# Patient Record
Sex: FEMALE | Race: WHITE | NOT HISPANIC OR LATINO | Employment: OTHER | URBAN - METROPOLITAN AREA
[De-identification: names, ages, dates, MRNs, and addresses within clinical notes are randomized per-mention and may not be internally consistent; named-entity substitution may affect disease eponyms.]

---

## 2017-01-16 ENCOUNTER — APPOINTMENT (OUTPATIENT)
Dept: WOUND CARE | Facility: HOSPITAL | Age: 57
End: 2017-01-16
Attending: SURGERY

## 2017-03-11 ENCOUNTER — HOSPITAL ENCOUNTER (EMERGENCY)
Facility: HOSPITAL | Age: 57
Discharge: HOME | End: 2017-03-11
Attending: EMERGENCY MEDICINE | Admitting: EMERGENCY MEDICINE

## 2017-03-11 ENCOUNTER — APPOINTMENT (OUTPATIENT)
Dept: GENERAL RADIOLOGY | Facility: HOSPITAL | Age: 57
End: 2017-03-11

## 2017-03-11 VITALS
TEMPERATURE: 98.1 F | HEIGHT: 65 IN | SYSTOLIC BLOOD PRESSURE: 126 MMHG | WEIGHT: 165 LBS | OXYGEN SATURATION: 99 % | BODY MASS INDEX: 27.49 KG/M2 | RESPIRATION RATE: 16 BRPM | HEART RATE: 86 BPM | DIASTOLIC BLOOD PRESSURE: 77 MMHG

## 2017-03-11 DIAGNOSIS — S40.011A CONTUSION OF RIGHT SHOULDER, INITIAL ENCOUNTER: Primary | ICD-10-CM

## 2017-03-11 PROCEDURE — 73030 X-RAY EXAM OF SHOULDER: CPT

## 2017-03-11 PROCEDURE — 99283 EMERGENCY DEPT VISIT LOW MDM: CPT

## 2017-03-11 RX ORDER — HYDROCODONE BITARTRATE AND ACETAMINOPHEN 7.5; 325 MG/1; MG/1
1 TABLET ORAL ONCE
Status: COMPLETED | OUTPATIENT
Start: 2017-03-11 | End: 2017-03-11

## 2017-03-11 RX ORDER — CIPROFLOXACIN 500 MG/1
500 TABLET, FILM COATED ORAL 2 TIMES DAILY
COMMUNITY
Start: 2017-03-04 | End: 2017-03-18

## 2017-03-11 RX ADMIN — HYDROCODONE BITARTRATE AND ACETAMINOPHEN 1 TABLET: 7.5; 325 TABLET ORAL at 10:42
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
## 2017-03-13 ENCOUNTER — TRANSCRIBE ORDERS (OUTPATIENT)
Dept: ADMINISTRATIVE | Facility: HOSPITAL | Age: 57
End: 2017-03-13

## 2017-03-13 ENCOUNTER — HOSPITAL ENCOUNTER (OUTPATIENT)
Dept: MRI IMAGING | Facility: HOSPITAL | Age: 57
Discharge: HOME | End: 2017-03-13
Attending: INTERNAL MEDICINE | Admitting: INTERNAL MEDICINE

## 2017-03-13 DIAGNOSIS — M75.101 UNSPECIFIED ROTATOR CUFF TEAR OR RUPTURE OF RIGHT SHOULDER, NOT SPECIFIED AS TRAUMATIC: ICD-10-CM

## 2017-03-13 DIAGNOSIS — M75.101 UNSPECIFIED ROTATOR CUFF TEAR OR RUPTURE OF RIGHT SHOULDER, NOT SPECIFIED AS TRAUMATIC: Primary | ICD-10-CM

## 2017-03-13 PROCEDURE — 73221 MRI JOINT UPR EXTREM W/O DYE: CPT

## 2017-07-05 ENCOUNTER — HOSPITAL ENCOUNTER (OUTPATIENT)
Facility: HOSPITAL | Age: 57
Setting detail: SURGERY ADMIT
End: 2017-07-05
Attending: ORTHOPAEDIC SURGERY | Admitting: ORTHOPAEDIC SURGERY

## 2017-07-18 ENCOUNTER — APPOINTMENT (OUTPATIENT)
Dept: PREADMISSION TESTING | Facility: HOSPITAL | Age: 57
End: 2017-07-18

## 2017-08-17 ENCOUNTER — TRANSCRIBE ORDERS (OUTPATIENT)
Dept: ADMINISTRATIVE | Facility: HOSPITAL | Age: 57
End: 2017-08-17

## 2017-08-17 DIAGNOSIS — I87.2 VENOUS INSUFFICIENCY: ICD-10-CM

## 2017-08-17 DIAGNOSIS — I82.90 THROMBOSIS: Primary | ICD-10-CM

## 2017-08-29 ENCOUNTER — HOSPITAL ENCOUNTER (OUTPATIENT)
Dept: CARDIOLOGY | Facility: HOSPITAL | Age: 57
Discharge: HOME | End: 2017-08-29
Attending: INTERNAL MEDICINE | Admitting: INTERNAL MEDICINE

## 2017-08-29 DIAGNOSIS — I87.2 VENOUS INSUFFICIENCY: ICD-10-CM

## 2017-08-29 DIAGNOSIS — I82.90 THROMBOSIS: ICD-10-CM

## 2017-08-29 LAB
BH CV LOWER VASCULAR LEFT COMMON FEMORAL AUGMENT: NORMAL
BH CV LOWER VASCULAR LEFT COMMON FEMORAL COMPETENT: NORMAL
BH CV LOWER VASCULAR LEFT COMMON FEMORAL COMPRESS: NORMAL
BH CV LOWER VASCULAR LEFT COMMON FEMORAL PHASIC: NORMAL
BH CV LOWER VASCULAR LEFT COMMON FEMORAL SPONT: NORMAL
BH CV LOWER VASCULAR LEFT DISTAL FEMORAL COMPRESS: NORMAL
BH CV LOWER VASCULAR LEFT GASTRONEMIUS COMPRESS: NORMAL
BH CV LOWER VASCULAR LEFT GREATER SAPH AK COMPRESS: NORMAL
BH CV LOWER VASCULAR LEFT GREATER SAPH BK COMPRESS: NORMAL
BH CV LOWER VASCULAR LEFT MID FEMORAL AUGMENT: NORMAL
BH CV LOWER VASCULAR LEFT MID FEMORAL COMPETENT: NORMAL
BH CV LOWER VASCULAR LEFT MID FEMORAL COMPRESS: NORMAL
BH CV LOWER VASCULAR LEFT MID FEMORAL PHASIC: NORMAL
BH CV LOWER VASCULAR LEFT MID FEMORAL SPONT: NORMAL
BH CV LOWER VASCULAR LEFT PERONEAL COMPRESS: NORMAL
BH CV LOWER VASCULAR LEFT POPLITEAL AUGMENT: NORMAL
BH CV LOWER VASCULAR LEFT POPLITEAL COMPETENT: NORMAL
BH CV LOWER VASCULAR LEFT POPLITEAL COMPRESS: NORMAL
BH CV LOWER VASCULAR LEFT POPLITEAL PHASIC: NORMAL
BH CV LOWER VASCULAR LEFT POPLITEAL SPONT: NORMAL
BH CV LOWER VASCULAR LEFT POSTERIOR TIBIAL COMPRESS: NORMAL
BH CV LOWER VASCULAR LEFT PROXIMAL FEMORAL COMPRESS: NORMAL
BH CV LOWER VASCULAR LEFT SAPHENOFEMORAL JUNCTION AUGMENT: NORMAL
BH CV LOWER VASCULAR LEFT SAPHENOFEMORAL JUNCTION COMPETENT: NORMAL
BH CV LOWER VASCULAR LEFT SAPHENOFEMORAL JUNCTION COMPRESS: NORMAL
BH CV LOWER VASCULAR LEFT SAPHENOFEMORAL JUNCTION PHASIC: NORMAL
BH CV LOWER VASCULAR LEFT SAPHENOFEMORAL JUNCTION SPONT: NORMAL
BH CV LOWER VASCULAR RIGHT COMMON FEMORAL AUGMENT: NORMAL
BH CV LOWER VASCULAR RIGHT COMMON FEMORAL COMPETENT: NORMAL
BH CV LOWER VASCULAR RIGHT COMMON FEMORAL COMPRESS: NORMAL
BH CV LOWER VASCULAR RIGHT COMMON FEMORAL PHASIC: NORMAL
BH CV LOWER VASCULAR RIGHT COMMON FEMORAL SPONT: NORMAL
BH CV LOWER VASCULAR RIGHT DISTAL FEMORAL COMPRESS: NORMAL
BH CV LOWER VASCULAR RIGHT GASTRONEMIUS COMPRESS: NORMAL
BH CV LOWER VASCULAR RIGHT GREATER SAPH AK COMPRESS: NORMAL
BH CV LOWER VASCULAR RIGHT GREATER SAPH BK COMPRESS: NORMAL
BH CV LOWER VASCULAR RIGHT MID FEMORAL AUGMENT: NORMAL
BH CV LOWER VASCULAR RIGHT MID FEMORAL COMPETENT: NORMAL
BH CV LOWER VASCULAR RIGHT MID FEMORAL COMPRESS: NORMAL
BH CV LOWER VASCULAR RIGHT MID FEMORAL PHASIC: NORMAL
BH CV LOWER VASCULAR RIGHT MID FEMORAL SPONT: NORMAL
BH CV LOWER VASCULAR RIGHT PERONEAL COMPRESS: NORMAL
BH CV LOWER VASCULAR RIGHT POPLITEAL AUGMENT: NORMAL
BH CV LOWER VASCULAR RIGHT POPLITEAL COMPETENT: NORMAL
BH CV LOWER VASCULAR RIGHT POPLITEAL COMPRESS: NORMAL
BH CV LOWER VASCULAR RIGHT POPLITEAL PHASIC: NORMAL
BH CV LOWER VASCULAR RIGHT POPLITEAL SPONT: NORMAL
BH CV LOWER VASCULAR RIGHT POSTERIOR TIBIAL COMPRESS: NORMAL
BH CV LOWER VASCULAR RIGHT PROXIMAL FEMORAL COMPRESS: NORMAL
BH CV LOWER VASCULAR RIGHT SAPHENOFEMORAL JUNCTION AUGMENT: NORMAL
BH CV LOWER VASCULAR RIGHT SAPHENOFEMORAL JUNCTION COMPETENT: NORMAL
BH CV LOWER VASCULAR RIGHT SAPHENOFEMORAL JUNCTION COMPRESS: NORMAL
BH CV LOWER VASCULAR RIGHT SAPHENOFEMORAL JUNCTION PHASIC: NORMAL
BH CV LOWER VASCULAR RIGHT SAPHENOFEMORAL JUNCTION SPONT: NORMAL

## 2017-08-29 PROCEDURE — 93970 EXTREMITY STUDY: CPT

## 2018-01-23 ENCOUNTER — APPOINTMENT (OUTPATIENT)
Dept: WOMENS IMAGING | Facility: HOSPITAL | Age: 58
End: 2018-01-23

## 2018-01-23 PROCEDURE — 77067 SCR MAMMO BI INCL CAD: CPT | Performed by: RADIOLOGY

## 2018-01-23 PROCEDURE — 77063 BREAST TOMOSYNTHESIS BI: CPT | Performed by: RADIOLOGY

## 2018-03-27 ENCOUNTER — TRANSCRIBE ORDERS (OUTPATIENT)
Dept: ADMINISTRATIVE | Facility: HOSPITAL | Age: 58
End: 2018-03-27

## 2018-03-27 DIAGNOSIS — M54.12 CERVICAL RADICULOPATHY: Primary | ICD-10-CM

## 2018-03-30 ENCOUNTER — HOSPITAL ENCOUNTER (OUTPATIENT)
Dept: MRI IMAGING | Facility: HOSPITAL | Age: 58
Discharge: HOME | End: 2018-03-30
Attending: SURGERY | Admitting: SURGERY

## 2018-03-30 ENCOUNTER — TRANSCRIBE ORDERS (OUTPATIENT)
Dept: ADMINISTRATIVE | Facility: HOSPITAL | Age: 58
End: 2018-03-30

## 2018-03-30 ENCOUNTER — HOSPITAL ENCOUNTER (OUTPATIENT)
Dept: GENERAL RADIOLOGY | Facility: HOSPITAL | Age: 58
Discharge: HOME | End: 2018-03-30
Attending: INTERNAL MEDICINE

## 2018-03-30 DIAGNOSIS — M19.112 POST-TRAUMATIC OSTEOARTHRITIS OF LEFT SHOULDER: ICD-10-CM

## 2018-03-30 DIAGNOSIS — M19.112 POST-TRAUMATIC OSTEOARTHRITIS OF LEFT SHOULDER: Primary | ICD-10-CM

## 2018-03-30 DIAGNOSIS — M54.12 CERVICAL RADICULOPATHY: ICD-10-CM

## 2018-03-30 PROCEDURE — 72141 MRI NECK SPINE W/O DYE: CPT

## 2018-03-30 PROCEDURE — 73030 X-RAY EXAM OF SHOULDER: CPT

## 2018-04-03 ENCOUNTER — LAB REQUISITION (OUTPATIENT)
Dept: LAB | Facility: HOSPITAL | Age: 58
End: 2018-04-03

## 2018-04-03 DIAGNOSIS — L08.9 LOCAL INFECTION OF SKIN AND SUBCUTANEOUS TISSUE: ICD-10-CM

## 2018-04-03 PROCEDURE — 87205 SMEAR GRAM STAIN: CPT | Performed by: SURGERY

## 2018-04-03 PROCEDURE — 87070 CULTURE OTHR SPECIMN AEROBIC: CPT | Performed by: SURGERY

## 2018-04-05 LAB
BACTERIA SPEC AEROBE CULT: NORMAL
BACTERIA SPEC AEROBE CULT: NORMAL
GRAM STN SPEC: NORMAL

## 2018-05-10 ENCOUNTER — APPOINTMENT (OUTPATIENT)
Dept: OTHER | Facility: HOSPITAL | Age: 58
End: 2018-05-10

## 2018-06-21 ENCOUNTER — APPOINTMENT (OUTPATIENT)
Dept: GENERAL RADIOLOGY | Facility: HOSPITAL | Age: 58
End: 2018-06-21

## 2018-06-21 ENCOUNTER — HOSPITAL ENCOUNTER (EMERGENCY)
Facility: HOSPITAL | Age: 58
Discharge: HOME | End: 2018-06-21
Attending: EMERGENCY MEDICINE | Admitting: EMERGENCY MEDICINE

## 2018-06-21 VITALS
SYSTOLIC BLOOD PRESSURE: 148 MMHG | WEIGHT: 160 LBS | HEIGHT: 65 IN | HEART RATE: 80 BPM | OXYGEN SATURATION: 98 % | DIASTOLIC BLOOD PRESSURE: 100 MMHG | BODY MASS INDEX: 26.66 KG/M2 | RESPIRATION RATE: 18 BRPM | TEMPERATURE: 97.6 F

## 2018-06-21 DIAGNOSIS — S43.401A SPRAIN OF RIGHT SHOULDER, UNSPECIFIED SHOULDER SPRAIN TYPE, INITIAL ENCOUNTER: ICD-10-CM

## 2018-06-21 DIAGNOSIS — N30.00 ACUTE CYSTITIS WITHOUT HEMATURIA: ICD-10-CM

## 2018-06-21 DIAGNOSIS — S63.502A SPRAIN OF LEFT WRIST, INITIAL ENCOUNTER: Primary | ICD-10-CM

## 2018-06-21 LAB
BACTERIA UR QL AUTO: ABNORMAL /HPF
BILIRUB UR QL STRIP: NEGATIVE
CLARITY UR: ABNORMAL
COLOR UR: YELLOW
GLUCOSE UR STRIP-MCNC: NEGATIVE MG/DL
HGB UR QL STRIP.AUTO: ABNORMAL
HYALINE CASTS UR QL AUTO: ABNORMAL /LPF
KETONES UR QL STRIP: NEGATIVE
LEUKOCYTE ESTERASE UR QL STRIP.AUTO: ABNORMAL
NITRITE UR QL STRIP: POSITIVE
PH UR STRIP.AUTO: 7.5 [PH] (ref 5–8)
PROT UR QL STRIP: ABNORMAL
RBC # UR: ABNORMAL /HPF
REF LAB TEST METHOD: ABNORMAL
SP GR UR STRIP: 1.03 (ref 1–1.03)
SQUAMOUS #/AREA URNS HPF: ABNORMAL /HPF
UROBILINOGEN UR QL STRIP: ABNORMAL
WBC UR QL AUTO: ABNORMAL /HPF

## 2018-06-21 PROCEDURE — 99283 EMERGENCY DEPT VISIT LOW MDM: CPT

## 2018-06-21 PROCEDURE — 73030 X-RAY EXAM OF SHOULDER: CPT

## 2018-06-21 PROCEDURE — 73110 X-RAY EXAM OF WRIST: CPT

## 2018-06-21 PROCEDURE — 81001 URINALYSIS AUTO W/SCOPE: CPT | Performed by: EMERGENCY MEDICINE

## 2018-06-21 PROCEDURE — P9612 CATHETERIZE FOR URINE SPEC: HCPCS

## 2018-06-21 RX ORDER — PHENAZOPYRIDINE HYDROCHLORIDE 200 MG/1
200 TABLET, FILM COATED ORAL 3 TIMES DAILY PRN
Qty: 15 TABLET | Refills: 0 | Status: SHIPPED | OUTPATIENT
Start: 2018-06-21 | End: 2022-03-25

## 2018-06-21 RX ORDER — OXYCODONE HCL 10 MG/1
20 TABLET, FILM COATED, EXTENDED RELEASE ORAL EVERY 12 HOURS PRN
COMMUNITY
End: 2022-03-03 | Stop reason: HOSPADM

## 2018-06-21 RX ORDER — ATORVASTATIN CALCIUM 10 MG/1
10 TABLET, FILM COATED ORAL DAILY
COMMUNITY

## 2018-06-21 RX ORDER — CEPHALEXIN 500 MG/1
500 CAPSULE ORAL 3 TIMES DAILY
Qty: 21 CAPSULE | Refills: 0 | Status: SHIPPED | OUTPATIENT
Start: 2018-06-21 | End: 2018-11-14

## 2018-10-22 ENCOUNTER — ANESTHESIA EVENT (OUTPATIENT)
Dept: PERIOP | Facility: HOSPITAL | Age: 58
End: 2018-10-22

## 2018-10-22 ENCOUNTER — ANESTHESIA (OUTPATIENT)
Dept: PERIOP | Facility: HOSPITAL | Age: 58
End: 2018-10-22

## 2018-10-22 ENCOUNTER — HOSPITAL ENCOUNTER (INPATIENT)
Facility: HOSPITAL | Age: 58
LOS: 3 days | Discharge: NURSING, REHAB OR LONG-TERM CARE | End: 2018-10-25
Attending: ORTHOPAEDIC SURGERY | Admitting: ORTHOPAEDIC SURGERY

## 2018-10-22 ENCOUNTER — APPOINTMENT (OUTPATIENT)
Dept: GENERAL RADIOLOGY | Facility: HOSPITAL | Age: 58
End: 2018-10-22

## 2018-10-22 DIAGNOSIS — M19.011 PRIMARY OSTEOARTHRITIS OF RIGHT SHOULDER: Primary | ICD-10-CM

## 2018-10-22 LAB
ANION GAP SERPL CALCULATED.3IONS-SCNC: 13.2 MMOL/L
BASOPHILS # BLD AUTO: 0.01 10*3/MM3 (ref 0–0.2)
BASOPHILS NFR BLD AUTO: 0.1 % (ref 0–1.5)
BUN BLD-MCNC: 20 MG/DL (ref 6–20)
BUN/CREAT SERPL: 33.3 (ref 7–25)
CALCIUM SPEC-SCNC: 9.8 MG/DL (ref 8.6–10.5)
CHLORIDE SERPL-SCNC: 101 MMOL/L (ref 98–107)
CO2 SERPL-SCNC: 25.8 MMOL/L (ref 22–29)
CREAT BLD-MCNC: 0.6 MG/DL (ref 0.57–1)
DEPRECATED RDW RBC AUTO: 46.3 FL (ref 37–54)
EOSINOPHIL # BLD AUTO: 0.08 10*3/MM3 (ref 0–0.7)
EOSINOPHIL NFR BLD AUTO: 0.8 % (ref 0.3–6.2)
ERYTHROCYTE [DISTWIDTH] IN BLOOD BY AUTOMATED COUNT: 13.6 % (ref 11.7–13)
GFR SERPL CREATININE-BSD FRML MDRD: 104 ML/MIN/1.73
GLUCOSE BLD-MCNC: 119 MG/DL (ref 65–99)
HCT VFR BLD AUTO: 40.3 % (ref 35.6–45.5)
HGB BLD-MCNC: 13 G/DL (ref 11.9–15.5)
IMM GRANULOCYTES # BLD: 0.01 10*3/MM3 (ref 0–0.03)
IMM GRANULOCYTES NFR BLD: 0.1 % (ref 0–0.5)
LYMPHOCYTES # BLD AUTO: 1.12 10*3/MM3 (ref 0.9–4.8)
LYMPHOCYTES NFR BLD AUTO: 11.2 % (ref 19.6–45.3)
MCH RBC QN AUTO: 29.7 PG (ref 26.9–32)
MCHC RBC AUTO-ENTMCNC: 32.3 G/DL (ref 32.4–36.3)
MCV RBC AUTO: 92 FL (ref 80.5–98.2)
MONOCYTES # BLD AUTO: 0.51 10*3/MM3 (ref 0.2–1.2)
MONOCYTES NFR BLD AUTO: 5.1 % (ref 5–12)
NEUTROPHILS # BLD AUTO: 8.28 10*3/MM3 (ref 1.9–8.1)
NEUTROPHILS NFR BLD AUTO: 82.8 % (ref 42.7–76)
PLATELET # BLD AUTO: 347 10*3/MM3 (ref 140–500)
PMV BLD AUTO: 9.7 FL (ref 6–12)
POTASSIUM BLD-SCNC: 3.4 MMOL/L (ref 3.5–5.2)
RBC # BLD AUTO: 4.38 10*6/MM3 (ref 3.9–5.2)
SODIUM BLD-SCNC: 140 MMOL/L (ref 136–145)
WBC NRBC COR # BLD: 10 10*3/MM3 (ref 4.5–10.7)

## 2018-10-22 PROCEDURE — 25010000002 DEXAMETHASONE PER 1 MG: Performed by: NURSE ANESTHETIST, CERTIFIED REGISTERED

## 2018-10-22 PROCEDURE — C1776 JOINT DEVICE (IMPLANTABLE): HCPCS | Performed by: ORTHOPAEDIC SURGERY

## 2018-10-22 PROCEDURE — 25010000002 FENTANYL CITRATE (PF) 100 MCG/2ML SOLUTION: Performed by: NURSE ANESTHETIST, CERTIFIED REGISTERED

## 2018-10-22 PROCEDURE — 25010000003 CEFAZOLIN IN DEXTROSE 2-4 GM/100ML-% SOLUTION: Performed by: NURSE ANESTHETIST, CERTIFIED REGISTERED

## 2018-10-22 PROCEDURE — 85025 COMPLETE CBC W/AUTO DIFF WBC: CPT | Performed by: ORTHOPAEDIC SURGERY

## 2018-10-22 PROCEDURE — L3670 SO ACRO/CLAV CAN WEB PRE OTS: HCPCS | Performed by: ORTHOPAEDIC SURGERY

## 2018-10-22 PROCEDURE — 25010000002 MIDAZOLAM PER 1 MG: Performed by: ANESTHESIOLOGY

## 2018-10-22 PROCEDURE — 25010000003 CEFAZOLIN IN DEXTROSE 2-4 GM/100ML-% SOLUTION: Performed by: ORTHOPAEDIC SURGERY

## 2018-10-22 PROCEDURE — 25010000003 MEPIVACAINE PER 10 ML: Performed by: ANESTHESIOLOGY

## 2018-10-22 PROCEDURE — 25010000002 FENTANYL CITRATE (PF) 100 MCG/2ML SOLUTION: Performed by: ANESTHESIOLOGY

## 2018-10-22 PROCEDURE — 25010000002 PROPOFOL 10 MG/ML EMULSION: Performed by: NURSE ANESTHETIST, CERTIFIED REGISTERED

## 2018-10-22 PROCEDURE — 80048 BASIC METABOLIC PNL TOTAL CA: CPT | Performed by: ORTHOPAEDIC SURGERY

## 2018-10-22 PROCEDURE — 25010000002 ONDANSETRON PER 1 MG: Performed by: NURSE ANESTHETIST, CERTIFIED REGISTERED

## 2018-10-22 PROCEDURE — 73020 X-RAY EXAM OF SHOULDER: CPT

## 2018-10-22 PROCEDURE — 0RRJ00Z REPLACEMENT OF RIGHT SHOULDER JOINT WITH REVERSE BALL AND SOCKET SYNTHETIC SUBSTITUTE, OPEN APPROACH: ICD-10-PCS | Performed by: ORTHOPAEDIC SURGERY

## 2018-10-22 PROCEDURE — 25010000002 ROPIVACAINE PER 1 MG: Performed by: ANESTHESIOLOGY

## 2018-10-22 DEVICE — IMPLANTABLE DEVICE
Type: IMPLANTABLE DEVICE | Site: SHOULDER | Status: FUNCTIONAL
Brand: COMPREHENSIVE® REVERSE SHOULDER

## 2018-10-22 DEVICE — IMPLANTABLE DEVICE: Type: IMPLANTABLE DEVICE | Site: SHOULDER | Status: FUNCTIONAL

## 2018-10-22 DEVICE — BASEPLT GLEN COMPR MINI W TPR ADAPTR 25: Type: IMPLANTABLE DEVICE | Site: SHOULDER | Status: FUNCTIONAL

## 2018-10-22 DEVICE — SCRW FIX LK HEX 4.75X20MM: Type: IMPLANTABLE DEVICE | Site: SHOULDER | Status: FUNCTIONAL

## 2018-10-22 DEVICE — IMPLANTABLE DEVICE
Type: IMPLANTABLE DEVICE | Site: SHOULDER | Status: FUNCTIONAL
Brand: TRABECULAR METAL®

## 2018-10-22 DEVICE — IMPLANTABLE DEVICE
Type: IMPLANTABLE DEVICE | Site: SHOULDER | Status: FUNCTIONAL
Brand: COMPREHENSIVE REVERSE SHOULDER

## 2018-10-22 RX ORDER — FLUOXETINE HYDROCHLORIDE 20 MG/1
60 CAPSULE ORAL DAILY
Status: DISCONTINUED | OUTPATIENT
Start: 2018-10-22 | End: 2018-10-25 | Stop reason: HOSPADM

## 2018-10-22 RX ORDER — EPHEDRINE SULFATE 50 MG/ML
INJECTION, SOLUTION INTRAVENOUS AS NEEDED
Status: DISCONTINUED | OUTPATIENT
Start: 2018-10-22 | End: 2018-10-22 | Stop reason: SURG

## 2018-10-22 RX ORDER — PROMETHAZINE HYDROCHLORIDE 25 MG/ML
12.5 INJECTION, SOLUTION INTRAMUSCULAR; INTRAVENOUS ONCE AS NEEDED
Status: DISCONTINUED | OUTPATIENT
Start: 2018-10-22 | End: 2018-10-22 | Stop reason: HOSPADM

## 2018-10-22 RX ORDER — ATORVASTATIN CALCIUM 10 MG/1
10 TABLET, FILM COATED ORAL DAILY
Status: DISCONTINUED | OUTPATIENT
Start: 2018-10-22 | End: 2018-10-25 | Stop reason: HOSPADM

## 2018-10-22 RX ORDER — PHENAZOPYRIDINE HYDROCHLORIDE 200 MG/1
200 TABLET, FILM COATED ORAL 3 TIMES DAILY PRN
Status: DISCONTINUED | OUTPATIENT
Start: 2018-10-22 | End: 2018-10-25 | Stop reason: HOSPADM

## 2018-10-22 RX ORDER — OXYCODONE HCL 10 MG/1
10 TABLET, FILM COATED, EXTENDED RELEASE ORAL EVERY 12 HOURS PRN
Status: DISCONTINUED | OUTPATIENT
Start: 2018-10-22 | End: 2018-10-23

## 2018-10-22 RX ORDER — HYDROCODONE BITARTRATE AND ACETAMINOPHEN 7.5; 325 MG/1; MG/1
1 TABLET ORAL ONCE AS NEEDED
Status: DISCONTINUED | OUTPATIENT
Start: 2018-10-22 | End: 2018-10-22 | Stop reason: HOSPADM

## 2018-10-22 RX ORDER — LABETALOL HYDROCHLORIDE 5 MG/ML
5 INJECTION, SOLUTION INTRAVENOUS
Status: DISCONTINUED | OUTPATIENT
Start: 2018-10-22 | End: 2018-10-22 | Stop reason: HOSPADM

## 2018-10-22 RX ORDER — DIPHENHYDRAMINE HCL 25 MG
25 CAPSULE ORAL EVERY 6 HOURS PRN
Status: DISCONTINUED | OUTPATIENT
Start: 2018-10-22 | End: 2018-10-22 | Stop reason: HOSPADM

## 2018-10-22 RX ORDER — PANTOPRAZOLE SODIUM 40 MG/1
40 TABLET, DELAYED RELEASE ORAL
Status: DISCONTINUED | OUTPATIENT
Start: 2018-10-22 | End: 2018-10-25 | Stop reason: HOSPADM

## 2018-10-22 RX ORDER — SODIUM CHLORIDE 0.9 % (FLUSH) 0.9 %
1-10 SYRINGE (ML) INJECTION AS NEEDED
Status: DISCONTINUED | OUTPATIENT
Start: 2018-10-22 | End: 2018-10-25 | Stop reason: HOSPADM

## 2018-10-22 RX ORDER — UREA 10 %
1 LOTION (ML) TOPICAL NIGHTLY PRN
Status: DISCONTINUED | OUTPATIENT
Start: 2018-10-22 | End: 2018-10-25 | Stop reason: HOSPADM

## 2018-10-22 RX ORDER — HYDROCODONE BITARTRATE AND ACETAMINOPHEN 10; 325 MG/1; MG/1
1 TABLET ORAL EVERY 4 HOURS PRN
Status: DISCONTINUED | OUTPATIENT
Start: 2018-10-22 | End: 2018-10-25 | Stop reason: HOSPADM

## 2018-10-22 RX ORDER — OXYCODONE AND ACETAMINOPHEN 10; 325 MG/1; MG/1
2 TABLET ORAL EVERY 12 HOURS
COMMUNITY
End: 2022-03-03 | Stop reason: HOSPADM

## 2018-10-22 RX ORDER — MAGNESIUM HYDROXIDE 1200 MG/15ML
LIQUID ORAL AS NEEDED
Status: DISCONTINUED | OUTPATIENT
Start: 2018-10-22 | End: 2018-10-22 | Stop reason: HOSPADM

## 2018-10-22 RX ORDER — SODIUM CHLORIDE, SODIUM LACTATE, POTASSIUM CHLORIDE, CALCIUM CHLORIDE 600; 310; 30; 20 MG/100ML; MG/100ML; MG/100ML; MG/100ML
9 INJECTION, SOLUTION INTRAVENOUS CONTINUOUS
Status: DISCONTINUED | OUTPATIENT
Start: 2018-10-22 | End: 2018-10-25 | Stop reason: HOSPADM

## 2018-10-22 RX ORDER — MIDAZOLAM HYDROCHLORIDE 1 MG/ML
1 INJECTION INTRAMUSCULAR; INTRAVENOUS
Status: DISCONTINUED | OUTPATIENT
Start: 2018-10-22 | End: 2018-10-22 | Stop reason: HOSPADM

## 2018-10-22 RX ORDER — SODIUM CHLORIDE 0.9 % (FLUSH) 0.9 %
3 SYRINGE (ML) INJECTION EVERY 12 HOURS SCHEDULED
Status: DISCONTINUED | OUTPATIENT
Start: 2018-10-22 | End: 2018-10-25 | Stop reason: HOSPADM

## 2018-10-22 RX ORDER — FENTANYL CITRATE 50 UG/ML
INJECTION, SOLUTION INTRAMUSCULAR; INTRAVENOUS AS NEEDED
Status: DISCONTINUED | OUTPATIENT
Start: 2018-10-22 | End: 2018-10-22 | Stop reason: SURG

## 2018-10-22 RX ORDER — MIDAZOLAM HYDROCHLORIDE 1 MG/ML
2 INJECTION INTRAMUSCULAR; INTRAVENOUS
Status: DISCONTINUED | OUTPATIENT
Start: 2018-10-22 | End: 2018-10-22 | Stop reason: HOSPADM

## 2018-10-22 RX ORDER — SENNA AND DOCUSATE SODIUM 50; 8.6 MG/1; MG/1
2 TABLET, FILM COATED ORAL 2 TIMES DAILY
Status: DISCONTINUED | OUTPATIENT
Start: 2018-10-22 | End: 2018-10-25 | Stop reason: HOSPADM

## 2018-10-22 RX ORDER — SODIUM CHLORIDE 0.9 % (FLUSH) 0.9 %
1-10 SYRINGE (ML) INJECTION AS NEEDED
Status: DISCONTINUED | OUTPATIENT
Start: 2018-10-22 | End: 2018-10-22 | Stop reason: HOSPADM

## 2018-10-22 RX ORDER — LIDOCAINE HYDROCHLORIDE 20 MG/ML
INJECTION, SOLUTION INFILTRATION; PERINEURAL AS NEEDED
Status: DISCONTINUED | OUTPATIENT
Start: 2018-10-22 | End: 2018-10-22 | Stop reason: SURG

## 2018-10-22 RX ORDER — TRANEXAMIC ACID 100 MG/ML
INJECTION, SOLUTION INTRAVENOUS AS NEEDED
Status: DISCONTINUED | OUTPATIENT
Start: 2018-10-22 | End: 2018-10-22 | Stop reason: SURG

## 2018-10-22 RX ORDER — FENTANYL CITRATE 50 UG/ML
50 INJECTION, SOLUTION INTRAMUSCULAR; INTRAVENOUS
Status: DISCONTINUED | OUTPATIENT
Start: 2018-10-22 | End: 2018-10-22 | Stop reason: HOSPADM

## 2018-10-22 RX ORDER — MIDAZOLAM HYDROCHLORIDE 5 MG/ML
INJECTION INTRAMUSCULAR; INTRAVENOUS
Status: COMPLETED | OUTPATIENT
Start: 2018-10-22 | End: 2018-10-22

## 2018-10-22 RX ORDER — ACETAMINOPHEN 325 MG/1
650 TABLET ORAL EVERY 4 HOURS PRN
Status: DISCONTINUED | OUTPATIENT
Start: 2018-10-22 | End: 2018-10-25 | Stop reason: HOSPADM

## 2018-10-22 RX ORDER — FAMOTIDINE 10 MG/ML
20 INJECTION, SOLUTION INTRAVENOUS ONCE
Status: COMPLETED | OUTPATIENT
Start: 2018-10-22 | End: 2018-10-22

## 2018-10-22 RX ORDER — EPHEDRINE SULFATE 50 MG/ML
5 INJECTION, SOLUTION INTRAVENOUS ONCE AS NEEDED
Status: DISCONTINUED | OUTPATIENT
Start: 2018-10-22 | End: 2018-10-22 | Stop reason: HOSPADM

## 2018-10-22 RX ORDER — KETOROLAC TROMETHAMINE 30 MG/ML
15 INJECTION, SOLUTION INTRAMUSCULAR; INTRAVENOUS EVERY 6 HOURS PRN
Status: DISPENSED | OUTPATIENT
Start: 2018-10-22 | End: 2018-10-24

## 2018-10-22 RX ORDER — SODIUM CHLORIDE, SODIUM LACTATE, POTASSIUM CHLORIDE, CALCIUM CHLORIDE 600; 310; 30; 20 MG/100ML; MG/100ML; MG/100ML; MG/100ML
100 INJECTION, SOLUTION INTRAVENOUS CONTINUOUS
Status: DISCONTINUED | OUTPATIENT
Start: 2018-10-22 | End: 2018-10-25 | Stop reason: HOSPADM

## 2018-10-22 RX ORDER — BACLOFEN 10 MG/1
10 TABLET ORAL 3 TIMES DAILY
Status: DISCONTINUED | OUTPATIENT
Start: 2018-10-22 | End: 2018-10-24

## 2018-10-22 RX ORDER — CLONAZEPAM 0.5 MG/1
0.25 TABLET ORAL 2 TIMES DAILY PRN
Status: DISCONTINUED | OUTPATIENT
Start: 2018-10-22 | End: 2018-10-25 | Stop reason: HOSPADM

## 2018-10-22 RX ORDER — PROMETHAZINE HYDROCHLORIDE 25 MG/1
25 TABLET ORAL EVERY 6 HOURS PRN
Status: DISCONTINUED | OUTPATIENT
Start: 2018-10-22 | End: 2018-10-25 | Stop reason: HOSPADM

## 2018-10-22 RX ORDER — CEFAZOLIN SODIUM 2 G/100ML
2 INJECTION, SOLUTION INTRAVENOUS EVERY 8 HOURS
Status: COMPLETED | OUTPATIENT
Start: 2018-10-22 | End: 2018-10-23

## 2018-10-22 RX ORDER — ROCURONIUM BROMIDE 10 MG/ML
INJECTION, SOLUTION INTRAVENOUS AS NEEDED
Status: DISCONTINUED | OUTPATIENT
Start: 2018-10-22 | End: 2018-10-22 | Stop reason: SURG

## 2018-10-22 RX ORDER — PROMETHAZINE HYDROCHLORIDE 12.5 MG/1
12.5 TABLET ORAL EVERY 6 HOURS PRN
Status: DISCONTINUED | OUTPATIENT
Start: 2018-10-22 | End: 2018-10-25 | Stop reason: HOSPADM

## 2018-10-22 RX ORDER — ACETAMINOPHEN 650 MG/1
650 SUPPOSITORY RECTAL EVERY 4 HOURS PRN
Status: DISCONTINUED | OUTPATIENT
Start: 2018-10-22 | End: 2018-10-25 | Stop reason: HOSPADM

## 2018-10-22 RX ORDER — ACETAMINOPHEN 160 MG/5ML
650 SOLUTION ORAL EVERY 4 HOURS PRN
Status: DISCONTINUED | OUTPATIENT
Start: 2018-10-22 | End: 2018-10-25 | Stop reason: HOSPADM

## 2018-10-22 RX ORDER — ROPIVACAINE HYDROCHLORIDE 5 MG/ML
INJECTION, SOLUTION EPIDURAL; INFILTRATION; PERINEURAL AS NEEDED
Status: DISCONTINUED | OUTPATIENT
Start: 2018-10-22 | End: 2018-10-22 | Stop reason: SURG

## 2018-10-22 RX ORDER — NALOXONE HCL 0.4 MG/ML
0.2 VIAL (ML) INJECTION AS NEEDED
Status: DISCONTINUED | OUTPATIENT
Start: 2018-10-22 | End: 2018-10-22 | Stop reason: HOSPADM

## 2018-10-22 RX ORDER — FAMOTIDINE 40 MG/1
40 TABLET, FILM COATED ORAL DAILY
Status: DISCONTINUED | OUTPATIENT
Start: 2018-10-22 | End: 2018-10-25 | Stop reason: HOSPADM

## 2018-10-22 RX ORDER — HYDROMORPHONE HYDROCHLORIDE 1 MG/ML
0.5 INJECTION, SOLUTION INTRAMUSCULAR; INTRAVENOUS; SUBCUTANEOUS
Status: DISCONTINUED | OUTPATIENT
Start: 2018-10-22 | End: 2018-10-22 | Stop reason: HOSPADM

## 2018-10-22 RX ORDER — ONDANSETRON 2 MG/ML
4 INJECTION INTRAMUSCULAR; INTRAVENOUS ONCE AS NEEDED
Status: DISCONTINUED | OUTPATIENT
Start: 2018-10-22 | End: 2018-10-22 | Stop reason: HOSPADM

## 2018-10-22 RX ORDER — PRAMIPEXOLE DIHYDROCHLORIDE 1.5 MG/1
1.5 TABLET ORAL 3 TIMES DAILY
Status: DISCONTINUED | OUTPATIENT
Start: 2018-10-22 | End: 2018-10-25

## 2018-10-22 RX ORDER — ONDANSETRON 2 MG/ML
INJECTION INTRAMUSCULAR; INTRAVENOUS AS NEEDED
Status: DISCONTINUED | OUTPATIENT
Start: 2018-10-22 | End: 2018-10-22 | Stop reason: SURG

## 2018-10-22 RX ORDER — PROPOFOL 10 MG/ML
VIAL (ML) INTRAVENOUS AS NEEDED
Status: DISCONTINUED | OUTPATIENT
Start: 2018-10-22 | End: 2018-10-22 | Stop reason: SURG

## 2018-10-22 RX ORDER — PROMETHAZINE HYDROCHLORIDE 25 MG/1
12.5 TABLET ORAL ONCE AS NEEDED
Status: DISCONTINUED | OUTPATIENT
Start: 2018-10-22 | End: 2018-10-22 | Stop reason: HOSPADM

## 2018-10-22 RX ORDER — LIDOCAINE HYDROCHLORIDE 10 MG/ML
0.5 INJECTION, SOLUTION EPIDURAL; INFILTRATION; INTRACAUDAL; PERINEURAL ONCE AS NEEDED
Status: DISCONTINUED | OUTPATIENT
Start: 2018-10-22 | End: 2018-10-22 | Stop reason: HOSPADM

## 2018-10-22 RX ORDER — FLUMAZENIL 0.1 MG/ML
0.2 INJECTION INTRAVENOUS AS NEEDED
Status: DISCONTINUED | OUTPATIENT
Start: 2018-10-22 | End: 2018-10-22 | Stop reason: HOSPADM

## 2018-10-22 RX ORDER — OXYCODONE AND ACETAMINOPHEN 10; 325 MG/1; MG/1
2 TABLET ORAL EVERY 4 HOURS PRN
Status: DISCONTINUED | OUTPATIENT
Start: 2018-10-22 | End: 2018-10-23

## 2018-10-22 RX ORDER — OXYCODONE AND ACETAMINOPHEN 7.5; 325 MG/1; MG/1
1 TABLET ORAL ONCE AS NEEDED
Status: DISCONTINUED | OUTPATIENT
Start: 2018-10-22 | End: 2018-10-22 | Stop reason: HOSPADM

## 2018-10-22 RX ORDER — CEFAZOLIN SODIUM 2 G/100ML
INJECTION, SOLUTION INTRAVENOUS AS NEEDED
Status: DISCONTINUED | OUTPATIENT
Start: 2018-10-22 | End: 2018-10-22 | Stop reason: SURG

## 2018-10-22 RX ORDER — PROMETHAZINE HYDROCHLORIDE 25 MG/1
25 SUPPOSITORY RECTAL ONCE AS NEEDED
Status: DISCONTINUED | OUTPATIENT
Start: 2018-10-22 | End: 2018-10-22 | Stop reason: HOSPADM

## 2018-10-22 RX ORDER — PROMETHAZINE HYDROCHLORIDE 25 MG/1
25 TABLET ORAL ONCE AS NEEDED
Status: DISCONTINUED | OUTPATIENT
Start: 2018-10-22 | End: 2018-10-22 | Stop reason: HOSPADM

## 2018-10-22 RX ORDER — DEXAMETHASONE SODIUM PHOSPHATE 10 MG/ML
INJECTION INTRAMUSCULAR; INTRAVENOUS AS NEEDED
Status: DISCONTINUED | OUTPATIENT
Start: 2018-10-22 | End: 2018-10-22 | Stop reason: SURG

## 2018-10-22 RX ADMIN — SUGAMMADEX 400 MG: 100 INJECTION, SOLUTION INTRAVENOUS at 14:25

## 2018-10-22 RX ADMIN — PROPOFOL 150 MG: 10 INJECTION, EMULSION INTRAVENOUS at 12:50

## 2018-10-22 RX ADMIN — Medication 3 ML: at 21:48

## 2018-10-22 RX ADMIN — SODIUM CHLORIDE, POTASSIUM CHLORIDE, SODIUM LACTATE AND CALCIUM CHLORIDE 100 ML/HR: 600; 310; 30; 20 INJECTION, SOLUTION INTRAVENOUS at 20:59

## 2018-10-22 RX ADMIN — ROCURONIUM BROMIDE 35 MG: 10 INJECTION INTRAVENOUS at 12:50

## 2018-10-22 RX ADMIN — PRAMIPEXOLE DIHYDROCHLORIDE 1.5 MG: 1.5 TABLET ORAL at 18:13

## 2018-10-22 RX ADMIN — DEXAMETHASONE SODIUM PHOSPHATE 8 MG: 10 INJECTION INTRAMUSCULAR; INTRAVENOUS at 13:21

## 2018-10-22 RX ADMIN — EPHEDRINE SULFATE 5 MG: 50 INJECTION INTRAMUSCULAR; INTRAVENOUS; SUBCUTANEOUS at 14:05

## 2018-10-22 RX ADMIN — SENNOSIDES AND DOCUSATE SODIUM 2 TABLET: 8.6; 5 TABLET ORAL at 20:59

## 2018-10-22 RX ADMIN — MIDAZOLAM HYDROCHLORIDE 1 MG: 2 INJECTION, SOLUTION INTRAMUSCULAR; INTRAVENOUS at 12:25

## 2018-10-22 RX ADMIN — FENTANYL CITRATE 50 MCG: 50 INJECTION, SOLUTION INTRAMUSCULAR; INTRAVENOUS at 15:00

## 2018-10-22 RX ADMIN — SODIUM CHLORIDE, POTASSIUM CHLORIDE, SODIUM LACTATE AND CALCIUM CHLORIDE 9 ML/HR: 600; 310; 30; 20 INJECTION, SOLUTION INTRAVENOUS at 12:24

## 2018-10-22 RX ADMIN — CEFAZOLIN SODIUM 2 G: 2 INJECTION, SOLUTION INTRAVENOUS at 20:59

## 2018-10-22 RX ADMIN — TRANEXAMIC ACID 1000 MG: 100 INJECTION, SOLUTION INTRAVENOUS at 13:12

## 2018-10-22 RX ADMIN — MEPIVACAINE HYDROCHLORIDE 10 ML: 15 INJECTION, SOLUTION EPIDURAL; INFILTRATION at 12:25

## 2018-10-22 RX ADMIN — CEFAZOLIN SODIUM 2 G: 2 INJECTION, SOLUTION INTRAVENOUS at 12:47

## 2018-10-22 RX ADMIN — PANTOPRAZOLE SODIUM 40 MG: 40 TABLET, DELAYED RELEASE ORAL at 17:13

## 2018-10-22 RX ADMIN — BACLOFEN 10 MG: 10 TABLET ORAL at 22:19

## 2018-10-22 RX ADMIN — BACLOFEN 10 MG: 10 TABLET ORAL at 18:13

## 2018-10-22 RX ADMIN — ATORVASTATIN CALCIUM 10 MG: 10 TABLET, FILM COATED ORAL at 18:13

## 2018-10-22 RX ADMIN — ROPIVACAINE HYDROCHLORIDE 25 ML: 5 INJECTION, SOLUTION EPIDURAL; INFILTRATION; PERINEURAL at 12:25

## 2018-10-22 RX ADMIN — MIDAZOLAM HYDROCHLORIDE 1 MG: 2 INJECTION, SOLUTION INTRAMUSCULAR; INTRAVENOUS at 12:32

## 2018-10-22 RX ADMIN — ROCURONIUM BROMIDE 15 MG: 10 INJECTION INTRAVENOUS at 13:53

## 2018-10-22 RX ADMIN — FENTANYL CITRATE 50 MCG: 50 INJECTION, SOLUTION INTRAMUSCULAR; INTRAVENOUS at 16:00

## 2018-10-22 RX ADMIN — LIDOCAINE HYDROCHLORIDE 100 MG: 20 INJECTION, SOLUTION INFILTRATION; PERINEURAL at 12:50

## 2018-10-22 RX ADMIN — EPHEDRINE SULFATE 10 MG: 50 INJECTION INTRAMUSCULAR; INTRAVENOUS; SUBCUTANEOUS at 12:51

## 2018-10-22 RX ADMIN — FENTANYL CITRATE 50 MCG: 50 INJECTION, SOLUTION INTRAMUSCULAR; INTRAVENOUS at 12:26

## 2018-10-22 RX ADMIN — PRAMIPEXOLE DIHYDROCHLORIDE 1.5 MG: 1.5 TABLET ORAL at 22:47

## 2018-10-22 RX ADMIN — OXYCODONE HYDROCHLORIDE AND ACETAMINOPHEN 2 TABLET: 10; 325 TABLET ORAL at 22:19

## 2018-10-22 RX ADMIN — LOSARTAN POTASSIUM: 50 TABLET, FILM COATED ORAL at 18:13

## 2018-10-22 RX ADMIN — OXYCODONE HYDROCHLORIDE AND ACETAMINOPHEN 2 TABLET: 10; 325 TABLET ORAL at 17:13

## 2018-10-22 RX ADMIN — FAMOTIDINE 20 MG: 10 INJECTION, SOLUTION INTRAVENOUS at 12:26

## 2018-10-22 RX ADMIN — EPHEDRINE SULFATE 10 MG: 50 INJECTION INTRAMUSCULAR; INTRAVENOUS; SUBCUTANEOUS at 13:12

## 2018-10-22 RX ADMIN — SODIUM CHLORIDE, POTASSIUM CHLORIDE, SODIUM LACTATE AND CALCIUM CHLORIDE: 600; 310; 30; 20 INJECTION, SOLUTION INTRAVENOUS at 12:45

## 2018-10-22 RX ADMIN — FENTANYL CITRATE 100 MCG: 50 INJECTION INTRAMUSCULAR; INTRAVENOUS at 12:46

## 2018-10-22 RX ADMIN — ONDANSETRON 4 MG: 2 INJECTION INTRAMUSCULAR; INTRAVENOUS at 14:22

## 2018-10-22 RX ADMIN — MIDAZOLAM HYDROCHLORIDE 1 MG: 5 INJECTION, SOLUTION INTRAMUSCULAR; INTRAVENOUS at 12:20

## 2018-10-22 RX ADMIN — FLUOXETINE HYDROCHLORIDE 60 MG: 20 CAPSULE ORAL at 18:13

## 2018-10-22 NOTE — PLAN OF CARE
Problem: Patient Care Overview  Goal: Plan of Care Review  Outcome: Ongoing (interventions implemented as appropriate)   10/22/18 1948   Coping/Psychosocial   Plan of Care Reviewed With patient   Plan of Care Review   Progress improving   OTHER   Outcome Summary S/P RRTS. Dressing c/d/i. Pain controlled with PO pain med. Sling to RUE, NWB. F/C to BSD. Accumax pump to bed. Skin intact. Discussed BP med and monitoring r./t HTN. Rehab vs HH.        Problem: Fall Risk (Adult)  Goal: Identify Related Risk Factors and Signs and Symptoms  Outcome: Ongoing (interventions implemented as appropriate)    Goal: Absence of Fall  Outcome: Ongoing (interventions implemented as appropriate)      Problem: Shoulder Arthroplasty (Adult)  Goal: Signs and Symptoms of Listed Potential Problems Will be Absent, Minimized or Managed (Shoulder Arthroplasty)  Outcome: Ongoing (interventions implemented as appropriate)    Goal: Anesthesia/Sedation Recovery  Outcome: Ongoing (interventions implemented as appropriate)      Problem: Skin Injury Risk (Adult)  Goal: Identify Related Risk Factors and Signs and Symptoms  Outcome: Outcome(s) achieved Date Met: 10/22/18    Goal: Skin Health and Integrity  Outcome: Ongoing (interventions implemented as appropriate)

## 2018-10-22 NOTE — OP NOTE
TOTAL SHOULDER REVERSE ARTHROPLASTY  Procedure Note    Maritza LECHUGA Darci  10/22/2018    Pre-op Diagnosis: Osteoarthritis Right  glenohumeral joint secondary to chronic full thickness rotator cuff tear.  Post-op Diagnosis: Same  Procedure: Right  Reverse Total Shoulder, glenoid augmentation with autograft  Surgeon:  Bipin Dangelo MD  Anesthesia: General, Anesthesiologist: Juma Graham MD  CRNA: Digna Jones CRNA  Staff: Circulator: Gurpreet Woody RN; Laverne Mancuso RN  Scrub Person: Maral King; Darlene Hughes; Axel Rodríguez  Vendor Representative: Ulisses Shelton  Assistant: Laverne Del Toro CFA  Estimated Blood Loss: 100ml  Specimens:   Order Name Source Comment Collection Info Order Time   BASIC METABOLIC PANEL Arm, Left  Collected By: Digna Chirinos RN 10/22/2018 11:24 AM     Drains: none  Complications: None    Components Utilized:      Implant Name Type Inv. Item Serial No.  Lot No. LRB No. Used   STEM HUM TRABECULAR REV [id number] - [id number] Implant STEM HUM TRABECULAR REV [id number]  [employer] [id number] Right 1   BASEPLT SONIDO COMPR MINI W TPR ADAPTR 25 - [id number] Implant BASEPLT SONIDO COMPR MINI W TPR ADAPTR 25  [employer] [id number] Right 1   SCRW COMPRNSV CNTRL 6.5X45MM REUS - [id number] Implant SCRW COMPRNSV CNTRL 6.5X45MM REUS  [employer] [id number] Right 1   SCRW FIX LK HEX 4.83K78AT - [id number] Implant SCRW FIX LK HEX 4.63P34PP  [employer] [id number] Right 1   GLENOSPHERE VERSA DIAL FXD OFFST36 PLS3 - [id number] Implant GLENOSPHERE VERSA DIAL FXD OFFST36 PLS3  [employer] [id number] Right 1   SCRW FIX LK HEX 4.97J38BV - [id number] Implant SCRW FIX LK HEX 4.49D33AQ  [employer] [id number] Right 1   LINER HUM REV TRAB REV PA 7D 36 PLS0 - [id number] Implant LINER HUM REV TRAB REV PA 7D 36 PLS0   [employer] [id number] Right 1       Indication for Procedure:   The patient is a 57 y.o. female presents today for a reverse total shoulder arthroplasty procedure  because of failure to conservatively manage the patients pain secondary to arthritis.  The patient was educated in risks of surgery that could include possible risk of infection, deep venous thrombosis, pulmonary embolism, fracture, neurovascular injury, leg length discrepancy, dislocation, possible persistent pain, need for additional surgeries, anesthetic risks, medical risks including heart attack and stroke, and death.  The discussion occurred in the office pre-operatively, and patient had the opportunity to ask questions, and concerns about the proposed surgery.  The patient also understood that medicine is not an exact science, and that outcomes of the surgical procedure may be less than desired. The patient wished to proceed.      Protocols for intravenous antibiotics and venous thrombosis were followed for this patient.  IV antibiotics were infused prior to surgery and will be discontinued within 24 hours of completion of the surgical procedure.  Thrombosis prophylaxis will be initiated within 24 hours of the completion of the surgical procedure.      DESCRIPTION OF PROCEDURE: After the patient was identified in the preoperative holding area, the operative site was marked and confirmed. The patient was brought to the operating room on a stretcher and placed supine on the operating table. General endotracheal anesthesia was placed uneventfully. The patient was placed in beach chair position. The right upper extremity was then prepped and draped in the usual sterile fashion. Timeout procedure was performed. IV antibiotics were infused. Then using a deltopectoral approach I utilized a #10 blade scalpel; deltoid was mobilized laterally, pectoralis major muscle was mobilized medially. The conjoined tendon was mobilized medially. The subscapularis was released 1 cm off the lessor tuberosity and retracted medially.  The shoulder was dislocated anteriorly. A starter awl was then placed in the superior aspect of the  humerus. This was reamed into the medullary canal, then was reamed up to the final size of the above humeral stem diameter. The intramedullary cutting block was attached and the oscillating saw was used to complete the ostomy cut. Then the proximal cone and conical reamers were then used to finish repairing the proximal humerus. The trial humerus stem was placed. The glenoid retractors were placed. The labrum was excised.  Osteophytes were removed around the glenoid face.  The starter drill was then placed in the central aspect of the glenoid, aimed 10° cephalad. It was then drilled full-thickness, then the glenoid reamer was used to flatten out the glenoid face. She had significant bone loss superiorly.  The patient's native bone from the humeral cut was then fashioned as an augment superiorly and placed on the backside of the glenoid component.  The baseplate was then impacted over the drill until it was fully seated.  The drill was then removed, and the hole depth was measured and the above size screw was placed.  Then screws were placed in the periphery using the locking drill guide and drill.  Depth measured and the appropriate length locking screws were placed.  Then the glenosphere component was impacted in place.  It was checked to be fully seated.   Then the trial humeral stem was removed and the final humeral stem was impacted into the humeral canal pressfit.  The trial polyethylenes were used. The above size polyethylene component polyethylene gave good balance and stability.  The final polyethylene was impacted on the humeral stem then shoulder was reduced. The wound was copiously irrigated with 3L of Betadine laced normal saline using pulsatile lavage. The subscapularis muscle was repaired with 0 Vicryl suture in an interrupted suture fashion.  The deltopectoral interval was closed with 0 Vicryl in a running fashion, 2-0 Vicryl to close the dermis, and 3-0 Monocryl followed by Dermabond skin glue to  close the skin. Sterile dressing applied. The shoulder immobilizer was then applied and the patient was wakened from anesthesia and returned to recovery room in stable condition. No intraoperative complications.  Sponge and needle counts were correct at the conclusion of the case.      Bipin Dangelo MD     Date: 10/22/2018  Time: 2:18 PM

## 2018-10-22 NOTE — ANESTHESIA PROCEDURE NOTES
Peripheral Block    Pre-sedation assessment completed: 10/22/2018 12:25 PM    Patient reassessed immediately prior to procedure    Patient location during procedure: holding area  Start time: 10/22/2018 12:25 PM  Stop time: 10/22/2018 12:35 PM  Reason for block: at surgeon's request and post-op pain management  Performed by  Anesthesiologist: CHU PATTERSON  Preanesthetic Checklist  Completed: patient identified, site marked, surgical consent, pre-op evaluation, timeout performed, IV checked, risks and benefits discussed and monitors and equipment checked  Prep:  Sterile barriers:cap, gloves, gown, mask and sterile barriers  Prep: ChloraPrep  Patient monitoring: blood pressure monitoring, continuous pulse oximetry and EKG  Procedure  Sedation:yes    Guidance:ultrasound guided  ULTRASOUND INTERPRETATION.  Using ultrasound guidance a 21 G gauge needle was placed in close proximity to the brachial plexus nerve, at which point, under ultrasound guidance anesthetic was injected in the area of the nerve and spread of the anesthesia was seen on ultrasound in close proximity thereto.  There were no abnormalities seen on ultrasound; a digital image was taken; and the patient tolerated the procedure with no complications. Images:still images obtained    Laterality:right  Block Type:interscalene  Injection Technique:single-shot  Needle Type:echogenic  Needle Gauge:21 G    Medications  Local Injected:ropivacaine 0.5% and 1.5%Mepicacaine  Post Assessment  Injection Assessment: negative aspiration for heme, no paresthesia on injection and incremental injection  Patient Tolerance:comfortable throughout block  Complications:no

## 2018-10-22 NOTE — ANESTHESIA PREPROCEDURE EVALUATION
Anesthesia Evaluation     Patient summary reviewed and Nursing notes reviewed   history of anesthetic complications: PONV               Airway   Mallampati: II  Dental      Pulmonary    (+) a smoker,   Cardiovascular     Rhythm: regular  Rate: normal    (+) hypertension, hyperlipidemia,       Neuro/Psych- negative ROS  GI/Hepatic/Renal/Endo - negative ROS     Musculoskeletal     Abdominal    Substance History - negative use     OB/GYN negative ob/gyn ROS         Other   (+) arthritis                     Anesthesia Plan    ASA 2     general   (MS  Right ISB)  intravenous induction   Anesthetic plan, all risks, benefits, and alternatives have been provided, discussed and informed consent has been obtained with: patient.

## 2018-10-22 NOTE — ANESTHESIA PROCEDURE NOTES
Airway  Urgency: elective    Date/Time: 10/22/2018 12:51 PM  Airway not difficult    General Information and Staff    Patient location during procedure: OR  Anesthesiologist: SHANICE BONNER  CRNA: GEO CABAN    Indications and Patient Condition  Indications for airway management: airway protection    Preoxygenated: yes  MILS not maintained throughout  Mask difficulty assessment: 1 - vent by mask    Final Airway Details  Final airway type: endotracheal airway      Successful airway: ETT  Cuffed: yes   Successful intubation technique: direct laryngoscopy  Endotracheal tube insertion site: oral  Blade: Sheila  Blade size: 4  ETT size: 7.0 mm  Cormack-Lehane Classification: grade I - full view of glottis  Placement verified by: chest auscultation and capnometry   Cuff volume (mL): 7  Measured from: lips  ETT to lips (cm): 22  Number of attempts at approach: 1    Additional Comments  Pt preoxygenated prior to induction, easy mask airway, atraumatic intubation,+ ETCO2, + bs bilat,  ETT secured and connected to ventilator.

## 2018-10-22 NOTE — H&P
ORTHOPEDIC SURGERY PRE-OP HISTORY AND PHYSICAL      Patient: Maritza Bejarano  Date of Admission: 10/22/2018 10:25 AM  YOB: 1960  Medical Record Number: [id number]  Attending Physician: Bipin Dangelo MD  Consulting Physician: Bipin Dangelo MD    CHIEF COMPLIANT: Right shoulder pain    HISTORY OF PRESENT ILLINESS: Patient is a 57 y.o. year old female presents to [facility] with above complaints.  The patient failed conservative treatment and patient requested surgical intervention.  Presents for a right reverse total shoulder arthroplasty.        ALLERGIES:   Allergies   Allergen Reactions   • Sulfa Antibiotics        HOME MEDICATIONS:  Prescriptions Prior to Admission   Medication Sig Dispense Refill Last Dose   • atorvastatin (LIPITOR) 10 MG tablet Take 10 mg by mouth Daily.      • baclofen (LIORESAL) 10 MG tablet Take 10 mg by mouth 3 (three) times a day.      • Calcium-Phosphorus-Vitamin D (CITRACAL +D3 PO) Take  by mouth daily.      • cephalexin (KEFLEX) 500 MG capsule Take 1 capsule by mouth 3 (Three) Times a Day. 21 capsule 0    • clonazePAM (KlonoPIN) 2 MG tablet Take  by mouth.      • FLUoxetine (PROzac) 20 MG capsule Take 60 mg by mouth Daily.      • HYDROcodone-acetaminophen (NORCO) 7.5-325 MG per tablet Take  by mouth.      • Hyoscyamine Sulfate (HYOSCYAMINE PO) Take 0.125 mg by mouth Daily.      • losartan-hydrochlorothiazide (HYZAAR) 50-12.5 MG per tablet Take 1 tablet by mouth daily.      • NON FORMULARY Take 675 mg by mouth daily. Protandim - OTC      • oxyCODONE (OXYCONTIN) 10 MG 12 hr tablet Take 10 mg by mouth Every 12 (Twelve) Hours As Needed.      • pantoprazole (PROTONIX) 40 MG EC tablet Take  by mouth 2 (two) times a day.      • phenazopyridine (PYRIDIUM) 200 MG tablet Take 1 tablet by mouth 3 (Three) Times a Day As Needed for bladder spasms. 15 tablet 0    • pramipexole (MIRAPEX) 0.5 MG tablet Take 1.5 mg by mouth 3 (three) times a day.      • promethazine  (PHENERGAN) 25 MG tablet Take  by mouth.          Past Medical History:   Diagnosis Date   • Hyperlipidemia    • Hypertension    • Multiple sclerosis (CMS/HCC)    • PONV (postoperative nausea and vomiting)      Past Surgical History:   Procedure Laterality Date   • ADENOIDECTOMY     • PARATHYROIDECTOMY       Social History     Occupational History   • Not on file.     Social History Main Topics   • Smoking status: Former Smoker     Quit date: 10/22/2003   • Smokeless tobacco: Never Used   • Alcohol use No   • Drug use: No   • Sexual activity: Defer      Social History     Social History Narrative   • No narrative on file     History reviewed. No pertinent family history.    REVIEW OF SYSTEMS:    HEENT: Patient denies any headaches, vision changes, change in hearing, or tinnitus, Patient denies epistaxis, sinus pain, hoarseness, or dysphagia   Pulmonary: Patient denies any cough, congestion, acute change in SOA or wheezing.   Cardiovascular: Patient denies any change in chest pain, dyspnea, palpitations, weakness, intolerance of exercise, varicosities, change in murmur   Gastrointestinal:  Patient denies change in appetite, melena, change in bowel habits.   Genital/Urinary: Patient denies dysuria, change in color of urine, change in frequency of urination, pain with urgency, change in incontinence, retention.   Musculoskeletal: Patient denies complaints of acute changes in symptoms of other joints not mentioned above.   Neurological: Patient denies changes in dizziness, tremor, ataxia, or difficulty in speaking or changes in memory.   Endocrine system: Patient denies acute changes in tremors, palpitations, polyuria, polydipsia, polyphagia, diaphoresis, exophthalmos, or goiter.   Psychological: Patient denies thoughts/plans or harming self or other; denies acute changes in depression,  insomnia, night terrors, piotr, disorientation.   Skin: Patient denies any bruising, rashes, discoloration, pruritus,or wounds not  mentioned in history of present illness or chief complaint above.   Hematopoietic: Patient denies current bleeding, epistaxis, hematuria, or melena.    PHYSICAL EXAM:   Vitals:  There were no vitals filed for this visit.    General:  57 y.o. female who appears about stated age.    Alert, cooperative, in no acute distress                       Head:    Normocephalic, without obvious abnormality, atraumatic   Eyes:            Lids and lashes normal, conjunctivae and sclerae normal, no         icterus, no pallor, corneas clear, PERRLA   Ears:    Ears appear intact with no abnormalities noted   Throat:   No oral lesions, no thrush, oral mucosa moist   Neck:   No adenopathy, supple, trachea midline, no JVD   Back:     Limited exam shows no severe kyphosis present,no visible           erythema, no excessive  tenderness to palpation.    Lungs:     Respirations regular, even and unlabored.     Heart:    Normal rate, Pulses palpable   Chest Wall:    No abnormalities observed.   Abdomen:     Normal bowel sounds, no masses, no organomegaly, soft              non-tender, non-distended, no guarding, no rebound                      tenderness   Rectal:     Deferred   Pulses:   Pulses palpable and equal bilaterally   Skin:   No bleeding, bruising or rash   Lymph nodes:   No palpable adenopathy   Extremities:     Right shoulder skin intact.  Painful ROM.  NVI distally.      DIAGNOSTIC TEST:  No visits with results within 2 Day(s) from this visit.   Latest known visit with results is:   Admission on 06/21/2018, Discharged on 06/21/2018   Component Date Value Ref Range Status   • Color,  06/21/2018 Yellow  Yellow, Straw Final   • Appearance,  06/21/2018 Cloudy* Clear Final   • pH, UA 06/21/2018 7.5  5.0 - 8.0 Final   • Specific Gravity, UA 06/21/2018 1.028  1.005 - 1.030 Final   • Glucose, UA 06/21/2018 Negative  Negative Final   • Ketones, UA 06/21/2018 Negative  Negative Final   • Bilirubin, UA 06/21/2018 Negative  Negative  Final   • Blood, UA 06/21/2018 Trace* Negative Final   • Protein, UA 06/21/2018 Trace* Negative Final   • Leuk Esterase, UA 06/21/2018 Small (1+)* Negative Final   • Nitrite, UA 06/21/2018 Positive* Negative Final   • Urobilinogen, UA 06/21/2018 1.0 E.U./dL  0.2 - 1.0 E.U./dL Final   • RBC, UA 06/21/2018 0-2  None Seen, 0-2 /HPF Final   • WBC, UA 06/21/2018 6-12* None Seen, 0-2 /HPF Final   • Bacteria, UA 06/21/2018 4+* None Seen /HPF Final   • Squamous Epithelial Cells, UA 06/21/2018 3-6* None Seen, 0-2 /HPF Final   • Hyaline Casts, UA 06/21/2018 None Seen  None Seen /LPF Final   • Methodology 06/21/2018 Manual Light Microscopy   Final       No results found.      ASSESSMENT:  Right shoulder OA, rotator cuff tear.    Patient Active Problem List   Diagnosis   • Osteoarthritis of right shoulder       PLAN:    Right reverse TSA.    Risks and benefits of surgical intervention were discussed in detail with the patient.  Risks of infection, fracture, dislocation, extremity length discrepancy, neurovascular injury, persistent pain, medical risks, anesthetic risk, need for additional surgery, deep venous thrombosis, pulmonary embolism and death.      The above diagnosis and treatment plan was discussed with the patient and/or family.  They were educated in both non-surgical and surgical treatment options for their condition.   They were given the opportunity to ask questions and were answered to their satisfaction.  They agreed to proceed with the above treatment plan.        Bipin Dangelo MD  Date: 10/22/2018

## 2018-10-22 NOTE — ANESTHESIA POSTPROCEDURE EVALUATION
Patient: Maritza Bejarano    Procedure Summary     Date:  10/22/18 Room / Location:  [facility] OR  / [facility] MAIN OR    Anesthesia Start:  1245 Anesthesia Stop:  1448    Procedure:  RT TOTAL SHOULDER REVERSE ARTHROPLASTY (Right Shoulder) Diagnosis:      Surgeon:  Bipin Dangelo MD Provider:  Juma Graham MD    Anesthesia Type:  general ASA Status:  2          Anesthesia Type: general  Last vitals  BP   124/75 (10/22/18 1234)   Temp   36.9 °C (98.4 °F) (10/22/18 1050)   Pulse   70 (10/22/18 1234)   Resp   13 (10/22/18 1234)     SpO2   97 % (10/22/18 1234)     Post Anesthesia Care and Evaluation    Patient location during evaluation: PACU  Anesthetic complications: No anesthetic complications

## 2018-10-23 LAB
ANION GAP SERPL CALCULATED.3IONS-SCNC: 13 MMOL/L
BUN BLD-MCNC: 10 MG/DL (ref 6–20)
BUN/CREAT SERPL: 13.7 (ref 7–25)
CALCIUM SPEC-SCNC: 9.3 MG/DL (ref 8.6–10.5)
CHLORIDE SERPL-SCNC: 100 MMOL/L (ref 98–107)
CO2 SERPL-SCNC: 26 MMOL/L (ref 22–29)
CREAT BLD-MCNC: 0.73 MG/DL (ref 0.57–1)
GFR SERPL CREATININE-BSD FRML MDRD: 82 ML/MIN/1.73
GLUCOSE BLD-MCNC: 134 MG/DL (ref 65–99)
HCT VFR BLD AUTO: 36.4 % (ref 35.6–45.5)
HCT VFR BLD AUTO: 37.5 % (ref 35.6–45.5)
HGB BLD-MCNC: 11.2 G/DL (ref 11.9–15.5)
HGB BLD-MCNC: 11.6 G/DL (ref 11.9–15.5)
POTASSIUM BLD-SCNC: 3.5 MMOL/L (ref 3.5–5.2)
SODIUM BLD-SCNC: 139 MMOL/L (ref 136–145)

## 2018-10-23 PROCEDURE — 85018 HEMOGLOBIN: CPT | Performed by: ORTHOPAEDIC SURGERY

## 2018-10-23 PROCEDURE — 25010000002 ENOXAPARIN PER 10 MG: Performed by: ORTHOPAEDIC SURGERY

## 2018-10-23 PROCEDURE — 25010000002 KETOROLAC TROMETHAMINE PER 15 MG: Performed by: ORTHOPAEDIC SURGERY

## 2018-10-23 PROCEDURE — 97162 PT EVAL MOD COMPLEX 30 MIN: CPT | Performed by: PHYSICAL THERAPIST

## 2018-10-23 PROCEDURE — 97110 THERAPEUTIC EXERCISES: CPT | Performed by: PHYSICAL THERAPIST

## 2018-10-23 PROCEDURE — 80048 BASIC METABOLIC PNL TOTAL CA: CPT | Performed by: ORTHOPAEDIC SURGERY

## 2018-10-23 PROCEDURE — 85014 HEMATOCRIT: CPT | Performed by: ORTHOPAEDIC SURGERY

## 2018-10-23 PROCEDURE — 25010000003 CEFAZOLIN IN DEXTROSE 2-4 GM/100ML-% SOLUTION: Performed by: ORTHOPAEDIC SURGERY

## 2018-10-23 RX ORDER — OXYCODONE AND ACETAMINOPHEN 10; 325 MG/1; MG/1
1-2 TABLET ORAL EVERY 4 HOURS PRN
Qty: 56 TABLET | Refills: 0 | Status: SHIPPED | OUTPATIENT
Start: 2018-10-23 | End: 2018-11-01

## 2018-10-23 RX ORDER — OXYCODONE AND ACETAMINOPHEN 10; 325 MG/1; MG/1
1 TABLET ORAL EVERY 4 HOURS PRN
Status: DISCONTINUED | OUTPATIENT
Start: 2018-10-23 | End: 2018-10-25 | Stop reason: HOSPADM

## 2018-10-23 RX ORDER — OXYCODONE AND ACETAMINOPHEN 10; 325 MG/1; MG/1
2 TABLET ORAL EVERY 4 HOURS PRN
Status: DISCONTINUED | OUTPATIENT
Start: 2018-10-23 | End: 2018-10-25 | Stop reason: HOSPADM

## 2018-10-23 RX ORDER — OXYCODONE HCL 10 MG/1
20 TABLET, FILM COATED, EXTENDED RELEASE ORAL EVERY 12 HOURS
Status: DISCONTINUED | OUTPATIENT
Start: 2018-10-23 | End: 2018-10-25 | Stop reason: HOSPADM

## 2018-10-23 RX ORDER — OXYCODONE HCL 20 MG/1
20 TABLET, FILM COATED, EXTENDED RELEASE ORAL EVERY 12 HOURS
Qty: 28 TABLET | Refills: 0 | Status: SHIPPED | OUTPATIENT
Start: 2018-10-23 | End: 2022-03-03 | Stop reason: HOSPADM

## 2018-10-23 RX ADMIN — PRAMIPEXOLE DIHYDROCHLORIDE 1.5 MG: 1.5 TABLET ORAL at 20:50

## 2018-10-23 RX ADMIN — PANTOPRAZOLE SODIUM 40 MG: 40 TABLET, DELAYED RELEASE ORAL at 17:08

## 2018-10-23 RX ADMIN — LOSARTAN POTASSIUM: 50 TABLET, FILM COATED ORAL at 08:16

## 2018-10-23 RX ADMIN — SODIUM CHLORIDE, POTASSIUM CHLORIDE, SODIUM LACTATE AND CALCIUM CHLORIDE 500 ML: 600; 310; 30; 20 INJECTION, SOLUTION INTRAVENOUS at 17:00

## 2018-10-23 RX ADMIN — Medication 3 ML: at 08:18

## 2018-10-23 RX ADMIN — OXYCODONE HYDROCHLORIDE AND ACETAMINOPHEN 2 TABLET: 10; 325 TABLET ORAL at 08:16

## 2018-10-23 RX ADMIN — PRAMIPEXOLE DIHYDROCHLORIDE 1.5 MG: 1.5 TABLET ORAL at 08:17

## 2018-10-23 RX ADMIN — OXYCODONE HYDROCHLORIDE 10 MG: 10 TABLET, FILM COATED, EXTENDED RELEASE ORAL at 00:50

## 2018-10-23 RX ADMIN — OXYCODONE HYDROCHLORIDE AND ACETAMINOPHEN 1 TABLET: 10; 325 TABLET ORAL at 21:54

## 2018-10-23 RX ADMIN — SENNOSIDES AND DOCUSATE SODIUM 2 TABLET: 8.6; 5 TABLET ORAL at 08:16

## 2018-10-23 RX ADMIN — OXYCODONE HYDROCHLORIDE AND ACETAMINOPHEN 2 TABLET: 10; 325 TABLET ORAL at 12:25

## 2018-10-23 RX ADMIN — FAMOTIDINE 40 MG: 40 TABLET, FILM COATED ORAL at 08:17

## 2018-10-23 RX ADMIN — OXYCODONE HYDROCHLORIDE 20 MG: 20 TABLET, FILM COATED, EXTENDED RELEASE ORAL at 13:37

## 2018-10-23 RX ADMIN — BACLOFEN 10 MG: 10 TABLET ORAL at 08:17

## 2018-10-23 RX ADMIN — ENOXAPARIN SODIUM 40 MG: 40 INJECTION SUBCUTANEOUS at 08:16

## 2018-10-23 RX ADMIN — FLUOXETINE HYDROCHLORIDE 60 MG: 20 CAPSULE ORAL at 08:17

## 2018-10-23 RX ADMIN — ATORVASTATIN CALCIUM 10 MG: 10 TABLET, FILM COATED ORAL at 08:18

## 2018-10-23 RX ADMIN — PANTOPRAZOLE SODIUM 40 MG: 40 TABLET, DELAYED RELEASE ORAL at 08:17

## 2018-10-23 RX ADMIN — OXYCODONE HYDROCHLORIDE AND ACETAMINOPHEN 2 TABLET: 10; 325 TABLET ORAL at 03:48

## 2018-10-23 RX ADMIN — CEFAZOLIN SODIUM 2 G: 2 INJECTION, SOLUTION INTRAVENOUS at 03:48

## 2018-10-23 RX ADMIN — Medication 3 ML: at 20:53

## 2018-10-23 RX ADMIN — KETOROLAC TROMETHAMINE 15 MG: 30 INJECTION, SOLUTION INTRAMUSCULAR at 20:50

## 2018-10-23 RX ADMIN — KETOROLAC TROMETHAMINE 15 MG: 30 INJECTION, SOLUTION INTRAMUSCULAR at 10:46

## 2018-10-23 RX ADMIN — BACLOFEN 10 MG: 10 TABLET ORAL at 20:50

## 2018-10-23 NOTE — PROGRESS NOTES
Procedure(s):  RT TOTAL SHOULDER REVERSE ARTHROPLASTY     LOS: 1 day     Subjective :   Complains of pain, controlled with meds.      Objective :    Vital signs in last 24 hours:  Vitals:    10/22/18 1855 10/22/18 1959 10/23/18 0039 10/23/18 0336   BP: 130/76 119/58 102/58 111/60   BP Location: Left arm Left arm Left arm Left arm   Patient Position: Lying Lying Lying Lying   Pulse: 102 99 72 78   Resp: 16 16 16 16   Temp: 100.2 °F (37.9 °C) 97.3 °F (36.3 °C) 98.1 °F (36.7 °C) 98.5 °F (36.9 °C)   TempSrc: Oral Oral Oral Oral   SpO2: 94% 94% 96% 94%   Weight:       Height:           PHYSICAL EXAM:  Patient is calm, in no acute distress, awake and oriented x 3.  Dressing is clean, dry and intact.  No signs of infection.  Swelling is appropriate in amount.  Ecchymosis is appropriate in amount.  Homans test is negative.  Patient is neurovascularly intact distally.    LABS:    Results from last 7 days  Lab Units 10/23/18  0423 10/22/18  1124   WBC 10*3/mm3  --  10.00   HEMOGLOBIN g/dL 11.6* 13.0   HEMATOCRIT % 37.5 40.3   PLATELETS 10*3/mm3  --  347       Results from last 7 days  Lab Units 10/23/18  0423   SODIUM mmol/L 139   POTASSIUM mmol/L 3.5   CHLORIDE mmol/L 100   CO2 mmol/L 26.0   BUN mg/dL 10   CREATININE mg/dL 0.73   GLUCOSE mg/dL 134*   CALCIUM mg/dL 9.3           ASSESSMENT:  Status post Procedure(s):  RT TOTAL SHOULDER REVERSE ARTHROPLASTY      Plan:  Continue Physical Therapy, increase mobility and range of motion as tolerated.  Continue SCDs, Continue Lovenox for DVT prophylaxis while inpatient.  Dispo planning for rehab facility.  Will try to d/c catheter, and resume self catheterization.  If not will resume catheter.      Bipin Dangelo MD    Date: 10/23/2018  Time: 7:03 AM

## 2018-10-23 NOTE — PLAN OF CARE
Problem: Patient Care Overview  Goal: Plan of Care Review  Outcome: Ongoing (interventions implemented as appropriate)   10/23/18 0432   Coping/Psychosocial   Plan of Care Reviewed With patient   Plan of Care Review   Progress improving   OTHER   Outcome Summary S/P RRTS. some c/o pain, dressing clean, dry, and intact. Sling on RUE. Hemovac drain in place with minimal discharge. Reviewed pt's MS and pain management routine at home. Will continue to monitor patient closely       Problem: Fall Risk (Adult)  Goal: Identify Related Risk Factors and Signs and Symptoms  Outcome: Ongoing (interventions implemented as appropriate)    Goal: Absence of Fall  Outcome: Ongoing (interventions implemented as appropriate)      Problem: Shoulder Arthroplasty (Adult)  Goal: Signs and Symptoms of Listed Potential Problems Will be Absent, Minimized or Managed (Shoulder Arthroplasty)  Outcome: Ongoing (interventions implemented as appropriate)    Goal: Anesthesia/Sedation Recovery  Outcome: Ongoing (interventions implemented as appropriate)      Problem: Skin Injury Risk (Adult)  Goal: Skin Health and Integrity  Outcome: Ongoing (interventions implemented as appropriate)

## 2018-10-23 NOTE — DISCHARGE PLACEMENT REQUEST
"Maritza Bejarano (58 y.o. Female)     Date of Birth Social Security Number Address Home Phone MRN    1960  [id number] [facility] [address] [phone] [id number]    [employer] Marital Status          Jain        Admission Date Admission Type Admitting Provider Attending Provider Department, Room/Bed    10/22/18 Elective Bipin Dangelo MD Rhoads, David, MD [address], [address]    Discharge Date Discharge Disposition Discharge Destination         Rehab Facility or Unit (DC - External)              Attending Provider:  Bipin Dangelo MD    Allergies:  Sulfa Antibiotics    Isolation:  None   Infection:  None   Code Status:  CPR    Ht:  165.1 cm (65\")   Wt:  74.8 kg (165 lb)    Admission Cmt:  None   Principal Problem:  Osteoarthritis of right shoulder [M19.011]                 Active Insurance as of 10/22/2018     Primary Coverage     Payor Plan Insurance Group Employer/Plan Group    MEDICARE MEDICARE A & B      Payor Plan Address Payor Plan Phone Number Effective From Effective To    [address] [facility] [id number] [phone] 6/1/1999     [facility] [id number]       Subscriber Name Subscriber Birth Date Member ID       GURWINDERMARITZA ANKUSH 1960 [id number]           Secondary Coverage     Payor Plan Insurance Group Employer/Plan Group    [place] [employer] [place]  [facility]      Payor Plan Address Payor Plan Phone Number Effective From Effective To    1996 [employer] [facility] [phone] 3/5/2016     [facility] [address]       Subscriber Name Subscriber Birth Date Member ID       GURWINDERNARINDERANKUSH LECHUGA 1960 [id number]                 Emergency Contacts      (Rel.) Home Phone Work Phone Mobile Phone    Bigg Bejarano (Spouse) [phone] -- [phone]              "

## 2018-10-23 NOTE — THERAPY EVALUATION
Acute Care - Physical Therapy Initial Evaluation   [place]     Patient Name: Maritza LECHUGA Darci  : 1960  MRN: [id number]  Today's Date: 10/23/2018                Admit Date: 10/22/2018    Visit Dx:     ICD-10-CM ICD-9-CM   1. Primary osteoarthritis of right shoulder M19.011 715.11     Patient Active Problem List   Diagnosis   • Osteoarthritis of right shoulder     Past Medical History:   Diagnosis Date   • Hyperlipidemia    • Hypertension    • Multiple sclerosis (CMS/HCC)    • PONV (postoperative nausea and vomiting)      Past Surgical History:   Procedure Laterality Date   • ADENOIDECTOMY     • PARATHYROIDECTOMY     • TOTAL SHOULDER ARTHROPLASTY W/ DISTAL CLAVICLE EXCISION Right 10/22/2018    Procedure: RT TOTAL SHOULDER REVERSE ARTHROPLASTY;  Surgeon: Bipin Dangelo MD;  [facility]: [facility] MAIN OR;  Service: Orthopedics        PT ASSESSMENT (last 12 hours)      Physical Therapy Evaluation     Row Name 10/23/18 1338          PT Evaluation Time/Intention    Subjective Information complains of;pain  -     Document Type evaluation  -     Mode of Treatment physical therapy  -     Patient Effort good  -     Symptoms Noted During/After Treatment increased pain  -     Row Name 10/23/18 1338          General Information    Patient Observations alert;cooperative;agree to therapy  -     General Observations of Patient in bed, RUE in sling  -     Prior Level of Function mod assist:;transfer  -     Equipment Currently Used at Home commode, 3-in-1;walker, rolling;wheelchair, motorized  -     Row Name 10/23/18 1332          Relationship/Environment    Lives With spouse  -     Row Name 10/23/18 1338          Resource/Environmental Concerns    Current Living Arrangements home/apartment/condo  -     Row Name 10/23/18 1335          Cognitive Assessment/Interventions    Additional Documentation Cognitive Assessment/Intervention (Group)  -     Row Name 10/23/18 1335          Cognitive  Assessment/Intervention- PT/OT    Orientation Status (Cognition) oriented x 4  -     Follows Commands (Cognition) WNL  -     Personal Safety Interventions fall prevention program maintained;gait belt;nonskid shoes/slippers when out of bed  -[facility] Name 10/23/18 1338          Mobility Assessment/Treatment    Extremity Weight-bearing Status right upper extremity  -     Right Upper Extremity (Weight-bearing Status) non weight-bearing (NWB)  -[facility] Name 10/23/18 1338          Bed Mobility Assessment/Treatment    Bed Mobility Assessment/Treatment rolling left;rolling right;supine-sit;sit-supine  -     Rolling Left [place] (Bed Mobility) moderate assist (50% patient effort)  -     Rolling Right [place] (Bed Mobility) not tested  -     Supine-Sit [place] (Bed Mobility) moderate assist (50% patient effort)  -     Sit-Supine [place] (Bed Mobility) maximum assist (25% patient effort)  -[facility] Name 10/23/18 1337          Transfer Assessment/Treatment    Transfer Assessment/Treatment sit-stand transfer;stand-sit transfer  -     Comment (Transfers) stood twice, pt did not wisht o transfers to chair secondary to pain and feeling light headed  -     Sit-Stand [place] (Transfers) maximum assist (25% patient effort)  -     Stand-Sit [place] (Transfers) moderate assist (50% patient effort)  -     Row Name 10/23/18 1338          Gait/Stairs Assessment/Training    [place] Level (Gait) not tested   pt is not ambulatory  -[facility] Name 10/23/18 1338          General ROM    GENERAL ROM COMMENTS AAROM WFL  -[facility] Name 10/23/18 1338          MMT (Manual Muscle Testing)    General MMT Comments RUE not tested, LUE WFL, LLE WFL, 3/5 distal, 2/5 proximal  -[facility] Name 10/23/18 1338          Motor Assessment/Intervention    Additional Documentation Therapeutic Exercise Interventions (Group)  -[facility] Name 10/23/18 1338          Therapeutic Exercise    Comment  (Therapeutic Exercise) R RTSA protocol x 10 reps with assist, BLE AP, LAQ x 5 reps  -     Row Name 10/23/18 6238          Pain Assessment    Additional Documentation Pain Scale: Numbers Pre/Post-Treatment (Group)  -     Row Name 10/23/18 4328          Pain Scale: Numbers Pre/Post-Treatment    Pain Scale: Numbers, Pretreatment 3/10  -     Pain Scale: Numbers, Post-Treatment 5/10  -     Pain Location - Side Right  -     Pain Location shoulder  -     Row Name             Wound 10/22/18 1324 Right shoulder incision    Wound - Properties Group Date first assessed: 10/22/18  -EG Time first assessed: 1324  -EG Side: Right  -EG Location: shoulder  -EG Type: incision  -EG    Row Name 10/23/18 9998          Plan of Care Review    Plan of Care Reviewed With patient  -[facility] Name 10/23/18 0292          Physical Therapy Clinical Impression    Patient/Family Goals Statement (PT Clinical Impression) return to [facility]  -     Criteria for Skilled Interventions Met (PT Clinical Impression) treatment indicated  -     Impairments Found (describe specific impairments) gait, locomotion, and balance;ROM  -     Rehab Potential (PT Clinical Summary) good, to achieve stated therapy goals  -     Row Name 10/23/18 4470          Physical Therapy Goals    Bed Mobility Goal Selection (PT) bed mobility, PT goal 1  -     Transfer Goal Selection (PT) transfer, PT goal 1  -[facility] Name 10/23/18 1427          Bed Mobility Goal 1 (PT)    Activity/Assistive Device (Bed Mobility Goal 1, PT) sit to supine;supine to sit  -     [place] Level/Cues Needed (Bed Mobility Goal 1, PT) moderate assist (50-74% patient effort)  -     Time Frame (Bed Mobility Goal 1, PT) 3 days  -     Row Name 10/23/18 6730          Transfer Goal 1 (PT)    Activity/Assistive Device (Transfer Goal 1, PT) sit-to-stand/stand-to-sit;bed-to-chair/chair-to-bed  -     [place] Level/Cues Needed (Transfer Goal 1, PT) moderate assist (50-74% patient  effort)  -     Time Frame (Transfer Goal 1, PT) 3 days  -     Row Name 10/23/18 1338          Patient Education Goal (PT)    Activity (Patient Education Goal, PT) HEP, post-op RTSA protocol, use of sling  -     [place]/Cues/Accuracy (Memory Goal 2, PT) demonstrates adequately;verbalizes understanding  -     Time Frame (Patient Education Goal, PT) 3 days  -     Row Name 10/23/18 1333          Positioning and Restraints    Pre-Treatment Position in bed  -     Post Treatment Position bed  -     In Bed fowlers;call light within reach;encouraged to call for assist;notified ns  -       User Key  (r) = Recorded By, (t) = Taken By, (c) = Cosigned By    Initials Name Provider Type    Hailey Kapoor, RENZO Physical Therapist    Laverne Ocampo RN Registered Nurse          Physical Therapy Education     Title: PT OT SLP Therapies (Done)     Topic: Physical Therapy (Done)     Point: Mobility training (Done)    Learning Progress Summary     Learner Status Readiness Method Response Comment Documented by    Patient Done Acceptance JANET BUTT NR   10/23/18 1349          Point: Home exercise program (Done)    Learning Progress Summary     Learner Status Readiness Method Response Comment Documented by    Patient Done Acceptance JANET BUTT NR   10/23/18 1349          Point: Body mechanics (Done)    Learning Progress Summary     Learner Status Readiness Method Response Comment Documented by    Patient Done Acceptance JANET BUTT NR   10/23/18 1349          Point: Precautions (Done)    Learning Progress Summary     Learner Status Readiness Method Response Comment Documented by    Patient Done Acceptance JANET BUTT NR   10/23/18 1349                      User Key     Initials Effective Dates Name Provider Type Critical access hospital 06/08/18 -  Hailey Workman, PT Physical Therapist PT                PT Recommendation and Plan  Anticipated Discharge Disposition (PT): skilled nursing facility  Planned Therapy  Interventions (PT Eval): bed mobility training, home exercise program, strengthening, transfer training, ROM (range of motion), patient/family education  Therapy Frequency (PT Clinical Impression): daily  Outcome Summary/Treatment Plan (PT)  Anticipated Discharge Disposition (PT): skilled nursing facility  Plan of Care Reviewed With: patient  Outcome Summary: Pt is s/p R RTSA. She has a h/o MS and is nonambulatory. Pt reports her  helps her transfer at home, but she usually able to hold on around his shoulders and does WB through BLE. Strength is 3/5 in LLE and 3-/5 distally in RLE and 2/5 proximally in RLE. Pt stood twice with assist but was uncomfortable transferring to a chair today. Pt would benefit from PT to address these impairments. Pt will need SNF placement at d/c          Outcome Measures     Row Name 10/23/18 1300             How much help from another person do you currently need...    Turning from your back to your side while in flat bed without using bedrails? 2  -KH      Moving from lying on back to sitting on the side of a flat bed without bedrails? 2  -KH      Moving to and from a bed to a chair (including a wheelchair)? 1  -KH      Standing up from a chair using your arms (e.g., wheelchair, bedside chair)? 2  -KH      Climbing 3-5 steps with a railing? 1  -KH      To walk in hospital room? 1  -KH      AM-PAC 6 Clicks Score 9  -KH         Functional Assessment    Outcome Measure Options AM-PAC 6 Clicks Basic Mobility (PT)  -        User Key  (r) = Recorded By, (t) = Taken By, (c) = Cosigned By    Initials Name Provider Type    Hailey Kapoor, PT Physical Therapist           Time Calculation:         PT Charges     Row Name 10/23/18 1697             Time Calculation    Start Time 1305  -      Stop Time 1337  -      Time Calculation (min) 32 min  -MARLEN      PT Received On 10/23/18  -MARLEN      PT - Next Appointment 10/24/18  -MARLEN         Time Calculation- PT    Total Timed Code  Minutes- PT 10 minute(s)  -MARLEN        User Key  (r) = Recorded By, (t) = Taken By, (c) = Cosigned By    Initials Name Provider Type    Hailey Kapoor, PT Physical Therapist        Therapy Suggested Charges     Code   Minutes Charges    None           Therapy Charges for Today     Code Description Service Date Service Provider Modifiers Qty    14035206285 HC PT EVAL MOD COMPLEXITY 2 10/23/2018 Hailey Workman, PT GP 1    50146382400 HC PT THER PROC EA 15 MIN 10/23/2018 Hailey Workman, PT GP 1          PT G-Codes  Outcome Measure Options: AM-PAC 6 Clicks Basic Mobility (PT)  AM-PAC 6 Clicks Score: 9      Hailey Workman PT  10/23/2018

## 2018-10-23 NOTE — PROGRESS NOTES
Discharge Planning Assessment  [facility]     Patient Name: Maritza Bejarano  MRN: [id number]  Today's Date: 10/23/2018    Admit Date: 10/22/2018          Discharge Needs Assessment     Row Name 10/23/18 1641       Living Environment    Lives With spouse    Current Living Arrangements home/apartment/condo    Quality of Family Relationships helpful;involved;supportive    Able to Return to Prior Arrangements yes       Transition Planning    Patient/Family Anticipates Transition to inpatient rehabilitation facility    Transportation Anticipated family or friend will provide       Discharge Needs Assessment    Readmission Within the Last 30 Days no previous admission in last 30 days    Concerns to be Addressed no discharge needs identified    Equipment Currently Used at Home wheelchair, motorized    Discharge Facility/Level of Care Needs nursing facility, skilled    Offered/Gave Vendor List yes            Discharge Plan     Row Name 10/23/18 1642       Plan    Plan referral pending w/ [facility]     Patient/Family in Agreement with Plan yes    Plan Comments Met w/ pt and family at the bedside, verified facesheet and d/c plan. Pt would like a referral to [facility] before she returns home. Her first choice is [facility] followed by  Wilfredo Scott. [place]/Nunu notified and will eval. CCP f/u 10/24.         Destination     Service Request Status Selected Specialties Address Phone Number Fax Number    [facility] Pending - Request Sent N/A [id number] [facility] [id number] [phone]      Durable Medical Equipment     No service coordination in this encounter.      Dialysis/Infusion     No service coordination in this encounter.      Home Medical Care     Service Request Status Selected Specialties Address Phone Number Fax Number    [facility] Pending - Request Sent N/A [address] [place] [address] [phone]      Social Care     No  service coordination in this encounter.        Expected Discharge Date and Time     Expected Discharge Date Expected Discharge Time    Oct 25, 2018               Demographic Summary    No documentation.           Functional Status     Row Name 10/23/18 1642       Functional Status    Usual Activity Tolerance fair    Current Activity Tolerance fair       Functional Status, IADL    Medications independent    Meal Preparation independent    Housekeeping assistive equipment and person    Laundry independent    Shopping assistive equipment and person       Mental Status    General Appearance WDL WDL       Mental Status Summary    Recent Changes in Mental Status/Cognitive Functioning no changes            Psychosocial    No documentation.           Abuse/Neglect    No documentation.           Legal    No documentation.           Substance Abuse    No documentation.           Patient Forms     Row Name 10/23/18 1641       Patient Forms    Provider Choice List Delivered    Delivered to Patient    Method of delivery In person          Shwetha Maravilla RN

## 2018-10-23 NOTE — PLAN OF CARE
Problem: Patient Care Overview  Goal: Plan of Care Review  Outcome: Ongoing (interventions implemented as appropriate)   10/23/18 8045   Coping/Psychosocial   Plan of Care Reviewed With patient   Plan of Care Review   Progress improving   OTHER   Outcome Summary VSS, BP low, pt denies symptoms. Pain controlled with PO pain med. Dressing c/d/i. F/c d/c'd. Pt straight caths self. Worked with therapy and sat on side of bed today. Discussed hx MS and pain management. Planning to d/c to rehab.        Problem: Fall Risk (Adult)  Goal: Identify Related Risk Factors and Signs and Symptoms  Outcome: Ongoing (interventions implemented as appropriate)    Goal: Absence of Fall  Outcome: Ongoing (interventions implemented as appropriate)      Problem: Shoulder Arthroplasty (Adult)  Goal: Signs and Symptoms of Listed Potential Problems Will be Absent, Minimized or Managed (Shoulder Arthroplasty)  Outcome: Ongoing (interventions implemented as appropriate)    Goal: Anesthesia/Sedation Recovery  Outcome: Ongoing (interventions implemented as appropriate)      Problem: Skin Injury Risk (Adult)  Goal: Skin Health and Integrity  Outcome: Ongoing (interventions implemented as appropriate)

## 2018-10-23 NOTE — PLAN OF CARE
Problem: Patient Care Overview  Goal: Plan of Care Review   10/23/18 6849   OTHER   Outcome Summary Pt is s/p R RTSA. She has a h/o MS and is nonambulatory. Pt reports her  helps her transfer at home, but she usually able to hold on around his shoulders and does WB through BLE. Strength is 3/5 in LLE and 3-/5 distally in RLE and 2/5 proximally in RLE. Pt stood twice with assist but was uncomfortable transferring to a chair today. Pt would benefit from PT to address these impairments. Pt will need SNF placement at d/c

## 2018-10-23 NOTE — PLAN OF CARE
Problem: Patient Care Overview  Goal: Plan of Care Review   10/23/18 8589   OTHER   Outcome Summary Pt is s/p R RTSA. She has a h/o MS and is nonambulatory. Pt reports her  helps her transfer at home, but she usually able to hold on around his shoulders and does WB through BLE. Strength is 3/5 in LLE and 3-/5 distally in RLE and 2/5 proximally in RLE. Pt stood twice with assist but was uncomfortable transferring to a chair today. Pt would benefit from PT to address these impairments.

## 2018-10-23 NOTE — DISCHARGE PLACEMENT REQUEST
"Maritza Bejarano (58 y.o. Female)     Date of Birth Social Security Number Address Home Phone MRN    1960  [id number] [facility] [address] [phone] [id number]    Church Marital Status          Congregational        Admission Date Admission Type Admitting Provider Attending Provider Department, Room/Bed    10/22/18 Elective Bipin Dangelo MD Rhoads, David, MD [address], [address]    Discharge Date Discharge Disposition Discharge Destination         Rehab Facility or Unit (DC - External)              Attending Provider:  Bipin Dangelo MD    Allergies:  Sulfa Antibiotics    Isolation:  None   Infection:  None   Code Status:  CPR    Ht:  165.1 cm (65\")   Wt:  74.8 kg (165 lb)    Admission Cmt:  None   Principal Problem:  Osteoarthritis of right shoulder [M19.011]                 Active Insurance as of 10/22/2018     Primary Coverage     Payor Plan Insurance Group Employer/Plan Group    MEDICARE MEDICARE A & B      Payor Plan Address Payor Plan Phone Number Effective From Effective To    [address] [facility] [id number] [phone] 6/1/1999     [facility] [id number]       Subscriber Name Subscriber Birth Date Member ID       GURWINDERMARITZA ANKUSH 1960 [id number]           Secondary Coverage     Payor Plan Insurance Group Employer/Plan Group    [place] [employer] [place]  [employer] HE[employer] [facility]      Payor Plan Address Payor Plan Phone Number Effective From Effective To    1996 BYPASS [facility] [phone] 3/5/2016     [facility] [address]       Subscriber Name Subscriber Birth Date Member ID       GURWINDERNARINDERANKUSH LECHUGA 1960 [id number]                 Emergency Contacts      (Rel.) Home Phone Work Phone Mobile Phone    Bigg Bejarano (Spouse) [phone] -- [phone]              "

## 2018-10-24 PROCEDURE — 97110 THERAPEUTIC EXERCISES: CPT

## 2018-10-24 PROCEDURE — 25010000002 ENOXAPARIN PER 10 MG: Performed by: ORTHOPAEDIC SURGERY

## 2018-10-24 RX ORDER — BACLOFEN 10 MG/1
10 TABLET ORAL EVERY 6 HOURS PRN
Status: DISCONTINUED | OUTPATIENT
Start: 2018-10-24 | End: 2018-10-25 | Stop reason: HOSPADM

## 2018-10-24 RX ADMIN — CLONAZEPAM 0.25 MG: 0.5 TABLET ORAL at 04:34

## 2018-10-24 RX ADMIN — PANTOPRAZOLE SODIUM 40 MG: 40 TABLET, DELAYED RELEASE ORAL at 08:47

## 2018-10-24 RX ADMIN — OXYCODONE HYDROCHLORIDE AND ACETAMINOPHEN 2 TABLET: 10; 325 TABLET ORAL at 21:38

## 2018-10-24 RX ADMIN — FLUOXETINE HYDROCHLORIDE 60 MG: 20 CAPSULE ORAL at 08:48

## 2018-10-24 RX ADMIN — ENOXAPARIN SODIUM 40 MG: 40 INJECTION SUBCUTANEOUS at 08:48

## 2018-10-24 RX ADMIN — PRAMIPEXOLE DIHYDROCHLORIDE 1.5 MG: 1.5 TABLET ORAL at 08:48

## 2018-10-24 RX ADMIN — SENNOSIDES AND DOCUSATE SODIUM 2 TABLET: 8.6; 5 TABLET ORAL at 08:48

## 2018-10-24 RX ADMIN — PRAMIPEXOLE DIHYDROCHLORIDE 1.5 MG: 1.5 TABLET ORAL at 21:38

## 2018-10-24 RX ADMIN — HYDROCODONE BITARTRATE AND ACETAMINOPHEN 1 TABLET: 10; 325 TABLET ORAL at 08:50

## 2018-10-24 RX ADMIN — LOSARTAN POTASSIUM: 50 TABLET, FILM COATED ORAL at 09:00

## 2018-10-24 RX ADMIN — BACLOFEN 10 MG: 10 TABLET ORAL at 17:05

## 2018-10-24 RX ADMIN — OXYCODONE HYDROCHLORIDE 20 MG: 20 TABLET, FILM COATED, EXTENDED RELEASE ORAL at 01:06

## 2018-10-24 RX ADMIN — PRAMIPEXOLE DIHYDROCHLORIDE 1.5 MG: 1.5 TABLET ORAL at 17:06

## 2018-10-24 RX ADMIN — BACLOFEN 10 MG: 10 TABLET ORAL at 21:38

## 2018-10-24 RX ADMIN — PANTOPRAZOLE SODIUM 40 MG: 40 TABLET, DELAYED RELEASE ORAL at 21:38

## 2018-10-24 RX ADMIN — OXYCODONE HYDROCHLORIDE 20 MG: 20 TABLET, FILM COATED, EXTENDED RELEASE ORAL at 14:37

## 2018-10-24 RX ADMIN — OXYCODONE HYDROCHLORIDE AND ACETAMINOPHEN 2 TABLET: 10; 325 TABLET ORAL at 14:38

## 2018-10-24 RX ADMIN — OXYCODONE HYDROCHLORIDE AND ACETAMINOPHEN 1 TABLET: 10; 325 TABLET ORAL at 04:34

## 2018-10-24 RX ADMIN — ATORVASTATIN CALCIUM 10 MG: 10 TABLET, FILM COATED ORAL at 08:48

## 2018-10-24 RX ADMIN — BACLOFEN 10 MG: 10 TABLET ORAL at 08:48

## 2018-10-24 RX ADMIN — Medication 3 ML: at 21:42

## 2018-10-24 NOTE — THERAPY TREATMENT NOTE
Acute Care - Physical Therapy Treatment Note  [facility]     Patient Name: Maritza LECHUGA Darci  : 1960  MRN: [id number]  Today's Date: 10/24/2018             Admit Date: 10/22/2018    Visit Dx:    ICD-10-CM ICD-9-CM   1. Primary osteoarthritis of right shoulder M19.011 715.11     Patient Active Problem List   Diagnosis   • Osteoarthritis of right shoulder       Therapy Treatment          Rehabilitation Treatment Summary     Row Name 10/24/18 1647             Treatment Time/Intention    Discipline physical therapist  -PC      Document Type therapy note (daily note)  -PC      Subjective Information no complaints  -PC      Mode of Treatment physical therapy  -PC      Patient/Family Observations pt sitting in chair, no acute distress, RUE in sling  -PC      Therapy Frequency (PT Clinical Impression) daily  -PC      Patient Effort good  -PC      Treatment Considerations/Comments pt with MS, nonambulatory at baseline  -PC      Recorded by [PC] Adeline Damon, PT 10/24/18 1650      Row Name 10/24/18 164             Cognitive Assessment/Intervention- PT/OT    Orientation Status (Cognition) oriented x 4  -PC      Follows Commands (Cognition) WNL  -PC      Recorded by [PC] Adeline Damon, PT 10/24/18 1650      Row Name 10/24/18 164             Mobility Assessment/Intervention    Extremity Weight-bearing Status right upper extremity  -PC      Right Upper Extremity (Weight-bearing Status) non weight-bearing (NWB)  -PC      Recorded by [PC] Adeline Damon, PT 10/24/18 1650      Row Name 10/24/18 1647             Bed Mobility Assessment/Treatment    Bed Mobility Assessment/Treatment --  -PC      Rolling Left [place] (Bed Mobility) --  -PC      Rolling Right [place] (Bed Mobility) --  -PC      Supine-Sit [place] (Bed Mobility) --  -PC      Sit-Supine [place] (Bed Mobility) --  -PC      Comment (Bed Mobility) pt up in chair  -PC      Recorded by [PC] Adeline Damon, PT 10/24/18 1650      Row Name  10/24/18 1647             Transfer Assessment/Treatment    Transfer Assessment/Treatment sit-stand transfer;stand-sit transfer  -PC      Comment (Transfers) worked on sit to stand, weight shifting in standing  -PC      Recorded by [PC] Adeline Damon, PT 10/24/18 1650      Row Name 10/24/18 1647             Sit-Stand Transfer    Sit-Stand [place] (Transfers) moderate assist (50% patient effort)  -PC      Recorded by [PC] Adeline Damon, PT 10/24/18 1650      Row Name 10/24/18 1647             Stand-Sit Transfer    Stand-Sit [place] (Transfers) moderate assist (50% patient effort)  -PC      Recorded by [PC] Adeline Damon, PT 10/24/18 1650      Row Name 10/24/18 1647             Gait/Stairs Assessment/Training    [place] Level (Gait) not tested   pt is not ambulatory  -PC      Recorded by [PC] Adeline Damon, PT 10/24/18 1650      Row Name 10/24/18 1647             Therapeutic Exercise    Comment (Therapeutic Exercise) B AP, LAQ, QS, R TSR protocol, pt perf pendulums by leaning forward in sitting  -PC      Recorded by [PC] Adeline Damon, PT 10/24/18 1650      Row Name 10/24/18 1647             Positioning and Restraints    Pre-Treatment Position sitting in chair/recliner  -PC      Post Treatment Position chair  -PC      In Chair reclined;call light within reach;encouraged to call for assist  -PC      Recorded by [PC] Adeline Damon, PT 10/24/18 1650      Row Name 10/24/18 1647             Pain Scale: Numbers Pre/Post-Treatment    Pain Scale: Numbers, Pretreatment 3/10  -PC      Pain Scale: Numbers, Post-Treatment 5/10  -PC      Pain Location - Side Right  -PC      Pain Location shoulder  -PC      Recorded by [PC] Adeline Damon, PT 10/24/18 1650      Row Name                Wound 10/22/18 1324 Right shoulder incision    Wound - Properties Group Date first assessed: 10/22/18 [EG] Time first assessed: 1324 [EG] Side: Right [EG] Location: shoulder [EG] Type: incision [EG] Recorded by:  [EG]  Laverne Mancuso, RN 10/22/18 1324    Row Name 10/24/18 1647             Plan of Care Review    Plan of Care Reviewed With patient  -PC      Recorded by [PC] Adeline Damon, PT 10/24/18 1650      Row Name 10/24/18 1647             Outcome Summary/Treatment Plan (PT)    Anticipated Discharge Disposition (PT) skilled nursing facility  -PC      Recorded by [PC] Adeline Damon, PT 10/24/18 1650        User Key  (r) = Recorded By, (t) = Taken By, (c) = Cosigned By    Initials Name Effective Dates Discipline    PC Adeline Damon, PT 04/03/18 -  PT    EG Laverne Mancuso RN 07/10/17 -  Nurse          Wound 10/22/18 1324 Right shoulder incision (Active)   Dressing Appearance dry;intact;no drainage 10/24/2018  4:04 PM   Closure INDER 10/24/2018  4:04 PM             Physical Therapy Education     Title: PT OT SLP Therapies (Done)     Topic: Physical Therapy (Done)     Point: Mobility training (Done)    Learning Progress Summary     Learner Status Readiness Method Response Comment Documented by    Patient Done Acceptance E,D DU  PC 10/24/18 1650     Done Acceptance JANET BUTT,NR   10/23/18 1349          Point: Home exercise program (Done)    Learning Progress Summary     Learner Status Readiness Method Response Comment Documented by    Patient Done Acceptance E,D DU  PC 10/24/18 1650     Done Acceptance ED ARTURO,NR   10/23/18 1349          Point: Body mechanics (Done)    Learning Progress Summary     Learner Status Readiness Method Response Comment Documented by    Patient Done Acceptance E,D DU  PC 10/24/18 1650     Done Acceptance ED VU,NR   10/23/18 1349          Point: Precautions (Done)    Learning Progress Summary     Learner Status Readiness Method Response Comment Documented by    Patient Done Acceptance E,D DU  PC 10/24/18 1650     Done Acceptance ED VU,NR   10/23/18 1349                      User Key     Initials Effective Dates Name Provider Type Discipline     06/08/18 -  Hailey Workman, PT  Physical Therapist PT    PC 04/03/18 -  Adeline Damon, PT Physical Therapist PT                    PT Recommendation and Plan  Anticipated Discharge Disposition (PT): skilled nursing facility  Therapy Frequency (PT Clinical Impression): daily  Outcome Summary/Treatment Plan (PT)  Anticipated Discharge Disposition (PT): skilled nursing facility  Plan of Care Reviewed With: patient  Progress: improving  Outcome Summary: pt with improved tolerance to  sit to stand today, perf B LE strengthening ex, and RUE total shoulder replacement ex, good progress today          Outcome Measures     Row Name 10/24/18 1600 10/23/18 1300          How much help from another person do you currently need...    Turning from your back to your side while in flat bed without using bedrails? 2  -PC 2  -KH     Moving from lying on back to sitting on the side of a flat bed without bedrails? 2  -PC 2  -KH     Moving to and from a bed to a chair (including a wheelchair)? 2  -PC 1  -KH     Standing up from a chair using your arms (e.g., wheelchair, bedside chair)? 2  -PC 2  -KH     Climbing 3-5 steps with a railing? 1  -PC 1  -KH     To walk in hospital room? 1  -PC 1  -KH     AM-PAC 6 Clicks Score 10  -PC 9  -KH        Functional Assessment    Outcome Measure Options  -- AM-PAC 6 Clicks Basic Mobility (PT)  -KH       User Key  (r) = Recorded By, (t) = Taken By, (c) = Cosigned By    Initials Name Provider Type     Hailey Workman, PT Physical Therapist    PC Adeline Damon, PT Physical Therapist           Time Calculation:         PT Charges     Row Name 10/24/18 1652             Time Calculation    Start Time 1505  -PC      Stop Time 1530  -PC      Time Calculation (min) 25 min  -PC      PT Received On 10/24/18  -PC      PT - Next Appointment 10/25/18  -PC        User Key  (r) = Recorded By, (t) = Taken By, (c) = Cosigned By    Initials Name Provider Type    PC Adeline Damon, PT Physical Therapist        Therapy Suggested Charges      Code   Minutes Charges    None           Therapy Charges for Today     Code Description Service Date Service Provider Modifiers Qty    54034529037 HC PT THER PROC EA 15 MIN 10/24/2018 Adeline Damon, PT GP 2          PT G-Codes  Outcome Measure Options: AM-PAC 6 Clicks Basic Mobility (PT)  AM-PAC 6 Clicks Score: 10    Adeline Damon, PT  10/24/2018

## 2018-10-24 NOTE — PLAN OF CARE
Problem: Patient Care Overview  Goal: Plan of Care Review  Outcome: Ongoing (interventions implemented as appropriate)   10/24/18 4292   Coping/Psychosocial   Plan of Care Reviewed With patient   Plan of Care Review   Progress improving   OTHER   Outcome Summary pt with improved tolerance to sit to stand today, perf B LE strengthening ex, and RUE total shoulder replacement ex, good progress today

## 2018-10-24 NOTE — PLAN OF CARE
Problem: Patient Care Overview  Goal: Plan of Care Review   10/24/18 6742   Coping/Psychosocial   Plan of Care Reviewed With patient   Plan of Care Review   Progress improving   OTHER   Outcome Summary HV drain remains in place to Right shoulder sling maintained. Instructed on need for freq BPchecks and antihypertensive meds. Self cath self this shift on BSC without difficulty. Pain managed with po meds. Expecting transfer to rehab tomorrow       Problem: Fall Risk (Adult)  Goal: Absence of Fall  Outcome: Ongoing (interventions implemented as appropriate)      Problem: Shoulder Arthroplasty (Adult)  Goal: Signs and Symptoms of Listed Potential Problems Will be Absent, Minimized or Managed (Shoulder Arthroplasty)  Outcome: Ongoing (interventions implemented as appropriate)      Problem: Skin Injury Risk (Adult)  Goal: Skin Health and Integrity  Outcome: Ongoing (interventions implemented as appropriate)

## 2018-10-24 NOTE — PROGRESS NOTES
Procedure(s):  RT TOTAL SHOULDER REVERSE ARTHROPLASTY     LOS: 2 days     Subjective :   Complains of muscle spasms. Pain controlled with meds.    Objective :    Vital signs in last 24 hours:  Vitals:    10/23/18 1900 10/23/18 2313 10/24/18 0223 10/24/18 0700   BP: 98/56 111/62 108/62 118/65   BP Location: Left arm Left arm Left arm Left arm   Patient Position: Lying Lying Lying Lying   Pulse: 76 73 71 80   Resp: 16 16 16 14   Temp: 99 °F (37.2 °C) 99 °F (37.2 °C) 99.4 °F (37.4 °C) 97.7 °F (36.5 °C)   TempSrc: Oral Oral Oral Oral   SpO2: 98% 93% 95% 94%   Weight:       Height:           PHYSICAL EXAM:  Patient is calm, in no acute distress, awake and oriented x 3.  Dressing is clean, dry and intact.  No signs of infection.  Swelling is appropriate in amount.  Ecchymosis is appropriate in amount.  Homans test is negative.  Patient is neurovascularly intact distally.  Drain intact.    LABS:    Results from last 7 days  Lab Units 10/23/18  1820  10/22/18  1124   WBC 10*3/mm3  --   --  10.00   HEMOGLOBIN g/dL 11.2*  < > 13.0   HEMATOCRIT % 36.4  < > 40.3   PLATELETS 10*3/mm3  --   --  347   < > = values in this interval not displayed.    Results from last 7 days  Lab Units 10/23/18  0423   SODIUM mmol/L 139   POTASSIUM mmol/L 3.5   CHLORIDE mmol/L 100   CO2 mmol/L 26.0   BUN mg/dL 10   CREATININE mg/dL 0.73   GLUCOSE mg/dL 134*   CALCIUM mg/dL 9.3           ASSESSMENT:  Status post Procedure(s):  RT TOTAL SHOULDER REVERSE ARTHROPLASTY      Plan:  Continue Physical Therapy, increase mobility and range of motion as tolerated.  Continue SCDs, Continue Lovenox for DVT prophylaxis while inpatient.  Dispo planning for rehab. DC drain when output less than 25cc per shift.    Autumn Raines PA-C    Date: 10/24/2018  Time: 7:57 AM

## 2018-10-24 NOTE — DISCHARGE PLACEMENT REQUEST
"Maritza Bejarano (58 y.o. Female)     Date of Birth Social Security Number Address Home Phone MRN    1960  [address] [facility] [address] [phone] [id number]    Rastafari Marital Status          [employer]        Admission Date Admission Type Admitting Provider Attending Provider Department, Room/Bed    10/22/18 Elective Bipin Dangelo MD Rhoads, David, MD [address], [address]    Discharge Date Discharge Disposition Discharge Destination         Rehab Facility or Unit (DC - External)              Attending Provider:  Bipin Dangelo MD    Allergies:  Sulfa Antibiotics    Isolation:  None   Infection:  None   Code Status:  CPR    Ht:  165.1 cm (65\")   Wt:  74.8 kg (165 lb)    Admission Cmt:  None   Principal Problem:  Osteoarthritis of right shoulder [M19.011]                 Active Insurance as of 10/22/2018     Primary Coverage     Payor Plan Insurance Group Employer/Plan Group    MEDICARE MEDICARE A & B      Payor Plan Address Payor Plan Phone Number Effective From Effective To    [address] [facility] [id number] [phone] 6/1/1999     [facility] [id number]       Subscriber Name Subscriber Birth Date Member ID       GURWINDERMARITZA ANKUSH 1960 [id number]           Secondary Coverage     Payor Plan Insurance Group Employer/Plan Group    [place] [employer] HEALTH AND [place]ARE [place]  [facility]      Payor Plan Address Payor Plan Phone Number Effective From Effective To    1996 BYPASS [facility] [phone] 3/5/2016     [facility] [address]       Subscriber Name Subscriber Birth Date Member ID       GURWINDERNARINDERANKUSH LECHUGA 1960 [id number]                 Emergency Contacts      (Rel.) Home Phone Work Phone Mobile Phone    Bigg Bejarano (Spouse) [phone] -- [phone]            "

## 2018-10-24 NOTE — PLAN OF CARE
Problem: Patient Care Overview  Goal: Plan of Care Review  Outcome: Ongoing (interventions implemented as appropriate)   10/24/18 0312   Coping/Psychosocial   Plan of Care Reviewed With patient   Plan of Care Review   Progress improving   OTHER   Outcome Summary client POD 1 Rt reverse total shoulder. VSS - BP stable, discussed BP monitoring and parameters and how pain medication can effect readings r/t hx HTN. pain well controlled with scheduled oxycontin and prn percocet - denies any GI upset. client was dtv by 2030, at 2045 was aided to C with assist x2 and gait belt in order to perform self catheterization per client request; client states thst she catheterizes herself 5 times per day.      Goal: Individualization and Mutuality  Outcome: Ongoing (interventions implemented as appropriate)    Goal: Discharge Needs Assessment  Outcome: Ongoing (interventions implemented as appropriate)      Problem: Fall Risk (Adult)  Goal: Identify Related Risk Factors and Signs and Symptoms  Outcome: Outcome(s) achieved Date Met: 10/24/18    Goal: Absence of Fall  Outcome: Ongoing (interventions implemented as appropriate)      Problem: Shoulder Arthroplasty (Adult)  Goal: Signs and Symptoms of Listed Potential Problems Will be Absent, Minimized or Managed (Shoulder Arthroplasty)  Outcome: Ongoing (interventions implemented as appropriate)    Goal: Anesthesia/Sedation Recovery  Outcome: Ongoing (interventions implemented as appropriate)      Problem: Skin Injury Risk (Adult)  Goal: Skin Health and Integrity  Outcome: Ongoing (interventions implemented as appropriate)

## 2018-10-25 VITALS
HEART RATE: 70 BPM | RESPIRATION RATE: 16 BRPM | SYSTOLIC BLOOD PRESSURE: 98 MMHG | HEIGHT: 65 IN | BODY MASS INDEX: 27.49 KG/M2 | TEMPERATURE: 97.8 F | OXYGEN SATURATION: 96 % | DIASTOLIC BLOOD PRESSURE: 59 MMHG | WEIGHT: 165 LBS

## 2018-10-25 PROBLEM — Z96.611 H/O TOTAL SHOULDER REPLACEMENT, RIGHT: Status: ACTIVE | Noted: 2018-10-22

## 2018-10-25 PROCEDURE — 25010000002 ENOXAPARIN PER 10 MG: Performed by: ORTHOPAEDIC SURGERY

## 2018-10-25 RX ORDER — PRAMIPEXOLE DIHYDROCHLORIDE 1.5 MG/1
1.5 TABLET ORAL 3 TIMES DAILY PRN
Status: DISCONTINUED | OUTPATIENT
Start: 2018-10-25 | End: 2018-10-25 | Stop reason: HOSPADM

## 2018-10-25 RX ADMIN — ATORVASTATIN CALCIUM 10 MG: 10 TABLET, FILM COATED ORAL at 08:38

## 2018-10-25 RX ADMIN — PANTOPRAZOLE SODIUM 40 MG: 40 TABLET, DELAYED RELEASE ORAL at 08:37

## 2018-10-25 RX ADMIN — ENOXAPARIN SODIUM 40 MG: 40 INJECTION SUBCUTANEOUS at 08:40

## 2018-10-25 RX ADMIN — OXYCODONE HYDROCHLORIDE AND ACETAMINOPHEN 1 TABLET: 10; 325 TABLET ORAL at 04:41

## 2018-10-25 RX ADMIN — OXYCODONE HYDROCHLORIDE AND ACETAMINOPHEN 1 TABLET: 10; 325 TABLET ORAL at 08:47

## 2018-10-25 RX ADMIN — FLUOXETINE HYDROCHLORIDE 60 MG: 20 CAPSULE ORAL at 08:38

## 2018-10-25 RX ADMIN — OXYCODONE HYDROCHLORIDE 20 MG: 20 TABLET, FILM COATED, EXTENDED RELEASE ORAL at 01:58

## 2018-10-25 RX ADMIN — OXYCODONE HYDROCHLORIDE AND ACETAMINOPHEN 1 TABLET: 10; 325 TABLET ORAL at 09:42

## 2018-10-25 RX ADMIN — BACLOFEN 10 MG: 10 TABLET ORAL at 04:41

## 2018-10-25 RX ADMIN — Medication 3 ML: at 08:38

## 2018-10-25 RX ADMIN — OXYCODONE HYDROCHLORIDE 20 MG: 20 TABLET, FILM COATED, EXTENDED RELEASE ORAL at 12:06

## 2018-10-25 RX ADMIN — PRAMIPEXOLE DIHYDROCHLORIDE 1.5 MG: 1.5 TABLET ORAL at 08:38

## 2018-10-25 RX ADMIN — SENNOSIDES AND DOCUSATE SODIUM 2 TABLET: 8.6; 5 TABLET ORAL at 08:37

## 2018-10-25 RX ADMIN — FAMOTIDINE 40 MG: 40 TABLET, FILM COATED ORAL at 08:37

## 2018-10-25 NOTE — DISCHARGE SUMMARY
Discharge Summary    Date of Admission: 10/22/2018 10:25 AM  Date of Discharge:  10/25/2018    Discharge Diagnosis:   Osteoarthritis of right shoulder, unspecified osteoarthritis type [M19.011]  S/P Right Reverse total shoulder arthroplasty     PMHX:   Past Medical History:   Diagnosis Date   • Hyperlipidemia    • Hypertension    • Multiple sclerosis (CMS/HCC)    • PONV (postoperative nausea and vomiting)        Discharge Disposition  Rehab Facility or Unit (DC - External)    Procedures Performed  Procedure(s):  RT TOTAL SHOULDER REVERSE ARTHROPLASTY       Indication for Admission  Patient is a 58 y.o. female admitted after undergoing the above surgical procedure. They were admitted for post-operative pain control, medical management and physical therapy.  They progressed with physical therapy.  They were deemed stable for discharge.      Consults:   Consults     No orders found from 9/23/2018 to 10/23/2018.          Discharge Instructions:  Patient is non-weight bearing as tolerated on the operative arm.  Patient is to progress range of motion with flexion as tolerated at least twice daily.  No external rotation past neutral for 6 weeks.  No resisted internal rotation for 6 weeks.  No Driving for 6 weeks.  The dressing is waterproof, and the patient may shower.  Keep dressing in place until post op day 7. Keep shoulder immobilizer on except for bathing and range of motion exercises. Dressing may be changed if saturated or starts to fall off.  Patient will follow-up in the office in 10-14 days.  Call the office at [phone] for any questions or concerns.      Discharge Medications     Discharge Medications      Changes to Medications      Instructions Start Date   oxyCODONE-acetaminophen  MG per tablet  Commonly known as:  PERCOCET  What changed:  Another medication with the same name was added. Make sure you understand how and when to take each.   2 tablets, Oral, Every 12 Hours       oxyCODONE-acetaminophen  MG per tablet  Commonly known as:  PERCOCET  What changed:  You were already taking a medication with the same name, and this prescription was added. Make sure you understand how and when to take each.   1-2 tablets, Oral, Every 4 Hours PRN      OXYCONTIN 10 MG 12 hr tablet  Generic drug:  oxyCODONE  What changed:  Another medication with the same name was added. Make sure you understand how and when to take each.   20 mg, Oral, Every 12 Hours PRN      oxyCODONE ER 20 MG 12 hr tablet  Commonly known as:  OXYCONTIN  What changed:  You were already taking a medication with the same name, and this prescription was added. Make sure you understand how and when to take each.   20 mg, Oral, Every 12 Hours         Continue These Medications      Instructions Start Date   atorvastatin 10 MG tablet  Commonly known as:  LIPITOR   10 mg, Oral, Daily      baclofen 10 MG tablet  Commonly known as:  LIORESAL   20 mg, Oral, 3 Times Daily      cephalexin 500 MG capsule  Commonly known as:  KEFLEX   500 mg, Oral, 3 Times Daily      CITRACAL +D3 PO   1 tablet, Oral, Daily      clonazePAM 2 MG tablet  Commonly known as:  KlonoPIN   Oral      FLUoxetine 20 MG capsule  Commonly known as:  PROzac   60 mg, Oral, Daily      HYOSCYAMINE PO   0.125 mg, Oral, Daily      losartan-hydrochlorothiazide 50-12.5 MG per tablet  Commonly known as:  HYZAAR   1 tablet, Oral, Daily      NON FORMULARY   675 mg, Oral, Daily, Protandim - OTC       pantoprazole 40 MG EC tablet  Commonly known as:  PROTONIX   Oral, 2 Times Daily      phenazopyridine 200 MG tablet  Commonly known as:  PYRIDIUM   200 mg, Oral, 3 Times Daily PRN      pramipexole 0.5 MG tablet  Commonly known as:  MIRAPEX   1.5 mg, Oral, 3 Times Daily      promethazine 25 MG tablet  Commonly known as:  PHENERGAN   Oral         Stop These Medications    HYDROcodone-acetaminophen 7.5-325 MG per tablet  Commonly known as:  NORCO            Discharge Diet:   Diet  Instructions     Advance Diet As Tolerated             Activity at Discharge:   Activity Instructions     Discharge Activity Restrictions       No driving for 6 weeks.  No external rotation past neutral for 6 weeks.  No resisted internal rotation for 6 weeks.    Discharge Activity Restrictions       No driving for 6 weeks.  No external rotation past neutral for 6 weeks.  No resisted internal rotation for 6 weeks.          Follow-up Appointments  No future appointments.  Additional Instructions for the Follow-ups that You Need to Schedule     Discharge Follow-up with Specified Provider: Autumn Maciel PA-C (Dr. Carey office); 2 Weeks    As directed      To:  Autumn Maciel PA-C (Dr. Carey office)    Follow Up:  2 Weeks         Referral to Home Health    As directed      No external rotation past neutral, no resisted internal rotation for 6 weeks.  Flexion as tolerated.    Order Comments:  No external rotation past neutral, no resisted internal rotation for 6 weeks.  Flexion as tolerated.     Face to Face Visit Date:  10/23/2018    Follow-up Provider for Plan of Care?:  I will be treating the patient on an ongoing basis.  Please send me the Plan of Care for signature.    Follow-up Provider:  MALA CAREY [[id number]]    Reason/Clinical Findings:  s/p TSA    Describe mobility limitations that make leaving home difficult:  taxing effort    Nursing/Therapeutic Services Requested:  Physical Therapy    PT orders:  Total joint pathway    Frequency:  1 Week 1               Test Results Pending at Discharge       Autumn Raines PA-C  10/25/18,  12:32 PM

## 2018-10-25 NOTE — PLAN OF CARE
Problem: Patient Care Overview  Goal: Plan of Care Review   10/25/18 5640   Coping/Psychosocial   Plan of Care Reviewed With patient   Plan of Care Review   Progress improving   OTHER   Outcome Summary Pt POD 3 R shoulder reverse. VSS, NVI, dressing CDI. Stand and pivot. Voiding adequately per BSC, self straight cath. Pain controlled with PO meds. D/C to [facility] today

## 2018-10-25 NOTE — PROGRESS NOTES
Continued Stay Note  [facility]     Patient Name: Maritza Bejarano  MRN: [id number]  Today's Date: 10/25/2018    Admit Date: 10/22/2018          Discharge Plan     Row Name 10/25/18 1109       Plan    Plan [facility]     Patient/Family in Agreement with Plan yes    Plan Comments [facility] will accept, bed is ready per [facility]. S/w [facility] (per pt request) per [facility] there are no beds available at [place].     Final Discharge Disposition Code 03 - skilled nursing facility (SNF)    [facility]               Discharge Codes    No documentation.       Expected Discharge Date and Time     Expected Discharge Date Expected Discharge Time    Oct 25, 2018             Shwetha Maravilla RN

## 2018-10-25 NOTE — PROGRESS NOTES
Procedure(s):  RT TOTAL SHOULDER REVERSE ARTHROPLASTY     LOS: 3 days     Subjective :   Complains of muscle spasm    Objective :    Vital signs in last 24 hours:  Vitals:    10/24/18 1900 10/24/18 2300 10/25/18 0300 10/25/18 0631   BP: 98/62 95/58 120/67 113/67   BP Location: Left arm Left arm Left arm Left arm   Patient Position: Lying Lying Lying Lying   Pulse: 75 68 62 80   Resp: 16 16 16 16   Temp: 98 °F (36.7 °C) 98 °F (36.7 °C) 98.4 °F (36.9 °C) 97.1 °F (36.2 °C)   TempSrc:       SpO2: 94% 94% 96% 96%   Weight:       Height:           PHYSICAL EXAM:  Patient is calm, in no acute distress, awake and oriented x 3.  Dressing is clean, dry and intact.  No signs of infection.  Swelling is appropriate in amount.  Ecchymosis is appropriate in amount.  Homans test is negative.  Patient is neurovascularly intact distally.    LABS:    Results from last 7 days  Lab Units 10/23/18  1820  10/22/18  1124   WBC 10*3/mm3  --   --  10.00   HEMOGLOBIN g/dL 11.2*  < > 13.0   HEMATOCRIT % 36.4  < > 40.3   PLATELETS 10*3/mm3  --   --  347   < > = values in this interval not displayed.    Results from last 7 days  Lab Units 10/23/18  0423   SODIUM mmol/L 139   POTASSIUM mmol/L 3.5   CHLORIDE mmol/L 100   CO2 mmol/L 26.0   BUN mg/dL 10   CREATININE mg/dL 0.73   GLUCOSE mg/dL 134*   CALCIUM mg/dL 9.3           ASSESSMENT:  Status post Procedure(s):  RT TOTAL SHOULDER REVERSE ARTHROPLASTY      Plan:  DC [facility]  Continue Physical Therapy, increase mobility and range of motion as tolerated.  Continue SCDs, Continue Lovenox for DVT prophylaxis while inpatient.  Dispo planning for rehab when bed is ready. Please notify the office when available.     Autumn Raines PA-C    Date: 10/25/2018  Time: 8:30 AM

## 2018-11-14 ENCOUNTER — HOSPITAL ENCOUNTER (EMERGENCY)
Facility: HOSPITAL | Age: 58
Discharge: HOME | End: 2018-11-15
Attending: EMERGENCY MEDICINE | Admitting: EMERGENCY MEDICINE

## 2018-11-14 ENCOUNTER — APPOINTMENT (OUTPATIENT)
Dept: GENERAL RADIOLOGY | Facility: HOSPITAL | Age: 58
End: 2018-11-14

## 2018-11-14 DIAGNOSIS — N39.0 ACUTE UTI: Primary | ICD-10-CM

## 2018-11-14 DIAGNOSIS — K59.00 CONSTIPATION, UNSPECIFIED CONSTIPATION TYPE: ICD-10-CM

## 2018-11-14 LAB
ALBUMIN SERPL-MCNC: 4.3 G/DL (ref 3.5–5.2)
ALBUMIN/GLOB SERPL: 1.2 G/DL
ALP SERPL-CCNC: 141 U/L (ref 39–117)
ALT SERPL W P-5'-P-CCNC: 15 U/L (ref 1–33)
ANION GAP SERPL CALCULATED.3IONS-SCNC: 11.9 MMOL/L
AST SERPL-CCNC: 24 U/L (ref 1–32)
BACTERIA UR QL AUTO: ABNORMAL /HPF
BASOPHILS # BLD AUTO: 0.01 10*3/MM3 (ref 0–0.2)
BASOPHILS NFR BLD AUTO: 0.2 % (ref 0–1.5)
BILIRUB SERPL-MCNC: 0.4 MG/DL (ref 0.1–1.2)
BILIRUB UR QL STRIP: NEGATIVE
BUN BLD-MCNC: 17 MG/DL (ref 6–20)
BUN/CREAT SERPL: 21 (ref 7–25)
CALCIUM SPEC-SCNC: 9.9 MG/DL (ref 8.6–10.5)
CHLORIDE SERPL-SCNC: 97 MMOL/L (ref 98–107)
CLARITY UR: ABNORMAL
CO2 SERPL-SCNC: 28.1 MMOL/L (ref 22–29)
COLOR UR: YELLOW
CREAT BLD-MCNC: 0.81 MG/DL (ref 0.57–1)
DEPRECATED RDW RBC AUTO: 46.8 FL (ref 37–54)
EOSINOPHIL # BLD AUTO: 0.08 10*3/MM3 (ref 0–0.7)
EOSINOPHIL NFR BLD AUTO: 1.4 % (ref 0.3–6.2)
ERYTHROCYTE [DISTWIDTH] IN BLOOD BY AUTOMATED COUNT: 13.7 % (ref 11.7–13)
GFR SERPL CREATININE-BSD FRML MDRD: 73 ML/MIN/1.73
GLOBULIN UR ELPH-MCNC: 3.5 GM/DL
GLUCOSE BLD-MCNC: 91 MG/DL (ref 65–99)
GLUCOSE UR STRIP-MCNC: NEGATIVE MG/DL
HCT VFR BLD AUTO: 39.2 % (ref 35.6–45.5)
HGB BLD-MCNC: 12.5 G/DL (ref 11.9–15.5)
HGB UR QL STRIP.AUTO: ABNORMAL
HOLD SPECIMEN: NORMAL
HOLD SPECIMEN: NORMAL
HYALINE CASTS UR QL AUTO: ABNORMAL /LPF
IMM GRANULOCYTES # BLD: 0 10*3/MM3 (ref 0–0.03)
IMM GRANULOCYTES NFR BLD: 0 % (ref 0–0.5)
KETONES UR QL STRIP: ABNORMAL
LEUKOCYTE ESTERASE UR QL STRIP.AUTO: NEGATIVE
LYMPHOCYTES # BLD AUTO: 1.4 10*3/MM3 (ref 0.9–4.8)
LYMPHOCYTES NFR BLD AUTO: 24.4 % (ref 19.6–45.3)
MCH RBC QN AUTO: 29.7 PG (ref 26.9–32)
MCHC RBC AUTO-ENTMCNC: 31.9 G/DL (ref 32.4–36.3)
MCV RBC AUTO: 93.1 FL (ref 80.5–98.2)
MONOCYTES # BLD AUTO: 0.4 10*3/MM3 (ref 0.2–1.2)
MONOCYTES NFR BLD AUTO: 7 % (ref 5–12)
NEUTROPHILS # BLD AUTO: 3.84 10*3/MM3 (ref 1.9–8.1)
NEUTROPHILS NFR BLD AUTO: 67 % (ref 42.7–76)
NITRITE UR QL STRIP: NEGATIVE
PH UR STRIP.AUTO: 5.5 [PH] (ref 5–8)
PLATELET # BLD AUTO: 421 10*3/MM3 (ref 140–500)
PMV BLD AUTO: 9.6 FL (ref 6–12)
POTASSIUM BLD-SCNC: 4 MMOL/L (ref 3.5–5.2)
PROT SERPL-MCNC: 7.8 G/DL (ref 6–8.5)
PROT UR QL STRIP: NEGATIVE
RBC # BLD AUTO: 4.21 10*6/MM3 (ref 3.9–5.2)
RBC # UR: ABNORMAL /HPF
REF LAB TEST METHOD: ABNORMAL
SODIUM BLD-SCNC: 137 MMOL/L (ref 136–145)
SP GR UR STRIP: 1.03 (ref 1–1.03)
SQUAMOUS #/AREA URNS HPF: ABNORMAL /HPF
UROBILINOGEN UR QL STRIP: ABNORMAL
WBC NRBC COR # BLD: 5.73 10*3/MM3 (ref 4.5–10.7)
WBC UR QL AUTO: ABNORMAL /HPF
WHOLE BLOOD HOLD SPECIMEN: NORMAL
WHOLE BLOOD HOLD SPECIMEN: NORMAL

## 2018-11-14 PROCEDURE — 36415 COLL VENOUS BLD VENIPUNCTURE: CPT

## 2018-11-14 PROCEDURE — 81001 URINALYSIS AUTO W/SCOPE: CPT | Performed by: NURSE PRACTITIONER

## 2018-11-14 PROCEDURE — 87077 CULTURE AEROBIC IDENTIFY: CPT | Performed by: EMERGENCY MEDICINE

## 2018-11-14 PROCEDURE — 87086 URINE CULTURE/COLONY COUNT: CPT | Performed by: EMERGENCY MEDICINE

## 2018-11-14 PROCEDURE — 99284 EMERGENCY DEPT VISIT MOD MDM: CPT

## 2018-11-14 PROCEDURE — 25010000002 CEFTRIAXONE PER 250 MG: Performed by: EMERGENCY MEDICINE

## 2018-11-14 PROCEDURE — 85025 COMPLETE CBC W/AUTO DIFF WBC: CPT | Performed by: NURSE PRACTITIONER

## 2018-11-14 PROCEDURE — 96365 THER/PROPH/DIAG IV INF INIT: CPT

## 2018-11-14 PROCEDURE — 74022 RADEX COMPL AQT ABD SERIES: CPT

## 2018-11-14 PROCEDURE — 51798 US URINE CAPACITY MEASURE: CPT

## 2018-11-14 PROCEDURE — 80053 COMPREHEN METABOLIC PANEL: CPT | Performed by: NURSE PRACTITIONER

## 2018-11-14 PROCEDURE — 87186 SC STD MICRODIL/AGAR DIL: CPT | Performed by: EMERGENCY MEDICINE

## 2018-11-14 PROCEDURE — 96361 HYDRATE IV INFUSION ADD-ON: CPT

## 2018-11-14 PROCEDURE — P9612 CATHETERIZE FOR URINE SPEC: HCPCS

## 2018-11-14 RX ORDER — CEFTRIAXONE SODIUM 1 G/50ML
1 INJECTION, SOLUTION INTRAVENOUS ONCE
Status: COMPLETED | OUTPATIENT
Start: 2018-11-14 | End: 2018-11-15

## 2018-11-14 RX ORDER — CEPHALEXIN 500 MG/1
500 CAPSULE ORAL 2 TIMES DAILY
Qty: 14 CAPSULE | Refills: 0 | Status: SHIPPED | OUTPATIENT
Start: 2018-11-14 | End: 2019-06-14

## 2018-11-14 RX ADMIN — CEFTRIAXONE SODIUM 1 G: 1 INJECTION, SOLUTION INTRAVENOUS at 23:44

## 2018-11-14 RX ADMIN — SODIUM CHLORIDE 500 ML: 9 INJECTION, SOLUTION INTRAVENOUS at 20:29

## 2018-11-14 NOTE — ED NOTES
"Patient states \"I haven't had a bowel movement in several days. I've been taking miralax and it hasn't been helping. I self-cath and I haven't been getting a large amount of urine the last few days, my legs have both been swelling too.\"     Tonia Lorenzo, RN  11/14/18 8004    "

## 2018-11-14 NOTE — ED TRIAGE NOTES
Pt to ED with c/o constipation x 5 days, has only had 2 BM's x 10 days, pt concerned about low urine output, states she self-caths, has only had 600ml output since yesterday. 2+ BLE edema present. + nausea, pt reports does not have much feeling in abd area d/t MS. Pt has been narcotics for fractured humerus and torn rotator cuff.

## 2018-11-15 VITALS
HEIGHT: 65 IN | SYSTOLIC BLOOD PRESSURE: 133 MMHG | RESPIRATION RATE: 18 BRPM | HEART RATE: 72 BPM | DIASTOLIC BLOOD PRESSURE: 63 MMHG | BODY MASS INDEX: 26.37 KG/M2 | WEIGHT: 158.3 LBS | OXYGEN SATURATION: 92 % | TEMPERATURE: 98.2 F

## 2018-11-15 NOTE — ED NOTES
Patient provided discharge instructions at this time.  Respirations are even and unlabored, chest rise and fall is equal in expansion.  All patients questions answered prior to departure from the ED.  Pt wheeled from ED in personal wheelchair by , capillary refill within normal limit, speech normal.       Leslee Kay, RN  11/15/18 0126

## 2018-11-15 NOTE — ED PROVIDER NOTES
EMERGENCY DEPARTMENT ENCOUNTER    CHIEF COMPLAINT  Chief Complaint: constipation  History given by: pt  History limited by: none  Room Number: 15/15  PMD: Sohan Jacobsen MD      HPI:  Pt is a 58 y.o. female who presents complaining of constipation x5 days. Pt admits to decreased urine output. Pt reports she has tried miralax with no relief.     Duration:  Five days  Onset: gradual  Timing: timing  Quality: constipation  Intensity/Severity: moderate  Progression: unchanged  Associated Symptoms: decreased urine output  Aggravating Factors: none  Alleviating Factors: none  Previous Episodes: none  Treatment before arrival: miralax with no relief.    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     Resolved Ambulatory Problems     Diagnosis Date Noted   • No Resolved Ambulatory Problems     Past Medical History:   Diagnosis Date   • Hyperlipidemia    • Hypertension    • Multiple sclerosis (CMS/HCC)    • PONV (postoperative nausea and vomiting)        PAST SURGICAL HISTORY  Past Surgical History:   Procedure Laterality Date   • ADENOIDECTOMY     • PARATHYROIDECTOMY         FAMILY HISTORY  History reviewed. No pertinent family history.    SOCIAL HISTORY  Social History     Socioeconomic History   • Marital status:      Spouse name: Not on file   • Number of children: Not on file   • Years of education: Not on file   • Highest education level: Not on file   Social Needs   • Financial resource strain: Not on file   • Food insecurity - worry: Not on file   • Food insecurity - inability: Not on file   • Transportation needs - medical: Not on file   • Transportation needs - non-medical: Not on file   Occupational History   • Not on file   Tobacco Use   • Smoking status: Former Smoker     Last attempt to quit: 10/22/2003     Years since quitting: 15.0   • Smokeless tobacco: Never Used   Substance and Sexual Activity   • Alcohol use: No   •  Drug use: No   • Sexual activity: Defer   Other Topics Concern   • Not on file   Social History Narrative   • Not on file       ALLERGIES  Sulfa antibiotics    REVIEW OF SYSTEMS  Review of Systems   Constitutional: Negative for fever.   HENT: Negative for sore throat.    Eyes: Negative.    Respiratory: Negative for cough and shortness of breath.    Cardiovascular: Negative for chest pain.   Gastrointestinal: Positive for constipation. Negative for abdominal pain, diarrhea and vomiting.   Genitourinary: Positive for decreased urine volume. Negative for dysuria.   Musculoskeletal: Negative for neck pain.   Skin: Negative for rash.   Allergic/Immunologic: Negative.    Neurological: Negative for weakness, numbness and headaches.   Hematological: Negative.    Psychiatric/Behavioral: Negative.    All other systems reviewed and are negative.      PHYSICAL EXAM  ED Triage Vitals [11/14/18 1739]   Temp Heart Rate Resp BP SpO2   99.2 °F (37.3 °C) 82 18 -- 99 %      Temp src Heart Rate Source Patient Position BP Location FiO2 (%)   Tympanic -- -- -- --       Physical Exam   Constitutional: She is oriented to person, place, and time. No distress.   HENT:   Head: Normocephalic and atraumatic.   Mouth/Throat: Mucous membranes are normal.   Eyes: EOM are normal. Pupils are equal, round, and reactive to light.   Neck: Normal range of motion. Neck supple.   Cardiovascular: Normal rate, regular rhythm and normal heart sounds.   Pulmonary/Chest: Effort normal and breath sounds normal. No respiratory distress.   Abdominal: Soft. Bowel sounds are hypoactive. There is no tenderness. There is no rebound and no guarding.   Musculoskeletal: Normal range of motion. She exhibits edema (2+ pedal).   Neurological: She is alert and oriented to person, place, and time. She has normal sensation and normal strength.   Pt is at baseline neuro per herself deficit due to MS   Skin: Skin is warm and dry. No rash noted.   Psychiatric: Mood and affect  normal.   Nursing note and vitals reviewed.      LAB RESULTS  Lab Results (last 24 hours)     ** No results found for the last 24 hours. **          I ordered the above labs and reviewed the results    RADIOLOGY  XR Abdomen 2 View With Chest 1 View   Final Result   Moderate fecal burden identified within the colon. This would be keeping   with history of constipation. No evidence of mechanical small bowel   obstruction.       This report was finalized on 11/14/2018 9:18 PM by Dr. Yisel Shafer M.D.               I ordered the above noted radiological studies. Interpreted by radiologist. Discussed with radiologist  Reviewed by me in PACS.       PROCEDURES  Procedures      PROGRESS AND CONSULTS       2024  Ordered abdomen XR for further viewing. Ordered IV fluids.    2134  Rechecked patient who is resting comfortably. Discussed all lab and test results. Discussed plan to give antibiotic for UTI. Discussed option to do an enema. Pt decided to do the enema. Pt understands and agrees with the plan. All questions have been answered.    2210  Ordered rocephin for UTI.    2300  Pt stable for discharge and agrees with plan.    MEDICAL DECISION MAKING  Results were reviewed/discussed with the patient and they were also made aware of online access. Pt also made aware that some labs, such as cultures, will not be resulted during ER visit and follow up with PMD is necessary.     MDM  Number of Diagnoses or Management Options     Amount and/or Complexity of Data Reviewed  Clinical lab tests: ordered and reviewed (UA: Bacteria 4+)  Tests in the radiology section of CPT®: ordered and reviewed (Abdomen XR - moderate fecal burden identified within the colon.)  Decide to obtain previous medical records or to obtain history from someone other than the patient: yes (EPIC)  Review and summarize past medical records: yes (Pt is on pain medication due to recent surgery to right arm. Pt has a history of MS.)           DIAGNOSIS  Final  diagnoses:   Acute UTI   Constipation, unspecified constipation type       DISPOSITION  Discharged    Latest Documented Vital Signs:  As of 7:52 AM  BP- 133/63 HR- 72 Temp- 98.2 °F (36.8 °C) (Oral) O2 sat- 92%    --  Documentation assistance provided by camryn Soto for Dr Gallegos.  Information recorded by the scribe was done at my direction and has been verified and validated by me.                Nicole Soto  11/14/18 5731       Suresh Gallegos MD  11/17/18 5257

## 2018-11-15 NOTE — ED NOTES
Per ED tech states pt had small BM after enema administration. Pt cleaned and dressed, sling reapplied. Pt waiting for  to arrive to transport home.     Leslee Kay, RN  11/15/18 0111

## 2018-11-15 NOTE — ED NOTES
Pt straight cathed for urine 150 ml cloudy urine out with sediment, provider notified.     Leslee Kay, RN  11/14/18 2055

## 2018-11-17 LAB
BACTERIA SPEC AEROBE CULT: ABNORMAL
BACTERIA SPEC AEROBE CULT: ABNORMAL

## 2019-06-14 ENCOUNTER — HOSPITAL ENCOUNTER (EMERGENCY)
Facility: HOSPITAL | Age: 59
Discharge: HOME | End: 2019-06-14
Attending: EMERGENCY MEDICINE | Admitting: EMERGENCY MEDICINE

## 2019-06-14 ENCOUNTER — APPOINTMENT (OUTPATIENT)
Dept: GENERAL RADIOLOGY | Facility: HOSPITAL | Age: 59
End: 2019-06-14

## 2019-06-14 VITALS
OXYGEN SATURATION: 94 % | BODY MASS INDEX: 30.82 KG/M2 | HEART RATE: 80 BPM | TEMPERATURE: 97.8 F | HEIGHT: 65 IN | SYSTOLIC BLOOD PRESSURE: 131 MMHG | WEIGHT: 185 LBS | DIASTOLIC BLOOD PRESSURE: 77 MMHG | RESPIRATION RATE: 18 BRPM

## 2019-06-14 DIAGNOSIS — S80.11XA CONTUSION OF RIGHT LOWER EXTREMITY, INITIAL ENCOUNTER: ICD-10-CM

## 2019-06-14 DIAGNOSIS — S80.11XA LEG HEMATOMA, RIGHT, INITIAL ENCOUNTER: Primary | ICD-10-CM

## 2019-06-14 DIAGNOSIS — L03.115 CELLULITIS OF RIGHT LEG: ICD-10-CM

## 2019-06-14 LAB
ALBUMIN SERPL-MCNC: 4 G/DL (ref 3.5–5.2)
ALBUMIN/GLOB SERPL: 1.3 G/DL
ALP SERPL-CCNC: 118 U/L (ref 39–117)
ALT SERPL W P-5'-P-CCNC: 13 U/L (ref 1–33)
ANION GAP SERPL CALCULATED.3IONS-SCNC: 11.5 MMOL/L
AST SERPL-CCNC: 21 U/L (ref 1–32)
BASOPHILS # BLD AUTO: 0.03 10*3/MM3 (ref 0–0.2)
BASOPHILS NFR BLD AUTO: 0.4 % (ref 0–1.5)
BILIRUB SERPL-MCNC: 0.5 MG/DL (ref 0.2–1.2)
BUN BLD-MCNC: 20 MG/DL (ref 6–20)
BUN/CREAT SERPL: 27 (ref 7–25)
CALCIUM SPEC-SCNC: 9.2 MG/DL (ref 8.6–10.5)
CHLORIDE SERPL-SCNC: 102 MMOL/L (ref 98–107)
CO2 SERPL-SCNC: 26.5 MMOL/L (ref 22–29)
CREAT BLD-MCNC: 0.74 MG/DL (ref 0.57–1)
DEPRECATED RDW RBC AUTO: 46 FL (ref 37–54)
EOSINOPHIL # BLD AUTO: 0.08 10*3/MM3 (ref 0–0.4)
EOSINOPHIL NFR BLD AUTO: 1.1 % (ref 0.3–6.2)
ERYTHROCYTE [DISTWIDTH] IN BLOOD BY AUTOMATED COUNT: 13.9 % (ref 12.3–15.4)
GFR SERPL CREATININE-BSD FRML MDRD: 81 ML/MIN/1.73
GLOBULIN UR ELPH-MCNC: 3 GM/DL
GLUCOSE BLD-MCNC: 107 MG/DL (ref 65–99)
HCT VFR BLD AUTO: 36.1 % (ref 34–46.6)
HGB BLD-MCNC: 11.4 G/DL (ref 12–15.9)
IMM GRANULOCYTES # BLD AUTO: 0.04 10*3/MM3 (ref 0–0.05)
IMM GRANULOCYTES NFR BLD AUTO: 0.6 % (ref 0–0.5)
LYMPHOCYTES # BLD AUTO: 0.93 10*3/MM3 (ref 0.7–3.1)
LYMPHOCYTES NFR BLD AUTO: 13.3 % (ref 19.6–45.3)
MCH RBC QN AUTO: 28.6 PG (ref 26.6–33)
MCHC RBC AUTO-ENTMCNC: 31.6 G/DL (ref 31.5–35.7)
MCV RBC AUTO: 90.5 FL (ref 79–97)
MONOCYTES # BLD AUTO: 0.62 10*3/MM3 (ref 0.1–0.9)
MONOCYTES NFR BLD AUTO: 8.9 % (ref 5–12)
NEUTROPHILS # BLD AUTO: 5.3 10*3/MM3 (ref 1.7–7)
NEUTROPHILS NFR BLD AUTO: 75.7 % (ref 42.7–76)
NRBC BLD AUTO-RTO: 0 /100 WBC (ref 0–0.2)
PLATELET # BLD AUTO: 349 10*3/MM3 (ref 140–450)
PMV BLD AUTO: 9.7 FL (ref 6–12)
POTASSIUM BLD-SCNC: 3.6 MMOL/L (ref 3.5–5.2)
PROCALCITONIN SERPL-MCNC: 0.06 NG/ML (ref 0.1–0.25)
PROT SERPL-MCNC: 7 G/DL (ref 6–8.5)
RBC # BLD AUTO: 3.99 10*6/MM3 (ref 3.77–5.28)
SODIUM BLD-SCNC: 140 MMOL/L (ref 136–145)
WBC NRBC COR # BLD: 7 10*3/MM3 (ref 3.4–10.8)

## 2019-06-14 PROCEDURE — 90471 IMMUNIZATION ADMIN: CPT | Performed by: EMERGENCY MEDICINE

## 2019-06-14 PROCEDURE — 85025 COMPLETE CBC W/AUTO DIFF WBC: CPT | Performed by: EMERGENCY MEDICINE

## 2019-06-14 PROCEDURE — 90715 TDAP VACCINE 7 YRS/> IM: CPT | Performed by: EMERGENCY MEDICINE

## 2019-06-14 PROCEDURE — 84145 PROCALCITONIN (PCT): CPT | Performed by: EMERGENCY MEDICINE

## 2019-06-14 PROCEDURE — 73590 X-RAY EXAM OF LOWER LEG: CPT

## 2019-06-14 PROCEDURE — 99283 EMERGENCY DEPT VISIT LOW MDM: CPT

## 2019-06-14 PROCEDURE — 80053 COMPREHEN METABOLIC PANEL: CPT | Performed by: EMERGENCY MEDICINE

## 2019-06-14 PROCEDURE — 25010000002 TDAP 5-2.5-18.5 LF-MCG/0.5 SUSPENSION: Performed by: EMERGENCY MEDICINE

## 2019-06-14 PROCEDURE — 36415 COLL VENOUS BLD VENIPUNCTURE: CPT

## 2019-06-14 RX ORDER — CEPHALEXIN 500 MG/1
500 CAPSULE ORAL ONCE
Status: COMPLETED | OUTPATIENT
Start: 2019-06-14 | End: 2019-06-14

## 2019-06-14 RX ORDER — CEPHALEXIN 500 MG/1
500 CAPSULE ORAL 2 TIMES DAILY
Qty: 14 CAPSULE | Refills: 0 | Status: SHIPPED | OUTPATIENT
Start: 2019-06-14 | End: 2021-02-04 | Stop reason: HOSPADM

## 2019-06-14 RX ADMIN — CEPHALEXIN 500 MG: 500 CAPSULE ORAL at 11:11

## 2019-06-14 RX ADMIN — TETANUS TOXOID, REDUCED DIPHTHERIA TOXOID AND ACELLULAR PERTUSSIS VACCINE, ADSORBED 0.5 ML: 5; 2.5; 8; 8; 2.5 SUSPENSION INTRAMUSCULAR at 11:10

## 2019-12-06 ENCOUNTER — HOSPITAL ENCOUNTER (EMERGENCY)
Facility: HOSPITAL | Age: 59
Discharge: HOME | End: 2019-12-06
Attending: EMERGENCY MEDICINE | Admitting: EMERGENCY MEDICINE

## 2019-12-06 ENCOUNTER — APPOINTMENT (OUTPATIENT)
Dept: CT IMAGING | Facility: HOSPITAL | Age: 59
End: 2019-12-06

## 2019-12-06 VITALS
TEMPERATURE: 97.9 F | HEART RATE: 70 BPM | WEIGHT: 185 LBS | RESPIRATION RATE: 16 BRPM | BODY MASS INDEX: 29.73 KG/M2 | DIASTOLIC BLOOD PRESSURE: 79 MMHG | SYSTOLIC BLOOD PRESSURE: 149 MMHG | HEIGHT: 66 IN | OXYGEN SATURATION: 98 %

## 2019-12-06 DIAGNOSIS — N39.0 URINARY TRACT INFECTION ASSOCIATED WITH CATHETERIZATION OF URINARY TRACT, UNSPECIFIED INDWELLING URINARY CATHETER TYPE, INITIAL ENCOUNTER (HCC): ICD-10-CM

## 2019-12-06 DIAGNOSIS — G35 MULTIPLE SCLEROSIS (HCC): ICD-10-CM

## 2019-12-06 DIAGNOSIS — R33.9 URINARY RETENTION: Primary | ICD-10-CM

## 2019-12-06 DIAGNOSIS — T83.511A URINARY TRACT INFECTION ASSOCIATED WITH CATHETERIZATION OF URINARY TRACT, UNSPECIFIED INDWELLING URINARY CATHETER TYPE, INITIAL ENCOUNTER (HCC): ICD-10-CM

## 2019-12-06 LAB
ALBUMIN SERPL-MCNC: 4.2 G/DL (ref 3.5–5.2)
ALBUMIN/GLOB SERPL: 1.3 G/DL
ALP SERPL-CCNC: 147 U/L (ref 39–117)
ALT SERPL W P-5'-P-CCNC: 19 U/L (ref 1–33)
ANION GAP SERPL CALCULATED.3IONS-SCNC: 14.4 MMOL/L (ref 5–15)
APTT PPP: 33.3 SECONDS (ref 22.7–35.4)
AST SERPL-CCNC: 36 U/L (ref 1–32)
BACTERIA UR QL AUTO: ABNORMAL /HPF
BASOPHILS # BLD AUTO: 0.04 10*3/MM3 (ref 0–0.2)
BASOPHILS NFR BLD AUTO: 0.6 % (ref 0–1.5)
BILIRUB SERPL-MCNC: 0.3 MG/DL (ref 0.2–1.2)
BILIRUB UR QL STRIP: NEGATIVE
BUN BLD-MCNC: 19 MG/DL (ref 6–20)
BUN/CREAT SERPL: 26.8 (ref 7–25)
CALCIUM SPEC-SCNC: 9.4 MG/DL (ref 8.6–10.5)
CHLORIDE SERPL-SCNC: 102 MMOL/L (ref 98–107)
CLARITY UR: ABNORMAL
CO2 SERPL-SCNC: 23.6 MMOL/L (ref 22–29)
COLOR UR: YELLOW
CREAT BLD-MCNC: 0.71 MG/DL (ref 0.57–1)
DEPRECATED RDW RBC AUTO: 42.2 FL (ref 37–54)
EOSINOPHIL # BLD AUTO: 0.11 10*3/MM3 (ref 0–0.4)
EOSINOPHIL NFR BLD AUTO: 1.6 % (ref 0.3–6.2)
ERYTHROCYTE [DISTWIDTH] IN BLOOD BY AUTOMATED COUNT: 13.6 % (ref 12.3–15.4)
GFR SERPL CREATININE-BSD FRML MDRD: 85 ML/MIN/1.73
GLOBULIN UR ELPH-MCNC: 3.3 GM/DL
GLUCOSE BLD-MCNC: 142 MG/DL (ref 65–99)
GLUCOSE UR STRIP-MCNC: NEGATIVE MG/DL
HCT VFR BLD AUTO: 36.4 % (ref 34–46.6)
HGB BLD-MCNC: 12 G/DL (ref 12–15.9)
HGB UR QL STRIP.AUTO: ABNORMAL
HYALINE CASTS UR QL AUTO: ABNORMAL /LPF
IMM GRANULOCYTES # BLD AUTO: 0.02 10*3/MM3 (ref 0–0.05)
IMM GRANULOCYTES NFR BLD AUTO: 0.3 % (ref 0–0.5)
INR PPP: 0.89 (ref 0.9–1.1)
KETONES UR QL STRIP: ABNORMAL
LEUKOCYTE ESTERASE UR QL STRIP.AUTO: ABNORMAL
LYMPHOCYTES # BLD AUTO: 0.98 10*3/MM3 (ref 0.7–3.1)
LYMPHOCYTES NFR BLD AUTO: 14.2 % (ref 19.6–45.3)
MCH RBC QN AUTO: 28.4 PG (ref 26.6–33)
MCHC RBC AUTO-ENTMCNC: 33 G/DL (ref 31.5–35.7)
MCV RBC AUTO: 86.1 FL (ref 79–97)
MONOCYTES # BLD AUTO: 0.43 10*3/MM3 (ref 0.1–0.9)
MONOCYTES NFR BLD AUTO: 6.2 % (ref 5–12)
NEUTROPHILS # BLD AUTO: 5.32 10*3/MM3 (ref 1.7–7)
NEUTROPHILS NFR BLD AUTO: 77.1 % (ref 42.7–76)
NITRITE UR QL STRIP: POSITIVE
NRBC BLD AUTO-RTO: 0 /100 WBC (ref 0–0.2)
NT-PROBNP SERPL-MCNC: 41 PG/ML (ref 5–900)
PH UR STRIP.AUTO: 5.5 [PH] (ref 5–8)
PLATELET # BLD AUTO: 372 10*3/MM3 (ref 140–450)
PMV BLD AUTO: 9.6 FL (ref 6–12)
POTASSIUM BLD-SCNC: 3.7 MMOL/L (ref 3.5–5.2)
PROT SERPL-MCNC: 7.5 G/DL (ref 6–8.5)
PROT UR QL STRIP: NEGATIVE
PROTHROMBIN TIME: 11.8 SECONDS (ref 11.7–14.2)
RBC # BLD AUTO: 4.23 10*6/MM3 (ref 3.77–5.28)
RBC # UR: ABNORMAL /HPF
REF LAB TEST METHOD: ABNORMAL
SODIUM BLD-SCNC: 140 MMOL/L (ref 136–145)
SP GR UR STRIP: >=1.03 (ref 1–1.03)
SQUAMOUS #/AREA URNS HPF: ABNORMAL /HPF
TROPONIN T SERPL-MCNC: <0.01 NG/ML (ref 0–0.03)
UROBILINOGEN UR QL STRIP: ABNORMAL
WBC NRBC COR # BLD: 6.9 10*3/MM3 (ref 3.4–10.8)
WBC UR QL AUTO: ABNORMAL /HPF

## 2019-12-06 PROCEDURE — 83880 ASSAY OF NATRIURETIC PEPTIDE: CPT | Performed by: EMERGENCY MEDICINE

## 2019-12-06 PROCEDURE — 51702 INSERT TEMP BLADDER CATH: CPT

## 2019-12-06 PROCEDURE — 81001 URINALYSIS AUTO W/SCOPE: CPT | Performed by: EMERGENCY MEDICINE

## 2019-12-06 PROCEDURE — 80053 COMPREHEN METABOLIC PANEL: CPT | Performed by: EMERGENCY MEDICINE

## 2019-12-06 PROCEDURE — 99284 EMERGENCY DEPT VISIT MOD MDM: CPT

## 2019-12-06 PROCEDURE — 93005 ELECTROCARDIOGRAM TRACING: CPT | Performed by: EMERGENCY MEDICINE

## 2019-12-06 PROCEDURE — 85025 COMPLETE CBC W/AUTO DIFF WBC: CPT | Performed by: EMERGENCY MEDICINE

## 2019-12-06 PROCEDURE — 84484 ASSAY OF TROPONIN QUANT: CPT | Performed by: EMERGENCY MEDICINE

## 2019-12-06 PROCEDURE — 85610 PROTHROMBIN TIME: CPT | Performed by: EMERGENCY MEDICINE

## 2019-12-06 PROCEDURE — 85730 THROMBOPLASTIN TIME PARTIAL: CPT | Performed by: EMERGENCY MEDICINE

## 2019-12-06 PROCEDURE — 70450 CT HEAD/BRAIN W/O DYE: CPT

## 2019-12-06 PROCEDURE — 93010 ELECTROCARDIOGRAM REPORT: CPT | Performed by: INTERNAL MEDICINE

## 2019-12-06 RX ORDER — NITROFURANTOIN 25; 75 MG/1; MG/1
100 CAPSULE ORAL 2 TIMES DAILY
Qty: 14 CAPSULE | Refills: 0 | Status: SHIPPED | OUTPATIENT
Start: 2019-12-06 | End: 2022-03-03 | Stop reason: HOSPADM

## 2019-12-06 RX ORDER — SODIUM CHLORIDE 0.9 % (FLUSH) 0.9 %
10 SYRINGE (ML) INJECTION AS NEEDED
Status: DISCONTINUED | OUTPATIENT
Start: 2019-12-06 | End: 2019-12-06 | Stop reason: HOSPADM

## 2019-12-06 NOTE — ED NOTES
"Pt to ED via EMS from home c/o bladder fullness d/t inability to perform her straight cath. Pt has hx of MS and usually straight caths herself w/out difficulty but couldn't do it tonight. Pt also reports hx of recent falls, states \"I fell 5 times in the last 48 hours.\"      Azalia Temple RN  12/06/19 3972    "

## 2019-12-06 NOTE — ED PROVIDER NOTES
EMERGENCY DEPARTMENT ENCOUNTER    CHIEF COMPLAINT  Chief Complaint: Urinary Retention  History given by: Patient  History limited by:   Room Number: 10/10  PMD: Provider, No Known      HPI:  Pt is a 59 y.o. female who presents complaining of urinary retention r/t her inability to perform self cath today. She states that she has been having an MS exacerbation and does not have the dexterity to perform cath. Pt states that she was last able to cath at 1500 yesterday. Pt also reports multiple falls over the last 2 days with head injury. No LOC. Pt does not take anticoagulants.      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     Resolved Ambulatory Problems     Diagnosis Date Noted   • No Resolved Ambulatory Problems     Past Medical History:   Diagnosis Date   • Hyperlipidemia    • Hypertension    • Multiple sclerosis (CMS/HCC)    • PONV (postoperative nausea and vomiting)        PAST SURGICAL HISTORY  Past Surgical History:   Procedure Laterality Date   • ADENOIDECTOMY     • PARATHYROIDECTOMY     • TOTAL SHOULDER ARTHROPLASTY W/ DISTAL CLAVICLE EXCISION Right 10/22/2018    Procedure: RT TOTAL SHOULDER REVERSE ARTHROPLASTY;  Surgeon: Bipin Dangelo MD;  Location: [facility];  Service: Orthopedics       FAMILY HISTORY  No family history on file.    SOCIAL HISTORY  Social History     Socioeconomic History   • Marital status:      Spouse name: Not on file   • Number of children: Not on file   • Years of education: Not on file   • Highest education level: Not on file   Tobacco Use   • Smoking status: Former Smoker     Last attempt to quit: 10/22/2003     Years since quittin.1   • Smokeless tobacco: Never Used   Substance and Sexual Activity   • Alcohol use: No   • Drug use: No   • Sexual activity: Defer       ALLERGIES  Contrast dye and Sulfa antibiotics    REVIEW OF SYSTEMS  Review of Systems   Constitutional: Negative for  fever.   HENT: Negative for sore throat.    Eyes: Negative.    Respiratory: Negative for cough and shortness of breath.    Cardiovascular: Negative for chest pain.   Gastrointestinal: Negative for abdominal pain, diarrhea and vomiting.   Genitourinary: Positive for decreased urine volume and difficulty urinating. Negative for dysuria.   Musculoskeletal: Negative for neck pain.   Skin: Negative for rash.   Allergic/Immunologic: Negative.    Neurological: Negative for weakness, numbness and headaches.   Hematological: Negative.    Psychiatric/Behavioral: Negative.    All other systems reviewed and are negative.      PHYSICAL EXAM  ED Triage Vitals [12/06/19 0239]   Temp Heart Rate Resp BP SpO2   97.9 °F (36.6 °C) 70 -- 130/70 98 %      Temp src Heart Rate Source Patient Position BP Location FiO2 (%)   Tympanic -- -- -- --       Physical Exam   Constitutional: She is oriented to person, place, and time. No distress.   HENT:   Head: Normocephalic and atraumatic.   Eyes: EOM are normal. Pupils are equal, round, and reactive to light.   Neck: Normal range of motion. Neck supple.   Cardiovascular: Normal rate, regular rhythm and normal heart sounds.   Pulmonary/Chest: Effort normal and breath sounds normal. No respiratory distress.   Abdominal: Soft. There is no tenderness. There is no rebound and no guarding.   Musculoskeletal: Normal range of motion. She exhibits no edema.   Neurological: She is alert and oriented to person, place, and time. She has normal sensation and normal strength.   Skin: Skin is warm and dry. No rash noted.   Psychiatric: Mood and affect normal.   Nursing note and vitals reviewed.      LAB RESULTS  Lab Results (last 24 hours)     Procedure Component Value Units Date/Time    CBC & Differential [[id number]] Collected:  12/06/19 0307    Specimen:  Blood Updated:  12/06/19 0350    Narrative:       The following orders were created for panel order CBC & Differential.  Procedure                                Abnormality         Status                     ---------                               -----------         ------                     CBC Auto Differential[[id number]]        Abnormal            Final result                 Please view results for these tests on the individual orders.    Comprehensive Metabolic Panel [[id number]]  (Abnormal) Collected:  12/06/19 0307    Specimen:  Blood Updated:  12/06/19 0342     Glucose 142 mg/dL      BUN 19 mg/dL      Creatinine 0.71 mg/dL      Sodium 140 mmol/L      Potassium 3.7 mmol/L      Chloride 102 mmol/L      CO2 23.6 mmol/L      Calcium 9.4 mg/dL      Total Protein 7.5 g/dL      Albumin 4.20 g/dL      ALT (SGPT) 19 U/L      AST (SGOT) 36 U/L      Alkaline Phosphatase 147 U/L      Total Bilirubin 0.3 mg/dL      eGFR Non African Amer 85 mL/min/1.73      Globulin 3.3 gm/dL      A/G Ratio 1.3 g/dL      BUN/Creatinine Ratio 26.8     Anion Gap 14.4 mmol/L     Narrative:       GFR Normal >60  Chronic Kidney Disease <60  Kidney Failure <15    Protime-INR [[id number]]  (Abnormal) Collected:  12/06/19 0307    Specimen:  Blood Updated:  12/06/19 0339     Protime 11.8 Seconds      INR 0.89    aPTT [[id number]]  (Normal) Collected:  12/06/19 0307    Specimen:  Blood Updated:  12/06/19 0339     PTT 33.3 seconds     Troponin [[id number]]  (Normal) Collected:  12/06/19 0307    Specimen:  Blood Updated:  12/06/19 0342     Troponin T <0.010 ng/mL     Narrative:       Troponin T Reference Range:  <= 0.03 ng/mL-   Negative for AMI  >0.03 ng/mL-     Abnormal for myocardial necrosis.  Clinicians would have to utilize clinical acumen, EKG, Troponin and serial changes to determine if it is an Acute Myocardial Infarction or myocardial injury due to an underlying chronic condition.     BNP [[id number]]  (Normal) Collected:  12/06/19 0307    Specimen:  Blood Updated:  12/06/19 0341     proBNP 41.0 pg/mL     Narrative:       Among patients with dyspnea, NT-proBNP is highly sensitive for the detection  of acute congestive heart failure. In addition NT-proBNP of <300 pg/ml effectively rules out acute congestive heart failure with 99% negative predictive value.    CBC Auto Differential [[id number]]  (Abnormal) Collected:  12/06/19 0307    Specimen:  Blood Updated:  12/06/19 0350     WBC 6.90 10*3/mm3      RBC 4.23 10*6/mm3      Hemoglobin 12.0 g/dL      Hematocrit 36.4 %      MCV 86.1 fL      MCH 28.4 pg      MCHC 33.0 g/dL      RDW 13.6 %      RDW-SD 42.2 fl      MPV 9.6 fL      Platelets 372 10*3/mm3      Neutrophil % 77.1 %      Lymphocyte % 14.2 %      Monocyte % 6.2 %      Eosinophil % 1.6 %      Basophil % 0.6 %      Immature Grans % 0.3 %      Neutrophils, Absolute 5.32 10*3/mm3      Lymphocytes, Absolute 0.98 10*3/mm3      Monocytes, Absolute 0.43 10*3/mm3      Eosinophils, Absolute 0.11 10*3/mm3      Basophils, Absolute 0.04 10*3/mm3      Immature Grans, Absolute 0.02 10*3/mm3      nRBC 0.0 /100 WBC     Urinalysis With Microscopic If Indicated (No Culture) - Urine, Catheter [[id number]]  (Abnormal) Collected:  12/06/19 0348    Specimen:  Urine, Catheter Updated:  12/06/19 0359     Color, UA Yellow     Appearance, UA Cloudy     pH, UA 5.5     Specific Gravity, UA >=1.030     Glucose, UA Negative     Ketones, UA Trace     Bilirubin, UA Negative     Blood, UA Trace     Protein, UA Negative     Leuk Esterase, UA Moderate (2+)     Nitrite, UA Positive     Urobilinogen, UA 1.0 E.U./dL    Urinalysis, Microscopic Only - Urine, Catheter [[id number]]  (Abnormal) Collected:  12/06/19 0348    Specimen:  Urine, Catheter Updated:  12/06/19 0359     RBC, UA 0-2 /HPF      WBC, UA Too Numerous to Count /HPF      Bacteria, UA 4+ /HPF      Squamous Epithelial Cells, UA 0-2 /HPF      Hyaline Casts, UA 21-30 /LPF      Methodology Automated Microscopy          I ordered the above labs and reviewed the results    RADIOLOGY  CT Head Without Contrast   Preliminary Result   1. No acute intracranial abnormality.                                        I ordered the above noted radiological studies. Interpreted by radiologist. Reviewed by me in PACS.       PROCEDURES  Procedures    EKG          EKG time: 0334  Rhythm/Rate: NSR, 75BPM  P waves and WA: nml  QRS, axis: nml   ST and T waves: nml     Interpreted Contemporaneously by me, independently viewed  unchanged compared to prior 7/13/14      PROGRESS AND CONSULTS     2:50 PM  Blood, UA, EKG, and CT head.    4:34 AM  Rechecked with pt. Discussed with pt about her unremarkable labs and imaging. Discussed with pt about option for admission vs discharge with catheter. She would like to be discharged.       MEDICAL DECISION MAKING  Results were reviewed/discussed with the patient and they were also made aware of online access. Pt also made aware that some labs, such as cultures, will not be resulted during ER visit and follow up with PMD is necessary.     MDM  Number of Diagnoses or Management Options     Amount and/or Complexity of Data Reviewed  Review and summarize past medical records: yes (Pt last seen in ED 6/2019 for cellulitis R leg)           DIAGNOSIS  Final diagnoses:   Urinary retention   Multiple sclerosis (CMS/[facility])   Urinary tract infection associated with catheterization of urinary tract, unspecified indwelling urinary catheter type, initial encounter (CMS/[facility])       DISPOSITION  DISCHARGE    Patient discharged in stable condition.    Reviewed implications of results, diagnosis, meds, responsibility to follow up, warning signs and symptoms of possible worsening, potential complications and reasons to return to ER.    Patient/Family voiced understanding of above instructions.    Discussed plan for discharge, as there is no emergent indication for admission. Patient referred to primary care provider for BP management due to today's BP. Pt/family is agreeable and understands need for follow up and repeat testing.  Pt is aware that discharge does not mean that nothing is wrong but it  indicates no emergency is present that requires admission and they must continue care with follow-up as given below or physician of their choice.     FOLLOW-UP  [facility] PHYSICAN REFERRAL SERVICE  [facility] [address] [phone]  Schedule an appointment as soon as possible for a visit            Medication List      New Prescriptions    nitrofurantoin (macrocrystal-monohydrate) 100 MG capsule  Commonly known as:  MACROBID  Take 1 capsule by mouth 2 (Two) Times a Day.              Latest Documented Vital Signs:  As of 6:52 AM  BP- 149/79 HR- 70 Temp- 97.9 °F (36.6 °C) (Tympanic) O2 sat- 98%    --  Documentation assistance provided by camryn Montenegro for Dr Tena.  Information recorded by the scribe was done at my direction and has been verified and validated by me.     James Montenegro  12/06/19 9318       James Montenegro  12/06/19 0602       Aurelio Tena MD  12/06/19 1852

## 2020-06-30 ENCOUNTER — APPOINTMENT (OUTPATIENT)
Dept: WOMENS IMAGING | Facility: HOSPITAL | Age: 60
End: 2020-06-30

## 2020-06-30 PROCEDURE — 77063 BREAST TOMOSYNTHESIS BI: CPT | Performed by: RADIOLOGY

## 2020-06-30 PROCEDURE — 77067 SCR MAMMO BI INCL CAD: CPT | Performed by: RADIOLOGY

## 2021-01-28 ENCOUNTER — APPOINTMENT (OUTPATIENT)
Dept: GENERAL RADIOLOGY | Facility: HOSPITAL | Age: 61
End: 2021-01-28

## 2021-01-28 ENCOUNTER — HOSPITAL ENCOUNTER (INPATIENT)
Facility: HOSPITAL | Age: 61
LOS: 7 days | Discharge: HOME | End: 2021-02-04
Attending: EMERGENCY MEDICINE | Admitting: HOSPITALIST

## 2021-01-28 ENCOUNTER — APPOINTMENT (OUTPATIENT)
Dept: CT IMAGING | Facility: HOSPITAL | Age: 61
End: 2021-01-28

## 2021-01-28 DIAGNOSIS — R05.9 COUGH: Primary | ICD-10-CM

## 2021-01-28 DIAGNOSIS — Z20.822 SUSPECTED COVID-19 VIRUS INFECTION: ICD-10-CM

## 2021-01-28 DIAGNOSIS — G47.33 OSA (OBSTRUCTIVE SLEEP APNEA): ICD-10-CM

## 2021-01-28 DIAGNOSIS — G35 MS (MULTIPLE SCLEROSIS) (HCC): ICD-10-CM

## 2021-01-28 DIAGNOSIS — R06.02 SHORTNESS OF BREATH: ICD-10-CM

## 2021-01-28 DIAGNOSIS — N30.00 ACUTE CYSTITIS WITHOUT HEMATURIA: ICD-10-CM

## 2021-01-28 PROBLEM — E78.5 HYPERLIPIDEMIA: Status: ACTIVE | Noted: 2021-01-28

## 2021-01-28 PROBLEM — I10 ESSENTIAL HYPERTENSION: Status: ACTIVE | Noted: 2021-01-28

## 2021-01-28 PROBLEM — N39.0 ACUTE UTI (URINARY TRACT INFECTION): Status: ACTIVE | Noted: 2021-01-28

## 2021-01-28 LAB
ALBUMIN SERPL-MCNC: 4.4 G/DL (ref 3.5–5.2)
ALBUMIN/GLOB SERPL: 1.5 G/DL
ALP SERPL-CCNC: 182 U/L (ref 39–117)
ALT SERPL W P-5'-P-CCNC: 17 U/L (ref 1–33)
ANION GAP SERPL CALCULATED.3IONS-SCNC: 9.6 MMOL/L (ref 5–15)
AST SERPL-CCNC: 28 U/L (ref 1–32)
B PARAPERT DNA SPEC QL NAA+PROBE: NOT DETECTED
B PERT DNA SPEC QL NAA+PROBE: NOT DETECTED
BACTERIA UR QL AUTO: ABNORMAL /HPF
BASOPHILS # BLD AUTO: 0.06 10*3/MM3 (ref 0–0.2)
BASOPHILS NFR BLD AUTO: 0.6 % (ref 0–1.5)
BILIRUB SERPL-MCNC: 0.2 MG/DL (ref 0–1.2)
BILIRUB UR QL STRIP: NEGATIVE
BUN SERPL-MCNC: 19 MG/DL (ref 6–20)
BUN/CREAT SERPL: 21.6 (ref 7–25)
C PNEUM DNA NPH QL NAA+NON-PROBE: NOT DETECTED
CALCIUM SPEC-SCNC: 10.1 MG/DL (ref 8.6–10.5)
CHLORIDE SERPL-SCNC: 105 MMOL/L (ref 98–107)
CLARITY UR: ABNORMAL
CO2 SERPL-SCNC: 26.4 MMOL/L (ref 22–29)
COLOR UR: YELLOW
CREAT SERPL-MCNC: 0.88 MG/DL (ref 0.57–1)
D DIMER PPP FEU-MCNC: 1.18 MCGFEU/ML (ref 0–0.49)
D-LACTATE SERPL-SCNC: 1.8 MMOL/L (ref 0.5–2)
DEPRECATED RDW RBC AUTO: 42.9 FL (ref 37–54)
EOSINOPHIL # BLD AUTO: 0.06 10*3/MM3 (ref 0–0.4)
EOSINOPHIL NFR BLD AUTO: 0.6 % (ref 0.3–6.2)
ERYTHROCYTE [DISTWIDTH] IN BLOOD BY AUTOMATED COUNT: 14.4 % (ref 12.3–15.4)
FERRITIN SERPL-MCNC: 17.4 NG/ML (ref 13–150)
FLUAV SUBTYP SPEC NAA+PROBE: NOT DETECTED
FLUBV RNA ISLT QL NAA+PROBE: NOT DETECTED
GFR SERPL CREATININE-BSD FRML MDRD: 66 ML/MIN/1.73
GLOBULIN UR ELPH-MCNC: 3 GM/DL
GLUCOSE SERPL-MCNC: 122 MG/DL (ref 65–99)
GLUCOSE UR STRIP-MCNC: NEGATIVE MG/DL
HADV DNA SPEC NAA+PROBE: NOT DETECTED
HCOV 229E RNA SPEC QL NAA+PROBE: NOT DETECTED
HCOV HKU1 RNA SPEC QL NAA+PROBE: NOT DETECTED
HCOV NL63 RNA SPEC QL NAA+PROBE: NOT DETECTED
HCOV OC43 RNA SPEC QL NAA+PROBE: NOT DETECTED
HCT VFR BLD AUTO: 40 % (ref 34–46.6)
HGB BLD-MCNC: 12.3 G/DL (ref 12–15.9)
HGB UR QL STRIP.AUTO: ABNORMAL
HMPV RNA NPH QL NAA+NON-PROBE: NOT DETECTED
HOLD SPECIMEN: NORMAL
HOLD SPECIMEN: NORMAL
HPIV1 RNA SPEC QL NAA+PROBE: NOT DETECTED
HPIV2 RNA SPEC QL NAA+PROBE: NOT DETECTED
HPIV3 RNA NPH QL NAA+PROBE: NOT DETECTED
HPIV4 P GENE NPH QL NAA+PROBE: NOT DETECTED
HYALINE CASTS UR QL AUTO: ABNORMAL /LPF
IMM GRANULOCYTES # BLD AUTO: 0.04 10*3/MM3 (ref 0–0.05)
IMM GRANULOCYTES NFR BLD AUTO: 0.4 % (ref 0–0.5)
KETONES UR QL STRIP: NEGATIVE
LDH SERPL-CCNC: 222 U/L (ref 135–214)
LEUKOCYTE ESTERASE UR QL STRIP.AUTO: ABNORMAL
LYMPHOCYTES # BLD AUTO: 0.81 10*3/MM3 (ref 0.7–3.1)
LYMPHOCYTES NFR BLD AUTO: 7.5 % (ref 19.6–45.3)
M PNEUMO IGG SER IA-ACNC: NOT DETECTED
MCH RBC QN AUTO: 25.5 PG (ref 26.6–33)
MCHC RBC AUTO-ENTMCNC: 30.8 G/DL (ref 31.5–35.7)
MCV RBC AUTO: 82.8 FL (ref 79–97)
MONOCYTES # BLD AUTO: 0.46 10*3/MM3 (ref 0.1–0.9)
MONOCYTES NFR BLD AUTO: 4.3 % (ref 5–12)
NEUTROPHILS NFR BLD AUTO: 86.6 % (ref 42.7–76)
NEUTROPHILS NFR BLD AUTO: 9.33 10*3/MM3 (ref 1.7–7)
NITRITE UR QL STRIP: POSITIVE
NRBC BLD AUTO-RTO: 0 /100 WBC (ref 0–0.2)
NT-PROBNP SERPL-MCNC: 22.4 PG/ML (ref 0–900)
PH UR STRIP.AUTO: <=5 [PH] (ref 5–8)
PLATELET # BLD AUTO: 405 10*3/MM3 (ref 140–450)
PMV BLD AUTO: 9.3 FL (ref 6–12)
POTASSIUM SERPL-SCNC: 4.7 MMOL/L (ref 3.5–5.2)
PROCALCITONIN SERPL-MCNC: 0.05 NG/ML (ref 0–0.25)
PROCALCITONIN SERPL-MCNC: 0.06 NG/ML (ref 0–0.25)
PROT SERPL-MCNC: 7.4 G/DL (ref 6–8.5)
PROT UR QL STRIP: NEGATIVE
QT INTERVAL: 383 MS
RBC # BLD AUTO: 4.83 10*6/MM3 (ref 3.77–5.28)
RBC # UR: ABNORMAL /HPF
REF LAB TEST METHOD: ABNORMAL
RHINOVIRUS RNA SPEC NAA+PROBE: NOT DETECTED
RSV RNA NPH QL NAA+NON-PROBE: NOT DETECTED
SARS-COV-2 RNA NPH QL NAA+NON-PROBE: NOT DETECTED
SODIUM SERPL-SCNC: 141 MMOL/L (ref 136–145)
SP GR UR STRIP: 1.03 (ref 1–1.03)
SQUAMOUS #/AREA URNS HPF: ABNORMAL /HPF
TROPONIN T SERPL-MCNC: <0.01 NG/ML (ref 0–0.03)
UROBILINOGEN UR QL STRIP: ABNORMAL
WBC # BLD AUTO: 10.76 10*3/MM3 (ref 3.4–10.8)
WBC UR QL AUTO: ABNORMAL /HPF
WHOLE BLOOD HOLD SPECIMEN: NORMAL
WHOLE BLOOD HOLD SPECIMEN: NORMAL

## 2021-01-28 PROCEDURE — 87077 CULTURE AEROBIC IDENTIFY: CPT | Performed by: PHYSICIAN ASSISTANT

## 2021-01-28 PROCEDURE — 85025 COMPLETE CBC W/AUTO DIFF WBC: CPT | Performed by: EMERGENCY MEDICINE

## 2021-01-28 PROCEDURE — 87040 BLOOD CULTURE FOR BACTERIA: CPT | Performed by: PHYSICIAN ASSISTANT

## 2021-01-28 PROCEDURE — 25010000002 ENOXAPARIN PER 10 MG: Performed by: NURSE PRACTITIONER

## 2021-01-28 PROCEDURE — 84484 ASSAY OF TROPONIN QUANT: CPT | Performed by: EMERGENCY MEDICINE

## 2021-01-28 PROCEDURE — 84145 PROCALCITONIN (PCT): CPT | Performed by: PHYSICIAN ASSISTANT

## 2021-01-28 PROCEDURE — 82728 ASSAY OF FERRITIN: CPT | Performed by: NURSE PRACTITIONER

## 2021-01-28 PROCEDURE — 83615 LACTATE (LD) (LDH) ENZYME: CPT | Performed by: NURSE PRACTITIONER

## 2021-01-28 PROCEDURE — 87186 SC STD MICRODIL/AGAR DIL: CPT | Performed by: PHYSICIAN ASSISTANT

## 2021-01-28 PROCEDURE — 84145 PROCALCITONIN (PCT): CPT | Performed by: NURSE PRACTITIONER

## 2021-01-28 PROCEDURE — 25010000002 DIPHENHYDRAMINE PER 50 MG: Performed by: PHYSICIAN ASSISTANT

## 2021-01-28 PROCEDURE — 81001 URINALYSIS AUTO W/SCOPE: CPT | Performed by: PHYSICIAN ASSISTANT

## 2021-01-28 PROCEDURE — 36415 COLL VENOUS BLD VENIPUNCTURE: CPT | Performed by: EMERGENCY MEDICINE

## 2021-01-28 PROCEDURE — 99285 EMERGENCY DEPT VISIT HI MDM: CPT

## 2021-01-28 PROCEDURE — 93010 ELECTROCARDIOGRAM REPORT: CPT | Performed by: INTERNAL MEDICINE

## 2021-01-28 PROCEDURE — 36415 COLL VENOUS BLD VENIPUNCTURE: CPT

## 2021-01-28 PROCEDURE — 0202U NFCT DS 22 TRGT SARS-COV-2: CPT | Performed by: PHYSICIAN ASSISTANT

## 2021-01-28 PROCEDURE — 71275 CT ANGIOGRAPHY CHEST: CPT

## 2021-01-28 PROCEDURE — 93005 ELECTROCARDIOGRAM TRACING: CPT | Performed by: EMERGENCY MEDICINE

## 2021-01-28 PROCEDURE — 25010000002 CEFTRIAXONE PER 250 MG: Performed by: PHYSICIAN ASSISTANT

## 2021-01-28 PROCEDURE — 85379 FIBRIN DEGRADATION QUANT: CPT | Performed by: NURSE PRACTITIONER

## 2021-01-28 PROCEDURE — 83605 ASSAY OF LACTIC ACID: CPT | Performed by: PHYSICIAN ASSISTANT

## 2021-01-28 PROCEDURE — 25010000002 METHYLPREDNISOLONE PER 40 MG: Performed by: PHYSICIAN ASSISTANT

## 2021-01-28 PROCEDURE — 80053 COMPREHEN METABOLIC PANEL: CPT | Performed by: EMERGENCY MEDICINE

## 2021-01-28 PROCEDURE — 83880 ASSAY OF NATRIURETIC PEPTIDE: CPT | Performed by: EMERGENCY MEDICINE

## 2021-01-28 PROCEDURE — 87205 SMEAR GRAM STAIN: CPT | Performed by: NURSE PRACTITIONER

## 2021-01-28 PROCEDURE — 87070 CULTURE OTHR SPECIMN AEROBIC: CPT | Performed by: NURSE PRACTITIONER

## 2021-01-28 PROCEDURE — 71045 X-RAY EXAM CHEST 1 VIEW: CPT

## 2021-01-28 PROCEDURE — 87086 URINE CULTURE/COLONY COUNT: CPT | Performed by: PHYSICIAN ASSISTANT

## 2021-01-28 PROCEDURE — P9612 CATHETERIZE FOR URINE SPEC: HCPCS

## 2021-01-28 PROCEDURE — 94799 UNLISTED PULMONARY SVC/PX: CPT

## 2021-01-28 PROCEDURE — 0 IOPAMIDOL PER 1 ML: Performed by: EMERGENCY MEDICINE

## 2021-01-28 RX ORDER — ACETAMINOPHEN 160 MG/5ML
650 SOLUTION ORAL EVERY 4 HOURS PRN
Status: DISCONTINUED | OUTPATIENT
Start: 2021-01-28 | End: 2021-02-04 | Stop reason: HOSPADM

## 2021-01-28 RX ORDER — PRAMIPEXOLE DIHYDROCHLORIDE 1.5 MG/1
1.5 TABLET ORAL 3 TIMES DAILY
Status: DISCONTINUED | OUTPATIENT
Start: 2021-01-28 | End: 2021-02-04 | Stop reason: HOSPADM

## 2021-01-28 RX ORDER — PRAMIPEXOLE DIHYDROCHLORIDE 1.5 MG/1
1.5 TABLET ORAL ONCE
Status: COMPLETED | OUTPATIENT
Start: 2021-01-28 | End: 2021-01-28

## 2021-01-28 RX ORDER — ACETAMINOPHEN 500 MG
1000 TABLET ORAL ONCE
Status: COMPLETED | OUTPATIENT
Start: 2021-01-28 | End: 2021-01-28

## 2021-01-28 RX ORDER — PROMETHAZINE HYDROCHLORIDE 25 MG/1
25 TABLET ORAL EVERY 6 HOURS PRN
Status: DISCONTINUED | OUTPATIENT
Start: 2021-01-28 | End: 2021-02-04 | Stop reason: HOSPADM

## 2021-01-28 RX ORDER — LOSARTAN POTASSIUM 50 MG/1
50 TABLET ORAL DAILY
Status: DISCONTINUED | OUTPATIENT
Start: 2021-01-29 | End: 2021-02-04 | Stop reason: HOSPADM

## 2021-01-28 RX ORDER — METHYLPREDNISOLONE SODIUM SUCCINATE 40 MG/ML
40 INJECTION, POWDER, LYOPHILIZED, FOR SOLUTION INTRAMUSCULAR; INTRAVENOUS EVERY 4 HOURS
Status: COMPLETED | OUTPATIENT
Start: 2021-01-28 | End: 2021-01-28

## 2021-01-28 RX ORDER — CLONAZEPAM 1 MG/1
2 TABLET ORAL NIGHTLY
Status: DISCONTINUED | OUTPATIENT
Start: 2021-01-28 | End: 2021-01-31

## 2021-01-28 RX ORDER — ACETAMINOPHEN 325 MG/1
650 TABLET ORAL EVERY 4 HOURS PRN
Status: DISCONTINUED | OUTPATIENT
Start: 2021-01-28 | End: 2021-02-04 | Stop reason: HOSPADM

## 2021-01-28 RX ORDER — CEFTRIAXONE SODIUM 1 G/50ML
1 INJECTION, SOLUTION INTRAVENOUS EVERY 24 HOURS
Status: DISCONTINUED | OUTPATIENT
Start: 2021-01-29 | End: 2021-01-30

## 2021-01-28 RX ORDER — FLUOXETINE HYDROCHLORIDE 20 MG/1
60 CAPSULE ORAL DAILY
Status: DISCONTINUED | OUTPATIENT
Start: 2021-01-29 | End: 2021-02-04 | Stop reason: HOSPADM

## 2021-01-28 RX ORDER — DIPHENHYDRAMINE HYDROCHLORIDE 50 MG/ML
50 INJECTION INTRAMUSCULAR; INTRAVENOUS ONCE
Status: COMPLETED | OUTPATIENT
Start: 2021-01-28 | End: 2021-01-28

## 2021-01-28 RX ORDER — LOSARTAN POTASSIUM 50 MG/1
50 TABLET ORAL DAILY
COMMUNITY
End: 2021-02-04 | Stop reason: HOSPADM

## 2021-01-28 RX ORDER — ONDANSETRON 2 MG/ML
4 INJECTION INTRAMUSCULAR; INTRAVENOUS EVERY 6 HOURS PRN
Status: DISCONTINUED | OUTPATIENT
Start: 2021-01-28 | End: 2021-02-04 | Stop reason: HOSPADM

## 2021-01-28 RX ORDER — ACETAMINOPHEN 650 MG/1
650 SUPPOSITORY RECTAL EVERY 4 HOURS PRN
Status: DISCONTINUED | OUTPATIENT
Start: 2021-01-28 | End: 2021-02-04 | Stop reason: HOSPADM

## 2021-01-28 RX ORDER — CEFTRIAXONE SODIUM 1 G/50ML
1 INJECTION, SOLUTION INTRAVENOUS ONCE
Status: COMPLETED | OUTPATIENT
Start: 2021-01-28 | End: 2021-01-28

## 2021-01-28 RX ORDER — NITROGLYCERIN 0.4 MG/1
0.4 TABLET SUBLINGUAL
Status: DISCONTINUED | OUTPATIENT
Start: 2021-01-28 | End: 2021-02-04 | Stop reason: HOSPADM

## 2021-01-28 RX ORDER — PANTOPRAZOLE SODIUM 40 MG/1
40 TABLET, DELAYED RELEASE ORAL
Status: DISCONTINUED | OUTPATIENT
Start: 2021-01-29 | End: 2021-02-04 | Stop reason: HOSPADM

## 2021-01-28 RX ORDER — SODIUM CHLORIDE 0.9 % (FLUSH) 0.9 %
10 SYRINGE (ML) INJECTION EVERY 12 HOURS SCHEDULED
Status: DISCONTINUED | OUTPATIENT
Start: 2021-01-28 | End: 2021-02-04 | Stop reason: HOSPADM

## 2021-01-28 RX ORDER — SODIUM CHLORIDE 0.9 % (FLUSH) 0.9 %
10 SYRINGE (ML) INJECTION AS NEEDED
Status: DISCONTINUED | OUTPATIENT
Start: 2021-01-28 | End: 2021-02-04 | Stop reason: HOSPADM

## 2021-01-28 RX ADMIN — CLONAZEPAM 2 MG: 1 TABLET ORAL at 22:39

## 2021-01-28 RX ADMIN — METHYLPREDNISOLONE SODIUM SUCCINATE 40 MG: 40 INJECTION, POWDER, LYOPHILIZED, FOR SOLUTION INTRAMUSCULAR; INTRAVENOUS at 15:55

## 2021-01-28 RX ADMIN — ENOXAPARIN SODIUM 40 MG: 40 INJECTION SUBCUTANEOUS at 22:39

## 2021-01-28 RX ADMIN — PRAMIPEXOLE DIHYDROCHLORIDE 1.5 MG: 1.5 TABLET ORAL at 16:39

## 2021-01-28 RX ADMIN — METHYLPREDNISOLONE SODIUM SUCCINATE 40 MG: 40 INJECTION, POWDER, LYOPHILIZED, FOR SOLUTION INTRAMUSCULAR; INTRAVENOUS at 22:45

## 2021-01-28 RX ADMIN — DIPHENHYDRAMINE HYDROCHLORIDE 50 MG: 50 INJECTION, SOLUTION INTRAMUSCULAR; INTRAVENOUS at 15:55

## 2021-01-28 RX ADMIN — CEFTRIAXONE SODIUM 1 G: 1 INJECTION, SOLUTION INTRAVENOUS at 15:57

## 2021-01-28 RX ADMIN — ACETAMINOPHEN 1000 MG: 500 TABLET, FILM COATED ORAL at 18:14

## 2021-01-28 RX ADMIN — SODIUM CHLORIDE, PRESERVATIVE FREE 10 ML: 5 INJECTION INTRAVENOUS at 20:02

## 2021-01-28 RX ADMIN — PRAMIPEXOLE DIHYDROCHLORIDE 1.5 MG: 1.5 TABLET ORAL at 22:39

## 2021-01-28 RX ADMIN — IOPAMIDOL 95 ML: 755 INJECTION, SOLUTION INTRAVENOUS at 17:55

## 2021-01-28 RX ADMIN — METHYLPREDNISOLONE SODIUM SUCCINATE 40 MG: 40 INJECTION, POWDER, LYOPHILIZED, FOR SOLUTION INTRAMUSCULAR; INTRAVENOUS at 20:00

## 2021-01-29 ENCOUNTER — APPOINTMENT (OUTPATIENT)
Dept: CARDIOLOGY | Facility: HOSPITAL | Age: 61
End: 2021-01-29

## 2021-01-29 LAB
ALBUMIN SERPL-MCNC: 3.9 G/DL (ref 3.5–5.2)
ALBUMIN/GLOB SERPL: 1.4 G/DL
ALP SERPL-CCNC: 159 U/L (ref 39–117)
ALT SERPL W P-5'-P-CCNC: 12 U/L (ref 1–33)
ANION GAP SERPL CALCULATED.3IONS-SCNC: 9.5 MMOL/L (ref 5–15)
AST SERPL-CCNC: 25 U/L (ref 1–32)
BASOPHILS # BLD AUTO: 0.02 10*3/MM3 (ref 0–0.2)
BASOPHILS NFR BLD AUTO: 0.3 % (ref 0–1.5)
BH CV LOWER VASCULAR LEFT COMMON FEMORAL AUGMENT: NORMAL
BH CV LOWER VASCULAR LEFT COMMON FEMORAL COMPETENT: NORMAL
BH CV LOWER VASCULAR LEFT COMMON FEMORAL COMPRESS: NORMAL
BH CV LOWER VASCULAR LEFT COMMON FEMORAL PHASIC: NORMAL
BH CV LOWER VASCULAR LEFT COMMON FEMORAL SPONT: NORMAL
BH CV LOWER VASCULAR LEFT DISTAL FEMORAL COMPRESS: NORMAL
BH CV LOWER VASCULAR LEFT GASTRONEMIUS COMPRESS: NORMAL
BH CV LOWER VASCULAR LEFT GREATER SAPH AK COMPRESS: NORMAL
BH CV LOWER VASCULAR LEFT GREATER SAPH BK COMPRESS: NORMAL
BH CV LOWER VASCULAR LEFT LESSER SAPH COMPRESS: NORMAL
BH CV LOWER VASCULAR LEFT MID FEMORAL AUGMENT: NORMAL
BH CV LOWER VASCULAR LEFT MID FEMORAL COMPETENT: NORMAL
BH CV LOWER VASCULAR LEFT MID FEMORAL COMPRESS: NORMAL
BH CV LOWER VASCULAR LEFT MID FEMORAL PHASIC: NORMAL
BH CV LOWER VASCULAR LEFT MID FEMORAL SPONT: NORMAL
BH CV LOWER VASCULAR LEFT PERONEAL COMPRESS: NORMAL
BH CV LOWER VASCULAR LEFT POPLITEAL AUGMENT: NORMAL
BH CV LOWER VASCULAR LEFT POPLITEAL COMPETENT: NORMAL
BH CV LOWER VASCULAR LEFT POPLITEAL COMPRESS: NORMAL
BH CV LOWER VASCULAR LEFT POPLITEAL PHASIC: NORMAL
BH CV LOWER VASCULAR LEFT POPLITEAL SPONT: NORMAL
BH CV LOWER VASCULAR LEFT POSTERIOR TIBIAL COMPRESS: NORMAL
BH CV LOWER VASCULAR LEFT PROFUNDA FEMORAL COMPRESS: NORMAL
BH CV LOWER VASCULAR LEFT PROXIMAL FEMORAL COMPRESS: NORMAL
BH CV LOWER VASCULAR LEFT SAPHENOFEMORAL JUNCTION COMPRESS: NORMAL
BH CV LOWER VASCULAR RIGHT COMMON FEMORAL AUGMENT: NORMAL
BH CV LOWER VASCULAR RIGHT COMMON FEMORAL COMPETENT: NORMAL
BH CV LOWER VASCULAR RIGHT COMMON FEMORAL COMPRESS: NORMAL
BH CV LOWER VASCULAR RIGHT COMMON FEMORAL PHASIC: NORMAL
BH CV LOWER VASCULAR RIGHT COMMON FEMORAL SPONT: NORMAL
BH CV LOWER VASCULAR RIGHT DISTAL FEMORAL COMPRESS: NORMAL
BH CV LOWER VASCULAR RIGHT GASTRONEMIUS COMPRESS: NORMAL
BH CV LOWER VASCULAR RIGHT GREATER SAPH AK COMPRESS: NORMAL
BH CV LOWER VASCULAR RIGHT GREATER SAPH BK COMPRESS: NORMAL
BH CV LOWER VASCULAR RIGHT LESSER SAPH COMPRESS: NORMAL
BH CV LOWER VASCULAR RIGHT MID FEMORAL AUGMENT: NORMAL
BH CV LOWER VASCULAR RIGHT MID FEMORAL COMPETENT: NORMAL
BH CV LOWER VASCULAR RIGHT MID FEMORAL COMPRESS: NORMAL
BH CV LOWER VASCULAR RIGHT MID FEMORAL PHASIC: NORMAL
BH CV LOWER VASCULAR RIGHT MID FEMORAL SPONT: NORMAL
BH CV LOWER VASCULAR RIGHT PERONEAL COMPRESS: NORMAL
BH CV LOWER VASCULAR RIGHT POPLITEAL AUGMENT: NORMAL
BH CV LOWER VASCULAR RIGHT POPLITEAL COMPETENT: NORMAL
BH CV LOWER VASCULAR RIGHT POPLITEAL COMPRESS: NORMAL
BH CV LOWER VASCULAR RIGHT POPLITEAL PHASIC: NORMAL
BH CV LOWER VASCULAR RIGHT POPLITEAL SPONT: NORMAL
BH CV LOWER VASCULAR RIGHT POSTERIOR TIBIAL COMPRESS: NORMAL
BH CV LOWER VASCULAR RIGHT PROFUNDA FEMORAL COMPRESS: NORMAL
BH CV LOWER VASCULAR RIGHT PROXIMAL FEMORAL COMPRESS: NORMAL
BH CV LOWER VASCULAR RIGHT SAPHENOFEMORAL JUNCTION COMPRESS: NORMAL
BILIRUB SERPL-MCNC: 0.3 MG/DL (ref 0–1.2)
BUN SERPL-MCNC: 12 MG/DL (ref 6–20)
BUN/CREAT SERPL: 19.7 (ref 7–25)
CALCIUM SPEC-SCNC: 9.2 MG/DL (ref 8.6–10.5)
CHLORIDE SERPL-SCNC: 104 MMOL/L (ref 98–107)
CK SERPL-CCNC: 64 U/L (ref 20–180)
CO2 SERPL-SCNC: 23.5 MMOL/L (ref 22–29)
CREAT SERPL-MCNC: 0.61 MG/DL (ref 0.57–1)
CRP SERPL-MCNC: 2.13 MG/DL (ref 0–0.5)
D DIMER PPP FEU-MCNC: 0.96 MCGFEU/ML (ref 0–0.49)
DEPRECATED RDW RBC AUTO: 40.5 FL (ref 37–54)
EOSINOPHIL # BLD AUTO: 0 10*3/MM3 (ref 0–0.4)
EOSINOPHIL NFR BLD AUTO: 0 % (ref 0.3–6.2)
ERYTHROCYTE [DISTWIDTH] IN BLOOD BY AUTOMATED COUNT: 13.9 % (ref 12.3–15.4)
FERRITIN SERPL-MCNC: 19.9 NG/ML (ref 13–150)
FIBRINOGEN PPP-MCNC: 393 MG/DL (ref 219–464)
GFR SERPL CREATININE-BSD FRML MDRD: 101 ML/MIN/1.73
GLOBULIN UR ELPH-MCNC: 2.8 GM/DL
GLUCOSE BLDC GLUCOMTR-MCNC: 166 MG/DL (ref 70–130)
GLUCOSE SERPL-MCNC: 148 MG/DL (ref 65–99)
HCT VFR BLD AUTO: 36.9 % (ref 34–46.6)
HGB BLD-MCNC: 12 G/DL (ref 12–15.9)
IMM GRANULOCYTES # BLD AUTO: 0.04 10*3/MM3 (ref 0–0.05)
IMM GRANULOCYTES NFR BLD AUTO: 0.6 % (ref 0–0.5)
LDH SERPL-CCNC: 211 U/L (ref 135–214)
LYMPHOCYTES # BLD AUTO: 0.59 10*3/MM3 (ref 0.7–3.1)
LYMPHOCYTES NFR BLD AUTO: 8.2 % (ref 19.6–45.3)
MCH RBC QN AUTO: 26.3 PG (ref 26.6–33)
MCHC RBC AUTO-ENTMCNC: 32.5 G/DL (ref 31.5–35.7)
MCV RBC AUTO: 80.9 FL (ref 79–97)
MONOCYTES # BLD AUTO: 0.05 10*3/MM3 (ref 0.1–0.9)
MONOCYTES NFR BLD AUTO: 0.7 % (ref 5–12)
NEUTROPHILS NFR BLD AUTO: 6.52 10*3/MM3 (ref 1.7–7)
NEUTROPHILS NFR BLD AUTO: 90.2 % (ref 42.7–76)
NRBC BLD AUTO-RTO: 0 /100 WBC (ref 0–0.2)
PLATELET # BLD AUTO: 391 10*3/MM3 (ref 140–450)
PMV BLD AUTO: 9.6 FL (ref 6–12)
POTASSIUM SERPL-SCNC: 4.1 MMOL/L (ref 3.5–5.2)
PROT SERPL-MCNC: 6.7 G/DL (ref 6–8.5)
RBC # BLD AUTO: 4.56 10*6/MM3 (ref 3.77–5.28)
SARS-COV-2 ORF1AB RESP QL NAA+PROBE: NOT DETECTED
SODIUM SERPL-SCNC: 137 MMOL/L (ref 136–145)
WBC # BLD AUTO: 7.22 10*3/MM3 (ref 3.4–10.8)

## 2021-01-29 PROCEDURE — 82962 GLUCOSE BLOOD TEST: CPT

## 2021-01-29 PROCEDURE — 85379 FIBRIN DEGRADATION QUANT: CPT | Performed by: NURSE PRACTITIONER

## 2021-01-29 PROCEDURE — 85384 FIBRINOGEN ACTIVITY: CPT | Performed by: NURSE PRACTITIONER

## 2021-01-29 PROCEDURE — 97162 PT EVAL MOD COMPLEX 30 MIN: CPT

## 2021-01-29 PROCEDURE — 82728 ASSAY OF FERRITIN: CPT | Performed by: NURSE PRACTITIONER

## 2021-01-29 PROCEDURE — 25010000002 ENOXAPARIN PER 10 MG: Performed by: NURSE PRACTITIONER

## 2021-01-29 PROCEDURE — 94799 UNLISTED PULMONARY SVC/PX: CPT

## 2021-01-29 PROCEDURE — 83615 LACTATE (LD) (LDH) ENZYME: CPT | Performed by: NURSE PRACTITIONER

## 2021-01-29 PROCEDURE — 93970 EXTREMITY STUDY: CPT

## 2021-01-29 PROCEDURE — U0004 COV-19 TEST NON-CDC HGH THRU: HCPCS | Performed by: HOSPITALIST

## 2021-01-29 PROCEDURE — 86140 C-REACTIVE PROTEIN: CPT | Performed by: NURSE PRACTITIONER

## 2021-01-29 PROCEDURE — 80053 COMPREHEN METABOLIC PANEL: CPT | Performed by: NURSE PRACTITIONER

## 2021-01-29 PROCEDURE — 97110 THERAPEUTIC EXERCISES: CPT

## 2021-01-29 PROCEDURE — 82550 ASSAY OF CK (CPK): CPT | Performed by: NURSE PRACTITIONER

## 2021-01-29 PROCEDURE — 25010000002 FUROSEMIDE PER 20 MG: Performed by: HOSPITALIST

## 2021-01-29 PROCEDURE — 85025 COMPLETE CBC W/AUTO DIFF WBC: CPT | Performed by: NURSE PRACTITIONER

## 2021-01-29 PROCEDURE — 25010000002 FUROSEMIDE PER 20 MG: Performed by: INTERNAL MEDICINE

## 2021-01-29 PROCEDURE — 25010000002 CEFTRIAXONE PER 250 MG: Performed by: NURSE PRACTITIONER

## 2021-01-29 RX ORDER — HYOSCYAMINE SULFATE EXTENDED-RELEASE 0.38 MG/1
375 TABLET ORAL EVERY 12 HOURS PRN
Status: DISCONTINUED | OUTPATIENT
Start: 2021-01-29 | End: 2021-02-04 | Stop reason: HOSPADM

## 2021-01-29 RX ORDER — FUROSEMIDE 10 MG/ML
40 INJECTION INTRAMUSCULAR; INTRAVENOUS ONCE
Status: COMPLETED | OUTPATIENT
Start: 2021-01-29 | End: 2021-01-29

## 2021-01-29 RX ORDER — FUROSEMIDE 10 MG/ML
40 INJECTION INTRAMUSCULAR; INTRAVENOUS DAILY
Status: DISCONTINUED | OUTPATIENT
Start: 2021-01-29 | End: 2021-02-02

## 2021-01-29 RX ORDER — BUTALBITAL, ACETAMINOPHEN AND CAFFEINE 50; 325; 40 MG/1; MG/1; MG/1
2 TABLET ORAL EVERY 6 HOURS PRN
Status: DISCONTINUED | OUTPATIENT
Start: 2021-01-29 | End: 2021-02-04 | Stop reason: HOSPADM

## 2021-01-29 RX ORDER — HYDROCHLOROTHIAZIDE 12.5 MG/1
12.5 CAPSULE, GELATIN COATED ORAL DAILY
Status: DISCONTINUED | OUTPATIENT
Start: 2021-01-29 | End: 2021-02-04 | Stop reason: HOSPADM

## 2021-01-29 RX ADMIN — FLUOXETINE HYDROCHLORIDE 60 MG: 20 CAPSULE ORAL at 08:02

## 2021-01-29 RX ADMIN — HYDROCHLOROTHIAZIDE 12.5 MG: 12.5 CAPSULE ORAL at 15:02

## 2021-01-29 RX ADMIN — FUROSEMIDE 40 MG: 10 INJECTION, SOLUTION INTRAMUSCULAR; INTRAVENOUS at 05:40

## 2021-01-29 RX ADMIN — SODIUM CHLORIDE, PRESERVATIVE FREE 10 ML: 5 INJECTION INTRAVENOUS at 09:55

## 2021-01-29 RX ADMIN — PRAMIPEXOLE DIHYDROCHLORIDE 1.5 MG: 1.5 TABLET ORAL at 16:27

## 2021-01-29 RX ADMIN — ENOXAPARIN SODIUM 40 MG: 40 INJECTION SUBCUTANEOUS at 20:31

## 2021-01-29 RX ADMIN — PRAMIPEXOLE DIHYDROCHLORIDE 1.5 MG: 1.5 TABLET ORAL at 08:02

## 2021-01-29 RX ADMIN — CEFTRIAXONE SODIUM 1 G: 1 INJECTION, SOLUTION INTRAVENOUS at 16:27

## 2021-01-29 RX ADMIN — FUROSEMIDE 40 MG: 10 INJECTION, SOLUTION INTRAMUSCULAR; INTRAVENOUS at 15:02

## 2021-01-29 RX ADMIN — PRAMIPEXOLE DIHYDROCHLORIDE 1.5 MG: 1.5 TABLET ORAL at 20:31

## 2021-01-29 RX ADMIN — CLONAZEPAM 2 MG: 1 TABLET ORAL at 20:31

## 2021-01-29 RX ADMIN — BUTALBITAL, ACETAMINOPHEN, AND CAFFEINE 2 TABLET: 50; 325; 40 TABLET ORAL at 10:21

## 2021-01-29 RX ADMIN — SODIUM CHLORIDE, PRESERVATIVE FREE 10 ML: 5 INJECTION INTRAVENOUS at 20:31

## 2021-01-29 RX ADMIN — LOSARTAN POTASSIUM 50 MG: 50 TABLET, FILM COATED ORAL at 08:02

## 2021-01-29 RX ADMIN — HYOSCYAMINE SULFATE 375 MCG: 0.38 TABLET, EXTENDED RELEASE ORAL at 20:31

## 2021-01-29 RX ADMIN — PANTOPRAZOLE SODIUM 40 MG: 40 TABLET, DELAYED RELEASE ORAL at 16:42

## 2021-01-30 LAB
ALBUMIN SERPL-MCNC: 3.6 G/DL (ref 3.5–5.2)
ALBUMIN/GLOB SERPL: 1.1 G/DL
ALP SERPL-CCNC: 145 U/L (ref 39–117)
ALT SERPL W P-5'-P-CCNC: 13 U/L (ref 1–33)
ANION GAP SERPL CALCULATED.3IONS-SCNC: 8.3 MMOL/L (ref 5–15)
AST SERPL-CCNC: 20 U/L (ref 1–32)
BACTERIA SPEC AEROBE CULT: ABNORMAL
BACTERIA SPEC RESP CULT: NORMAL
BASOPHILS # BLD AUTO: 0.05 10*3/MM3 (ref 0–0.2)
BASOPHILS NFR BLD AUTO: 0.4 % (ref 0–1.5)
BILIRUB SERPL-MCNC: 0.2 MG/DL (ref 0–1.2)
BUN SERPL-MCNC: 21 MG/DL (ref 6–20)
BUN/CREAT SERPL: 24.7 (ref 7–25)
CALCIUM SPEC-SCNC: 8.9 MG/DL (ref 8.6–10.5)
CHLORIDE SERPL-SCNC: 99 MMOL/L (ref 98–107)
CK SERPL-CCNC: 32 U/L (ref 20–180)
CO2 SERPL-SCNC: 32.7 MMOL/L (ref 22–29)
CREAT SERPL-MCNC: 0.85 MG/DL (ref 0.57–1)
CRP SERPL-MCNC: 1.3 MG/DL (ref 0–0.5)
DEPRECATED RDW RBC AUTO: 42.5 FL (ref 37–54)
EOSINOPHIL # BLD AUTO: 0.29 10*3/MM3 (ref 0–0.4)
EOSINOPHIL NFR BLD AUTO: 2.3 % (ref 0.3–6.2)
ERYTHROCYTE [DISTWIDTH] IN BLOOD BY AUTOMATED COUNT: 14.3 % (ref 12.3–15.4)
FERRITIN SERPL-MCNC: 24.6 NG/ML (ref 13–150)
GFR SERPL CREATININE-BSD FRML MDRD: 69 ML/MIN/1.73
GLOBULIN UR ELPH-MCNC: 3.2 GM/DL
GLUCOSE SERPL-MCNC: 95 MG/DL (ref 65–99)
GRAM STN SPEC: NORMAL
HCT VFR BLD AUTO: 37.1 % (ref 34–46.6)
HGB BLD-MCNC: 12 G/DL (ref 12–15.9)
IMM GRANULOCYTES # BLD AUTO: 0.08 10*3/MM3 (ref 0–0.05)
IMM GRANULOCYTES NFR BLD AUTO: 0.6 % (ref 0–0.5)
LYMPHOCYTES # BLD AUTO: 1.71 10*3/MM3 (ref 0.7–3.1)
LYMPHOCYTES NFR BLD AUTO: 13.8 % (ref 19.6–45.3)
MCH RBC QN AUTO: 26.7 PG (ref 26.6–33)
MCHC RBC AUTO-ENTMCNC: 32.3 G/DL (ref 31.5–35.7)
MCV RBC AUTO: 82.4 FL (ref 79–97)
MONOCYTES # BLD AUTO: 0.82 10*3/MM3 (ref 0.1–0.9)
MONOCYTES NFR BLD AUTO: 6.6 % (ref 5–12)
NEUTROPHILS NFR BLD AUTO: 76.3 % (ref 42.7–76)
NEUTROPHILS NFR BLD AUTO: 9.43 10*3/MM3 (ref 1.7–7)
NRBC BLD AUTO-RTO: 0 /100 WBC (ref 0–0.2)
PLATELET # BLD AUTO: 382 10*3/MM3 (ref 140–450)
PMV BLD AUTO: 9.3 FL (ref 6–12)
POTASSIUM SERPL-SCNC: 3.5 MMOL/L (ref 3.5–5.2)
PROT SERPL-MCNC: 6.8 G/DL (ref 6–8.5)
RBC # BLD AUTO: 4.5 10*6/MM3 (ref 3.77–5.28)
SODIUM SERPL-SCNC: 140 MMOL/L (ref 136–145)
WBC # BLD AUTO: 12.38 10*3/MM3 (ref 3.4–10.8)

## 2021-01-30 PROCEDURE — 36415 COLL VENOUS BLD VENIPUNCTURE: CPT | Performed by: NURSE PRACTITIONER

## 2021-01-30 PROCEDURE — 80053 COMPREHEN METABOLIC PANEL: CPT | Performed by: NURSE PRACTITIONER

## 2021-01-30 PROCEDURE — 87449 NOS EACH ORGANISM AG IA: CPT | Performed by: INTERNAL MEDICINE

## 2021-01-30 PROCEDURE — 86140 C-REACTIVE PROTEIN: CPT | Performed by: NURSE PRACTITIONER

## 2021-01-30 PROCEDURE — 25010000002 ENOXAPARIN PER 10 MG: Performed by: NURSE PRACTITIONER

## 2021-01-30 PROCEDURE — 85025 COMPLETE CBC W/AUTO DIFF WBC: CPT | Performed by: NURSE PRACTITIONER

## 2021-01-30 PROCEDURE — 25010000002 CEFTRIAXONE PER 250 MG: Performed by: NURSE PRACTITIONER

## 2021-01-30 PROCEDURE — 94799 UNLISTED PULMONARY SVC/PX: CPT

## 2021-01-30 PROCEDURE — 25010000002 FUROSEMIDE PER 20 MG: Performed by: INTERNAL MEDICINE

## 2021-01-30 PROCEDURE — 87385 HISTOPLASMA CAPSUL AG IA: CPT | Performed by: INTERNAL MEDICINE

## 2021-01-30 PROCEDURE — 82728 ASSAY OF FERRITIN: CPT | Performed by: NURSE PRACTITIONER

## 2021-01-30 PROCEDURE — 99223 1ST HOSP IP/OBS HIGH 75: CPT | Performed by: INTERNAL MEDICINE

## 2021-01-30 PROCEDURE — 82550 ASSAY OF CK (CPK): CPT | Performed by: NURSE PRACTITIONER

## 2021-01-30 PROCEDURE — 94640 AIRWAY INHALATION TREATMENT: CPT

## 2021-01-30 RX ORDER — GUAIFENESIN 600 MG/1
1200 TABLET, EXTENDED RELEASE ORAL EVERY 12 HOURS SCHEDULED
Status: DISCONTINUED | OUTPATIENT
Start: 2021-01-30 | End: 2021-02-04 | Stop reason: HOSPADM

## 2021-01-30 RX ADMIN — ALBUTEROL SULFATE 2.5 MG: 2.5 SOLUTION RESPIRATORY (INHALATION) at 21:59

## 2021-01-30 RX ADMIN — ENOXAPARIN SODIUM 40 MG: 40 INJECTION SUBCUTANEOUS at 20:41

## 2021-01-30 RX ADMIN — PANTOPRAZOLE SODIUM 40 MG: 40 TABLET, DELAYED RELEASE ORAL at 16:57

## 2021-01-30 RX ADMIN — PANTOPRAZOLE SODIUM 40 MG: 40 TABLET, DELAYED RELEASE ORAL at 09:07

## 2021-01-30 RX ADMIN — FUROSEMIDE 40 MG: 10 INJECTION, SOLUTION INTRAMUSCULAR; INTRAVENOUS at 09:06

## 2021-01-30 RX ADMIN — CEFTRIAXONE SODIUM 1 G: 1 INJECTION, SOLUTION INTRAVENOUS at 16:57

## 2021-01-30 RX ADMIN — PRAMIPEXOLE DIHYDROCHLORIDE 1.5 MG: 1.5 TABLET ORAL at 20:41

## 2021-01-30 RX ADMIN — HYOSCYAMINE SULFATE 375 MCG: 0.38 TABLET, EXTENDED RELEASE ORAL at 20:41

## 2021-01-30 RX ADMIN — SODIUM CHLORIDE, PRESERVATIVE FREE 10 ML: 5 INJECTION INTRAVENOUS at 20:42

## 2021-01-30 RX ADMIN — HYDROCHLOROTHIAZIDE 12.5 MG: 12.5 CAPSULE ORAL at 09:06

## 2021-01-30 RX ADMIN — GUAIFENESIN 1200 MG: 600 TABLET, EXTENDED RELEASE ORAL at 20:41

## 2021-01-30 RX ADMIN — SODIUM CHLORIDE, PRESERVATIVE FREE 10 ML: 5 INJECTION INTRAVENOUS at 09:06

## 2021-01-30 RX ADMIN — BUTALBITAL, ACETAMINOPHEN, AND CAFFEINE 2 TABLET: 50; 325; 40 TABLET ORAL at 05:20

## 2021-01-30 RX ADMIN — GUAIFENESIN 1200 MG: 600 TABLET, EXTENDED RELEASE ORAL at 13:25

## 2021-01-30 RX ADMIN — PRAMIPEXOLE DIHYDROCHLORIDE 1.5 MG: 1.5 TABLET ORAL at 09:06

## 2021-01-30 RX ADMIN — LOSARTAN POTASSIUM 50 MG: 50 TABLET, FILM COATED ORAL at 09:06

## 2021-01-30 RX ADMIN — FLUOXETINE HYDROCHLORIDE 60 MG: 20 CAPSULE ORAL at 09:06

## 2021-01-30 RX ADMIN — CLONAZEPAM 2 MG: 1 TABLET ORAL at 20:41

## 2021-01-30 RX ADMIN — PRAMIPEXOLE DIHYDROCHLORIDE 1.5 MG: 1.5 TABLET ORAL at 16:57

## 2021-01-31 ENCOUNTER — APPOINTMENT (OUTPATIENT)
Dept: CARDIOLOGY | Facility: HOSPITAL | Age: 61
End: 2021-01-31

## 2021-01-31 LAB
ALBUMIN SERPL-MCNC: 3.9 G/DL (ref 3.5–5.2)
ALBUMIN/GLOB SERPL: 1.4 G/DL
ALP SERPL-CCNC: 148 U/L (ref 39–117)
ALT SERPL W P-5'-P-CCNC: 11 U/L (ref 1–33)
ANION GAP SERPL CALCULATED.3IONS-SCNC: 11 MMOL/L (ref 5–15)
ARTERIAL PATENCY WRIST A: POSITIVE
AST SERPL-CCNC: 21 U/L (ref 1–32)
ATMOSPHERIC PRESS: 747.3 MMHG
BASE EXCESS BLDA CALC-SCNC: 4.3 MMOL/L (ref 0–2)
BASOPHILS # BLD AUTO: 0.07 10*3/MM3 (ref 0–0.2)
BASOPHILS NFR BLD AUTO: 0.6 % (ref 0–1.5)
BDY SITE: ABNORMAL
BILIRUB SERPL-MCNC: 0.3 MG/DL (ref 0–1.2)
BUN SERPL-MCNC: 23 MG/DL (ref 6–20)
BUN/CREAT SERPL: 33.3 (ref 7–25)
CALCIUM SPEC-SCNC: 8.9 MG/DL (ref 8.6–10.5)
CHLORIDE SERPL-SCNC: 95 MMOL/L (ref 98–107)
CK SERPL-CCNC: 42 U/L (ref 20–180)
CO2 SERPL-SCNC: 28 MMOL/L (ref 22–29)
CREAT SERPL-MCNC: 0.69 MG/DL (ref 0.57–1)
CRP SERPL-MCNC: 1.96 MG/DL (ref 0–0.5)
D DIMER PPP FEU-MCNC: 0.79 MCGFEU/ML (ref 0–0.49)
DEPRECATED RDW RBC AUTO: 41.1 FL (ref 37–54)
EOSINOPHIL # BLD AUTO: 0.33 10*3/MM3 (ref 0–0.4)
EOSINOPHIL NFR BLD AUTO: 3 % (ref 0.3–6.2)
ERYTHROCYTE [DISTWIDTH] IN BLOOD BY AUTOMATED COUNT: 14 % (ref 12.3–15.4)
FERRITIN SERPL-MCNC: 26.8 NG/ML (ref 13–150)
FIBRINOGEN PPP-MCNC: 457 MG/DL (ref 219–464)
GAS FLOW AIRWAY: 2 LPM
GFR SERPL CREATININE-BSD FRML MDRD: 87 ML/MIN/1.73
GLOBULIN UR ELPH-MCNC: 2.8 GM/DL
GLUCOSE SERPL-MCNC: 110 MG/DL (ref 65–99)
HCO3 BLDA-SCNC: 29 MMOL/L (ref 22–28)
HCT VFR BLD AUTO: 38.2 % (ref 34–46.6)
HGB BLD-MCNC: 12.6 G/DL (ref 12–15.9)
IMM GRANULOCYTES # BLD AUTO: 0.06 10*3/MM3 (ref 0–0.05)
IMM GRANULOCYTES NFR BLD AUTO: 0.5 % (ref 0–0.5)
LDH SERPL-CCNC: 193 U/L (ref 135–214)
LYMPHOCYTES # BLD AUTO: 1.88 10*3/MM3 (ref 0.7–3.1)
LYMPHOCYTES NFR BLD AUTO: 17 % (ref 19.6–45.3)
MCH RBC QN AUTO: 26.8 PG (ref 26.6–33)
MCHC RBC AUTO-ENTMCNC: 33 G/DL (ref 31.5–35.7)
MCV RBC AUTO: 81.1 FL (ref 79–97)
MODALITY: ABNORMAL
MONOCYTES # BLD AUTO: 0.77 10*3/MM3 (ref 0.1–0.9)
MONOCYTES NFR BLD AUTO: 7 % (ref 5–12)
NEUTROPHILS NFR BLD AUTO: 7.95 10*3/MM3 (ref 1.7–7)
NEUTROPHILS NFR BLD AUTO: 71.9 % (ref 42.7–76)
NRBC BLD AUTO-RTO: 0 /100 WBC (ref 0–0.2)
PCO2 BLDA: 42.8 MM HG (ref 35–45)
PH BLDA: 7.44 PH UNITS (ref 7.35–7.45)
PLATELET # BLD AUTO: 395 10*3/MM3 (ref 140–450)
PMV BLD AUTO: 9.4 FL (ref 6–12)
PO2 BLDA: 60.5 MM HG (ref 80–100)
POTASSIUM SERPL-SCNC: 3.5 MMOL/L (ref 3.5–5.2)
PROCALCITONIN SERPL-MCNC: 0.05 NG/ML (ref 0–0.25)
PROT SERPL-MCNC: 6.7 G/DL (ref 6–8.5)
RBC # BLD AUTO: 4.71 10*6/MM3 (ref 3.77–5.28)
SAO2 % BLDCOA: 91.5 % (ref 92–99)
SODIUM SERPL-SCNC: 134 MMOL/L (ref 136–145)
TOTAL RATE: 20 BREATHS/MINUTE
WBC # BLD AUTO: 11.06 10*3/MM3 (ref 3.4–10.8)

## 2021-01-31 PROCEDURE — 94799 UNLISTED PULMONARY SVC/PX: CPT

## 2021-01-31 PROCEDURE — 84145 PROCALCITONIN (PCT): CPT | Performed by: INTERNAL MEDICINE

## 2021-01-31 PROCEDURE — 82803 BLOOD GASES ANY COMBINATION: CPT

## 2021-01-31 PROCEDURE — 83615 LACTATE (LD) (LDH) ENZYME: CPT | Performed by: NURSE PRACTITIONER

## 2021-01-31 PROCEDURE — 85025 COMPLETE CBC W/AUTO DIFF WBC: CPT | Performed by: NURSE PRACTITIONER

## 2021-01-31 PROCEDURE — 25010000002 PERFLUTREN (DEFINITY) 8.476 MG IN SODIUM CHLORIDE (PF) 0.9 % 10 ML INJECTION: Performed by: INTERNAL MEDICINE

## 2021-01-31 PROCEDURE — 82550 ASSAY OF CK (CPK): CPT | Performed by: NURSE PRACTITIONER

## 2021-01-31 PROCEDURE — 85379 FIBRIN DEGRADATION QUANT: CPT | Performed by: NURSE PRACTITIONER

## 2021-01-31 PROCEDURE — 85384 FIBRINOGEN ACTIVITY: CPT | Performed by: NURSE PRACTITIONER

## 2021-01-31 PROCEDURE — 87449 NOS EACH ORGANISM AG IA: CPT | Performed by: INTERNAL MEDICINE

## 2021-01-31 PROCEDURE — 86140 C-REACTIVE PROTEIN: CPT | Performed by: NURSE PRACTITIONER

## 2021-01-31 PROCEDURE — 80053 COMPREHEN METABOLIC PANEL: CPT | Performed by: NURSE PRACTITIONER

## 2021-01-31 PROCEDURE — 93306 TTE W/DOPPLER COMPLETE: CPT

## 2021-01-31 PROCEDURE — 87385 HISTOPLASMA CAPSUL AG IA: CPT | Performed by: INTERNAL MEDICINE

## 2021-01-31 PROCEDURE — 25010000002 ENOXAPARIN PER 10 MG: Performed by: NURSE PRACTITIONER

## 2021-01-31 PROCEDURE — 82728 ASSAY OF FERRITIN: CPT | Performed by: NURSE PRACTITIONER

## 2021-01-31 PROCEDURE — 36600 WITHDRAWAL OF ARTERIAL BLOOD: CPT

## 2021-01-31 PROCEDURE — 99232 SBSQ HOSP IP/OBS MODERATE 35: CPT | Performed by: INTERNAL MEDICINE

## 2021-01-31 PROCEDURE — 93306 TTE W/DOPPLER COMPLETE: CPT | Performed by: INTERNAL MEDICINE

## 2021-01-31 PROCEDURE — 25010000002 FUROSEMIDE PER 20 MG: Performed by: INTERNAL MEDICINE

## 2021-01-31 RX ORDER — ALBUTEROL SULFATE 2.5 MG/3ML
2.5 SOLUTION RESPIRATORY (INHALATION)
Status: DISCONTINUED | OUTPATIENT
Start: 2021-01-31 | End: 2021-02-04 | Stop reason: HOSPADM

## 2021-01-31 RX ORDER — CLONAZEPAM 1 MG/1
2 TABLET ORAL NIGHTLY PRN
Status: DISCONTINUED | OUTPATIENT
Start: 2021-01-31 | End: 2021-02-04 | Stop reason: HOSPADM

## 2021-01-31 RX ORDER — SODIUM CHLORIDE FOR INHALATION 7 %
4 VIAL, NEBULIZER (ML) INHALATION
Status: DISCONTINUED | OUTPATIENT
Start: 2021-01-31 | End: 2021-02-04 | Stop reason: HOSPADM

## 2021-01-31 RX ADMIN — SODIUM CHLORIDE, PRESERVATIVE FREE 10 ML: 5 INJECTION INTRAVENOUS at 20:12

## 2021-01-31 RX ADMIN — GUAIFENESIN 1200 MG: 600 TABLET, EXTENDED RELEASE ORAL at 08:48

## 2021-01-31 RX ADMIN — PERFLUTREN 2 ML: 6.52 INJECTION, SUSPENSION INTRAVENOUS at 12:00

## 2021-01-31 RX ADMIN — BUTALBITAL, ACETAMINOPHEN, AND CAFFEINE 2 TABLET: 50; 325; 40 TABLET ORAL at 10:19

## 2021-01-31 RX ADMIN — PANTOPRAZOLE SODIUM 40 MG: 40 TABLET, DELAYED RELEASE ORAL at 08:49

## 2021-01-31 RX ADMIN — LOSARTAN POTASSIUM 50 MG: 50 TABLET, FILM COATED ORAL at 08:47

## 2021-01-31 RX ADMIN — PRAMIPEXOLE DIHYDROCHLORIDE 1.5 MG: 1.5 TABLET ORAL at 23:20

## 2021-01-31 RX ADMIN — PANTOPRAZOLE SODIUM 40 MG: 40 TABLET, DELAYED RELEASE ORAL at 19:01

## 2021-01-31 RX ADMIN — PRAMIPEXOLE DIHYDROCHLORIDE 1.5 MG: 1.5 TABLET ORAL at 19:01

## 2021-01-31 RX ADMIN — PRAMIPEXOLE DIHYDROCHLORIDE 1.5 MG: 1.5 TABLET ORAL at 08:47

## 2021-01-31 RX ADMIN — ALBUTEROL SULFATE 2.5 MG: 2.5 SOLUTION RESPIRATORY (INHALATION) at 20:00

## 2021-01-31 RX ADMIN — ENOXAPARIN SODIUM 40 MG: 40 INJECTION SUBCUTANEOUS at 20:17

## 2021-01-31 RX ADMIN — BUTALBITAL, ACETAMINOPHEN, AND CAFFEINE 2 TABLET: 50; 325; 40 TABLET ORAL at 22:24

## 2021-01-31 RX ADMIN — CLONAZEPAM 2 MG: 1 TABLET ORAL at 23:26

## 2021-01-31 RX ADMIN — FLUOXETINE HYDROCHLORIDE 60 MG: 20 CAPSULE ORAL at 08:47

## 2021-01-31 RX ADMIN — FUROSEMIDE 40 MG: 10 INJECTION, SOLUTION INTRAMUSCULAR; INTRAVENOUS at 08:48

## 2021-01-31 RX ADMIN — ALBUTEROL SULFATE 2.5 MG: 2.5 SOLUTION RESPIRATORY (INHALATION) at 14:58

## 2021-01-31 RX ADMIN — SODIUM CHLORIDE, PRESERVATIVE FREE 10 ML: 5 INJECTION INTRAVENOUS at 08:48

## 2021-01-31 RX ADMIN — ALBUTEROL SULFATE 2.5 MG: 2.5 SOLUTION RESPIRATORY (INHALATION) at 08:20

## 2021-01-31 RX ADMIN — GUAIFENESIN 1200 MG: 600 TABLET, EXTENDED RELEASE ORAL at 20:17

## 2021-01-31 RX ADMIN — HYDROCHLOROTHIAZIDE 12.5 MG: 12.5 CAPSULE ORAL at 08:47

## 2021-02-01 ENCOUNTER — APPOINTMENT (OUTPATIENT)
Dept: GENERAL RADIOLOGY | Facility: HOSPITAL | Age: 61
End: 2021-02-01

## 2021-02-01 LAB
ALBUMIN SERPL-MCNC: 3.8 G/DL (ref 3.5–5.2)
ALBUMIN/GLOB SERPL: 1.1 G/DL
ALP SERPL-CCNC: 166 U/L (ref 39–117)
ALT SERPL W P-5'-P-CCNC: 15 U/L (ref 1–33)
ANION GAP SERPL CALCULATED.3IONS-SCNC: 12.8 MMOL/L (ref 5–15)
AORTIC DIMENSIONLESS INDEX: 0.8 (DI)
ARTERIAL PATENCY WRIST A: POSITIVE
AST SERPL-CCNC: 23 U/L (ref 1–32)
ATMOSPHERIC PRESS: 750.5 MMHG
BASE EXCESS BLDA CALC-SCNC: 4.4 MMOL/L (ref 0–2)
BASOPHILS # BLD AUTO: 0.06 10*3/MM3 (ref 0–0.2)
BASOPHILS NFR BLD AUTO: 0.5 % (ref 0–1.5)
BDY SITE: ABNORMAL
BH CV ECHO MEAS - ACS: 2.1 CM
BH CV ECHO MEAS - AO MAX PG (FULL): 3.8 MMHG
BH CV ECHO MEAS - AO MAX PG: 9 MMHG
BH CV ECHO MEAS - AO MEAN PG (FULL): 2 MMHG
BH CV ECHO MEAS - AO MEAN PG: 5 MMHG
BH CV ECHO MEAS - AO ROOT AREA (BSA CORRECTED): 1.2
BH CV ECHO MEAS - AO ROOT AREA: 3.8 CM^2
BH CV ECHO MEAS - AO ROOT DIAM: 2.2 CM
BH CV ECHO MEAS - AO V2 MAX: 150 CM/SEC
BH CV ECHO MEAS - AO V2 MEAN: 109 CM/SEC
BH CV ECHO MEAS - AO V2 VTI: 25 CM
BH CV ECHO MEAS - ASC AORTA: 2.7 CM
BH CV ECHO MEAS - AVA(I,A): 1.9 CM^2
BH CV ECHO MEAS - AVA(I,D): 1.9 CM^2
BH CV ECHO MEAS - AVA(V,A): 1.7 CM^2
BH CV ECHO MEAS - AVA(V,D): 1.7 CM^2
BH CV ECHO MEAS - BSA(HAYCOCK): 2 M^2
BH CV ECHO MEAS - BSA: 1.9 M^2
BH CV ECHO MEAS - BZI_BMI: 30.5 KILOGRAMS/M^2
BH CV ECHO MEAS - BZI_METRIC_HEIGHT: 165.1 CM
BH CV ECHO MEAS - BZI_METRIC_WEIGHT: 83 KG
BH CV ECHO MEAS - EDV(CUBED): 46.7 ML
BH CV ECHO MEAS - EDV(MOD-SP2): 70 ML
BH CV ECHO MEAS - EDV(MOD-SP4): 65 ML
BH CV ECHO MEAS - EDV(TEICH): 54.4 ML
BH CV ECHO MEAS - EF(CUBED): 77.2 %
BH CV ECHO MEAS - EF(MOD-BP): 69.8 %
BH CV ECHO MEAS - EF(MOD-SP2): 74.3 %
BH CV ECHO MEAS - EF(MOD-SP4): 67.7 %
BH CV ECHO MEAS - EF(TEICH): 70.2 %
BH CV ECHO MEAS - ESV(CUBED): 10.6 ML
BH CV ECHO MEAS - ESV(MOD-SP2): 18 ML
BH CV ECHO MEAS - ESV(MOD-SP4): 21 ML
BH CV ECHO MEAS - ESV(TEICH): 16.2 ML
BH CV ECHO MEAS - FS: 38.9 %
BH CV ECHO MEAS - IVS/LVPW: 1
BH CV ECHO MEAS - IVSD: 1.1 CM
BH CV ECHO MEAS - LAT PEAK E' VEL: 9.7 CM/SEC
BH CV ECHO MEAS - LV DIASTOLIC VOL/BSA (35-75): 34.1 ML/M^2
BH CV ECHO MEAS - LV MASS(C)D: 124.1 GRAMS
BH CV ECHO MEAS - LV MASS(C)DI: 65.2 GRAMS/M^2
BH CV ECHO MEAS - LV MAX PG: 5.2 MMHG
BH CV ECHO MEAS - LV MEAN PG: 3 MMHG
BH CV ECHO MEAS - LV SYSTOLIC VOL/BSA (12-30): 11 ML/M^2
BH CV ECHO MEAS - LV V1 MAX: 114 CM/SEC
BH CV ECHO MEAS - LV V1 MEAN: 83.2 CM/SEC
BH CV ECHO MEAS - LV V1 VTI: 20.4 CM
BH CV ECHO MEAS - LVIDD: 3.6 CM
BH CV ECHO MEAS - LVIDS: 2.2 CM
BH CV ECHO MEAS - LVLD AP2: 7.6 CM
BH CV ECHO MEAS - LVLD AP4: 7.4 CM
BH CV ECHO MEAS - LVLS AP2: 6.5 CM
BH CV ECHO MEAS - LVLS AP4: 7.1 CM
BH CV ECHO MEAS - LVOT AREA (M): 2.3 CM^2
BH CV ECHO MEAS - LVOT AREA: 2.3 CM^2
BH CV ECHO MEAS - LVOT DIAM: 1.7 CM
BH CV ECHO MEAS - LVPWD: 1.1 CM
BH CV ECHO MEAS - MED PEAK E' VEL: 6.4 CM/SEC
BH CV ECHO MEAS - MV A DUR: 0.13 SEC
BH CV ECHO MEAS - MV A MAX VEL: 66 CM/SEC
BH CV ECHO MEAS - MV DEC SLOPE: 268 CM/SEC^2
BH CV ECHO MEAS - MV DEC TIME: 309 SEC
BH CV ECHO MEAS - MV E MAX VEL: 52.3 CM/SEC
BH CV ECHO MEAS - MV E/A: 0.79
BH CV ECHO MEAS - MV MAX PG: 3.4 MMHG
BH CV ECHO MEAS - MV MEAN PG: 2 MMHG
BH CV ECHO MEAS - MV P1/2T MAX VEL: 99.9 CM/SEC
BH CV ECHO MEAS - MV P1/2T: 109.2 MSEC
BH CV ECHO MEAS - MV V2 MAX: 91.6 CM/SEC
BH CV ECHO MEAS - MV V2 MEAN: 59.9 CM/SEC
BH CV ECHO MEAS - MV V2 VTI: 26.4 CM
BH CV ECHO MEAS - MVA P1/2T LCG: 2.2 CM^2
BH CV ECHO MEAS - MVA(P1/2T): 2 CM^2
BH CV ECHO MEAS - MVA(VTI): 1.8 CM^2
BH CV ECHO MEAS - PA ACC TIME: 0.05 SEC
BH CV ECHO MEAS - PA MAX PG (FULL): -1.1 MMHG
BH CV ECHO MEAS - PA MAX PG: 2.8 MMHG
BH CV ECHO MEAS - PA PR(ACCEL): 56.5 MMHG
BH CV ECHO MEAS - PA V2 MAX: 84.2 CM/SEC
BH CV ECHO MEAS - PULM A REVS DUR: 0.14 SEC
BH CV ECHO MEAS - PULM A REVS VEL: 27 CM/SEC
BH CV ECHO MEAS - PULM DIAS VEL: 34.3 CM/SEC
BH CV ECHO MEAS - PULM S/D: 1.4
BH CV ECHO MEAS - PULM SYS VEL: 47.1 CM/SEC
BH CV ECHO MEAS - RV MAX PG: 3.9 MMHG
BH CV ECHO MEAS - RV MEAN PG: 2 MMHG
BH CV ECHO MEAS - RV V1 MAX: 98.6 CM/SEC
BH CV ECHO MEAS - RV V1 MEAN: 57.3 CM/SEC
BH CV ECHO MEAS - RV V1 VTI: 14.5 CM
BH CV ECHO MEAS - SI(AO): 49.9 ML/M^2
BH CV ECHO MEAS - SI(CUBED): 18.9 ML/M^2
BH CV ECHO MEAS - SI(LVOT): 24.3 ML/M^2
BH CV ECHO MEAS - SI(MOD-SP2): 27.3 ML/M^2
BH CV ECHO MEAS - SI(MOD-SP4): 23.1 ML/M^2
BH CV ECHO MEAS - SI(TEICH): 20.1 ML/M^2
BH CV ECHO MEAS - SV(AO): 95 ML
BH CV ECHO MEAS - SV(CUBED): 36 ML
BH CV ECHO MEAS - SV(LVOT): 46.3 ML
BH CV ECHO MEAS - SV(MOD-SP2): 52 ML
BH CV ECHO MEAS - SV(MOD-SP4): 44 ML
BH CV ECHO MEAS - SV(TEICH): 38.2 ML
BH CV ECHO MEASUREMENTS AVERAGE E/E' RATIO: 6.5
BH CV XLRA - TDI S': 16 CM/SEC
BILIRUB SERPL-MCNC: 0.3 MG/DL (ref 0–1.2)
BUN SERPL-MCNC: 24 MG/DL (ref 6–20)
BUN/CREAT SERPL: 27.9 (ref 7–25)
CALCIUM SPEC-SCNC: 9.4 MG/DL (ref 8.6–10.5)
CHLORIDE SERPL-SCNC: 95 MMOL/L (ref 98–107)
CK SERPL-CCNC: 42 U/L (ref 20–180)
CO2 SERPL-SCNC: 25.2 MMOL/L (ref 22–29)
CREAT SERPL-MCNC: 0.86 MG/DL (ref 0.57–1)
CRP SERPL-MCNC: 2.57 MG/DL (ref 0–0.5)
DEPRECATED RDW RBC AUTO: 42.4 FL (ref 37–54)
EOSINOPHIL # BLD AUTO: 0.27 10*3/MM3 (ref 0–0.4)
EOSINOPHIL NFR BLD AUTO: 2.4 % (ref 0.3–6.2)
ERYTHROCYTE [DISTWIDTH] IN BLOOD BY AUTOMATED COUNT: 14.5 % (ref 12.3–15.4)
FERRITIN SERPL-MCNC: 28.8 NG/ML (ref 13–150)
GAS FLOW AIRWAY: 7 LPM
GFR SERPL CREATININE-BSD FRML MDRD: 68 ML/MIN/1.73
GLOBULIN UR ELPH-MCNC: 3.6 GM/DL
GLUCOSE SERPL-MCNC: 125 MG/DL (ref 65–99)
HCO3 BLDA-SCNC: 30.5 MMOL/L (ref 22–28)
HCT VFR BLD AUTO: 42 % (ref 34–46.6)
HGB BLD-MCNC: 13.1 G/DL (ref 12–15.9)
IMM GRANULOCYTES # BLD AUTO: 0.06 10*3/MM3 (ref 0–0.05)
IMM GRANULOCYTES NFR BLD AUTO: 0.5 % (ref 0–0.5)
LEFT ATRIUM VOLUME INDEX: 15.3 ML/M2
LV EF 2D ECHO EST: 70 %
LYMPHOCYTES # BLD AUTO: 2.06 10*3/MM3 (ref 0.7–3.1)
LYMPHOCYTES NFR BLD AUTO: 17.9 % (ref 19.6–45.3)
MAXIMAL PREDICTED HEART RATE: 162 BPM
MCH RBC QN AUTO: 25.5 PG (ref 26.6–33)
MCHC RBC AUTO-ENTMCNC: 31.2 G/DL (ref 31.5–35.7)
MCV RBC AUTO: 81.7 FL (ref 79–97)
MODALITY: ABNORMAL
MONOCYTES # BLD AUTO: 0.77 10*3/MM3 (ref 0.1–0.9)
MONOCYTES NFR BLD AUTO: 6.7 % (ref 5–12)
NEUTROPHILS NFR BLD AUTO: 72 % (ref 42.7–76)
NEUTROPHILS NFR BLD AUTO: 8.26 10*3/MM3 (ref 1.7–7)
NRBC BLD AUTO-RTO: 0 /100 WBC (ref 0–0.2)
PCO2 BLDA: 49.8 MM HG (ref 35–45)
PH BLDA: 7.39 PH UNITS (ref 7.35–7.45)
PLATELET # BLD AUTO: 464 10*3/MM3 (ref 140–450)
PMV BLD AUTO: 9.4 FL (ref 6–12)
PO2 BLDA: 76.9 MM HG (ref 80–100)
POTASSIUM SERPL-SCNC: 3.5 MMOL/L (ref 3.5–5.2)
PROT SERPL-MCNC: 7.4 G/DL (ref 6–8.5)
RBC # BLD AUTO: 5.14 10*6/MM3 (ref 3.77–5.28)
SAO2 % BLDCOA: 94.9 % (ref 92–99)
SODIUM SERPL-SCNC: 133 MMOL/L (ref 136–145)
STRESS TARGET HR: 138 BPM
TOTAL RATE: 16 BREATHS/MINUTE
WBC # BLD AUTO: 11.48 10*3/MM3 (ref 3.4–10.8)

## 2021-02-01 PROCEDURE — 25010000002 ENOXAPARIN PER 10 MG: Performed by: NURSE PRACTITIONER

## 2021-02-01 PROCEDURE — 86140 C-REACTIVE PROTEIN: CPT | Performed by: NURSE PRACTITIONER

## 2021-02-01 PROCEDURE — 92610 EVALUATE SWALLOWING FUNCTION: CPT

## 2021-02-01 PROCEDURE — 94799 UNLISTED PULMONARY SVC/PX: CPT

## 2021-02-01 PROCEDURE — 80053 COMPREHEN METABOLIC PANEL: CPT | Performed by: NURSE PRACTITIONER

## 2021-02-01 PROCEDURE — 82803 BLOOD GASES ANY COMBINATION: CPT

## 2021-02-01 PROCEDURE — 85025 COMPLETE CBC W/AUTO DIFF WBC: CPT | Performed by: NURSE PRACTITIONER

## 2021-02-01 PROCEDURE — 36600 WITHDRAWAL OF ARTERIAL BLOOD: CPT

## 2021-02-01 PROCEDURE — 82728 ASSAY OF FERRITIN: CPT | Performed by: NURSE PRACTITIONER

## 2021-02-01 PROCEDURE — 71046 X-RAY EXAM CHEST 2 VIEWS: CPT

## 2021-02-01 PROCEDURE — 82550 ASSAY OF CK (CPK): CPT | Performed by: NURSE PRACTITIONER

## 2021-02-01 PROCEDURE — 99223 1ST HOSP IP/OBS HIGH 75: CPT | Performed by: NURSE PRACTITIONER

## 2021-02-01 PROCEDURE — 25010000002 FUROSEMIDE PER 20 MG: Performed by: INTERNAL MEDICINE

## 2021-02-01 PROCEDURE — 36415 COLL VENOUS BLD VENIPUNCTURE: CPT | Performed by: NURSE PRACTITIONER

## 2021-02-01 RX ADMIN — PANTOPRAZOLE SODIUM 40 MG: 40 TABLET, DELAYED RELEASE ORAL at 17:29

## 2021-02-01 RX ADMIN — PRAMIPEXOLE DIHYDROCHLORIDE 1.5 MG: 1.5 TABLET ORAL at 21:08

## 2021-02-01 RX ADMIN — SODIUM CHLORIDE 4 ML: 7 NEBU SOLN,3 % NEBU at 08:41

## 2021-02-01 RX ADMIN — ALBUTEROL SULFATE 2.5 MG: 2.5 SOLUTION RESPIRATORY (INHALATION) at 11:48

## 2021-02-01 RX ADMIN — PRAMIPEXOLE DIHYDROCHLORIDE 1.5 MG: 1.5 TABLET ORAL at 15:19

## 2021-02-01 RX ADMIN — ALBUTEROL SULFATE 2.5 MG: 2.5 SOLUTION RESPIRATORY (INHALATION) at 08:40

## 2021-02-01 RX ADMIN — BUTALBITAL, ACETAMINOPHEN, AND CAFFEINE 2 TABLET: 50; 325; 40 TABLET ORAL at 09:26

## 2021-02-01 RX ADMIN — SODIUM CHLORIDE, PRESERVATIVE FREE 10 ML: 5 INJECTION INTRAVENOUS at 21:20

## 2021-02-01 RX ADMIN — SODIUM CHLORIDE 4 ML: 7 NEBU SOLN,3 % NEBU at 19:36

## 2021-02-01 RX ADMIN — BUTALBITAL, ACETAMINOPHEN, AND CAFFEINE 2 TABLET: 50; 325; 40 TABLET ORAL at 15:19

## 2021-02-01 RX ADMIN — LOSARTAN POTASSIUM 50 MG: 50 TABLET, FILM COATED ORAL at 09:27

## 2021-02-01 RX ADMIN — GUAIFENESIN 1200 MG: 600 TABLET, EXTENDED RELEASE ORAL at 21:08

## 2021-02-01 RX ADMIN — FLUOXETINE HYDROCHLORIDE 60 MG: 20 CAPSULE ORAL at 09:27

## 2021-02-01 RX ADMIN — PANTOPRAZOLE SODIUM 40 MG: 40 TABLET, DELAYED RELEASE ORAL at 06:32

## 2021-02-01 RX ADMIN — ALBUTEROL SULFATE 2.5 MG: 2.5 SOLUTION RESPIRATORY (INHALATION) at 19:31

## 2021-02-01 RX ADMIN — ALBUTEROL SULFATE 2.5 MG: 2.5 SOLUTION RESPIRATORY (INHALATION) at 16:10

## 2021-02-01 RX ADMIN — ENOXAPARIN SODIUM 40 MG: 40 INJECTION SUBCUTANEOUS at 21:08

## 2021-02-01 RX ADMIN — CLONAZEPAM 1 MG: 1 TABLET ORAL at 21:11

## 2021-02-01 RX ADMIN — HYDROCHLOROTHIAZIDE 12.5 MG: 12.5 CAPSULE ORAL at 09:27

## 2021-02-01 RX ADMIN — PRAMIPEXOLE DIHYDROCHLORIDE 1.5 MG: 1.5 TABLET ORAL at 09:27

## 2021-02-01 RX ADMIN — SODIUM CHLORIDE, PRESERVATIVE FREE 10 ML: 5 INJECTION INTRAVENOUS at 09:27

## 2021-02-01 RX ADMIN — GUAIFENESIN 1200 MG: 600 TABLET, EXTENDED RELEASE ORAL at 09:27

## 2021-02-01 RX ADMIN — FUROSEMIDE 40 MG: 10 INJECTION, SOLUTION INTRAMUSCULAR; INTRAVENOUS at 09:27

## 2021-02-01 NOTE — PLAN OF CARE
Goal Outcome Evaluation:  Plan of Care Reviewed With: patient  Progress: improving   Vss on 2 L nasal cannula. During 0200 assessment patients o2 sats were declining. Ended up needing to bump patient to 7 L high flow nasal cannula. ABGs were drawn. Repositioned patient, encouraged deep breathing and coughing, eventually managed to get patient back down to 2 L nasal cannula. Q2 turns. IS. A/O x 4. NSR. Patient currently resting comfortably. Will continue to monitor.

## 2021-02-01 NOTE — THERAPY EVALUATION
Acute Care - Speech Language Pathology   Swallow Initial Evaluation [facility]     Patient Name: Maritza Nance Darci  : 1960  MRN: [id number]  Today's Date: 2021               Admit Date: 2021    Visit Dx:     ICD-10-CM ICD-9-CM   1. Cough  R05 786.2   2. Shortness of breath  R06.02 786.05   3. Acute cystitis without hematuria  N30.00 595.0   4. Suspected COVID-19 virus infection  Z20.822 V01.79   5. MS (multiple sclerosis) (CMS/HCC)  G35 340   6. GERARDO (obstructive sleep apnea)  G47.33 327.23     Patient Active Problem List   Diagnosis   • H/O total shoulder replacement, right   • Cough   • Acute UTI (urinary tract infection)   • Multiple sclerosis (CMS/HCC)   • Essential hypertension   • Hyperlipidemia   • Shortness of breath     Past Medical History:   Diagnosis Date   • Hyperlipidemia    • Hypertension    • Multiple sclerosis (CMS/HCC)    • PONV (postoperative nausea and vomiting)      Past Surgical History:   Procedure Laterality Date   • ADENOIDECTOMY     • PARATHYROIDECTOMY     • TOTAL SHOULDER ARTHROPLASTY W/ DISTAL CLAVICLE EXCISION Right 10/22/2018    Procedure: RT TOTAL SHOULDER REVERSE ARTHROPLASTY;  Surgeon: Bipin Dangelo MD;  Location: [facility] MAIN OR;  Service: Orthopedics     Patient was not wearing a face mask during this therapy encounter. Therapist used appropriate personal protective equipment including mask, eye protection and gloves.  Mask used was standard procedure mask. Appropriate PPE was worn during the entire therapy session. Hand hygiene was completed before and after therapy session. Patient is not in enhanced droplet precautions.        SWALLOW EVALUATION (last 72 hours)      SLP Adult Swallow Evaluation     Row Name 21 1500                   Rehab Evaluation    Document Type  evaluation  -HS        Subjective Information  complains of;weakness;fatigue;dyspnea  -HS        Patient Observations  alert;cooperative;agree to therapy  -HS        Care Plan Review   evaluation/treatment results reviewed;care plan/treatment goals reviewed;risks/benefits reviewed;current/potential barriers reviewed;patient/other agree to care plan  -HS        Patient Effort  good  -HS        Symptoms Noted During/After Treatment  none  -HS           General Information    Patient Profile Reviewed  yes  -HS        Pertinent History Of Current Problem  Admitted with dyspnea, AHRF. Hx of MS, hiatal hernia. Pulmonology in room during portion of eval. Suspects aspiration. Patient denies dysphagia.   -HS        Current Method of Nutrition  regular textures;thin liquids  -HS        Precautions/Limitations, Vision  WFL;for purposes of eval  -HS        Precautions/Limitations, Hearing  WFL;for purposes of eval  -HS        Prior Level of Function-Communication  WFL  -HS        Prior Level of Function-Swallowing  no diet consistency restrictions;safe, efficient swallowing in all situations  -HS        Plans/Goals Discussed with  patient  -HS        Barriers to Rehab  none identified  -HS        Patient's Goals for Discharge  patient did not state  -HS           Oral Motor Structure and Function    Dentition Assessment  natural, present and adequate  -HS        Secretion Management  WNL/WFL  -HS        Mucosal Quality  moist, healthy  -HS        Volitional Swallow  weak  -HS        Volitional Cough  weak  -HS           Oral Musculature and Cranial Nerve Assessment    Oral Motor General Assessment  generalized oral motor weakness  -HS           General Eating/Swallowing Observations    Respiratory Support Currently in Use  nasal cannula;other (see comments) below chin upon SLP entering room, 96% O2 on room air  -HS        Eating/Swallowing Skills  self-fed;appropriate self-feeding skills observed  -HS        Positioning During Eating  needs frequent re-positioning  -HS        Utensils Used  spoon;cup;straw  -HS        Consistencies Trialed  regular textures;mechanical soft, no mixed consistencies;pureed;ice  chips;thin liquids  -HS           Respiratory    Respiratory Status  other (see comments) difficulty coordinating breathing/speech  -HS           Clinical Swallow Eval    Oral Prep Phase  WFL  -HS        Oral Transit  WFL  -HS        Oral Residue  WFL  -HS        Pharyngeal Phase  suspected pharyngeal impairment  -HS        Esophageal Phase  suspected esophageal impairment  -HS        Clinical Swallow Evaluation Summary  Patient demonstrates weak coughing at baseline. She has difficulty coordinating breathing with talking (and likely swallowing at times). Pulmonologist states congestion is in her throat versus her lungs during his assessment (SLP present). Decreased laryngeal elevation upon palpation with ice chip trials. Audible swallow with straw sips of thin liquids. No overt s/s of aspiration with cup sips of thin liquids, puree, mechanical soft, or regular. Concerned for swallow fatigue.   -HS           Clinical Impression    SLP Swallowing Diagnosis  R/O pharyngeal dysphagia;esophageal dysphagia  -HS        Functional Impact  risk of aspiration/pneumonia;risk of malnutrition;risk of dehydration  -HS        Rehab Potential/Prognosis, Swallowing  good, to achieve stated therapy goals  -HS        Swallow Criteria for Skilled Therapeutic Interventions Met  demonstrates skilled criteria  -HS           Recommendations    Therapy Frequency (Swallow)  PRN  -HS        Predicted Duration Therapy Intervention (Days)  until discharge  -HS        SLP Diet Recommendation  regular textures;thin liquids  -HS        Recommended Diagnostics  VFSS (MBS)  -HS        Recommended Precautions and Strategies  upright posture during/after eating;small bites of food and sips of liquid;no straw;fatigue precautions;reflux precautions  -HS        Oral Care Recommendations  Oral Care before breakfast, after meals and PRN  -HS        SLP Rec. for Method of Medication Administration  as tolerated  -HS        Monitor for Signs of Aspiration   yes;notify SLP if any concerns  -        Anticipated Discharge Disposition (SLP)  anticipate therapy at next level of care  -HS          User Key  (r) = Recorded By, (t) = Taken By, (c) = Cosigned By    Initials Name Effective Dates     Judith Gonzalez, MS CCC-SLP 06/08/18 -           EDUCATION  The patient has been educated in the following areas:   Dysphagia (Swallowing Impairment) Oral Care/Hydration Modified Diet Instruction.    SLP Recommendation and Plan  SLP Swallowing Diagnosis: R/O pharyngeal dysphagia, esophageal dysphagia  SLP Diet Recommendation: regular textures, thin liquids  Recommended Precautions and Strategies: upright posture during/after eating, small bites of food and sips of liquid, no straw, fatigue precautions, reflux precautions  SLP Rec. for Method of Medication Administration: as tolerated     Monitor for Signs of Aspiration: yes, notify SLP if any concerns  Recommended Diagnostics: VFSS ([facility])  Swallow Criteria for Skilled Therapeutic Interventions Met: demonstrates skilled criteria  Anticipated Discharge Disposition (SLP): anticipate therapy at next level of care  Rehab Potential/Prognosis, Swallowing: good, to achieve stated therapy goals  Therapy Frequency (Swallow): PRN  Predicted Duration Therapy Intervention (Days): until discharge     Plan of Care Reviewed With: patient  Outcome Summary: Clinical swallow evaluation completed. Suspected pharyngeal dysphagia. Rec: regular diet with thin liquids (no straws). Meds as tolerated. Upright for PO intake, small bites/sips, no straws, fatigue precautions, reflux precautions. VFSS to further assess pharyngeal swallow.         SLP Outcome Measures (last 72 hours)      SLP Outcome Measures     Row Name 02/01/21 1500             SLP Outcome Measures    Outcome Measure Used?  Adult NOMS  -HS         Adult FCM Scores    FCM Chosen  Swallowing  -      Swallowing FCM Score  5  -HS        User Key  (r) = Recorded By, (t) = Taken By, (c) =  Cosigned By    Initials Name Effective Dates    HS Judith Gonzalez MS CCC-SLP 06/08/18 -            Time Calculation:   Time Calculation- SLP     Row Name 02/01/21 1535             Time Calculation- SLP    SLP Start Time  1330  -HS      SLP Stop Time  1445  -HS      SLP Time Calculation (min)  75 min  -HS      SLP Received On  02/01/21  -        User Key  (r) = Recorded By, (t) = Taken By, (c) = Cosigned By    Initials Name Provider Type     Judith Gonzalez MS CCC-SLP Speech and Language Pathologist          Therapy Charges for Today     Code Description Service Date Service Provider Modifiers Qty    [id number] HC ST EVAL ORAL PHARYNG SWALLOW 5 2/1/2021 Judith Gonzalez MS CCC-SLP GN 1               Judith Gonzalez MS CCC-ZARA  2/1/2021

## 2021-02-01 NOTE — PROGRESS NOTES
LOS: 4 days   Patient Care Team:  Provider, No Known as PCP - General  Sohan Jacobsen MD as PCP - Family Medicine    Subjective     Patient says she just feels sort of punk no energy nothing specific.  Not really short of breath she has her oxygen off currently    Review of Systems:          Objective     Vital Signs  Vital Sign Min/Max for last 24 hours  Temp  Min: 97.5 °F (36.4 °C)  Max: 99.8 °F (37.7 °C)   BP  Min: 113/82  Max: 136/95   Pulse  Min: 78  Max: 98   Resp  Min: 16  Max: 18   SpO2  Min: 86 %  Max: 96 %   Flow (L/min)  Min: 2  Max: 7   No data recorded        Ventilator/Non-Invasive Ventilation Settings (From admission, onward)    None                       Body mass index is 30.45 kg/m².  I/O last 3 completed shifts:  In: 118 [P.O.:118]  Out: 2500 [Urine:2500]  No intake/output data recorded.        Physical Exam:  General Appearance: Well-developed obese white female looks a decade or 2 older than her stated age resting in bed she has a wet rhonchorous cough that clearly starts in her laryngeal area  Eyes: Conjunctiva are clear and anicteric  ENT: Mucous membranes are moist no erythema or exudates Mallampati type II airway nasal septum midline  Neck: No adenopathy no jugular venous tension trachea midline  Lungs: Pretty clear but she does have this very loose wet sounding cough that is loudest in the laryngeal area  Cardiac: Regular rate rhythm  Abdomen: Obese soft nontender no palpable hepatosplenomegaly  : Not examined  Musculoskeletal: Does not have a whole lot of muscle mass in her lower extremities  Skin: No jaundice no petechiae skin is warm and dry  Neuro: She is alert and oriented she has lower extremity weakness  Extremities/P Vascular: No clubbing no cyanosis no edema palpable radial dorsalis pedis pulses  MSE: Little anxious       Labs:  Results from last 7 days   Lab Units 02/01/21  0345 01/31/21  0536 01/30/21  0533 01/29/21  0602 01/28/21  1257   GLUCOSE mg/dL 125* 110* 95  148* 122*   SODIUM mmol/L 133* 134* 140 137 141   POTASSIUM mmol/L 3.5 3.5 3.5 4.1 4.7   CO2 mmol/L 25.2 28.0 32.7* 23.5 26.4   CHLORIDE mmol/L 95* 95* 99 104 105   ANION GAP mmol/L 12.8 11.0 8.3 9.5 9.6   CREATININE mg/dL 0.86 0.69 0.85 0.61 0.88   BUN mg/dL 24* 23* 21* 12 19   BUN / CREAT RATIO  27.9* 33.3* 24.7 19.7 21.6   CALCIUM mg/dL 9.4 8.9 8.9 9.2 10.1   EGFR IF NONAFRICN AM mL/min/1.73 68 87 69 101 66   ALK PHOS U/L 166* 148* 145* 159* 182*   TOTAL PROTEIN g/dL 7.4 6.7 6.8 6.7 7.4   ALT (SGPT) U/L 15 11 13 12 17   AST (SGOT) U/L 23 21 20 25 28   BILIRUBIN mg/dL 0.3 0.3 0.2 0.3 0.2   ALBUMIN g/dL 3.80 3.90 3.60 3.90 4.40   GLOBULIN gm/dL 3.6 2.8 3.2 2.8 3.0     Estimated Creatinine Clearance: 75.9 mL/min (by C-G formula based on SCr of 0.86 mg/dL).      Results from last 7 days   Lab Units 02/01/21  0345 01/31/21  0536 01/30/21  0533 01/29/21  0602 01/28/21  1257   WBC 10*3/mm3 11.48* 11.06* 12.38* 7.22 10.76   RBC 10*6/mm3 5.14 4.71 4.50 4.56 4.83   HEMOGLOBIN g/dL 13.1 12.6 12.0 12.0 12.3   HEMATOCRIT % 42.0 38.2 37.1 36.9 40.0   MCV fL 81.7 81.1 82.4 80.9 82.8   MCH pg 25.5* 26.8 26.7 26.3* 25.5*   MCHC g/dL 31.2* 33.0 32.3 32.5 30.8*   RDW % 14.5 14.0 14.3 13.9 14.4   RDW-SD fl 42.4 41.1 42.5 40.5 42.9   MPV fL 9.4 9.4 9.3 9.6 9.3   PLATELETS 10*3/mm3 464* 395 382 391 405   NEUTROPHIL % % 72.0 71.9 76.3* 90.2* 86.6*   LYMPHOCYTE % % 17.9* 17.0* 13.8* 8.2* 7.5*   MONOCYTES % % 6.7 7.0 6.6 0.7* 4.3*   EOSINOPHIL % % 2.4 3.0 2.3 0.0* 0.6   BASOPHIL % % 0.5 0.6 0.4 0.3 0.6   IMM GRAN % % 0.5 0.5 0.6* 0.6* 0.4   NEUTROS ABS 10*3/mm3 8.26* 7.95* 9.43* 6.52 9.33*   LYMPHS ABS 10*3/mm3 2.06 1.88 1.71 0.59* 0.81   MONOS ABS 10*3/mm3 0.77 0.77 0.82 0.05* 0.46   EOS ABS 10*3/mm3 0.27 0.33 0.29 0.00 0.06   BASOS ABS 10*3/mm3 0.06 0.07 0.05 0.02 0.06   IMMATURE GRANS (ABS) 10*3/mm3 0.06* 0.06* 0.08* 0.04 0.04   NRBC /100 WBC 0.0 0.0 0.0 0.0 0.0     Results from last 7 days   Lab Units 02/01/21  0248   PH, ARTERIAL pH  units 7.394   PO2 ART mm Hg 76.9*   PCO2, ARTERIAL mm Hg 49.8*   HCO3 ART mmol/L 30.5*     Results from last 7 days   Lab Units 02/01/21  0345 01/31/21  0536 01/30/21  0533  01/28/21  1257   CK TOTAL U/L 42 42 32   < >  --    TROPONIN T ng/mL  --   --   --   --  <0.010    < > = values in this interval not displayed.     Results from last 7 days   Lab Units 01/28/21  1257   PROBNP pg/mL 22.4         Results from last 7 days   Lab Units 01/31/21  0536 01/28/21  2013 01/28/21  1358 01/28/21  1257   LACTATE mmol/L  --   --  1.8  --    PROCALCITONIN ng/mL 0.05 0.06  --  0.05         Microbiology Results (last 10 days)     Procedure Component Value - Date/Time    COVID-19,[facility]TIMA PANTHER,[facility] IN-HOUSE, NP/OP SWAB IN UTM/VTM/[facility] TRANSPORT MEDIA,24 HR TAT - Swab, Nasopharynx [[id number]]  (Normal) Collected: 01/29/21 0547    Lab Status: Final result Specimen: Swab from Nasopharynx Updated: 01/29/21 1011     COVID19 Not Detected    Narrative:      Fact sheet for providers: https://www.fda.gov/media/295716/download     Fact sheet for patients: https://www.fda.gov/media/294448/download    Test performed by PCR.    Respiratory Culture - Sputum, Cough [[id number]] Collected: 01/28/21 2028    Lab Status: Final result Specimen: Sputum from Cough Updated: 01/30/21 0856     Respiratory Culture Light growth (2+) Normal Respiratory Kathia: NO S.aureus/MRSA or Pseudomonas aeruginosa     Gram Stain Occasional Epithelial cells per low power field      Rare (1+) WBCs seen      Mixed bacterial morphotypes seen on Gram Stain    Respiratory Panel PCR w/COVID-19(SARS-CoV-2) [facility] In-House, NP Swab in UTM/VTM, 3-4 HR TAT - Swab, Nasopharynx [[id number]]  (Normal) Collected: 01/28/21 1359    Lab Status: Final result Specimen: Swab from Nasopharynx Updated: 01/28/21 1529     ADENOVIRUS, PCR Not Detected     Coronavirus 229E Not Detected     Coronavirus HKU1 Not Detected     Coronavirus NL63 Not Detected     Coronavirus OC43  Not Detected     COVID19 Not Detected     Human Metapneumovirus Not Detected     Human Rhinovirus/Enterovirus Not Detected     Influenza A PCR Not Detected     Influenza B PCR Not Detected     Parainfluenza Virus 1 Not Detected     Parainfluenza Virus 2 Not Detected     Parainfluenza Virus 3 Not Detected     Parainfluenza Virus 4 Not Detected     RSV, PCR Not Detected     Bordetella pertussis pcr Not Detected     Bordetella parapertussis PCR Not Detected     Chlamydophila pneumoniae PCR Not Detected     Mycoplasma pneumo by PCR Not Detected    Narrative:      Fact sheet for providers: https://docs.[employer]/wp-content/uploads/VXZ3479-4221-YP9.1-EUA-Provider-Fact-Sheet-3.pdf    Fact sheet for patients: https://[employer]/wp-content/uploads/YSK5096-0907-VV3.1-EUA-Patient-Fact-Sheet-1.pdf    Test performed by PCR.    Urine Culture - Urine, Urine, Catheter [[id number]]  (Abnormal)  (Susceptibility) Collected: 01/28/21 1359    Lab Status: Final result Specimen: Urine, Catheter Updated: 01/30/21 0054     Urine Culture >100,000 CFU/mL Escherichia coli    Susceptibility      Escherichia coli     JOSE     Ampicillin Resistant     Ampicillin + Sulbactam Intermediate     Cefazolin Susceptible     Cefepime Susceptible     Ceftazidime Susceptible     Ceftriaxone Susceptible     Gentamicin Susceptible     Levofloxacin Susceptible     Nitrofurantoin Susceptible     Piperacillin + Tazobactam Susceptible     Tetracycline Susceptible     Trimethoprim + Sulfamethoxazole Susceptible                    Blood Culture - Blood, Arm, Left [[id number]] Collected: 01/28/21 1358    Lab Status: Preliminary result Specimen: Blood from Arm, Left Updated: 01/31/21 1430     Blood Culture No growth at 3 days    Blood Culture - Blood, Arm, Right [[id number]] Collected: 01/28/21 1358    Lab Status: Preliminary result Specimen: Blood from Arm, Right Updated: 01/31/21 1430     Blood Culture No growth at 3 days              albuterol, 2.5 mg,  Nebulization, 4x Daily - RT  enoxaparin, 40 mg, Subcutaneous, Q24H  FLUoxetine, 60 mg, Oral, Daily  furosemide, 40 mg, Intravenous, Daily  guaiFENesin, 1,200 mg, Oral, Q12H  hydroCHLOROthiazide Oral, 12.5 mg, Oral, Daily  losartan, 50 mg, Oral, Daily  pantoprazole, 40 mg, Oral, BID AC  pramipexole, 1.5 mg, Oral, TID  sodium chloride, 10 mL, Intravenous, Q12H  sodium chloride, 4 mL, Nebulization, BID - RT           Diagnostics:  Xr Chest 1 View    Result Date: 1/28/2021  XR CHEST 1 VW-  HISTORY: Female who is 60 years-old,  short of breath  TECHNIQUE: Frontal view of the chest  COMPARISON: 11/14/2018  FINDINGS: There is mildly enlarged. Pulmonary vasculature is unremarkable. Mildly tortuous appearing aorta. Minimal likely scarring or atelectasis medially at the left base. No pleural effusion, or pneumothorax. No acute osseous process.      Minimal likely scarring or atelectasis at the left base. Mild cardiomegaly. The aorta appears mildly tortuous. Follow-up as clinically indicated.  This report was finalized on 1/28/2021 2:35 PM by Dr. Luan Estrada M.D.      Ct Angiogram Chest    Result Date: 1/28/2021  CT ANGIOGRAM CHEST-  INDICATIONS: Short of breath, cough  Radiation dose reduction techniques were utilized, including automated exposure control and exposure modulation based on body size.  TECHNIQUE: CT angiography of the chest with three-dimensional reconstructions  COMPARISON: None available  FINDINGS:  No pulmonary embolism. No aortic dissection.  The heart size is normal without pericardial effusion. A few small subcentimeter short axis mediastinal lymph nodes are seen that are not significant by size criteria.  The airways appear clear.  No pleural effusion or pneumothorax.  The lungs show likely scarring and atelectasis in the lower lungs. A 9 mm nodular density right apical region is seen on image 92, recommend 3 month follow-up chest CT characterize change; further evaluation with positron emission  tomography could be considered provided, but may be limited by small size of the lesion. A predominantly groundglass density measuring 2.9 cm is seen right apical region on axial image 18, infectious or neoplastic etiologies could be considered, follow-up of this lesion is also recommended. Scattered faint groundglass densities are seen in the posterior right middle lobe right lower lobe could be areas of developing pneumonia, including possibility of viral pneumonia.  Upper abdominal structures show nonspecific nodular thickening of the bilateral adrenal glands appear stable from 04/05/2016. Moderate to large hiatal hernia.  Degenerative changes are seen in the spine. No acute fracture is identified. Sternal foramen is seen.       Nodular and groundglass densities in the right lung, further evaluation/follow-up recommended as indicated.  No pulmonary embolism.  This report was finalized on 1/28/2021 6:45 PM by Dr. Luan Estrada M.D.      Results for orders placed during the hospital encounter of 01/28/21   Adult Transthoracic Echo Complete W/ Cont if Necessary Per Protocol    Narrative · Calculated left ventricular EF = 69.8% Estimated left ventricular EF =   70% Estimated left ventricular EF was in agreement with the calculated   left ventricular EF. Left ventricular systolic function is normal. The   left ventricular cavity is small in size. Left ventricular wall thickness   is consistent with moderate concentric hypertrophy. All left ventricular   wall segments contract normally. Left ventricular diastolic function is   consistent with (grade I) impaired relaxation.  · Limited 2D imaging of cardiac valves that appear to be functioning   normally by doppler imaging          I have reviewed the CT scan of the chest and no PE is seen there is a little bit of groundglass density in the right there is also some in the left base and there is linear atelectasis in both bases    Active Hospital Problems     Diagnosis  POA   • **Shortness of breath [R06.02]  Unknown   • Cough [R05]  Yes   • Acute UTI (urinary tract infection) [N39.0]  Unknown   • Multiple sclerosis (CMS/HCC) [G35]  Unknown   • Essential hypertension [I10]  Unknown   • Hyperlipidemia [E78.5]  Unknown      Resolved Hospital Problems   No resolved problems to display.         Assessment/Plan     1. Hypoxic mild hypercapnic respiratory failure probably related to some of the the mild infiltrates and atelectasis.  She is improving rapidly wean O2 as tolerated in fact her saturations were 96% on room air when I was examining her.  2. Right-sided pulmonary infiltrates groundglass nodular, cultures been negative procalcitonin was negative she completed 3 days of antibiotics for her cystitis infectious disease does not feel further antibiotics are indicated.  I wonder if she had a little aspiration pneumonitis.  I would recommend a follow-up CT scan in 6 to 12 weeks.  3. Atelectasis bibasilar she is getting OPEP, hypertonic saline and bronchodilator therapy.  Continue.  4. Hiatal hernia part  5. Multiple sclerosis  6. Hypertension  7. Hyperlipidemia  8. Cystitis E. coli patient with chronic intermittent catheterization completed antibiotics  9. Possible sleep apnea she needs polysomnogram after discharge  10. Hyponatremia mild  11. Suspected oropharyngeal dysphagia she certainly sounds like it speech therapy was there while I was there and her getting ready to evaluate I will be interested in there findings    Plan for disposition:    Ash Wilcox MD  02/01/21  13:12 EST    Time:

## 2021-02-01 NOTE — CONSULTS
Neurology Consult Note  Consult Date: 2/1/2021  Referring MD: Jose Valente*  Reason for Consult I have been asked to see the patient in neurological consultation to render advice and opinion regarding excessive secretions.    Maritza Bejarano is a 60 y.o. female with a PMH of multiple sclerosis primary or secondary progressive followed by Dr. France with [facility], HTN, RLS, marijuana use who presented to the hospital on 1/28/2021 with c/o SOA, productive cough, and malaise. Reportedly has not been on disease modifying therapy since Jan. 5. 2010. Last treatment was Tysabri after she failed both Copaxone and Rebif. She was supposed to be initiated on Ocrevus trial however she never underwent the recommended GERARDO study or shingles vaccination. At baseline she is nonambulatory and uses a motorized scooter for transportation and requires full transfer help by her .  She has not walked in 3 years.  She also self caths. Respiratory panel was negative including negative COVID-19 testing with an additional negative COVID-19 testing. CTA chest was concerning for PNA. She received 3 doses of Rocephin. Fungal serologies and Fungitell have been sent for concerns for histo/blasto. We were consulted due to concerns of MS contributing to the patient's increased O2 requirements and excessive secretions.  Today she feels her breathing is better and less shallow.  She is still coughing up white sputum but her cough is strong.  She denies any troubles with swallowing.  She reports her first sign of MS was optic neuritis which occurred back in 1999.  She reports she still has intermittent daily blurred vision.  She has not been seen by her ophthalmologist in 5 years.    Past Medical/Surgical Hx:  Past Medical History:   Diagnosis Date   • Hyperlipidemia    • Hypertension    • Multiple sclerosis (CMS/HCC)    • PONV (postoperative nausea and vomiting)      Past Surgical History:   Procedure Laterality Date   •  ADENOIDECTOMY     • PARATHYROIDECTOMY     • TOTAL SHOULDER ARTHROPLASTY W/ DISTAL CLAVICLE EXCISION Right 10/22/2018    Procedure: RT TOTAL SHOULDER REVERSE ARTHROPLASTY;  Surgeon: Bipin Dangelo MD;  Location: [facility];  Service: Orthopedics     Medications On Admission  Medications Prior to Admission   Medication Sig Dispense Refill Last Dose   • atorvastatin (LIPITOR) 10 MG tablet Take 10 mg by mouth Daily.      • baclofen (LIORESAL) 10 MG tablet Take 20 mg by mouth 3 (Three) Times a Day.      • Calcium-Phosphorus-Vitamin D (CITRACAL +D3 PO) Take 1 tablet by mouth Daily.      • clonazePAM (KlonoPIN) 2 MG tablet Take  by mouth.      • FLUoxetine (PROzac) 20 MG capsule Take 60 mg by mouth Daily.      • Hyoscyamine Sulfate (HYOSCYAMINE PO) Take 0.125 mg by mouth Daily.      • losartan (COZAAR) 50 MG tablet Take 50 mg by mouth Daily.      • pantoprazole (PROTONIX) 40 MG EC tablet Take  by mouth 2 (two) times a day.      • pramipexole (MIRAPEX) 0.5 MG tablet Take 1.5 mg by mouth 3 (three) times a day.      • promethazine (PHENERGAN) 25 MG tablet Take  by mouth.      • cephalexin (KEFLEX) 500 MG capsule Take 1 capsule by mouth 2 (Two) Times a Day. 14 capsule 0    • losartan-hydrochlorothiazide (HYZAAR) 50-12.5 MG per tablet Take 1 tablet by mouth daily.      • nitrofurantoin, macrocrystal-monohydrate, (MACROBID) 100 MG capsule Take 1 capsule by mouth 2 (Two) Times a Day. 14 capsule 0    • NON FORMULARY Take 675 mg by mouth daily. Protandim - OTC      • oxyCODONE (OXYCONTIN) 10 MG 12 hr tablet Take 20 mg by mouth Every 12 (Twelve) Hours As Needed.      • oxyCODONE ER (oxyCONTIN) 20 MG 12 hr tablet Take 1 tablet by mouth Every 12 (Twelve) Hours. 28 tablet 0    • oxyCODONE-acetaminophen (PERCOCET)  MG per tablet Take 2 tablets by mouth Every 12 (Twelve) Hours.      • phenazopyridine (PYRIDIUM) 200 MG tablet Take 1 tablet by mouth 3 (Three) Times a Day As Needed for bladder spasms. 15 tablet 0   "    Allergies:  Allergies   Allergen Reactions   • Contrast Dye Shortness Of Breath   • Sulfa Antibiotics Other (See Comments)     Bradycardia after injection      Social Hx:  Social History     Socioeconomic History   • Marital status:      Spouse name: Not on file   • Number of children: Not on file   • Years of education: Not on file   • Highest education level: Not on file   Tobacco Use   • Smoking status: Former Smoker     Quit date: 10/22/2003     Years since quittin.2   • Smokeless tobacco: Never Used   Substance and Sexual Activity   • Alcohol use: No   • Drug use: Yes     Frequency: 2.0 times per week     Types: Marijuana   • Sexual activity: Defer     Family Hx:  Family History   Problem Relation Age of Onset   • Hypertension Mother    • Hyperlipidemia Mother    • Heart disease Mother    • Heart disease Father    • Miscarriages / Stillbirths Sister      Review of Systems   Constitutional: Positive for fatigue.   HENT: Negative.    Eyes: Positive for visual disturbance.   Respiratory: Positive for cough.    Cardiovascular: Positive for leg swelling.   Gastrointestinal: Negative.    Endocrine: Negative.    Genitourinary: Positive for difficulty urinating.   Musculoskeletal: Positive for gait problem.   Skin: Negative.    Allergic/Immunologic: Negative.    Neurological: Positive for weakness and numbness.   Hematological: Negative.    Psychiatric/Behavioral: Negative.      Exam  /82 (BP Location: Left arm, Patient Position: Lying)   Pulse 86   Temp 98.3 °F (36.8 °C) (Oral)   Resp 18   Ht 165.1 cm (65\")   Wt 83 kg (183 lb)   LMP  (LMP Unknown) Comment: PT. STATES HER LAST PERIOD WAS GREATER THAN FIVE YEARS AGO  SpO2 95%   Breastfeeding No   BMI 30.45 kg/m²     Current Facility-Administered Medications:   •  acetaminophen (TYLENOL) tablet 650 mg, 650 mg, Oral, Q4H PRN **OR** acetaminophen (TYLENOL) 160 MG/5ML solution 650 mg, 650 mg, Oral, Q4H PRN **OR** acetaminophen (TYLENOL) " suppository 650 mg, 650 mg, Rectal, Q4H PRN, Rosina Atkinson APRN  •  albuterol (PROVENTIL) nebulizer solution 0.083% 2.5 mg/3mL, 2.5 mg, Nebulization, 4x Daily - RT, Christopher Caicedo MD, 2.5 mg at 02/01/21 1148  •  butalbital-acetaminophen-caffeine (FIORICET, ESGIC) -40 MG per tablet 2 tablet, 2 tablet, Oral, Q6H PRN, Christopher Caicedo MD, 2 tablet at 02/01/21 1519  •  clonazePAM (KlonoPIN) tablet 2 mg, 2 mg, Oral, Nightly PRN, Christopher Caicedo MD, 2 mg at 01/31/21 2326  •  enoxaparin (LOVENOX) syringe 40 mg, 40 mg, Subcutaneous, Q24H, Rosina Atkinson APRN, 40 mg at 01/31/21 2017  •  FLUoxetine (PROzac) capsule 60 mg, 60 mg, Oral, Daily, Rosina Atkinson APRN, 60 mg at 02/01/21 0927  •  furosemide (LASIX) injection 40 mg, 40 mg, Intravenous, Daily, Christopher Caicedo MD, 40 mg at 02/01/21 0927  •  guaiFENesin (MUCINEX) 12 hr tablet 1,200 mg, 1,200 mg, Oral, Q12H, Christopher Caicedo MD, 1,200 mg at 02/01/21 0927  •  hydroCHLOROthiazide (MICROZIDE) capsule 12.5 mg, 12.5 mg, Oral, Daily, Christopher Caicedo MD, 12.5 mg at 02/01/21 0927  •  hyoscyamine (LEVBID) 12 hr tablet 375 mcg, 375 mcg, Oral, Q12H PRN, Christopher Caicedo MD, 375 mcg at 01/30/21 2041  •  losartan (COZAAR) tablet 50 mg, 50 mg, Oral, Daily, Rosina Atkinson APRN, 50 mg at 02/01/21 0927  •  nitroglycerin (NITROSTAT) SL tablet 0.4 mg, 0.4 mg, Sublingual, Q5 Min PRN, Rosina Atkinson APRN  •  ondansetron (ZOFRAN) injection 4 mg, 4 mg, Intravenous, Q6H PRN, Rosina Atkinson APRN  •  pantoprazole (PROTONIX) EC tablet 40 mg, 40 mg, Oral, BID AC, Rosina Atkinson APRN, 40 mg at 02/01/21 0632  •  pramipexole (MIRAPEX) tablet 1.5 mg, 1.5 mg, Oral, TID, Rosina Atkinson APRN, 1.5 mg at 02/01/21 1519  •  promethazine (PHENERGAN) tablet 25 mg, 25 mg, Oral, Q6H PRN, Rosina Atkinson APRN  •  sodium chloride 0.9 % flush 10 mL, 10 mL, Intravenous, PRN, Suresh Gallegos MD  •  sodium chloride 0.9 % flush 10 mL,  10 mL, Intravenous, Q12H, Rosina Atkinson APRN, 10 mL at 02/01/21 0927  •  sodium chloride 0.9 % flush 10 mL, 10 mL, Intravenous, PRN, Rosina Atkinson APRN  •  sodium chloride 7 % nebulizer solution nebulizer solution 4 mL, 4 mL, Nebulization, BID - RT, Rom Mcmillan MD, 4 mL at 02/01/21 0841    PRN meds  •  acetaminophen **OR** acetaminophen **OR** acetaminophen  •  butalbital-acetaminophen-caffeine  •  clonazePAM  •  hyoscyamine  •  nitroglycerin  •  ondansetron  •  promethazine  •  sodium chloride  •  sodium chloride    No current facility-administered medications on file prior to encounter.      Current Outpatient Medications on File Prior to Encounter   Medication Sig   • atorvastatin (LIPITOR) 10 MG tablet Take 10 mg by mouth Daily.   • baclofen (LIORESAL) 10 MG tablet Take 20 mg by mouth 3 (Three) Times a Day.   • Calcium-Phosphorus-Vitamin D (CITRACAL +D3 PO) Take 1 tablet by mouth Daily.   • clonazePAM (KlonoPIN) 2 MG tablet Take  by mouth.   • FLUoxetine (PROzac) 20 MG capsule Take 60 mg by mouth Daily.   • Hyoscyamine Sulfate (HYOSCYAMINE PO) Take 0.125 mg by mouth Daily.   • losartan (COZAAR) 50 MG tablet Take 50 mg by mouth Daily.   • pantoprazole (PROTONIX) 40 MG EC tablet Take  by mouth 2 (two) times a day.   • pramipexole (MIRAPEX) 0.5 MG tablet Take 1.5 mg by mouth 3 (three) times a day.   • promethazine (PHENERGAN) 25 MG tablet Take  by mouth.   • cephalexin (KEFLEX) 500 MG capsule Take 1 capsule by mouth 2 (Two) Times a Day.   • losartan-hydrochlorothiazide (HYZAAR) 50-12.5 MG per tablet Take 1 tablet by mouth daily.   • nitrofurantoin, macrocrystal-monohydrate, (MACROBID) 100 MG capsule Take 1 capsule by mouth 2 (Two) Times a Day.   • NON FORMULARY Take 675 mg by mouth daily. Protandim - OTC   • oxyCODONE (OXYCONTIN) 10 MG 12 hr tablet Take 20 mg by mouth Every 12 (Twelve) Hours As Needed.   • oxyCODONE ER (oxyCONTIN) 20 MG 12 hr tablet Take 1 tablet by mouth Every 12 (Twelve) Hours.    • oxyCODONE-acetaminophen (PERCOCET)  MG per tablet Take 2 tablets by mouth Every 12 (Twelve) Hours.   • phenazopyridine (PYRIDIUM) 200 MG tablet Take 1 tablet by mouth 3 (Three) Times a Day As Needed for bladder spasms.       General appearance: NAD, alert and cooperative, well groomed  HEENT: Normocephalic, atraumatic, PERRL 3mm bilaterally and sluggish, no masses or tenderness  Eyes: fundus sharp with no papilledema or retinal hemorrhages.  COR: RRR  Resp: Even and unlabored  Extremities: Mild BLE edema  Skin: warm, dry    Neurological:   MS: oriented x3, recent/remote memory intact, normal attention/concentration, language intact, no neglect, normal fund of knowledge  CN: visual fields full, PERRL, EOMI, facial sensation equal, no facial droop, hearing symmetric, palate elevates symmetrically, shoulder shrug equal, tongue midline  Motor: 5/5 in upper extremities, 0/5 in lower extremities against gravity and distance, very weak plantar flexion  Reflexes: Upgoing toes bilaterally, trace LLE/RLE reflex  Sensory: light touch sensation intact in all 4 ext.  Coordination: Normal finger to nose test  Gait and station: Immobile  Rapid alternating movements: normal finger to thumb tap bilaterally    Laboratory results:  Lab Results   Component Value Date    GLUCOSE 125 (H) 02/01/2021    CALCIUM 9.4 02/01/2021     (L) 02/01/2021    K 3.5 02/01/2021    CO2 25.2 02/01/2021    CL 95 (L) 02/01/2021    BUN 24 (H) 02/01/2021    CREATININE 0.86 02/01/2021    EGFRIFNONA 68 02/01/2021    BCR 27.9 (H) 02/01/2021    ANIONGAP 12.8 02/01/2021     Lab Results   Component Value Date    WBC 11.48 (H) 02/01/2021    HGB 13.1 02/01/2021    HCT 42.0 02/01/2021    MCV 81.7 02/01/2021     (H) 02/01/2021     No results found for: CHOL  No results found for: HDL  No results found for: LDL  No results found for: TRIG  No results found for: HGBA1C  Lab Results   Component Value Date    INR 0.89 (L) 12/06/2019    INR 0.9  07/29/2014    PROTIME 11.8 12/06/2019    PROTIME 9.9 (L) 07/29/2014     Lab Results   Component Value Date    OEVHJLWM34 231 07/29/2014     Lab Results   Component Value Date    TSH 1.060 01/04/2019     Pain Management Panel     There is no flowsheet data to display.         Brief Urine Lab Results  (Last result in the past 365 days)      Color   Clarity   Blood   Leuk Est   Nitrite   Protein   CREAT   Urine HCG        01/28/21 1359 Yellow Cloudy Trace Trace Positive Negative               Lab review: I personally reviewed all labs as documented above.    Imaging review:   Xr Chest 1 View    Result Date: 1/28/2021  Minimal likely scarring or atelectasis at the left base. Mild cardiomegaly. The aorta appears mildly tortuous. Follow-up as clinically indicated.  This report was finalized on 1/28/2021 2:35 PM by Dr. Luan Estrada M.D.      Ct Angiogram Chest    Result Date: 1/28/2021   Nodular and groundglass densities in the right lung, further evaluation/follow-up recommended as indicated.  No pulmonary embolism.  This report was finalized on 1/28/2021 6:45 PM by Dr. Luan Estrada M.D.      Results for orders placed during the hospital encounter of 01/28/21   Adult Transthoracic Echo Complete W/ Cont if Necessary Per Protocol    Narrative · Calculated left ventricular EF = 69.8% Estimated left ventricular EF =   70% Estimated left ventricular EF was in agreement with the calculated   left ventricular EF. Left ventricular systolic function is normal. The   left ventricular cavity is small in size. Left ventricular wall thickness   is consistent with moderate concentric hypertrophy. All left ventricular   wall segments contract normally. Left ventricular diastolic function is   consistent with (grade I) impaired relaxation.  · Limited 2D imaging of cardiac valves that appear to be functioning   normally by doppler imaging          I personally reviewed images with Dr. Byrnes, and he agrees with radiology  report.    Diagnosis: Shortness of air with abnormal CT chest, multiple sclerosis, UTI    Comment: 60-year-old female presented with SOA and a productive cough x several days with history of multiple sclerosis not on drug therapy at this time and has not been for at least 10 years.  Subjectively she is reporting her breathing feels much better today.  I had her perform her IS exercise while I was in the room and she was able to reach 1250 x3 attempts.  She had a strong cough and was able to clear and cough up her secretions.  No troubles with swallowing.  She did have evidence of paraparesis on exam which is her baseline. Given her exam, we do not feel that her MS is contributing to her excessive secretions or SOA. Her CTH from 2019 actually did not show any significant white matter lesions. Unfortunately we are unable to review the images and report of her MRI brain and MRI C-spine performed at [facility] however no need to repeat imaging here. She needs f/u outpatient. Provided her with several different Neurologist including Luan Cueto in our office who treat MS. She is also requesting referral for a new PCP, this can be arranged by the .     PLAN:   No need for additional brain or spine imaging.  Patient subjectively denies any difficulty swallowing.  ST notes reviewed, they recommend regular diet with thin liquids and a VFSS.  F/U with outpatient Neurology as scheduled.   to refer to new PCP.  PT/OT/ST  We will sign off, will see again per request.    Case discussed with patient and Dr. Byrnes and he agrees with plan above.  DANA Muller

## 2021-02-01 NOTE — PROGRESS NOTES
Name: Maritza Nance Darci ADMIT: 2021   : 1960  PCP: Provider, No Known    MRN: [id number] LOS: 4 days   AGE/SEX: 60 y.o. female  ROOM: S408/   Subjective   Chief Complaint   Patient presents with   • Shortness of Breath      She had worse hypoxia overnight requiring up to 7 L.  Patient reports she felt like she was unable to cough secretions up having choking sensation during that time.  She does feel improved now and has been weaned back down to 3 L.  No chest pain or palpitations.  No nausea vomiting or diarrhea.    Objective   Vital Signs  Temp:  [97.5 °F (36.4 °C)-99.8 °F (37.7 °C)] 98 °F (36.7 °C)  Heart Rate:  [70-98] 78  Resp:  [16-18] 16  BP: (113-136)/(73-95) 136/95  SpO2:  [86 %-95 %] 91 %  on  Flow (L/min):  [2-7] 3;   Device (Oxygen Therapy): nasal cannula  Body mass index is 30.45 kg/m².    Physical Exam  Vitals signs and nursing note reviewed.   Constitutional:       General: She is not in acute distress.  HENT:      Head: Normocephalic and atraumatic.   Eyes:      Extraocular Movements: Extraocular movements intact.      Conjunctiva/sclera: Conjunctivae normal.   Neck:      Musculoskeletal: Neck supple. No muscular tenderness.   Cardiovascular:      Rate and Rhythm: Normal rate and regular rhythm.      Pulses: Normal pulses.   Pulmonary:      Effort: Pulmonary effort is normal.      Breath sounds: Decreased breath sounds and rhonchi present. No wheezing.   Abdominal:      General: There is no distension.      Palpations: Abdomen is soft.      Tenderness: There is no abdominal tenderness. There is no guarding or rebound.   Musculoskeletal:      Right lower leg: Edema present.      Left lower leg: Edema present.      Comments: 1+   Skin:     General: Skin is warm and dry.   Neurological:      Mental Status: She is alert. Mental status is at baseline.   Psychiatric:         Mood and Affect: Mood normal.         Behavior: Behavior normal.         Results Review:       I reviewed the  patient's new clinical results.      Results from last 7 days   Lab Units 02/01/21 0345 01/31/21 0536 01/30/21  0533 01/29/21  0602   WBC 10*3/mm3 11.48* 11.06* 12.38* 7.22   HEMOGLOBIN g/dL 13.1 12.6 12.0 12.0   PLATELETS 10*3/mm3 464* 395 382 391     Results from last 7 days   Lab Units 02/01/21 0345 01/31/21 0536 01/30/21  0533 01/29/21  0602   SODIUM mmol/L 133* 134* 140 137   POTASSIUM mmol/L 3.5 3.5 3.5 4.1   CHLORIDE mmol/L 95* 95* 99 104   CO2 mmol/L 25.2 28.0 32.7* 23.5   BUN mg/dL 24* 23* 21* 12   CREATININE mg/dL 0.86 0.69 0.85 0.61   GLUCOSE mg/dL 125* 110* 95 148*   Estimated Creatinine Clearance: 75.9 mL/min (by C-G formula based on SCr of 0.86 mg/dL).  Results from last 7 days   Lab Units 02/01/21 0345 01/31/21 0536 01/30/21  0533 01/29/21  0602   CALCIUM mg/dL 9.4 8.9 8.9 9.2   ALBUMIN g/dL 3.80 3.90 3.60 3.90          albuterol, 2.5 mg, Nebulization, 4x Daily - RT  enoxaparin, 40 mg, Subcutaneous, Q24H  FLUoxetine, 60 mg, Oral, Daily  furosemide, 40 mg, Intravenous, Daily  guaiFENesin, 1,200 mg, Oral, Q12H  hydroCHLOROthiazide Oral, 12.5 mg, Oral, Daily  losartan, 50 mg, Oral, Daily  pantoprazole, 40 mg, Oral, BID AC  pramipexole, 1.5 mg, Oral, TID  sodium chloride, 10 mL, Intravenous, Q12H  sodium chloride, 4 mL, Nebulization, BID - RT       Diet Regular; Cardiac    Assessment/Plan      Active Hospital Problems    Diagnosis  POA   • **Shortness of breath [R06.02]  Unknown   • Cough [R05]  Yes   • Acute UTI (urinary tract infection) [N39.0]  Unknown   • Multiple sclerosis (CMS/HCC) [G35]  Unknown   • Essential hypertension [I10]  Unknown   • Hyperlipidemia [E78.5]  Unknown      Resolved Hospital Problems   No resolved problems to display.       · UTI, chronic intermittent cath: Ecoli sensitive to cephalosporin. Completed rocephin course for acute cystitis.  · Shortness of breath: Ground glass on CT possibly inhalant ALI, inflammation, or peripheral airway disease.  Much more rhonchorous today  and increased O2 requirements.  Repeat chest x-ray.  She is receiving guaifenesin and OPEP to aid with secretions.  Pulmonology and ID following.  · Multiple sclerosis: Will ask neurology to review.  · Hypertension: Stable  · Elevated D-dimer: No PE on CTA, Covid negative x2, no dvt on duplex.  · Snoring: Outpatient PSG  · Prophylaxis: Lovenox  · Disposition: Home/TBD    Christopher Caicedo MD  [facility]  02/01/21  10:10 EST    Dictated portions using Dragon dictation software.    During the entire encounter, I was wearing recommended PPE including face mask and eye protection. Hand sanitization was performed prior to entering room and upon exit.

## 2021-02-01 NOTE — PLAN OF CARE
Problem: Adult Inpatient Plan of Care  Goal: Plan of Care Review  Outcome: Ongoing, Progressing  Flowsheets (Taken 2/1/2021 1454)  Progress: no change  Plan of Care Reviewed With:   patient   sibling  Goal: Patient-Specific Goal (Individualized)  Outcome: Ongoing, Progressing  Goal: Absence of Hospital-Acquired Illness or Injury  Outcome: Ongoing, Progressing  Intervention: Identify and Manage Fall Risk  Recent Flowsheet Documentation  Taken 2/1/2021 1453 by Kelsie Farmer RN  Safety Promotion/Fall Prevention: safety round/check completed  Taken 2/1/2021 1200 by Kelsie Farmer RN  Safety Promotion/Fall Prevention:   safety round/check completed   room organization consistent   assistive device/personal items within reach  Taken 2/1/2021 0900 by Kelsie Farmer RN  Safety Promotion/Fall Prevention:   safety round/check completed   fall prevention program maintained   nonskid shoes/slippers when out of bed   room organization consistent  Intervention: Prevent Skin Injury  Recent Flowsheet Documentation  Taken 2/1/2021 1200 by Kelsie Farmer RN  Body Position: tilted, left  Taken 2/1/2021 0900 by Kelsie Farmer RN  Body Position:   turned   tilted, left  Intervention: Prevent and Manage VTE (venous thromboembolism) Risk  Recent Flowsheet Documentation  Taken 2/1/2021 0900 by Kelsie Farmer RN  VTE Prevention/Management: (LOVENOX) other (see comments)  Intervention: Prevent Infection  Recent Flowsheet Documentation  Taken 2/1/2021 1200 by Kelsie Farmer RN  Infection Prevention:   hand hygiene promoted   rest/sleep promoted  Taken 2/1/2021 0900 by Kelsie Farmer RN  Infection Prevention:   hand hygiene promoted   visitors restricted/screened   rest/sleep promoted  Goal: Optimal Comfort and Wellbeing  Outcome: Ongoing, Progressing  Goal: Readiness for Transition of Care  Outcome: Ongoing, Progressing   Goal Outcome Evaluation:  Plan of Care Reviewed With: patient, sibling  Progress: no change      Message left for a return call.

## 2021-02-01 NOTE — PLAN OF CARE
Goal Outcome Evaluation:  Plan of Care Reviewed With: patient     Outcome Summary: Clinical swallow evaluation completed. Suspected pharyngeal dysphagia. Rec: regular diet with thin liquids (no straws). Meds as tolerated. Upright for PO intake, small bites/sips, no straws, fatigue precautions, reflux precautions. VFSS to further assess pharyngeal swallow.

## 2021-02-02 ENCOUNTER — APPOINTMENT (OUTPATIENT)
Dept: GENERAL RADIOLOGY | Facility: HOSPITAL | Age: 61
End: 2021-02-02

## 2021-02-02 LAB
ALBUMIN SERPL-MCNC: 4 G/DL (ref 3.5–5.2)
ALBUMIN/GLOB SERPL: 1.3 G/DL
ALP SERPL-CCNC: 163 U/L (ref 39–117)
ALT SERPL W P-5'-P-CCNC: 22 U/L (ref 1–33)
ANION GAP SERPL CALCULATED.3IONS-SCNC: 13.7 MMOL/L (ref 5–15)
AST SERPL-CCNC: 33 U/L (ref 1–32)
BACTERIA SPEC AEROBE CULT: NORMAL
BACTERIA SPEC AEROBE CULT: NORMAL
BASOPHILS # BLD AUTO: 0.08 10*3/MM3 (ref 0–0.2)
BASOPHILS NFR BLD AUTO: 0.7 % (ref 0–1.5)
BILIRUB SERPL-MCNC: 0.3 MG/DL (ref 0–1.2)
BUN SERPL-MCNC: 22 MG/DL (ref 6–20)
BUN/CREAT SERPL: 26.2 (ref 7–25)
CALCIUM SPEC-SCNC: 9.2 MG/DL (ref 8.6–10.5)
CHLORIDE SERPL-SCNC: 92 MMOL/L (ref 98–107)
CK SERPL-CCNC: 85 U/L (ref 20–180)
CO2 SERPL-SCNC: 26.3 MMOL/L (ref 22–29)
CREAT SERPL-MCNC: 0.84 MG/DL (ref 0.57–1)
CRP SERPL-MCNC: 2.54 MG/DL (ref 0–0.5)
D DIMER PPP FEU-MCNC: 0.56 MCGFEU/ML (ref 0–0.49)
DEPRECATED RDW RBC AUTO: 41.1 FL (ref 37–54)
EOSINOPHIL # BLD AUTO: 0.32 10*3/MM3 (ref 0–0.4)
EOSINOPHIL NFR BLD AUTO: 2.9 % (ref 0.3–6.2)
ERYTHROCYTE [DISTWIDTH] IN BLOOD BY AUTOMATED COUNT: 14.2 % (ref 12.3–15.4)
FERRITIN SERPL-MCNC: 30.8 NG/ML (ref 13–150)
FIBRINOGEN PPP-MCNC: 560 MG/DL (ref 219–464)
GFR SERPL CREATININE-BSD FRML MDRD: 70 ML/MIN/1.73
GLOBULIN UR ELPH-MCNC: 3 GM/DL
GLUCOSE SERPL-MCNC: 136 MG/DL (ref 65–99)
HCT VFR BLD AUTO: 41.2 % (ref 34–46.6)
HGB BLD-MCNC: 13.2 G/DL (ref 12–15.9)
IMM GRANULOCYTES # BLD AUTO: 0.08 10*3/MM3 (ref 0–0.05)
IMM GRANULOCYTES NFR BLD AUTO: 0.7 % (ref 0–0.5)
LDH SERPL-CCNC: 202 U/L (ref 135–214)
LYMPHOCYTES # BLD AUTO: 1.96 10*3/MM3 (ref 0.7–3.1)
LYMPHOCYTES NFR BLD AUTO: 18 % (ref 19.6–45.3)
MAGNESIUM SERPL-MCNC: 2.6 MG/DL (ref 1.6–2.6)
MCH RBC QN AUTO: 26 PG (ref 26.6–33)
MCHC RBC AUTO-ENTMCNC: 32 G/DL (ref 31.5–35.7)
MCV RBC AUTO: 81.3 FL (ref 79–97)
MONOCYTES # BLD AUTO: 0.74 10*3/MM3 (ref 0.1–0.9)
MONOCYTES NFR BLD AUTO: 6.8 % (ref 5–12)
NEUTROPHILS NFR BLD AUTO: 7.71 10*3/MM3 (ref 1.7–7)
NEUTROPHILS NFR BLD AUTO: 70.9 % (ref 42.7–76)
NRBC BLD AUTO-RTO: 0 /100 WBC (ref 0–0.2)
PLATELET # BLD AUTO: 456 10*3/MM3 (ref 140–450)
PMV BLD AUTO: 9.5 FL (ref 6–12)
POTASSIUM SERPL-SCNC: 3.4 MMOL/L (ref 3.5–5.2)
PROT SERPL-MCNC: 7 G/DL (ref 6–8.5)
RBC # BLD AUTO: 5.07 10*6/MM3 (ref 3.77–5.28)
SODIUM SERPL-SCNC: 132 MMOL/L (ref 136–145)
WBC # BLD AUTO: 10.89 10*3/MM3 (ref 3.4–10.8)

## 2021-02-02 PROCEDURE — 80053 COMPREHEN METABOLIC PANEL: CPT | Performed by: NURSE PRACTITIONER

## 2021-02-02 PROCEDURE — 85379 FIBRIN DEGRADATION QUANT: CPT | Performed by: NURSE PRACTITIONER

## 2021-02-02 PROCEDURE — 86140 C-REACTIVE PROTEIN: CPT | Performed by: NURSE PRACTITIONER

## 2021-02-02 PROCEDURE — 94799 UNLISTED PULMONARY SVC/PX: CPT

## 2021-02-02 PROCEDURE — 25010000002 FUROSEMIDE PER 20 MG: Performed by: INTERNAL MEDICINE

## 2021-02-02 PROCEDURE — 85384 FIBRINOGEN ACTIVITY: CPT | Performed by: NURSE PRACTITIONER

## 2021-02-02 PROCEDURE — 85025 COMPLETE CBC W/AUTO DIFF WBC: CPT | Performed by: NURSE PRACTITIONER

## 2021-02-02 PROCEDURE — 83735 ASSAY OF MAGNESIUM: CPT | Performed by: INTERNAL MEDICINE

## 2021-02-02 PROCEDURE — 83615 LACTATE (LD) (LDH) ENZYME: CPT | Performed by: NURSE PRACTITIONER

## 2021-02-02 PROCEDURE — 82728 ASSAY OF FERRITIN: CPT | Performed by: NURSE PRACTITIONER

## 2021-02-02 PROCEDURE — 74230 X-RAY XM SWLNG FUNCJ C+: CPT

## 2021-02-02 PROCEDURE — 63710000001 PROMETHAZINE PER 25 MG: Performed by: NURSE PRACTITIONER

## 2021-02-02 PROCEDURE — 82550 ASSAY OF CK (CPK): CPT | Performed by: NURSE PRACTITIONER

## 2021-02-02 PROCEDURE — 92611 MOTION FLUOROSCOPY/SWALLOW: CPT

## 2021-02-02 PROCEDURE — 25010000002 ENOXAPARIN PER 10 MG: Performed by: NURSE PRACTITIONER

## 2021-02-02 RX ORDER — MAGNESIUM SULFATE HEPTAHYDRATE 40 MG/ML
4 INJECTION, SOLUTION INTRAVENOUS AS NEEDED
Status: DISCONTINUED | OUTPATIENT
Start: 2021-02-02 | End: 2021-02-04 | Stop reason: HOSPADM

## 2021-02-02 RX ORDER — POTASSIUM CHLORIDE 7.45 MG/ML
10 INJECTION INTRAVENOUS
Status: DISCONTINUED | OUTPATIENT
Start: 2021-02-02 | End: 2021-02-04 | Stop reason: HOSPADM

## 2021-02-02 RX ORDER — MAGNESIUM SULFATE HEPTAHYDRATE 40 MG/ML
2 INJECTION, SOLUTION INTRAVENOUS AS NEEDED
Status: DISCONTINUED | OUTPATIENT
Start: 2021-02-02 | End: 2021-02-04 | Stop reason: HOSPADM

## 2021-02-02 RX ORDER — POTASSIUM CHLORIDE 750 MG/1
40 TABLET, FILM COATED, EXTENDED RELEASE ORAL AS NEEDED
Status: DISCONTINUED | OUTPATIENT
Start: 2021-02-02 | End: 2021-02-04 | Stop reason: HOSPADM

## 2021-02-02 RX ORDER — POTASSIUM CHLORIDE 1.5 G/1.77G
40 POWDER, FOR SOLUTION ORAL AS NEEDED
Status: DISCONTINUED | OUTPATIENT
Start: 2021-02-02 | End: 2021-02-04 | Stop reason: HOSPADM

## 2021-02-02 RX ADMIN — ENOXAPARIN SODIUM 40 MG: 40 INJECTION SUBCUTANEOUS at 20:53

## 2021-02-02 RX ADMIN — ALBUTEROL SULFATE 2.5 MG: 2.5 SOLUTION RESPIRATORY (INHALATION) at 15:10

## 2021-02-02 RX ADMIN — ALBUTEROL SULFATE 2.5 MG: 2.5 SOLUTION RESPIRATORY (INHALATION) at 11:04

## 2021-02-02 RX ADMIN — PROMETHAZINE HYDROCHLORIDE 25 MG: 25 TABLET ORAL at 01:34

## 2021-02-02 RX ADMIN — ALBUTEROL SULFATE 2.5 MG: 2.5 SOLUTION RESPIRATORY (INHALATION) at 07:23

## 2021-02-02 RX ADMIN — PANTOPRAZOLE SODIUM 40 MG: 40 TABLET, DELAYED RELEASE ORAL at 06:42

## 2021-02-02 RX ADMIN — PRAMIPEXOLE DIHYDROCHLORIDE 1.5 MG: 1.5 TABLET ORAL at 20:53

## 2021-02-02 RX ADMIN — BARIUM SULFATE 50 ML: 400 SUSPENSION ORAL at 14:48

## 2021-02-02 RX ADMIN — PRAMIPEXOLE DIHYDROCHLORIDE 1.5 MG: 1.5 TABLET ORAL at 17:46

## 2021-02-02 RX ADMIN — BARIUM SULFATE 4 ML: 980 POWDER, FOR SUSPENSION ORAL at 14:48

## 2021-02-02 RX ADMIN — BUTALBITAL, ACETAMINOPHEN, AND CAFFEINE 2 TABLET: 50; 325; 40 TABLET ORAL at 17:49

## 2021-02-02 RX ADMIN — GUAIFENESIN 1200 MG: 600 TABLET, EXTENDED RELEASE ORAL at 20:53

## 2021-02-02 RX ADMIN — LOSARTAN POTASSIUM 50 MG: 50 TABLET, FILM COATED ORAL at 08:19

## 2021-02-02 RX ADMIN — POTASSIUM CHLORIDE 40 MEQ: 1.5 POWDER, FOR SOLUTION ORAL at 10:09

## 2021-02-02 RX ADMIN — PRAMIPEXOLE DIHYDROCHLORIDE 1.5 MG: 1.5 TABLET ORAL at 08:19

## 2021-02-02 RX ADMIN — GUAIFENESIN 1200 MG: 600 TABLET, EXTENDED RELEASE ORAL at 08:19

## 2021-02-02 RX ADMIN — ALBUTEROL SULFATE 2.5 MG: 2.5 SOLUTION RESPIRATORY (INHALATION) at 19:27

## 2021-02-02 RX ADMIN — PANTOPRAZOLE SODIUM 40 MG: 40 TABLET, DELAYED RELEASE ORAL at 17:47

## 2021-02-02 RX ADMIN — CLONAZEPAM 1 MG: 1 TABLET ORAL at 20:53

## 2021-02-02 RX ADMIN — SODIUM CHLORIDE, PRESERVATIVE FREE 10 ML: 5 INJECTION INTRAVENOUS at 10:12

## 2021-02-02 RX ADMIN — FLUOXETINE HYDROCHLORIDE 60 MG: 20 CAPSULE ORAL at 08:18

## 2021-02-02 RX ADMIN — SODIUM CHLORIDE, PRESERVATIVE FREE 10 ML: 5 INJECTION INTRAVENOUS at 20:54

## 2021-02-02 RX ADMIN — SODIUM CHLORIDE 4 ML: 7 NEBU SOLN,3 % NEBU at 19:28

## 2021-02-02 RX ADMIN — BARIUM SULFATE 55 ML: 0.81 POWDER, FOR SUSPENSION ORAL at 14:47

## 2021-02-02 RX ADMIN — HYDROCHLOROTHIAZIDE 12.5 MG: 12.5 CAPSULE ORAL at 08:18

## 2021-02-02 RX ADMIN — SODIUM CHLORIDE 4 ML: 7 NEBU SOLN,3 % NEBU at 07:23

## 2021-02-02 NOTE — MBS/VFSS/FEES
Acute Care - Speech Language Pathology   Swallow Initial Evaluation [facility]     Patient Name: Maritza Nance Darci  : 1960  MRN: [id number]  Today's Date: 2021               Admit Date: 2021    Visit Dx:     ICD-10-CM ICD-9-CM   1. Cough  R05 786.2   2. Shortness of breath  R06.02 786.05   3. Acute cystitis without hematuria  N30.00 595.0   4. Suspected COVID-19 virus infection  Z20.822 V01.79   5. MS (multiple sclerosis) (CMS/HCC)  G35 340   6. GERARDO (obstructive sleep apnea)  G47.33 327.23     Patient Active Problem List   Diagnosis   • H/O total shoulder replacement, right   • Cough   • Acute UTI (urinary tract infection)   • Multiple sclerosis (CMS/HCC)   • Essential hypertension   • Hyperlipidemia   • Shortness of breath     Past Medical History:   Diagnosis Date   • Dawn esophagus     per patient   • Hyperlipidemia    • Hypertension    • Multiple sclerosis (CMS/HCC)    • PONV (postoperative nausea and vomiting)      Past Surgical History:   Procedure Laterality Date   • ADENOIDECTOMY     • PARATHYROIDECTOMY     • TOTAL SHOULDER ARTHROPLASTY W/ DISTAL CLAVICLE EXCISION Right 10/22/2018    Procedure: RT TOTAL SHOULDER REVERSE ARTHROPLASTY;  Surgeon: Bipin Dangelo MD;  Location: [facility] MAIN OR;  Service: Orthopedics     Patient was not wearing a face mask during this therapy encounter. Therapist used appropriate personal protective equipment including mask, eye protection and gloves.  Mask used was standard procedure mask. Appropriate PPE was worn during the entire therapy session. Hand hygiene was completed before and after therapy session. Patient is not in enhanced droplet precautions.        SWALLOW EVALUATION (last 72 hours)      SLP Adult Swallow Evaluation     Row Name 21 1600 21 1500                Rehab Evaluation    Document Type  evaluation  -AW  evaluation  -HS       Subjective Information  no complaints  -AW  complains of;weakness;fatigue;dyspnea  -HS       Patient  Observations  alert;cooperative;agree to therapy  -AW  alert;cooperative;agree to therapy  -HS       Care Plan Review  evaluation/treatment results reviewed;patient/other agree to care plan  -AW  evaluation/treatment results reviewed;care plan/treatment goals reviewed;risks/benefits reviewed;current/potential barriers reviewed;patient/other agree to care plan  -HS       Patient Effort  good  -AW  good  -HS       Symptoms Noted During/After Treatment  none  -AW  none  -HS          General Information    Patient Profile Reviewed  yes  -AW  yes  -HS       Pertinent History Of Current Problem  Admitted with dyspnea, AHRF. Hx of MS, hiatal hernia.   -AW  Admitted with dyspnea, AHRF. Hx of MS, hiatal hernia. Pulmonology in room during portion of eval. Suspects aspiration. Patient denies dysphagia.   -HS       Current Method of Nutrition  regular textures;thin liquids  -AW  regular textures;thin liquids  -HS       Precautions/Limitations, Vision  WFL;for purposes of eval  -AW  WFL;for purposes of eval  -HS       Precautions/Limitations, Hearing  WFL;for purposes of eval  -AW  WFL;for purposes of eval  -HS       Prior Level of Function-Communication  WFL  -AW  WFL  -HS       Prior Level of Function-Swallowing  no diet consistency restrictions  -AW  no diet consistency restrictions;safe, efficient swallowing in all situations  -HS       Plans/Goals Discussed with  patient;agreed upon  -AW  patient  -HS       Barriers to Rehab  none identified  -AW  none identified  -HS       Patient's Goals for Discharge  return home  -AW  patient did not state  -HS          Pain    Additional Documentation  Pain Scale: Numbers Pre/Post-Treatment (Group)  -AW  --          Pain Scale: Numbers Pre/Post-Treatment    Pretreatment Pain Rating  0/10 - no pain  -AW  --       Posttreatment Pain Rating  0/10 - no pain  -AW  --          Oral Motor Structure and Function    Dentition Assessment  natural, present and adequate  -AW  natural, present and  adequate  -HS       Secretion Management  WNL/WFL  -AW  WNL/WFL  -HS       Mucosal Quality  moist, healthy  -AW  moist, healthy  -HS       Volitional Swallow  --  weak  -HS       Volitional Cough  --  weak  -HS          Oral Musculature and Cranial Nerve Assessment    Oral Motor General Assessment  generalized oral motor weakness  -AW  generalized oral motor weakness  -HS          General Eating/Swallowing Observations    Respiratory Support Currently in Use  nasal cannula  -AW  nasal cannula;other (see comments) below chin upon SLP entering room, 96% O2 on room air  -HS       Eating/Swallowing Skills  fed by SLP;self-fed  -AW  self-fed;appropriate self-feeding skills observed  -HS       Positioning During Eating  upright in bed  -AW  needs frequent re-positioning  -HS       Utensils Used  --  spoon;cup;straw  -HS       Consistencies Trialed  --  regular textures;mechanical soft, no mixed consistencies;pureed;ice chips;thin liquids  -HS          Respiratory    Respiratory Status  --  other (see comments) difficulty coordinating breathing/speech  -HS          Clinical Swallow Eval    Oral Prep Phase  --  WFL  -HS       Oral Transit  --  WFL  -HS       Oral Residue  --  WFL  -HS       Pharyngeal Phase  --  suspected pharyngeal impairment  -HS       Esophageal Phase  --  suspected esophageal impairment  -HS       Clinical Swallow Evaluation Summary  --  Patient demonstrates weak coughing at baseline. She has difficulty coordinating breathing with talking (and likely swallowing at times). Pulmonologist states congestion is in her throat versus her lungs during his assessment (SLP present). Decreased laryngeal elevation upon palpation with ice chip trials. Audible swallow with straw sips of thin liquids. No overt s/s of aspiration with cup sips of thin liquids, puree, mechanical soft, or regular. Concerned for swallow fatigue.   -HS          MBS/VFSS    Utensils Used  spoon;cup;straw  -AW  --       Consistencies Trialed   regular textures;soft textures;mixed consistency;pureed;thin liquids;nectar/syrup-thick liquids  -AW  --          MBS/VFSS Interpretation    VFSS Summary  Pt exhibited mild oropharyngeal dysphagia characterized by mistiming, tongue base weakness, and decreased hyolaryngeal excursion. Premature spillage to the valleculae was noted with thin, pureed, regular, and to the pyriforms with soft and mixed consistencies. No penetration or aspiration was noted with thin (cup/straw), pureed, soft solids, mixed consistency, or regular solids. Pt had mild pharyngeal residuals (tongue base, valleculae, aryepiglottic folds, and pyriforms) which she cleared with a spontaneous swallow. These residuals increase risk of aspiration making use of swallow strategies important.   -AW  --          SLP Communication to Radiology    Summary Statement  Pt exhibited mild oropharyngeal dysphagia characterized by mistiming, tongue base weakness, and decreased hyolaryngeal excursion. Premature spillage to the valleculae was noted with thin, pureed, regular, and to the pyriforms with soft and mixed consistencies. No penetration or aspiration was noted with thin (cup/straw), pureed, soft solids, mixed consistency, or regular solids. Pt had mild pharyngeal residuals (tongue base, valleculae, aryepiglottic folds, and pyriforms) which she cleared with a spontaneous swallow. These residuals increase risk of aspiration making use of swallow strategies important.   -AW  --          Clinical Impression    SLP Swallowing Diagnosis  R/O pharyngeal dysphagia  -AW  R/O pharyngeal dysphagia;esophageal dysphagia  -HS       Functional Impact  risk of aspiration/pneumonia  -AW  risk of aspiration/pneumonia;risk of malnutrition;risk of dehydration  -HS       Rehab Potential/Prognosis, Swallowing  good, to achieve stated therapy goals  -AW  good, to achieve stated therapy goals  -HS       Swallow Criteria for Skilled Therapeutic Interventions Met  demonstrates  skilled criteria  -AW  demonstrates skilled criteria  -HS          Recommendations    Therapy Frequency (Swallow)  PRN  -AW  PRN  -HS       Predicted Duration Therapy Intervention (Days)  until discharge  -AW  until discharge  -HS       SLP Diet Recommendation  regular textures;thin liquids  -AW  regular textures;thin liquids  -HS       Recommended Diagnostics  --  VFSS (MBS)  -HS       Recommended Precautions and Strategies  upright posture during/after eating;small bites of food and sips of liquid;multiple swallows per bite of food;multiple swallows per sip of liquid  -AW  upright posture during/after eating;small bites of food and sips of liquid;no straw;fatigue precautions;reflux precautions  -HS       Oral Care Recommendations  Oral Care BID/PRN  -AW  Oral Care before breakfast, after meals and PRN  -HS       SLP Rec. for Method of Medication Administration  meds whole;with pudding or applesauce  -AW  as tolerated  -HS       Monitor for Signs of Aspiration  yes;notify SLP if any concerns  -AW  yes;notify SLP if any concerns  -HS       Anticipated Discharge Disposition (SLP)  unknown  -AW  anticipate therapy at next level of care  -HS          Swallow Goals (SLP)    Oral Nutrition/Hydration Goal Selection (SLP)  oral nutrition/hydration, SLP goal 1  -AW  --          Oral Nutrition/Hydration Goal 1 (SLP)    Oral Nutrition/Hydration Goal 1, SLP  Pt will safely tolerate the least restrictive diet with no s/s of aspiration.  -AW  --       Time Frame (Oral Nutrition/Hydration Goal 1, SLP)  by discharge  -AW  --         User Key  (r) = Recorded By, (t) = Taken By, (c) = Cosigned By    Initials Name Effective Dates    HS Judith Gonzalez, MS CCC-SLP 06/08/18 -     Sahra Curran MS CCC-SLP 06/08/18 -           EDUCATION  The patient has been educated in the following areas:   Dysphagia (Swallowing Impairment) Oral Care/Hydration.    SLP Recommendation and Plan  SLP Swallowing Diagnosis: R/O pharyngeal dysphagia  SLP  Diet Recommendation: regular textures, thin liquids  Recommended Precautions and Strategies: upright posture during/after eating, small bites of food and sips of liquid, multiple swallows per bite of food, multiple swallows per sip of liquid  SLP Rec. for Method of Medication Administration: meds whole, with pudding or applesauce     Monitor for Signs of Aspiration: yes, notify SLP if any concerns     Swallow Criteria for Skilled Therapeutic Interventions Met: demonstrates skilled criteria  Anticipated Discharge Disposition (SLP): unknown  Rehab Potential/Prognosis, Swallowing: good, to achieve stated therapy goals  Therapy Frequency (Swallow): PRN  Predicted Duration Therapy Intervention (Days): until discharge                         Outcome Summary: VFSS completed. Pt had no penetration or aspiration during the study. Mild pharyngeal residuals were noted, cleared with a spontaneous swallow, which places pt at increased risk of penetration/aspiraiton. Recommend continue on regular diet w/ thins; meds as tolerated; upright for meals and 30 min after; slow rate; small bites; single sips; double swallow. Of note, pt had a lot of coughing prior to the study and 1 time during the study. Frequent repositioning was needed due to pt leaning. ST to follow.    SLP GOALS     Row Name 02/02/21 1600             Oral Nutrition/Hydration Goal 1 (SLP)    Oral Nutrition/Hydration Goal 1, SLP  Pt will safely tolerate the least restrictive diet with no s/s of aspiration.  -AW      Time Frame (Oral Nutrition/Hydration Goal 1, SLP)  by discharge  -AW        User Key  (r) = Recorded By, (t) = Taken By, (c) = Cosigned By    Initials Name Provider Type    AW Sahra Thomas MS CCC-SLP Speech and Language Pathologist           SLP Outcome Measures (last 72 hours)      SLP Outcome Measures     Row Name 02/01/21 1500             SLP Outcome Measures    Outcome Measure Used?  Adult NOMS  -HS         Adult FCM Scores    FCM Chosen  Swallowing   -HS      Swallowing FCM Score  5  -HS        User Key  (r) = Recorded By, (t) = Taken By, (c) = Cosigned By    Initials Name Effective Dates    HS Judith Gonzalez, MS CCC-SLP 06/08/18 -            Time Calculation:   Time Calculation- SLP     Row Name 02/02/21 1736             Time Calculation- SLP    SLP Start Time  1400  -AW      SLP Received On  02/02/21  -AW        User Key  (r) = Recorded By, (t) = Taken By, (c) = Cosigned By    Initials Name Provider Type    Sahra Curran, MS CCC-SLP Speech and Language Pathologist          Therapy Charges for Today     Code Description Service Date Service Provider Modifiers Qty    80914086688 HC ST MOTION FLUORO EVAL SWALLOW 5 2/2/2021 Sahra Thomas, MS CCC-SLP GN 1               Sahra Thomas MS CCC-SLP  2/2/2021

## 2021-02-02 NOTE — PROGRESS NOTES
Name: Maritza Nance Darci ADMIT: 2021   : 1960  PCP: Provider, No Known    MRN: [id number] LOS: 5 days   AGE/SEX: 60 y.o. female  ROOM: S4/   Subjective   Chief Complaint   Patient presents with   • Shortness of Breath      Resting comfortably. Snoring. No CP NVD. Dyspnea improved compared to yesterday.    Objective   Vital Signs  Temp:  [97.8 °F (36.6 °C)-98.7 °F (37.1 °C)] 98.4 °F (36.9 °C)  Heart Rate:  [74-94] 83  Resp:  [16-20] 16  BP: (118-140)/(59-90) 129/77  SpO2:  [90 %-99 %] 93 %  on  Flow (L/min):  [3-4] 4;   Device (Oxygen Therapy): nasal cannula  Body mass index is 30.45 kg/m².    Physical Exam  Vitals signs and nursing note reviewed.   Constitutional:       General: She is not in acute distress.  HENT:      Head: Normocephalic and atraumatic.   Eyes:      Extraocular Movements: Extraocular movements intact.      Conjunctiva/sclera: Conjunctivae normal.   Neck:      Musculoskeletal: Neck supple. No muscular tenderness.   Cardiovascular:      Rate and Rhythm: Normal rate and regular rhythm.      Pulses: Normal pulses.   Pulmonary:      Effort: Pulmonary effort is normal.      Breath sounds: Decreased breath sounds and rhonchi present. No wheezing.   Abdominal:      General: There is no distension.      Palpations: Abdomen is soft.      Tenderness: There is no abdominal tenderness. There is no guarding or rebound.   Musculoskeletal:      Right lower leg: Edema present.      Left lower leg: Edema present.      Comments: trace   Skin:     General: Skin is warm and dry.   Neurological:      Mental Status: She is alert. Mental status is at baseline.   Psychiatric:         Mood and Affect: Mood normal.         Behavior: Behavior normal.         Results Review:       I reviewed the patient's new clinical results.     I reviewed imaging, agree with interpretation     I reviewed telemetry, sinus     I reviewed other test results, agree with interpretation.      Results from last 7 days   Lab Units  02/02/21 0351 02/01/21 0345 01/31/21  0536 01/30/21  0533   WBC 10*3/mm3 10.89* 11.48* 11.06* 12.38*   HEMOGLOBIN g/dL 13.2 13.1 12.6 12.0   PLATELETS 10*3/mm3 456* 464* 395 382     Results from last 7 days   Lab Units 02/02/21 0351 02/01/21 0345 01/31/21  0536 01/30/21  0533   SODIUM mmol/L 132* 133* 134* 140   POTASSIUM mmol/L 3.4* 3.5 3.5 3.5   CHLORIDE mmol/L 92* 95* 95* 99   CO2 mmol/L 26.3 25.2 28.0 32.7*   BUN mg/dL 22* 24* 23* 21*   CREATININE mg/dL 0.84 0.86 0.69 0.85   GLUCOSE mg/dL 136* 125* 110* 95   Estimated Creatinine Clearance: 77.7 mL/min (by C-G formula based on SCr of 0.84 mg/dL).  Results from last 7 days   Lab Units 02/02/21 0351 02/01/21 0345 01/31/21 0536 01/30/21  0533   CALCIUM mg/dL 9.2 9.4 8.9 8.9   ALBUMIN g/dL 4.00 3.80 3.90 3.60   MAGNESIUM mg/dL 2.6  --   --   --           albuterol, 2.5 mg, Nebulization, 4x Daily - RT  enoxaparin, 40 mg, Subcutaneous, Q24H  FLUoxetine, 60 mg, Oral, Daily  furosemide, 40 mg, Intravenous, Daily  guaiFENesin, 1,200 mg, Oral, Q12H  hydroCHLOROthiazide Oral, 12.5 mg, Oral, Daily  losartan, 50 mg, Oral, Daily  pantoprazole, 40 mg, Oral, BID AC  pramipexole, 1.5 mg, Oral, TID  sodium chloride, 10 mL, Intravenous, Q12H  sodium chloride, 4 mL, Nebulization, BID - RT       Diet Regular; Cardiac    Assessment/Plan      Active Hospital Problems    Diagnosis  POA   • **Shortness of breath [R06.02]  Unknown   • Cough [R05]  Yes   • Acute UTI (urinary tract infection) [N39.0]  Unknown   • Multiple sclerosis (CMS/HCC) [G35]  Unknown   • Essential hypertension [I10]  Unknown   • Hyperlipidemia [E78.5]  Unknown      Resolved Hospital Problems   No resolved problems to display.       · UTI, chronic intermittent cath: Ecoli sensitive to cephalosporin. Completed rocephin course for acute cystitis.  · Shortness of breath: Ground glass on CT possibly inhalant ALI, inflammation, or peripheral airway disease.  Undergoing SLP evaluation today. CXR mostly unchanged.  Preserved EF and grade 1 diastolic dysfunction on echo. Peripheral edema improved with IV lasix but respiratory status unchanged and no pulmonary edema. Will stop lasix. Wean O2 as able. Pulmonology following.  · Multiple sclerosis: Stable. Discussed with Dr Byrnes. Neurology evaluated.  · Hypertension: Stable  · Elevated D-dimer: No PE on CTA, Covid negative x2, no dvt on duplex.  · Snoring: Outpatient PSG  · Prophylaxis: Lovenox  · Disposition: Home/TBD    Christopher Caicedo MD  [facility]  02/02/21  09:59 EST    Dictated portions using Dragon dictation software.    During the entire encounter, I was wearing recommended PPE including face mask and eye protection. Hand sanitization was performed prior to entering room and upon exit.

## 2021-02-02 NOTE — PLAN OF CARE
Goal Outcome Evaluation:  Plan of Care Reviewed With: patient  Progress: no change  Outcome Summary: Pt O2 increased to 4L NC related to desats. Pt still with loose cough, not productive overnight. Using IS and flutter valve. Q2 turns, F/C in place. VVS, awaiting am labs, filemon continue to monitor.

## 2021-02-02 NOTE — PROGRESS NOTES
LOS: 5 days   Patient Care Team:  Provider, No Known as PCP - Sohan Michele MD as PCP - Family Medicine    Subjective     Patient reports she has even less energy today than she did yesterday she thinks she slept well she did desaturate again when she slept last night.  She has been suspected of having sleep apnea in the past she had a home study apparently that sensor fell out and she did not get to complete.    Review of Systems:          Objective     Vital Signs  Vital Sign Min/Max for last 24 hours  Temp  Min: 97.8 °F (36.6 °C)  Max: 98.7 °F (37.1 °C)   BP  Min: 118/59  Max: 140/90   Pulse  Min: 71  Max: 94   Resp  Min: 16  Max: 20   SpO2  Min: 90 %  Max: 99 %   Flow (L/min)  Min: 3  Max: 4   No data recorded        Ventilator/Non-Invasive Ventilation Settings (From admission, onward)    None                       Body mass index is 30.45 kg/m².  I/O last 3 completed shifts:  In: -   Out: 1800 [Urine:1800]  No intake/output data recorded.        Physical Exam:  General Appearance: Well-developed obese white female looks a decade or 2 older than her stated age resting in bed   Eyes: Conjunctiva are clear and anicteric  ENT: Mucous membranes are moist no erythema or exudates Mallampati type 3 airway nasal septum midline  Neck: No adenopathy no jugular venous tension trachea midline  Lungs: Are pretty clear today she does not have as much of that loose cough that she had yesterday.  Cardiac: Regular rate rhythm  Abdomen: Obese soft nontender no palpable hepatosplenomegaly  : Not examined  Musculoskeletal: Does not have a whole lot of muscle mass in her lower extremities  Skin: No jaundice no petechiae skin is warm and dry  Neuro: She is alert and oriented she has lower extremity weakness  Extremities/P Vascular: No clubbing no cyanosis no edema palpable radial dorsalis pedis pulses  MSE: Pretty flat affect today       Labs:  Results from last 7 days   Lab Units 02/02/21  0351 02/01/21  1630  01/31/21  0536 01/30/21  0533 01/29/21  0602 01/28/21  1257   GLUCOSE mg/dL 136* 125* 110* 95 148* 122*   SODIUM mmol/L 132* 133* 134* 140 137 141   POTASSIUM mmol/L 3.4* 3.5 3.5 3.5 4.1 4.7   MAGNESIUM mg/dL 2.6  --   --   --   --   --    CO2 mmol/L 26.3 25.2 28.0 32.7* 23.5 26.4   CHLORIDE mmol/L 92* 95* 95* 99 104 105   ANION GAP mmol/L 13.7 12.8 11.0 8.3 9.5 9.6   CREATININE mg/dL 0.84 0.86 0.69 0.85 0.61 0.88   BUN mg/dL 22* 24* 23* 21* 12 19   BUN / CREAT RATIO  26.2* 27.9* 33.3* 24.7 19.7 21.6   CALCIUM mg/dL 9.2 9.4 8.9 8.9 9.2 10.1   EGFR IF NONAFRICN AM mL/min/1.73 70 68 87 69 101 66   ALK PHOS U/L 163* 166* 148* 145* 159* 182*   TOTAL PROTEIN g/dL 7.0 7.4 6.7 6.8 6.7 7.4   ALT (SGPT) U/L 22 15 11 13 12 17   AST (SGOT) U/L 33* 23 21 20 25 28   BILIRUBIN mg/dL 0.3 0.3 0.3 0.2 0.3 0.2   ALBUMIN g/dL 4.00 3.80 3.90 3.60 3.90 4.40   GLOBULIN gm/dL 3.0 3.6 2.8 3.2 2.8 3.0     Estimated Creatinine Clearance: 77.7 mL/min (by C-G formula based on SCr of 0.84 mg/dL).      Results from last 7 days   Lab Units 02/02/21  0351 02/01/21  0345 01/31/21 0536 01/30/21  0533 01/29/21  0602 01/28/21  1257   WBC 10*3/mm3 10.89* 11.48* 11.06* 12.38* 7.22 10.76   RBC 10*6/mm3 5.07 5.14 4.71 4.50 4.56 4.83   HEMOGLOBIN g/dL 13.2 13.1 12.6 12.0 12.0 12.3   HEMATOCRIT % 41.2 42.0 38.2 37.1 36.9 40.0   MCV fL 81.3 81.7 81.1 82.4 80.9 82.8   MCH pg 26.0* 25.5* 26.8 26.7 26.3* 25.5*   MCHC g/dL 32.0 31.2* 33.0 32.3 32.5 30.8*   RDW % 14.2 14.5 14.0 14.3 13.9 14.4   RDW-SD fl 41.1 42.4 41.1 42.5 40.5 42.9   MPV fL 9.5 9.4 9.4 9.3 9.6 9.3   PLATELETS 10*3/mm3 456* 464* 395 382 391 405   NEUTROPHIL % % 70.9 72.0 71.9 76.3* 90.2* 86.6*   LYMPHOCYTE % % 18.0* 17.9* 17.0* 13.8* 8.2* 7.5*   MONOCYTES % % 6.8 6.7 7.0 6.6 0.7* 4.3*   EOSINOPHIL % % 2.9 2.4 3.0 2.3 0.0* 0.6   BASOPHIL % % 0.7 0.5 0.6 0.4 0.3 0.6   IMM GRAN % % 0.7* 0.5 0.5 0.6* 0.6* 0.4   NEUTROS ABS 10*3/mm3 7.71* 8.26* 7.95* 9.43* 6.52 9.33*   LYMPHS ABS 10*3/mm3 1.96 2.06  1.88 1.71 0.59* 0.81   MONOS ABS 10*3/mm3 0.74 0.77 0.77 0.82 0.05* 0.46   EOS ABS 10*3/mm3 0.32 0.27 0.33 0.29 0.00 0.06   BASOS ABS 10*3/mm3 0.08 0.06 0.07 0.05 0.02 0.06   IMMATURE GRANS (ABS) 10*3/mm3 0.08* 0.06* 0.06* 0.08* 0.04 0.04   NRBC /100 WBC 0.0 0.0 0.0 0.0 0.0 0.0     Results from last 7 days   Lab Units 02/01/21  0248   PH, ARTERIAL pH units 7.394   PO2 ART mm Hg 76.9*   PCO2, ARTERIAL mm Hg 49.8*   HCO3 ART mmol/L 30.5*     Results from last 7 days   Lab Units 02/02/21  0351 02/01/21  0345 01/31/21  0536  01/28/21  1257   CK TOTAL U/L 85 42 42   < >  --    TROPONIN T ng/mL  --   --   --   --  <0.010    < > = values in this interval not displayed.     Results from last 7 days   Lab Units 01/28/21  1257   PROBNP pg/mL 22.4         Results from last 7 days   Lab Units 01/31/21  0536 01/28/21  2013 01/28/21  1358 01/28/21  1257   LACTATE mmol/L  --   --  1.8  --    PROCALCITONIN ng/mL 0.05 0.06  --  0.05         Microbiology Results (last 10 days)     Procedure Component Value - Date/Time    COVID-19,[facility] IN-HOUSE, NP/OP SWAB IN UTM/VTM/[facility] TRANSPORT MEDIA,24 HR TAT - Swab, Nasopharynx [[id number]]  (Normal) Collected: 01/29/21 0547    Lab Status: Final result Specimen: Swab from Nasopharynx Updated: 01/29/21 1011     COVID19 Not Detected    Narrative:      Fact sheet for providers: https://www.fda.gov/media/135598/download     Fact sheet for patients: https://www.fda.gov/media/197018/download    Test performed by PCR.    Respiratory Culture - Sputum, Cough [[id number]] Collected: 01/28/21 2028    Lab Status: Final result Specimen: Sputum from Cough Updated: 01/30/21 0856     Respiratory Culture Light growth (2+) Normal Respiratory Kathia: NO S.aureus/MRSA or Pseudomonas aeruginosa     Gram Stain Occasional Epithelial cells per low power field      Rare (1+) WBCs seen      Mixed bacterial morphotypes seen on Gram Stain    Respiratory Panel PCR w/COVID-19(SARS-CoV-2)  YAMILE/FRANK/ROSE/PAD/[facility]I In-House, NP Swab in UTM/VTM, 3-4 HR TAT - Swab, Nasopharynx [[id number]]  (Normal) Collected: 01/28/21 1359    Lab Status: Final result Specimen: Swab from Nasopharynx Updated: 01/28/21 1529     ADENOVIRUS, PCR Not Detected     Coronavirus 229E Not Detected     Coronavirus HKU1 Not Detected     Coronavirus NL63 Not Detected     Coronavirus OC43 Not Detected     COVID19 Not Detected     Human Metapneumovirus Not Detected     Human Rhinovirus/Enterovirus Not Detected     Influenza A PCR Not Detected     Influenza B PCR Not Detected     Parainfluenza Virus 1 Not Detected     Parainfluenza Virus 2 Not Detected     Parainfluenza Virus 3 Not Detected     Parainfluenza Virus 4 Not Detected     RSV, PCR Not Detected     Bordetella pertussis pcr Not Detected     Bordetella parapertussis PCR Not Detected     Chlamydophila pneumoniae PCR Not Detected     Mycoplasma pneumo by PCR Not Detected    Narrative:      Fact sheet for providers: https://docs.[employer]/wp-content/uploads/VMV5744-5571-OE0.1-EUA-Provider-Fact-Sheet-3.pdf    Fact sheet for patients: https://docs.[employer]/wp-content/uploads/AYD8060-8144-AI3.1-EUA-Patient-Fact-Sheet-1.pdf    Test performed by PCR.    Urine Culture - Urine, Urine, Catheter [[id number]]  (Abnormal)  (Susceptibility) Collected: 01/28/21 1359    Lab Status: Final result Specimen: Urine, Catheter Updated: 01/30/21 0054     Urine Culture >100,000 CFU/mL Escherichia coli    Susceptibility      Escherichia coli     JOSE     Ampicillin Resistant     Ampicillin + Sulbactam Intermediate     Cefazolin Susceptible     Cefepime Susceptible     Ceftazidime Susceptible     Ceftriaxone Susceptible     Gentamicin Susceptible     Levofloxacin Susceptible     Nitrofurantoin Susceptible     Piperacillin + Tazobactam Susceptible     Tetracycline Susceptible     Trimethoprim + Sulfamethoxazole Susceptible                    Blood Culture - Blood, Arm, Left [[id number]] Collected:  01/28/21 1358    Lab Status: Preliminary result Specimen: Blood from Arm, Left Updated: 02/01/21 1430     Blood Culture No growth at 4 days    Blood Culture - Blood, Arm, Right [[id number]] Collected: 01/28/21 1358    Lab Status: Preliminary result Specimen: Blood from Arm, Right Updated: 02/01/21 1430     Blood Culture No growth at 4 days              albuterol, 2.5 mg, Nebulization, 4x Daily - RT  enoxaparin, 40 mg, Subcutaneous, Q24H  FLUoxetine, 60 mg, Oral, Daily  guaiFENesin, 1,200 mg, Oral, Q12H  hydroCHLOROthiazide Oral, 12.5 mg, Oral, Daily  losartan, 50 mg, Oral, Daily  pantoprazole, 40 mg, Oral, BID AC  pramipexole, 1.5 mg, Oral, TID  sodium chloride, 10 mL, Intravenous, Q12H  sodium chloride, 4 mL, Nebulization, BID - RT           Diagnostics:  Xr Chest 1 View    Result Date: 1/28/2021  XR CHEST 1 VW-  HISTORY: Female who is 60 years-old,  short of breath  TECHNIQUE: Frontal view of the chest  COMPARISON: 11/14/2018  FINDINGS: There is mildly enlarged. Pulmonary vasculature is unremarkable. Mildly tortuous appearing aorta. Minimal likely scarring or atelectasis medially at the left base. No pleural effusion, or pneumothorax. No acute osseous process.      Minimal likely scarring or atelectasis at the left base. Mild cardiomegaly. The aorta appears mildly tortuous. Follow-up as clinically indicated.  This report was finalized on 1/28/2021 2:35 PM by Dr. Luan Estrada M.D.      Ct Angiogram Chest    Result Date: 1/28/2021  CT ANGIOGRAM CHEST-  INDICATIONS: Short of breath, cough  Radiation dose reduction techniques were utilized, including automated exposure control and exposure modulation based on body size.  TECHNIQUE: CT angiography of the chest with three-dimensional reconstructions  COMPARISON: None available  FINDINGS:  No pulmonary embolism. No aortic dissection.  The heart size is normal without pericardial effusion. A few small subcentimeter short axis mediastinal lymph nodes are seen that are  not significant by size criteria.  The airways appear clear.  No pleural effusion or pneumothorax.  The lungs show likely scarring and atelectasis in the lower lungs. A 9 mm nodular density right apical region is seen on image 92, recommend 3 month follow-up chest CT characterize change; further evaluation with positron emission tomography could be considered provided, but may be limited by small size of the lesion. A predominantly groundglass density measuring 2.9 cm is seen right apical region on axial image 18, infectious or neoplastic etiologies could be considered, follow-up of this lesion is also recommended. Scattered faint groundglass densities are seen in the posterior right middle lobe right lower lobe could be areas of developing pneumonia, including possibility of viral pneumonia.  Upper abdominal structures show nonspecific nodular thickening of the bilateral adrenal glands appear stable from 04/05/2016. Moderate to large hiatal hernia.  Degenerative changes are seen in the spine. No acute fracture is identified. Sternal foramen is seen.       Nodular and groundglass densities in the right lung, further evaluation/follow-up recommended as indicated.  No pulmonary embolism.  This report was finalized on 1/28/2021 6:45 PM by Dr. Luan Estrada M.D.      Results for orders placed during the hospital encounter of 01/28/21   Adult Transthoracic Echo Complete W/ Cont if Necessary Per Protocol    Narrative · Calculated left ventricular EF = 69.8% Estimated left ventricular EF =   70% Estimated left ventricular EF was in agreement with the calculated   left ventricular EF. Left ventricular systolic function is normal. The   left ventricular cavity is small in size. Left ventricular wall thickness   is consistent with moderate concentric hypertrophy. All left ventricular   wall segments contract normally. Left ventricular diastolic function is   consistent with (grade I) impaired relaxation.  · Limited 2D  imaging of cardiac valves that appear to be functioning   normally by doppler imaging          I have reviewed the CT scan of the chest and no PE is seen there is a little bit of groundglass density in the right there is also some in the left base and there is linear atelectasis in both bases    Active Hospital Problems    Diagnosis  POA   • **Shortness of breath [R06.02]  Unknown   • Cough [R05]  Yes   • Acute UTI (urinary tract infection) [N39.0]  Unknown   • Multiple sclerosis (CMS/HCC) [G35]  Unknown   • Essential hypertension [I10]  Unknown   • Hyperlipidemia [E78.5]  Unknown      Resolved Hospital Problems   No resolved problems to display.         Assessment/Plan     1. Hypoxic mild hypercapnic respiratory failure probably related to some of the the mild infiltrates and atelectasis.  She had some desaturation again last evening they are weaning her back down now.  I do not know whether she has some sleep apnea central or obstructive that may be contributing to her nighttime desaturation.  I have placed an order for a polysomnogram at discharge.  2. Right-sided pulmonary infiltrates groundglass nodular, cultures been negative procalcitonin was negative she completed 3 days of antibiotics for her cystitis infectious disease does not feel further antibiotics are indicated.  I wonder if she had a little aspiration pneumonitis.  I would recommend a follow-up CT scan in 6 to 12 weeks.  3. Atelectasis bibasilar she is getting OPEP, hypertonic saline and bronchodilator therapy.  Continue.  4. Hiatal hernia part  5. Multiple sclerosis  6. Hypertension  7. Hyperlipidemia  8. Cystitis, E. coli ;patient with chronic intermittent catheterization completed antibiotics  9. Possible sleep apnea she needs polysomnogram after discharge  10. Hyponatremia mild  11. Suspected oropharyngeal dysphagia she certainly sounds like it speech therapy is doing a video swallow shortly to evaluate.    Plan for disposition:    Ash Argueta  MD [place]  02/02/21  13:39 EST    Time:

## 2021-02-02 NOTE — PLAN OF CARE
Problem: Adult Inpatient Plan of Care  Goal: Plan of Care Review  Outcome: Ongoing, Progressing  Flowsheets (Taken 2/2/2021 1831)  Progress: improving  Plan of Care Reviewed With: patient  Goal: Patient-Specific Goal (Individualized)  Outcome: Ongoing, Progressing  Goal: Absence of Hospital-Acquired Illness or Injury  Outcome: Ongoing, Progressing  Intervention: Identify and Manage Fall Risk  Recent Flowsheet Documentation  Taken 2/2/2021 1802 by Kelsie Farmer RN  Safety Promotion/Fall Prevention: safety round/check completed  Taken 2/2/2021 1432 by Kelsie Farmer RN  Safety Promotion/Fall Prevention: safety round/check completed  Taken 2/2/2021 0805 by Kelsie Farmer RN  Safety Promotion/Fall Prevention: safety round/check completed  Intervention: Prevent Skin Injury  Recent Flowsheet Documentation  Taken 2/2/2021 1432 by Kelsie Farmer RN  Body Position: weight shift assistance provided  Taken 2/2/2021 0805 by Kelsie Farmer RN  Body Position:   turned   tilted, right  Intervention: Prevent and Manage VTE (venous thromboembolism) Risk  Recent Flowsheet Documentation  Taken 2/2/2021 1432 by Kelsie Farmer RN  VTE Prevention/Management:   bilateral   sequential compression devices on  Intervention: Prevent Infection  Recent Flowsheet Documentation  Taken 2/2/2021 1802 by Kelsie Farmer RN  Infection Prevention: hand hygiene promoted  Taken 2/2/2021 1432 by Kelsie Faremr RN  Infection Prevention: hand hygiene promoted  Taken 2/2/2021 0805 by Kelsie Farmer RN  Infection Prevention:   hand hygiene promoted   visitors restricted/screened  Goal: Optimal Comfort and Wellbeing  Outcome: Ongoing, Progressing  Goal: Readiness for Transition of Care  Outcome: Ongoing, Progressing   Goal Outcome Evaluation:  Plan of Care Reviewed With: patient  Progress: improving

## 2021-02-02 NOTE — PLAN OF CARE
Goal Outcome Evaluation:        Outcome Summary: VFSS completed. Pt had no penetration or aspiration during the study. Mild pharyngeal residuals were noted, cleared with a spontaneous swallow, which places pt at increased risk of penetration/aspiraiton. Recommend continue on regular diet w/ thins; meds as tolerated; upright for meals and 30 min after; slow rate; small bites; single sips; double swallow. Of note, pt had a lot of coughing prior to the study and 1 time during the study. Frequent repositioning was needed due to pt leaning. ST to follow.

## 2021-02-03 LAB
1,3 BETA GLUCAN SER-MCNC: <31 PG/ML
ALBUMIN SERPL-MCNC: 3.8 G/DL (ref 3.5–5.2)
ALBUMIN/GLOB SERPL: 1.1 G/DL
ALP SERPL-CCNC: 171 U/L (ref 39–117)
ALT SERPL W P-5'-P-CCNC: 24 U/L (ref 1–33)
ANION GAP SERPL CALCULATED.3IONS-SCNC: 13 MMOL/L (ref 5–15)
AST SERPL-CCNC: 32 U/L (ref 1–32)
BASOPHILS # BLD AUTO: 0.08 10*3/MM3 (ref 0–0.2)
BASOPHILS NFR BLD AUTO: 0.8 % (ref 0–1.5)
BILIRUB SERPL-MCNC: 0.2 MG/DL (ref 0–1.2)
BUN SERPL-MCNC: 23 MG/DL (ref 6–20)
BUN/CREAT SERPL: 32.9 (ref 7–25)
CALCIUM SPEC-SCNC: 9.3 MG/DL (ref 8.6–10.5)
CHLORIDE SERPL-SCNC: 95 MMOL/L (ref 98–107)
CK SERPL-CCNC: 84 U/L (ref 20–180)
CO2 SERPL-SCNC: 26 MMOL/L (ref 22–29)
CREAT SERPL-MCNC: 0.7 MG/DL (ref 0.57–1)
CRP SERPL-MCNC: 1.95 MG/DL (ref 0–0.5)
DEPRECATED RDW RBC AUTO: 40.8 FL (ref 37–54)
EOSINOPHIL # BLD AUTO: 0.37 10*3/MM3 (ref 0–0.4)
EOSINOPHIL NFR BLD AUTO: 3.9 % (ref 0.3–6.2)
ERYTHROCYTE [DISTWIDTH] IN BLOOD BY AUTOMATED COUNT: 14.2 % (ref 12.3–15.4)
FERRITIN SERPL-MCNC: 32.4 NG/ML (ref 13–150)
GFR SERPL CREATININE-BSD FRML MDRD: 86 ML/MIN/1.73
GLOBULIN UR ELPH-MCNC: 3.6 GM/DL
GLUCOSE SERPL-MCNC: 129 MG/DL (ref 65–99)
HCT VFR BLD AUTO: 39.9 % (ref 34–46.6)
HGB BLD-MCNC: 12.7 G/DL (ref 12–15.9)
IMM GRANULOCYTES # BLD AUTO: 0.08 10*3/MM3 (ref 0–0.05)
IMM GRANULOCYTES NFR BLD AUTO: 0.8 % (ref 0–0.5)
LYMPHOCYTES # BLD AUTO: 1.55 10*3/MM3 (ref 0.7–3.1)
LYMPHOCYTES NFR BLD AUTO: 16.2 % (ref 19.6–45.3)
MCH RBC QN AUTO: 25.8 PG (ref 26.6–33)
MCHC RBC AUTO-ENTMCNC: 31.8 G/DL (ref 31.5–35.7)
MCV RBC AUTO: 80.9 FL (ref 79–97)
MONOCYTES # BLD AUTO: 0.64 10*3/MM3 (ref 0.1–0.9)
MONOCYTES NFR BLD AUTO: 6.7 % (ref 5–12)
NEUTROPHILS NFR BLD AUTO: 6.85 10*3/MM3 (ref 1.7–7)
NEUTROPHILS NFR BLD AUTO: 71.6 % (ref 42.7–76)
NRBC BLD AUTO-RTO: 0 /100 WBC (ref 0–0.2)
PLATELET # BLD AUTO: 454 10*3/MM3 (ref 140–450)
PMV BLD AUTO: 9.7 FL (ref 6–12)
POTASSIUM SERPL-SCNC: 3.9 MMOL/L (ref 3.5–5.2)
PROT SERPL-MCNC: 7.4 G/DL (ref 6–8.5)
RBC # BLD AUTO: 4.93 10*6/MM3 (ref 3.77–5.28)
REF LAB TEST METHOD: NORMAL
REF LAB TEST METHOD: NORMAL
SODIUM SERPL-SCNC: 134 MMOL/L (ref 136–145)
WBC # BLD AUTO: 9.57 10*3/MM3 (ref 3.4–10.8)

## 2021-02-03 PROCEDURE — 25010000002 ENOXAPARIN PER 10 MG: Performed by: NURSE PRACTITIONER

## 2021-02-03 PROCEDURE — 80053 COMPREHEN METABOLIC PANEL: CPT | Performed by: NURSE PRACTITIONER

## 2021-02-03 PROCEDURE — 94799 UNLISTED PULMONARY SVC/PX: CPT

## 2021-02-03 PROCEDURE — 82550 ASSAY OF CK (CPK): CPT | Performed by: NURSE PRACTITIONER

## 2021-02-03 PROCEDURE — 85025 COMPLETE CBC W/AUTO DIFF WBC: CPT | Performed by: NURSE PRACTITIONER

## 2021-02-03 PROCEDURE — 92526 ORAL FUNCTION THERAPY: CPT

## 2021-02-03 PROCEDURE — 94762 N-INVAS EAR/PLS OXIMTRY CONT: CPT

## 2021-02-03 PROCEDURE — 63710000001 PROMETHAZINE PER 25 MG: Performed by: NURSE PRACTITIONER

## 2021-02-03 PROCEDURE — 86140 C-REACTIVE PROTEIN: CPT | Performed by: NURSE PRACTITIONER

## 2021-02-03 PROCEDURE — 82728 ASSAY OF FERRITIN: CPT | Performed by: NURSE PRACTITIONER

## 2021-02-03 RX ADMIN — PANTOPRAZOLE SODIUM 40 MG: 40 TABLET, DELAYED RELEASE ORAL at 08:57

## 2021-02-03 RX ADMIN — PRAMIPEXOLE DIHYDROCHLORIDE 1.5 MG: 1.5 TABLET ORAL at 21:24

## 2021-02-03 RX ADMIN — PANTOPRAZOLE SODIUM 40 MG: 40 TABLET, DELAYED RELEASE ORAL at 17:55

## 2021-02-03 RX ADMIN — PRAMIPEXOLE DIHYDROCHLORIDE 1.5 MG: 1.5 TABLET ORAL at 08:56

## 2021-02-03 RX ADMIN — SODIUM CHLORIDE 4 ML: 7 NEBU SOLN,3 % NEBU at 23:39

## 2021-02-03 RX ADMIN — ALBUTEROL SULFATE 2.5 MG: 2.5 SOLUTION RESPIRATORY (INHALATION) at 23:39

## 2021-02-03 RX ADMIN — FLUOXETINE HYDROCHLORIDE 60 MG: 20 CAPSULE ORAL at 08:56

## 2021-02-03 RX ADMIN — LOSARTAN POTASSIUM 50 MG: 50 TABLET, FILM COATED ORAL at 08:56

## 2021-02-03 RX ADMIN — ALBUTEROL SULFATE 2.5 MG: 2.5 SOLUTION RESPIRATORY (INHALATION) at 06:38

## 2021-02-03 RX ADMIN — GUAIFENESIN 1200 MG: 600 TABLET, EXTENDED RELEASE ORAL at 08:56

## 2021-02-03 RX ADMIN — GUAIFENESIN 1200 MG: 600 TABLET, EXTENDED RELEASE ORAL at 21:24

## 2021-02-03 RX ADMIN — PRAMIPEXOLE DIHYDROCHLORIDE 1.5 MG: 1.5 TABLET ORAL at 15:16

## 2021-02-03 RX ADMIN — SODIUM CHLORIDE 4 ML: 7 NEBU SOLN,3 % NEBU at 06:38

## 2021-02-03 RX ADMIN — HYDROCHLOROTHIAZIDE 12.5 MG: 12.5 CAPSULE ORAL at 08:56

## 2021-02-03 RX ADMIN — SODIUM CHLORIDE, PRESERVATIVE FREE 10 ML: 5 INJECTION INTRAVENOUS at 08:57

## 2021-02-03 RX ADMIN — ALBUTEROL SULFATE 2.5 MG: 2.5 SOLUTION RESPIRATORY (INHALATION) at 15:47

## 2021-02-03 RX ADMIN — PROMETHAZINE HYDROCHLORIDE 25 MG: 25 TABLET ORAL at 17:55

## 2021-02-03 RX ADMIN — SODIUM CHLORIDE, PRESERVATIVE FREE 10 ML: 5 INJECTION INTRAVENOUS at 21:24

## 2021-02-03 RX ADMIN — ENOXAPARIN SODIUM 40 MG: 40 INJECTION SUBCUTANEOUS at 21:24

## 2021-02-03 NOTE — PROGRESS NOTES
LOS: 6 days   Patient Care Team:  Provider, No Known as PCP - General  Sohan Jacobsen MD as PCP - Family Medicine    Subjective     Patient reports still has a lack of energy.  Not coughing much.    Review of Systems:          Objective     Vital Signs  Vital Sign Min/Max for last 24 hours  Temp  Min: 97.6 °F (36.4 °C)  Max: 98.2 °F (36.8 °C)   BP  Min: 131/64  Max: 158/79   Pulse  Min: 81  Max: 94   Resp  Min: 18  Max: 20   SpO2  Min: 91 %  Max: 99 %   Flow (L/min)  Min: 2  Max: 2   No data recorded        Ventilator/Non-Invasive Ventilation Settings (From admission, onward)    None                       Body mass index is 30.45 kg/m².  I/O last 3 completed shifts:  In: -   Out: 900 [Urine:900]  I/O this shift:  In: 1040 [P.O.:1040]  Out: 120 [Urine:120]        Physical Exam:  General Appearance: Well-developed obese white female looks a decade or 2 older than her stated age resting in bed   Eyes: Conjunctiva are clear and anicteric  ENT: Mucous membranes are moist no erythema or exudates Mallampati type 3 airway nasal septum midline  Neck: No adenopathy no jugular venous tension trachea midline  Lungs: CLear bilaterally.  Cardiac: Regular rate rhythm  Abdomen: Obese soft nontender no palpable hepatosplenomegaly  : Not examined  Musculoskeletal: Does not have a whole lot of muscle mass in her lower extremities  Skin: No jaundice no petechiae skin is warm and dry  Neuro: She is alert and oriented she has lower extremity weakness and her upper extremities are not real strong either.  Extremities/P Vascular: No clubbing no cyanosis no edema palpable radial dorsalis pedis pulses  MSE: Much more normal affect today       Labs:  Results from last 7 days   Lab Units 02/03/21  0454 02/02/21  0351 02/01/21  0345 01/31/21  0536 01/30/21  0533 01/29/21  0602 01/28/21  1257   GLUCOSE mg/dL 129* 136* 125* 110* 95 148* 122*   SODIUM mmol/L 134* 132* 133* 134* 140 137 141   POTASSIUM mmol/L 3.9 3.4* 3.5 3.5 3.5 4.1 4.7    MAGNESIUM mg/dL  --  2.6  --   --   --   --   --    CO2 mmol/L 26.0 26.3 25.2 28.0 32.7* 23.5 26.4   CHLORIDE mmol/L 95* 92* 95* 95* 99 104 105   ANION GAP mmol/L 13.0 13.7 12.8 11.0 8.3 9.5 9.6   CREATININE mg/dL 0.70 0.84 0.86 0.69 0.85 0.61 0.88   BUN mg/dL 23* 22* 24* 23* 21* 12 19   BUN / CREAT RATIO  32.9* 26.2* 27.9* 33.3* 24.7 19.7 21.6   CALCIUM mg/dL 9.3 9.2 9.4 8.9 8.9 9.2 10.1   EGFR IF NONAFRICN AM mL/min/1.73 86 70 68 87 69 101 66   ALK PHOS U/L 171* 163* 166* 148* 145* 159* 182*   TOTAL PROTEIN g/dL 7.4 7.0 7.4 6.7 6.8 6.7 7.4   ALT (SGPT) U/L 24 22 15 11 13 12 17   AST (SGOT) U/L 32 33* 23 21 20 25 28   BILIRUBIN mg/dL 0.2 0.3 0.3 0.3 0.2 0.3 0.2   ALBUMIN g/dL 3.80 4.00 3.80 3.90 3.60 3.90 4.40   GLOBULIN gm/dL 3.6 3.0 3.6 2.8 3.2 2.8 3.0     Estimated Creatinine Clearance: 93.2 mL/min (by C-G formula based on SCr of 0.7 mg/dL).      Results from last 7 days   Lab Units 02/03/21  0454 02/02/21  0351 02/01/21  0345 01/31/21  0536 01/30/21  0533 01/29/21  0602 01/28/21  1257   WBC 10*3/mm3 9.57 10.89* 11.48* 11.06* 12.38* 7.22 10.76   RBC 10*6/mm3 4.93 5.07 5.14 4.71 4.50 4.56 4.83   HEMOGLOBIN g/dL 12.7 13.2 13.1 12.6 12.0 12.0 12.3   HEMATOCRIT % 39.9 41.2 42.0 38.2 37.1 36.9 40.0   MCV fL 80.9 81.3 81.7 81.1 82.4 80.9 82.8   MCH pg 25.8* 26.0* 25.5* 26.8 26.7 26.3* 25.5*   MCHC g/dL 31.8 32.0 31.2* 33.0 32.3 32.5 30.8*   RDW % 14.2 14.2 14.5 14.0 14.3 13.9 14.4   RDW-SD fl 40.8 41.1 42.4 41.1 42.5 40.5 42.9   MPV fL 9.7 9.5 9.4 9.4 9.3 9.6 9.3   PLATELETS 10*3/mm3 454* 456* 464* 395 382 391 405   NEUTROPHIL % % 71.6 70.9 72.0 71.9 76.3* 90.2* 86.6*   LYMPHOCYTE % % 16.2* 18.0* 17.9* 17.0* 13.8* 8.2* 7.5*   MONOCYTES % % 6.7 6.8 6.7 7.0 6.6 0.7* 4.3*   EOSINOPHIL % % 3.9 2.9 2.4 3.0 2.3 0.0* 0.6   BASOPHIL % % 0.8 0.7 0.5 0.6 0.4 0.3 0.6   IMM GRAN % % 0.8* 0.7* 0.5 0.5 0.6* 0.6* 0.4   NEUTROS ABS 10*3/mm3 6.85 7.71* 8.26* 7.95* 9.43* 6.52 9.33*   LYMPHS ABS 10*3/mm3 1.55 1.96 2.06 1.88 1.71 0.59*  0.81   MONOS ABS 10*3/mm3 0.64 0.74 0.77 0.77 0.82 0.05* 0.46   EOS ABS 10*3/mm3 0.37 0.32 0.27 0.33 0.29 0.00 0.06   BASOS ABS 10*3/mm3 0.08 0.08 0.06 0.07 0.05 0.02 0.06   IMMATURE GRANS (ABS) 10*3/mm3 0.08* 0.08* 0.06* 0.06* 0.08* 0.04 0.04   NRBC /100 WBC 0.0 0.0 0.0 0.0 0.0 0.0 0.0     Results from last 7 days   Lab Units 02/01/21  0248   PH, ARTERIAL pH units 7.394   PO2 ART mm Hg 76.9*   PCO2, ARTERIAL mm Hg 49.8*   HCO3 ART mmol/L 30.5*     Results from last 7 days   Lab Units 02/03/21  0454 02/02/21  0351 02/01/21  0345  01/28/21  1257   CK TOTAL U/L 84 85 42   < >  --    TROPONIN T ng/mL  --   --   --   --  <0.010    < > = values in this interval not displayed.     Results from last 7 days   Lab Units 01/28/21  1257   PROBNP pg/mL 22.4         Results from last 7 days   Lab Units 01/31/21  0536 01/28/21  2013 01/28/21  1358 01/28/21  1257   LACTATE mmol/L  --   --  1.8  --    PROCALCITONIN ng/mL 0.05 0.06  --  0.05         Microbiology Results (last 10 days)     Procedure Component Value - Date/Time    Blastomyces Antigen - Urine, Urine, Clean Catch [[id number]] Collected: 01/30/21 1948    Lab Status: Final result Specimen: Urine, Clean Catch Updated: 02/03/21 1004     Reference Lab Report see attached report    Narrative:      See original report from [facility].    COVID-19,[facility] IN-HOUSE, NP/OP SWAB IN UTM/VTM/SALINE TRANSPORT MEDIA,24 HR TAT - Swab, Nasopharynx [[id number]]  (Normal) Collected: 01/29/21 0547    Lab Status: Final result Specimen: Swab from Nasopharynx Updated: 01/29/21 1011     COVID19 Not Detected    Narrative:      Fact sheet for providers: https://www.fda.gov/media/748189/download     Fact sheet for patients: https://www.fda.gov/media/680928/download    Test performed by PCR.    Respiratory Culture - Sputum, Cough [[id number]] Collected: 01/28/21 2028    Lab Status: Final result Specimen: Sputum from Cough Updated: 01/30/21 0856     Respiratory Culture Light  growth (2+) Normal Respiratory Kathia: NO S.aureus/MRSA or Pseudomonas aeruginosa     Gram Stain Occasional Epithelial cells per low power field      Rare (1+) WBCs seen      Mixed bacterial morphotypes seen on Gram Stain    Respiratory Panel PCR w/COVID-19(SARS-CoV-2) YAMILE/FRANK/ROSE/[facility]I In-House, NP Swab in UTM/VTM, 3-4 HR TAT - Swab, Nasopharynx [[id number]]  (Normal) Collected: 01/28/21 135    Lab Status: Final result Specimen: Swab from Nasopharynx Updated: 01/28/21 1529     ADENOVIRUS, PCR Not Detected     Coronavirus 229E Not Detected     Coronavirus HKU1 Not Detected     Coronavirus NL63 Not Detected     Coronavirus OC43 Not Detected     COVID19 Not Detected     Human Metapneumovirus Not Detected     Human Rhinovirus/Enterovirus Not Detected     Influenza A PCR Not Detected     Influenza B PCR Not Detected     Parainfluenza Virus 1 Not Detected     Parainfluenza Virus 2 Not Detected     Parainfluenza Virus 3 Not Detected     Parainfluenza Virus 4 Not Detected     RSV, PCR Not Detected     Bordetella pertussis pcr Not Detected     Bordetella parapertussis PCR Not Detected     Chlamydophila pneumoniae PCR Not Detected     Mycoplasma pneumo by PCR Not Detected    Narrative:      Fact sheet for providers: https://docs.[employer]/wp-content/uploads/JTK1684-2483-YN5.1-EUA-Provider-Fact-Sheet-3.pdf    Fact sheet for patients: https://docs.[employer]/wp-content/uploads/KCA7170-7527-MV1.1-EUA-Patient-Fact-Sheet-1.pdf    Test performed by PCR.    Urine Culture - Urine, Urine, Catheter [[id number]]  (Abnormal)  (Susceptibility) Collected: 01/28/21 1359    Lab Status: Final result Specimen: Urine, Catheter Updated: 01/30/21 0054     Urine Culture >100,000 CFU/mL Escherichia coli    Susceptibility      Escherichia coli     JOSE     Ampicillin Resistant     Ampicillin + Sulbactam Intermediate     Cefazolin Susceptible     Cefepime Susceptible     Ceftazidime Susceptible     Ceftriaxone Susceptible      Gentamicin Susceptible     Levofloxacin Susceptible     Nitrofurantoin Susceptible     Piperacillin + Tazobactam Susceptible     Tetracycline Susceptible     Trimethoprim + Sulfamethoxazole Susceptible                    Blood Culture - Blood, Arm, Left [[id number]] Collected: 01/28/21 1358    Lab Status: Final result Specimen: Blood from Arm, Left Updated: 02/02/21 1430     Blood Culture No growth at 5 days    Blood Culture - Blood, Arm, Right [[id number]] Collected: 01/28/21 1358    Lab Status: Final result Specimen: Blood from Arm, Right Updated: 02/02/21 1430     Blood Culture No growth at 5 days              albuterol, 2.5 mg, Nebulization, 4x Daily - RT  enoxaparin, 40 mg, Subcutaneous, Q24H  FLUoxetine, 60 mg, Oral, Daily  guaiFENesin, 1,200 mg, Oral, Q12H  hydroCHLOROthiazide Oral, 12.5 mg, Oral, Daily  losartan, 50 mg, Oral, Daily  pantoprazole, 40 mg, Oral, BID AC  pramipexole, 1.5 mg, Oral, TID  sodium chloride, 10 mL, Intravenous, Q12H  sodium chloride, 4 mL, Nebulization, BID - RT           Diagnostics:  Xr Chest 1 View    Result Date: 1/28/2021  XR CHEST 1 VW-  HISTORY: Female who is 60 years-old,  short of breath  TECHNIQUE: Frontal view of the chest  COMPARISON: 11/14/2018  FINDINGS: There is mildly enlarged. Pulmonary vasculature is unremarkable. Mildly tortuous appearing aorta. Minimal likely scarring or atelectasis medially at the left base. No pleural effusion, or pneumothorax. No acute osseous process.      Minimal likely scarring or atelectasis at the left base. Mild cardiomegaly. The aorta appears mildly tortuous. Follow-up as clinically indicated.  This report was finalized on 1/28/2021 2:35 PM by Dr. Luan Estrada M.D.      Ct Angiogram Chest    Result Date: 1/28/2021  CT ANGIOGRAM CHEST-  INDICATIONS: Short of breath, cough  Radiation dose reduction techniques were utilized, including automated exposure control and exposure modulation based on body size.  TECHNIQUE: CT angiography of the  chest with three-dimensional reconstructions  COMPARISON: None available  FINDINGS:  No pulmonary embolism. No aortic dissection.  The heart size is normal without pericardial effusion. A few small subcentimeter short axis mediastinal lymph nodes are seen that are not significant by size criteria.  The airways appear clear.  No pleural effusion or pneumothorax.  The lungs show likely scarring and atelectasis in the lower lungs. A 9 mm nodular density right apical region is seen on image 92, recommend 3 month follow-up chest CT characterize change; further evaluation with positron emission tomography could be considered provided, but may be limited by small size of the lesion. A predominantly groundglass density measuring 2.9 cm is seen right apical region on axial image 18, infectious or neoplastic etiologies could be considered, follow-up of this lesion is also recommended. Scattered faint groundglass densities are seen in the posterior right middle lobe right lower lobe could be areas of developing pneumonia, including possibility of viral pneumonia.  Upper abdominal structures show nonspecific nodular thickening of the bilateral adrenal glands appear stable from 04/05/2016. Moderate to large hiatal hernia.  Degenerative changes are seen in the spine. No acute fracture is identified. Sternal foramen is seen.       Nodular and groundglass densities in the right lung, further evaluation/follow-up recommended as indicated.  No pulmonary embolism.  This report was finalized on 1/28/2021 6:45 PM by Dr. Luan Estrada M.D.      Results for orders placed during the hospital encounter of 01/28/21   Adult Transthoracic Echo Complete W/ Cont if Necessary Per Protocol    Narrative · Calculated left ventricular EF = 69.8% Estimated left ventricular EF =   70% Estimated left ventricular EF was in agreement with the calculated   left ventricular EF. Left ventricular systolic function is normal. The   left ventricular cavity  is small in size. Left ventricular wall thickness   is consistent with moderate concentric hypertrophy. All left ventricular   wall segments contract normally. Left ventricular diastolic function is   consistent with (grade I) impaired relaxation.  · Limited 2D imaging of cardiac valves that appear to be functioning   normally by doppler imaging              Active Hospital Problems    Diagnosis  POA   • **Shortness of breath [R06.02]  Unknown   • Cough [R05]  Yes   • Acute UTI (urinary tract infection) [N39.0]  Unknown   • Multiple sclerosis (CMS/HCC) [G35]  Unknown   • Essential hypertension [I10]  Unknown   • Hyperlipidemia [E78.5]  Unknown      Resolved Hospital Problems   No resolved problems to display.         Assessment/Plan     1. Hypoxic mild hypercapnic respiratory failure probably related to some of the the mild infiltrates and atelectasis.  She had some desaturation again last evening they are weaning her back down now.  I do not know whether she has some sleep apnea central or obstructive that may be contributing to her nighttime desaturation.  I have placed an order for a polysomnogram at discharge.  Check a blood gas tomorrow and nocturnal oximetry tonight  2. Right-sided pulmonary infiltrates groundglass nodular, cultures been negative procalcitonin was negative she completed 3 days of antibiotics for her cystitis infectious disease does not feel further antibiotics are indicated.  I wonder if she had a little aspiration pneumonitis.  I would recommend a follow-up CT scan in 6 to 12 weeks.  3. Atelectasis bibasilar she is getting OPEP, hypertonic saline and bronchodilator therapy.  Continue.  4. Hiatal hernia part  5. Multiple sclerosis  6. Hypertension  7. Hyperlipidemia  8. Cystitis, E. coli ;patient with chronic intermittent catheterization completed antibiotics  9. Possible sleep apnea she needs polysomnogram after discharge  10. Hyponatremia mild  11. Oropharyngeal dysphagia mild to moderate on  video swallow.    Plan for disposition:    Ash Wilcox MD  02/03/21  16:20 EST    Time:

## 2021-02-03 NOTE — PLAN OF CARE
Goal Outcome Evaluation:  Plan of Care Reviewed With: patient  Progress: improving  Outcome Summary: Pt demonstrated use of IS for RN while awake. Refused turning during night for RN. Pt cough is croupy and weak but with coaching will cough harder; not productive. No signs of aspiration when taking meds or drinking water. Pt tried to have BM on bedpanm, but would like to try to be assisted to BSC with use of gait belt. Outpatient sleep study per Dr. Wilcox. Pt on O2 @ 2L NC. Pt also declined assistance with brushing her teeth before bed. RN set all her supplies up for her on her table and reminded her how to use the controls on the bed to raise and lower her HOB. Continue to wean down O2 as able.

## 2021-02-03 NOTE — PLAN OF CARE
Goal Outcome Evaluation:        Outcome Summary: Pt seen for diet tolerance and VFSS f/u. Reviewed video swallow results from 2/2/21. Discussed recommended strategies of slow rate, positioning, double swallow, and use of single sip. Emphasized the importance of upright position and use of re-positioning during meals as pt needed to be re-positioned several times during the study due to leaning. Pt had spirometer at bedside. Encouraged her to keep using this and continue use at home. Pt verbalized understanding of all recommendations. Pt is tolerating reg/thin with no overt s/s.

## 2021-02-03 NOTE — PROGRESS NOTES
Name: Maritza Nance Darci ADMIT: 2021   : 1960  PCP: Provider, No Known    MRN: [id number] LOS: 6 days   AGE/SEX: 60 y.o. female  ROOM: [facility]   Subjective   Chief Complaint   Patient presents with   • Shortness of Breath      No CP NVD. Dyspnea is improved compared to yesterday but she feels fatigued.    Objective   Vital Signs  Temp:  [97.6 °F (36.4 °C)-98.2 °F (36.8 °C)] 97.6 °F (36.4 °C)  Heart Rate:  [71-94] 94  Resp:  [16-20] 19  BP: (132-158)/(74-79) 133/74  SpO2:  [91 %-99 %] 91 %  on  Flow (L/min):  [2-3.5] 2;   Device (Oxygen Therapy): room air  Body mass index is 30.45 kg/m².    Physical Exam  Vitals signs and nursing note reviewed.   Constitutional:       General: She is not in acute distress.  HENT:      Head: Normocephalic and atraumatic.   Eyes:      Extraocular Movements: Extraocular movements intact.      Conjunctiva/sclera: Conjunctivae normal.   Neck:      Musculoskeletal: Neck supple. No muscular tenderness.   Cardiovascular:      Rate and Rhythm: Normal rate and regular rhythm.      Pulses: Normal pulses.   Pulmonary:      Effort: Pulmonary effort is normal.      Breath sounds: Decreased breath sounds present. No wheezing.   Abdominal:      General: There is no distension.      Palpations: Abdomen is soft.      Tenderness: There is no abdominal tenderness. There is no guarding or rebound.   Musculoskeletal:      Right lower leg: Edema present.      Left lower leg: Edema present.      Comments: trace   Skin:     General: Skin is warm and dry.   Neurological:      Mental Status: She is alert. Mental status is at baseline.   Psychiatric:         Mood and Affect: Mood normal.         Behavior: Behavior normal.         Results Review:       I reviewed the patient's new clinical results.     I reviewed imaging, agree with interpretation     I reviewed telemetry, sinus      Results from last 7 days   Lab Units 21  0454 21  0351 21  0345 21  0536   WBC 10*3/mm3 9.57  10.89* 11.48* 11.06*   HEMOGLOBIN g/dL 12.7 13.2 13.1 12.6   PLATELETS 10*3/mm3 454* 456* 464* 395     Results from last 7 days   Lab Units 02/03/21 0454 02/02/21 0351 02/01/21 0345 01/31/21  0536   SODIUM mmol/L 134* 132* 133* 134*   POTASSIUM mmol/L 3.9 3.4* 3.5 3.5   CHLORIDE mmol/L 95* 92* 95* 95*   CO2 mmol/L 26.0 26.3 25.2 28.0   BUN mg/dL 23* 22* 24* 23*   CREATININE mg/dL 0.70 0.84 0.86 0.69   GLUCOSE mg/dL 129* 136* 125* 110*   Estimated Creatinine Clearance: 93.2 mL/min (by C-G formula based on SCr of 0.7 mg/dL).  Results from last 7 days   Lab Units 02/03/21 0454 02/02/21 0351 02/01/21 0345 01/31/21  0536   CALCIUM mg/dL 9.3 9.2 9.4 8.9   ALBUMIN g/dL 3.80 4.00 3.80 3.90   MAGNESIUM mg/dL  --  2.6  --   --           albuterol, 2.5 mg, Nebulization, 4x Daily - RT  enoxaparin, 40 mg, Subcutaneous, Q24H  FLUoxetine, 60 mg, Oral, Daily  guaiFENesin, 1,200 mg, Oral, Q12H  hydroCHLOROthiazide Oral, 12.5 mg, Oral, Daily  losartan, 50 mg, Oral, Daily  pantoprazole, 40 mg, Oral, BID AC  pramipexole, 1.5 mg, Oral, TID  sodium chloride, 10 mL, Intravenous, Q12H  sodium chloride, 4 mL, Nebulization, BID - RT       Diet Regular; Cardiac    Assessment/Plan      Active Hospital Problems    Diagnosis  POA   • **Shortness of breath [R06.02]  Unknown   • Cough [R05]  Yes   • Acute UTI (urinary tract infection) [N39.0]  Unknown   • Multiple sclerosis (CMS/HCC) [G35]  Unknown   • Essential hypertension [I10]  Unknown   • Hyperlipidemia [E78.5]  Unknown      Resolved Hospital Problems   No resolved problems to display.       · UTI, chronic intermittent cath: Ecoli sensitive to cephalosporin. Completed rocephin course for acute cystitis.  · Shortness of breath: Ground glass on CT possibly inhalant ALI, inflammation, or peripheral airway disease.  Preserved EF and grade 1 diastolic dysfunction on echo. Peripheral edema improved with IV lasix but respiratory status unchanged and no pulmonary edema so now off lasix. FVSS  showed some oropharyngeal dysphagai but no aspiration. Wean O2 as able (improved to 2L this morning). Pulmonology following.  · Multiple sclerosis: Neurology evaluated.  · Hypertension: Acceptable  · Elevated D-dimer: No PE on CTA, Covid negative x2, no dvt on duplex.  · Snoring: Outpatient PSG  · Prophylaxis: Lovenox  · Disposition: Home/possibly tomorrow    Christopher Caicedo MD  [facility]  02/03/21  10:41 EST    Dictated portions using Dragon dictation software.    During the entire encounter, I was wearing recommended PPE including face mask and eye protection. Hand sanitization was performed prior to entering room and upon exit.

## 2021-02-03 NOTE — THERAPY TREATMENT NOTE
Acute Care - Speech Language Pathology   Swallow Treatment Note [facility]     Patient Name: Maritza Nance Darci  : 1960  MRN: [id number]  Today's Date: 2/3/2021               Admit Date: 2021    Visit Dx:     ICD-10-CM ICD-9-CM   1. Cough  R05 786.2   2. Shortness of breath  R06.02 786.05   3. Acute cystitis without hematuria  N30.00 595.0   4. Suspected COVID-19 virus infection  Z20.822 V01.79   5. MS (multiple sclerosis) (CMS/HCC)  G35 340   6. GERARDO (obstructive sleep apnea)  G47.33 327.23     Patient Active Problem List   Diagnosis   • H/O total shoulder replacement, right   • Cough   • Acute UTI (urinary tract infection)   • Multiple sclerosis (CMS/HCC)   • Essential hypertension   • Hyperlipidemia   • Shortness of breath     Past Medical History:   Diagnosis Date   • Dawn esophagus     per patient   • Hyperlipidemia    • Hypertension    • Multiple sclerosis (CMS/HCC)    • PONV (postoperative nausea and vomiting)      Past Surgical History:   Procedure Laterality Date   • ADENOIDECTOMY     • PARATHYROIDECTOMY     • TOTAL SHOULDER ARTHROPLASTY W/ DISTAL CLAVICLE EXCISION Right 10/22/2018    Procedure: RT TOTAL SHOULDER REVERSE ARTHROPLASTY;  Surgeon: Bipin Dangelo MD;  Location: [facility] MAIN OR;  Service: Orthopedics        SWALLOW EVALUATION (last 72 hours)      SLP Adult Swallow Evaluation     Row Name 21 1300 21 1600 21 1500             Rehab Evaluation    Document Type  therapy note (daily note)  -AW  evaluation  -AW  evaluation  -HS      Subjective Information  no complaints  -AW  no complaints  -AW  complains of;weakness;fatigue;dyspnea  -HS      Patient Observations  alert;cooperative;agree to therapy  -AW  alert;cooperative;agree to therapy  -AW  alert;cooperative;agree to therapy  -HS      Care Plan Review  evaluation/treatment results reviewed;care plan/treatment goals reviewed;risks/benefits reviewed;current/potential barriers reviewed;patient/other agree to care plan   -AW  evaluation/treatment results reviewed;patient/other agree to care plan  -AW  evaluation/treatment results reviewed;care plan/treatment goals reviewed;risks/benefits reviewed;current/potential barriers reviewed;patient/other agree to care plan  -HS      Patient Effort  good  -AW  good  -AW  good  -HS      Symptoms Noted During/After Treatment  none  -AW  none  -AW  none  -HS         General Information    Patient Profile Reviewed  --  yes  -AW  yes  -HS      Pertinent History Of Current Problem  --  Admitted with dyspnea, AHRF. Hx of MS, hiatal hernia.   -AW  Admitted with dyspnea, AHRF. Hx of MS, hiatal hernia. Pulmonology in room during portion of eval. Suspects aspiration. Patient denies dysphagia.   -HS      Current Method of Nutrition  --  regular textures;thin liquids  -AW  regular textures;thin liquids  -HS      Precautions/Limitations, Vision  --  WFL;for purposes of eval  -AW  WFL;for purposes of eval  -HS      Precautions/Limitations, Hearing  --  WFL;for purposes of eval  -AW  WFL;for purposes of eval  -HS      Prior Level of Function-Communication  --  WFL  -AW  WFL  -HS      Prior Level of Function-Swallowing  --  no diet consistency restrictions  -AW  no diet consistency restrictions;safe, efficient swallowing in all situations  -HS      Plans/Goals Discussed with  --  patient;agreed upon  -AW  patient  -HS      Barriers to Rehab  --  none identified  -AW  none identified  -HS      Patient's Goals for Discharge  --  return home  -AW  patient did not state  -HS         Pain    Additional Documentation  --  Pain Scale: Numbers Pre/Post-Treatment (Group)  -AW  --         Pain Scale: Numbers Pre/Post-Treatment    Pretreatment Pain Rating  --  0/10 - no pain  -AW  --      Posttreatment Pain Rating  --  0/10 - no pain  -AW  --         Oral Motor Structure and Function    Dentition Assessment  --  natural, present and adequate  -AW  natural, present and adequate  -HS      Secretion Management  --  WNL/WFL   -AW  WNL/WFL  -HS      Mucosal Quality  --  moist, healthy  -AW  moist, healthy  -HS      Volitional Swallow  --  --  weak  -HS      Volitional Cough  --  --  weak  -HS         Oral Musculature and Cranial Nerve Assessment    Oral Motor General Assessment  --  generalized oral motor weakness  -AW  generalized oral motor weakness  -HS         General Eating/Swallowing Observations    Respiratory Support Currently in Use  --  nasal cannula  -AW  nasal cannula;other (see comments) below chin upon SLP entering room, 96% O2 on room air  -HS      Eating/Swallowing Skills  --  fed by SLP;self-fed  -AW  self-fed;appropriate self-feeding skills observed  -HS      Positioning During Eating  --  upright in bed  -AW  needs frequent re-positioning  -HS      Utensils Used  --  --  spoon;cup;straw  -HS      Consistencies Trialed  --  --  regular textures;mechanical soft, no mixed consistencies;pureed;ice chips;thin liquids  -HS         Respiratory    Respiratory Status  --  --  other (see comments) difficulty coordinating breathing/speech  -HS         Clinical Swallow Eval    Oral Prep Phase  --  --  WFL  -HS      Oral Transit  --  --  WFL  -HS      Oral Residue  --  --  WFL  -HS      Pharyngeal Phase  --  --  suspected pharyngeal impairment  -HS      Esophageal Phase  --  --  suspected esophageal impairment  -HS      Clinical Swallow Evaluation Summary  --  --  Patient demonstrates weak coughing at baseline. She has difficulty coordinating breathing with talking (and likely swallowing at times). Pulmonologist states congestion is in her throat versus her lungs during his assessment (SLP present). Decreased laryngeal elevation upon palpation with ice chip trials. Audible swallow with straw sips of thin liquids. No overt s/s of aspiration with cup sips of thin liquids, puree, mechanical soft, or regular. Concerned for swallow fatigue.   -HS         MBS/VFSS    Utensils Used  --  spoon;cup;straw  -AW  --      Consistencies Trialed   --  regular textures;soft textures;mixed consistency;pureed;thin liquids;nectar/syrup-thick liquids  -AW  --         MBS/VFSS Interpretation    VFSS Summary  --  Pt exhibited mild oropharyngeal dysphagia characterized by mistiming, tongue base weakness, and decreased hyolaryngeal excursion. Premature spillage to the valleculae was noted with thin, pureed, regular, and to the pyriforms with soft and mixed consistencies. No penetration or aspiration was noted with thin (cup/straw), pureed, soft solids, mixed consistency, or regular solids. Pt had mild pharyngeal residuals (tongue base, valleculae, aryepiglottic folds, and pyriforms) which she cleared with a spontaneous swallow. These residuals increase risk of aspiration making use of swallow strategies important.   -AW  --         SLP Communication to Radiology    Summary Statement  --  Pt exhibited mild oropharyngeal dysphagia characterized by mistiming, tongue base weakness, and decreased hyolaryngeal excursion. Premature spillage to the valleculae was noted with thin, pureed, regular, and to the pyriforms with soft and mixed consistencies. No penetration or aspiration was noted with thin (cup/straw), pureed, soft solids, mixed consistency, or regular solids. Pt had mild pharyngeal residuals (tongue base, valleculae, aryepiglottic folds, and pyriforms) which she cleared with a spontaneous swallow. These residuals increase risk of aspiration making use of swallow strategies important.   -AW  --         Clinical Impression    SLP Swallowing Diagnosis  --  R/O pharyngeal dysphagia  -AW  R/O pharyngeal dysphagia;esophageal dysphagia  -HS      Functional Impact  --  risk of aspiration/pneumonia  -AW  risk of aspiration/pneumonia;risk of malnutrition;risk of dehydration  -HS      Rehab Potential/Prognosis, Swallowing  --  good, to achieve stated therapy goals  -AW  good, to achieve stated therapy goals  -HS      Swallow Criteria for Skilled Therapeutic Interventions Met   --  demonstrates skilled criteria  -AW  demonstrates skilled criteria  -HS         Recommendations    Therapy Frequency (Swallow)  --  PRN  -AW  PRN  -HS      Predicted Duration Therapy Intervention (Days)  --  until discharge  -AW  until discharge  -HS      SLP Diet Recommendation  --  regular textures;thin liquids  -AW  regular textures;thin liquids  -HS      Recommended Diagnostics  --  --  VFSS ([facility])  -HS      Recommended Precautions and Strategies  --  upright posture during/after eating;small bites of food and sips of liquid;multiple swallows per bite of food;multiple swallows per sip of liquid  -AW  upright posture during/after eating;small bites of food and sips of liquid;no straw;fatigue precautions;reflux precautions  -HS      Oral Care Recommendations  --  Oral Care BID/PRN  -AW  Oral Care before breakfast, after meals and PRN  -HS      SLP Rec. for Method of Medication Administration  --  meds whole;with pudding or applesauce  -AW  as tolerated  -HS      Monitor for Signs of Aspiration  --  yes;notify SLP if any concerns  -AW  yes;notify SLP if any concerns  -HS      Anticipated Discharge Disposition (SLP)  --  unknown  -AW  anticipate therapy at next level of care  -HS         Swallow Goals (SLP)    Oral Nutrition/Hydration Goal Selection (SLP)  --  oral nutrition/hydration, SLP goal 1  -AW  --         Oral Nutrition/Hydration Goal 1 (SLP)    Oral Nutrition/Hydration Goal 1, SLP  --  Pt will safely tolerate the least restrictive diet with no s/s of aspiration.  -AW  --      Time Frame (Oral Nutrition/Hydration Goal 1, SLP)  --  by discharge  -AW  --        User Key  (r) = Recorded By, (t) = Taken By, (c) = Cosigned By    Initials Name Effective Dates     Judith Gonzalez, MS CCC-SLP 06/08/18 -     Sahra Curran, MS CCC-SLP 06/08/18 -           EDUCATION  The patient has been educated in the following areas:   Dysphagia (Swallowing Impairment) Oral Care/Hydration.    SLP Recommendation and Plan                                                              Outcome Summary: Pt seen for diet tolerance and VFSS f/u. Reviewed video swallow results from 2/2/21. Discussed recommended strategies of slow rate, positioning, double swallow, and use of single sip. Emphasized the importance of upright position and use of re-positioning during meals as pt needed to be re-positioned several times during the study due to leaning. Pt had spirometer at bedside. Encouraged her to keep using this and continue use at home. Pt verbalized understanding of all recommendations. Pt is tolerating reg/thin with no overt s/s.     Patient was not wearing a face mask during this therapy encounter. Therapist used appropriate personal protective equipment including mask, eye protection and gloves.  Mask used was standard procedure mask. Appropriate PPE was worn during the entire therapy session. Hand hygiene was completed before and after therapy session. Patient is not in enhanced droplet precautions.     SLP GOALS     Row Name 02/03/21 1300 02/02/21 1600          Oral Nutrition/Hydration Goal 1 (SLP)    Oral Nutrition/Hydration Goal 1, SLP  Pt seen for diet tolerance and VFSS f/u. Reviewed video swallow results from 2/2/21. Discussed recommended strategies of slow rate, positioning, double swallow, and use of single sip. Emphasized the importance of upright position and use of re-positioning during meals as pt needed to be re-positioned several times during the study due to leaning. Pt had spirometer at bedside. Encouraged her to keep using this and continue use at home. Pt verbalized understanding of all recommendations. Pt is tolerating reg/thin with no overt s/s.    -AW  Pt will safely tolerate the least restrictive diet with no s/s of aspiration.  -AW     Time Frame (Oral Nutrition/Hydration Goal 1, SLP)  --  by discharge  -AW       User Key  (r) = Recorded By, (t) = Taken By, (c) = Cosigned By    Initials Name Provider Type    ARMANDO Thomas  MS Sahra CCC-SLP Speech and Language Pathologist           SLP Outcome Measures (last 72 hours)      SLP Outcome Measures     Row Name 02/01/21 1500             SLP Outcome Measures    Outcome Measure Used?  Adult NOMS  -HS         Adult FCM Scores    FCM Chosen  Swallowing  -HS      Swallowing FCM Score  5  -HS        User Key  (r) = Recorded By, (t) = Taken By, (c) = Cosigned By    Initials Name Effective Dates    HS Judith Gonzalez, MS CCC-SLP 06/08/18 -            Time Calculation:   Time Calculation- SLP     Row Name 02/03/21 1336             Time Calculation- SLP    SLP Start Time  1000  -AW      SLP Received On  02/03/21  -AW        User Key  (r) = Recorded By, (t) = Taken By, (c) = Cosigned By    Initials Name Provider Type    AW Sahra Thomas, MS CCC-SLP Speech and Language Pathologist          Therapy Charges for Today     Code Description Service Date Service Provider Modifiers Qty    05893253647 HC ST MOTION FLUORO EVAL SWALLOW 5 2/2/2021 Sahra Thomas MS CCC-SLP GN 1    17686188410 HC ST TREATMENT SWALLOW 3 2/3/2021 Sahra Thomas MS CCC-SLP GN 1               Sahra Thomas MS CCC-SLP  2/3/2021

## 2021-02-04 VITALS
TEMPERATURE: 98.2 F | HEIGHT: 65 IN | DIASTOLIC BLOOD PRESSURE: 80 MMHG | BODY MASS INDEX: 30.49 KG/M2 | RESPIRATION RATE: 18 BRPM | SYSTOLIC BLOOD PRESSURE: 150 MMHG | OXYGEN SATURATION: 93 % | HEART RATE: 78 BPM | WEIGHT: 183 LBS

## 2021-02-04 PROBLEM — R13.12 OROPHARYNGEAL DYSPHAGIA: Status: ACTIVE | Noted: 2021-02-04

## 2021-02-04 LAB
ALBUMIN SERPL-MCNC: 3.9 G/DL (ref 3.5–5.2)
ALBUMIN/GLOB SERPL: 1.1 G/DL
ALP SERPL-CCNC: 179 U/L (ref 39–117)
ALT SERPL W P-5'-P-CCNC: 28 U/L (ref 1–33)
ANION GAP SERPL CALCULATED.3IONS-SCNC: 12.5 MMOL/L (ref 5–15)
ARTERIAL PATENCY WRIST A: POSITIVE
AST SERPL-CCNC: 37 U/L (ref 1–32)
ATMOSPHERIC PRESS: 746.6 MMHG
BASE EXCESS BLDA CALC-SCNC: 2.2 MMOL/L (ref 0–2)
BASOPHILS # BLD AUTO: 0.07 10*3/MM3 (ref 0–0.2)
BASOPHILS NFR BLD AUTO: 0.6 % (ref 0–1.5)
BDY SITE: ABNORMAL
BILIRUB SERPL-MCNC: 0.2 MG/DL (ref 0–1.2)
BUN SERPL-MCNC: 22 MG/DL (ref 6–20)
BUN/CREAT SERPL: 27.8 (ref 7–25)
CALCIUM SPEC-SCNC: 9.5 MG/DL (ref 8.6–10.5)
CHLORIDE SERPL-SCNC: 95 MMOL/L (ref 98–107)
CK SERPL-CCNC: 85 U/L (ref 20–180)
CO2 SERPL-SCNC: 24.5 MMOL/L (ref 22–29)
CREAT SERPL-MCNC: 0.79 MG/DL (ref 0.57–1)
CRP SERPL-MCNC: 1.73 MG/DL (ref 0–0.5)
D DIMER PPP FEU-MCNC: 0.74 MCGFEU/ML (ref 0–0.49)
DEPRECATED RDW RBC AUTO: 42.1 FL (ref 37–54)
EOSINOPHIL # BLD AUTO: 0.25 10*3/MM3 (ref 0–0.4)
EOSINOPHIL NFR BLD AUTO: 2.2 % (ref 0.3–6.2)
ERYTHROCYTE [DISTWIDTH] IN BLOOD BY AUTOMATED COUNT: 14.5 % (ref 12.3–15.4)
FERRITIN SERPL-MCNC: 29.3 NG/ML (ref 13–150)
FIBRINOGEN PPP-MCNC: 525 MG/DL (ref 219–464)
GFR SERPL CREATININE-BSD FRML MDRD: 75 ML/MIN/1.73
GLOBULIN UR ELPH-MCNC: 3.4 GM/DL
GLUCOSE SERPL-MCNC: 150 MG/DL (ref 65–99)
H CAPSUL AG SPEC QL: NORMAL
HCO3 BLDA-SCNC: 27.6 MMOL/L (ref 22–28)
HCT VFR BLD AUTO: 38.9 % (ref 34–46.6)
HGB BLD-MCNC: 12.9 G/DL (ref 12–15.9)
IMM GRANULOCYTES # BLD AUTO: 0.09 10*3/MM3 (ref 0–0.05)
IMM GRANULOCYTES NFR BLD AUTO: 0.8 % (ref 0–0.5)
LDH SERPL-CCNC: 195 U/L (ref 135–214)
LYMPHOCYTES # BLD AUTO: 1.92 10*3/MM3 (ref 0.7–3.1)
LYMPHOCYTES NFR BLD AUTO: 17.1 % (ref 19.6–45.3)
MCH RBC QN AUTO: 26.6 PG (ref 26.6–33)
MCHC RBC AUTO-ENTMCNC: 33.2 G/DL (ref 31.5–35.7)
MCV RBC AUTO: 80.2 FL (ref 79–97)
MODALITY: ABNORMAL
MONOCYTES # BLD AUTO: 0.69 10*3/MM3 (ref 0.1–0.9)
MONOCYTES NFR BLD AUTO: 6.1 % (ref 5–12)
NEUTROPHILS NFR BLD AUTO: 73.2 % (ref 42.7–76)
NEUTROPHILS NFR BLD AUTO: 8.24 10*3/MM3 (ref 1.7–7)
NRBC BLD AUTO-RTO: 0 /100 WBC (ref 0–0.2)
PCO2 BLDA: 44.4 MM HG (ref 35–45)
PH BLDA: 7.4 PH UNITS (ref 7.35–7.45)
PLATELET # BLD AUTO: 471 10*3/MM3 (ref 140–450)
PMV BLD AUTO: 9.2 FL (ref 6–12)
PO2 BLDA: 63.1 MM HG (ref 80–100)
POTASSIUM SERPL-SCNC: 3.8 MMOL/L (ref 3.5–5.2)
PROT SERPL-MCNC: 7.3 G/DL (ref 6–8.5)
RBC # BLD AUTO: 4.85 10*6/MM3 (ref 3.77–5.28)
SAO2 % BLDCOA: 91.6 % (ref 92–99)
SET MECH RESP RATE: 18
SODIUM SERPL-SCNC: 132 MMOL/L (ref 136–145)
WBC # BLD AUTO: 11.26 10*3/MM3 (ref 3.4–10.8)

## 2021-02-04 PROCEDURE — 85379 FIBRIN DEGRADATION QUANT: CPT | Performed by: NURSE PRACTITIONER

## 2021-02-04 PROCEDURE — 85025 COMPLETE CBC W/AUTO DIFF WBC: CPT | Performed by: NURSE PRACTITIONER

## 2021-02-04 PROCEDURE — 82803 BLOOD GASES ANY COMBINATION: CPT

## 2021-02-04 PROCEDURE — 94799 UNLISTED PULMONARY SVC/PX: CPT

## 2021-02-04 PROCEDURE — 83615 LACTATE (LD) (LDH) ENZYME: CPT | Performed by: NURSE PRACTITIONER

## 2021-02-04 PROCEDURE — 36415 COLL VENOUS BLD VENIPUNCTURE: CPT | Performed by: NURSE PRACTITIONER

## 2021-02-04 PROCEDURE — 82550 ASSAY OF CK (CPK): CPT | Performed by: NURSE PRACTITIONER

## 2021-02-04 PROCEDURE — 86140 C-REACTIVE PROTEIN: CPT | Performed by: NURSE PRACTITIONER

## 2021-02-04 PROCEDURE — 82728 ASSAY OF FERRITIN: CPT | Performed by: NURSE PRACTITIONER

## 2021-02-04 PROCEDURE — 85384 FIBRINOGEN ACTIVITY: CPT | Performed by: NURSE PRACTITIONER

## 2021-02-04 PROCEDURE — 80053 COMPREHEN METABOLIC PANEL: CPT | Performed by: NURSE PRACTITIONER

## 2021-02-04 PROCEDURE — 36600 WITHDRAWAL OF ARTERIAL BLOOD: CPT

## 2021-02-04 RX ORDER — ALBUTEROL SULFATE 90 UG/1
2 AEROSOL, METERED RESPIRATORY (INHALATION) EVERY 4 HOURS PRN
Qty: 8 G | Refills: 0 | Status: SHIPPED | OUTPATIENT
Start: 2021-02-04

## 2021-02-04 RX ADMIN — ALBUTEROL SULFATE 2.5 MG: 2.5 SOLUTION RESPIRATORY (INHALATION) at 07:37

## 2021-02-04 RX ADMIN — PANTOPRAZOLE SODIUM 40 MG: 40 TABLET, DELAYED RELEASE ORAL at 06:43

## 2021-02-04 RX ADMIN — HYDROCHLOROTHIAZIDE 12.5 MG: 12.5 CAPSULE ORAL at 09:38

## 2021-02-04 RX ADMIN — LOSARTAN POTASSIUM 50 MG: 50 TABLET, FILM COATED ORAL at 09:38

## 2021-02-04 RX ADMIN — SODIUM CHLORIDE 4 ML: 7 NEBU SOLN,3 % NEBU at 07:41

## 2021-02-04 RX ADMIN — SODIUM CHLORIDE, PRESERVATIVE FREE 10 ML: 5 INJECTION INTRAVENOUS at 09:39

## 2021-02-04 RX ADMIN — PRAMIPEXOLE DIHYDROCHLORIDE 1.5 MG: 1.5 TABLET ORAL at 09:38

## 2021-02-04 RX ADMIN — GUAIFENESIN 1200 MG: 600 TABLET, EXTENDED RELEASE ORAL at 09:37

## 2021-02-04 RX ADMIN — FLUOXETINE HYDROCHLORIDE 60 MG: 20 CAPSULE ORAL at 09:37

## 2021-02-04 RX ADMIN — ALBUTEROL SULFATE 2.5 MG: 2.5 SOLUTION RESPIRATORY (INHALATION) at 11:15

## 2021-02-04 NOTE — DISCHARGE SUMMARY
"    Date of Admission: 1/28/2021  Date of Discharge:  2/4/2021  Primary Care Physician: Provider, No Known     Discharge Diagnosis:  Active Hospital Problems    Diagnosis  POA   • **Shortness of breath [R06.02]  Unknown   • Oropharyngeal dysphagia [R13.12]  Yes   • Cough [R05]  Yes   • Acute UTI (urinary tract infection) [N39.0]  Unknown   • Multiple sclerosis (CMS/HCC) [G35]  Unknown   • Essential hypertension [I10]  Unknown   • Hyperlipidemia [E78.5]  Unknown      Resolved Hospital Problems   No resolved problems to display.       Presenting Problem/History of Present Illness:  Shortness of breath [R06.02]  Cough [R05]  MS (multiple sclerosis) (CMS/HCC) [G35]  Acute cystitis without hematuria [N30.00]  Suspected COVID-19 virus infection [Z20.822]     Mrs. Bejarano is a 60-year-old female with history of multiple sclerosis, hypertension, hyperlipidemia who presents to the emergency room with shortness of breath. Patient states she started having shortness of breath this a.m. that has gradually worsened. She initially had a dry cough that has become productive this afternoon. She denies having any fever at home that she is aware of, but she did have some chills and noticed that she had some increased urine output, which she states normally indicates she has a fever some infection. With her history of multiple sclerosis she has been written with bilateral lower extremity weakness and uses a motorized scooter, her  lifts her from the bed to the scooter. She does self catheter self 3-4 times a day she states. She is not noticed her urine being at any change in color, no blood in her urine. She has been having normal bowel movements, however this morning her bowel movement was \"softer\" than normal. She denies diarrhea or blood in her stool. She has not started on any new medication that she is aware of. She denies any exposure to known COVID-19.  In the emergency room patient respiratory viral panel was negative " including COVID-19, CTA done does shows nodular groundglass densities in the right lung, no pulmonary embolism. Urinalysis is positive for nitrites. Patient was started on Rocephin in the emergency room. Patient did have allergy to contrast dye, but was premedicated per protocol and states that she has done well with contrast in the past using that protocol. BNP was 22.4, glucose 122, sodium 141, potassium 4.7, creatinine 0.88, BUN 19, white blood cell count 10.76, hemoglobin 12.3, hematocrit 40.0, platelets 405. Lactate 1.8, pro calcitonin 0.05. Patient will be kept in Covid isolation for now and likely retest in a.m.    Hospital Course:  The patient is a 60 y.o. female who presented with shortness of breath.  She was found to have acute cystitis secondary to chronic intermittent catheterization which is from multiple sclerosis.  Urine culture grew E. coli sensitive to cephalosporin and she has completed a Rocephin course here.  She is on prophylactic antibiotic for recurrent urinary tract infections as an outpatient and can resume that as previously instructed.    CT did have groundglass changes concerning for atypical infection or inhalant acute lung injury possible peripheral airway disease.  Pulmonology was consulted.  She had peripheral edema on admission so even though BNP was not elevated we did give diuresis.  This improved her edema but did not really improve her respiratory status.  Echo was obtained and this showed preserved ejection fraction and only grade 1 diastolic dysfunction.  Diuretics were stopped.  Covid was negative x2.  She had some issues with secretions so neurology and speech therapy evaluated.  She did not have any evidence of MS exacerbation.  She was found to have oropharyngeal dysphagia but did not have any aspiration on speech evaluation.  She also likely has sleep apnea since she was snoring and having intermittent daytime somnolence.  Plan is for outpatient polysomnography here to  determine if it is obstructive or possibly having central component.  She did have an elevated D-dimer on admission and did not have any clot on CTA or duplex.  She is currently been weaned back to room air and is feeling improved.  She can be discharged home and will need to follow-up with primary care and pulmonology.  She will need a repeat CT scan in about 6 to 12 weeks to monitor.    Exam Today:  Constitutional:       General: She is not in acute distress.  HENT:      Head: Normocephalic and atraumatic.   Eyes:      Extraocular Movements: Extraocular movements intact.      Conjunctiva/sclera: Conjunctivae normal.   Neck:      Musculoskeletal: Neck supple. No muscular tenderness.   Cardiovascular:      Rate and Rhythm: Normal rate and regular rhythm.      Pulses: Normal pulses.   Pulmonary:      Effort: Pulmonary effort is normal.      Breath sounds: Decreased breath sounds present. No wheezing.   Abdominal:      General: There is no distension.      Palpations: Abdomen is soft.      Tenderness: There is no abdominal tenderness. There is no guarding or rebound.   Musculoskeletal:      Right lower leg: Edema present.      Left lower leg: Edema present.      Comments: trace   Skin:     General: Skin is warm and dry.   Neurological:      Mental Status: She is alert. Mental status is at baseline.   Psychiatric:         Mood and Affect: Mood normal.         Behavior: Behavior normal.     Results:  CXR  Minimal likely scarring or atelectasis at the left base.  Mild cardiomegaly. The aorta appears mildly tortuous. Follow-up as  clinically indicated.    CTA Chest  Nodular and groundglass densities in the right lung, further  evaluation/follow-up recommended as indicated.    No pulmonary embolism.    CXR  The lungs are well-expanded with some predominantly linear  density at the left base likely representing atelectasis as also noted  on the CT scan performed 4 days ago. There is a small hiatus hernia best  seen on the  previous CT scan. I cannot completely exclude some minimal  changes of acute bronchitis with peribronchial thickening better  visualized on the CT scan. The heart remains top normal in size.    FVSS  The patient exhibits mild-to-moderate oropharyngeal dysphagia  with mistiming of swallowing. Some residuals were present which cleared  with spontaneous swallow. There is no penetration or aspiration during  the examination. Please also see the speech therapist's report.    TTE  · Calculated left ventricular EF = 69.8% Estimated left ventricular EF = 70% Estimated left ventricular EF was in agreement with the calculated left ventricular EF. Left ventricular systolic function is normal. The left ventricular cavity is small in size. Left ventricular wall thickness is consistent with moderate concentric hypertrophy. All left ventricular wall segments contract normally. Left ventricular diastolic function is consistent with (grade I) impaired relaxation.  · Limited 2D imaging of cardiac valves that appear to be functioning normally by doppler imaging    BLE Duplex  · Normal bilateral lower extremity venous duplex scan.    Procedures Performed:         Consults:   Consults     Date and Time Order Name Status Description    2/1/2021 1020 Inpatient Neurology Consult General Completed     1/30/2021 1043 Inpatient Infectious Diseases Consult Completed     1/29/2021 1304 Inpatient Pulmonology Consult Completed     1/28/2021 1904 [facility] (on-call MD unless specified) Details Completed            Discharge Disposition:  Home or Self Care    Discharge Medications:     Discharge Medications      New Medications      Instructions Start Date   albuterol sulfate  (90 Base) MCG/ACT inhaler  Commonly known as: PROVENTIL HFA;VENTOLIN HFA;PROAIR HFA   2 puffs, Inhalation, Every 4 Hours PRN         Continue These Medications      Instructions Start Date   atorvastatin 10 MG tablet  Commonly known as: LIPITOR   10 mg, Oral, Daily       baclofen 10 MG tablet  Commonly known as: LIORESAL   20 mg, Oral, 3 Times Daily      CITRACAL +D3 PO   1 tablet, Oral, Daily      clonazePAM 2 MG tablet  Commonly known as: KlonoPIN   Oral      FLUoxetine 20 MG capsule  Commonly known as: PROzac   60 mg, Oral, Daily      HYOSCYAMINE PO   0.125 mg, Oral, Daily      losartan-hydrochlorothiazide 50-12.5 MG per tablet  Commonly known as: HYZAAR   1 tablet, Oral, Daily      nitrofurantoin (macrocrystal-monohydrate) 100 MG capsule  Commonly known as: MACROBID   100 mg, Oral, 2 Times Daily      NON FORMULARY   675 mg, Oral, Daily, Protandim - OTC       oxyCODONE-acetaminophen  MG per tablet  Commonly known as: PERCOCET   2 tablets, Oral, Every 12 Hours      OxyCONTIN 10 MG 12 hr tablet  Generic drug: oxyCODONE   20 mg, Oral, Every 12 Hours PRN      oxyCODONE ER 20 MG 12 hr tablet  Commonly known as: OxyCONTIN   20 mg, Oral, Every 12 Hours      pantoprazole 40 MG EC tablet  Commonly known as: PROTONIX   Oral, 2 Times Daily      phenazopyridine 200 MG tablet  Commonly known as: PYRIDIUM   200 mg, Oral, 3 Times Daily PRN      pramipexole 0.5 MG tablet  Commonly known as: MIRAPEX   1.5 mg, Oral, 3 Times Daily      promethazine 25 MG tablet  Commonly known as: PHENERGAN   Oral         Stop These Medications    cephalexin 500 MG capsule  Commonly known as: KEFLEX     losartan 50 MG tablet  Commonly known as: COZAAR            Discharge Diet:   Diet Instructions     Diet: Cardiac      Discharge Diet: Cardiac          Activity at Discharge:   Activity Instructions     Activity as Tolerated            Follow-up Appointments:  Follow-up Information     Provider, No Known Follow up.    Contact information:  [facility] [address] [phone]             Sohan Jacobsen MD .    Specialties: Internal Medicine, Hospitalist  Contact information:  [address] [facility] [address] [address] [phone]             Ash Wilcox MD Follow up.     Specialty: Pulmonary Disease  Contact information:  [facility] SABA  [address] [id number] [phone]                   Test Results Pending at Discharge:       Christopher Caicedo MD  02/04/21  09:41 EST    Time Spent on Discharge Activities: >30 minutes    Dictated portions using Dragon dictation software.  During the entire encounter, I was wearing recommended PPE including face mask and eye protection. Hand sanitization was performed prior to entering room and upon exit.

## 2021-02-04 NOTE — PROGRESS NOTES
Continued Stay Note  [facility]     Patient Name: Maritza Bejarano  MRN: [id number]  Today's Date: 2/4/2021    Admit Date: 1/28/2021    Discharge Plan     Row Name 02/04/21 1132       Plan    Plan  Home with spouse    Plan Comments  Patient plasn home with her spouse.  Updated her Facesheet information and clarified with [facility] that the patient is seen by DANA Stafford.  They were notified of DC and call patient and schedule a hospital follow up appointment within 7-10 days.  Patient has been weaned from oxygen and denies DME needs.  Spouse will transport.....................Trena Spears RN        Discharge Codes    No documentation.       Expected Discharge Date and Time     Expected Discharge Date Expected Discharge Time    Feb 4, 2021             Trena Spears RN

## 2021-02-05 ENCOUNTER — READMISSION MANAGEMENT (OUTPATIENT)
Dept: CALL CENTER | Facility: HOSPITAL | Age: 61
End: 2021-02-05

## 2021-02-05 ENCOUNTER — TRANSCRIBE ORDERS (OUTPATIENT)
Dept: SLEEP MEDICINE | Facility: HOSPITAL | Age: 61
End: 2021-02-05

## 2021-02-05 DIAGNOSIS — Z01.818 OTHER SPECIFIED PRE-OPERATIVE EXAMINATION: Primary | ICD-10-CM

## 2021-02-05 NOTE — OUTREACH NOTE
Prep Survey      Responses   [facility] patient discharged from?  [place]   Is LACE score < 7 ?  No   Emergency Room discharge w/ pulse ox?  No   Eligibility  Readm Mgmt   Discharge diagnosis  Shortness of breath, cough, UTI   Does the patient have one of the following disease processes/diagnoses(primary or secondary)?  Other   Does the patient have Home health ordered?  No   Is there a DME ordered?  No   Comments regarding appointments  Needs f/u scheduled   Prep survey completed?  Yes          Geri Boogie RN

## 2021-02-08 ENCOUNTER — READMISSION MANAGEMENT (OUTPATIENT)
Dept: CALL CENTER | Facility: HOSPITAL | Age: 61
End: 2021-02-08

## 2021-02-08 NOTE — OUTREACH NOTE
Medical Week 1 Survey      Responses   [facility] patient discharged from?  [place]   Does the patient have one of the following disease processes/diagnoses(primary or secondary)?  Other   Week 1 attempt successful?  Yes   Call start time  1520   Call end time  1524   Discharge diagnosis  Shortness of breath, cough, UTI   Person spoke with today (if not patient) and relationship  Donald-spouse    Meds reviewed with patient/caregiver?  Yes   Is the patient having any side effects they believe may be caused by any medication additions or changes?  No   Does the patient have all medications ordered at discharge?  Yes   Is the patient taking all medications as directed (includes completed medication regime)?  Yes   Does the patient have a primary care provider?   Yes   Does the patient have an appointment with their PCP within 7 days of discharge?  No   What is preventing the patient from scheduling follow up appointments within 7 days of discharge?  Haven't had time   Nursing Interventions  Advised patient to make appointment   Has the patient kept scheduled appointments due by today?  N/A   Psychosocial issues?  No   Did the patient receive a copy of their discharge instructions?  Yes   Nursing interventions  Reviewed instructions with patient   What is the patient's perception of their health status since discharge?  Improving   Is the patient/caregiver able to teach back signs and symptoms related to disease process for when to call PCP?  Yes   Is the patient/caregiver able to teach back signs and symptoms related to disease process for when to call 911?  Yes   Is the patient/caregiver able to teach back the hierarchy of who to call/visit for symptoms/problems? PCP, Specialist, Home health nurse, Urgent Care, ED, 911  Yes   Week 1 call completed?  Yes          Rosanne Shell RN

## 2021-02-08 NOTE — PROGRESS NOTES
Case Management Discharge Note      Final Note: Discharge home.         Selected Continued Care - Discharged on 2/4/2021 Admission date: 1/28/2021 - Discharge disposition: Home or Self Care    Destination    No services have been selected for the patient.              Durable Medical Equipment    No services have been selected for the patient.              Dialysis/Infusion    No services have been selected for the patient.              Home Medical Care    No services have been selected for the patient.              Therapy    No services have been selected for the patient.              Community Resources    No services have been selected for the patient.                  Transportation Services  Private: Car    Final Discharge Disposition Code: 01 - home or self-care

## 2021-02-17 ENCOUNTER — READMISSION MANAGEMENT (OUTPATIENT)
Dept: CALL CENTER | Facility: HOSPITAL | Age: 61
End: 2021-02-17

## 2021-02-17 NOTE — OUTREACH NOTE
Medical Week 2 Survey      Responses   [facility] patient discharged from?  [place]   Does the patient have one of the following disease processes/diagnoses(primary or secondary)?  Other   Week 2 attempt successful?  No   Unsuccessful attempts  Attempt 1          Wai Degroot RN

## 2021-02-18 ENCOUNTER — READMISSION MANAGEMENT (OUTPATIENT)
Dept: CALL CENTER | Facility: HOSPITAL | Age: 61
End: 2021-02-18

## 2021-02-18 NOTE — OUTREACH NOTE
Medical Week 2 Survey      Responses   [facility] patient discharged from?  [place]   Does the patient have one of the following disease processes/diagnoses(primary or secondary)?  Other   Week 2 attempt successful?  No   Unsuccessful attempts  Attempt 2          Katrin Alvarez RN

## 2021-02-24 ENCOUNTER — READMISSION MANAGEMENT (OUTPATIENT)
Dept: CALL CENTER | Facility: HOSPITAL | Age: 61
End: 2021-02-24

## 2021-02-24 NOTE — OUTREACH NOTE
Medical Week 3 Survey      Responses   [facility] patient discharged from?  [place]   Does the patient have one of the following disease processes/diagnoses(primary or secondary)?  Other   Week 3 attempt successful?  No   Unsuccessful attempts  Attempt 1          Minal Lomas RN

## 2021-02-26 ENCOUNTER — LAB (OUTPATIENT)
Dept: LAB | Facility: HOSPITAL | Age: 61
End: 2021-02-26

## 2021-02-26 DIAGNOSIS — Z01.818 OTHER SPECIFIED PRE-OPERATIVE EXAMINATION: ICD-10-CM

## 2021-02-26 PROCEDURE — C9803 HOPD COVID-19 SPEC COLLECT: HCPCS

## 2021-02-26 PROCEDURE — U0004 COV-19 TEST NON-CDC HGH THRU: HCPCS

## 2021-02-27 LAB — SARS-COV-2 ORF1AB RESP QL NAA+PROBE: NOT DETECTED

## 2021-03-01 ENCOUNTER — APPOINTMENT (OUTPATIENT)
Dept: SLEEP MEDICINE | Facility: HOSPITAL | Age: 61
End: 2021-03-01

## 2021-03-01 ENCOUNTER — READMISSION MANAGEMENT (OUTPATIENT)
Dept: CALL CENTER | Facility: HOSPITAL | Age: 61
End: 2021-03-01

## 2021-03-01 NOTE — OUTREACH NOTE
Medical Week 3 Survey      Responses   [facility] patient discharged from?  [place]   Does the patient have one of the following disease processes/diagnoses(primary or secondary)?  Other   Week 3 attempt successful?  Yes   Call start time  1744   Call end time  1746   Meds reviewed with patient/caregiver?  Yes   Is the patient taking all medications as directed (includes completed medication regime)?  Yes   Has the patient kept scheduled appointments due by today?  Yes   What is the patient's perception of their health status since discharge?  Improving   Week 3 Call Completed?  Yes   Graduated  Yes   Did the patient feel the follow up calls were helpful during their recovery period?  Yes   Was the number of calls appropriate?  Yes   Graduated/Revoked comments  She was so pleased with her care, no new issues, doing well          Reyna Madden, RN

## 2021-03-11 ENCOUNTER — TRANSCRIBE ORDERS (OUTPATIENT)
Dept: LAB | Facility: HOSPITAL | Age: 61
End: 2021-03-11

## 2021-03-11 DIAGNOSIS — Z01.818 OTHER SPECIFIED PRE-OPERATIVE EXAMINATION: Primary | ICD-10-CM

## 2021-03-15 ENCOUNTER — TRANSCRIBE ORDERS (OUTPATIENT)
Dept: LAB | Facility: HOSPITAL | Age: 61
End: 2021-03-15

## 2021-03-15 DIAGNOSIS — Z01.818 OTHER SPECIFIED PRE-OPERATIVE EXAMINATION: Primary | ICD-10-CM

## 2021-03-16 ENCOUNTER — TRANSCRIBE ORDERS (OUTPATIENT)
Dept: SLEEP MEDICINE | Facility: HOSPITAL | Age: 61
End: 2021-03-16

## 2021-03-16 ENCOUNTER — LAB (OUTPATIENT)
Dept: LAB | Facility: HOSPITAL | Age: 61
End: 2021-03-16

## 2021-03-16 DIAGNOSIS — Z01.818 OTHER SPECIFIED PRE-OPERATIVE EXAMINATION: Primary | ICD-10-CM

## 2021-03-16 DIAGNOSIS — Z01.818 OTHER SPECIFIED PRE-OPERATIVE EXAMINATION: ICD-10-CM

## 2021-03-18 ENCOUNTER — HOSPITAL ENCOUNTER (OUTPATIENT)
Dept: SLEEP MEDICINE | Facility: HOSPITAL | Age: 61
Discharge: HOME | End: 2021-03-18

## 2021-03-24 ENCOUNTER — LAB (OUTPATIENT)
Dept: LAB | Facility: HOSPITAL | Age: 61
End: 2021-03-24

## 2021-03-24 DIAGNOSIS — Z01.818 OTHER SPECIFIED PRE-OPERATIVE EXAMINATION: ICD-10-CM

## 2021-03-24 PROCEDURE — U0004 COV-19 TEST NON-CDC HGH THRU: HCPCS

## 2021-03-24 PROCEDURE — C9803 HOPD COVID-19 SPEC COLLECT: HCPCS

## 2021-03-25 LAB — SARS-COV-2 RNA RESP QL NAA+PROBE: NOT DETECTED

## 2021-03-26 ENCOUNTER — HOSPITAL ENCOUNTER (OUTPATIENT)
Dept: SLEEP MEDICINE | Facility: HOSPITAL | Age: 61
Discharge: HOME | End: 2021-03-26
Admitting: INTERNAL MEDICINE

## 2021-03-26 ENCOUNTER — BULK ORDERING (OUTPATIENT)
Dept: CASE MANAGEMENT | Facility: OTHER | Age: 61
End: 2021-03-26

## 2021-03-26 DIAGNOSIS — G47.33 OSA (OBSTRUCTIVE SLEEP APNEA): ICD-10-CM

## 2021-03-26 DIAGNOSIS — Z23 IMMUNIZATION DUE: ICD-10-CM

## 2021-03-26 PROCEDURE — 95811 POLYSOM 6/>YRS CPAP 4/> PARM: CPT

## 2021-04-13 ENCOUNTER — TELEPHONE (OUTPATIENT)
Dept: SLEEP MEDICINE | Facility: HOSPITAL | Age: 61
End: 2021-04-13

## 2021-08-09 PROBLEM — Z86.39 HISTORY OF VITAMIN D DEFICIENCY: Status: ACTIVE | Noted: 2017-10-31

## 2021-08-09 PROBLEM — F41.9 ANXIETY AND DEPRESSION: Status: ACTIVE | Noted: 2018-05-01

## 2021-08-09 PROBLEM — K21.9 ACID REFLUX: Status: ACTIVE | Noted: 2021-08-09

## 2021-08-09 PROBLEM — N31.9 NEUROGENIC BLADDER: Status: ACTIVE | Noted: 2017-02-23

## 2021-08-09 PROBLEM — Z86.69 HISTORY OF OPTIC NEURITIS: Status: ACTIVE | Noted: 2020-01-07

## 2021-08-09 PROBLEM — F32.A ANXIETY AND DEPRESSION: Status: ACTIVE | Noted: 2018-05-01

## 2021-08-09 PROBLEM — R92.8 ABNORMAL FINDING ON MAMMOGRAPHY: Status: ACTIVE | Noted: 2021-08-09

## 2021-08-09 PROBLEM — N39.46 MIXED INCONTINENCE: Status: ACTIVE | Noted: 2017-07-31

## 2021-08-09 PROBLEM — A64 DISEASE WITH A PREDOMINANTLY SEXUAL MODE OF TRANSMISSION: Status: ACTIVE | Noted: 2021-08-09

## 2021-08-09 PROBLEM — R35.0 FOM (FREQUENCY OF MICTURITION): Status: ACTIVE | Noted: 2021-08-09

## 2021-08-09 PROBLEM — Z98.890 S/P CUBITAL TUNNEL RELEASE: Status: ACTIVE | Noted: 2019-01-04

## 2021-08-09 PROBLEM — M25.511 PAIN IN JOINT OF RIGHT SHOULDER: Status: ACTIVE | Noted: 2017-07-31

## 2021-08-09 PROBLEM — IMO0001 BRASH: Status: ACTIVE | Noted: 2021-08-09

## 2021-08-09 PROBLEM — M75.100 RUPTURE OF ROTATOR CUFF OF SHOULDER: Status: ACTIVE | Noted: 2017-08-31

## 2021-08-09 PROBLEM — Z86.69 HISTORY OF DIPLOPIA: Status: ACTIVE | Noted: 2020-01-07

## 2021-08-09 RX ORDER — MELATONIN
5000 DAILY
COMMUNITY

## 2021-08-09 RX ORDER — POLYETHYLENE GLYCOL 3350 17 G/17G
17 POWDER, FOR SOLUTION ORAL
COMMUNITY
End: 2022-03-25

## 2021-08-09 RX ORDER — LOSARTAN POTASSIUM 50 MG/1
50 TABLET ORAL DAILY
COMMUNITY
Start: 2021-06-01 | End: 2022-03-03 | Stop reason: HOSPADM

## 2022-02-26 ENCOUNTER — APPOINTMENT (OUTPATIENT)
Dept: GENERAL RADIOLOGY | Facility: HOSPITAL | Age: 62
End: 2022-02-26

## 2022-02-26 ENCOUNTER — HOSPITAL ENCOUNTER (INPATIENT)
Facility: HOSPITAL | Age: 62
LOS: 5 days | Discharge: NURSING, REHAB OR LONG-TERM CARE | End: 2022-03-03
Attending: EMERGENCY MEDICINE | Admitting: HOSPITALIST

## 2022-02-26 DIAGNOSIS — N30.90 CYSTITIS: ICD-10-CM

## 2022-02-26 DIAGNOSIS — F41.9 ANXIETY AND DEPRESSION: ICD-10-CM

## 2022-02-26 DIAGNOSIS — G35 MULTIPLE SCLEROSIS EXACERBATION: Primary | ICD-10-CM

## 2022-02-26 DIAGNOSIS — F32.A ANXIETY AND DEPRESSION: ICD-10-CM

## 2022-02-26 LAB
ALBUMIN SERPL-MCNC: 3.9 G/DL (ref 3.5–5.2)
ALBUMIN/GLOB SERPL: 1.2 G/DL
ALP SERPL-CCNC: 174 U/L (ref 39–117)
ALT SERPL W P-5'-P-CCNC: 16 U/L (ref 1–33)
ANION GAP SERPL CALCULATED.3IONS-SCNC: 13 MMOL/L (ref 5–15)
AST SERPL-CCNC: 32 U/L (ref 1–32)
BACTERIA UR QL AUTO: ABNORMAL /HPF
BASOPHILS # BLD AUTO: 0.02 10*3/MM3 (ref 0–0.2)
BASOPHILS NFR BLD AUTO: 0.2 % (ref 0–1.5)
BILIRUB SERPL-MCNC: 0.6 MG/DL (ref 0–1.2)
BILIRUB UR QL STRIP: NEGATIVE
BUN SERPL-MCNC: 12 MG/DL (ref 6–20)
BUN/CREAT SERPL: 14.1 (ref 7–25)
CALCIUM SPEC-SCNC: 8.7 MG/DL (ref 8.6–10.5)
CHLORIDE SERPL-SCNC: 104 MMOL/L (ref 98–107)
CLARITY UR: ABNORMAL
CO2 SERPL-SCNC: 21 MMOL/L (ref 22–29)
COLOR UR: YELLOW
CREAT SERPL-MCNC: 0.85 MG/DL (ref 0.57–1)
D-LACTATE SERPL-SCNC: 1.4 MMOL/L (ref 0.5–2)
DEPRECATED RDW RBC AUTO: 41 FL (ref 37–54)
EOSINOPHIL # BLD AUTO: 0.02 10*3/MM3 (ref 0–0.4)
EOSINOPHIL NFR BLD AUTO: 0.2 % (ref 0.3–6.2)
ERYTHROCYTE [DISTWIDTH] IN BLOOD BY AUTOMATED COUNT: 13.9 % (ref 12.3–15.4)
GFR SERPL CREATININE-BSD FRML MDRD: 68 ML/MIN/1.73
GLOBULIN UR ELPH-MCNC: 3.3 GM/DL
GLUCOSE SERPL-MCNC: 118 MG/DL (ref 65–99)
GLUCOSE UR STRIP-MCNC: NEGATIVE MG/DL
HCT VFR BLD AUTO: 37.7 % (ref 34–46.6)
HGB BLD-MCNC: 12.7 G/DL (ref 12–15.9)
HGB UR QL STRIP.AUTO: ABNORMAL
HYALINE CASTS UR QL AUTO: ABNORMAL /LPF
IMM GRANULOCYTES # BLD AUTO: 0.05 10*3/MM3 (ref 0–0.05)
IMM GRANULOCYTES NFR BLD AUTO: 0.5 % (ref 0–0.5)
KETONES UR QL STRIP: ABNORMAL
LEUKOCYTE ESTERASE UR QL STRIP.AUTO: ABNORMAL
LIPASE SERPL-CCNC: 279 U/L (ref 13–60)
LYMPHOCYTES # BLD AUTO: 0.26 10*3/MM3 (ref 0.7–3.1)
LYMPHOCYTES NFR BLD AUTO: 2.8 % (ref 19.6–45.3)
MCH RBC QN AUTO: 27.9 PG (ref 26.6–33)
MCHC RBC AUTO-ENTMCNC: 33.7 G/DL (ref 31.5–35.7)
MCV RBC AUTO: 82.9 FL (ref 79–97)
MONOCYTES # BLD AUTO: 0.61 10*3/MM3 (ref 0.1–0.9)
MONOCYTES NFR BLD AUTO: 6.6 % (ref 5–12)
NEUTROPHILS NFR BLD AUTO: 8.23 10*3/MM3 (ref 1.7–7)
NEUTROPHILS NFR BLD AUTO: 89.7 % (ref 42.7–76)
NITRITE UR QL STRIP: POSITIVE
NRBC BLD AUTO-RTO: 0 /100 WBC (ref 0–0.2)
PH UR STRIP.AUTO: 6 [PH] (ref 5–8)
PLATELET # BLD AUTO: 303 10*3/MM3 (ref 140–450)
PMV BLD AUTO: 9 FL (ref 6–12)
POTASSIUM SERPL-SCNC: 3.8 MMOL/L (ref 3.5–5.2)
PROCALCITONIN SERPL-MCNC: 1.02 NG/ML (ref 0–0.25)
PROT SERPL-MCNC: 7.2 G/DL (ref 6–8.5)
PROT UR QL STRIP: ABNORMAL
RBC # BLD AUTO: 4.55 10*6/MM3 (ref 3.77–5.28)
RBC # UR STRIP: ABNORMAL /HPF
REF LAB TEST METHOD: ABNORMAL
SARS-COV-2 RNA PNL SPEC NAA+PROBE: NOT DETECTED
SODIUM SERPL-SCNC: 138 MMOL/L (ref 136–145)
SP GR UR STRIP: 1.01 (ref 1–1.03)
SQUAMOUS #/AREA URNS HPF: ABNORMAL /HPF
UROBILINOGEN UR QL STRIP: ABNORMAL
WBC # UR STRIP: ABNORMAL /HPF
WBC NRBC COR # BLD: 9.19 10*3/MM3 (ref 3.4–10.8)

## 2022-02-26 PROCEDURE — 80053 COMPREHEN METABOLIC PANEL: CPT | Performed by: EMERGENCY MEDICINE

## 2022-02-26 PROCEDURE — 25010000002 DIAZEPAM PER 5 MG: Performed by: PSYCHIATRY & NEUROLOGY

## 2022-02-26 PROCEDURE — 25010000002 METHYLPREDNISOLONE PER 125 MG: Performed by: EMERGENCY MEDICINE

## 2022-02-26 PROCEDURE — 87635 SARS-COV-2 COVID-19 AMP PRB: CPT | Performed by: EMERGENCY MEDICINE

## 2022-02-26 PROCEDURE — 83605 ASSAY OF LACTIC ACID: CPT | Performed by: EMERGENCY MEDICINE

## 2022-02-26 PROCEDURE — 71045 X-RAY EXAM CHEST 1 VIEW: CPT

## 2022-02-26 PROCEDURE — 99285 EMERGENCY DEPT VISIT HI MDM: CPT

## 2022-02-26 PROCEDURE — 25010000002 ONDANSETRON PER 1 MG: Performed by: EMERGENCY MEDICINE

## 2022-02-26 PROCEDURE — 25010000002 METHYLPREDNISOLONE PER 125 MG: Performed by: PSYCHIATRY & NEUROLOGY

## 2022-02-26 PROCEDURE — 99223 1ST HOSP IP/OBS HIGH 75: CPT | Performed by: PSYCHIATRY & NEUROLOGY

## 2022-02-26 PROCEDURE — 87186 SC STD MICRODIL/AGAR DIL: CPT | Performed by: EMERGENCY MEDICINE

## 2022-02-26 PROCEDURE — 25010000002 METOCLOPRAMIDE PER 10 MG: Performed by: EMERGENCY MEDICINE

## 2022-02-26 PROCEDURE — 85025 COMPLETE CBC W/AUTO DIFF WBC: CPT | Performed by: EMERGENCY MEDICINE

## 2022-02-26 PROCEDURE — 87150 DNA/RNA AMPLIFIED PROBE: CPT | Performed by: EMERGENCY MEDICINE

## 2022-02-26 PROCEDURE — 87077 CULTURE AEROBIC IDENTIFY: CPT | Performed by: EMERGENCY MEDICINE

## 2022-02-26 PROCEDURE — 25010000002 CEFTRIAXONE PER 250 MG: Performed by: EMERGENCY MEDICINE

## 2022-02-26 PROCEDURE — 81001 URINALYSIS AUTO W/SCOPE: CPT | Performed by: EMERGENCY MEDICINE

## 2022-02-26 PROCEDURE — 84145 PROCALCITONIN (PCT): CPT | Performed by: NURSE PRACTITIONER

## 2022-02-26 PROCEDURE — 87040 BLOOD CULTURE FOR BACTERIA: CPT | Performed by: EMERGENCY MEDICINE

## 2022-02-26 PROCEDURE — 83690 ASSAY OF LIPASE: CPT | Performed by: EMERGENCY MEDICINE

## 2022-02-26 RX ORDER — PRAMIPEXOLE DIHYDROCHLORIDE 1.5 MG/1
1.5 TABLET ORAL 3 TIMES DAILY
Status: DISCONTINUED | OUTPATIENT
Start: 2022-02-26 | End: 2022-02-27

## 2022-02-26 RX ORDER — PANTOPRAZOLE SODIUM 40 MG/1
40 TABLET, DELAYED RELEASE ORAL
Status: DISCONTINUED | OUTPATIENT
Start: 2022-02-27 | End: 2022-03-03 | Stop reason: HOSPADM

## 2022-02-26 RX ORDER — ONDANSETRON 2 MG/ML
4 INJECTION INTRAMUSCULAR; INTRAVENOUS EVERY 6 HOURS PRN
Status: DISCONTINUED | OUTPATIENT
Start: 2022-02-26 | End: 2022-03-03

## 2022-02-26 RX ORDER — SODIUM CHLORIDE 0.9 % (FLUSH) 0.9 %
10 SYRINGE (ML) INJECTION AS NEEDED
Status: DISCONTINUED | OUTPATIENT
Start: 2022-02-26 | End: 2022-03-03 | Stop reason: HOSPADM

## 2022-02-26 RX ORDER — ATORVASTATIN CALCIUM 20 MG/1
10 TABLET, FILM COATED ORAL DAILY
Status: DISCONTINUED | OUTPATIENT
Start: 2022-02-27 | End: 2022-03-03 | Stop reason: HOSPADM

## 2022-02-26 RX ORDER — DIAZEPAM 5 MG/ML
5 INJECTION, SOLUTION INTRAMUSCULAR; INTRAVENOUS ONCE
Status: COMPLETED | OUTPATIENT
Start: 2022-02-26 | End: 2022-02-26

## 2022-02-26 RX ORDER — SODIUM CHLORIDE 0.9 % (FLUSH) 0.9 %
10 SYRINGE (ML) INJECTION EVERY 12 HOURS SCHEDULED
Status: DISCONTINUED | OUTPATIENT
Start: 2022-02-26 | End: 2022-03-03 | Stop reason: HOSPADM

## 2022-02-26 RX ORDER — ACETAMINOPHEN 325 MG/1
650 TABLET ORAL EVERY 4 HOURS PRN
Status: DISCONTINUED | OUTPATIENT
Start: 2022-02-26 | End: 2022-03-03 | Stop reason: HOSPADM

## 2022-02-26 RX ORDER — ACETAMINOPHEN 650 MG/1
650 SUPPOSITORY RECTAL EVERY 4 HOURS PRN
Status: DISCONTINUED | OUTPATIENT
Start: 2022-02-26 | End: 2022-03-03 | Stop reason: HOSPADM

## 2022-02-26 RX ORDER — FLUOXETINE HYDROCHLORIDE 20 MG/1
60 CAPSULE ORAL DAILY
Status: DISCONTINUED | OUTPATIENT
Start: 2022-02-27 | End: 2022-03-03 | Stop reason: HOSPADM

## 2022-02-26 RX ORDER — ACETAMINOPHEN 500 MG
1000 TABLET ORAL ONCE
Status: DISCONTINUED | OUTPATIENT
Start: 2022-02-26 | End: 2022-02-26

## 2022-02-26 RX ORDER — LOSARTAN POTASSIUM 50 MG/1
50 TABLET ORAL DAILY
Status: DISCONTINUED | OUTPATIENT
Start: 2022-02-27 | End: 2022-03-01

## 2022-02-26 RX ORDER — ONDANSETRON 2 MG/ML
4 INJECTION INTRAMUSCULAR; INTRAVENOUS ONCE
Status: COMPLETED | OUTPATIENT
Start: 2022-02-26 | End: 2022-02-26

## 2022-02-26 RX ORDER — CALCIUM CARBONATE 200(500)MG
2 TABLET,CHEWABLE ORAL 2 TIMES DAILY PRN
Status: DISCONTINUED | OUTPATIENT
Start: 2022-02-26 | End: 2022-03-03 | Stop reason: HOSPADM

## 2022-02-26 RX ORDER — BACLOFEN 10 MG/1
20 TABLET ORAL 3 TIMES DAILY
Status: DISCONTINUED | OUTPATIENT
Start: 2022-02-26 | End: 2022-03-03 | Stop reason: HOSPADM

## 2022-02-26 RX ORDER — PHENAZOPYRIDINE HYDROCHLORIDE 200 MG/1
200 TABLET, FILM COATED ORAL 3 TIMES DAILY PRN
Status: DISCONTINUED | OUTPATIENT
Start: 2022-02-26 | End: 2022-03-03 | Stop reason: HOSPADM

## 2022-02-26 RX ORDER — ACETAMINOPHEN 160 MG/5ML
650 SOLUTION ORAL EVERY 4 HOURS PRN
Status: DISCONTINUED | OUTPATIENT
Start: 2022-02-26 | End: 2022-03-03 | Stop reason: HOSPADM

## 2022-02-26 RX ORDER — METOCLOPRAMIDE HYDROCHLORIDE 5 MG/ML
10 INJECTION INTRAMUSCULAR; INTRAVENOUS ONCE
Status: COMPLETED | OUTPATIENT
Start: 2022-02-26 | End: 2022-02-26

## 2022-02-26 RX ORDER — MELATONIN
5000 DAILY
Status: DISCONTINUED | OUTPATIENT
Start: 2022-02-27 | End: 2022-03-03 | Stop reason: HOSPADM

## 2022-02-26 RX ADMIN — Medication 10 ML: at 22:38

## 2022-02-26 RX ADMIN — CEFTRIAXONE 1 G: 1 INJECTION, POWDER, FOR SOLUTION INTRAMUSCULAR; INTRAVENOUS at 11:01

## 2022-02-26 RX ADMIN — DIAZEPAM 5 MG: 5 INJECTION INTRAMUSCULAR; INTRAVENOUS at 12:06

## 2022-02-26 RX ADMIN — ACETAMINOPHEN 650 MG: 325 TABLET ORAL at 23:55

## 2022-02-26 RX ADMIN — SODIUM CHLORIDE 500 MG: 900 INJECTION INTRAVENOUS at 10:24

## 2022-02-26 RX ADMIN — BACLOFEN 20 MG: 10 TABLET ORAL at 22:36

## 2022-02-26 RX ADMIN — METOCLOPRAMIDE HYDROCHLORIDE 10 MG: 5 INJECTION INTRAMUSCULAR; INTRAVENOUS at 10:46

## 2022-02-26 RX ADMIN — SODIUM CHLORIDE 500 MG: 900 INJECTION INTRAVENOUS at 18:49

## 2022-02-26 RX ADMIN — SODIUM CHLORIDE 500 MG: 900 INJECTION INTRAVENOUS at 13:03

## 2022-02-26 RX ADMIN — ACETAMINOPHEN 650 MG: 325 TABLET ORAL at 14:19

## 2022-02-26 RX ADMIN — SODIUM CHLORIDE, POTASSIUM CHLORIDE, SODIUM LACTATE AND CALCIUM CHLORIDE 1000 ML: 600; 310; 30; 20 INJECTION, SOLUTION INTRAVENOUS at 10:25

## 2022-02-26 RX ADMIN — ONDANSETRON 4 MG: 2 INJECTION INTRAMUSCULAR; INTRAVENOUS at 09:37

## 2022-02-26 RX ADMIN — PRAMIPEXOLE DIHYDROCHLORIDE 1.5 MG: 1.5 TABLET ORAL at 22:36

## 2022-02-27 ENCOUNTER — APPOINTMENT (OUTPATIENT)
Dept: MRI IMAGING | Facility: HOSPITAL | Age: 62
End: 2022-02-27

## 2022-02-27 PROBLEM — A41.50 SEPSIS DUE TO GRAM NEGATIVE BACTERIA (HCC): Status: ACTIVE | Noted: 2022-02-27

## 2022-02-27 LAB
ANION GAP SERPL CALCULATED.3IONS-SCNC: 10 MMOL/L (ref 5–15)
BACTERIA BLD CULT: ABNORMAL
BUN SERPL-MCNC: 15 MG/DL (ref 6–20)
BUN/CREAT SERPL: 20.3 (ref 7–25)
CALCIUM SPEC-SCNC: 9 MG/DL (ref 8.6–10.5)
CHLORIDE SERPL-SCNC: 107 MMOL/L (ref 98–107)
CO2 SERPL-SCNC: 22 MMOL/L (ref 22–29)
CREAT SERPL-MCNC: 0.74 MG/DL (ref 0.57–1)
DEPRECATED RDW RBC AUTO: 42.5 FL (ref 37–54)
ERYTHROCYTE [DISTWIDTH] IN BLOOD BY AUTOMATED COUNT: 13.9 % (ref 12.3–15.4)
GFR SERPL CREATININE-BSD FRML MDRD: 80 ML/MIN/1.73
GLUCOSE SERPL-MCNC: 180 MG/DL (ref 65–99)
HCT VFR BLD AUTO: 33.4 % (ref 34–46.6)
HGB BLD-MCNC: 11 G/DL (ref 12–15.9)
MCH RBC QN AUTO: 27.5 PG (ref 26.6–33)
MCHC RBC AUTO-ENTMCNC: 32.9 G/DL (ref 31.5–35.7)
MCV RBC AUTO: 83.5 FL (ref 79–97)
PLATELET # BLD AUTO: 310 10*3/MM3 (ref 140–450)
PMV BLD AUTO: 9.5 FL (ref 6–12)
POTASSIUM SERPL-SCNC: 3.9 MMOL/L (ref 3.5–5.2)
RBC # BLD AUTO: 4 10*6/MM3 (ref 3.77–5.28)
SODIUM SERPL-SCNC: 139 MMOL/L (ref 136–145)
WBC NRBC COR # BLD: 8.12 10*3/MM3 (ref 3.4–10.8)

## 2022-02-27 PROCEDURE — 99223 1ST HOSP IP/OBS HIGH 75: CPT | Performed by: INTERNAL MEDICINE

## 2022-02-27 PROCEDURE — 97110 THERAPEUTIC EXERCISES: CPT

## 2022-02-27 PROCEDURE — 85027 COMPLETE CBC AUTOMATED: CPT | Performed by: NURSE PRACTITIONER

## 2022-02-27 PROCEDURE — 80048 BASIC METABOLIC PNL TOTAL CA: CPT | Performed by: NURSE PRACTITIONER

## 2022-02-27 PROCEDURE — 25010000002 CEFTRIAXONE PER 250 MG: Performed by: HOSPITALIST

## 2022-02-27 PROCEDURE — 25010000002 METHYLPREDNISOLONE PER 125 MG: Performed by: PSYCHIATRY & NEUROLOGY

## 2022-02-27 PROCEDURE — 99231 SBSQ HOSP IP/OBS SF/LOW 25: CPT | Performed by: PSYCHIATRY & NEUROLOGY

## 2022-02-27 PROCEDURE — 97162 PT EVAL MOD COMPLEX 30 MIN: CPT

## 2022-02-27 RX ORDER — FUROSEMIDE 20 MG/1
20 TABLET ORAL ONCE
Status: COMPLETED | OUTPATIENT
Start: 2022-02-27 | End: 2022-02-27

## 2022-02-27 RX ORDER — CLONAZEPAM 1 MG/1
2 TABLET ORAL ONCE
Status: COMPLETED | OUTPATIENT
Start: 2022-02-28 | End: 2022-02-28

## 2022-02-27 RX ORDER — PRAMIPEXOLE DIHYDROCHLORIDE 1.5 MG/1
1.5 TABLET ORAL EVERY 8 HOURS SCHEDULED
Status: DISCONTINUED | OUTPATIENT
Start: 2022-02-27 | End: 2022-03-03 | Stop reason: HOSPADM

## 2022-02-27 RX ADMIN — Medication 10 ML: at 08:10

## 2022-02-27 RX ADMIN — PRAMIPEXOLE DIHYDROCHLORIDE 1.5 MG: 1.5 TABLET ORAL at 05:07

## 2022-02-27 RX ADMIN — BACLOFEN 20 MG: 10 TABLET ORAL at 20:39

## 2022-02-27 RX ADMIN — PRAMIPEXOLE DIHYDROCHLORIDE 1.5 MG: 1.5 TABLET ORAL at 13:15

## 2022-02-27 RX ADMIN — SODIUM CHLORIDE 500 MG: 900 INJECTION INTRAVENOUS at 11:42

## 2022-02-27 RX ADMIN — CEFTRIAXONE 2 G: 2 INJECTION, POWDER, FOR SOLUTION INTRAMUSCULAR; INTRAVENOUS at 11:41

## 2022-02-27 RX ADMIN — SODIUM CHLORIDE 500 MG: 900 INJECTION INTRAVENOUS at 03:52

## 2022-02-27 RX ADMIN — FLUOXETINE HYDROCHLORIDE 60 MG: 20 CAPSULE ORAL at 08:06

## 2022-02-27 RX ADMIN — BACLOFEN 20 MG: 10 TABLET ORAL at 16:06

## 2022-02-27 RX ADMIN — FUROSEMIDE 20 MG: 20 TABLET ORAL at 11:53

## 2022-02-27 RX ADMIN — Medication 10 ML: at 20:40

## 2022-02-27 RX ADMIN — SODIUM CHLORIDE 500 MG: 900 INJECTION INTRAVENOUS at 17:49

## 2022-02-27 RX ADMIN — Medication 10 ML: at 08:09

## 2022-02-27 RX ADMIN — PANTOPRAZOLE SODIUM 40 MG: 40 TABLET, DELAYED RELEASE ORAL at 05:07

## 2022-02-27 RX ADMIN — ATORVASTATIN CALCIUM 10 MG: 20 TABLET, FILM COATED ORAL at 08:06

## 2022-02-27 RX ADMIN — Medication 10 ML: at 20:39

## 2022-02-27 RX ADMIN — BACLOFEN 20 MG: 10 TABLET ORAL at 08:06

## 2022-02-27 RX ADMIN — LOSARTAN POTASSIUM: 50 TABLET, FILM COATED ORAL at 08:06

## 2022-02-27 RX ADMIN — Medication 5000 UNITS: at 08:06

## 2022-02-27 RX ADMIN — PRAMIPEXOLE DIHYDROCHLORIDE 1.5 MG: 1.5 TABLET ORAL at 20:39

## 2022-02-28 ENCOUNTER — APPOINTMENT (OUTPATIENT)
Dept: CT IMAGING | Facility: HOSPITAL | Age: 62
End: 2022-02-28

## 2022-02-28 ENCOUNTER — APPOINTMENT (OUTPATIENT)
Dept: SLEEP MEDICINE | Facility: HOSPITAL | Age: 62
End: 2022-02-28

## 2022-02-28 PROCEDURE — 99232 SBSQ HOSP IP/OBS MODERATE 35: CPT | Performed by: INTERNAL MEDICINE

## 2022-02-28 PROCEDURE — 74176 CT ABD & PELVIS W/O CONTRAST: CPT

## 2022-02-28 PROCEDURE — 25010000002 CEFTRIAXONE PER 250 MG: Performed by: HOSPITALIST

## 2022-02-28 PROCEDURE — 97530 THERAPEUTIC ACTIVITIES: CPT

## 2022-02-28 PROCEDURE — 87040 BLOOD CULTURE FOR BACTERIA: CPT | Performed by: INTERNAL MEDICINE

## 2022-02-28 PROCEDURE — 97166 OT EVAL MOD COMPLEX 45 MIN: CPT

## 2022-02-28 PROCEDURE — 99233 SBSQ HOSP IP/OBS HIGH 50: CPT | Performed by: NURSE PRACTITIONER

## 2022-02-28 PROCEDURE — 25010000002 METHYLPREDNISOLONE PER 125 MG: Performed by: PSYCHIATRY & NEUROLOGY

## 2022-02-28 RX ORDER — FUROSEMIDE 20 MG/1
20 TABLET ORAL DAILY
Status: DISCONTINUED | OUTPATIENT
Start: 2022-03-01 | End: 2022-03-03 | Stop reason: HOSPADM

## 2022-02-28 RX ADMIN — Medication 10 ML: at 21:05

## 2022-02-28 RX ADMIN — CLONAZEPAM 2 MG: 1 TABLET ORAL at 00:32

## 2022-02-28 RX ADMIN — SODIUM CHLORIDE 500 MG: 900 INJECTION INTRAVENOUS at 06:00

## 2022-02-28 RX ADMIN — BACLOFEN 20 MG: 10 TABLET ORAL at 09:46

## 2022-02-28 RX ADMIN — BACLOFEN 20 MG: 10 TABLET ORAL at 17:02

## 2022-02-28 RX ADMIN — PANTOPRAZOLE SODIUM 40 MG: 40 TABLET, DELAYED RELEASE ORAL at 06:00

## 2022-02-28 RX ADMIN — ATORVASTATIN CALCIUM 10 MG: 20 TABLET, FILM COATED ORAL at 09:46

## 2022-02-28 RX ADMIN — Medication 10 ML: at 21:06

## 2022-02-28 RX ADMIN — LOSARTAN POTASSIUM 50 MG: 50 TABLET, FILM COATED ORAL at 21:05

## 2022-02-28 RX ADMIN — CEFTRIAXONE 2 G: 2 INJECTION, POWDER, FOR SOLUTION INTRAMUSCULAR; INTRAVENOUS at 12:28

## 2022-02-28 RX ADMIN — FLUOXETINE HYDROCHLORIDE 60 MG: 20 CAPSULE ORAL at 09:45

## 2022-02-28 RX ADMIN — Medication 10 ML: at 09:46

## 2022-02-28 RX ADMIN — BACLOFEN 20 MG: 10 TABLET ORAL at 23:18

## 2022-02-28 RX ADMIN — PRAMIPEXOLE DIHYDROCHLORIDE 1.5 MG: 1.5 TABLET ORAL at 23:18

## 2022-02-28 RX ADMIN — Medication 5000 UNITS: at 09:45

## 2022-02-28 RX ADMIN — PRAMIPEXOLE DIHYDROCHLORIDE 1.5 MG: 1.5 TABLET ORAL at 06:00

## 2022-02-28 RX ADMIN — PRAMIPEXOLE DIHYDROCHLORIDE 1.5 MG: 1.5 TABLET ORAL at 17:02

## 2022-03-01 LAB
BACTERIA SPEC AEROBE CULT: ABNORMAL
BACTERIA SPEC AEROBE CULT: ABNORMAL
GRAM STN SPEC: ABNORMAL
ISOLATED FROM: ABNORMAL
ISOLATED FROM: ABNORMAL

## 2022-03-01 PROCEDURE — 99232 SBSQ HOSP IP/OBS MODERATE 35: CPT | Performed by: NURSE PRACTITIONER

## 2022-03-01 PROCEDURE — 99232 SBSQ HOSP IP/OBS MODERATE 35: CPT | Performed by: INTERNAL MEDICINE

## 2022-03-01 PROCEDURE — 97110 THERAPEUTIC EXERCISES: CPT

## 2022-03-01 PROCEDURE — 25010000002 CEFTRIAXONE PER 250 MG: Performed by: INTERNAL MEDICINE

## 2022-03-01 PROCEDURE — 97535 SELF CARE MNGMENT TRAINING: CPT

## 2022-03-01 RX ORDER — CLONAZEPAM 1 MG/1
2 TABLET ORAL ONCE
Status: COMPLETED | OUTPATIENT
Start: 2022-03-01 | End: 2022-03-01

## 2022-03-01 RX ORDER — LEVOFLOXACIN 750 MG/1
750 TABLET ORAL EVERY 24 HOURS
Status: DISCONTINUED | OUTPATIENT
Start: 2022-03-02 | End: 2022-03-03 | Stop reason: HOSPADM

## 2022-03-01 RX ADMIN — Medication 10 ML: at 21:33

## 2022-03-01 RX ADMIN — PRAMIPEXOLE DIHYDROCHLORIDE 1.5 MG: 1.5 TABLET ORAL at 06:15

## 2022-03-01 RX ADMIN — PRAMIPEXOLE DIHYDROCHLORIDE 1.5 MG: 1.5 TABLET ORAL at 21:32

## 2022-03-01 RX ADMIN — ATORVASTATIN CALCIUM 10 MG: 20 TABLET, FILM COATED ORAL at 09:50

## 2022-03-01 RX ADMIN — PANTOPRAZOLE SODIUM 40 MG: 40 TABLET, DELAYED RELEASE ORAL at 06:15

## 2022-03-01 RX ADMIN — BACLOFEN 20 MG: 10 TABLET ORAL at 16:32

## 2022-03-01 RX ADMIN — FUROSEMIDE 20 MG: 20 TABLET ORAL at 06:15

## 2022-03-01 RX ADMIN — Medication 10 ML: at 09:51

## 2022-03-01 RX ADMIN — CEFTRIAXONE 2 G: 2 INJECTION, POWDER, FOR SOLUTION INTRAMUSCULAR; INTRAVENOUS at 12:42

## 2022-03-01 RX ADMIN — Medication 5000 UNITS: at 09:50

## 2022-03-01 RX ADMIN — LOSARTAN POTASSIUM: 50 TABLET, FILM COATED ORAL at 16:32

## 2022-03-01 RX ADMIN — BACLOFEN 20 MG: 10 TABLET ORAL at 21:32

## 2022-03-01 RX ADMIN — FLUOXETINE HYDROCHLORIDE 60 MG: 20 CAPSULE ORAL at 09:56

## 2022-03-01 RX ADMIN — LOSARTAN POTASSIUM 50 MG: 50 TABLET, FILM COATED ORAL at 09:50

## 2022-03-01 RX ADMIN — CLONAZEPAM 2 MG: 1 TABLET ORAL at 00:25

## 2022-03-01 RX ADMIN — Medication 10 ML: at 21:32

## 2022-03-01 RX ADMIN — PRAMIPEXOLE DIHYDROCHLORIDE 1.5 MG: 1.5 TABLET ORAL at 16:32

## 2022-03-01 RX ADMIN — BACLOFEN 20 MG: 10 TABLET ORAL at 09:51

## 2022-03-01 NOTE — THERAPY TREATMENT NOTE
Patient Name: Maritza Nance Darci  : 1960    MRN: [id number]                              Today's Date: 3/1/2022       Admit Date: 2022    Visit Dx:     ICD-10-CM ICD-9-CM   1. Multiple sclerosis exacerbation (HCC)  G35 340   2. 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   • FOM (frequency of micturition)   • Headache   • History of diplopia   • History of optic neuritis   • History of vitamin D deficiency   • Migraine syndrome   • Mixed incontinence   • Nausea   • Neurogenic bladder   • Pain in joint of right shoulder   • Restless legs syndrome   • Rupture of rotator cuff of shoulder   • Secondary progressive multiple sclerosis (HCC)   • S/P cubital tunnel release   • Vitamin D deficiency   • Multiple sclerosis exacerbation (HCC)   • Sepsis due to Gram negative bacteria (HCC)     Past Medical History:   Diagnosis Date   • Dawn esophagus     per patient   • Hyperlipidemia    • Hypertension    • Multiple sclerosis (HCC)    • PONV (postoperative nausea and vomiting)      Past Surgical History:   Procedure Laterality Date   • ADENOIDECTOMY     • PARATHYROIDECTOMY     • TOTAL SHOULDER ARTHROPLASTY W/ DISTAL CLAVICLE EXCISION Right 10/22/2018    Procedure: RT TOTAL SHOULDER REVERSE ARTHROPLASTY;  Surgeon: Bipin Dangelo MD;  Location: [facility];  Service: Orthopedics      General Information     Row Name 22 1213          Physical Therapy Time and Intention    Document Type therapy note (daily note)  -AUSTIN     Mode of Treatment co-treatment;  occupational therapy; physical therapy  -     Row Name 03/01/22 1213          General Information    Patient Profile Reviewed yes  -     Existing Precautions/Restrictions fall  -     Row Name 03/01/22 1213          Cognition    Orientation Status (Cognition) oriented x 3  -     Row Name 03/01/22 1213          Safety Issues, Functional Mobility    Impairments Affecting Function (Mobility) balance; coordination; endurance/activity tolerance; range of motion (ROM); strength; postural/trunk control; sensation/sensory awareness  -           User Key  (r) = Recorded By, (t) = Taken By, (c) = Cosigned By    Initials Name Provider Type    Azalia Anderson PTA Physical Therapy Assistant               Mobility     Row Name 03/01/22 1213          Bed Mobility    Supine-Sit [place] (Bed Mobility) moderate assist (50% patient effort); verbal cues  -     Comment (Bed Mobility) trunk control very weak once EOB  -     Row Name 03/01/22 1213          Transfers    Comment (Transfers) STS x1, then perf pivot to BSC, then pt wanted chair so had to pull bsc out and replace w/recliner while OT staff was in front blocking knees and heavy use of gt belt  -[facility] Name 03/01/22 1213          Bed-Chair Transfer    Bed-Chair [place] (Transfers) maximum assist (25% patient effort); verbal cues; nonverbal cues (demo/gesture); 2 person assist  -[facility] Name 03/01/22 1213          Sit-Stand Transfer    Sit-Stand [place] (Transfers) maximum assist (25% patient effort); verbal cues; nonverbal cues (demo/gesture); 2 person assist; moderate assist (50% patient effort)  -     Assistive Device (Sit-Stand Transfers) walker, front-wheeled  -     Row Name 03/01/22 1213          Gait/Stairs (Locomotion)    [place] Level (Gait) unable to assess  -           User Key  (r) = Recorded By, (t) = Taken By, (c) = Cosigned By    Initials Name Provider Type    Azalia Anderson PTA Physical Therapy Assistant                Obj/Interventions    No documentation.                Goals/Plan    No documentation.                Clinical Impression     Row Name 03/01/22 1215          Pain Scale: Numbers Pre/Post-Treatment    Pretreatment Pain Rating 0/10 - no pain  -     Posttreatment Pain Rating 0/10 - no pain  -     Row Name 03/01/22 1215          Plan of Care Review    Plan of Care Reviewed With patient  -     Outcome Summary Pt xochitl PT/OT session w/MAX 2 assist STS and tfer from bed>bsc>recliner; later returned to assist and educ nsg on possible ways to safely get pt back onto bsc from recliner and then to bed for full clean-up by nsg  -AUSTIN     Row Name 03/01/22 1215          Therapy Assessment/Plan (PT)    Rehab Potential (PT) fair, will monitor progress closely  -     Criteria for Skilled Interventions Met (PT) yes  -AUSTIN     Row Name 03/01/22 1215          Positioning and Restraints    Pre-Treatment Position in bed  -JM     Post Treatment Position chair  -JM     In Chair reclined; call light within reach; encouraged to call for assist; exit alarm on; notified nsg; waffle cushion  -           User Key  (r) = Recorded By, (t) = Taken By, (c) = Cosigned By    Initials Name Provider Type    Azalia Anderson PTA Physical Therapy Assistant               Outcome Measures     Row Name 03/01/22 6109          How much help from another person do you currently need...    Turning from your back to your side while in flat bed without using bedrails? 2  -JM     Moving from lying on back to sitting on the side of a flat bed without bedrails? 2  -JM     Moving to and from a bed to a chair (including a wheelchair)? 2  -JM     Standing up from a chair using your arms (e.g., wheelchair, bedside chair)? 2  -JM     Climbing 3-5 steps with a railing? 1  -JM     To walk in hospital room? 1  -JM     AM-PAC 6 Clicks Score (PT) 10  -           User Key  (r) = Recorded By, (t) = Taken By, (c) = Cosigned By    Initials Name Provider Type     Azalia Anderson PTA Physical Therapy Assistant                             Physical Therapy Education                 Title: PT OT SLP Therapies (Done)     Topic: Physical Therapy (Done)     Point: Mobility training (Done)     Learning Progress Summary           Patient Eager, E,TB,D, VU,NR by AUSTIN at 3/1/2022 1851    Acceptance, E,TB, VU by JARED at 3/1/2022 1636    Acceptance, E,TB, VU by  at 3/1/2022 0404    Acceptance, E, VU by AA at 2/28/2022 1653    Acceptance, E, VU,NR by DJ at 2/28/2022 1446    Acceptance, E, VU by DB at 2/27/2022 1135                   Point: Home exercise program (Done)     Learning Progress Summary           Patient Eager, E,TB,D, VU,NR by AUSTIN at 3/1/2022 1851    Acceptance, E,TB, VU by JARED at 3/1/2022 1636    Acceptance, E,TB, VU by  at 3/1/2022 0404    Acceptance, E, VU by AA at 2/28/2022 1653    Acceptance, E, VU,NR by DJ at 2/28/2022 1446    Acceptance, E, VU by DB at 2/27/2022 1135                               User Key     Initials Effective Dates Name Provider Type Discipline    AUSTIN 03/07/18 -  Azalia Tena PTA Physical Therapy Assistant PT    JARED 06/16/21 -  Alpesh Higgins, RN Registered Nurse Nurse    MARIBELL 06/16/21 -  Kelly Cerda, PT Physical Therapist PT    DJ 10/25/19 -  Felipa Camara, PT Physical Therapist PT    AA 06/16/21 -  Jessi Toussaint, RN Registered Nurse Nurse     03/10/21 -  Lottie Castorena, RN Registered Nurse Nurse              PT Recommendation and Plan     Plan of Care Reviewed With: patient  Outcome Summary: Pt xochitl PT/OT session w/MAX 2 assist STS and tfer from bed>bsc>recliner; later returned to assist and educ nsg on possible ways to safely get pt back onto bsc from recliner and then to bed for full clean-up by nsg     Time Calculation:    PT Charges     Row Name 03/01/22 1841 03/01/22 1213          Time Calculation    Start Time 1430  - 1115  -JM     Stop Time 1454  - 1204  -JM     Time Calculation (min) 24 min  -AUSTIN 49 min  -AUSTIN     PT Received  On 03/01/22  -AUSTIN 03/01/22  -AUSTIN     PT - Next Appointment 03/02/22  -AUSTIN 03/02/22  -AUSTIN           User Key  (r) = Recorded By, (t) = Taken By, (c) = Cosigned By    Initials Name Provider Type    Azalia Anderson PTA Physical Therapy Assistant              Therapy Charges for Today     Code Description Service Date Service Provider Modifiers Qty    05150102057 HC PT THER PROC EA 15 MIN 3/1/2022 Azalia Tena PTA GP 3    12938516217 HC PT THER SUPP EA 15 MIN 3/1/2022 Azalia Tena PTA GP 3    54483661929 HC PT THER PROC EA 15 MIN 3/1/2022 Azalia Tena PTA GP 2    58350536891 HC PT THER SUPP EA 15 MIN 3/1/2022 Azalia Tena PTA GP 1          PT G-Codes  Outcome Measure Options: AM-PAC 6 Clicks Basic Mobility (PT)  AM-PAC 6 Clicks Score (PT): 10  AM-PAC 6 Clicks Score (OT): 13    Azalia Tena PTA  3/1/2022

## 2022-03-01 NOTE — PLAN OF CARE
Goal Outcome Evaluation:      Pt alert and Oriented. Pt medicated per orders. Pt stated she is able to do more with her hands today than previous. Pt bladder scan obtained >300. RN performed straight cath. Pt tolerated well. Pt placed on 2L nasal cannula for oxygen saturation dropping while asleep, had to be titrated up to 3L. Pt takes klonopin at home, requested this medication. RN placed a call out, one time dose obtained. Pt rested well. Pt vital signs within normal limits at this time.

## 2022-03-01 NOTE — PLAN OF CARE
Goal Outcome Evaluation:  Plan of Care Reviewed With: patient           Outcome Summary: Pt xochitl PT/OT session w/MAX 2 assist STS and tfer from bed>bsc>recliner; later returned to assist and educ nsg on possible ways to safely get pt back onto bsc from recliner and then to bed for full clean-up by nsg    Patient was wearing a face mask during this therapy encounter. Therapist used appropriate personal protective equipment including eye protection, mask, and gloves.  Mask used was standard procedure mask. Appropriate PPE was worn during the entire therapy session. Hand hygiene was completed before and after therapy session. Patient is not in enhanced droplet precautions.

## 2022-03-01 NOTE — NURSING NOTE
"Pt asked if she was receiving lasix tonight. Upon looking at the MAR this RN notified the pt she was not prescribed lasix. Pt stated \"she took it the other day\". Upon reviewing previous orders pt did receive one dose of oral Lasix 20mg. Pt stated that she is suppose to take lasix every day and she has a prescription for lasix. RN questioned pt about how long she has been prescribed this medication. Pt stated \"over a year\". Pt stated that she did not tell anyone in the ER that she had a prescription for lasix because \"she was embarrassed that she has not been taking it recently\". RN placed a call out spoke with Ji Dennison, new orders obtained.   "

## 2022-03-01 NOTE — PROGRESS NOTES
"DOS: 3/1/2022  NAME: Maritza Nance Darci   : 1960  PCP: Nilda Junior APRN  Chief Complaint   Patient presents with   • decreased mobility     Stroke    Subjective: MRIs were attempted last night but per MRI staff the patient did not tolerated due to pain while laying flat. The patient states she was able to stand for 15 seconds the PT and she has not been able to  1 year so she is very encouraged about going to rehab. She continues to feel like she is currently functioning better than her recent baseline.     Objective:  Vital signs: /75 (BP Location: Right arm, Patient Position: Sitting)   Pulse 73   Temp 98.2 °F (36.8 °C) (Oral)   Resp 18   Ht 165.1 cm (65\")   Wt 80.3 kg (177 lb)   LMP  (LMP Unknown) Comment: PT. STATES HER LAST PERIOD WAS GREATER THAN FIVE YEARS AGO  SpO2 95%   BMI 29.45 kg/m²       General appearance: Well developed, well nourished, alert and cooperative.   HEENT: Normocephalic.   Cardiac: Regular rate and rhythm.  Peripheral Vasculature: Radial pulses are equal and symmetric.  Chest Exam: Clear to auscultation bilaterally, no wheezes, no rhonchi.  Extremities: Normal, no edema.   Skin: No rashes or birthmarks.      Higher integrative function: Awake/alert, oriented to self, location, and date.  Recent memory intact.  Naming intact, speech fluent.  No neglect.  CN II: Visual fields intact.  CN III IV VI: Extraocular movements are full without nystagmus. Pupils are equal, round, and reactive to light.  CN V: Normal facial sensation.  CN VII: Facial movements are symmetric, no weakness.   CN VIII: Auditory acuity is normal.   CN IX & X: Symmetric palatal movement.   CN XI: Sternocleidomastoid and trapezius are normal. No weakness.   CN XII: The tongue is midline.  Motor: BUE 5/5, BLE 3/5 with spasticity in legs>arms.  Sensation: Decreased to light touch in legs bilaterally.  Station and gait: Deferred, nonambulatory at baseline.  Muscle stretch reflexes: " Hyperreflexive throughout.  The patient was reexamined, changes noted.     Scheduled Meds:atorvastatin, 10 mg, Oral, Daily  baclofen, 20 mg, Oral, TID  cholecalciferol, 5,000 Units, Oral, Daily  FLUoxetine, 60 mg, Oral, Daily  furosemide, 20 mg, Oral, Daily  [START ON 3/2/2022] levoFLOXacin, 750 mg, Oral, Q24H  losartan-HCTZ (HYZAAR) 50-12.5 combo dose, , Oral, Daily  pantoprazole, 40 mg, Oral, Q AM  pramipexole, 1.5 mg, Oral, Q8H  sodium chloride, 10 mL, Intravenous, Q12H  sodium chloride, 10 mL, Intravenous, Q12H      Continuous Infusions:   PRN Meds:.•  acetaminophen **OR** acetaminophen **OR** acetaminophen  •  calcium carbonate  •  ondansetron  •  phenazopyridine  •  [COMPLETED] Insert peripheral IV **AND** sodium chloride  •  sodium chloride  •  sodium chloride    Laboratory results:  Lab Results   Component Value Date    GLUCOSE 180 (H) 02/27/2022    CALCIUM 9.0 02/27/2022     02/27/2022    K 3.9 02/27/2022    CO2 22.0 02/27/2022     02/27/2022    BUN 15 02/27/2022    CREATININE 0.74 02/27/2022    EGFRIFNONA 80 02/27/2022    BCR 20.3 02/27/2022    ANIONGAP 10.0 02/27/2022     Lab Results   Component Value Date    WBC 8.12 02/27/2022    HGB 11.0 (L) 02/27/2022    HCT 33.4 (L) 02/27/2022    MCV 83.5 02/27/2022     02/27/2022     No results found for: CHOL  No results found for: HDL  No results found for: LDL  No results found for: TRIG       Review and interpretation of imaging:  CT Abdomen Pelvis Stone Protocol    Result Date: 2/28/2022  1. Hiatal hernia. 2. No acute findings are seen to explain patient's reported bilateral flank pain. There is no evidence of urolithiasis.  Radiation dose reduction techniques were utilized, including automated exposure control and exposure modulation based on body size.  This report was finalized on 2/28/2022 4:40 PM by Dr. Curt Byers M.D.        Impression:  61-year-old female with past medical history of HTN, HLD, and MS with associated neurogenic  bladder status post bladder stimulator, followed by Dr. France, who was admitted 2/26 with complaints of progressive weakening of her arms and a burning sensation over her body.  At baseline she is bound to her scooter and requires self cathing.  She has previously been on Copaxone, Rebif, and Tysabri however more recently she has not been on any disease modifying therapies.  MRI brain and cervical spine with and without contrast were ordered but were not able to be completed as patient did not tolerate laying flat.     Dr. Rodriguez started her on Solu-Medrol 500 mg IV every 8 hours on 2/26 and due to improvement she was decreased to twice daily yesterday.  She has been found to have UTI with associated E. coli bacteremia/sepsis.  She has improved since she was started on antibiotics, ID following.     Diagnoses:  Multiple sclerosis pseudoexacerbation  E. coli bacteremia  UTI  Neurogenic bladder s/[ bladder stimulator  The above impression statement reviewed and changes noted.    Plan:  Given patient's improvement no need to pursue MRI brain/c spine.   D/W Dr Kerr. Neurology will sign off, but please call with further questions/concerns.  F/U with her neurology at [facility] as outpatient.

## 2022-03-01 NOTE — PROGRESS NOTES
ID note foe e coli  Subjective: She is comfortable today. MS doing very well, better than baseline. Lab was unable to collect enough blood for repeat cx and given improvement I think chances of persistent bacteremia are very unlikely. No fevers or chills or night sweats.  Tolerating Rocephin.     O: AF, nad chronically ill appearing,  Abdomen soft nontender, florence mood and affect    2/26 BCx 2/2 E coli    A/p  1.  E. coli bacteremia  2.  Bacteremic UTI with neurogenic bladder  3.  Multiple sclerosis     CT a/p negative  Stop rocephin after today's dose then start Levofloxacin 750mg po q24 4d  Home when okay with others  We will see prn

## 2022-03-01 NOTE — PLAN OF CARE
Goal Outcome Evaluation:  Plan of Care Reviewed With: patient        Progress: no change  Outcome Summary: Pt transferrd byt PT today to bsc then to r/c, barely bearing weight to le, requires 2-3 staff to transfer, had lg hard formed stool this shift, intermittent cath performed r/t bladder scan of 769 at 1000, 700cc urine returned , pt xochitl well, pressure to lower abd resolved per pt, abd now soft, nontender, pt sleeping on and off, easy to arouse. nad, nsr.

## 2022-03-01 NOTE — CASE MANAGEMENT/SOCIAL WORK
Continued Stay Note  [facility]     Patient Name: Maritza Nance Darci  MRN: [id number]  Today's Date: 3/1/2022    Admit Date: 2/26/2022     Discharge Plan     Row Name 03/01/22 1455       Plan    Plan Plan skilled care at accepting facility- pre cert needed.   ELAYNE Armando RN    Patient/Family in Agreement with Plan yes    Plan Comments Called and spoke with Savannah  ( [phone]) regarding bed availability at [facility] and [facility].  Per Savannah [facility] is not in network with pt's insurance.  Savannah to check bed availability at [facility]- pre cert needed. Plan skilled care at accepting facility- pre cert needed   ELAYNE Armando RN               Discharge Codes    No documentation.               Expected Discharge Date and Time     Expected Discharge Date Expected Discharge Time    Mar 4, 2022             Chelly Armando RN

## 2022-03-01 NOTE — PLAN OF CARE
Goal Outcome Evaluation:  Plan of Care Reviewed With: patient     Outcome Summary: Pt was found supine in bed, pleasant and agreeable to OT/PT cotreatment, reporting urge to have BM. ModA to sit EOB with dec core strength for sitting balance noted initially. STS with ModAx2, stand pivot TF to BSC with MaxAx2 with VCs for technique and safety. Poor LB strength and coordination during TF with concepcion knees flexed, assist to block. Pt req's total Ax2 for toileting task, clothing mgt and brief change. TF to bedside recliner with MaxAx2. Pt is left C with all needs in reach.   Therapist used appropriate personal protective equipment including mask, gloves, and eye protection. Hand hygiene was completed before and after therapy session.

## 2022-03-01 NOTE — PROGRESS NOTES
Name: Maritza Bejarano ADMIT: 2022   : 1960  PCP: Nilda Junior APRN    MRN: [id number] LOS: 3 days   AGE/SEX: 61 y.o. female  ROOM: [facility]/     Subjective   Subjective    She states she was able to stand with PT today. No new complaints.     Review of Systems   Constitutional: Negative for fever.   Respiratory: Negative for cough and shortness of breath.    Cardiovascular: Negative for chest pain.   Gastrointestinal: Negative for abdominal pain, diarrhea, nausea and vomiting.   Neurological: Negative for headaches.      Objective   Objective   Vital Signs  Temp:  [97.7 °F (36.5 °C)-98.2 °F (36.8 °C)] 98.2 °F (36.8 °C)  Heart Rate:  [65-81] 80  Resp:  [18] 18  BP: (149-165)/(80-94) 150/87  SpO2:  [93 %-96 %] 93 %  on  Flow (L/min):  [2-3] 3;   Device (Oxygen Therapy): room air  Body mass index is 29.45 kg/m².    Physical Exam  Constitutional:       General: She is not in acute distress.     Appearance: Normal appearance. She is not toxic-appearing.   HENT:      Head: Normocephalic and atraumatic.      Right Ear: External ear normal.      Left Ear: External ear normal.      Nose: Nose normal.   Eyes:      Conjunctiva/sclera: Conjunctivae normal.   Cardiovascular:      Rate and Rhythm: Normal rate and regular rhythm.   Pulmonary:      Effort: Pulmonary effort is normal. No respiratory distress.      Breath sounds: Normal breath sounds. No wheezing or rhonchi.   Abdominal:      General: Bowel sounds are normal. There is no distension.      Palpations: Abdomen is soft.      Tenderness: There is no abdominal tenderness. There is no guarding or rebound.   Musculoskeletal:         General: No swelling.      Right lower leg: No edema.      Left lower leg: No edema.   Skin:     General: Skin is warm and dry.   Neurological:      Mental Status: She is alert and oriented to person, place, and time.   Psychiatric:         Mood and Affect: Mood normal.         Behavior: Behavior normal.         Thought  Content: Thought content normal.     Results Review  I reviewed the patient's new clinical results.  Results from last 7 days   Lab Units 02/27/22  0435 02/26/22  0919   WBC 10*3/mm3 8.12 9.19   HEMOGLOBIN g/dL 11.0* 12.7   PLATELETS 10*3/mm3 310 303     Results from last 7 days   Lab Units 02/27/22  0435 02/26/22  0919   SODIUM mmol/L 139 138   POTASSIUM mmol/L 3.9 3.8   CHLORIDE mmol/L 107 104   CO2 mmol/L 22.0 21.0*   BUN mg/dL 15 12   CREATININE mg/dL 0.74 0.85   GLUCOSE mg/dL 180* 118*     Lab Results   Component Value Date    ANIONGAP 10.0 02/27/2022     Estimated Creatinine Clearance: 85.7 mL/min (by C-G formula based on SCr of 0.74 mg/dL).    Results from last 7 days   Lab Units 02/26/22  0919   ALBUMIN g/dL 3.90   BILIRUBIN mg/dL 0.6   ALK PHOS U/L 174*   AST (SGOT) U/L 32   ALT (SGPT) U/L 16     Results from last 7 days   Lab Units 02/27/22  0435 02/26/22  0919   CALCIUM mg/dL 9.0 8.7   ALBUMIN g/dL  --  3.90     Results from last 7 days   Lab Units 02/26/22  1057 02/26/22  0919   PROCALCITONIN ng/mL  --  1.02*   LACTATE mmol/L 1.4  --      Scheduled Meds  atorvastatin, 10 mg, Oral, Daily  baclofen, 20 mg, Oral, TID  cefTRIAXone, 2 g, Intravenous, Q24H  cholecalciferol, 5,000 Units, Oral, Daily  FLUoxetine, 60 mg, Oral, Daily  furosemide, 20 mg, Oral, Daily  [START ON 3/2/2022] levoFLOXacin, 750 mg, Oral, Q24H  losartan, 50 mg, Oral, Daily  pantoprazole, 40 mg, Oral, Q AM  pramipexole, 1.5 mg, Oral, Q8H  sodium chloride, 10 mL, Intravenous, Q12H  sodium chloride, 10 mL, Intravenous, Q12H    Continuous Infusions   PRN Meds  •  acetaminophen **OR** acetaminophen **OR** acetaminophen  •  calcium carbonate  •  ondansetron  •  phenazopyridine  •  [COMPLETED] Insert peripheral IV **AND** sodium chloride  •  sodium chloride  •  sodium chloride     Diet  Diet Regular    I have personally reviewed:  [x]  Microbiology BCx E. coli susceptible to all  [x]  EKG/Telemetry   [x]  Radiology CT noted. No urolithiasis.  [x]   EKG/Telemetry        Assessment/Plan     Active Hospital Problems    Diagnosis  POA   • Sepsis due to Gram negative bacteria (HCC) [A41.50]  Unknown   • Multiple sclerosis exacerbation (HCC) [G35]  Yes   • Acute UTI (urinary tract infection) [N39.0]  Yes   • Essential hypertension [I10]  Yes   • Hyperlipidemia [E78.5]  Yes   • Anxiety and depression [F41.9, F32.A]  Yes      Resolved Hospital Problems   No resolved problems to display.     61 y.o. female admitted with weakness, MS exacerbation. History of MS since 1993, hypertension, hyperlipidemia. History of retention due to MS has bladder stimulator    · MS exacerbation: Symptoms improved. Status post steroids per neurology. MRI brain and C-spine pending  · Gram-negative sepsis from UTI. BC ID E. coli. Antibiotics to p.o.  · HTN: Resume HCTZ  · SCDs for DVT prophylaxis  · Discussed with patient, nursing staff, CCP and care team on multidisciplinary rounds  · Discharge: She plans on going to SNF at [facility] A[facility]ldo Grant MD  [facility]  03/01/22  12:36 EST    I wore protective equipment throughout this patient encounter including a face mask, gloves, and protective eyewear.  Hand hygiene was performed before donning protective equipment and after removal when leaving the room.

## 2022-03-01 NOTE — CONSULTS
I was requested to see patient to provide emotional and spiritual support.  Patient expressed a strong spiritual belief and she is seeking a closer relationship with God.  She was raised [employer], but has now become active at [facility].  It sounds like she has an excellent support system.  She has a lot of hope for her future.  We concluded with prayer.

## 2022-03-01 NOTE — THERAPY TREATMENT NOTE
Patient Name: Maritza Nance Darci  : 1960    MRN: [id number]                              Today's Date: 3/1/2022       Admit Date: 2022    Visit Dx:     ICD-10-CM ICD-9-CM   1. Multiple sclerosis exacerbation (HCC)  G35 340   2. 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   • FOM (frequency of micturition)   • Headache   • History of diplopia   • History of optic neuritis   • History of vitamin D deficiency   • Migraine syndrome   • Mixed incontinence   • Nausea   • Neurogenic bladder   • Pain in joint of right shoulder   • Restless legs syndrome   • Rupture of rotator cuff of shoulder   • Secondary progressive multiple sclerosis (HCC)   • S/P cubital tunnel release   • Vitamin D deficiency   • Multiple sclerosis exacerbation (HCC)   • Sepsis due to Gram negative bacteria (HCC)     Past Medical History:   Diagnosis Date   • Dawn esophagus     per patient   • Hyperlipidemia    • Hypertension    • Multiple sclerosis (HCC)    • PONV (postoperative nausea and vomiting)      Past Surgical History:   Procedure Laterality Date   • ADENOIDECTOMY     • PARATHYROIDECTOMY     • TOTAL SHOULDER ARTHROPLASTY W/ DISTAL CLAVICLE EXCISION Right 10/22/2018    Procedure: RT TOTAL SHOULDER REVERSE ARTHROPLASTY;  Surgeon: Bipin Dangelo MD;  Location: [facility];  Service: Orthopedics      General Information     Row Name 22 1249          OT Time and Intention    Document Type therapy note (daily note)  -JW     Mode of Treatment co-treatment; occupational therapy;  physical therapy  -     Row Name 03/01/22 1249          General Information    Patient Profile Reviewed yes  -     Existing Precautions/Restrictions fall  -     Row Name 03/01/22 1249          Cognition    Orientation Status (Cognition) oriented x 3  -     Row Name 03/01/22 1249          Safety Issues, Functional Mobility    Impairments Affecting Function (Mobility) balance; coordination; endurance/activity tolerance; range of motion (ROM); strength; postural/trunk control; sensation/sensory awareness  -           User Key  (r) = Recorded By, (t) = Taken By, (c) = Cosigned By    Initials Name Provider Type     Hermelinda Cronin OT Occupational Therapist                 Mobility/ADL's     Row Name 03/01/22 1250          Bed Mobility    Supine-Sit [place] (Bed Mobility) moderate assist (50% patient effort); verbal cues  -     Row Name 03/01/22 1250          Transfers    Transfers toilet transfer; bed-chair transfer; sit-stand transfer  -     Bed-Chair [place] (Transfers) maximum assist (25% patient effort); verbal cues; nonverbal cues (demo/gesture); 2 person assist  -     Sit-Stand [place] (Transfers) maximum assist (25% patient effort); verbal cues; nonverbal cues (demo/gesture); 2 person assist; moderate assist (50% patient effort)  -     [place] Level (Toilet Transfer) verbal cues; nonverbal cues (demo/gesture); maximum assist (25% patient effort); 2 person assist  -     Assistive Device (Toilet Transfer) commode, bedside without drop arms  -     Row Name 03/01/22 1250          Sit-Stand Transfer    Assistive Device (Sit-Stand Transfers) walker, front-wheeled  -     Row Name 03/01/22 1250          Toilet Transfer    Type (Toilet Transfer) stand pivot/stand step  -     Row Name 03/01/22 1250          Activities of Daily Living    BADL Assessment/Intervention toileting; grooming  -     Row Name 03/01/22 1250          Toileting Assessment/Training    [place] Level  (Toileting) toileting skills; adjust/manage clothing; change pad/brief; perform perineal hygiene; dependent (less than 25% patient effort); verbal cues  x2 assist  -     Position (Toileting) supported sitting; supported standing  -JW     Row Name 03/01/22 1250          Grooming Assessment/Training    [place] Level (Grooming) grooming skills; hair care, combing/brushing; set up; standby assist; verbal cues  -     Position (Grooming) supported sitting  -           User Key  (r) = Recorded By, (t) = Taken By, (c) = Cosigned By    Initials Name Provider Type    Hermelinda Vera OT Occupational Therapist               Obj/Interventions     Row Name 03/01/22 1253          Balance    Static Sitting Balance mild impairment; supported; sitting, edge of bed  -     Dynamic Sitting Balance mild impairment; moderate impairment; supported; sitting, edge of bed  -JW     Static Standing Balance severe impairment; supported; standing  -     Balance Interventions sitting; standing; occupation based/functional task; dynamic reaching  -     Comment, Balance dec core strength and trunk control for sitting balance at EOB. Knees buckling once standing  -           User Key  (r) = Recorded By, (t) = Taken By, (c) = Cosigned By    Initials Name Provider Type    Hermelinda Vera OT Occupational Therapist               Goals/Plan    No documentation.                Clinical Impression     Row Name 03/01/22 1254          Plan of Care Review    Plan of Care Reviewed With patient  -JW     Outcome Summary Pt was found supine in bed, pleasant and agreeable to OT/PT cotreatment, reporting urge to have BM. ModA to sit EOB with dec core strength for sitting balance noted initially. STS with ModAx2, stand pivot TF to BSC with MaxAx2 with VCs for technique and safety. Poor LB strength and coordination during TF with concepcion knees flexed, assist to block. Pt req's total Ax2 for toileting task, clothing mgt and brief change. TF to bedside  recliner with MaxAx2. Pt is left [facility] with all needs in reach.  -     Row Name 03/01/22 1254          Positioning and Restraints    Pre-Treatment Position in bed  -     Post Treatment Position chair  -JW     In Chair notified nsg; sitting; call light within reach; encouraged to call for assist; exit alarm on; legs elevated; waffle cushion  -           User Key  (r) = Recorded By, (t) = Taken By, (c) = Cosigned By    Initials Name Provider Type    Hermelinda Vera OT Occupational Therapist               Outcome Measures    No documentation.                 Occupational Therapy Education                 Title: PT OT SLP Therapies (Done)     Topic: Occupational Therapy (Done)     Point: ADL training (Done)     Description:   Instruct learner(s) on proper safety adaptation and remediation techniques during self care or transfers.   Instruct in proper use of assistive devices.              Learning Progress Summary           Patient Acceptance, E,TB, VU by  at 3/1/2022 0404    Acceptance, E, VU by YOGI at 2/28/2022 1653    Acceptance, E, VU by ALFREDO at 2/28/2022 1051    Comment: role of OT, plan of care, safety                   Point: Home exercise program (Done)     Description:   Instruct learner(s) on appropriate technique for monitoring, assisting and/or progressing therapeutic exercises/activities.              Learning Progress Summary           Patient Acceptance, E,TB, VU by  at 3/1/2022 0404    Acceptance, E, VU by YOGI at 2/28/2022 1653    Acceptance, E, VU by ALFREDO at 2/28/2022 1051    Comment: role of OT, plan of care, safety                   Point: Precautions (Done)     Description:   Instruct learner(s) on prescribed precautions during self-care and functional transfers.              Learning Progress Summary           Patient Acceptance, E,TB, VU by  at 3/1/2022 0404    Acceptance, E, VU by YOGI at 2/28/2022 1653    Acceptance, E, VU by ALFREDO at 2/28/2022 1051    Comment: role of OT, plan of care, safety                    Point: Body mechanics (Done)     Description:   Instruct learner(s) on proper positioning and spine alignment during self-care, functional mobility activities and/or exercises.              Learning Progress Summary           Patient Acceptance, E,TB, VU by  at 3/1/2022 0404    Acceptance, E, VU by  at 2/28/2022 1653    Acceptance, E, VU by ALFREOD at 2/28/2022 1051    Comment: role of OT, plan of care, safety                               User Key     Initials Effective Dates Name Provider Type Discipline     06/16/21 -  Jessi Toussaint, RN Registered Nurse Nurse     03/10/21 -  Lottie Castorena, RN Registered Nurse Nurse    ALFREDO 06/10/21 -  Hermelinda Cronin OT Occupational Therapist OT              OT Recommendation and Plan  Planned Therapy Interventions (OT): activity tolerance training, BADL retraining, functional balance retraining, neuromuscular control/coordination retraining, occupation/activity based interventions, patient/caregiver education/training, strengthening exercise, transfer/mobility retraining  Therapy Frequency (OT): 5 times/wk  Plan of Care Review  Plan of Care Reviewed With: patient  Outcome Summary: Pt was found supine in bed, pleasant and agreeable to OT/PT cotreatment, reporting urge to have BM. ModA to sit EOB with dec core strength for sitting balance noted initially. STS with ModAx2, stand pivot TF to BSC with MaxAx2 with VCs for technique and safety. Poor LB strength and coordination during TF with concepcion knees flexed, assist to block. Pt req's total Ax2 for toileting task, clothing mgt and brief change. TF to bedside recliner with MaxAx2. Pt is left [facility] with all needs in reach.     Time Calculation:    Time Calculation- OT     Row Name 03/01/22 1259             Time Calculation-     OT Start Time 1117  -      OT Stop Time 1158  -      OT Time Calculation (min) 41 min  -      Total Timed Code Minutes- OT 41 minute(s)  -      OT Received On 03/01/22  -      OT - Next  Appointment 03/02/22  -              Timed Charges    37842 - OT Self Care/Mgmt Minutes 41  -JW              Total Minutes    Timed Charges Total Minutes 41  -JW       Total Minutes 41  -JW            User Key  (r) = Recorded By, (t) = Taken By, (c) = Cosigned By    Initials Name Provider Type    Hermelinda Vera OT Occupational Therapist              Therapy Charges for Today     Code Description Service Date Service Provider Modifiers Qty    41075478150  OT THERAPEUTIC ACT EA 15 MIN 2/28/2022 Hermelinda Cronin OT GO 1    [id number]  OT EVAL MOD COMPLEXITY 3 2/28/2022 Hermelinda Cronin OT GO 1    [id number]  OT SELF CARE/MGMT/TRAIN EA 15 MIN 3/1/2022 Hermelinda Cronin OT GO 3               Hermelinda Cronin OT  3/1/2022

## 2022-03-02 LAB
HCT VFR BLD AUTO: 38.6 % (ref 34–46.6)
HGB BLD-MCNC: 12.4 G/DL (ref 12–15.9)
QT INTERVAL: 427 MS

## 2022-03-02 PROCEDURE — 93005 ELECTROCARDIOGRAM TRACING: CPT | Performed by: HOSPITALIST

## 2022-03-02 PROCEDURE — 25010000002 ONDANSETRON PER 1 MG: Performed by: NURSE PRACTITIONER

## 2022-03-02 PROCEDURE — 99233 SBSQ HOSP IP/OBS HIGH 50: CPT | Performed by: NURSE PRACTITIONER

## 2022-03-02 PROCEDURE — 97110 THERAPEUTIC EXERCISES: CPT

## 2022-03-02 PROCEDURE — 25010000002 METHYLPREDNISOLONE PER 125 MG: Performed by: NURSE PRACTITIONER

## 2022-03-02 PROCEDURE — 93010 ELECTROCARDIOGRAM REPORT: CPT | Performed by: INTERNAL MEDICINE

## 2022-03-02 PROCEDURE — 85014 HEMATOCRIT: CPT | Performed by: HOSPITALIST

## 2022-03-02 PROCEDURE — 85018 HEMOGLOBIN: CPT | Performed by: HOSPITALIST

## 2022-03-02 RX ADMIN — Medication 10 ML: at 21:08

## 2022-03-02 RX ADMIN — PRAMIPEXOLE DIHYDROCHLORIDE 1.5 MG: 1.5 TABLET ORAL at 21:07

## 2022-03-02 RX ADMIN — ONDANSETRON 4 MG: 2 INJECTION INTRAMUSCULAR; INTRAVENOUS at 21:07

## 2022-03-02 RX ADMIN — FLUOXETINE HYDROCHLORIDE 60 MG: 20 CAPSULE ORAL at 09:01

## 2022-03-02 RX ADMIN — Medication 10 ML: at 09:01

## 2022-03-02 RX ADMIN — PANTOPRAZOLE SODIUM 40 MG: 40 TABLET, DELAYED RELEASE ORAL at 09:03

## 2022-03-02 RX ADMIN — PRAMIPEXOLE DIHYDROCHLORIDE 1.5 MG: 1.5 TABLET ORAL at 06:26

## 2022-03-02 RX ADMIN — PRAMIPEXOLE DIHYDROCHLORIDE 1.5 MG: 1.5 TABLET ORAL at 14:33

## 2022-03-02 RX ADMIN — FUROSEMIDE 20 MG: 20 TABLET ORAL at 06:26

## 2022-03-02 RX ADMIN — LEVOFLOXACIN 750 MG: 750 TABLET, FILM COATED ORAL at 09:01

## 2022-03-02 RX ADMIN — BACLOFEN 20 MG: 10 TABLET ORAL at 09:01

## 2022-03-02 RX ADMIN — LOSARTAN POTASSIUM: 50 TABLET, FILM COATED ORAL at 09:01

## 2022-03-02 RX ADMIN — ONDANSETRON 4 MG: 2 INJECTION INTRAMUSCULAR; INTRAVENOUS at 08:55

## 2022-03-02 RX ADMIN — SODIUM CHLORIDE 500 MG: 900 INJECTION INTRAVENOUS at 14:34

## 2022-03-02 RX ADMIN — ATORVASTATIN CALCIUM 10 MG: 20 TABLET, FILM COATED ORAL at 09:01

## 2022-03-02 RX ADMIN — BACLOFEN 20 MG: 10 TABLET ORAL at 17:10

## 2022-03-02 RX ADMIN — BACLOFEN 20 MG: 10 TABLET ORAL at 21:07

## 2022-03-02 RX ADMIN — Medication 5000 UNITS: at 14:33

## 2022-03-02 RX ADMIN — ONDANSETRON 4 MG: 2 INJECTION INTRAMUSCULAR; INTRAVENOUS at 14:34

## 2022-03-02 NOTE — PROGRESS NOTES
Name: Maritza Bejarano ADMIT: 2022   : 1960  PCP: Nilda Junior APRN    MRN: [id number] LOS: 4 days   AGE/SEX: 61 y.o. female  ROOM: [facility]/     Subjective   Subjective    She states arm weakness is worse today. She states she is not able to lift her legs.    Review of Systems   Constitutional: Negative for fever.   Respiratory: Negative for cough and shortness of breath.    Cardiovascular: Negative for chest pain.   Gastrointestinal: Negative for abdominal pain, diarrhea, nausea and vomiting.   Neurological: Positive for weakness. Negative for headaches.      Objective   Objective   Vital Signs  Temp:  [97.4 °F (36.3 °C)-98 °F (36.7 °C)] 97.6 °F (36.4 °C)  Heart Rate:  [62-73] 63  Resp:  [18] 18  BP: (104-153)/(69-86) 104/69  SpO2:  [95 %-97 %] 95 %  on   ;   Device (Oxygen Therapy): room air  Body mass index is 29.45 kg/m².    Physical Exam  Constitutional:       General: She is not in acute distress.     Appearance: Normal appearance. She is not toxic-appearing.   HENT:      Head: Normocephalic and atraumatic.      Right Ear: External ear normal.      Left Ear: External ear normal.      Nose: Nose normal.   Eyes:      Conjunctiva/sclera: Conjunctivae normal.   Cardiovascular:      Rate and Rhythm: Normal rate and regular rhythm.   Pulmonary:      Effort: Pulmonary effort is normal. No respiratory distress.      Breath sounds: Normal breath sounds. No wheezing or rhonchi.   Abdominal:      General: Bowel sounds are normal. There is no distension.      Palpations: Abdomen is soft.      Tenderness: There is no abdominal tenderness. There is no guarding or rebound.   Musculoskeletal:         General: No swelling.      Right lower leg: No edema.      Left lower leg: No edema.   Skin:     General: Skin is warm and dry.   Neurological:      Mental Status: She is alert and oriented to person, place, and time.      Motor: Weakness present.   Psychiatric:         Mood and Affect: Mood normal.          Behavior: Behavior normal.         Thought Content: Thought content normal.     Results Review  I reviewed the patient's new clinical results.  Results from last 7 days   Lab Units 02/27/22  0435 02/26/22 0919   WBC 10*3/mm3 8.12 9.19   HEMOGLOBIN g/dL 11.0* 12.7   PLATELETS 10*3/mm3 310 303     Results from last 7 days   Lab Units 02/27/22  0435 02/26/22 0919   SODIUM mmol/L 139 138   POTASSIUM mmol/L 3.9 3.8   CHLORIDE mmol/L 107 104   CO2 mmol/L 22.0 21.0*   BUN mg/dL 15 12   CREATININE mg/dL 0.74 0.85   GLUCOSE mg/dL 180* 118*     Lab Results   Component Value Date    ANIONGAP 10.0 02/27/2022     Estimated Creatinine Clearance: 85.7 mL/min (by C-G formula based on SCr of 0.74 mg/dL).    Results from last 7 days   Lab Units 02/26/22  0919   ALBUMIN g/dL 3.90   BILIRUBIN mg/dL 0.6   ALK PHOS U/L 174*   AST (SGOT) U/L 32   ALT (SGPT) U/L 16     Results from last 7 days   Lab Units 02/27/22  0435 02/26/22 0919   CALCIUM mg/dL 9.0 8.7   ALBUMIN g/dL  --  3.90     Results from last 7 days   Lab Units 02/26/22  1057 02/26/22 0919   PROCALCITONIN ng/mL  --  1.02*   LACTATE mmol/L 1.4  --      Scheduled Meds  atorvastatin, 10 mg, Oral, Daily  baclofen, 20 mg, Oral, TID  cholecalciferol, 5,000 Units, Oral, Daily  FLUoxetine, 60 mg, Oral, Daily  furosemide, 20 mg, Oral, Daily  levoFLOXacin, 750 mg, Oral, Q24H  losartan-HCTZ (HYZAAR) 50-12.5 combo dose, , Oral, Daily  pantoprazole, 40 mg, Oral, Q AM  pramipexole, 1.5 mg, Oral, Q8H  sodium chloride, 10 mL, Intravenous, Q12H  sodium chloride, 10 mL, Intravenous, Q12H    Continuous Infusions   PRN Meds  •  acetaminophen **OR** acetaminophen **OR** acetaminophen  •  calcium carbonate  •  ondansetron  •  phenazopyridine  •  [COMPLETED] Insert peripheral IV **AND** sodium chloride  •  sodium chloride  •  sodium chloride     Diet  Diet Regular    I have personally reviewed:  [x]  Microbiology follow-up cultures in process  [x]  EKG/Telemetry        Assessment/Plan     Active  Hospital Problems    Diagnosis  POA   • Sepsis due to Gram negative bacteria (HCC) [A41.50]  Unknown   • Multiple sclerosis exacerbation (HCC) [G35]  Yes   • Acute UTI (urinary tract infection) [N39.0]  Yes   • Essential hypertension [I10]  Yes   • Hyperlipidemia [E78.5]  Yes   • Anxiety and depression [F41.9, F32.A]  Yes      Resolved Hospital Problems   No resolved problems to display.     61 y.o. female admitted with weakness, MS exacerbation. History of MS since 1993, hypertension, hyperlipidemia. History of retention due to MS has bladder stimulator    · MS exacerbation: Symptoms improved but worsened again today steroids were stopped yesterday.   · E. coli sepsis from UTI. BC ID E. coli. Antibiotics to p.o. (levofloxacin- check EKG). Follow-up urology 4 weeks.  · HTN: Resumed HCTZ and BP improved today continue to monitor  · SCDs for DVT prophylaxis  · Discussed with patient, nursing staff, CCP and care team on multidisciplinary rounds and Dr. Kerr neurology will see today  · Discharge: She plans on going to SN[facility] Aroldo Grant MD  [facility]  03/02/22  10:32 EST    I wore protective equipment throughout this patient encounter including a face mask, gloves, and protective eyewear.  Hand hygiene was performed before donning protective equipment and after removal when leaving the room.

## 2022-03-02 NOTE — THERAPY TREATMENT NOTE
Acute Care - Physical Therapy Treatment Note  [facility]     Patient Name: Maritza Nance Darci  : 1960  MRN: [id number]  Today's Date: 3/2/2022      Visit Dx:     ICD-10-CM ICD-9-CM   1. Multiple sclerosis exacerbation (HCC)  G35 340   2. 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   • FOM (frequency of micturition)   • Headache   • History of diplopia   • History of optic neuritis   • History of vitamin D deficiency   • Migraine syndrome   • Mixed incontinence   • Nausea   • Neurogenic bladder   • Pain in joint of right shoulder   • Restless legs syndrome   • Rupture of rotator cuff of shoulder   • Secondary progressive multiple sclerosis (HCC)   • S/P cubital tunnel release   • Vitamin D deficiency   • Multiple sclerosis exacerbation (HCC)   • Sepsis due to Gram negative bacteria (HCC)     Past Medical History:   Diagnosis Date   • Dawn esophagus     per patient   • Hyperlipidemia    • Hypertension    • Multiple sclerosis (HCC)    • PONV (postoperative nausea and vomiting)      Past Surgical History:   Procedure Laterality Date   • ADENOIDECTOMY     • PARATHYROIDECTOMY     • TOTAL SHOULDER ARTHROPLASTY W/ DISTAL CLAVICLE EXCISION Right 10/22/2018    Procedure: RT TOTAL SHOULDER REVERSE ARTHROPLASTY;  Surgeon: Bipin Dangelo MD;  Location: [facility] OR;  Service: Orthopedics     PT Assessment (last 12 hours)     PT Evaluation and Treatment     Row Name 22 1100          Physical Therapy Time and Intention    Subjective Information complains of;  fatigue; weakness  -     Document Type therapy note (daily note)  -     Mode of Treatment individual therapy; physical therapy  -     Patient Effort good  -     Row Name 03/02/22 1100          General Information    Patient Profile Reviewed yes  -     Existing Precautions/Restrictions fall  -     Barriers to Rehab medically complex; physical barrier  -     Row Name 03/02/22 1100          Cognition    Orientation Status (Cognition) oriented x 3  -     Row Name 03/02/22 1100          Pain Scale: Numbers Pre/Post-Treatment    Pretreatment Pain Rating 0/10 - no pain  -     Posttreatment Pain Rating 0/10 - no pain  -     Row Name 03/02/22 1100          Bed Mobility    Supine-Sit Peach (Bed Mobility) moderate assist (50% patient effort); nonverbal cues (demo/gesture); verbal cues; 2 person assist  -     Sit-Supine Peach (Bed Mobility) moderate assist (50% patient effort); 2 person assist; verbal cues; nonverbal cues (demo/gesture)  -     Assistive Device (Bed Mobility) bed rails; draw sheet; head of bed elevated  -     Row Name 03/02/22 1100          Balance    Static Sitting Balance moderate impairment; supported; sitting, edge of bed  -     Dynamic Sitting Balance moderate impairment; sitting, edge of bed; supported  -     Row Name 03/02/22 1100          Motor Skills    Therapeutic Exercise --  PROM LAQ x 5, AROM APs x 5  -     Row Name             Wound 02/26/22 1607 Left upper greater trochanter    Wound - Properties Group Placement Date: 02/26/22  -LN Placement Time: 1607  -LN Present on Hospital Admission: Y  -LN Side: Left  -LN Orientation: upper  -LN Location: greater trochanter  -LN     Retired Wound - Properties Group Date first assessed: 02/26/22  -LN Time first assessed: 1607  -LN Present on Hospital Admission: Y  -LN Side: Left  -LN Location: greater trochanter  -LN     Row Name 03/02/22 1100          Plan of Care Review    Plan of Care Reviewed With patient  -      Progress declining  -     Outcome Summary Pt w increased weakness this date, requiring Mod A x 2 for bed mobility, limited movement LEs w apparent increased tone. Working on static sitting balance Mod A. will cont to progress as appropriate.  -     Row Name 03/02/22 1100          Positioning and Restraints    Pre-Treatment Position in bed  -     Post Treatment Position bed  -     In Bed supine; call light within reach; encouraged to call for assist; exit alarm on; LUE elevated; RUE elevated; legs elevated  -           User Key  (r) = Recorded By, (t) = Taken By, (c) = Cosigned By    Initials Name Provider Type     Anegli Farmer, PT Physical Therapist    Loni Taylor, RN Registered Nurse                Physical Therapy Education                 Title: PT OT SLP Therapies (In Progress)     Topic: Physical Therapy (In Progress)     Point: Mobility training (In Progress)     Learning Progress Summary           Patient Acceptance, E, NR by  at 3/2/2022 1200    Acceptance, E,TB, VU,NR by  at 3/2/2022 0400    Eager, E,TB,D, VU,NR by AUSTIN at 3/1/2022 1851    Acceptance, E,TB, VU by JARED at 3/1/2022 1636    Acceptance, E,TB, VU by  at 3/1/2022 0404    Acceptance, E, VU by AA at 2/28/2022 1653    Acceptance, E, VU,NR by MELISSA at 2/28/2022 1446    Acceptance, E, VU by DB at 2/27/2022 1135                   Point: Home exercise program (In Progress)     Learning Progress Summary           Patient Acceptance, E, NR by  at 3/2/2022 1200    Acceptance, E,TB, VU,NR by  at 3/2/2022 0400    Eager, E,TB,D, VU,NR by AUSTIN at 3/1/2022 1851    Acceptance, E,TB, VU by JARED at 3/1/2022 1636    Acceptance, E,TB, VU by  at 3/1/2022 0404    Acceptance, E, VU by AA at 2/28/2022 1653    Acceptance, E, VU,NR by MELISSA at 2/28/2022 1446    Acceptance, E, VU by DB at 2/27/2022 1135                               User Key     Initials Effective Dates Name Provider Type Discipline     06/16/21 -  Angeli Farmer, PT Physical  Therapist PT    JM 03/07/18 -  Azalia Tena PTA Physical Therapy Assistant PT    JS 06/16/21 -  Alpesh Higgins, RN Registered Nurse Nurse    DB 06/16/21 -  Kelly Cerda, PT Physical Therapist PT    DJ 10/25/19 -  Felipa Camara, PT Physical Therapist PT    AA 06/16/21 -  Jessi Toussaint, RN Registered Nurse Nurse    AH 03/10/21 -  Lottie Castorena, RN Registered Nurse Nurse              PT Recommendation and Plan     Plan of Care Reviewed With: patient  Progress: declining  Outcome Summary: Pt w increased weakness this date, requiring Mod A x 2 for bed mobility, limited movement LEs w apparent increased tone. Working on static sitting balance Mod A. will cont to progress as appropriate.   Outcome Measures     Row Name 03/02/22 1200             How much help from another person do you currently need...    Turning from your back to your side while in flat bed without using bedrails? 2  -LH      Moving from lying on back to sitting on the side of a flat bed without bedrails? 2  -LH      Moving to and from a bed to a chair (including a wheelchair)? 1  -LH      Standing up from a chair using your arms (e.g., wheelchair, bedside chair)? 2  -LH      Climbing 3-5 steps with a railing? 1  -LH      To walk in hospital room? 1  -      AM-PAC 6 Clicks Score (PT) 9  -              Functional Assessment    Outcome Measure Options AM-PAC 6 Clicks Basic Mobility (PT)  -            User Key  (r) = Recorded By, (t) = Taken By, (c) = Cosigned By    Initials Name Provider Type     Angeli Farmer, PT Physical Therapist                 Time Calculation:    PT Charges     Row Name 03/02/22 1201             Time Calculation    Start Time 1007  -      Stop Time 1017  -      Time Calculation (min) 10 min  -      PT Received On 03/02/22  -      PT - Next Appointment 03/03/22  -            User Key  (r) = Recorded By, (t) = Taken By, (c) = Cosigned By    Initials Name Provider Type     Angeli Farmer, PT Physical  Therapist              Therapy Charges for Today     Code Description Service Date Service Provider Modifiers Qty    [id number]  PT THER SUPP EA 15 MIN 3/2/2022 Angeli Farmer, PT GP 1    27971819178 HC PT THER PROC EA 15 MIN 3/2/2022 Angeli Farmer, PT GP 1          PT G-Codes  Outcome Measure Options: AM-PAC 6 Clicks Basic Mobility (PT)  AM-PAC 6 Clicks Score (PT): 9  AM-PAC 6 Clicks Score (OT): 13    Angeli Farmer, PT  3/2/2022

## 2022-03-02 NOTE — PROGRESS NOTES
"DOS: 3/2/2022  NAME: Maritza Nance Darci   : 1960  PCP: Nilda Junior APRN  Chief Complaint   Patient presents with   • decreased mobility   Patient seen in follow-up today; new to me        Subjective: Reports she had a \"terrible night\" she reports significant nausea not improved by antiemetics causing her inability to eat.  Our service signed off yesterday; reconsulted today due to worsening weakness in torso and lower extremities.  Patient on high-dose IV steroid until ; noted decline since that time.    No family at bedside.     Objective:  Vital signs: /69 (BP Location: Right arm, Patient Position: Lying)   Pulse 63   Temp 97.6 °F (36.4 °C) (Oral)   Resp 18   Ht 165.1 cm (65\")   Wt 80.3 kg (177 lb)   LMP  (LMP Unknown) Comment: PT. STATES HER LAST PERIOD WAS GREATER THAN FIVE YEARS AGO  SpO2 95%   BMI 29.45 kg/m²      General appearance: Well developed, well nourished, alert and cooperative.  BMI 29.45  HEENT: Normocephalic.   Cardiac: Regular rate and rhythm.  Peripheral Vasculature: Radial pulses are equal and symmetric.  Chest Exam: Clear to auscultation bilaterally, no wheezes, no rhonchi.  Extremities: Normal, no edema.   Skin: No rashes or birthmarks.      Higher integrative function: Awake/alert, oriented to self, location, and date.  Recent memory intact.  Naming intact, speech fluent.  No neglect.  CN II: Visual fields intact.  CN III IV VI: Extraocular movements are full without nystagmus. Pupils are equal, round, and reactive to light.  CN V: Normal facial sensation.  CN VII: Facial movements are symmetric, no weakness.   CN VIII: Auditory acuity is normal.   CN IX & X: Symmetric palatal movement.   CN XI: Sternocleidomastoid and trapezius are normal. No weakness.   CN XII: The tongue is midline.  Motor: BUE 4+/5, BLE 3-/5 with spasticity in legs>arms.  Sensation: Decreased to light touch in legs bilaterally.  Station and gait: Deferred, nonambulatory at baseline.  Muscle " stretch reflexes: Hyperreflexive throughout.    Laboratory results:  Lab Results   Component Value Date    GLUCOSE 180 (H) 02/27/2022    CALCIUM 9.0 02/27/2022     02/27/2022    K 3.9 02/27/2022    CO2 22.0 02/27/2022     02/27/2022    BUN 15 02/27/2022    CREATININE 0.74 02/27/2022    EGFRIFNONA 80 02/27/2022    BCR 20.3 02/27/2022    ANIONGAP 10.0 02/27/2022     Lab Results   Component Value Date    WBC 8.12 02/27/2022    HGB 11.0 (L) 02/27/2022    HCT 33.4 (L) 02/27/2022    MCV 83.5 02/27/2022     02/27/2022          Review and interpretation of imaging:  XR CHEST 1 VW-     HISTORY: Female who is 61 years-old, low-grade fever     TECHNIQUE: Frontal view of the chest     COMPARISON: 02/01/2021     FINDINGS: The heart size is borderline. Pulmonary vasculature is  unremarkable. Minimal likely atelectasis or infiltrate medially in the  left base. No pleural effusion, or pneumothorax. No acute osseous  process.     IMPRESSION:  Minimal likely atelectasis or infiltrate medially in the  left base. Borderline heart size.     This report was finalized on 2/26/2022 9:49 AM by Dr. Luan Estrada M.D.     CT OF THE ABDOMEN AND PELVIS WITHOUT CONTRAST 02/28/2022     HISTORY: Bilateral flank pain.     TECHNIQUE: Spiral images were obtained from the lung bases to the  symphysis pubis. No intravenous or oral contrast was given.     FINDINGS: There is minimal atelectasis or dependent edema in the  posterior aspects of the lung bases with some additional linear scar or  atelectasis in the left lung base.     There is a moderate size hiatal hernia. Gallbladder appears contracted.  No gallstones are seen. There is food material in the stomach.     The liver, spleen and pancreas appear unremarkable. There is mild  fullness of both adrenal glands which appears similar to the 04/05/2016  study and may represent some mild adrenal hyperplasia.     No intrarenal stones are seen bilaterally. No hydronephrosis  or  significant perinephric stranding is seen. No ureteral stones are seen.  Urinary bladder is unremarkable.     Uterus and adnexa appear normal.     No bowel wall thickening or bowel dilatation is seen.     IMPRESSION:  1. Hiatal hernia.  2. No acute findings are seen to explain patient's reported bilateral  flank pain. There is no evidence of urolithiasis.     Radiation dose reduction techniques were utilized, including automated  exposure control and exposure modulation based on body size.     This report was finalized on 2/28/2022 4:40 PM by Dr. Curt Byers M.D.    Impression:  1.  Multiple sclerosis; pseudoexacerbation-worsening symptom while off steroids  2.  E. coli bacteremia  3.  UTI; on IV antibiotics  4.  Neurogenic bladder secondary to #1; bladder stimulator in place  5.  Immobility due to #1      Note: Neurologically, stable with worsening weakness lower extremities greater than upper extremities since steroids discontinued therefore will recommend resuming steroid until patient seen by outpatient neurologist; recommend consideration of disease modifying therapy in the future.PT/OT/ST. CCP to assist with discharge planning. Call RRT for any acute neurological changes and/or concerns. We will continue to follow and advise.       Plan:  · Resume steroids will recommend methylprednisolone 500 mg IV x1 dose then prednisone 60 mg daily thereafter until seen by outpatient neurologist Dr. France   · Continue UTI treatment urology-urology following  · Previously recommended consideration of MRI of the brain and cervical spine to further evaluate progression of MS not completed due to patient level bladder stimulator displaced therefore urology consulted.  Would not recommend pursuing at this time as it will likely not  will recommend patient follow-up with established neurologist to further evaluate adding disease modifying therapy; previously on Copaxone, Rebif, and Tysabri.     Case  reviewed with attending neurologist Dr. Kerr she agrees with treatment recommendations above.     DANA Vasquez

## 2022-03-02 NOTE — PLAN OF CARE
Goal Outcome Evaluation:              Outcome Summary: C/O weakness in extremities. States can only move toe but does move feet side to side. States can only move hands but raised arms in the air for Dr. Grant. Medicated for nausea x 2 which got better after 2nd dose. Pt feared of having a GIB but H/H better today at 12.4/38.6 than on 02/27. No visual signs of bleeding. Straight cath x 1 for bladder scan of 377 and voiding per yeny.Telemetry SB/SR.

## 2022-03-02 NOTE — PLAN OF CARE
Goal Outcome Evaluation:  Plan of Care Reviewed With: patient        Progress: declining  Outcome Summary: Pt w increased weakness this date, requiring Mod A x 2 for bed mobility, limited movement LEs w apparent increased tone. Working on static sitting balance Mod A. will cont to progress as appropriate.      .Patient was wearing a face mask during this therapy encounter. Therapist used appropriate personal protective equipment including eye protection, mask, and gloves.  Mask used was standard procedure mask. Appropriate PPE was worn during the entire therapy session. Hand hygiene was completed before and after therapy session. Patient is not in enhanced droplet precautions.

## 2022-03-02 NOTE — PLAN OF CARE
Goal Outcome Evaluation:            Pt is Alert and Oriented. Pt medicated per orders. Pt had no complaints of pain throughout shift. Pt bladder scanned q6. Straight cath x1. 300ml out. Pt medicated per orders. Pt on room air throughout shift. Vital signs within normal limits at this time.

## 2022-03-03 VITALS
RESPIRATION RATE: 18 BRPM | WEIGHT: 177 LBS | HEART RATE: 82 BPM | SYSTOLIC BLOOD PRESSURE: 120 MMHG | TEMPERATURE: 98.2 F | OXYGEN SATURATION: 94 % | HEIGHT: 65 IN | DIASTOLIC BLOOD PRESSURE: 59 MMHG | BODY MASS INDEX: 29.49 KG/M2

## 2022-03-03 LAB
ANION GAP SERPL CALCULATED.3IONS-SCNC: 13.3 MMOL/L (ref 5–15)
BUN SERPL-MCNC: 24 MG/DL (ref 6–20)
BUN/CREAT SERPL: 30.4 (ref 7–25)
CALCIUM SPEC-SCNC: 9.4 MG/DL (ref 8.6–10.5)
CHLORIDE SERPL-SCNC: 100 MMOL/L (ref 98–107)
CO2 SERPL-SCNC: 25.7 MMOL/L (ref 22–29)
CREAT SERPL-MCNC: 0.79 MG/DL (ref 0.57–1)
DEPRECATED RDW RBC AUTO: 43.5 FL (ref 37–54)
EGFRCR SERPLBLD CKD-EPI 2021: 86.3 ML/MIN/1.73
ERYTHROCYTE [DISTWIDTH] IN BLOOD BY AUTOMATED COUNT: 14.1 % (ref 12.3–15.4)
GLUCOSE SERPL-MCNC: 210 MG/DL (ref 65–99)
HCT VFR BLD AUTO: 41.1 % (ref 34–46.6)
HGB BLD-MCNC: 13.2 G/DL (ref 12–15.9)
MCH RBC QN AUTO: 27.1 PG (ref 26.6–33)
MCHC RBC AUTO-ENTMCNC: 32.1 G/DL (ref 31.5–35.7)
MCV RBC AUTO: 84.4 FL (ref 79–97)
PLATELET # BLD AUTO: 456 10*3/MM3 (ref 140–450)
PMV BLD AUTO: 9.4 FL (ref 6–12)
POTASSIUM SERPL-SCNC: 3.6 MMOL/L (ref 3.5–5.2)
RBC # BLD AUTO: 4.87 10*6/MM3 (ref 3.77–5.28)
SODIUM SERPL-SCNC: 139 MMOL/L (ref 136–145)
WBC NRBC COR # BLD: 9.66 10*3/MM3 (ref 3.4–10.8)

## 2022-03-03 PROCEDURE — 97530 THERAPEUTIC ACTIVITIES: CPT

## 2022-03-03 PROCEDURE — 80048 BASIC METABOLIC PNL TOTAL CA: CPT | Performed by: HOSPITALIST

## 2022-03-03 PROCEDURE — 85027 COMPLETE CBC AUTOMATED: CPT | Performed by: HOSPITALIST

## 2022-03-03 PROCEDURE — 99233 SBSQ HOSP IP/OBS HIGH 50: CPT | Performed by: NURSE PRACTITIONER

## 2022-03-03 PROCEDURE — 63710000001 PREDNISONE PER 5 MG: Performed by: NURSE PRACTITIONER

## 2022-03-03 PROCEDURE — 25010000002 ONDANSETRON PER 1 MG: Performed by: NURSE PRACTITIONER

## 2022-03-03 PROCEDURE — 97110 THERAPEUTIC EXERCISES: CPT

## 2022-03-03 PROCEDURE — 97535 SELF CARE MNGMENT TRAINING: CPT

## 2022-03-03 RX ORDER — PRENATAL 168/IRON/FOLIC/OMEGA3 27-800-235
1 CAPSULE ORAL DAILY
Start: 2022-03-10 | End: 2022-03-25

## 2022-03-03 RX ORDER — CLONAZEPAM 1 MG/1
2 TABLET ORAL 2 TIMES DAILY PRN
Qty: 4 TABLET | Refills: 0 | Status: SHIPPED | OUTPATIENT
Start: 2022-03-03 | End: 2022-03-25

## 2022-03-03 RX ORDER — LEVOFLOXACIN 750 MG/1
750 TABLET ORAL EVERY 24 HOURS
Qty: 2 TABLET | Refills: 0
Start: 2022-03-04 | End: 2022-03-06

## 2022-03-03 RX ORDER — PREDNISONE 20 MG/1
60 TABLET ORAL
Start: 2022-03-04 | End: 2022-03-25

## 2022-03-03 RX ORDER — ONDANSETRON 2 MG/ML
4 INJECTION INTRAMUSCULAR; INTRAVENOUS EVERY 4 HOURS PRN
Status: DISCONTINUED | OUTPATIENT
Start: 2022-03-03 | End: 2022-03-03 | Stop reason: HOSPADM

## 2022-03-03 RX ORDER — CLONAZEPAM 1 MG/1
2 TABLET ORAL 2 TIMES DAILY PRN
Qty: 4 TABLET | Refills: 0
Start: 2022-03-03 | End: 2022-03-03 | Stop reason: SDUPTHER

## 2022-03-03 RX ADMIN — ONDANSETRON 4 MG: 2 INJECTION INTRAMUSCULAR; INTRAVENOUS at 01:36

## 2022-03-03 RX ADMIN — FLUOXETINE HYDROCHLORIDE 60 MG: 20 CAPSULE ORAL at 09:11

## 2022-03-03 RX ADMIN — Medication 5000 UNITS: at 09:12

## 2022-03-03 RX ADMIN — PANTOPRAZOLE SODIUM 40 MG: 40 TABLET, DELAYED RELEASE ORAL at 06:23

## 2022-03-03 RX ADMIN — Medication 10 ML: at 09:14

## 2022-03-03 RX ADMIN — PREDNISONE 60 MG: 50 TABLET ORAL at 09:11

## 2022-03-03 RX ADMIN — PRAMIPEXOLE DIHYDROCHLORIDE 1.5 MG: 1.5 TABLET ORAL at 06:23

## 2022-03-03 RX ADMIN — PRAMIPEXOLE DIHYDROCHLORIDE 1.5 MG: 1.5 TABLET ORAL at 14:55

## 2022-03-03 RX ADMIN — BACLOFEN 20 MG: 10 TABLET ORAL at 09:11

## 2022-03-03 RX ADMIN — FUROSEMIDE 20 MG: 20 TABLET ORAL at 06:23

## 2022-03-03 RX ADMIN — LEVOFLOXACIN 750 MG: 750 TABLET, FILM COATED ORAL at 09:12

## 2022-03-03 RX ADMIN — LOSARTAN POTASSIUM: 50 TABLET, FILM COATED ORAL at 09:13

## 2022-03-03 RX ADMIN — ATORVASTATIN CALCIUM 10 MG: 20 TABLET, FILM COATED ORAL at 09:11

## 2022-03-03 NOTE — DISCHARGE SUMMARY
[facility]    Date of Discharge:  3/3/2022    PCP: Nilda Junior APRN    Discharge Diagnosis:   Active Hospital Problems    Diagnosis  POA   • Sepsis due to Gram negative bacteria (HCC) [A41.50]  Unknown   • Multiple sclerosis exacerbation (HCC) [G35]  Yes   • Acute UTI (urinary tract infection) [N39.0]  Yes   • Essential hypertension [I10]  Yes   • Hyperlipidemia [E78.5]  Yes   • Anxiety and depression [F41.9, F32.A]  Yes      Resolved Hospital Problems   No resolved problems to display.      Consults     Date and Time Order Name Status Description    2/27/2022 10:29 AM Inpatient Urology Consult Completed     2/27/2022 10:29 AM Inpatient Infectious Diseases Consult Completed     2/27/2022 10:28 AM Inpatient Infectious Diseases Consult Completed     2/26/2022 10:22 AM [facility] (on-call MD unless specified) Details      2/26/2022  9:15 AM Inpatient Neurology Consult General Completed         Hospital Course  61 y.o. female admitted with multiple sclerosis exacerbation and E. coli sepsis/bacteremia from a urinary tract infection (neurogenic bladder, bladder stimulator). She was started on IV steroids with improvement in her weakness (she states even better than baseline). ID recommends finishing out antibiotics with levofloxacin. She was taken off steroids and had worsening of symptoms and restarted with improvement again. Neurology recommends discharge on steroids 60 mg a day until she follows up with her regular neurologist (Dr. Ozzy France). She states she does not have an appointment until August but will try to get one as soon as possible. She knows it is not ideal to be on high-dose steroids chronically and will try to get an appointment as soon as possible. She is also aware of risk of tendinopathy with levofloxacin especially with steroids but she is on a relatively short course of levofloxacin.     She has an elevated BUN likely from steroids.    I  discussed the patient's findings and my recommendations with patient and nursing staff.    Condition on Discharge: Improved strength today.     Temp:  [97.8 °F (36.6 °C)-98.3 °F (36.8 °C)] 98.2 °F (36.8 °C)  Heart Rate:  [64-84] 82  Resp:  [18] 18  BP: (113-122)/(59-73) 120/59  Body mass index is 29.45 kg/m².    Physical Exam  Constitutional:       General: She is not in acute distress.     Appearance: Normal appearance. She is not toxic-appearing.   HENT:      Head: Normocephalic and atraumatic.      Right Ear: External ear normal.      Left Ear: External ear normal.      Nose: Nose normal.   Eyes:      Conjunctiva/sclera: Conjunctivae normal.   Cardiovascular:      Rate and Rhythm: Normal rate and regular rhythm.   Pulmonary:      Effort: Pulmonary effort is normal. No respiratory distress.      Breath sounds: Normal breath sounds. No wheezing or rhonchi.   Abdominal:      General: Bowel sounds are normal. There is no distension.      Palpations: Abdomen is soft.      Tenderness: There is no abdominal tenderness. There is no guarding or rebound.   Musculoskeletal:         General: No swelling.      Right lower leg: No edema.      Left lower leg: No edema.   Skin:     General: Skin is warm and dry.   Neurological:      Mental Status: She is alert and oriented to person, place, and time.   Psychiatric:         Mood and Affect: Mood normal.         Behavior: Behavior normal.         Thought Content: Thought content normal.     While in the room and during my examination of the patient I wore gloves, mask, eye protection.  I washed my hands before and after this patient encounter.     Disposition: Skilled Nursing Facility (DC - External)       Discharge Medications      New Medications      Instructions Start Date   levoFLOXacin 750 MG tablet  Commonly known as: LEVAQUIN   750 mg, Oral, Every 24 Hours   Start Date: March 4, 2022     predniSONE 20 MG tablet  Commonly known as: DELTASONE   60 mg, Oral, Daily With  Breakfast   Start Date: March 4, 2022        Changes to Medications      Instructions Start Date   calcium citrate-vitamin d 315-250 MG-UNIT tablet tablet  Commonly known as: CALCITRATE  What changed: These instructions start on March 10, 2022. If you are unsure what to do until then, ask your doctor or other care provider.   1 tablet, Oral, Daily   Start Date: March 10, 2022     Citracal +D3 250-107-500 MG-MG-UNIT chewable tablet  Generic drug: Calcium-Phosphorus-Vitamin D  What changed:   · medication strength  · These instructions start on March 10, 2022. If you are unsure what to do until then, ask your doctor or other care provider.   1 tablet, Oral, Daily   Start Date: March 10, 2022     clonazePAM 1 MG tablet  Commonly known as: KlonoPIN  What changed:   · medication strength  · how much to take  · when to take this  · reasons to take this   2 mg, Oral, 2 Times Daily PRN         Continue These Medications      Instructions Start Date   albuterol sulfate  (90 Base) MCG/ACT inhaler  Commonly known as: PROVENTIL HFA;VENTOLIN HFA;PROAIR HFA   2 puffs, Inhalation, Every 4 Hours PRN      atorvastatin 10 MG tablet  Commonly known as: LIPITOR   10 mg, Oral, Daily      baclofen 10 MG tablet  Commonly known as: LIORESAL   20 mg, Oral, 3 Times Daily      cholecalciferol 25 MCG (1000 UT) tablet  Commonly known as: VITAMIN D3   5,000 Units, Oral, Daily      conjugated estrogens 0.625 MG/GM vaginal cream  Commonly known as: PREMARIN   USE A PEA-SIZED AMOUNT ON FINGERTIP AND PLACE IN THE VAGINAL OPENING 3 X PER WEEK.      FLUoxetine 20 MG capsule  Commonly known as: PROzac   60 mg, Oral, Daily      HYOSCYAMINE PO   0.125 mg, Oral, Daily      losartan-hydrochlorothiazide 50-12.5 MG per tablet  Commonly known as: HYZAAR   1 tablet, Oral, Daily      pantoprazole 40 MG EC tablet  Commonly known as: PROTONIX   Oral, 2 Times Daily      phenazopyridine 200 MG tablet  Commonly known as: PYRIDIUM   200 mg, Oral, 3 Times Daily  PRN      polyethylene glycol 17 GM/SCOOP powder  Commonly known as: MIRALAX   17 g, Oral      pramipexole 0.5 MG tablet  Commonly known as: MIRAPEX   1.5 mg, Oral, 3 Times Daily      promethazine 25 MG tablet  Commonly known as: PHENERGAN   Oral      triamcinolone 0.1 % ointment  Commonly known as: KENALOG   APPLY TO AFFECTED AREA TWICE A DAY         Stop These Medications    losartan 50 MG tablet  Commonly known as: COZAAR     nitrofurantoin (macrocrystal-monohydrate) 100 MG capsule  Commonly known as: MACROBID     NON FORMULARY     oxyCODONE ER 20 MG 12 hr tablet  Commonly known as: OxyCONTIN     oxyCODONE-acetaminophen  MG per tablet  Commonly known as: PERCOCET     OxyCONTIN 10 MG 12 hr tablet  Generic drug: oxyCODONE           Diet Instructions     Diet: Regular      Discharge Diet: Regular         Activity Instructions     Activity as Tolerated           Additional Instructions for the Follow-ups that You Need to Schedule     Call MD for problems / concerns.   As directed         Contact information for follow-up providers     Nilda Junior APRN .    Specialty: Family Medicine  Contact information:  [address]  [address]  [place] [id number] [phone]             Sohan Jacobsen MD .    Specialties: Internal Medicine, Hospitalist  Contact information:  [address] [place] [id number]  [phone]             Ozzy France MD. Call.    Specialty: Neurology  Why: make appointment ASAP  Contact information:  [address]  [address]  [place] [address]  [phone]                   Contact information for after-discharge care     Destination     [facility] .    Service: Skilled Nursing  Contact information:  [address] [facility] [address] [phone]                            Pending Labs     Order Current Status    Blood Culture - Blood, Arm, Left Preliminary result    Blood Culture - Blood, Arm, Right  Preliminary result         Ravinder Grant MD  03/03/22  13:28 EST    Discharge time spent greater than 30 minutes.

## 2022-03-03 NOTE — CASE MANAGEMENT/SOCIAL WORK
Continued Stay Note  [facility]     Patient Name: Maritza Nance Darci  MRN: [id number]  Today's Date: 3/3/2022    Admit Date: 2/26/2022     Discharge Plan     Row Name 03/03/22 1428       Plan    Plan Plan [facility] for skilled care - pre cert obtained.  ELAYNE Armando RN    Patient/Family in Agreement with Plan yes    Plan Comments Per [place]  ( [phone]) [facility] has a bed and can accept pt today.  Discharge Summary faxed to [facility].  Discharge Summary in packet.  Packet to RN.  Called and spoke with pt's spouse ( Bigg Darci  [phone]) and he states he will transport pt to [facility].  Plan [facility] for skilled care- pre cert obtained.  ELAYNE Armando RN               Discharge Codes    No documentation.               Expected Discharge Date and Time     Expected Discharge Date Expected Discharge Time    Mar 3, 2022             Chelly Armando RN

## 2022-03-03 NOTE — THERAPY TREATMENT NOTE
Acute Care - Occupational Therapy Treatment Note  [facility]     Patient Name: Maritza Nance Darci  : 1960  MRN: [id number]  Today's Date: 3/3/2022             Admit Date: 2022       ICD-10-CM ICD-9-CM   1. Multiple sclerosis exacerbation (HCC)  G35 340   2. Cystitis  N30.90 595.9   3. 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   • FOM (frequency of micturition)   • Headache   • History of diplopia   • History of optic neuritis   • History of vitamin D deficiency   • Migraine syndrome   • Mixed incontinence   • Nausea   • Neurogenic bladder   • Pain in joint of right shoulder   • Restless legs syndrome   • Rupture of rotator cuff of shoulder   • Secondary progressive multiple sclerosis (HCC)   • S/P cubital tunnel release   • Vitamin D deficiency   • Multiple sclerosis exacerbation (HCC)   • Sepsis due to Gram negative bacteria (HCC)     Past Medical History:   Diagnosis Date   • Dawn esophagus     per patient   • Hyperlipidemia    • Hypertension    • Multiple sclerosis (HCC)    • PONV (postoperative nausea and vomiting)      Past Surgical History:   Procedure Laterality Date   • ADENOIDECTOMY     • PARATHYROIDECTOMY     • TOTAL SHOULDER ARTHROPLASTY W/ DISTAL CLAVICLE EXCISION Right 10/22/2018    Procedure: RT TOTAL SHOULDER REVERSE ARTHROPLASTY;  Surgeon: Bipin Dangelo MD;  Location: [facility] OR;  Service: Orthopedics         OT ASSESSMENT FLOWSHEET (last 12 hours)     OT Evaluation and Treatment     Row Name 22  7209                   OT Time and Intention    Document Type therapy note (daily note)  -MR        Mode of Treatment co-treatment; occupational therapy; physical therapy  -MR        Patient Effort good  -MR                  General Information    Patient Profile Reviewed yes  -MR        Patient/Family/Caregiver Comments/Observations pt supine in bed, NAD  -MR        Existing Precautions/Restrictions fall  -MR        Barriers to Rehab medically complex; previous functional deficit  -MR                  Cognition    Orientation Status (Cognition) oriented x 3  -MR        Follows Commands (Cognition) repetition of directions required; verbal cues/prompting required  -MR                  Pain Scale: Numbers Pre/Post-Treatment    Pretreatment Pain Rating 0/10 - no pain  -MR        Posttreatment Pain Rating 0/10 - no pain  -MR                  Bed Mobility    Bed Mobility supine-sit; sit-supine; scooting/bridging  -MR        Scooting/Bridging [place] (Bed Mobility) moderate assist (50% patient effort)  -MR        Supine-Sit [place] (Bed Mobility) 2 person assist; moderate assist (50% patient effort); maximum assist (25% patient effort)  -MR        Sit-Supine [place] (Bed Mobility) 2 person assist; maximum assist (25% patient effort)  -MR        Assistive Device (Bed Mobility) bed rails; draw sheet; head of bed elevated  -MR                  Safety Issues, Functional Mobility    Safety Issues Affecting Function (Mobility) insight into deficits/self-awareness; judgment; safety precaution awareness; awareness of need for assistance  -MR        Impairments Affecting Function (Mobility) balance; coordination; endurance/activity tolerance; grasp; motor control; muscle tone abnormal; postural/trunk control; range of motion (ROM); strength  -MR                  Motor Skills    Therapeutic Exercise shoulder  -MR                  Shoulder (Therapeutic Exercise)    Shoulder (Therapeutic Exercise) AROM (active range  of motion)  -MR        Shoulder AROM (Therapeutic Exercise) --  forward reaching while seated EOB  -MR                  Balance    Balance Assessment sitting static balance; sitting dynamic balance  -MR        Static Sitting Balance mild impairment; supported; sitting, edge of bed  -MR        Dynamic Sitting Balance moderate impairment; supported; sitting, edge of bed  -MR        Comment, Balance pt able to tolerate sitting EOB for various activities > 20 minutes this date. Level of assist needed for sitting balance varies from CGA to min/mod assist. Pt able to participate in washing hair using shampoo cap with mod assist from OT while PT assists to maintain sitting balance. Pt also participates in weight bearing through UEs, and forward functional reaching.  -MR                  Activities of Daily Living    BADL Assessment/Intervention grooming; lower body dressing  -MR                  Lower Body Dressing Assessment/Training    [place] Level (Lower Body Dressing) doff; don; socks; dependent (less than 25% patient effort)  -MR        Position (Lower Body Dressing) supine  -MR                  Grooming Assessment/Training    [place] Level (Grooming) grooming skills; moderate assist (50% patient effort)  washing hair using shampoo cap  -MR        Position (Grooming) edge of bed sitting; supported sitting  -MR        Comment (Grooming) pt uses LUE to participate in washing hair while seated EOB  -MR                  Wound 02/26/22 1607 Left upper greater trochanter    Wound - Properties Group Placement Date: 02/26/22  -LN Placement Time: 1607  -LN Present on Hospital Admission: Y  -LN Side: Left  -LN Orientation: upper  -LN Location: greater trochanter  -LN        Retired Wound - Properties Group Date first assessed: 02/26/22  -LN Time first assessed: 1607  -LN Present on Hospital Admission: Y  -LN Side: Left  -LN Location: greater trochanter  -LN                  Plan of Care Review    Plan of Care  Reviewed With patient  -MR        Outcome Summary Pt agreeable to OT this am, seen for co-tx with PT. Performs supine to sit with mod to max assist of 2. Pt is able to tolerate sitting EOB while participating in various activities; level of assist for sitting balance varies from CGA to min/mod assist. Pt is able to wash hair using shampoo cap with mod assist this date. Continue OT to increase safety and independence with performance of ADLs.  -MR                  Positioning and Restraints    Pre-Treatment Position in bed  -MR        Post Treatment Position bed  -MR        In Bed supine; notified nsg; call light within reach; encouraged to call for assist; exit alarm on  -MR              User Key  (r) = Recorded By, (t) = Taken By, (c) = Cosigned By    Initials Name Effective Dates     Katrin RamosBuffy, OT 06/16/21 -     Loni Taylor RN 06/01/21 -                  Occupational Therapy Education                 Title: PT OT SLP Therapies (Done)     Topic: Occupational Therapy (Done)     Point: ADL training (Done)     Description:   Instruct learner(s) on proper safety adaptation and remediation techniques during self care or transfers.   Instruct in proper use of assistive devices.              Learning Progress Summary           Patient Acceptance, E, VU,NR by  at 3/3/2022 0424      Show all documentation for this point (5)                 Point: Home exercise program (Done)     Description:   Instruct learner(s) on appropriate technique for monitoring, assisting and/or progressing therapeutic exercises/activities.              Learning Progress Summary           Patient Acceptance, E, VU,NR by  at 3/3/2022 0424      Show all documentation for this point (5)                 Point: Precautions (Done)     Description:   Instruct learner(s) on prescribed precautions during self-care and functional transfers.              Learning Progress Summary           Patient Acceptance, E, VU,NR by  at 3/3/2022  0424      Show all documentation for this point (5)                 Point: Body mechanics (Done)     Description:   Instruct learner(s) on proper positioning and spine alignment during self-care, functional mobility activities and/or exercises.              Learning Progress Summary           Patient Acceptance, E, VU,NR by  at 3/3/2022 0424      Show all documentation for this point (5)                             User Key     Initials Effective Dates Name Provider Type Discipline     03/10/21 -  Lottie Castorena, RN Registered Nurse Nurse                  OT Recommendation and Plan     Plan of Care Review  Plan of Care Reviewed With: patient  Outcome Summary: Pt agreeable to OT this am, seen for co-tx with PT. Performs supine to sit with mod to max assist of 2. Pt is able to tolerate sitting EOB while participating in various activities; level of assist for sitting balance varies from CGA to min/mod assist. Pt is able to wash hair using shampoo cap with mod assist this date. Continue OT to increase safety and independence with performance of ADLs.  Plan of Care Reviewed With: patient  Outcome Summary: Pt agreeable to OT this am, seen for co-tx with PT. Performs supine to sit with mod to max assist of 2. Pt is able to tolerate sitting EOB while participating in various activities; level of assist for sitting balance varies from CGA to min/mod assist. Pt is able to wash hair using shampoo cap with mod assist this date. Continue OT to increase safety and independence with performance of ADLs.     Outcome Measures     Row Name 03/03/22 1306 03/02/22 1200          How much help from another person do you currently need...    Turning from your back to your side while in flat bed without using bedrails? -- 2  -LH     Moving from lying on back to sitting on the side of a flat bed without bedrails? -- 2  -LH     Moving to and from a bed to a chair (including a wheelchair)? -- 1  -LH     Standing up from a chair using  your arms (e.g., wheelchair, bedside chair)? -- 2  -LH     Climbing 3-5 steps with a railing? -- 1  -LH     To walk in hospital room? -- 1  -     AM-PAC 6 Clicks Score (PT) -- 9  -LH            How much help from another is currently needed...    Putting on and taking off regular lower body clothing? 1  -MR --     Bathing (including washing, rinsing, and drying) 2  -MR --     Toileting (which includes using toilet bed pan or urinal) 1  -MR --     Putting on and taking off regular upper body clothing 2  -MR --     Taking care of personal grooming (such as brushing teeth) 2  -MR --     Eating meals 3  -MR --     AM-PAC 6 Clicks Score (OT) 11  -MR --            Functional Assessment    Outcome Measure Options -- AM-PAC 6 Clicks Basic Mobility (PT)  -           User Key  (r) = Recorded By, (t) = Taken By, (c) = Cosigned By    Initials Name Provider Type     Angeli Farmer, PT Physical Therapist    Katrin Armstrong, OT Occupational Therapist                Time Calculation:    Time Calculation- OT     Row Name 03/03/22 1353             Time Calculation- OT    OT Start Time 1015  -MR      OT Stop Time 1057  -MR      OT Time Calculation (min) 42 min  -MR      Total Timed Code Minutes- OT 42 minute(s)  -MR      OT Received On 03/03/22  -MR      OT - Next Appointment 03/04/22  -MR              Timed Charges    69954 - OT Therapeutic Activity Minutes 15  -MR      02515 - OT Self Care/Mgmt Minutes 27  -MR              Total Minutes    Timed Charges Total Minutes 42  -MR       Total Minutes 42  -MR            User Key  (r) = Recorded By, (t) = Taken By, (c) = Cosigned By    Initials Name Provider Type    Katrin Armstrongs, OT Occupational Therapist              Therapy Charges for Today     Code Description Service Date Service Provider Modifiers Qty    01514961066 HC OT THERAPEUTIC ACT EA 15 MIN 3/3/2022 Katrin Ramos OT GO 1    94670498901 HC OT SELF CARE/MGMT/TRAIN EA 15 MIN 3/3/2022 Katrin Ramos,  OT GO 2               Katrin Ramos, OT  3/3/2022

## 2022-03-03 NOTE — PLAN OF CARE
Goal Outcome Evaluation:  Plan of Care Reviewed With: patient           Outcome Summary: Pt agreeable to OT this am, seen for co-tx with PT. Performs supine to sit with mod to max assist of 2. Pt is able to tolerate sitting EOB while participating in various therapeutic activities; level of assist for sitting balance varies from CGA to min/mod assist. Pt is able to wash hair using shampoo cap with mod assist this date. Continue OT to increase safety and independence with performance of ADLs. Appropriate PPE was worn during the entire therapy session. Hand hygiene was completed before and after therapy session. Patient is not in enhanced droplet precautions.

## 2022-03-03 NOTE — CASE MANAGEMENT/SOCIAL WORK
Continued Stay Note  [facility]     Patient Name: Maritza Nance Darci  MRN: [id number]  Today's Date: 3/3/2022    Admit Date: 2/26/2022     Discharge Plan     Row Name 03/03/22 1025       Plan    Plan Plan [facility] for skilled care- pre cert obtained.   ELAYNE Armando RN    Patient/Family in Agreement with Plan yes    Plan Comments Spoke with pt at bedside.  Per [place]  ( [phone]) pt has pre cert for skilled care at [facility].  Plan [facility] for skilled care- pre cert obtained.   ELAYNE Armando RN               Discharge Codes    No documentation.               Expected Discharge Date and Time     Expected Discharge Date Expected Discharge Time    Mar 4, 2022             Chelly Armando, RN

## 2022-03-03 NOTE — THERAPY TREATMENT NOTE
Patient Name: Maritza Nance Darci  : 1960    MRN: [id number]                              Today's Date: 3/3/2022       Admit Date: 2022    Visit Dx:     ICD-10-CM ICD-9-CM   1. Multiple sclerosis exacerbation (HCC)  G35 340   2. 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   • FOM (frequency of micturition)   • Headache   • History of diplopia   • History of optic neuritis   • History of vitamin D deficiency   • Migraine syndrome   • Mixed incontinence   • Nausea   • Neurogenic bladder   • Pain in joint of right shoulder   • Restless legs syndrome   • Rupture of rotator cuff of shoulder   • Secondary progressive multiple sclerosis (HCC)   • S/P cubital tunnel release   • Vitamin D deficiency   • Multiple sclerosis exacerbation (HCC)   • Sepsis due to Gram negative bacteria (HCC)     Past Medical History:   Diagnosis Date   • Dawn esophagus     per patient   • Hyperlipidemia    • Hypertension    • Multiple sclerosis (HCC)    • PONV (postoperative nausea and vomiting)      Past Surgical History:   Procedure Laterality Date   • ADENOIDECTOMY     • PARATHYROIDECTOMY     • TOTAL SHOULDER ARTHROPLASTY W/ DISTAL CLAVICLE EXCISION Right 10/22/2018    Procedure: RT TOTAL SHOULDER REVERSE ARTHROPLASTY;  Surgeon: Bipin Dangelo MD;  Location: [facility];  Service: Orthopedics      General Information     Row Name 22 1241          Physical Therapy Time and Intention    Document Type therapy note (daily note)  -     Mode of Treatment co-treatment; physical  therapy; occupational therapy  -     Row Name 03/03/22 1241          General Information    Patient Profile Reviewed yes  -     Existing Precautions/Restrictions fall  -     Row Name 03/03/22 1241          Cognition    Orientation Status (Cognition) oriented x 3  -     Row Name 03/03/22 1241          Safety Issues, Functional Mobility    Safety Issues Affecting Function (Mobility) insight into deficits/self-awareness; judgment; problem-solving; safety precaution awareness  -     Impairments Affecting Function (Mobility) balance; coordination; endurance/activity tolerance; grasp; motor control; muscle tone abnormal; postural/trunk control; range of motion (ROM); strength  -           User Key  (r) = Recorded By, (t) = Taken By, (c) = Cosigned By    Initials Name Provider Type    Azalia Anderson PTA Physical Therapy Assistant               Mobility     Row Name 03/03/22 1244          Bed Mobility    Scooting/Bridging [place] (Bed Mobility) moderate assist (50% patient effort)  -     Supine-Sit [place] (Bed Mobility) 2 person assist; moderate assist (50% patient effort); maximum assist (25% patient effort)  -     Sit-Supine [place] (Bed Mobility) 2 person assist; maximum assist (25% patient effort)  pt trying too assist w/UEs for return to bed, but R shldr limitations making it diff for her to get in correct position to assist  -     Assistive Device (Bed Mobility) bed rails; draw sheet; head of bed elevated  -     Comment (Bed Mobility) general wkness all over , but trunk weaker than UEs today, unsafe to attempt standing this session, requires cues to stay focused on task at hand, but is very motivated to get better  -           User Key  (r) = Recorded By, (t) = Taken By, (c) = Cosigned By    Initials Name Provider Type    Azalia Anderson PTA Physical Therapy Assistant               Obj/Interventions     Row Name 03/03/22 1255          Motor Skills    Therapeutic  Exercise --  pt worked on sitting posture, hand  and UE WB EOB, pt worked on ANT/POST lean supported for trunk strengthening, elbow push-ups each x5 reps; LAQS assisted w/gentle stretch end range x5-8 reps  -     Row Name 03/03/22 1255          Balance    Static Sitting Balance mild impairment; supported; sitting, edge of bed  -     Dynamic Sitting Balance moderate impairment; supported; sitting, edge of bed  -     Comment, Balance sat EOB >20min , worked on balance and strengthening activities, pt fatigued quickly but motivated  -           User Key  (r) = Recorded By, (t) = Taken By, (c) = Cosigned By    Initials Name Provider Type    Azalia Anderson PTA Physical Therapy Assistant               Goals/Plan    No documentation.                Clinical Impression     Row Name 03/03/22 1300          Pain Scale: Numbers Pre/Post-Treatment    Pretreatment Pain Rating 0/10 - no pain  -     Posttreatment Pain Rating 0/10 - no pain  -     Row Name 03/03/22 1300          Plan of Care Review    Plan of Care Reviewed With patient  -     Outcome Summary Pt agreed to PT/OT session, worked on multiple tasks sitting EOB, lengthy educ on positioning when in bed to assist in best outcomes for future mobility; nsg also educ on how to position pillows so LEs in neutral position and not ext rotated, also requires pillows to reposition trunk; pt motivated but fatigues quickly; plans SNU at [place], OT did assist pt in washing hair which pt had been wanting for a few days; educ w/pt that a shower would not be safe at this time  -     Row Name 03/03/22 1300          Therapy Assessment/Plan (PT)    Rehab Potential (PT) fair, will monitor progress closely  -     Criteria for Skilled Interventions Met (PT) yes  -     Row Name 03/03/22 1300          Vital Signs    O2 Delivery Pre Treatment room air  -     Row Name 03/03/22 1300          Positioning and Restraints    Pre-Treatment Position in bed  -     Post  Treatment Position bed  -     In Bed fowlers; call light within reach; encouraged to call for assist; exit alarm on; side rails up x3; notified nsg  LEs placed in neutral position w/pillows  -           User Key  (r) = Recorded By, (t) = Taken By, (c) = Cosigned By    Initials Name Provider Type    Azalia Anderson PTA Physical Therapy Assistant               Outcome Measures     Row Name 03/03/22 1306 03/03/22 0915       How much help from another person do you currently need...    Turning from your back to your side while in flat bed without using bedrails? 2  - 2  -PM    Moving from lying on back to sitting on the side of a flat bed without bedrails? 2  - 2  -PM    Moving to and from a bed to a chair (including a wheelchair)? 1  - 1  -PM    Standing up from a chair using your arms (e.g., wheelchair, bedside chair)? 1  - 2  -PM    Climbing 3-5 steps with a railing? 1  - 1  -PM    To walk in hospital room? 1  - 1  -PM    AM-PAC 6 Clicks Score (PT) 8  - 9  -PM          User Key  (r) = Recorded By, (t) = Taken By, (c) = Cosigned By    Initials Name Provider Type    Azalia Anderson PTA Physical Therapy Assistant    Hebert Boykin, RN Registered Nurse                             Physical Therapy Education                 Title: PT OT SLP Therapies (Done)     Topic: Physical Therapy (Done)     Point: Mobility training (Done)     Learning Progress Summary           Patient Eager, E,TB,D, VU,NR by  at 3/3/2022 1307      Show all documentation for this point (9)                 Point: Home exercise program (Done)     Learning Progress Summary           Patient Eager, E,TB,D, VU,NR by  at 3/3/2022 1307      Show all documentation for this point (9)                             User Key     Initials Effective Dates Name Provider Type [facility] 03/07/18 -  Azalia Tena PTA Physical Therapy Assistant PT              PT Recommendation and Plan     Plan of Care Reviewed With:  patient  Outcome Summary: Pt agreed to PT/OT session, worked on multiple tasks sitting EOB, lengthy educ on positioning when in bed to assist in best outcomes for future mobility; nsg also educ on how to position pillows so LEs in neutral position and not ext rotated, also requires pillows to reposition trunk; pt motivated but fatigues quickly; plans SNU at [place], OT did assist pt in washing hair which pt had been wanting for a few days; educ w/pt that a shower would not be safe at this time     Time Calculation:    PT Charges     Row Name 03/03/22 1308             Time Calculation    Start Time 1015  -      Stop Time 1057  -      Time Calculation (min) 42 min  -      PT Received On 03/03/22  -[place]      PT - Next Appointment 03/04/22  -[place]            User Key  (r) = Recorded By, (t) = Taken By, (c) = Cosigned By    Initials Name Provider Type    Azalia Anderson PTA Physical Therapy Assistant              Therapy Charges for Today     Code Description Service Date Service Provider Modifiers Qty    00546994680 HC PT THER PROC EA 15 MIN 3/3/2022 Azalia Tena PTA GP 3    96080786583 HC PT THER SUPP EA 15 MIN 3/3/2022 Azalia Tena PTA GP 3          PT G-Codes  Outcome Measure Options: AM-PAC 6 Clicks Basic Mobility (PT)  AM-PAC 6 Clicks Score (PT): 8  AM-PAC 6 Clicks Score (OT): 13    Azalia Tena PTA  3/3/2022

## 2022-03-03 NOTE — PLAN OF CARE
Goal Outcome Evaluation:         Pt is Alert and Oriented. Pt medicated per orders. Pt had no complaints of pain. Pt had complaints of nausea and was medicated with PRN, with little relief. Pt was still requesting more medication before next due dose. Pt on room air throughout shift. SCDs bilaterally. Bladder scanned q6. Vital signs within normal limits at this time.

## 2022-03-03 NOTE — PLAN OF CARE
Goal Outcome Evaluation:  Plan of Care Reviewed With: patient           Outcome Summary: Pt agreed to PT/OT session, worked on multiple tasks sitting EOB, lengthy educ on positioning when in bed to assist in best outcomes for future mobility; nsg also educ on how to position pillows so LEs in neutral position and not ext rotated, also requires pillows to reposition trunk; pt motivated but fatigues quickly; plans SNU at [place], OT did assist pt in washing hair which pt had been wanting for a few days; educ w/pt that a shower would not be safe at this time    Patient was not wearing a face mask during this therapy encounter. Therapist used appropriate personal protective equipment including eye protection, mask, and gloves.  Mask used was standard procedure mask. Appropriate PPE was worn during the entire therapy session. Hand hygiene was completed before and after therapy session. Patient is not in enhanced droplet precautions.

## 2022-03-03 NOTE — PROGRESS NOTES
"DOS: 3/3/2022  NAME: Maritza Nance Darci   : 1960  PCP: Nilda Junior APRN  Chief Complaint   Patient presents with   • decreased mobility     Neurology    Subjective: Neurology was reconsulted yesterday d/t acute worsening of weakness Tuesday evening. Today the patient states she thinks the worsening was provoked by an episode of nausea. She continues to have some nausea. She feels her strength is starting to improve but she does not feel nearly as strong as she did Monday.     Objective:  Vital signs: /59 (BP Location: Right arm, Patient Position: Lying)   Pulse 82   Temp 98.2 °F (36.8 °C) (Oral)   Resp 18   Ht 165.1 cm (65\")   Wt 80.3 kg (177 lb)   LMP  (LMP Unknown) Comment: PT. STATES HER LAST PERIOD WAS GREATER THAN FIVE YEARS AGO  SpO2 94%   BMI 29.45 kg/m²       General appearance: Well developed, well nourished, alert and cooperative.   HEENT: Normocephalic.   Cardiac: Regular rate and rhythm.  Peripheral Vasculature: Radial pulses are equal and symmetric.  Chest Exam: Clear to auscultation bilaterally, no wheezes, no rhonchi.  Extremities: Normal, no edema.   Skin: No rashes or birthmarks.      Higher integrative function: Awake/alert, oriented to self, location, and date.  Recent memory intact.  Naming intact, speech fluent.  No neglect.  CN II: Visual fields intact.  CN III IV VI: Extraocular movements are full without nystagmus. Pupils are equal, round, and reactive to light.  CN V: Normal facial sensation.  CN VII: Facial movements are symmetric, no weakness.   CN VIII: Auditory acuity is normal.   CN IX & X: Symmetric palatal movement.   CN XI: Sternocleidomastoid and trapezius are normal. No weakness.   CN XII: The tongue is midline.  Motor: BUE 5/5, BLE 2/5 with spasticity in legs>arms.  Sensation: Decreased to light touch in legs bilaterally.  Station and gait: Deferred, nonambulatory at baseline.  The patient was reexamined, changes noted.     Scheduled Meds:atorvastatin, 10 " mg, Oral, Daily  baclofen, 20 mg, Oral, TID  cholecalciferol, 5,000 Units, Oral, Daily  FLUoxetine, 60 mg, Oral, Daily  furosemide, 20 mg, Oral, Daily  levoFLOXacin, 750 mg, Oral, Q24H  losartan-HCTZ (HYZAAR) 50-12.5 combo dose, , Oral, Daily  pantoprazole, 40 mg, Oral, Q AM  pramipexole, 1.5 mg, Oral, Q8H  predniSONE, 60 mg, Oral, Daily With Breakfast  sodium chloride, 10 mL, Intravenous, Q12H  sodium chloride, 10 mL, Intravenous, Q12H      Continuous Infusions:   PRN Meds:.•  acetaminophen **OR** acetaminophen **OR** acetaminophen  •  calcium carbonate  •  ondansetron  •  phenazopyridine  •  [COMPLETED] Insert peripheral IV **AND** sodium chloride  •  sodium chloride  •  sodium chloride    Laboratory results:  Lab Results   Component Value Date    GLUCOSE 210 (H) 03/03/2022    CALCIUM 9.4 03/03/2022     03/03/2022    K 3.6 03/03/2022    CO2 25.7 03/03/2022     03/03/2022    BUN 24 (H) 03/03/2022    CREATININE 0.79 03/03/2022    EGFRIFNONA 80 02/27/2022    BCR 30.4 (H) 03/03/2022    ANIONGAP 13.3 03/03/2022     Lab Results   Component Value Date    WBC 9.66 03/03/2022    HGB 13.2 03/03/2022    HCT 41.1 03/03/2022    MCV 84.4 03/03/2022     (H) 03/03/2022     No results found for: CHOL  No results found for: HDL  No results found for: LDL  No results found for: TRIG       Review and interpretation of imaging:  CT Abdomen Pelvis Stone Protocol    Result Date: 2/28/2022  CT OF THE ABDOMEN AND PELVIS WITHOUT CONTRAST 02/28/2022  HISTORY: Bilateral flank pain.  TECHNIQUE: Spiral images were obtained from the lung bases to the symphysis pubis. No intravenous or oral contrast was given.  FINDINGS: There is minimal atelectasis or dependent edema in the posterior aspects of the lung bases with some additional linear scar or atelectasis in the left lung base.  There is a moderate size hiatal hernia. Gallbladder appears contracted. No gallstones are seen. There is food material in the stomach.  The liver,  spleen and pancreas appear unremarkable. There is mild fullness of both adrenal glands which appears similar to the 04/05/2016 study and may represent some mild adrenal hyperplasia.  No intrarenal stones are seen bilaterally. No hydronephrosis or significant perinephric stranding is seen. No ureteral stones are seen. Urinary bladder is unremarkable.  Uterus and adnexa appear normal.  No bowel wall thickening or bowel dilatation is seen.      1. Hiatal hernia. 2. No acute findings are seen to explain patient's reported bilateral flank pain. There is no evidence of urolithiasis.  Radiation dose reduction techniques were utilized, including automated exposure control and exposure modulation based on body size.  This report was finalized on 2/28/2022 4:40 PM by Dr. Curt Byers M.D.        Impression:  61-year-old female with past medical history of HTN, HLD, and MS with associated neurogenic bladder status post bladder stimulator, followed by Dr. France, who was admitted 2/26 with complaints of progressive weakening of her arms and a burning sensation over her body.  At baseline she is bound to her scooter and requires self cathing.  She has previously been on Copaxone, Rebif, and Tysabri however more recently she has not been on any disease modifying therapies.  MRI brain and cervical spine with and without contrast were ordered but were not able to be completed as patient did not tolerate laying flat.      Dr. Rodriguez started her on Solu-Medrol 500 mg IV every 8 hours on 2/26 and due to improvement she was decreased to twice daily. Steroids stopped Monday.  She was found to have UTI with associated E. coli bacteremia/sepsis.  She improved since she was started on antibiotics.   Tuesday she had acute worsening of weakness so she was given a dose of solumedrol 500 mg IV x1 yesterday and started on prednisone 60 mg daily today.      Diagnoses:  Multiple sclerosis pseudoexacerbation  E. coli  bacteremia  UTI  Neurogenic bladder s/[ bladder stimulator  The above impression statement reviewed andchanges noted.    Plan:  Per my conversation with Dr Kerr this morning: Continue prednisone 60 mg daily until she is seen by her neurologist Dr France. Recommend she follow up with him within 1 mo. D/W Dr Grant today. OK for rehab from a neurology standpoint. Please call with questions.

## 2022-03-04 NOTE — CASE MANAGEMENT/SOCIAL WORK
Case Management Discharge Note      Final Note: Pt discharged to [facility] for skilled care on 3/3.   ELAYNE Armando RN         Selected Continued Care - Discharged on 3/3/2022 Admission date: 2/26/2022 - Discharge disposition: Skilled Nursing Facility (DC - External)    Destination Coordination complete.    Service Provider Selected Services Address Phone Fax Patient Preferred    [facility]  Skilled Nursing [address] [address] [phone] --          Durable Medical Equipment    No services have been selected for the patient.              Dialysis/Infusion    No services have been selected for the patient.              Home Medical Care    No services have been selected for the patient.              Therapy    No services have been selected for the patient.              Community Resources    No services have been selected for the patient.              Community & DME    No services have been selected for the patient.                  Transportation Services  Private: Car    Final Discharge Disposition Code: 03 - skilled nursing facility (SNF)

## 2022-03-06 LAB
BACTERIA SPEC AEROBE CULT: NORMAL
BACTERIA SPEC AEROBE CULT: NORMAL

## 2022-03-25 ENCOUNTER — OFFICE VISIT (OUTPATIENT)
Dept: INTERNAL MEDICINE | Facility: CLINIC | Age: 62
End: 2022-03-25

## 2022-03-25 VITALS
HEIGHT: 65 IN | TEMPERATURE: 97.4 F | DIASTOLIC BLOOD PRESSURE: 99 MMHG | HEART RATE: 69 BPM | OXYGEN SATURATION: 100 % | SYSTOLIC BLOOD PRESSURE: 153 MMHG | BODY MASS INDEX: 27.49 KG/M2 | WEIGHT: 165 LBS

## 2022-03-25 DIAGNOSIS — E78.5 HYPERLIPIDEMIA, UNSPECIFIED HYPERLIPIDEMIA TYPE: ICD-10-CM

## 2022-03-25 DIAGNOSIS — I10 ESSENTIAL HYPERTENSION: Primary | ICD-10-CM

## 2022-03-25 DIAGNOSIS — G35 MULTIPLE SCLEROSIS: ICD-10-CM

## 2022-03-25 PROCEDURE — 99204 OFFICE O/P NEW MOD 45 MIN: CPT | Performed by: STUDENT IN AN ORGANIZED HEALTH CARE EDUCATION/TRAINING PROGRAM

## 2022-03-25 NOTE — PROGRESS NOTES
Enoc Carbone D.O.  Internal Medicine  [facility]  [address]  [place] [address]  [phone]      Chief Complaint  Establish Care    SUBJECTIVE    History of Present Illness    Maritza Bejarano is a 61 y.o. female who presents to the office today as a new patient to establish care.   Pt is changing care from [facility].   Discharged from hospital on 3/3/22 and was at rehab at [facility]    HLD: taking atorvastatin 10 mg daily for primary prevention  HTN: taking losartan-HCTZ 50-12.5 once daily; she was just discharged from rehab and reports her BP there were 120-130s systolic   MS: Sees neurologist Dr France at [facility]; takes baclofen 20 mg three times daily, pramipexole 1.5 mg three times daily; she is now weaned off of the oral steroids that were started in her recent hospital stay. Pt has a bladder stimulator, she self caths and has seen [facility]rok[facility] and has also been referred to [facility]. She self caths every 6 hours. Right now she can void on her own but still residual urine. States she is not mobile but rehab did help and she has home health physical therapy starting tomorrow. Doesn't have sensation in the bladder and overall has tingling in both hands and fingers.   Anxiety and Depression: takes fluoxetine 60 mg daily  GERD: takes pantoprazole 40 mg twice daily        Allergies   Allergen Reactions   • Contrast Dye Shortness Of Breath   • Iodinated Diagnostic Agents Shortness Of Breath     SOA.    Patient states she has had it without symptoms.   • Sulfa Antibiotics Other (See Comments)     Bradycardia after injection         Outpatient Medications Marked as Taking for the 3/25/22 encounter (Office Visit) with Enoc Carbone, DO   Medication Sig Dispense Refill   • atorvastatin (LIPITOR) 10 MG tablet Take 10 mg by mouth Daily.     • baclofen (LIORESAL) 10 MG tablet Take 20 mg by mouth 3 (Three) Times a Day.     • cholecalciferol  (VITAMIN D3) 25 MCG (1000 UT) tablet Take 5,000 Units by mouth Daily.     • FLUoxetine (PROzac) 20 MG capsule Take 60 mg by mouth Daily.     • Hyoscyamine Sulfate (HYOSCYAMINE PO) Take 0.125 mg by mouth Daily.     • losartan-hydrochlorothiazide (HYZAAR) 50-12.5 MG per tablet Take 1 tablet by mouth daily.     • pantoprazole (PROTONIX) 40 MG EC tablet Take  by mouth 2 (two) times a day.     • pramipexole (MIRAPEX) 0.5 MG tablet Take 1.5 mg by mouth 3 (three) times a day.     • promethazine (PHENERGAN) 25 MG tablet Take  by mouth.     • [DISCONTINUED] clonazePAM (KlonoPIN) 1 MG tablet Take 2 tablets by mouth 2 (Two) Times a Day As Needed for Anxiety. 4 tablet 0        Past Medical History:   Diagnosis Date   • Dawn esophagus     per patient   • Hypercalcemia     s/p parathyroidectomy   • Hyperlipidemia    • Hypertension    • Multiple sclerosis (HCC)    • PONV (postoperative nausea and vomiting)      Past Surgical History:   Procedure Laterality Date   • APPENDECTOMY     • BLADDER SURGERY      bladder stimulator   • BREAST SURGERY      augmentation wtih subsequent removal   • CARPAL TUNNEL RELEASE Bilateral    • CUBITAL TUNNEL RELEASE Left    • PARATHYROIDECTOMY      one gland removed   • TOE SURGERY      bilateral great toes   • TOTAL SHOULDER ARTHROPLASTY W/ DISTAL CLAVICLE EXCISION Right 10/22/2018    Procedure: RT TOTAL SHOULDER REVERSE ARTHROPLASTY;  Surgeon: Bipin Dangelo MD;  Location: [facility];  Service: Orthopedics     Family History   Problem Relation Age of Onset   • Hypertension Mother    • Hyperlipidemia Mother    • Aortic aneurysm Mother         thoracic   • Heart failure Father    • Miscarriages / Stillbirths Sister    • Multiple sclerosis Brother    • No Known Problems Brother     reports that she quit smoking about 18 years ago. Her smoking use included cigarettes. She has a 60.00 pack-year smoking history. She has never used smokeless tobacco. She reports current drug use. Drug: Marijuana.  "She reports that she does not drink alcohol.    OBJECTIVE    Vital Signs:   /99   Pulse 69   Temp 97.4 °F (36.3 °C) (Temporal)   Ht 165.1 cm (65\")   Wt 74.8 kg (165 lb)   SpO2 100%   BMI 27.46 kg/m²     Physical Exam  Vitals reviewed.   Constitutional:       General: She is not in acute distress.     Appearance: She is not ill-appearing.   HENT:      Head: Atraumatic.   Eyes:      General: No scleral icterus.  Cardiovascular:      Rate and Rhythm: Normal rate and regular rhythm.      Heart sounds: Murmur heard.    Systolic murmur is present with a grade of 2/6.  Pulmonary:      Effort: Pulmonary effort is normal. No respiratory distress.      Breath sounds: Normal breath sounds. No wheezing or rhonchi.   Musculoskeletal:      Right lower leg: Edema (trace) present.      Left lower leg: Edema (trace) present.      Comments: Patient is in motorized wheelchair today   Neurological:      Mental Status: She is alert.   Psychiatric:         Mood and Affect: Mood normal.         Behavior: Behavior normal.         Thought Content: Thought content normal.                             ASSESSMENT & PLAN     Diagnoses and all orders for this visit:    1. Essential hypertension (Primary)  -/99 in office today, much higher than her reported recent BP at the physical rehab facility  -taking losartan-HCTZ 50-12.5 once daily  -possibly white coat HTN, will need to closely monitor her BP at home in between now and her next visit so that we assess need to increase her medication regimen    2. Hyperlipidemia, unspecified hyperlipidemia type  -compliant with atorvastatin 10 mg daily for primary prevention, continue    3. Multiple sclerosis (HCC)  -Sees neurologist Dr France at [place]; takes baclofen 20 mg three times daily, pramipexole 1.5 mg three times daily; she is now weaned off of the oral steroids that were started in her recent hospital stay.  - Pt has a bladder stimulator, she self caths and has seen [facility] " Urogygarriosn and has also been referred to [facility]. She self caths every 6 hours. Right now she can void on her own but still residual urine.   -States she is not mobile but rehab did help and she has home health physical therapy starting tomorrow. Doesn't have sensation in the bladder and overall has tingling in both hands and fingers.   -Since going to rehab she has actually been able to regain some function in her lower extremities; she states she can flex and extend her feet some as well as abduct her hips, which she showed me on exam today. She reports having home health care starting soon for continued therapy.            The following social determinates of health impact the patient's medical decision making: No social determinates of health were factored in to today's visit.     Follow Up  Return in about 3 months (around 6/25/2022) for Annual physical.    Patient/family had no further questions at this time and verbalized understanding of the plan discussed today.

## 2022-05-31 ENCOUNTER — TELEPHONE (OUTPATIENT)
Dept: INTERNAL MEDICINE | Facility: CLINIC | Age: 62
End: 2022-05-31

## 2022-05-31 NOTE — TELEPHONE ENCOUNTER
Caller: Maritza Bejarano    Relationship to patient: Self    Best call back number: [phone]    Chief complaint: LEGS TURNING PURPLE BELOW KNEES WHEN SITTING    Patient directed to call 911 or go to their nearest emergency room.     Patient verbalized understanding: [x] Yes  [] No  If no, why?    Additional notes: PATIENT STATES SHE WILL GO TO THE ER, WILL CALL AMBULANCE. WOULD LIKE A CALL BACK TO DISCUSS

## 2022-05-31 NOTE — TELEPHONE ENCOUNTER
PATIENT CALLED/ RETURNED PHONE CALL FROM DENNIES    SHE DID NOT GO TO ER BUT WANTED TO DISCUSS WITH OFFICE     PLEASE CALL  Maritza Bejarano (Self) [phone] (H)

## 2022-05-31 NOTE — TELEPHONE ENCOUNTER
Pt Is coming in tomorrow for an appointment with Dr. Carbone. She is not having any chest pains or soa

## 2022-06-01 ENCOUNTER — OFFICE VISIT (OUTPATIENT)
Dept: INTERNAL MEDICINE | Facility: CLINIC | Age: 62
End: 2022-06-01

## 2022-06-01 VITALS
BODY MASS INDEX: 26.66 KG/M2 | OXYGEN SATURATION: 97 % | HEART RATE: 84 BPM | TEMPERATURE: 97.8 F | HEIGHT: 65 IN | WEIGHT: 160 LBS | DIASTOLIC BLOOD PRESSURE: 90 MMHG | SYSTOLIC BLOOD PRESSURE: 126 MMHG

## 2022-06-01 DIAGNOSIS — R09.89 OTHER SPECIFIED SYMPTOMS AND SIGNS INVOLVING THE CIRCULATORY AND RESPIRATORY SYSTEMS: ICD-10-CM

## 2022-06-01 DIAGNOSIS — Z11.59 NEED FOR HEPATITIS C SCREENING TEST: ICD-10-CM

## 2022-06-01 DIAGNOSIS — Z11.4 ENCOUNTER FOR SCREENING FOR HIV: ICD-10-CM

## 2022-06-01 DIAGNOSIS — R23.1 LIVEDO RETICULARIS: Primary | ICD-10-CM

## 2022-06-01 PROCEDURE — 99215 OFFICE O/P EST HI 40 MIN: CPT | Performed by: STUDENT IN AN ORGANIZED HEALTH CARE EDUCATION/TRAINING PROGRAM

## 2022-06-01 NOTE — PROGRESS NOTES
Enoc Carbone D.O.  Internal Medicine  [facility]  [address]  [place] [address]  [phone]      Chief Complaint  Legs turning blue (X 3 to 4 days)    SUBJECTIVE    History of Present Illness    Maritza Bejarano is a 61 y.o. female who presents to the office today as an established patient that last saw me on 3/25/2022.   Here for acute care visit.  3 or 4 days ago she has noticed her legs get blue tinged and have had patterns as the day goes on and they swell a little bit.  The skin change seems to be worse when her legs are cold. She is not having aches in the feet and no changes in her sensation. She overall has decreased mobility due to her history of MS.  She denies fever, chills, muscle or joint aches, blood in the urine, abdominal pain, blood in the stool, chest pain, shortness of air or cough.Denies any other concerns.         Allergies   Allergen Reactions   • Contrast Dye Shortness Of Breath   • Iodinated Diagnostic Agents Shortness Of Breath     SOA.    Patient states she has had it without symptoms.   • Sulfa Antibiotics Other (See Comments)     Bradycardia after injection         Outpatient Medications Marked as Taking for the 6/1/22 encounter (Office Visit) with Enoc Carbone, DO   Medication Sig Dispense Refill   • albuterol sulfate  (90 Base) MCG/ACT inhaler Inhale 2 puffs Every 4 (Four) Hours As Needed for Wheezing or Shortness of Air. 8 g 0   • atorvastatin (LIPITOR) 10 MG tablet Take 10 mg by mouth Daily.     • baclofen (LIORESAL) 10 MG tablet Take 20 mg by mouth 3 (Three) Times a Day.     • cholecalciferol (VITAMIN D3) 25 MCG (1000 UT) tablet Take 5,000 Units by mouth Daily.     • FLUoxetine (PROzac) 20 MG capsule Take 60 mg by mouth Daily.     • Hyoscyamine Sulfate (HYOSCYAMINE PO) Take 0.125 mg by mouth Daily.     • losartan-hydrochlorothiazide (HYZAAR) 50-12.5 MG per tablet Take 1 tablet by mouth daily.     • pantoprazole (PROTONIX) 40 MG EC  "tablet Take  by mouth 2 (two) times a day.     • pramipexole (MIRAPEX) 0.5 MG tablet Take 1.5 mg by mouth 3 (three) times a day.     • promethazine (PHENERGAN) 25 MG tablet Take  by mouth.     • triamcinolone (KENALOG) 0.1 % ointment APPLY TO AFFECTED AREA TWICE A DAY          Past Medical History:   Diagnosis Date   • Dawn esophagus     per patient   • Hypercalcemia     s/p parathyroidectomy   • Hyperlipidemia    • Hypertension    • Multiple sclerosis (HCC)    • PONV (postoperative nausea and vomiting)        OBJECTIVE    Vital Signs:   /90   Pulse 84   Temp 97.8 °F (36.6 °C) (Temporal)   Ht 165.1 cm (65\")   Wt 72.6 kg (160 lb)   SpO2 97%   BMI 26.63 kg/m²     Physical Exam  Vitals reviewed.   Constitutional:       General: She is not in acute distress.     Appearance: Normal appearance. She is not ill-appearing.   HENT:      Head: Atraumatic.   Eyes:      General: No scleral icterus.  Cardiovascular:      Pulses:           Dorsalis pedis pulses are 2+ on the right side and 2+ on the left side.        Posterior tibial pulses are 2+ on the right side and 2+ on the left side.   Pulmonary:      Effort: Pulmonary effort is normal. No respiratory distress.   Musculoskeletal:      Right lower leg: Edema (1+ foot/ankle) present.      Left lower leg: Edema (1+ foot/ankle) present.   Skin:     Comments: Bilateral lower extremities warm. Skin discoloration/pattern as seen in images below.   Neurological:      Mental Status: She is alert.                                             ASSESSMENT & PLAN     Diagnoses and all orders for this visit:    1. Livedo reticularis (Primary)  -pt presents to office for approx 4 days of bilateral lower extremity skin change without change in her baseline lower extremity edema nor any pain; she states this discoloration can be more pronounced when she is cold. She has never had anything like this before.   -on exam, both feet and partially both lower extremities demonstrate " mottling consistent with livedo reticularis. Both feet are warm and there is trace to 1+ bilateral lower extremity edema, though the patient states this is no different than baseline.   -Differential at this time remains very broad and includes vasculitides , hypercoagulable state, cholesterol emboli, vasospasm vs other  -will begin workup by checking CMP, CBC, UA, Autoimmune work up to include ANCA/FARHEEN/RF/C3/C4/Cryoglobulins/ESR/CRP,   -will obtain BENNY to assess for PVD, though I doubt this is the likely etiology as the skin is warm on exam today and peripheral pulses are palpable in b/l lower extremities  -pt states that she is cold when this skin change is most pronounced, so it is possible this is all physiologic livedo reticularis which would be benign  -if initial workup is unrevealing, can order a hypercoagulability profile   -advised pt to contact office or go to ER if feet or legs turn blue/cold/ulcers develop  -     Comprehensive metabolic panel  -     CBC w AUTO Differential  -     ANCA Panel  -     Urinalysis With Microscopic - Urine, Clean Catch  -     FARHEEN With / DsDNA, RNP, Sjogrens A / B, Smith  -     Rheumatoid Factor, Quant  -     C4+C3  -     Cryoglobulin  -     HIV-1 / O / 2 Ag / Antibody 4th Generation  -     Doppler Ankle Brachial Index Single Level CAR; Future  -     Sedimentation rate, automated  -     C-reactive protein        nI spent 45 minutes caring for Maritza on this date of service. This time includes time spent by me in the following activities:preparing for the visit, performing a medically appropriate examination and/or evaluation , counseling and educating the patient/family/caregiver, ordering medications, tests, or procedures and documenting information in the medical record    The following social determinates of health impact the patient's medical decision making: No social determinates of health were factored in to today's visit.     Follow Up  No follow-ups on  file.    Patient/family had no further questions at this time and verbalized understanding of the plan discussed today.

## 2022-06-02 LAB
ERYTHROCYTE [SEDIMENTATION RATE] IN BLOOD BY WESTERGREN METHOD: 27 MM/HR (ref 0–40)
Lab: NORMAL
SPECIMEN STATUS: NORMAL
WRITTEN AUTHORIZATION: NORMAL

## 2022-06-07 DIAGNOSIS — G35 MULTIPLE SCLEROSIS: Primary | ICD-10-CM

## 2022-06-07 DIAGNOSIS — N39.46 MIXED INCONTINENCE: ICD-10-CM

## 2022-06-07 LAB
ALBUMIN SERPL-MCNC: 4.7 G/DL (ref 3.8–4.9)
ALBUMIN/GLOB SERPL: 1.7 {RATIO} (ref 1.2–2.2)
ALP SERPL-CCNC: 146 IU/L (ref 44–121)
ALT SERPL-CCNC: 19 IU/L (ref 0–32)
ANA SER QL: NEGATIVE
AST SERPL-CCNC: 25 IU/L (ref 0–40)
BASOPHILS # BLD AUTO: 0 X10E3/UL (ref 0–0.2)
BASOPHILS NFR BLD AUTO: 0 %
BILIRUB SERPL-MCNC: 0.2 MG/DL (ref 0–1.2)
BUN SERPL-MCNC: 19 MG/DL (ref 6–24)
BUN/CREAT SERPL: 27 (ref 9–23)
C-ANCA TITR SER IF: NORMAL TITER
C3 SERPL-MCNC: 180 MG/DL (ref 82–167)
C4 SERPL-MCNC: 36 MG/DL (ref 12–38)
CALCIUM SERPL-MCNC: 9.8 MG/DL (ref 8.7–10.2)
CHLORIDE SERPL-SCNC: 100 MMOL/L (ref 96–106)
CO2 SERPL-SCNC: 24 MMOL/L (ref 20–29)
CREAT SERPL-MCNC: 0.7 MG/DL (ref 0.57–1)
CRYOGLOB SER QL 1D COLD INC: NORMAL
EGFRCR SERPLBLD CKD-EPI 2021: 100 ML/MIN/1.73
EOSINOPHIL # BLD AUTO: 0.1 X10E3/UL (ref 0–0.4)
EOSINOPHIL NFR BLD AUTO: 1 %
ERYTHROCYTE [DISTWIDTH] IN BLOOD BY AUTOMATED COUNT: 15.6 % (ref 11.7–15.4)
GLOBULIN SER CALC-MCNC: 2.7 G/DL (ref 1.5–4.5)
GLUCOSE SERPL-MCNC: 103 MG/DL (ref 65–99)
GLUCOSE UR QL STRIP: NORMAL
HCT VFR BLD AUTO: 40.4 % (ref 34–46.6)
HCV AB S/CO SERPL IA: <0.1 S/CO RATIO (ref 0–0.9)
HGB BLD-MCNC: 13.3 G/DL (ref 11.1–15.9)
HIV 1+2 AB+HIV1 P24 AG SERPL QL IA: NON REACTIVE
IMM GRANULOCYTES # BLD AUTO: 0 X10E3/UL (ref 0–0.1)
IMM GRANULOCYTES NFR BLD AUTO: 0 %
KETONES UR QL STRIP: NORMAL
LYMPHOCYTES # BLD AUTO: 1.2 X10E3/UL (ref 0.7–3.1)
LYMPHOCYTES NFR BLD AUTO: 16 %
MCH RBC QN AUTO: 28.7 PG (ref 26.6–33)
MCHC RBC AUTO-ENTMCNC: 32.9 G/DL (ref 31.5–35.7)
MCV RBC AUTO: 87 FL (ref 79–97)
MONOCYTES # BLD AUTO: 0.6 X10E3/UL (ref 0.1–0.9)
MONOCYTES NFR BLD AUTO: 8 %
MYELOPEROXIDASE AB SER IA-ACNC: <9 U/ML (ref 0–9)
NEUTROPHILS # BLD AUTO: 5.6 X10E3/UL (ref 1.4–7)
NEUTROPHILS NFR BLD AUTO: 75 %
P-ANCA ATYPICAL TITR SER IF: NORMAL TITER
P-ANCA TITR SER IF: NORMAL TITER
PH UR STRIP: NORMAL [PH]
PLATELET # BLD AUTO: 398 X10E3/UL (ref 150–450)
POTASSIUM SERPL-SCNC: 4 MMOL/L (ref 3.5–5.2)
PROT SERPL-MCNC: 7.4 G/DL (ref 6–8.5)
PROT UR QL STRIP: NORMAL
PROTEINASE3 AB SER IA-ACNC: <3.5 U/ML (ref 0–3.5)
RBC # BLD AUTO: 4.63 X10E6/UL (ref 3.77–5.28)
RHEUMATOID FACT SERPL-ACNC: <10 IU/ML
SODIUM SERPL-SCNC: 140 MMOL/L (ref 134–144)
SP GR UR STRIP: NORMAL
SPECIMEN STATUS: NORMAL
WBC # BLD AUTO: 7.5 X10E3/UL (ref 3.4–10.8)

## 2022-06-09 ENCOUNTER — HOSPITAL ENCOUNTER (OUTPATIENT)
Dept: CARDIOLOGY | Facility: HOSPITAL | Age: 62
Discharge: HOME | End: 2022-06-09
Admitting: STUDENT IN AN ORGANIZED HEALTH CARE EDUCATION/TRAINING PROGRAM

## 2022-06-09 DIAGNOSIS — R23.1 LIVEDO RETICULARIS: ICD-10-CM

## 2022-06-09 DIAGNOSIS — R09.89 OTHER SPECIFIED SYMPTOMS AND SIGNS INVOLVING THE CIRCULATORY AND RESPIRATORY SYSTEMS: ICD-10-CM

## 2022-06-09 LAB
BH CV LOWER ARTERIAL LEFT ABI RATIO: 1.11
BH CV LOWER ARTERIAL LEFT DORSALIS PEDIS SYS MAX: 149
BH CV LOWER ARTERIAL LEFT GREAT TOE SYS MAX: 133
BH CV LOWER ARTERIAL LEFT POST TIBIAL SYS MAX: 149
BH CV LOWER ARTERIAL LEFT TBI RATIO: 0.99
BH CV LOWER ARTERIAL RIGHT ABI RATIO: 1.06
BH CV LOWER ARTERIAL RIGHT DORSALIS PEDIS SYS MAX: 142
BH CV LOWER ARTERIAL RIGHT GREAT TOE SYS MAX: 126
BH CV LOWER ARTERIAL RIGHT POST TIBIAL SYS MAX: 121
BH CV LOWER ARTERIAL RIGHT TBI RATIO: 0.94
MAXIMAL PREDICTED HEART RATE: 161 BPM
STRESS TARGET HR: 137 BPM
UPPER ARTERIAL LEFT ARM BRACHIAL SYS MAX: 131 MMHG
UPPER ARTERIAL RIGHT ARM BRACHIAL SYS MAX: 134 MMHG

## 2022-06-09 PROCEDURE — 93922 UPR/L XTREMITY ART 2 LEVELS: CPT

## 2022-06-12 LAB
APPEARANCE UR: CLEAR
BACTERIA #/AREA URNS HPF: ABNORMAL /[HPF]
BACTERIA UR CULT: ABNORMAL
BACTERIA UR CULT: ABNORMAL
BILIRUB UR QL STRIP: NEGATIVE
CASTS URNS QL MICRO: ABNORMAL /LPF
COLOR UR: YELLOW
EPI CELLS #/AREA URNS HPF: ABNORMAL /HPF (ref 0–10)
GLUCOSE UR QL STRIP: NEGATIVE
HGB UR QL STRIP: NEGATIVE
KETONES UR QL STRIP: NEGATIVE
LEUKOCYTE ESTERASE UR QL STRIP: ABNORMAL
MICRO URNS: ABNORMAL
NITRITE UR QL STRIP: POSITIVE
OTHER ANTIBIOTIC SUSC ISLT: ABNORMAL
PH UR STRIP: 5.5 [PH] (ref 5–7.5)
PROT UR QL STRIP: ABNORMAL
RBC #/AREA URNS HPF: ABNORMAL /HPF (ref 0–2)
SP GR UR STRIP: 1.03 (ref 1–1.03)
URINALYSIS REFLEX: ABNORMAL
UROBILINOGEN UR STRIP-MCNC: 0.2 MG/DL (ref 0.2–1)
WBC #/AREA URNS HPF: ABNORMAL /HPF (ref 0–5)

## 2022-06-13 ENCOUNTER — OFFICE VISIT (OUTPATIENT)
Dept: INTERNAL MEDICINE | Facility: CLINIC | Age: 62
End: 2022-06-13

## 2022-06-13 VITALS
WEIGHT: 160 LBS | BODY MASS INDEX: 26.66 KG/M2 | HEIGHT: 65 IN | DIASTOLIC BLOOD PRESSURE: 78 MMHG | HEART RATE: 76 BPM | OXYGEN SATURATION: 96 % | SYSTOLIC BLOOD PRESSURE: 120 MMHG

## 2022-06-13 DIAGNOSIS — R76.0 ANTICARDIOLIPIN ANTIBODY POSITIVE: ICD-10-CM

## 2022-06-13 DIAGNOSIS — I99.8 OTHER DISORDER OF CIRCULATORY SYSTEM: ICD-10-CM

## 2022-06-13 DIAGNOSIS — R23.1 LIVEDO RETICULARIS: Primary | ICD-10-CM

## 2022-06-13 PROCEDURE — 99213 OFFICE O/P EST LOW 20 MIN: CPT | Performed by: STUDENT IN AN ORGANIZED HEALTH CARE EDUCATION/TRAINING PROGRAM

## 2022-06-13 NOTE — PROGRESS NOTES
Enoc Carbone D.O.  Internal Medicine  [facility]  [address], [address]  [place] [address]  [phone]      Chief Complaint  Follow-up on legs    SUBJECTIVE    History of Present Illness    Maritza Bejarano is a 61 y.o. female who presents to the office today as an established patient that last saw me on 6/1/2022.     Here for short term 1 week follow up of discoloration of the legs consistent with livedo reticularis. States the leg blue pattern seems to be worse as the day goes on and her feet hang. Tends to be better when they are elevated.   No swelling of either leg or pain.     States she was also just placed on an antibiotic for UTI by her urologist.     Allergies   Allergen Reactions   • Contrast Dye Shortness Of Breath   • Iodinated Diagnostic Agents Shortness Of Breath     SOA.    Patient states she has had it without symptoms.   • Sulfa Antibiotics Other (See Comments)     Bradycardia after injection         Outpatient Medications Marked as Taking for the 6/13/22 encounter (Office Visit) with Enoc Carbone, DO   Medication Sig Dispense Refill   • albuterol sulfate  (90 Base) MCG/ACT inhaler Inhale 2 puffs Every 4 (Four) Hours As Needed for Wheezing or Shortness of Air. 8 g 0   • atorvastatin (LIPITOR) 10 MG tablet Take 10 mg by mouth Daily.     • baclofen (LIORESAL) 10 MG tablet Take 20 mg by mouth 3 (Three) Times a Day.     • cholecalciferol (VITAMIN D3) 25 MCG (1000 UT) tablet Take 5,000 Units by mouth Daily.     • FLUoxetine (PROzac) 20 MG capsule Take 60 mg by mouth Daily.     • Hyoscyamine Sulfate (HYOSCYAMINE PO) Take 0.125 mg by mouth Daily.     • losartan-hydrochlorothiazide (HYZAAR) 50-12.5 MG per tablet Take 1 tablet by mouth daily.     • pantoprazole (PROTONIX) 40 MG EC tablet Take  by mouth 2 (two) times a day.     • pramipexole (MIRAPEX) 0.5 MG tablet Take 1.5 mg by mouth 3 (three) times a day.     • promethazine (PHENERGAN) 25 MG tablet Take  by mouth.    "  • triamcinolone (KENALOG) 0.1 % ointment APPLY TO AFFECTED AREA TWICE A DAY          Past Medical History:   Diagnosis Date   • Dawn esophagus     per patient   • Hypercalcemia     s/p parathyroidectomy   • Hyperlipidemia    • Hypertension    • Multiple sclerosis (HCC)    • PONV (postoperative nausea and vomiting)        OBJECTIVE    Vital Signs:   /78   Pulse 76   Ht 165.1 cm (65\")   Wt 72.6 kg (160 lb)   SpO2 96%   BMI 26.63 kg/m²     Physical Exam  Vitals reviewed.   Constitutional:       General: She is not in acute distress.     Appearance: Normal appearance. She is not ill-appearing.   Eyes:      General: No scleral icterus.  Musculoskeletal:      Right lower leg: Edema (trace ankle and foot) present.      Left lower leg: Edema (trace ankle and foot) present.      Comments: Ambulates with motorized wheelchair     Skin:     General: Skin is warm.      Coloration: Skin is not jaundiced.      Comments: See images below for foot exam   Neurological:      Mental Status: She is alert.   Psychiatric:         Mood and Affect: Mood normal.         Behavior: Behavior normal.         Thought Content: Thought content normal.                                                     ASSESSMENT & PLAN     Diagnoses and all orders for this visit:    1. Livedo reticularis (Primary)  -pt here for short term 1 week follow up of bilateral skin changes consistent with livedo reticularis  -workup thus far has included Normal CBC, negative ANCA panel, negative FARHEEN, negative RF, negative cryoglobulin, negative HIV, negative hepatitis C, normal ESR, normal kidney function electrolytes and liver enzymes. Urinary tract infection was positive for bacteria and she is currently being treated for asymptomatic UTI by urology.  -I sent her for BENNY testing and that was normal as well   -pt reports the legs still look worse in the evening as opposed to the morning  -today I will obtain antiphospholipid syndrome to complete a " generalized workup of livedo but it is possible that this is physiologic livedo vs stasis dermatologic change  -     Antiphospholipid Syndrome            The following social determinates of health impact the patient's medical decision making: No social determinates of health were factored in to today's visit.     Follow Up  No follow-ups on file.    Patient/family had no further questions at this time and verbalized understanding of the plan discussed today.

## 2022-06-15 LAB
APTT HEX PL PPP: 3 SEC (ref 0–11)
APTT PPP: 26.2 SEC (ref 22.9–30.2)
B2 GLYCOPROT1 IGG SER-ACNC: <9 GPI IGG UNITS (ref 0–20)
B2 GLYCOPROT1 IGM SER-ACNC: <9 GPI IGM UNITS (ref 0–32)
CARDIOLIPIN IGG SER IA-ACNC: <9 GPL U/ML (ref 0–14)
CARDIOLIPIN IGM SER IA-ACNC: 13 MPL U/ML (ref 0–12)
INR PPP: 1 (ref 0.9–1.2)
PATH INTERP BLD-IMP: NORMAL
PATH INTERP SPEC-IMP: ABNORMAL
PROTHROMBIN TIME: 10.1 SEC (ref 9.1–12)
SCREEN DRVVT: 27.4 SEC (ref 0–47)
THROMBIN TIME: 17.1 SEC (ref 0–23)

## 2022-06-17 ENCOUNTER — TELEPHONE (OUTPATIENT)
Dept: INTERNAL MEDICINE | Facility: CLINIC | Age: 62
End: 2022-06-17

## 2022-06-17 NOTE — TELEPHONE ENCOUNTER
Patient request call back from [facility] about her pramipexole. She states she has been working with [facility] about the dosage. Please call patient back at [phone].

## 2022-06-24 ENCOUNTER — APPOINTMENT (OUTPATIENT)
Dept: CARDIOLOGY | Facility: HOSPITAL | Age: 62
End: 2022-06-24

## 2022-08-15 ENCOUNTER — TELEPHONE (OUTPATIENT)
Dept: ONCOLOGY | Facility: CLINIC | Age: 62
End: 2022-08-15

## 2022-08-15 NOTE — TELEPHONE ENCOUNTER
Caller: RAMILA    Reason for Call: RAMILA IS CALLING TO R/S HER NEW LAB AND APPT WITH DR COELHO, SHE WOULD LIKE AN AFTERNOON APPT    SHE IS IN A WHEELCHAIR, SHE WANTED THE STAFF TO KNOW    PLEASE ADVISE

## 2022-09-20 ENCOUNTER — APPOINTMENT (OUTPATIENT)
Dept: OTHER | Facility: HOSPITAL | Age: 62
End: 2022-09-20

## 2023-05-15 RX ORDER — ATORVASTATIN CALCIUM 10 MG/1
10 TABLET, FILM COATED ORAL DAILY
Qty: 90 TABLET | Refills: 1 | Status: SHIPPED | OUTPATIENT
Start: 2023-05-15

## 2023-05-15 RX ORDER — PANTOPRAZOLE SODIUM 40 MG/1
40 TABLET, DELAYED RELEASE ORAL 2 TIMES DAILY
Qty: 180 TABLET | Refills: 1 | Status: SHIPPED | OUTPATIENT
Start: 2023-05-15

## 2023-05-15 RX ORDER — LOSARTAN POTASSIUM AND HYDROCHLOROTHIAZIDE 12.5; 5 MG/1; MG/1
1 TABLET ORAL DAILY
Qty: 90 TABLET | Refills: 1 | Status: SHIPPED | OUTPATIENT
Start: 2023-05-15

## 2023-05-15 RX ORDER — FLUOXETINE HYDROCHLORIDE 20 MG/1
60 CAPSULE ORAL DAILY
Qty: 270 CAPSULE | Refills: 1 | Status: SHIPPED | OUTPATIENT
Start: 2023-05-15

## 2023-09-15 ENCOUNTER — OFFICE VISIT (OUTPATIENT)
Dept: INTERNAL MEDICINE | Facility: CLINIC | Age: 63
End: 2023-09-15
Payer: MEDICARE

## 2023-09-15 VITALS
OXYGEN SATURATION: 95 % | WEIGHT: 175 LBS | BODY MASS INDEX: 29.16 KG/M2 | HEART RATE: 76 BPM | SYSTOLIC BLOOD PRESSURE: 128 MMHG | HEIGHT: 65 IN | DIASTOLIC BLOOD PRESSURE: 80 MMHG

## 2023-09-15 DIAGNOSIS — D22.9 ATYPICAL MOLE: ICD-10-CM

## 2023-09-15 DIAGNOSIS — Z12.31 ENCOUNTER FOR SCREENING MAMMOGRAM FOR MALIGNANT NEOPLASM OF BREAST: ICD-10-CM

## 2023-09-15 DIAGNOSIS — M25.532 PAIN IN BOTH WRISTS: Primary | ICD-10-CM

## 2023-09-15 DIAGNOSIS — E78.5 HYPERLIPIDEMIA, UNSPECIFIED HYPERLIPIDEMIA TYPE: ICD-10-CM

## 2023-09-15 DIAGNOSIS — I10 ESSENTIAL HYPERTENSION: ICD-10-CM

## 2023-09-15 DIAGNOSIS — R74.8 ELEVATED ALKALINE PHOSPHATASE LEVEL: ICD-10-CM

## 2023-09-15 DIAGNOSIS — M25.531 PAIN IN BOTH WRISTS: Primary | ICD-10-CM

## 2023-09-15 DIAGNOSIS — D64.9 NORMOCYTIC ANEMIA: ICD-10-CM

## 2023-09-15 PROCEDURE — 3079F DIAST BP 80-89 MM HG: CPT | Performed by: STUDENT IN AN ORGANIZED HEALTH CARE EDUCATION/TRAINING PROGRAM

## 2023-09-15 PROCEDURE — 99214 OFFICE O/P EST MOD 30 MIN: CPT | Performed by: STUDENT IN AN ORGANIZED HEALTH CARE EDUCATION/TRAINING PROGRAM

## 2023-09-15 PROCEDURE — 3074F SYST BP LT 130 MM HG: CPT | Performed by: STUDENT IN AN ORGANIZED HEALTH CARE EDUCATION/TRAINING PROGRAM

## 2023-09-15 RX ORDER — CYANOCOBALAMIN 1000 UG/ML
1000 INJECTION, SOLUTION INTRAMUSCULAR; SUBCUTANEOUS
COMMUNITY
Start: 2023-02-14 | End: 2024-02-14

## 2023-09-15 RX ORDER — CLONAZEPAM 2 MG/1
2 TABLET ORAL
COMMUNITY
Start: 2023-08-24

## 2023-09-15 NOTE — PROGRESS NOTES
Enoc Carbone D.O.  Internal Medicine  [facility]  [address], [address]  [place] [address]  [phone]      Chief Complaint  wrist pain    SUBJECTIVE    History of Present Illness    Maritza Bejarano is a 62 y.o. female who presents to the office today as an established patient that last saw me on 6/13/2022.     Pt states she has had swelling on her wrist that  is getting worse over the last 5 years. States she previously went to a hand specialist and she has made another appointment with them. States she has worsened pain at the base of the thumb and it is starting to radiate up her forearm. States her average pain in her wrists is 6/10 , present most of the time, worse with playing games on her cell phone. She doesn't take any pain medication over the counter for it.     HTN: taking losartan-HCTZ 50-12.5 once daily; doesn't check her blood pressure at home frequently.     HLD: taking atorvastatin 10 mg daily for primary prevention     Allergies   Allergen Reactions    Contrast Dye (Echo Or Unknown Ct/Mr) Shortness Of Breath    Iodinated Contrast Media Shortness Of Breath     SOA.    Patient states she has had it without symptoms.    Sulfa Antibiotics Other (See Comments)     Bradycardia after injection         Outpatient Medications Marked as Taking for the 9/15/23 encounter (Office Visit) with Enoc Carbone, DO   Medication Sig Dispense Refill    atorvastatin (LIPITOR) 10 MG tablet Take 1 tablet by mouth Daily. 90 tablet 1    baclofen (LIORESAL) 10 MG tablet Take 2 tablets by mouth 3 (Three) Times a Day.      cholecalciferol (VITAMIN D3) 25 MCG (1000 UT) tablet Take 5 tablets by mouth Daily.      clonazePAM (KlonoPIN) 2 MG tablet Take 1 tablet by mouth.      cyanocobalamin 1000 MCG/ML injection Inject 1 mL under the skin into the appropriate area as directed.      FLUoxetine (PROzac) 20 MG capsule Take 3 capsules by mouth Daily. 270 capsule 1    losartan-hydrochlorothiazide (HYZAAR)  "50-12.5 MG per tablet Take 1 tablet by mouth Daily. 90 tablet 1    pantoprazole (PROTONIX) 40 MG EC tablet Take 1 tablet by mouth 2 (Two) Times a Day. 180 tablet 1    pramipexole (MIRAPEX) 0.5 MG tablet Take 3 tablets by mouth 3 (Three) Times a Day.      promethazine (PHENERGAN) 25 MG tablet Take  by mouth.          Past Medical History:   Diagnosis Date    Dawn esophagus     per patient    Blurred vision     R/T MS    Carpal tunnel syndrome     Depression     Diplopia 2013    GERD (gastroesophageal reflux disease)     H/O Skin cancer, basal cell     Headache     History of blood transfusion     History of GI bleed     R/T NSAIDS AND STEROIDS, multiple times    History of urinary tract infection     Hypercalcemia     s/p parathyroidectomy    Hyperlipidemia     Hypertension     Movement disorder     Multiple sclerosis     Optic neuritis     PONV (postoperative nausea and vomiting)        OBJECTIVE    Vital Signs:   /80   Pulse 76   Ht 165.1 cm (65\")   Wt 79.4 kg (175 lb)   SpO2 95%   BMI 29.12 kg/m²     Physical Exam  Vitals reviewed.   Constitutional:       General: She is not in acute distress.     Appearance: Normal appearance. She is not ill-appearing.   HENT:      Head: Atraumatic.     Eyes:      General: No scleral icterus.  Cardiovascular:      Rate and Rhythm: Normal rate and regular rhythm.      Heart sounds: Murmur heard.   Systolic murmur is present with a grade of 2/6.   Pulmonary:      Effort: Pulmonary effort is normal. No respiratory distress.      Breath sounds: Normal breath sounds. No stridor. No wheezing or rhonchi.   Musculoskeletal:      Comments: Ambulating via motorized wheelchair.  Bilateral hands with bony hypertrophy at the CMC joints with a small amount of effusion. No overlying skin erythema. No swelling of the MCP joints. Scattered deviation of distal phalynx of other fingers.    Skin:     Coloration: Skin is not jaundiced.   Neurological:      Mental Status: She is alert. "   Psychiatric:         Mood and Affect: Mood normal.         Behavior: Behavior normal.         Thought Content: Thought content normal.                         Narrative & Impression   XR WRIST 3+ VW LEFT-     INDICATIONS:    Trauma     TECHNIQUE: 4 VIEWS OF THE LEFT WRIST     COMPARISON: 1/9/2013     FINDINGS:      Osseous fragments at the dorsal aspect of the wrist, one of which  appears to be new but at least partly corticated, may reflect age  indeterminate triquetral fracture; soft tissue swelling is seen in this  region. Degenerative changes are seen at the lateral aspect of the  wrist, with subcortical cystic change apparent in the scaphoid.     IMPRESSION:     Soft tissue swelling of the wrist. Evidence of age indeterminate  triquetral fracture, correlate with location of pain. If there is  further clinical concern, MRI could be considered for further  evaluation.     This report was finalized on 6/21/2018 3:32 PM by Dr. Luan Estrada M.D.     ASSESSMENT & PLAN     Diagnoses and all orders for this visit:    1. Pain in both wrists (Primary)  -reports following previously with hand surgeon and getting some type of injection; I do not have access to those records  -I do have access to a left wrist xray from 2018 which showed soft tissues swelling of the wrist and age indeterminate triquetral fracture   -overall I will allow hand surgery to continue to manage but I did recommend she start Tylenol arthritis 2 tabs up to 3 times daily as needed and will Rx voltaren gel for symptomatic relief   -symptoms are overall consistent with OA   -of note patient had normal RF in 2022  -     Diclofenac Sodium (VOLTAREN) 1 % gel gel; Apply 4 g topically to the appropriate area as directed 4 (Four) Times a Day As Needed (pain).  Dispense: 350 g; Refill: 0    2. Essential hypertension  -acceptable control on current regimen at 128/80 today  -recheck CMP today    3. Hyperlipidemia, unspecified hyperlipidemia  type  -taking atorvastatin 10 mg daily for primary  prevention  -will check fasting lipid today    4. Atypical mole       -recommended dermatology consult; provided contact information to pt in office today and she can self refer    5. Encounter for screening mammogram for malignant neoplasm of breast  -     Mammo Screening Digital Tomosynthesis Bilateral With CAD; Future            The following social determinates of health impact the patient's medical decision making: No social determinates of health were factored in to today's visit.     Follow Up  Return in about 2 months (around 11/15/2023) for Annual physical.    Patient/family had no further questions at this time and verbalized understanding of the plan discussed today.

## 2023-09-16 LAB
ALBUMIN SERPL-MCNC: 4.5 G/DL (ref 3.5–5.2)
ALBUMIN/GLOB SERPL: 1.8 G/DL
ALP SERPL-CCNC: 174 U/L (ref 39–117)
ALT SERPL-CCNC: 20 U/L (ref 1–33)
AST SERPL-CCNC: 25 U/L (ref 1–32)
BASOPHILS # BLD AUTO: 0.05 10*3/MM3 (ref 0–0.2)
BASOPHILS NFR BLD AUTO: 0.6 % (ref 0–1.5)
BILIRUB SERPL-MCNC: 0.4 MG/DL (ref 0–1.2)
BUN SERPL-MCNC: 20 MG/DL (ref 8–23)
BUN/CREAT SERPL: 27 (ref 7–25)
CALCIUM SERPL-MCNC: 9.6 MG/DL (ref 8.6–10.5)
CHLORIDE SERPL-SCNC: 104 MMOL/L (ref 98–107)
CHOLEST SERPL-MCNC: 167 MG/DL (ref 0–200)
CO2 SERPL-SCNC: 26.4 MMOL/L (ref 22–29)
CREAT SERPL-MCNC: 0.74 MG/DL (ref 0.57–1)
EGFRCR SERPLBLD CKD-EPI 2021: 92.8 ML/MIN/1.73
EOSINOPHIL # BLD AUTO: 0.12 10*3/MM3 (ref 0–0.4)
EOSINOPHIL NFR BLD AUTO: 1.5 % (ref 0.3–6.2)
ERYTHROCYTE [DISTWIDTH] IN BLOOD BY AUTOMATED COUNT: 13.7 % (ref 12.3–15.4)
GLOBULIN SER CALC-MCNC: 2.5 GM/DL
GLUCOSE SERPL-MCNC: 89 MG/DL (ref 65–99)
HCT VFR BLD AUTO: 36.9 % (ref 34–46.6)
HDLC SERPL-MCNC: 46 MG/DL (ref 40–60)
HGB BLD-MCNC: 11.8 G/DL (ref 12–15.9)
IMM GRANULOCYTES # BLD AUTO: 0.03 10*3/MM3 (ref 0–0.05)
IMM GRANULOCYTES NFR BLD AUTO: 0.4 % (ref 0–0.5)
LDLC SERPL CALC-MCNC: 94 MG/DL (ref 0–100)
LDLC/HDLC SERPL: 1.96 {RATIO}
LYMPHOCYTES # BLD AUTO: 0.93 10*3/MM3 (ref 0.7–3.1)
LYMPHOCYTES NFR BLD AUTO: 11.7 % (ref 19.6–45.3)
MCH RBC QN AUTO: 26 PG (ref 26.6–33)
MCHC RBC AUTO-ENTMCNC: 32 G/DL (ref 31.5–35.7)
MCV RBC AUTO: 81.5 FL (ref 79–97)
MONOCYTES # BLD AUTO: 0.44 10*3/MM3 (ref 0.1–0.9)
MONOCYTES NFR BLD AUTO: 5.6 % (ref 5–12)
NEUTROPHILS # BLD AUTO: 6.35 10*3/MM3 (ref 1.7–7)
NEUTROPHILS NFR BLD AUTO: 80.2 % (ref 42.7–76)
NRBC BLD AUTO-RTO: 0 /100 WBC (ref 0–0.2)
PLATELET # BLD AUTO: 364 10*3/MM3 (ref 140–450)
POTASSIUM SERPL-SCNC: 3.8 MMOL/L (ref 3.5–5.2)
PROT SERPL-MCNC: 7 G/DL (ref 6–8.5)
RBC # BLD AUTO: 4.53 10*6/MM3 (ref 3.77–5.28)
SODIUM SERPL-SCNC: 142 MMOL/L (ref 136–145)
TRIGL SERPL-MCNC: 155 MG/DL (ref 0–150)
VLDLC SERPL CALC-MCNC: 27 MG/DL (ref 5–40)
WBC # BLD AUTO: 7.92 10*3/MM3 (ref 3.4–10.8)

## 2023-09-18 ENCOUNTER — TELEPHONE (OUTPATIENT)
Dept: INTERNAL MEDICINE | Facility: CLINIC | Age: 63
End: 2023-09-18

## 2023-09-18 NOTE — TELEPHONE ENCOUNTER
Caller: Maritza Bejarano    Relationship: Self    Best call back number: [phone]     What test was performed:  BLOOD TEST     When was the test performed: 9.15.23       Additional notes: PATIENT IS REQUESTING TO DISCUSS HER TEST RESULTS WITH CLINICAL    PATIENT IS CONCERNED THAT SHE MAY HAVE A UTI AND IS WONDERING IF THAT WILL BE SENT FOR CULTURE     PLEASE CALL PATIENT TO ADVISE AND LEAVE VOICEMAIL IF UNABLE TO REACH PATIENT

## 2023-09-28 LAB
ALP BONE CFR SERPL: 39 % (ref 14–68)
ALP INTEST CFR SERPL: 1 % (ref 0–18)
ALP LIVER CFR SERPL: 60 % (ref 18–85)
ALP SERPL-CCNC: 163 IU/L (ref 44–121)
FERRITIN SERPL-MCNC: 30 NG/ML (ref 15–150)
FOLATE SERPL-MCNC: 13.3 NG/ML
GGT SERPL-CCNC: 15 IU/L (ref 0–60)
IRON SATN MFR SERPL: 8 % (ref 15–55)
IRON SERPL-MCNC: 32 UG/DL (ref 27–159)
RETICS/RBC NFR AUTO: 1.9 % (ref 0.6–2.6)
TIBC SERPL-MCNC: 407 UG/DL (ref 250–450)
TSH SERPL DL<=0.005 MIU/L-ACNC: 1.47 UIU/ML (ref 0.45–4.5)
UIBC SERPL-MCNC: 375 UG/DL (ref 131–425)
VIT B12 SERPL-MCNC: 519 PG/ML (ref 232–1245)

## 2023-10-17 ENCOUNTER — TELEPHONE (OUTPATIENT)
Dept: INTERNAL MEDICINE | Facility: CLINIC | Age: 63
End: 2023-10-17

## 2023-10-17 NOTE — TELEPHONE ENCOUNTER
Caller: Maritza Bejarano    Relationship: Self    Best call back number: [phone]     What orders are you requesting (i.e. lab or imaging): REPAIR ORDER FOR ELECTRIC HOSPITAL BED    THIS WOULD NEED TO BE FAXED TO [facility]. FAX NUMBER [phone].

## 2023-11-09 ENCOUNTER — TELEPHONE (OUTPATIENT)
Dept: INTERNAL MEDICINE | Facility: CLINIC | Age: 63
End: 2023-11-09

## 2023-11-09 NOTE — TELEPHONE ENCOUNTER
Caller: Maritza Bejarano    Relationship: Self    Best call back number:     [phone]       Equipment requested:     PATIENT IS NEED A CONNECTOR HOSE FROM THE TUBE TO THE MASK FOR PATIENT'S CPAP DREAM STATION MACHINE      Additional information or concerns:       PATIENT IS WANTING THE SCRIPT/ORDDER SENT TO [facility].    [phone]     PHONE: [phone]      [address] [place] [address]       PLEASE LET PATIENT KNOW WHEN THIS HAS BEEN SENT.

## 2023-11-14 ENCOUNTER — TELEPHONE (OUTPATIENT)
Dept: INTERNAL MEDICINE | Facility: CLINIC | Age: 63
End: 2023-11-14
Payer: MEDICARE

## 2023-11-14 NOTE — TELEPHONE ENCOUNTER
Caller: Maritza Bejarano    Relationship: Self    Best call back number: [phone]    What was the call regarding: PATIENT STATED THE FORM THAT WAS SENT TO [facility] WAS INCORRECT, SO THEY HAVE FAXED OVER ANOTHER FORM TO BE FILLED OUT.    SHE WOULD LIKE TO CHECK THE STATUS OF THIS.    PLEASE CALL.

## 2023-11-16 ENCOUNTER — OFFICE VISIT (OUTPATIENT)
Dept: INTERNAL MEDICINE | Facility: CLINIC | Age: 63
End: 2023-11-16
Payer: MEDICARE

## 2023-11-16 VITALS
TEMPERATURE: 97.7 F | WEIGHT: 175 LBS | OXYGEN SATURATION: 97 % | HEART RATE: 81 BPM | HEIGHT: 65 IN | DIASTOLIC BLOOD PRESSURE: 84 MMHG | SYSTOLIC BLOOD PRESSURE: 130 MMHG | BODY MASS INDEX: 29.16 KG/M2

## 2023-11-16 DIAGNOSIS — Z78.0 POST-MENOPAUSAL: ICD-10-CM

## 2023-11-16 DIAGNOSIS — F41.9 ANXIETY AND DEPRESSION: ICD-10-CM

## 2023-11-16 DIAGNOSIS — Z91.81 AT HIGH RISK FOR FALLS: ICD-10-CM

## 2023-11-16 DIAGNOSIS — Z00.00 MEDICARE ANNUAL WELLNESS VISIT, SUBSEQUENT: Primary | ICD-10-CM

## 2023-11-16 DIAGNOSIS — G35 MULTIPLE SCLEROSIS: ICD-10-CM

## 2023-11-16 DIAGNOSIS — F32.A ANXIETY AND DEPRESSION: ICD-10-CM

## 2023-11-16 DIAGNOSIS — D50.9 IRON DEFICIENCY ANEMIA, UNSPECIFIED IRON DEFICIENCY ANEMIA TYPE: ICD-10-CM

## 2023-11-16 LAB
EXPIRATION DATE: ABNORMAL
HGB BLDA-MCNC: 10 G/DL (ref 12–17)
Lab: ABNORMAL

## 2023-11-16 RX ORDER — PRAMIPEXOLE DIHYDROCHLORIDE 1.5 MG/1
1.5 TABLET ORAL 3 TIMES DAILY
COMMUNITY
Start: 2023-09-17

## 2023-11-16 NOTE — PROGRESS NOTES
The ABCs of the Annual Wellness Visit  Subsequent Medicare Wellness Visit    Subjective      Maritza Bejarano is a 63 y.o. female who presents for a Subsequent Medicare Wellness Visit.    The following portions of the patient's history were reviewed and   updated as appropriate: allergies, current medications, past family history, past medical history, past social history, past surgical history, and problem list.    HLD: taking atorvastatin 10 mg daily for primary prevention    HTN: taking losartan-HCTZ 50-12.5 once daily. Doesn't routinely check BP at home.     MS: Sees neurologist Dr Jacobson at [place]; takes baclofen 20 mg three times daily, pramipexole 1.5 mg three times daily. Pt has a bladder stimulator. She no longer self caths and is incontinent to urine. She follows with [facility]. [facility] is adjusting the stimulator. Doesn't have sensation in the bladder and overall has tingling in both hands and fingers, feet and legs.She previously had physical therapy. Her neurologist prescribes PRN clonazepam for insomnia- 30 tablets lasts up to 6 months. She receives B12 injections from neurology. She takes a vitamin D supplement.     Anxiety and Depression: takes fluoxetine 60 mg daily. States she has been on this forever and considering weaning off it.     GERD: takes pantoprazole 40 mg twice daily    Iron deficiency anemia: at last visit recommended iron tablet. She has not picked that up yet. States she did not realize a GI appointment was made and thus did not go.     Compared to one year ago, the patient feels her physical   health is the same.    Compared to one year ago, the patient feels her mental   health is the same.    Recent Hospitalizations:  She was not admitted to the hospital during the last year.       Current Medical Providers:  Patient Care Team:  Enoc Carbone DO as PCP - General (Internal Medicine)  Sohan Jacobsen MD as PCP - Family Medicine  Rohith Reyes MD as Consulting Physician  (Hematology and Oncology)  Enoc Carbone DO as Referring Physician (Internal Medicine)    Outpatient Medications Prior to Visit   Medication Sig Dispense Refill    atorvastatin (LIPITOR) 10 MG tablet Take 1 tablet by mouth Daily. 90 tablet 1    baclofen (LIORESAL) 10 MG tablet Take 2 tablets by mouth 3 (Three) Times a Day.      cholecalciferol (VITAMIN D3) 25 MCG (1000 UT) tablet Take 5 tablets by mouth Daily.      clonazePAM (KlonoPIN) 2 MG tablet Take 1 tablet by mouth.      cyanocobalamin 1000 MCG/ML injection Inject 1 mL under the skin into the appropriate area as directed.      Diclofenac Sodium (VOLTAREN) 1 % gel gel Apply 4 g topically to the appropriate area as directed 4 (Four) Times a Day As Needed (pain). 350 g 0    FLUoxetine (PROzac) 20 MG capsule Take 3 capsules by mouth Daily. 270 capsule 1    losartan-hydrochlorothiazide (HYZAAR) 50-12.5 MG per tablet Take 1 tablet by mouth Daily. 90 tablet 1    pantoprazole (PROTONIX) 40 MG EC tablet Take 1 tablet by mouth 2 (Two) Times a Day. 180 tablet 1    pramipexole (MIRAPEX) 1.5 MG tablet Take 1 tablet by mouth 3 (Three) Times a Day.      ferrous sulfate 325 (65 FE) MG tablet Take 1 tablet by mouth Every Other Day. (Patient not taking: Reported on 11/16/2023) 45 tablet 1    triamcinolone (KENALOG) 0.1 % ointment APPLY TO AFFECTED AREA TWICE A DAY (Patient not taking: Reported on 9/15/2023)      albuterol sulfate  (90 Base) MCG/ACT inhaler Inhale 2 puffs Every 4 (Four) Hours As Needed for Wheezing or Shortness of Air. (Patient not taking: Reported on 9/15/2023) 8 g 0    Hyoscyamine Sulfate (HYOSCYAMINE PO) Take 0.125 mg by mouth Daily. (Patient not taking: Reported on 9/15/2023)      pramipexole (MIRAPEX) 0.5 MG tablet Take 3 tablets by mouth 3 (Three) Times a Day.      promethazine (PHENERGAN) 25 MG tablet Take  by mouth.       No facility-administered medications prior to visit.       No opioid medication identified on active medication list. I have  "reviewed chart for other potential  high risk medication/s and harmful drug interactions in the elderly.        Aspirin is not on active medication list.  Aspirin use is not indicated based on review of current medical condition/s. Risk of harm outweighs potential benefits.  .    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  ACP discussion was held with the patient during this visit. Patient does not have an advance directive, information provided.     Objective    Vitals:    11/16/23 1558   BP: 130/84   Pulse: 81   Temp: 97.7 °F (36.5 °C)   TempSrc: Infrared   SpO2: 97%   Weight: 79.4 kg (175 lb)   Height: 165.1 cm (65\")     Physical Exam  Vitals reviewed.   Constitutional:       General: She is not in acute distress.     Appearance: Normal appearance. She is not ill-appearing.   HENT:      Head: Atraumatic.   Eyes:      General: No scleral icterus.  Pulmonary:      Effort: Pulmonary effort is normal. No respiratory distress.   Musculoskeletal:      " "Comments: Ambulating via motorized wheelchair   Skin:     Coloration: Skin is not jaundiced.   Neurological:      Mental Status: She is alert.   Psychiatric:         Mood and Affect: Mood normal.         Behavior: Behavior normal.         Thought Content: Thought content normal.           Estimated body mass index is 29.12 kg/m² as calculated from the following:    Height as of this encounter: 165.1 cm (65\").    Weight as of this encounter: 79.4 kg (175 lb).    BMI is >= 25 and <30. (Overweight) The following options were offered after discussion;: nutrition counseling/recommendations      Does the patient have evidence of cognitive impairment?   No    Lab Results   Component Value Date    CHLPL 167 09/15/2023    TRIG 155 (H) 09/15/2023    HDL 46 09/15/2023    LDL 94 09/15/2023    VLDL 27 09/15/2023          HEALTH RISK ASSESSMENT    Smoking Status:  Social History     Tobacco Use   Smoking Status Former    Packs/day: 2.00    Years: 30.00    Additional pack years: 0.00    Total pack years: 60.00    Types: Cigarettes    Quit date: 10/22/2003    Years since quittin.0   Smokeless Tobacco Never     Alcohol Consumption:  Social History     Substance and Sexual Activity   Alcohol Use Not Currently     Fall Risk Screen:    STEADI Fall Risk Assessment was completed, and patient is at HIGH risk for falls. Assessment completed on:2023    Depression Screenin/16/2023     4:00 PM   PHQ-2/PHQ-9 Depression Screening   Little Interest or Pleasure in Doing Things 0-->not at all   Feeling Down, Depressed or Hopeless 0-->not at all   PHQ-9: Brief Depression Severity Measure Score 0       Health Habits and Functional and Cognitive Screenin/16/2023     4:00 PM   Functional & Cognitive Status   Do you have difficulty preparing food and eating? No   Do you have difficulty bathing yourself, getting dressed or grooming yourself? Yes   Do you have difficulty using the toilet? Yes   Do you have difficulty moving " around from place to place? Yes   Do you have trouble with steps or getting out of a bed or a chair? Yes   Current Diet Low Carb Diet   Dental Exam Not up to date   Eye Exam Not up to date   Exercise (times per week) 0 times per week   Current Exercises Include No Regular Exercise   Do you need help using the phone?  No   Are you deaf or do you have serious difficulty hearing?  No   Do you need help to go to places out of walking distance? Yes   Do you need help shopping? Yes   Do you need help preparing meals?  Yes   Do you need help with housework?  Yes   Do you need help with laundry? Yes   Do you need help taking your medications? No   Do you need help managing money? No   Do you ever drive or ride in a car without wearing a seat belt? No   Have you felt unusual stress, anger or loneliness in the last month? No   Who do you live with? Spouse   If you need help, do you have trouble finding someone available to you? No   Do you have difficulty concentrating, remembering or making decisions? No       Age-appropriate Screening Schedule:  Refer to the list below for future screening recommendations based on patient's age, sex and/or medical conditions. Orders for these recommended tests are listed in the plan section. The patient has been provided with a written plan.    Health Maintenance   Topic Date Due    MAMMOGRAM  Never done    COVID-19 Vaccine (1) Never done    ZOSTER VACCINE (1 of 2) Never done    PAP SMEAR  Never done    INFLUENZA VACCINE  Never done    LIPID PANEL  09/15/2024    ANNUAL WELLNESS VISIT  11/16/2024    BMI FOLLOWUP  11/16/2024    COLORECTAL CANCER SCREENING  01/30/2025    TDAP/TD VACCINES (2 - Td or Tdap) 06/14/2029    HEPATITIS C SCREENING  Completed    Pneumococcal Vaccine 0-64  Aged Out                  CMS Preventative Services Quick Reference  Risk Factors Identified During Encounter:    *mammogram scheduled for  January  *DEXA ordered today  *she is referred to GI for  "colonoscopy/EGD    Fall Risk-High or Moderate: Referral Placed for Physical Therapy  Immunizations Discussed/Encouraged: Influenza, Shingrix, COVID19, and RSV (Respiratory Syncytial Virus)  Polypharmacy: Medication List reviewed  Dental Screening Recommended  Vision Screening Recommended    The above risks/problems have been discussed with the patient.  Pertinent information has been shared with the patient in the After Visit Summary.      Follow Up:   Next Medicare Wellness visit to be scheduled in 1 year.      An After Visit Summary and PPPS were made available to the patient.    A problem-based visit was also conducted on the same day, see below for assessment and plan    Diagnoses and all orders for this visit:    1. Anxiety and depression (Primary)  -takes fluoxetine 60 mg daily. States she has been on this forever \"decades\"  and considering weaning off it.   -at this point I advised pt she could consider a titration off the medication over several months and we discussed how that would be performed. In general if she is tolerating it well she doesn't have to titrate off the medication though. If anxiety and depression symptoms recurred I would recommend restarting asap.     2. Iron deficiency anemia, unspecified iron deficiency anemia type  -at last visit recommended iron tablet. She has not picked that up yet. States she did not realize a GI appointment was made and thus did not go.     Lab Results   Component Value Date    WBC 7.92 09/15/2023    HGB 11.8 (L) 09/15/2023    HCT 36.9 09/15/2023    MCV 81.5 09/15/2023     09/15/2023     -repeat Hgb POC today is 10.0  -pt reports history of GI bleed in the remote past after steroid administration as well as a questionable history of Dawn's esophagus. Advised her of the precancerous nature of Dawn's and that I strongly encourage her to start iron supplement as prescribed and call GI to get back on the schedule. She seems agreeable.   -I will have her " follow up in 1-2 months for close monitoring.   -     POCT hemoglobin            The following social determinates of health impact the patient's medical decision making: No social determinates of health were factored in to today's visit.     Follow Up  Return in about 7 weeks (around 1/4/2024) for Recheck.

## 2023-11-17 ENCOUNTER — HOME HEALTH ADMISSION (OUTPATIENT)
Dept: HOME HEALTH SERVICES | Facility: HOME HEALTHCARE | Age: 63
End: 2023-11-17
Payer: MEDICARE

## 2023-11-20 ENCOUNTER — TELEPHONE (OUTPATIENT)
Dept: INTERNAL MEDICINE | Facility: CLINIC | Age: 63
End: 2023-11-20

## 2023-11-20 NOTE — TELEPHONE ENCOUNTER
Faxed referral and clinical information to [facility] and they will call the patient to schedule an appointment.

## 2023-11-20 NOTE — TELEPHONE ENCOUNTER
Caller: VIBHA CUELLAR      Best call back number: [phone]    Who are you requesting to speak with (clinical staff, provider,  specific staff member): DR. SALMERON    What was the call regarding: VIBHA WITH [facility] STATES SHE WANTED TO LET DR. SALMERON KNOW THAT THEY RECEIVED A REFERRAL FOR THE PATIENT BUT THEY ARE UNABLE TO TAKE ON THE PATIENT DUE TO CAPACITY REASONS.

## 2023-12-29 ENCOUNTER — TELEPHONE (OUTPATIENT)
Dept: INTERNAL MEDICINE | Facility: CLINIC | Age: 63
End: 2023-12-29
Payer: MEDICARE

## 2023-12-29 NOTE — TELEPHONE ENCOUNTER
MARIA DE JESUS Downing from [facility] called and stated that patient is avoiding scheduling Home Health visits. Salina stated that patient was going to let her come today for a visit but now patient is not answering her phone and returning voice mails to her.     Salina call back number [phone] if you have qauestions.

## 2024-01-05 ENCOUNTER — TELEPHONE (OUTPATIENT)
Dept: INTERNAL MEDICINE | Facility: CLINIC | Age: 64
End: 2024-01-05
Payer: MEDICARE

## 2024-01-05 NOTE — TELEPHONE ENCOUNTER
Caller: EULOGIO DOE    Relationship to patient: Home Health    Best call back number: [phone]     [facility] IS CALLING TO INFORM DR SALMERON PATIENT IS NOT ANSWERING THE PHONE     EULOGIO STATES SHE HAS TRIED MULTIPLE DAYS AND NOT ABLE TO GET IN TOUCH WITH PATIENT     EULOGIO STATES SHE HAS SPOKEN WITH THE PATIENTS  AND HE INFORMED HER TO CONTACT THE PATIENT

## 2024-03-04 ENCOUNTER — TELEPHONE (OUTPATIENT)
Dept: INTERNAL MEDICINE | Facility: CLINIC | Age: 64
End: 2024-03-04
Payer: MEDICARE

## 2024-03-04 NOTE — TELEPHONE ENCOUNTER
Caller: Maritza Bejarano    Relationship to patient: Self    Best call back number: [phone]     Chief complaint: LOW HEMOGLOBIN    Type of visit: LAB    Requested date: WEEK OF 03/04/24    If rescheduling, when is the original appointment: N/A     Additional notes: PATIENT IS REQUESTING TO COME IN AND HAVE A HEMOGLOBIN TEST DONE TO CHECK HER LEVELS. HER LAST LAB SHOWED HER LEVELS WERE LOW.     PLEASE CALL TO DISCUSS AND ADVISE

## 2024-03-06 DIAGNOSIS — D50.9 IRON DEFICIENCY ANEMIA, UNSPECIFIED IRON DEFICIENCY ANEMIA TYPE: Primary | ICD-10-CM

## 2024-03-07 ENCOUNTER — HOSPITAL ENCOUNTER (OUTPATIENT)
Facility: HOSPITAL | Age: 64
Setting detail: OBSERVATION
Discharge: HOME | End: 2024-03-09
Attending: EMERGENCY MEDICINE | Admitting: INTERNAL MEDICINE
Payer: MEDICARE

## 2024-03-07 DIAGNOSIS — R60.0 LOWER EXTREMITY EDEMA: ICD-10-CM

## 2024-03-07 DIAGNOSIS — L03.119 CELLULITIS OF LOWER EXTREMITY, UNSPECIFIED LATERALITY: Primary | ICD-10-CM

## 2024-03-07 LAB
ALBUMIN SERPL-MCNC: 4 G/DL (ref 3.5–5.2)
ALBUMIN/GLOB SERPL: 1.3 G/DL
ALP SERPL-CCNC: 136 U/L (ref 39–117)
ALT SERPL W P-5'-P-CCNC: 13 U/L (ref 1–33)
ANION GAP SERPL CALCULATED.3IONS-SCNC: 11.7 MMOL/L (ref 5–15)
AST SERPL-CCNC: 23 U/L (ref 1–32)
BASOPHILS # BLD AUTO: 0.03 10*3/MM3 (ref 0–0.2)
BASOPHILS # BLD AUTO: 0.04 10*3/MM3 (ref 0–0.2)
BASOPHILS NFR BLD AUTO: 0.5 % (ref 0–1.5)
BASOPHILS NFR BLD AUTO: 0.6 % (ref 0–1.5)
BILIRUB SERPL-MCNC: 0.3 MG/DL (ref 0–1.2)
BUN SERPL-MCNC: 15 MG/DL (ref 8–23)
BUN/CREAT SERPL: 22.4 (ref 7–25)
CALCIUM SPEC-SCNC: 9.2 MG/DL (ref 8.6–10.5)
CHLORIDE SERPL-SCNC: 107 MMOL/L (ref 98–107)
CO2 SERPL-SCNC: 25.3 MMOL/L (ref 22–29)
CREAT SERPL-MCNC: 0.67 MG/DL (ref 0.57–1)
D-LACTATE SERPL-SCNC: 1.7 MMOL/L (ref 0.5–2)
DEPRECATED RDW RBC AUTO: 53 FL (ref 37–54)
EGFRCR SERPLBLD CKD-EPI 2021: 99.6 ML/MIN/1.73
EOSINOPHIL # BLD AUTO: 0.11 10*3/MM3 (ref 0–0.4)
EOSINOPHIL # BLD AUTO: 0.15 10*3/MM3 (ref 0–0.4)
EOSINOPHIL NFR BLD AUTO: 1.8 % (ref 0.3–6.2)
EOSINOPHIL NFR BLD AUTO: 2.3 % (ref 0.3–6.2)
ERYTHROCYTE [DISTWIDTH] IN BLOOD BY AUTOMATED COUNT: 17 % (ref 12.3–15.4)
ERYTHROCYTE [DISTWIDTH] IN BLOOD BY AUTOMATED COUNT: 17.4 % (ref 12.3–15.4)
FERRITIN SERPL-MCNC: 47.1 NG/ML (ref 13–150)
GLOBULIN UR ELPH-MCNC: 3 GM/DL
GLUCOSE SERPL-MCNC: 96 MG/DL (ref 65–99)
HCT VFR BLD AUTO: 38.4 % (ref 34–46.6)
HCT VFR BLD AUTO: 40.2 % (ref 34–46.6)
HGB BLD-MCNC: 12.4 G/DL (ref 12–15.9)
HGB BLD-MCNC: 12.5 G/DL (ref 12–15.9)
IMM GRANULOCYTES # BLD AUTO: 0.02 10*3/MM3 (ref 0–0.05)
IMM GRANULOCYTES # BLD AUTO: 0.06 10*3/MM3 (ref 0–0.05)
IMM GRANULOCYTES NFR BLD AUTO: 0.3 % (ref 0–0.5)
IMM GRANULOCYTES NFR BLD AUTO: 0.9 % (ref 0–0.5)
IRON SATN MFR SERPL: 15 % (ref 20–50)
IRON SERPL-MCNC: 68 MCG/DL (ref 37–145)
LYMPHOCYTES # BLD AUTO: 1 10*3/MM3 (ref 0.7–3.1)
LYMPHOCYTES # BLD AUTO: 1.35 10*3/MM3 (ref 0.7–3.1)
LYMPHOCYTES NFR BLD AUTO: 15.5 % (ref 19.6–45.3)
LYMPHOCYTES NFR BLD AUTO: 22 % (ref 19.6–45.3)
MCH RBC QN AUTO: 26.1 PG (ref 26.6–33)
MCH RBC QN AUTO: 27.2 PG (ref 26.6–33)
MCHC RBC AUTO-ENTMCNC: 30.8 G/DL (ref 31.5–35.7)
MCHC RBC AUTO-ENTMCNC: 32.6 G/DL (ref 31.5–35.7)
MCV RBC AUTO: 83.7 FL (ref 79–97)
MCV RBC AUTO: 84.5 FL (ref 79–97)
MONOCYTES # BLD AUTO: 0.42 10*3/MM3 (ref 0.1–0.9)
MONOCYTES # BLD AUTO: 0.43 10*3/MM3 (ref 0.1–0.9)
MONOCYTES NFR BLD AUTO: 6.7 % (ref 5–12)
MONOCYTES NFR BLD AUTO: 6.8 % (ref 5–12)
NEUTROPHILS # BLD AUTO: 4.78 10*3/MM3 (ref 1.7–7)
NEUTROPHILS NFR BLD AUTO: 4.22 10*3/MM3 (ref 1.7–7)
NEUTROPHILS NFR BLD AUTO: 68.6 % (ref 42.7–76)
NEUTROPHILS NFR BLD AUTO: 74 % (ref 42.7–76)
NRBC BLD AUTO-RTO: 0 /100 WBC (ref 0–0.2)
NRBC BLD AUTO-RTO: 0 /100 WBC (ref 0–0.2)
PLATELET # BLD AUTO: 383 10*3/MM3 (ref 140–450)
PLATELET # BLD AUTO: 389 10*3/MM3 (ref 140–450)
PMV BLD AUTO: 9.2 FL (ref 6–12)
POTASSIUM SERPL-SCNC: 4.1 MMOL/L (ref 3.5–5.2)
PROT SERPL-MCNC: 7 G/DL (ref 6–8.5)
RBC # BLD AUTO: 4.59 10*6/MM3 (ref 3.77–5.28)
RBC # BLD AUTO: 4.76 10*6/MM3 (ref 3.77–5.28)
SODIUM SERPL-SCNC: 144 MMOL/L (ref 136–145)
TIBC SERPL-MCNC: 448 MCG/DL
UIBC SERPL-MCNC: 380 MCG/DL (ref 112–346)
WBC # BLD AUTO: 6.46 10*3/MM3 (ref 3.4–10.8)
WBC NRBC COR # BLD AUTO: 6.15 10*3/MM3 (ref 3.4–10.8)
WHOLE BLOOD HOLD COAG: NORMAL

## 2024-03-07 PROCEDURE — 96365 THER/PROPH/DIAG IV INF INIT: CPT

## 2024-03-07 PROCEDURE — 36415 COLL VENOUS BLD VENIPUNCTURE: CPT

## 2024-03-07 PROCEDURE — 25010000002 CEFAZOLIN IN DEXTROSE 2-4 GM/100ML-% SOLUTION: Performed by: PHYSICIAN ASSISTANT

## 2024-03-07 PROCEDURE — 99285 EMERGENCY DEPT VISIT HI MDM: CPT

## 2024-03-07 PROCEDURE — 87040 BLOOD CULTURE FOR BACTERIA: CPT | Performed by: PHYSICIAN ASSISTANT

## 2024-03-07 PROCEDURE — G0378 HOSPITAL OBSERVATION PER HR: HCPCS

## 2024-03-07 PROCEDURE — 85025 COMPLETE CBC W/AUTO DIFF WBC: CPT | Performed by: PHYSICIAN ASSISTANT

## 2024-03-07 PROCEDURE — 80053 COMPREHEN METABOLIC PANEL: CPT | Performed by: PHYSICIAN ASSISTANT

## 2024-03-07 PROCEDURE — 83605 ASSAY OF LACTIC ACID: CPT | Performed by: PHYSICIAN ASSISTANT

## 2024-03-07 RX ORDER — AMOXICILLIN 250 MG
2 CAPSULE ORAL 2 TIMES DAILY PRN
Status: DISCONTINUED | OUTPATIENT
Start: 2024-03-07 | End: 2024-03-09 | Stop reason: HOSPADM

## 2024-03-07 RX ORDER — HYDROCHLOROTHIAZIDE 12.5 MG/1
12.5 TABLET ORAL
Status: DISCONTINUED | OUTPATIENT
Start: 2024-03-08 | End: 2024-03-09 | Stop reason: HOSPADM

## 2024-03-07 RX ORDER — BACLOFEN 10 MG/1
20 TABLET ORAL 3 TIMES DAILY
Status: DISCONTINUED | OUTPATIENT
Start: 2024-03-07 | End: 2024-03-09 | Stop reason: HOSPADM

## 2024-03-07 RX ORDER — HYDROCODONE BITARTRATE AND ACETAMINOPHEN 7.5; 325 MG/1; MG/1
1 TABLET ORAL EVERY 6 HOURS PRN
Status: DISCONTINUED | OUTPATIENT
Start: 2024-03-07 | End: 2024-03-09 | Stop reason: HOSPADM

## 2024-03-07 RX ORDER — CEFAZOLIN SODIUM 2 G/100ML
2000 INJECTION, SOLUTION INTRAVENOUS ONCE
Status: COMPLETED | OUTPATIENT
Start: 2024-03-07 | End: 2024-03-07

## 2024-03-07 RX ORDER — FERROUS SULFATE 325(65) MG
325 TABLET ORAL EVERY OTHER DAY
Status: DISCONTINUED | OUTPATIENT
Start: 2024-03-07 | End: 2024-03-09 | Stop reason: HOSPADM

## 2024-03-07 RX ORDER — ATORVASTATIN CALCIUM 20 MG/1
10 TABLET, FILM COATED ORAL DAILY
Status: DISCONTINUED | OUTPATIENT
Start: 2024-03-08 | End: 2024-03-09 | Stop reason: HOSPADM

## 2024-03-07 RX ORDER — MELATONIN
5000 DAILY
Status: DISCONTINUED | OUTPATIENT
Start: 2024-03-08 | End: 2024-03-09 | Stop reason: HOSPADM

## 2024-03-07 RX ORDER — BISACODYL 10 MG
10 SUPPOSITORY, RECTAL RECTAL DAILY PRN
Status: DISCONTINUED | OUTPATIENT
Start: 2024-03-07 | End: 2024-03-09 | Stop reason: HOSPADM

## 2024-03-07 RX ORDER — CLONAZEPAM 1 MG/1
2 TABLET ORAL AS NEEDED
Status: DISCONTINUED | OUTPATIENT
Start: 2024-03-07 | End: 2024-03-09 | Stop reason: HOSPADM

## 2024-03-07 RX ORDER — PRAMIPEXOLE DIHYDROCHLORIDE 1.5 MG/1
1.5 TABLET ORAL 3 TIMES DAILY
Status: DISCONTINUED | OUTPATIENT
Start: 2024-03-07 | End: 2024-03-09 | Stop reason: HOSPADM

## 2024-03-07 RX ORDER — ONDANSETRON 2 MG/ML
4 INJECTION INTRAMUSCULAR; INTRAVENOUS EVERY 6 HOURS PRN
Status: DISCONTINUED | OUTPATIENT
Start: 2024-03-07 | End: 2024-03-09 | Stop reason: HOSPADM

## 2024-03-07 RX ORDER — PANTOPRAZOLE SODIUM 40 MG/1
40 TABLET, DELAYED RELEASE ORAL 2 TIMES DAILY
Status: DISCONTINUED | OUTPATIENT
Start: 2024-03-07 | End: 2024-03-09 | Stop reason: HOSPADM

## 2024-03-07 RX ORDER — LOSARTAN POTASSIUM 50 MG/1
50 TABLET ORAL
Status: DISCONTINUED | OUTPATIENT
Start: 2024-03-08 | End: 2024-03-09 | Stop reason: HOSPADM

## 2024-03-07 RX ORDER — BISACODYL 5 MG/1
5 TABLET, DELAYED RELEASE ORAL DAILY PRN
Status: DISCONTINUED | OUTPATIENT
Start: 2024-03-07 | End: 2024-03-09 | Stop reason: HOSPADM

## 2024-03-07 RX ORDER — FLUOXETINE HYDROCHLORIDE 20 MG/1
60 CAPSULE ORAL DAILY
Status: DISCONTINUED | OUTPATIENT
Start: 2024-03-08 | End: 2024-03-09 | Stop reason: HOSPADM

## 2024-03-07 RX ORDER — ACETAMINOPHEN 650 MG/1
650 SUPPOSITORY RECTAL EVERY 4 HOURS PRN
Status: DISCONTINUED | OUTPATIENT
Start: 2024-03-07 | End: 2024-03-09 | Stop reason: HOSPADM

## 2024-03-07 RX ORDER — HYDROCODONE BITARTRATE AND ACETAMINOPHEN 5; 325 MG/1; MG/1
1 TABLET ORAL ONCE
Status: COMPLETED | OUTPATIENT
Start: 2024-03-07 | End: 2024-03-07

## 2024-03-07 RX ORDER — ACETAMINOPHEN 160 MG/5ML
650 SOLUTION ORAL EVERY 4 HOURS PRN
Status: DISCONTINUED | OUTPATIENT
Start: 2024-03-07 | End: 2024-03-09 | Stop reason: HOSPADM

## 2024-03-07 RX ORDER — ACETAMINOPHEN 325 MG/1
650 TABLET ORAL EVERY 4 HOURS PRN
Status: DISCONTINUED | OUTPATIENT
Start: 2024-03-07 | End: 2024-03-09 | Stop reason: HOSPADM

## 2024-03-07 RX ORDER — POLYETHYLENE GLYCOL 3350 17 G/17G
17 POWDER, FOR SOLUTION ORAL DAILY PRN
Status: DISCONTINUED | OUTPATIENT
Start: 2024-03-07 | End: 2024-03-09 | Stop reason: HOSPADM

## 2024-03-07 RX ADMIN — BACLOFEN 20 MG: 10 TABLET ORAL at 22:29

## 2024-03-07 RX ADMIN — PANTOPRAZOLE SODIUM 40 MG: 40 TABLET, DELAYED RELEASE ORAL at 22:29

## 2024-03-07 RX ADMIN — CLONAZEPAM 2 MG: 1 TABLET ORAL at 22:29

## 2024-03-07 RX ADMIN — PRAMIPEXOLE DIHYDROCHLORIDE 1.5 MG: 1.5 TABLET ORAL at 22:29

## 2024-03-07 RX ADMIN — HYDROCODONE BITARTRATE AND ACETAMINOPHEN 1 TABLET: 5; 325 TABLET ORAL at 13:16

## 2024-03-07 RX ADMIN — CEFAZOLIN SODIUM 2000 MG: 2 INJECTION, SOLUTION INTRAVENOUS at 13:52

## 2024-03-07 RX ADMIN — HYDROCODONE BITARTRATE AND ACETAMINOPHEN 1 TABLET: 7.5; 325 TABLET ORAL at 18:53

## 2024-03-07 RX ADMIN — FERROUS SULFATE TAB 325 MG (65 MG ELEMENTAL FE) 325 MG: 325 (65 FE) TAB at 22:29

## 2024-03-07 NOTE — ED NOTES
Nursing report ED to floor  Maritza Bergn  63 y.o.  female    HPI :  HPI (Adult)  Stated Reason for Visit: left leg rash/pain    Chief Complaint  Chief Complaint   Patient presents with    Leg Pain       Admitting doctor:   Gisella Britt MD    Admitting diagnosis:   The primary encounter diagnosis was Cellulitis of lower extremity, unspecified laterality. A diagnosis of Lower extremity edema was also pertinent to this visit.    Code status:   Current Code Status       Date Active Code Status Order ID Comments User Context       Prior            Allergies:   Contrast dye (echo or unknown ct/mr), Iodinated contrast media, and Sulfa antibiotics    Isolation:   No active isolations    Intake and Output  No intake or output data in the 24 hours ending 03/07/24 1435    Weight:       03/07/24  1156   Weight: 77.1 kg (170 lb)       Most recent vitals:   Vitals:    03/07/24 1228 03/07/24 1231 03/07/24 1331 03/07/24 1401   BP:  159/88 169/95 167/83   Pulse: 70 73 70 73   Resp:       Temp:       TempSrc:       SpO2: 97% 100% 98% 99%   Weight:       Height:           Active LDAs/IV Access:   Lines, Drains & Airways       Active LDAs       Name Placement date Placement time Site Days    Peripheral IV 03/07/24 1318 Right Antecubital 03/07/24  1318  Antecubital  less than 1    External Urinary Catheter 03/02/22  1924  --  735                    Labs (abnormal labs have a star):   Labs Reviewed   COMPREHENSIVE METABOLIC PANEL - Abnormal; Notable for the following components:       Result Value    Alkaline Phosphatase 136 (*)     All other components within normal limits    Narrative:     GFR Normal >60  Chronic Kidney Disease <60  Kidney Failure <15     CBC WITH AUTO DIFFERENTIAL - Abnormal; Notable for the following components:    MCH 26.1 (*)     MCHC 30.8 (*)     RDW 17.0 (*)     All other components within normal limits   LACTIC ACID, PLASMA - Normal   BLOOD CULTURE   BLOOD CULTURE   RAINBOW DRAW    Narrative:     The  following orders were created for panel order [place] Draw.  Procedure                               Abnormality         Status                     ---------                               -----------         ------                     Light Blue Top[[id number]]                                   Final result                 Please view results for these tests on the individual orders.   CBC AND DIFFERENTIAL    Narrative:     The following orders were created for panel order CBC & Differential.  Procedure                               Abnormality         Status                     ---------                               -----------         ------                     CBC Auto Differential[[id number]]        Abnormal            Final result                 Please view results for these tests on the individual orders.   LIGHT BLUE TOP       EKG:   No orders to display       Meds given in ED:   Medications   ceFAZolin in dextrose (ANCEF) IVPB solution 2,000 mg (2,000 mg Intravenous New Bag 3/7/24 1352)   HYDROcodone-acetaminophen (NORCO) 5-325 MG per tablet 1 tablet (1 tablet Oral Given 3/7/24 1316)       Imaging results:  No radiology results for the last day    Ambulatory status:   - Ax2    Social issues:   Social History     Socioeconomic History    Marital status:      Spouse name: Donald    Number of children: 0   Tobacco Use    Smoking status: Former     Current packs/day: 0.00     Average packs/day: 2.0 packs/day for 30.0 years (60.0 ttl pk-yrs)     Types: Cigarettes     Start date: 10/22/1973     Quit date: 10/22/2003     Years since quittin.3    Smokeless tobacco: Never   Vaping Use    Vaping status: Never Used   Substance and Sexual Activity    Alcohol use: Not Currently    Drug use: Yes     Types: Marijuana       Peripheral Neurovascular  Peripheral Neurovascular (Adult)  Peripheral Neurovascular WDL: .WDL except, neurovascular assessment lower, pulse assessment  Pulse Assessment: dorsalis pedis  LLE  Neurovascular Assessment  Temperature LLE: warm  Color LLE: blotchy  RLE Neurovascular Assessment  Temperature RLE: warm  Color RLE: blotchy    Neuro Cognitive  Neuro Cognitive (Adult)  Cognitive/Neuro/Behavioral WDL: WDL, orientation  Orientation: oriented x 4    Learning  Learning Assessment (Adult)  Learning Readiness and Ability: no barriers identified    Respiratory  Respiratory WDL  Respiratory WDL: WDL    Abdominal Pain       Pain Assessments  Pain (Adult)  (0-10) Pain Rating: Rest: 2  (0-10) Pain Rating: Activity: 3  Response to Pain Interventions: interventions effective per patient    NIH Stroke Scale       Luz Rodriguez, RN  03/07/24 14:35 EST

## 2024-03-07 NOTE — PLAN OF CARE
Goal Outcome Evaluation:              Outcome Evaluation: Admission from ER with Cellulitis. Bed alarm on. SL. Purewick in place. Norco ordered for pain.

## 2024-03-07 NOTE — ED PROVIDER NOTES
MD ATTESTATION NOTE    The MEGHA and I have discussed this patient's history, physical exam, and treatment plan.  I have reviewed the documentation and personally had a face to face interaction with the patient. I affirm the documentation and agree with the treatment and plan.  The attached note describes my personal findings.      I provided a substantive portion of the care of the patient.  I personally performed the physical exam in its entirety, and below are my findings.        Brief HPI: Patient presents for evaluation of leg pain.  Patient has paraplegia from MS.  Patient has had increasing pain to both legs.  Increasing redness left greater than right.    PHYSICAL EXAM  ED Triage Vitals   Temp Heart Rate Resp BP SpO2   03/07/24 1153 03/07/24 1153 03/07/24 1153 03/07/24 1156 03/07/24 1153   97.8 °F (36.6 °C) 76 16 (!) 176/102 100 %      Temp src Heart Rate Source Patient Position BP Location FiO2 (%)   03/07/24 1153 -- -- -- --   Oral             GENERAL: no acute distress  HENT: nares patent  EYES: no scleral icterus  CV: regular rhythm, normal rate  RESPIRATORY: normal effort  ABDOMEN: soft  MUSCULOSKELETAL: no deformity  NEURO: alert, moves all extremities, follows commands  PSYCH:  calm, cooperative  SKIN: warm, dry.  Redness bilateral lower extremities    Vital signs and nursing notes reviewed.    ED Course as of 03/07/24 1829   Thu Mar 07, 2024   1355 WBC: 6.15 [KA]   1355 Hemoglobin: 12.4 [KA]   1355 Glucose: 96 [KA]   1355 Creatinine: 0.67 [KA]   1355 Lactate: 1.7 [KA]   1420 Patient presents with bilateral lower extremity cellulitis, worse on the left.  Chronic lower extremity edema.  Labs look okay, no sepsis.  Based on the bilateral nature and significant progression over the last couple days offered admission for further evaluation and treatment and she is agreeable [KA]   1427 I discussed patient with Dr. Britt, hospitalist including history presentation workup and she agrees to admit for further  evaluation and treatment. [KA]      ED Course User Index  [KA] Natalia Aburto PA-C        Plan: admit    SHARED VISIT: This visit was performed by BOTH a physician and an APC. The substantive portion of the medical decision making was performed by this attesting physician who made or approved the management plan and takes responsibility for patient management. All studies in the APC note (if performed) were independently interpreted by me.         Kimo Costello MD  03/07/24 4573

## 2024-03-08 ENCOUNTER — APPOINTMENT (OUTPATIENT)
Dept: CARDIOLOGY | Facility: HOSPITAL | Age: 64
End: 2024-03-08
Payer: MEDICARE

## 2024-03-08 LAB
ANION GAP SERPL CALCULATED.3IONS-SCNC: 9.3 MMOL/L (ref 5–15)
BASOPHILS # BLD AUTO: 0.03 10*3/MM3 (ref 0–0.2)
BASOPHILS NFR BLD AUTO: 0.6 % (ref 0–1.5)
BH CV LOWER VASCULAR LEFT COMMON FEMORAL AUGMENT: NORMAL
BH CV LOWER VASCULAR LEFT COMMON FEMORAL COMPETENT: NORMAL
BH CV LOWER VASCULAR LEFT COMMON FEMORAL COMPRESS: NORMAL
BH CV LOWER VASCULAR LEFT COMMON FEMORAL PHASIC: NORMAL
BH CV LOWER VASCULAR LEFT COMMON FEMORAL SPONT: NORMAL
BH CV LOWER VASCULAR LEFT DISTAL FEMORAL COMPRESS: NORMAL
BH CV LOWER VASCULAR LEFT GASTRONEMIUS COMPRESS: NORMAL
BH CV LOWER VASCULAR LEFT GREATER SAPH AK COMPRESS: NORMAL
BH CV LOWER VASCULAR LEFT GREATER SAPH BK COMPRESS: NORMAL
BH CV LOWER VASCULAR LEFT LESSER SAPH COMPRESS: NORMAL
BH CV LOWER VASCULAR LEFT MID FEMORAL AUGMENT: NORMAL
BH CV LOWER VASCULAR LEFT MID FEMORAL COMPETENT: NORMAL
BH CV LOWER VASCULAR LEFT MID FEMORAL COMPRESS: NORMAL
BH CV LOWER VASCULAR LEFT MID FEMORAL PHASIC: NORMAL
BH CV LOWER VASCULAR LEFT MID FEMORAL SPONT: NORMAL
BH CV LOWER VASCULAR LEFT PERONEAL COMPRESS: NORMAL
BH CV LOWER VASCULAR LEFT POPLITEAL AUGMENT: NORMAL
BH CV LOWER VASCULAR LEFT POPLITEAL COMPETENT: NORMAL
BH CV LOWER VASCULAR LEFT POPLITEAL COMPRESS: NORMAL
BH CV LOWER VASCULAR LEFT POPLITEAL PHASIC: NORMAL
BH CV LOWER VASCULAR LEFT POPLITEAL SPONT: NORMAL
BH CV LOWER VASCULAR LEFT POSTERIOR TIBIAL COMPRESS: NORMAL
BH CV LOWER VASCULAR LEFT PROFUNDA FEMORAL COMPRESS: NORMAL
BH CV LOWER VASCULAR LEFT PROXIMAL FEMORAL COMPRESS: NORMAL
BH CV LOWER VASCULAR LEFT SAPHENOFEMORAL JUNCTION COMPRESS: NORMAL
BH CV LOWER VASCULAR RIGHT COMMON FEMORAL AUGMENT: NORMAL
BH CV LOWER VASCULAR RIGHT COMMON FEMORAL COMPETENT: NORMAL
BH CV LOWER VASCULAR RIGHT COMMON FEMORAL COMPRESS: NORMAL
BH CV LOWER VASCULAR RIGHT COMMON FEMORAL PHASIC: NORMAL
BH CV LOWER VASCULAR RIGHT COMMON FEMORAL SPONT: NORMAL
BH CV LOWER VASCULAR RIGHT DISTAL FEMORAL COMPRESS: NORMAL
BH CV LOWER VASCULAR RIGHT GASTRONEMIUS COMPRESS: NORMAL
BH CV LOWER VASCULAR RIGHT GREATER SAPH AK COMPRESS: NORMAL
BH CV LOWER VASCULAR RIGHT GREATER SAPH BK COMPRESS: NORMAL
BH CV LOWER VASCULAR RIGHT LESSER SAPH COMPRESS: NORMAL
BH CV LOWER VASCULAR RIGHT MID FEMORAL AUGMENT: NORMAL
BH CV LOWER VASCULAR RIGHT MID FEMORAL COMPETENT: NORMAL
BH CV LOWER VASCULAR RIGHT MID FEMORAL COMPRESS: NORMAL
BH CV LOWER VASCULAR RIGHT MID FEMORAL PHASIC: NORMAL
BH CV LOWER VASCULAR RIGHT MID FEMORAL SPONT: NORMAL
BH CV LOWER VASCULAR RIGHT PERONEAL COMPRESS: NORMAL
BH CV LOWER VASCULAR RIGHT POPLITEAL AUGMENT: NORMAL
BH CV LOWER VASCULAR RIGHT POPLITEAL COMPETENT: NORMAL
BH CV LOWER VASCULAR RIGHT POPLITEAL COMPRESS: NORMAL
BH CV LOWER VASCULAR RIGHT POPLITEAL PHASIC: NORMAL
BH CV LOWER VASCULAR RIGHT POPLITEAL SPONT: NORMAL
BH CV LOWER VASCULAR RIGHT POSTERIOR TIBIAL COMPRESS: NORMAL
BH CV LOWER VASCULAR RIGHT PROFUNDA FEMORAL COMPRESS: NORMAL
BH CV LOWER VASCULAR RIGHT PROXIMAL FEMORAL COMPRESS: NORMAL
BH CV LOWER VASCULAR RIGHT SAPHENOFEMORAL JUNCTION COMPRESS: NORMAL
BUN SERPL-MCNC: 15 MG/DL (ref 8–23)
BUN/CREAT SERPL: 18.5 (ref 7–25)
CALCIUM SPEC-SCNC: 8.3 MG/DL (ref 8.6–10.5)
CHLORIDE SERPL-SCNC: 109 MMOL/L (ref 98–107)
CO2 SERPL-SCNC: 23.7 MMOL/L (ref 22–29)
CREAT SERPL-MCNC: 0.81 MG/DL (ref 0.57–1)
DEPRECATED RDW RBC AUTO: 51.6 FL (ref 37–54)
EGFRCR SERPLBLD CKD-EPI 2021: 82.7 ML/MIN/1.73
EOSINOPHIL # BLD AUTO: 0.13 10*3/MM3 (ref 0–0.4)
EOSINOPHIL NFR BLD AUTO: 2.6 % (ref 0.3–6.2)
ERYTHROCYTE [DISTWIDTH] IN BLOOD BY AUTOMATED COUNT: 16.8 % (ref 12.3–15.4)
GLUCOSE SERPL-MCNC: 117 MG/DL (ref 65–99)
HCT VFR BLD AUTO: 34.8 % (ref 34–46.6)
HGB BLD-MCNC: 10.9 G/DL (ref 12–15.9)
IMM GRANULOCYTES # BLD AUTO: 0.02 10*3/MM3 (ref 0–0.05)
IMM GRANULOCYTES NFR BLD AUTO: 0.4 % (ref 0–0.5)
LYMPHOCYTES # BLD AUTO: 1.34 10*3/MM3 (ref 0.7–3.1)
LYMPHOCYTES NFR BLD AUTO: 26.6 % (ref 19.6–45.3)
MCH RBC QN AUTO: 26.3 PG (ref 26.6–33)
MCHC RBC AUTO-ENTMCNC: 31.3 G/DL (ref 31.5–35.7)
MCV RBC AUTO: 83.9 FL (ref 79–97)
MONOCYTES # BLD AUTO: 0.46 10*3/MM3 (ref 0.1–0.9)
MONOCYTES NFR BLD AUTO: 9.1 % (ref 5–12)
NEUTROPHILS NFR BLD AUTO: 3.06 10*3/MM3 (ref 1.7–7)
NEUTROPHILS NFR BLD AUTO: 60.7 % (ref 42.7–76)
NRBC BLD AUTO-RTO: 0 /100 WBC (ref 0–0.2)
PLATELET # BLD AUTO: 326 10*3/MM3 (ref 140–450)
PMV BLD AUTO: 9.3 FL (ref 6–12)
POTASSIUM SERPL-SCNC: 4 MMOL/L (ref 3.5–5.2)
RBC # BLD AUTO: 4.15 10*6/MM3 (ref 3.77–5.28)
SODIUM SERPL-SCNC: 142 MMOL/L (ref 136–145)
WBC NRBC COR # BLD AUTO: 5.04 10*3/MM3 (ref 3.4–10.8)

## 2024-03-08 PROCEDURE — 97530 THERAPEUTIC ACTIVITIES: CPT

## 2024-03-08 PROCEDURE — 85025 COMPLETE CBC W/AUTO DIFF WBC: CPT | Performed by: INTERNAL MEDICINE

## 2024-03-08 PROCEDURE — 80048 BASIC METABOLIC PNL TOTAL CA: CPT | Performed by: INTERNAL MEDICINE

## 2024-03-08 PROCEDURE — 25010000002 CEFAZOLIN IN DEXTROSE 2-4 GM/100ML-% SOLUTION: Performed by: HOSPITALIST

## 2024-03-08 PROCEDURE — 97110 THERAPEUTIC EXERCISES: CPT

## 2024-03-08 PROCEDURE — 97162 PT EVAL MOD COMPLEX 30 MIN: CPT

## 2024-03-08 PROCEDURE — G0378 HOSPITAL OBSERVATION PER HR: HCPCS

## 2024-03-08 PROCEDURE — 93970 EXTREMITY STUDY: CPT

## 2024-03-08 PROCEDURE — 36415 COLL VENOUS BLD VENIPUNCTURE: CPT | Performed by: INTERNAL MEDICINE

## 2024-03-08 RX ORDER — CEFAZOLIN SODIUM 2 G/100ML
2000 INJECTION, SOLUTION INTRAVENOUS EVERY 8 HOURS
Status: DISCONTINUED | OUTPATIENT
Start: 2024-03-08 | End: 2024-03-09 | Stop reason: HOSPADM

## 2024-03-08 RX ORDER — GINSENG 100 MG
1 CAPSULE ORAL EVERY 12 HOURS SCHEDULED
Status: DISCONTINUED | OUTPATIENT
Start: 2024-03-08 | End: 2024-03-09 | Stop reason: HOSPADM

## 2024-03-08 RX ADMIN — Medication 5000 UNITS: at 08:49

## 2024-03-08 RX ADMIN — ATORVASTATIN CALCIUM 10 MG: 20 TABLET, FILM COATED ORAL at 08:49

## 2024-03-08 RX ADMIN — CEFAZOLIN SODIUM 2000 MG: 2 INJECTION, SOLUTION INTRAVENOUS at 18:23

## 2024-03-08 RX ADMIN — PRAMIPEXOLE DIHYDROCHLORIDE 1.5 MG: 1.5 TABLET ORAL at 18:23

## 2024-03-08 RX ADMIN — HYDROCODONE BITARTRATE AND ACETAMINOPHEN 1 TABLET: 7.5; 325 TABLET ORAL at 20:56

## 2024-03-08 RX ADMIN — PANTOPRAZOLE SODIUM 40 MG: 40 TABLET, DELAYED RELEASE ORAL at 08:49

## 2024-03-08 RX ADMIN — HYDROCHLOROTHIAZIDE 12.5 MG: 12.5 TABLET ORAL at 08:49

## 2024-03-08 RX ADMIN — FLUOXETINE HYDROCHLORIDE 60 MG: 20 CAPSULE ORAL at 08:49

## 2024-03-08 RX ADMIN — LOSARTAN POTASSIUM 50 MG: 50 TABLET, FILM COATED ORAL at 08:50

## 2024-03-08 RX ADMIN — PRAMIPEXOLE DIHYDROCHLORIDE 1.5 MG: 1.5 TABLET ORAL at 08:50

## 2024-03-08 RX ADMIN — BACLOFEN 20 MG: 10 TABLET ORAL at 18:23

## 2024-03-08 RX ADMIN — BACITRACIN 0.9 G: 500 OINTMENT TOPICAL at 20:52

## 2024-03-08 RX ADMIN — CEFAZOLIN SODIUM 2000 MG: 2 INJECTION, SOLUTION INTRAVENOUS at 10:55

## 2024-03-08 RX ADMIN — BACLOFEN 20 MG: 10 TABLET ORAL at 20:50

## 2024-03-08 RX ADMIN — BACLOFEN 20 MG: 10 TABLET ORAL at 08:50

## 2024-03-08 RX ADMIN — PANTOPRAZOLE SODIUM 40 MG: 40 TABLET, DELAYED RELEASE ORAL at 20:50

## 2024-03-08 RX ADMIN — BACITRACIN 0.9 G: 500 OINTMENT TOPICAL at 10:55

## 2024-03-08 RX ADMIN — PRAMIPEXOLE DIHYDROCHLORIDE 1.5 MG: 1.5 TABLET ORAL at 20:50

## 2024-03-08 NOTE — THERAPY EVALUATION
Patient Name: Maritza Nance Darci  : 1960    MRN: [id number]                              Today's Date: 3/8/2024       Admit Date: 3/7/2024    Visit Dx:     ICD-10-CM ICD-9-CM   1. Cellulitis of lower extremity, unspecified laterality  L03.119 682.6   2. Lower extremity edema  R60.0 782.3     Patient Active Problem List   Diagnosis    H/O total shoulder replacement, right    Cough    Acute UTI (urinary tract infection)    Multiple sclerosis    Essential hypertension    Hyperlipidemia    Shortness of breath    Oropharyngeal dysphagia    Abnormal finding on mammography    Acid reflux    Anxiety and depression    Brash    Carpal tunnel syndrome    Chronic low back pain    Diplopia    Disease with a predominantly sexual mode of transmission    FOM (frequency of micturition)    Headache    History of diplopia    History of optic neuritis    History of vitamin D deficiency    Migraine syndrome    Mixed incontinence    Nausea    Neurogenic bladder    Pain in joint of right shoulder    Restless legs syndrome    Rupture of rotator cuff of shoulder    Secondary progressive multiple sclerosis    S/P cubital tunnel release    Vitamin D deficiency    Multiple sclerosis exacerbation    Sepsis due to Gram negative bacteria    Lower extremity cellulitis     Past Medical History:   Diagnosis Date    Dawn esophagus     per patient    Blurred vision     R/T MS    Carpal tunnel syndrome     Depression     Diplopia     GERD (gastroesophageal reflux disease)     H/O Skin cancer, basal cell     Headache     History of blood transfusion     History of GI bleed     R/T NSAIDS AND STEROIDS, multiple times    History of urinary tract infection     Hypercalcemia     s/p parathyroidectomy    Hyperlipidemia     Hypertension     Movement disorder     Multiple sclerosis     Optic neuritis     PONV (postoperative nausea and vomiting)      Past Surgical History:   Procedure Laterality Date    APPENDECTOMY      BLADDER SURGERY      bladder  stimulator    BREAST SURGERY      augmentation wtih subsequent removal    CARPAL TUNNEL RELEASE Bilateral     Left 2018, right 2020    CUBITAL TUNNEL RELEASE Left     CYSTOSCOPY BOTOX INJECTION OF BLADDER  2018    Cystoscopy with Botox    PARATHYROIDECTOMY      one gland removed    ROTATOR CUFF REPAIR Right 2017    TOE SURGERY      bilateral great toes    TOTAL SHOULDER ARTHROPLASTY W/ DISTAL CLAVICLE EXCISION Right 10/22/2018    Procedure: RT TOTAL SHOULDER REVERSE ARTHROPLASTY;  Surgeon: Bipin Dangelo MD;  Location: [facility] OR;  Service: Orthopedics      General Information       Row Name 03/08/24 1325          Physical Therapy Time and Intention    Document Type evaluation  -MG     Mode of Treatment individual therapy;physical therapy  -MG       Row Name 03/08/24 1325          General Information    Patient Profile Reviewed yes  -MG     Prior Level of Function max assist:;dependent:  Pt uses power WC or motorized scooter as each one only fits in certain parts of the house. States spouse lifts her into the WC/scooter and onto the commode.  -MG     Existing Precautions/Restrictions fall  -MG     Barriers to Rehab previous functional deficit;impaired sensation;contractures  -MG       Row Name 03/08/24 1325          Living Environment    People in Home spouse  -MG       Row Name 03/08/24 1325          Home Main Entrance    Number of Stairs, Main Entrance none   bring truck flush w/ porch and pt is able to use scooter or power WC straight into the house.  -MG       Row Name 03/08/24 1325          Stairs Within Home, Primary    Number of Stairs, Within Home, Primary none  -MG       Row Name 03/08/24 1325          Cognition    Orientation Status (Cognition) oriented x 4  -MG       Row Name 03/08/24 1325          Safety Issues, Functional Mobility    Impairments Affecting Function (Mobility) balance;postural/trunk control;range of motion (ROM);endurance/activity tolerance;sensation/sensory  awareness;grasp;motor control;strength;muscle tone abnormal  -MG               User Key  (r) = Recorded By, (t) = Taken By, (c) = Cosigned By      Initials Name Provider Type    MG Salina Victor PT Physical Therapist                   Mobility       Row Name 03/08/24 1328          Bed Mobility    Bed Mobility other (see comments)  -MG     Assistive Device (Bed Mobility) bed rails  -MG     Comment, (Bed Mobility) Tsf'd into long sitting with MaxA and pt pulling up with both UEs on bedrail. Increased effort required. Pt unable to maintain.  -MG               User Key  (r) = Recorded By, (t) = Taken By, (c) = Cosigned By      Initials Name Provider Type    MG Salina Victor PT Physical Therapist                   Obj/Interventions       Row Name 03/08/24 1328          Range of Motion Comprehensive    General Range of Motion lower extremity range of motion deficits identified  -MG     Comment, General Range of Motion Bilateral ankle PF and hip ER WFL. Limited AROM DF. Bilateral hamstring contractures w/ pt knees flexed around 10deg. Limited bilateral hip internal rotation d/t tightness, but not to contracture extent. Pt able to achieve active bilateral knee flexion total of 10deg (gets to 20deg of flexion).  -MG       Row Name 03/08/24 1328          Strength Comprehensive (MMT)    General Manual Muscle Testing (MMT) Assessment lower extremity strength deficits identified;neck/trunk strength deficits identified  -MG     Comment, General Manual Muscle Testing (MMT) Assessment Bilateral ankle PF 3+/5. Bilateral ankle DF, knee flexion/ext and hip flexion 2+/5.  -MG       Row Name 03/08/24 1328          Motor Skills    Therapeutic Exercise other (see comments)  Bilateral AP, HS x5  -MG       Row Name 03/08/24 1328          Sensory Assessment (Somatosensory)    Sensory Assessment (Somatosensory) other (see comments)  Decreased sensation to LEs. Able to feel pressure.  -MG               User Key  (r) = Recorded By, (t) =  Taken By, (c) = Cosigned By      Initials Name Provider Type    MG Salina Victor, PT Physical Therapist                   Goals/Plan       Row Name 03/08/24 1410          ROM Goal 1 (PT)    ROM Goal 1 (PT) B knee ROM to 30deg of flexion.  -MG     Time Frame (ROM Goal 1, PT) 1 week  -MG       Row Name 03/08/24 1410          Problem Specific Goal 1 (PT)    Problem Specific Goal 1 (PT) Improve supported via bed rail long sitting tolerance to 1 min.  -MG     Time Frame (Problem Specific Goal 1, PT) 1 week  -MG       Row Name 03/08/24 1410          Therapy Assessment/Plan (PT)    Planned Therapy Interventions (PT) balance training;bed mobility training;home exercise program;motor coordination training;neuromuscular re-education;ROM (range of motion);strengthening;stretching;patient/family education;postural re-education;transfer training  -MG               User Key  (r) = Recorded By, (t) = Taken By, (c) = Cosigned By      Initials Name Provider Type    MG Salina Victor PT Physical Therapist                   Clinical Impression       Row Name 03/08/24 1341          Pain    Pretreatment Pain Rating 0/10 - no pain  -MG     Posttreatment Pain Rating 0/10 - no pain  -MG     Pre/Posttreatment Pain Comment States cellulitis spots on LEs are painful to touch, otherwise in no pain.  -MG     Pain Intervention(s) Medication (See MAR);Ambulation/increased activity;Repositioned;Rest  -MG       Row Name 03/08/24 1341          Plan of Care Review    Plan of Care Reviewed With patient  -MG     Outcome Evaluation Pt is a 64 y/o F with h/o Primary progressive MS leading to paraplegia that presented to ED with c/o redness and pain to BLEs; admitted with cellulitis. Pt reports at baseline her  lifts her into her power WC, motorized scooter or onto the commode, and he assists with all ADLs. Pt sleeps in a hospital bed with a trapezee and with increased time/effort she is able to reposition herself. Pt states spouse brings truck  straight up to their porch and it is flush to where she can drive her WC or scooter straight into the house. Pt also states they typically leave a gait belt strapped around her thighs as her LEs tend to flex and ER without it. Pt presents with decreased strength, decreased sensation, bilateral hamstring contractures with pt at 10deg of knee flexion, impaired trunk strength/control and decreased endurance. Pt performed LE ther-ex (ankle pumps, heel slides AROM x5 reps) and one modified sit-up with HOB elevated and pulling up with bedrails and MaxA from this PT, but was unable to maintain. Educated and discussed with pt on alternative transfers to  via Rachell lift or sliding board to protect spouse as she states it is beginning to become taxing on his back. Pt stating spouse is too impatient for these transfers and is unsure if a Rachell would fit in her house. Per pt and chart pt gets multiple abrasions to her knees from positioning in her WC, discussed need to call [employer] for potential accessories of foot straps and lateral side padding to keep her LEs from externally rotating. Discussed positioning of her LEs in the bed to assist w/ long duration stretching and further prevent contractures and hip ER with pillow placement and/or splinting (RN made aware of needing Bassem splint/Multi-podus boots). Pt questioning need for steroids during this admission, as she has improved LE movement w/ steroids during last admission, encouraged pt to discuss with hospitalist or her neurologist. DANYELL w/ RN following session on need for splints and pt wanting to tsf to recliner or power WC w/ use of Rachell later this date. PT will follow pt during this admission to address ROM, strength and trunk deficits. Rec home w/ assist and HH PT to further assist w/ tsf training with spouse. Pt interested in new hospital bed and possibly Rachell lift if able to fit in her house.  -MG       Row Name 03/08/24 8587          Therapy Assessment/Plan  (PT)    Criteria for Skilled Interventions Met (PT) yes;meets criteria  -MG     Therapy Frequency (PT) 3 times/wk  -MG       Row Name 03/08/24 1341          Vital Signs    O2 Delivery Pre Treatment room air  -MG     O2 Delivery Intra Treatment room air  -MG     O2 Delivery Post Treatment room air  -MG     Pre Patient Position Supine  -MG     Intra Patient Position Sitting  Long sitting  -MG     Post Patient Position Supine  -MG       Row Name 03/08/24 1341          Positioning and Restraints    Pre-Treatment Position in bed  -MG     Post Treatment Position bed  -MG     In Bed notified nsg;supine;fowlers;call light within reach;encouraged to call for assist;exit alarm on  Side rails up x4 to assist w/ pillow placement for long duration stretching of her hamstrings and prevent further ER.  -MG               User Key  (r) = Recorded By, (t) = Taken By, (c) = Cosigned By      Initials Name Provider Type    Salina Bocanegra, PT Physical Therapist                   Outcome Measures       Row Name 03/08/24 1411 03/08/24 1045       How much help from another person do you currently need...    Turning from your back to your side while in flat bed without using bedrails? 2  -MG 3  -CD    Moving from lying on back to sitting on the side of a flat bed without bedrails? 2  -MG 3  -CD    Moving to and from a bed to a chair (including a wheelchair)? 1  -MG 2  -CD    Standing up from a chair using your arms (e.g., wheelchair, bedside chair)? 1  -MG 1  -CD    Climbing 3-5 steps with a railing? 1  -MG 1  -CD    To walk in hospital room? 1  -MG 1  -CD    AM-PAC 6 Clicks Score (PT) 8  -MG 11  -CD    Highest Level of Mobility Goal 3 --> Sit at edge of bed  -MG 4 --> Transfer to chair/commode  -CD              User Key  (r) = Recorded By, (t) = Taken By, (c) = Cosigned By      Initials Name Provider Type    Karlie Guzman, RN Registered Nurse    Salina Bocanegra, PT Physical Therapist                                 Physical  Therapy Education       Title: PT OT SLP Therapies (In Progress)       Topic: Physical Therapy (In Progress)       Point: Mobility training (Not Started)       Learner Progress:  Not documented in this visit.              Point: Home exercise program (Done)       Learning Progress Summary             Patient Acceptance, E,D, VU by  at 3/8/2024 1412    Comment: Ed to continue performing ankle pumps and heel slides for 5 reps throughout the day.                         Point: Body mechanics (Done)       Learning Progress Summary             Patient Acceptance, E,D, VU by  at 3/8/2024 1412    Comment: Ed to continue performing ankle pumps and heel slides for 5 reps throughout the day.                         Point: Precautions (Done)       Learning Progress Summary             Patient Acceptance, E,D, VU by  at 3/8/2024 1412    Comment: Ed to continue performing ankle pumps and heel slides for 5 reps throughout the day.                                         User Key       Initials Effective Dates Name Provider Type Discipline     05/24/22 -  Salina Victor PT Physical Therapist PT                  PT Recommendation and Plan  Planned Therapy Interventions (PT): balance training, bed mobility training, home exercise program, motor coordination training, neuromuscular re-education, ROM (range of motion), strengthening, stretching, patient/family education, postural re-education, transfer training  Plan of Care Reviewed With: patient  Outcome Evaluation: Pt is a 64 y/o F with h/o Primary progressive MS leading to paraplegia that presented to ED with c/o redness and pain to BLEs; admitted with cellulitis. Pt reports at baseline her  lifts her into her power WC, motorized scooter or onto the commode, and he assists with all ADLs. Pt sleeps in a hospital bed with a trapezee and with increased time/effort she is able to reposition herself. Pt states spouse brings truck straight up to their porch and it is  flush to where she can drive her WC or scooter straight into the house. Pt also states they typically leave a gait belt strapped around her thighs as her LEs tend to flex and ER without it. Pt presents with decreased strength, decreased sensation, bilateral hamstring contractures with pt at 10deg of knee flexion, impaired trunk strength/control and decreased endurance. Pt performed LE ther-ex (ankle pumps, heel slides AROM x5 reps) and one modified sit-up with HOB elevated and pulling up with bedrails and MaxA from this PT, but was unable to maintain. Educated and discussed with pt on alternative transfers to  via Rachell lift or sliding board to protect spouse as she states it is beginning to become taxing on his back. Pt stating spouse is too impatient for these transfers and is unsure if a Rachell would fit in her house. Per pt and chart pt gets multiple abrasions to her knees from positioning in her WC, discussed need to call [employer] for potential accessories of foot straps and lateral side padding to keep her LEs from externally rotating. Discussed positioning of her LEs in the bed to assist w/ long duration stretching and further prevent contractures and hip ER with pillow placement and/or splinting (RN made aware of needing Bassem splint/Multi-podus boots). Pt questioning need for steroids during this admission, as she has improved LE movement w/ steroids during last admission, encouraged pt to discuss with hospitalist or her neurologist. DANYELL w/ RN following session on need for splints and pt wanting to tsf to recliner or power WC w/ use of Rachell later this date. PT will follow pt during this admission to address ROM, strength and trunk deficits. Rec home w/ assist and HH PT to further assist w/ tsf training with spouse. Pt interested in new hospital bed and possibly Racehll lift if able to fit in her house.     Time Calculation:         PT Charges       Row Name 03/08/24 6472             Time Calculation     Start Time 0852  -MG      Stop Time 0946  -MG      Time Calculation (min) 54 min  -MG      PT Received On 03/08/24  -MG      PT - Next Appointment 03/11/24  -MG      PT Goal Re-Cert Due Date 03/22/24  -MG         Time Calculation- PT    Total Timed Code Minutes- PT 42 minute(s)  -MG                User Key  (r) = Recorded By, (t) = Taken By, (c) = Cosigned By      Initials Name Provider Type    MG Salina Victor, PT Physical Therapist                  Therapy Charges for Today       Code Description Service Date Service Provider Modifiers Qty    35573182797 HC PT EVAL MOD COMPLEXITY 2 3/8/2024 Salina Victor, PT GP 1    89724761238 HC PT THERAPEUTIC ACT EA 15 MIN 3/8/2024 Salina Victor, PT GP 2    63162822555 HC PT THER PROC EA 15 MIN 3/8/2024 Salina Victor, PT GP 1            PT G-Codes  AM-PAC 6 Clicks Score (PT): 8  PT Discharge Summary  Anticipated Discharge Disposition (PT): home with home health (For transfer training with spouse. Pt interested in new hospital bed as it has been 5+ years since getting the current bed and Rachell if able to fit inside her home.)    Salina Victor, PT  3/8/2024

## 2024-03-08 NOTE — H&P
HISTORY AND PHYSICAL   [facility]        Date of Admission: 3/7/2024  Patient Identification:  Name: Maritza Bejarano  Age: 63 y.o.  Sex: female  :  1960  MRN: [id number]                     Primary Care Physician: Enoc Carbone DO    Chief Complaint:  63 year old female presented to the emergency with redness and pain of  both  legs which started a few days ago; left is worse than the right; it started a few days ago; she is not aware of any injury but says she runs into things while on her electric scooter; she has a history of paraplegia and multiple sclerosis    History of Present Illness:   As above    Past Medical History:  Past Medical History:   Diagnosis Date    Dawn esophagus     per patient    Blurred vision     R/T MS    Carpal tunnel syndrome     Depression     Diplopia     GERD (gastroesophageal reflux disease)     H/O Skin cancer, basal cell     Headache     History of blood transfusion     History of GI bleed     R/T NSAIDS AND STEROIDS, multiple times    History of urinary tract infection     Hypercalcemia     s/p parathyroidectomy    Hyperlipidemia     Hypertension     Movement disorder     Multiple sclerosis     Optic neuritis     PONV (postoperative nausea and vomiting)      Past Surgical History:  Past Surgical History:   Procedure Laterality Date    APPENDECTOMY      BLADDER SURGERY      bladder stimulator    BREAST SURGERY      augmentation wtih subsequent removal    CARPAL TUNNEL RELEASE Bilateral     Left , right     CUBITAL TUNNEL RELEASE Left     CYSTOSCOPY BOTOX INJECTION OF BLADDER  2018    Cystoscopy with Botox    PARATHYROIDECTOMY      one gland removed    ROTATOR CUFF REPAIR Right 2017    TOE SURGERY      bilateral great toes    TOTAL SHOULDER ARTHROPLASTY W/ DISTAL CLAVICLE EXCISION Right 10/22/2018    Procedure: RT TOTAL SHOULDER REVERSE ARTHROPLASTY;  Surgeon: Bipin Dangelo MD;  Location: [facility];  Service: Orthopedics      Home  Meds:  Medications Prior to Admission   Medication Sig Dispense Refill Last Dose    atorvastatin (LIPITOR) 10 MG tablet Take 1 tablet by mouth Daily. 90 tablet 1 3/6/2024    baclofen (LIORESAL) 10 MG tablet Take 2 tablets by mouth 3 (Three) Times a Day.   Past Week    cholecalciferol (VITAMIN D3) 25 MCG (1000 UT) tablet Take 5 tablets by mouth Daily.   3/6/2024    ferrous sulfate 325 (65 FE) MG tablet Take 1 tablet by mouth Every Other Day. 45 tablet 1 3/6/2024    FLUoxetine (PROzac) 20 MG capsule Take 3 capsules by mouth Daily. 270 capsule 1 3/6/2024    losartan-hydrochlorothiazide (HYZAAR) 50-12.5 MG per tablet Take 1 tablet by mouth Daily. 90 tablet 1 3/6/2024    pantoprazole (PROTONIX) 40 MG EC tablet Take 1 tablet by mouth 2 (Two) Times a Day. 180 tablet 1 3/6/2024    pramipexole (MIRAPEX) 1.5 MG tablet Take 1 tablet by mouth 3 (Three) Times a Day.   3/6/2024    clonazePAM (KlonoPIN) 2 MG tablet Take 1 tablet by mouth As Needed (Sleep).       Diclofenac Sodium (VOLTAREN) 1 % gel gel Apply 4 g topically to the appropriate area as directed 4 (Four) Times a Day As Needed (pain). 350 g 0     triamcinolone (KENALOG) 0.1 % ointment APPLY TO AFFECTED AREA TWICE A DAY (Patient not taking: Reported on 9/15/2023)          Allergies:  Allergies   Allergen Reactions    Contrast Dye (Echo Or Unknown Ct/Mr) Shortness Of Breath    Iodinated Contrast Media Shortness Of Breath     SOA.    Patient states she has had it without symptoms.    Sulfa Antibiotics Other (See Comments)     Bradycardia after injection      Immunizations:  Immunization History   Administered Date(s) Administered    Tdap 06/14/2019     Social History:   Social History     Social History Narrative    Not on file     Social History     Socioeconomic History    Marital status:      Spouse name: Donald    Number of children: 0   Tobacco Use    Smoking status: Former     Current packs/day: 0.00     Average packs/day: 2.0 packs/day for 30.0 years (60.0 ttl  "pk-yrs)     Types: Cigarettes     Start date: 10/22/1973     Quit date: 10/22/2003     Years since quittin.3    Smokeless tobacco: Never   Vaping Use    Vaping status: Never Used   Substance and Sexual Activity    Alcohol use: Not Currently    Drug use: Yes     Types: Marijuana       Family History:  Family History   Problem Relation Age of Onset    Hypertension Mother     Hyperlipidemia Mother     Aortic aneurysm Mother         thoracic    Diabetes Mother     Hypertension Father     Heart failure Father     Miscarriages / Stillbirths Sister     Multiple sclerosis Brother     Atrial fibrillation Brother     Diabetes Maternal Grandfather     Stroke Maternal Grandfather     Parkinsonism Paternal Grandmother     Tremor Paternal Grandmother     Stomach cancer Paternal Grandfather         Review of Systems  See history of present illness and past medical history.  Patient denies headache, dizziness, syncope, falls, trauma, change in vision, change in hearing, change in taste, changes in weight, changes in appetite, focal weakness, numbness, or paresthesia.  Patient denies chest pain, palpitations, dyspnea, orthopnea, PND, cough, sinus pressure, rhinorrhea, epistaxis, hemoptysis, nausea, vomiting,hematemesis, diarrhea, constipation or hematochezia.  Denies cold or heat intolerance, polydipsia, polyuria, polyphagia. Denies hematuria, pyuria, dysuria, hesitancy, frequency or urgency. Denies consumption of raw and under cooked meats foods or change in water source.       Objective:  T Max 24 hrs: Temp (24hrs), Av.6 °F (36.4 °C), Min:97.1 °F (36.2 °C), Max:97.8 °F (36.6 °C)    Vitals Ranges:   Temp:  [97.1 °F (36.2 °C)-97.8 °F (36.6 °C)] 97.5 °F (36.4 °C)  Heart Rate:  [70-77] 77  Resp:  [16-18] 18  BP: (126-176)/() 126/66      Exam:  /66 (BP Location: Left arm, Patient Position: Lying)   Pulse 77   Temp 97.5 °F (36.4 °C) (Oral)   Resp 18   Ht 166.4 cm (65.5\")   Wt 77.1 kg (170 lb)   LMP  (LMP " Unknown) Comment: PT. STATES HER LAST PERIOD WAS GREATER THAN FIVE YEARS AGO  SpO2 94%   BMI 27.86 kg/m²     General Appearance:    Alert, cooperative, no distress, appears stated age   Head:    Normocephalic, without obvious abnormality, atraumatic   Eyes:    PERRL, conjunctivae/corneas clear, EOM's intact, both eyes   Ears:    Normal external ear canals, both ears   Nose:   Nares normal, septum midline, mucosa normal, no drainage    or sinus tenderness   Throat:   Lips, mucosa, and tongue normal   Neck:   Supple, symmetrical, trachea midline, no adenopathy;     thyroid:  no enlargement/tenderness/nodules; no carotid    bruit or JVD   Back:     Symmetric, no curvature, ROM normal, no CVA tenderness   Lungs:     Clear to auscultation bilaterally, respirations unlabored   Chest Wall:    No tenderness or deformity    Heart:    Regular rate and rhythm, S1 and S2 normal, no murmur, rub   or gallop   Abdomen:     Soft, nontender, bowel sounds active all four quadrants,     no masses, no hepatomegaly, no splenomegaly   Extremities:   Extremities normal, atraumatic, erythema both lower extremities; warm and tender to touch                       .    Data Review:  Labs in chart were reviewed.  WBC   Date Value Ref Range Status   03/07/2024 6.15 3.40 - 10.80 10*3/mm3 Final   03/06/2024 6.46 3.40 - 10.80 10*3/mm3 Final     Hemoglobin   Date Value Ref Range Status   03/07/2024 12.4 12.0 - 15.9 g/dL Final     Hematocrit   Date Value Ref Range Status   03/07/2024 40.2 34.0 - 46.6 % Final     Platelets   Date Value Ref Range Status   03/07/2024 389 140 - 450 10*3/mm3 Final     Sodium   Date Value Ref Range Status   03/07/2024 144 136 - 145 mmol/L Final     Potassium   Date Value Ref Range Status   03/07/2024 4.1 3.5 - 5.2 mmol/L Final     Chloride   Date Value Ref Range Status   03/07/2024 107 98 - 107 mmol/L Final     CO2   Date Value Ref Range Status   03/07/2024 25.3 22.0 - 29.0 mmol/L Final     BUN   Date Value Ref Range  Status   03/07/2024 15 8 - 23 mg/dL Final     Creatinine   Date Value Ref Range Status   03/07/2024 0.67 0.57 - 1.00 mg/dL Final     Glucose   Date Value Ref Range Status   03/07/2024 96 65 - 99 mg/dL Final     Calcium   Date Value Ref Range Status   03/07/2024 9.2 8.6 - 10.5 mg/dL Final                Imaging Results (All)       None              Assessment:  Active Hospital Problems    Diagnosis  POA    **Lower extremity cellulitis [L03.119]  Yes      Resolved Hospital Problems   No resolved problems to display.   Multiple sclerosis  Paraplegia  Hypertension  Hyperlipidemia      Plan:  Will continue antibiotics  Follow closely for clinical improvement  Monitor bp on her home medications  Dw patient and ed provider    Gisella Britt MD  3/7/2024  21:07 EST

## 2024-03-08 NOTE — PLAN OF CARE
Goal Outcome Evaluation:  Plan of Care Reviewed With: patient           Outcome Evaluation: Pt is a 64 y/o F with h/o Primary progressive MS leading to paraplegia that presented to ED with c/o redness and pain to BLEs; admitted with cellulitis. Pt reports at baseline her  lifts her into her power WC, motorized scooter or onto the commode, and he assists with all ADLs. Pt sleeps in a hospital bed with a trapezee and with increased time/effort she is able to reposition herself. Pt states spouse brings truck straight up to their porch and it is flush to where she can drive her WC or scooter straight into the house. Pt also states they typically leave a gait belt strapped around her thighs as her LEs tend to flex and ER without it. Pt presents with decreased strength, decreased sensation, bilateral hamstring contractures with pt at 10deg of knee flexion, impaired trunk strength/control and decreased endurance. Pt performed LE ther-ex (ankle pumps, heel slides AROM x5 reps) and one modified sit-up with HOB elevated and pulling up with bedrails and MaxA from this PT, but was unable to maintain. Educated and discussed with pt on alternative transfers to  via Rachell lift or sliding board to protect spouse as she states it is beginning to become taxing on his back. Pt stating spouse is too impatient for these transfers and is unsure if a Rachell would fit in her house. Per pt and chart pt gets multiple abrasions to her knees from positioning in her WC, discussed need to call [employer] for potential accessories of foot straps and lateral side padding to keep her LEs from externally rotating. Discussed positioning of her LEs in the bed to assist w/ long duration stretching and further prevent contractures and hip ER with pillow placement and/or splinting (RN made aware of needing Bassem splint/Multi-podus boots). Pt questioning need for steroids during this admission, as she has improved LE movement w/ steroids during  last admission, encouraged pt to discuss with hospitalist or her neurologist. SBAR w/ RN following session on need for splints and pt wanting to tsf to recliner or power WC w/ use of Rachell later this date. PT will follow pt during this admission to address ROM, strength and trunk deficits. Rec home w/ assist and  PT to further assist w/ tsf training with spouse. Pt interested in new hospital bed and possibly Rachell lift if able to fit in her house.      Anticipated Discharge Disposition (PT): home with home health (For transfer training with spouse. Pt interested in new hospital bed as it has been 5+ years since getting the current bed and Rachell if able to fit inside her home.)

## 2024-03-08 NOTE — PLAN OF CARE
Goal Outcome Evaluation:  Plan of Care Reviewed With: patient           Outcome Evaluation: VSS, left lower leg pink, warm to touch and painful, right has area above ankle that pt states is starting to feel th same. the right leg does have area that is pink, and warm, blood culture obtained in ER, first dose of abx given, on reg diet, room air, purewick in place, pt wheel chair bound, refused SCDs. plan of care continuing.

## 2024-03-08 NOTE — PROGRESS NOTES
Continued Stay Note  [facility]     Patient Name: Maritza Nance Darci  MRN: [id number]  Today's Date: 3/8/2024    Admit Date: 3/7/2024    Plan: Home with spouse assist   Discharge Plan       Row Name 03/08/24 1220       Plan    Plan Home with spouse assist    Plan Comments Introduced self and role of CCP. Patient confirmed DC plan is to return to home. Stated her spouse assists with all her needs. Patient uses an electric wheelchair and/or scooter.  Stated her spouse will provide transportation at DC. Denies any needs/equipment.                   Discharge Codes    No documentation.                 Expected Discharge Date and Time       Expected Discharge Date Expected Discharge Time    Mar 9, 2024               Jess Mathis RN

## 2024-03-08 NOTE — PROGRESS NOTES
"    DAILY PROGRESS NOTE  [facility]    Patient Identification:  Name: Maritza Bejarano  Age: 63 y.o.  Sex: female  :  1960  MRN: [id number]         Primary Care Physician: Enoc Carbone DO    Subjective:  Interval History: Admits to limited use of lower extremity secondary to MS.  Admits to also bumping the legs on things and not being aware.  Denies any nausea vomiting diarrhea.  Denies any altered mentation or fever.  No chest pain or shortness of breath.    Objective: Sleeping very soundly upon entering room.  Once awake and conversational and pleasant.  Good historian.  No family present at bedside the electric scooter was noted in room    Scheduled Meds:atorvastatin, 10 mg, Oral, Daily  baclofen, 20 mg, Oral, TID  cholecalciferol, 5,000 Units, Oral, Daily  ferrous sulfate, 325 mg, Oral, Every Other Day  FLUoxetine, 60 mg, Oral, Daily  losartan, 50 mg, Oral, Q24H   And  hydroCHLOROthiazide, 12.5 mg, Oral, Q24H  pantoprazole, 40 mg, Oral, BID  pramipexole, 1.5 mg, Oral, TID      Continuous Infusions:     Vital signs in last 24 hours:  Temp:  [97 °F (36.1 °C)-97.8 °F (36.6 °C)] 97.6 °F (36.4 °C)  Heart Rate:  [70-83] 79  Resp:  [16-18] 18  BP: (114-176)/() 136/85    Intake/Output:    Intake/Output Summary (Last 24 hours) at 3/8/2024 0647  Last data filed at 3/8/2024 0524  Gross per 24 hour   Intake 100 ml   Output 650 ml   Net -550 ml       Exam:  /85 (BP Location: Right arm, Patient Position: Lying)   Pulse 79   Temp 97.6 °F (36.4 °C) (Oral)   Resp 18   Ht 166.4 cm (65.5\")   Wt 77.1 kg (170 lb)   LMP  (LMP Unknown) Comment: PT. STATES HER LAST PERIOD WAS GREATER THAN FIVE YEARS AGO  SpO2 94%   BMI 27.86 kg/m²     General Appearance:    Alert, cooperative, nontoxic, AAOx3                          Head:    Normocephalic, without obvious abnormality, atraumatic                         Throat:   Oral mucosa pink and moist                           Neck:   No JVD                 "         Lungs:    Clear to auscultation bilaterally, respirations unlabored                 Chest Wall:    No tenderness or deformity                          Heart:    Regular rate and rhythm, S1 and S2 normal                  Abdomen:     Soft, nontender, bowel sounds active                  Extremities: No pitting edema to lower extremities, limited use given MS but partial movement noted, small hematoma to left anterior shin about quarter size with some increased warmth and erythema surrounding and distal to this lesion consistent with some mild cellulitis.  Very mild stasis like changes noted to right lower extremity with very mild baseline pedal edema                         pulses:   Pulses palpable in lower extremities                  Neurologic:   CNII-XII intact     Data Review:  Labs in chart were reviewed.    Assessment:  Active Hospital Problems    Diagnosis  POA    **Lower extremity cellulitis [L03.119]  Yes      Resolved Hospital Problems   No resolved problems to display.       Plan:    Left lower extremity cellulitis secondary to contusion to anterior shin with small hematoma made worse by immobility/scooter dependent given MS with limited use of legs   -Additional abrasions noted to right knee but right lower extremity not as cellulitic in appearance -add topical bacitracin   -Cefazolin for now we will switch to p.o. antibiotic at discharge   -No sepsis present admission -BC pending x 2      HTN stable on losartan/HCTZ    YAZ on p.o. iron    HLD on statin    GERD on PPI so no additional prophylaxis needed    Past history of MS followed by  with appointment pending next week and currently not on any immunosuppressive meds        Disposition -PT to evaluate today    -Check Doppler lower extremities given him immobility to ensure no complicating features such as DVT   -CCP to coordinate discharge needs but anticipate home tomorrow    Jose Rasmussen MD  3/8/2024  06:47 EST

## 2024-03-09 ENCOUNTER — READMISSION MANAGEMENT (OUTPATIENT)
Dept: CALL CENTER | Facility: HOSPITAL | Age: 64
End: 2024-03-09
Payer: MEDICARE

## 2024-03-09 VITALS
BODY MASS INDEX: 27.6 KG/M2 | RESPIRATION RATE: 18 BRPM | HEIGHT: 66 IN | OXYGEN SATURATION: 95 % | WEIGHT: 171.74 LBS | HEART RATE: 72 BPM | TEMPERATURE: 98.6 F | DIASTOLIC BLOOD PRESSURE: 74 MMHG | SYSTOLIC BLOOD PRESSURE: 130 MMHG

## 2024-03-09 PROCEDURE — 25010000002 CEFAZOLIN IN DEXTROSE 2-4 GM/100ML-% SOLUTION: Performed by: HOSPITALIST

## 2024-03-09 PROCEDURE — 96375 TX/PRO/DX INJ NEW DRUG ADDON: CPT

## 2024-03-09 PROCEDURE — 25010000002 ONDANSETRON PER 1 MG: Performed by: INTERNAL MEDICINE

## 2024-03-09 PROCEDURE — G0378 HOSPITAL OBSERVATION PER HR: HCPCS

## 2024-03-09 RX ORDER — CEPHALEXIN 500 MG/1
500 CAPSULE ORAL 3 TIMES DAILY
Qty: 18 CAPSULE | Refills: 0 | Status: SHIPPED | OUTPATIENT
Start: 2024-03-09

## 2024-03-09 RX ORDER — GINSENG 100 MG
1 CAPSULE ORAL EVERY 12 HOURS SCHEDULED
Qty: 28 G | Refills: 0 | Status: SHIPPED | OUTPATIENT
Start: 2024-03-09

## 2024-03-09 RX ADMIN — BACLOFEN 20 MG: 10 TABLET ORAL at 09:00

## 2024-03-09 RX ADMIN — Medication 5000 UNITS: at 09:00

## 2024-03-09 RX ADMIN — FERROUS SULFATE TAB 325 MG (65 MG ELEMENTAL FE) 325 MG: 325 (65 FE) TAB at 09:00

## 2024-03-09 RX ADMIN — CEFAZOLIN SODIUM 2000 MG: 2 INJECTION, SOLUTION INTRAVENOUS at 01:20

## 2024-03-09 RX ADMIN — FLUOXETINE HYDROCHLORIDE 60 MG: 20 CAPSULE ORAL at 09:01

## 2024-03-09 RX ADMIN — HYDROCHLOROTHIAZIDE 12.5 MG: 12.5 TABLET ORAL at 09:01

## 2024-03-09 RX ADMIN — PANTOPRAZOLE SODIUM 40 MG: 40 TABLET, DELAYED RELEASE ORAL at 09:00

## 2024-03-09 RX ADMIN — ONDANSETRON 4 MG: 2 INJECTION INTRAMUSCULAR; INTRAVENOUS at 03:29

## 2024-03-09 RX ADMIN — PRAMIPEXOLE DIHYDROCHLORIDE 1.5 MG: 1.5 TABLET ORAL at 09:01

## 2024-03-09 RX ADMIN — LOSARTAN POTASSIUM 50 MG: 50 TABLET, FILM COATED ORAL at 09:01

## 2024-03-09 RX ADMIN — ATORVASTATIN CALCIUM 10 MG: 20 TABLET, FILM COATED ORAL at 09:00

## 2024-03-09 RX ADMIN — BACITRACIN 0.9 G: 500 OINTMENT TOPICAL at 09:00

## 2024-03-09 RX ADMIN — HYDROCODONE BITARTRATE AND ACETAMINOPHEN 1 TABLET: 7.5; 325 TABLET ORAL at 09:18

## 2024-03-09 NOTE — NURSING NOTE
Pt present during discharge teaching. Pt instructed to call and make an appointment with PCP. Pt instructed to  medications from her preferred pharmacy. All belongings with pt. All questions answered. Pt ready to go home.

## 2024-03-09 NOTE — OUTREACH NOTE
Prep Survey      Flowsheet Row Responses   [facility] patient discharged from? [place]   Is LACE score < 7 ? Yes   Eligibility [facility]   Date of Admission 03/07/24   Date of Discharge 03/09/24   Discharge Disposition Home or Self Care   Discharge diagnosis Lower extremity cellulitis   Does the patient have one of the following disease processes/diagnoses(primary or secondary)? Other   Prep survey completed? Yes            Eugenia GONZALES - Registered Nurse

## 2024-03-09 NOTE — PLAN OF CARE
Goal Outcome Evaluation:              Outcome Evaluation: VSS. Turn 2Q. Heels elevated. Needs food cut for meals. PO meds for pain. Sleepy most of the day. States she only wants 1/2 tablet of klonopin

## 2024-03-09 NOTE — PLAN OF CARE
Goal Outcome Evaluation:  Plan of Care Reviewed With: patient        Progress: no change  Outcome Evaluation: vss, afebrile, Q2 turns, new iv placed, kvo for iv abx, medicated ointment applied, Norco given for pain control, zofran given for nausea, purewick in place, room air, resting between care, bed alarm and fall precautions maintained

## 2024-03-09 NOTE — DISCHARGE SUMMARY
[facility]    Date of Discharge:  3/9/2024    PCP: Enoc Carbone DO    Discharge Diagnosis:   Active Hospital Problems    Diagnosis  POA    **Lower extremity cellulitis [L03.119]  Yes      Resolved Hospital Problems   No resolved problems to display.          Consults       Date and Time Order Name Status Description    3/7/2024  2:20 PM [facility] (on-call MD unless specified) Details            Hospital Course  63 y.o. female who was initially going to due to concerns for cellulitis in lower extremities.  Her right lower extremity does not demonstrate any aspects of cellulitis when I evaluated her but her left lower extremity clearly has a quarter sized hematoma with some possible aspects of secondary cellulitis.  She was placed on cefazolin and is clinically improved to this point.  I will transition her over to p.o. Keflex for her to complete a total 7-day course of antibiotics.  Her history is complicated by MS and she is Scooter dependent as she does not have much use of her lower extremities anymore.  CCP coordinated additional discharge needs with spouse and patient and apparently they are doing just fine and do not need any additional assistance or home health.  Other comorbidities are stable such as hypertension YAZ HLD as well as GERD on associated medications.  Further evaluation of her lower extremities with Doppler given her immobility did not reveal any aspects of DVT.  Vital signs are stable as well as afebrile and I feel this patient is more than medically stable to transition back to an outpatient management care plan and she is agreeable to that as well as thankful for the care she received while here.  No family present at bedside when I saw the patient and she has not asked me to reach out discussed the case with anyone additionally.      Temp:  [97 °F (36.1 °C)-97.8 °F (36.6 °C)] 97 °F (36.1 °C)  Heart Rate:  [66-84] 72  Resp:  [18] 18  BP:  ()/(57-81) 155/61  Body mass index is 28.14 kg/m².    Physical Exam  HENT:      Head: Normocephalic.      Nose: Nose normal.      Mouth/Throat:      Mouth: Mucous membranes are moist.      Pharynx: Oropharynx is clear.   Cardiovascular:      Rate and Rhythm: Normal rate and regular rhythm.   Pulmonary:      Effort: Pulmonary effort is normal. No respiratory distress.      Breath sounds: Normal breath sounds.   Abdominal:      General: Bowel sounds are normal. There is no distension.      Palpations: Abdomen is soft.      Tenderness: There is no abdominal tenderness.   Musculoskeletal:      Comments: Quarter size small hematoma to the left anterior shin with mild subsequent cellulitis   Neurological:      Mental Status: She is alert and oriented to person, place, and time.      Comments: Chronic decreased use of lower extremities with chronic nonpitting mild edema   Psychiatric:         Mood and Affect: Mood normal.         Behavior: Behavior normal.       Disposition: Home or Self Care       Discharge Medications        New Medications        Instructions Start Date   bacitracin 500 UNIT/GM ointment   0.9 g, Topical, Every 12 Hours Scheduled      cephalexin 500 MG capsule  Commonly known as: Keflex   500 mg, Oral, 3 Times Daily             Continue These Medications        Instructions Start Date   atorvastatin 10 MG tablet  Commonly known as: LIPITOR   10 mg, Oral, Daily      baclofen 10 MG tablet  Commonly known as: LIORESAL   20 mg, Oral, 3 Times Daily      cholecalciferol 25 MCG (1000 UT) tablet   5,000 Units, Oral, Daily      clonazePAM 2 MG tablet  Commonly known as: KlonoPIN   2 mg, Oral, As Needed      Diclofenac Sodium 1 % gel gel  Commonly known as: VOLTAREN   4 g, Topical, 4 Times Daily PRN      ferrous sulfate 325 (65 FE) MG tablet   325 mg, Oral, Every Other Day      FLUoxetine 20 MG capsule  Commonly known as: PROzac   60 mg, Oral, Daily      losartan-hydrochlorothiazide 50-12.5 MG per  tablet  Commonly known as: HYZAAR   1 tablet, Oral, Daily      pantoprazole 40 MG EC tablet  Commonly known as: PROTONIX   40 mg, Oral, 2 Times Daily      pramipexole 1.5 MG tablet  Commonly known as: MIRAPEX   1.5 mg, Oral, 3 Times Daily             Stop These Medications      triamcinolone 0.1 % ointment  Commonly known as: KENALOG               Additional Instructions for the Follow-ups that You Need to Schedule       Discharge Follow-up with PCP   As directed       Currently Documented PCP:    Enoc Carbone DO    PCP Phone Number:    [phone]     Follow Up Details: PCP 2 to 3 weeks.  Keep scheduled follow-up with pending neurologist as noted prior to this admission               Follow-up Information       Enoc Carbone DO .    Specialty: Internal Medicine  Why: PCP 2 to 3 weeks.  Keep scheduled follow-up with pending neurologist as noted prior to this admission  Contact information:  [address]  [place] [facility] [id number] [phone]               Sohan Jacobsen MD .    Specialties: Internal Medicine, Hospitalist  Why: PCP 2 to 3 weeks.  Keep scheduled follow-up with pending neurologist as noted prior to this admission  Contact information:  [address]  [place] [id number] [phone]                            Future Appointments   Date Time Provider Department Center   3/13/2024  3:45 PM Enoc Carbone DO [facility]     Pending Labs       Order Current Status    Blood Culture - Blood, Arm, Left Preliminary result    Blood Culture - Blood, Arm, Right Preliminary result           Jose Rasmussen MD  [facility]  03/09/24    Discharge time spent greater than 30 minutes.

## 2024-03-10 NOTE — CASE MANAGEMENT/SOCIAL WORK
Case Management Discharge Note      Final Note: Home w/ Spouse         Selected Continued Care - Discharged on 3/9/2024 Admission date: 3/7/2024 - Discharge disposition: Home or Self Care      Destination    No services have been selected for the patient.                Durable Medical Equipment    No services have been selected for the patient.                Dialysis/Infusion    No services have been selected for the patient.                Home Medical Care    No services have been selected for the patient.                Therapy    No services have been selected for the patient.                Community Resources    No services have been selected for the patient.                Community & DME    No services have been selected for the patient.                    Transportation Services  Private: Car (spouse)    Final Discharge Disposition Code: 01 - home or self-care

## 2024-03-11 ENCOUNTER — TRANSITIONAL CARE MANAGEMENT TELEPHONE ENCOUNTER (OUTPATIENT)
Dept: CALL CENTER | Facility: HOSPITAL | Age: 64
End: 2024-03-11
Payer: MEDICARE

## 2024-03-11 NOTE — OUTREACH NOTE
Call Center TCM Note      Flowsheet Row Responses   [facility] patient discharged from? [facility]   Does the patient have one of the following disease processes/diagnoses(primary or secondary)? Other   TCM attempt successful? Yes   Call start time 1619   Call end time 1622   Discharge diagnosis Lower extremity cellulitis   Person spoke with today (if not patient) and relationship Patient   Meds reviewed with patient/caregiver? Yes   Is the patient having any side effects they believe may be caused by any medication additions or changes? No   Does the patient have all medications ordered at discharge? Yes   Is the patient taking all medications as directed (includes completed medication regime)? Yes   Comments Patient has a video visit with PCP for a hospital followup on 3/13/24   Does the patient have an appointment with their PCP within 7-14 days of discharge? Yes   Psychosocial issues? No   Did the patient receive a copy of their discharge instructions? Yes   Nursing interventions Reviewed instructions with patient   What is the patient's perception of their health status since discharge? Improving   Is the patient/caregiver able to teach back signs and symptoms related to disease process for when to call PCP? Yes   Is the patient/caregiver able to teach back signs and symptoms related to disease process for when to call 911? Yes   Is the patient/caregiver able to teach back the hierarchy of who to call/visit for symptoms/problems? PCP, Specialist, Home health nurse, Urgent Care, ED, 911 Yes   If the patient is a current smoker, are they able to teach back resources for cessation? Not a smoker   TCM call completed? Yes   Wrap up additional comments Doing well. No needs at this time.   Call end time 1622   Would this patient benefit from a Referral to Amb Social Work? No   Is the patient interested in additional calls from an ambulatory ? No            Wai Degroot RN    3/11/2024, 16:22  EDT

## 2024-03-12 LAB
BACTERIA SPEC AEROBE CULT: NORMAL
BACTERIA SPEC AEROBE CULT: NORMAL

## 2024-03-13 ENCOUNTER — TELEMEDICINE (OUTPATIENT)
Dept: INTERNAL MEDICINE | Facility: CLINIC | Age: 64
End: 2024-03-13
Payer: MEDICARE

## 2024-03-13 DIAGNOSIS — Z09 HOSPITAL DISCHARGE FOLLOW-UP: Primary | ICD-10-CM

## 2024-03-13 DIAGNOSIS — D50.9 IRON DEFICIENCY ANEMIA, UNSPECIFIED IRON DEFICIENCY ANEMIA TYPE: ICD-10-CM

## 2024-03-13 DIAGNOSIS — L03.116 CELLULITIS OF LEFT LOWER EXTREMITY: ICD-10-CM

## 2024-03-13 NOTE — PROGRESS NOTES
"Transitional Care Follow Up Visit  Subjective     Maritza Bejarano is a 63 y.o. female who presents for a transitional care management visit.    Within 48 business hours after discharge our office contacted her via telephone to coordinate her care and needs.      I reviewed and discussed the details of that call along with the discharge summary, hospital problems, inpatient lab results, inpatient diagnostic studies, and consultation reports with Maritza.     Current outpatient and discharge medications have been reconciled for the patient.  Reviewed by: Enoc Carbone DO          3/9/2024    12:36 PM   Date of TCM Phone Call   [facility]   Date of Admission 3/7/2024   Date of Discharge 3/9/2024   Discharge Disposition Home or Self Care     Risk for Readmission (LACE) Score: 2 (3/9/2024  6:00 AM)      History of Present Illness   Course During Hospital Stay:      \"Hospital Course  63 y.o. female who was initially going to due to concerns for cellulitis in lower extremities.  Her right lower extremity does not demonstrate any aspects of cellulitis when I evaluated her but her left lower extremity clearly has a quarter sized hematoma with some possible aspects of secondary cellulitis.  She was placed on cefazolin and is clinically improved to this point.  I will transition her over to p.o. Keflex for her to complete a total 7-day course of antibiotics.  Her history is complicated by MS and she is Scooter dependent as she does not have much use of her lower extremities anymore.  [facility] coordinated additional discharge needs with spouse and patient and apparently they are doing just fine and do not need any additional assistance or home health.  Other comorbidities are stable such as hypertension YAZ HLD as well as GERD on associated medications.  Further evaluation of her lower extremities with Doppler given her immobility did not reveal any aspects of DVT.  Vital signs are stable as well as afebrile and I " "feel this patient is more than medically stable to transition back to an outpatient management care plan and she is agreeable to that as well as thankful for the care she received while here.  No family present at bedside when I saw the patient and she has not asked me to reach out discussed the case with anyone additionally.\"    Mode of Visit: Video  Location of patient: home in [place]   Location of provider: [facility]  You have chosen to receive care through a telehealth visit.  Does the patient consent to use a video/audio connection their medical care today? yes  The visit included audio and video interaction. No technical issues occurred during this visit.     She states she has 3 more days of antibiotic pills left.    States redness in her legs is \"much better, was my whole leg\". States the redness that was surrounding the hematoma on the left lower shin is now gone. She states she is not able to move her camera to her leg today to look but she can show me pictures on her phone. States no fever or chills and the pain is a lot better.     The following portions of the patient's history were reviewed and updated as appropriate: allergies, current medications, past family history, past medical history, past social history, past surgical history, and problem list.      Objective   Physical Exam  Vitals reviewed: PHYSICAL EXAM LIMITED 2/2 TELEHEALTH.   Constitutional:       General: She is not in acute distress.     Appearance: She is not ill-appearing.   Eyes:      General: No scleral icterus.  Pulmonary:      Effort: Pulmonary effort is normal. No respiratory distress.   Skin:     Coloration: Skin is not jaundiced.   Neurological:      Mental Status: She is alert.   Psychiatric:         Mood and Affect: Mood normal.         Behavior: Behavior normal.         Thought Content: Thought content normal.         Assessment & Plan   Diagnoses and all orders for this visit:    1. Hospital discharge follow-up " (Primary)  2. Iron deficiency anemia, unspecified iron deficiency anemia type  3. Cellulitis of left lower extremity  -pt seen via telehealth today for hospital follow up LLE cellulitis and hematoma. Overall she cannot move her camera for me to see her leg but she shows me a picture of before treatment which shows a hematoma with surrounding erythema and then shows me a picture of today which shows no erythema around the hematoma. Advised that the hematoma itself could take several weeks to resolve.   -at this point I recommend she continue antibiotic therapy as prescribed at discharge and return to office asap if any redness begins to develop again and spread.   Lab Results   Component Value Date    WBC 5.04 03/08/2024    HGB 10.9 (L) 03/08/2024    HCT 34.8 03/08/2024    MCV 83.9 03/08/2024     03/08/2024     -she did develop a mild anemia while inpatient  which I suspect is dilutional and iatrogenic.. She has history of YAZ and had improvement up until she was in the hospital after starting iron therapy. . She is scheduled next month with GI for consideration of further eval. I recommend she make an appointment in office for labs in 1 month to follow up.

## 2024-03-15 NOTE — ED PROVIDER NOTES
EMERGENCY DEPARTMENT ENCOUNTER  Room Number:  P688/1  PCP: Enoc Carbone DO  Independent Historians: Patient      HPI:  Chief Complaint: had concerns including Leg Pain.       A complete HPI/ROS/PMH/PSH/SH/FH are unobtainable due to: None    Chronic or social conditions impacting patient care (Social Determinants of Health): None      Context: Maritza Bejarano is a 63 y.o. female with a medical history of Paraplegia from MS who presents to the ED c/o acute bilateral leg pain, gradually worsening, left greater than right.  She uses a motorized wheelchair.  She has noticed increasing redness and they have been painful to the touch for a few days.  She denies any fever.  She is not diabetic.      Review of prior external notes (non-ED) -and- Review of prior external test results outside of this encounter:  Office visit with PCP on 11/16/2023 for Medicare wellness visit.  Point-of-care hemoglobin was 10, encouraged to start iron supplement as previously prescribed, no new medications        PAST MEDICAL HISTORY  Active Ambulatory Problems     Diagnosis Date Noted    H/O total shoulder replacement, right 10/22/2018    Cough 01/28/2021    Acute UTI (urinary tract infection) 01/28/2021    Multiple sclerosis 01/28/2021    Essential hypertension 01/28/2021    Hyperlipidemia 01/28/2021    Shortness of breath 01/28/2021    Oropharyngeal dysphagia 02/04/2021    Abnormal finding on mammography 08/09/2021    Acid reflux 08/09/2021    Anxiety and depression 05/01/2018    Brash 08/09/2021    Carpal tunnel syndrome 01/10/2013    Chronic low back pain 01/22/2014    Diplopia 01/10/2013    Disease with a predominantly sexual mode of transmission 08/09/2021    FOM (frequency of micturition) 08/09/2021    Headache 01/10/2013    History of diplopia 01/07/2020    History of optic neuritis 01/07/2020    History of vitamin D deficiency 10/31/2017    Migraine syndrome 01/22/2014    Mixed incontinence 07/31/2017    Nausea 08/10/2015    Neurogenic  bladder 02/23/2017    Pain in joint of right shoulder 07/31/2017    Restless legs syndrome 07/23/2014    Rupture of rotator cuff of shoulder 08/31/2017    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    S/P cubital tunnel release 01/04/2019    Vitamin D deficiency 01/22/2014    Multiple sclerosis exacerbation 02/26/2022    Sepsis due to Gram negative bacteria 02/27/2022     Resolved Ambulatory Problems     Diagnosis Date Noted    No Resolved Ambulatory Problems     Past Medical History:   Diagnosis Date    Dawn esophagus     Blurred vision     Depression     GERD (gastroesophageal reflux disease)     H/O Skin cancer, basal cell     History of blood transfusion     History of GI bleed     History of urinary tract infection     Hypercalcemia     Hypertension     Movement disorder     Optic neuritis     PONV (postoperative nausea and vomiting)          PAST SURGICAL HISTORY  Past Surgical History:   Procedure Laterality Date    APPENDECTOMY      BLADDER SURGERY      bladder stimulator    BREAST SURGERY      augmentation wtih subsequent removal    CARPAL TUNNEL RELEASE Bilateral     Left 2018, right 2020    CUBITAL TUNNEL RELEASE Left     CYSTOSCOPY BOTOX INJECTION OF BLADDER  2018    Cystoscopy with Botox    PARATHYROIDECTOMY      one gland removed    ROTATOR CUFF REPAIR Right 2017    TOE SURGERY      bilateral great toes    TOTAL SHOULDER ARTHROPLASTY W/ DISTAL CLAVICLE EXCISION Right 10/22/2018    Procedure: RT TOTAL SHOULDER REVERSE ARTHROPLASTY;  Surgeon: Bipin Dangelo MD;  Location: [facility];  Service: Orthopedics         FAMILY HISTORY  Family History   Problem Relation Age of Onset    Hypertension Mother     Hyperlipidemia Mother     Aortic aneurysm Mother         thoracic    Diabetes Mother     Hypertension Father     Heart failure Father     Miscarriages / Stillbirths Sister     Multiple sclerosis Brother     Atrial fibrillation Brother     Diabetes Maternal Grandfather     Stroke Maternal  Grandfather     Parkinsonism Paternal Grandmother     Tremor Paternal Grandmother     Stomach cancer Paternal Grandfather          SOCIAL HISTORY  Social History     Socioeconomic History    Marital status:      Spouse name: Donald    Number of children: 0   Tobacco Use    Smoking status: Former     Current packs/day: 0.00     Average packs/day: 2.0 packs/day for 30.0 years (60.0 ttl pk-yrs)     Types: Cigarettes     Start date: 10/22/1973     Quit date: 10/22/2003     Years since quittin.4    Smokeless tobacco: Never   Vaping Use    Vaping status: Never Used   Substance and Sexual Activity    Alcohol use: Not Currently    Drug use: Yes     Types: Marijuana         ALLERGIES  Contrast dye (echo or unknown ct/mr), Iodinated contrast media, and Sulfa antibiotics      REVIEW OF SYSTEMS  Review of Systems  Included in HPI  All systems reviewed and negative except for those discussed in HPI.      PHYSICAL EXAM    I have reviewed the triage vital signs and nursing notes.    ED Triage Vitals   Temp Heart Rate Resp BP SpO2   24 1153 24 1153 24 1153 24 1156 24 1153   97.8 °F (36.6 °C) 76 16 (!) 176/102 100 %      Temp src Heart Rate Source Patient Position BP Location FiO2 (%)   24 1153 24 1537 24 1537 24 1537 --   Oral Monitor Sitting Right arm        Physical Exam  GENERAL: alert, no acute distress  SKIN: Warm, dry  HENT: Normocephalic, atraumatic  EYES: no scleral icterus  CV: regular rhythm, regular rate  RESPIRATORY: normal effort, lungs clear  ABDOMEN: nondistended  MUSCULOSKELETAL: no deformity.  1+ pitting edema to the bilateral lower extremities.  There is erythema bilaterally, less significant on the right, deeper in color and more warmth and greater tenderness on the left.  No open skin.  NEURO: alert, moves all extremities, follows commands            LAB RESULTS  Labs Reviewed   COMPREHENSIVE METABOLIC PANEL - Abnormal; Notable for the following  components:       Result Value    Alkaline Phosphatase 136 (*)     All other components within normal limits    Narrative:     GFR Normal >60  Chronic Kidney Disease <60  Kidney Failure <15     CBC WITH AUTO DIFFERENTIAL - Abnormal; Notable for the following components:    MCH 26.1 (*)     MCHC 30.8 (*)     RDW 17.0 (*)     All other components within normal limits   BASIC METABOLIC PANEL - Abnormal; Notable for the following components:    Glucose 117 (*)     Chloride 109 (*)     Calcium 8.3 (*)     All other components within normal limits    Narrative:     GFR Normal >60  Chronic Kidney Disease <60  Kidney Failure <15     CBC WITH AUTO DIFFERENTIAL - Abnormal; Notable for the following components:    Hemoglobin 10.9 (*)     MCH 26.3 (*)     MCHC 31.3 (*)     RDW 16.8 (*)     All other components within normal limits   BLOOD CULTURE - Normal   BLOOD CULTURE - Normal   LACTIC ACID, PLASMA - Normal   RAINBOW DRAW    Narrative:     The following orders were created for panel order [place] Draw.  Procedure                               Abnormality         Status                     ---------                               -----------         ------                     Light Blue Top[[id number]]                                   Final result                 Please view results for these tests on the individual orders.   CBC AND DIFFERENTIAL    Narrative:     The following orders were created for panel order CBC & Differential.  Procedure                               Abnormality         Status                     ---------                               -----------         ------                     CBC Auto Differential[[id number]]        Abnormal            Final result                 Please view results for these tests on the individual orders.   LIGHT BLUE TOP           RADIOLOGY  No Radiology Exams Resulted Within Past 24 Hours      MEDICATIONS GIVEN IN ER  Medications   ceFAZolin in dextrose (ANCEF) IVPB solution  2,000 mg (0 mg Intravenous Stopped 3/7/24 1446)   HYDROcodone-acetaminophen (NORCO) 5-325 MG per tablet 1 tablet (1 tablet Oral Given 3/7/24 1316)         ORDERS PLACED DURING THIS VISIT:  Orders Placed This Encounter   Procedures    Blood Culture - Blood,    Blood Culture - Blood,    Comprehensive Metabolic Panel    Lactic Acid, Plasma    CBC Auto Differential    [place] Draw    Basic Metabolic Panel    CBC Auto Differential    Place Sequential Compression Device    Discharge Follow-up with PCP    PT Plan of Care Cert / Re-Cert    Initiate Observation Status    Discharge patient    CBC & Differential    Light Blue Top         OUTPATIENT MEDICATION MANAGEMENT:  No current Epic-ordered facility-administered medications on file.     Current Outpatient Medications Ordered in Epic   Medication Sig Dispense Refill    atorvastatin (LIPITOR) 10 MG tablet Take 1 tablet by mouth Daily. 90 tablet 1    bacitracin 500 UNIT/GM ointment Apply 1 Application topically to the appropriate area as directed Every 12 (Twelve) Hours. 28 g 0    baclofen (LIORESAL) 10 MG tablet Take 2 tablets by mouth 3 (Three) Times a Day.      cholecalciferol (VITAMIN D3) 25 MCG (1000 UT) tablet Take 5 tablets by mouth Daily.      ferrous sulfate 325 (65 FE) MG tablet Take 1 tablet by mouth Every Other Day. 45 tablet 1    FLUoxetine (PROzac) 20 MG capsule Take 3 capsules by mouth Daily. 270 capsule 1    losartan-hydrochlorothiazide (HYZAAR) 50-12.5 MG per tablet Take 1 tablet by mouth Daily. 90 tablet 1    pantoprazole (PROTONIX) 40 MG EC tablet Take 1 tablet by mouth 2 (Two) Times a Day. 180 tablet 1    pramipexole (MIRAPEX) 1.5 MG tablet Take 1 tablet by mouth 3 (Three) Times a Day.      cephalexin (Keflex) 500 MG capsule Take 1 capsule by mouth 3 (Three) Times a Day. 18 capsule 0    clonazePAM (KlonoPIN) 2 MG tablet Take 1 tablet by mouth As Needed (Sleep).      Diclofenac Sodium (VOLTAREN) 1 % gel gel Apply 4 g topically to the appropriate area as  directed 4 (Four) Times a Day As Needed (pain). 350 g 0         PROCEDURES  Procedures            PROGRESS, DATA ANALYSIS, CONSULTS, AND MEDICAL DECISION MAKING  All labs have been independently interpreted by me.  All radiology studies have been reviewed by me. All EKG's have been independently viewed and interpreted by me.  Discussion below represents my analysis of pertinent findings related to patient's condition, differential diagnosis, treatment plan and final disposition.    DIFFERENTIAL    My differential diagnosis includes but is not limited to lymphedema, venous insufficiency, cellulitis, inflammation due to chronic swelling       ED Course as of 03/15/24 1940   Thu Mar 07, 2024   1355 WBC: 6.15 [KA]   1355 Hemoglobin: 12.4 [KA]   1355 Glucose: 96 [KA]   1355 Creatinine: 0.67 [KA]   1355 Lactate: 1.7 [KA]   1420 Patient presents with bilateral lower extremity cellulitis, worse on the left.  Chronic lower extremity edema.  Labs look okay, no sepsis.  Based on the bilateral nature and significant progression over the last couple days offered admission for further evaluation and treatment and she is agreeable [KA]   1427 I discussed patient with Dr. Britt, hospitalist including history presentation workup and she agrees to admit for further evaluation and treatment. [KA]      ED Course User Index  [KA] Natalia Aburto PA-C             AS OF 19:40 EDT VITALS:    BP - 130/74  HR - 72  TEMP - 98.6 °F (37 °C) (Oral)  O2 SATS - 95%    COMPLEXITY OF CARE  Admission was considered but after careful review of the patient's presentation, physical examination, diagnostic results, and response to treatment the patient may be safely discharged with outpatient follow-up.      DIAGNOSIS  Final diagnoses:   Cellulitis of lower extremity, unspecified laterality   Lower extremity edema         DISPOSITION  ED Disposition       ED Disposition   Decision to Admit    Condition   --    Comment   Level of Care: Med/Surg [1]    Diagnosis: Lower extremity cellulitis [079635]   Admitting Physician: NIHARIKA RAMIREZ [[id number]]   Attending Physician: NIHARIKA RAMIREZ [[id number]]                    FOLLOW UP  Enoc Carbone DO  [address]  [address]  [place] [address]  [phone]      PCP 2 to 3 weeks.  Keep scheduled follow-up with pending neurologist as noted prior to this admission    Sohan Jacobsen MD  [address]  [address]  [place] [address]  [phone]      PCP 2 to 3 weeks.  Keep scheduled follow-up with pending neurologist as noted prior to this admission              Please note that portions of this document were completed with a voice recognition program.    Note Disclaimer: At [facility], we believe that sharing information builds trust and better relationships. You are receiving this note because you recently visited [facility]. It is possible you will see health information before a provider has talked with you about it. This kind of information can be easy to misunderstand. To help you fully understand what it means for your health, we urge you to discuss this note with your provider.         Natalia Aburto PA-C  03/15/24 1940

## 2024-03-26 ENCOUNTER — OFFICE VISIT (OUTPATIENT)
Dept: INTERNAL MEDICINE | Facility: CLINIC | Age: 64
End: 2024-03-26
Payer: MEDICARE

## 2024-03-26 VITALS
OXYGEN SATURATION: 100 % | DIASTOLIC BLOOD PRESSURE: 70 MMHG | WEIGHT: 171 LBS | HEIGHT: 66 IN | HEART RATE: 68 BPM | SYSTOLIC BLOOD PRESSURE: 138 MMHG | BODY MASS INDEX: 27.48 KG/M2

## 2024-03-26 DIAGNOSIS — L03.116 CELLULITIS OF LEFT LOWER EXTREMITY: Primary | ICD-10-CM

## 2024-03-26 RX ORDER — SULFAMETHOXAZOLE AND TRIMETHOPRIM 800; 160 MG/1; MG/1
2 TABLET ORAL 2 TIMES DAILY
Qty: 24 TABLET | Refills: 0 | Status: SHIPPED | OUTPATIENT
Start: 2024-03-26 | End: 2024-04-01 | Stop reason: SDUPTHER

## 2024-03-26 RX ORDER — SULFAMETHOXAZOLE AND TRIMETHOPRIM 800; 160 MG/1; MG/1
2 TABLET ORAL 2 TIMES DAILY
Qty: 24 TABLET | Refills: 0 | Status: SHIPPED | OUTPATIENT
Start: 2024-03-26 | End: 2024-03-26

## 2024-03-26 NOTE — PROGRESS NOTES
Enoc Carbone D.O.  Internal Medicine  [facility]  [address], [address]  [place] [address]  [phone]      Chief Complaint  Follow-up (On cellulitis on legs)    SUBJECTIVE    History of Present Illness    Maritza Bejarano is a 63 y.o. female who presents to the office today as an established patient that last saw me on 11/16/2023.   Here today for acute care visit.   Pt states she saw her neurologist yesterday who thought legs looked red, possibly cellulitis again so she is here today to be seen for that. No fevers but she has started to feel very cold. Pt is unable to look at them to see what the legs look like due to her MS. Has no other concerns.     Allergies   Allergen Reactions    Contrast Dye (Echo Or Unknown Ct/Mr) Shortness Of Breath    Iodinated Contrast Media Shortness Of Breath     SOA.    Patient states she has had it without symptoms.    Sulfa Antibiotics Other (See Comments)     Bradycardia after injection         Outpatient Medications Marked as Taking for the 3/26/24 encounter (Office Visit) with Enoc Carbone, DO   Medication Sig Dispense Refill    atorvastatin (LIPITOR) 10 MG tablet Take 1 tablet by mouth Daily. 90 tablet 1    bacitracin 500 UNIT/GM ointment Apply 1 Application topically to the appropriate area as directed Every 12 (Twelve) Hours. 28 g 0    baclofen (LIORESAL) 10 MG tablet Take 2 tablets by mouth 3 (Three) Times a Day.      cephalexin (Keflex) 500 MG capsule Take 1 capsule by mouth 3 (Three) Times a Day. 18 capsule 0    cholecalciferol (VITAMIN D3) 25 MCG (1000 UT) tablet Take 5 tablets by mouth Daily.      clonazePAM (KlonoPIN) 2 MG tablet Take 1 tablet by mouth As Needed (Sleep).      Diclofenac Sodium (VOLTAREN) 1 % gel gel Apply 4 g topically to the appropriate area as directed 4 (Four) Times a Day As Needed (pain). 350 g 0    ferrous sulfate 325 (65 FE) MG tablet Take 1 tablet by mouth Every Other Day. 45 tablet 1    FLUoxetine (PROzac) 20 MG  "capsule Take 3 capsules by mouth Daily. 270 capsule 1    losartan-hydrochlorothiazide (HYZAAR) 50-12.5 MG per tablet Take 1 tablet by mouth Daily. 90 tablet 1    pantoprazole (PROTONIX) 40 MG EC tablet Take 1 tablet by mouth 2 (Two) Times a Day. 180 tablet 1    pramipexole (MIRAPEX) 1.5 MG tablet Take 1 tablet by mouth 3 (Three) Times a Day.      sulfamethoxazole-trimethoprim (Bactrim DS) 800-160 MG per tablet Take 2 tablets by mouth 2 (Two) Times a Day for 6 days. 24 tablet 0        Past Medical History:   Diagnosis Date    Dawn esophagus     per patient    Blurred vision     R/T MS    Carpal tunnel syndrome     Depression     Diplopia 2013    GERD (gastroesophageal reflux disease)     H/O Skin cancer, basal cell     Headache     History of blood transfusion     History of GI bleed     R/T NSAIDS AND STEROIDS, multiple times    History of urinary tract infection     Hypercalcemia     s/p parathyroidectomy    Hyperlipidemia     Hypertension     Movement disorder     Multiple sclerosis     Optic neuritis     PONV (postoperative nausea and vomiting)        OBJECTIVE    Vital Signs:   /70   Pulse 68   Ht 166.4 cm (65.5\")   Wt 77.6 kg (171 lb)   SpO2 100%   BMI 28.02 kg/m²        Physical Exam  Vitals reviewed.   Constitutional:       General: She is not in acute distress.     Appearance: Normal appearance. She is not ill-appearing.   Eyes:      General: No scleral icterus.  Pulmonary:      Effort: Pulmonary effort is normal. No respiratory distress.   Musculoskeletal:      Right lower leg: Edema (2+ ankle and pretibial pitting) present.      Left lower leg: Edema (2+ ankle and pretibial pitting) present.   Skin:     Coloration: Skin is not jaundiced.      Comments: LLE with erythema and warmth as shown and marked in the images below   Neurological:      Mental Status: She is alert.   Psychiatric:         Mood and Affect: Mood normal.         Behavior: Behavior normal.         Thought Content: Thought " content normal.                                   ASSESSMENT & PLAN     Diagnoses and all orders for this visit:    1. Cellulitis of left lower extremity (Primary)  -    pt presents to the office today for acute care visit after another provider saw her left lower leg yesterday and felt it looked red  -pt was recently in hospital at [facility] for cellulitis of this extremity and when I saw her for follow up visit she was completing her oral therapy and having improvement in erythema. Unfortunately with her MS she is unable to look at her legs thoroughly so we unaware that redness was increasing until her neurologist mentioned it.  -on exam I am concerned that she has redness and warmth suggestive of skin/soft tissue infection. She denies purulent discharge. Review of vitals is overall normal.   -I marked the area of redness today. Advised her that we can treat with Bactrim DS for 6 days and I would like to see her back in 7 days . Report to ER immediately if she starts to feel systemically ill or has worsening of the redness beyond the marked area. She has sulfa on her allergy list but she states this was not a real allergy and was just related to having a slow heart rate in the hospital after getting an injection of medication.   -     sulfamethoxazole-trimethoprim (Bactrim DS) 800-160 MG per tablet; Take 2 tablets by mouth 2 (Two) Times a Day for 6 days.  Dispense: 24 tablet; Refill: 0            Follow Up  Return in about 1 week (around 4/2/2024) for Recheck.    Patient/family had no further questions at this time and verbalized understanding of the plan discussed today.

## 2024-03-29 ENCOUNTER — TELEPHONE (OUTPATIENT)
Dept: INTERNAL MEDICINE | Facility: CLINIC | Age: 64
End: 2024-03-29

## 2024-03-29 NOTE — TELEPHONE ENCOUNTER
Caller: Maritza Bejarano    Relationship: Self    Best call back number: [phone]     What was the call regarding: PATIENT WAS IN ON TUESDAY FOR CELLULITIS. HER LEG LOOKS BETTER BUT SHE IS NAUSEOUS. SHE DOES NOT HAVE A FEVER BUT WANTED TO MAKE SURE THE NAUSEA WAS NOT A REASON TO GO TO THE ER.

## 2024-04-01 ENCOUNTER — OFFICE VISIT (OUTPATIENT)
Dept: INTERNAL MEDICINE | Facility: CLINIC | Age: 64
End: 2024-04-01
Payer: MEDICARE

## 2024-04-01 VITALS
DIASTOLIC BLOOD PRESSURE: 80 MMHG | SYSTOLIC BLOOD PRESSURE: 120 MMHG | BODY MASS INDEX: 27.48 KG/M2 | WEIGHT: 171 LBS | HEIGHT: 66 IN | RESPIRATION RATE: 16 BRPM

## 2024-04-01 DIAGNOSIS — L03.116 CELLULITIS OF LEFT LOWER EXTREMITY: Primary | ICD-10-CM

## 2024-04-01 DIAGNOSIS — D50.9 IRON DEFICIENCY ANEMIA, UNSPECIFIED IRON DEFICIENCY ANEMIA TYPE: ICD-10-CM

## 2024-04-01 RX ORDER — SULFAMETHOXAZOLE AND TRIMETHOPRIM 800; 160 MG/1; MG/1
2 TABLET ORAL 2 TIMES DAILY
Qty: 20 TABLET | Refills: 0 | Status: SHIPPED | OUTPATIENT
Start: 2024-04-01 | End: 2024-04-06

## 2024-04-01 NOTE — PROGRESS NOTES
Enoc Carbone D.O.  Internal Medicine  [facility]  [address], [address]  [place] [address]  [phone]      Chief Complaint  Cellulitis    SUBJECTIVE    History of Present Illness    Maritza Bejarano is a 63 y.o. female who presents to the office today as an established patient that last saw me on 3/26/2024.     Left lower extremity cellulitis: at last visit started Bactrim DS 2 tab twice daily.   No fever or chills since last visit. Feels that the redness in the left leg is improved as well as swelling improved.     Allergies   Allergen Reactions    Contrast Dye (Echo Or Unknown Ct/Mr) Shortness Of Breath    Iodinated Contrast Media Shortness Of Breath     SOA.    Patient states she has had it without symptoms.    Sulfa Antibiotics Other (See Comments)     Bradycardia after injection         Outpatient Medications Marked as Taking for the 4/1/24 encounter (Office Visit) with Enoc Carbone, DO   Medication Sig Dispense Refill    atorvastatin (LIPITOR) 10 MG tablet Take 1 tablet by mouth Daily. 90 tablet 1    bacitracin 500 UNIT/GM ointment Apply 1 Application topically to the appropriate area as directed Every 12 (Twelve) Hours. 28 g 0    baclofen (LIORESAL) 10 MG tablet Take 2 tablets by mouth 3 (Three) Times a Day.      cephalexin (Keflex) 500 MG capsule Take 1 capsule by mouth 3 (Three) Times a Day. 18 capsule 0    cholecalciferol (VITAMIN D3) 25 MCG (1000 UT) tablet Take 5 tablets by mouth Daily.      clonazePAM (KlonoPIN) 2 MG tablet Take 1 tablet by mouth As Needed (Sleep).      Diclofenac Sodium (VOLTAREN) 1 % gel gel Apply 4 g topically to the appropriate area as directed 4 (Four) Times a Day As Needed (pain). 350 g 0    ferrous sulfate 325 (65 FE) MG tablet Take 1 tablet by mouth Every Other Day. 45 tablet 1    FLUoxetine (PROzac) 20 MG capsule Take 3 capsules by mouth Daily. 270 capsule 1    losartan-hydrochlorothiazide (HYZAAR) 50-12.5 MG per tablet Take 1 tablet by mouth  "Daily. 90 tablet 1    pantoprazole (PROTONIX) 40 MG EC tablet Take 1 tablet by mouth 2 (Two) Times a Day. 180 tablet 1    pramipexole (MIRAPEX) 1.5 MG tablet Take 1 tablet by mouth 3 (Three) Times a Day.      sulfamethoxazole-trimethoprim (Bactrim DS) 800-160 MG per tablet Take 2 tablets by mouth 2 (Two) Times a Day for 5 days. 20 tablet 0    [DISCONTINUED] sulfamethoxazole-trimethoprim (Bactrim DS) 800-160 MG per tablet Take 2 tablets by mouth 2 (Two) Times a Day for 6 days. 24 tablet 0        Past Medical History:   Diagnosis Date    Dawn esophagus     per patient    Blurred vision     R/T MS    Carpal tunnel syndrome     Depression     Diplopia 2013    GERD (gastroesophageal reflux disease)     H/O Skin cancer, basal cell     Headache     History of blood transfusion     History of GI bleed     R/T NSAIDS AND STEROIDS, multiple times    History of urinary tract infection     Hypercalcemia     s/p parathyroidectomy    Hyperlipidemia     Hypertension     Movement disorder     Multiple sclerosis     Optic neuritis     PONV (postoperative nausea and vomiting)        OBJECTIVE    Vital Signs:   /80   Resp 16   Ht 166.4 cm (65.5\")   Wt 77.6 kg (171 lb)   BMI 28.02 kg/m²        Physical Exam  Vitals reviewed.   Eyes:      General: No scleral icterus.  Musculoskeletal:      Comments: Ambulating via wheelchair.   Skin:     Comments: There is resolution of LLE warmth to touch as well as resolution of skin and soft tissue edema associated with cellulitis. Redness has decreased in intensity compared to last visit and has started to regress from the marked areas that were put in place last visit.    Neurological:      Mental Status: She is alert.   Psychiatric:         Mood and Affect: Mood normal.         Behavior: Behavior normal.         Thought Content: Thought content normal.                                   ASSESSMENT & PLAN     Diagnoses and all orders for this visit:    1. Cellulitis of left lower " extremity (Primary)  -at last visit started Bactrim DS 2 tab twice daily.   No fever or chills since last visit. Feels that the redness in the left leg is improved as well as swelling improved.   -cellulitis is improving clinically on exam. Will extend treatment with Bactrim DS another 5 days. She will let me know at that point if redness/swelling recurs or if symptoms worsen. She is in agreement and overall feels better about this problem.   -     sulfamethoxazole-trimethoprim (Bactrim DS) 800-160 MG per tablet; Take 2 tablets by mouth 2 (Two) Times a Day for 5 days.  Dispense: 20 tablet; Refill: 0    2. Iron deficiency anemia, unspecified iron deficiency anemia type  -she has been able to get a GI appointment later this month which is great for consideration of EGD and colonoscopy  -will recheck CBC and iron studies to assess for continued improvement with ferrous sulfate treatment   -     CBC w AUTO Differential  -     Iron Profile  -     Ferritin          Follow Up  Return in about 3 months (around 7/1/2024) for Recheck.    Patient/family had no further questions at this time and verbalized understanding of the plan discussed today.

## 2024-04-08 ENCOUNTER — OFFICE VISIT (OUTPATIENT)
Dept: SLEEP MEDICINE | Facility: HOSPITAL | Age: 64
End: 2024-04-08
Payer: MEDICARE

## 2024-04-08 VITALS — WEIGHT: 170 LBS | HEART RATE: 72 BPM | OXYGEN SATURATION: 97 % | HEIGHT: 66 IN | BODY MASS INDEX: 27.32 KG/M2

## 2024-04-08 DIAGNOSIS — G47.33 OBSTRUCTIVE SLEEP APNEA, ADULT: Primary | ICD-10-CM

## 2024-04-08 PROCEDURE — G0463 HOSPITAL OUTPT CLINIC VISIT: HCPCS

## 2024-04-08 NOTE — PROGRESS NOTES
[facility]      Patient Care Team:  Enoc Carbone DO as PCP - General (Internal Medicine)  Sohan Jacobsen MD as PCP - Family Medicine  Rohith Reyes MD as Consulting Physician (Hematology and Oncology)  Enoc Carbone DO as Referring Physician (Internal Medicine)      Chief complaint:   Establish care for sleep apnea    History of present illness:    Subjective   Patient is a 60 y.o. female patient with hx of MS who complains of loud snoring, frequent nocturia (x4), non refreshing sleep and excessive daytime sleepiness     Split night PSG 3/26/21: AHI=49/h; Osiel SpO2=53%; Hypoxic burden= 7.9 min; CPAP titration reached pressure of 10 cm H2O which was effective.     She currently has a CPAP but does not use it much.  She only uses it sporadically.  She said that she does not sleep well with that and often it makes her lips fluttering.  It does not seem that she has seen a sleep provider for 3 years at least.    Sleep schedule:  -Bedtime: Midnight  -Sleep latency: Not long  -Wake up time: 7 AM , does feel refreshed  -Nocturnal awakenin-5 times because of change position.  No difficulties going back to sleep.      Mask: Nasal Mask which does fit well.  No significant air leak or dry mouth  DME: [facility]    ESS: Total score: 6     Review of Systems  Constitutional: No fever or chills. No changes in appetite.   ENMT: No sinus congestion, postnasal drip, hoarsness  Cardiovascular: No chest pain, palpitation or legs swelling.    Respiratory: No dyspnea, cough or wheezing.  Gastrointestinal: No constipation, diarrhea, abdominal pain or acid reflux  Neurology: No headache,  numbness or dizziness but weakness.  She is wheelchair-bound..   Musculoskeletal: No joints pain, stiffness or swelling.   Psychiatry: No depression, anxiety or irritability.   Hem/Lymphatic: No swollen glands or easy bruising.  Integumentary: No rash.  Endocrinology: No excessive thirst,  cold or warm intolerance.   Urinary: No dysuria, bloody urine or frequent urination.     History  Past Medical History:   Diagnosis Date    Dawn esophagus     per patient    Blurred vision     R/T MS    Carpal tunnel syndrome     Depression     Diplopia 2013    GERD (gastroesophageal reflux disease)     H/O Skin cancer, basal cell     Headache     History of blood transfusion     History of GI bleed     R/T NSAIDS AND STEROIDS, multiple times    History of urinary tract infection     Hypercalcemia     s/p parathyroidectomy    Hyperlipidemia     Hypertension     Movement disorder     Multiple sclerosis     Optic neuritis     PONV (postoperative nausea and vomiting)    ,   Past Surgical History:   Procedure Laterality Date    APPENDECTOMY      BLADDER SURGERY      bladder stimulator    BREAST SURGERY      augmentation wtih subsequent removal    CARPAL TUNNEL RELEASE Bilateral     Left 2018, right 2020    CUBITAL TUNNEL RELEASE Left     CYSTOSCOPY BOTOX INJECTION OF BLADDER  2018    Cystoscopy with Botox    PARATHYROIDECTOMY      one gland removed    ROTATOR CUFF REPAIR Right 2017    TOE SURGERY      bilateral great toes    TOTAL SHOULDER ARTHROPLASTY W/ DISTAL CLAVICLE EXCISION Right 10/22/2018    Procedure: RT TOTAL SHOULDER REVERSE ARTHROPLASTY;  Surgeon: Bipin Dangelo MD;  Location: [facility];  Service: Orthopedics   ,   Family History   Problem Relation Age of Onset    Hypertension Mother     Hyperlipidemia Mother     Aortic aneurysm Mother         thoracic    Diabetes Mother     Hypertension Father     Heart failure Father     Miscarriages / Stillbirths Sister     Multiple sclerosis Brother     Atrial fibrillation Brother     Diabetes Maternal Grandfather     Stroke Maternal Grandfather     Parkinsonism Paternal Grandmother     Tremor Paternal Grandmother     Stomach cancer Paternal Grandfather    ,   Social History     Socioeconomic History    Marital status:      Spouse name: Donald Ellis of  children: 0   Tobacco Use    Smoking status: Former     Current packs/day: 0.00     Average packs/day: 2.0 packs/day for 30.0 years (60.0 ttl pk-yrs)     Types: Cigarettes     Start date: 10/22/1973     Quit date: 10/22/2003     Years since quittin.4    Smokeless tobacco: Never   Vaping Use    Vaping status: Never Used   Substance and Sexual Activity    Alcohol use: Not Currently    Drug use: Yes     Types: Marijuana     E-cigarette/Vaping    E-cigarette/Vaping Use Never User     Passive Exposure No     Counseling Given No      E-cigarette/Vaping Substances    Nicotine No     THC No     CBD No     Flavoring No      E-cigarette/Vaping Devices    Disposable No     Pre-filled or Refillable Cartridge No     Refillable Tank No     Pre-filled Pod No          , and Allergies:  Contrast dye (echo or unknown ct/mr), Iodinated contrast media, and Sulfa antibiotics    Medications:    Current Outpatient Medications:     atorvastatin (LIPITOR) 10 MG tablet, Take 1 tablet by mouth Daily., Disp: 90 tablet, Rfl: 1    bacitracin 500 UNIT/GM ointment, Apply 1 Application topically to the appropriate area as directed Every 12 (Twelve) Hours., Disp: 28 g, Rfl: 0    baclofen (LIORESAL) 10 MG tablet, Take 2 tablets by mouth 3 (Three) Times a Day., Disp: , Rfl:     cephalexin (Keflex) 500 MG capsule, Take 1 capsule by mouth 3 (Three) Times a Day., Disp: 18 capsule, Rfl: 0    cholecalciferol (VITAMIN D3) 25 MCG (1000 UT) tablet, Take 5 tablets by mouth Daily., Disp: , Rfl:     clonazePAM (KlonoPIN) 2 MG tablet, Take 1 tablet by mouth As Needed (Sleep)., Disp: , Rfl:     Diclofenac Sodium (VOLTAREN) 1 % gel gel, Apply 4 g topically to the appropriate area as directed 4 (Four) Times a Day As Needed (pain)., Disp: 350 g, Rfl: 0    ferrous sulfate 325 (65 FE) MG tablet, Take 1 tablet by mouth Every Other Day., Disp: 45 tablet, Rfl: 1    FLUoxetine (PROzac) 20 MG capsule, Take 3 capsules by mouth Daily., Disp: 270 capsule, Rfl: 1     "losartan-hydrochlorothiazide (HYZAAR) 50-12.5 MG per tablet, Take 1 tablet by mouth Daily., Disp: 90 tablet, Rfl: 1    pantoprazole (PROTONIX) 40 MG EC tablet, Take 1 tablet by mouth 2 (Two) Times a Day., Disp: 180 tablet, Rfl: 1    pramipexole (MIRAPEX) 1.5 MG tablet, Take 1 tablet by mouth 3 (Three) Times a Day., Disp: , Rfl:       Objective   Vital Signs:  Vitals:    04/08/24 1521   Pulse: 72   SpO2: 97%   Weight: 77.1 kg (170 lb)   Height: 166.4 cm (65.5\")     Body mass index is 27.86 kg/m².  Neck Circumference: 14 inches     Physical Exam:  Neck Circumference: 14 inches     Constitutional: Not in acute distress.  Eyes: Injected conjunctiva, EOMI. pupils equal reactive to light.  ENMT: Westbrook score 4. Mallampati score 4. No oral thrush. Tonsils grade 1. Narrow distance in between the posterior pharyngeal pillars (<25 %).   Neck: Large. No thyromegaly.  Trachea midline.  Heart: Regular rhythm and rate, no murmur  Lungs: Good and equal air entry bilaterally. No crackles or wheezing.  Nonlabored breathing.       Abdomen: Obese.  Soft.  No tenderness.  Positive bowel sounds.  Extremities: No cyanosis, clubbing or pitting edema.  Warm extremities and well-perfused.  Neuro: Conscious, alert, oriented x3.  Gait is normal.  Strength 5/5 in arms and legs.  Psych: Appropriate mood and affect.    Integumentary: No rash.  Normal skin turgor.  Lymphatic: No palpable cervical or supraclavicular lymph nodes.    Diagnostic data:      CPAP download showed:  Date: Last 30 days  Usage (days): 6.7 %  Days used>4h: 3.3 %  AHI: 1.2/h  Leak: 0 min and 30 seconds  Usage: 4h and 36 min  CPAP: 10 cm H2O    Assessment   GERARDO, severe  HTN  Overweight, BMI 27      Plan:  Discussed the result of the download.  She is noncompliant with therapy.  Will prescribe her a chinstrap and see if that would help her tolerate CPAP.  If not then she may need fitting with a new mask.  If she continues to have issues then we could place her on a sleep aid " with her machine.  She does have clonazepam but she stated that when she takes it, she gets very groggy during the day.    Discussed the association between obstructive sleep apnea and cardiovascular disease including HTN and the beneficial effect of Pap therapy in reducing the risk of major cardiovascular events.    Counseled for weight loss.  Encouraged to exercise regularly and cut down on carbohydrates.  Discussed that losing weight may decrease the severity of sleep apnea and obviate the need of CPAP therapy.    RTC 3 months          Deny Hair MD  04/08/24  15:40 EDT    This note was dictated utilizing Dragon dictation

## 2024-05-14 ENCOUNTER — TELEPHONE (OUTPATIENT)
Dept: INTERNAL MEDICINE | Facility: CLINIC | Age: 64
End: 2024-05-14
Payer: MEDICARE

## 2024-05-14 NOTE — TELEPHONE ENCOUNTER
Caller: Maritza Bejarano    Relationship: Self    Best call back number: [phone]    What medication are you requesting: Qsymia (phentermine/topiramate)     What are your current symptoms:     How long have you been experiencing symptoms:     Have you had these symptoms before:    [] Yes  [] No    Have you been treated for these symptoms before:   [] Yes  [] No    If a prescription is needed, what is your preferred pharmacy and phone number: [id number] [address]  AT [facility] [phone] [facility] [phone] FX     Additional notes: PATIENT IS REQUESTING A NEW MEDICATION FOR WEIGHT LOST. PATIENT STATED SHE HAS SPOKEN WITH DR. SALMERON REGARDING THIS AND HAS MADE THE DECISION ON WHICH MEDICATION SHE WOULD LIKE TO HAVE. PLEASE CALL PATIENT WITH FURTHER INFORMATION.

## 2024-05-23 ENCOUNTER — OFFICE VISIT (OUTPATIENT)
Dept: INTERNAL MEDICINE | Facility: CLINIC | Age: 64
End: 2024-05-23
Payer: MEDICARE

## 2024-05-23 VITALS
HEIGHT: 66 IN | HEART RATE: 76 BPM | OXYGEN SATURATION: 98 % | BODY MASS INDEX: 28.12 KG/M2 | DIASTOLIC BLOOD PRESSURE: 80 MMHG | SYSTOLIC BLOOD PRESSURE: 130 MMHG | WEIGHT: 175 LBS

## 2024-05-23 DIAGNOSIS — Z79.899 HIGH RISK MEDICATION USE: ICD-10-CM

## 2024-05-23 DIAGNOSIS — D50.9 IRON DEFICIENCY ANEMIA, UNSPECIFIED IRON DEFICIENCY ANEMIA TYPE: ICD-10-CM

## 2024-05-23 DIAGNOSIS — R73.9 HYPERGLYCEMIA: ICD-10-CM

## 2024-05-23 DIAGNOSIS — E66.3 OVERWEIGHT: Primary | ICD-10-CM

## 2024-05-23 PROCEDURE — 99214 OFFICE O/P EST MOD 30 MIN: CPT | Performed by: STUDENT IN AN ORGANIZED HEALTH CARE EDUCATION/TRAINING PROGRAM

## 2024-05-23 PROCEDURE — 3075F SYST BP GE 130 - 139MM HG: CPT | Performed by: STUDENT IN AN ORGANIZED HEALTH CARE EDUCATION/TRAINING PROGRAM

## 2024-05-23 PROCEDURE — G2211 COMPLEX E/M VISIT ADD ON: HCPCS | Performed by: STUDENT IN AN ORGANIZED HEALTH CARE EDUCATION/TRAINING PROGRAM

## 2024-05-23 PROCEDURE — 3079F DIAST BP 80-89 MM HG: CPT | Performed by: STUDENT IN AN ORGANIZED HEALTH CARE EDUCATION/TRAINING PROGRAM

## 2024-05-23 RX ORDER — FERROUS SULFATE 325(65) MG
325 TABLET ORAL EVERY OTHER DAY
Qty: 45 TABLET | Refills: 1 | Status: SHIPPED | OUTPATIENT
Start: 2024-05-23

## 2024-05-23 RX ORDER — PHENTERMINE AND TOPIRAMATE 3.75; 23 MG/1; MG/1
3.75 CAPSULE, EXTENDED RELEASE ORAL DAILY
Qty: 14 CAPSULE | Refills: 0 | Status: SHIPPED | OUTPATIENT
Start: 2024-05-23 | End: 2024-06-06

## 2024-05-23 RX ORDER — PHENTERMINE AND TOPIRAMATE 7.5; 46 MG/1; MG/1
7.5 CAPSULE, EXTENDED RELEASE ORAL DAILY
Qty: 30 CAPSULE | Refills: 1 | Status: SHIPPED | OUTPATIENT
Start: 2024-05-23

## 2024-05-23 NOTE — PROGRESS NOTES
Enoc Carbone D.O.  Internal Medicine  [facility]  [address], [address]  [place] [address]  [phone]      Chief Complaint  Discuss weight loss medication    SUBJECTIVE    History of Present Illness    Maritza Bejarano is a 63 y.o. female who presents to the office today as an established patient that last saw me on 4/1/2024.     Overweight:  states she researched weight loss medications from last visit. She  has researched her option and is interested in Qsymia. She is limited in exercise by MS. She has started PT with MS neuroscience recently and that is her primary exercise. Pt states she likes sweets. She tends to delay eating and then eats a large portion size . She did weightwatchers years ago. Not currently following any dietary program.       Allergies   Allergen Reactions    Contrast Dye (Echo Or Unknown Ct/Mr) Shortness Of Breath    Iodinated Contrast Media Shortness Of Breath     SOA.    Patient states she has had it without symptoms.    Sulfa Antibiotics Other (See Comments)     Bradycardia after injection         Outpatient Medications Marked as Taking for the 5/23/24 encounter (Office Visit) with Enoc Carbone, DO   Medication Sig Dispense Refill    atorvastatin (LIPITOR) 10 MG tablet Take 1 tablet by mouth Daily. 90 tablet 1    bacitracin 500 UNIT/GM ointment Apply 1 Application topically to the appropriate area as directed Every 12 (Twelve) Hours. 28 g 0    baclofen (LIORESAL) 10 MG tablet Take 2 tablets by mouth 3 (Three) Times a Day.      cephalexin (Keflex) 500 MG capsule Take 1 capsule by mouth 3 (Three) Times a Day. 18 capsule 0    cholecalciferol (VITAMIN D3) 25 MCG (1000 UT) tablet Take 5 tablets by mouth Daily.      clonazePAM (KlonoPIN) 2 MG tablet Take 1 tablet by mouth As Needed (Sleep).      Diclofenac Sodium (VOLTAREN) 1 % gel gel Apply 4 g topically to the appropriate area as directed 4 (Four) Times a Day As Needed (pain). 350 g 0    ferrous sulfate 325  "(65 FE) MG tablet TAKE 1 TABLET BY MOUTH EVERY OTHER DAY 45 tablet 1    losartan-hydrochlorothiazide (HYZAAR) 50-12.5 MG per tablet Take 1 tablet by mouth Daily. 90 tablet 1    pantoprazole (PROTONIX) 40 MG EC tablet Take 1 tablet by mouth 2 (Two) Times a Day. 180 tablet 1    pramipexole (MIRAPEX) 1.5 MG tablet Take 1 tablet by mouth 3 (Three) Times a Day.          Past Medical History:   Diagnosis Date    Dawn esophagus     per patient    Blurred vision     R/T MS    Carpal tunnel syndrome     Depression     Diplopia 2013    GERD (gastroesophageal reflux disease)     H/O Skin cancer, basal cell     Headache     History of blood transfusion     History of GI bleed     R/T NSAIDS AND STEROIDS, multiple times    History of urinary tract infection     Hypercalcemia     s/p parathyroidectomy    Hyperlipidemia     Hypertension     Movement disorder     Multiple sclerosis     Optic neuritis     PONV (postoperative nausea and vomiting)        OBJECTIVE    Vital Signs:   /80   Pulse 76   Ht 166.4 cm (65.5\")   Wt 79.4 kg (175 lb)   SpO2 98%   BMI 28.68 kg/m²        Physical Exam  Vitals reviewed.   Constitutional:       General: She is not in acute distress.     Appearance: Normal appearance. She is not ill-appearing.   Eyes:      General: No scleral icterus.  Pulmonary:      Effort: Pulmonary effort is normal. No respiratory distress.   Musculoskeletal:      Comments: Ambulating via motorized wheelchair   Skin:     Coloration: Skin is not jaundiced.   Neurological:      Mental Status: She is alert.   Psychiatric:         Mood and Affect: Mood normal.         Behavior: Behavior normal.         Thought Content: Thought content normal.                             ASSESSMENT & PLAN     Diagnoses and all orders for this visit:    1. Overweight (Primary)  2. Hyperglycemia  3. High risk medication use  -Patient seen today for initiation/continuation of pharmacological weight loss therapy. Patient advised of the " alternatives to pharmacological weight loss therapy, including strict dieting and exercise, weight loss programs such as  Weightwatchers. Advised patient that weight loss medication is most successful when in conjunction with diet and exercise.  -After discussing the alternatives, patient is interested in starting Qsymia. She has comorbid hypertension and hyperlidemia.  -Patient’s current weight is 175 pounds and BMI is 28.7.   -Reviewed contraindications for Qsymia: pregnancy, glaucoma, hyperthyroidism, treatment with MAOIs within 14 days, or known allergy  -Advised pt that the most common side effects included paraesthesia, dizziness, changed sense of taste, insomnia, constipation, dry mouth, increased heart rate, changes in cognitive function or mood.   -Avoid use of alcohol, sleep medications, or benzodiazepines while on Qsymia. Advised pt to stop clonazepam use for sleep which should not be an issue because she only uses it once per week or less.   -Will check CMP every 6 while on Qsymia to monitor electrolytes, renal function and liver enzymes.  -If patient decides to stop Qsymia, they should gradually taper off and contact the office for instructions  -Qsymia dosage for initiation: 3.75 mg/ 23 mg one cap oral daily for 14 days, then start 7.5 mg / 46 mg one cap oral daily. If patient’s achieve 3% weight loss or greater after 12 weeks, they can continue at this dose.  -discussed the controlled substance nature of this medication.  Richi reviewed.  Obtain serum drug screen.  -Check A1c        Follow Up  No follow-ups on file.    Patient/family had no further questions at this time and verbalized understanding of the plan discussed today.

## 2024-05-29 ENCOUNTER — APPOINTMENT (OUTPATIENT)
Dept: WOMENS IMAGING | Facility: HOSPITAL | Age: 64
End: 2024-05-29
Payer: MEDICARE

## 2024-05-29 PROCEDURE — 77066 DX MAMMO INCL CAD BI: CPT | Performed by: RADIOLOGY

## 2024-05-29 PROCEDURE — G0279 TOMOSYNTHESIS, MAMMO: HCPCS | Performed by: RADIOLOGY

## 2024-05-29 PROCEDURE — 76641 ULTRASOUND BREAST COMPLETE: CPT | Performed by: RADIOLOGY

## 2024-06-04 ENCOUNTER — APPOINTMENT (OUTPATIENT)
Dept: OTHER | Facility: HOSPITAL | Age: 64
End: 2024-06-04
Payer: MEDICARE

## 2024-06-04 ENCOUNTER — APPOINTMENT (OUTPATIENT)
Dept: WOMENS IMAGING | Facility: HOSPITAL | Age: 64
End: 2024-06-04
Payer: MEDICARE

## 2024-06-04 ENCOUNTER — LAB REQUISITION (OUTPATIENT)
Dept: LAB | Facility: HOSPITAL | Age: 64
End: 2024-06-04
Payer: MEDICARE

## 2024-06-04 DIAGNOSIS — N63.0 UNSPECIFIED LUMP IN UNSPECIFIED BREAST: ICD-10-CM

## 2024-06-04 LAB
11OH-THC SPEC-MCNC: NEGATIVE NG/ML
AMPHETAMINES SERPL QL SCN: NEGATIVE NG/ML
BARBITURATES SERPL QL SCN: NEGATIVE UG/ML
BENZODIAZ SERPL QL SCN: NEGATIVE NG/ML
CANNABIDIOL SERPLBLD CFM-MCNC: NEGATIVE NG/ML
CANNABINOIDS SERPL QL SCN: ABNORMAL NG/ML
CANNABINOIDS SPEC QL CFM: POSITIVE
CARBOXYTHC SPEC-MCNC: 4.1 NG/ML
COCAINE+BZE SERPL QL SCN: NEGATIVE NG/ML
HBA1C MFR BLD: 6 % (ref 4.8–5.6)
METHADONE SERPL QL SCN: NEGATIVE NG/ML
OPIATES SERPL QL SCN: NEGATIVE NG/ML
OXYCODONE+OXYMORPHONE SERPLBLD QL SCN: NEGATIVE NG/ML
PCP SERPL QL SCN: NEGATIVE NG/ML
PROPOXYPH SERPL QL SCN: NEGATIVE NG/ML
THC SERPLBLD CFM-MCNC: NEGATIVE NG/ML

## 2024-06-04 PROCEDURE — 88341 IMHCHEM/IMCYTCHM EA ADD ANTB: CPT | Performed by: STUDENT IN AN ORGANIZED HEALTH CARE EDUCATION/TRAINING PROGRAM

## 2024-06-04 PROCEDURE — 88360 TUMOR IMMUNOHISTOCHEM/MANUAL: CPT | Performed by: STUDENT IN AN ORGANIZED HEALTH CARE EDUCATION/TRAINING PROGRAM

## 2024-06-04 PROCEDURE — 88305 TISSUE EXAM BY PATHOLOGIST: CPT | Performed by: STUDENT IN AN ORGANIZED HEALTH CARE EDUCATION/TRAINING PROGRAM

## 2024-06-04 PROCEDURE — 88342 IMHCHEM/IMCYTCHM 1ST ANTB: CPT | Performed by: STUDENT IN AN ORGANIZED HEALTH CARE EDUCATION/TRAINING PROGRAM

## 2024-06-07 ENCOUNTER — PATIENT OUTREACH (OUTPATIENT)
Dept: OTHER | Facility: HOSPITAL | Age: 64
End: 2024-06-07
Payer: MEDICARE

## 2024-06-07 DIAGNOSIS — C50.919 MALIGNANT NEOPLASM OF FEMALE BREAST, UNSPECIFIED ESTROGEN RECEPTOR STATUS, UNSPECIFIED LATERALITY, UNSPECIFIED SITE OF BREAST: Primary | ICD-10-CM

## 2024-06-07 NOTE — PROGRESS NOTES
Introductory call placed to Ms. Bejarano. Left voicemail introducing myself and navigation services. Gave her my contact info to call back at her convenience.

## 2024-06-10 ENCOUNTER — PATIENT OUTREACH (OUTPATIENT)
Dept: OTHER | Facility: HOSPITAL | Age: 64
End: 2024-06-10
Payer: MEDICARE

## 2024-06-10 ENCOUNTER — TELEPHONE (OUTPATIENT)
Dept: SURGERY | Facility: CLINIC | Age: 64
End: 2024-06-10
Payer: MEDICARE

## 2024-06-10 ENCOUNTER — TELEPHONE (OUTPATIENT)
Dept: INTERNAL MEDICINE | Facility: CLINIC | Age: 64
End: 2024-06-10
Payer: MEDICARE

## 2024-06-10 DIAGNOSIS — C50.911 MALIGNANT NEOPLASM OF RIGHT FEMALE BREAST, UNSPECIFIED ESTROGEN RECEPTOR STATUS, UNSPECIFIED SITE OF BREAST: ICD-10-CM

## 2024-06-10 DIAGNOSIS — C50.919 MALIGNANT NEOPLASM OF FEMALE BREAST, UNSPECIFIED ESTROGEN RECEPTOR STATUS, UNSPECIFIED LATERALITY, UNSPECIFIED SITE OF BREAST: Primary | ICD-10-CM

## 2024-06-10 LAB
DX PRELIMINARY: NORMAL
LAB AP CASE REPORT: NORMAL
LAB AP CLINICAL INFORMATION: NORMAL
LAB AP DIAGNOSIS COMMENT: NORMAL
LAB AP SPECIAL STAINS: NORMAL
LAB AP SYNOPTIC CHECKLIST: NORMAL
PATH REPORT.FINAL DX SPEC: NORMAL
PATH REPORT.GROSS SPEC: NORMAL

## 2024-06-10 NOTE — TELEPHONE ENCOUNTER
Caller: Maritza Bejarano    Relationship: Self    Best call back number: [phone]     What test was performed: LABS    When was the test performed: 5/23/24    Additional notes: PATIENT REQUESTS A CALL BACK TO DISCUSS RESULTS WHEN AVAILABLE

## 2024-06-10 NOTE — PROGRESS NOTES
Introductory call placed to Ms. Bejarano. Left voicemail introducing myself and asking for a return call at her convenience. Left contact information.       Ms. Bejarano called back and I introduced myself and navigation services. She stated she is aware of appointment time and place. She will bring her  with her to the consult. She has no immediate needs at this time. All questions answered. Will meet her at her surgery appointment June 12th.

## 2024-06-12 ENCOUNTER — OFFICE VISIT (OUTPATIENT)
Dept: SURGERY | Facility: CLINIC | Age: 64
End: 2024-06-12
Payer: MEDICARE

## 2024-06-12 ENCOUNTER — PATIENT OUTREACH (OUTPATIENT)
Dept: OTHER | Facility: HOSPITAL | Age: 64
End: 2024-06-12
Payer: MEDICARE

## 2024-06-12 VITALS
SYSTOLIC BLOOD PRESSURE: 140 MMHG | DIASTOLIC BLOOD PRESSURE: 78 MMHG | BODY MASS INDEX: 28.12 KG/M2 | HEIGHT: 66 IN | WEIGHT: 175 LBS

## 2024-06-12 DIAGNOSIS — C50.919 MALIGNANT NEOPLASM OF FEMALE BREAST, UNSPECIFIED ESTROGEN RECEPTOR STATUS, UNSPECIFIED LATERALITY, UNSPECIFIED SITE OF BREAST: Primary | ICD-10-CM

## 2024-06-12 PROCEDURE — 1160F RVW MEDS BY RX/DR IN RCRD: CPT | Performed by: STUDENT IN AN ORGANIZED HEALTH CARE EDUCATION/TRAINING PROGRAM

## 2024-06-12 PROCEDURE — 3078F DIAST BP <80 MM HG: CPT | Performed by: STUDENT IN AN ORGANIZED HEALTH CARE EDUCATION/TRAINING PROGRAM

## 2024-06-12 PROCEDURE — 3077F SYST BP >= 140 MM HG: CPT | Performed by: STUDENT IN AN ORGANIZED HEALTH CARE EDUCATION/TRAINING PROGRAM

## 2024-06-12 PROCEDURE — 99204 OFFICE O/P NEW MOD 45 MIN: CPT | Performed by: STUDENT IN AN ORGANIZED HEALTH CARE EDUCATION/TRAINING PROGRAM

## 2024-06-12 PROCEDURE — 1159F MED LIST DOCD IN RCRD: CPT | Performed by: STUDENT IN AN ORGANIZED HEALTH CARE EDUCATION/TRAINING PROGRAM

## 2024-06-12 NOTE — PROGRESS NOTES
Referral received from Dr. Barber's office. Met before her surgery consult. I introduced myself and navigational services. She will talk through treatment options with Dr. Barber and will have further testing this week to determine next steps.       She stated she has a wonderful support system with her . She is wheelchair bound due to MS. Her  helps with full transfers out of bed and into her chair.      She stated she has financial and transportation concerns. Referral made to social work and financial navigator to help with these. She has no resource needs or ongoing concerns at this time.      We discussed we have support options if the need arises. She was thankful for the information.      We discussed integrative therapies and other services [facility]. She received a navigation folder with the following information:     [facility] Support [facility], Cancer and Restorative Exercise (CARE), [employer] Exercise program, Guide for the Newly Diagnosed, [employer], [facility], Massage Therapy, Reiki Therapy, [employer], Cancer Nutrition, [facility] Support [facility] and Survivorship [facility].     She verbalized appreciation for navigational services and she has my contact information and will call with any questions that arise.

## 2024-06-12 NOTE — PROGRESS NOTES
General Surgery Breast Cancer History and Physical Exam     Summary:    Maritza Bejarano is a 63 y.o. lady who presents with a new diagnosis of left breast invasive lobular carcinoma: Grade III,  ER+/IA+, Her2-; [id number], Stage IV.      A multidisciplinary plan has been formulated for the patient:    (1) Breast Surgical Oncology:  -Follow up [facility] 9 panel genetic testing. I will call her with results.   -Scans and MRI to evaluate anatomy as well as for surgical planning.   -Nurse navigator consult.   -Discussed high concern that this is metastatic breast cancer.  Will await additional imaging and refer to oncology prior to any further decision making.    (2) Medical Oncology:  -Will refer to Dr. Lopes.     (3) Radiation Oncology:  -Will refer postoperatively for evaluation for radiation therapy as clinically indicated.    Referring Provider: No ref. provider found    Chief Complaint: abnormal breast imaging    History of Present Illness: Ms. Maritza Bejarano is a 63 y.o. year old lady, seen at the request of No ref. provider found for a new diagnosis of bilateral breast cancer.      This was initially detected as an imaging abnormality. She has not had a mammogram in 4 years. She had a prior left breast biopsy in 2014. Her work-up is detailed in the oncologic history below. She denies any breast lumps, pain, skin changes, or nipple discharge. She does have left nipple inversion.     She has a family history of breast cancer in a maternal great grandmother (age 28), maternal great aunt, and mother. She denies any family history of ovarian cancer .     Workup of Current Diagnosis:    5/21/2024 bilateral Screening Mammogram:  There are scattered areas of fibroglandular density.  Finding 1: New nipple retraction, skin and trabecular thickening in the left breast requires additional evaluation.  Finding 2: Axillary lymph nodes in the left breast require additional evaluation:  Finding 3: Focal asymmetry in the right breast  requires additional evaluation.  BI-RADS Category 3    5/29/2024 bilateral diagnostic Mammogram with Ultrasound:   Finding 1: On present exam there is a developing asymmetry measuring 4 x 5 x 6 cm with associated nipple retraction, skin thickening, and trabecular thickening in the left breast central region.  Ultrasound shows an irregular mass with indistinct margins and skin thickening.  Highly suggestive of malignancy.  Ultrasound-guided biopsy is recommended.  Finding 2: There is an axillary lymph node in the left breast.  On ultrasound there is a round enlarged axillary lymph node measuring 11 mm with cortical thickening of 9 mm.  Suspicious.  Ultrasound-guided biopsy is recommended.  Finding 3: Focal asymmetry in the right breast completely resolved.  No sonographic correlate.  Resolved.  Finding 4: On ultrasound there is an enlarged axillary lymph node measuring 14 mm in the right breast with cortical thickening of 5 mm.  Suspicious.  Ultrasound-guided biopsy is recommended.  BI-RADS Category 5    6/4/2024 right breast ultrasound-guided biopsy:   The right breast at the axillary position was imaged and the abnormal lymph node was localized.  5 passes were made.  A spring shaped HydroMARK tissue marker was placed.  Clip was in the expected position.  Pathology returned as lymph node with metastatic lobular carcinoma which is malignant and concordant.    6/4/2024 left breast ultrasound-guided biopsy:  The left breast at 130 was imaged and the mass was localized.  7 cores were obtained.  A 2 marked vision globe shaped tissue marker was placed.  Clip was in the expected position.  Pathology returned as invasive lobular carcinoma which is malignant and concordant.    6/4/2024 left breast ultrasound-guided biopsy:  The left breast at the axillary position was imaged and the abnormal lymph node was localized.  4 cores were obtained.  A spring shaped HydroMARK tissue marker was placed.  Clip was in the expected  position.  Pathology returned malignant and concordant.    6/4/2024 Pathology:   Final Diagnosis   1.  Left breast at 130 o'clock (tumark vision clip), core biopsy:               A.  Invasive lobular carcinoma with crush artifact:                            1.  Overall Calvin grade II (tubular score = 3, nuclear score = 2, mitotic score = 1).                            2.  Invasive carcinoma measures at least 8.5 mm.                            3.  No lymphovascular space invasion identified.        2.  Left axillary lymph node (HydroMARK clip), core biopsy:               A.  Lymph node involved by metastatic lobular carcinoma, Calvin grade II (see comment):                            1.  Focus of metastasis measures at least 10 mm. without definitive extranodal extension identified.     3.  Right axillary lymph node  (HydroMARK clip), core biopsy:               A.  Lymph node involved by metastatic lobular carcinoma, Calvin grade II (see comment):                            1.  Focus of metastasis measures at least 4 mm. without extranodal extension identified.     Past Medical History:   MS  GERD  HLD  HTN    Past Surgical History:    Past Surgical History:   Procedure Laterality Date    APPENDECTOMY      BLADDER SURGERY      bladder stimulator    BREAST SURGERY      augmentation wtih subsequent removal    CARPAL TUNNEL RELEASE Bilateral     Left 2018, right 2020    CUBITAL TUNNEL RELEASE Left     CYSTOSCOPY BOTOX INJECTION OF BLADDER  2018    Cystoscopy with Botox    PARATHYROIDECTOMY      one gland removed    ROTATOR CUFF REPAIR Right 2017    TOE SURGERY      bilateral great toes    TOTAL SHOULDER ARTHROPLASTY W/ DISTAL CLAVICLE EXCISION Right 10/22/2018    Procedure: RT TOTAL SHOULDER REVERSE ARTHROPLASTY;  Surgeon: Bipin Dangelo MD;  Location: [facility];  Service: Orthopedics     Right rotator cuff repair   Right total shoulder arthroplasty  Bilateral breast implants in 1986, removed 1994      Family History:    As above    Social History:  Denies tobacco use  Occasional alcohol use    Allergies:   Allergies   Allergen Reactions    Contrast Dye (Echo Or Unknown Ct/Mr) Shortness Of Breath    Iodinated Contrast Media Shortness Of Breath     SOA.    Patient states she has had it without symptoms.    Sulfa Antibiotics Other (See Comments)     Bradycardia after injection      Medications:     Current Outpatient Medications:     atorvastatin (LIPITOR) 10 MG tablet, Take 1 tablet by mouth Daily., Disp: 90 tablet, Rfl: 1    bacitracin 500 UNIT/GM ointment, Apply 1 Application topically to the appropriate area as directed Every 12 (Twelve) Hours., Disp: 28 g, Rfl: 0    baclofen (LIORESAL) 10 MG tablet, Take 2 tablets by mouth 3 (Three) Times a Day., Disp: , Rfl:     cephalexin (Keflex) 500 MG capsule, Take 1 capsule by mouth 3 (Three) Times a Day., Disp: 18 capsule, Rfl: 0    cholecalciferol (VITAMIN D3) 25 MCG (1000 UT) tablet, Take 5 tablets by mouth Daily., Disp: , Rfl:     clonazePAM (KlonoPIN) 2 MG tablet, Take 1 tablet by mouth As Needed (Sleep)., Disp: , Rfl:     Diclofenac Sodium (VOLTAREN) 1 % gel gel, Apply 4 g topically to the appropriate area as directed 4 (Four) Times a Day As Needed (pain)., Disp: 350 g, Rfl: 0    ferrous sulfate 325 (65 FE) MG tablet, TAKE 1 TABLET BY MOUTH EVERY OTHER DAY, Disp: 45 tablet, Rfl: 1    FLUoxetine (PROzac) 20 MG capsule, Take 3 capsules by mouth Daily. (Patient not taking: Reported on 5/23/2024), Disp: 270 capsule, Rfl: 1    losartan-hydrochlorothiazide (HYZAAR) 50-12.5 MG per tablet, Take 1 tablet by mouth Daily., Disp: 90 tablet, Rfl: 1    pantoprazole (PROTONIX) 40 MG EC tablet, Take 1 tablet by mouth 2 (Two) Times a Day., Disp: 180 tablet, Rfl: 1    Phentermine-Topiramate (Qsymia) 3.75-23 MG capsule sustained-release 24 hr, Take 3.75 mg by mouth Daily for 14 days., Disp: 14 capsule, Rfl: 0    Phentermine-Topiramate (Qsymia) 7.5-46 MG capsule sustained-release  24 hr, Take 7.5 mg by mouth Daily. Begin after day 14., Disp: 30 capsule, Rfl: 1    pramipexole (MIRAPEX) 1.5 MG tablet, Take 1 tablet by mouth 3 (Three) Times a Day., Disp: , Rfl:     Laboratory Values:    Labs from 2024 reviewed by me     Review of Systems:   Influenza-like illness: no fever, no  cough, no  sore throat, no  body aches, no loss of sense of taste or smell, no known exposure to person with Covid-19.  Constitutional: Negative for fevers or chills  HENT: Negative for hearing loss or runny nose  Eyes: Negative for vision changes or scleral icterus  Respiratory: Negative for cough or shortness of breath  Cardiovascular: Negative for chest pain or heart palpitations  Gastrointestinal: Negative for abdominal pain, nausea, vomiting, constipation, melena, or hematochezia  Genitourinary: Negative for hematuria or dysuria  Musculoskeletal: Negative for joint swelling or gait instability  Neurologic: Negative for tremors or seizures  Psychiatric: Negative for suicidal ideations or depression  All other systems reviewed and negative    Physical Exam:   ECO - Symptomatic, <50% confined to bed  Constitutional: Well-developed well-nourished, no acute distress  Eyes: Conjunctiva normal, sclera nonicteric  ENMT: Hearing grossly normal, oral mucosa moist  Neck: Supple, no palpable mass, trachea midline  Respiratory: Clear to auscultation, normal inspiratory effort  Cardiovascular: Regular rate, no peripheral edema, no jugular venous distention  Breast: symmetric  Right: No visible abnormalities on inspection while seated, with arms raised or hands on hips. No masses, skin changes, or nipple abnormalities.  Left: Large left breast mass, fixed, skin thickening and nipple retraction throughout the central and lower outer breast  Biopsy site appreciated in breast  Region, otherwise no skin changes.   No clinical chest wall involvement.  Gastrointestinal: Soft, nontender  Lymphatics (palpable nodes): No cervical,  supraclavicular or axillary lymphadenopathy  Skin:  Warm, dry, no rash on visualized skin surfaces  Musculoskeletal: Symmetric strength, normal gait  Psychiatric: Alert and oriented ×3, normal affect       Discussion:  I had an extensive discussion with the patient and her family about the nature of her breast cancer diagnosis. We reviewed the components of breast tissue including ducts and lobules. We reviewed her pathology report in detail. We reviewed breast cancer histology, including stage, grade, ER/NE receptors, HER2 receptors and how this applies to her diagnosis. We reviewed the basics of systemic and local/regional management of breast cancer.     We discussed that most breast cancer is not hereditary, however given her family history, this may play a role in her case. I believe genetic testing is warranted and could affect surgical decision making.     We reviewed potential surgical treatments to include partial mastectomy, mastectomy, sentinel lymph node biopsy and axillary node dissection and discussed the rationale associated with each approach. Regarding radiation therapy, we discussed that radiation is indicated in all cases of breast conservation and in only limited circumstances following mastectomy. We discussed that the primary goal of adjuvant radiation is to decrease the likelihood of local recurrence.     We discussed that in her case, systemic treatment would likely involve endocrine therapy/targeted therapy.     We discussed that in her case, systemic treatment would involve endocrine therapy and possibly chemotherapy. She will be referred to medical oncology to discuss this further.     DANITA TERRELL M.D.  General and Endoscopic Surgery  [facility]    [address], [address]  [place], [place], [address]  P: [phone]  F: [phone]

## 2024-06-13 ENCOUNTER — TELEPHONE (OUTPATIENT)
Dept: SURGERY | Facility: CLINIC | Age: 64
End: 2024-06-13
Payer: MEDICARE

## 2024-06-13 ENCOUNTER — HOSPITAL ENCOUNTER (OUTPATIENT)
Dept: MRI IMAGING | Facility: HOSPITAL | Age: 64
Discharge: HOME | End: 2024-06-13
Payer: MEDICARE

## 2024-06-13 DIAGNOSIS — C50.911 MALIGNANT NEOPLASM OF RIGHT FEMALE BREAST, UNSPECIFIED ESTROGEN RECEPTOR STATUS, UNSPECIFIED SITE OF BREAST: ICD-10-CM

## 2024-06-13 DIAGNOSIS — C50.919 MALIGNANT NEOPLASM OF FEMALE BREAST, UNSPECIFIED ESTROGEN RECEPTOR STATUS, UNSPECIFIED LATERALITY, UNSPECIFIED SITE OF BREAST: ICD-10-CM

## 2024-06-13 PROCEDURE — A4648 IMPLANTABLE TISSUE MARKER: HCPCS | Performed by: RADIOLOGY

## 2024-06-13 PROCEDURE — 19083 BX BREAST 1ST LESION US IMAG: CPT | Performed by: RADIOLOGY

## 2024-06-13 PROCEDURE — 38505 NEEDLE BIOPSY LYMPH NODES: CPT | Performed by: RADIOLOGY

## 2024-06-13 NOTE — TELEPHONE ENCOUNTER
Hub staff attempted to follow warm transfer process and was unsuccessful     Caller: Maritza Bejarano    Relationship to patient: Self    Best call back number: [phone]     Patient is needing: PT COULD NOT COMPLETE MRI OF BILATERAL BREASTS TODAY BECAUSE OF HER BLADDER STIMULATOR - IT IS TURNED OFF SINCE IT IS NOT WORKING, BUT SHE FORGOT TO TAKE HER CONTROLLER WITH HER BECAUSE IT IS ALREADY OFF - THEY ARE TELLING HER THE MRI CAN NOT BE RESCHEDULED UNTIL 7/5/24 - CENTRAL SCHEDULING IS WORKING ON CHECKING W/ HER UROLOGIST ABOUT THE STATUS OF THE STIMULATOR, BUT SHE IS HOPING THE OFFICE CAN HELP GET IT SCHEDULED SOONER

## 2024-06-13 NOTE — TELEPHONE ENCOUNTER
Caller: Maritza Bejarano    Relationship: Self    Best call back number: [phone]    What is the best time to reach you: PT CAN BE REACHED AT ANYTIME, IF NO ANSWER A DETAILED MSG CAN BE LEFT    Who are you requesting to speak with (clinical staff, provider,  specific staff member): NON CLINICAL     Do you know the name of the person who called: PT WOULD LIKE TO DISCUSS OTHER OPTIONS AT FREE [employer]ING [employer]ACILITIES, NOT JUST [employer]    What was the call regarding: RESCHEDULING     Is it okay if the provider responds through MyChart: NO

## 2024-06-14 ENCOUNTER — HOSPITAL ENCOUNTER (OUTPATIENT)
Dept: NUCLEAR MEDICINE | Facility: HOSPITAL | Age: 64
Discharge: HOME | End: 2024-06-14
Payer: MEDICARE

## 2024-06-14 ENCOUNTER — TELEPHONE (OUTPATIENT)
Dept: SURGERY | Facility: CLINIC | Age: 64
End: 2024-06-14
Payer: MEDICARE

## 2024-06-14 ENCOUNTER — HOSPITAL ENCOUNTER (OUTPATIENT)
Dept: CT IMAGING | Facility: HOSPITAL | Age: 64
Discharge: HOME | End: 2024-06-14
Payer: MEDICARE

## 2024-06-14 DIAGNOSIS — C50.919 MALIGNANT NEOPLASM OF FEMALE BREAST, UNSPECIFIED ESTROGEN RECEPTOR STATUS, UNSPECIFIED LATERALITY, UNSPECIFIED SITE OF BREAST: ICD-10-CM

## 2024-06-14 LAB — CREAT BLDA-MCNC: 0.8 MG/DL (ref 0.6–1.3)

## 2024-06-14 PROCEDURE — 0 TECHNETIUM MEDRONATE KIT: Performed by: STUDENT IN AN ORGANIZED HEALTH CARE EDUCATION/TRAINING PROGRAM

## 2024-06-14 PROCEDURE — 25510000001 IOPAMIDOL 61 % SOLUTION: Performed by: STUDENT IN AN ORGANIZED HEALTH CARE EDUCATION/TRAINING PROGRAM

## 2024-06-14 PROCEDURE — 82565 ASSAY OF CREATININE: CPT

## 2024-06-14 PROCEDURE — 74177 CT ABD & PELVIS W/CONTRAST: CPT

## 2024-06-14 PROCEDURE — 71260 CT THORAX DX C+: CPT

## 2024-06-14 PROCEDURE — 78306 BONE IMAGING WHOLE BODY: CPT

## 2024-06-14 PROCEDURE — A9503 TC99M MEDRONATE: HCPCS | Performed by: STUDENT IN AN ORGANIZED HEALTH CARE EDUCATION/TRAINING PROGRAM

## 2024-06-14 RX ORDER — TC 99M MEDRONATE 20 MG/10ML
20.4 INJECTION, POWDER, LYOPHILIZED, FOR SOLUTION INTRAVENOUS
Status: COMPLETED | OUTPATIENT
Start: 2024-06-14 | End: 2024-06-14

## 2024-06-14 RX ADMIN — Medication 20.4 MILLICURIE: at 10:40

## 2024-06-14 RX ADMIN — IOPAMIDOL 95 ML: 612 INJECTION, SOLUTION INTRAVENOUS at 11:05

## 2024-06-14 NOTE — TELEPHONE ENCOUNTER
Attempted to contact patient to inform MRI is rescheduled 6/27 arrive 9:15 to start at 9:45 at [address] . Left voicemail to call back.

## 2024-06-17 ENCOUNTER — TELEPHONE (OUTPATIENT)
Dept: PSYCHIATRY | Facility: HOSPITAL | Age: 64
End: 2024-06-17
Payer: MEDICARE

## 2024-06-17 NOTE — TELEPHONE ENCOUNTER
Oncology Support Services    OSW called the patient regarding referral from Dr. Barber's office.  The patient appreciated the call and agreed for the need to talk, but the patient shared today was not a good day to talk.  She was checking her mychart frequently for results and did not sleep well last night. We agreed OSW would check the patient's appointment list and look for an opportunity to meet with the patient in person.  Patient verbalized agreement and appreciation for the plan.    Alvin Morris, MSW, [facility]  Oncology Social Worker

## 2024-06-18 ENCOUNTER — PATIENT OUTREACH (OUTPATIENT)
Dept: OTHER | Facility: HOSPITAL | Age: 64
End: 2024-06-18
Payer: MEDICARE

## 2024-06-18 ENCOUNTER — TELEPHONE (OUTPATIENT)
Dept: SURGERY | Facility: CLINIC | Age: 64
End: 2024-06-18
Payer: MEDICARE

## 2024-06-18 NOTE — TELEPHONE ENCOUNTER
Returned call to patient.  Patient would like to discuss in detail results of whole body bone scan from 06/14/24.  Said she was told previously Dr. Barber would call back on Monday 06/13.

## 2024-06-18 NOTE — PROGRESS NOTES
Ms. Bejarano called with questions about her scans. Message sent to Dr. Barber asking if she can call when possible. She was thankful for the help.

## 2024-06-18 NOTE — TELEPHONE ENCOUNTER
Caller: Maritza Bejarano     Relationship: SELF     Best call back number: [phone]     What is the best time to reach you:  ANYTIME     Who are you requesting to speak with (clinical staff, provider,  specific staff member):  DR TERRELL       What was the call regarding:  PATIENT IS WANTING DR TERRELL TO RETURN HER CALL REGARDING RADIOLOGY TEST     Is it okay if the provider responds through MyChart:  PREFERS PHONE CALL

## 2024-06-20 DIAGNOSIS — C50.919 MALIGNANT NEOPLASM OF FEMALE BREAST, UNSPECIFIED ESTROGEN RECEPTOR STATUS, UNSPECIFIED LATERALITY, UNSPECIFIED SITE OF BREAST: Primary | ICD-10-CM

## 2024-06-23 DIAGNOSIS — C50.919 MALIGNANT NEOPLASM OF FEMALE BREAST, UNSPECIFIED ESTROGEN RECEPTOR STATUS, UNSPECIFIED LATERALITY, UNSPECIFIED SITE OF BREAST: Primary | ICD-10-CM

## 2024-06-24 ENCOUNTER — PATIENT OUTREACH (OUTPATIENT)
Dept: OTHER | Facility: HOSPITAL | Age: 64
End: 2024-06-24
Payer: MEDICARE

## 2024-06-24 ENCOUNTER — PATIENT MESSAGE (OUTPATIENT)
Dept: SURGERY | Facility: CLINIC | Age: 64
End: 2024-06-24
Payer: MEDICARE

## 2024-06-24 DIAGNOSIS — C50.919 MALIGNANT NEOPLASM OF FEMALE BREAST, UNSPECIFIED ESTROGEN RECEPTOR STATUS, UNSPECIFIED LATERALITY, UNSPECIFIED SITE OF BREAST: Primary | ICD-10-CM

## 2024-06-24 NOTE — PROGRESS NOTES
Ms. Bejarano called with questions about the scan results and if other tests need to be ordered. Reviewed chart and let her know Dr. Barber placed orders for MRI Brain, MRI abd pelvis and MRI hip. She also has a breast MRI coming up on June 27th. She also has a referral to medical oncology that she is awaiting an appointment for. We discussed her specific questions about the test results would best be discussed with her physician team. Message sent to Dr. Barber letting her know the patients request. She was thankful for the help.

## 2024-06-25 ENCOUNTER — TELEPHONE (OUTPATIENT)
Dept: SURGERY | Facility: CLINIC | Age: 64
End: 2024-06-25
Payer: MEDICARE

## 2024-06-25 ENCOUNTER — PATIENT OUTREACH (OUTPATIENT)
Dept: OTHER | Facility: HOSPITAL | Age: 64
End: 2024-06-25
Payer: MEDICARE

## 2024-06-25 RX ORDER — LOSARTAN POTASSIUM AND HYDROCHLOROTHIAZIDE 12.5; 5 MG/1; MG/1
TABLET ORAL
Qty: 90 TABLET | Refills: 0 | Status: SHIPPED | OUTPATIENT
Start: 2024-06-25 | End: 2024-06-27 | Stop reason: SDUPTHER

## 2024-06-25 RX ORDER — PANTOPRAZOLE SODIUM 40 MG/1
TABLET, DELAYED RELEASE ORAL
Qty: 180 TABLET | Refills: 0 | Status: SHIPPED | OUTPATIENT
Start: 2024-06-25 | End: 2024-06-27 | Stop reason: SDUPTHER

## 2024-06-25 RX ORDER — ATORVASTATIN CALCIUM 10 MG/1
TABLET, FILM COATED ORAL
Qty: 90 TABLET | Refills: 0 | Status: SHIPPED | OUTPATIENT
Start: 2024-06-25 | End: 2024-06-27 | Stop reason: SDUPTHER

## 2024-06-25 NOTE — TELEPHONE ENCOUNTER
Spoke with patient, advised of Dr. Barber's summary of CT scan. Patient voiced understanding and did not have any further questions

## 2024-06-25 NOTE — TELEPHONE ENCOUNTER
Left voice mail for patient informing her results of genetic testing is negative. Okay per verbal release.

## 2024-06-25 NOTE — PROGRESS NOTES
Ms. Bejarano called with questions about her CT chest scan and what that means. Let her know her oncologist can talk her through those things at her appointment. She stated she was feeling weak and was having a hard time breathing. Encouraged her to go to ER to get checked out since she is struggling to breath. She stated she would call her PCP office first and then go to ER if needed.

## 2024-06-26 ENCOUNTER — TELEPHONE (OUTPATIENT)
Dept: SURGERY | Facility: CLINIC | Age: 64
End: 2024-06-26
Payer: MEDICARE

## 2024-06-26 NOTE — TELEPHONE ENCOUNTER
Contact patient to inform MRI's are scheduled 7/10 arrive 715am to start at 730 am at [address]. Left voicemail to call back.

## 2024-06-27 ENCOUNTER — LAB (OUTPATIENT)
Dept: OTHER | Facility: HOSPITAL | Age: 64
End: 2024-06-27
Payer: MEDICARE

## 2024-06-27 ENCOUNTER — HOSPITAL ENCOUNTER (OUTPATIENT)
Dept: MRI IMAGING | Facility: HOSPITAL | Age: 64
Discharge: HOME | End: 2024-06-27
Admitting: STUDENT IN AN ORGANIZED HEALTH CARE EDUCATION/TRAINING PROGRAM
Payer: MEDICARE

## 2024-06-27 ENCOUNTER — CONSULT (OUTPATIENT)
Dept: ONCOLOGY | Facility: CLINIC | Age: 64
End: 2024-06-27
Payer: MEDICARE

## 2024-06-27 VITALS
RESPIRATION RATE: 16 BRPM | DIASTOLIC BLOOD PRESSURE: 75 MMHG | HEART RATE: 83 BPM | SYSTOLIC BLOOD PRESSURE: 115 MMHG | HEIGHT: 66 IN | OXYGEN SATURATION: 96 % | TEMPERATURE: 98.1 F | BODY MASS INDEX: 28.12 KG/M2 | WEIGHT: 175 LBS

## 2024-06-27 DIAGNOSIS — Z17.0 MALIGNANT NEOPLASM OF OVERLAPPING SITES OF LEFT BREAST IN FEMALE, ESTROGEN RECEPTOR POSITIVE: Primary | ICD-10-CM

## 2024-06-27 DIAGNOSIS — C50.912 INVASIVE LOBULAR CARCINOMA OF BREAST, STAGE 4, LEFT: Primary | ICD-10-CM

## 2024-06-27 DIAGNOSIS — C50.919 MALIGNANT NEOPLASM OF FEMALE BREAST, UNSPECIFIED ESTROGEN RECEPTOR STATUS, UNSPECIFIED LATERALITY, UNSPECIFIED SITE OF BREAST: Primary | ICD-10-CM

## 2024-06-27 DIAGNOSIS — C50.812 MALIGNANT NEOPLASM OF OVERLAPPING SITES OF LEFT BREAST IN FEMALE, ESTROGEN RECEPTOR POSITIVE: Primary | ICD-10-CM

## 2024-06-27 LAB
BASOPHILS # BLD AUTO: 0.08 10*3/MM3 (ref 0–0.2)
BASOPHILS NFR BLD AUTO: 0.9 % (ref 0–1.5)
CREAT BLDA-MCNC: 0.8 MG/DL (ref 0.6–1.3)
DEPRECATED RDW RBC AUTO: 45.3 FL (ref 37–54)
EOSINOPHIL # BLD AUTO: 0.32 10*3/MM3 (ref 0–0.4)
EOSINOPHIL NFR BLD AUTO: 3.4 % (ref 0.3–6.2)
ERYTHROCYTE [DISTWIDTH] IN BLOOD BY AUTOMATED COUNT: 14 % (ref 12.3–15.4)
HCT VFR BLD AUTO: 40.2 % (ref 34–46.6)
HGB BLD-MCNC: 13.6 G/DL (ref 12–15.9)
IMM GRANULOCYTES # BLD AUTO: 0.08 10*3/MM3 (ref 0–0.05)
IMM GRANULOCYTES NFR BLD AUTO: 0.9 % (ref 0–0.5)
LYMPHOCYTES # BLD AUTO: 1.2 10*3/MM3 (ref 0.7–3.1)
LYMPHOCYTES NFR BLD AUTO: 12.8 % (ref 19.6–45.3)
MCH RBC QN AUTO: 29.9 PG (ref 26.6–33)
MCHC RBC AUTO-ENTMCNC: 33.8 G/DL (ref 31.5–35.7)
MCV RBC AUTO: 88.4 FL (ref 79–97)
MONOCYTES # BLD AUTO: 0.62 10*3/MM3 (ref 0.1–0.9)
MONOCYTES NFR BLD AUTO: 6.6 % (ref 5–12)
NEUTROPHILS NFR BLD AUTO: 7.1 10*3/MM3 (ref 1.7–7)
NEUTROPHILS NFR BLD AUTO: 75.4 % (ref 42.7–76)
NRBC BLD AUTO-RTO: 1.1 /100 WBC (ref 0–0.2)
PLATELET # BLD AUTO: 415 10*3/MM3 (ref 140–450)
PMV BLD AUTO: 9.6 FL (ref 6–12)
RBC # BLD AUTO: 4.55 10*6/MM3 (ref 3.77–5.28)
WBC NRBC COR # BLD AUTO: 9.4 10*3/MM3 (ref 3.4–10.8)

## 2024-06-27 PROCEDURE — 82565 ASSAY OF CREATININE: CPT

## 2024-06-27 PROCEDURE — 36415 COLL VENOUS BLD VENIPUNCTURE: CPT

## 2024-06-27 PROCEDURE — 85025 COMPLETE CBC W/AUTO DIFF WBC: CPT | Performed by: INTERNAL MEDICINE

## 2024-06-27 RX ORDER — PANTOPRAZOLE SODIUM 40 MG/1
40 TABLET, DELAYED RELEASE ORAL DAILY
Qty: 180 TABLET | Refills: 0 | Status: SHIPPED | OUTPATIENT
Start: 2024-06-27

## 2024-06-27 RX ORDER — BACLOFEN 20 MG/1
20 TABLET ORAL 3 TIMES DAILY
COMMUNITY
Start: 2024-06-25 | End: 2025-06-25

## 2024-06-27 RX ORDER — LOSARTAN POTASSIUM AND HYDROCHLOROTHIAZIDE 12.5; 5 MG/1; MG/1
1 TABLET ORAL DAILY
Qty: 90 TABLET | Refills: 0 | Status: SHIPPED | OUTPATIENT
Start: 2024-06-27

## 2024-06-27 RX ORDER — ANASTROZOLE 1 MG/1
1 TABLET ORAL DAILY
Qty: 30 TABLET | Refills: 3 | Status: SHIPPED | OUTPATIENT
Start: 2024-06-27

## 2024-06-27 RX ORDER — ATORVASTATIN CALCIUM 10 MG/1
10 TABLET, FILM COATED ORAL DAILY
Qty: 90 TABLET | Refills: 0 | Status: SHIPPED | OUTPATIENT
Start: 2024-06-27

## 2024-06-27 NOTE — PROGRESS NOTES
Subjective   Maritza Bejarano is a 63 y.o. female.     History of Present Illness     Patient is a 63-year-old postmenopausal lady who has not had a mammogram in 4 years presented with a screen detected abnormality.  She had a left breast biopsy in 2014 which was benign.  Denies palpating any breast masses skin changes or nipple discharge prior to the abnormal mammogram.  She does have left nipple inversion.    Patient has a longstanding diagnosis of multiple sclerosis and has not been on any treatment.  She was previously seen by Dr. Ozzy France and now being seen by Dr. Soto with neurology.  She has been nonambulatory for the past 4 years and requires a wheelchair.  She is unable to transfer herself in and out of wheelchairs and her  has to lift her for all the transfers.  She is also incontinent of the bladder and requiring a suprapubic catheter placed by urology to help with the same.  She had a bladder stimulator in the past which was turned off.  Other comorbidities include hypertension and hyperlipidemia.      Family history significant for maternal great grandmother with breast cancer, maternal great aunt and mother with breast cancer in her 70s.  Denies any family history of ovarian cancer, pancreatic cancer or melanoma.    5/21/2024-bilateral screening mammogram  Finding 1.new nipple retraction along with diffuse skin and trabecular thickening of the left breast.  There is suggestion of subtle architectural distortion seen on the MLO projection in the posterior central region.  3 biopsy markers are noted.  No new suspicious calcifications.  Send finding 2.few axillary lymph node seen in the left breast which display interval increase in size and density.    Finding 3.focal asymmetry measuring 10 mm seen in the anterior one third of the right breast in the medial subareolar region.    Impression  Finding 1.new nipple retraction, skin and trabecular thickening in the left breast requires additional  evaluation.  There is also question architectural distortion in the posterior central breast seen on the MLO projection.  Diagnostic mammogram to include repeat full-field CC with additional posterior tissue and complete breast ultrasound is recommended.  Finding 2.axillary lymph nodes in the left breast require additional evaluation, ultrasound recommended.  Finding 3.focal asymmetry in the right breast requires additional evaluation.  Diagnostic mammogram and limited breast ultrasound is recommended.    5/29/2024-bilateral diagnostic mammogram and ultrasound  Finding 1.4 0.7 x 5.6 x 6.8 cm developing asymmetry with associated nipple retraction, skin thickening and trabecular thickening of the left breast in the central region, inferior region in the upper outer region and the lower outer region.  Finding 2.axillary lymph node in the left breast.  Finding 3.suspected finding completely resolves upon further evaluation representing benign fibroglandular breast tissue.    Bilateral breast ultrasound  Finding 1.nonparallel hypoechoic mass with indistinct margins and skin thickening and internal vascularity in the left breast in the central region, inferior region, upper outer region and in the lower outer region.  The finding is palpable and appears to involve all 4 quadrants.  Most discrete portion is located at 130, 3 cm from the nipple, skin thickening up to 6 mm.  Send finding 2.rounded enlarged left axillary lymph node measuring 11 mm.  Cortical thickening of 9 mm present.    Finding 3.no sonographic correlate.  Send finding 4.enlarged right axillary lymph node measuring 14 mm.  Cortical thickening of 5 mm.    Impression  Finding 1.ultrasound-guided biopsy recommended  Finding 2.ultrasound-guided biopsy recommended  Finding 3.previously described right breast asymmetry resolves  Finding 4.ultrasound-guided biopsy recommended.    Ultrasound-guided biopsy of the left breast and left axilla lymph node and right  axilla lymph node    1.left breast  Invasive lobular carcinoma with crush artifact  Grade 2  Invasive carcinoma measures 8.5 mm  No lymphovascular space invasion  ER +91 to 100% strong  NC +31 to 40% moderate  HER2 negative, 0  Ki-67 35%    2.left axillary lymph node focus of metastatic Dastech lobular carcinoma measuring 10 mm  ER +91 to 100% strong  NC +21 to 30%  HER2 -0  Ki-67 30%    3.right axillary lymph node with a focus of metastatic carcinoma measuring 4 mm.  Grade 2.  ER +91 to 100% strong  NC +11 to 20% moderate  HER2 negative, 0  Ki-67 35%    Pathology of all the 3 sites appear similar and findings could represent left breast lobular carcinoma metastatic to the right as well as left axillary lymph nodes.    6/14/2024-CT of the chest abdomen and pelvis  1.large ill-defined infiltrative central left breast mass measuring 6.7 x 4 cm, medially there is an irregular 2.5 x 2.5 cm mass.  Significant thickening of the skin in the left breast and there is left axilla lymphadenopathy.  Left axilla lymph node measures 1.3 x 1.3 cm.  There is also a new enlarged right axillary lymph node measuring 1.3 x 1 cm.  All of the subcentimeter right axillary lymph nodes are slightly larger than previous.  2.no mediastinal hilar or internal mammary chain lymphadenopathy  3.new indeterminate mixed density measuring 1.3 x 1.2 cm right apical pulmonary nodule.  Follow-up recommended.  4.pleural parenchymal thickening and nodules inferiorly and posterior to the right upper lobe are stable.  Chronic scarring and subsegmental atelectatic change in both lower lobes.    CT abdomen pelvis  1.moderate fatty infiltration of the liver.  No suspicious liver lesions.  2.moderate-sized paraesophageal hernia.  No acute bowel abnormality.  3.right sacral stimulator noted at the S3 neuroforamina.  No suspicious bone lesions are noted.    6/14/2024-bone scan  1.focal abnormal uptake in the left anterior inferior iliac spine correlating with  lucent lesion on the CT concerning for metastatic disease.  Further evaluation with MRI of the pelvis and left hip could be performed.  2.focal uptake in the left frontal calvarium again concerning for metastatic disease.  Noncontrast CT or MRI could be obtained.  3.soft tissue uptake noted in the left breast at the site of known left breast cancer.  4.changes from arthroplasty on the right shoulder.  5.multifocal likely degenerative uptake in each knee, ankle and foot and increased uptake in the medial and patellofemoral cortex of the right knee could be posttraumatic.      MRI of the pelvis and the left hip as well as MRI of the brain have been ordered and currently scheduled for 7/10/2024.    [employer] 9 gene stat panel negative.          The following portions of the patient's history were reviewed and updated as appropriate: allergies, current medications, past family history, past medical history, past social history, past surgical history, and problem list.    Past Medical History:   Diagnosis Date    Anemia 2024    `Treated with iron    Dawn esophagus     per patient    Blurred vision     R/T MS    Breast cancer 05/2024+++++    Carpal tunnel syndrome     Clotting disorder 1993, 2003, 2012    3 g/i bleeds w/ transfus/ions    Colon polyp 2013    removed w/ colonoscopy    Deep vein thrombosis phlebitis 1980    Depression     Diplopia 2013    GERD (gastroesophageal reflux disease)     GI (gastrointestinal bleed) 3 bleeds    2 transfusions    H/O Skin cancer, basal cell     Headache     History of blood transfusion     History of GI bleed     R/T NSAIDS AND STEROIDS, multiple times    History of urinary tract infection     Hypercalcemia     s/p parathyroidectomy    Hyperlipidemia     Hypertension     Movement disorder     Multiple sclerosis     Optic neuritis     PONV (postoperative nausea and vomiting)         Past Surgical History:   Procedure Laterality Date    APPENDECTOMY      BLADDER SURGERY      bladder  stimulator    BREAST BIOPSY  don't remember    BREAST SURGERY      augmentation wtih subsequent removal    CARPAL TUNNEL RELEASE Bilateral     Left 2018, right 2020    CUBITAL TUNNEL RELEASE Left     CYSTOSCOPY BOTOX INJECTION OF BLADDER  2018    Cystoscopy with Botox    FRACTURE SURGERY  2019    rt shoulder    PARATHYROIDECTOMY      one gland removed    ROTATOR CUFF REPAIR Right 2017    TOE SURGERY      bilateral great toes    TOTAL SHOULDER ARTHROPLASTY W/ DISTAL CLAVICLE EXCISION Right 10/22/2018    Procedure: RT TOTAL SHOULDER REVERSE ARTHROPLASTY;  Surgeon: Bipin Dangelo MD;  Location: [facility] MAIN OR;  Service: Orthopedics        Family History   Problem Relation Age of Onset    Hypertension Mother     Hyperlipidemia Mother     Aortic aneurysm Mother         thoracic    Diabetes Mother     Hypertension Father         charissa heart failure    Heart failure Father     Hyperlipidemia Father     Miscarriages / Stillbirths Sister     Multiple sclerosis Brother     Atrial fibrillation Brother     Diabetes Maternal Grandfather     Stroke Maternal Grandfather     Parkinsonism Paternal Grandmother     Tremor Paternal Grandmother     Stomach cancer Paternal Grandfather     Diabetes Maternal Grandmother         Social History     Socioeconomic History    Marital status:      Spouse name: Donald    Number of children: 0   Tobacco Use    Smoking status: Former     Current packs/day: 0.00     Average packs/day: 2.0 packs/day for 30.0 years (60.0 ttl pk-yrs)     Types: Cigarettes     Start date: 10/22/1973     Quit date: 10/22/2003     Years since quittin.6    Smokeless tobacco: Never   Vaping Use    Vaping status: Never Used   Substance and Sexual Activity    Alcohol use: Not Currently    Drug use: Not Currently     Types: Marijuana     Comment: advised by neurologist for sleep    Sexual activity: Not Currently     Partners: Male     Birth control/protection: Post-menopausal        OB History    No obstetric history  on file.     Age at menarche-15  Age at first live childbirth-not applicable   2 para 0  0  Age of menopause-57  No use of hormone replacement therapy      No Known Allergies         Review of Systems   Constitutional:  Positive for activity change and fatigue.   HENT: Negative.     Eyes: Negative.    Respiratory:  Positive for shortness of breath.    Cardiovascular: Negative.    Gastrointestinal: Negative.    Endocrine: Negative.    Genitourinary:  Positive for urinary incontinence.   Musculoskeletal: Negative.    Skin: Negative.    Allergic/Immunologic: Negative.    Neurological:  Positive for weakness.   Psychiatric/Behavioral: Negative.           Objective   not currently breastfeeding.   Physical Exam  Vitals reviewed.   Constitutional:       Appearance: Normal appearance. She is normal weight.   HENT:      Right Ear: External ear normal.      Left Ear: External ear normal.      Nose: Nose normal.      Mouth/Throat:      Pharynx: Oropharynx is clear.   Eyes:      Conjunctiva/sclera: Conjunctivae normal.   Cardiovascular:      Rate and Rhythm: Normal rate.   Pulmonary:      Effort: Pulmonary effort is normal.   Abdominal:      General: Abdomen is flat.   Musculoskeletal:         General: Normal range of motion.      Cervical back: Normal range of motion.   Skin:     General: Skin is warm.   Neurological:      General: No focal deficit present.      Mental Status: She is alert and oriented to person, place, and time.   Psychiatric:         Mood and Affect: Mood normal.         Behavior: Behavior normal.         Thought Content: Thought content normal.         Judgment: Judgment normal.       Breast Exam: Right breast appears normal on inspection.  No palpable right axilla lymphadenopathy or right breast masses.  Left breast on inspection there is nipple retraction.  On palpation there is a 13 x 9 cm mass in the retroareolar region pretty much occupying the whole breast.  There is palpable left  axillary lymphadenopathy.    Hospital Outpatient Visit on 06/14/2024   Component Date Value Ref Range Status    Creatinine 06/14/2024 0.80  0.60 - 1.30 mg/dL Final    Serial Number: [id number]:  [id number]   Lab Requisition on 06/04/2024   Component Date Value Ref Range Status    Case Report 06/04/2024    Final                    Value:Surgical Pathology Report                         Case: [id number]                                  Authorizing Provider:  Nilda Mills MD     Collected:           06/04/2024 12:49 PM          [facility]  Received:            06/04/2024 03:07 PM                                 LABORATORY                                                                   Pathologist:           Gilberto Buenrostro MD                                                         Specimens:   1) - Breast, Left, left breast 130, formalin at 1259, tumark vision clip, 7 cores, 12               g needle                                                                                            2) - Axilla, Left, left axilla LN, formalin at 1249, hydromark clip, 5 cores, 14 g                  needle                                                                                              3) - Axilla, Right, right axilla LN, formalin at 1309, 5 cores, 14 g needle,                                                  hydromark clip                                                                             Clinical Information 06/04/2024    Final                    Value:This result contains rich text formatting which cannot be displayed here.    Final Diagnosis 06/04/2024    Final                    Value:This result contains rich text formatting which cannot be displayed here.    Preliminary Diagnosis 06/04/2024    Final                    Value:This result contains rich text formatting which cannot be displayed here.    Synoptic Checklist 06/04/2024    Final                     Value:Breast Biomarker Reporting Template                            BREAST BIOMARKER REPORTING TEMPLATE - All Specimens                            Protocol posted: 12/13/2023                                                           Test(s) Performed:                                     Estrogen Receptor (ER) Status:    Positive (greater than 10% of cells demonstrate nuclear positivity)                                    Percentage of Cells with Nuclear Positivity:    %                                    Average Intensity of Staining:    Strong                                  Test Type:    Food and Drug Administration (FDA) cleared (test / vendor): [employer]                                  Primary Antibody:    SP1                                  Scoring System:    Akil                                    Proportion Score:    5                                    Intensity Score:    3                                    Total Akil Score:    8                                Test(s) Performed:                                     Progesterone Receptor (PgR) Status:    Positive                                    Percentage of Cells with Nuclear Positivity:    31-40%                                    Average Intensity of Staining:    Moderate                                  Test Type:    Food and Drug Administration (FDA) cleared (test / vendor): [employer]                                  Primary Antibody:    1E2                                  Scoring System:    Akil                                    Proportion Score:    3                                    Intensity Score:    2                                    Total Akil Score:    5                                Test(s) Performed:                                     HER2 by Immunohistochemistry:    Negative (Score 0)                                  Test Type:    Food and Drug Administration (FDA) cleared (test / vendor): [employer]                                   Primary Antibody:    4B5                                Test(s) Performed:    Ki-67                                  Ki-67 Percentage of Positive Nuclei:    35 %                               Cold Ischemia and Fixation Times:    Meet requirements specified in latest version of the ASCO / CAP Guidelines                                Cold Ischemia Time (minutes):    10 min                               Fixation Time (hours):    8 hours                               Testing Performed on Block Number(s):    1A                                                         METHODS                               Fixative:    Formalin                                Image Analysis:    Not performed                             Breast Biomarker Reporting Template                            BREAST BIOMARKER REPORTING TEMPLATE - All Specimens                            Protocol posted: 12/13/2023                                                           Test(s) Performed:                                     Estrogen Receptor (ER) Status:    Positive (greater than 10% of cells demonstrate nuclear positivity)                                    Percentage of Cells with Nuclear Positivity:    %                                    Average Intensity of Staining:    Strong                                  Test Type:    Food and Drug Administration (FDA) cleared (test / vendor): [email or web address]                                  Primary Antibody:    SP1                                  Scoring System:    Akil                                    Proportion Score:    5                                    Intensity Score:    3                                    Total Akil Score:    8                                Test(s) Performed:                                     Progesterone Receptor (PgR) Status:    Positive                                    Percentage of Cells with Nuclear Positivity:    21-30%                                     Average Intensity of Staining:    Moderate                                  Test Type:    Food and Drug Administration (FDA) cleared (test / vendor): [employer]                                  Primary Antibody:    1E2                                  Scoring System:    Akil                                    Proportion Score:    3                                    Intensity Score:    2                                    Total Akil Score:    5                                Test(s) Performed:                                     HER2 by Immunohistochemistry:    Negative (Score 0)                                  Test Type:    Food and Drug Administration (FDA) cleared (test / vendor): [employer]                                  Primary Antibody:    4B5                                Test(s) Performed:    Ki-67                                  Ki-67 Percentage of Positive Nuclei:    30 %                                 Primary Antibody:    30-9                                Cold Ischemia and Fixation Times:    Meet requirements specified in latest version of the ASCO / CAP Guidelines                                Cold Ischemia Time (minutes):    4 min                               Fixation Time (hours):    8 hours                               Testing Performed on Block Number(s):    2A                                                         METHODS                               Fixative:    Formalin                                Image Analysis:    Not performed                             Breast Biomarker Reporting Template                            BREAST BIOMARKER REPORTING TEMPLATE - All Specimens                            Protocol posted: 12/13/2023                                                           Test(s) Performed:                                     Estrogen Receptor (ER) Status:    Positive (greater than 10% of cells demonstrate nuclear positivity)                                    Percentage of  Cells with Nuclear Positivity:    %                                    Average Intensity of Staining:    Strong                                  Test Type:    Food and Drug Administration (FDA) cleared (test / vendor): [employer]                                  Primary Antibody:    SP1                                  Scoring System:    Akil                                    Proportion Score:    5                                    Intensity Score:    3                                    Total Akil Score:    8                                Test(s) Performed:                                     Progesterone Receptor (PgR) Status:    Positive                                    Percentage of Cells with Nuclear Positivity:    11-20%                                    Average Intensity of Staining:    Moderate                                  Test Type:    Food and Drug Administration (FDA) cleared (test / vendor): [employer]                                  Primary Antibody:    1E2                                  Scoring System:    Akil                                    Proportion Score:    3                                    Intensity Score:    2                                    Total Akil Score:    5                                Test(s) Performed:                                     HER2 by Immunohistochemistry:    Negative (Score 0)                                  Test Type:    Food and Drug Administration (FDA) cleared (test / vendor): [employer]                                  Primary Antibody:    4B5                                Test(s) Performed:    Ki-67                                  Ki-67 Percentage of Positive Nuclei:    35 %                                 Primary Antibody:    30-9                                Cold Ischemia and Fixation Times:    Meet requirements specified in latest version of the ASCO / CAP Guidelines                                Cold Ischemia Time (minutes):    20  min                               Fixation Time (hours):    8 hours                               Testing Performed on Block Number(s):    3A                                                         METHODS                               Fixative:    Formalin                                Image Analysis:    Not performed       Comment 06/04/2024    Final                    Value:This result contains rich text formatting which cannot be displayed here.    Gross Description 06/04/2024    Final                    Value:This result contains rich text formatting which cannot be displayed here.    Special Stains 06/04/2024    Final                    Value:This result contains rich text formatting which cannot be displayed here.        NM Bone Scan Whole Body    Result Date: 6/17/2024   1. Focal abnormal radiotracer uptake in the left anterior/inferior iliac spine correlating with a lucent lesion on CT, concerning for metastatic disease. Could consider further evaluation with MRI of the pelvis and left hip without and with contrast. 2. Focal abnormal radiotracer uptake in the left frontal calvarium. Metastatic disease not excluded. Recommend correlating with noncontrast CT or MRI without and with contrast if clinically indicated. 3. Amorphous soft tissue and skin radiotracer uptake in the left breast correlating with the known breast malignancy. 4. Right shoulder photopenia from the known reverse arthroplasty with focal abnormal radiotracer uptake in the superior right shoulder correlating with a nondisplaced fracture at the base of the right acromion. 5. Multifocal likely degenerative radiotracer uptake at each knee, ankle, and foot with increased uptake in the medial and patellofemoral cortex of the right knee that could also be posttraumatic. Correlate with radiographs.  This report was finalized on 6/17/2024 1:55 PM by Angel Galloway MD on Workstation: [id number]          Assessment & Plan       *Left breast invasive  lobular carcinoma  The tumor is estrogen receptor +91 to 100%, progesterone receptor +31 to 40%, HER2 negative, 0, Ki-67 35%  The tumor measures greater than 10 cm on exam, lymph node positive.  CT of the chest abdomen and pelvis with right upper lobe pulmonary nodule which is new and the bone scan also showsUptake in the left anterior inferior iliac spine which correlates to a lucent area on the CT scan and also focal uptake noted in the left frontal calvarium concerning for metastatic disease.  Further evaluation with a MRI of the pelvis and hip as well as MRI of the brain is underway.  The scans are scheduled for 7/10/2024.  Discussed at length the details of imaging and pathology report.Discussed the origin of breast cancer from the ducts and the lobules and the histological type of breast cancer based on site of origin. Discussed the tumor size, lymph node status and stage of the cancer. Explained the presence of DCIS. Discussed the receptor status including ER, IL and her-2 dorothy and their significance in determining the biology and treatment. Also discussed the importance of grade and ki-67.   Clinical T3 N1 M1, stage IV invasive lobular carcinoma.  There is also a right axillary lymph node which has been biopsied and consistent with invasive lobular carcinoma with similar morphology as well as receptors concerning for metastatic disease rather than a primary right breast cancer.  Given the concerns of metastatic disease and strongly ER/IL positive breast cancer I recommend that she proceed with endocrine therapy with anastrozole along with a CDK 4 6 inhibitor as a first-line.  Recommend Ribociclib 400 mg 3 weeks on and 1 week off.  Adverse effects of this treatment including but not limited to hot flashes, mood changes, fatigue, insomnia, myelosuppression, increased risk of infections, hepatotoxicity, pneumonitis, risk of DVT PE, effect on bone density discussed.  Patient willing to proceed the recommended  treatment however we will wait to confirm the possible metastatic lesions in the spine with the MRI.  Even if the MRIs do not confirm metastatic disease given the fact that the right axilla was positive for invasive lobular carcinoma we could presume that this is metastatic disease.  She also has a locally advanced left breast cancer which may not be operable at this time.  Even in that event I think it is fair to start her on CDK 4 6 inhibitor along with an AI and not proceed with surgery.  She also has an extremely poor functional status because of MS and may be higher than average risk for surgery.      *Right axillary lymphadenopathy  Biopsied and consistent with invasive lobular carcinoma, grade 2, ER/MT positive and HER2 negative  Patient will be started on AI and Ribociclib  Please refer to the above discussion for details  Also obtain [facility] or [facility] testing    *MS-patient is currently not on any treatment for MS.    *Hypertension-continue current medication      Hyperlipidemia-continue current medication      *Urinary incontinence-patient plans to have a suprapubic catheter placed by urology.      *History of iron deficiency anemia-  3/8/2024 hemoglobin was low at 10.9 and subsequently patient took oral iron which resulted in improvement of hemoglobin and normal.  She was scheduled for colonoscopy however she canceled that due to ongoing management of breast cancer.  She proceed with a colonoscopy.    *Genetic testing-9 gene stat panel negative      *Dyspnea  CT chest shows some bibasilar atelectasis/pneumonia  No cough but patient reports dyspnea the past 2 to 3 months especially when she is talking  CBC reviewed and no leukocytosis.  No fevers.  Unclear etiology for dyspnea  I will not start her on antibiotics  Recommend that she follow-up with her primary care physician to rule out a cardiac etiology    *Right upper lobe pulmonary nodule   Concerning for metastatic disease  PET/CT may not be helpful as  invasive lobular carcinomas typically are not very PET avid.  Could consider PET-FES  Obtain tumor markers    *Follow-up-after the MRIs.    19 minutes total spent on the encounter including reviewing the CT chest abdomen and pelvis as well as bone scan images independently, interpreting them independently, care coordination, face-to-face time with the patient, reviewing the medical records, documentation on the same day.

## 2024-06-28 ENCOUNTER — TELEPHONE (OUTPATIENT)
Dept: INTERNAL MEDICINE | Facility: CLINIC | Age: 64
End: 2024-06-28
Payer: MEDICARE

## 2024-06-28 ENCOUNTER — PATIENT ROUNDING (BHMG ONLY) (OUTPATIENT)
Dept: ONCOLOGY | Facility: CLINIC | Age: 64
End: 2024-06-28
Payer: MEDICARE

## 2024-06-28 ENCOUNTER — SPECIALTY PHARMACY (OUTPATIENT)
Dept: PHARMACY | Facility: HOSPITAL | Age: 64
End: 2024-06-28
Payer: MEDICARE

## 2024-06-28 ENCOUNTER — HOSPITAL ENCOUNTER (OUTPATIENT)
Dept: MRI IMAGING | Facility: HOSPITAL | Age: 64
Discharge: HOME | End: 2024-06-28
Payer: MEDICARE

## 2024-06-28 PROBLEM — C50.812 MALIGNANT NEOPLASM OF OVERLAPPING SITES OF LEFT BREAST IN FEMALE, ESTROGEN RECEPTOR POSITIVE: Status: ACTIVE | Noted: 2024-06-28

## 2024-06-28 PROBLEM — Z17.0 MALIGNANT NEOPLASM OF OVERLAPPING SITES OF LEFT BREAST IN FEMALE, ESTROGEN RECEPTOR POSITIVE: Status: ACTIVE | Noted: 2024-06-28

## 2024-06-28 LAB
DX PRELIMINARY: NORMAL
LAB AP CASE REPORT: NORMAL
LAB AP CLINICAL INFORMATION: NORMAL
LAB AP DIAGNOSIS COMMENT: NORMAL
LAB AP SPECIAL STAINS: NORMAL
LAB AP SYNOPTIC CHECKLIST: NORMAL
PATH REPORT.ADDENDUM SPEC: NORMAL
PATH REPORT.FINAL DX SPEC: NORMAL
PATH REPORT.GROSS SPEC: NORMAL

## 2024-06-28 PROCEDURE — C8937 CAD BREAST MRI: HCPCS

## 2024-06-28 PROCEDURE — C8908 MRI W/O FOL W/CONT, BREAST,: HCPCS

## 2024-06-28 PROCEDURE — 0 GADOBENATE DIMEGLUMINE 529 MG/ML SOLUTION: Performed by: STUDENT IN AN ORGANIZED HEALTH CARE EDUCATION/TRAINING PROGRAM

## 2024-06-28 PROCEDURE — A9577 INJ MULTIHANCE: HCPCS | Performed by: STUDENT IN AN ORGANIZED HEALTH CARE EDUCATION/TRAINING PROGRAM

## 2024-06-28 RX ADMIN — GADOBENATE DIMEGLUMINE 16 ML: 529 INJECTION, SOLUTION INTRAVENOUS at 17:47

## 2024-06-28 NOTE — PROGRESS NOTES
A My-Chart message has been sent to the patient for PATIENT ROUNDING with [facility].

## 2024-06-28 NOTE — PROGRESS NOTES
Staff message rec from Rosina BAEZ, Clinical RN-Dr Lopes would like to start pt on Kisqali plus Anastrozole.    Dose will start at 400 mg daily for 21 days on then 7 days off and then increase as tolerated.    I have submitted the PA to [employer] through [employer]. Currently waiting for a decision.    Rosina Sethi, RN sent to Alyssa Rich, [facility]; Belle Razo, Pharmacy Technician  She has pending MRI's to confirm metastatic disease.  The plan will be to start her on Ribo + Anastrozole.  I've sent the anastrozole.  Dr Lopes said we'll plan to start the Ribo at 400mg and increase as tolerated.  Dr Lopes will be out, and she will have her see Dr Gooden or Dr SOMMER after the scans.  She'll plan to see her when she returns at the end of July/ beginning of August.    Thank you     Belle Razo, Pharmacy Technician  Specialty Pharmacy Technician

## 2024-07-01 ENCOUNTER — SPECIALTY PHARMACY (OUTPATIENT)
Dept: PHARMACY | Facility: HOSPITAL | Age: 64
End: 2024-07-01
Payer: MEDICARE

## 2024-07-01 ENCOUNTER — OFFICE VISIT (OUTPATIENT)
Dept: INTERNAL MEDICINE | Facility: CLINIC | Age: 64
End: 2024-07-01
Payer: MEDICARE

## 2024-07-01 VITALS
OXYGEN SATURATION: 95 % | HEIGHT: 66 IN | BODY MASS INDEX: 28.12 KG/M2 | WEIGHT: 175 LBS | HEART RATE: 71 BPM | SYSTOLIC BLOOD PRESSURE: 118 MMHG | DIASTOLIC BLOOD PRESSURE: 74 MMHG

## 2024-07-01 DIAGNOSIS — R82.90 ABNORMAL FINDING ON URINALYSIS: ICD-10-CM

## 2024-07-01 DIAGNOSIS — Z17.0 MALIGNANT NEOPLASM OF OVERLAPPING SITES OF LEFT BREAST IN FEMALE, ESTROGEN RECEPTOR POSITIVE: ICD-10-CM

## 2024-07-01 DIAGNOSIS — R06.09 OTHER FORM OF DYSPNEA: Primary | ICD-10-CM

## 2024-07-01 DIAGNOSIS — C50.812 MALIGNANT NEOPLASM OF OVERLAPPING SITES OF LEFT BREAST IN FEMALE, ESTROGEN RECEPTOR POSITIVE: ICD-10-CM

## 2024-07-01 LAB
BILIRUB BLD-MCNC: NEGATIVE MG/DL
CLARITY, POC: ABNORMAL
COLOR UR: ABNORMAL
EXPIRATION DATE: ABNORMAL
GLUCOSE UR STRIP-MCNC: NEGATIVE MG/DL
KETONES UR QL: NEGATIVE
LEUKOCYTE EST, POC: ABNORMAL
Lab: ABNORMAL
NITRITE UR-MCNC: POSITIVE MG/ML
PH UR: 5.5 [PH] (ref 5–8)
PROT UR STRIP-MCNC: NEGATIVE MG/DL
RBC # UR STRIP: NEGATIVE /UL
SP GR UR: 1.03 (ref 1–1.03)
UROBILINOGEN UR QL: ABNORMAL

## 2024-07-01 PROCEDURE — 3074F SYST BP LT 130 MM HG: CPT | Performed by: STUDENT IN AN ORGANIZED HEALTH CARE EDUCATION/TRAINING PROGRAM

## 2024-07-01 PROCEDURE — 81003 URINALYSIS AUTO W/O SCOPE: CPT | Performed by: STUDENT IN AN ORGANIZED HEALTH CARE EDUCATION/TRAINING PROGRAM

## 2024-07-01 PROCEDURE — 1126F AMNT PAIN NOTED NONE PRSNT: CPT | Performed by: STUDENT IN AN ORGANIZED HEALTH CARE EDUCATION/TRAINING PROGRAM

## 2024-07-01 PROCEDURE — G2211 COMPLEX E/M VISIT ADD ON: HCPCS | Performed by: STUDENT IN AN ORGANIZED HEALTH CARE EDUCATION/TRAINING PROGRAM

## 2024-07-01 PROCEDURE — 99214 OFFICE O/P EST MOD 30 MIN: CPT | Performed by: STUDENT IN AN ORGANIZED HEALTH CARE EDUCATION/TRAINING PROGRAM

## 2024-07-01 PROCEDURE — 3078F DIAST BP <80 MM HG: CPT | Performed by: STUDENT IN AN ORGANIZED HEALTH CARE EDUCATION/TRAINING PROGRAM

## 2024-07-01 RX ORDER — ALBUTEROL SULFATE 90 UG/1
2 AEROSOL, METERED RESPIRATORY (INHALATION) EVERY 4 HOURS PRN
Qty: 18 G | Refills: 0 | Status: SHIPPED | OUTPATIENT
Start: 2024-07-01

## 2024-07-01 NOTE — PROGRESS NOTES
"  Enoc Carbone D.O.  Internal Medicine  [facility]  [address], [address]  [place] [address]  [phone]      Chief Complaint  loosing breath before finishing a sentense (Poss UTI)    SUBJECTIVE    History of Present Illness    Maritza Bejarano is a 63 y.o. female who presents to the office today as an established patient that last saw me on 5/23/2024.     Pt states since last visit she has been diagnosed with breast cancer. She is now following with [facility] [place], operated by [facility].     Feels that for a few months she has noticing losing her breath at the end of a sentence. It is not getting worse. Never short of air at rest. She is immobile due to her MS primarily so she doesn't know if walking would make it worse. No chest pain associated. She coughs an hour after eating only and denies any other sputum production. States she had a recent Chest CT with her oncologist.  \"I've been meaning to tell you but I forgot\" . No fever or chills. She sleeps at night with a hospital bed that has the head elevated, doesn't feel short of air when sleeping. Denies swelling of the legs.     States she thinks she has a UTI. States she is incontinent to urine chronically and that hasn't changed. No fever or chills. States she just saw her neutrophil count high which made her worried.     No Known Allergies     Outpatient Medications Marked as Taking for the 7/1/24 encounter (Office Visit) with Enoc Carbone, DO   Medication Sig Dispense Refill    anastrozole (ARIMIDEX) 1 MG tablet Take 1 tablet by mouth Daily. 30 tablet 3    atorvastatin (LIPITOR) 10 MG tablet Take 1 tablet by mouth Daily. 90 tablet 0    bacitracin 500 UNIT/GM ointment Apply 1 Application topically to the appropriate area as directed Every 12 (Twelve) Hours. 28 g 0    baclofen (LIORESAL) 20 MG tablet Take 1 tablet by mouth 3 (Three) Times a Day.      cholecalciferol (VITAMIN D3) 25 MCG (1000 UT) tablet Take 5 tablets by mouth Daily.      clonazePAM " "(KlonoPIN) 2 MG tablet Take 1 tablet by mouth As Needed (Sleep).      losartan-hydrochlorothiazide (HYZAAR) 50-12.5 MG per tablet Take 1 tablet by mouth Daily. 90 tablet 0    pantoprazole (PROTONIX) 40 MG EC tablet Take 1 tablet by mouth Daily. 180 tablet 0    pramipexole (MIRAPEX) 1.5 MG tablet Take 1 tablet by mouth 3 (Three) Times a Day.          Past Medical History:   Diagnosis Date    Anemia 2024    `Treated with iron    Dawn esophagus     per patient    Blurred vision     R/T MS    Breast cancer 05/2024+++++    Carpal tunnel syndrome     Clotting disorder 1993, 2003, 2012    3 g/i bleeds w/ transfus/ions    Colon polyp 2013    removed w/ colonoscopy    Deep vein thrombosis phlebitis 1980    Depression     Diplopia 2013    GERD (gastroesophageal reflux disease)     GI (gastrointestinal bleed) 3 bleeds    2 transfusions    H/O Skin cancer, basal cell     Headache     History of blood transfusion     History of GI bleed     R/T NSAIDS AND STEROIDS, multiple times    History of urinary tract infection     Hypercalcemia     s/p parathyroidectomy    Hyperlipidemia     Hypertension     Movement disorder     Multiple sclerosis     Optic neuritis     PONV (postoperative nausea and vomiting)        OBJECTIVE    Vital Signs:   /74   Pulse 71   Ht 166.4 cm (65.51\")   Wt 79.4 kg (175 lb)   SpO2 95%   BMI 28.67 kg/m²        Physical Exam  Vitals reviewed.   Constitutional:       General: She is not in acute distress.     Appearance: Normal appearance. She is not ill-appearing.   Eyes:      General: No scleral icterus.  Cardiovascular:      Rate and Rhythm: Normal rate and regular rhythm.      Heart sounds: Normal heart sounds. No murmur heard.  Pulmonary:      Effort: Pulmonary effort is normal. No respiratory distress.      Breath sounds: No wheezing.      Comments: Soft crackles left middle and lower lung fields  Musculoskeletal:      Right lower leg: No edema.      Left lower leg: No edema.      Comments: " "Ambulating via motorized wheelchair.   Skin:     Coloration: Skin is not jaundiced.   Neurological:      Mental Status: She is alert.   Psychiatric:         Mood and Affect: Mood normal.         Behavior: Behavior normal.         Thought Content: Thought content normal.                             ASSESSMENT & PLAN     Diagnoses and all orders for this visit:    1. Other form of dyspnea (Primary)  -Feels that for a few months she has noticing losing her breath at the end of a sentence. It is not getting worse. Never short of air at rest. She is immobile due to her MS primarily so she doesn't know if walking would make it worse. No chest pain associated. She coughs an hour after eating only and denies any other sputum production. States she had a recent Chest CT with her oncologist.  \"I've been meaning to tell you but I forgot\" . No fever or chills. She sleeps at night with a hospital bed that has the head elevated, doesn't feel short of air when sleeping. Denies swelling of the legs.   -Certainly patient does not have typical characteristics of an infectious etiology.  CBC reviewed from 6/27/2024 shows white count normal at 9.4.  She is not having sputum production.  I reviewed chest CT done for metastatic breast cancer evaluation.  There is a right apical pulmonary nodule, pleural parenchymal thickening and nodules inferiorly and posterior to the right upper lobe and stable, mild chronic scarring and subsegmental atelectasis at both lower lobes and a new small patchy opacity at the posterior right lung base which is felt to be representing atelectasis but pneumonia cannot be excluded.  On exam, she is satting 95% on room air which is slightly lower than her baseline and there are some crackles at the left lung base and midlung field.  -At this point I would like to get a pulmonary function test given significant smoking history of 60 pack years.  She did quit smoking in 2003.  I will give her an albuterol inhaler " for which she can let me know if this improves her breathing.  Certainly if it does improve her symptoms it would be suggestive of a obstructive lung process.  -I have low suspicion for cardiac etiology of symptoms given her euvolemic state and symptom presentation   -     albuterol sulfate  (90 Base) MCG/ACT inhaler; Inhale 2 puffs Every 4 (Four) Hours As Needed for Wheezing or Shortness of Air.  Dispense: 18 g; Refill: 0  -     Complete PFT - Pre & Post Bronchodilator; Future  -     Hemoglobin; Future    2. Abnormal finding on urinalysis  -States she thinks she has a UTI. States she is incontinent to urine chronically and that hasn't changed. No fever or chills. States she just saw her neutrophil count high which made her worried.   -Urinalysis in office with positive nitrite And trace leukocyte esterase.  I will send for culture.  Plan to treat only if positive.  -     POCT urinalysis dipstick, automated  -     Urine Culture - Urine, Urine, Catheter    3. Malignant neoplasm of overlapping sites of left breast in female, estrogen receptor positive  -I reviewed 6/27/2024 heme-onc progress note as well as 6/12/2024 breast surgery progress note.  I also reviewed imaging studies which include CT chest with contrast, CT abdomen pelvis with contrast, nuclear bone scan.          Follow Up  Return in about 2 months (around 9/1/2024) for Recheck.    Patient/family had no further questions at this time and verbalized understanding of the plan discussed today.

## 2024-07-01 NOTE — PROGRESS NOTES
Kisqali approved from 3/29/2024 to 6/28/2024-[employer].    I ran a test claim in [facility] and it returned paid with a copay of $ 3312.70. Pt will be able to use the  FREE Month Voucher for her fist fill and I will seek assistance for future fills.      Belle Razo, Pharmacy Technician  Specialty Pharmacy Technician

## 2024-07-02 ENCOUNTER — TELEPHONE (OUTPATIENT)
Dept: ONCOLOGY | Facility: CLINIC | Age: 64
End: 2024-07-02
Payer: MEDICARE

## 2024-07-02 NOTE — TELEPHONE ENCOUNTER
"Call to Maritza after message received from nurse navigator that she had questions regarding her treatment plan.  Maritza states she noticed on her appointments that she was scheduled for an infusion and she understood that she would not be receiving IV chemotherapy.  Explained that the appointment on 7/9 is for a dual education in which she will meet with the nurse practitioner and pharmacist.  The pharmacist department is \"infusion\" but she is not scheduled for an infusion.  She also had questions about the MRI she had on Friday.  Advised her to reach out to Dr Barber's office, and I would also sent a note to Dr Lopes for clarification on next steps regarding that report.  I have also provided her with my direct line should she have any more questions.  She voiced understanding and was thankful for the call.   "

## 2024-07-03 ENCOUNTER — SPECIALTY PHARMACY (OUTPATIENT)
Dept: PHARMACY | Facility: HOSPITAL | Age: 64
End: 2024-07-03
Payer: MEDICARE

## 2024-07-03 LAB
BACTERIA UR CULT: NORMAL
BACTERIA UR CULT: NORMAL

## 2024-07-03 NOTE — TELEPHONE ENCOUNTER
Follow up call to Maritza to let her know Dr Lopes did review the MRI of the breast from last week.  She asked me to let Maritza know that we should wait on the MRI of the hip and pelvis that are upcoming.  If this shows she has metastatic cancer, the biopsy may not be necessary as this will not change the management of her cancer much.  Maritza voiced understanding.

## 2024-07-03 NOTE — PROGRESS NOTES
Funding has opened with [employer] for pts diagnosis. I was able to secure her $15,000 from 6/3/2024-6/2/2025.     This will cover the cost of her Kisqali with [facility].     Belle Razo, Pharmacy Technician  Specialty Pharmacy Technician

## 2024-07-09 ENCOUNTER — OFFICE VISIT (OUTPATIENT)
Dept: ONCOLOGY | Facility: CLINIC | Age: 64
End: 2024-07-09
Payer: MEDICARE

## 2024-07-09 ENCOUNTER — SPECIALTY PHARMACY (OUTPATIENT)
Dept: ONCOLOGY | Facility: HOSPITAL | Age: 64
End: 2024-07-09
Payer: MEDICARE

## 2024-07-09 ENCOUNTER — SPECIALTY PHARMACY (OUTPATIENT)
Dept: PHARMACY | Facility: HOSPITAL | Age: 64
End: 2024-07-09
Payer: MEDICARE

## 2024-07-09 VITALS
TEMPERATURE: 97.7 F | RESPIRATION RATE: 16 BRPM | HEIGHT: 66 IN | DIASTOLIC BLOOD PRESSURE: 82 MMHG | SYSTOLIC BLOOD PRESSURE: 134 MMHG | OXYGEN SATURATION: 99 % | HEART RATE: 78 BPM | BODY MASS INDEX: 28.58 KG/M2

## 2024-07-09 DIAGNOSIS — C50.919 MALIGNANT NEOPLASM OF FEMALE BREAST, UNSPECIFIED ESTROGEN RECEPTOR STATUS, UNSPECIFIED LATERALITY, UNSPECIFIED SITE OF BREAST: Primary | ICD-10-CM

## 2024-07-09 DIAGNOSIS — G47.9 SLEEP DISTURBANCE: ICD-10-CM

## 2024-07-09 DIAGNOSIS — Z17.0 MALIGNANT NEOPLASM OF OVERLAPPING SITES OF LEFT BREAST IN FEMALE, ESTROGEN RECEPTOR POSITIVE: Primary | ICD-10-CM

## 2024-07-09 DIAGNOSIS — R45.89 ANXIETY ABOUT HEALTH: ICD-10-CM

## 2024-07-09 DIAGNOSIS — Z17.0 MALIGNANT NEOPLASM OF OVERLAPPING SITES OF LEFT BREAST IN FEMALE, ESTROGEN RECEPTOR POSITIVE: ICD-10-CM

## 2024-07-09 DIAGNOSIS — C50.912 INVASIVE LOBULAR CARCINOMA OF BREAST, STAGE 4, LEFT: Primary | ICD-10-CM

## 2024-07-09 DIAGNOSIS — C50.812 MALIGNANT NEOPLASM OF OVERLAPPING SITES OF LEFT BREAST IN FEMALE, ESTROGEN RECEPTOR POSITIVE: ICD-10-CM

## 2024-07-09 DIAGNOSIS — C50.812 MALIGNANT NEOPLASM OF OVERLAPPING SITES OF LEFT BREAST IN FEMALE, ESTROGEN RECEPTOR POSITIVE: Primary | ICD-10-CM

## 2024-07-09 PROCEDURE — 1126F AMNT PAIN NOTED NONE PRSNT: CPT | Performed by: NURSE PRACTITIONER

## 2024-07-09 PROCEDURE — 3079F DIAST BP 80-89 MM HG: CPT | Performed by: NURSE PRACTITIONER

## 2024-07-09 PROCEDURE — 3075F SYST BP GE 130 - 139MM HG: CPT | Performed by: NURSE PRACTITIONER

## 2024-07-09 PROCEDURE — 99213 OFFICE O/P EST LOW 20 MIN: CPT | Performed by: NURSE PRACTITIONER

## 2024-07-09 RX ORDER — ONDANSETRON HYDROCHLORIDE 8 MG/1
8 TABLET, FILM COATED ORAL 3 TIMES DAILY PRN
Qty: 30 TABLET | Refills: 5 | Status: SHIPPED | OUTPATIENT
Start: 2024-07-09

## 2024-07-09 NOTE — PROGRESS NOTES
Oral Chemotherapy - New Referral    Received a referral from Dr. Lopes    Treatment Plan: Kisqali (ribociclib)  Start date of treatment planned for:  7/16/24  Indication: breast cancer ER/WA +  Relevant past treatments: none  Is the therapy appropriate based on treatment guidelines and FDA labeling?: yes  Therapeutic Goals: Continue treatment until progression or intolerable toxicity  Patient can self-administer oral medications: Yes    Drug-Drug Interactions: The current medication list was reviewed and there are no relevant drug-drug interactions with the specialty medication.  Medication Allergies: The patient has NKDA  Review of Labs/Dose Adjustments: The patient's most recent labs were reviewed and all are WNL to start treatment at this dose.     A prescription was released to  [facility]  specialty pharmacy for   Drug: Kisqali (ribociclib)  Strength: 200 mg  Directions: Take two tablets by mouth daily for 21 days on then 7 days off  Quantity: 42  Refills: 0    Pharmacy education  completed today    Name/Credentials: Alyssa Rich PharmD, BCPS    7/9/2024  12:59 EDT

## 2024-07-09 NOTE — PROGRESS NOTES
TREATMENT  PREPARATION    Maritza Bejarano  [id number]  1960    Chief Complaint: Treatment preparation and needs assessment    History of present illness:  Maritza Bejarano is a 63 y.o. year old female who is here today for treatment preparation and needs assessment.  The patient has been diagnosed with   Encounter Diagnoses   Name Primary?    Invasive lobular carcinoma of breast, stage 4, left Yes    Sleep disturbance     Anxiety about health     Malignant neoplasm of overlapping sites of left breast in female, estrogen receptor positive     and is scheduled to begin treatment with:     Oncology History:    Oncology/Hematology History   Malignant neoplasm of overlapping sites of left breast in female, estrogen receptor positive   6/28/2024 Initial Diagnosis    Malignant neoplasm of overlapping sites of left breast in female, estrogen receptor positive     7/15/2024 -  Chemotherapy    OP BREAST Letrozole / Ribociclib         The current medication list and allergy list were reviewed and reconciled.     Past Medical History, Past Surgical History, Social History, Family History have been reviewed and are without significant changes except as mentioned.    Physical Exam:    Vitals:    07/09/24 1110   BP: 134/82   Pulse: 78   Resp: 16   Temp: 97.7 °F (36.5 °C)   SpO2: 99%     Vitals:    07/09/24 1110   PainSc: 0-No pain        ECOG score: 3             Physical Exam      NEEDS ASSESSMENTS    Genetics  The patient's new diagnosis and family history have been reviewed for genetic counseling needs. The patient will not be referred..     Psychosocial and Barriers to care  The patient has completed a PHQ-9 Depression Screening and the Distress Thermometer (DT) today.  PHQ-9 results show PHQ-2 Total Score:   PHQ-9 Total Score: PHQ-9 Total Score:       The patient scored their distress today as Distress Level: 10 on a scale of 0-10 with 0 being no distress and 10 being extreme distress. Problems marked by the patient as being  an issue for them within the last week include Physical concerns  Sleep: Yes .      Results were reviewed along with psychosocial resources offered by our cancer center.  Our Supportive Oncology team will be flagged for a score of 4 or above, and a same day call will be made for a score of 9 or 10.  A mental health referral is offered at that time. Patients who score less than 4 have been educated on our support services and can be referred to our  upon request.  The patient will be referred to our .       Nutrition  The patient has completed the malnutrition screening today. They scored Malnutrition Screening Tool  Have you recently lost weight without trying?  If yes, how much weight have you lost?: 0--> No  Have you been eating poorly because of a decreased appetite?: 0--> No  MST score: 0   with a score of 0-1 meaning not at risk in a score of 2 or greater meaning at risk.  Patients with a score of 3 or higher will be referred to our oncology dietitian for support. Patients beginning at risk treatment regimens or who have dietary concerns will also be referred to our oncology dietitian. The patient will be referred.    Functional Assessment  Persons who are age 70 or greater will be screened for qualification of a comprehensive geriatric assessment by our survivorship nurse practitioner.  Older adults with cancer face unique challenges. These may include an increased risk of drug reactions, financial burdens, and caregiver stress. The patient scored   . Patients scoring 14 or lower will referred for an older adult functional assessment with the survivorship advanced practice registered nurse to ensure all needed support is provided as patients plan for their treatments. NOT APPLICABLE    Intravenous Access Assessment  The patient and I discussed planned intravenous chemo/biotherapy as well as other IV treatments that are often needed throughout the course of treatment. These may  "include, but are not limited to blood transfusions, antibiotics, and IV hydration. Discussed that depending on selected treatment and vein assessment, patient may require venous access device (VAD) which could include but not limited to a Mediport or PICC line. Risks and benefits of VADs reviewed. The patient will be treated via Oral Treatment.    Reproductive/Sexual Activity   People should avoid becoming pregnant and should not get a partner pregnant while undergoing chemo/biotherapy.  People of childbearing age should use effective contraception during active therapy. The best recommendation for all people is to use a barrier method for a minimum of 1 week after the last infusion of chemo/biotherapy to prevent your partner being exposed to byproducts from treatment medications in bodily fluids. Effective contraception should be discussed with your oncology team to make sure it is safe to take based on your diagnosis. Possible options include oral contraceptives, barrier methods. Chemo/biotherapy can change your ability to reproduce children in the future.  There are options for fertility preservation. NOT APPLICABLE    Advanced Care Planning  Advance Care Planning   The patient and I discussed advanced care planning, \"Conversations that Matter\".   This service is offered for development of advance directives with a certified ACP facilitator.  The patient does not have an up-to-date advanced directive. This document is not on file with our office. The patient is not interested in an appointment with one of our facilitators to create or update their advanced directives.               Smoking cessation  Tobacco Use: Medium Risk (7/9/2024)    Patient History     Smoking Tobacco Use: Former     Smokeless Tobacco Use: Never     Passive Exposure: Not on file       Patient and I discussed their tobacco use history. Referral will not be made for smoking cessation.      Palliative Care  When appropriate, the patient and I " discussed the availability palliative care services and when appropriate Hospice care. Palliative care is not the same as Hospice care which was explained to the patient.The patient is not interested in additional information from our  on these services.    Survivorship   When appropriate, we discussed that we will refer the patient to survivorship clinic to discuss next steps following completion of planned treatment.  Reviewed this visit will include assessment of your physical, psychological, functional, and spiritual needs as a survivor and the need at attend this visit when scheduled.    TREATMENT EDUCATION    Today I met with the patient to discuss the chemo/biotherapy regimen recommended for treatment of Invasive lobular carcinoma of breast, stage 4, left  - Ambulatory Referral to OP ONC Nutrition Services  - Ambulatory Referral to Behavioral Health    Sleep disturbance  - Ambulatory Referral to Behavioral Health    Anxiety about health  - Ambulatory Referral to Behavioral Health    Malignant neoplasm of overlapping sites of left breast in female, estrogen receptor positive  .  The patient was given explanation of treatment premed side effects including office policy that prohibits patients to drive if sedating medications are administered, MD explanation given regarding benefits, side effects, toxicities and goals of treatment.  The patient received a Chemotherapy/Biotherapy Plan Summary including diagnosis and explanation of specific treatment plan.    SIDE EFFECTS:  Common side effects were discussed with the patient and/or significant other.  Discussion included where applicable hair loss/discoloration, anemia/fatigue, infection/chills/fever, appetite, bleeding risk/precautions, constipation, diarrhea, mouth sores, taste alteration, loss of appetite, nausea/vomiting, peripheral neuropathy, skin/nail changes, rash, muscle aches/weakness, photosensitivity, weight gain/loss, hearing loss,  dizziness, menopausal symptoms, menstrual irregularity, sterility, high blood pressure, heart damage, liver damage, lung damage, kidney damage, DVT/PE risk, fluid retention, pleural/pericardial effusion, somnolence, electrolyte/LFT imbalance, vein exercises and/or the possible need for vascular access/port placement.  The patient was advised that although uncommon, leakage of an infused medication from the vein or venous access device may lead to skin breakdown and/or other tissue damage.  The patient was advised that he/she may have pain, bleeding, and/or bruising from the insertion of a needle in their vein or venous access device (port).  The patient was further advised that, in spite of proper technique, infection with redness and irritation may rarely occur at the site where the needle was inserted.  The patient was advised that if complications occur, additional medical treatment is available.  Finally, where applicable we have reviewed rare but potential immune mediated side effects including shortness of breath, cough, chest pain (pneumonitis), abdominal pain, diarrhea (colitis), thyroiditis (hypothyroid or hyperthyroid), hepatitis and liver dysfunction, nephritis and renal dysfunction.    Discussion also included side effects specific to drugs in the treatment plan, specifically:    Treatment Plans       Name Type Plan Dates Plan Provider         Active    OP BREAST Letrozole / Ribociclib ONCOLOGY TREATMENT  7/14/2024 - Present Zainab Lopes MD                      Questions answered and additional information discussed on topics including:  Anemia, Thrombocytopenia, Neutropenia, Nutrition and appetite changes, Constipation, Diarrhea, Nausea & vomiting, Mouth sores, Skin & nail changes, Organ toxicities, Home care, and Oral medication handling and disposal       Assessment and Plan:    Diagnoses and all orders for this visit:    1. Invasive lobular carcinoma of breast, stage 4, left (Primary)  -      Ambulatory Referral to OP ONC Nutrition Services  -     Ambulatory Referral to Behavioral Health    2. Sleep disturbance  -     Ambulatory Referral to Behavioral Health    3. Anxiety about health  -     Ambulatory Referral to Behavioral Health    4. Malignant neoplasm of overlapping sites of left breast in female, estrogen receptor positive      Orders Placed This Encounter   Procedures    Ambulatory Referral to OP ONC Nutrition Services     Referral Priority:   Routine     Referral Type:   Clinical Pathway     Referral Reason:   Specialty Services Required     Number of Visits Requested:   1    Ambulatory Referral to Behavioral Health     Referral Priority:   Routine     Referral Type:   Behavorial Health/Psych     Referral Reason:   Specialty Services Required     Requested Specialty:   Behavioral Health     Number of Visits Requested:   1         The patient and I have reviewed their diagnosis and scheduled treatment plan. Needs assessment was completed where applicable including genetics, psychosocial needs, barriers to care, VAD evaluation, advanced care planning, survivorship, and palliative care services where indicated. Referrals have been ordered as appropriate based upon evaluation today and patient desires.   Chemo/biotherapy teaching was completed today and consent obtained. See separate documentation for further details.  Adequate time was given to answer questions.  Patient made aware of their care team members and contact information if they have questions or problems throughout the treatment course.  Discussion held and written information provided describing frequency of office visits and ongoing monitoring throughout the treatment plan.     Reviewed with patient any prescribed medication sent to pharmacy.  Education provided regarding proper storage, safe handling, and proper disposal of unused medication.  Proper handling of body fluids and waste discussed and written information provided.  If  appropriate, patient had pretreatment labs drawn today.    Learning assessment completed at initial patient encounter. See separate flowsheet. Chemo/biotherapy education comprehension assessed at today's visit.    I spent 20 minutes caring for Maritza on this date of service. This time includes time spent by me in the following activities: preparing for the visit, reviewing tests, obtaining and/or reviewing a separately obtained history, counseling and educating the patient/family/caregiver, referring and communicating with other health care professionals, documenting information in the medical record, and care coordination.     Nilda Ovalles, APRN   07/09/24

## 2024-07-09 NOTE — PROGRESS NOTES
Specialty Pharmacy Patient Management Program  Oncology Initial Assessment       Maritza Bejarano is a 63 y.o. female with Breast Cancer seen by an Oncology provider and enrolled in the Oncology Patient Management program offered by [facility].  An initial outreach was conducted, including assessment of therapy appropriateness and specialty medication education for Kisqali (ribociclib) and Arimidex (anastrozole). The patient was introduced to services offered by [facility], including: regular assessments, refill coordination, mail order delivery options, prior authorization maintenance, and financial assistance programs as applicable. The patient was also provided with contact information for the pharmacy team.     Goal of chemotherapy: disease control    Treatment Medication(s) / Frequency and Dosing    Kisqali (ribociclib) 400 mg PO once daily for 21 days on, then 7 days off  Arimidex (anastrozole) 1 mg PO once daily    Number of cycles:  24 cycles planned    Start date of oral specialty medication:  TBD pending appointment on 7/16/24    Follow-up Testing to be determined after TBD cycles by MD.     Items for home use: Imodium AD (for diarrhea) and Acetaminophen or Tylenol (for fever and/or pain)    Rx written for: [x] Nausea    [] Pre-Chemo   ondansetron 8 mg by mouth every 8 hours as needed for nausea      Completing Pharmacist: Ronny Powell, Pharmacy Intern             Date/time: 07/09/2024 11:57 EDT         Relevant Past Medical History, Comorbidities, and Vaccines  Relevant medical history and concomitant health conditions were discussed with the patient. The patient's chart has been reviewed for relevant past medical history and comorbid health conditions and updated as necessary.  Vaccines are coordinated by the patient's oncologist and primary care provider.  Past Medical History:   Diagnosis Date    Anemia 2024    `Treated with iron    Dawn esophagus     per  patient    Blurred vision     R/T MS    Breast cancer 2024+++++    Carpal tunnel syndrome     Clotting disorder , ,     3 g/i bleeds w/ transfus/ions    Colon polyp     removed w/ colonoscopy    Deep vein thrombosis phlebitis 1980    Depression     Diplopia     GERD (gastroesophageal reflux disease)     GI (gastrointestinal bleed) 3 bleeds    2 transfusions    H/O Skin cancer, basal cell     Headache     History of blood transfusion     History of GI bleed     R/T NSAIDS AND STEROIDS, multiple times    History of urinary tract infection     Hypercalcemia     s/p parathyroidectomy    Hyperlipidemia     Hypertension     Movement disorder     Multiple sclerosis     Optic neuritis     PONV (postoperative nausea and vomiting)      Social History     Socioeconomic History    Marital status:      Spouse name: Donald    Number of children: 0   Tobacco Use    Smoking status: Former     Current packs/day: 0.00     Average packs/day: 2.0 packs/day for 30.0 years (60.0 ttl pk-yrs)     Types: Cigarettes     Start date: 10/22/1973     Quit date: 10/22/2003     Years since quittin.7    Smokeless tobacco: Never   Vaping Use    Vaping status: Never Used   Substance and Sexual Activity    Alcohol use: Not Currently    Drug use: Not Currently     Types: Marijuana     Comment: advised by neurologist for sleep    Sexual activity: Not Currently     Partners: Male     Birth control/protection: Post-menopausal       Allergies  Known allergies and reactions were discussed with the patient. The patient's chart has been reviewed for allergy information and updated as necessary.   No Known Allergies     Current Medication List  This medication list has been reviewed with the patient and evaluated for any interactions or necessary modifications/recommendations, and updated to include all prescription medications, OTC medications, and supplements the patient is currently taking.  This list reflects what is  contained in the patient's profile, which has also been marked as reviewed to communicate to other providers it is the most up to date version of the patient's current medication therapy.   Prior to Admission medications    Medication Sig Start Date End Date Taking? Authorizing Provider   albuterol sulfate  (90 Base) MCG/ACT inhaler Inhale 2 puffs Every 4 (Four) Hours As Needed for Wheezing or Shortness of Air. 7/1/24   Enoc Carbone DO   anastrozole (ARIMIDEX) 1 MG tablet Take 1 tablet by mouth Daily. 6/27/24   Zainab Lopes MD   atorvastatin (LIPITOR) 10 MG tablet Take 1 tablet by mouth Daily. 6/27/24   Enoc Carbone DO   bacitracin 500 UNIT/GM ointment Apply 1 Application topically to the appropriate area as directed Every 12 (Twelve) Hours. 3/9/24   Jose Rasmussen MD   baclofen (LIORESAL) 20 MG tablet Take 1 tablet by mouth 3 (Three) Times a Day. 6/25/24 6/25/25  Iban Gallardo MD   cholecalciferol (VITAMIN D3) 25 MCG (1000 UT) tablet Take 5 tablets by mouth Daily.    Iban Gallardo MD   clonazePAM (KlonoPIN) 2 MG tablet Take 1 tablet by mouth As Needed (Sleep). 8/24/23   Iban Gallardo MD   losartan-hydrochlorothiazide (HYZAAR) 50-12.5 MG per tablet Take 1 tablet by mouth Daily. 6/27/24   Enoc Carbone DO   pantoprazole (PROTONIX) 40 MG EC tablet Take 1 tablet by mouth Daily. 6/27/24   Enoc Carbone DO   pramipexole (MIRAPEX) 1.5 MG tablet Take 1 tablet by mouth 3 (Three) Times a Day. 9/17/23   Iban Gallardo MD       Drug Interactions  Reviewed concomitant medications, allergies, labs, comorbidities/medical history, quality of life, and immunization history.   Drug-drug interactions noted and discussed during education: no significant drug interactions noted. . Reminded the patient to let us know before making any changes or starting any new prescription or OTC medications so we can first assess drug interactions.  Drug-food interactions noted and discussed during  education: Patient was instructed to avoid eating grapefruit and drinking grapefruit juice and eating pomegranate and drinking pomegranate juice    Recommended Medications Assessment  Bone Health (such as calcium/vitamin D, bisphosphonate, RANKL inhibitor) - Not Indicated   VTE prophylaxis - Not Indicated   Prophylactic antimicrobials - Not Indicated   Tumor lysis syndrome prophylaxis - Risk for TLS Low.    Relevant Laboratory Values  Lab Results   Component Value Date    GLUCOSE 117 (H) 03/08/2024    CALCIUM 8.3 (L) 03/08/2024     03/08/2024    K 4.0 03/08/2024    CO2 23.7 03/08/2024     (H) 03/08/2024    BUN 15 03/08/2024    CREATININE 0.80 06/27/2024    EGFRIFAFRI >60 11/23/2020    EGFRIFNONA 80 02/27/2022    BCR 18.5 03/08/2024    ANIONGAP 9.3 03/08/2024     Lab Results   Component Value Date    WBC 9.40 06/27/2024    RBC 4.55 06/27/2024    HGB 13.6 06/27/2024    HCT 40.2 06/27/2024    MCV 88.4 06/27/2024    MCH 29.9 06/27/2024    MCHC 33.8 06/27/2024    RDW 14.0 06/27/2024    RDWSD 45.3 06/27/2024    MPV 9.6 06/27/2024     06/27/2024    NEUTRORELPCT 75.4 06/27/2024    LYMPHORELPCT 12.8 (L) 06/27/2024    MONORELPCT 6.6 06/27/2024    EOSRELPCT 3.4 06/27/2024    BASORELPCT 0.9 06/27/2024    AUTOIGPER 0.9 (H) 06/27/2024    NEUTROABS 7.10 (H) 06/27/2024    LYMPHSABS 1.20 06/27/2024    MONOSABS 0.62 06/27/2024    EOSABS 0.32 06/27/2024    BASOSABS 0.08 06/27/2024    AUTOIGNUM 0.08 (H) 06/27/2024    NRBC 1.1 (H) 06/27/2024       The above labs have been reviewed. No dose adjustments are needed for the oral specialty medication(s) based on the labs.    Initial Education Provided for Specialty Medication  The patient has been provided with the following education. All questions and concerns have been addressed prior to the patient receiving the medication, and the patient has verbalized understanding of the education and any materials provided.  Additional patient education shall be provided and  documented upon request by the patient, provider or payer.      Provided patient with:   Education sheets about the medication, 24-hour clinic phone number and my contact information and instructions to call should additional questions arise.     Medication Education Sheets Provided: (select all that apply)  Oral Specialty Medication: Arimidex (anastrozole) and Kisqali (ribociclib)    TOPICS COMMENTS   Storage and Handling of Oral Specialty Medication Store in the original container, in a dry location out of direct sunlight, and out of reach of children or pets. and Store at room temperature.  Discussed safe handling and what to do with any unused medication.   Administration of Oral Specialty Medication Take with or without food at the same time(s) each day. and Do not crush or chew tablets.   Adherence to Oral Specialty Regimen and Handling Missed Doses Patient is likely to have good treatment adherence; reinforced the importance of adherence. Reviewed how to address missed doses and to let us know of any missed doses.   Anemia: role of RBC, cause, s/s, ways to manage, role of transfusion Reviewed the role of RBC and the use of transfusions if hemoglobin decreases too much.  Patient to notify us if they experience shortness of breath, dizziness, or palpitations.  Also let patient know they could feel more tired than usual and to try to stay active, but rest if they need to.    Thrombocytopenia: role of platelet, cause, s/s, ways to prevent bleeding, things to avoid, when to seek help Reviewed the role of platelets in blood clotting and when to call clinic (bloody nose that bleeds for 5 mins despite pressure, a cut that won't stop bleeding despite pressure, gums that bleed excessively with brushing or flossing). Recommended using an electric razor, soft bristle toothbrush, and blowing your nose gently.    Neutropenia: role of WBC, cause, infection precautions, s/s of infection, when to call MD Reviewed the role of  WBC, good infection prevention practices, and when to call the clinic (temperature 100.4F, sore throat, burning urination, etc)   Nutrition and Appetite Changes:  importance of maintaining healthy diet & weight, ways to manage to improve intake, dietary consult, exercise regimen, electrolyte and/or blood glucose abnormalities Lipid Panel Abnormalities:  Explained that the oncology therapy may lead to abnormalities in lipid values, specifically: high cholesterol   Diarrhea: causes, s/s of dehydration, ways to manage, dietary changes, when to call MD Chemotherapy : Discussed the risk of diarrhea. Instructed patient on use OTC loperamide with diarrhea onset, but emphasized the importance of calling the clinic if 4-6 episodes in 24 hours not relieved by OTC loperamide.   Constipation: causes, ways to manage, dietary changes, when to call MD Provided supplementary handout with instructions for use of docusate and other OTC therapies to manage constipation.  Instructed to call us if medications aren't working.   Nausea/Vomiting: cause, use of antiemetics, dietary changes, when to call MD Emetic risk: Moderate  PRN home meds: Ondansetron  Pharmacy home meds sent to: [facility]    Instructed the patient to take a dose of the PRN medication at the first onset of nausea and if it's not working to call us for additional medications.  Also provided non-drug measures to mitigate nausea.       Alopecia: cause, ways to manage, resources Discussed the possibility of hair loss with the patient. Informed patient that they could request a prescription for a wig if desired and most of the cost is usually covered by insurance. Recommended covering the head with a hat and/or protecting the skin on the head with SPF 30 or higher.    Nervous System Changes: causes, s/s, neuropathies, cognitive changes, ways to manage discussed the possibility of hot flashes as well as mitigation strategies   Pain: causes, ways to manage Chemo: Discussed  muscle and joint aches/pains with chemotherapy, and recommended the use of OTC pain relief with ibuprofen or acetaminophen if needed.       Organ Toxicities: cause, s/s, need for diagnostic tests, labs, when to notify MD Discussed potential effects on organ systems, monitoring, diagnostic tests, labs, and when to notify their MD. Discussed the signs/symptoms of the following: hepatotoxicity and lung changes       Miscellaneous Financial Issues: none  Lab Draws: On days 1 and 15 of cycles 1 and 2, then starting with cycle 3, labs on day 1 of each cycle thereafter   Infertility and Sexuality:  causes, fertility preservation options, sexuality changes, ways to manage, importance of birth control The patient is not of childbearing potential.   Home Care: how to manage bodily fluids Counseled on management of soiled linens and proper flush technique.  Discussed how to manage all the side effects at home and advised when to contact the MD office         Adherence and Self-Administration  Barriers to Patient Adherence and/or Self-Administration: none  Expected duration of therapy: Until disease progression or intolerable toxicity and 24 planned cycles    Goals of Therapy  Patient Goals of Therapy:   Consistently take medications as prescribed  Patient will adhere to medication regimen  Patient will report any medication side effects to healthcare provider  Clinical Goals:    Goals Addressed Today    None     Support patient understanding of medication regimen  Ensure patient knows the pharmacy contact information  Schedule regular follow-up to monitor the treatment serious adverse events  Schedule regular follow-up to confirm medication adherence  Schedule regular follow-up to monitor disease progression or stability    Quality of Life Assessment   The patient's current (pre-therapy) quality of life was discussed with the patient. The QOL segment of this outreach has been reviewed and updated.   Quality of Life Score:  6/10    Wrap up  Discussed aforementioned material with patient in person, face-to-face, in clinic.   Chemo consents/CCA were signed at today's visit.   Medication availability: patient will receive medication from [facility] pharmacy on: 07/16/24 and patient is aware not to start until instructed to do so by MD, even if receiving the medication sooner  Patient expressed understanding.   Patient demonstrates ability to self-administer medication. No barriers to adherence identified.  All questions and concerns addressed.     Reassessment Plan & Follow-Up  Pharmacist to perform regular reassessments no more than (6) months from the previous assessment.  Welcome information and patient satisfaction survey to be sent by retail team with patient's initial fill.  Care Coordinator to set up future refill outreaches, coordinate prescription delivery, and escalate clinical questions to pharmacist.     Additional Plans, Therapy Recommendations or Therapy Problems to Be Addressed: none     Attestation  I attest that the patient was actively involved in and has agreed to the above plan of care.  If the prescribed therapy is at any point deemed not appropriate based on the current or future assessments, a consultation will be initiated with the patient's specialty care provider to determine the best course of action. The revised plan of therapy will be documented along with any additional patient education provided.     Name/Credentials: Ronny Powell, Pharmacy Intern    Date and Time: 7/9/2024  11:57 EDT

## 2024-07-09 NOTE — PROGRESS NOTES
Specialty Pharmacy Initial Fill Coordination Note     Maritza is a 63 y.o. female met with today regarding her first fill of Kisqali specialty medication(s).    Reviewed and verified with patient:       Specialty medication(s) and dose(s) confirmed: yes  Kisqali 200 mg tabs-2 tabs daily for 21 days on then 7 days off.    Delivery Questions      Flowsheet Row Most Recent Value   Delivery method Beeline   Delivery address verified with patient/caregiver? Yes  [facility] [place] [facility]   Delivery address Prescription  [[facility] [place] [facility]Ship 7/11 for delivery 7/12-$0 copay with [employer]]   Number of medications in delivery 1   Medication(s) being filled and delivered Ribociclib Succinate   Doses left of specialty medications 0-New Start   Copay verified? Yes   Copay amount $0 copay with [employer]   Copay form of payment No copayment ($0)   Ship Date 7/11/2024   Delivery Date 7/12/2024   Signature Required No          Kisqali delivery [facility] [place] [facility] with pt for  at her 7/16/2024 appt. $0 copay with insurance and [employer]. This is her first delivery of the medication and will start after her 7/16/2024 appt. Pt also met with [facility]TM Pharmacist today. I met with pt and explained my role in the office. We discussed her copay and how the [employer] works.      Follow-up: 21 day(s)     Belle Razo, Pharmacy Technician  Specialty Pharmacy Technician

## 2024-07-09 NOTE — PROGRESS NOTES
Drug: Kisqali (ribociclib)  Strength: 200 mg  Directions: Take two tablets by mouth daily for 21 days on then 7 days off  Quantity: 42  Refills: 0    Released to pharmacy: [facility]    Completed independent double check on medication order/RX.  Martin Conrad, PharmD, BCOP  Clinical Oncology Pharmacist

## 2024-07-10 ENCOUNTER — HOSPITAL ENCOUNTER (OUTPATIENT)
Dept: MRI IMAGING | Facility: HOSPITAL | Age: 64
Discharge: HOME | End: 2024-07-10
Payer: MEDICARE

## 2024-07-10 ENCOUNTER — TELEPHONE (OUTPATIENT)
Dept: ONCOLOGY | Facility: CLINIC | Age: 64
End: 2024-07-10
Payer: MEDICARE

## 2024-07-10 DIAGNOSIS — C50.919 MALIGNANT NEOPLASM OF FEMALE BREAST, UNSPECIFIED ESTROGEN RECEPTOR STATUS, UNSPECIFIED LATERALITY, UNSPECIFIED SITE OF BREAST: ICD-10-CM

## 2024-07-10 PROCEDURE — 73723 MRI JOINT LWR EXTR W/O&W/DYE: CPT

## 2024-07-10 PROCEDURE — 70553 MRI BRAIN STEM W/O & W/DYE: CPT

## 2024-07-10 PROCEDURE — 0 GADOBENATE DIMEGLUMINE 529 MG/ML SOLUTION: Performed by: STUDENT IN AN ORGANIZED HEALTH CARE EDUCATION/TRAINING PROGRAM

## 2024-07-10 PROCEDURE — A9577 INJ MULTIHANCE: HCPCS | Performed by: STUDENT IN AN ORGANIZED HEALTH CARE EDUCATION/TRAINING PROGRAM

## 2024-07-10 PROCEDURE — 72197 MRI PELVIS W/O & W/DYE: CPT

## 2024-07-10 RX ADMIN — GADOBENATE DIMEGLUMINE 15 ML: 529 INJECTION, SOLUTION INTRAVENOUS at 10:09

## 2024-07-10 NOTE — TELEPHONE ENCOUNTER
Provider: Marianne   Caller: patient  Relationship to Patient: self  Call Back Phone Number: [phone]  Reason for Call: Pt calling about getting Zofran called in before starting treatments.

## 2024-07-10 NOTE — TELEPHONE ENCOUNTER
Called the patient to let her know the zofran was sent to [facility] in [place] yesterday. Patient v/u.

## 2024-07-12 ENCOUNTER — TELEMEDICINE - AUDIO (OUTPATIENT)
Dept: OTHER | Facility: HOSPITAL | Age: 64
End: 2024-07-12
Payer: MEDICARE

## 2024-07-12 ENCOUNTER — TELEPHONE (OUTPATIENT)
Dept: PSYCHIATRY | Facility: HOSPITAL | Age: 64
End: 2024-07-12
Payer: MEDICARE

## 2024-07-12 ENCOUNTER — PATIENT OUTREACH (OUTPATIENT)
Dept: OTHER | Facility: HOSPITAL | Age: 64
End: 2024-07-12
Payer: MEDICARE

## 2024-07-12 ENCOUNTER — SPECIALTY PHARMACY (OUTPATIENT)
Dept: PHARMACY | Facility: HOSPITAL | Age: 64
End: 2024-07-12
Payer: MEDICARE

## 2024-07-12 NOTE — PROGRESS NOTES
I have received Kisqali from [facility]  and placed it in locked storage inside the [facility] office at [facility].     Belle Razo, Pharmacy Technician  Specialty Pharmacy Technician

## 2024-07-12 NOTE — TELEPHONE ENCOUNTER
Referral received from Rosy CORTEZ for supportive oncology service.  Attempted to contact patient x 3 with last attempt made on 7-12-24.  LVM x 3 with direct contact info if patient wishes to schedule an appt in the future.  [phone]/SP

## 2024-07-12 NOTE — PROGRESS NOTES
OUTPATIENT ONCOLOGY NUTRITION ASSESSMENT    Patient Name: Maritza Bejarano  YOB: 1960  MRN: [id number]  Assessment Date: 7/12/2024    COMMENTS:   Spoke to patient on the phone today per her request to answer questions about breast cancer nutrition. She is to begin kisqali and anastrozole.   Reviewed evidence-based nutrition recommendations for breast cancer survivors with an emphasis on a plant-based diet and weight management. I am mailing her a packet of information and handouts.  She will call or contact me with any questions.      Will be available for any questions.         Reason for Assessment Physician referral, New assessment, Education , Patient request     Diagnosis/Problem   Breast cancer   Treatment Plan Chemotherapy Kisqali and Anastrozole                 Medical/Surgical History Past Medical History:   Diagnosis Date    Anemia 2024    `Treated with iron    Dawn esophagus     per patient    Blurred vision     R/T MS    Breast cancer 05/2024+++++    Carpal tunnel syndrome     Clotting disorder 1993, 2003, 2012    3 g/i bleeds w/ transfus/ions    Colon polyp 2013    removed w/ colonoscopy    Deep vein thrombosis phlebitis 1980    Depression     Diplopia 2013    GERD (gastroesophageal reflux disease)     GI (gastrointestinal bleed) 3 bleeds    2 transfusions    H/O Skin cancer, basal cell     Headache     History of blood transfusion     History of GI bleed     R/T NSAIDS AND STEROIDS, multiple times    History of urinary tract infection     Hypercalcemia     s/p parathyroidectomy    Hyperlipidemia     Hypertension     Movement disorder     Multiple sclerosis     Optic neuritis     PONV (postoperative nausea and vomiting)        Past Surgical History:   Procedure Laterality Date    APPENDECTOMY      BLADDER SURGERY      bladder stimulator    BREAST BIOPSY  don't remember    BREAST SURGERY      augmentation wtih subsequent removal    CARPAL TUNNEL RELEASE Bilateral     Left 2018, right 2020  "   CUBITAL TUNNEL RELEASE Left     CYSTOSCOPY BOTOX INJECTION OF BLADDER  2018    Cystoscopy with Botox    FRACTURE SURGERY  2019    rt shoulder    PARATHYROIDECTOMY      one gland removed    ROTATOR CUFF REPAIR Right 2017    TOE SURGERY      bilateral great toes    TOTAL SHOULDER ARTHROPLASTY W/ DISTAL CLAVICLE EXCISION Right 10/22/2018    Procedure: RT TOTAL SHOULDER REVERSE ARTHROPLASTY;  Surgeon: Bipin Dangelo MD;  Location: [facility];  Service: Orthopedics            Anthropometrics        Current Height Ht Readings from Last 1 Encounters:   07/09/24 166.6 cm (65.61\")      Current Weight Wt Readings from Last 1 Encounters:   07/01/24 79.4 kg (175 lb)      Weight History Wt Readings from Last 30 Encounters:   07/01/24 1530 79.4 kg (175 lb)   06/27/24 1332 79.4 kg (175 lb)   06/12/24 1538 79.4 kg (175 lb)   05/23/24 1132 79.4 kg (175 lb)   04/08/24 1521 77.1 kg (170 lb)   04/01/24 1526 77.6 kg (171 lb)   03/26/24 1520 77.6 kg (171 lb)   03/09/24 0513 77.9 kg (171 lb 11.8 oz)   03/07/24 1156 77.1 kg (170 lb)   11/16/23 1558 79.4 kg (175 lb)   09/15/23 1313 79.4 kg (175 lb)   06/13/22 1524 72.6 kg (160 lb)   06/01/22 1528 72.6 kg (160 lb)   03/25/22 1435 74.8 kg (165 lb)   02/26/22 1444 80.3 kg (177 lb)   02/26/22 1158 90.7 kg (200 lb)   01/31/21 1208 83 kg (183 lb)   01/28/21 2117 83 kg (183 lb)   01/28/21 1354 83.4 kg (183 lb 12.8 oz)   12/06/19 0535 83.9 kg (185 lb)   06/14/19 0930 83.9 kg (185 lb)   11/14/18 2016 71.8 kg (158 lb 4.8 oz)   10/22/18 1050 74.8 kg (165 lb)   06/21/18 1346 72.6 kg (160 lb)   03/30/18 1021 72.6 kg (160 lb)   03/11/17 0900 74.8 kg (165 lb)   04/05/16 1211 74.8 kg (165 lb)   10/20/14 1517 61.2 kg (135 lb 0.2 oz)          Medications           Current medications: albuterol sulfate HFA, anastrozole, atorvastatin, bacitracin, baclofen, cholecalciferol, clonazePAM, losartan-hydrochlorothiazide, ondansetron, pantoprazole, pramipexole, and ribociclib succinate                 Labs     " "  Pertinent Labs          Invalid input(s): \"LABALBU\", \"PROT\"          COVID19   Date Value Ref Range Status   02/26/2022 Not Detected Not Detected - Ref. Range Final     Lab Results   Component Value Date    HGBA1C 6.0 (H) 05/23/2024          Electronically signed by:  Andria Taylor RD, LD  07/12/24 13:52 EDT    "

## 2024-07-13 LAB
DNA RANGE(S) EXAMINED NAR: NORMAL
GENE DIS ANL INTERP-IMP: POSITIVE
GENE DIS ASSESSED: NORMAL
GENE MUT TESTED BLD/T: 3.2 M/MB
MSI CA SPEC-IMP: NORMAL
REASON FOR STUDY: NORMAL
TEMPUS GERMLINE NOTE: NORMAL
TEMPUS LCA: NORMAL
TEMPUS PERTINENTNEGATIVES: NORMAL
TEMPUS PORTAL: NORMAL
TEMPUS THERAPY1: NORMAL
TEMPUS THERAPY2: NORMAL
TEMPUS THERAPY3: NORMAL
TEMPUS THERAPY4: NORMAL
TEMPUS THERAPY5: NORMAL
TEMPUS THERAPY6: NORMAL
TEMPUS THERAPY7: NORMAL
TEMPUS THERAPY8: NORMAL
TEMPUS THERAPYCOUNT: 8
TEMPUS TRIALCOUNT: 3
TEMPUS TRIALMATCHES1: NORMAL
TEMPUS TRIALMATCHES2: NORMAL
TEMPUS TRIALMATCHES3: NORMAL
TEMPUS XR RESULT 1: NORMAL

## 2024-07-15 ENCOUNTER — TELEPHONE (OUTPATIENT)
Dept: PSYCHIATRY | Facility: HOSPITAL | Age: 64
End: 2024-07-15
Payer: MEDICARE

## 2024-07-15 NOTE — TELEPHONE ENCOUNTER
Oncology Support Services    OSW called patient to see if we could meet after her appointment with Dr. Saavedra tomorrow, July 16, 2024.  OSW needed to leave a VM message and encouraged the patient to stop by the clinic, on the first floor, after her appointment on the 5th floor.    The patient did not stop by the clinic after the appointment.  OSW has mailed information to the patient's home about support services offered and will be available to discuss any psychosocial needs identified.     Alvin Morris MSW, [facility]  Oncology Social Worker

## 2024-07-16 ENCOUNTER — LAB (OUTPATIENT)
Dept: LAB | Facility: HOSPITAL | Age: 64
End: 2024-07-16
Payer: MEDICARE

## 2024-07-16 ENCOUNTER — CLINICAL SUPPORT (OUTPATIENT)
Dept: ONCOLOGY | Facility: HOSPITAL | Age: 64
End: 2024-07-16
Payer: MEDICARE

## 2024-07-16 ENCOUNTER — SPECIALTY PHARMACY (OUTPATIENT)
Dept: PHARMACY | Facility: HOSPITAL | Age: 64
End: 2024-07-16
Payer: MEDICARE

## 2024-07-16 ENCOUNTER — OFFICE VISIT (OUTPATIENT)
Dept: ONCOLOGY | Facility: CLINIC | Age: 64
End: 2024-07-16
Payer: MEDICARE

## 2024-07-16 VITALS — DIASTOLIC BLOOD PRESSURE: 79 MMHG | SYSTOLIC BLOOD PRESSURE: 117 MMHG

## 2024-07-16 VITALS
HEIGHT: 66 IN | RESPIRATION RATE: 16 BRPM | HEART RATE: 74 BPM | SYSTOLIC BLOOD PRESSURE: 117 MMHG | DIASTOLIC BLOOD PRESSURE: 79 MMHG | TEMPERATURE: 98.4 F | OXYGEN SATURATION: 98 % | WEIGHT: 175 LBS | BODY MASS INDEX: 28.12 KG/M2

## 2024-07-16 DIAGNOSIS — Z17.0 MALIGNANT NEOPLASM OF OVERLAPPING SITES OF LEFT BREAST IN FEMALE, ESTROGEN RECEPTOR POSITIVE: Primary | ICD-10-CM

## 2024-07-16 DIAGNOSIS — C50.812 MALIGNANT NEOPLASM OF OVERLAPPING SITES OF LEFT BREAST IN FEMALE, ESTROGEN RECEPTOR POSITIVE: Primary | ICD-10-CM

## 2024-07-16 DIAGNOSIS — Z17.0 MALIGNANT NEOPLASM OF OVERLAPPING SITES OF LEFT BREAST IN FEMALE, ESTROGEN RECEPTOR POSITIVE: ICD-10-CM

## 2024-07-16 DIAGNOSIS — Z79.899 ENCOUNTER FOR LONG-TERM (CURRENT) USE OF OTHER MEDICATIONS: ICD-10-CM

## 2024-07-16 DIAGNOSIS — C50.812 MALIGNANT NEOPLASM OF OVERLAPPING SITES OF LEFT BREAST IN FEMALE, ESTROGEN RECEPTOR POSITIVE: ICD-10-CM

## 2024-07-16 LAB
ALBUMIN SERPL-MCNC: 3.9 G/DL (ref 3.5–5.2)
ALBUMIN/GLOB SERPL: 1.1 G/DL
ALP SERPL-CCNC: 209 U/L (ref 39–117)
ALT SERPL W P-5'-P-CCNC: 24 U/L (ref 1–33)
ANION GAP SERPL CALCULATED.3IONS-SCNC: 13.2 MMOL/L (ref 5–15)
AST SERPL-CCNC: 47 U/L (ref 1–32)
BASOPHILS # BLD AUTO: 0.05 10*3/MM3 (ref 0–0.2)
BASOPHILS NFR BLD AUTO: 0.6 % (ref 0–1.5)
BILIRUB SERPL-MCNC: 0.4 MG/DL (ref 0–1.2)
BUN SERPL-MCNC: 17 MG/DL (ref 8–23)
BUN/CREAT SERPL: 26.6 (ref 7–25)
CALCIUM SPEC-SCNC: 9.9 MG/DL (ref 8.6–10.5)
CANCER AG15-3 SERPL-ACNC: 16.2 U/ML
CHLORIDE SERPL-SCNC: 103 MMOL/L (ref 98–107)
CO2 SERPL-SCNC: 23.8 MMOL/L (ref 22–29)
CREAT SERPL-MCNC: 0.64 MG/DL (ref 0.57–1)
DEPRECATED RDW RBC AUTO: 45.6 FL (ref 37–54)
EGFRCR SERPLBLD CKD-EPI 2021: 100.7 ML/MIN/1.73
EOSINOPHIL # BLD AUTO: 0.17 10*3/MM3 (ref 0–0.4)
EOSINOPHIL NFR BLD AUTO: 2.1 % (ref 0.3–6.2)
ERYTHROCYTE [DISTWIDTH] IN BLOOD BY AUTOMATED COUNT: 13.3 % (ref 12.3–15.4)
GLOBULIN UR ELPH-MCNC: 3.4 GM/DL
GLUCOSE SERPL-MCNC: 118 MG/DL (ref 65–99)
HCT VFR BLD AUTO: 37.1 % (ref 34–46.6)
HGB BLD-MCNC: 11.7 G/DL (ref 12–15.9)
IMM GRANULOCYTES # BLD AUTO: 0.03 10*3/MM3 (ref 0–0.05)
IMM GRANULOCYTES NFR BLD AUTO: 0.4 % (ref 0–0.5)
LYMPHOCYTES # BLD AUTO: 1.36 10*3/MM3 (ref 0.7–3.1)
LYMPHOCYTES NFR BLD AUTO: 16.7 % (ref 19.6–45.3)
MAGNESIUM SERPL-MCNC: 2.2 MG/DL (ref 1.6–2.4)
MCH RBC QN AUTO: 29.3 PG (ref 26.6–33)
MCHC RBC AUTO-ENTMCNC: 31.5 G/DL (ref 31.5–35.7)
MCV RBC AUTO: 92.8 FL (ref 79–97)
MONOCYTES # BLD AUTO: 0.49 10*3/MM3 (ref 0.1–0.9)
MONOCYTES NFR BLD AUTO: 6 % (ref 5–12)
NEUTROPHILS NFR BLD AUTO: 6.02 10*3/MM3 (ref 1.7–7)
NEUTROPHILS NFR BLD AUTO: 74.2 % (ref 42.7–76)
NRBC BLD AUTO-RTO: 0 /100 WBC (ref 0–0.2)
PHOSPHATE SERPL-MCNC: 3.9 MG/DL (ref 2.5–4.5)
PLATELET # BLD AUTO: 426 10*3/MM3 (ref 140–450)
PMV BLD AUTO: 9.1 FL (ref 6–12)
POTASSIUM SERPL-SCNC: 3.7 MMOL/L (ref 3.5–5.2)
PROT SERPL-MCNC: 7.3 G/DL (ref 6–8.5)
RBC # BLD AUTO: 4 10*6/MM3 (ref 3.77–5.28)
SODIUM SERPL-SCNC: 140 MMOL/L (ref 136–145)
WBC NRBC COR # BLD AUTO: 8.12 10*3/MM3 (ref 3.4–10.8)

## 2024-07-16 PROCEDURE — 1159F MED LIST DOCD IN RCRD: CPT | Performed by: INTERNAL MEDICINE

## 2024-07-16 PROCEDURE — 84100 ASSAY OF PHOSPHORUS: CPT

## 2024-07-16 PROCEDURE — 83735 ASSAY OF MAGNESIUM: CPT

## 2024-07-16 PROCEDURE — 80053 COMPREHEN METABOLIC PANEL: CPT

## 2024-07-16 PROCEDURE — 93005 ELECTROCARDIOGRAM TRACING: CPT

## 2024-07-16 PROCEDURE — 85025 COMPLETE CBC W/AUTO DIFF WBC: CPT

## 2024-07-16 PROCEDURE — 1160F RVW MEDS BY RX/DR IN RCRD: CPT | Performed by: INTERNAL MEDICINE

## 2024-07-16 PROCEDURE — 1126F AMNT PAIN NOTED NONE PRSNT: CPT | Performed by: INTERNAL MEDICINE

## 2024-07-16 PROCEDURE — 36415 COLL VENOUS BLD VENIPUNCTURE: CPT

## 2024-07-16 PROCEDURE — 99215 OFFICE O/P EST HI 40 MIN: CPT | Performed by: INTERNAL MEDICINE

## 2024-07-16 PROCEDURE — 3078F DIAST BP <80 MM HG: CPT | Performed by: INTERNAL MEDICINE

## 2024-07-16 PROCEDURE — 86300 IMMUNOASSAY TUMOR CA 15-3: CPT | Performed by: INTERNAL MEDICINE

## 2024-07-16 PROCEDURE — 3074F SYST BP LT 130 MM HG: CPT | Performed by: INTERNAL MEDICINE

## 2024-07-16 NOTE — PROGRESS NOTES
Ms. Bejarano picked up  Kisqali from the [facility] office today.   Minal Pink - Care Coordinator   7/16/2024  08:51 EDT

## 2024-07-16 NOTE — PROGRESS NOTES
Subjective   Maritza Bejarano is a 63 y.o. female.     History of Present Illness   Patient is a 63-year-old female who was diagnosed with invasive lobular carcinoma of the left breast ER 91 to 100%, RI 31 to 40%, HER2/dorothy negative with Ki-67 of 35%.  The tumor measured 10 cm on exam with lymph node positive.    Her CT scan did show right upper lobe pulmonary nodule which is new and bone scan showed uptake in the left anterior Ilex plan which correlates to a lucent area on the CT scan and shows focal uptake in the left frontal calvarium concerning for metastatic disease.  She was thought to have a clinical T3 N1 M1 stage IV invasive lobular carcinoma.  There was also a right axillary lymph node which was biopsied and consistent with invasive lobular carcinoma with similar morphology as well as receptors concerning for metastatic disease rather than a primary right breast cancer.    Patient was to start ribociclib 400 mg 3 weeks on 1 week off along with anastrozole.  She is here to start ribociclib today.    She had had MRI of the brain which showed T2 hyperintensity involving the frontal bone measuring 1.6 cm in size and a smaller area of enhancement in the frontal bone laterally and inferiorly concerning for metastasis.  No brain mets    MRI of the hip and pelvis showed multiple enhancing bone lesions consistent with metastatic disease to the sacrum and pelvis.  Dominant lesion in the anterior inferior left pelvic spine and rectus femoris muscle origin that correlates with the site of bone scan uptake.  She has some right joint hip joint effusion.  Her CA 15-3 16.2    She is here to start day 1 ribociclib.  I reviewed her QT interval her QTc is 431 ms in the normal range.      Oncologic history:    Patient is a 63-year-old postmenopausal lady who has not had a mammogram in 4 years presented with a screen detected abnormality.  She had a left breast biopsy in 2014 which was benign.  Denies palpating any breast masses  skin changes or nipple discharge prior to the abnormal mammogram.  She does have left nipple inversion.    Patient has a longstanding diagnosis of multiple sclerosis and has not been on any treatment.  She was previously seen by Dr. Ozzy France and now being seen by Dr. Soto with neurology.  She has been nonambulatory for the past 4 years and requires a wheelchair.  She is unable to transfer herself in and out of wheelchairs and her  has to lift her for all the transfers.  She is also incontinent of the bladder and requiring a suprapubic catheter placed by urology to help with the same.  She had a bladder stimulator in the past which was turned off.  Other comorbidities include hypertension and hyperlipidemia.      Family history significant for maternal great grandmother with breast cancer, maternal great aunt and mother with breast cancer in her 70s.  Denies any family history of ovarian cancer, pancreatic cancer or melanoma.    5/21/2024-bilateral screening mammogram  Finding 1.new nipple retraction along with diffuse skin and trabecular thickening of the left breast.  There is suggestion of subtle architectural distortion seen on the MLO projection in the posterior central region.  3 biopsy markers are noted.  No new suspicious calcifications.  Send finding 2.few axillary lymph node seen in the left breast which display interval increase in size and density.    Finding 3.focal asymmetry measuring 10 mm seen in the anterior one third of the right breast in the medial subareolar region.    Impression  Finding 1.new nipple retraction, skin and trabecular thickening in the left breast requires additional evaluation.  There is also question architectural distortion in the posterior central breast seen on the MLO projection.  Diagnostic mammogram to include repeat full-field CC with additional posterior tissue and complete breast ultrasound is recommended.  Finding 2.axillary lymph nodes in the left breast  require additional evaluation, ultrasound recommended.  Finding 3.focal asymmetry in the right breast requires additional evaluation.  Diagnostic mammogram and limited breast ultrasound is recommended.    5/29/2024-bilateral diagnostic mammogram and ultrasound  Finding 1.4 0.7 x 5.6 x 6.8 cm developing asymmetry with associated nipple retraction, skin thickening and trabecular thickening of the left breast in the central region, inferior region in the upper outer region and the lower outer region.  Finding 2.axillary lymph node in the left breast.  Finding 3.suspected finding completely resolves upon further evaluation representing benign fibroglandular breast tissue.    Bilateral breast ultrasound  Finding 1.nonparallel hypoechoic mass with indistinct margins and skin thickening and internal vascularity in the left breast in the central region, inferior region, upper outer region and in the lower outer region.  The finding is palpable and appears to involve all 4 quadrants.  Most discrete portion is located at 130, 3 cm from the nipple, skin thickening up to 6 mm.  Send finding 2.rounded enlarged left axillary lymph node measuring 11 mm.  Cortical thickening of 9 mm present.    Finding 3.no sonographic correlate.  Send finding 4.enlarged right axillary lymph node measuring 14 mm.  Cortical thickening of 5 mm.    Impression  Finding 1.ultrasound-guided biopsy recommended  Finding 2.ultrasound-guided biopsy recommended  Finding 3.previously described right breast asymmetry resolves  Finding 4.ultrasound-guided biopsy recommended.    Ultrasound-guided biopsy of the left breast and left axilla lymph node and right axilla lymph node    1.left breast  Invasive lobular carcinoma with crush artifact  Grade 2  Invasive carcinoma measures 8.5 mm  No lymphovascular space invasion  ER +91 to 100% strong  IL +31 to 40% moderate  HER2 negative, 0  Ki-67 35%    2.left axillary lymph node focus of metastatic Dastech lobular  carcinoma measuring 10 mm  ER +91 to 100% strong  CO +21 to 30%  HER2 -0  Ki-67 30%    3.right axillary lymph node with a focus of metastatic carcinoma measuring 4 mm.  Grade 2.  ER +91 to 100% strong  CO +11 to 20% moderate  HER2 negative, 0  Ki-67 35%    Pathology of all the 3 sites appear similar and findings could represent left breast lobular carcinoma metastatic to the right as well as left axillary lymph nodes.    6/14/2024-CT of the chest abdomen and pelvis  1.large ill-defined infiltrative central left breast mass measuring 6.7 x 4 cm, medially there is an irregular 2.5 x 2.5 cm mass.  Significant thickening of the skin in the left breast and there is left axilla lymphadenopathy.  Left axilla lymph node measures 1.3 x 1.3 cm.  There is also a new enlarged right axillary lymph node measuring 1.3 x 1 cm.  All of the subcentimeter right axillary lymph nodes are slightly larger than previous.  2.no mediastinal hilar or internal mammary chain lymphadenopathy  3.new indeterminate mixed density measuring 1.3 x 1.2 cm right apical pulmonary nodule.  Follow-up recommended.  4.pleural parenchymal thickening and nodules inferiorly and posterior to the right upper lobe are stable.  Chronic scarring and subsegmental atelectatic change in both lower lobes.    CT abdomen pelvis  1.moderate fatty infiltration of the liver.  No suspicious liver lesions.  2.moderate-sized paraesophageal hernia.  No acute bowel abnormality.  3.right sacral stimulator noted at the S3 neuroforamina.  No suspicious bone lesions are noted.    6/14/2024-bone scan  1.focal abnormal uptake in the left anterior inferior iliac spine correlating with lucent lesion on the CT concerning for metastatic disease.  Further evaluation with MRI of the pelvis and left hip could be performed.  2.focal uptake in the left frontal calvarium again concerning for metastatic disease.  Noncontrast CT or MRI could be obtained.  3.soft tissue uptake noted in the left  breast at the site of known left breast cancer.  4.changes from arthroplasty on the right shoulder.  5.multifocal likely degenerative uptake in each knee, ankle and foot and increased uptake in the medial and patellofemoral cortex of the right knee could be posttraumatic.      MRI of the pelvis and the left hip as well as MRI of the brain have been ordered and currently scheduled for 7/10/2024.    Inv[employer]ae 9 gene stat panel negative.          The following portions of the patient's history were reviewed and updated as appropriate: allergies, current medications, past family history, past medical history, past social history, past surgical history, and problem list.    Past Medical History:   Diagnosis Date    Anemia 2024    `Treated with iron    Dawn esophagus     per patient    Blurred vision     R/T MS    Breast cancer 05/2024+++++    Carpal tunnel syndrome     Clotting disorder 1993, 2003, 2012    3 g/i bleeds w/ transfus/ions    Colon polyp 2013    removed w/ colonoscopy    Deep vein thrombosis phlebitis 1980    Depression     Diplopia 2013    GERD (gastroesophageal reflux disease)     GI (gastrointestinal bleed) 3 bleeds    2 transfusions    H/O Skin cancer, basal cell     Headache     History of blood transfusion     History of GI bleed     R/T NSAIDS AND STEROIDS, multiple times    History of urinary tract infection     Hypercalcemia     s/p parathyroidectomy    Hyperlipidemia     Hypertension     Movement disorder     Multiple sclerosis     Optic neuritis     PONV (postoperative nausea and vomiting)         Past Surgical History:   Procedure Laterality Date    APPENDECTOMY      BLADDER SURGERY      bladder stimulator    BREAST BIOPSY  don't remember    BREAST SURGERY      augmentation wtih subsequent removal    CARPAL TUNNEL RELEASE Bilateral     Left 2018, right 2020    CUBITAL TUNNEL RELEASE Left     CYSTOSCOPY BOTOX INJECTION OF BLADDER  2018    Cystoscopy with Botox    FRACTURE SURGERY  2019    rt  shoulder    PARATHYROIDECTOMY      one gland removed    ROTATOR CUFF REPAIR Right 2017    TOE SURGERY      bilateral great toes    TOTAL SHOULDER ARTHROPLASTY W/ DISTAL CLAVICLE EXCISION Right 10/22/2018    Procedure: RT TOTAL SHOULDER REVERSE ARTHROPLASTY;  Surgeon: Bipin Dangelo MD;  Location: [facility];  Service: Orthopedics        Family History   Problem Relation Age of Onset    Hypertension Mother     Hyperlipidemia Mother     Aortic aneurysm Mother         thoracic    Diabetes Mother     Hypertension Father         charissa heart failure    Heart failure Father     Hyperlipidemia Father     Miscarriages / Stillbirths Sister     Multiple sclerosis Brother     Atrial fibrillation Brother     Diabetes Maternal Grandfather     Stroke Maternal Grandfather     Parkinsonism Paternal Grandmother     Tremor Paternal Grandmother     Stomach cancer Paternal Grandfather     Diabetes Maternal Grandmother         Social History     Socioeconomic History    Marital status:      Spouse name: Donald    Number of children: 0   Tobacco Use    Smoking status: Former     Current packs/day: 0.00     Average packs/day: 2.0 packs/day for 30.0 years (60.0 ttl pk-yrs)     Types: Cigarettes     Start date: 10/22/1973     Quit date: 10/22/2003     Years since quittin.7    Smokeless tobacco: Never   Vaping Use    Vaping status: Never Used   Substance and Sexual Activity    Alcohol use: Not Currently    Drug use: Not Currently     Types: Marijuana     Comment: advised by neurologist for sleep    Sexual activity: Not Currently     Partners: Male     Birth control/protection: Post-menopausal        OB History    No obstetric history on file.     Age at menarche-15  Age at first live childbirth-not applicable   2 para 0  0  Age of menopause-57  No use of hormone replacement therapy      No Known Allergies         Review of Systems   Constitutional:  Positive for activity change and fatigue.   HENT: Negative.    "  Eyes: Negative.    Respiratory:  Positive for shortness of breath.    Cardiovascular: Negative.    Gastrointestinal: Negative.    Endocrine: Negative.    Genitourinary:  Positive for urinary incontinence.   Musculoskeletal: Negative.    Skin: Negative.    Allergic/Immunologic: Negative.    Neurological:  Positive for weakness.   Psychiatric/Behavioral: Negative.           Objective   Blood pressure 117/79, pulse 74, temperature 98.4 °F (36.9 °C), temperature source Oral, resp. rate 16, height 166.6 cm (65.59\"), weight 79.4 kg (175 lb), SpO2 98%, not currently breastfeeding.   Physical Exam  Vitals reviewed.   Constitutional:       Appearance: Normal appearance. She is normal weight.   HENT:      Right Ear: External ear normal.      Left Ear: External ear normal.      Nose: Nose normal.      Mouth/Throat:      Pharynx: Oropharynx is clear.   Eyes:      Conjunctiva/sclera: Conjunctivae normal.   Cardiovascular:      Rate and Rhythm: Normal rate.   Pulmonary:      Effort: Pulmonary effort is normal.   Abdominal:      General: Abdomen is flat.   Musculoskeletal:         General: Normal range of motion.      Cervical back: Normal range of motion.   Skin:     General: Skin is warm.   Neurological:      General: No focal deficit present.      Mental Status: She is alert and oriented to person, place, and time.   Psychiatric:         Mood and Affect: Mood normal.         Behavior: Behavior normal.         Thought Content: Thought content normal.         Judgment: Judgment normal.       Breast Exam: Right breast appears normal on inspection.  No palpable right axilla lymphadenopathy or right breast masses.  Left breast on inspection there is nipple retraction.  On palpation there is a 13 x 9 cm mass in the retroareolar region pretty much occupying the whole breast.  There is palpable left axillary lymphadenopathy.    Lab on 07/16/2024   Component Date Value Ref Range Status    Glucose 07/16/2024 118 (H)  65 - 99 mg/dL " Final    BUN 07/16/2024 17  8 - 23 mg/dL Final    Creatinine 07/16/2024 0.64  0.57 - 1.00 mg/dL Final    Sodium 07/16/2024 140  136 - 145 mmol/L Final    Potassium 07/16/2024 3.7  3.5 - 5.2 mmol/L Final    Chloride 07/16/2024 103  98 - 107 mmol/L Final    CO2 07/16/2024 23.8  22.0 - 29.0 mmol/L Final    Calcium 07/16/2024 9.9  8.6 - 10.5 mg/dL Final    Total Protein 07/16/2024 7.3  6.0 - 8.5 g/dL Final    Albumin 07/16/2024 3.9  3.5 - 5.2 g/dL Final    ALT (SGPT) 07/16/2024 24  1 - 33 U/L Final    AST (SGOT) 07/16/2024 47 (H)  1 - 32 U/L Final    Alkaline Phosphatase 07/16/2024 209 (H)  39 - 117 U/L Final    Total Bilirubin 07/16/2024 0.4  0.0 - 1.2 mg/dL Final    Globulin 07/16/2024 3.4  gm/dL Final    A/G Ratio 07/16/2024 1.1  g/dL Final    BUN/Creatinine Ratio 07/16/2024 26.6 (H)  7.0 - 25.0 Final    Anion Gap 07/16/2024 13.2  5.0 - 15.0 mmol/L Final    eGFR 07/16/2024 100.7  >60.0 mL/min/1.73 Final    Magnesium 07/16/2024 2.2  1.6 - 2.4 mg/dL Final    Phosphorus 07/16/2024 3.9  2.5 - 4.5 mg/dL Final    WBC 07/16/2024 8.12  3.40 - 10.80 10*3/mm3 Final    RBC 07/16/2024 4.00  3.77 - 5.28 10*6/mm3 Final    Hemoglobin 07/16/2024 11.7 (L)  12.0 - 15.9 g/dL Final    Hematocrit 07/16/2024 37.1  34.0 - 46.6 % Final    MCV 07/16/2024 92.8  79.0 - 97.0 fL Final    MCH 07/16/2024 29.3  26.6 - 33.0 pg Final    MCHC 07/16/2024 31.5  31.5 - 35.7 g/dL Final    RDW 07/16/2024 13.3  12.3 - 15.4 % Final    RDW-SD 07/16/2024 45.6  37.0 - 54.0 fl Final    MPV 07/16/2024 9.1  6.0 - 12.0 fL Final    Platelets 07/16/2024 426  140 - 450 10*3/mm3 Final    Neutrophil % 07/16/2024 74.2  42.7 - 76.0 % Final    Lymphocyte % 07/16/2024 16.7 (L)  19.6 - 45.3 % Final    Monocyte % 07/16/2024 6.0  5.0 - 12.0 % Final    Eosinophil % 07/16/2024 2.1  0.3 - 6.2 % Final    Basophil % 07/16/2024 0.6  0.0 - 1.5 % Final    Immature Grans % 07/16/2024 0.4  0.0 - 0.5 % Final    Neutrophils, Absolute 07/16/2024 6.02  1.70 - 7.00 10*3/mm3 Final     Lymphocytes, Absolute 07/16/2024 1.36  0.70 - 3.10 10*3/mm3 Final    Monocytes, Absolute 07/16/2024 0.49  0.10 - 0.90 10*3/mm3 Final    Eosinophils, Absolute 07/16/2024 0.17  0.00 - 0.40 10*3/mm3 Final    Basophils, Absolute 07/16/2024 0.05  0.00 - 0.20 10*3/mm3 Final    Immature Grans, Absolute 07/16/2024 0.03  0.00 - 0.05 10*3/mm3 Final    nRBC 07/16/2024 0.0  0.0 - 0.2 /100 WBC Final    CA 15-3 07/16/2024 16.2  <=25.0 U/mL Final   Office Visit on 07/01/2024   Component Date Value Ref Range Status    Color 07/01/2024 Other (A)  Yellow, Straw, Dark Yellow, Fatimah Final    Clarity, UA 07/01/2024 Cloudy (A)  Clear Final    Specific Gravity  07/01/2024 1.030  1.005 - 1.030 Final    pH, Urine 07/01/2024 5.5  5.0 - 8.0 Final    Leukocytes 07/01/2024 Trace (A)  Negative Final    Nitrite, UA 07/01/2024 Positive (A)  Negative Final    Protein, POC 07/01/2024 Negative  Negative mg/dL Final    Glucose, UA 07/01/2024 Negative  Negative mg/dL Final    Ketones, UA 07/01/2024 Negative  Negative Final    Urobilinogen, UA 07/01/2024 0.2 E.U./dL  Normal, 0.2 E.U./dL Final    Bilirubin 07/01/2024 Negative  Negative Final    Blood, UA 07/01/2024 Negative  Negative Final    Lot Number 07/01/2024 [id number]   Final    Expiration Date 07/01/2024 03/31/2025   Final    Urine Culture 07/01/2024 Final report   Final    Result 1 07/01/2024 Comment   Final    Comment: Greater than 2 organisms recovered, none predominant. Please submit  another sample if clinically indicated.  Greater than 100,000 colony forming units per mL     Hospital Outpatient Visit on 06/27/2024   Component Date Value Ref Range Status    Creatinine 06/27/2024 0.80  0.60 - 1.30 mg/dL Final    Serial Number: [id number]:  [id number]   Orders Only on 06/27/2024   Component Date Value Ref Range Status    Reason for Study 06/27/2024 To identify somatic and germline mutations relevant to patient's cancer.   Final    Genetic Diseases Assessed 06/27/2024 Cancer   Final    Description  of Ranges of DNA Seque* 06/27/2024 648 gene panel   Final    Overall Interpretation 06/27/2024 positive   Final    MSI 06/27/2024 Stable   Final    TMB 06/27/2024 3.2  m/MB Final    [facility] Portal 06/27/2024 [email or web address]   Final    [facility] link    Pertinent Negatives 06/27/2024 ESR1   Final    Low Coverage Regions 06/27/2024 KDM5D   Final    Therapy Count 06/27/2024 8   Final    [facility]: Potential Therapy 1 06/27/2024    Final                    Value:Gene: 8975^PIK3CA^HGNC  Variant: p.E542K  Match Type: snvIndel  Match Type Description: PIK3CA p.E542K  Agent: Alpelisib + Fulvestrant  Drug Class: Combination (PI3K Inhibitor + Estrogen Receptor Antagonist)  Tissue: Breast Cancer  Association: Response  Evidence Status: Consensus  Evidence ID: NCCN  KDB Variant: E542K - GOF  NCCN Associated Evidence: Consensus, Breast Cancer  MSK Associated Evidence: MSK OncoKB, Level 1  Label: FDA On Label  FDA Approved?: Yes  On label?: Yes    [facility]: Potential Therapy 2 06/27/2024    Final                    Value:Gene: 8975^PIK3CA^HGNC  Variant: p.E542K  Match Type: snvIndel  Match Type Description: PIK3CA p.E542K  Agent: Capivasertib + Fulvestrant  Drug Class: Combination (Pan-AKT Inhibitor + Estrogen Receptor Antagonist)  Tissue: Breast Cancer  Association: Response  Evidence Status: Consensus  Evidence ID: NCCN  KDB Variant: E542K - GOF  NCCN Associated Evidence: Consensus, Breast Cancer  MSK Associated Evidence: MSK OncoKB, Level 1  Label: FDA On Label  FDA Approved?: Yes  On label?: Yes    [facility]: Potential Therapy 3 06/27/2024    Final                    Value:Gene: 3430^ERBB2^HGNC  Variant: p.L755S  Match Type: snvIndel  Match Type Description: ERBB2 p.L755S  Agent: Neratinib  Drug Class: Pan-HER TKI  Tissue: Triple-Receptor Negative Breast Cancer  Association: Response  Evidence Status:  Consensus  Evidence ID: NCCN  KDB Variant: Gain-of-function  Label: FDA Off Label  FDA Approved?: Yes  On label?: No    [facility]: Potential Therapy 4 06/27/2024    Final                    Value:Gene: 3430^ERBB2^HGNC  Variant: p.L755S  Match Type: snvIndel  Match Type Description: ERBB2 p.L755S  Agent: Neratinib + Fulvestrant  Drug Class: Combination (Pan-HER TKI + Estrogen Receptor Antagonist)  Tissue: HR+ Her2- Breast Cancer  Association: Response  Evidence Status: Consensus  Evidence ID: NCCN  KDB Variant: Gain-of-function  NCCN Associated Evidence: Consensus, HR+ Her2- Breast Cancer  Label: FDA Off Label  FDA Approved?: Yes  On label?: No    [facility]: Potential Therapy 5 06/27/2024    Final                    Value:Gene: 3430^ERBB2^HGNC  Variant: p.L755S  Match Type: snvIndel  Match Type Description: ERBB2 p.L755S  Agent: Trastuzumab + Neratinib  Drug Class: Combination (Anti-HER2 MAb + Pan-HER TKI)  Tissue: Triple-Receptor Negative Breast Cancer  Association: Response  Evidence Status: Consensus  Evidence ID: NCCN  KDB Variant: Gain-of-function  Label: FDA Off Label  FDA Approved?: Yes  On label?: No    [facility]: Potential Therapy 6 06/27/2024    Final                    Value:Gene: 3430^ERBB2^HGNC  Variant: p.L755S  Match Type: snvIndel  Match Type Description: ERBB2 p.L755S  Agent: Trastuzumab + Neratinib + Fulvestrant  Drug Class: Combination (Anti-HER2 MAb + Pan-HER TKI + Estrogen Receptor Antagonist)  Tissue: HR+ Her2- Breast Cancer  Association: Response  Evidence Status: Consensus  Evidence ID: NCCN  KDB Variant: Gain-of-function  NCCN Associated Evidence: Consensus, HR+ Her2- Breast Cancer  Label: FDA Off Label  FDA Approved?: Yes  On label?: No    [facility]: Potential Therapy 7 06/27/2024    Final                    Value:Gene: 8975^PIK3CA^HGNC  Variant: p.E542K  Match Type: snvIndel  Match Type Description: PIK3CA p.E542K  Agent: Alpelisib  Drug Class: PI3K Inhibitor  Tissue: Solid Tumors  Association:  Response  Evidence Status: Clinical research  Evidence ID: [id number]  Evidence URL: https://www.ncbi.nlm.nih.gov/pubmed/00167688  Evidence Title: Phosphatidylinositol 3-Kinase  -Selective Inhibition With Alpelisib (STT289) in PJO3SA-Bhnfpbp Solid Tumors: Results From the First-in-Human Study - [facility]  KDB Variant: Gain-of-function  Label: FDA Off Label  FDA Approved?: Yes  On label?: No    [facility]: Potential Therapy 8 06/27/2024    Final                    Value:Gene: 1748^CDH1^HGNC  Variant: p.R222fs  Match Type: snvIndel  Match Type Description: CDH1 p.R222fs  Drug Class: Diagnostic  Tissue: Lobular Breast Cancer  Evidence Status: Clinical research  Evidence ID: [id number]  Evidence URL: https://www.ncbi.nlm.nih.gov/pubmed/62306565  Evidence Title: Lobular breast cancer: Clinical, molecular and morphological characteristics - PubMed  KDB Variant: Loss-of-function  Label: Additional Indicators  FDA Approved?: No  On label?: No    Trial Count 06/27/2024 3   Final    Trial 1: Matched criteria 06/27/2024    Final                    Value:Clinical Trial NCT ID: [id number]  Clinical Trial Title: A Study Evaluating the Efficacy and Safety of Inavolisib Plus Fulvestrant Compared With Alpelisib Plus Fulvestrant in Participants With HR-Positive, HER2-Negative, PIK3CA Mutated, Locally Advanced or Metastatic Breast Cancer Post CDK4/6i and Endocrine Combination Therapy  Clinical Trial URL: https://clinicaltrials.gov/ct2/show/HGK65910827  Clinical Phase: Phase 3  Clinical Trial Matches: PIK3CA p.E542K mutation  Clinical Trial Distance and Location: [address] [place] [address]    Trial 2: Matched criteria 06/27/2024    Final                    Value:Clinical Trial NCT ID: [id number]  Clinical Trial Title: Testing the Safety and Tolerability of the Anti-cancer Drugs Trastuzumab Deruxtecan and Neratinib for Cancers With Changes in the HER2 Gene  Clinical Trial URL: https://clinicaltrials.gov/ct2/show/WVG02925584  Clinical Phase: Phase  1  Clinical Trial Matches: ERBB2 (HER2) p.L755S mutation  Clinical Trial Distance and Location: 63 mi, [place], [place]    Trial 3: Matched criteria 06/27/2024    Final                    Value:Clinical Trial NCT ID: [id number]  Clinical Trial Title: Study of kPV801 in Subjects With Selected Advanced Solid Tumor Malignancies  Clinical Trial URL: https://clinicaltrials.gov/ct2/show/ODY00551657  Clinical Phase: Phase 1/Phase 2  Clinical Trial Matches: CCND1 amplification  Clinical Trial Distance and Location: [address], [place], [place]    xR Result 1 06/27/2024 NEGATIVE  Negative - This report is being issued to report the results of gene rearrangement and altered splicing analysis from RNA sequencing. No gene rearrangements nor reportable altered splicing events were identified from RNA sequencing.   Final    Germline Variant Note 06/27/2024 No normal sample was received, therefore tumor/normal matched analysis was not performed.   Final   Lab on 06/27/2024   Component Date Value Ref Range Status    WBC 06/27/2024 9.40  3.40 - 10.80 10*3/mm3 Final    RBC 06/27/2024 4.55  3.77 - 5.28 10*6/mm3 Final    Hemoglobin 06/27/2024 13.6  12.0 - 15.9 g/dL Final    Hematocrit 06/27/2024 40.2  34.0 - 46.6 % Final    MCV 06/27/2024 88.4  79.0 - 97.0 fL Final    MCH 06/27/2024 29.9  26.6 - 33.0 pg Final    MCHC 06/27/2024 33.8  31.5 - 35.7 g/dL Final    RDW 06/27/2024 14.0  12.3 - 15.4 % Final    RDW-SD 06/27/2024 45.3  37.0 - 54.0 fl Final    MPV 06/27/2024 9.6  6.0 - 12.0 fL Final    Platelets 06/27/2024 415  140 - 450 10*3/mm3 Final    Neutrophil % 06/27/2024 75.4  42.7 - 76.0 % Final    Lymphocyte % 06/27/2024 12.8 (L)  19.6 - 45.3 % Final    Monocyte % 06/27/2024 6.6  5.0 - 12.0 % Final    Eosinophil % 06/27/2024 3.4  0.3 - 6.2 % Final    Basophil % 06/27/2024 0.9  0.0 - 1.5 % Final    Immature Grans % 06/27/2024 0.9 (H)  0.0 - 0.5 % Final    Neutrophils, Absolute 06/27/2024 7.10 (H)  1.70 - 7.00 10*3/mm3 Final    Lymphocytes,  Absolute 06/27/2024 1.20  0.70 - 3.10 10*3/mm3 Final    Monocytes, Absolute 06/27/2024 0.62  0.10 - 0.90 10*3/mm3 Final    Eosinophils, Absolute 06/27/2024 0.32  0.00 - 0.40 10*3/mm3 Final    Basophils, Absolute 06/27/2024 0.08  0.00 - 0.20 10*3/mm3 Final    Immature Grans, Absolute 06/27/2024 0.08 (H)  0.00 - 0.05 10*3/mm3 Final    nRBC 06/27/2024 1.1 (H)  0.0 - 0.2 /100 WBC Final        MRI Brain With & Without Contrast    Result Date: 7/11/2024  1.  There is no evidence of acute infarction or of abnormal intra-axial metastatic disease. 2.  There is a new area of T2 hyperintensity involving the frontal bone anterolaterally to the left measuring 1.6 cm in size and is slightly smaller area of enhancement involving the frontal bone laterally and inferiorly both of which are new versus prior examination and consistent with osseous metastatic disease. 3.  Areas of T2 FLAIR hyperintensity are appreciated involving the white matter of the cerebral hemispheres bilaterally similar appearances compared to the prior examination. The appearance is nonspecific. This likely represents a combination of small vessel ischemic disease and areas of chronic demyelination.  This report was finalized on 7/11/2024 2:40 PM by Dr. Nikolas Dominguez M.D on Workstation: [id number]      MRI Hip Left With & Without Contrast    Result Date: 7/11/2024  1. Multiple enhancing bone lesions consistent with metastatic disease to the sacrum and pelvis. Dominant lesion is present at the anterior inferior left iliac spine deep to the rectus femoris muscle origin and correlates with a site of bone scan uptake. 2. Right hip joint effusion with surrounding synovial thickening/synovitis. There is soft tissue thickening extending posterior to the right hip joint associated with the right superior gemellus muscle and obturator internus muscle and this may be associated with synovitis. Soft tissue metastasis is also possible and this could be followed on  subsequent exam. Due to presence of synovitis, intra-articular infection could be considered in the proper clinical setting and arthrocentesis could be performed for further evaluation if needed.  This report was finalized on 7/11/2024 12:40 PM by Dr. Suresh Ace M.D on Workstation: [id number]          Assessment & Plan       *Left breast invasive lobular carcinoma  The tumor is estrogen receptor +91 to 100%, progesterone receptor +31 to 40%, HER2 negative, 0, Ki-67 35%  The tumor measures greater than 10 cm on exam, lymph node positive.  CT of the chest abdomen and pelvis with right upper lobe pulmonary nodule which is new and the bone scan also showsUptake in the left anterior inferior iliac spine which correlates to a lucent area on the CT scan and also focal uptake noted in the left frontal calvarium concerning for metastatic disease.  Further evaluation with a MRI of the pelvis and hip as well as MRI of the brain is underway.  The scans are scheduled for 7/10/2024.  Discussed at length the details of imaging and pathology report.Discussed the origin of breast cancer from the ducts and the lobules and the histological type of breast cancer based on site of origin. Discussed the tumor size, lymph node status and stage of the cancer. Explained the presence of DCIS. Discussed the receptor status including ER, ME and her-2 dorothy and their significance in determining the biology and treatment. Also discussed the importance of grade and ki-67.   Clinical T3 N1 M1, stage IV invasive lobular carcinoma.  There is also a right axillary lymph node which has been biopsied and consistent with invasive lobular carcinoma with similar morphology as well as receptors concerning for metastatic disease rather than a primary right breast cancer.  Given the concerns of metastatic disease and strongly ER/ME positive breast cancer I recommend that she proceed with endocrine therapy with anastrozole along with a CDK 4 6 inhibitor  as a first-line.  Recommend Ribociclib 400 mg 3 weeks on and 1 week off.  Adverse effects of this treatment including but not limited to hot flashes, mood changes, fatigue, insomnia, myelosuppression, increased risk of infections, hepatotoxicity, pneumonitis, risk of DVT PE, effect on bone density discussed.  Patient willing to proceed the recommended treatment however we will wait to confirm the possible metastatic lesions in the spine with the MRI.  Even if the MRIs do not confirm metastatic disease given the fact that the right axilla was positive for invasive lobular carcinoma we could presume that this is metastatic disease.  She also has a locally advanced left breast cancer which may not be operable at this time.  Even in that event I think it is fair to start her on CDK 4 6 inhibitor along with an AI and not proceed with surgery.  She also has an extremely poor functional status because of MS and may be higher than average risk for surgery.  July 16, 2024: Reviewed MRI of the pelvis and hip consistent with many bony metastasis.  MRI brain shows left frontal bone mets but no brain involvement.  Patient is here to start day 1 ribociclib along with anastrozole.      *Right axillary lymphadenopathy  Biopsied and consistent with invasive lobular carcinoma, grade 2, ER/MA positive and HER2 negative  Patient will be started on AI and Ribociclib  Please refer to the above discussion for details  Also obtain [facility] or [facility] testing    *MS-patient is currently not on any treatment for MS.    *Hypertension-continue current medication      Hyperlipidemia-continue current medication      *Urinary incontinence-patient plans to have a suprapubic catheter placed by urology.      *History of iron deficiency anemia-  3/8/2024 hemoglobin was low at 10.9 and subsequently patient took oral iron which resulted in improvement of hemoglobin and normal.  She was scheduled for colonoscopy however she canceled that due to ongoing  management of breast cancer.  She proceed with a colonoscopy.    *Genetic testing-9 gene stat panel negative      *Dyspnea  CT chest shows some bibasilar atelectasis/pneumonia  No cough but patient reports dyspnea the past 2 to 3 months especially when she is talking  CBC reviewed and no leukocytosis.  No fevers.  Unclear etiology for dyspnea  I will not start her on antibiotics  Recommend that she follow-up with her primary care physician to rule out a cardiac etiology    *Right upper lobe pulmonary nodule   Concerning for metastatic disease  PET/CT may not be helpful as invasive lobular carcinomas typically are not very PET avid.  Could consider PET-FES  Obtain tumor markers    *Follow-up-after the MRIs.  Reviewed MRI of the pelvis and MRI of the brain which shows mets in the bone    Plan  Reviewed MRI of the pelvis and hip which is consistent with multiple bone mets  Reviewed MRI of the brain which shows left frontal bone met  Patient is here to start ribociclib 400 mg p.o. daily 3 weeks on 1 week off along with anastrozole 1 mg p.o. daily  Reviewed EKG which shows QTc of 431  Patient has had education  Keep previously scheduled appointment with Dr. Lopes.    I have seen the patient for the first time and reviewed in detail all of the imaging and pathology results prior to starting treatment.  Discussed with Dr. Lopes      I spent 45 total minutes, face-to-face, caring for Maritza today. Greater than 50% of this time involved counseling and/or coordination of care as documented within this note.    Jennifer Saavedra MD

## 2024-07-17 ENCOUNTER — SPECIALTY PHARMACY (OUTPATIENT)
Dept: PHARMACY | Facility: HOSPITAL | Age: 64
End: 2024-07-17
Payer: MEDICARE

## 2024-07-17 NOTE — PROGRESS NOTES
Patient called yesterday to ask about when and how to take Kisqali and anastrozole. I called her back today and confirmed that Kisqali should be taken 2 tablets at the same time each day for 21 days, then off 7 days, and anastrozole should be taken 1 tablet at the same time each day. I informed her that both can be taken with or without food.    Patient verbalized understanding. I encouraged her to call with any further questions.    Ronny Powell  Pharmacy Intern

## 2024-07-17 NOTE — PROGRESS NOTES
Pt seen by Dr Saavedra yesterday-she was instructed to start the Kisqali.    I will contact pt to confirm day 1 start date.     [place], Pharmacy Technician  Specialty Pharmacy Technician

## 2024-07-18 ENCOUNTER — SPECIALTY PHARMACY (OUTPATIENT)
Dept: PHARMACY | Facility: HOSPITAL | Age: 64
End: 2024-07-18
Payer: MEDICARE

## 2024-07-18 NOTE — PROGRESS NOTES
I contacted Maritza to check her start date of the Kisqali. She did not start the medication the other day as instructed by Dr Saavedra.    She states she is getting a 2nd opinion from another doctor but needs lab work.     She states she is planning on starting tomorrow, 7/19/24 but if this changes she will call me.       [place], Pharmacy Technician  Specialty Pharmacy Technician

## 2024-07-20 LAB — CREAT BLDA-MCNC: 0.7 MG/DL (ref 0.6–1.3)

## 2024-07-22 RX ORDER — LOSARTAN POTASSIUM AND HYDROCHLOROTHIAZIDE 12.5; 5 MG/1; MG/1
1 TABLET ORAL DAILY
Qty: 90 TABLET | Refills: 1 | Status: SHIPPED | OUTPATIENT
Start: 2024-07-22

## 2024-07-25 ENCOUNTER — SPECIALTY PHARMACY (OUTPATIENT)
Dept: PHARMACY | Facility: HOSPITAL | Age: 64
End: 2024-07-25
Payer: MEDICARE

## 2024-07-25 NOTE — PROGRESS NOTES
Called patient for 1-week check in, no answer. Left voicemail to return my call at direct line [phone].    Ronny Powell  Pharmacy Intern

## 2024-07-30 ENCOUNTER — OFFICE VISIT (OUTPATIENT)
Dept: ONCOLOGY | Facility: CLINIC | Age: 64
End: 2024-07-30
Payer: MEDICARE

## 2024-07-30 ENCOUNTER — LAB (OUTPATIENT)
Dept: LAB | Facility: HOSPITAL | Age: 64
End: 2024-07-30
Payer: MEDICARE

## 2024-07-30 VITALS
OXYGEN SATURATION: 96 % | BODY MASS INDEX: 28.6 KG/M2 | HEART RATE: 70 BPM | SYSTOLIC BLOOD PRESSURE: 128 MMHG | HEIGHT: 66 IN | TEMPERATURE: 98.4 F | RESPIRATION RATE: 16 BRPM | DIASTOLIC BLOOD PRESSURE: 80 MMHG

## 2024-07-30 DIAGNOSIS — C50.812 MALIGNANT NEOPLASM OF OVERLAPPING SITES OF LEFT BREAST IN FEMALE, ESTROGEN RECEPTOR POSITIVE: ICD-10-CM

## 2024-07-30 DIAGNOSIS — C79.51 CANCER, METASTATIC TO BONE: ICD-10-CM

## 2024-07-30 DIAGNOSIS — Z17.0 MALIGNANT NEOPLASM OF OVERLAPPING SITES OF LEFT BREAST IN FEMALE, ESTROGEN RECEPTOR POSITIVE: ICD-10-CM

## 2024-07-30 DIAGNOSIS — C50.812 MALIGNANT NEOPLASM OF OVERLAPPING SITES OF LEFT BREAST IN FEMALE, ESTROGEN RECEPTOR POSITIVE: Primary | ICD-10-CM

## 2024-07-30 DIAGNOSIS — Z79.899 ENCOUNTER FOR LONG-TERM (CURRENT) USE OF OTHER MEDICATIONS: ICD-10-CM

## 2024-07-30 DIAGNOSIS — Z17.0 MALIGNANT NEOPLASM OF OVERLAPPING SITES OF LEFT BREAST IN FEMALE, ESTROGEN RECEPTOR POSITIVE: Primary | ICD-10-CM

## 2024-07-30 LAB
ALBUMIN SERPL-MCNC: 3.9 G/DL (ref 3.5–5.2)
ALBUMIN/GLOB SERPL: 1.3 G/DL
ALP SERPL-CCNC: 212 U/L (ref 39–117)
ALT SERPL W P-5'-P-CCNC: 19 U/L (ref 1–33)
ANION GAP SERPL CALCULATED.3IONS-SCNC: 13.2 MMOL/L (ref 5–15)
AST SERPL-CCNC: 45 U/L (ref 1–32)
BASOPHILS # BLD AUTO: 0.05 10*3/MM3 (ref 0–0.2)
BASOPHILS NFR BLD AUTO: 0.7 % (ref 0–1.5)
BILIRUB SERPL-MCNC: 0.3 MG/DL (ref 0–1.2)
BUN SERPL-MCNC: 21 MG/DL (ref 8–23)
BUN/CREAT SERPL: 34.4 (ref 7–25)
CALCIUM SPEC-SCNC: 9 MG/DL (ref 8.6–10.5)
CHLORIDE SERPL-SCNC: 104 MMOL/L (ref 98–107)
CO2 SERPL-SCNC: 26.8 MMOL/L (ref 22–29)
CREAT SERPL-MCNC: 0.61 MG/DL (ref 0.57–1)
DEPRECATED RDW RBC AUTO: 43.1 FL (ref 37–54)
EGFRCR SERPLBLD CKD-EPI 2021: 101.9 ML/MIN/1.73
EOSINOPHIL # BLD AUTO: 0.11 10*3/MM3 (ref 0–0.4)
EOSINOPHIL NFR BLD AUTO: 1.6 % (ref 0.3–6.2)
ERYTHROCYTE [DISTWIDTH] IN BLOOD BY AUTOMATED COUNT: 13.2 % (ref 12.3–15.4)
GLOBULIN UR ELPH-MCNC: 2.9 GM/DL
GLUCOSE SERPL-MCNC: 111 MG/DL (ref 65–99)
HCT VFR BLD AUTO: 34.8 % (ref 34–46.6)
HGB BLD-MCNC: 10.8 G/DL (ref 12–15.9)
IMM GRANULOCYTES # BLD AUTO: 0.03 10*3/MM3 (ref 0–0.05)
IMM GRANULOCYTES NFR BLD AUTO: 0.4 % (ref 0–0.5)
LYMPHOCYTES # BLD AUTO: 1.08 10*3/MM3 (ref 0.7–3.1)
LYMPHOCYTES NFR BLD AUTO: 15.4 % (ref 19.6–45.3)
MCH RBC QN AUTO: 27.7 PG (ref 26.6–33)
MCHC RBC AUTO-ENTMCNC: 31 G/DL (ref 31.5–35.7)
MCV RBC AUTO: 89.2 FL (ref 79–97)
MONOCYTES # BLD AUTO: 0.49 10*3/MM3 (ref 0.1–0.9)
MONOCYTES NFR BLD AUTO: 7 % (ref 5–12)
NEUTROPHILS NFR BLD AUTO: 5.25 10*3/MM3 (ref 1.7–7)
NEUTROPHILS NFR BLD AUTO: 74.9 % (ref 42.7–76)
NRBC BLD AUTO-RTO: 0 /100 WBC (ref 0–0.2)
PLATELET # BLD AUTO: 289 10*3/MM3 (ref 140–450)
PMV BLD AUTO: 9.9 FL (ref 6–12)
POTASSIUM SERPL-SCNC: 3.9 MMOL/L (ref 3.5–5.2)
PROT SERPL-MCNC: 6.8 G/DL (ref 6–8.5)
RBC # BLD AUTO: 3.9 10*6/MM3 (ref 3.77–5.28)
SODIUM SERPL-SCNC: 144 MMOL/L (ref 136–145)
WBC NRBC COR # BLD AUTO: 7.01 10*3/MM3 (ref 3.4–10.8)

## 2024-07-30 PROCEDURE — 3074F SYST BP LT 130 MM HG: CPT | Performed by: INTERNAL MEDICINE

## 2024-07-30 PROCEDURE — 3079F DIAST BP 80-89 MM HG: CPT | Performed by: INTERNAL MEDICINE

## 2024-07-30 PROCEDURE — 80053 COMPREHEN METABOLIC PANEL: CPT

## 2024-07-30 PROCEDURE — 99215 OFFICE O/P EST HI 40 MIN: CPT | Performed by: INTERNAL MEDICINE

## 2024-07-30 PROCEDURE — 85025 COMPLETE CBC W/AUTO DIFF WBC: CPT

## 2024-07-30 PROCEDURE — 36415 COLL VENOUS BLD VENIPUNCTURE: CPT

## 2024-07-30 PROCEDURE — 1126F AMNT PAIN NOTED NONE PRSNT: CPT | Performed by: INTERNAL MEDICINE

## 2024-07-30 NOTE — PROGRESS NOTES
Subjective   Maritza Bejarano is a 63 y.o. female.     History of Present Illness   Patient returns today for follow-up accompanied by her .  She has not started the Ribociclib yet and she had several questions about surgery, role of Ribociclib and other medications that she is currently on.  She is scheduled to have a suprapubic catheter.  She is reporting some tenderness to palpation of the left breast which was not previously present.  There is also some mild erythema of the left breast.    Oncologic history:    Patient is a 63-year-old postmenopausal lady who has not had a mammogram in 4 years presented with a screen detected abnormality.  She had a left breast biopsy in 2014 which was benign.  Denies palpating any breast masses skin changes or nipple discharge prior to the abnormal mammogram.  She does have left nipple inversion.    Patient has a longstanding diagnosis of multiple sclerosis and has not been on any treatment.  She was previously seen by Dr. Ozzy France and now being seen by Dr. Soto with neurology.  She has been nonambulatory for the past 4 years and requires a wheelchair.  She is unable to transfer herself in and out of wheelchairs and her  has to lift her for all the transfers.  She is also incontinent of the bladder and requiring a suprapubic catheter placed by urology to help with the same.  She had a bladder stimulator in the past which was turned off.  Other comorbidities include hypertension and hyperlipidemia.      Family history significant for maternal great grandmother with breast cancer, maternal great aunt and mother with breast cancer in her 70s.  Denies any family history of ovarian cancer, pancreatic cancer or melanoma.    5/21/2024-bilateral screening mammogram  Finding 1.new nipple retraction along with diffuse skin and trabecular thickening of the left breast.  There is suggestion of subtle architectural distortion seen on the MLO projection in the posterior central  region.  3 biopsy markers are noted.  No new suspicious calcifications.  Send finding 2.few axillary lymph node seen in the left breast which display interval increase in size and density.    Finding 3.focal asymmetry measuring 10 mm seen in the anterior one third of the right breast in the medial subareolar region.    Impression  Finding 1.new nipple retraction, skin and trabecular thickening in the left breast requires additional evaluation.  There is also question architectural distortion in the posterior central breast seen on the MLO projection.  Diagnostic mammogram to include repeat full-field CC with additional posterior tissue and complete breast ultrasound is recommended.  Finding 2.axillary lymph nodes in the left breast require additional evaluation, ultrasound recommended.  Finding 3.focal asymmetry in the right breast requires additional evaluation.  Diagnostic mammogram and limited breast ultrasound is recommended.    5/29/2024-bilateral diagnostic mammogram and ultrasound  Finding 1.4 0.7 x 5.6 x 6.8 cm developing asymmetry with associated nipple retraction, skin thickening and trabecular thickening of the left breast in the central region, inferior region in the upper outer region and the lower outer region.  Finding 2.axillary lymph node in the left breast.  Finding 3.suspected finding completely resolves upon further evaluation representing benign fibroglandular breast tissue.    Bilateral breast ultrasound  Finding 1.nonparallel hypoechoic mass with indistinct margins and skin thickening and internal vascularity in the left breast in the central region, inferior region, upper outer region and in the lower outer region.  The finding is palpable and appears to involve all 4 quadrants.  Most discrete portion is located at 130, 3 cm from the nipple, skin thickening up to 6 mm.  Send finding 2.rounded enlarged left axillary lymph node measuring 11 mm.  Cortical thickening of 9 mm present.    Finding  3.no sonographic correlate.  Send finding 4.enlarged right axillary lymph node measuring 14 mm.  Cortical thickening of 5 mm.    Impression  Finding 1.ultrasound-guided biopsy recommended  Finding 2.ultrasound-guided biopsy recommended  Finding 3.previously described right breast asymmetry resolves  Finding 4.ultrasound-guided biopsy recommended.    Ultrasound-guided biopsy of the left breast and left axilla lymph node and right axilla lymph node    1.left breast  Invasive lobular carcinoma with crush artifact  Grade 2  Invasive carcinoma measures 8.5 mm  No lymphovascular space invasion  ER +91 to 100% strong  NV +31 to 40% moderate  HER2 negative, 0  Ki-67 35%    2.left axillary lymph node focus of metastatic Dastech lobular carcinoma measuring 10 mm  ER +91 to 100% strong  NV +21 to 30%  HER2 -0  Ki-67 30%    3.right axillary lymph node with a focus of metastatic carcinoma measuring 4 mm.  Grade 2.  ER +91 to 100% strong  NV +11 to 20% moderate  HER2 negative, 0  Ki-67 35%    Pathology of all the 3 sites appear similar and findings could represent left breast lobular carcinoma metastatic to the right as well as left axillary lymph nodes.    6/14/2024-CT of the chest abdomen and pelvis  1.large ill-defined infiltrative central left breast mass measuring 6.7 x 4 cm, medially there is an irregular 2.5 x 2.5 cm mass.  Significant thickening of the skin in the left breast and there is left axilla lymphadenopathy.  Left axilla lymph node measures 1.3 x 1.3 cm.  There is also a new enlarged right axillary lymph node measuring 1.3 x 1 cm.  All of the subcentimeter right axillary lymph nodes are slightly larger than previous.  2.no mediastinal hilar or internal mammary chain lymphadenopathy  3.new indeterminate mixed density measuring 1.3 x 1.2 cm right apical pulmonary nodule.  Follow-up recommended.  4.pleural parenchymal thickening and nodules inferiorly and posterior to the right upper lobe are stable.  Chronic  scarring and subsegmental atelectatic change in both lower lobes.    CT abdomen pelvis  1.moderate fatty infiltration of the liver.  No suspicious liver lesions.  2.moderate-sized paraesophageal hernia.  No acute bowel abnormality.  3.right sacral stimulator noted at the S3 neuroforamina.  No suspicious bone lesions are noted.    6/14/2024-bone scan  1.focal abnormal uptake in the left anterior inferior iliac spine correlating with lucent lesion on the CT concerning for metastatic disease.  Further evaluation with MRI of the pelvis and left hip could be performed.  2.focal uptake in the left frontal calvarium again concerning for metastatic disease.  Noncontrast CT or MRI could be obtained.  3.soft tissue uptake noted in the left breast at the site of known left breast cancer.  4.changes from arthroplasty on the right shoulder.  5.multifocal likely degenerative uptake in each knee, ankle and foot and increased uptake in the medial and patellofemoral cortex of the right knee could be posttraumatic.      MRI of the pelvis and the left hip as well as MRI of the brain have been ordered and currently scheduled for 7/10/2024.    Inv[employer]ae 9 gene stat panel negative.    MRI of the brain which showed T2 hyperintensity involving the frontal bone measuring 1.6 cm in size and a smaller area of enhancement in the frontal bone laterally and inferiorly concerning for metastasis.  No brain mets    MRI of the hip and pelvis showed multiple enhancing bone lesions consistent with metastatic disease to the sacrum and pelvis.  Dominant lesion in the anterior inferior left pelvic spine and rectus femoris muscle origin that correlates with the site of bone scan uptake.  She has some right joint hip joint effusion.  Her CA 15-3 16.2    She was seen by Dr. Saavedra on 7/16/2024 and recommended to start on Ribociclib along with anastrozole.    The following portions of the patient's history were reviewed and updated as appropriate: allergies,  current medications, past family history, past medical history, past social history, past surgical history, and problem list.    Past Medical History:   Diagnosis Date    Anemia 2024    `Treated with iron    Dawn esophagus     per patient    Blurred vision     R/T MS    Breast cancer 05/2024+++++    Carpal tunnel syndrome     Clotting disorder 1993, 2003, 2012    3 g/i bleeds w/ transfus/ions    Colon polyp 2013    removed w/ colonoscopy    Deep vein thrombosis phlebitis 1980    Depression     Diplopia 2013    GERD (gastroesophageal reflux disease)     GI (gastrointestinal bleed) 3 bleeds    2 transfusions    H/O Skin cancer, basal cell     Headache     History of blood transfusion     History of GI bleed     R/T NSAIDS AND STEROIDS, multiple times    History of urinary tract infection     Hypercalcemia     s/p parathyroidectomy    Hyperlipidemia     Hypertension     Movement disorder     Multiple sclerosis     Optic neuritis     PONV (postoperative nausea and vomiting)         Past Surgical History:   Procedure Laterality Date    APPENDECTOMY      BLADDER SURGERY      bladder stimulator    BREAST BIOPSY  don't remember    BREAST SURGERY      augmentation wtih subsequent removal    CARPAL TUNNEL RELEASE Bilateral     Left 2018, right 2020    CUBITAL TUNNEL RELEASE Left     CYSTOSCOPY BOTOX INJECTION OF BLADDER  2018    Cystoscopy with Botox    FRACTURE SURGERY  2019    rt shoulder    PARATHYROIDECTOMY      one gland removed    ROTATOR CUFF REPAIR Right 2017    TOE SURGERY      bilateral great toes    TOTAL SHOULDER ARTHROPLASTY W/ DISTAL CLAVICLE EXCISION Right 10/22/2018    Procedure: RT TOTAL SHOULDER REVERSE ARTHROPLASTY;  Surgeon: Bipin Dangelo MD;  Location: [facility] OR;  Service: Orthopedics        Family History   Problem Relation Age of Onset    Hypertension Mother     Hyperlipidemia Mother     Aortic aneurysm Mother         thoracic    Diabetes Mother     Hypertension Father         charissa heart  failure    Heart failure Father     Hyperlipidemia Father     Miscarriages / Stillbirths Sister     Multiple sclerosis Brother     Atrial fibrillation Brother     Diabetes Maternal Grandfather     Stroke Maternal Grandfather     Parkinsonism Paternal Grandmother     Tremor Paternal Grandmother     Stomach cancer Paternal Grandfather     Diabetes Maternal Grandmother         Social History     Socioeconomic History    Marital status:      Spouse name: Donald    Number of children: 0   Tobacco Use    Smoking status: Former     Current packs/day: 0.00     Average packs/day: 2.0 packs/day for 30.0 years (60.0 ttl pk-yrs)     Types: Cigarettes     Start date: 10/22/1973     Quit date: 10/22/2003     Years since quittin.7    Smokeless tobacco: Never   Vaping Use    Vaping status: Never Used   Substance and Sexual Activity    Alcohol use: Not Currently    Drug use: Not Currently     Types: Marijuana     Comment: advised by neurologist for sleep    Sexual activity: Not Currently     Partners: Male     Birth control/protection: Post-menopausal        OB History    No obstetric history on file.     Age at menarche-15  Age at first live childbirth-not applicable   2 para 0  0  Age of menopause-57  No use of hormone replacement therapy      No Known Allergies         Review of Systems   Constitutional:  Positive for activity change and fatigue.   HENT: Negative.     Eyes: Negative.    Respiratory:  Positive for shortness of breath.    Cardiovascular: Negative.    Gastrointestinal: Negative.    Endocrine: Negative.    Genitourinary:  Positive for urinary incontinence.   Musculoskeletal: Negative.    Skin: Negative.    Allergic/Immunologic: Negative.    Neurological:  Positive for weakness.   Psychiatric/Behavioral: Negative.       Review of systems as mentioned HPI otherwise negative    Objective   not currently breastfeeding.   Physical Exam  Vitals reviewed.   Constitutional:       Appearance: Normal  appearance. She is normal weight.   HENT:      Right Ear: External ear normal.      Left Ear: External ear normal.      Nose: Nose normal.      Mouth/Throat:      Pharynx: Oropharynx is clear.   Eyes:      Conjunctiva/sclera: Conjunctivae normal.   Cardiovascular:      Rate and Rhythm: Normal rate.   Pulmonary:      Effort: Pulmonary effort is normal.   Abdominal:      General: Abdomen is flat.   Musculoskeletal:         General: Normal range of motion.      Cervical back: Normal range of motion.   Skin:     General: Skin is warm.   Neurological:      General: No focal deficit present.      Mental Status: She is alert and oriented to person, place, and time.   Psychiatric:         Mood and Affect: Mood normal.         Behavior: Behavior normal.         Thought Content: Thought content normal.         Judgment: Judgment normal.       Breast Exam: Right breast appears normal on inspection.  No palpable right axilla lymphadenopathy or right breast masses.  Left breast on inspection there is nipple retraction.  On palpation there is a 13 x 9 cm mass in the retroareolar region pretty much occupying the whole breast.  There is palpable left axillary lymphadenopathy.    I have reexamined the patient and the results are consistent with the previously documented exam. Zainab Lopes MD      Lab on 07/30/2024   Component Date Value Ref Range Status    WBC 07/30/2024 7.01  3.40 - 10.80 10*3/mm3 Final    RBC 07/30/2024 3.90  3.77 - 5.28 10*6/mm3 Final    Hemoglobin 07/30/2024 10.8 (L)  12.0 - 15.9 g/dL Final    Hematocrit 07/30/2024 34.8  34.0 - 46.6 % Final    MCV 07/30/2024 89.2  79.0 - 97.0 fL Final    MCH 07/30/2024 27.7  26.6 - 33.0 pg Final    MCHC 07/30/2024 31.0 (L)  31.5 - 35.7 g/dL Final    RDW 07/30/2024 13.2  12.3 - 15.4 % Final    RDW-SD 07/30/2024 43.1  37.0 - 54.0 fl Final    MPV 07/30/2024 9.9  6.0 - 12.0 fL Final    Platelets 07/30/2024 289  140 - 450 10*3/mm3 Final    Neutrophil % 07/30/2024 74.9  42.7 -  76.0 % Final    Lymphocyte % 07/30/2024 15.4 (L)  19.6 - 45.3 % Final    Monocyte % 07/30/2024 7.0  5.0 - 12.0 % Final    Eosinophil % 07/30/2024 1.6  0.3 - 6.2 % Final    Basophil % 07/30/2024 0.7  0.0 - 1.5 % Final    Immature Grans % 07/30/2024 0.4  0.0 - 0.5 % Final    Neutrophils, Absolute 07/30/2024 5.25  1.70 - 7.00 10*3/mm3 Final    Lymphocytes, Absolute 07/30/2024 1.08  0.70 - 3.10 10*3/mm3 Final    Monocytes, Absolute 07/30/2024 0.49  0.10 - 0.90 10*3/mm3 Final    Eosinophils, Absolute 07/30/2024 0.11  0.00 - 0.40 10*3/mm3 Final    Basophils, Absolute 07/30/2024 0.05  0.00 - 0.20 10*3/mm3 Final    Immature Grans, Absolute 07/30/2024 0.03  0.00 - 0.05 10*3/mm3 Final    nRBC 07/30/2024 0.0  0.0 - 0.2 /100 WBC Final   Lab on 07/16/2024   Component Date Value Ref Range Status    Glucose 07/16/2024 118 (H)  65 - 99 mg/dL Final    BUN 07/16/2024 17  8 - 23 mg/dL Final    Creatinine 07/16/2024 0.64  0.57 - 1.00 mg/dL Final    Sodium 07/16/2024 140  136 - 145 mmol/L Final    Potassium 07/16/2024 3.7  3.5 - 5.2 mmol/L Final    Chloride 07/16/2024 103  98 - 107 mmol/L Final    CO2 07/16/2024 23.8  22.0 - 29.0 mmol/L Final    Calcium 07/16/2024 9.9  8.6 - 10.5 mg/dL Final    Total Protein 07/16/2024 7.3  6.0 - 8.5 g/dL Final    Albumin 07/16/2024 3.9  3.5 - 5.2 g/dL Final    ALT (SGPT) 07/16/2024 24  1 - 33 U/L Final    AST (SGOT) 07/16/2024 47 (H)  1 - 32 U/L Final    Alkaline Phosphatase 07/16/2024 209 (H)  39 - 117 U/L Final    Total Bilirubin 07/16/2024 0.4  0.0 - 1.2 mg/dL Final    Globulin 07/16/2024 3.4  gm/dL Final    A/G Ratio 07/16/2024 1.1  g/dL Final    BUN/Creatinine Ratio 07/16/2024 26.6 (H)  7.0 - 25.0 Final    Anion Gap 07/16/2024 13.2  5.0 - 15.0 mmol/L Final    eGFR 07/16/2024 100.7  >60.0 mL/min/1.73 Final    Magnesium 07/16/2024 2.2  1.6 - 2.4 mg/dL Final    Phosphorus 07/16/2024 3.9  2.5 - 4.5 mg/dL Final    WBC 07/16/2024 8.12  3.40 - 10.80 10*3/mm3 Final    RBC 07/16/2024 4.00  3.77 - 5.28  10*6/mm3 Final    Hemoglobin 07/16/2024 11.7 (L)  12.0 - 15.9 g/dL Final    Hematocrit 07/16/2024 37.1  34.0 - 46.6 % Final    MCV 07/16/2024 92.8  79.0 - 97.0 fL Final    MCH 07/16/2024 29.3  26.6 - 33.0 pg Final    MCHC 07/16/2024 31.5  31.5 - 35.7 g/dL Final    RDW 07/16/2024 13.3  12.3 - 15.4 % Final    RDW-SD 07/16/2024 45.6  37.0 - 54.0 fl Final    MPV 07/16/2024 9.1  6.0 - 12.0 fL Final    Platelets 07/16/2024 426  140 - 450 10*3/mm3 Final    Neutrophil % 07/16/2024 74.2  42.7 - 76.0 % Final    Lymphocyte % 07/16/2024 16.7 (L)  19.6 - 45.3 % Final    Monocyte % 07/16/2024 6.0  5.0 - 12.0 % Final    Eosinophil % 07/16/2024 2.1  0.3 - 6.2 % Final    Basophil % 07/16/2024 0.6  0.0 - 1.5 % Final    Immature Grans % 07/16/2024 0.4  0.0 - 0.5 % Final    Neutrophils, Absolute 07/16/2024 6.02  1.70 - 7.00 10*3/mm3 Final    Lymphocytes, Absolute 07/16/2024 1.36  0.70 - 3.10 10*3/mm3 Final    Monocytes, Absolute 07/16/2024 0.49  0.10 - 0.90 10*3/mm3 Final    Eosinophils, Absolute 07/16/2024 0.17  0.00 - 0.40 10*3/mm3 Final    Basophils, Absolute 07/16/2024 0.05  0.00 - 0.20 10*3/mm3 Final    Immature Grans, Absolute 07/16/2024 0.03  0.00 - 0.05 10*3/mm3 Final    nRBC 07/16/2024 0.0  0.0 - 0.2 /100 WBC Final    CA 15-3 07/16/2024 16.2  <=25.0 U/mL Final   Hospital Outpatient Visit on 07/10/2024   Component Date Value Ref Range Status    Creatinine 07/10/2024 0.70  0.60 - 1.30 mg/dL Final    Serial Number: [id number]:  [id number]   Office Visit on 07/01/2024   Component Date Value Ref Range Status    Color 07/01/2024 Other (A)  Yellow, Straw, Dark Yellow, Fatimah Final    Clarity, UA 07/01/2024 Cloudy (A)  Clear Final    Specific Gravity  07/01/2024 1.030  1.005 - 1.030 Final    pH, Urine 07/01/2024 5.5  5.0 - 8.0 Final    Leukocytes 07/01/2024 Trace (A)  Negative Final    Nitrite, UA 07/01/2024 Positive (A)  Negative Final    Protein, POC 07/01/2024 Negative  Negative mg/dL Final    Glucose, UA 07/01/2024 Negative   Negative mg/dL Final    Ketones, UA 07/01/2024 Negative  Negative Final    Urobilinogen, UA 07/01/2024 0.2 E.U./dL  Normal, 0.2 E.U./dL Final    Bilirubin 07/01/2024 Negative  Negative Final    Blood, UA 07/01/2024 Negative  Negative Final    Lot Number 07/01/2024 [id number]   Final    Expiration Date 07/01/2024 03/31/2025   Final    Urine Culture 07/01/2024 Final report   Final    Result 1 07/01/2024 Comment   Final    Comment: Greater than 2 organisms recovered, none predominant. Please submit  another sample if clinically indicated.  Greater than 100,000 colony forming units per mL          MRI Brain With & Without Contrast    Result Date: 7/11/2024  1.  There is no evidence of acute infarction or of abnormal intra-axial metastatic disease. 2.  There is a new area of T2 hyperintensity involving the frontal bone anterolaterally to the left measuring 1.6 cm in size and is slightly smaller area of enhancement involving the frontal bone laterally and inferiorly both of which are new versus prior examination and consistent with osseous metastatic disease. 3.  Areas of T2 FLAIR hyperintensity are appreciated involving the white matter of the cerebral hemispheres bilaterally similar appearances compared to the prior examination. The appearance is nonspecific. This likely represents a combination of small vessel ischemic disease and areas of chronic demyelination.  This report was finalized on 7/11/2024 2:40 PM by Dr. Nikolas Dominguez M.D on Workstation: [id number]      MRI Hip Left With & Without Contrast    Result Date: 7/11/2024  1. Multiple enhancing bone lesions consistent with metastatic disease to the sacrum and pelvis. Dominant lesion is present at the anterior inferior left iliac spine deep to the rectus femoris muscle origin and correlates with a site of bone scan uptake. 2. Right hip joint effusion with surrounding synovial thickening/synovitis. There is soft tissue thickening extending posterior to the right hip  joint associated with the right superior gemellus muscle and obturator internus muscle and this may be associated with synovitis. Soft tissue metastasis is also possible and this could be followed on subsequent exam. Due to presence of synovitis, intra-articular infection could be considered in the proper clinical setting and arthrocentesis could be performed for further evaluation if needed.  This report was finalized on 7/11/2024 12:40 PM by Dr. Suresh Ace M.D on Workstation: [id number]          Assessment & Plan       *Left breast invasive lobular carcinoma  The tumor is estrogen receptor +91 to 100%, progesterone receptor +31 to 40%, HER2 negative, 0, Ki-67 35%  The tumor measures greater than 10 cm on exam, lymph node positive.  CT of the chest abdomen and pelvis with right upper lobe pulmonary nodule which is new and the bone scan also showsUptake in the left anterior inferior iliac spine which correlates to a lucent area on the CT scan and also focal uptake noted in the left frontal calvarium concerning for metastatic disease.  Further evaluation with a MRI of the pelvis and hip as well as MRI of the brain is underway.  The scans are scheduled for 7/10/2024.  Discussed at length the details of imaging and pathology report.Discussed the origin of breast cancer from the ducts and the lobules and the histological type of breast cancer based on site of origin. Discussed the tumor size, lymph node status and stage of the cancer. Explained the presence of DCIS. Discussed the receptor status including ER, IL and her-2 dorothy and their significance in determining the biology and treatment. Also discussed the importance of grade and ki-67.   Clinical T3 N1 M1, stage IV invasive lobular carcinoma.  There is also a right axillary lymph node which has been biopsied and consistent with invasive lobular carcinoma with similar morphology as well as receptors concerning for metastatic disease rather than a primary right  breast cancer.  Given the concerns of metastatic disease and strongly ER/VT positive breast cancer I recommend that she proceed with endocrine therapy with anastrozole along with a CDK 4 6 inhibitor as a first-line.  Recommend Ribociclib 400 mg 3 weeks on and 1 week off.  Adverse effects of this treatment including but not limited to hot flashes, mood changes, fatigue, insomnia, myelosuppression, increased risk of infections, hepatotoxicity, pneumonitis, risk of DVT PE, effect on bone density discussed.  Patient willing to proceed the recommended treatment however we will wait to confirm the possible metastatic lesions in the spine with the MRI.  Even if the MRIs do not confirm metastatic disease given the fact that the right axilla was positive for invasive lobular carcinoma we could presume that this is metastatic disease.  She also has a locally advanced left breast cancer which may not be operable at this time.  Even in that event I think it is fair to start her on CDK 4 6 inhibitor along with an AI and not proceed with surgery.  She also has an extremely poor functional status because of MS and may be higher than average risk for surgery.  July 16, 2024: Reviewed MRI of the pelvis and hip consistent with many bony metastasis.  MRI brain shows left frontal bone mets but no brain involvement.  Patient is here to start day 1 ribociclib along with anastrozole.  Patient has not started Ribociclib yet.  Continues on anastrozole.  She had questions regarding why surgery would not be an option and also regarding other medications that she is on.  We reviewed the side effects of Ribociclib again and explained that it is safe to continue her other medications that she is currently taking.  Now that she has metastatic disease explained to her that surgery would not be curative and would in fact delay the required treatment and therefore we would have to start her on Ribociclib as soon as possible.  Patient willing to  proceed with Ribociclib.  She will be seen in 2 weeks to assess tolerance  Tempus performed on the left axilla lymph node biopsy shows PIK3CA mutation, CDH 1 mutation and Erb B2 mutation.  Therefore patient would benefit from alpelisib and Faslodex after progression on Ribociclib and AI.  Other options include neratinib plus Faslodex.      *Right axillary lymphadenopathy  Biopsied and consistent with invasive lobular carcinoma, grade 2, ER/HI positive and HER2 negative  Patient will be started on AI and Ribociclib  Please refer to the above discussion for details    *Right breast non-mass enhancement  6/28/2024-MRI of the breast with non-mass enhancement noted in the right breast and patient had questions regarding if this should be biopsied.  Given extensive metastatic disease in the bones and right axilla lymph node consistent with invasive lobular carcinoma management would not change with the biopsy and hence I would recommend against it.      *Skeletal metastasis  Patient will be started on Xgeva    *MS-patient is currently not on any treatment for MS.    *Hypertension-continue current medication      Hyperlipidemia-continue current medication      *Urinary incontinence-patient plans to have a suprapubic catheter placed by urology.      *History of iron deficiency anemia-  3/8/2024 hemoglobin was low at 10.9 and subsequently patient took oral iron which resulted in improvement of hemoglobin and normal.  She was scheduled for colonoscopy however she canceled that due to ongoing management of breast cancer.  She proceed with a colonoscopy.    *Genetic testing-9 gene stat panel negative      *Dyspnea  CT chest shows some bibasilar atelectasis/pneumonia  No cough but patient reports dyspnea the past 2 to 3 months especially when she is talking  CBC reviewed and no leukocytosis.  No fevers.  Unclear etiology for dyspnea  I will not start her on antibiotics  Recommend that she follow-up with her primary care  physician to rule out a cardiac etiology    *Right upper lobe pulmonary nodule   Concerning for metastatic disease  PET/CT may not be helpful as invasive lobular carcinomas typically are not very PET avid.  Could consider PET-FES  Obtain tumor markers    *Follow-up-after the MRIs.  Reviewed MRI of the pelvis and MRI of the brain which shows mets in the bone    Plan  Recommend starting ribociclib 400 mg p.o. daily 3 weeks on 1 week off along with anastrozole 1 mg p.o. daily  Follow-up with APRN in 2 weeks with CBC and CMP to assess tolerance  Reviewed EKG which shows QTc of 431  Patient has had education  Keep previously scheduled appointment with Dr. Lopes.    I reviewed the MRI of the pelvis , MRI of the hip and MRI of the brain images independently interpreted them independently.    I spent 41 total minutes, face-to-face, caring for Marizta today. Greater than 50% of this time involved counseling and/or coordination of care as documented within this note.    Zainab Lopes MD

## 2024-07-31 PROBLEM — C79.51 CANCER, METASTATIC TO BONE: Status: ACTIVE | Noted: 2024-07-31

## 2024-08-02 ENCOUNTER — SPECIALTY PHARMACY (OUTPATIENT)
Dept: PHARMACY | Facility: HOSPITAL | Age: 64
End: 2024-08-02
Payer: MEDICARE

## 2024-08-02 NOTE — PROGRESS NOTES
Refill task for Kisqali due today-per reviewing note from Dr Lopes on 7/30/2024-Pt did not start the medication. It was recommended she start on 7/30/2024. If she started on 7/30/2024 her next cycle is not due to start until 8/27/2024.    She is scheduled to see NP on 8/13/2024. I have moved the refill task to 8/20/2024 (day she should start her off week).       Belle Razo, Pharmacy Technician  Specialty Pharmacy Technician

## 2024-08-07 ENCOUNTER — SPECIALTY PHARMACY (OUTPATIENT)
Dept: PHARMACY | Facility: HOSPITAL | Age: 64
End: 2024-08-07
Payer: MEDICARE

## 2024-08-07 ENCOUNTER — TELEPHONE (OUTPATIENT)
Dept: ONCOLOGY | Facility: CLINIC | Age: 64
End: 2024-08-07
Payer: MEDICARE

## 2024-08-07 DIAGNOSIS — Z17.0 MALIGNANT NEOPLASM OF OVERLAPPING SITES OF LEFT BREAST IN FEMALE, ESTROGEN RECEPTOR POSITIVE: Primary | ICD-10-CM

## 2024-08-07 DIAGNOSIS — C50.812 MALIGNANT NEOPLASM OF OVERLAPPING SITES OF LEFT BREAST IN FEMALE, ESTROGEN RECEPTOR POSITIVE: Primary | ICD-10-CM

## 2024-08-07 RX ORDER — FERROUS SULFATE 325(65) MG
325 TABLET ORAL
Qty: 30 TABLET | Refills: 3 | Status: SHIPPED | OUTPATIENT
Start: 2024-08-07

## 2024-08-07 NOTE — TELEPHONE ENCOUNTER
Maritza called today to report that she has not yet started her kisqali.  She was checking into second opinion on some alternative therapies, but has ultimately decided to proceed with the kisqali.  She intends to start this today.  We reviewed her schedule, and this was reviewed with Dr Lopes.  Will have her return in 2 weeks for labs and NP visit, and in 4 weeks to see Dr Lopes.  She is currently scheduled for xgeva on the 16th, and explained that we can move that to the 21st.  She also states Dr Lopes was going to send in some iron tablets for her and her pharmacy did not have the prescription.  She also needed the antinausea medicaiton as this was returned because she did not yet pick it up.  These were both sent to [facility] at [place] for her.  She voiced understanding.

## 2024-08-07 NOTE — PROGRESS NOTES
Staff message rec from Rosina BAEZ, Clinical RN-Pt reported she has not started her Kisqali. She intends to start today, 8/7/2024.    I will move her refill coordination accordingly.    Belle Razo, Pharmacy Technician  Specialty Pharmacy Technician

## 2024-08-13 ENCOUNTER — PATIENT OUTREACH (OUTPATIENT)
Dept: OTHER | Facility: HOSPITAL | Age: 64
End: 2024-08-13
Payer: MEDICARE

## 2024-08-13 NOTE — PROGRESS NOTES
Called Ms. Darci to see how she was doing. She stated she got a new bed and it does not have the trapeze bar yet so she is struggling to move and reposition well. She asked if she could call me back later when she is up and about. She was thankful for the call and will reach out soon.

## 2024-08-14 ENCOUNTER — TELEPHONE (OUTPATIENT)
Dept: ONCOLOGY | Facility: CLINIC | Age: 64
End: 2024-08-14
Payer: MEDICARE

## 2024-08-14 ENCOUNTER — PATIENT OUTREACH (OUTPATIENT)
Dept: OTHER | Facility: HOSPITAL | Age: 64
End: 2024-08-14
Payer: MEDICARE

## 2024-08-14 RX ORDER — PROCHLORPERAZINE MALEATE 10 MG
10 TABLET ORAL EVERY 6 HOURS PRN
Qty: 90 TABLET | Refills: 5 | Status: SHIPPED | OUTPATIENT
Start: 2024-08-14

## 2024-08-14 NOTE — PROGRESS NOTES
Ms. Darci called and stated she is doing well, but still struggling with nausea and the Zofran is not working. She asked if Dr. Lopes could let her have phenergan. Message sent to their office requesting this for the patient. She has no other needs at this time and was thankful for the call.

## 2024-08-14 NOTE — TELEPHONE ENCOUNTER
Message received from nurse navigator stating Maritza is requesting Phenergan for nausea.  I contacted Maritza, she states she took 3 Zofran yesterday an got no relief. Will send in compazine and advised her she can take this every six hours.  Will follow up regarding phenergan.  She voiced understanding.

## 2024-08-16 ENCOUNTER — TELEPHONE (OUTPATIENT)
Dept: ONCOLOGY | Facility: CLINIC | Age: 64
End: 2024-08-16
Payer: MEDICARE

## 2024-08-16 ENCOUNTER — OFFICE VISIT (OUTPATIENT)
Dept: ONCOLOGY | Facility: CLINIC | Age: 64
End: 2024-08-16
Payer: MEDICARE

## 2024-08-16 ENCOUNTER — APPOINTMENT (OUTPATIENT)
Dept: ONCOLOGY | Facility: HOSPITAL | Age: 64
End: 2024-08-16
Payer: MEDICARE

## 2024-08-16 ENCOUNTER — LAB (OUTPATIENT)
Dept: LAB | Facility: HOSPITAL | Age: 64
End: 2024-08-16
Payer: MEDICARE

## 2024-08-16 VITALS
DIASTOLIC BLOOD PRESSURE: 70 MMHG | SYSTOLIC BLOOD PRESSURE: 109 MMHG | BODY MASS INDEX: 27.49 KG/M2 | OXYGEN SATURATION: 97 % | WEIGHT: 165 LBS | HEART RATE: 70 BPM | TEMPERATURE: 98.2 F | RESPIRATION RATE: 18 BRPM | HEIGHT: 65 IN

## 2024-08-16 DIAGNOSIS — D64.9 ANEMIA, UNSPECIFIED TYPE: Primary | ICD-10-CM

## 2024-08-16 DIAGNOSIS — C79.51 CANCER, METASTATIC TO BONE: ICD-10-CM

## 2024-08-16 DIAGNOSIS — C50.812 MALIGNANT NEOPLASM OF OVERLAPPING SITES OF LEFT BREAST IN FEMALE, ESTROGEN RECEPTOR POSITIVE: ICD-10-CM

## 2024-08-16 DIAGNOSIS — Z17.0 MALIGNANT NEOPLASM OF OVERLAPPING SITES OF LEFT BREAST IN FEMALE, ESTROGEN RECEPTOR POSITIVE: ICD-10-CM

## 2024-08-16 LAB
ALBUMIN SERPL-MCNC: 3.8 G/DL (ref 3.5–5.2)
ALBUMIN/GLOB SERPL: 1.2 G/DL
ALP SERPL-CCNC: 192 U/L (ref 39–117)
ALT SERPL W P-5'-P-CCNC: 10 U/L (ref 1–33)
ANION GAP SERPL CALCULATED.3IONS-SCNC: 12.7 MMOL/L (ref 5–15)
AST SERPL-CCNC: 36 U/L (ref 1–32)
BASOPHILS # BLD AUTO: 0.03 10*3/MM3 (ref 0–0.2)
BASOPHILS NFR BLD AUTO: 0.5 % (ref 0–1.5)
BILIRUB SERPL-MCNC: 0.4 MG/DL (ref 0–1.2)
BUN SERPL-MCNC: 19 MG/DL (ref 8–23)
BUN/CREAT SERPL: 23.2 (ref 7–25)
CALCIUM SPEC-SCNC: 9.2 MG/DL (ref 8.6–10.5)
CHLORIDE SERPL-SCNC: 106 MMOL/L (ref 98–107)
CO2 SERPL-SCNC: 27.3 MMOL/L (ref 22–29)
CREAT SERPL-MCNC: 0.82 MG/DL (ref 0.57–1)
DEPRECATED RDW RBC AUTO: 44.9 FL (ref 37–54)
EGFRCR SERPLBLD CKD-EPI 2021: 81.5 ML/MIN/1.73
EOSINOPHIL # BLD AUTO: 0.1 10*3/MM3 (ref 0–0.4)
EOSINOPHIL NFR BLD AUTO: 1.6 % (ref 0.3–6.2)
ERYTHROCYTE [DISTWIDTH] IN BLOOD BY AUTOMATED COUNT: 14 % (ref 12.3–15.4)
FERRITIN SERPL-MCNC: 32.6 NG/ML (ref 13–150)
GLOBULIN UR ELPH-MCNC: 3.1 GM/DL
GLUCOSE SERPL-MCNC: 134 MG/DL (ref 65–99)
HCT VFR BLD AUTO: 34.1 % (ref 34–46.6)
HGB BLD-MCNC: 10.2 G/DL (ref 12–15.9)
IMM GRANULOCYTES # BLD AUTO: 0.01 10*3/MM3 (ref 0–0.05)
IMM GRANULOCYTES NFR BLD AUTO: 0.2 % (ref 0–0.5)
IRON 24H UR-MRATE: 24 MCG/DL (ref 37–145)
IRON SATN MFR SERPL: 5 % (ref 20–50)
LYMPHOCYTES # BLD AUTO: 0.86 10*3/MM3 (ref 0.7–3.1)
LYMPHOCYTES NFR BLD AUTO: 13.5 % (ref 19.6–45.3)
MAGNESIUM SERPL-MCNC: 2.1 MG/DL (ref 1.6–2.4)
MCH RBC QN AUTO: 26.1 PG (ref 26.6–33)
MCHC RBC AUTO-ENTMCNC: 29.9 G/DL (ref 31.5–35.7)
MCV RBC AUTO: 87.2 FL (ref 79–97)
MONOCYTES # BLD AUTO: 0.24 10*3/MM3 (ref 0.1–0.9)
MONOCYTES NFR BLD AUTO: 3.8 % (ref 5–12)
NEUTROPHILS NFR BLD AUTO: 5.13 10*3/MM3 (ref 1.7–7)
NEUTROPHILS NFR BLD AUTO: 80.4 % (ref 42.7–76)
NRBC BLD AUTO-RTO: 0 /100 WBC (ref 0–0.2)
PHOSPHATE SERPL-MCNC: 3.7 MG/DL (ref 2.5–4.5)
PLATELET # BLD AUTO: 488 10*3/MM3 (ref 140–450)
PMV BLD AUTO: 9.5 FL (ref 6–12)
POTASSIUM SERPL-SCNC: 3.9 MMOL/L (ref 3.5–5.2)
PROT SERPL-MCNC: 6.9 G/DL (ref 6–8.5)
RBC # BLD AUTO: 3.91 10*6/MM3 (ref 3.77–5.28)
SODIUM SERPL-SCNC: 146 MMOL/L (ref 136–145)
TIBC SERPL-MCNC: 440 MCG/DL (ref 298–536)
TRANSFERRIN SERPL-MCNC: 295 MG/DL (ref 200–360)
WBC NRBC COR # BLD AUTO: 6.37 10*3/MM3 (ref 3.4–10.8)

## 2024-08-16 PROCEDURE — 3078F DIAST BP <80 MM HG: CPT | Performed by: NURSE PRACTITIONER

## 2024-08-16 PROCEDURE — 84466 ASSAY OF TRANSFERRIN: CPT | Performed by: NURSE PRACTITIONER

## 2024-08-16 PROCEDURE — 84100 ASSAY OF PHOSPHORUS: CPT

## 2024-08-16 PROCEDURE — 80053 COMPREHEN METABOLIC PANEL: CPT

## 2024-08-16 PROCEDURE — 82728 ASSAY OF FERRITIN: CPT | Performed by: NURSE PRACTITIONER

## 2024-08-16 PROCEDURE — 83540 ASSAY OF IRON: CPT | Performed by: NURSE PRACTITIONER

## 2024-08-16 PROCEDURE — 83735 ASSAY OF MAGNESIUM: CPT

## 2024-08-16 PROCEDURE — 99214 OFFICE O/P EST MOD 30 MIN: CPT | Performed by: NURSE PRACTITIONER

## 2024-08-16 PROCEDURE — 36415 COLL VENOUS BLD VENIPUNCTURE: CPT

## 2024-08-16 PROCEDURE — 3074F SYST BP LT 130 MM HG: CPT | Performed by: NURSE PRACTITIONER

## 2024-08-16 PROCEDURE — 85025 COMPLETE CBC W/AUTO DIFF WBC: CPT

## 2024-08-16 PROCEDURE — 1126F AMNT PAIN NOTED NONE PRSNT: CPT | Performed by: NURSE PRACTITIONER

## 2024-08-16 RX ORDER — PROMETHAZINE HYDROCHLORIDE 12.5 MG/1
12.5 TABLET ORAL EVERY 6 HOURS PRN
Qty: 60 TABLET | Refills: 3 | Status: SHIPPED | OUTPATIENT
Start: 2024-08-16

## 2024-08-16 RX ORDER — AMOXICILLIN 250 MG
1 CAPSULE ORAL DAILY
Qty: 30 TABLET | Refills: 2 | Status: SHIPPED | OUTPATIENT
Start: 2024-08-16

## 2024-08-16 NOTE — TELEPHONE ENCOUNTER
Follow up call to Maritza, I let her know Dr Lopes was ok to send in some phenergan for her.  I cautioned her regarding use of phenergan and side effect of drowsiness.  Encouraged her to try the compazine first and if this is not effective, the phenergan would be ok to use.  She voiced understanding.

## 2024-08-16 NOTE — PROGRESS NOTES
Subjective   Maritza Bejarano is a 63 y.o. female.     History of Present Illness   Patient returns today for follow-up accompanied by her .  She has not started the Ribociclib yet and she had several questions about surgery, role of Ribociclib and other medications that she is currently on.  She is scheduled to have a suprapubic catheter.  She is reporting some tenderness to palpation of the left breast which was not previously present.  There is also some mild erythema of the left breast.    Oncologic history:    Patient is a 63-year-old postmenopausal lady who has not had a mammogram in 4 years presented with a screen detected abnormality.  She had a left breast biopsy in 2014 which was benign.  Denies palpating any breast masses skin changes or nipple discharge prior to the abnormal mammogram.  She does have left nipple inversion.    Patient has a longstanding diagnosis of multiple sclerosis and has not been on any treatment.  She was previously seen by Dr. Ozzy France and now being seen by Dr. Soto with neurology.  She has been nonambulatory for the past 4 years and requires a wheelchair.  She is unable to transfer herself in and out of wheelchairs and her  has to lift her for all the transfers.  She is also incontinent of the bladder and requiring a suprapubic catheter placed by urology to help with the same.  She had a bladder stimulator in the past which was turned off.  Other comorbidities include hypertension and hyperlipidemia.      Family history significant for maternal great grandmother with breast cancer, maternal great aunt and mother with breast cancer in her 70s.  Denies any family history of ovarian cancer, pancreatic cancer or melanoma.    5/21/2024-bilateral screening mammogram  Finding 1.new nipple retraction along with diffuse skin and trabecular thickening of the left breast.  There is suggestion of subtle architectural distortion seen on the MLO projection in the posterior central  region.  3 biopsy markers are noted.  No new suspicious calcifications.  Send finding 2.few axillary lymph node seen in the left breast which display interval increase in size and density.    Finding 3.focal asymmetry measuring 10 mm seen in the anterior one third of the right breast in the medial subareolar region.    Impression  Finding 1.new nipple retraction, skin and trabecular thickening in the left breast requires additional evaluation.  There is also question architectural distortion in the posterior central breast seen on the MLO projection.  Diagnostic mammogram to include repeat full-field CC with additional posterior tissue and complete breast ultrasound is recommended.  Finding 2.axillary lymph nodes in the left breast require additional evaluation, ultrasound recommended.  Finding 3.focal asymmetry in the right breast requires additional evaluation.  Diagnostic mammogram and limited breast ultrasound is recommended.    5/29/2024-bilateral diagnostic mammogram and ultrasound  Finding 1.4 0.7 x 5.6 x 6.8 cm developing asymmetry with associated nipple retraction, skin thickening and trabecular thickening of the left breast in the central region, inferior region in the upper outer region and the lower outer region.  Finding 2.axillary lymph node in the left breast.  Finding 3.suspected finding completely resolves upon further evaluation representing benign fibroglandular breast tissue.    Bilateral breast ultrasound  Finding 1.nonparallel hypoechoic mass with indistinct margins and skin thickening and internal vascularity in the left breast in the central region, inferior region, upper outer region and in the lower outer region.  The finding is palpable and appears to involve all 4 quadrants.  Most discrete portion is located at 130, 3 cm from the nipple, skin thickening up to 6 mm.  Send finding 2.rounded enlarged left axillary lymph node measuring 11 mm.  Cortical thickening of 9 mm present.    Finding  3.no sonographic correlate.  Send finding 4.enlarged right axillary lymph node measuring 14 mm.  Cortical thickening of 5 mm.    Impression  Finding 1.ultrasound-guided biopsy recommended  Finding 2.ultrasound-guided biopsy recommended  Finding 3.previously described right breast asymmetry resolves  Finding 4.ultrasound-guided biopsy recommended.    Ultrasound-guided biopsy of the left breast and left axilla lymph node and right axilla lymph node    1.left breast  Invasive lobular carcinoma with crush artifact  Grade 2  Invasive carcinoma measures 8.5 mm  No lymphovascular space invasion  ER +91 to 100% strong  [place] +31 to 40% moderate  HER2 negative, 0  Ki-67 35%    2.left axillary lymph node focus of metastatic Dastech lobular carcinoma measuring 10 mm  ER +91 to 100% strong  [place] +21 to 30%  HER2 -0  Ki-67 30%    3.right axillary lymph node with a focus of metastatic carcinoma measuring 4 mm.  Grade 2.  ER +91 to 100% strong  [place] +11 to 20% moderate  HER2 negative, 0  Ki-67 35%    Pathology of all the 3 sites appear similar and findings could represent left breast lobular carcinoma metastatic to the right as well as left axillary lymph nodes.    6/14/2024-CT of the chest abdomen and pelvis  1.large ill-defined infiltrative central left breast mass measuring 6.7 x 4 cm, medially there is an irregular 2.5 x 2.5 cm mass.  Significant thickening of the skin in the left breast and there is left axilla lymphadenopathy.  Left axilla lymph node measures 1.3 x 1.3 cm.  There is also a new enlarged right axillary lymph node measuring 1.3 x 1 cm.  All of the subcentimeter right axillary lymph nodes are slightly larger than previous.  2.no mediastinal hilar or internal mammary chain lymphadenopathy  3.new indeterminate mixed density measuring 1.3 x 1.2 cm right apical pulmonary nodule.  Follow-up recommended.  4.pleural parenchymal thickening and nodules inferiorly and posterior to the right upper lobe are stable.  Chronic  scarring and subsegmental atelectatic change in both lower lobes.    CT abdomen pelvis  1.moderate fatty infiltration of the liver.  No suspicious liver lesions.  2.moderate-sized paraesophageal hernia.  No acute bowel abnormality.  3.right sacral stimulator noted at the S3 neuroforamina.  No suspicious bone lesions are noted.    6/14/2024-bone scan  1.focal abnormal uptake in the left anterior inferior iliac spine correlating with lucent lesion on the CT concerning for metastatic disease.  Further evaluation with MRI of the pelvis and left hip could be performed.  2.focal uptake in the left frontal calvarium again concerning for metastatic disease.  Noncontrast CT or MRI could be obtained.  3.soft tissue uptake noted in the left breast at the site of known left breast cancer.  4.changes from arthroplasty on the right shoulder.  5.multifocal likely degenerative uptake in each knee, ankle and foot and increased uptake in the medial and patellofemoral cortex of the right knee could be posttraumatic.      MRI of the pelvis and the left hip as well as MRI of the brain have been ordered and currently scheduled for 7/10/2024.    Invitae 9 gene stat panel negative.    MRI of the brain which showed T2 hyperintensity involving the frontal bone measuring 1.6 cm in size and a smaller area of enhancement in the frontal bone laterally and inferiorly concerning for metastasis.  No brain mets    MRI of the hip and pelvis showed multiple enhancing bone lesions consistent with metastatic disease to the sacrum and pelvis.  Dominant lesion in the anterior inferior left pelvic spine and rectus femoris muscle origin that correlates with the site of bone scan uptake.  She has some right joint hip joint effusion.  Her CA 15-3 16.2    She was seen by Dr. Saavedra on 7/16/2024 and recommended to start on Ribociclib along with anastrozole.    INTERVAL HISTORY:  Patient returns today continuing on ribociclib and anastrozole, having been on  the [place] for approximately 1 week.  She is struggled with some nausea and actually called into the office earlier requesting some Phenergan.  We requested she first alternate prochlorperazine and ondansetron however may utilize Phenergan for rescue.  She knows to not take this around the time of prochlorperazine.  Patient is due to begin    The following portions of the patient's history were reviewed and updated as appropriate: allergies, current medications, past family history, past medical history, past social history, past surgical history, and problem list.    Past Medical History:   Diagnosis Date    Anemia 2024    `Treated with iron    Dawn esophagus     per patient    Blurred vision     R/T MS    Breast cancer 05/2024+++++    Carpal tunnel syndrome     Clotting disorder 1993, 2003, 2012    3 g/i bleeds w/ transfus/ions    Colon polyp 2013    removed w/ colonoscopy    Deep vein thrombosis phlebitis 1980    Depression     Diplopia 2013    GERD (gastroesophageal reflux disease)     GI (gastrointestinal bleed) 3 bleeds    2 transfusions    H/O Skin cancer, basal cell     Headache     History of blood transfusion     History of GI bleed     R/T NSAIDS AND STEROIDS, multiple times    History of urinary tract infection     Hypercalcemia     s/p parathyroidectomy    Hyperlipidemia     Hypertension     Movement disorder     Multiple sclerosis     Optic neuritis     PONV (postoperative nausea and vomiting)         Past Surgical History:   Procedure Laterality Date    APPENDECTOMY      BLADDER SURGERY      bladder stimulator    BREAST BIOPSY  don't remember    BREAST SURGERY      augmentation wtih subsequent removal    CARPAL TUNNEL RELEASE Bilateral     Left 2018, right 2020    CUBITAL TUNNEL RELEASE Left     CYSTOSCOPY BOTOX INJECTION OF BLADDER  2018    Cystoscopy with Botox    FRACTURE SURGERY  2019    rt shoulder    PARATHYROIDECTOMY      one gland removed    ROTATOR CUFF REPAIR Right 2017    TOE SURGERY       bilateral great toes    TOTAL SHOULDER ARTHROPLASTY W/ DISTAL CLAVICLE EXCISION Right 10/22/2018    Procedure: RT TOTAL SHOULDER REVERSE ARTHROPLASTY;  Surgeon: Bipin Dangelo MD;  Location: [facility];  Service: Orthopedics        Family History   Problem Relation Age of Onset    Hypertension Mother     Hyperlipidemia Mother     Aortic aneurysm Mother         thoracic    Diabetes Mother     Hypertension Father         charissa heart failure    Heart failure Father     Hyperlipidemia Father     Miscarriages / Stillbirths Sister     Multiple sclerosis Brother     Atrial fibrillation Brother     Diabetes Maternal Grandfather     Stroke Maternal Grandfather     Parkinsonism Paternal Grandmother     Tremor Paternal Grandmother     Stomach cancer Paternal Grandfather     Diabetes Maternal Grandmother         Social History     Socioeconomic History    Marital status:      Spouse name: Donald    Number of children: 0   Tobacco Use    Smoking status: Former     Current packs/day: 0.00     Average packs/day: 2.0 packs/day for 30.0 years (60.0 ttl pk-yrs)     Types: Cigarettes     Start date: 10/22/1973     Quit date: 10/22/2003     Years since quittin.8    Smokeless tobacco: Never   Vaping Use    Vaping status: Never Used   Substance and Sexual Activity    Alcohol use: Not Currently    Drug use: Not Currently     Types: Marijuana     Comment: advised by neurologist for sleep    Sexual activity: Not Currently     Partners: Male     Birth control/protection: Post-menopausal        OB History    No obstetric history on file.     Age at menarche-15  Age at first live childbirth-not applicable   2 para 0  0  Age of menopause-57  No use of hormone replacement therapy      No Known Allergies         Review of Systems   Constitutional:  Positive for activity change and fatigue.   HENT: Negative.     Eyes: Negative.    Respiratory:  Positive for shortness of breath.    Cardiovascular: Negative.   "  Gastrointestinal: Negative.    Endocrine: Negative.    Genitourinary:  Positive for urinary incontinence.   Musculoskeletal: Negative.    Skin: Negative.    Allergic/Immunologic: Negative.    Neurological:  Positive for weakness.   Psychiatric/Behavioral: Negative.       Review of systems as mentioned HPI otherwise negative    Objective   Blood pressure 109/70, pulse 70, temperature 98.2 °F (36.8 °C), temperature source Oral, resp. rate 18, height 165.1 cm (65\"), weight 74.8 kg (165 lb), SpO2 97%, not currently breastfeeding.   Physical Exam  Vitals reviewed.   Constitutional:       Appearance: Normal appearance. She is normal weight.   HENT:      Right Ear: External ear normal.      Left Ear: External ear normal.      Nose: Nose normal.      Mouth/Throat:      Pharynx: Oropharynx is clear.   Eyes:      Conjunctiva/sclera: Conjunctivae normal.   Cardiovascular:      Rate and Rhythm: Normal rate.   Pulmonary:      Effort: Pulmonary effort is normal.   Abdominal:      General: Abdomen is flat.   Musculoskeletal:         General: Normal range of motion.      Cervical back: Normal range of motion.   Skin:     General: Skin is warm.   Neurological:      General: No focal deficit present.      Mental Status: She is alert and oriented to person, place, and time.   Psychiatric:         Mood and Affect: Mood normal.         Behavior: Behavior normal.         Thought Content: Thought content normal.         Judgment: Judgment normal.       Breast Exam: Right breast appears normal on inspection.  No palpable right axilla lymphadenopathy or right breast masses.  Left breast on inspection there is nipple retraction.  On palpation there is a 13 x 9 cm mass in the retroareolar region pretty much occupying the whole breast.  There is palpable left axillary lymphadenopathy.    I have reexamined the patient and the results are consistent with the previously documented exam. DANA Lopez      Office Visit on 08/16/2024 "   Component Date Value Ref Range Status    Ferritin 08/16/2024 32.60  13.00 - 150.00 ng/mL Final    Iron 08/16/2024 24 (L)  37 - 145 mcg/dL Final    Iron Saturation (TSAT) 08/16/2024 5 (L)  20 - 50 % Final    Transferrin 08/16/2024 295  200 - 360 mg/dL Final    TIBC 08/16/2024 440  298 - 536 mcg/dL Final   Lab on 08/16/2024   Component Date Value Ref Range Status    Magnesium 08/16/2024 2.1  1.6 - 2.4 mg/dL Final    Phosphorus 08/16/2024 3.7  2.5 - 4.5 mg/dL Final    Glucose 08/16/2024 134 (H)  65 - 99 mg/dL Final    BUN 08/16/2024 19  8 - 23 mg/dL Final    Creatinine 08/16/2024 0.82  0.57 - 1.00 mg/dL Final    Sodium 08/16/2024 146 (H)  136 - 145 mmol/L Final    Potassium 08/16/2024 3.9  3.5 - 5.2 mmol/L Final    Chloride 08/16/2024 106  98 - 107 mmol/L Final    CO2 08/16/2024 27.3  22.0 - 29.0 mmol/L Final    Calcium 08/16/2024 9.2  8.6 - 10.5 mg/dL Final    Total Protein 08/16/2024 6.9  6.0 - 8.5 g/dL Final    Albumin 08/16/2024 3.8  3.5 - 5.2 g/dL Final    ALT (SGPT) 08/16/2024 10  1 - 33 U/L Final    AST (SGOT) 08/16/2024 36 (H)  1 - 32 U/L Final    Alkaline Phosphatase 08/16/2024 192 (H)  39 - 117 U/L Final    Total Bilirubin 08/16/2024 0.4  0.0 - 1.2 mg/dL Final    Globulin 08/16/2024 3.1  gm/dL Final    A/G Ratio 08/16/2024 1.2  g/dL Final    BUN/Creatinine Ratio 08/16/2024 23.2  7.0 - 25.0 Final    Anion Gap 08/16/2024 12.7  5.0 - 15.0 mmol/L Final    eGFR 08/16/2024 81.5  >60.0 mL/min/1.73 Final    WBC 08/16/2024 6.37  3.40 - 10.80 10*3/mm3 Final    RBC 08/16/2024 3.91  3.77 - 5.28 10*6/mm3 Final    Hemoglobin 08/16/2024 10.2 (L)  12.0 - 15.9 g/dL Final    Hematocrit 08/16/2024 34.1  34.0 - 46.6 % Final    MCV 08/16/2024 87.2  79.0 - 97.0 fL Final    MCH 08/16/2024 26.1 (L)  26.6 - 33.0 pg Final    MCHC 08/16/2024 29.9 (L)  31.5 - 35.7 g/dL Final    RDW 08/16/2024 14.0  12.3 - 15.4 % Final    RDW-SD 08/16/2024 44.9  37.0 - 54.0 fl Final    MPV 08/16/2024 9.5  6.0 - 12.0 fL Final    Platelets 08/16/2024 488  (H)  140 - 450 10*3/mm3 Final    Neutrophil % 08/16/2024 80.4 (H)  42.7 - 76.0 % Final    Lymphocyte % 08/16/2024 13.5 (L)  19.6 - 45.3 % Final    Monocyte % 08/16/2024 3.8 (L)  5.0 - 12.0 % Final    Eosinophil % 08/16/2024 1.6  0.3 - 6.2 % Final    Basophil % 08/16/2024 0.5  0.0 - 1.5 % Final    Immature Grans % 08/16/2024 0.2  0.0 - 0.5 % Final    Neutrophils, Absolute 08/16/2024 5.13  1.70 - 7.00 10*3/mm3 Final    Lymphocytes, Absolute 08/16/2024 0.86  0.70 - 3.10 10*3/mm3 Final    Monocytes, Absolute 08/16/2024 0.24  0.10 - 0.90 10*3/mm3 Final    Eosinophils, Absolute 08/16/2024 0.10  0.00 - 0.40 10*3/mm3 Final    Basophils, Absolute 08/16/2024 0.03  0.00 - 0.20 10*3/mm3 Final    Immature Grans, Absolute 08/16/2024 0.01  0.00 - 0.05 10*3/mm3 Final    nRBC 08/16/2024 0.0  0.0 - 0.2 /100 WBC Final   Lab on 07/30/2024   Component Date Value Ref Range Status    Glucose 07/30/2024 111 (H)  65 - 99 mg/dL Final    BUN 07/30/2024 21  8 - 23 mg/dL Final    Creatinine 07/30/2024 0.61  0.57 - 1.00 mg/dL Final    Sodium 07/30/2024 144  136 - 145 mmol/L Final    Potassium 07/30/2024 3.9  3.5 - 5.2 mmol/L Final    Chloride 07/30/2024 104  98 - 107 mmol/L Final    CO2 07/30/2024 26.8  22.0 - 29.0 mmol/L Final    Calcium 07/30/2024 9.0  8.6 - 10.5 mg/dL Final    Total Protein 07/30/2024 6.8  6.0 - 8.5 g/dL Final    Albumin 07/30/2024 3.9  3.5 - 5.2 g/dL Final    ALT (SGPT) 07/30/2024 19  1 - 33 U/L Final    AST (SGOT) 07/30/2024 45 (H)  1 - 32 U/L Final    Alkaline Phosphatase 07/30/2024 212 (H)  39 - 117 U/L Final    Total Bilirubin 07/30/2024 0.3  0.0 - 1.2 mg/dL Final    Globulin 07/30/2024 2.9  gm/dL Final    A/G Ratio 07/30/2024 1.3  g/dL Final    BUN/Creatinine Ratio 07/30/2024 34.4 (H)  7.0 - 25.0 Final    Anion Gap 07/30/2024 13.2  5.0 - 15.0 mmol/L Final    eGFR 07/30/2024 101.9  >60.0 mL/min/1.73 Final    WBC 07/30/2024 7.01  3.40 - 10.80 10*3/mm3 Final    RBC 07/30/2024 3.90  3.77 - 5.28 10*6/mm3 Final    Hemoglobin  07/30/2024 10.8 (L)  12.0 - 15.9 g/dL Final    Hematocrit 07/30/2024 34.8  34.0 - 46.6 % Final    MCV 07/30/2024 89.2  79.0 - 97.0 fL Final    MCH 07/30/2024 27.7  26.6 - 33.0 pg Final    MCHC 07/30/2024 31.0 (L)  31.5 - 35.7 g/dL Final    RDW 07/30/2024 13.2  12.3 - 15.4 % Final    RDW-SD 07/30/2024 43.1  37.0 - 54.0 fl Final    MPV 07/30/2024 9.9  6.0 - 12.0 fL Final    Platelets 07/30/2024 289  140 - 450 10*3/mm3 Final    Neutrophil % 07/30/2024 74.9  42.7 - 76.0 % Final    Lymphocyte % 07/30/2024 15.4 (L)  19.6 - 45.3 % Final    Monocyte % 07/30/2024 7.0  5.0 - 12.0 % Final    Eosinophil % 07/30/2024 1.6  0.3 - 6.2 % Final    Basophil % 07/30/2024 0.7  0.0 - 1.5 % Final    Immature Grans % 07/30/2024 0.4  0.0 - 0.5 % Final    Neutrophils, Absolute 07/30/2024 5.25  1.70 - 7.00 10*3/mm3 Final    Lymphocytes, Absolute 07/30/2024 1.08  0.70 - 3.10 10*3/mm3 Final    Monocytes, Absolute 07/30/2024 0.49  0.10 - 0.90 10*3/mm3 Final    Eosinophils, Absolute 07/30/2024 0.11  0.00 - 0.40 10*3/mm3 Final    Basophils, Absolute 07/30/2024 0.05  0.00 - 0.20 10*3/mm3 Final    Immature Grans, Absolute 07/30/2024 0.03  0.00 - 0.05 10*3/mm3 Final    nRBC 07/30/2024 0.0  0.0 - 0.2 /100 WBC Final        No radiology results for the last 30 days.       Assessment & Plan       *Left breast invasive lobular carcinoma  The tumor is estrogen receptor +91 to 100%, progesterone receptor +31 to 40%, HER2 negative, 0, Ki-67 35%  The tumor measures greater than 10 cm on exam, lymph node positive.  CT of the chest abdomen and pelvis with right upper lobe pulmonary nodule which is new and the bone scan also showsUptake in the left anterior inferior iliac spine which correlates to a lucent area on the CT scan and also focal uptake noted in the left frontal calvarium concerning for metastatic disease.  Further evaluation with a MRI of the pelvis and hip as well as MRI of the brain is underway.  The scans are scheduled for 7/10/2024.  Discussed at  length the details of imaging and pathology report.Discussed the origin of breast cancer from the ducts and the lobules and the histological type of breast cancer based on site of origin. Discussed the tumor size, lymph node status and stage of the cancer. Explained the presence of DCIS. Discussed the receptor status including ER, KY and her-2 dorothy and their significance in determining the biology and treatment. Also discussed the importance of grade and ki-67.   Clinical T3 N1 M1, stage IV invasive lobular carcinoma.  There is also a right axillary lymph node which has been biopsied and consistent with invasive lobular carcinoma with similar morphology as well as receptors concerning for metastatic disease rather than a primary right breast cancer.  Given the concerns of metastatic disease and strongly ER/KY positive breast cancer I recommend that she proceed with endocrine therapy with anastrozole along with a CDK 4 6 inhibitor as a first-line.  Recommend Ribociclib 400 mg 3 weeks on and 1 week off.  Adverse effects of this treatment including but not limited to hot flashes, mood changes, fatigue, insomnia, myelosuppression, increased risk of infections, hepatotoxicity, pneumonitis, risk of DVT PE, effect on bone density discussed.  Patient willing to proceed the recommended treatment however we will wait to confirm the possible metastatic lesions in the spine with the MRI.  Even if the MRIs do not confirm metastatic disease given the fact that the right axilla was positive for invasive lobular carcinoma we could presume that this is metastatic disease.  She also has a locally advanced left breast cancer which may not be operable at this time.  Even in that event I think it is fair to start her on CDK 4 6 inhibitor along with an AI and not proceed with surgery.  She also has an extremely poor functional status because of MS and may be higher than average risk for surgery.  July 16, 2024: Reviewed MRI of the pelvis  and hip consistent with many bony metastasis.  MRI brain shows left frontal bone mets but no brain involvement.  Patient is here to start day 1 ribociclib along with anastrozole.  Patient has not started Ribociclib yet.  Continues on anastrozole.  She had questions regarding why surgery would not be an option and also regarding other medications that she is on.  We reviewed the side effects of Ribociclib again and explained that it is safe to continue her other medications that she is currently taking.  Now that she has metastatic disease explained to her that surgery would not be curative and would in fact delay the required treatment and therefore we would have to start her on Ribociclib as soon as possible.  Patient willing to proceed with Ribociclib.  She will be seen in 2 weeks to assess tolerance  Tempus performed on the left axilla lymph node biopsy shows PIK3CA mutation, CDH 1 mutation and Erb B2 mutation.  Therefore patient would benefit from alpelisib and Faslodex after progression on Ribociclib and AI.  Other options include neratinib plus Faslodex.  8/16/2024 patient returns for follow-up having been on ribociclib for approximately 1 week.  She continues anastrozole daily.  She has been having nausea for which she is called into the office and we have added additional antiemetics as below.  We discussed initiation of Xgeva and she will get dental clearance for this.  Will have her back in 2 weeks for follow-up.      *Right axillary lymphadenopathy  Biopsied and consistent with invasive lobular carcinoma, grade 2, ER/N[place] positive and HER2 negative  Patient will be started on AI and Ribociclib  Please refer to the above discussion for details    *Right breast non-mass enhancement  6/28/2024-MRI of the breast with non-mass enhancement noted in the right breast and patient had questions regarding if this should be biopsied.  Given extensive metastatic disease in the bones and right axilla lymph node  consistent with invasive lobular carcinoma management would not change with the biopsy and hence I would recommend against it.      *Skeletal metastasis  Patient will be started on Xgeva  8/16/2024 Xgeva initiation will be held as the patient has not been to the dentist in over 2 years.  Discussed possible side effects of Xgeva and she will schedule a dental visit and we will have her back in 1 week to initiate Xgeva pending this is complete.    *MS-patient is currently not on any treatment for MS.    *Hypertension-continue current medication      Hyperlipidemia-continue current medication      *Urinary incontinence-patient plans to have a suprapubic catheter placed by urology.      *History of iron deficiency anemia-  3/8/2024 hemoglobin was low at 10.9 and subsequently patient took oral iron which resulted in improvement of hemoglobin and normal.  She was scheduled for colonoscopy however she canceled that due to ongoing management of breast cancer.  She proceed with a colonoscopy.  8/16/2024 patient's hemoglobin is 10.2 today.  She states she recently was told to begin daily iron supplementation.  Baseline iron studies ordered today she is only been taking the iron for a few days.  Ferritin 32, iron saturation 5%, TIBC 440.  We will repeat this in 6 to 8 weeks.    *Genetic testing-9 gene stat panel negative      *Dyspnea  CT chest shows some bibasilar atelectasis/pneumonia  No cough but patient reports dyspnea the past 2 to 3 months especially when she is talking  CBC reviewed and no leukocytosis.  No fevers.  Unclear etiology for dyspnea  I will not start her on antibiotics  Recommend that she follow-up with her primary care physician to rule out a cardiac etiology    *Right upper lobe pulmonary nodule   Concerning for metastatic disease  PET/CT may not be helpful as invasive lobular carcinomas typically are not very PET avid.  Could consider PET-FES  Obtain tumor markers    *Follow-up-after the MRIs.  Reviewed  MRI of the pelvis and MRI of the brain which shows mets in the bone    Plan  Continue progressive close 400 mg p.o. daily 3 weeks on 1 week off along with anastrozole 1 mg p.o. daily  HOLD initiation of Xgeva for dental clearance  Patient may alternate prochlorperazine and ondansetron for nausea control.  Does have Phenergan for rescue if needed, knows not to take this around the time of prochlorperazine  Continue daily oral ferrous sulfate.  Repeat labs in 6 weeks, ordered today.  Return in 1 week for Xgeva pending dental clearance  Turn in 2 weeks for review by Dr. Lopes with labs    Patient continues high risk medication requiring frequent monitoring    DANA Lopez

## 2024-08-21 ENCOUNTER — SPECIALTY PHARMACY (OUTPATIENT)
Dept: PHARMACY | Facility: HOSPITAL | Age: 64
End: 2024-08-21
Payer: MEDICARE

## 2024-08-21 ENCOUNTER — TELEPHONE (OUTPATIENT)
Dept: ONCOLOGY | Facility: CLINIC | Age: 64
End: 2024-08-21
Payer: MEDICARE

## 2024-08-21 RX ORDER — OLANZAPINE 5 MG/1
5 TABLET ORAL NIGHTLY
Qty: 30 TABLET | Refills: 5 | Status: SHIPPED | OUTPATIENT
Start: 2024-08-21

## 2024-08-21 NOTE — TELEPHONE ENCOUNTER
Maritza called today reporting ongoing nausea despite the addition of both compazine and phenergan.  She states she took two phenergan yesterday and one compazine today.  We discussed taking both compazine and phenergan around the clock, every six hours each, staggering each so that she can take something ever three hours.  Reviewed with Dr Lopes and will also send in zyprexa for her to take nightly.  Asked her to follow up if she does not see any improvement.  She voiced understanding.

## 2024-08-21 NOTE — PROGRESS NOTES
Specialty Pharmacy Note: Kisqali (ribociclib)    Maritza Nance Darci is a 63 y.o. female with breast cancer was seen 8/20/24 by APRN. Per provider dictation, no changes to oral oncology regimen Kisqali (ribociclib).  Labs Review: The CMP and CBC from 8/20/24 have been reviewed. No dose adjustments are needed for the oral specialty medication(s) based on the labs.    Specialty pharmacy will continue to follow patient.    Alyssa Rich, PharmD, BCPS  8/21/2024  09:41 EDT

## 2024-08-21 NOTE — PROGRESS NOTES
MTM telephone encounter re:adherence and side effects (Kisqali)     Maritza reports starting Kisqali. Thus far, patient is experiencing side effects of nausea. She reports she has been prescribed several medications for this but none have been helpful.  She also reports she is taking the medication at bedtime, in hopes of sleeping through any nausea, but this has not been the case.  Patient denies taking anti-nausea medications prior to Kisqali dose, only takes after becomes nauseous.  Recommend patient take either Zofran or Compazine 30 minutes prior to scheduled Kisqali dose.  Also discussed she can alternate Zofran and Compazine every 3-4 hours as needed for nausea, to better control side effect.  Patient was previously only utilizing one medication at a time, without benefit.  Discussed patient should not take Compazine and Phenergan together, as they work similarly and would have limited increased efficacy.  Would recommend alternating Zofran with either Compazine or Phenergan.  Advised pt to call office if any changes. Patient expressed understanding and had no additional questions at this time.       Danielle Conrad RPH  8/21/2024  10:57 EDT

## 2024-08-23 ENCOUNTER — TELEPHONE (OUTPATIENT)
Dept: ONCOLOGY | Facility: CLINIC | Age: 64
End: 2024-08-23
Payer: MEDICARE

## 2024-08-23 NOTE — TELEPHONE ENCOUNTER
Called the patient to let her know that with her not getting dental clearance yet, we would have to push her appt out. She stated her extreme nausea was the issue with going to the dentist this week, but that is just now under control. She stated she was going to try to get into the dentist for clearance at the beginning of next week and wanted the xgeva moved to after she sees Dr. Lopes on 8/30. Scheduling and Rosina BOSE were messaged. Patient v/u.

## 2024-08-23 NOTE — TELEPHONE ENCOUNTER
Caller: Maritza Bejarano    Relationship: Self    Best call back number: [phone]    What is the best time to reach you: ANYTIME    Who are you requesting to speak with (clinical staff, provider,  specific staff member): CLINICAL        What was the call regarding:     WAS IN LAST WEEK SAW NURSE PRACTITIONER AND SUPPOSED TO COME FOR INJECTION XGEVA TODAY BUT WAS SUPPOSED TO SEE DENTIST FIRST HAS NOT BEEN ABLE TO SEE DENTIST DUE TO HAVING THE NAUSEA ALL WEEK WHICH IS JUST NOW GETTING UNDER CONTROL    WANTED TO KNOW IF STILL WANTED HER TO COME IN FOR THE INJECTION TODAY OR IF FOR ANYTHING AT ALL?     Is it okay if the provider responds through [facility]: YES

## 2024-08-30 ENCOUNTER — SPECIALTY PHARMACY (OUTPATIENT)
Dept: PHARMACY | Facility: HOSPITAL | Age: 64
End: 2024-08-30
Payer: MEDICARE

## 2024-08-30 ENCOUNTER — INFUSION (OUTPATIENT)
Dept: ONCOLOGY | Facility: HOSPITAL | Age: 64
End: 2024-08-30
Payer: MEDICARE

## 2024-08-30 ENCOUNTER — OFFICE VISIT (OUTPATIENT)
Dept: ONCOLOGY | Facility: CLINIC | Age: 64
End: 2024-08-30
Payer: MEDICARE

## 2024-08-30 ENCOUNTER — LAB (OUTPATIENT)
Dept: LAB | Facility: HOSPITAL | Age: 64
End: 2024-08-30
Payer: MEDICARE

## 2024-08-30 VITALS
DIASTOLIC BLOOD PRESSURE: 76 MMHG | TEMPERATURE: 98.7 F | HEART RATE: 79 BPM | HEIGHT: 65 IN | RESPIRATION RATE: 16 BRPM | WEIGHT: 165 LBS | SYSTOLIC BLOOD PRESSURE: 121 MMHG | BODY MASS INDEX: 27.49 KG/M2 | OXYGEN SATURATION: 97 %

## 2024-08-30 DIAGNOSIS — D64.9 ANEMIA, UNSPECIFIED TYPE: ICD-10-CM

## 2024-08-30 DIAGNOSIS — C50.812 MALIGNANT NEOPLASM OF OVERLAPPING SITES OF LEFT BREAST IN FEMALE, ESTROGEN RECEPTOR POSITIVE: ICD-10-CM

## 2024-08-30 DIAGNOSIS — C79.51 CANCER, METASTATIC TO BONE: ICD-10-CM

## 2024-08-30 DIAGNOSIS — Z17.0 MALIGNANT NEOPLASM OF OVERLAPPING SITES OF LEFT BREAST IN FEMALE, ESTROGEN RECEPTOR POSITIVE: ICD-10-CM

## 2024-08-30 DIAGNOSIS — D64.9 ANEMIA, UNSPECIFIED TYPE: Primary | ICD-10-CM

## 2024-08-30 DIAGNOSIS — Z17.0 MALIGNANT NEOPLASM OF OVERLAPPING SITES OF LEFT BREAST IN FEMALE, ESTROGEN RECEPTOR POSITIVE: Primary | ICD-10-CM

## 2024-08-30 DIAGNOSIS — C50.812 MALIGNANT NEOPLASM OF OVERLAPPING SITES OF LEFT BREAST IN FEMALE, ESTROGEN RECEPTOR POSITIVE: Primary | ICD-10-CM

## 2024-08-30 LAB
ALBUMIN SERPL-MCNC: 3.9 G/DL (ref 3.5–5.2)
ALBUMIN/GLOB SERPL: 1.2 G/DL
ALP SERPL-CCNC: 222 U/L (ref 39–117)
ALT SERPL W P-5'-P-CCNC: 13 U/L (ref 1–33)
ANION GAP SERPL CALCULATED.3IONS-SCNC: 12.8 MMOL/L (ref 5–15)
AST SERPL-CCNC: 31 U/L (ref 1–32)
BASOPHILS # BLD AUTO: 0.04 10*3/MM3 (ref 0–0.2)
BASOPHILS NFR BLD AUTO: 1.5 % (ref 0–1.5)
BILIRUB SERPL-MCNC: 0.2 MG/DL (ref 0–1.2)
BUN SERPL-MCNC: 12 MG/DL (ref 8–23)
BUN/CREAT SERPL: 16 (ref 7–25)
CALCIUM SPEC-SCNC: 9.1 MG/DL (ref 8.6–10.5)
CHLORIDE SERPL-SCNC: 107 MMOL/L (ref 98–107)
CO2 SERPL-SCNC: 22.2 MMOL/L (ref 22–29)
CREAT SERPL-MCNC: 0.75 MG/DL (ref 0.57–1)
DEPRECATED RDW RBC AUTO: 55.9 FL (ref 37–54)
EGFRCR SERPLBLD CKD-EPI 2021: 90.7 ML/MIN/1.73
EOSINOPHIL # BLD AUTO: 0.04 10*3/MM3 (ref 0–0.4)
EOSINOPHIL NFR BLD AUTO: 1.5 % (ref 0.3–6.2)
ERYTHROCYTE [DISTWIDTH] IN BLOOD BY AUTOMATED COUNT: 18.5 % (ref 12.3–15.4)
GLOBULIN UR ELPH-MCNC: 3.2 GM/DL
GLUCOSE SERPL-MCNC: 136 MG/DL (ref 65–99)
HCT VFR BLD AUTO: 35.9 % (ref 34–46.6)
HGB BLD-MCNC: 10.9 G/DL (ref 12–15.9)
IMM GRANULOCYTES # BLD AUTO: 0.01 10*3/MM3 (ref 0–0.05)
IMM GRANULOCYTES NFR BLD AUTO: 0.4 % (ref 0–0.5)
LYMPHOCYTES # BLD AUTO: 0.56 10*3/MM3 (ref 0.7–3.1)
LYMPHOCYTES NFR BLD AUTO: 21.4 % (ref 19.6–45.3)
MAGNESIUM SERPL-MCNC: 2.2 MG/DL (ref 1.6–2.4)
MCH RBC QN AUTO: 27.1 PG (ref 26.6–33)
MCHC RBC AUTO-ENTMCNC: 30.4 G/DL (ref 31.5–35.7)
MCV RBC AUTO: 89.3 FL (ref 79–97)
MONOCYTES # BLD AUTO: 0.12 10*3/MM3 (ref 0.1–0.9)
MONOCYTES NFR BLD AUTO: 4.6 % (ref 5–12)
NEUTROPHILS NFR BLD AUTO: 1.85 10*3/MM3 (ref 1.7–7)
NEUTROPHILS NFR BLD AUTO: 70.6 % (ref 42.7–76)
NRBC BLD AUTO-RTO: 0 /100 WBC (ref 0–0.2)
PHOSPHATE SERPL-MCNC: 3 MG/DL (ref 2.5–4.5)
PLATELET # BLD AUTO: 412 10*3/MM3 (ref 140–450)
PMV BLD AUTO: 8.8 FL (ref 6–12)
POTASSIUM SERPL-SCNC: 3.3 MMOL/L (ref 3.5–5.2)
PROT SERPL-MCNC: 7.1 G/DL (ref 6–8.5)
RBC # BLD AUTO: 4.02 10*6/MM3 (ref 3.77–5.28)
SODIUM SERPL-SCNC: 142 MMOL/L (ref 136–145)
WBC NRBC COR # BLD AUTO: 2.62 10*3/MM3 (ref 3.4–10.8)

## 2024-08-30 PROCEDURE — 84100 ASSAY OF PHOSPHORUS: CPT

## 2024-08-30 PROCEDURE — 36415 COLL VENOUS BLD VENIPUNCTURE: CPT

## 2024-08-30 PROCEDURE — 96372 THER/PROPH/DIAG INJ SC/IM: CPT

## 2024-08-30 PROCEDURE — 80053 COMPREHEN METABOLIC PANEL: CPT

## 2024-08-30 PROCEDURE — 25010000002 DENOSUMAB 120 MG/1.7ML SOLUTION: Performed by: INTERNAL MEDICINE

## 2024-08-30 PROCEDURE — 85025 COMPLETE CBC W/AUTO DIFF WBC: CPT

## 2024-08-30 PROCEDURE — 83735 ASSAY OF MAGNESIUM: CPT

## 2024-08-30 RX ORDER — RIBOCICLIB 200 MG/1
400 TABLET, FILM COATED ORAL DAILY
Qty: 42 TABLET | Refills: 0 | Status: CANCELLED | OUTPATIENT
Start: 2024-08-30

## 2024-08-30 RX ADMIN — DENOSUMAB 120 MG: 120 INJECTION SUBCUTANEOUS at 15:49

## 2024-08-30 NOTE — PROGRESS NOTES
Specialty Pharmacy Patient Management Program  Per Protocol Prescription Order or Refill     Patient will be filling or currently fills medications at [facility] and is enrolled in the Patient Management Program.    Requested Prescriptions     Signed Prescriptions Disp Refills    ribociclib succinate (Kisqali, 400 MG Dose,) 200 MG tablet therapy pack tablet 42 tablet 5     Sig: Take 2 tablets by mouth Daily. Take for 21 days on, then 7 days oss     Prescription orders above were sent to the pharmacy per Collaborative Care Agreement Protocol.     Last Office Visit: 8/16/24  Next Office Visit: today    Quita Montemayor [facility] BCOP  Clinical Specialty Pharmacist, Oncology  8/30/2024  14:34 EDT

## 2024-08-30 NOTE — PROGRESS NOTES
Specialty Pharmacy Patient Management Program  Per Protocol Prescription Order or Refill       Requested Prescriptions     Signed Prescriptions Disp Refills    ribociclib succinate (Kisqali, 400 MG Dose,) 200 MG tablet therapy pack tablet 42 tablet 5     Sig: Take 2 tablets by mouth Daily. Take for 21 days on, then 7 days off.     Prescription orders above were sent to [facility] per Collaborative Care Agreement Protocol.     Completed independent double check on medication order/RX.    Martin Conrad PharmD, BCOP  Clinical Specialty Pharmacist, Oncology  8/30/2024  16:23 EDT

## 2024-08-30 NOTE — PROGRESS NOTES
Subjective   Maritza Bejarano is a 63 y.o. female.     History of Present Illness   Maritza presents today for follow-up, she is on anastrozole and Ribociclib.  She has had experienced severe nausea which is improved with Compazine.  This is the third week and she will go on her week off starting tomorrow.  Besides the nausea she has been tolerating the treatment fairly well.  We sent in Zyprexa but she does not wish to start that.  She has obtained dental clearance and here to start Xgeva.  CBC and CMP reviewed .     Oncologic history:    Patient is a 63-year-old postmenopausal lady who has not had a mammogram in 4 years presented with a screen detected abnormality.  She had a left breast biopsy in 2014 which was benign.  Denies palpating any breast masses skin changes or nipple discharge prior to the abnormal mammogram.  She does have left nipple inversion.    Patient has a longstanding diagnosis of multiple sclerosis and has not been on any treatment.  She was previously seen by Dr. Ozzy France and now being seen by Dr. Soto with neurology.  She has been nonambulatory for the past 4 years and requires a wheelchair.  She is unable to transfer herself in and out of wheelchairs and her  has to lift her for all the transfers.  She is also incontinent of the bladder and requiring a suprapubic catheter placed by urology to help with the same.  She had a bladder stimulator in the past which was turned off.  Other comorbidities include hypertension and hyperlipidemia.      Family history significant for maternal great grandmother with breast cancer, maternal great aunt and mother with breast cancer in her 70s.  Denies any family history of ovarian cancer, pancreatic cancer or melanoma.    5/21/2024-bilateral screening mammogram  Finding 1.new nipple retraction along with diffuse skin and trabecular thickening of the left breast.  There is suggestion of subtle architectural distortion seen on the MLO projection in the  posterior central region.  3 biopsy markers are noted.  No new suspicious calcifications.  Send finding 2.few axillary lymph node seen in the left breast which display interval increase in size and density.    Finding 3.focal asymmetry measuring 10 mm seen in the anterior one third of the right breast in the medial subareolar region.    Impression  Finding 1.new nipple retraction, skin and trabecular thickening in the left breast requires additional evaluation.  There is also question architectural distortion in the posterior central breast seen on the MLO projection.  Diagnostic mammogram to include repeat full-field CC with additional posterior tissue and complete breast ultrasound is recommended.  Finding 2.axillary lymph nodes in the left breast require additional evaluation, ultrasound recommended.  Finding 3.focal asymmetry in the right breast requires additional evaluation.  Diagnostic mammogram and limited breast ultrasound is recommended.    5/29/2024-bilateral diagnostic mammogram and ultrasound  Finding 1.4 0.7 x 5.6 x 6.8 cm developing asymmetry with associated nipple retraction, skin thickening and trabecular thickening of the left breast in the central region, inferior region in the upper outer region and the lower outer region.  Finding 2.axillary lymph node in the left breast.  Finding 3.suspected finding completely resolves upon further evaluation representing benign fibroglandular breast tissue.    Bilateral breast ultrasound  Finding 1.nonparallel hypoechoic mass with indistinct margins and skin thickening and internal vascularity in the left breast in the central region, inferior region, upper outer region and in the lower outer region.  The finding is palpable and appears to involve all 4 quadrants.  Most discrete portion is located at 130, 3 cm from the nipple, skin thickening up to 6 mm.  Send finding 2.rounded enlarged left axillary lymph node measuring 11 mm.  Cortical thickening of 9 mm  present.    Finding 3.no sonographic correlate.  Send finding 4.enlarged right axillary lymph node measuring 14 mm.  Cortical thickening of 5 mm.    Impression  Finding 1.ultrasound-guided biopsy recommended  Finding 2.ultrasound-guided biopsy recommended  Finding 3.previously described right breast asymmetry resolves  Finding 4.ultrasound-guided biopsy recommended.    Ultrasound-guided biopsy of the left breast and left axilla lymph node and right axilla lymph node    1.left breast  Invasive lobular carcinoma with crush artifact  Grade 2  Invasive carcinoma measures 8.5 mm  No lymphovascular space invasion  ER +91 to 100% strong  VT +31 to 40% moderate  HER2 negative, 0  Ki-67 35%    2.left axillary lymph node focus of metastatic Dastech lobular carcinoma measuring 10 mm  ER +91 to 100% strong  VT +21 to 30%  HER2 -0  Ki-67 30%    3.right axillary lymph node with a focus of metastatic carcinoma measuring 4 mm.  Grade 2.  ER +91 to 100% strong  VT +11 to 20% moderate  HER2 negative, 0  Ki-67 35%    Pathology of all the 3 sites appear similar and findings could represent left breast lobular carcinoma metastatic to the right as well as left axillary lymph nodes.    6/14/2024-CT of the chest abdomen and pelvis  1.large ill-defined infiltrative central left breast mass measuring 6.7 x 4 cm, medially there is an irregular 2.5 x 2.5 cm mass.  Significant thickening of the skin in the left breast and there is left axilla lymphadenopathy.  Left axilla lymph node measures 1.3 x 1.3 cm.  There is also a new enlarged right axillary lymph node measuring 1.3 x 1 cm.  All of the subcentimeter right axillary lymph nodes are slightly larger than previous.  2.no mediastinal hilar or internal mammary chain lymphadenopathy  3.new indeterminate mixed density measuring 1.3 x 1.2 cm right apical pulmonary nodule.  Follow-up recommended.  4.pleural parenchymal thickening and nodules inferiorly and posterior to the right upper lobe are  stable.  Chronic scarring and subsegmental atelectatic change in both lower lobes.    CT abdomen pelvis  1.moderate fatty infiltration of the liver.  No suspicious liver lesions.  2.moderate-sized paraesophageal hernia.  No acute bowel abnormality.  3.right sacral stimulator noted at the S3 neuroforamina.  No suspicious bone lesions are noted.    6/14/2024-bone scan  1.focal abnormal uptake in the left anterior inferior iliac spine correlating with lucent lesion on the CT concerning for metastatic disease.  Further evaluation with MRI of the pelvis and left hip could be performed.  2.focal uptake in the left frontal calvarium again concerning for metastatic disease.  Noncontrast CT or MRI could be obtained.  3.soft tissue uptake noted in the left breast at the site of known left breast cancer.  4.changes from arthroplasty on the right shoulder.  5.multifocal likely degenerative uptake in each knee, ankle and foot and increased uptake in the medial and patellofemoral cortex of the right knee could be posttraumatic.      MRI of the pelvis and the left hip as well as MRI of the brain have been ordered and currently scheduled for 7/10/2024.    Inv[employer]ae 9 gene stat panel negative.    MRI of the brain which showed T2 hyperintensity involving the frontal bone measuring 1.6 cm in size and a smaller area of enhancement in the frontal bone laterally and inferiorly concerning for metastasis.  No brain mets    MRI of the hip and pelvis showed multiple enhancing bone lesions consistent with metastatic disease to the sacrum and pelvis.  Dominant lesion in the anterior inferior left pelvic spine and rectus femoris muscle origin that correlates with the site of bone scan uptake.  She has some right joint hip joint effusion.  Her CA 15-3 16.2    She was seen by Dr. Saavedra on 7/16/2024 and recommended to start on Ribociclib along with anastrozole.    INTERVAL HISTORY:  Patient returns today continuing on ribociclib and anastrozole,  having been on the [place] for approximately 1 week.  She is struggled with some nausea and actually called into the office earlier requesting some Phenergan.  We requested she first alternate prochlorperazine and ondansetron however may utilize Phenergan for rescue.  She knows to not take this around the time of prochlorperazine.  Patient is due to begin    The following portions of the patient's history were reviewed and updated as appropriate: allergies, current medications, past family history, past medical history, past social history, past surgical history, and problem list.    Past Medical History:   Diagnosis Date    Anemia 2024    `Treated with iron    Dawn esophagus     per patient    Blurred vision     R/T MS    Breast cancer 05/2024+++++    Carpal tunnel syndrome     Clotting disorder 1993, 2003, 2012    3 g/i bleeds w/ transfus/ions    Colon polyp 2013    removed w/ colonoscopy    Deep vein thrombosis phlebitis 1980    Depression     Diplopia 2013    GERD (gastroesophageal reflux disease)     GI (gastrointestinal bleed) 3 bleeds    2 transfusions    H/O Skin cancer, basal cell     Headache     History of blood transfusion     History of GI bleed     R/T NSAIDS AND STEROIDS, multiple times    History of urinary tract infection     Hypercalcemia     s/p parathyroidectomy    Hyperlipidemia     Hypertension     Movement disorder     Multiple sclerosis     Optic neuritis     PONV (postoperative nausea and vomiting)         Past Surgical History:   Procedure Laterality Date    APPENDECTOMY      BLADDER SURGERY      bladder stimulator    BREAST BIOPSY  don't remember    BREAST SURGERY      augmentation wtih subsequent removal    CARPAL TUNNEL RELEASE Bilateral     Left 2018, right 2020    CUBITAL TUNNEL RELEASE Left     CYSTOSCOPY BOTOX INJECTION OF BLADDER  2018    Cystoscopy with Botox    FRACTURE SURGERY  2019    rt shoulder    PARATHYROIDECTOMY      one gland removed    ROTATOR CUFF REPAIR Right 2017     TOE SURGERY      bilateral great toes    TOTAL SHOULDER ARTHROPLASTY W/ DISTAL CLAVICLE EXCISION Right 10/22/2018    Procedure: RT TOTAL SHOULDER REVERSE ARTHROPLASTY;  Surgeon: Bipin Dangelo MD;  Location: [facility];  Service: Orthopedics        Family History   Problem Relation Age of Onset    Hypertension Mother     Hyperlipidemia Mother     Aortic aneurysm Mother         thoracic    Diabetes Mother     Hypertension Father         charissa heart failure    Heart failure Father     Hyperlipidemia Father     Miscarriages / Stillbirths Sister     Multiple sclerosis Brother     Atrial fibrillation Brother     Diabetes Maternal Grandfather     Stroke Maternal Grandfather     Parkinsonism Paternal Grandmother     Tremor Paternal Grandmother     Stomach cancer Paternal Grandfather     Diabetes Maternal Grandmother         Social History     Socioeconomic History    Marital status:      Spouse name: Donald    Number of children: 0   Tobacco Use    Smoking status: Former     Current packs/day: 0.00     Average packs/day: 2.0 packs/day for 30.0 years (60.0 ttl pk-yrs)     Types: Cigarettes     Start date: 10/22/1973     Quit date: 10/22/2003     Years since quittin.8    Smokeless tobacco: Never   Vaping Use    Vaping status: Never Used   Substance and Sexual Activity    Alcohol use: Not Currently    Drug use: Not Currently     Types: Marijuana     Comment: advised by neurologist for sleep    Sexual activity: Not Currently     Partners: Male     Birth control/protection: Post-menopausal        OB History    No obstetric history on file.     Age at menarche-15  Age at first live childbirth-not applicable   2 para 0  0  Age of menopause-57  No use of hormone replacement therapy      No Known Allergies         Review of Systems   Constitutional:  Positive for activity change and fatigue.   HENT: Negative.     Eyes: Negative.    Respiratory:  Positive for shortness of breath.    Cardiovascular:  "Negative.    Gastrointestinal: Negative.    Endocrine: Negative.    Genitourinary:  Positive for urinary incontinence.   Musculoskeletal: Negative.    Skin: Negative.    Allergic/Immunologic: Negative.    Neurological:  Positive for weakness.   Psychiatric/Behavioral: Negative.       Review of systems as mentioned HPI otherwise negative    Objective   Resp. rate 16, height 165.1 cm (65\"), weight 74.8 kg (165 lb), not currently breastfeeding.   Physical Exam  Vitals reviewed.   Constitutional:       Appearance: Normal appearance. She is normal weight.   HENT:      Right Ear: External ear normal.      Left Ear: External ear normal.      Nose: Nose normal.      Mouth/Throat:      Pharynx: Oropharynx is clear.   Eyes:      Conjunctiva/sclera: Conjunctivae normal.   Cardiovascular:      Rate and Rhythm: Normal rate.   Pulmonary:      Effort: Pulmonary effort is normal.   Abdominal:      General: Abdomen is flat.   Musculoskeletal:         General: Normal range of motion.      Cervical back: Normal range of motion.   Skin:     General: Skin is warm.   Neurological:      General: No focal deficit present.      Mental Status: She is alert and oriented to person, place, and time.   Psychiatric:         Mood and Affect: Mood normal.         Behavior: Behavior normal.         Thought Content: Thought content normal.         Judgment: Judgment normal.       Breast Exam: Right breast appears normal on inspection.  No palpable right axilla lymphadenopathy or right breast masses.  Left breast on inspection there is nipple retraction.  On palpation there is a 13 x 9 cm mass in the retroareolar region pretty much occupying the whole breast.  There is palpable left axillary lymphadenopathy.    I have reexamined the patient and the results are consistent with the previously documented exam. Zainab Lopes MD      Lab on 08/30/2024   Component Date Value Ref Range Status    WBC 08/30/2024 2.62 (L)  3.40 - 10.80 10*3/mm3 Final    RBC " 08/30/2024 4.02  3.77 - 5.28 10*6/mm3 Final    Hemoglobin 08/30/2024 10.9 (L)  12.0 - 15.9 g/dL Final    Hematocrit 08/30/2024 35.9  34.0 - 46.6 % Final    MCV 08/30/2024 89.3  79.0 - 97.0 fL Final    MCH 08/30/2024 27.1  26.6 - 33.0 pg Final    MCHC 08/30/2024 30.4 (L)  31.5 - 35.7 g/dL Final    RDW 08/30/2024 18.5 (H)  12.3 - 15.4 % Final    RDW-SD 08/30/2024 55.9 (H)  37.0 - 54.0 fl Final    MPV 08/30/2024 8.8  6.0 - 12.0 fL Final    Platelets 08/30/2024 412  140 - 450 10*3/mm3 Final    Neutrophil % 08/30/2024 70.6  42.7 - 76.0 % Final    Lymphocyte % 08/30/2024 21.4  19.6 - 45.3 % Final    Monocyte % 08/30/2024 4.6 (L)  5.0 - 12.0 % Final    Eosinophil % 08/30/2024 1.5  0.3 - 6.2 % Final    Basophil % 08/30/2024 1.5  0.0 - 1.5 % Final    Immature Grans % 08/30/2024 0.4  0.0 - 0.5 % Final    Neutrophils, Absolute 08/30/2024 1.85  1.70 - 7.00 10*3/mm3 Final    Lymphocytes, Absolute 08/30/2024 0.56 (L)  0.70 - 3.10 10*3/mm3 Final    Monocytes, Absolute 08/30/2024 0.12  0.10 - 0.90 10*3/mm3 Final    Eosinophils, Absolute 08/30/2024 0.04  0.00 - 0.40 10*3/mm3 Final    Basophils, Absolute 08/30/2024 0.04  0.00 - 0.20 10*3/mm3 Final    Immature Grans, Absolute 08/30/2024 0.01  0.00 - 0.05 10*3/mm3 Final    nRBC 08/30/2024 0.0  0.0 - 0.2 /100 WBC Final   Office Visit on 08/16/2024   Component Date Value Ref Range Status    Ferritin 08/16/2024 32.60  13.00 - 150.00 ng/mL Final    Iron 08/16/2024 24 (L)  37 - 145 mcg/dL Final    Iron Saturation (TSAT) 08/16/2024 5 (L)  20 - 50 % Final    Transferrin 08/16/2024 295  200 - 360 mg/dL Final    TIBC 08/16/2024 440  298 - 536 mcg/dL Final   Lab on 08/16/2024   Component Date Value Ref Range Status    Magnesium 08/16/2024 2.1  1.6 - 2.4 mg/dL Final    Phosphorus 08/16/2024 3.7  2.5 - 4.5 mg/dL Final    Glucose 08/16/2024 134 (H)  65 - 99 mg/dL Final    BUN 08/16/2024 19  8 - 23 mg/dL Final    Creatinine 08/16/2024 0.82  0.57 - 1.00 mg/dL Final    Sodium 08/16/2024 146 (H)  136 -  145 mmol/L Final    Potassium 08/16/2024 3.9  3.5 - 5.2 mmol/L Final    Chloride 08/16/2024 106  98 - 107 mmol/L Final    CO2 08/16/2024 27.3  22.0 - 29.0 mmol/L Final    Calcium 08/16/2024 9.2  8.6 - 10.5 mg/dL Final    Total Protein 08/16/2024 6.9  6.0 - 8.5 g/dL Final    Albumin 08/16/2024 3.8  3.5 - 5.2 g/dL Final    ALT (SGPT) 08/16/2024 10  1 - 33 U/L Final    AST (SGOT) 08/16/2024 36 (H)  1 - 32 U/L Final    Alkaline Phosphatase 08/16/2024 192 (H)  39 - 117 U/L Final    Total Bilirubin 08/16/2024 0.4  0.0 - 1.2 mg/dL Final    Globulin 08/16/2024 3.1  gm/dL Final    A/G Ratio 08/16/2024 1.2  g/dL Final    BUN/Creatinine Ratio 08/16/2024 23.2  7.0 - 25.0 Final    Anion Gap 08/16/2024 12.7  5.0 - 15.0 mmol/L Final    eGFR 08/16/2024 81.5  >60.0 mL/min/1.73 Final    WBC 08/16/2024 6.37  3.40 - 10.80 10*3/mm3 Final    RBC 08/16/2024 3.91  3.77 - 5.28 10*6/mm3 Final    Hemoglobin 08/16/2024 10.2 (L)  12.0 - 15.9 g/dL Final    Hematocrit 08/16/2024 34.1  34.0 - 46.6 % Final    MCV 08/16/2024 87.2  79.0 - 97.0 fL Final    MCH 08/16/2024 26.1 (L)  26.6 - 33.0 pg Final    MCHC 08/16/2024 29.9 (L)  31.5 - 35.7 g/dL Final    RDW 08/16/2024 14.0  12.3 - 15.4 % Final    RDW-SD 08/16/2024 44.9  37.0 - 54.0 fl Final    MPV 08/16/2024 9.5  6.0 - 12.0 fL Final    Platelets 08/16/2024 488 (H)  140 - 450 10*3/mm3 Final    Neutrophil % 08/16/2024 80.4 (H)  42.7 - 76.0 % Final    Lymphocyte % 08/16/2024 13.5 (L)  19.6 - 45.3 % Final    Monocyte % 08/16/2024 3.8 (L)  5.0 - 12.0 % Final    Eosinophil % 08/16/2024 1.6  0.3 - 6.2 % Final    Basophil % 08/16/2024 0.5  0.0 - 1.5 % Final    Immature Grans % 08/16/2024 0.2  0.0 - 0.5 % Final    Neutrophils, Absolute 08/16/2024 5.13  1.70 - 7.00 10*3/mm3 Final    Lymphocytes, Absolute 08/16/2024 0.86  0.70 - 3.10 10*3/mm3 Final    Monocytes, Absolute 08/16/2024 0.24  0.10 - 0.90 10*3/mm3 Final    Eosinophils, Absolute 08/16/2024 0.10  0.00 - 0.40 10*3/mm3 Final    Basophils, Absolute  08/16/2024 0.03  0.00 - 0.20 10*3/mm3 Final    Immature Grans, Absolute 08/16/2024 0.01  0.00 - 0.05 10*3/mm3 Final    nRBC 08/16/2024 0.0  0.0 - 0.2 /100 WBC Final        No radiology results for the last 30 days.       Assessment & Plan       *Left breast invasive lobular carcinoma  The tumor is estrogen receptor +91 to 100%, progesterone receptor +31 to 40%, HER2 negative, 0, Ki-67 35%  The tumor measures greater than 10 cm on exam, lymph node positive.  CT of the chest abdomen and pelvis with right upper lobe pulmonary nodule which is new and the bone scan also showsUptake in the left anterior inferior iliac spine which correlates to a lucent area on the CT scan and also focal uptake noted in the left frontal calvarium concerning for metastatic disease.  Further evaluation with a MRI of the pelvis and hip as well as MRI of the brain is underway.  The scans are scheduled for 7/10/2024.  Discussed at length the details of imaging and pathology report.Discussed the origin of breast cancer from the ducts and the lobules and the histological type of breast cancer based on site of origin. Discussed the tumor size, lymph node status and stage of the cancer. Explained the presence of DCIS. Discussed the receptor status including ER, AK and her-2 dorothy and their significance in determining the biology and treatment. Also discussed the importance of grade and ki-67.   Clinical T3 N1 M1, stage IV invasive lobular carcinoma.  There is also a right axillary lymph node which has been biopsied and consistent with invasive lobular carcinoma with similar morphology as well as receptors concerning for metastatic disease rather than a primary right breast cancer.  Given the concerns of metastatic disease and strongly ER/AK positive breast cancer I recommend that she proceed with endocrine therapy with anastrozole along with a CDK 4 6 inhibitor as a first-line.  Recommend Ribociclib 400 mg 3 weeks on and 1 week off.  Adverse effects  of this treatment including but not limited to hot flashes, mood changes, fatigue, insomnia, myelosuppression, increased risk of infections, hepatotoxicity, pneumonitis, risk of DVT PE, effect on bone density discussed.  Patient willing to proceed the recommended treatment however we will wait to confirm the possible metastatic lesions in the spine with the MRI.  Even if the MRIs do not confirm metastatic disease given the fact that the right axilla was positive for invasive lobular carcinoma we could presume that this is metastatic disease.  She also has a locally advanced left breast cancer which may not be operable at this time.  Even in that event I think it is fair to start her on CDK 4 6 inhibitor along with an AI and not proceed with surgery.  She also has an extremely poor functional status because of MS and may be higher than average risk for surgery.  July 16, 2024: Reviewed MRI of the pelvis and hip consistent with many bony metastasis.  MRI brain shows left frontal bone mets but no brain involvement.  Patient is here to start day 1 ribociclib along with anastrozole.  Patient has not started Ribociclib yet.  Continues on anastrozole.  She had questions regarding why surgery would not be an option and also regarding other medications that she is on.  We reviewed the side effects of Ribociclib again and explained that it is safe to continue her other medications that she is currently taking.  Now that she has metastatic disease explained to her that surgery would not be curative and would in fact delay the required treatment and therefore we would have to start her on Ribociclib as soon as possible.  Patient willing to proceed with Ribociclib.  She will be seen in 2 weeks to assess tolerance  Tempus performed on the left axilla lymph node biopsy shows PIK3CA mutation, CDH 1 mutation and Erb B2 mutation.  Therefore patient would benefit from alpelisib and Faslodex after progression on Ribociclib and  AI.  Other options include neratinib plus Faslodex.  8/16/2024 patient returns for follow-up having been on ribociclib for approximately 1 week.  She continues anastrozole daily.  She has been having nausea for which she is called into the office and we have added additional antiemetics as below.  We discussed initiation of Xgeva and she will get dental clearance for this.  Will have her back in 2 weeks for follow-up.  8/30/2024-last day of week 3 of Ribociclib.  Starting tomorrow she will stop the Ribociclib for 1 week  Labs today reviewed and noted to have leukopenia secondary to Ribociclib.  Neutrophil still greater than 1000.  Other labs are pretty normal.  She will continue with the current treatment      *Right axillary lymphadenopathy  Biopsied and consistent with invasive lobular carcinoma, grade 2, ER/IA positive and HER2 negative  Patient will be started on AI and Ribociclib  Please refer to the above discussion for details    *Right breast non-mass enhancement  6/28/2024-MRI of the breast with non-mass enhancement noted in the right breast and patient had questions regarding if this should be biopsied.  Given extensive metastatic disease in the bones and right axilla lymph node consistent with invasive lobular carcinoma management would not change with the biopsy and hence I would recommend against it.    *Severe nausea  Secondary to Ribociclib  Controlled with Compazine now  Continue Compazine  Sent in Zyprexa but patient does not want to take that    *Skeletal metastasis  Patient will be started on Xgeva  8/16/2024 Xgeva initiation will be held as the patient has not been to the dentist in over 2 years.  Discussed possible side effects of Xgeva and she will schedule a dental visit and we will have her back in 1 week to initiate Xgeva pending this is complete.    *MS-patient is currently not on any treatment for MS.    *Hypertension-continue current medication  No changes      Hyperlipidemia-continue  current medication  No changes      *Urinary incontinence-patient plans to have a suprapubic catheter placed by urology.      *History of iron deficiency anemia-  3/8/2024 hemoglobin was low at 10.9 and subsequently patient took oral iron which resulted in improvement of hemoglobin and normal.  She was scheduled for colonoscopy however she canceled that due to ongoing management of breast cancer.  She proceed with a colonoscopy.  8/16/2024 patient's hemoglobin is 10.2 today.  She states she recently was told to begin daily iron supplementation.  Baseline iron studies ordered today she is only been taking the iron for a few days.  Ferritin 32, iron saturation 5%, TIBC 440.  We will repeat this in 6 to 8 weeks.  Continues on oral iron    *Genetic testing-9 gene stat panel negative      *Dyspnea  CT chest shows some bibasilar atelectasis/pneumonia  No cough but patient reports dyspnea the past 2 to 3 months especially when she is talking  CBC reviewed and no leukocytosis.  No fevers.  Unclear etiology for dyspnea  I will not start her on antibiotics  Recommend that she follow-up with her primary care physician to rule out a cardiac etiology    *Right upper lobe pulmonary nodule   Concerning for metastatic disease  PET/CT may not be helpful as invasive lobular carcinomas typically are not very PET avid.  Could consider PET-FES  Obtain tumor markers    *Follow-up-after the MRIs.  Reviewed MRI of the pelvis and MRI of the brain which shows mets in the bone    Plan   400 mg p.o. daily 3 weeks on 1 week off along with anastrozole 1 mg p.o. daily, week off starts tomorrow  Start Xgeva today  Continue Compazine, reports good control of nausea with Compazine  Not using Zofran  Does not wish to start Zyprexa  Continue daily oral ferrous sulfate.  Repeat labs in 6 weeks, ordered today.  APRN in 2 weeks and MD in 8 weeks    Patient continues high risk medication requiring frequent monitoring.  Patient experiencing severe nausea  with Ribociclib.  Leukopenia secondary to Ribociclib.    Zainab Lopes MD

## 2024-08-30 NOTE — PROGRESS NOTES
Specialty Pharmacy Refill Coordination Note     Maritza is a 63 y.o. female contacted today regarding refills of Kisqali specialty medication(s).    Reviewed and verified with patient:       Specialty medication(s) and dose(s) confirmed: yes  Kisqali 200 mg tabs-2 tabs daily for 21 days on then 7 days off.  Next cycle will start on 9/7/2024    Refill Questions      Flowsheet Row Most Recent Value   Changes to allergies? No   Changes to medications? No   New conditions or infections since last clinic visit No   Unplanned office visit, urgent care, ED, or hospital admission in the last 4 weeks  No   How does patient/caregiver feel medication is working? Fair   Financial problems or insurance changes  No   Since the previous refill, were any specialty medication doses or scheduled injections missed or delayed?  No   Does this patient require a clinical escalation to a pharmacist? No            Delivery Questions      Flowsheet Row Most Recent Value   Delivery method [employer]   Delivery address verified with patient/caregiver? Yes  [Ship to home address]   Delivery address Home  [Ship to home address-Ship 9/3 for delivery 9/4-$0 copay with [employer]-Address Confirmed]   Number of medications in delivery 1   Medication(s) being filled and delivered Ribociclib Succinate   Doses left of specialty medications 1 dose for tonight then will start off week. Cycle will restart on 9/7/2024   Copay verified? Yes   Copay amount $0 copay with [employer]   Copay form of payment No copayment ($0)   Ship Date 9/3/2024   Delivery Date 9/4/2024   Signature Required No          Kisqali delivery coordinated with pt for 9/4/2024 to her home address. $0 copay with [employer] [place]. Her last delivery was on 7/12/2024 and she did not start the medication until 8/3/2024. No questions or concerns to report to MTM Team today.     Follow-up: 21 day(s)     [place], Pharmacy Technician  Specialty Pharmacy  Technician

## 2024-08-31 LAB — CANCER AG27-29 SERPL-ACNC: 25.7 U/ML (ref 0–38.6)

## 2024-09-03 ENCOUNTER — SPECIALTY PHARMACY (OUTPATIENT)
Dept: PHARMACY | Facility: HOSPITAL | Age: 64
End: 2024-09-03
Payer: MEDICARE

## 2024-09-03 NOTE — PROGRESS NOTES
Specialty Pharmacy Note: Kisqali (ribociclib)    Maritza Bejarano is a 63 y.o. female with breast cancer was seen 8/30/24 by Dr. Lopes. Per provider dictation, no changes to oral oncology regimen Kisqali (ribociclib).  Labs Review: The CMP and CBC from 8/30/24 have been reviewed. No dose adjustments are needed for the oral specialty medication(s) based on the labs.    Specialty pharmacy will continue to follow patient.    Alyssa Rich, PharmD, BCPS  9/3/2024  10:57 EDT

## 2024-09-05 ENCOUNTER — TELEPHONE (OUTPATIENT)
Dept: ONCOLOGY | Facility: CLINIC | Age: 64
End: 2024-09-05

## 2024-09-05 NOTE — TELEPHONE ENCOUNTER
Caller: Maritza Bejarano    Relationship: Self    Best call back number: [phone]    What is the best time to reach you: ANYTIME    Who are you requesting to speak with (clinical staff, provider,  specific staff member): CLINICAL    What was the call regarding: PT IS REQUESTING A CALLBACK FROM MIKAEL CHOI

## 2024-09-07 ENCOUNTER — APPOINTMENT (OUTPATIENT)
Dept: GENERAL RADIOLOGY | Facility: HOSPITAL | Age: 64
End: 2024-09-07
Payer: MEDICARE

## 2024-09-07 ENCOUNTER — HOSPITAL ENCOUNTER (INPATIENT)
Facility: HOSPITAL | Age: 64
LOS: 7 days | Discharge: HOME | End: 2024-09-17
Attending: EMERGENCY MEDICINE | Admitting: HOSPITALIST
Payer: MEDICARE

## 2024-09-07 DIAGNOSIS — R65.20 SEPSIS WITH ACUTE HYPOXIC RESPIRATORY FAILURE WITHOUT SEPTIC SHOCK, DUE TO UNSPECIFIED ORGANISM: ICD-10-CM

## 2024-09-07 DIAGNOSIS — G35 MULTIPLE SCLEROSIS: ICD-10-CM

## 2024-09-07 DIAGNOSIS — N39.0 URINARY TRACT INFECTION WITHOUT HEMATURIA, SITE UNSPECIFIED: ICD-10-CM

## 2024-09-07 DIAGNOSIS — J96.01 SEPSIS WITH ACUTE HYPOXIC RESPIRATORY FAILURE WITHOUT SEPTIC SHOCK, DUE TO UNSPECIFIED ORGANISM: ICD-10-CM

## 2024-09-07 DIAGNOSIS — J18.9 PNEUMONIA OF BOTH LOWER LOBES DUE TO INFECTIOUS ORGANISM: Primary | ICD-10-CM

## 2024-09-07 DIAGNOSIS — S90.414A TOE ABRASION, RIGHT, INITIAL ENCOUNTER: ICD-10-CM

## 2024-09-07 DIAGNOSIS — A41.9 SEPSIS WITH ACUTE HYPOXIC RESPIRATORY FAILURE WITHOUT SEPTIC SHOCK, DUE TO UNSPECIFIED ORGANISM: ICD-10-CM

## 2024-09-07 LAB
ALBUMIN SERPL-MCNC: 3.8 G/DL (ref 3.5–5.2)
ALBUMIN/GLOB SERPL: 1.4 G/DL
ALP SERPL-CCNC: 179 U/L (ref 39–117)
ALT SERPL W P-5'-P-CCNC: 101 U/L (ref 1–33)
ANION GAP SERPL CALCULATED.3IONS-SCNC: 11.2 MMOL/L (ref 5–15)
AST SERPL-CCNC: 90 U/L (ref 1–32)
B PARAPERT DNA SPEC QL NAA+PROBE: NOT DETECTED
B PERT DNA SPEC QL NAA+PROBE: NOT DETECTED
BACTERIA UR QL AUTO: ABNORMAL /HPF
BASOPHILS # BLD AUTO: 0.07 10*3/MM3 (ref 0–0.2)
BASOPHILS NFR BLD AUTO: 1.1 % (ref 0–1.5)
BILIRUB SERPL-MCNC: 0.3 MG/DL (ref 0–1.2)
BILIRUB UR QL STRIP: NEGATIVE
BUN SERPL-MCNC: 15 MG/DL (ref 8–23)
BUN/CREAT SERPL: 22.4 (ref 7–25)
C PNEUM DNA NPH QL NAA+NON-PROBE: NOT DETECTED
CALCIUM SPEC-SCNC: 8.6 MG/DL (ref 8.6–10.5)
CHLORIDE SERPL-SCNC: 107 MMOL/L (ref 98–107)
CLARITY UR: ABNORMAL
CO2 SERPL-SCNC: 20.8 MMOL/L (ref 22–29)
COLOR UR: YELLOW
CREAT SERPL-MCNC: 0.67 MG/DL (ref 0.57–1)
D-LACTATE SERPL-SCNC: 2 MMOL/L (ref 0.5–2)
D-LACTATE SERPL-SCNC: 2.3 MMOL/L (ref 0.5–2)
D-LACTATE SERPL-SCNC: 2.5 MMOL/L (ref 0.5–2)
DEPRECATED RDW RBC AUTO: 58 FL (ref 37–54)
EGFRCR SERPLBLD CKD-EPI 2021: 99.6 ML/MIN/1.73
EOSINOPHIL # BLD AUTO: 0.07 10*3/MM3 (ref 0–0.4)
EOSINOPHIL NFR BLD AUTO: 1.1 % (ref 0.3–6.2)
ERYTHROCYTE [DISTWIDTH] IN BLOOD BY AUTOMATED COUNT: 19.2 % (ref 12.3–15.4)
FLUAV SUBTYP SPEC NAA+PROBE: NOT DETECTED
FLUBV RNA ISLT QL NAA+PROBE: NOT DETECTED
GLOBULIN UR ELPH-MCNC: 2.7 GM/DL
GLUCOSE SERPL-MCNC: 125 MG/DL (ref 65–99)
GLUCOSE UR STRIP-MCNC: NEGATIVE MG/DL
HADV DNA SPEC NAA+PROBE: NOT DETECTED
HCOV 229E RNA SPEC QL NAA+PROBE: NOT DETECTED
HCOV HKU1 RNA SPEC QL NAA+PROBE: NOT DETECTED
HCOV NL63 RNA SPEC QL NAA+PROBE: NOT DETECTED
HCOV OC43 RNA SPEC QL NAA+PROBE: NOT DETECTED
HCT VFR BLD AUTO: 31.8 % (ref 34–46.6)
HGB BLD-MCNC: 10.1 G/DL (ref 12–15.9)
HGB UR QL STRIP.AUTO: NEGATIVE
HMPV RNA NPH QL NAA+NON-PROBE: NOT DETECTED
HPIV1 RNA ISLT QL NAA+PROBE: NOT DETECTED
HPIV2 RNA SPEC QL NAA+PROBE: NOT DETECTED
HPIV3 RNA NPH QL NAA+PROBE: NOT DETECTED
HPIV4 P GENE NPH QL NAA+PROBE: NOT DETECTED
HYALINE CASTS UR QL AUTO: ABNORMAL /LPF
IMM GRANULOCYTES # BLD AUTO: 0.03 10*3/MM3 (ref 0–0.05)
IMM GRANULOCYTES NFR BLD AUTO: 0.5 % (ref 0–0.5)
KETONES UR QL STRIP: NEGATIVE
L PNEUMO1 AG UR QL IA: NEGATIVE
LEUKOCYTE ESTERASE UR QL STRIP.AUTO: ABNORMAL
LYMPHOCYTES # BLD AUTO: 0.71 10*3/MM3 (ref 0.7–3.1)
LYMPHOCYTES NFR BLD AUTO: 10.9 % (ref 19.6–45.3)
M PNEUMO IGG SER IA-ACNC: NOT DETECTED
MCH RBC QN AUTO: 27.4 PG (ref 26.6–33)
MCHC RBC AUTO-ENTMCNC: 31.8 G/DL (ref 31.5–35.7)
MCV RBC AUTO: 86.4 FL (ref 79–97)
MONOCYTES # BLD AUTO: 0.51 10*3/MM3 (ref 0.1–0.9)
MONOCYTES NFR BLD AUTO: 7.8 % (ref 5–12)
NEUTROPHILS NFR BLD AUTO: 5.15 10*3/MM3 (ref 1.7–7)
NEUTROPHILS NFR BLD AUTO: 78.6 % (ref 42.7–76)
NITRITE UR QL STRIP: POSITIVE
NRBC BLD AUTO-RTO: 0 /100 WBC (ref 0–0.2)
PH UR STRIP.AUTO: 8 [PH] (ref 5–8)
PLATELET # BLD AUTO: 328 10*3/MM3 (ref 140–450)
PMV BLD AUTO: 8.8 FL (ref 6–12)
POTASSIUM SERPL-SCNC: 3.8 MMOL/L (ref 3.5–5.2)
PROCALCITONIN SERPL-MCNC: 0.47 NG/ML (ref 0–0.25)
PROT SERPL-MCNC: 6.5 G/DL (ref 6–8.5)
PROT UR QL STRIP: ABNORMAL
RBC # BLD AUTO: 3.68 10*6/MM3 (ref 3.77–5.28)
RBC # UR STRIP: ABNORMAL /HPF
REF LAB TEST METHOD: ABNORMAL
RHINOVIRUS RNA SPEC NAA+PROBE: NOT DETECTED
RSV RNA NPH QL NAA+NON-PROBE: NOT DETECTED
S PNEUM AG SPEC QL LA: NEGATIVE
SARS-COV-2 RNA NPH QL NAA+NON-PROBE: NOT DETECTED
SODIUM SERPL-SCNC: 139 MMOL/L (ref 136–145)
SP GR UR STRIP: 1.02 (ref 1–1.03)
SQUAMOUS #/AREA URNS HPF: ABNORMAL /HPF
UROBILINOGEN UR QL STRIP: ABNORMAL
WBC # UR STRIP: ABNORMAL /HPF
WBC NRBC COR # BLD AUTO: 6.54 10*3/MM3 (ref 3.4–10.8)

## 2024-09-07 PROCEDURE — 87077 CULTURE AEROBIC IDENTIFY: CPT | Performed by: EMERGENCY MEDICINE

## 2024-09-07 PROCEDURE — 71045 X-RAY EXAM CHEST 1 VIEW: CPT

## 2024-09-07 PROCEDURE — 80053 COMPREHEN METABOLIC PANEL: CPT | Performed by: PHYSICIAN ASSISTANT

## 2024-09-07 PROCEDURE — 36415 COLL VENOUS BLD VENIPUNCTURE: CPT

## 2024-09-07 PROCEDURE — 83605 ASSAY OF LACTIC ACID: CPT | Performed by: PHYSICIAN ASSISTANT

## 2024-09-07 PROCEDURE — 36415 COLL VENOUS BLD VENIPUNCTURE: CPT | Performed by: PHYSICIAN ASSISTANT

## 2024-09-07 PROCEDURE — 25010000002 ENOXAPARIN PER 10 MG: Performed by: STUDENT IN AN ORGANIZED HEALTH CARE EDUCATION/TRAINING PROGRAM

## 2024-09-07 PROCEDURE — 87040 BLOOD CULTURE FOR BACTERIA: CPT | Performed by: EMERGENCY MEDICINE

## 2024-09-07 PROCEDURE — 87186 SC STD MICRODIL/AGAR DIL: CPT | Performed by: EMERGENCY MEDICINE

## 2024-09-07 PROCEDURE — G0378 HOSPITAL OBSERVATION PER HR: HCPCS

## 2024-09-07 PROCEDURE — 87086 URINE CULTURE/COLONY COUNT: CPT | Performed by: EMERGENCY MEDICINE

## 2024-09-07 PROCEDURE — 81001 URINALYSIS AUTO W/SCOPE: CPT | Performed by: PHYSICIAN ASSISTANT

## 2024-09-07 PROCEDURE — 85025 COMPLETE CBC W/AUTO DIFF WBC: CPT | Performed by: PHYSICIAN ASSISTANT

## 2024-09-07 PROCEDURE — 0202U NFCT DS 22 TRGT SARS-COV-2: CPT | Performed by: PHYSICIAN ASSISTANT

## 2024-09-07 PROCEDURE — 25010000002 CEFTRIAXONE PER 250 MG: Performed by: STUDENT IN AN ORGANIZED HEALTH CARE EDUCATION/TRAINING PROGRAM

## 2024-09-07 PROCEDURE — 87449 NOS EACH ORGANISM AG IA: CPT | Performed by: STUDENT IN AN ORGANIZED HEALTH CARE EDUCATION/TRAINING PROGRAM

## 2024-09-07 PROCEDURE — 73630 X-RAY EXAM OF FOOT: CPT

## 2024-09-07 PROCEDURE — 84145 PROCALCITONIN (PCT): CPT | Performed by: STUDENT IN AN ORGANIZED HEALTH CARE EDUCATION/TRAINING PROGRAM

## 2024-09-07 PROCEDURE — 25810000003 LACTATED RINGERS SOLUTION: Performed by: PHYSICIAN ASSISTANT

## 2024-09-07 PROCEDURE — 99285 EMERGENCY DEPT VISIT HI MDM: CPT

## 2024-09-07 RX ORDER — BACLOFEN 10 MG/1
20 TABLET ORAL 3 TIMES DAILY
Status: DISCONTINUED | OUTPATIENT
Start: 2024-09-07 | End: 2024-09-12

## 2024-09-07 RX ORDER — PROCHLORPERAZINE MALEATE 10 MG
10 TABLET ORAL EVERY 6 HOURS PRN
Status: DISCONTINUED | OUTPATIENT
Start: 2024-09-07 | End: 2024-09-17 | Stop reason: HOSPADM

## 2024-09-07 RX ORDER — PANTOPRAZOLE SODIUM 40 MG/1
40 TABLET, DELAYED RELEASE ORAL DAILY
Status: DISCONTINUED | OUTPATIENT
Start: 2024-09-07 | End: 2024-09-13

## 2024-09-07 RX ORDER — ATORVASTATIN CALCIUM 20 MG/1
10 TABLET, FILM COATED ORAL DAILY
Status: DISCONTINUED | OUTPATIENT
Start: 2024-09-07 | End: 2024-09-17 | Stop reason: HOSPADM

## 2024-09-07 RX ORDER — LOSARTAN POTASSIUM 50 MG/1
50 TABLET ORAL
Status: DISCONTINUED | OUTPATIENT
Start: 2024-09-07 | End: 2024-09-11

## 2024-09-07 RX ORDER — PRAMIPEXOLE DIHYDROCHLORIDE 1.5 MG/1
1.5 TABLET ORAL 3 TIMES DAILY
Status: DISCONTINUED | OUTPATIENT
Start: 2024-09-07 | End: 2024-09-17 | Stop reason: HOSPADM

## 2024-09-07 RX ORDER — HYDROCHLOROTHIAZIDE 12.5 MG/1
12.5 TABLET ORAL
Status: DISCONTINUED | OUTPATIENT
Start: 2024-09-07 | End: 2024-09-11

## 2024-09-07 RX ORDER — ENOXAPARIN SODIUM 100 MG/ML
40 INJECTION SUBCUTANEOUS EVERY 24 HOURS
Status: DISCONTINUED | OUTPATIENT
Start: 2024-09-07 | End: 2024-09-11

## 2024-09-07 RX ORDER — ANASTROZOLE 1 MG/1
1 TABLET ORAL DAILY
Status: DISCONTINUED | OUTPATIENT
Start: 2024-09-07 | End: 2024-09-17 | Stop reason: HOSPADM

## 2024-09-07 RX ORDER — CLONAZEPAM 1 MG/1
2 TABLET ORAL AS NEEDED
Status: DISCONTINUED | OUTPATIENT
Start: 2024-09-07 | End: 2024-09-17 | Stop reason: HOSPADM

## 2024-09-07 RX ORDER — OLANZAPINE 5 MG/1
5 TABLET ORAL NIGHTLY
Status: DISCONTINUED | OUTPATIENT
Start: 2024-09-07 | End: 2024-09-17 | Stop reason: HOSPADM

## 2024-09-07 RX ADMIN — OLANZAPINE 5 MG: 5 TABLET, FILM COATED ORAL at 20:46

## 2024-09-07 RX ADMIN — ENOXAPARIN SODIUM 40 MG: 100 INJECTION SUBCUTANEOUS at 17:57

## 2024-09-07 RX ADMIN — PANTOPRAZOLE SODIUM 40 MG: 40 TABLET, DELAYED RELEASE ORAL at 17:57

## 2024-09-07 RX ADMIN — PRAMIPEXOLE DIHYDROCHLORIDE 1.5 MG: 1.5 TABLET ORAL at 23:36

## 2024-09-07 RX ADMIN — CEFTRIAXONE 2000 MG: 2 INJECTION, POWDER, FOR SOLUTION INTRAMUSCULAR; INTRAVENOUS at 17:57

## 2024-09-07 RX ADMIN — BACLOFEN 20 MG: 10 TABLET ORAL at 17:57

## 2024-09-07 RX ADMIN — BACLOFEN 20 MG: 10 TABLET ORAL at 23:35

## 2024-09-07 RX ADMIN — SODIUM CHLORIDE, POTASSIUM CHLORIDE, SODIUM LACTATE AND CALCIUM CHLORIDE 500 ML: 600; 310; 30; 20 INJECTION, SOLUTION INTRAVENOUS at 12:58

## 2024-09-07 RX ADMIN — CLONAZEPAM 2 MG: 1 TABLET ORAL at 23:36

## 2024-09-08 LAB
ALBUMIN SERPL-MCNC: 3.6 G/DL (ref 3.5–5.2)
ALP SERPL-CCNC: 187 U/L (ref 39–117)
ALT SERPL W P-5'-P-CCNC: 105 U/L (ref 1–33)
ANION GAP SERPL CALCULATED.3IONS-SCNC: 11 MMOL/L (ref 5–15)
AST SERPL-CCNC: 88 U/L (ref 1–32)
BILIRUB CONJ SERPL-MCNC: <0.2 MG/DL (ref 0–0.3)
BILIRUB INDIRECT SERPL-MCNC: ABNORMAL MG/DL
BILIRUB SERPL-MCNC: 0.2 MG/DL (ref 0–1.2)
BUN SERPL-MCNC: 13 MG/DL (ref 8–23)
BUN/CREAT SERPL: 19.4 (ref 7–25)
CALCIUM SPEC-SCNC: 8.8 MG/DL (ref 8.6–10.5)
CHLORIDE SERPL-SCNC: 112 MMOL/L (ref 98–107)
CO2 SERPL-SCNC: 20 MMOL/L (ref 22–29)
CREAT SERPL-MCNC: 0.67 MG/DL (ref 0.57–1)
DEPRECATED RDW RBC AUTO: 60.9 FL (ref 37–54)
EGFRCR SERPLBLD CKD-EPI 2021: 99.6 ML/MIN/1.73
ERYTHROCYTE [DISTWIDTH] IN BLOOD BY AUTOMATED COUNT: 19.3 % (ref 12.3–15.4)
GLUCOSE SERPL-MCNC: 204 MG/DL (ref 65–99)
HCT VFR BLD AUTO: 30 % (ref 34–46.6)
HGB BLD-MCNC: 9.4 G/DL (ref 12–15.9)
MCH RBC QN AUTO: 27.6 PG (ref 26.6–33)
MCHC RBC AUTO-ENTMCNC: 31.3 G/DL (ref 31.5–35.7)
MCV RBC AUTO: 88.2 FL (ref 79–97)
PLATELET # BLD AUTO: 333 10*3/MM3 (ref 140–450)
PMV BLD AUTO: 9.1 FL (ref 6–12)
POTASSIUM SERPL-SCNC: 4.4 MMOL/L (ref 3.5–5.2)
PROT SERPL-MCNC: 6.5 G/DL (ref 6–8.5)
RBC # BLD AUTO: 3.4 10*6/MM3 (ref 3.77–5.28)
SODIUM SERPL-SCNC: 143 MMOL/L (ref 136–145)
WBC NRBC COR # BLD AUTO: 3.31 10*3/MM3 (ref 3.4–10.8)

## 2024-09-08 PROCEDURE — 80048 BASIC METABOLIC PNL TOTAL CA: CPT | Performed by: STUDENT IN AN ORGANIZED HEALTH CARE EDUCATION/TRAINING PROGRAM

## 2024-09-08 PROCEDURE — G0378 HOSPITAL OBSERVATION PER HR: HCPCS

## 2024-09-08 PROCEDURE — 25010000002 CEFTRIAXONE PER 250 MG: Performed by: STUDENT IN AN ORGANIZED HEALTH CARE EDUCATION/TRAINING PROGRAM

## 2024-09-08 PROCEDURE — 80076 HEPATIC FUNCTION PANEL: CPT | Performed by: STUDENT IN AN ORGANIZED HEALTH CARE EDUCATION/TRAINING PROGRAM

## 2024-09-08 PROCEDURE — 85027 COMPLETE CBC AUTOMATED: CPT | Performed by: STUDENT IN AN ORGANIZED HEALTH CARE EDUCATION/TRAINING PROGRAM

## 2024-09-08 PROCEDURE — 25010000002 ENOXAPARIN PER 10 MG: Performed by: STUDENT IN AN ORGANIZED HEALTH CARE EDUCATION/TRAINING PROGRAM

## 2024-09-08 RX ADMIN — PRAMIPEXOLE DIHYDROCHLORIDE 1.5 MG: 1.5 TABLET ORAL at 15:19

## 2024-09-08 RX ADMIN — PRAMIPEXOLE DIHYDROCHLORIDE 1.5 MG: 1.5 TABLET ORAL at 08:50

## 2024-09-08 RX ADMIN — PRAMIPEXOLE DIHYDROCHLORIDE 1.5 MG: 1.5 TABLET ORAL at 21:10

## 2024-09-08 RX ADMIN — BACLOFEN 20 MG: 10 TABLET ORAL at 08:50

## 2024-09-08 RX ADMIN — BACLOFEN 20 MG: 10 TABLET ORAL at 21:10

## 2024-09-08 RX ADMIN — CEFTRIAXONE 2000 MG: 2 INJECTION, POWDER, FOR SOLUTION INTRAMUSCULAR; INTRAVENOUS at 15:34

## 2024-09-08 RX ADMIN — PANTOPRAZOLE SODIUM 40 MG: 40 TABLET, DELAYED RELEASE ORAL at 08:50

## 2024-09-08 RX ADMIN — BACLOFEN 20 MG: 10 TABLET ORAL at 15:19

## 2024-09-08 RX ADMIN — OLANZAPINE 5 MG: 5 TABLET, FILM COATED ORAL at 21:10

## 2024-09-08 RX ADMIN — ENOXAPARIN SODIUM 40 MG: 100 INJECTION SUBCUTANEOUS at 15:40

## 2024-09-09 ENCOUNTER — APPOINTMENT (OUTPATIENT)
Dept: CT IMAGING | Facility: HOSPITAL | Age: 64
End: 2024-09-09
Payer: MEDICARE

## 2024-09-09 ENCOUNTER — APPOINTMENT (OUTPATIENT)
Dept: CARDIOLOGY | Facility: HOSPITAL | Age: 64
End: 2024-09-09
Payer: MEDICARE

## 2024-09-09 LAB
ABO GROUP BLD: NORMAL
ALBUMIN SERPL-MCNC: 3.4 G/DL (ref 3.5–5.2)
ALBUMIN/GLOB SERPL: 1.2 G/DL
ALP SERPL-CCNC: 168 U/L (ref 39–117)
ALT SERPL W P-5'-P-CCNC: 111 U/L (ref 1–33)
ANION GAP SERPL CALCULATED.3IONS-SCNC: 10.2 MMOL/L (ref 5–15)
AST SERPL-CCNC: 92 U/L (ref 1–32)
BACTERIA SPEC AEROBE CULT: ABNORMAL
BH CV VAS HEPATIC PORTAL VEIN DIAMETER: 0.95 CM
BH CV VAS HEPATOPORTAL HEPATIC V LT DIRECTION: NORMAL
BH CV VAS HEPATOPORTAL HEPATIC V MID DIRECTION: NORMAL
BH CV VAS HEPATOPORTAL HEPATIC V RT DIRECTION: NORMAL
BH CV VAS HEPATOPORTAL PORTAL V EXTRAHEPATIC DIRECTION: NORMAL
BH CV VAS HEPATOPORTAL PORTAL V LT INTRA DIRECTION: NORMAL
BH CV VAS HEPATOPORTAL PORTAL V MAIN INTRA DIRECTION: NORMAL
BH CV VAS HEPATOPORTAL PORTAL V PSV: 34 CM/S
BH CV VAS HEPATOPORTAL PORTAL V RT INTRA DIRECTION: NORMAL
BH CV VAS HEPATOPORTAL SMV DIRECTION: NORMAL
BH CV VAS HEPATOPORTAL SPLENIC DIRECTION: NORMAL
BH CV VAS SMA HEPATIC EDV: 24 CM/S
BH CV VAS SMA HEPATIC PSV: 84 CM/S
BH CV VAS SMA SPLENIC EDV: 17 CM/S
BH CV VAS SMA SPLENIC PSV: 57 CM/S
BILIRUB SERPL-MCNC: <0.2 MG/DL (ref 0–1.2)
BLD GP AB SCN SERPL QL: NEGATIVE
BUN SERPL-MCNC: 12 MG/DL (ref 8–23)
BUN/CREAT SERPL: 16.9 (ref 7–25)
CALCIUM SPEC-SCNC: 8.4 MG/DL (ref 8.6–10.5)
CHLORIDE SERPL-SCNC: 106 MMOL/L (ref 98–107)
CO2 SERPL-SCNC: 22.8 MMOL/L (ref 22–29)
CREAT SERPL-MCNC: 0.71 MG/DL (ref 0.57–1)
DEPRECATED RDW RBC AUTO: 57.4 FL (ref 37–54)
EGFRCR SERPLBLD CKD-EPI 2021: 96.9 ML/MIN/1.73
ERYTHROCYTE [DISTWIDTH] IN BLOOD BY AUTOMATED COUNT: 18.4 % (ref 12.3–15.4)
FERRITIN SERPL-MCNC: 88.6 NG/ML (ref 13–150)
GLOBULIN UR ELPH-MCNC: 2.9 GM/DL
GLUCOSE SERPL-MCNC: 225 MG/DL (ref 65–99)
HAPTOGLOB SERPL-MCNC: 293 MG/DL (ref 30–200)
HBA1C MFR BLD: 5.5 % (ref 4.8–5.6)
HCT VFR BLD AUTO: 30.4 % (ref 34–46.6)
HGB BLD-MCNC: 9.4 G/DL (ref 12–15.9)
IRON 24H UR-MRATE: 32 MCG/DL (ref 37–145)
IRON SATN MFR SERPL: 9 % (ref 20–50)
LDH SERPL-CCNC: 269 U/L (ref 135–214)
MCH RBC QN AUTO: 27.2 PG (ref 26.6–33)
MCHC RBC AUTO-ENTMCNC: 30.9 G/DL (ref 31.5–35.7)
MCV RBC AUTO: 87.9 FL (ref 79–97)
PLATELET # BLD AUTO: 325 10*3/MM3 (ref 140–450)
PMV BLD AUTO: 9.1 FL (ref 6–12)
POTASSIUM SERPL-SCNC: 4 MMOL/L (ref 3.5–5.2)
PROT SERPL-MCNC: 6.3 G/DL (ref 6–8.5)
RBC # BLD AUTO: 3.46 10*6/MM3 (ref 3.77–5.28)
RETICS # AUTO: 0.06 10*6/MM3 (ref 0.02–0.13)
RETICS/RBC NFR AUTO: 1.81 % (ref 0.7–1.9)
RH BLD: POSITIVE
SODIUM SERPL-SCNC: 139 MMOL/L (ref 136–145)
T&S EXPIRATION DATE: NORMAL
TIBC SERPL-MCNC: 347 MCG/DL (ref 298–536)
TRANSFERRIN SERPL-MCNC: 233 MG/DL (ref 200–360)
TSH SERPL DL<=0.05 MIU/L-ACNC: 0.97 UIU/ML (ref 0.27–4.2)
WBC NRBC COR # BLD AUTO: 3.56 10*3/MM3 (ref 3.4–10.8)

## 2024-09-09 PROCEDURE — G0378 HOSPITAL OBSERVATION PER HR: HCPCS

## 2024-09-09 PROCEDURE — 25010000002 ENOXAPARIN PER 10 MG: Performed by: STUDENT IN AN ORGANIZED HEALTH CARE EDUCATION/TRAINING PROGRAM

## 2024-09-09 PROCEDURE — 93975 VASCULAR STUDY: CPT

## 2024-09-09 PROCEDURE — 86901 BLOOD TYPING SEROLOGIC RH(D): CPT | Performed by: HOSPITALIST

## 2024-09-09 PROCEDURE — 85027 COMPLETE CBC AUTOMATED: CPT | Performed by: STUDENT IN AN ORGANIZED HEALTH CARE EDUCATION/TRAINING PROGRAM

## 2024-09-09 PROCEDURE — 83010 ASSAY OF HAPTOGLOBIN QUANT: CPT | Performed by: HOSPITALIST

## 2024-09-09 PROCEDURE — 83540 ASSAY OF IRON: CPT | Performed by: HOSPITALIST

## 2024-09-09 PROCEDURE — 82728 ASSAY OF FERRITIN: CPT | Performed by: HOSPITALIST

## 2024-09-09 PROCEDURE — 86850 RBC ANTIBODY SCREEN: CPT | Performed by: HOSPITALIST

## 2024-09-09 PROCEDURE — 80053 COMPREHEN METABOLIC PANEL: CPT | Performed by: STUDENT IN AN ORGANIZED HEALTH CARE EDUCATION/TRAINING PROGRAM

## 2024-09-09 PROCEDURE — 93975 VASCULAR STUDY: CPT | Performed by: SURGERY

## 2024-09-09 PROCEDURE — 25510000001 IOPAMIDOL 61 % SOLUTION: Performed by: HOSPITALIST

## 2024-09-09 PROCEDURE — 86900 BLOOD TYPING SEROLOGIC ABO: CPT | Performed by: HOSPITALIST

## 2024-09-09 PROCEDURE — 83036 HEMOGLOBIN GLYCOSYLATED A1C: CPT | Performed by: HOSPITALIST

## 2024-09-09 PROCEDURE — 83615 LACTATE (LD) (LDH) ENZYME: CPT | Performed by: HOSPITALIST

## 2024-09-09 PROCEDURE — 85045 AUTOMATED RETICULOCYTE COUNT: CPT | Performed by: HOSPITALIST

## 2024-09-09 PROCEDURE — 97162 PT EVAL MOD COMPLEX 30 MIN: CPT

## 2024-09-09 PROCEDURE — 25010000002 CEFTRIAXONE PER 250 MG: Performed by: STUDENT IN AN ORGANIZED HEALTH CARE EDUCATION/TRAINING PROGRAM

## 2024-09-09 PROCEDURE — 74177 CT ABD & PELVIS W/CONTRAST: CPT

## 2024-09-09 PROCEDURE — 84466 ASSAY OF TRANSFERRIN: CPT | Performed by: HOSPITALIST

## 2024-09-09 PROCEDURE — 97530 THERAPEUTIC ACTIVITIES: CPT

## 2024-09-09 PROCEDURE — 84443 ASSAY THYROID STIM HORMONE: CPT | Performed by: HOSPITALIST

## 2024-09-09 RX ORDER — IOPAMIDOL 612 MG/ML
100 INJECTION, SOLUTION INTRAVASCULAR
Status: COMPLETED | OUTPATIENT
Start: 2024-09-09 | End: 2024-09-09

## 2024-09-09 RX ADMIN — OLANZAPINE 5 MG: 5 TABLET, FILM COATED ORAL at 21:55

## 2024-09-09 RX ADMIN — BACLOFEN 20 MG: 10 TABLET ORAL at 21:55

## 2024-09-09 RX ADMIN — IOPAMIDOL 85 ML: 612 INJECTION, SOLUTION INTRAVENOUS at 10:35

## 2024-09-09 RX ADMIN — PRAMIPEXOLE DIHYDROCHLORIDE 1.5 MG: 1.5 TABLET ORAL at 21:55

## 2024-09-09 RX ADMIN — LOSARTAN POTASSIUM 50 MG: 50 TABLET, FILM COATED ORAL at 09:03

## 2024-09-09 RX ADMIN — CLONAZEPAM 1 MG: 1 TABLET ORAL at 21:55

## 2024-09-09 RX ADMIN — PANTOPRAZOLE SODIUM 40 MG: 40 TABLET, DELAYED RELEASE ORAL at 09:03

## 2024-09-09 RX ADMIN — ANASTROZOLE 1 MG: 1 TABLET, FILM COATED ORAL at 09:03

## 2024-09-09 RX ADMIN — BACLOFEN 20 MG: 10 TABLET ORAL at 09:03

## 2024-09-09 RX ADMIN — PRAMIPEXOLE DIHYDROCHLORIDE 1.5 MG: 1.5 TABLET ORAL at 09:05

## 2024-09-09 RX ADMIN — ENOXAPARIN SODIUM 40 MG: 100 INJECTION SUBCUTANEOUS at 17:33

## 2024-09-09 RX ADMIN — CEFTRIAXONE 2000 MG: 2 INJECTION, POWDER, FOR SOLUTION INTRAMUSCULAR; INTRAVENOUS at 17:33

## 2024-09-10 ENCOUNTER — APPOINTMENT (OUTPATIENT)
Dept: GENERAL RADIOLOGY | Facility: HOSPITAL | Age: 64
End: 2024-09-10
Payer: MEDICARE

## 2024-09-10 PROBLEM — K52.9 COLITIS: Status: ACTIVE | Noted: 2024-09-10

## 2024-09-10 LAB
ALBUMIN SERPL-MCNC: 3.6 G/DL (ref 3.5–5.2)
ANION GAP SERPL CALCULATED.3IONS-SCNC: 11 MMOL/L (ref 5–15)
BASOPHILS # BLD AUTO: 0.15 10*3/MM3 (ref 0–0.2)
BASOPHILS NFR BLD AUTO: 2.1 % (ref 0–1.5)
BUN SERPL-MCNC: 13 MG/DL (ref 8–23)
BUN/CREAT SERPL: 17.6 (ref 7–25)
CALCIUM SPEC-SCNC: 8.6 MG/DL (ref 8.6–10.5)
CHLORIDE SERPL-SCNC: 109 MMOL/L (ref 98–107)
CO2 SERPL-SCNC: 20 MMOL/L (ref 22–29)
CREAT SERPL-MCNC: 0.74 MG/DL (ref 0.57–1)
DEPRECATED RDW RBC AUTO: 57.7 FL (ref 37–54)
EGFRCR SERPLBLD CKD-EPI 2021: 92.2 ML/MIN/1.73
EOSINOPHIL # BLD AUTO: 0.14 10*3/MM3 (ref 0–0.4)
EOSINOPHIL NFR BLD AUTO: 1.9 % (ref 0.3–6.2)
ERYTHROCYTE [DISTWIDTH] IN BLOOD BY AUTOMATED COUNT: 18.9 % (ref 12.3–15.4)
GLUCOSE SERPL-MCNC: 150 MG/DL (ref 65–99)
HAV IGM SERPL QL IA: NORMAL
HBV CORE IGM SERPL QL IA: NORMAL
HBV SURFACE AG SERPL QL IA: NORMAL
HCT VFR BLD AUTO: 33.6 % (ref 34–46.6)
HCV AB SER QL: NORMAL
HGB BLD-MCNC: 10.7 G/DL (ref 12–15.9)
IMM GRANULOCYTES # BLD AUTO: 0.03 10*3/MM3 (ref 0–0.05)
IMM GRANULOCYTES NFR BLD AUTO: 0.4 % (ref 0–0.5)
LYMPHOCYTES # BLD AUTO: 0.79 10*3/MM3 (ref 0.7–3.1)
LYMPHOCYTES NFR BLD AUTO: 10.9 % (ref 19.6–45.3)
MAGNESIUM SERPL-MCNC: 2.3 MG/DL (ref 1.6–2.4)
MCH RBC QN AUTO: 27.2 PG (ref 26.6–33)
MCHC RBC AUTO-ENTMCNC: 31.8 G/DL (ref 31.5–35.7)
MCV RBC AUTO: 85.5 FL (ref 79–97)
MONOCYTES # BLD AUTO: 0.58 10*3/MM3 (ref 0.1–0.9)
MONOCYTES NFR BLD AUTO: 8 % (ref 5–12)
NEUTROPHILS NFR BLD AUTO: 5.56 10*3/MM3 (ref 1.7–7)
NEUTROPHILS NFR BLD AUTO: 76.7 % (ref 42.7–76)
NRBC BLD AUTO-RTO: 0 /100 WBC (ref 0–0.2)
NT-PROBNP SERPL-MCNC: 99.2 PG/ML (ref 0–900)
PHOSPHATE SERPL-MCNC: 2.1 MG/DL (ref 2.5–4.5)
PLATELET # BLD AUTO: 412 10*3/MM3 (ref 140–450)
PMV BLD AUTO: 8.7 FL (ref 6–12)
POTASSIUM SERPL-SCNC: 4.7 MMOL/L (ref 3.5–5.2)
PROCALCITONIN SERPL-MCNC: 0.37 NG/ML (ref 0–0.25)
RBC # BLD AUTO: 3.93 10*6/MM3 (ref 3.77–5.28)
SODIUM SERPL-SCNC: 140 MMOL/L (ref 136–145)
WBC NRBC COR # BLD AUTO: 7.25 10*3/MM3 (ref 3.4–10.8)

## 2024-09-10 PROCEDURE — 87040 BLOOD CULTURE FOR BACTERIA: CPT | Performed by: STUDENT IN AN ORGANIZED HEALTH CARE EDUCATION/TRAINING PROGRAM

## 2024-09-10 PROCEDURE — 83735 ASSAY OF MAGNESIUM: CPT | Performed by: HOSPITALIST

## 2024-09-10 PROCEDURE — 25810000003 SODIUM CHLORIDE 0.9 % SOLUTION: Performed by: HOSPITALIST

## 2024-09-10 PROCEDURE — 85025 COMPLETE CBC W/AUTO DIFF WBC: CPT | Performed by: HOSPITALIST

## 2024-09-10 PROCEDURE — 84145 PROCALCITONIN (PCT): CPT | Performed by: STUDENT IN AN ORGANIZED HEALTH CARE EDUCATION/TRAINING PROGRAM

## 2024-09-10 PROCEDURE — 25010000002 ENOXAPARIN PER 10 MG: Performed by: STUDENT IN AN ORGANIZED HEALTH CARE EDUCATION/TRAINING PROGRAM

## 2024-09-10 PROCEDURE — 80069 RENAL FUNCTION PANEL: CPT | Performed by: HOSPITALIST

## 2024-09-10 PROCEDURE — 83880 ASSAY OF NATRIURETIC PEPTIDE: CPT | Performed by: HOSPITALIST

## 2024-09-10 PROCEDURE — 80074 ACUTE HEPATITIS PANEL: CPT | Performed by: HOSPITALIST

## 2024-09-10 PROCEDURE — 25010000002 NA FERRIC GLUC CPLX PER 12.5 MG: Performed by: HOSPITALIST

## 2024-09-10 PROCEDURE — 84550 ASSAY OF BLOOD/URIC ACID: CPT

## 2024-09-10 PROCEDURE — 99223 1ST HOSP IP/OBS HIGH 75: CPT | Performed by: STUDENT IN AN ORGANIZED HEALTH CARE EDUCATION/TRAINING PROGRAM

## 2024-09-10 PROCEDURE — 25010000002 CEFTRIAXONE PER 250 MG: Performed by: STUDENT IN AN ORGANIZED HEALTH CARE EDUCATION/TRAINING PROGRAM

## 2024-09-10 PROCEDURE — 71045 X-RAY EXAM CHEST 1 VIEW: CPT

## 2024-09-10 RX ORDER — CETIRIZINE HYDROCHLORIDE 10 MG/1
10 TABLET ORAL ONCE
Status: COMPLETED | OUTPATIENT
Start: 2024-09-10 | End: 2024-09-10

## 2024-09-10 RX ORDER — AMOXICILLIN 250 MG
2 CAPSULE ORAL NIGHTLY
Status: DISCONTINUED | OUTPATIENT
Start: 2024-09-10 | End: 2024-09-17 | Stop reason: HOSPADM

## 2024-09-10 RX ORDER — ACETAMINOPHEN 325 MG/1
650 TABLET ORAL ONCE
Status: COMPLETED | OUTPATIENT
Start: 2024-09-10 | End: 2024-09-10

## 2024-09-10 RX ORDER — POLYETHYLENE GLYCOL 3350 17 G/17G
17 POWDER, FOR SOLUTION ORAL DAILY
Status: DISCONTINUED | OUTPATIENT
Start: 2024-09-10 | End: 2024-09-17 | Stop reason: HOSPADM

## 2024-09-10 RX ORDER — METRONIDAZOLE 500 MG/1
500 TABLET ORAL EVERY 8 HOURS SCHEDULED
Status: DISCONTINUED | OUTPATIENT
Start: 2024-09-10 | End: 2024-09-11

## 2024-09-10 RX ORDER — BISACODYL 10 MG
10 SUPPOSITORY, RECTAL RECTAL DAILY PRN
Status: DISCONTINUED | OUTPATIENT
Start: 2024-09-10 | End: 2024-09-17 | Stop reason: HOSPADM

## 2024-09-10 RX ADMIN — CLONAZEPAM 2 MG: 1 TABLET ORAL at 23:44

## 2024-09-10 RX ADMIN — SODIUM CHLORIDE 250 MG: 9 INJECTION, SOLUTION INTRAVENOUS at 13:52

## 2024-09-10 RX ADMIN — OLANZAPINE 5 MG: 5 TABLET, FILM COATED ORAL at 21:56

## 2024-09-10 RX ADMIN — SENNOSIDES AND DOCUSATE SODIUM 2 TABLET: 50; 8.6 TABLET ORAL at 21:56

## 2024-09-10 RX ADMIN — METRONIDAZOLE 500 MG: 500 TABLET, FILM COATED ORAL at 21:57

## 2024-09-10 RX ADMIN — CEFTRIAXONE 2000 MG: 2 INJECTION, POWDER, FOR SOLUTION INTRAMUSCULAR; INTRAVENOUS at 19:14

## 2024-09-10 RX ADMIN — CETIRIZINE HYDROCHLORIDE 10 MG: 10 TABLET ORAL at 13:51

## 2024-09-10 RX ADMIN — SENNOSIDES AND DOCUSATE SODIUM 2 TABLET: 50; 8.6 TABLET ORAL at 11:20

## 2024-09-10 RX ADMIN — PRAMIPEXOLE DIHYDROCHLORIDE 1.5 MG: 1.5 TABLET ORAL at 08:39

## 2024-09-10 RX ADMIN — METRONIDAZOLE 500 MG: 500 TABLET, FILM COATED ORAL at 14:00

## 2024-09-10 RX ADMIN — BACLOFEN 20 MG: 10 TABLET ORAL at 08:39

## 2024-09-10 RX ADMIN — PRAMIPEXOLE DIHYDROCHLORIDE 1.5 MG: 1.5 TABLET ORAL at 21:57

## 2024-09-10 RX ADMIN — LOSARTAN POTASSIUM 50 MG: 50 TABLET, FILM COATED ORAL at 08:39

## 2024-09-10 RX ADMIN — PANTOPRAZOLE SODIUM 40 MG: 40 TABLET, DELAYED RELEASE ORAL at 08:39

## 2024-09-10 RX ADMIN — PRAMIPEXOLE DIHYDROCHLORIDE 1.5 MG: 1.5 TABLET ORAL at 17:49

## 2024-09-10 RX ADMIN — ENOXAPARIN SODIUM 40 MG: 100 INJECTION SUBCUTANEOUS at 17:50

## 2024-09-10 RX ADMIN — BACLOFEN 20 MG: 10 TABLET ORAL at 17:49

## 2024-09-10 RX ADMIN — BISACODYL 10 MG: 10 SUPPOSITORY RECTAL at 21:57

## 2024-09-10 RX ADMIN — ANASTROZOLE 1 MG: 1 TABLET, FILM COATED ORAL at 08:38

## 2024-09-10 RX ADMIN — BACLOFEN 20 MG: 10 TABLET ORAL at 21:58

## 2024-09-10 RX ADMIN — ACETAMINOPHEN 325MG 650 MG: 325 TABLET ORAL at 13:52

## 2024-09-10 RX ADMIN — ACETAMINOPHEN 325MG 650 MG: 325 TABLET ORAL at 04:31

## 2024-09-11 ENCOUNTER — APPOINTMENT (OUTPATIENT)
Dept: MRI IMAGING | Facility: HOSPITAL | Age: 64
End: 2024-09-11
Payer: MEDICARE

## 2024-09-11 ENCOUNTER — APPOINTMENT (OUTPATIENT)
Dept: CARDIOLOGY | Facility: HOSPITAL | Age: 64
End: 2024-09-11
Payer: MEDICARE

## 2024-09-11 LAB
ALBUMIN SERPL-MCNC: 3.4 G/DL (ref 3.5–5.2)
ALBUMIN SERPL-MCNC: 3.4 G/DL (ref 3.5–5.2)
ANION GAP SERPL CALCULATED.3IONS-SCNC: 10.6 MMOL/L (ref 5–15)
ANION GAP SERPL CALCULATED.3IONS-SCNC: 13 MMOL/L (ref 5–15)
ARTERIAL PATENCY WRIST A: POSITIVE
ARTERIAL PATENCY WRIST A: POSITIVE
ATMOSPHERIC PRESS: 751.9 MMHG
ATMOSPHERIC PRESS: 752.5 MMHG
BASE EXCESS BLDA CALC-SCNC: -2.1 MMOL/L (ref 0–2)
BASE EXCESS BLDA CALC-SCNC: -3 MMOL/L (ref 0–2)
BASOPHILS # BLD AUTO: 0.17 10*3/MM3 (ref 0–0.2)
BASOPHILS NFR BLD AUTO: 2 % (ref 0–1.5)
BDY SITE: ABNORMAL
BDY SITE: ABNORMAL
BH CV LOWER VASCULAR LEFT COMMON FEMORAL AUGMENT: NORMAL
BH CV LOWER VASCULAR LEFT COMMON FEMORAL COMPETENT: NORMAL
BH CV LOWER VASCULAR LEFT COMMON FEMORAL COMPRESS: NORMAL
BH CV LOWER VASCULAR LEFT COMMON FEMORAL PHASIC: NORMAL
BH CV LOWER VASCULAR LEFT COMMON FEMORAL SPONT: NORMAL
BH CV LOWER VASCULAR LEFT DISTAL FEMORAL COMPRESS: NORMAL
BH CV LOWER VASCULAR LEFT GASTRONEMIUS COMPRESS: NORMAL
BH CV LOWER VASCULAR LEFT GREATER SAPH AK COMPRESS: NORMAL
BH CV LOWER VASCULAR LEFT GREATER SAPH BK COMPRESS: NORMAL
BH CV LOWER VASCULAR LEFT LESSER SAPH COMPRESS: NORMAL
BH CV LOWER VASCULAR LEFT MID FEMORAL AUGMENT: NORMAL
BH CV LOWER VASCULAR LEFT MID FEMORAL COMPETENT: NORMAL
BH CV LOWER VASCULAR LEFT MID FEMORAL COMPRESS: NORMAL
BH CV LOWER VASCULAR LEFT MID FEMORAL PHASIC: NORMAL
BH CV LOWER VASCULAR LEFT MID FEMORAL SPONT: NORMAL
BH CV LOWER VASCULAR LEFT PERONEAL COMPRESS: NORMAL
BH CV LOWER VASCULAR LEFT POPLITEAL AUGMENT: NORMAL
BH CV LOWER VASCULAR LEFT POPLITEAL COMPETENT: NORMAL
BH CV LOWER VASCULAR LEFT POPLITEAL COMPRESS: NORMAL
BH CV LOWER VASCULAR LEFT POPLITEAL PHASIC: NORMAL
BH CV LOWER VASCULAR LEFT POPLITEAL SPONT: NORMAL
BH CV LOWER VASCULAR LEFT POSTERIOR TIBIAL COMPRESS: NORMAL
BH CV LOWER VASCULAR LEFT PROFUNDA FEMORAL COMPRESS: NORMAL
BH CV LOWER VASCULAR LEFT PROXIMAL FEMORAL COMPRESS: NORMAL
BH CV LOWER VASCULAR LEFT SAPHENOFEMORAL JUNCTION COMPRESS: NORMAL
BH CV LOWER VASCULAR RIGHT COMMON FEMORAL AUGMENT: NORMAL
BH CV LOWER VASCULAR RIGHT COMMON FEMORAL COMPETENT: NORMAL
BH CV LOWER VASCULAR RIGHT COMMON FEMORAL COMPRESS: NORMAL
BH CV LOWER VASCULAR RIGHT COMMON FEMORAL PHASIC: NORMAL
BH CV LOWER VASCULAR RIGHT COMMON FEMORAL SPONT: NORMAL
BH CV LOWER VASCULAR RIGHT DISTAL FEMORAL COMPRESS: NORMAL
BH CV LOWER VASCULAR RIGHT GASTRONEMIUS COMPRESS: NORMAL
BH CV LOWER VASCULAR RIGHT GREATER SAPH AK COMPRESS: NORMAL
BH CV LOWER VASCULAR RIGHT GREATER SAPH BK COMPRESS: NORMAL
BH CV LOWER VASCULAR RIGHT LESSER SAPH COMPRESS: NORMAL
BH CV LOWER VASCULAR RIGHT MID FEMORAL AUGMENT: NORMAL
BH CV LOWER VASCULAR RIGHT MID FEMORAL COMPETENT: NORMAL
BH CV LOWER VASCULAR RIGHT MID FEMORAL COMPRESS: NORMAL
BH CV LOWER VASCULAR RIGHT MID FEMORAL PHASIC: NORMAL
BH CV LOWER VASCULAR RIGHT MID FEMORAL SPONT: NORMAL
BH CV LOWER VASCULAR RIGHT PERONEAL COMPRESS: NORMAL
BH CV LOWER VASCULAR RIGHT POPLITEAL AUGMENT: NORMAL
BH CV LOWER VASCULAR RIGHT POPLITEAL COMPETENT: NORMAL
BH CV LOWER VASCULAR RIGHT POPLITEAL COMPRESS: NORMAL
BH CV LOWER VASCULAR RIGHT POPLITEAL PHASIC: NORMAL
BH CV LOWER VASCULAR RIGHT POPLITEAL SPONT: NORMAL
BH CV LOWER VASCULAR RIGHT POSTERIOR TIBIAL COMPRESS: NORMAL
BH CV LOWER VASCULAR RIGHT PROFUNDA FEMORAL COMPRESS: NORMAL
BH CV LOWER VASCULAR RIGHT PROXIMAL FEMORAL COMPRESS: NORMAL
BH CV LOWER VASCULAR RIGHT SAPHENOFEMORAL JUNCTION COMPRESS: NORMAL
BILIRUB UR QL STRIP: NEGATIVE
BUN SERPL-MCNC: 21 MG/DL (ref 8–23)
BUN SERPL-MCNC: 22 MG/DL (ref 8–23)
BUN/CREAT SERPL: 10.2 (ref 7–25)
BUN/CREAT SERPL: 14.4 (ref 7–25)
CALCIUM SPEC-SCNC: 8.8 MG/DL (ref 8.6–10.5)
CALCIUM SPEC-SCNC: 9.1 MG/DL (ref 8.6–10.5)
CHLORIDE SERPL-SCNC: 108 MMOL/L (ref 98–107)
CHLORIDE SERPL-SCNC: 108 MMOL/L (ref 98–107)
CLARITY UR: CLEAR
CO2 BLDA-SCNC: 23.4 MMOL/L (ref 23–27)
CO2 SERPL-SCNC: 19 MMOL/L (ref 22–29)
CO2 SERPL-SCNC: 19.4 MMOL/L (ref 22–29)
COLOR UR: YELLOW
CREAT SERPL-MCNC: 1.46 MG/DL (ref 0.57–1)
CREAT SERPL-MCNC: 2.16 MG/DL (ref 0.57–1)
CREAT UR-MCNC: 48.5 MG/DL
D-LACTATE SERPL-SCNC: 1.3 MMOL/L
D-LACTATE SERPL-SCNC: 1.3 MMOL/L (ref 0.5–2)
DEPRECATED RDW RBC AUTO: 57.4 FL (ref 37–54)
DEVICE COMMENT: ABNORMAL
DEVICE COMMENT: ABNORMAL
DEVICE COMMENT: NORMAL
EGFRCR SERPLBLD CKD-EPI 2021: 25.5 ML/MIN/1.73
EGFRCR SERPLBLD CKD-EPI 2021: 40.8 ML/MIN/1.73
EOSINOPHIL # BLD AUTO: 0.17 10*3/MM3 (ref 0–0.4)
EOSINOPHIL NFR BLD AUTO: 2 % (ref 0.3–6.2)
EOSINOPHIL SPEC QL MICRO: 0 % EOS/100 CELLS (ref 0–0)
ERYTHROCYTE [DISTWIDTH] IN BLOOD BY AUTOMATED COUNT: 19 % (ref 12.3–15.4)
GAS FLOW AIRWAY: 3 LPM
GLUCOSE BLDC GLUCOMTR-MCNC: 192 MG/DL (ref 70–130)
GLUCOSE SERPL-MCNC: 162 MG/DL (ref 65–99)
GLUCOSE SERPL-MCNC: 192 MG/DL (ref 65–99)
GLUCOSE UR STRIP-MCNC: ABNORMAL MG/DL
HCO3 BLDA-SCNC: 22.3 MMOL/L (ref 22–28)
HCO3 BLDA-SCNC: 24.3 MMOL/L (ref 22–28)
HCT VFR BLD AUTO: 36.2 % (ref 34–46.6)
HCT VFR BLDA CALC: 38 % (ref 38–51)
HEMODILUTION: NO
HEMODILUTION: NO
HGB BLD-MCNC: 11.5 G/DL (ref 12–15.9)
HGB BLDA-MCNC: 12.8 G/DL (ref 12–17)
HGB UR QL STRIP.AUTO: NEGATIVE
IMM GRANULOCYTES # BLD AUTO: 0.04 10*3/MM3 (ref 0–0.05)
IMM GRANULOCYTES NFR BLD AUTO: 0.5 % (ref 0–0.5)
INHALED O2 CONCENTRATION: 21 %
INSPIRATORY TIME: 1
KETONES UR QL STRIP: NEGATIVE
LEUKOCYTE ESTERASE UR QL STRIP.AUTO: NEGATIVE
LYMPHOCYTES # BLD AUTO: 1.13 10*3/MM3 (ref 0.7–3.1)
LYMPHOCYTES NFR BLD AUTO: 13.1 % (ref 19.6–45.3)
Lab: ABNORMAL
MAGNESIUM SERPL-MCNC: 2.7 MG/DL (ref 1.6–2.4)
MCH RBC QN AUTO: 26.9 PG (ref 26.6–33)
MCHC RBC AUTO-ENTMCNC: 31.8 G/DL (ref 31.5–35.7)
MCV RBC AUTO: 84.8 FL (ref 79–97)
MODALITY: ABNORMAL
MODALITY: ABNORMAL
MONOCYTES # BLD AUTO: 0.85 10*3/MM3 (ref 0.1–0.9)
MONOCYTES NFR BLD AUTO: 9.9 % (ref 5–12)
NEUTROPHILS NFR BLD AUTO: 6.24 10*3/MM3 (ref 1.7–7)
NEUTROPHILS NFR BLD AUTO: 72.5 % (ref 42.7–76)
NITRITE UR QL STRIP: NEGATIVE
NOTIFIED WHO: ABNORMAL
NRBC BLD AUTO-RTO: 0 /100 WBC (ref 0–0.2)
O2 A-A PPRESDIFF RESPIRATORY: 0.6 MMHG
OSMOLALITY UR: 354 MOSM/KG
PCO2 BLDA: 35.9 MM HG (ref 35–45)
PCO2 BLDA: 51.7 MM HG (ref 35–45)
PEEP RESPIRATORY: 6 CM[H2O]
PH BLDA: 7.28 PH UNITS (ref 7.35–7.45)
PH BLDA: 7.4 PH UNITS (ref 7.35–7.45)
PH UR STRIP.AUTO: 6 [PH] (ref 5–8)
PHOSPHATE SERPL-MCNC: 3 MG/DL (ref 2.5–4.5)
PHOSPHATE SERPL-MCNC: 3.1 MG/DL (ref 2.5–4.5)
PLATELET # BLD AUTO: 489 10*3/MM3 (ref 140–450)
PMV BLD AUTO: 9 FL (ref 6–12)
PO2 BLD: 303 MM[HG] (ref 0–500)
PO2 BLDA: 63.6 MM HG (ref 80–100)
PO2 BLDA: 98.9 MM HG (ref 80–100)
POTASSIUM SERPL-SCNC: 4.9 MMOL/L (ref 3.5–5.2)
POTASSIUM SERPL-SCNC: 5.3 MMOL/L (ref 3.5–5.2)
PROCALCITONIN SERPL-MCNC: 0.48 NG/ML (ref 0–0.25)
PROT UR QL STRIP: NEGATIVE
RBC # BLD AUTO: 4.27 10*6/MM3 (ref 3.77–5.28)
READ BACK: YES
SAO2 % BLDCOA: 92.1 % (ref 92–98.5)
SAO2 % BLDCOA: 96.6 % (ref 92–98.5)
SET MECH RESP RATE: 14
SODIUM SERPL-SCNC: 138 MMOL/L (ref 136–145)
SODIUM SERPL-SCNC: 140 MMOL/L (ref 136–145)
SODIUM UR-SCNC: 60 MMOL/L
SP GR UR STRIP: 1.01 (ref 1–1.03)
TOTAL RATE: 19 BREATHS/MINUTE
TOTAL RATE: 22 BREATHS/MINUTE
URATE SERPL-MCNC: 4.7 MG/DL (ref 2.4–5.7)
URATE SERPL-MCNC: 5.8 MG/DL (ref 2.4–5.7)
UROBILINOGEN UR QL STRIP: ABNORMAL
VENTILATOR MODE: ABNORMAL
VT ON VENT VENT: 500 ML
WBC NRBC COR # BLD AUTO: 8.6 10*3/MM3 (ref 3.4–10.8)

## 2024-09-11 PROCEDURE — 85018 HEMOGLOBIN: CPT

## 2024-09-11 PROCEDURE — 84300 ASSAY OF URINE SODIUM: CPT | Performed by: HOSPITALIST

## 2024-09-11 PROCEDURE — 80069 RENAL FUNCTION PANEL: CPT | Performed by: HOSPITALIST

## 2024-09-11 PROCEDURE — 99221 1ST HOSP IP/OBS SF/LOW 40: CPT | Performed by: STUDENT IN AN ORGANIZED HEALTH CARE EDUCATION/TRAINING PROGRAM

## 2024-09-11 PROCEDURE — 99233 SBSQ HOSP IP/OBS HIGH 50: CPT | Performed by: STUDENT IN AN ORGANIZED HEALTH CARE EDUCATION/TRAINING PROGRAM

## 2024-09-11 PROCEDURE — 94799 UNLISTED PULMONARY SVC/PX: CPT

## 2024-09-11 PROCEDURE — 93970 EXTREMITY STUDY: CPT | Performed by: SURGERY

## 2024-09-11 PROCEDURE — 93970 EXTREMITY STUDY: CPT

## 2024-09-11 PROCEDURE — 87205 SMEAR GRAM STAIN: CPT | Performed by: HOSPITALIST

## 2024-09-11 PROCEDURE — 84145 PROCALCITONIN (PCT): CPT | Performed by: NURSE PRACTITIONER

## 2024-09-11 PROCEDURE — 82803 BLOOD GASES ANY COMBINATION: CPT | Performed by: HOSPITALIST

## 2024-09-11 PROCEDURE — 83935 ASSAY OF URINE OSMOLALITY: CPT | Performed by: HOSPITALIST

## 2024-09-11 PROCEDURE — 81003 URINALYSIS AUTO W/O SCOPE: CPT | Performed by: HOSPITALIST

## 2024-09-11 PROCEDURE — 83605 ASSAY OF LACTIC ACID: CPT

## 2024-09-11 PROCEDURE — 85025 COMPLETE CBC W/AUTO DIFF WBC: CPT | Performed by: HOSPITALIST

## 2024-09-11 PROCEDURE — 82948 REAGENT STRIP/BLOOD GLUCOSE: CPT

## 2024-09-11 PROCEDURE — 83605 ASSAY OF LACTIC ACID: CPT | Performed by: NURSE PRACTITIONER

## 2024-09-11 PROCEDURE — 36600 WITHDRAWAL OF ARTERIAL BLOOD: CPT | Performed by: HOSPITALIST

## 2024-09-11 PROCEDURE — 82570 ASSAY OF URINE CREATININE: CPT | Performed by: HOSPITALIST

## 2024-09-11 PROCEDURE — 25010000002 PIPERACILLIN SOD-TAZOBACTAM PER 1 G: Performed by: STUDENT IN AN ORGANIZED HEALTH CARE EDUCATION/TRAINING PROGRAM

## 2024-09-11 PROCEDURE — 83735 ASSAY OF MAGNESIUM: CPT | Performed by: HOSPITALIST

## 2024-09-11 PROCEDURE — 70551 MRI BRAIN STEM W/O DYE: CPT

## 2024-09-11 PROCEDURE — 84550 ASSAY OF BLOOD/URIC ACID: CPT | Performed by: HOSPITALIST

## 2024-09-11 PROCEDURE — 94640 AIRWAY INHALATION TREATMENT: CPT

## 2024-09-11 PROCEDURE — 94660 CPAP INITIATION&MGMT: CPT

## 2024-09-11 PROCEDURE — 94761 N-INVAS EAR/PLS OXIMETRY MLT: CPT

## 2024-09-11 PROCEDURE — 25010000002 ENOXAPARIN PER 10 MG: Performed by: STUDENT IN AN ORGANIZED HEALTH CARE EDUCATION/TRAINING PROGRAM

## 2024-09-11 RX ORDER — ACETAMINOPHEN 325 MG/1
650 TABLET ORAL EVERY 6 HOURS PRN
Status: DISCONTINUED | OUTPATIENT
Start: 2024-09-11 | End: 2024-09-17 | Stop reason: HOSPADM

## 2024-09-11 RX ORDER — ENOXAPARIN SODIUM 100 MG/ML
30 INJECTION SUBCUTANEOUS EVERY 24 HOURS
Status: DISCONTINUED | OUTPATIENT
Start: 2024-09-11 | End: 2024-09-12

## 2024-09-11 RX ORDER — IPRATROPIUM BROMIDE AND ALBUTEROL SULFATE 2.5; .5 MG/3ML; MG/3ML
3 SOLUTION RESPIRATORY (INHALATION)
Status: DISCONTINUED | OUTPATIENT
Start: 2024-09-11 | End: 2024-09-17 | Stop reason: HOSPADM

## 2024-09-11 RX ORDER — ACETAMINOPHEN 650 MG/1
650 SUPPOSITORY RECTAL EVERY 4 HOURS PRN
Status: DISCONTINUED | OUTPATIENT
Start: 2024-09-11 | End: 2024-09-17 | Stop reason: HOSPADM

## 2024-09-11 RX ADMIN — ACETAMINOPHEN 650 MG: 650 SUPPOSITORY RECTAL at 20:48

## 2024-09-11 RX ADMIN — METRONIDAZOLE 500 MG: 500 TABLET, FILM COATED ORAL at 05:35

## 2024-09-11 RX ADMIN — ENOXAPARIN SODIUM 30 MG: 100 INJECTION SUBCUTANEOUS at 16:38

## 2024-09-11 RX ADMIN — IPRATROPIUM BROMIDE AND ALBUTEROL SULFATE 3 ML: .5; 3 SOLUTION RESPIRATORY (INHALATION) at 11:42

## 2024-09-11 RX ADMIN — PIPERACILLIN AND TAZOBACTAM 3.38 G: 3; .375 INJECTION, POWDER, FOR SOLUTION INTRAVENOUS at 11:40

## 2024-09-11 RX ADMIN — PIPERACILLIN AND TAZOBACTAM 3.38 G: 3; .375 INJECTION, POWDER, LYOPHILIZED, FOR SOLUTION INTRAVENOUS at 20:48

## 2024-09-11 RX ADMIN — IPRATROPIUM BROMIDE AND ALBUTEROL SULFATE 3 ML: .5; 3 SOLUTION RESPIRATORY (INHALATION) at 16:00

## 2024-09-11 RX ADMIN — ACETAMINOPHEN 650 MG: 650 SUPPOSITORY RECTAL at 06:16

## 2024-09-11 RX ADMIN — IPRATROPIUM BROMIDE AND ALBUTEROL SULFATE 3 ML: .5; 3 SOLUTION RESPIRATORY (INHALATION) at 19:53

## 2024-09-12 LAB
ALBUMIN SERPL-MCNC: 3.4 G/DL (ref 3.5–5.2)
ANION GAP SERPL CALCULATED.3IONS-SCNC: 11 MMOL/L (ref 5–15)
BACTERIA SPEC AEROBE CULT: NORMAL
BACTERIA SPEC AEROBE CULT: NORMAL
BASOPHILS # BLD AUTO: 0.15 10*3/MM3 (ref 0–0.2)
BASOPHILS NFR BLD AUTO: 2.3 % (ref 0–1.5)
BUN SERPL-MCNC: 15 MG/DL (ref 8–23)
BUN/CREAT SERPL: 24.6 (ref 7–25)
CALCIUM SPEC-SCNC: 8.4 MG/DL (ref 8.6–10.5)
CHLORIDE SERPL-SCNC: 110 MMOL/L (ref 98–107)
CO2 SERPL-SCNC: 24 MMOL/L (ref 22–29)
CREAT SERPL-MCNC: 0.61 MG/DL (ref 0.57–1)
D DIMER PPP FEU-MCNC: 1.59 MCGFEU/ML (ref 0–0.61)
DEPRECATED RDW RBC AUTO: 59.6 FL (ref 37–54)
EGFRCR SERPLBLD CKD-EPI 2021: 101.9 ML/MIN/1.73
EOSINOPHIL # BLD AUTO: 0.23 10*3/MM3 (ref 0–0.4)
EOSINOPHIL NFR BLD AUTO: 3.6 % (ref 0.3–6.2)
ERYTHROCYTE [DISTWIDTH] IN BLOOD BY AUTOMATED COUNT: 19.2 % (ref 12.3–15.4)
GLUCOSE SERPL-MCNC: 148 MG/DL (ref 65–99)
HCT VFR BLD AUTO: 35 % (ref 34–46.6)
HGB BLD-MCNC: 10.8 G/DL (ref 12–15.9)
IMM GRANULOCYTES # BLD AUTO: 0.06 10*3/MM3 (ref 0–0.05)
IMM GRANULOCYTES NFR BLD AUTO: 0.9 % (ref 0–0.5)
LYMPHOCYTES # BLD AUTO: 1.58 10*3/MM3 (ref 0.7–3.1)
LYMPHOCYTES NFR BLD AUTO: 24.7 % (ref 19.6–45.3)
MAGNESIUM SERPL-MCNC: 2.6 MG/DL (ref 1.6–2.4)
MCH RBC QN AUTO: 26.9 PG (ref 26.6–33)
MCHC RBC AUTO-ENTMCNC: 30.9 G/DL (ref 31.5–35.7)
MCV RBC AUTO: 87.3 FL (ref 79–97)
MONOCYTES # BLD AUTO: 0.55 10*3/MM3 (ref 0.1–0.9)
MONOCYTES NFR BLD AUTO: 8.6 % (ref 5–12)
NEUTROPHILS NFR BLD AUTO: 3.82 10*3/MM3 (ref 1.7–7)
NEUTROPHILS NFR BLD AUTO: 59.9 % (ref 42.7–76)
NRBC BLD AUTO-RTO: 0 /100 WBC (ref 0–0.2)
PHOSPHATE SERPL-MCNC: 1.9 MG/DL (ref 2.5–4.5)
PLATELET # BLD AUTO: 459 10*3/MM3 (ref 140–450)
PMV BLD AUTO: 9.2 FL (ref 6–12)
POTASSIUM SERPL-SCNC: 4.1 MMOL/L (ref 3.5–5.2)
RBC # BLD AUTO: 4.01 10*6/MM3 (ref 3.77–5.28)
SODIUM SERPL-SCNC: 145 MMOL/L (ref 136–145)
URATE SERPL-MCNC: 3.1 MG/DL (ref 2.4–5.7)
WBC NRBC COR # BLD AUTO: 6.39 10*3/MM3 (ref 3.4–10.8)

## 2024-09-12 PROCEDURE — 80069 RENAL FUNCTION PANEL: CPT

## 2024-09-12 PROCEDURE — 94664 DEMO&/EVAL PT USE INHALER: CPT

## 2024-09-12 PROCEDURE — 85025 COMPLETE CBC W/AUTO DIFF WBC: CPT

## 2024-09-12 PROCEDURE — 25010000002 PIPERACILLIN SOD-TAZOBACTAM PER 1 G: Performed by: STUDENT IN AN ORGANIZED HEALTH CARE EDUCATION/TRAINING PROGRAM

## 2024-09-12 PROCEDURE — 25010000002 ENOXAPARIN PER 10 MG: Performed by: STUDENT IN AN ORGANIZED HEALTH CARE EDUCATION/TRAINING PROGRAM

## 2024-09-12 PROCEDURE — 83735 ASSAY OF MAGNESIUM: CPT

## 2024-09-12 PROCEDURE — 94799 UNLISTED PULMONARY SVC/PX: CPT

## 2024-09-12 PROCEDURE — 85379 FIBRIN DEGRADATION QUANT: CPT | Performed by: INTERNAL MEDICINE

## 2024-09-12 PROCEDURE — 84550 ASSAY OF BLOOD/URIC ACID: CPT

## 2024-09-12 PROCEDURE — 99232 SBSQ HOSP IP/OBS MODERATE 35: CPT | Performed by: STUDENT IN AN ORGANIZED HEALTH CARE EDUCATION/TRAINING PROGRAM

## 2024-09-12 PROCEDURE — 94761 N-INVAS EAR/PLS OXIMETRY MLT: CPT

## 2024-09-12 PROCEDURE — 92610 EVALUATE SWALLOWING FUNCTION: CPT

## 2024-09-12 PROCEDURE — 94660 CPAP INITIATION&MGMT: CPT

## 2024-09-12 RX ORDER — ENOXAPARIN SODIUM 100 MG/ML
40 INJECTION SUBCUTANEOUS EVERY 24 HOURS
Status: DISCONTINUED | OUTPATIENT
Start: 2024-09-12 | End: 2024-09-17 | Stop reason: HOSPADM

## 2024-09-12 RX ORDER — BACLOFEN 10 MG/1
10 TABLET ORAL EVERY 12 HOURS SCHEDULED
Status: DISCONTINUED | OUTPATIENT
Start: 2024-09-12 | End: 2024-09-17 | Stop reason: HOSPADM

## 2024-09-12 RX ORDER — BACLOFEN 10 MG/1
10 TABLET ORAL 3 TIMES DAILY
Status: DISCONTINUED | OUTPATIENT
Start: 2024-09-12 | End: 2024-09-12

## 2024-09-12 RX ADMIN — IPRATROPIUM BROMIDE AND ALBUTEROL SULFATE 3 ML: .5; 3 SOLUTION RESPIRATORY (INHALATION) at 10:49

## 2024-09-12 RX ADMIN — SENNOSIDES AND DOCUSATE SODIUM 2 TABLET: 50; 8.6 TABLET ORAL at 20:22

## 2024-09-12 RX ADMIN — BACLOFEN 10 MG: 10 TABLET ORAL at 11:46

## 2024-09-12 RX ADMIN — BACLOFEN 10 MG: 10 TABLET ORAL at 20:22

## 2024-09-12 RX ADMIN — PRAMIPEXOLE DIHYDROCHLORIDE 1.5 MG: 1.5 TABLET ORAL at 15:32

## 2024-09-12 RX ADMIN — PIPERACILLIN AND TAZOBACTAM 3.38 G: 3; .375 INJECTION, POWDER, LYOPHILIZED, FOR SOLUTION INTRAVENOUS at 02:36

## 2024-09-12 RX ADMIN — IPRATROPIUM BROMIDE AND ALBUTEROL SULFATE 3 ML: .5; 3 SOLUTION RESPIRATORY (INHALATION) at 20:10

## 2024-09-12 RX ADMIN — PRAMIPEXOLE DIHYDROCHLORIDE 1.5 MG: 1.5 TABLET ORAL at 20:22

## 2024-09-12 RX ADMIN — POLYETHYLENE GLYCOL 3350 17 G: 17 POWDER, FOR SOLUTION ORAL at 10:00

## 2024-09-12 RX ADMIN — ANASTROZOLE 1 MG: 1 TABLET, FILM COATED ORAL at 09:56

## 2024-09-12 RX ADMIN — PRAMIPEXOLE DIHYDROCHLORIDE 1.5 MG: 1.5 TABLET ORAL at 09:56

## 2024-09-12 RX ADMIN — PIPERACILLIN AND TAZOBACTAM 3.38 G: 3; .375 INJECTION, POWDER, LYOPHILIZED, FOR SOLUTION INTRAVENOUS at 10:01

## 2024-09-12 RX ADMIN — PIPERACILLIN AND TAZOBACTAM 3.38 G: 3; .375 INJECTION, POWDER, LYOPHILIZED, FOR SOLUTION INTRAVENOUS at 17:32

## 2024-09-12 RX ADMIN — ENOXAPARIN SODIUM 40 MG: 100 INJECTION SUBCUTANEOUS at 16:03

## 2024-09-12 RX ADMIN — IPRATROPIUM BROMIDE AND ALBUTEROL SULFATE 3 ML: .5; 3 SOLUTION RESPIRATORY (INHALATION) at 07:12

## 2024-09-12 RX ADMIN — BISACODYL 10 MG: 10 SUPPOSITORY RECTAL at 11:36

## 2024-09-12 RX ADMIN — PANTOPRAZOLE SODIUM 40 MG: 40 TABLET, DELAYED RELEASE ORAL at 09:56

## 2024-09-12 RX ADMIN — IPRATROPIUM BROMIDE AND ALBUTEROL SULFATE 3 ML: .5; 3 SOLUTION RESPIRATORY (INHALATION) at 14:59

## 2024-09-13 ENCOUNTER — TELEPHONE (OUTPATIENT)
Dept: INTERNAL MEDICINE | Facility: CLINIC | Age: 64
End: 2024-09-13
Payer: MEDICARE

## 2024-09-13 LAB
ALBUMIN SERPL-MCNC: 3.1 G/DL (ref 3.5–5.2)
ANION GAP SERPL CALCULATED.3IONS-SCNC: 13.5 MMOL/L (ref 5–15)
BASOPHILS # BLD MANUAL: 0.15 10*3/MM3 (ref 0–0.2)
BASOPHILS NFR BLD MANUAL: 3 % (ref 0–1.5)
BUN SERPL-MCNC: 15 MG/DL (ref 8–23)
BUN/CREAT SERPL: 22.4 (ref 7–25)
CALCIUM SPEC-SCNC: 8.4 MG/DL (ref 8.6–10.5)
CHLORIDE SERPL-SCNC: 106 MMOL/L (ref 98–107)
CO2 SERPL-SCNC: 18.5 MMOL/L (ref 22–29)
CREAT SERPL-MCNC: 0.67 MG/DL (ref 0.57–1)
DEPRECATED RDW RBC AUTO: 60.9 FL (ref 37–54)
EGFRCR SERPLBLD CKD-EPI 2021: 99.6 ML/MIN/1.73
EOSINOPHIL # BLD MANUAL: 0.24 10*3/MM3 (ref 0–0.4)
EOSINOPHIL NFR BLD MANUAL: 5 % (ref 0.3–6.2)
ERYTHROCYTE [DISTWIDTH] IN BLOOD BY AUTOMATED COUNT: 19 % (ref 12.3–15.4)
GLUCOSE SERPL-MCNC: 139 MG/DL (ref 65–99)
HCT VFR BLD AUTO: 31.3 % (ref 34–46.6)
HGB BLD-MCNC: 9.2 G/DL (ref 12–15.9)
LYMPHOCYTES # BLD MANUAL: 1.22 10*3/MM3 (ref 0.7–3.1)
LYMPHOCYTES NFR BLD MANUAL: 12 % (ref 5–12)
MAGNESIUM SERPL-MCNC: 2.6 MG/DL (ref 1.6–2.4)
MCH RBC QN AUTO: 26.3 PG (ref 26.6–33)
MCHC RBC AUTO-ENTMCNC: 29.4 G/DL (ref 31.5–35.7)
MCV RBC AUTO: 89.4 FL (ref 79–97)
MONOCYTES # BLD: 0.58 10*3/MM3 (ref 0.1–0.9)
NEUTROPHILS # BLD AUTO: 2.68 10*3/MM3 (ref 1.7–7)
NEUTROPHILS NFR BLD MANUAL: 55 % (ref 42.7–76)
NRBC BLD AUTO-RTO: 0 /100 WBC (ref 0–0.2)
PHOSPHATE SERPL-MCNC: 2.1 MG/DL (ref 2.5–4.5)
PLAT MORPH BLD: NORMAL
PLATELET # BLD AUTO: 440 10*3/MM3 (ref 140–450)
PMV BLD AUTO: 9.4 FL (ref 6–12)
POTASSIUM SERPL-SCNC: 3.5 MMOL/L (ref 3.5–5.2)
RBC # BLD AUTO: 3.5 10*6/MM3 (ref 3.77–5.28)
RBC MORPH BLD: NORMAL
SODIUM SERPL-SCNC: 138 MMOL/L (ref 136–145)
URATE SERPL-MCNC: 2.4 MG/DL (ref 2.4–5.7)
VARIANT LYMPHS NFR BLD MANUAL: 25 % (ref 19.6–45.3)
WBC MORPH BLD: NORMAL
WBC NRBC COR # BLD AUTO: 4.87 10*3/MM3 (ref 3.4–10.8)

## 2024-09-13 PROCEDURE — 25810000003 SODIUM CHLORIDE 0.9 % SOLUTION: Performed by: INTERNAL MEDICINE

## 2024-09-13 PROCEDURE — 84550 ASSAY OF BLOOD/URIC ACID: CPT

## 2024-09-13 PROCEDURE — 94799 UNLISTED PULMONARY SVC/PX: CPT

## 2024-09-13 PROCEDURE — 94761 N-INVAS EAR/PLS OXIMETRY MLT: CPT

## 2024-09-13 PROCEDURE — 97166 OT EVAL MOD COMPLEX 45 MIN: CPT

## 2024-09-13 PROCEDURE — 25010000002 ENOXAPARIN PER 10 MG: Performed by: STUDENT IN AN ORGANIZED HEALTH CARE EDUCATION/TRAINING PROGRAM

## 2024-09-13 PROCEDURE — 80069 RENAL FUNCTION PANEL: CPT

## 2024-09-13 PROCEDURE — 25010000002 PIPERACILLIN SOD-TAZOBACTAM PER 1 G: Performed by: STUDENT IN AN ORGANIZED HEALTH CARE EDUCATION/TRAINING PROGRAM

## 2024-09-13 PROCEDURE — 94664 DEMO&/EVAL PT USE INHALER: CPT

## 2024-09-13 PROCEDURE — 83735 ASSAY OF MAGNESIUM: CPT

## 2024-09-13 PROCEDURE — 94660 CPAP INITIATION&MGMT: CPT

## 2024-09-13 PROCEDURE — 85025 COMPLETE CBC W/AUTO DIFF WBC: CPT

## 2024-09-13 PROCEDURE — 85007 BL SMEAR W/DIFF WBC COUNT: CPT

## 2024-09-13 PROCEDURE — 99232 SBSQ HOSP IP/OBS MODERATE 35: CPT | Performed by: STUDENT IN AN ORGANIZED HEALTH CARE EDUCATION/TRAINING PROGRAM

## 2024-09-13 PROCEDURE — 97535 SELF CARE MNGMENT TRAINING: CPT

## 2024-09-13 RX ORDER — PANTOPRAZOLE SODIUM 40 MG/1
40 TABLET, DELAYED RELEASE ORAL
Status: DISCONTINUED | OUTPATIENT
Start: 2024-09-13 | End: 2024-09-17 | Stop reason: HOSPADM

## 2024-09-13 RX ORDER — FENTANYL/ROPIVACAINE/NS/PF 2-625MCG/1
15 PLASTIC BAG, INJECTION (ML) EPIDURAL ONCE
Status: COMPLETED | OUTPATIENT
Start: 2024-09-13 | End: 2024-09-13

## 2024-09-13 RX ADMIN — IPRATROPIUM BROMIDE AND ALBUTEROL SULFATE 3 ML: .5; 3 SOLUTION RESPIRATORY (INHALATION) at 19:42

## 2024-09-13 RX ADMIN — POLYETHYLENE GLYCOL 3350 17 G: 17 POWDER, FOR SOLUTION ORAL at 08:42

## 2024-09-13 RX ADMIN — ANASTROZOLE 1 MG: 1 TABLET, FILM COATED ORAL at 08:42

## 2024-09-13 RX ADMIN — BACLOFEN 10 MG: 10 TABLET ORAL at 08:42

## 2024-09-13 RX ADMIN — PRAMIPEXOLE DIHYDROCHLORIDE 1.5 MG: 1.5 TABLET ORAL at 08:42

## 2024-09-13 RX ADMIN — PIPERACILLIN AND TAZOBACTAM 3.38 G: 3; .375 INJECTION, POWDER, LYOPHILIZED, FOR SOLUTION INTRAVENOUS at 02:42

## 2024-09-13 RX ADMIN — PANTOPRAZOLE SODIUM 40 MG: 40 TABLET, DELAYED RELEASE ORAL at 08:42

## 2024-09-13 RX ADMIN — PRAMIPEXOLE DIHYDROCHLORIDE 1.5 MG: 1.5 TABLET ORAL at 16:04

## 2024-09-13 RX ADMIN — POTASSIUM PHOSPHATE, MONOBASIC AND POTASSIUM PHOSPHATE, DIBASIC 15 MMOL: 224; 236 INJECTION, SOLUTION, CONCENTRATE INTRAVENOUS at 16:32

## 2024-09-13 RX ADMIN — ENOXAPARIN SODIUM 40 MG: 100 INJECTION SUBCUTANEOUS at 16:04

## 2024-09-13 RX ADMIN — IPRATROPIUM BROMIDE AND ALBUTEROL SULFATE 3 ML: .5; 3 SOLUTION RESPIRATORY (INHALATION) at 07:32

## 2024-09-13 RX ADMIN — BACLOFEN 10 MG: 10 TABLET ORAL at 20:29

## 2024-09-13 RX ADMIN — SENNOSIDES AND DOCUSATE SODIUM 2 TABLET: 50; 8.6 TABLET ORAL at 20:29

## 2024-09-13 RX ADMIN — PANTOPRAZOLE SODIUM 40 MG: 40 TABLET, DELAYED RELEASE ORAL at 17:48

## 2024-09-13 RX ADMIN — IPRATROPIUM BROMIDE AND ALBUTEROL SULFATE 3 ML: .5; 3 SOLUTION RESPIRATORY (INHALATION) at 14:55

## 2024-09-13 RX ADMIN — PRAMIPEXOLE DIHYDROCHLORIDE 1.5 MG: 1.5 TABLET ORAL at 20:29

## 2024-09-13 RX ADMIN — PIPERACILLIN AND TAZOBACTAM 3.38 G: 3; .375 INJECTION, POWDER, LYOPHILIZED, FOR SOLUTION INTRAVENOUS at 11:00

## 2024-09-13 RX ADMIN — PIPERACILLIN AND TAZOBACTAM 3.38 G: 3; .375 INJECTION, POWDER, LYOPHILIZED, FOR SOLUTION INTRAVENOUS at 17:48

## 2024-09-13 NOTE — TELEPHONE ENCOUNTER
Caller: MAGUI NINA WITH [facility]    Best call back number: [phone] SECURE VOICEMAIL    What orders are you requesting (i.e. lab or imaging): ASKING FOR CONFIRMATION IF DR. SALMERON WOULD APPROVE HOME HEALTH ORDERS AS NEEDED PER HOSPITALIST

## 2024-09-14 LAB
ALBUMIN SERPL-MCNC: 2.9 G/DL (ref 3.5–5.2)
ANION GAP SERPL CALCULATED.3IONS-SCNC: 8.9 MMOL/L (ref 5–15)
BASOPHILS # BLD MANUAL: 0.05 10*3/MM3 (ref 0–0.2)
BASOPHILS NFR BLD MANUAL: 1 % (ref 0–1.5)
BUN SERPL-MCNC: 13 MG/DL (ref 8–23)
BUN/CREAT SERPL: 24.5 (ref 7–25)
CALCIUM SPEC-SCNC: 8 MG/DL (ref 8.6–10.5)
CHLORIDE SERPL-SCNC: 111 MMOL/L (ref 98–107)
CO2 SERPL-SCNC: 22.1 MMOL/L (ref 22–29)
CREAT SERPL-MCNC: 0.53 MG/DL (ref 0.57–1)
DEPRECATED RDW RBC AUTO: 59 FL (ref 37–54)
EGFRCR SERPLBLD CKD-EPI 2021: 105.4 ML/MIN/1.73
EOSINOPHIL # BLD MANUAL: 0.25 10*3/MM3 (ref 0–0.4)
EOSINOPHIL NFR BLD MANUAL: 5 % (ref 0.3–6.2)
ERYTHROCYTE [DISTWIDTH] IN BLOOD BY AUTOMATED COUNT: 18.8 % (ref 12.3–15.4)
GLUCOSE SERPL-MCNC: 112 MG/DL (ref 65–99)
HCT VFR BLD AUTO: 28.1 % (ref 34–46.6)
HGB BLD-MCNC: 8.8 G/DL (ref 12–15.9)
LYMPHOCYTES # BLD MANUAL: 1.06 10*3/MM3 (ref 0.7–3.1)
LYMPHOCYTES NFR BLD MANUAL: 6 % (ref 5–12)
MAGNESIUM SERPL-MCNC: 2.3 MG/DL (ref 1.6–2.4)
MCH RBC QN AUTO: 27.5 PG (ref 26.6–33)
MCHC RBC AUTO-ENTMCNC: 31.3 G/DL (ref 31.5–35.7)
MCV RBC AUTO: 87.8 FL (ref 79–97)
MONOCYTES # BLD: 0.3 10*3/MM3 (ref 0.1–0.9)
NEUTROPHILS # BLD AUTO: 3.4 10*3/MM3 (ref 1.7–7)
NEUTROPHILS NFR BLD MANUAL: 67 % (ref 42.7–76)
NRBC BLD AUTO-RTO: 0 /100 WBC (ref 0–0.2)
PHOSPHATE SERPL-MCNC: 2.3 MG/DL (ref 2.5–4.5)
PLAT MORPH BLD: NORMAL
PLATELET # BLD AUTO: 472 10*3/MM3 (ref 140–450)
PMV BLD AUTO: 9.3 FL (ref 6–12)
POTASSIUM SERPL-SCNC: 3.7 MMOL/L (ref 3.5–5.2)
RBC # BLD AUTO: 3.2 10*6/MM3 (ref 3.77–5.28)
RBC MORPH BLD: NORMAL
SODIUM SERPL-SCNC: 142 MMOL/L (ref 136–145)
VARIANT LYMPHS NFR BLD MANUAL: 21 % (ref 19.6–45.3)
WBC MORPH BLD: NORMAL
WBC NRBC COR # BLD AUTO: 5.07 10*3/MM3 (ref 3.4–10.8)

## 2024-09-14 PROCEDURE — 94799 UNLISTED PULMONARY SVC/PX: CPT

## 2024-09-14 PROCEDURE — 94761 N-INVAS EAR/PLS OXIMETRY MLT: CPT

## 2024-09-14 PROCEDURE — 94660 CPAP INITIATION&MGMT: CPT

## 2024-09-14 PROCEDURE — 85007 BL SMEAR W/DIFF WBC COUNT: CPT | Performed by: HOSPITALIST

## 2024-09-14 PROCEDURE — 80069 RENAL FUNCTION PANEL: CPT | Performed by: HOSPITALIST

## 2024-09-14 PROCEDURE — 25010000002 PIPERACILLIN SOD-TAZOBACTAM PER 1 G: Performed by: STUDENT IN AN ORGANIZED HEALTH CARE EDUCATION/TRAINING PROGRAM

## 2024-09-14 PROCEDURE — 25010000002 ONDANSETRON PER 1 MG

## 2024-09-14 PROCEDURE — 85025 COMPLETE CBC W/AUTO DIFF WBC: CPT | Performed by: HOSPITALIST

## 2024-09-14 PROCEDURE — 83735 ASSAY OF MAGNESIUM: CPT | Performed by: HOSPITALIST

## 2024-09-14 PROCEDURE — 25010000002 ENOXAPARIN PER 10 MG: Performed by: STUDENT IN AN ORGANIZED HEALTH CARE EDUCATION/TRAINING PROGRAM

## 2024-09-14 PROCEDURE — 94664 DEMO&/EVAL PT USE INHALER: CPT

## 2024-09-14 RX ORDER — LACTULOSE 10 G/15ML
30 SOLUTION ORAL 3 TIMES DAILY
Status: DISCONTINUED | OUTPATIENT
Start: 2024-09-14 | End: 2024-09-17 | Stop reason: HOSPADM

## 2024-09-14 RX ORDER — ONDANSETRON 2 MG/ML
4 INJECTION INTRAMUSCULAR; INTRAVENOUS EVERY 6 HOURS PRN
Status: DISCONTINUED | OUTPATIENT
Start: 2024-09-14 | End: 2024-09-17 | Stop reason: HOSPADM

## 2024-09-14 RX ADMIN — PIPERACILLIN AND TAZOBACTAM 3.38 G: 3; .375 INJECTION, POWDER, LYOPHILIZED, FOR SOLUTION INTRAVENOUS at 19:50

## 2024-09-14 RX ADMIN — PRAMIPEXOLE DIHYDROCHLORIDE 1.5 MG: 1.5 TABLET ORAL at 15:50

## 2024-09-14 RX ADMIN — PIPERACILLIN AND TAZOBACTAM 3.38 G: 3; .375 INJECTION, POWDER, LYOPHILIZED, FOR SOLUTION INTRAVENOUS at 12:55

## 2024-09-14 RX ADMIN — BISACODYL 10 MG: 10 SUPPOSITORY RECTAL at 15:51

## 2024-09-14 RX ADMIN — SENNOSIDES AND DOCUSATE SODIUM 2 TABLET: 50; 8.6 TABLET ORAL at 19:57

## 2024-09-14 RX ADMIN — BACLOFEN 10 MG: 10 TABLET ORAL at 19:50

## 2024-09-14 RX ADMIN — POLYETHYLENE GLYCOL 3350 17 G: 17 POWDER, FOR SOLUTION ORAL at 09:40

## 2024-09-14 RX ADMIN — LACTULOSE 30 G: 10 SOLUTION ORAL at 17:39

## 2024-09-14 RX ADMIN — PRAMIPEXOLE DIHYDROCHLORIDE 1.5 MG: 1.5 TABLET ORAL at 19:50

## 2024-09-14 RX ADMIN — IPRATROPIUM BROMIDE AND ALBUTEROL SULFATE 3 ML: .5; 3 SOLUTION RESPIRATORY (INHALATION) at 19:21

## 2024-09-14 RX ADMIN — ENOXAPARIN SODIUM 40 MG: 100 INJECTION SUBCUTANEOUS at 15:50

## 2024-09-14 RX ADMIN — IPRATROPIUM BROMIDE AND ALBUTEROL SULFATE 3 ML: .5; 3 SOLUTION RESPIRATORY (INHALATION) at 11:33

## 2024-09-14 RX ADMIN — PANTOPRAZOLE SODIUM 40 MG: 40 TABLET, DELAYED RELEASE ORAL at 17:39

## 2024-09-14 RX ADMIN — PANTOPRAZOLE SODIUM 40 MG: 40 TABLET, DELAYED RELEASE ORAL at 09:40

## 2024-09-14 RX ADMIN — ANASTROZOLE 1 MG: 1 TABLET, FILM COATED ORAL at 09:40

## 2024-09-14 RX ADMIN — IPRATROPIUM BROMIDE AND ALBUTEROL SULFATE 3 ML: .5; 3 SOLUTION RESPIRATORY (INHALATION) at 07:01

## 2024-09-14 RX ADMIN — ONDANSETRON 4 MG: 2 INJECTION, SOLUTION INTRAMUSCULAR; INTRAVENOUS at 02:22

## 2024-09-14 RX ADMIN — PRAMIPEXOLE DIHYDROCHLORIDE 1.5 MG: 1.5 TABLET ORAL at 09:40

## 2024-09-14 RX ADMIN — BACLOFEN 10 MG: 10 TABLET ORAL at 09:40

## 2024-09-14 RX ADMIN — IPRATROPIUM BROMIDE AND ALBUTEROL SULFATE 3 ML: .5; 3 SOLUTION RESPIRATORY (INHALATION) at 16:06

## 2024-09-14 RX ADMIN — PIPERACILLIN AND TAZOBACTAM 3.38 G: 3; .375 INJECTION, POWDER, LYOPHILIZED, FOR SOLUTION INTRAVENOUS at 01:51

## 2024-09-14 RX ADMIN — ONDANSETRON 4 MG: 2 INJECTION, SOLUTION INTRAMUSCULAR; INTRAVENOUS at 21:07

## 2024-09-15 LAB
ALBUMIN SERPL-MCNC: 3.4 G/DL (ref 3.5–5.2)
ALBUMIN/GLOB SERPL: 1.1 G/DL
ALP SERPL-CCNC: 176 U/L (ref 39–117)
ALT SERPL W P-5'-P-CCNC: 160 U/L (ref 1–33)
ANION GAP SERPL CALCULATED.3IONS-SCNC: 11.7 MMOL/L (ref 5–15)
AST SERPL-CCNC: 214 U/L (ref 1–32)
BACTERIA SPEC AEROBE CULT: NORMAL
BACTERIA SPEC AEROBE CULT: NORMAL
BASOPHILS # BLD MANUAL: 0.05 10*3/MM3 (ref 0–0.2)
BASOPHILS NFR BLD MANUAL: 1 % (ref 0–1.5)
BILIRUB SERPL-MCNC: 0.2 MG/DL (ref 0–1.2)
BUN SERPL-MCNC: 9 MG/DL (ref 8–23)
BUN/CREAT SERPL: 15.5 (ref 7–25)
CALCIUM SPEC-SCNC: 8.5 MG/DL (ref 8.6–10.5)
CHLORIDE SERPL-SCNC: 109 MMOL/L (ref 98–107)
CO2 SERPL-SCNC: 19.3 MMOL/L (ref 22–29)
CREAT SERPL-MCNC: 0.58 MG/DL (ref 0.57–1)
DEPRECATED RDW RBC AUTO: 58.9 FL (ref 37–54)
EGFRCR SERPLBLD CKD-EPI 2021: 103.1 ML/MIN/1.73
EOSINOPHIL # BLD MANUAL: 0.21 10*3/MM3 (ref 0–0.4)
EOSINOPHIL NFR BLD MANUAL: 4 % (ref 0.3–6.2)
ERYTHROCYTE [DISTWIDTH] IN BLOOD BY AUTOMATED COUNT: 18.7 % (ref 12.3–15.4)
GLOBULIN UR ELPH-MCNC: 3 GM/DL
GLUCOSE SERPL-MCNC: 122 MG/DL (ref 65–99)
HCT VFR BLD AUTO: 33.4 % (ref 34–46.6)
HGB BLD-MCNC: 10.1 G/DL (ref 12–15.9)
LYMPHOCYTES # BLD MANUAL: 1.18 10*3/MM3 (ref 0.7–3.1)
LYMPHOCYTES NFR BLD MANUAL: 9 % (ref 5–12)
MCH RBC QN AUTO: 26.4 PG (ref 26.6–33)
MCHC RBC AUTO-ENTMCNC: 30.2 G/DL (ref 31.5–35.7)
MCV RBC AUTO: 87.4 FL (ref 79–97)
MONOCYTES # BLD: 0.48 10*3/MM3 (ref 0.1–0.9)
MYELOCYTES NFR BLD MANUAL: 1 % (ref 0–0)
NEUTROPHILS # BLD AUTO: 3.38 10*3/MM3 (ref 1.7–7)
NEUTROPHILS NFR BLD MANUAL: 63 % (ref 42.7–76)
NRBC BLD AUTO-RTO: 0 /100 WBC (ref 0–0.2)
PLAT MORPH BLD: NORMAL
PLATELET # BLD AUTO: 511 10*3/MM3 (ref 140–450)
PMV BLD AUTO: 9.5 FL (ref 6–12)
POTASSIUM SERPL-SCNC: 4 MMOL/L (ref 3.5–5.2)
PROT SERPL-MCNC: 6.4 G/DL (ref 6–8.5)
RBC # BLD AUTO: 3.82 10*6/MM3 (ref 3.77–5.28)
RBC MORPH BLD: NORMAL
SODIUM SERPL-SCNC: 140 MMOL/L (ref 136–145)
VARIANT LYMPHS NFR BLD MANUAL: 22 % (ref 19.6–45.3)
WBC MORPH BLD: NORMAL
WBC NRBC COR # BLD AUTO: 5.36 10*3/MM3 (ref 3.4–10.8)

## 2024-09-15 PROCEDURE — 94799 UNLISTED PULMONARY SVC/PX: CPT

## 2024-09-15 PROCEDURE — 25010000002 PIPERACILLIN SOD-TAZOBACTAM PER 1 G: Performed by: STUDENT IN AN ORGANIZED HEALTH CARE EDUCATION/TRAINING PROGRAM

## 2024-09-15 PROCEDURE — 80053 COMPREHEN METABOLIC PANEL: CPT | Performed by: INTERNAL MEDICINE

## 2024-09-15 PROCEDURE — 25010000002 ENOXAPARIN PER 10 MG: Performed by: STUDENT IN AN ORGANIZED HEALTH CARE EDUCATION/TRAINING PROGRAM

## 2024-09-15 PROCEDURE — 94760 N-INVAS EAR/PLS OXIMETRY 1: CPT

## 2024-09-15 PROCEDURE — 85007 BL SMEAR W/DIFF WBC COUNT: CPT | Performed by: INTERNAL MEDICINE

## 2024-09-15 PROCEDURE — 25010000002 ONDANSETRON PER 1 MG

## 2024-09-15 PROCEDURE — 94761 N-INVAS EAR/PLS OXIMETRY MLT: CPT

## 2024-09-15 PROCEDURE — 85025 COMPLETE CBC W/AUTO DIFF WBC: CPT | Performed by: INTERNAL MEDICINE

## 2024-09-15 PROCEDURE — 94664 DEMO&/EVAL PT USE INHALER: CPT

## 2024-09-15 RX ADMIN — ACETAMINOPHEN 325MG 650 MG: 325 TABLET ORAL at 15:21

## 2024-09-15 RX ADMIN — BACLOFEN 10 MG: 10 TABLET ORAL at 20:54

## 2024-09-15 RX ADMIN — IPRATROPIUM BROMIDE AND ALBUTEROL SULFATE 3 ML: .5; 3 SOLUTION RESPIRATORY (INHALATION) at 20:08

## 2024-09-15 RX ADMIN — PRAMIPEXOLE DIHYDROCHLORIDE 1.5 MG: 1.5 TABLET ORAL at 20:54

## 2024-09-15 RX ADMIN — BACLOFEN 10 MG: 10 TABLET ORAL at 09:54

## 2024-09-15 RX ADMIN — ENOXAPARIN SODIUM 40 MG: 100 INJECTION SUBCUTANEOUS at 17:06

## 2024-09-15 RX ADMIN — ACETAMINOPHEN 325MG 650 MG: 325 TABLET ORAL at 20:54

## 2024-09-15 RX ADMIN — PIPERACILLIN AND TAZOBACTAM 3.38 G: 3; .375 INJECTION, POWDER, LYOPHILIZED, FOR SOLUTION INTRAVENOUS at 18:06

## 2024-09-15 RX ADMIN — SENNOSIDES AND DOCUSATE SODIUM 2 TABLET: 50; 8.6 TABLET ORAL at 20:54

## 2024-09-15 RX ADMIN — PIPERACILLIN AND TAZOBACTAM 3.38 G: 3; .375 INJECTION, POWDER, LYOPHILIZED, FOR SOLUTION INTRAVENOUS at 09:55

## 2024-09-15 RX ADMIN — PIPERACILLIN AND TAZOBACTAM 3.38 G: 3; .375 INJECTION, POWDER, LYOPHILIZED, FOR SOLUTION INTRAVENOUS at 01:33

## 2024-09-15 RX ADMIN — PANTOPRAZOLE SODIUM 40 MG: 40 TABLET, DELAYED RELEASE ORAL at 17:06

## 2024-09-15 RX ADMIN — ONDANSETRON 4 MG: 2 INJECTION, SOLUTION INTRAMUSCULAR; INTRAVENOUS at 20:54

## 2024-09-15 RX ADMIN — ONDANSETRON 4 MG: 2 INJECTION, SOLUTION INTRAMUSCULAR; INTRAVENOUS at 09:54

## 2024-09-15 RX ADMIN — LACTULOSE 30 G: 10 SOLUTION ORAL at 01:33

## 2024-09-15 RX ADMIN — ANASTROZOLE 1 MG: 1 TABLET, FILM COATED ORAL at 09:54

## 2024-09-15 RX ADMIN — PRAMIPEXOLE DIHYDROCHLORIDE 1.5 MG: 1.5 TABLET ORAL at 09:54

## 2024-09-15 RX ADMIN — IPRATROPIUM BROMIDE AND ALBUTEROL SULFATE 3 ML: .5; 3 SOLUTION RESPIRATORY (INHALATION) at 14:39

## 2024-09-15 RX ADMIN — PANTOPRAZOLE SODIUM 40 MG: 40 TABLET, DELAYED RELEASE ORAL at 09:54

## 2024-09-15 RX ADMIN — IPRATROPIUM BROMIDE AND ALBUTEROL SULFATE 3 ML: .5; 3 SOLUTION RESPIRATORY (INHALATION) at 11:19

## 2024-09-15 RX ADMIN — PRAMIPEXOLE DIHYDROCHLORIDE 1.5 MG: 1.5 TABLET ORAL at 17:06

## 2024-09-15 RX ADMIN — IPRATROPIUM BROMIDE AND ALBUTEROL SULFATE 3 ML: .5; 3 SOLUTION RESPIRATORY (INHALATION) at 07:14

## 2024-09-16 ENCOUNTER — DOCUMENTATION (OUTPATIENT)
Dept: HOME HEALTH SERVICES | Facility: HOME HEALTHCARE | Age: 64
End: 2024-09-16
Payer: MEDICARE

## 2024-09-16 ENCOUNTER — HOME HEALTH ADMISSION (OUTPATIENT)
Dept: HOME HEALTH SERVICES | Facility: HOME HEALTHCARE | Age: 64
End: 2024-09-16
Payer: COMMERCIAL

## 2024-09-16 ENCOUNTER — TRANSCRIBE ORDERS (OUTPATIENT)
Dept: HOME HEALTH SERVICES | Facility: HOME HEALTHCARE | Age: 64
End: 2024-09-16
Payer: MEDICARE

## 2024-09-16 DIAGNOSIS — G35 MS (MULTIPLE SCLEROSIS): ICD-10-CM

## 2024-09-16 DIAGNOSIS — R47.01 APHASIA: ICD-10-CM

## 2024-09-16 DIAGNOSIS — N17.9 AKI (ACUTE KIDNEY INJURY): ICD-10-CM

## 2024-09-16 DIAGNOSIS — N39.0 ACUTE UTI: ICD-10-CM

## 2024-09-16 DIAGNOSIS — K52.9 COLITIS: Primary | ICD-10-CM

## 2024-09-16 DIAGNOSIS — J18.9 PNEUMONIA DUE TO INFECTIOUS ORGANISM, UNSPECIFIED LATERALITY, UNSPECIFIED PART OF LUNG: ICD-10-CM

## 2024-09-16 LAB
ALBUMIN SERPL-MCNC: 3.4 G/DL (ref 3.5–5.2)
ALBUMIN/GLOB SERPL: 1.2 G/DL
ALP SERPL-CCNC: 168 U/L (ref 39–117)
ALT SERPL W P-5'-P-CCNC: 187 U/L (ref 1–33)
ANION GAP SERPL CALCULATED.3IONS-SCNC: 7.5 MMOL/L (ref 5–15)
AST SERPL-CCNC: 261 U/L (ref 1–32)
BASOPHILS # BLD MANUAL: 0.1 10*3/MM3 (ref 0–0.2)
BASOPHILS NFR BLD MANUAL: 2 % (ref 0–1.5)
BILIRUB SERPL-MCNC: 0.2 MG/DL (ref 0–1.2)
BUN SERPL-MCNC: 7 MG/DL (ref 8–23)
BUN/CREAT SERPL: 11.1 (ref 7–25)
CALCIUM SPEC-SCNC: 8.1 MG/DL (ref 8.6–10.5)
CHLORIDE SERPL-SCNC: 108 MMOL/L (ref 98–107)
CO2 SERPL-SCNC: 23.5 MMOL/L (ref 22–29)
CREAT SERPL-MCNC: 0.63 MG/DL (ref 0.57–1)
DEPRECATED RDW RBC AUTO: 58 FL (ref 37–54)
EGFRCR SERPLBLD CKD-EPI 2021: 101.1 ML/MIN/1.73
EOSINOPHIL # BLD MANUAL: 0.1 10*3/MM3 (ref 0–0.4)
EOSINOPHIL NFR BLD MANUAL: 2 % (ref 0.3–6.2)
ERYTHROCYTE [DISTWIDTH] IN BLOOD BY AUTOMATED COUNT: 18.7 % (ref 12.3–15.4)
GLOBULIN UR ELPH-MCNC: 2.8 GM/DL
GLUCOSE SERPL-MCNC: 101 MG/DL (ref 65–99)
HCT VFR BLD AUTO: 29.8 % (ref 34–46.6)
HGB BLD-MCNC: 9.3 G/DL (ref 12–15.9)
LYMPHOCYTES # BLD MANUAL: 1.14 10*3/MM3 (ref 0.7–3.1)
LYMPHOCYTES NFR BLD MANUAL: 8.2 % (ref 5–12)
MCH RBC QN AUTO: 26.8 PG (ref 26.6–33)
MCHC RBC AUTO-ENTMCNC: 31.2 G/DL (ref 31.5–35.7)
MCV RBC AUTO: 85.9 FL (ref 79–97)
MONOCYTES # BLD: 0.42 10*3/MM3 (ref 0.1–0.9)
NEUTROPHILS # BLD AUTO: 3.34 10*3/MM3 (ref 1.7–7)
NEUTROPHILS NFR BLD MANUAL: 65.3 % (ref 42.7–76)
NRBC BLD AUTO-RTO: 0 /100 WBC (ref 0–0.2)
OVALOCYTES BLD QL SMEAR: ABNORMAL
PLAT MORPH BLD: NORMAL
PLATELET # BLD AUTO: 546 10*3/MM3 (ref 140–450)
PMV BLD AUTO: 8.9 FL (ref 6–12)
POIKILOCYTOSIS BLD QL SMEAR: ABNORMAL
POLYCHROMASIA BLD QL SMEAR: ABNORMAL
POTASSIUM SERPL-SCNC: 3.4 MMOL/L (ref 3.5–5.2)
PROT SERPL-MCNC: 6.2 G/DL (ref 6–8.5)
RBC # BLD AUTO: 3.47 10*6/MM3 (ref 3.77–5.28)
SODIUM SERPL-SCNC: 139 MMOL/L (ref 136–145)
VARIANT LYMPHS NFR BLD MANUAL: 22.4 % (ref 19.6–45.3)
WBC MORPH BLD: NORMAL
WBC NRBC COR # BLD AUTO: 5.11 10*3/MM3 (ref 3.4–10.8)

## 2024-09-16 PROCEDURE — 25010000002 PIPERACILLIN SOD-TAZOBACTAM PER 1 G: Performed by: STUDENT IN AN ORGANIZED HEALTH CARE EDUCATION/TRAINING PROGRAM

## 2024-09-16 PROCEDURE — 80053 COMPREHEN METABOLIC PANEL: CPT | Performed by: INTERNAL MEDICINE

## 2024-09-16 PROCEDURE — 94761 N-INVAS EAR/PLS OXIMETRY MLT: CPT

## 2024-09-16 PROCEDURE — 85007 BL SMEAR W/DIFF WBC COUNT: CPT | Performed by: INTERNAL MEDICINE

## 2024-09-16 PROCEDURE — 94664 DEMO&/EVAL PT USE INHALER: CPT

## 2024-09-16 PROCEDURE — 94660 CPAP INITIATION&MGMT: CPT

## 2024-09-16 PROCEDURE — 85025 COMPLETE CBC W/AUTO DIFF WBC: CPT | Performed by: INTERNAL MEDICINE

## 2024-09-16 PROCEDURE — 25010000002 ENOXAPARIN PER 10 MG: Performed by: STUDENT IN AN ORGANIZED HEALTH CARE EDUCATION/TRAINING PROGRAM

## 2024-09-16 PROCEDURE — 94799 UNLISTED PULMONARY SVC/PX: CPT

## 2024-09-16 RX ADMIN — BACLOFEN 10 MG: 10 TABLET ORAL at 09:04

## 2024-09-16 RX ADMIN — IPRATROPIUM BROMIDE AND ALBUTEROL SULFATE 3 ML: .5; 3 SOLUTION RESPIRATORY (INHALATION) at 15:20

## 2024-09-16 RX ADMIN — PIPERACILLIN AND TAZOBACTAM 3.38 G: 3; .375 INJECTION, POWDER, LYOPHILIZED, FOR SOLUTION INTRAVENOUS at 02:40

## 2024-09-16 RX ADMIN — PRAMIPEXOLE DIHYDROCHLORIDE 1.5 MG: 1.5 TABLET ORAL at 09:04

## 2024-09-16 RX ADMIN — PANTOPRAZOLE SODIUM 40 MG: 40 TABLET, DELAYED RELEASE ORAL at 18:27

## 2024-09-16 RX ADMIN — ENOXAPARIN SODIUM 40 MG: 100 INJECTION SUBCUTANEOUS at 18:27

## 2024-09-16 RX ADMIN — PRAMIPEXOLE DIHYDROCHLORIDE 1.5 MG: 1.5 TABLET ORAL at 20:38

## 2024-09-16 RX ADMIN — PRAMIPEXOLE DIHYDROCHLORIDE 1.5 MG: 1.5 TABLET ORAL at 18:27

## 2024-09-16 RX ADMIN — BACLOFEN 10 MG: 10 TABLET ORAL at 20:38

## 2024-09-16 RX ADMIN — PIPERACILLIN AND TAZOBACTAM 3.38 G: 3; .375 INJECTION, POWDER, LYOPHILIZED, FOR SOLUTION INTRAVENOUS at 09:04

## 2024-09-16 RX ADMIN — PANTOPRAZOLE SODIUM 40 MG: 40 TABLET, DELAYED RELEASE ORAL at 09:04

## 2024-09-16 RX ADMIN — ANASTROZOLE 1 MG: 1 TABLET, FILM COATED ORAL at 09:04

## 2024-09-16 RX ADMIN — IPRATROPIUM BROMIDE AND ALBUTEROL SULFATE 3 ML: .5; 3 SOLUTION RESPIRATORY (INHALATION) at 11:07

## 2024-09-16 RX ADMIN — ACETAMINOPHEN 325MG 650 MG: 325 TABLET ORAL at 04:09

## 2024-09-16 RX ADMIN — IPRATROPIUM BROMIDE AND ALBUTEROL SULFATE 3 ML: .5; 3 SOLUTION RESPIRATORY (INHALATION) at 19:27

## 2024-09-16 RX ADMIN — LACTULOSE 30 G: 10 SOLUTION ORAL at 20:39

## 2024-09-16 RX ADMIN — SENNOSIDES AND DOCUSATE SODIUM 2 TABLET: 50; 8.6 TABLET ORAL at 20:39

## 2024-09-16 RX ADMIN — ACETAMINOPHEN 325MG 650 MG: 325 TABLET ORAL at 20:38

## 2024-09-16 RX ADMIN — IPRATROPIUM BROMIDE AND ALBUTEROL SULFATE 3 ML: .5; 3 SOLUTION RESPIRATORY (INHALATION) at 07:17

## 2024-09-17 ENCOUNTER — TELEPHONE (OUTPATIENT)
Dept: ONCOLOGY | Facility: CLINIC | Age: 64
End: 2024-09-17
Payer: MEDICARE

## 2024-09-17 ENCOUNTER — READMISSION MANAGEMENT (OUTPATIENT)
Dept: CALL CENTER | Facility: HOSPITAL | Age: 64
End: 2024-09-17
Payer: MEDICARE

## 2024-09-17 VITALS
OXYGEN SATURATION: 100 % | TEMPERATURE: 98.9 F | RESPIRATION RATE: 12 BRPM | BODY MASS INDEX: 30.96 KG/M2 | WEIGHT: 186.07 LBS | SYSTOLIC BLOOD PRESSURE: 137 MMHG | DIASTOLIC BLOOD PRESSURE: 77 MMHG | HEART RATE: 75 BPM

## 2024-09-17 PROCEDURE — 94761 N-INVAS EAR/PLS OXIMETRY MLT: CPT

## 2024-09-17 PROCEDURE — 94799 UNLISTED PULMONARY SVC/PX: CPT

## 2024-09-17 PROCEDURE — 25010000002 ONDANSETRON PER 1 MG

## 2024-09-17 PROCEDURE — 94664 DEMO&/EVAL PT USE INHALER: CPT

## 2024-09-17 RX ADMIN — PANTOPRAZOLE SODIUM 40 MG: 40 TABLET, DELAYED RELEASE ORAL at 09:26

## 2024-09-17 RX ADMIN — ACETAMINOPHEN 325MG 650 MG: 325 TABLET ORAL at 09:30

## 2024-09-17 RX ADMIN — BACLOFEN 10 MG: 10 TABLET ORAL at 09:26

## 2024-09-17 RX ADMIN — PRAMIPEXOLE DIHYDROCHLORIDE 1.5 MG: 1.5 TABLET ORAL at 09:26

## 2024-09-17 RX ADMIN — ONDANSETRON 4 MG: 2 INJECTION, SOLUTION INTRAMUSCULAR; INTRAVENOUS at 09:30

## 2024-09-17 RX ADMIN — IPRATROPIUM BROMIDE AND ALBUTEROL SULFATE 3 ML: .5; 3 SOLUTION RESPIRATORY (INHALATION) at 11:08

## 2024-09-17 RX ADMIN — ANASTROZOLE 1 MG: 1 TABLET, FILM COATED ORAL at 09:26

## 2024-09-17 RX ADMIN — IPRATROPIUM BROMIDE AND ALBUTEROL SULFATE 3 ML: .5; 3 SOLUTION RESPIRATORY (INHALATION) at 07:28

## 2024-09-17 RX ADMIN — POLYETHYLENE GLYCOL 3350 17 G: 17 POWDER, FOR SOLUTION ORAL at 09:26

## 2024-09-18 ENCOUNTER — HOSPITAL ENCOUNTER (OUTPATIENT)
Facility: HOSPITAL | Age: 64
Setting detail: OBSERVATION
Discharge: NURSING, REHAB OR LONG-TERM CARE | End: 2024-09-21
Attending: STUDENT IN AN ORGANIZED HEALTH CARE EDUCATION/TRAINING PROGRAM | Admitting: INTERNAL MEDICINE
Payer: MEDICARE

## 2024-09-18 ENCOUNTER — TRANSITIONAL CARE MANAGEMENT TELEPHONE ENCOUNTER (OUTPATIENT)
Dept: CALL CENTER | Facility: HOSPITAL | Age: 64
End: 2024-09-18
Payer: MEDICARE

## 2024-09-18 ENCOUNTER — SPECIALTY PHARMACY (OUTPATIENT)
Dept: PHARMACY | Facility: HOSPITAL | Age: 64
End: 2024-09-18
Payer: MEDICARE

## 2024-09-18 DIAGNOSIS — F32.A ANXIETY AND DEPRESSION: ICD-10-CM

## 2024-09-18 DIAGNOSIS — F41.9 ANXIETY AND DEPRESSION: ICD-10-CM

## 2024-09-18 DIAGNOSIS — R53.1 WEAKNESS ACQUIRED IN ICU: Primary | ICD-10-CM

## 2024-09-18 DIAGNOSIS — G35 MULTIPLE SCLEROSIS: ICD-10-CM

## 2024-09-18 LAB
ALBUMIN SERPL-MCNC: 4.1 G/DL (ref 3.5–5.2)
ALBUMIN/GLOB SERPL: 1.3 G/DL
ALP SERPL-CCNC: 200 U/L (ref 39–117)
ALT SERPL W P-5'-P-CCNC: 279 U/L (ref 1–33)
ANION GAP SERPL CALCULATED.3IONS-SCNC: 11 MMOL/L (ref 5–15)
AST SERPL-CCNC: 344 U/L (ref 1–32)
BASOPHILS # BLD AUTO: 0.14 10*3/MM3 (ref 0–0.2)
BASOPHILS NFR BLD AUTO: 1.7 % (ref 0–1.5)
BILIRUB SERPL-MCNC: 0.3 MG/DL (ref 0–1.2)
BILIRUB UR QL STRIP: NEGATIVE
BUN SERPL-MCNC: 10 MG/DL (ref 8–23)
BUN/CREAT SERPL: 18.5 (ref 7–25)
CALCIUM SPEC-SCNC: 9.7 MG/DL (ref 8.6–10.5)
CHLORIDE SERPL-SCNC: 102 MMOL/L (ref 98–107)
CLARITY UR: CLEAR
CO2 SERPL-SCNC: 24 MMOL/L (ref 22–29)
COLOR UR: YELLOW
CREAT SERPL-MCNC: 0.54 MG/DL (ref 0.57–1)
DEPRECATED RDW RBC AUTO: 56.9 FL (ref 37–54)
EGFRCR SERPLBLD CKD-EPI 2021: 104.9 ML/MIN/1.73
EOSINOPHIL # BLD AUTO: 0.31 10*3/MM3 (ref 0–0.4)
EOSINOPHIL NFR BLD AUTO: 3.9 % (ref 0.3–6.2)
ERYTHROCYTE [DISTWIDTH] IN BLOOD BY AUTOMATED COUNT: 18.8 % (ref 12.3–15.4)
GLOBULIN UR ELPH-MCNC: 3.2 GM/DL
GLUCOSE SERPL-MCNC: 101 MG/DL (ref 65–99)
GLUCOSE UR STRIP-MCNC: NEGATIVE MG/DL
HCT VFR BLD AUTO: 36.6 % (ref 34–46.6)
HGB BLD-MCNC: 11.6 G/DL (ref 12–15.9)
HGB UR QL STRIP.AUTO: NEGATIVE
IMM GRANULOCYTES # BLD AUTO: 0.03 10*3/MM3 (ref 0–0.05)
IMM GRANULOCYTES NFR BLD AUTO: 0.4 % (ref 0–0.5)
KETONES UR QL STRIP: NEGATIVE
LEUKOCYTE ESTERASE UR QL STRIP.AUTO: NEGATIVE
LYMPHOCYTES # BLD AUTO: 1.29 10*3/MM3 (ref 0.7–3.1)
LYMPHOCYTES NFR BLD AUTO: 16.1 % (ref 19.6–45.3)
MAGNESIUM SERPL-MCNC: 2.3 MG/DL (ref 1.6–2.4)
MCH RBC QN AUTO: 27.1 PG (ref 26.6–33)
MCHC RBC AUTO-ENTMCNC: 31.7 G/DL (ref 31.5–35.7)
MCV RBC AUTO: 85.5 FL (ref 79–97)
MONOCYTES # BLD AUTO: 0.51 10*3/MM3 (ref 0.1–0.9)
MONOCYTES NFR BLD AUTO: 6.4 % (ref 5–12)
NEUTROPHILS NFR BLD AUTO: 5.73 10*3/MM3 (ref 1.7–7)
NEUTROPHILS NFR BLD AUTO: 71.5 % (ref 42.7–76)
NITRITE UR QL STRIP: NEGATIVE
NRBC BLD AUTO-RTO: 0 /100 WBC (ref 0–0.2)
PH UR STRIP.AUTO: 7.5 [PH] (ref 5–8)
PLATELET # BLD AUTO: 693 10*3/MM3 (ref 140–450)
PMV BLD AUTO: 9.2 FL (ref 6–12)
POTASSIUM SERPL-SCNC: 4 MMOL/L (ref 3.5–5.2)
PROT SERPL-MCNC: 7.3 G/DL (ref 6–8.5)
PROT UR QL STRIP: NEGATIVE
RBC # BLD AUTO: 4.28 10*6/MM3 (ref 3.77–5.28)
SODIUM SERPL-SCNC: 137 MMOL/L (ref 136–145)
SP GR UR STRIP: 1.01 (ref 1–1.03)
TROPONIN T SERPL HS-MCNC: 16 NG/L
UROBILINOGEN UR QL STRIP: NORMAL
WBC NRBC COR # BLD AUTO: 8.01 10*3/MM3 (ref 3.4–10.8)

## 2024-09-18 PROCEDURE — 80053 COMPREHEN METABOLIC PANEL: CPT | Performed by: STUDENT IN AN ORGANIZED HEALTH CARE EDUCATION/TRAINING PROGRAM

## 2024-09-18 PROCEDURE — 99285 EMERGENCY DEPT VISIT HI MDM: CPT

## 2024-09-18 PROCEDURE — 93010 ELECTROCARDIOGRAM REPORT: CPT | Performed by: INTERNAL MEDICINE

## 2024-09-18 PROCEDURE — 51798 US URINE CAPACITY MEASURE: CPT

## 2024-09-18 PROCEDURE — G0378 HOSPITAL OBSERVATION PER HR: HCPCS

## 2024-09-18 PROCEDURE — 93005 ELECTROCARDIOGRAM TRACING: CPT | Performed by: STUDENT IN AN ORGANIZED HEALTH CARE EDUCATION/TRAINING PROGRAM

## 2024-09-18 PROCEDURE — 83735 ASSAY OF MAGNESIUM: CPT | Performed by: STUDENT IN AN ORGANIZED HEALTH CARE EDUCATION/TRAINING PROGRAM

## 2024-09-18 PROCEDURE — 84484 ASSAY OF TROPONIN QUANT: CPT | Performed by: STUDENT IN AN ORGANIZED HEALTH CARE EDUCATION/TRAINING PROGRAM

## 2024-09-18 PROCEDURE — 81003 URINALYSIS AUTO W/O SCOPE: CPT | Performed by: STUDENT IN AN ORGANIZED HEALTH CARE EDUCATION/TRAINING PROGRAM

## 2024-09-18 PROCEDURE — 85025 COMPLETE CBC W/AUTO DIFF WBC: CPT | Performed by: STUDENT IN AN ORGANIZED HEALTH CARE EDUCATION/TRAINING PROGRAM

## 2024-09-18 PROCEDURE — 36415 COLL VENOUS BLD VENIPUNCTURE: CPT | Performed by: STUDENT IN AN ORGANIZED HEALTH CARE EDUCATION/TRAINING PROGRAM

## 2024-09-18 PROCEDURE — 51702 INSERT TEMP BLADDER CATH: CPT

## 2024-09-18 RX ORDER — HYDROCHLOROTHIAZIDE 12.5 MG/1
12.5 TABLET ORAL
Status: DISCONTINUED | OUTPATIENT
Start: 2024-09-19 | End: 2024-09-21 | Stop reason: HOSPADM

## 2024-09-18 RX ORDER — ACETAMINOPHEN 160 MG/5ML
650 SOLUTION ORAL EVERY 4 HOURS PRN
Status: DISCONTINUED | OUTPATIENT
Start: 2024-09-18 | End: 2024-09-21 | Stop reason: HOSPADM

## 2024-09-18 RX ORDER — PROCHLORPERAZINE MALEATE 5 MG
10 TABLET ORAL EVERY 6 HOURS PRN
Status: DISCONTINUED | OUTPATIENT
Start: 2024-09-18 | End: 2024-09-21 | Stop reason: HOSPADM

## 2024-09-18 RX ORDER — BACLOFEN 10 MG/1
20 TABLET ORAL 2 TIMES DAILY
Status: DISCONTINUED | OUTPATIENT
Start: 2024-09-18 | End: 2024-09-21 | Stop reason: HOSPADM

## 2024-09-18 RX ORDER — AMOXICILLIN 250 MG
1 CAPSULE ORAL DAILY
Status: DISCONTINUED | OUTPATIENT
Start: 2024-09-19 | End: 2024-09-21 | Stop reason: HOSPADM

## 2024-09-18 RX ORDER — ONDANSETRON 2 MG/ML
4 INJECTION INTRAMUSCULAR; INTRAVENOUS EVERY 6 HOURS PRN
Status: DISCONTINUED | OUTPATIENT
Start: 2024-09-18 | End: 2024-09-21 | Stop reason: HOSPADM

## 2024-09-18 RX ORDER — PANTOPRAZOLE SODIUM 40 MG/1
40 TABLET, DELAYED RELEASE ORAL 2 TIMES DAILY
Status: DISCONTINUED | OUTPATIENT
Start: 2024-09-18 | End: 2024-09-21 | Stop reason: HOSPADM

## 2024-09-18 RX ORDER — BISACODYL 10 MG
10 SUPPOSITORY, RECTAL RECTAL DAILY PRN
Status: DISCONTINUED | OUTPATIENT
Start: 2024-09-18 | End: 2024-09-21 | Stop reason: HOSPADM

## 2024-09-18 RX ORDER — PRAMIPEXOLE DIHYDROCHLORIDE 1.5 MG/1
1.5 TABLET ORAL 3 TIMES DAILY
Status: DISCONTINUED | OUTPATIENT
Start: 2024-09-18 | End: 2024-09-21 | Stop reason: HOSPADM

## 2024-09-18 RX ORDER — LOSARTAN POTASSIUM 50 MG/1
50 TABLET ORAL
Status: DISCONTINUED | OUTPATIENT
Start: 2024-09-19 | End: 2024-09-21 | Stop reason: HOSPADM

## 2024-09-18 RX ORDER — BISACODYL 5 MG/1
5 TABLET, DELAYED RELEASE ORAL DAILY PRN
Status: DISCONTINUED | OUTPATIENT
Start: 2024-09-18 | End: 2024-09-21 | Stop reason: HOSPADM

## 2024-09-18 RX ORDER — OLANZAPINE 5 MG/1
5 TABLET ORAL NIGHTLY
Status: DISCONTINUED | OUTPATIENT
Start: 2024-09-18 | End: 2024-09-21 | Stop reason: HOSPADM

## 2024-09-18 RX ORDER — FERROUS SULFATE 325(65) MG
325 TABLET ORAL
Status: DISCONTINUED | OUTPATIENT
Start: 2024-09-19 | End: 2024-09-21 | Stop reason: HOSPADM

## 2024-09-18 RX ORDER — ACETAMINOPHEN 325 MG/1
650 TABLET ORAL EVERY 4 HOURS PRN
Status: DISCONTINUED | OUTPATIENT
Start: 2024-09-18 | End: 2024-09-21 | Stop reason: HOSPADM

## 2024-09-18 RX ORDER — CLONAZEPAM 1 MG/1
2 TABLET ORAL AS NEEDED
Status: DISCONTINUED | OUTPATIENT
Start: 2024-09-18 | End: 2024-09-21 | Stop reason: HOSPADM

## 2024-09-18 RX ORDER — CHOLECALCIFEROL (VITAMIN D3) 25 MCG
5000 TABLET ORAL DAILY
Status: DISCONTINUED | OUTPATIENT
Start: 2024-09-19 | End: 2024-09-21 | Stop reason: HOSPADM

## 2024-09-18 RX ORDER — ATORVASTATIN CALCIUM 20 MG/1
10 TABLET, FILM COATED ORAL DAILY
Status: DISCONTINUED | OUTPATIENT
Start: 2024-09-19 | End: 2024-09-21 | Stop reason: HOSPADM

## 2024-09-18 RX ORDER — ALBUTEROL SULFATE 0.83 MG/ML
2.5 SOLUTION RESPIRATORY (INHALATION) EVERY 6 HOURS PRN
Status: DISCONTINUED | OUTPATIENT
Start: 2024-09-18 | End: 2024-09-21 | Stop reason: HOSPADM

## 2024-09-18 RX ORDER — ANASTROZOLE 1 MG/1
1 TABLET ORAL DAILY
Status: DISCONTINUED | OUTPATIENT
Start: 2024-09-19 | End: 2024-09-21 | Stop reason: HOSPADM

## 2024-09-18 RX ORDER — POLYETHYLENE GLYCOL 3350 17 G/17G
17 POWDER, FOR SOLUTION ORAL DAILY PRN
Status: DISCONTINUED | OUTPATIENT
Start: 2024-09-18 | End: 2024-09-21 | Stop reason: HOSPADM

## 2024-09-18 RX ORDER — ACETAMINOPHEN 650 MG/1
650 SUPPOSITORY RECTAL EVERY 4 HOURS PRN
Status: DISCONTINUED | OUTPATIENT
Start: 2024-09-18 | End: 2024-09-21 | Stop reason: HOSPADM

## 2024-09-18 RX ORDER — AMOXICILLIN 250 MG
2 CAPSULE ORAL 2 TIMES DAILY PRN
Status: DISCONTINUED | OUTPATIENT
Start: 2024-09-18 | End: 2024-09-21 | Stop reason: HOSPADM

## 2024-09-18 RX ADMIN — PANTOPRAZOLE SODIUM 40 MG: 40 TABLET, DELAYED RELEASE ORAL at 22:04

## 2024-09-18 RX ADMIN — BACLOFEN 20 MG: 10 TABLET ORAL at 22:04

## 2024-09-18 RX ADMIN — CLONAZEPAM 1 MG: 1 TABLET ORAL at 22:04

## 2024-09-18 RX ADMIN — PRAMIPEXOLE DIHYDROCHLORIDE 1.5 MG: 1.5 TABLET ORAL at 22:04

## 2024-09-18 RX ADMIN — OLANZAPINE 5 MG: 5 TABLET, FILM COATED ORAL at 22:04

## 2024-09-19 ENCOUNTER — SPECIALTY PHARMACY (OUTPATIENT)
Dept: PHARMACY | Facility: HOSPITAL | Age: 64
End: 2024-09-19
Payer: MEDICARE

## 2024-09-19 ENCOUNTER — APPOINTMENT (OUTPATIENT)
Dept: ULTRASOUND IMAGING | Facility: HOSPITAL | Age: 64
End: 2024-09-19
Payer: MEDICARE

## 2024-09-19 LAB
ANION GAP SERPL CALCULATED.3IONS-SCNC: 10.7 MMOL/L (ref 5–15)
BUN SERPL-MCNC: 10 MG/DL (ref 8–23)
BUN/CREAT SERPL: 16.7 (ref 7–25)
CALCIUM SPEC-SCNC: 8.7 MG/DL (ref 8.6–10.5)
CHLORIDE SERPL-SCNC: 107 MMOL/L (ref 98–107)
CO2 SERPL-SCNC: 23.3 MMOL/L (ref 22–29)
CREAT SERPL-MCNC: 0.6 MG/DL (ref 0.57–1)
DEPRECATED RDW RBC AUTO: 58.5 FL (ref 37–54)
EGFRCR SERPLBLD CKD-EPI 2021: 102.3 ML/MIN/1.73
EOSINOPHIL # BLD MANUAL: 0.14 10*3/MM3 (ref 0–0.4)
EOSINOPHIL NFR BLD MANUAL: 2 % (ref 0.3–6.2)
ERYTHROCYTE [DISTWIDTH] IN BLOOD BY AUTOMATED COUNT: 18.5 % (ref 12.3–15.4)
GLUCOSE SERPL-MCNC: 107 MG/DL (ref 65–99)
HAV IGM SERPL QL IA: NORMAL
HBV CORE IGM SERPL QL IA: NORMAL
HBV SURFACE AG SERPL QL IA: NORMAL
HCT VFR BLD AUTO: 35.6 % (ref 34–46.6)
HCV AB SER QL: NORMAL
HGB BLD-MCNC: 10.5 G/DL (ref 12–15.9)
LYMPHOCYTES # BLD MANUAL: 1.81 10*3/MM3 (ref 0.7–3.1)
LYMPHOCYTES NFR BLD MANUAL: 4 % (ref 5–12)
MCH RBC QN AUTO: 25.8 PG (ref 26.6–33)
MCHC RBC AUTO-ENTMCNC: 29.5 G/DL (ref 31.5–35.7)
MCV RBC AUTO: 87.5 FL (ref 79–97)
MONOCYTES # BLD: 0.29 10*3/MM3 (ref 0.1–0.9)
NEUTROPHILS # BLD AUTO: 4.98 10*3/MM3 (ref 1.7–7)
NEUTROPHILS NFR BLD MANUAL: 69 % (ref 42.7–76)
NRBC BLD AUTO-RTO: 0 /100 WBC (ref 0–0.2)
PLAT MORPH BLD: NORMAL
PLATELET # BLD AUTO: 650 10*3/MM3 (ref 140–450)
PMV BLD AUTO: 9.1 FL (ref 6–12)
POTASSIUM SERPL-SCNC: 3.5 MMOL/L (ref 3.5–5.2)
QT INTERVAL: 428 MS
QTC INTERVAL: 452 MS
RBC # BLD AUTO: 4.07 10*6/MM3 (ref 3.77–5.28)
RBC MORPH BLD: NORMAL
SODIUM SERPL-SCNC: 141 MMOL/L (ref 136–145)
VARIANT LYMPHS NFR BLD MANUAL: 25 % (ref 19.6–45.3)
WBC MORPH BLD: NORMAL
WBC NRBC COR # BLD AUTO: 7.22 10*3/MM3 (ref 3.4–10.8)

## 2024-09-19 PROCEDURE — 97166 OT EVAL MOD COMPLEX 45 MIN: CPT

## 2024-09-19 PROCEDURE — 80048 BASIC METABOLIC PNL TOTAL CA: CPT | Performed by: INTERNAL MEDICINE

## 2024-09-19 PROCEDURE — 97110 THERAPEUTIC EXERCISES: CPT

## 2024-09-19 PROCEDURE — 97530 THERAPEUTIC ACTIVITIES: CPT

## 2024-09-19 PROCEDURE — 80074 ACUTE HEPATITIS PANEL: CPT | Performed by: STUDENT IN AN ORGANIZED HEALTH CARE EDUCATION/TRAINING PROGRAM

## 2024-09-19 PROCEDURE — G0378 HOSPITAL OBSERVATION PER HR: HCPCS

## 2024-09-19 PROCEDURE — 76705 ECHO EXAM OF ABDOMEN: CPT

## 2024-09-19 PROCEDURE — 97162 PT EVAL MOD COMPLEX 30 MIN: CPT

## 2024-09-19 PROCEDURE — 85025 COMPLETE CBC W/AUTO DIFF WBC: CPT | Performed by: INTERNAL MEDICINE

## 2024-09-19 RX ADMIN — Medication 5000 UNITS: at 08:46

## 2024-09-19 RX ADMIN — BACLOFEN 20 MG: 10 TABLET ORAL at 08:47

## 2024-09-19 RX ADMIN — FERROUS SULFATE TAB 325 MG (65 MG ELEMENTAL FE) 325 MG: 325 (65 FE) TAB at 08:47

## 2024-09-19 RX ADMIN — HYDROCHLOROTHIAZIDE 12.5 MG: 12.5 TABLET ORAL at 08:47

## 2024-09-19 RX ADMIN — LOSARTAN POTASSIUM 50 MG: 50 TABLET, FILM COATED ORAL at 08:46

## 2024-09-19 RX ADMIN — ATORVASTATIN CALCIUM 10 MG: 20 TABLET, FILM COATED ORAL at 08:46

## 2024-09-19 RX ADMIN — PRAMIPEXOLE DIHYDROCHLORIDE 1.5 MG: 1.5 TABLET ORAL at 20:00

## 2024-09-19 RX ADMIN — PRAMIPEXOLE DIHYDROCHLORIDE 1.5 MG: 1.5 TABLET ORAL at 08:48

## 2024-09-19 RX ADMIN — BACLOFEN 20 MG: 10 TABLET ORAL at 20:00

## 2024-09-19 RX ADMIN — PRAMIPEXOLE DIHYDROCHLORIDE 1.5 MG: 1.5 TABLET ORAL at 16:02

## 2024-09-19 RX ADMIN — PANTOPRAZOLE SODIUM 40 MG: 40 TABLET, DELAYED RELEASE ORAL at 08:48

## 2024-09-19 RX ADMIN — ANASTROZOLE 1 MG: 1 TABLET ORAL at 20:00

## 2024-09-19 RX ADMIN — OLANZAPINE 5 MG: 5 TABLET, FILM COATED ORAL at 20:00

## 2024-09-19 RX ADMIN — ACETAMINOPHEN 325MG 650 MG: 325 TABLET ORAL at 08:57

## 2024-09-19 RX ADMIN — PANTOPRAZOLE SODIUM 40 MG: 40 TABLET, DELAYED RELEASE ORAL at 20:00

## 2024-09-20 ENCOUNTER — TELEPHONE (OUTPATIENT)
Dept: ONCOLOGY | Facility: CLINIC | Age: 64
End: 2024-09-20

## 2024-09-20 DIAGNOSIS — C50.812 MALIGNANT NEOPLASM OF OVERLAPPING SITES OF LEFT BREAST IN FEMALE, ESTROGEN RECEPTOR POSITIVE: Primary | ICD-10-CM

## 2024-09-20 DIAGNOSIS — Z17.0 MALIGNANT NEOPLASM OF OVERLAPPING SITES OF LEFT BREAST IN FEMALE, ESTROGEN RECEPTOR POSITIVE: Primary | ICD-10-CM

## 2024-09-20 DIAGNOSIS — C79.51 CANCER, METASTATIC TO BONE: ICD-10-CM

## 2024-09-20 LAB
ALBUMIN SERPL-MCNC: 3.7 G/DL (ref 3.5–5.2)
ALBUMIN/GLOB SERPL: 1.2 G/DL
ALP SERPL-CCNC: 169 U/L (ref 39–117)
ALT SERPL W P-5'-P-CCNC: 194 U/L (ref 1–33)
ANION GAP SERPL CALCULATED.3IONS-SCNC: 9.5 MMOL/L (ref 5–15)
AST SERPL-CCNC: 199 U/L (ref 1–32)
BASOPHILS # BLD AUTO: 0.17 10*3/MM3 (ref 0–0.2)
BASOPHILS NFR BLD AUTO: 2.3 % (ref 0–1.5)
BILIRUB SERPL-MCNC: <0.2 MG/DL (ref 0–1.2)
BUN SERPL-MCNC: 15 MG/DL (ref 8–23)
BUN/CREAT SERPL: 21.1 (ref 7–25)
CALCIUM SPEC-SCNC: 8.6 MG/DL (ref 8.6–10.5)
CHLORIDE SERPL-SCNC: 106 MMOL/L (ref 98–107)
CO2 SERPL-SCNC: 25.5 MMOL/L (ref 22–29)
CREAT SERPL-MCNC: 0.71 MG/DL (ref 0.57–1)
DEPRECATED RDW RBC AUTO: 56 FL (ref 37–54)
EGFRCR SERPLBLD CKD-EPI 2021: 96.9 ML/MIN/1.73
EOSINOPHIL # BLD AUTO: 0.45 10*3/MM3 (ref 0–0.4)
EOSINOPHIL NFR BLD AUTO: 6.2 % (ref 0.3–6.2)
ERYTHROCYTE [DISTWIDTH] IN BLOOD BY AUTOMATED COUNT: 18.3 % (ref 12.3–15.4)
GLOBULIN UR ELPH-MCNC: 3 GM/DL
GLUCOSE SERPL-MCNC: 139 MG/DL (ref 65–99)
HCT VFR BLD AUTO: 35 % (ref 34–46.6)
HGB BLD-MCNC: 10.8 G/DL (ref 12–15.9)
IMM GRANULOCYTES # BLD AUTO: 0.05 10*3/MM3 (ref 0–0.05)
IMM GRANULOCYTES NFR BLD AUTO: 0.7 % (ref 0–0.5)
LYMPHOCYTES # BLD AUTO: 1.6 10*3/MM3 (ref 0.7–3.1)
LYMPHOCYTES NFR BLD AUTO: 22.1 % (ref 19.6–45.3)
MCH RBC QN AUTO: 26.4 PG (ref 26.6–33)
MCHC RBC AUTO-ENTMCNC: 30.9 G/DL (ref 31.5–35.7)
MCV RBC AUTO: 85.6 FL (ref 79–97)
MONOCYTES # BLD AUTO: 0.59 10*3/MM3 (ref 0.1–0.9)
MONOCYTES NFR BLD AUTO: 8.1 % (ref 5–12)
NEUTROPHILS NFR BLD AUTO: 4.38 10*3/MM3 (ref 1.7–7)
NEUTROPHILS NFR BLD AUTO: 60.6 % (ref 42.7–76)
NRBC BLD AUTO-RTO: 0 /100 WBC (ref 0–0.2)
PLATELET # BLD AUTO: 642 10*3/MM3 (ref 140–450)
PMV BLD AUTO: 8.9 FL (ref 6–12)
POTASSIUM SERPL-SCNC: 3.6 MMOL/L (ref 3.5–5.2)
PROT SERPL-MCNC: 6.7 G/DL (ref 6–8.5)
RBC # BLD AUTO: 4.09 10*6/MM3 (ref 3.77–5.28)
SODIUM SERPL-SCNC: 141 MMOL/L (ref 136–145)
WBC NRBC COR # BLD AUTO: 7.24 10*3/MM3 (ref 3.4–10.8)

## 2024-09-20 PROCEDURE — 99222 1ST HOSP IP/OBS MODERATE 55: CPT | Performed by: INTERNAL MEDICINE

## 2024-09-20 PROCEDURE — 80053 COMPREHEN METABOLIC PANEL: CPT | Performed by: STUDENT IN AN ORGANIZED HEALTH CARE EDUCATION/TRAINING PROGRAM

## 2024-09-20 PROCEDURE — G0378 HOSPITAL OBSERVATION PER HR: HCPCS

## 2024-09-20 PROCEDURE — 85025 COMPLETE CBC W/AUTO DIFF WBC: CPT | Performed by: STUDENT IN AN ORGANIZED HEALTH CARE EDUCATION/TRAINING PROGRAM

## 2024-09-20 RX ADMIN — PANTOPRAZOLE SODIUM 40 MG: 40 TABLET, DELAYED RELEASE ORAL at 20:43

## 2024-09-20 RX ADMIN — PRAMIPEXOLE DIHYDROCHLORIDE 1.5 MG: 1.5 TABLET ORAL at 16:48

## 2024-09-20 RX ADMIN — CLONAZEPAM 0.5 MG: 1 TABLET ORAL at 02:56

## 2024-09-20 RX ADMIN — PANTOPRAZOLE SODIUM 40 MG: 40 TABLET, DELAYED RELEASE ORAL at 09:45

## 2024-09-20 RX ADMIN — LOSARTAN POTASSIUM 50 MG: 50 TABLET, FILM COATED ORAL at 09:53

## 2024-09-20 RX ADMIN — SENNOSIDES AND DOCUSATE SODIUM 1 TABLET: 50; 8.6 TABLET ORAL at 09:45

## 2024-09-20 RX ADMIN — CLONAZEPAM 0.5 MG: 1 TABLET ORAL at 22:31

## 2024-09-20 RX ADMIN — FERROUS SULFATE TAB 325 MG (65 MG ELEMENTAL FE) 325 MG: 325 (65 FE) TAB at 09:45

## 2024-09-20 RX ADMIN — PRAMIPEXOLE DIHYDROCHLORIDE 1.5 MG: 1.5 TABLET ORAL at 09:46

## 2024-09-20 RX ADMIN — ANASTROZOLE 1 MG: 1 TABLET ORAL at 20:43

## 2024-09-20 RX ADMIN — BACLOFEN 20 MG: 10 TABLET ORAL at 20:43

## 2024-09-20 RX ADMIN — PRAMIPEXOLE DIHYDROCHLORIDE 1.5 MG: 1.5 TABLET ORAL at 20:43

## 2024-09-20 RX ADMIN — ATORVASTATIN CALCIUM 10 MG: 20 TABLET, FILM COATED ORAL at 09:52

## 2024-09-20 RX ADMIN — Medication 5000 UNITS: at 09:53

## 2024-09-20 RX ADMIN — OLANZAPINE 5 MG: 5 TABLET, FILM COATED ORAL at 20:43

## 2024-09-20 RX ADMIN — HYDROCHLOROTHIAZIDE 12.5 MG: 12.5 TABLET ORAL at 09:53

## 2024-09-20 RX ADMIN — BACLOFEN 20 MG: 10 TABLET ORAL at 09:45

## 2024-09-20 NOTE — TELEPHONE ENCOUNTER
Caller: Maritza Bejarano    Relationship: Self    Best call back number: [phone]    What is the best time to reach you: ANYTIME    Who are you requesting to speak with (clinical staff, provider,  specific staff member): BEKA BOSE         What was the call regarding:     HAD MESSAGE FROM BEKA BOSE EARLIER THIS WEEK TO CALL BACK. RETURNING HIS CALL     Is it okay if the provider responds through MyChart: NO

## 2024-09-21 VITALS
SYSTOLIC BLOOD PRESSURE: 121 MMHG | DIASTOLIC BLOOD PRESSURE: 71 MMHG | OXYGEN SATURATION: 98 % | BODY MASS INDEX: 31.96 KG/M2 | HEIGHT: 65 IN | WEIGHT: 191.8 LBS | TEMPERATURE: 97.7 F | RESPIRATION RATE: 16 BRPM | HEART RATE: 86 BPM

## 2024-09-21 PROBLEM — R79.89 ELEVATED LFTS: Status: ACTIVE | Noted: 2024-09-21

## 2024-09-21 LAB
ALBUMIN SERPL-MCNC: 3.8 G/DL (ref 3.5–5.2)
ALBUMIN/GLOB SERPL: 1.2 G/DL
ALP SERPL-CCNC: 159 U/L (ref 39–117)
ALT SERPL W P-5'-P-CCNC: 161 U/L (ref 1–33)
ANION GAP SERPL CALCULATED.3IONS-SCNC: 10 MMOL/L (ref 5–15)
AST SERPL-CCNC: 160 U/L (ref 1–32)
BASOPHILS # BLD AUTO: 0.19 10*3/MM3 (ref 0–0.2)
BASOPHILS NFR BLD AUTO: 2.7 % (ref 0–1.5)
BILIRUB SERPL-MCNC: 0.2 MG/DL (ref 0–1.2)
BUN SERPL-MCNC: 16 MG/DL (ref 8–23)
BUN/CREAT SERPL: 26.7 (ref 7–25)
CALCIUM SPEC-SCNC: 8.9 MG/DL (ref 8.6–10.5)
CHLORIDE SERPL-SCNC: 104 MMOL/L (ref 98–107)
CO2 SERPL-SCNC: 26 MMOL/L (ref 22–29)
CREAT SERPL-MCNC: 0.6 MG/DL (ref 0.57–1)
DEPRECATED RDW RBC AUTO: 56.6 FL (ref 37–54)
EGFRCR SERPLBLD CKD-EPI 2021: 102.3 ML/MIN/1.73
EOSINOPHIL # BLD AUTO: 0.5 10*3/MM3 (ref 0–0.4)
EOSINOPHIL NFR BLD AUTO: 7.1 % (ref 0.3–6.2)
ERYTHROCYTE [DISTWIDTH] IN BLOOD BY AUTOMATED COUNT: 18.4 % (ref 12.3–15.4)
GLOBULIN UR ELPH-MCNC: 3.1 GM/DL
GLUCOSE SERPL-MCNC: 97 MG/DL (ref 65–99)
HCT VFR BLD AUTO: 34.9 % (ref 34–46.6)
HGB BLD-MCNC: 10.9 G/DL (ref 12–15.9)
IMM GRANULOCYTES # BLD AUTO: 0.05 10*3/MM3 (ref 0–0.05)
IMM GRANULOCYTES NFR BLD AUTO: 0.7 % (ref 0–0.5)
LYMPHOCYTES # BLD AUTO: 1.75 10*3/MM3 (ref 0.7–3.1)
LYMPHOCYTES NFR BLD AUTO: 24.7 % (ref 19.6–45.3)
MCH RBC QN AUTO: 26.7 PG (ref 26.6–33)
MCHC RBC AUTO-ENTMCNC: 31.2 G/DL (ref 31.5–35.7)
MCV RBC AUTO: 85.5 FL (ref 79–97)
MONOCYTES # BLD AUTO: 0.68 10*3/MM3 (ref 0.1–0.9)
MONOCYTES NFR BLD AUTO: 9.6 % (ref 5–12)
NEUTROPHILS NFR BLD AUTO: 3.92 10*3/MM3 (ref 1.7–7)
NEUTROPHILS NFR BLD AUTO: 55.2 % (ref 42.7–76)
NRBC BLD AUTO-RTO: 0 /100 WBC (ref 0–0.2)
PLATELET # BLD AUTO: 626 10*3/MM3 (ref 140–450)
PMV BLD AUTO: 8.9 FL (ref 6–12)
POTASSIUM SERPL-SCNC: 3.6 MMOL/L (ref 3.5–5.2)
PROT SERPL-MCNC: 6.9 G/DL (ref 6–8.5)
RBC # BLD AUTO: 4.08 10*6/MM3 (ref 3.77–5.28)
SODIUM SERPL-SCNC: 140 MMOL/L (ref 136–145)
WBC NRBC COR # BLD AUTO: 7.09 10*3/MM3 (ref 3.4–10.8)

## 2024-09-21 PROCEDURE — G0378 HOSPITAL OBSERVATION PER HR: HCPCS

## 2024-09-21 PROCEDURE — 85025 COMPLETE CBC W/AUTO DIFF WBC: CPT | Performed by: STUDENT IN AN ORGANIZED HEALTH CARE EDUCATION/TRAINING PROGRAM

## 2024-09-21 PROCEDURE — 80053 COMPREHEN METABOLIC PANEL: CPT | Performed by: STUDENT IN AN ORGANIZED HEALTH CARE EDUCATION/TRAINING PROGRAM

## 2024-09-21 PROCEDURE — 63710000001 PREDNISONE PER 1 MG: Performed by: HOSPITALIST

## 2024-09-21 RX ORDER — PREDNISONE 20 MG/1
20 TABLET ORAL
Status: DISCONTINUED | OUTPATIENT
Start: 2024-09-21 | End: 2024-09-21 | Stop reason: HOSPADM

## 2024-09-21 RX ORDER — PREDNISONE 20 MG/1
20 TABLET ORAL
Start: 2024-09-21 | End: 2024-09-26

## 2024-09-21 RX ORDER — PANTOPRAZOLE SODIUM 40 MG/1
40 TABLET, DELAYED RELEASE ORAL 2 TIMES DAILY
Start: 2024-09-21

## 2024-09-21 RX ORDER — CLONAZEPAM 2 MG/1
2 TABLET ORAL 2 TIMES DAILY PRN
Qty: 6 TABLET | Refills: 0 | Status: SHIPPED | OUTPATIENT
Start: 2024-09-21

## 2024-09-21 RX ADMIN — LOSARTAN POTASSIUM 50 MG: 50 TABLET, FILM COATED ORAL at 09:10

## 2024-09-21 RX ADMIN — PRAMIPEXOLE DIHYDROCHLORIDE 1.5 MG: 1.5 TABLET ORAL at 09:10

## 2024-09-21 RX ADMIN — BACLOFEN 20 MG: 10 TABLET ORAL at 09:10

## 2024-09-21 RX ADMIN — PREDNISONE 20 MG: 20 TABLET ORAL at 12:42

## 2024-09-21 RX ADMIN — HYDROCHLOROTHIAZIDE 12.5 MG: 12.5 TABLET ORAL at 09:09

## 2024-09-21 RX ADMIN — ATORVASTATIN CALCIUM 10 MG: 20 TABLET, FILM COATED ORAL at 09:08

## 2024-09-21 RX ADMIN — Medication 5000 UNITS: at 09:08

## 2024-09-21 RX ADMIN — FERROUS SULFATE TAB 325 MG (65 MG ELEMENTAL FE) 325 MG: 325 (65 FE) TAB at 09:08

## 2024-09-21 RX ADMIN — PANTOPRAZOLE SODIUM 40 MG: 40 TABLET, DELAYED RELEASE ORAL at 09:09

## 2024-09-23 ENCOUNTER — TELEPHONE (OUTPATIENT)
Dept: ONCOLOGY | Facility: CLINIC | Age: 64
End: 2024-09-23
Payer: MEDICARE

## 2024-09-27 ENCOUNTER — TELEPHONE (OUTPATIENT)
Dept: ONCOLOGY | Facility: CLINIC | Age: 64
End: 2024-09-27
Payer: MEDICARE

## 2024-10-02 ENCOUNTER — TELEPHONE (OUTPATIENT)
Dept: INTERNAL MEDICINE | Facility: CLINIC | Age: 64
End: 2024-10-02
Payer: MEDICARE

## 2024-10-02 ENCOUNTER — PATIENT OUTREACH (OUTPATIENT)
Dept: OTHER | Facility: HOSPITAL | Age: 64
End: 2024-10-02
Payer: MEDICARE

## 2024-10-02 NOTE — TELEPHONE ENCOUNTER
Received call from patient she is currently in [facility] acute care for a bad UTI. She is asking how she can be transfer to [facility] so she can build her strength up.

## 2024-10-02 NOTE — PROGRESS NOTES
Called Ms. Bejarano to see how she was doing. Left a message with my contact information and asked her to call back at her convenience.

## 2024-10-02 NOTE — TELEPHONE ENCOUNTER
Wants to talk to Dennies about her patient care and has medical questions for Dr. Carbone. Please call her back at [phone] as soon as you can.

## 2024-10-04 ENCOUNTER — PATIENT OUTREACH (OUTPATIENT)
Dept: OTHER | Facility: HOSPITAL | Age: 64
End: 2024-10-04
Payer: MEDICARE

## 2024-10-04 ENCOUNTER — OFFICE VISIT (OUTPATIENT)
Dept: INTERNAL MEDICINE | Facility: CLINIC | Age: 64
End: 2024-10-04
Payer: MEDICARE

## 2024-10-04 VITALS
HEIGHT: 65 IN | TEMPERATURE: 97.7 F | SYSTOLIC BLOOD PRESSURE: 140 MMHG | HEART RATE: 102 BPM | DIASTOLIC BLOOD PRESSURE: 70 MMHG | WEIGHT: 166 LBS | BODY MASS INDEX: 27.66 KG/M2 | OXYGEN SATURATION: 96 %

## 2024-10-04 DIAGNOSIS — D50.9 IRON DEFICIENCY ANEMIA, UNSPECIFIED IRON DEFICIENCY ANEMIA TYPE: ICD-10-CM

## 2024-10-04 DIAGNOSIS — K71.9 DRUG-INDUCED LIVER INJURY: ICD-10-CM

## 2024-10-04 DIAGNOSIS — Z09 HOSPITAL DISCHARGE FOLLOW-UP: Primary | ICD-10-CM

## 2024-10-04 NOTE — PROGRESS NOTES
Ms. Bejarano called back with questions about upcoming appointments and we discussed those. She was thankful for the help.

## 2024-10-04 NOTE — PROGRESS NOTES
"Transitional Care Follow Up Visit  Subjective     Maritza Bejarano is a 63 y.o. female who presents for a transitional care management visit.    Within 48 business hours after discharge our office contacted her via telephone to coordinate her care and needs.      I reviewed and discussed the details of that call along with the discharge summary, hospital problems, inpatient lab results, inpatient diagnostic studies, and consultation reports with Maritza.     Current outpatient and discharge medications have been reconciled for the patient.  Reviewed by: Enoc Carbone DO          9/17/2024     4:47 PM   Date of TCM Phone Call   [facility]   Date of Admission 9/7/2024   Date of Discharge 9/17/2024   Discharge Disposition Home-Health Care [facility]     Risk for Readmission (LACE) Score: 10 (9/21/2024  6:00 AM)      History of Present Illness   Course During Hospital Stay:     \"Very pleasant 62yo woman with MS, neurogenic bladder, HTN, and left breast cancer with skeletal mets, who was just discharged after admission here for pneumonia and KINZA with brief transfer to the ICU requiring BiPAP. She was discharged home but was too weak and returned for placement.      Weakness felt to be multifactorial due to cancer, MS, recent hospitalization   PT recommended SNF  Arrangements made for dc to [facility] today  Have started 5d burst of Prednisone at pt request--this usually helps her a great deal with weakness given her h/o MS. Her neurologist has prescribed in past. Already on PPI for GI ppx.     Consulted Oncology per her request.   They recommend continuing to hold Ribociclib but okay to continue anastrozole.   No plans to resume Ribociclib until LFTs normalize (see below).     LFTs falling w/o specific treatment  Previous CT showed hepatic steatosis  Acute hep panel negative  Liver ultrasound with only fatty infiltration  Related to CA Ribociclib?  Will also hold statin until LFTs normalize--defer restarting " "statin to her PCP at f/u on 10/4/24     BPs acceptable on home meds     Neurogenic bladder is followed by [facility], she has appt with them in early October  Continue Matias placed last admission until then\"     Patient states she is still in skilled nursing at [facility] . States they were running out of catheter supplies and not cathing her as frequently as she needed.   She states that PT has been excellent there however and she feels like she is getting stronger. Pt states she is going to be going to assisted living for a year or less once she finishes skilled nursing.  She states she went to Urology earlier this week and is being considered for suprapubic cath vs continued matias.     The following portions of the patient's history were reviewed and updated as appropriate: allergies, current medications, past family history, past medical history, past social history, past surgical history, and problem list.        Objective   There were no vitals taken for this visit.  Physical Exam  Vitals reviewed.   Constitutional:       General: She is not in acute distress.     Appearance: Normal appearance. She is not ill-appearing.   Eyes:      General: No scleral icterus.  Pulmonary:      Effort: Pulmonary effort is normal. No respiratory distress.   Musculoskeletal:      Comments: Ambulating via motor wheelchair    Skin:     Coloration: Skin is not jaundiced.   Neurological:      Mental Status: She is alert.   Psychiatric:         Mood and Affect: Mood normal.         Behavior: Behavior normal.         Thought Content: Thought content normal.         Assessment & Plan   Diagnoses and all orders for this visit:    1. Hospital discharge follow-up (Primary)  2. Drug-induced liver injury  3. Iron deficiency anemia, unspecified iron deficiency anemia type  -pt here today for hospital discharge follow up. I reviewed day of discharge labs as well as d/c summary.  -patient has seen urology as recommended at discharge. Plan per " her report is continued indwelling catheter vs suprapubic cath.   -she is very pleased with the level of PT support she is getting at her current SNF  -continue specialist care , coordination between heme/onc and neurology. Pt may be obtaining IV steroids for her MS soon. Not currently on MS specific therapy  -BP acceptable at 140/70 today  -will obtain repeat CMP and CBC/iron studies to eval for improving anemia and liver inflammation  -     CBC & Differential  -     Iron Profile  -     Ferritin        -     Comprehensive Metabolic Panel

## 2024-10-08 ENCOUNTER — TELEPHONE (OUTPATIENT)
Dept: ONCOLOGY | Facility: CLINIC | Age: 64
End: 2024-10-08
Payer: MEDICARE

## 2024-10-08 NOTE — TELEPHONE ENCOUNTER
Mary Beth Kaur, states that she is being treated by [facility] for her MS and wants clearance from Dr. Lopes to treat the patient for IV Steroids.

## 2024-10-08 NOTE — TELEPHONE ENCOUNTER
Called Vincent back, let her know per Dr. Lopes that the patient is ok to have IV Steroids. No answer, left a VM.

## 2024-10-09 ENCOUNTER — TELEPHONE (OUTPATIENT)
Dept: ONCOLOGY | Facility: CLINIC | Age: 64
End: 2024-10-09

## 2024-10-14 ENCOUNTER — SPECIALTY PHARMACY (OUTPATIENT)
Dept: PHARMACY | Facility: HOSPITAL | Age: 64
End: 2024-10-14
Payer: MEDICARE

## 2024-10-14 ENCOUNTER — OFFICE VISIT (OUTPATIENT)
Dept: ONCOLOGY | Facility: CLINIC | Age: 64
End: 2024-10-14
Payer: MEDICARE

## 2024-10-14 ENCOUNTER — INFUSION (OUTPATIENT)
Dept: ONCOLOGY | Facility: HOSPITAL | Age: 64
End: 2024-10-14
Payer: MEDICARE

## 2024-10-14 ENCOUNTER — LAB (OUTPATIENT)
Dept: LAB | Facility: HOSPITAL | Age: 64
End: 2024-10-14
Payer: MEDICARE

## 2024-10-14 VITALS
OXYGEN SATURATION: 96 % | SYSTOLIC BLOOD PRESSURE: 117 MMHG | RESPIRATION RATE: 16 BRPM | BODY MASS INDEX: 29.99 KG/M2 | TEMPERATURE: 97.6 F | HEART RATE: 75 BPM | WEIGHT: 180 LBS | DIASTOLIC BLOOD PRESSURE: 76 MMHG | HEIGHT: 65 IN

## 2024-10-14 DIAGNOSIS — C79.51 CANCER, METASTATIC TO BONE: ICD-10-CM

## 2024-10-14 DIAGNOSIS — C50.812 MALIGNANT NEOPLASM OF OVERLAPPING SITES OF LEFT BREAST IN FEMALE, ESTROGEN RECEPTOR POSITIVE: ICD-10-CM

## 2024-10-14 DIAGNOSIS — Z17.0 MALIGNANT NEOPLASM OF OVERLAPPING SITES OF LEFT BREAST IN FEMALE, ESTROGEN RECEPTOR POSITIVE: ICD-10-CM

## 2024-10-14 DIAGNOSIS — C79.51 CANCER, METASTATIC TO BONE: Primary | ICD-10-CM

## 2024-10-14 LAB
ALBUMIN SERPL-MCNC: 4 G/DL (ref 3.5–5.2)
ALBUMIN/GLOB SERPL: 1.3 G/DL
ALP SERPL-CCNC: 135 U/L (ref 39–117)
ALT SERPL W P-5'-P-CCNC: 27 U/L (ref 1–33)
ANION GAP SERPL CALCULATED.3IONS-SCNC: 9.2 MMOL/L (ref 5–15)
AST SERPL-CCNC: 32 U/L (ref 1–32)
BASOPHILS # BLD AUTO: 0.06 10*3/MM3 (ref 0–0.2)
BASOPHILS NFR BLD AUTO: 0.5 % (ref 0–1.5)
BILIRUB SERPL-MCNC: 0.2 MG/DL (ref 0–1.2)
BUN SERPL-MCNC: 18 MG/DL (ref 8–23)
BUN/CREAT SERPL: 31.6 (ref 7–25)
CALCIUM SPEC-SCNC: 9.3 MG/DL (ref 8.6–10.5)
CHLORIDE SERPL-SCNC: 102 MMOL/L (ref 98–107)
CO2 SERPL-SCNC: 27.8 MMOL/L (ref 22–29)
CREAT SERPL-MCNC: 0.57 MG/DL (ref 0.57–1)
DEPRECATED RDW RBC AUTO: 68.2 FL (ref 37–54)
EGFRCR SERPLBLD CKD-EPI 2021: 103.5 ML/MIN/1.73
EOSINOPHIL # BLD AUTO: 0.29 10*3/MM3 (ref 0–0.4)
EOSINOPHIL NFR BLD AUTO: 2.5 % (ref 0.3–6.2)
ERYTHROCYTE [DISTWIDTH] IN BLOOD BY AUTOMATED COUNT: 21.4 % (ref 12.3–15.4)
GLOBULIN UR ELPH-MCNC: 3 GM/DL
GLUCOSE SERPL-MCNC: 111 MG/DL (ref 65–99)
HCT VFR BLD AUTO: 41.2 % (ref 34–46.6)
HGB BLD-MCNC: 12.7 G/DL (ref 12–15.9)
IMM GRANULOCYTES # BLD AUTO: 0.12 10*3/MM3 (ref 0–0.05)
IMM GRANULOCYTES NFR BLD AUTO: 1.1 % (ref 0–0.5)
LYMPHOCYTES # BLD AUTO: 1.83 10*3/MM3 (ref 0.7–3.1)
LYMPHOCYTES NFR BLD AUTO: 16 % (ref 19.6–45.3)
MAGNESIUM SERPL-MCNC: 2.4 MG/DL (ref 1.6–2.4)
MCH RBC QN AUTO: 27.4 PG (ref 26.6–33)
MCHC RBC AUTO-ENTMCNC: 30.8 G/DL (ref 31.5–35.7)
MCV RBC AUTO: 88.8 FL (ref 79–97)
MONOCYTES # BLD AUTO: 0.65 10*3/MM3 (ref 0.1–0.9)
MONOCYTES NFR BLD AUTO: 5.7 % (ref 5–12)
NEUTROPHILS NFR BLD AUTO: 74.2 % (ref 42.7–76)
NEUTROPHILS NFR BLD AUTO: 8.47 10*3/MM3 (ref 1.7–7)
NRBC BLD AUTO-RTO: 0 /100 WBC (ref 0–0.2)
PHOSPHATE SERPL-MCNC: 3 MG/DL (ref 2.5–4.5)
PLATELET # BLD AUTO: 294 10*3/MM3 (ref 140–450)
PMV BLD AUTO: 9.3 FL (ref 6–12)
POTASSIUM SERPL-SCNC: 3.7 MMOL/L (ref 3.5–5.2)
PROT SERPL-MCNC: 7 G/DL (ref 6–8.5)
RBC # BLD AUTO: 4.64 10*6/MM3 (ref 3.77–5.28)
SODIUM SERPL-SCNC: 139 MMOL/L (ref 136–145)
WBC NRBC COR # BLD AUTO: 11.42 10*3/MM3 (ref 3.4–10.8)

## 2024-10-14 PROCEDURE — 84100 ASSAY OF PHOSPHORUS: CPT | Performed by: INTERNAL MEDICINE

## 2024-10-14 PROCEDURE — 85025 COMPLETE CBC W/AUTO DIFF WBC: CPT

## 2024-10-14 PROCEDURE — 36415 COLL VENOUS BLD VENIPUNCTURE: CPT

## 2024-10-14 PROCEDURE — 25010000002 DENOSUMAB 120 MG/1.7ML SOLUTION: Performed by: INTERNAL MEDICINE

## 2024-10-14 PROCEDURE — 96372 THER/PROPH/DIAG INJ SC/IM: CPT

## 2024-10-14 PROCEDURE — 80053 COMPREHEN METABOLIC PANEL: CPT | Performed by: INTERNAL MEDICINE

## 2024-10-14 PROCEDURE — 83735 ASSAY OF MAGNESIUM: CPT | Performed by: INTERNAL MEDICINE

## 2024-10-14 RX ADMIN — DENOSUMAB 120 MG: 120 INJECTION SUBCUTANEOUS at 09:32

## 2024-10-14 NOTE — PROGRESS NOTES
Specialty Pharmacy Patient Management Program  Per Protocol Prescription Order or Refill       Requested Prescriptions     Signed Prescriptions Disp Refills    ribociclib succinate (Kisqali, 200 MG Dose,) 200 MG tablet therapy pack tablet 21 tablet 0     Sig: Take 1 tablet by mouth Daily. Take for 21 days on, then 7 days off     Prescription orders above were sent to [facility] per Collaborative Care Agreement Protocol.     Completed independent double check on medication order/RX.    Martin Conrad PharmD, BCOP  Clinical Specialty Pharmacist, Oncology  10/14/2024  13:04 EDT

## 2024-10-14 NOTE — PROGRESS NOTES
Subjective   Maritza Bejarano is a 63 y.o. female.   With metastatic invasive lobular carcinoma, ER/MO strongly positive cancer with metastatic disease to the bones.    History of Present Illness   Maritza returns today for follow-up.  She presents after discharge from the hospital.  She was admitted to the hospital from 9/18/2024 to 9/21/2024 due to profound weakness requiring placement to a rehab.    Prior to that she was admitted to the hospital from 9/11/2024 to 9/16/2024 for KINZA and pneumonia.    She was noted to have abnormal LFTs and Ribociclib was held during the hospitalization.    Patient remained off of Ribociclib since discharge from the hospital.  She is currently at [facility] rehab.  She reports feeling stronger since discharge from the hospital.  She continues on anastrozole and has been tolerating that well.    She has met with her neurologist Dr. Jacobson at [facility] who has ordered an MRI of the thoracic and lumbar spine.  This was performed at [facility] and we do not have the results of the same.  Patient ports that it mentioned metastatic disease to the thoracic and cervical spine was concerned if the disease was progressing.    She is also reporting dyspnea and is concerned about metastatic disease to the chest.  She had a CT chest in June 2024 which did not show any evidence of metastatic disease to the lungs.  Patient reports that she had longstanding dyspnea and this has not changed significantly.    She otherwise feels well today without any new complaints.    Oncologic history:    Patient is a 63-year-old postmenopausal lady who has not had a mammogram in 4 years presented with a screen detected abnormality.  She had a left breast biopsy in 2014 which was benign.  Denies palpating any breast masses skin changes or nipple discharge prior to the abnormal mammogram.  She does have left nipple inversion.    Patient has a longstanding diagnosis of multiple sclerosis and has not been on any  treatment.  She was previously seen by Dr. Ozzy France and now being seen by Dr. Soto with neurology.  She has been nonambulatory for the past 4 years and requires a wheelchair.  She is unable to transfer herself in and out of wheelchairs and her  has to lift her for all the transfers.  She is also incontinent of the bladder and requiring a suprapubic catheter placed by urology to help with the same.  She had a bladder stimulator in the past which was turned off.  Other comorbidities include hypertension and hyperlipidemia.      Family history significant for maternal great grandmother with breast cancer, maternal great aunt and mother with breast cancer in her 70s.  Denies any family history of ovarian cancer, pancreatic cancer or melanoma.    5/21/2024-bilateral screening mammogram  Finding 1.new nipple retraction along with diffuse skin and trabecular thickening of the left breast.  There is suggestion of subtle architectural distortion seen on the MLO projection in the posterior central region.  3 biopsy markers are noted.  No new suspicious calcifications.  Send finding 2.few axillary lymph node seen in the left breast which display interval increase in size and density.    Finding 3.focal asymmetry measuring 10 mm seen in the anterior one third of the right breast in the medial subareolar region.    Impression  Finding 1.new nipple retraction, skin and trabecular thickening in the left breast requires additional evaluation.  There is also question architectural distortion in the posterior central breast seen on the MLO projection.  Diagnostic mammogram to include repeat full-field CC with additional posterior tissue and complete breast ultrasound is recommended.  Finding 2.axillary lymph nodes in the left breast require additional evaluation, ultrasound recommended.  Finding 3.focal asymmetry in the right breast requires additional evaluation.  Diagnostic mammogram and limited breast ultrasound is  recommended.    5/29/2024-bilateral diagnostic mammogram and ultrasound  Finding 1.4 0.7 x 5.6 x 6.8 cm developing asymmetry with associated nipple retraction, skin thickening and trabecular thickening of the left breast in the central region, inferior region in the upper outer region and the lower outer region.  Finding 2.axillary lymph node in the left breast.  Finding 3.suspected finding completely resolves upon further evaluation representing benign fibroglandular breast tissue.    Bilateral breast ultrasound  Finding 1.nonparallel hypoechoic mass with indistinct margins and skin thickening and internal vascularity in the left breast in the central region, inferior region, upper outer region and in the lower outer region.  The finding is palpable and appears to involve all 4 quadrants.  Most discrete portion is located at 130, 3 cm from the nipple, skin thickening up to 6 mm.  Send finding 2.rounded enlarged left axillary lymph node measuring 11 mm.  Cortical thickening of 9 mm present.    Finding 3.no sonographic correlate.  Send finding 4.enlarged right axillary lymph node measuring 14 mm.  Cortical thickening of 5 mm.    Impression  Finding 1.ultrasound-guided biopsy recommended  Finding 2.ultrasound-guided biopsy recommended  Finding 3.previously described right breast asymmetry resolves  Finding 4.ultrasound-guided biopsy recommended.    Ultrasound-guided biopsy of the left breast and left axilla lymph node and right axilla lymph node    1.left breast  Invasive lobular carcinoma with crush artifact  Grade 2  Invasive carcinoma measures 8.5 mm  No lymphovascular space invasion  ER +91 to 100% strong  AZ +31 to 40% moderate  HER2 negative, 0  Ki-67 35%    2.left axillary lymph node focus of metastatic Dastech lobular carcinoma measuring 10 mm  ER +91 to 100% strong  AZ +21 to 30%  HER2 -0  Ki-67 30%    3.right axillary lymph node with a focus of metastatic carcinoma measuring 4 mm.  Grade 2.  ER +91 to 100%  strong  LA +11 to 20% moderate  HER2 negative, 0  Ki-67 35%    Pathology of all the 3 sites appear similar and findings could represent left breast lobular carcinoma metastatic to the right as well as left axillary lymph nodes.    6/14/2024-CT of the chest abdomen and pelvis  1.large ill-defined infiltrative central left breast mass measuring 6.7 x 4 cm, medially there is an irregular 2.5 x 2.5 cm mass.  Significant thickening of the skin in the left breast and there is left axilla lymphadenopathy.  Left axilla lymph node measures 1.3 x 1.3 cm.  There is also a new enlarged right axillary lymph node measuring 1.3 x 1 cm.  All of the subcentimeter right axillary lymph nodes are slightly larger than previous.  2.no mediastinal hilar or internal mammary chain lymphadenopathy  3.new indeterminate mixed density measuring 1.3 x 1.2 cm right apical pulmonary nodule.  Follow-up recommended.  4.pleural parenchymal thickening and nodules inferiorly and posterior to the right upper lobe are stable.  Chronic scarring and subsegmental atelectatic change in both lower lobes.    CT abdomen pelvis  1.moderate fatty infiltration of the liver.  No suspicious liver lesions.  2.moderate-sized paraesophageal hernia.  No acute bowel abnormality.  3.right sacral stimulator noted at the S3 neuroforamina.  No suspicious bone lesions are noted.    6/14/2024-bone scan  1.focal abnormal uptake in the left anterior inferior iliac spine correlating with lucent lesion on the CT concerning for metastatic disease.  Further evaluation with MRI of the pelvis and left hip could be performed.  2.focal uptake in the left frontal calvarium again concerning for metastatic disease.  Noncontrast CT or MRI could be obtained.  3.soft tissue uptake noted in the left breast at the site of known left breast cancer.  4.changes from arthroplasty on the right shoulder.  5.multifocal likely degenerative uptake in each knee, ankle and foot and increased uptake in the  medial and patellofemoral cortex of the right knee could be posttraumatic.    MRI of the pelvis and the left hip as well as MRI of the brain have been ordered and currently scheduled for 7/10/2024.    Invitae 9 gene stat panel negative.    MRI of the brain which showed T2 hyperintensity involving the frontal bone measuring 1.6 cm in size and a smaller area of enhancement in the frontal bone laterally and inferiorly concerning for metastasis.  No brain mets    MRI of the hip and pelvis showed multiple enhancing bone lesions consistent with metastatic disease to the sacrum and pelvis.  Dominant lesion in the anterior inferior left pelvic spine and rectus femoris muscle origin that correlates with the site of bone scan uptake.  She has some right joint hip joint effusion.  Her CA 15-3 16.2    She was seen by Dr. Saavedra on 7/16/2024 and recommended to start on Ribociclib along with anastrozole.    Started Ribociclib in the first week of August 2024    Had profound nausea and vomiting requiring antiemetics.  Completed 3 weeks of Ribociclib on 8/30/2024.    Hospitalized from 9/11/2024 to 9/16/2024 for pneumonia and KINZA and since then Ribociclib has been on hold    Patient has not yet started the second cycle of Ribociclib      The following portions of the patient's history were reviewed and updated as appropriate: allergies, current medications, past family history, past medical history, past social history, past surgical history, and problem list.    Past Medical History:   Diagnosis Date    Anemia 2024    `Treated with iron    Dawn esophagus     per patient    Blurred vision     R/T MS    Breast cancer 05/2024+++++    Carpal tunnel syndrome     Clotting disorder 1993, 2003, 2012    3 g/i bleeds w/ transfus/ions    Colon polyp 2013    removed w/ colonoscopy    Deep vein thrombosis phlebitis 1980    Depression     Diplopia 2013    GERD (gastroesophageal reflux disease)     GI (gastrointestinal bleed) 3 bleeds    2  transfusions    H/O Skin cancer, basal cell     Headache     History of blood transfusion     History of GI bleed     R/T NSAIDS AND STEROIDS, multiple times    History of urinary tract infection     Hypercalcemia     s/p parathyroidectomy    Hyperlipidemia     Hypertension     Movement disorder     Multiple sclerosis     Optic neuritis     PONV (postoperative nausea and vomiting)         Past Surgical History:   Procedure Laterality Date    APPENDECTOMY      BLADDER SURGERY      bladder stimulator    BREAST BIOPSY  don't remember    BREAST SURGERY      augmentation wtih subsequent removal    CARPAL TUNNEL RELEASE Bilateral     Left 2018, right 2020    CUBITAL TUNNEL RELEASE Left     CYSTOSCOPY BOTOX INJECTION OF BLADDER  2018    Cystoscopy with Botox    FRACTURE SURGERY  2019    rt shoulder    PARATHYROIDECTOMY      one gland removed    ROTATOR CUFF REPAIR Right 2017    TOE SURGERY      bilateral great toes    TOTAL SHOULDER ARTHROPLASTY W/ DISTAL CLAVICLE EXCISION Right 10/22/2018    Procedure: RT TOTAL SHOULDER REVERSE ARTHROPLASTY;  Surgeon: Bipin Dangelo MD;  Location: [facility];  Service: Orthopedics        Family History   Problem Relation Age of Onset    Hypertension Mother     Hyperlipidemia Mother     Aortic aneurysm Mother         thoracic    Diabetes Mother     Hypertension Father         charissa heart failure    Heart failure Father     Hyperlipidemia Father     Miscarriages / Stillbirths Sister     Multiple sclerosis Brother     Atrial fibrillation Brother     Diabetes Maternal Grandfather     Stroke Maternal Grandfather     Parkinsonism Paternal Grandmother     Tremor Paternal Grandmother     Stomach cancer Paternal Grandfather     Diabetes Maternal Grandmother         Social History     Socioeconomic History    Marital status:      Spouse name: Donald    Number of children: 0   Tobacco Use    Smoking status: Former     Current packs/day: 0.00     Average packs/day: 2.0 packs/day for 30.0  "years (60.0 ttl pk-yrs)     Types: Cigarettes     Start date: 10/22/1973     Quit date: 10/22/2003     Years since quittin.9    Smokeless tobacco: Never   Vaping Use    Vaping status: Never Used   Substance and Sexual Activity    Alcohol use: Not Currently    Drug use: Not Currently     Types: Marijuana     Comment: advised by neurologist for sleep    Sexual activity: Not Currently     Partners: Male     Birth control/protection: Post-menopausal        OB History    No obstetric history on file.     Age at menarche-15  Age at first live childbirth-not applicable   2 para 0  0  Age of menopause-57  No use of hormone replacement therapy      No Known Allergies         Review of Systems   Constitutional:  Positive for activity change and fatigue.   HENT: Negative.     Eyes: Negative.    Respiratory:  Positive for shortness of breath.    Cardiovascular: Negative.    Gastrointestinal: Negative.    Endocrine: Negative.    Genitourinary:  Positive for urinary incontinence.   Musculoskeletal: Negative.    Skin: Negative.    Allergic/Immunologic: Negative.    Neurological:  Positive for weakness.   Psychiatric/Behavioral: Negative.       Review of systems as mentioned HPI otherwise negative    Objective   Blood pressure 117/76, pulse 75, temperature 97.6 °F (36.4 °C), temperature source Oral, resp. rate 16, height 165.1 cm (65\"), weight 81.6 kg (180 lb), SpO2 96%, not currently breastfeeding.   Physical Exam  Vitals reviewed.   Constitutional:       Appearance: Normal appearance. She is normal weight.   HENT:      Right Ear: External ear normal.      Left Ear: External ear normal.      Nose: Nose normal.      Mouth/Throat:      Pharynx: Oropharynx is clear.   Eyes:      Conjunctiva/sclera: Conjunctivae normal.   Cardiovascular:      Rate and Rhythm: Normal rate.   Pulmonary:      Effort: Pulmonary effort is normal.   Abdominal:      General: Abdomen is flat.   Musculoskeletal:         General: Normal " range of motion.      Cervical back: Normal range of motion.   Skin:     General: Skin is warm.   Neurological:      General: No focal deficit present.      Mental Status: She is alert and oriented to person, place, and time.   Psychiatric:         Mood and Affect: Mood normal.         Behavior: Behavior normal.         Thought Content: Thought content normal.         Judgment: Judgment normal.       Breast Exam: Right breast appears normal on inspection.  No palpable right axilla lymphadenopathy or right breast masses.  Left breast on inspection there is nipple retraction.  On palpation there is a 13 x 9 cm mass in the retroareolar region pretty much occupying the whole breast.  There is palpable left axillary lymphadenopathy.    I have reexamined the patient and the results are consistent with the previously documented exam. Zainab Lopes MD        Lab on 10/14/2024   Component Date Value Ref Range Status    Glucose 10/14/2024 111 (H)  65 - 99 mg/dL Final    BUN 10/14/2024 18  8 - 23 mg/dL Final    Creatinine 10/14/2024 0.57  0.57 - 1.00 mg/dL Final    Sodium 10/14/2024 139  136 - 145 mmol/L Final    Potassium 10/14/2024 3.7  3.5 - 5.2 mmol/L Final    Chloride 10/14/2024 102  98 - 107 mmol/L Final    CO2 10/14/2024 27.8  22.0 - 29.0 mmol/L Final    Calcium 10/14/2024 9.3  8.6 - 10.5 mg/dL Final    Total Protein 10/14/2024 7.0  6.0 - 8.5 g/dL Final    Albumin 10/14/2024 4.0  3.5 - 5.2 g/dL Final    ALT (SGPT) 10/14/2024 27  1 - 33 U/L Final    AST (SGOT) 10/14/2024 32  1 - 32 U/L Final    Alkaline Phosphatase 10/14/2024 135 (H)  39 - 117 U/L Final    Total Bilirubin 10/14/2024 0.2  0.0 - 1.2 mg/dL Final    Globulin 10/14/2024 3.0  gm/dL Final    A/G Ratio 10/14/2024 1.3  g/dL Final    BUN/Creatinine Ratio 10/14/2024 31.6 (H)  7.0 - 25.0 Final    Anion Gap 10/14/2024 9.2  5.0 - 15.0 mmol/L Final    eGFR 10/14/2024 103.5  >60.0 mL/min/1.73 Final    Magnesium 10/14/2024 2.4  1.6 - 2.4 mg/dL Final    Phosphorus  10/14/2024 3.0  2.5 - 4.5 mg/dL Final    WBC 10/14/2024 11.42 (H)  3.40 - 10.80 10*3/mm3 Final    RBC 10/14/2024 4.64  3.77 - 5.28 10*6/mm3 Final    Hemoglobin 10/14/2024 12.7  12.0 - 15.9 g/dL Final    Hematocrit 10/14/2024 41.2  34.0 - 46.6 % Final    MCV 10/14/2024 88.8  79.0 - 97.0 fL Final    MCH 10/14/2024 27.4  26.6 - 33.0 pg Final    MCHC 10/14/2024 30.8 (L)  31.5 - 35.7 g/dL Final    RDW 10/14/2024 21.4 (H)  12.3 - 15.4 % Final    RDW-SD 10/14/2024 68.2 (H)  37.0 - 54.0 fl Final    MPV 10/14/2024 9.3  6.0 - 12.0 fL Final    Platelets 10/14/2024 294  140 - 450 10*3/mm3 Final    Neutrophil % 10/14/2024 74.2  42.7 - 76.0 % Final    Lymphocyte % 10/14/2024 16.0 (L)  19.6 - 45.3 % Final    Monocyte % 10/14/2024 5.7  5.0 - 12.0 % Final    Eosinophil % 10/14/2024 2.5  0.3 - 6.2 % Final    Basophil % 10/14/2024 0.5  0.0 - 1.5 % Final    Immature Grans % 10/14/2024 1.1 (H)  0.0 - 0.5 % Final    Neutrophils, Absolute 10/14/2024 8.47 (H)  1.70 - 7.00 10*3/mm3 Final    Lymphocytes, Absolute 10/14/2024 1.83  0.70 - 3.10 10*3/mm3 Final    Monocytes, Absolute 10/14/2024 0.65  0.10 - 0.90 10*3/mm3 Final    Eosinophils, Absolute 10/14/2024 0.29  0.00 - 0.40 10*3/mm3 Final    Basophils, Absolute 10/14/2024 0.06  0.00 - 0.20 10*3/mm3 Final    Immature Grans, Absolute 10/14/2024 0.12 (H)  0.00 - 0.05 10*3/mm3 Final    nRBC 10/14/2024 0.0  0.0 - 0.2 /100 WBC Final   Admission on 09/18/2024, Discharged on 09/21/2024   Component Date Value Ref Range Status    Color, UA 09/18/2024 Yellow  Yellow, Straw Final    Appearance, UA 09/18/2024 Clear  Clear Final    pH, UA 09/18/2024 7.5  5.0 - 8.0 Final    Specific Gravity, UA 09/18/2024 1.010  1.005 - 1.030 Final    Glucose, UA 09/18/2024 Negative  Negative Final    Ketones, UA 09/18/2024 Negative  Negative Final    Bilirubin, UA 09/18/2024 Negative  Negative Final    Blood, UA 09/18/2024 Negative  Negative Final    Protein, UA 09/18/2024 Negative  Negative Final    Leuk Esterase, UA  09/18/2024 Negative  Negative Final    Nitrite, UA 09/18/2024 Negative  Negative Final    Urobilinogen, UA 09/18/2024 0.2 E.U./dL  0.2 - 1.0 E.U./dL Final    Glucose 09/18/2024 101 (H)  65 - 99 mg/dL Final    BUN 09/18/2024 10  8 - 23 mg/dL Final    Creatinine 09/18/2024 0.54 (L)  0.57 - 1.00 mg/dL Final    Sodium 09/18/2024 137  136 - 145 mmol/L Final    Potassium 09/18/2024 4.0  3.5 - 5.2 mmol/L Final    Chloride 09/18/2024 102  98 - 107 mmol/L Final    CO2 09/18/2024 24.0  22.0 - 29.0 mmol/L Final    Calcium 09/18/2024 9.7  8.6 - 10.5 mg/dL Final    Total Protein 09/18/2024 7.3  6.0 - 8.5 g/dL Final    Albumin 09/18/2024 4.1  3.5 - 5.2 g/dL Final    ALT (SGPT) 09/18/2024 279 (H)  1 - 33 U/L Final    AST (SGOT) 09/18/2024 344 (H)  1 - 32 U/L Final    Alkaline Phosphatase 09/18/2024 200 (H)  39 - 117 U/L Final    Total Bilirubin 09/18/2024 0.3  0.0 - 1.2 mg/dL Final    Globulin 09/18/2024 3.2  gm/dL Final    A/G Ratio 09/18/2024 1.3  g/dL Final    BUN/Creatinine Ratio 09/18/2024 18.5  7.0 - 25.0 Final    Anion Gap 09/18/2024 11.0  5.0 - 15.0 mmol/L Final    eGFR 09/18/2024 104.9  >60.0 mL/min/1.73 Final    QT Interval 09/18/2024 428  ms Final    QTC Interval 09/18/2024 452  ms Final    Magnesium 09/18/2024 2.3  1.6 - 2.4 mg/dL Final    HS Troponin T 09/18/2024 16 (H)  <14 ng/L Final    WBC 09/18/2024 8.01  3.40 - 10.80 10*3/mm3 Final    RBC 09/18/2024 4.28  3.77 - 5.28 10*6/mm3 Final    Hemoglobin 09/18/2024 11.6 (L)  12.0 - 15.9 g/dL Final    Hematocrit 09/18/2024 36.6  34.0 - 46.6 % Final    MCV 09/18/2024 85.5  79.0 - 97.0 fL Final    MCH 09/18/2024 27.1  26.6 - 33.0 pg Final    MCHC 09/18/2024 31.7  31.5 - 35.7 g/dL Final    RDW 09/18/2024 18.8 (H)  12.3 - 15.4 % Final    RDW-SD 09/18/2024 56.9 (H)  37.0 - 54.0 fl Final    MPV 09/18/2024 9.2  6.0 - 12.0 fL Final    Platelets 09/18/2024 693 (H)  140 - 450 10*3/mm3 Final    Neutrophil % 09/18/2024 71.5  42.7 - 76.0 % Final    Lymphocyte % 09/18/2024 16.1 (L)   19.6 - 45.3 % Final    Monocyte % 09/18/2024 6.4  5.0 - 12.0 % Final    Eosinophil % 09/18/2024 3.9  0.3 - 6.2 % Final    Basophil % 09/18/2024 1.7 (H)  0.0 - 1.5 % Final    Immature Grans % 09/18/2024 0.4  0.0 - 0.5 % Final    Neutrophils, Absolute 09/18/2024 5.73  1.70 - 7.00 10*3/mm3 Final    Lymphocytes, Absolute 09/18/2024 1.29  0.70 - 3.10 10*3/mm3 Final    Monocytes, Absolute 09/18/2024 0.51  0.10 - 0.90 10*3/mm3 Final    Eosinophils, Absolute 09/18/2024 0.31  0.00 - 0.40 10*3/mm3 Final    Basophils, Absolute 09/18/2024 0.14  0.00 - 0.20 10*3/mm3 Final    Immature Grans, Absolute 09/18/2024 0.03  0.00 - 0.05 10*3/mm3 Final    nRBC 09/18/2024 0.0  0.0 - 0.2 /100 WBC Final    Glucose 09/19/2024 107 (H)  65 - 99 mg/dL Final    BUN 09/19/2024 10  8 - 23 mg/dL Final    Creatinine 09/19/2024 0.60  0.57 - 1.00 mg/dL Final    Sodium 09/19/2024 141  136 - 145 mmol/L Final    Potassium 09/19/2024 3.5  3.5 - 5.2 mmol/L Final    Chloride 09/19/2024 107  98 - 107 mmol/L Final    CO2 09/19/2024 23.3  22.0 - 29.0 mmol/L Final    Calcium 09/19/2024 8.7  8.6 - 10.5 mg/dL Final    BUN/Creatinine Ratio 09/19/2024 16.7  7.0 - 25.0 Final    Anion Gap 09/19/2024 10.7  5.0 - 15.0 mmol/L Final    eGFR 09/19/2024 102.3  >60.0 mL/min/1.73 Final    WBC 09/19/2024 7.22  3.40 - 10.80 10*3/mm3 Final    RBC 09/19/2024 4.07  3.77 - 5.28 10*6/mm3 Final    Hemoglobin 09/19/2024 10.5 (L)  12.0 - 15.9 g/dL Final    Hematocrit 09/19/2024 35.6  34.0 - 46.6 % Final    MCV 09/19/2024 87.5  79.0 - 97.0 fL Final    MCH 09/19/2024 25.8 (L)  26.6 - 33.0 pg Final    MCHC 09/19/2024 29.5 (L)  31.5 - 35.7 g/dL Final    RDW 09/19/2024 18.5 (H)  12.3 - 15.4 % Final    RDW-SD 09/19/2024 58.5 (H)  37.0 - 54.0 fl Final    MPV 09/19/2024 9.1  6.0 - 12.0 fL Final    Platelets 09/19/2024 650 (H)  140 - 450 10*3/mm3 Final    nRBC 09/19/2024 0.0  0.0 - 0.2 /100 WBC Final    Neutrophil % 09/19/2024 69.0  42.7 - 76.0 % Final    Lymphocyte % 09/19/2024 25.0  19.6 -  45.3 % Final    Monocyte % 09/19/2024 4.0 (L)  5.0 - 12.0 % Final    Eosinophil % 09/19/2024 2.0  0.3 - 6.2 % Final    Neutrophils Absolute 09/19/2024 4.98  1.70 - 7.00 10*3/mm3 Final    Lymphocytes Absolute 09/19/2024 1.81  0.70 - 3.10 10*3/mm3 Final    Monocytes Absolute 09/19/2024 0.29  0.10 - 0.90 10*3/mm3 Final    Eosinophils Absolute 09/19/2024 0.14  0.00 - 0.40 10*3/mm3 Final    RBC Morphology 09/19/2024 Normal  Normal Final    WBC Morphology 09/19/2024 Normal  Normal Final    Platelet Morphology 09/19/2024 Normal  Normal Final    Hepatitis B Surface Ag 09/19/2024 Non-Reactive  Non-Reactive Final    Hep A IgM 09/19/2024 Non-Reactive  Non-Reactive Final    Hep B C IgM 09/19/2024 Non-Reactive  Non-Reactive Final    Hepatitis C Ab 09/19/2024 Non-Reactive  Non-Reactive Final    Glucose 09/20/2024 139 (H)  65 - 99 mg/dL Final    BUN 09/20/2024 15  8 - 23 mg/dL Final    Creatinine 09/20/2024 0.71  0.57 - 1.00 mg/dL Final    Sodium 09/20/2024 141  136 - 145 mmol/L Final    Potassium 09/20/2024 3.6  3.5 - 5.2 mmol/L Final    Chloride 09/20/2024 106  98 - 107 mmol/L Final    CO2 09/20/2024 25.5  22.0 - 29.0 mmol/L Final    Calcium 09/20/2024 8.6  8.6 - 10.5 mg/dL Final    Total Protein 09/20/2024 6.7  6.0 - 8.5 g/dL Final    Albumin 09/20/2024 3.7  3.5 - 5.2 g/dL Final    ALT (SGPT) 09/20/2024 194 (H)  1 - 33 U/L Final    AST (SGOT) 09/20/2024 199 (H)  1 - 32 U/L Final    Alkaline Phosphatase 09/20/2024 169 (H)  39 - 117 U/L Final    Total Bilirubin 09/20/2024 <0.2  0.0 - 1.2 mg/dL Final    Globulin 09/20/2024 3.0  gm/dL Final    A/G Ratio 09/20/2024 1.2  g/dL Final    BUN/Creatinine Ratio 09/20/2024 21.1  7.0 - 25.0 Final    Anion Gap 09/20/2024 9.5  5.0 - 15.0 mmol/L Final    eGFR 09/20/2024 96.9  >60.0 mL/min/1.73 Final    WBC 09/20/2024 7.24  3.40 - 10.80 10*3/mm3 Final    RBC 09/20/2024 4.09  3.77 - 5.28 10*6/mm3 Final    Hemoglobin 09/20/2024 10.8 (L)  12.0 - 15.9 g/dL Final    Hematocrit 09/20/2024 35.0  34.0 -  46.6 % Final    MCV 09/20/2024 85.6  79.0 - 97.0 fL Final    MCH 09/20/2024 26.4 (L)  26.6 - 33.0 pg Final    MCHC 09/20/2024 30.9 (L)  31.5 - 35.7 g/dL Final    RDW 09/20/2024 18.3 (H)  12.3 - 15.4 % Final    RDW-SD 09/20/2024 56.0 (H)  37.0 - 54.0 fl Final    MPV 09/20/2024 8.9  6.0 - 12.0 fL Final    Platelets 09/20/2024 642 (H)  140 - 450 10*3/mm3 Final    Neutrophil % 09/20/2024 60.6  42.7 - 76.0 % Final    Lymphocyte % 09/20/2024 22.1  19.6 - 45.3 % Final    Monocyte % 09/20/2024 8.1  5.0 - 12.0 % Final    Eosinophil % 09/20/2024 6.2  0.3 - 6.2 % Final    Basophil % 09/20/2024 2.3 (H)  0.0 - 1.5 % Final    Immature Grans % 09/20/2024 0.7 (H)  0.0 - 0.5 % Final    Neutrophils, Absolute 09/20/2024 4.38  1.70 - 7.00 10*3/mm3 Final    Lymphocytes, Absolute 09/20/2024 1.60  0.70 - 3.10 10*3/mm3 Final    Monocytes, Absolute 09/20/2024 0.59  0.10 - 0.90 10*3/mm3 Final    Eosinophils, Absolute 09/20/2024 0.45 (H)  0.00 - 0.40 10*3/mm3 Final    Basophils, Absolute 09/20/2024 0.17  0.00 - 0.20 10*3/mm3 Final    Immature Grans, Absolute 09/20/2024 0.05  0.00 - 0.05 10*3/mm3 Final    nRBC 09/20/2024 0.0  0.0 - 0.2 /100 WBC Final    Glucose 09/21/2024 97  65 - 99 mg/dL Final    BUN 09/21/2024 16  8 - 23 mg/dL Final    Creatinine 09/21/2024 0.60  0.57 - 1.00 mg/dL Final    Sodium 09/21/2024 140  136 - 145 mmol/L Final    Potassium 09/21/2024 3.6  3.5 - 5.2 mmol/L Final    Chloride 09/21/2024 104  98 - 107 mmol/L Final    CO2 09/21/2024 26.0  22.0 - 29.0 mmol/L Final    Calcium 09/21/2024 8.9  8.6 - 10.5 mg/dL Final    Total Protein 09/21/2024 6.9  6.0 - 8.5 g/dL Final    Albumin 09/21/2024 3.8  3.5 - 5.2 g/dL Final    ALT (SGPT) 09/21/2024 161 (H)  1 - 33 U/L Final    AST (SGOT) 09/21/2024 160 (H)  1 - 32 U/L Final    Alkaline Phosphatase 09/21/2024 159 (H)  39 - 117 U/L Final    Total Bilirubin 09/21/2024 0.2  0.0 - 1.2 mg/dL Final    Globulin 09/21/2024 3.1  gm/dL Final    A/G Ratio 09/21/2024 1.2  g/dL Final     BUN/Creatinine Ratio 09/21/2024 26.7 (H)  7.0 - 25.0 Final    Anion Gap 09/21/2024 10.0  5.0 - 15.0 mmol/L Final    eGFR 09/21/2024 102.3  >60.0 mL/min/1.73 Final    WBC 09/21/2024 7.09  3.40 - 10.80 10*3/mm3 Final    RBC 09/21/2024 4.08  3.77 - 5.28 10*6/mm3 Final    Hemoglobin 09/21/2024 10.9 (L)  12.0 - 15.9 g/dL Final    Hematocrit 09/21/2024 34.9  34.0 - 46.6 % Final    MCV 09/21/2024 85.5  79.0 - 97.0 fL Final    MCH 09/21/2024 26.7  26.6 - 33.0 pg Final    MCHC 09/21/2024 31.2 (L)  31.5 - 35.7 g/dL Final    RDW 09/21/2024 18.4 (H)  12.3 - 15.4 % Final    RDW-SD 09/21/2024 56.6 (H)  37.0 - 54.0 fl Final    MPV 09/21/2024 8.9  6.0 - 12.0 fL Final    Platelets 09/21/2024 626 (H)  140 - 450 10*3/mm3 Final    Neutrophil % 09/21/2024 55.2  42.7 - 76.0 % Final    Lymphocyte % 09/21/2024 24.7  19.6 - 45.3 % Final    Monocyte % 09/21/2024 9.6  5.0 - 12.0 % Final    Eosinophil % 09/21/2024 7.1 (H)  0.3 - 6.2 % Final    Basophil % 09/21/2024 2.7 (H)  0.0 - 1.5 % Final    Immature Grans % 09/21/2024 0.7 (H)  0.0 - 0.5 % Final    Neutrophils, Absolute 09/21/2024 3.92  1.70 - 7.00 10*3/mm3 Final    Lymphocytes, Absolute 09/21/2024 1.75  0.70 - 3.10 10*3/mm3 Final    Monocytes, Absolute 09/21/2024 0.68  0.10 - 0.90 10*3/mm3 Final    Eosinophils, Absolute 09/21/2024 0.50 (H)  0.00 - 0.40 10*3/mm3 Final    Basophils, Absolute 09/21/2024 0.19  0.00 - 0.20 10*3/mm3 Final    Immature Grans, Absolute 09/21/2024 0.05  0.00 - 0.05 10*3/mm3 Final    nRBC 09/21/2024 0.0  0.0 - 0.2 /100 WBC Final   No results displayed because visit has over 200 results.           No radiology results for the last 30 days.     10/14/2024 CBC reviewed and WBC 11.42, hemoglobin 12.7 and platelets 294,000  CMP reviewed and BUN 18, creatinine 0.57 and LFTs normal with an ALT of 27, AST 32 and alkaline phosphatase 135    Assessment & Plan       *Left breast invasive lobular carcinoma  The tumor is estrogen receptor +91 to 100%, progesterone receptor +31 to  40%, HER2 negative, 0, Ki-67 35%  The tumor measures greater than 10 cm on exam, lymph node positive.  CT of the chest abdomen and pelvis with right upper lobe pulmonary nodule which is new and the bone scan also showsUptake in the left anterior inferior iliac spine which correlates to a lucent area on the CT scan and also focal uptake noted in the left frontal calvarium concerning for metastatic disease.  Further evaluation with a MRI of the pelvis and hip as well as MRI of the brain is underway.  The scans are scheduled for 7/10/2024.  Clinical T3 N1 M1, stage IV invasive lobular carcinoma.  There is also a right axillary lymph node which has been biopsied and consistent with invasive lobular carcinoma with similar morphology as well as receptors concerning for metastatic disease rather than a primary right breast cancer.  Recommended Ribociclib 400 mg 3 weeks on and 1 week off.  July 16, 2024: Reviewed MRI of the pelvis and hip consistent with many bony metastasis.  MRI brain shows left frontal bone mets but no brain involvement.  Patient is here to start day 1 ribociclib along with anastrozole.  Continues on anastrozole.  Tempus performed on the left axilla lymph node biopsy shows PIK3CA mutation, CDH 1 mutation and Erb B2 mutation.  Therefore patient would benefit from alpelisib and Faslodex after progression on Ribociclib and AI.  Other options include neratinib plus Faslodex.  Initiated Ribociclib in August 2024.  8/30/2024-last day of week 3 of Ribociclib.    Patient completed 1 cycle of Ribociclib 400 mg and subsequently has not been able to go back on Ribociclib due to recurrent hospitalizations and abnormal LFTs  10/14/2024-LFTs are normal today.  Recommend resuming Ribociclib at 200 mg and subsequently we will increase the dose to 400 mg with the next cycle.  We will obtain reports of the MRI of the thoracic and cervical spine to review the results.      *Right axillary lymphadenopathy  Biopsied and  consistent with invasive lobular carcinoma, grade 2, ER/TN positive and HER2 negative  Patient initiated on AI and Ribociclib  Please refer to the above discussion for details    *Right breast non-mass enhancement  6/28/2024-MRI of the breast with non-mass enhancement noted in the right breast and patient had questions regarding if this should be biopsied.  Given extensive metastatic disease in the bones and right axilla lymph node consistent with invasive lobular carcinoma management would not change with the biopsy and hence I would recommend against it.    *Severe nausea  Secondary to Ribociclib  Improved with Compazine and Zofran.  Recommend using the same regimen once she starts back on Ribociclib.    *Skeletal metastasis  Patient will be started on Xgeva  8/16/2024 Xgeva initiation will be held as the patient has not been to the dentist in over 2 years.  Discussed possible side effects of Xgeva and she will schedule a dental visit and we will have her back in 1 week to initiate Xgeva pending this is complete.  10/14/2024-second dose of Xgeva will be administered.  Labs reviewed and stable to proceed.    *Multiple sclerosis  Patient was not on any treatment previously.  She sees Dr. Jacobson at [facility].  Due to profound weakness patient requested her neurologist to start steroids.  They plan to initiate high-dose IV steroids to help with this.  Is currently on oral steroids and patient feels much stronger than before.    *Hypertension-continue current medication  Blood pressure 117/76  Continue to monitor  No changes in medications      Hyperlipidemia-continue current medication  No changes      *Urinary incontinence-patient had  a suprapubic catheter placed by urology.      *History of iron deficiency anemia-  3/8/2024 hemoglobin was low at 10.9 and subsequently patient took oral iron which resulted in improvement of hemoglobin and normal.  She was scheduled for colonoscopy however she canceled that due  to ongoing management of breast cancer.  She proceed with a colonoscopy.  8/16/2024 patient's hemoglobin is 10.2 today.  She states she recently was told to begin daily iron supplementation.  Baseline iron studies ordered today she is only been taking the iron for a few days.  Ferritin 32, iron saturation 5%, TIBC 440.  We will repeat this in 6 to 8 weeks.  Continues on oral iron    *Genetic testing-9 gene stat panel negative      *Dyspnea  CT chest shows some bibasilar atelectasis/pneumonia  CT chest from June 2024 without any evidence of metastatic disease  Patient requesting that we repeat a CT chest due to concerns for pneumonia.  Repeat CT chest will be ordered.    *Right upper lobe pulmonary nodule   Concerning for metastatic disease  PET/CT may not be helpful as invasive lobular carcinomas typically are not very PET avid.  Could consider PET-FES    *Follow-up-after the MRIs.  Reviewed MRI of the pelvis and MRI of the brain which shows mets in the bone  Recent MRI of the cervical and thoracic spine reports are available.  Requesting that from [facility].    *Abnormal LFTs  Likely secondary to Ribociclib  Will start at a lower dose of 200 mg daily and subsequently increased to 400 mg if LFTs remain stable    Plan  Resume Ribociclib at 200 mg for the second cycle and subsequent increase to 400 mg of LFT stay stable  Second dose of Xgeva 10/14/2024  Continue Compazine for nausea induced by Ribociclib  Not using Zofran  Does not wish to start Zyprexa  Continue daily oral ferrous sulfate.    Xgeva in 4 weeks      Patient continues high risk medication requiring frequent monitoring.  Recent hospitalization requiring extensive review of medical records, care coordination with pharmacy.   65 minutes total spent on the encounter on the same day including reviewing the medical records, face-to-face time and documentation on the same day    Zainab Lopes MD

## 2024-10-14 NOTE — PROGRESS NOTES
Specialty Pharmacy Patient Management Program  Per Protocol Prescription Order or Refill     Patient will be filling or currently fills medications at [facility] and is enrolled in the Patient Management Program.    Requested Prescriptions     Signed Prescriptions Disp Refills    ribociclib succinate (Kisqali, 200 MG Dose,) 200 MG tablet therapy pack tablet 21 tablet 0     Sig: Take 1 tablet by mouth Daily. Take for 21 days on, then 7 days off     Prescription orders above were sent to the pharmacy per Collaborative Care Agreement Protocol.     Last Office Visit: 10/14/24  Next Office Visit: 11/4/24    Quita Montemayor Rph, BCOP  Clinical Specialty Pharmacist, Oncology  10/14/2024  12:45 EDT

## 2024-10-14 NOTE — PROGRESS NOTES
Staff message rec from [facility] Pharmacist-Dr Lopes has restarted pt Kisqali at 200 mg daily and will increase to 400 mg if LFTs stay stable for 1 cycle.    I will contact pt to coordinate delivery.    Lety Montemayor [facility] sent to Danielle Conrad [facility]; Belle Razo, Pharmacy Technician  Sent    Martin,  Please check me          Previous Messages       ----- Message -----  From: Zainab Lopes MD  Sent: 10/14/2024   9:00 AM EDT  To: Rosina Sethi RN; Alyssa Rich [facility]    Please send ribociclib 200mg and we ll increase to 400mg if LFTs stay stable on this dose for 1 cycle.    Belle Razo, Pharmacy Technician  10/14/24  12:56 EDT

## 2024-10-15 ENCOUNTER — SPECIALTY PHARMACY (OUTPATIENT)
Dept: PHARMACY | Facility: HOSPITAL | Age: 64
End: 2024-10-15
Payer: MEDICARE

## 2024-10-15 ENCOUNTER — TELEPHONE (OUTPATIENT)
Dept: ONCOLOGY | Facility: CLINIC | Age: 64
End: 2024-10-15
Payer: MEDICARE

## 2024-10-15 DIAGNOSIS — C79.51 CANCER, METASTATIC TO BONE: ICD-10-CM

## 2024-10-15 DIAGNOSIS — C50.912 INVASIVE LOBULAR CARCINOMA OF BREAST, STAGE 4, LEFT: ICD-10-CM

## 2024-10-15 DIAGNOSIS — Z17.0 MALIGNANT NEOPLASM OF OVERLAPPING SITES OF LEFT BREAST IN FEMALE, ESTROGEN RECEPTOR POSITIVE: Primary | ICD-10-CM

## 2024-10-15 DIAGNOSIS — C50.812 MALIGNANT NEOPLASM OF OVERLAPPING SITES OF LEFT BREAST IN FEMALE, ESTROGEN RECEPTOR POSITIVE: Primary | ICD-10-CM

## 2024-10-15 NOTE — PROGRESS NOTES
Specialty Pharmacy Note: Kisqali (ribociclib)    Maritza Bejarano is a 63 y.o. female with breast cancer was seen 10/14/24 by Dr. Lopes. Per provider dictation, resume oral oncology regimen Kisqali (ribociclib) with dose reduction.  Labs Review: The CMP and CBC from 10/14/24 have been reviewed.  Patient to restart Kisqali at 200 mg dose    Specialty pharmacy will continue to follow patient.    Alyssa   10/15/2024  09:52 EDT   31-Dec-2021

## 2024-10-15 NOTE — PROGRESS NOTES
Specialty Pharmacy Patient Management Program  Oncology / Hematology Refill Outreach      Maritza was contacted today regarding refills of her Kisqali specialty medication(s).    Specialty medication(s) and dose(s) confirmed: Yes  Kisqali 200 mg daily for 21 days on then 7 days off.    Refill Questions      Flowsheet Row Most Recent Value   Changes to allergies? No   Changes to medications? Yes  [Kisqali dose reduced to 200 mg]   New conditions or infections since last clinic visit Yes   If yes, please describe  She was admitted to the hospital last month with Pneumonia then again for weakness   Unplanned office visit, urgent care, ED, or hospital admission in the last 4 weeks  Yes   How does patient/caregiver feel medication is working? Fair   Financial problems or insurance changes  No   Since the previous refill, were any specialty medication doses or scheduled injections missed or delayed?  Yes   If yes, please provide the amount 5 weeks   Why were doses missed? Due to hospitalization   Does this patient require a clinical escalation to a pharmacist? No  [MTM Pharmacist aware]          Delivery Questions      Flowsheet Row Most Recent Value   Delivery method [employer]   Delivery address verified with patient/caregiver? Yes  [Ship to home address]   Delivery address Home  [Ship to home address-Ship 10/16 for delivery 10/17-$0 copay with insurance covering 100%-Address Confirmed]   Number of medications in delivery 1   Medication(s) being filled and delivered Ribociclib Succinate   Doses left of specialty medications 0-New dose   Copay verified? Yes   Copay amount $0 copay with [employer]   Copay form of payment No copayment ($0)   Ship Date 10/16/2024   Delivery Date 10/17/2024   Signature Required No          Kisqali delivery coordinated with pt for 10/17/2024 to her home address. $0 copay with insurance covering 100%. Her last delivery was on 9/3/2024. No questions or concerns to report to MTM Team today.  She has missed 5 weeks of treatment from being admitted to the hospital. She will restart at a lower dose. PDC is 59%.    Follow-Up: 21 Days    Belle Razo, Pharmacy Technician  10/15/2024  10:21 EDT

## 2024-10-17 ENCOUNTER — HOSPITAL ENCOUNTER (INPATIENT)
Facility: HOSPITAL | Age: 64
LOS: 3 days | Discharge: HOME | End: 2024-10-23
Attending: EMERGENCY MEDICINE | Admitting: HOSPITALIST
Payer: MEDICARE

## 2024-10-17 ENCOUNTER — TELEPHONE (OUTPATIENT)
Dept: ONCOLOGY | Facility: CLINIC | Age: 64
End: 2024-10-17
Payer: MEDICARE

## 2024-10-17 ENCOUNTER — APPOINTMENT (OUTPATIENT)
Dept: CT IMAGING | Facility: HOSPITAL | Age: 64
End: 2024-10-17
Payer: MEDICARE

## 2024-10-17 DIAGNOSIS — R33.8 ACUTE URINARY RETENTION: Primary | ICD-10-CM

## 2024-10-17 DIAGNOSIS — G35 HISTORY OF MULTIPLE SCLEROSIS: ICD-10-CM

## 2024-10-17 DIAGNOSIS — N39.0 ACUTE UTI: ICD-10-CM

## 2024-10-17 DIAGNOSIS — D72.829 LEUKOCYTOSIS, UNSPECIFIED TYPE: ICD-10-CM

## 2024-10-17 LAB
ALBUMIN SERPL-MCNC: 3.4 G/DL (ref 3.5–5.2)
ALBUMIN/GLOB SERPL: 1 G/DL
ALP SERPL-CCNC: 127 U/L (ref 39–117)
ALT SERPL W P-5'-P-CCNC: 26 U/L (ref 1–33)
ANION GAP SERPL CALCULATED.3IONS-SCNC: 10 MMOL/L (ref 5–15)
AST SERPL-CCNC: 34 U/L (ref 1–32)
BACTERIA UR QL AUTO: ABNORMAL /HPF
BASOPHILS # BLD AUTO: 0.04 10*3/MM3 (ref 0–0.2)
BASOPHILS NFR BLD AUTO: 0.2 % (ref 0–1.5)
BILIRUB SERPL-MCNC: 0.4 MG/DL (ref 0–1.2)
BILIRUB UR QL STRIP: NEGATIVE
BUN SERPL-MCNC: 21 MG/DL (ref 8–23)
BUN/CREAT SERPL: 21.2 (ref 7–25)
CALCIUM SPEC-SCNC: 8.5 MG/DL (ref 8.6–10.5)
CHLORIDE SERPL-SCNC: 102 MMOL/L (ref 98–107)
CLARITY UR: ABNORMAL
CO2 SERPL-SCNC: 22 MMOL/L (ref 22–29)
COLOR UR: YELLOW
CREAT SERPL-MCNC: 0.99 MG/DL (ref 0.57–1)
DEPRECATED RDW RBC AUTO: 59.3 FL (ref 37–54)
EGFRCR SERPLBLD CKD-EPI 2021: 65 ML/MIN/1.73
EOSINOPHIL # BLD AUTO: 0.02 10*3/MM3 (ref 0–0.4)
EOSINOPHIL NFR BLD AUTO: 0.1 % (ref 0.3–6.2)
ERYTHROCYTE [DISTWIDTH] IN BLOOD BY AUTOMATED COUNT: 19.7 % (ref 12.3–15.4)
GLOBULIN UR ELPH-MCNC: 3.4 GM/DL
GLUCOSE SERPL-MCNC: 160 MG/DL (ref 65–99)
GLUCOSE UR STRIP-MCNC: ABNORMAL MG/DL
HCT VFR BLD AUTO: 42.8 % (ref 34–46.6)
HGB BLD-MCNC: 13.9 G/DL (ref 12–15.9)
HGB UR QL STRIP.AUTO: ABNORMAL
HYALINE CASTS UR QL AUTO: ABNORMAL /LPF
IMM GRANULOCYTES # BLD AUTO: 0.1 10*3/MM3 (ref 0–0.05)
IMM GRANULOCYTES NFR BLD AUTO: 0.5 % (ref 0–0.5)
KETONES UR QL STRIP: NEGATIVE
LEUKOCYTE ESTERASE UR QL STRIP.AUTO: ABNORMAL
LYMPHOCYTES # BLD AUTO: 0.61 10*3/MM3 (ref 0.7–3.1)
LYMPHOCYTES NFR BLD AUTO: 3.2 % (ref 19.6–45.3)
MCH RBC QN AUTO: 27.5 PG (ref 26.6–33)
MCHC RBC AUTO-ENTMCNC: 32.5 G/DL (ref 31.5–35.7)
MCV RBC AUTO: 84.6 FL (ref 79–97)
MONOCYTES # BLD AUTO: 1.07 10*3/MM3 (ref 0.1–0.9)
MONOCYTES NFR BLD AUTO: 5.6 % (ref 5–12)
NEUTROPHILS NFR BLD AUTO: 17.22 10*3/MM3 (ref 1.7–7)
NEUTROPHILS NFR BLD AUTO: 90.4 % (ref 42.7–76)
NITRITE UR QL STRIP: NEGATIVE
NRBC BLD AUTO-RTO: 0 /100 WBC (ref 0–0.2)
PH UR STRIP.AUTO: 5.5 [PH] (ref 5–8)
PLATELET # BLD AUTO: 341 10*3/MM3 (ref 140–450)
PMV BLD AUTO: 8.6 FL (ref 6–12)
POTASSIUM SERPL-SCNC: 4 MMOL/L (ref 3.5–5.2)
PROT SERPL-MCNC: 6.8 G/DL (ref 6–8.5)
PROT UR QL STRIP: NEGATIVE
RBC # BLD AUTO: 5.06 10*6/MM3 (ref 3.77–5.28)
RBC # UR STRIP: ABNORMAL /HPF
REF LAB TEST METHOD: ABNORMAL
SODIUM SERPL-SCNC: 134 MMOL/L (ref 136–145)
SP GR UR STRIP: 1.01 (ref 1–1.03)
SQUAMOUS #/AREA URNS HPF: ABNORMAL /HPF
UROBILINOGEN UR QL STRIP: ABNORMAL
WBC # UR STRIP: ABNORMAL /HPF
WBC NRBC COR # BLD AUTO: 19.06 10*3/MM3 (ref 3.4–10.8)
YEAST URNS QL MICRO: PRESENT /HPF

## 2024-10-17 PROCEDURE — 74176 CT ABD & PELVIS W/O CONTRAST: CPT

## 2024-10-17 PROCEDURE — 85025 COMPLETE CBC W/AUTO DIFF WBC: CPT | Performed by: PHYSICIAN ASSISTANT

## 2024-10-17 PROCEDURE — 25010000002 CEFTRIAXONE PER 250 MG: Performed by: NURSE PRACTITIONER

## 2024-10-17 PROCEDURE — 99291 CRITICAL CARE FIRST HOUR: CPT

## 2024-10-17 PROCEDURE — 25010000002 MORPHINE PER 10 MG: Performed by: EMERGENCY MEDICINE

## 2024-10-17 PROCEDURE — 80053 COMPREHEN METABOLIC PANEL: CPT | Performed by: PHYSICIAN ASSISTANT

## 2024-10-17 PROCEDURE — P9612 CATHETERIZE FOR URINE SPEC: HCPCS

## 2024-10-17 PROCEDURE — 51798 US URINE CAPACITY MEASURE: CPT

## 2024-10-17 PROCEDURE — 87086 URINE CULTURE/COLONY COUNT: CPT | Performed by: PHYSICIAN ASSISTANT

## 2024-10-17 PROCEDURE — 25010000002 PROCHLORPERAZINE 10 MG/2ML SOLUTION: Performed by: PHYSICIAN ASSISTANT

## 2024-10-17 PROCEDURE — 81001 URINALYSIS AUTO W/SCOPE: CPT | Performed by: PHYSICIAN ASSISTANT

## 2024-10-17 RX ORDER — SODIUM CHLORIDE 0.9 % (FLUSH) 0.9 %
10 SYRINGE (ML) INJECTION AS NEEDED
Status: DISCONTINUED | OUTPATIENT
Start: 2024-10-17 | End: 2024-10-23 | Stop reason: HOSPADM

## 2024-10-17 RX ORDER — MORPHINE SULFATE 2 MG/ML
4 INJECTION, SOLUTION INTRAMUSCULAR; INTRAVENOUS ONCE
Status: COMPLETED | OUTPATIENT
Start: 2024-10-17 | End: 2024-10-17

## 2024-10-17 RX ORDER — DIMETHICONE 1.5 G/100ML
1 CREAM TOPICAL EVERY MORNING
COMMUNITY

## 2024-10-17 RX ORDER — ONDANSETRON 2 MG/ML
4 INJECTION INTRAMUSCULAR; INTRAVENOUS ONCE
Status: DISCONTINUED | OUTPATIENT
Start: 2024-10-17 | End: 2024-10-17

## 2024-10-17 RX ORDER — CASTOR OIL AND BALSAM, PERU 788; 87 MG/G; MG/G
1 OINTMENT TOPICAL 2 TIMES DAILY
COMMUNITY

## 2024-10-17 RX ORDER — PREDNISONE 20 MG/1
20 TABLET ORAL DAILY
COMMUNITY

## 2024-10-17 RX ORDER — PROCHLORPERAZINE EDISYLATE 5 MG/ML
10 INJECTION INTRAMUSCULAR; INTRAVENOUS ONCE
Status: COMPLETED | OUTPATIENT
Start: 2024-10-17 | End: 2024-10-17

## 2024-10-17 RX ADMIN — CEFTRIAXONE SODIUM 1000 MG: 1 INJECTION, POWDER, FOR SOLUTION INTRAMUSCULAR; INTRAVENOUS at 21:53

## 2024-10-17 RX ADMIN — MORPHINE SULFATE 4 MG: 2 INJECTION, SOLUTION INTRAMUSCULAR; INTRAVENOUS at 20:56

## 2024-10-17 RX ADMIN — PROCHLORPERAZINE EDISYLATE 10 MG: 5 INJECTION INTRAMUSCULAR; INTRAVENOUS at 20:56

## 2024-10-17 NOTE — ED PROVIDER NOTES
EMERGENCY DEPARTMENT ENCOUNTER  Room Number:  06/06  PCP: Doris Faria MD  Independent Historians: Patient and EMS      HPI:  Chief Complaint: had concerns including Pain.     A complete HPI/ROS/PMH/PSH/SH/FH are unobtainable due to: None    Chronic or social conditions impacting patient care (Social Determinants of Health): None      Context: Maritza Bejarano is a 63 y.o. female with a medical history of multiple sclerosis, hypertension, hyperlipidemia, dysphagia, anxiety, depression, optic neuritis, and breast cancer who presents to the ED c/o acute nausea, bladder pressure, low back pain.  Patient reports her symptoms started today.  Patient has chronic indwelling Cobos catheter.  Reports she feels like she needs to urinate although does not feel like it is coming out.  She is currently at [facility] rehab.  Reports she was recently diagnosed with advanced breast cancer.  Reports she was recently admitted with sepsis.  No reported fever, vomiting, diarrhea.  Patient denies chest pain or dyspnea.  No other systemic complaints at this time.      Review of prior external notes (non-ED) -and- Review of prior external test results outside of this encounter:  Patient seen in office by oncology on 10/14/2024 for metastatic breast cancer.  Reviewed assessment and plan.  Patient will continue current medications.  Reviewed labs collected on 10/14/2024.  CBC with hemoglobin 12.7, CMP with creatinine 0.57.    Prescription drug monitoring program review:     N/A    PAST MEDICAL HISTORY  Active Ambulatory Problems     Diagnosis Date Noted    H/O total shoulder replacement, right 10/22/2018    Cough 01/28/2021    Acute UTI (urinary tract infection) 01/28/2021    Multiple sclerosis 01/28/2021    Essential hypertension 01/28/2021    Hyperlipidemia 01/28/2021    Shortness of breath 01/28/2021    Oropharyngeal dysphagia 02/04/2021    Abnormal finding on mammography 08/09/2021    Acid reflux 08/09/2021    Anxiety and  depression 05/01/2018    Brash 08/09/2021    Carpal tunnel syndrome 01/10/2013    Chronic low back pain 01/22/2014    Diplopia 01/10/2013    Disease with a predominantly sexual mode of transmission 08/09/2021    FOM (frequency of micturition) 08/09/2021    Headache 01/10/2013    History of diplopia 01/07/2020    History of optic neuritis 01/07/2020    History of vitamin D deficiency 10/31/2017    Migraine syndrome 01/22/2014    Mixed incontinence 07/31/2017    Nausea 08/10/2015    Neurogenic bladder 02/23/2017    Pain in joint of right shoulder 07/31/2017    Restless legs syndrome 07/23/2014    Rupture of rotator cuff of shoulder 08/31/2017    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    S/P cubital tunnel release 01/04/2019    Vitamin D deficiency 01/22/2014    Multiple sclerosis exacerbation 02/26/2022    Sepsis due to Gram negative bacteria 02/27/2022    Lower extremity cellulitis 03/07/2024    Malignant neoplasm of overlapping sites of left breast in female, estrogen receptor positive 06/28/2024    Encounter for long-term (current) use of other medications 07/16/2024    Cancer, metastatic to bone 07/31/2024    Colitis 09/10/2024    Weakness 09/18/2024    Elevated LFTs 09/21/2024     Resolved Ambulatory Problems     Diagnosis Date Noted    No Resolved Ambulatory Problems     Past Medical History:   Diagnosis Date    Anemia 2024    Dawn esophagus     Blurred vision     Breast cancer 05/2024+++++    Clotting disorder 1993, 2003, 2012    Colon polyp 2013    Deep vein thrombosis phlebitis 1980    Depression     GERD (gastroesophageal reflux disease)     GI (gastrointestinal bleed) 3 bleeds    H/O Skin cancer, basal cell     History of blood transfusion     History of GI bleed     History of urinary tract infection     Hypercalcemia     Hypertension     Movement disorder     Optic neuritis     PONV (postoperative nausea and vomiting)          PAST SURGICAL HISTORY  Past Surgical History:   Procedure  Laterality Date    APPENDECTOMY      BLADDER SURGERY      bladder stimulator    BREAST BIOPSY  don't remember    BREAST SURGERY      augmentation wtih subsequent removal    CARPAL TUNNEL RELEASE Bilateral     Left 2018, right 2020    CUBITAL TUNNEL RELEASE Left     CYSTOSCOPY BOTOX INJECTION OF BLADDER  2018    Cystoscopy with Botox    FRACTURE SURGERY  2019    rt shoulder    PARATHYROIDECTOMY      one gland removed    ROTATOR CUFF REPAIR Right 2017    TOE SURGERY      bilateral great toes    TOTAL SHOULDER ARTHROPLASTY W/ DISTAL CLAVICLE EXCISION Right 10/22/2018    Procedure: RT TOTAL SHOULDER REVERSE ARTHROPLASTY;  Surgeon: Bipin Dangelo MD;  Location: [facility] OR;  Service: Orthopedics         FAMILY HISTORY  Family History   Problem Relation Age of Onset    Hypertension Mother     Hyperlipidemia Mother     Aortic aneurysm Mother         thoracic    Diabetes Mother     Hypertension Father         charissa heart failure    Heart failure Father     Hyperlipidemia Father     Miscarriages / Stillbirths Sister     Multiple sclerosis Brother     Atrial fibrillation Brother     Diabetes Maternal Grandfather     Stroke Maternal Grandfather     Parkinsonism Paternal Grandmother     Tremor Paternal Grandmother     Stomach cancer Paternal Grandfather     Diabetes Maternal Grandmother          SOCIAL HISTORY  Social History     Socioeconomic History    Marital status:      Spouse name: Donald    Number of children: 0   Tobacco Use    Smoking status: Former     Current packs/day: 0.00     Average packs/day: 2.0 packs/day for 30.0 years (60.0 ttl pk-yrs)     Types: Cigarettes     Start date: 10/22/1973     Quit date: 10/22/2003     Years since quittin.0    Smokeless tobacco: Never   Vaping Use    Vaping status: Never Used   Substance and Sexual Activity    Alcohol use: Not Currently    Drug use: Not Currently     Types: Marijuana     Comment: advised by neurologist for sleep    Sexual activity: Not Currently      Partners: Male     Birth control/protection: Post-menopausal         ALLERGIES  Patient has no known allergies.      REVIEW OF SYSTEMS  Included in HPI  All systems reviewed and negative except for those discussed in HPI.      PHYSICAL EXAM    I have reviewed the triage vital signs and nursing notes.    ED Triage Vitals [10/17/24 1938]   Temp Heart Rate Resp BP SpO2   98.6 °F (37 °C) 84 16 169/69 94 %      Temp src Heart Rate Source Patient Position BP Location FiO2 (%)   Oral Monitor -- -- --       Physical Exam  Constitutional:       General: She is not in acute distress.     Appearance: She is well-developed.   HENT:      Head: Normocephalic and atraumatic.   Eyes:      Extraocular Movements: Extraocular movements intact.   Cardiovascular:      Rate and Rhythm: Normal rate and regular rhythm.      Heart sounds: Normal heart sounds.      Comments: Distal pulses intact  Pulmonary:      Effort: Pulmonary effort is normal.      Breath sounds: Normal breath sounds.   Abdominal:      General: There is no distension.      Tenderness: There is abdominal tenderness.   Skin:     General: Skin is warm.   Neurological:      General: No focal deficit present.      Mental Status: She is alert and oriented to person, place, and time.   Psychiatric:         Mood and Affect: Mood normal.             LAB RESULTS  Recent Results (from the past 24 hours)   Comprehensive Metabolic Panel    Collection Time: 10/17/24  8:37 PM    Specimen: Hand, Right; Blood   Result Value Ref Range    Glucose 160 (H) 65 - 99 mg/dL    BUN 21 8 - 23 mg/dL    Creatinine 0.99 0.57 - 1.00 mg/dL    Sodium 134 (L) 136 - 145 mmol/L    Potassium 4.0 3.5 - 5.2 mmol/L    Chloride 102 98 - 107 mmol/L    CO2 22.0 22.0 - 29.0 mmol/L    Calcium 8.5 (L) 8.6 - 10.5 mg/dL    Total Protein 6.8 6.0 - 8.5 g/dL    Albumin 3.4 (L) 3.5 - 5.2 g/dL    ALT (SGPT) 26 1 - 33 U/L    AST (SGOT) 34 (H) 1 - 32 U/L    Alkaline Phosphatase 127 (H) 39 - 117 U/L    Total Bilirubin 0.4 0.0  - 1.2 mg/dL    Globulin 3.4 gm/dL    A/G Ratio 1.0 g/dL    BUN/Creatinine Ratio 21.2 7.0 - 25.0    Anion Gap 10.0 5.0 - 15.0 mmol/L    eGFR 65.0 >60.0 mL/min/1.73   CBC Auto Differential    Collection Time: 10/17/24  8:37 PM    Specimen: Hand, Right; Blood   Result Value Ref Range    WBC 19.06 (H) 3.40 - 10.80 10*3/mm3    RBC 5.06 3.77 - 5.28 10*6/mm3    Hemoglobin 13.9 12.0 - 15.9 g/dL    Hematocrit 42.8 34.0 - 46.6 %    MCV 84.6 79.0 - 97.0 fL    MCH 27.5 26.6 - 33.0 pg    MCHC 32.5 31.5 - 35.7 g/dL    RDW 19.7 (H) 12.3 - 15.4 %    RDW-SD 59.3 (H) 37.0 - 54.0 fl    MPV 8.6 6.0 - 12.0 fL    Platelets 341 140 - 450 10*3/mm3    Neutrophil % 90.4 (H) 42.7 - 76.0 %    Lymphocyte % 3.2 (L) 19.6 - 45.3 %    Monocyte % 5.6 5.0 - 12.0 %    Eosinophil % 0.1 (L) 0.3 - 6.2 %    Basophil % 0.2 0.0 - 1.5 %    Immature Grans % 0.5 0.0 - 0.5 %    Neutrophils, Absolute 17.22 (H) 1.70 - 7.00 10*3/mm3    Lymphocytes, Absolute 0.61 (L) 0.70 - 3.10 10*3/mm3    Monocytes, Absolute 1.07 (H) 0.10 - 0.90 10*3/mm3    Eosinophils, Absolute 0.02 0.00 - 0.40 10*3/mm3    Basophils, Absolute 0.04 0.00 - 0.20 10*3/mm3    Immature Grans, Absolute 0.10 (H) 0.00 - 0.05 10*3/mm3    nRBC 0.0 0.0 - 0.2 /100 WBC   Urinalysis With Culture If Indicated - Urine, Catheter    Collection Time: 10/17/24  8:38 PM    Specimen: Urine, Catheter   Result Value Ref Range    Color, UA Yellow Yellow, Straw    Appearance, UA Cloudy (A) Clear    pH, UA 5.5 5.0 - 8.0    Specific Gravity, UA 1.015 1.005 - 1.030    Glucose,  mg/dL (1+) (A) Negative    Ketones, UA Negative Negative    Bilirubin, UA Negative Negative    Blood, UA Trace (A) Negative    Protein, UA Negative Negative    Leuk Esterase, UA Large (3+) (A) Negative    Nitrite, UA Negative Negative    Urobilinogen, UA 0.2 E.U./dL 0.2 - 1.0 E.U./dL   Urinalysis, Microscopic Only - Urine, Catheter    Collection Time: 10/17/24  8:38 PM    Specimen: Urine, Catheter   Result Value Ref Range    RBC, UA 21-50 (A)  None Seen, 0-2 /HPF    WBC, UA Too Numerous to Count (A) None Seen, 0-2 /HPF    Bacteria, UA None Seen None Seen /HPF    Squamous Epithelial Cells, UA 0-2 None Seen, 0-2 /HPF    Yeast, UA Present (A) None Seen /HPF    Hyaline Casts, UA None Seen None Seen /LPF    Methodology Automated Microscopy          MEDICATIONS GIVEN IN ER  Medications   sodium chloride 0.9 % flush 10 mL (has no administration in time range)   cefTRIAXone (ROCEPHIN) 1,000 mg in sodium chloride 0.9 % 100 mL MBP (has no administration in time range)   prochlorperazine (COMPAZINE) injection 10 mg (10 mg Intravenous Given 10/17/24 2056)   morphine injection 4 mg (4 mg Intravenous Given 10/17/24 2056)         ORDERS PLACED DURING THIS VISIT:  Orders Placed This Encounter   Procedures    Urine Culture - Urine,    CT Abdomen Pelvis Without Contrast    Comprehensive Metabolic Panel    Urinalysis With Culture If Indicated - Urine, Catheter    CBC Auto Differential    Urinalysis, Microscopic Only - Urine, Clean Catch    Bladder scan    Insert Peripheral IV    CBC & Differential         OUTPATIENT MEDICATION MANAGEMENT:  Current Facility-Administered Medications Ordered in Epic   Medication Dose Route Frequency Provider Last Rate Last Admin    cefTRIAXone (ROCEPHIN) 1,000 mg in sodium chloride 0.9 % 100 mL MBP  1,000 mg Intravenous Once Sarah Loza APRN        sodium chloride 0.9 % flush 10 mL  10 mL Intravenous PRN Nidia Ji PA-C         Current Outpatient Medications Ordered in Epic   Medication Sig Dispense Refill    albuterol sulfate  (90 Base) MCG/ACT inhaler Inhale 2 puffs Every 4 (Four) Hours As Needed for Wheezing or Shortness of Air. 18 g 0    anastrozole (ARIMIDEX) 1 MG tablet Take 1 tablet by mouth Daily. 30 tablet 3    baclofen (LIORESAL) 20 MG tablet Take 1 tablet by mouth 2 (Two) Times a Day.      Cholecalciferol (vitamin D3) 125 MCG (5000 UT) tablet tablet Take 1 tablet by mouth Daily.      clonazePAM (KlonoPIN) 2 MG  tablet Take 1 tablet by mouth 2 (Two) Times a Day As Needed for Anxiety (Sleep). 6 tablet 0    ferrous sulfate 325 (65 FE) MG tablet Take 1 tablet by mouth Daily With Breakfast. 30 tablet 3    losartan-hydrochlorothiazide (HYZAAR) 50-12.5 MG per tablet Take 1 tablet by mouth Daily. 90 tablet 1    OLANZapine (ZyPREXA) 5 MG tablet Take 1 tablet by mouth Every Night. (Patient not taking: Reported on 10/4/2024) 30 tablet 5    ondansetron (ZOFRAN) 8 MG tablet Take 1 tablet by mouth 3 (Three) Times a Day As Needed for Nausea or Vomiting. (Patient not taking: Reported on 10/4/2024) 30 tablet 5    pantoprazole (PROTONIX) 40 MG EC tablet Take 1 tablet by mouth 2 (Two) Times a Day.      pramipexole (MIRAPEX) 1.5 MG tablet Take 1 tablet by mouth 3 (Three) Times a Day.      prochlorperazine (COMPAZINE) 10 MG tablet Take 1 tablet by mouth Every 6 (Six) Hours As Needed for Nausea or Vomiting. (Patient not taking: Reported on 10/4/2024) 90 tablet 5    promethazine (PHENERGAN) 12.5 MG tablet Take 1 tablet by mouth Every 6 (Six) Hours As Needed for Nausea or Vomiting. 60 tablet 3    ribociclib succinate (Kisqali, 200 MG Dose,) 200 MG tablet therapy pack tablet Take 1 tablet by mouth Daily. Take for 21 days on, then 7 days off 21 tablet 0    sennosides-docusate (senna-docusate sodium) 8.6-50 MG per tablet Take 1 tablet by mouth Daily. (Patient not taking: Reported on 10/14/2024) 30 tablet 2           PROGRESS, DATA ANALYSIS, CONSULTS, AND MEDICAL DECISION MAKING  All labs have been independently interpreted by me.  All radiology studies have been reviewed by me. All EKG's have been independently viewed and interpreted by me.  Discussion below represents my analysis of pertinent findings related to patient's condition, differential diagnosis, treatment plan and final disposition.    Differential diagnosis includes but is not limited to acute urinary retention, KINZA, sepsis.        ED Course as of 10/17/24 2147   u Oct 17, 2024   2122  WBC(!): 19.06 [MP]   2121 Hemoglobin: 13.9 [MP]   2121 BUN: 21 [MP]   2121 Creatinine: 0.99 [MP]   2121 Blood, UA(!): Trace [MP]   2121 Leukocytes, UA(!): Large (3+) [MP]   2121 Nitrite, UA: Negative [MP]   2121 RBC, UA(!): 21-50 [MP]   2121 WBC, UA(!): Too Numerous to Count [MP]   2121 Bacteria, UA: None Seen [MP]   2121 Squamous Epithelial Cells, UA: 0-2 [MP]   2122 Patient was retaining greater than 600 mL urine despite Cobos catheter.  This was irrigated by RN with good output.  Since irrigation, patient has had greater than 900 mL urine output in the Cobos catheter bag. [MP]   2146 Pt care turned over to DANA Marcum, pending CT scan [MP]   2146 Patient was turned over to me by Nidia Ji PA-C.  Pending: imaging report and disposition.   [AR]      ED Course User Index  [AR] Sarah Loza APRN  [MP] Nidia Ji PA-C             AS OF 21:47 EDT VITALS:    BP - 169/69  HR - 84  TEMP - 98.6 °F (37 °C) (Oral)  O2 SATS - 94%      DIAGNOSIS  Final diagnoses:   Acute urinary retention   Leukocytosis, unspecified type   History of multiple sclerosis         DISPOSITION  ED Disposition       ED Disposition   Intended Admit    Condition   --    Comment   --                Please note that portions of this document were completed with a voice recognition program.    Note Disclaimer: At [facility], we believe that sharing information builds trust and better relationships. You are receiving this note because you recently visited [facility]. It is possible you will see health information before a provider has talked with you about it. This kind of information can be easy to misunderstand. To help you fully understand what it means for your health, we urge you to discuss this note with your provider.     Nidia Ji PA-C  10/17/24 2143

## 2024-10-17 NOTE — ED TRIAGE NOTES
To ER via EMS from [place].  C/o pain from urinary catheter insertion site that  radiates down legs.  States pain has been all day. Denies trauma to catheter.   Pt also c/o nausea and headache.  Pt states di not eat lunch or dinner due to feeling bad.

## 2024-10-17 NOTE — TELEPHONE ENCOUNTER
Called patient back and let her know that her rehab facility should be monitoring all of this and told her they will reach out if they need any orders placed. Pt v/u

## 2024-10-18 ENCOUNTER — SPECIALTY PHARMACY (OUTPATIENT)
Dept: PHARMACY | Facility: HOSPITAL | Age: 64
End: 2024-10-18
Payer: MEDICARE

## 2024-10-18 PROBLEM — N39.0 ACUTE UTI: Status: ACTIVE | Noted: 2024-10-18

## 2024-10-18 LAB
ANION GAP SERPL CALCULATED.3IONS-SCNC: 9.4 MMOL/L (ref 5–15)
BASOPHILS # BLD AUTO: 0.05 10*3/MM3 (ref 0–0.2)
BASOPHILS NFR BLD AUTO: 0.3 % (ref 0–1.5)
BUN SERPL-MCNC: 16 MG/DL (ref 8–23)
BUN/CREAT SERPL: 16 (ref 7–25)
CALCIUM SPEC-SCNC: 8.2 MG/DL (ref 8.6–10.5)
CHLORIDE SERPL-SCNC: 103 MMOL/L (ref 98–107)
CO2 SERPL-SCNC: 26.6 MMOL/L (ref 22–29)
CREAT SERPL-MCNC: 1 MG/DL (ref 0.57–1)
DEPRECATED RDW RBC AUTO: 60.4 FL (ref 37–54)
EGFRCR SERPLBLD CKD-EPI 2021: 64.2 ML/MIN/1.73
EOSINOPHIL # BLD AUTO: 0.15 10*3/MM3 (ref 0–0.4)
EOSINOPHIL NFR BLD AUTO: 1 % (ref 0.3–6.2)
ERYTHROCYTE [DISTWIDTH] IN BLOOD BY AUTOMATED COUNT: 19.9 % (ref 12.3–15.4)
GLUCOSE SERPL-MCNC: 184 MG/DL (ref 65–99)
HCT VFR BLD AUTO: 37.9 % (ref 34–46.6)
HGB BLD-MCNC: 12.3 G/DL (ref 12–15.9)
IMM GRANULOCYTES # BLD AUTO: 0.09 10*3/MM3 (ref 0–0.05)
IMM GRANULOCYTES NFR BLD AUTO: 0.6 % (ref 0–0.5)
LYMPHOCYTES # BLD AUTO: 1.25 10*3/MM3 (ref 0.7–3.1)
LYMPHOCYTES NFR BLD AUTO: 8.7 % (ref 19.6–45.3)
MCH RBC QN AUTO: 27.4 PG (ref 26.6–33)
MCHC RBC AUTO-ENTMCNC: 32.5 G/DL (ref 31.5–35.7)
MCV RBC AUTO: 84.4 FL (ref 79–97)
MONOCYTES # BLD AUTO: 0.86 10*3/MM3 (ref 0.1–0.9)
MONOCYTES NFR BLD AUTO: 6 % (ref 5–12)
NEUTROPHILS NFR BLD AUTO: 11.94 10*3/MM3 (ref 1.7–7)
NEUTROPHILS NFR BLD AUTO: 83.4 % (ref 42.7–76)
NRBC BLD AUTO-RTO: 0 /100 WBC (ref 0–0.2)
NT-PROBNP SERPL-MCNC: 94 PG/ML (ref 0–900)
PLATELET # BLD AUTO: 352 10*3/MM3 (ref 140–450)
PMV BLD AUTO: 9.4 FL (ref 6–12)
POTASSIUM SERPL-SCNC: 3.7 MMOL/L (ref 3.5–5.2)
RBC # BLD AUTO: 4.49 10*6/MM3 (ref 3.77–5.28)
SODIUM SERPL-SCNC: 139 MMOL/L (ref 136–145)
WBC NRBC COR # BLD AUTO: 14.34 10*3/MM3 (ref 3.4–10.8)

## 2024-10-18 PROCEDURE — 80048 BASIC METABOLIC PNL TOTAL CA: CPT | Performed by: HOSPITALIST

## 2024-10-18 PROCEDURE — G0378 HOSPITAL OBSERVATION PER HR: HCPCS

## 2024-10-18 PROCEDURE — 85025 COMPLETE CBC W/AUTO DIFF WBC: CPT | Performed by: HOSPITALIST

## 2024-10-18 PROCEDURE — 87102 FUNGUS ISOLATION CULTURE: CPT | Performed by: HOSPITALIST

## 2024-10-18 PROCEDURE — 25810000003 SODIUM CHLORIDE 0.9 % SOLUTION: Performed by: HOSPITALIST

## 2024-10-18 PROCEDURE — 83880 ASSAY OF NATRIURETIC PEPTIDE: CPT | Performed by: HOSPITALIST

## 2024-10-18 RX ORDER — PRAMIPEXOLE DIHYDROCHLORIDE 1.5 MG/1
1.5 TABLET ORAL 3 TIMES DAILY
Status: DISCONTINUED | OUTPATIENT
Start: 2024-10-18 | End: 2024-10-23 | Stop reason: HOSPADM

## 2024-10-18 RX ORDER — ALBUTEROL SULFATE 0.83 MG/ML
2.5 SOLUTION RESPIRATORY (INHALATION) EVERY 6 HOURS PRN
Status: DISCONTINUED | OUTPATIENT
Start: 2024-10-18 | End: 2024-10-23 | Stop reason: HOSPADM

## 2024-10-18 RX ORDER — BACLOFEN 10 MG/1
10 TABLET ORAL 3 TIMES DAILY PRN
Status: DISCONTINUED | OUTPATIENT
Start: 2024-10-18 | End: 2024-10-20

## 2024-10-18 RX ORDER — ANASTROZOLE 1 MG/1
1 TABLET ORAL DAILY
Status: DISCONTINUED | OUTPATIENT
Start: 2024-10-18 | End: 2024-10-23 | Stop reason: HOSPADM

## 2024-10-18 RX ORDER — OLANZAPINE 5 MG/1
5 TABLET ORAL NIGHTLY
Status: DISCONTINUED | OUTPATIENT
Start: 2024-10-18 | End: 2024-10-23 | Stop reason: HOSPADM

## 2024-10-18 RX ORDER — AMOXICILLIN 250 MG
1 CAPSULE ORAL DAILY
Status: DISCONTINUED | OUTPATIENT
Start: 2024-10-18 | End: 2024-10-23 | Stop reason: HOSPADM

## 2024-10-18 RX ORDER — MORPHINE SULFATE 2 MG/ML
2 INJECTION, SOLUTION INTRAMUSCULAR; INTRAVENOUS EVERY 4 HOURS PRN
Status: DISCONTINUED | OUTPATIENT
Start: 2024-10-18 | End: 2024-10-19

## 2024-10-18 RX ORDER — PREDNISONE 20 MG/1
20 TABLET ORAL DAILY
Status: DISCONTINUED | OUTPATIENT
Start: 2024-10-18 | End: 2024-10-23 | Stop reason: HOSPADM

## 2024-10-18 RX ORDER — PANTOPRAZOLE SODIUM 40 MG/1
40 TABLET, DELAYED RELEASE ORAL
Status: DISCONTINUED | OUTPATIENT
Start: 2024-10-18 | End: 2024-10-23 | Stop reason: HOSPADM

## 2024-10-18 RX ORDER — CHOLECALCIFEROL (VITAMIN D3) 25 MCG
5000 TABLET ORAL DAILY
Status: DISCONTINUED | OUTPATIENT
Start: 2024-10-18 | End: 2024-10-23 | Stop reason: HOSPADM

## 2024-10-18 RX ORDER — BACLOFEN 10 MG/1
20 TABLET ORAL 2 TIMES DAILY
Status: DISCONTINUED | OUTPATIENT
Start: 2024-10-18 | End: 2024-10-18

## 2024-10-18 RX ORDER — FLUCONAZOLE 200 MG/1
200 TABLET ORAL ONCE
Status: COMPLETED | OUTPATIENT
Start: 2024-10-18 | End: 2024-10-18

## 2024-10-18 RX ORDER — PROCHLORPERAZINE EDISYLATE 5 MG/ML
10 INJECTION INTRAMUSCULAR; INTRAVENOUS EVERY 4 HOURS PRN
Status: DISCONTINUED | OUTPATIENT
Start: 2024-10-18 | End: 2024-10-23 | Stop reason: HOSPADM

## 2024-10-18 RX ORDER — HYDROCHLOROTHIAZIDE 12.5 MG/1
12.5 TABLET ORAL
Status: DISCONTINUED | OUTPATIENT
Start: 2024-10-18 | End: 2024-10-18

## 2024-10-18 RX ORDER — LOSARTAN POTASSIUM 50 MG/1
50 TABLET ORAL
Status: DISCONTINUED | OUTPATIENT
Start: 2024-10-18 | End: 2024-10-18

## 2024-10-18 RX ORDER — FERROUS SULFATE 325(65) MG
325 TABLET ORAL
Status: DISCONTINUED | OUTPATIENT
Start: 2024-10-18 | End: 2024-10-18

## 2024-10-18 RX ORDER — SODIUM CHLORIDE 9 MG/ML
75 INJECTION, SOLUTION INTRAVENOUS CONTINUOUS
Status: DISCONTINUED | OUTPATIENT
Start: 2024-10-18 | End: 2024-10-19

## 2024-10-18 RX ORDER — FLUCONAZOLE 100 MG/1
100 TABLET ORAL EVERY 24 HOURS
Status: COMPLETED | OUTPATIENT
Start: 2024-10-18 | End: 2024-10-20

## 2024-10-18 RX ADMIN — Medication 5000 UNITS: at 13:15

## 2024-10-18 RX ADMIN — BACLOFEN 10 MG: 10 TABLET ORAL at 16:38

## 2024-10-18 RX ADMIN — PANTOPRAZOLE SODIUM 40 MG: 40 TABLET, DELAYED RELEASE ORAL at 06:12

## 2024-10-18 RX ADMIN — FLUCONAZOLE 100 MG: 100 TABLET ORAL at 13:16

## 2024-10-18 RX ADMIN — SENNOSIDES AND DOCUSATE SODIUM 1 TABLET: 50; 8.6 TABLET ORAL at 13:15

## 2024-10-18 RX ADMIN — SODIUM CHLORIDE 75 ML/HR: 9 INJECTION, SOLUTION INTRAVENOUS at 02:35

## 2024-10-18 RX ADMIN — PRAMIPEXOLE DIHYDROCHLORIDE 1.5 MG: 1.5 TABLET ORAL at 16:35

## 2024-10-18 RX ADMIN — OLANZAPINE 5 MG: 5 TABLET, FILM COATED ORAL at 20:55

## 2024-10-18 RX ADMIN — ANASTROZOLE 1 MG: 1 TABLET ORAL at 13:16

## 2024-10-18 RX ADMIN — PRAMIPEXOLE DIHYDROCHLORIDE 1.5 MG: 1.5 TABLET ORAL at 20:55

## 2024-10-18 RX ADMIN — PANTOPRAZOLE SODIUM 40 MG: 40 TABLET, DELAYED RELEASE ORAL at 16:35

## 2024-10-18 RX ADMIN — SODIUM CHLORIDE 75 ML/HR: 9 INJECTION, SOLUTION INTRAVENOUS at 15:31

## 2024-10-18 RX ADMIN — FLUCONAZOLE 200 MG: 200 TABLET ORAL at 02:37

## 2024-10-18 RX ADMIN — MENTHOL, ZINC OXIDE 1 APPLICATION: .44; 20.6 OINTMENT TOPICAL at 20:55

## 2024-10-18 NOTE — PROGRESS NOTES
Continued Stay Note  [facility]     Patient Name: Maritza Bejarano  MRN: [id number]  Today's Date: 10/18/2024    Admit Date: 10/17/2024    Plan: Rehab  (referrals in [facility]/Pending)   Discharge Plan       Row Name 10/18/24 7513       Plan    Plan Rehab  (referrals in [facility]/Pending)    Plan Comments Per Mihaela/LOU, Azalia/Lucila Ferrari and Shandra/[facility], all waiting on PT/OT eval. Per Christal Montes, no beds available      Row Name 10/18/24 1239       Plan    Plan Rehab (referrals in [facility]/Pending)    Plan Comments NICE noted. Introduced self and role of CCP. Patient is from [facility]. Has been there since 9/21/24. Prior to that time, patient was living at her home with spouse who was her care giver. Patient used a wheelchair and was able to transfer in/out on own. Patient was hospitalized 9/7/24 to 9/17/24 and was DC to [facility] [place]. Admit back to hospital the next day 9/18/24 and stayed until 9/21/24. At this time she was DC'd to [place] for skilled rehab. Pateint requested not to retunr to [place]. Requested referrals to [facility], [facility] and [facility]. Referrals placed in EPIC.                   Discharge Codes    No documentation.                 Expected Discharge Date and Time       Expected Discharge Date Expected Discharge Time    Oct 22, 2024               Jess Mathis RN

## 2024-10-18 NOTE — ED NOTES
Nursing report ED to floor  Maritza Bergn  63 y.o.  female    HPI :  HPI  Stated Reason for Visit: To ER via EMS from [place].  C/o pain from urinary catheter insertion site that  radiates down legs.  States pain has been all day. Denies trauma to catheter.   Pt also c/o nausea and headache.  Pt states di not eat lunch or dinner due to feeling bad.  History Obtained From: patient, EMS    Chief Complaint  Chief Complaint   Patient presents with    Pain       Admitting doctor:   Farooq Vicente MD    Admitting diagnosis:   The primary encounter diagnosis was Acute urinary retention. Diagnoses of Leukocytosis, unspecified type, History of multiple sclerosis, and Acute UTI were also pertinent to this visit.    Code status:   Current Code Status       Date Active Code Status Order ID Comments User Context       Prior            Allergies:   Patient has no known allergies.    Isolation:   No active isolations    Intake and Output  No intake or output data in the 24 hours ending 10/18/24 0044    Weight:       10/17/24  1938   Weight: 76.2 kg (168 lb)       Most recent vitals:   Vitals:    10/17/24 2201 10/17/24 2301 10/17/24 2302 10/17/24 2351   BP: 149/74 124/71  111/62   Pulse: 85 89 87 82   Resp:       Temp:       TempSrc:       SpO2: 99% 97% 97% 95%   Weight:       Height:           Active LDAs/IV Access:   Lines, Drains & Airways       Active LDAs       Name Placement date Placement time Site Days    Peripheral IV 10/17/24 2030 Posterior;Right Forearm 10/17/24  2030  Forearm  less than 1    Urethral Catheter Silicone 16 Fr. 09/18/24  1723  -- 29                    Labs (abnormal labs have a star):   Labs Reviewed   COMPREHENSIVE METABOLIC PANEL - Abnormal; Notable for the following components:       Result Value    Glucose 160 (*)     Sodium 134 (*)     Calcium 8.5 (*)     Albumin 3.4 (*)     AST (SGOT) 34 (*)     Alkaline Phosphatase 127 (*)     All other components within normal limits    Narrative:     GFR Normal  >60  Chronic Kidney Disease <60  Kidney Failure <15     URINALYSIS W/ CULTURE IF INDICATED - Abnormal; Notable for the following components:    Appearance, UA Cloudy (*)     Glucose,  mg/dL (1+) (*)     Blood, UA Trace (*)     Leuk Esterase, UA Large (3+) (*)     All other components within normal limits    Narrative:     In absence of clinical symptoms, the presence of pyuria, bacteria, and/or nitrites on the urinalysis result does not correlate with infection.   CBC WITH AUTO DIFFERENTIAL - Abnormal; Notable for the following components:    WBC 19.06 (*)     RDW 19.7 (*)     RDW-SD 59.3 (*)     Neutrophil % 90.4 (*)     Lymphocyte % 3.2 (*)     Eosinophil % 0.1 (*)     Neutrophils, Absolute 17.22 (*)     Lymphocytes, Absolute 0.61 (*)     Monocytes, Absolute 1.07 (*)     Immature Grans, Absolute 0.10 (*)     All other components within normal limits   URINALYSIS, MICROSCOPIC ONLY - Abnormal; Notable for the following components:    RBC, UA 21-50 (*)     WBC, UA Too Numerous to Count (*)     Yeast, UA Present (*)     All other components within normal limits   URINE CULTURE   CBC AND DIFFERENTIAL    Narrative:     The following orders were created for panel order CBC & Differential.  Procedure                               Abnormality         Status                     ---------                               -----------         ------                     CBC Auto Differential[[id number]]        Abnormal            Final result                 Please view results for these tests on the individual orders.       EKG:   No orders to display       Meds given in ED:   Medications   sodium chloride 0.9 % flush 10 mL (has no administration in time range)   prochlorperazine (COMPAZINE) injection 10 mg (10 mg Intravenous Given 10/17/24 2056)   morphine injection 4 mg (4 mg Intravenous Given 10/17/24 2056)   cefTRIAXone (ROCEPHIN) 1,000 mg in sodium chloride 0.9 % 100 mL MBP (0 mg Intravenous Stopped 10/17/24 2231)        Imaging results:  CT Abdomen Pelvis Without Contrast    Result Date: 10/17/2024  While the urinary bladder is decompressed with a Cobos catheter, there is extensive perivesical stranding, as well as periureteral and perinephric stranding. Appearance is concerning for cystitis and ascending urinary tract infection.  Radiation dose reduction techniques were utilized, including automated exposure control and exposure modulation based on body size.   This report was finalized on 10/17/2024 11:48 PM by Dr. Yisel Shafer M.D on Workstation: [id number]       Ambulatory status:   - with assist    Social issues:   Social History     Socioeconomic History    Marital status:      Spouse name: Donald    Number of children: 0   Tobacco Use    Smoking status: Former     Current packs/day: 0.00     Average packs/day: 2.0 packs/day for 30.0 years (60.0 ttl pk-yrs)     Types: Cigarettes     Start date: 10/22/1973     Quit date: 10/22/2003     Years since quittin.0    Smokeless tobacco: Never   Vaping Use    Vaping status: Never Used   Substance and Sexual Activity    Alcohol use: Not Currently    Drug use: Not Currently     Types: Marijuana     Comment: advised by neurologist for sleep    Sexual activity: Not Currently     Partners: Male     Birth control/protection: Post-menopausal       Peripheral Neurovascular  Peripheral Neurovascular (Adult)  Peripheral Neurovascular WDL: WDL    Neuro Cognitive  Neuro Cognitive (Adult)  Cognitive/Neuro/Behavioral WDL: WDL    Learning  Learning Assessment  Learning Readiness and Ability: no barriers identified  Education Provided  Person Taught: patient  Teaching Method: verbal instruction    Respiratory  Respiratory  Airway WDL: WDL  Respiratory WDL  Respiratory WDL: WDL    Abdominal Pain       Pain Assessments  Pain (Adult)  (0-10) Pain Rating: Rest: 8    NIH Stroke Scale       Teresa Poole RN  10/18/24 00:44 EDT

## 2024-10-18 NOTE — PROGRESS NOTES
Zainab Lopes MD [facility], [facility]; Rosina Sethi, RN  Hold off till she is discharged.    Thanks          Previous Messages       ----- Message -----  From: Lety Montemayor RPH  Sent: 10/18/2024  10:31 AM EDT  To: Rosina Sethi, JUICE; Zainab Lopes MD    Good Morning,    She has been admitted to the hospital and is calling the [facility] office asking if she should start her Kisqali.  I am happy to call her with any directives.  She stated a new shipment arrived at her house yesterday.      Thanks,  Quita    Called and left  with the above directives

## 2024-10-18 NOTE — H&P
History and physical    Primary care physician      Chief complaint  Generalized pain with nausea    History of present illness  63-year-old white female with history of multiple sclerosis metastatic breast cancer hypertension hyperlipidemia neurogenic bladder with chronic Cobos immobilization syndrome nursing home resident presented to [facility], [place], operated by [facility] emergency room with multiple complaint including generalized weakness pain all over nausea.  Patient workup in ER revealed recurrent UTI secondary to indwelling Cobos catheter admitted for management.  Patient denies any fever chills chest pain increase shortness of breath vomiting diarrhea.    PAST MEDICAL HISTORY   Anemia 2024    Dawn esophagus      Blurred vision      Breast cancer 05/2024+++++    Clotting disorder 1993, 2003, 2012    Colon polyp 2013    Deep vein thrombosis phlebitis 1980    Depression      GERD (gastroesophageal reflux disease)      GI (gastrointestinal bleed) 3 bleeds    H/O Skin cancer, basal cell      History of blood transfusion      History of GI bleed      History of urinary tract infection      Hypercalcemia      Hypertension      Movement disorder      Optic neuritis      PONV (postoperative nausea and vomiting)        PAST SURGICAL HISTORY              Procedure Laterality Date    APPENDECTOMY        BLADDER SURGERY         bladder stimulator    BREAST BIOPSY   don't remember    BREAST SURGERY         augmentation wtih subsequent removal    CARPAL TUNNEL RELEASE Bilateral       Left 2018, right 2020    CUBITAL TUNNEL RELEASE Left      CYSTOSCOPY BOTOX INJECTION OF BLADDER   2018     Cystoscopy with Botox    FRACTURE SURGERY   2019     rt shoulder    PARATHYROIDECTOMY         one gland removed    ROTATOR CUFF REPAIR Right 2017    TOE SURGERY         bilateral great toes    TOTAL SHOULDER ARTHROPLASTY W/ DISTAL CLAVICLE EXCISION Right 10/22/2018     Procedure: RT TOTAL SHOULDER REVERSE ARTHROPLASTY;  Surgeon: Loreto  "MD Bipin;  Location: [facility] OR;  Service: Orthopedics         FAMILY HISTORY           Problem Relation Age of Onset    Hypertension Mother      Hyperlipidemia Mother      Aortic aneurysm Mother           thoracic    Diabetes Mother      Hypertension Father           charissa heart failure    Heart failure Father      Hyperlipidemia Father      Miscarriages / Stillbirths Sister      Multiple sclerosis Brother      Atrial fibrillation Brother      Diabetes Maternal Grandfather      Stroke Maternal Grandfather      Parkinsonism Paternal Grandmother      Tremor Paternal Grandmother      Stomach cancer Paternal Grandfather      Diabetes Maternal Grandmother        SOCIAL HISTORY                 Socioeconomic History    Marital status:        Spouse name: Donald    Number of children: 0   Tobacco Use    Smoking status: Former       Current packs/day: 0.00       Average packs/day: 2.0 packs/day for 30.0 years (60.0 ttl pk-yrs)       Types: Cigarettes       Start date: 10/22/1973       Quit date: 10/22/2003       Years since quittin.0    Smokeless tobacco: Never   Vaping Use    Vaping status: Never Used   Substance and Sexual Activity    Alcohol use: Not Currently    Drug use: Not Currently       Types: Marijuana       Comment: advised by neurologist for sleep    Sexual activity: Not Currently       Partners: Male       Birth control/protection: Post-menopausal         ALLERGIES  Patient has no known allergies.  Nursing Home medications reviewed     REVIEW OF SYSTEMS  All systems reviewed and negative except for those discussed in HPI.     PHYSICAL EXAM  Blood pressure 120/78, pulse 78, temperature 97.1 °F (36.2 °C), temperature source Oral, resp. rate 16, height 152.4 cm (60\"), weight 83.4 kg (183 lb 13.8 oz), SpO2 96%, not currently breastfeeding.    Constitutional:       General: She is not in acute distress.     Appearance: She is well-developed.   HENT:      Head: Normocephalic and atraumatic.   Eyes:     "  Extraocular Movements: Extraocular movements intact.   Cardiovascular:      Rate and Rhythm: Normal rate and regular rhythm.      Heart sounds: Normal heart sounds.      Comments: Distal pulses intact  Pulmonary:      Effort: Pulmonary effort is normal.      Breath sounds: Normal breath sounds.   Abdominal:      General: There is no distension.      Tenderness: There is abdominal tenderness.   Skin:     General: Skin is warm.   Neurological:      General: No focal deficit present.      Mental Status: She is alert and oriented to person, place, and time.   Psychiatric:         Mood and Affect: Mood normal.      LAB RESULTS  Lab Results (last 24 hours)       Procedure Component Value Units Date/Time    Basic Metabolic Panel [[id number]]  (Abnormal) Collected: 10/18/24 0932    Specimen: Blood from Arm, Left Updated: 10/18/24 1006     Glucose 184 mg/dL      BUN 16 mg/dL      Creatinine 1.00 mg/dL      Sodium 139 mmol/L      Potassium 3.7 mmol/L      Chloride 103 mmol/L      CO2 26.6 mmol/L      Calcium 8.2 mg/dL      BUN/Creatinine Ratio 16.0     Anion Gap 9.4 mmol/L      eGFR 64.2 mL/min/1.73     Narrative:      GFR Normal >60  Chronic Kidney Disease <60  Kidney Failure <15      Urine Culture - Urine, Urine, Catheter [[id number]]  (Normal) Collected: 10/17/24 2038    Specimen: Urine, Catheter Updated: 10/18/24 0737     Urine Culture No growth    CBC & Differential [[id number]]  (Abnormal) Collected: 10/18/24 0438    Specimen: Blood from Arm, Left Updated: 10/18/24 0609    Narrative:      The following orders were created for panel order CBC & Differential.  Procedure                               Abnormality         Status                     ---------                               -----------         ------                     CBC Auto Differential[[id number]]        Abnormal            Final result                 Please view results for these tests on the individual orders.    CBC Auto Differential [[id number]]   (Abnormal) Collected: 10/18/24 0438    Specimen: Blood from Arm, Left Updated: 10/18/24 0609     WBC 14.34 10*3/mm3      RBC 4.49 10*6/mm3      Hemoglobin 12.3 g/dL      Hematocrit 37.9 %      MCV 84.4 fL      MCH 27.4 pg      MCHC 32.5 g/dL      RDW 19.9 %      RDW-SD 60.4 fl      MPV 9.4 fL      Platelets 352 10*3/mm3      Neutrophil % 83.4 %      Lymphocyte % 8.7 %      Monocyte % 6.0 %      Eosinophil % 1.0 %      Basophil % 0.3 %      Immature Grans % 0.6 %      Neutrophils, Absolute 11.94 10*3/mm3      Lymphocytes, Absolute 1.25 10*3/mm3      Monocytes, Absolute 0.86 10*3/mm3      Eosinophils, Absolute 0.15 10*3/mm3      Basophils, Absolute 0.05 10*3/mm3      Immature Grans, Absolute 0.09 10*3/mm3      nRBC 0.0 /100 WBC     Urinalysis, Microscopic Only - Urine, Catheter [[id number]]  (Abnormal) Collected: 10/17/24 2038    Specimen: Urine, Catheter Updated: 10/17/24 2117     RBC, UA 21-50 /HPF      WBC, UA Too Numerous to Count /HPF      Bacteria, UA None Seen /HPF      Squamous Epithelial Cells, UA 0-2 /HPF      Yeast, UA Present /HPF      Hyaline Casts, UA None Seen /LPF      Methodology Automated Microscopy    Urinalysis With Culture If Indicated - Urine, Catheter [[id number]]  (Abnormal) Collected: 10/17/24 2038    Specimen: Urine, Catheter Updated: 10/17/24 2116     Color, UA Yellow     Appearance, UA Cloudy     pH, UA 5.5     Specific Gravity, UA 1.015     Glucose,  mg/dL (1+)     Ketones, UA Negative     Bilirubin, UA Negative     Blood, UA Trace     Protein, UA Negative     Leuk Esterase, UA Large (3+)     Nitrite, UA Negative     Urobilinogen, UA 0.2 E.U./dL    Narrative:      In absence of clinical symptoms, the presence of pyuria, bacteria, and/or nitrites on the urinalysis result does not correlate with infection.    Comprehensive Metabolic Panel [[id number]]  (Abnormal) Collected: 10/17/24 2037    Specimen: Blood from Hand, Right Updated: 10/17/24 2114     Glucose 160 mg/dL      BUN 21 mg/dL       Creatinine 0.99 mg/dL      Sodium 134 mmol/L      Potassium 4.0 mmol/L      Chloride 102 mmol/L      CO2 22.0 mmol/L      Calcium 8.5 mg/dL      Total Protein 6.8 g/dL      Albumin 3.4 g/dL      ALT (SGPT) 26 U/L      AST (SGOT) 34 U/L      Alkaline Phosphatase 127 U/L      Total Bilirubin 0.4 mg/dL      Globulin 3.4 gm/dL      A/G Ratio 1.0 g/dL      BUN/Creatinine Ratio 21.2     Anion Gap 10.0 mmol/L      eGFR 65.0 mL/min/1.73     Narrative:      GFR Normal >60  Chronic Kidney Disease <60  Kidney Failure <15      CBC & Differential [[id number]]  (Abnormal) Collected: 10/17/24 2037    Specimen: Blood from Hand, Right Updated: 10/17/24 2049    Narrative:      The following orders were created for panel order CBC & Differential.  Procedure                               Abnormality         Status                     ---------                               -----------         ------                     CBC Auto Differential[[id number]]        Abnormal            Final result                 Please view results for these tests on the individual orders.    CBC Auto Differential [[id number]]  (Abnormal) Collected: 10/17/24 2037    Specimen: Blood from Hand, Right Updated: 10/17/24 2049     WBC 19.06 10*3/mm3      RBC 5.06 10*6/mm3      Hemoglobin 13.9 g/dL      Hematocrit 42.8 %      MCV 84.6 fL      MCH 27.5 pg      MCHC 32.5 g/dL      RDW 19.7 %      RDW-SD 59.3 fl      MPV 8.6 fL      Platelets 341 10*3/mm3      Neutrophil % 90.4 %      Lymphocyte % 3.2 %      Monocyte % 5.6 %      Eosinophil % 0.1 %      Basophil % 0.2 %      Immature Grans % 0.5 %      Neutrophils, Absolute 17.22 10*3/mm3      Lymphocytes, Absolute 0.61 10*3/mm3      Monocytes, Absolute 1.07 10*3/mm3      Eosinophils, Absolute 0.02 10*3/mm3      Basophils, Absolute 0.04 10*3/mm3      Immature Grans, Absolute 0.10 10*3/mm3      nRBC 0.0 /100 WBC           Imaging Results (Last 24 Hours)       Procedure Component Value Units Date/Time    CT Abdomen  Pelvis Without Contrast [[id number]] Collected: 10/17/24 2343     Updated: 10/17/24 2351    Narrative:      CT OF THE ABDOMEN PELVIS WITHOUT CONTRAST     HISTORY: Low back pain     COMPARISON: September 9, 2024     TECHNIQUE: Axial CT imaging was obtained through the abdomen and pelvis.  No IV contrast was administered.     FINDINGS:  Images through the lung bases demonstrate atelectasis and/or scarring.  No suspicious hepatic lesions are seen. Liver is enlarged, measures 17.3  cm in craniocaudal dimensions. There is a moderate hiatal hernia. The  adrenal glands are bulky in appearance. Gallbladder, spleen, pancreas,  and duodenum are normal. There is dilatation of the renal pelves  bilaterally, as well as perinephric stranding, which is new when  compared to prior exam. There is also periureteral stranding. A Cobos  catheter is present within the urinary bladder, which is collapsed,  although there is extensive perivesical stranding. Appearance suggests  cystitis and ascending urinary tract infection. There appear to be some  calcified uterine fibroids. There is no bowel obstruction. Appendix is  absent. There is a sacral nerve stimulator. There are old left-sided rib  fractures. There is thoracolumbar scoliosis, with convexity to the left.       Impression:      While the urinary bladder is decompressed with a Cobos catheter, there  is extensive perivesical stranding, as well as periureteral and  perinephric stranding. Appearance is concerning for cystitis and  ascending urinary tract infection.     Radiation dose reduction techniques were utilized, including automated  exposure control and exposure modulation based on body size.        This report was finalized on 10/17/2024 11:48 PM by Dr. Yisel Shafer M.D on Workstation: [id number]             Scan on 9/18/2024 1733 by [employer], [place]: ECG 12-LEAD         Author: -- Service: -- Author Type: --   Filed: Date of Service: Creation Time:   Status:  (Other)   HEART RATE=67  bpm  RR Wrqwbadd=364  ms  VA Xtljnjix=536  ms  P Horizontal Axis=14  deg  P Front Axis=73  deg  QRSD Interval=98  ms  QT Jfnvkdvl=180  ms  LWoO=059  ms  QRS Axis=38  deg  T Wave Axis=239  deg  - NORMAL ECG -  Sinus rhythm  No change from previous tracing            Current Facility-Administered Medications:     albuterol (PROVENTIL) nebulizer solution 0.083% 2.5 mg/3mL, 2.5 mg, Nebulization, Q6H PRN, Farooq Vicente MD    anastrozole (ARIMIDEX) tablet 1 mg, 1 mg, Oral, Daily, Farooq Vicente MD, 1 mg at 10/18/24 1316    baclofen (LIORESAL) tablet 10 mg, 10 mg, Oral, TID PRN, Farooq Vicente MD    cefTRIAXone (ROCEPHIN) 1,000 mg in sodium chloride 0.9 % 100 mL MBP, 1,000 mg, Intravenous, Daily, Farooq Vicente MD    cholecalciferol (VITAMIN D3) tablet 5,000 Units, 5,000 Units, Oral, Daily, Farooq Vicente MD, 5,000 Units at 10/18/24 1315    fluconazole (DIFLUCAN) tablet 100 mg, 100 mg, Oral, Q24H, Farooq Vicente MD, 100 mg at 10/18/24 1316    morphine injection 2 mg, 2 mg, Intravenous, Q4H PRN, Farooq Vicente MD    OLANZapine (zyPREXA) tablet 5 mg, 5 mg, Oral, Nightly, Farooq Vicente MD    pantoprazole (PROTONIX) EC tablet 40 mg, 40 mg, Oral, BID AC, Farooq Vicente MD, 40 mg at 10/18/24 0612    pramipexole (MIRAPEX) tablet 1.5 mg, 1.5 mg, Oral, TID, Farooq Vicente MD    predniSONE (DELTASONE) tablet 20 mg, 20 mg, Oral, Daily, Farooq Vicente MD    prochlorperazine (COMPAZINE) injection 10 mg, 10 mg, Intravenous, Q4H PRN, Farooq Vicente MD    sennosides-docusate (PERICOLACE) 8.6-50 MG per tablet 1 tablet, 1 tablet, Oral, Daily, Farooq Vicente MD, 1 tablet at 10/18/24 1315    [COMPLETED] Insert Peripheral IV, , , Once **AND** sodium chloride 0.9 % flush 10 mL, 10 mL, Intravenous, PRN, Nidia Ji PA-C    sodium chloride 0.9 % infusion, 75 mL/hr, Intravenous, Continuous, Farooq Vicente MD, Last Rate: 75 mL/hr at 10/18/24 0235, 75 mL/hr at 10/18/24 0235     ASSESSMENT  Acute cystitis with sepsis  Yeast  UTI  Urine retention with chronic Cobos catheter  Multiple sclerosis  Hypertension  Hyperlipidemia  Metastatic breast cancer  Immobilization syndrome    PLAN  Admit  IVF  IV antibiotics after obtaining cultures  Diflucan  Infectious disease consult  Adjust Cobos catheter  Continue nursing home medications  Stress ulcer DVT prophylaxis  Supportive care  PT OT  Patient is full code  Discussed with nursing staff  Follow closely further recommendation current hospital course    LATRELL BRITO MD

## 2024-10-18 NOTE — ED PROVIDER NOTES
MD ATTESTATION NOTE    SHARED VISIT: This visit was performed by BOTH a physician and an APC. The substantive portion of the medical decision making was performed by this attesting physician who made or approved the management plan and takes responsibility for patient management. All studies documented in the APC note (if performed) were independently interpreted by me.    The MEGHA and I have discussed this patient's history, physical exam, MDM, and treatment plan.  I have reviewed the documentation and personally had a face to face interaction with the patient. The attached note describes my personal findings.      Maritza Bejarano is a 63 y.o. female who presents to the ED c/o acute abdominal pain which began today.  Patient has a chronic indwelling Cobos catheter.  She has a history of MS as well as left breast cancer with known metastases to the bone.    On exam:  GENERAL: not distressed  HENT: nares patent  EYES: no scleral icterus  CV: regular rhythm, regular rate  RESPIRATORY: normal effort  ABDOMEN: soft, generalized abdominal tenderness without rebound or guarding  MUSCULOSKELETAL: no deformity  NEURO: Sleepy but responds to voice, moves all extremities  SKIN: warm, dry    Labs  Recent Results (from the past 24 hours)   Comprehensive Metabolic Panel    Collection Time: 10/17/24  8:37 PM    Specimen: Hand, Right; Blood   Result Value Ref Range    Glucose 160 (H) 65 - 99 mg/dL    BUN 21 8 - 23 mg/dL    Creatinine 0.99 0.57 - 1.00 mg/dL    Sodium 134 (L) 136 - 145 mmol/L    Potassium 4.0 3.5 - 5.2 mmol/L    Chloride 102 98 - 107 mmol/L    CO2 22.0 22.0 - 29.0 mmol/L    Calcium 8.5 (L) 8.6 - 10.5 mg/dL    Total Protein 6.8 6.0 - 8.5 g/dL    Albumin 3.4 (L) 3.5 - 5.2 g/dL    ALT (SGPT) 26 1 - 33 U/L    AST (SGOT) 34 (H) 1 - 32 U/L    Alkaline Phosphatase 127 (H) 39 - 117 U/L    Total Bilirubin 0.4 0.0 - 1.2 mg/dL    Globulin 3.4 gm/dL    A/G Ratio 1.0 g/dL    BUN/Creatinine Ratio 21.2 7.0 - 25.0    Anion Gap 10.0  5.0 - 15.0 mmol/L    eGFR 65.0 >60.0 mL/min/1.73   CBC Auto Differential    Collection Time: 10/17/24  8:37 PM    Specimen: Hand, Right; Blood   Result Value Ref Range    WBC 19.06 (H) 3.40 - 10.80 10*3/mm3    RBC 5.06 3.77 - 5.28 10*6/mm3    Hemoglobin 13.9 12.0 - 15.9 g/dL    Hematocrit 42.8 34.0 - 46.6 %    MCV 84.6 79.0 - 97.0 fL    MCH 27.5 26.6 - 33.0 pg    MCHC 32.5 31.5 - 35.7 g/dL    RDW 19.7 (H) 12.3 - 15.4 %    RDW-SD 59.3 (H) 37.0 - 54.0 fl    MPV 8.6 6.0 - 12.0 fL    Platelets 341 140 - 450 10*3/mm3    Neutrophil % 90.4 (H) 42.7 - 76.0 %    Lymphocyte % 3.2 (L) 19.6 - 45.3 %    Monocyte % 5.6 5.0 - 12.0 %    Eosinophil % 0.1 (L) 0.3 - 6.2 %    Basophil % 0.2 0.0 - 1.5 %    Immature Grans % 0.5 0.0 - 0.5 %    Neutrophils, Absolute 17.22 (H) 1.70 - 7.00 10*3/mm3    Lymphocytes, Absolute 0.61 (L) 0.70 - 3.10 10*3/mm3    Monocytes, Absolute 1.07 (H) 0.10 - 0.90 10*3/mm3    Eosinophils, Absolute 0.02 0.00 - 0.40 10*3/mm3    Basophils, Absolute 0.04 0.00 - 0.20 10*3/mm3    Immature Grans, Absolute 0.10 (H) 0.00 - 0.05 10*3/mm3    nRBC 0.0 0.0 - 0.2 /100 WBC   Urinalysis With Culture If Indicated - Urine, Catheter    Collection Time: 10/17/24  8:38 PM    Specimen: Urine, Catheter   Result Value Ref Range    Color, UA Yellow Yellow, Straw    Appearance, UA Cloudy (A) Clear    pH, UA 5.5 5.0 - 8.0    Specific Gravity, UA 1.015 1.005 - 1.030    Glucose,  mg/dL (1+) (A) Negative    Ketones, UA Negative Negative    Bilirubin, UA Negative Negative    Blood, UA Trace (A) Negative    Protein, UA Negative Negative    Leuk Esterase, UA Large (3+) (A) Negative    Nitrite, UA Negative Negative    Urobilinogen, UA 0.2 E.U./dL 0.2 - 1.0 E.U./dL   Urinalysis, Microscopic Only - Urine, Catheter    Collection Time: 10/17/24  8:38 PM    Specimen: Urine, Catheter   Result Value Ref Range    RBC, UA 21-50 (A) None Seen, 0-2 /HPF    WBC, UA Too Numerous to Count (A) None Seen, 0-2 /HPF    Bacteria, UA None Seen None Seen  /HPF    Squamous Epithelial Cells, UA 0-2 None Seen, 0-2 /HPF    Yeast, UA Present (A) None Seen /HPF    Hyaline Casts, UA None Seen None Seen /LPF    Methodology Automated Microscopy        Radiology  No Radiology Exams Resulted Within Past 24 Hours    Medications given in the ED:  Medications   sodium chloride 0.9 % flush 10 mL (has no administration in time range)   prochlorperazine (COMPAZINE) injection 10 mg (10 mg Intravenous Given 10/17/24 2056)   morphine injection 4 mg (4 mg Intravenous Given 10/17/24 2056)   cefTRIAXone (ROCEPHIN) 1,000 mg in sodium chloride 0.9 % 100 mL MBP (1,000 mg Intravenous New Bag 10/17/24 2153)       Orders placed during this visit:  Orders Placed This Encounter   Procedures    Urine Culture - Urine,    CT Abdomen Pelvis Without Contrast    Comprehensive Metabolic Panel    Urinalysis With Culture If Indicated - Urine, Catheter    CBC Auto Differential    Urinalysis, Microscopic Only - Urine, Clean Catch    Bladder scan    Insert Peripheral IV    CBC & Differential       Medical Decision Making:  ED Course as of 10/18/24 2330   Thu Oct 17, 2024   2121 WBC(!): 19.06 [MP]   2121 Hemoglobin: 13.9 [MP]   2121 BUN: 21 [MP]   2121 Creatinine: 0.99 [MP]   2121 Blood, UA(!): Trace [MP]   2121 Leukocytes, UA(!): Large (3+) [MP]   2121 Nitrite, UA: Negative [MP]   2121 RBC, UA(!): 21-50 [MP]   2121 WBC, UA(!): Too Numerous to Count [MP]   2121 Bacteria, UA: None Seen [MP]   2121 Squamous Epithelial Cells, UA: 0-2 [MP]   2122 Patient was retaining greater than 600 mL urine despite Cobos catheter.  This was irrigated by RN with good output.  Since irrigation, patient has had greater than 900 mL urine output in the Cobos catheter bag. [MP]   2146 Pt care turned over to DANA Marcum, pending CT scan [MP]   2146 Patient was turned over to me by Nidia Ji PA-C.  Pending: imaging report and disposition.   [AR]   Fri Oct 18, 2024   0000 Patient reassessed.  Discussed ED findings,  differential diagnosis, and the need for admission for evaluation/treatment.  They are agreeable to admission and all questions were answered.     [AR]   0024 Phone call with Dr. Vicente.  Discussed the patient, relevant history, exam, diagnostics, ED findings/progress, and concerns. They agree to admit the patient. Care assumed by the admitting physician at this time.     [AR]      ED Course User Index  [AR] Sarah Loza APRN  [MP] Nidia Ji, TERESA         Diagnosis  Final diagnoses:   Acute urinary retention   Leukocytosis, unspecified type   History of multiple sclerosis          Juma Buchanan II, MD  10/18/24 4962

## 2024-10-18 NOTE — PROGRESS NOTES
Kisqali supply from [facility] delivered to pt on 10/17/2024 at 11:23 am.    UPS[employer] tracking # [id number]     Per note from [facility] Pharmacist today-Pt is to hold on restarting until discharged from the hospital per Dr Lopes.    [place], Pharmacy Technician  10/18/24  15:11 EDT

## 2024-10-18 NOTE — DISCHARGE PLACEMENT REQUEST
"Maritza Purdy (63 y.o. Female)       Date of Birth   1960    Social Security Number       Address   [address] [place] [address] [place] [address]    Home Phone   [phone]    MRN   [id number]       Mandaeism   Orthodox    Marital Status                               Admission Date   10/17/24    Admission Type   Emergency    Admitting Provider   Farooq Vicente MD    Attending Provider   Farooq Vicente MD    Department, Room/Bed   [address], P694/1       Discharge Date       Discharge Disposition       Discharge Destination                                 Attending Provider: Farooq Vicente MD    Allergies: No Known Allergies    Isolation: Contact   Infection: Candida Auris (rule out) (10/18/24)   Code Status: CPR    Ht: 152.4 cm (60\")   Wt: 83.4 kg (183 lb 13.8 oz)    Admission Cmt: None   Principal Problem: Acute UTI [N39.0]                   Active Insurance as of 10/17/2024       Primary Coverage       Payor Plan Insurance Group Employer/Plan Group    ANTH MEDICARE REPLACEMENT ANTH MEDICARE ADVANTAGE [id number]       Payor Plan Address Payor Plan Phone Number Payor Plan Fax Number Effective Dates    [address] [phone]  1/1/2023 - None Entered    [place] [address]         Subscriber Name Subscriber Birth Date Member ID       MARITZA PURDY 1960 [id number]                     Emergency Contacts        (Rel.) Home Phone Work Phone Mobile Phone    Donald Purdy (Spouse) -- -- [phone]    Maru Rodriguez (Sister) -- -- [phone]    Shana Klein (Sister) -- -- [phone]                "

## 2024-10-18 NOTE — NURSING NOTE
10/18/24 1535   Wound 10/18/24 Bilateral gluteal   Placement Date: 10/18/24   Side: Bilateral  Location: gluteal  Primary Wound Type: MASD (Moisture associated skin damage)   Dressing Appearance open to air  (brief in use)   Base dry;pink;moist;yellow  (gluteal cleft moist (pink/yellow), upper aspect medial gluteals dry, flaking, rough, skin)   Wound Length (cm)   (gluteal cleft approx 3x1.5 cm moist skin loss)   Drainage Amount none   Skin Interventions   Pressure Reduction Devices   (needs a Centreall Pro + specialty bed and mattress)     Wound/ostomy - consult received regarding skin issues to gluteals and gluteal cleft POA. Patient has MS, very limited mobility requires assistance with all repositioning. Patient assisted onto her side, brief moved aside, skin to inner aspect of bilateral upper gluteals with dry, flaky skin and gluteal cleft with a liner shaped area of skin loss, no depth, pink with scant yellow noted. Obese, soft and loose gluteals, skin damage more consistent with MASD, possibly a pressure component as well. Treatment directed at quality hygiene, keep skin clean and dry, use of barrier cream to protect from moisture, recommend use of calmoseptine and to place an ABD pad over tucking it between the gluteals to prevent skin on skin friction and make contact with skin base to gluteal cleft. Recommend Centrella Pro + specialty mattress/bed [id number] mattress for adequate pressure redistribution and control of microclimate which can be requested from [facility].

## 2024-10-18 NOTE — PROGRESS NOTES
Discharge Planning Assessment  [facility]     Patient Name: Maritza Bejarano  MRN: [id number]  Today's Date: 10/18/2024    Admit Date: 10/17/2024    Plan: Rehab (referrals in [facility]/Pending)   Discharge Needs Assessment       Row Name 10/18/24 1233       Living Environment    People in Home spouse    Name(s) of People in Home Agjeana Donald    Current Living Arrangements other (see comments)  Has been in rehab facility since 9/22/24    Primary Care Provided by self;spouse/significant other    Provides Primary Care For no one    Family Caregiver if Needed spouse    Family Caregiver Names Spouse Donald Darci    Quality of Family Relationships helpful;involved;supportive       Transition Planning    Patient/Family Anticipates Transition to inpatient rehabilitation facility    Transportation Anticipated health plan transportation;family or friend will provide       Discharge Needs Assessment    Equipment Currently Used at Home wheelchair, motorized;commode    Provided Post Acute Provider List? N/A    N/A Provider List Comment Requested [facility] or [facility] [employer]                   Discharge Plan       Row Name 10/18/24 1239       Plan    Plan Rehab (referrals in [facility]/Pending)    Plan Comments ARLET noted. Introduced self and role of CCP. Patient is from [facility]. Has been there since 9/21/24. Prior to that time, patient was living at her home with spouse who was her care giver. Patient used a wheelchair and was able to transfer in/out on own. Patient was hospitalized 9/7/24 to 9/17/24 and was DC to hospitals [place]. Admit back to hospital the next day 9/18/24 and stayed until 9/21/24. At this time she was DC'd to [facility] for skilled rehab. Pateint requested not to retunr to [facility]. Requested referrals to [facility] and [facility]. Referrals placed in [facility].                  Continued Care and Services - Admitted Since 10/17/2024       Destination        Service Provider Request Status Services Address Phone Fax Patient Preferred    [facility] Pending - Request Sent -- [address] [place] [address] [phone] --    [facility] - Request Sent -- [address] [facility] [place] [address] [phone] --    [facility] Pending - Request Sent -- [address] [address] [phone] --    [facility] Pending - Request Sent -- [address] [facility] [place] [address] [phone] --                 Selected Continued Care - Prior Encounters Includes continued care and service providers with selected services from prior encounters from 7/19/2024 to 10/18/2024      Discharged on 9/21/2024 Admission date: 9/18/2024 - Discharge disposition: Skilled Nursing Facility (DC - External)      Destination       Service Provider Services Address Phone Fax Patient Preferred    [facility] [address] [facility] [address] [phone] --                      Discharged on 9/17/2024 Admission date: 9/7/2024 - Discharge disposition: Home-Health Care Svc      Home Medical Care       Service Provider Services Address Phone Fax Patient Preferred    [facility] [address] [address] [phone] --                             Demographic Summary       Row Name 10/18/24 1231       General Information    Admission Type observation    Arrived From subacute/long-term acute care    Required Notices Provided Observation Status Notice    Reason for Consult discharge planning    Preferred Language English                   Functional Status       Row Name 10/18/24 1231       Functional Status    Usual Activity Tolerance moderate    Current Activity Tolerance moderate       Functional Status, IADL    Medications assistive person     Meal Preparation assistive equipment and person    Housekeeping assistive equipment and person    Laundry assistive equipment and person    Shopping assistive equipment and person       Mental Status    General Appearance WDL WDL       Employment/    Employment Status disabled                   Psychosocial       Row Name 10/18/24 1232       Values/Beliefs    Spiritual, Cultural Beliefs, Anabaptism Practices, Values that Affect Care no       Behavior WDL    Behavior WDL WDL       Emotion Mood WDL    Emotion/Mood/Affect WDL WDL       Speech WDL    Speech WDL WDL       Perceptual State WDL    Perceptual State WDL WDL       Coping/Stress    Sources of Support spouse;sibling(s)    Reaction to Health Status adjusting    Understanding of Condition and Treatment adequate understanding of treatment       Developmental Stage (Eriksson's Stages of Development)    Developmental Stage Stage 7 (35-65 years/Middle Adulthood) Generativity vs. Stagnation                   Abuse/Neglect       Row Name 10/18/24 1233       Personal Safety    Feels Unsafe at Home or Work/School no    Feels Threatened by Someone no    Does Anyone Try to Keep You From Having Contact with Others or Doing Things Outside Your Home? no    Physical Signs of Abuse Present no               Jess Mathis, RN

## 2024-10-18 NOTE — PLAN OF CARE
Goal Outcome Evaluation:   Patient a&o, vss. Pt turned q2h. F/C care maintained. IVF infusing. Falls precautions maintained. No complaints of pain. Plan of care will continue.

## 2024-10-18 NOTE — PLAN OF CARE
Patient non ambulatory @baseline, wheelchair bound, Q2 turn.  No complaints of ABD pain, resolved when ED flushed catheter. VSS. A&Ox4, started fluids. Will continue to monitor.     Problem: Adult Inpatient Plan of Care  Goal: Plan of Care Review  Outcome: Progressing  Flowsheets (Taken 10/18/2024 0627)  Progress: improving  Plan of Care Reviewed With: patient  Goal: Patient-Specific Goal (Individualized)  Outcome: Progressing  Goal: Absence of Hospital-Acquired Illness or Injury  Outcome: Progressing  Intervention: Identify and Manage Fall Risk  Recent Flowsheet Documentation  Taken 10/18/2024 0600 by Digna Sethi RN  Safety Promotion/Fall Prevention: safety round/check completed  Taken 10/18/2024 0223 by Digna Sethi RN  Safety Promotion/Fall Prevention:   assistive device/personal items within reach   clutter free environment maintained   fall prevention program maintained   lighting adjusted   muscle strengthening facilitated   room organization consistent   safety round/check completed  Intervention: Prevent Skin Injury  Recent Flowsheet Documentation  Taken 10/18/2024 0223 by Digna Sethi RN  Body Position:   turned   left  Intervention: Prevent Infection  Recent Flowsheet Documentation  Taken 10/18/2024 0223 by Digna Sethi RN  Infection Prevention: rest/sleep promoted  Goal: Optimal Comfort and Wellbeing  Outcome: Progressing  Intervention: Monitor Pain and Promote Comfort  Recent Flowsheet Documentation  Taken 10/18/2024 0223 by Digna Sethi RN  Pain Management Interventions:   pillow support provided   position adjusted  Intervention: Provide Person-Centered Care  Recent Flowsheet Documentation  Taken 10/18/2024 0223 by Digna Sethi RN  Trust Relationship/Rapport:   care explained   questions answered  Goal: Readiness for Transition of Care  Outcome: Progressing  Intervention: Mutually Develop Transition Plan  Recent Flowsheet Documentation  Taken 10/18/2024  0225 by Digna Sethi RN  Transportation Anticipated: health plan transportation  Patient/Family Anticipated Services at Transition:   Patient/Family Anticipates Transition to: inpatient rehabilitation facility  Taken 10/18/2024 0220 by Digna Sethi RN  Equipment Currently Used at Home:   wheelchair   commode   hospital bed   lift device     Problem: Skin Injury Risk Increased  Goal: Skin Health and Integrity  Outcome: Progressing  Intervention: Optimize Skin Protection  Recent Flowsheet Documentation  Taken 10/18/2024 0223 by Digna Sethi RN  Activity Management: activity encouraged  Head of Bed (HOB) Positioning: HOB at 20-30 degrees     Problem: Fall Injury Risk  Goal: Absence of Fall and Fall-Related Injury  Outcome: Progressing  Intervention: Identify and Manage Contributors  Recent Flowsheet Documentation  Taken 10/18/2024 0223 by Digna Sethi RN  Medication Review/Management: medications reviewed  Intervention: Promote Injury-Free Environment  Recent Flowsheet Documentation  Taken 10/18/2024 0600 by Digna Sethi RN  Safety Promotion/Fall Prevention: safety round/check completed  Taken 10/18/2024 0223 by Digna Sethi RN  Safety Promotion/Fall Prevention:   assistive device/personal items within reach   clutter free environment maintained   fall prevention program maintained   lighting adjusted   muscle strengthening facilitated   room organization consistent   safety round/check completed     Problem: Mobility Impairment  Goal: Optimal Mobility  Outcome: Progressing  Intervention: Optimize Mobility  Recent Flowsheet Documentation  Taken 10/18/2024 0223 by Digna Sethi RN  Activity Management: activity encouraged  Positioning/Transfer Devices:   pillows   in use   Goal Outcome Evaluation:

## 2024-10-18 NOTE — ED PROVIDER NOTES
EMERGENCY DEPARTMENT ENCOUNTER    Room number:  38/38  Date Seen:  10/18/2024  Time of transfer:2146  PCP:  Doris Faria MD    Laboratory Results:  Recent Results (from the past 24 hours)   Comprehensive Metabolic Panel    Collection Time: 10/17/24  8:37 PM    Specimen: Hand, Right; Blood   Result Value Ref Range    Glucose 160 (H) 65 - 99 mg/dL    BUN 21 8 - 23 mg/dL    Creatinine 0.99 0.57 - 1.00 mg/dL    Sodium 134 (L) 136 - 145 mmol/L    Potassium 4.0 3.5 - 5.2 mmol/L    Chloride 102 98 - 107 mmol/L    CO2 22.0 22.0 - 29.0 mmol/L    Calcium 8.5 (L) 8.6 - 10.5 mg/dL    Total Protein 6.8 6.0 - 8.5 g/dL    Albumin 3.4 (L) 3.5 - 5.2 g/dL    ALT (SGPT) 26 1 - 33 U/L    AST (SGOT) 34 (H) 1 - 32 U/L    Alkaline Phosphatase 127 (H) 39 - 117 U/L    Total Bilirubin 0.4 0.0 - 1.2 mg/dL    Globulin 3.4 gm/dL    A/G Ratio 1.0 g/dL    BUN/Creatinine Ratio 21.2 7.0 - 25.0    Anion Gap 10.0 5.0 - 15.0 mmol/L    eGFR 65.0 >60.0 mL/min/1.73   CBC Auto Differential    Collection Time: 10/17/24  8:37 PM    Specimen: Hand, Right; Blood   Result Value Ref Range    WBC 19.06 (H) 3.40 - 10.80 10*3/mm3    RBC 5.06 3.77 - 5.28 10*6/mm3    Hemoglobin 13.9 12.0 - 15.9 g/dL    Hematocrit 42.8 34.0 - 46.6 %    MCV 84.6 79.0 - 97.0 fL    MCH 27.5 26.6 - 33.0 pg    MCHC 32.5 31.5 - 35.7 g/dL    RDW 19.7 (H) 12.3 - 15.4 %    RDW-SD 59.3 (H) 37.0 - 54.0 fl    MPV 8.6 6.0 - 12.0 fL    Platelets 341 140 - 450 10*3/mm3    Neutrophil % 90.4 (H) 42.7 - 76.0 %    Lymphocyte % 3.2 (L) 19.6 - 45.3 %    Monocyte % 5.6 5.0 - 12.0 %    Eosinophil % 0.1 (L) 0.3 - 6.2 %    Basophil % 0.2 0.0 - 1.5 %    Immature Grans % 0.5 0.0 - 0.5 %    Neutrophils, Absolute 17.22 (H) 1.70 - 7.00 10*3/mm3    Lymphocytes, Absolute 0.61 (L) 0.70 - 3.10 10*3/mm3    Monocytes, Absolute 1.07 (H) 0.10 - 0.90 10*3/mm3    Eosinophils, Absolute 0.02 0.00 - 0.40 10*3/mm3    Basophils, Absolute 0.04 0.00 - 0.20 10*3/mm3    Immature Grans, Absolute 0.10 (H) 0.00 - 0.05 10*3/mm3     nRBC 0.0 0.0 - 0.2 /100 WBC   Urinalysis With Culture If Indicated - Urine, Catheter    Collection Time: 10/17/24  8:38 PM    Specimen: Urine, Catheter   Result Value Ref Range    Color, UA Yellow Yellow, Straw    Appearance, UA Cloudy (A) Clear    pH, UA 5.5 5.0 - 8.0    Specific Gravity, UA 1.015 1.005 - 1.030    Glucose,  mg/dL (1+) (A) Negative    Ketones, UA Negative Negative    Bilirubin, UA Negative Negative    Blood, UA Trace (A) Negative    Protein, UA Negative Negative    Leuk Esterase, UA Large (3+) (A) Negative    Nitrite, UA Negative Negative    Urobilinogen, UA 0.2 E.U./dL 0.2 - 1.0 E.U./dL   Urinalysis, Microscopic Only - Urine, Catheter    Collection Time: 10/17/24  8:38 PM    Specimen: Urine, Catheter   Result Value Ref Range    RBC, UA 21-50 (A) None Seen, 0-2 /HPF    WBC, UA Too Numerous to Count (A) None Seen, 0-2 /HPF    Bacteria, UA None Seen None Seen /HPF    Squamous Epithelial Cells, UA 0-2 None Seen, 0-2 /HPF    Yeast, UA Present (A) None Seen /HPF    Hyaline Casts, UA None Seen None Seen /LPF    Methodology Automated Microscopy      I reviewed the above results.    Radiology:  CT Abdomen Pelvis Without Contrast   Final Result   While the urinary bladder is decompressed with a Cobos catheter, there   is extensive perivesical stranding, as well as periureteral and   perinephric stranding. Appearance is concerning for cystitis and   ascending urinary tract infection.       Radiation dose reduction techniques were utilized, including automated   exposure control and exposure modulation based on body size.           This report was finalized on 10/17/2024 11:48 PM by Dr. Yisel Shafer M.D on Workstation: [id number]            I reviewed the above results    Medications ordered in ED:  Medications   sodium chloride 0.9 % flush 10 mL (has no administration in time range)   prochlorperazine (COMPAZINE) injection 10 mg (10 mg Intravenous Given 10/17/24 2056)   morphine injection 4 mg  (4 mg Intravenous Given 10/17/24 2056)   cefTRIAXone (ROCEPHIN) 1,000 mg in sodium chloride 0.9 % 100 mL MBP (0 mg Intravenous Stopped 10/17/24 2231)       ED Course as of 10/18/24 0026   u Oct 17, 2024   2121 WBC(!): 19.06 [MP]   2121 Hemoglobin: 13.9 [MP]   2121 BUN: 21 [MP]   2121 Creatinine: 0.99 [MP]   2121 Blood, UA(!): Trace [MP]   2121 Leukocytes, UA(!): Large (3+) [MP]   2121 Nitrite, UA: Negative [MP]   2121 RBC, UA(!): 21-50 [MP]   2121 WBC, UA(!): Too Numerous to Count [MP]   2121 Bacteria, UA: None Seen [MP]   2121 Squamous Epithelial Cells, UA: 0-2 [MP]   2122 Patient was retaining greater than 600 mL urine despite Cobos catheter.  This was irrigated by RN with good output.  Since irrigation, patient has had greater than 900 mL urine output in the Cobos catheter bag. [MP]   2146 Pt care turned over to DANA Marcum, pending CT scan [MP]   2146 Patient was turned over to me by Nidia Ji PA-C.  Pending: imaging report and disposition.   [AR]   Fri Oct 18, 2024   0000 Patient reassessed.  Discussed ED findings, differential diagnosis, and the need for admission for evaluation/treatment.  They are agreeable to admission and all questions were answered.     [AR]   0024 Phone call with Dr. Vicente.  Discussed the patient, relevant history, exam, diagnostics, ED findings/progress, and concerns. They agree to admit the patient. Care assumed by the admitting physician at this time.     [AR]      ED Course User Index  [AR] Sarah Loza APRN  [MP] Nidia Ji PA-C       Diagnosis:  Final diagnoses:   Acute urinary retention   Leukocytosis, unspecified type   History of multiple sclerosis   Acute UTI       Provider attestation:  I personally reviewed the past medical history, past surgical history, social history, family history, current medications, and allergies as they appear in the chart.    The patient was seen and examined by myself and Dr. Buchanan, who agree with plan.       Denia  Sarah, APRN  10/18/24 0026

## 2024-10-19 LAB
ALBUMIN SERPL-MCNC: 2.9 G/DL (ref 3.5–5.2)
ALBUMIN/GLOB SERPL: 1.1 G/DL
ALP SERPL-CCNC: 96 U/L (ref 39–117)
ALT SERPL W P-5'-P-CCNC: 18 U/L (ref 1–33)
ANION GAP SERPL CALCULATED.3IONS-SCNC: 7.3 MMOL/L (ref 5–15)
AST SERPL-CCNC: 24 U/L (ref 1–32)
BACTERIA SPEC AEROBE CULT: NO GROWTH
BASOPHILS # BLD AUTO: 0.04 10*3/MM3 (ref 0–0.2)
BASOPHILS NFR BLD AUTO: 0.5 % (ref 0–1.5)
BILIRUB SERPL-MCNC: 0.2 MG/DL (ref 0–1.2)
BUN SERPL-MCNC: 14 MG/DL (ref 8–23)
BUN/CREAT SERPL: 23.3 (ref 7–25)
CALCIUM SPEC-SCNC: 7.4 MG/DL (ref 8.6–10.5)
CHLORIDE SERPL-SCNC: 107 MMOL/L (ref 98–107)
CHOLEST SERPL-MCNC: 168 MG/DL (ref 0–200)
CO2 SERPL-SCNC: 23.7 MMOL/L (ref 22–29)
CREAT SERPL-MCNC: 0.6 MG/DL (ref 0.57–1)
DEPRECATED RDW RBC AUTO: 58.4 FL (ref 37–54)
EGFRCR SERPLBLD CKD-EPI 2021: 102.3 ML/MIN/1.73
EOSINOPHIL # BLD AUTO: 0.32 10*3/MM3 (ref 0–0.4)
EOSINOPHIL NFR BLD AUTO: 3.7 % (ref 0.3–6.2)
ERYTHROCYTE [DISTWIDTH] IN BLOOD BY AUTOMATED COUNT: 19.2 % (ref 12.3–15.4)
GLOBULIN UR ELPH-MCNC: 2.7 GM/DL
GLUCOSE SERPL-MCNC: 106 MG/DL (ref 65–99)
HBA1C MFR BLD: 6.8 % (ref 4.8–5.6)
HCT VFR BLD AUTO: 34.4 % (ref 34–46.6)
HDLC SERPL-MCNC: 38 MG/DL (ref 40–60)
HGB BLD-MCNC: 11 G/DL (ref 12–15.9)
IMM GRANULOCYTES # BLD AUTO: 0.06 10*3/MM3 (ref 0–0.05)
IMM GRANULOCYTES NFR BLD AUTO: 0.7 % (ref 0–0.5)
LDLC SERPL CALC-MCNC: 109 MG/DL (ref 0–100)
LDLC/HDLC SERPL: 2.82 {RATIO}
LYMPHOCYTES # BLD AUTO: 1.53 10*3/MM3 (ref 0.7–3.1)
LYMPHOCYTES NFR BLD AUTO: 17.5 % (ref 19.6–45.3)
MCH RBC QN AUTO: 27.2 PG (ref 26.6–33)
MCHC RBC AUTO-ENTMCNC: 32 G/DL (ref 31.5–35.7)
MCV RBC AUTO: 84.9 FL (ref 79–97)
MONOCYTES # BLD AUTO: 0.58 10*3/MM3 (ref 0.1–0.9)
MONOCYTES NFR BLD AUTO: 6.6 % (ref 5–12)
NEUTROPHILS NFR BLD AUTO: 6.2 10*3/MM3 (ref 1.7–7)
NEUTROPHILS NFR BLD AUTO: 71 % (ref 42.7–76)
NRBC BLD AUTO-RTO: 0 /100 WBC (ref 0–0.2)
PLATELET # BLD AUTO: 314 10*3/MM3 (ref 140–450)
PMV BLD AUTO: 9 FL (ref 6–12)
POTASSIUM SERPL-SCNC: 3.4 MMOL/L (ref 3.5–5.2)
PROT SERPL-MCNC: 5.6 G/DL (ref 6–8.5)
RBC # BLD AUTO: 4.05 10*6/MM3 (ref 3.77–5.28)
SODIUM SERPL-SCNC: 138 MMOL/L (ref 136–145)
TRIGL SERPL-MCNC: 115 MG/DL (ref 0–150)
TSH SERPL DL<=0.05 MIU/L-ACNC: 1.08 UIU/ML (ref 0.27–4.2)
VLDLC SERPL-MCNC: 21 MG/DL (ref 5–40)
WBC NRBC COR # BLD AUTO: 8.73 10*3/MM3 (ref 3.4–10.8)

## 2024-10-19 PROCEDURE — 84443 ASSAY THYROID STIM HORMONE: CPT | Performed by: HOSPITALIST

## 2024-10-19 PROCEDURE — 97163 PT EVAL HIGH COMPLEX 45 MIN: CPT | Performed by: PHYSICAL THERAPIST

## 2024-10-19 PROCEDURE — G0378 HOSPITAL OBSERVATION PER HR: HCPCS

## 2024-10-19 PROCEDURE — 97530 THERAPEUTIC ACTIVITIES: CPT

## 2024-10-19 PROCEDURE — 25010000002 SODIUM CHLORIDE 0.9 % WITH KCL 20 MEQ 20-0.9 MEQ/L-% SOLUTION: Performed by: HOSPITALIST

## 2024-10-19 PROCEDURE — 97530 THERAPEUTIC ACTIVITIES: CPT | Performed by: PHYSICAL THERAPIST

## 2024-10-19 PROCEDURE — 80053 COMPREHEN METABOLIC PANEL: CPT | Performed by: HOSPITALIST

## 2024-10-19 PROCEDURE — 25010000002 CEFTRIAXONE PER 250 MG: Performed by: HOSPITALIST

## 2024-10-19 PROCEDURE — 83036 HEMOGLOBIN GLYCOSYLATED A1C: CPT | Performed by: HOSPITALIST

## 2024-10-19 PROCEDURE — 80061 LIPID PANEL: CPT | Performed by: HOSPITALIST

## 2024-10-19 PROCEDURE — 97166 OT EVAL MOD COMPLEX 45 MIN: CPT

## 2024-10-19 PROCEDURE — 85025 COMPLETE CBC W/AUTO DIFF WBC: CPT | Performed by: HOSPITALIST

## 2024-10-19 RX ORDER — HYDROCODONE BITARTRATE AND ACETAMINOPHEN 5; 325 MG/1; MG/1
1 TABLET ORAL EVERY 6 HOURS PRN
Status: DISCONTINUED | OUTPATIENT
Start: 2024-10-19 | End: 2024-10-19

## 2024-10-19 RX ORDER — CLONAZEPAM 0.5 MG/1
0.5 TABLET ORAL 2 TIMES DAILY PRN
Status: DISCONTINUED | OUTPATIENT
Start: 2024-10-19 | End: 2024-10-23 | Stop reason: HOSPADM

## 2024-10-19 RX ORDER — SODIUM CHLORIDE AND POTASSIUM CHLORIDE 150; 900 MG/100ML; MG/100ML
75 INJECTION, SOLUTION INTRAVENOUS CONTINUOUS
Status: DISCONTINUED | OUTPATIENT
Start: 2024-10-19 | End: 2024-10-20

## 2024-10-19 RX ORDER — HYDROCODONE BITARTRATE AND ACETAMINOPHEN 5; 325 MG/1; MG/1
1 TABLET ORAL EVERY 4 HOURS PRN
Status: DISCONTINUED | OUTPATIENT
Start: 2024-10-19 | End: 2024-10-23 | Stop reason: HOSPADM

## 2024-10-19 RX ADMIN — ANASTROZOLE 1 MG: 1 TABLET ORAL at 10:05

## 2024-10-19 RX ADMIN — FLUCONAZOLE 100 MG: 100 TABLET ORAL at 13:25

## 2024-10-19 RX ADMIN — SENNOSIDES AND DOCUSATE SODIUM 1 TABLET: 50; 8.6 TABLET ORAL at 10:02

## 2024-10-19 RX ADMIN — POTASSIUM CHLORIDE AND SODIUM CHLORIDE 75 ML/HR: 900; 150 INJECTION, SOLUTION INTRAVENOUS at 21:42

## 2024-10-19 RX ADMIN — Medication 5000 UNITS: at 10:02

## 2024-10-19 RX ADMIN — PRAMIPEXOLE DIHYDROCHLORIDE 1.5 MG: 1.5 TABLET ORAL at 15:50

## 2024-10-19 RX ADMIN — MENTHOL, ZINC OXIDE 1 APPLICATION: .44; 20.6 OINTMENT TOPICAL at 21:45

## 2024-10-19 RX ADMIN — PRAMIPEXOLE DIHYDROCHLORIDE 1.5 MG: 1.5 TABLET ORAL at 10:05

## 2024-10-19 RX ADMIN — PANTOPRAZOLE SODIUM 40 MG: 40 TABLET, DELAYED RELEASE ORAL at 10:02

## 2024-10-19 RX ADMIN — PANTOPRAZOLE SODIUM 40 MG: 40 TABLET, DELAYED RELEASE ORAL at 18:45

## 2024-10-19 RX ADMIN — HYDROCODONE BITARTRATE AND ACETAMINOPHEN 1 TABLET: 5; 325 TABLET ORAL at 21:41

## 2024-10-19 RX ADMIN — CLONAZEPAM 0.5 MG: 0.5 TABLET ORAL at 02:01

## 2024-10-19 RX ADMIN — OLANZAPINE 5 MG: 5 TABLET, FILM COATED ORAL at 21:41

## 2024-10-19 RX ADMIN — CEFTRIAXONE SODIUM 1000 MG: 1 INJECTION, POWDER, FOR SOLUTION INTRAMUSCULAR; INTRAVENOUS at 21:41

## 2024-10-19 RX ADMIN — MENTHOL, ZINC OXIDE 1 APPLICATION: .44; 20.6 OINTMENT TOPICAL at 10:03

## 2024-10-19 RX ADMIN — PRAMIPEXOLE DIHYDROCHLORIDE 1.5 MG: 1.5 TABLET ORAL at 21:41

## 2024-10-19 NOTE — CONSULTS
CONSULT NOTE    Infectious Diseases - Luisa Lima MD  [facility]       Patient Identification:  Name: Maritza Bejarano  Age: 63 y.o.  Sex: female  :  1960  MRN: [id number]             Date of Consultation: 10/18/2024      Primary Care Physician: Doris Faria MD                               Requesting Physician: Dr. Calderon  Reason for Consultation: Sepsis    History of presenting illness: Patient is a 63-year-old female who has complicated past medical history including history of multiple sclerosis with associated neurogenic bladder, metastatic breast cancer, hypertension, dyslipidemia who has been dealing with recurrent UTI and hospitalization due to sepsis with last hospitalization for 3 days from 2024 through 2024 when she presented with weakness and was found to have flare of her MS treated with steroid burst and was then subsequently discharged to rehabilitation place.  Patient used to do intermittent catheterizations but because the frequency with which she needed catheterization could not be accomplished at the rehabilitation place so she requested Cobos catheter placement.  In this background patient was doing reasonably well but presented to the emergency room on 10/17/2024 with complaints of pressure in her urinary catheter insertion site with pressure going down in her legs as well as on the sides.  Patient also had headache and nausea and subjective sense of ill health.  Workup in the emergency room revealed malfunctioning catheter and CT scan of the abdomen and pelvis showed extensive perivesical stranding and perinephric stranding suggestive of ascending urinary tract infection.  Patient last urine culture positive was in 2024 when on 2024 she had Klebsiella species.  Patient's primary attending was made aware of her last infectious disease evaluation by [facility] and primary attending wanted me to continue to evaluate  the patient.  Impression:  This presentation in the above context is consistent with:  1-systemic illness due to ascending urinary tract infection in the setting of neurogenic bladder and intermittent catheterizations and now indwelling catheter because of the frequency with which catheterization is needed is not a comfortable in the nursing home setting with risk of colonization with resistant chasity currently improving on ceftriaxone with improvement in her white blood cell count and sense of wellbeing  2-superimposed yeast colonization of  tract  3-multiple sclerosis with recent flare requiring steroid  4-recent hospitalization for sepsis for urinary tract infection  5-other diagnoses per primary team.    Recommendations/Discussions:  At this juncture I agree with the care plan consisting of IV ceftriaxone with close monitoring of her clinical course and arrangements for planned to either perform clean intermittent catheterizations or exchange existing catheter periodically.  Follow-up on urine culture and blood culture results and adjust antibiotic therapy accordingly.  If her urine culture remains negative during this hospitalization but given her presentation and CT scan findings would recommend continuation of ceftriaxone for couple weeks and may be able to transition to oral Bactrim 1 DS p.o. twice daily based on the urine culture results from 9/7/2024 when she grew Klebsiella pneumonia.  Significant discussion to take place about using her symptoms and clinical picture as a guide for presence of urinary tract infection instead of using urine culture as a way to document test of cure.  Asymptomatic urine culture is discouraged to the patient and explained in great detail.  Monitor closely for side effects of antibiotic therapy.          Past Medical History:  Past Medical History:   Diagnosis Date    Anemia 2024    `Treated with iron    Dawn esophagus     per patient    Blurred vision     R/T MS     Breast cancer 05/2024+++++    Carpal tunnel syndrome     Clotting disorder 1993, 2003, 2012    3 g/i bleeds w/ transfus/ions    Colon polyp 2013    removed w/ colonoscopy    Deep vein thrombosis phlebitis 1980    Depression     Diplopia 2013    GERD (gastroesophageal reflux disease)     GI (gastrointestinal bleed) 3 bleeds    2 transfusions    H/O Skin cancer, basal cell     Headache     History of blood transfusion     History of GI bleed     R/T NSAIDS AND STEROIDS, multiple times    History of urinary tract infection     Hypercalcemia     s/p parathyroidectomy    Hyperlipidemia     Hypertension     Movement disorder     Multiple sclerosis     Optic neuritis     PONV (postoperative nausea and vomiting)      Past Surgical History:  Past Surgical History:   Procedure Laterality Date    APPENDECTOMY      BLADDER SURGERY      bladder stimulator    BREAST BIOPSY  don't remember    BREAST SURGERY      augmentation wtih subsequent removal    CARPAL TUNNEL RELEASE Bilateral     Left 2018, right 2020    CUBITAL TUNNEL RELEASE Left     CYSTOSCOPY BOTOX INJECTION OF BLADDER  2018    Cystoscopy with Botox    FRACTURE SURGERY  2019    rt shoulder    PARATHYROIDECTOMY      one gland removed    ROTATOR CUFF REPAIR Right 2017    TOE SURGERY      bilateral great toes    TOTAL SHOULDER ARTHROPLASTY W/ DISTAL CLAVICLE EXCISION Right 10/22/2018    Procedure: RT TOTAL SHOULDER REVERSE ARTHROPLASTY;  Surgeon: Bipin Dangelo MD;  Location: [facility];  Service: Orthopedics      Home Meds:  Medications Prior to Admission   Medication Sig Dispense Refill Last Dose/Taking    albuterol sulfate  (90 Base) MCG/ACT inhaler Inhale 2 puffs Every 4 (Four) Hours As Needed for Wheezing or Shortness of Air. 18 g 0 10/17/2024    anastrozole (ARIMIDEX) 1 MG tablet Take 1 tablet by mouth Daily. 30 tablet 3 10/17/2024    baclofen (LIORESAL) 20 MG tablet Take 1 tablet by mouth 2 (Two) Times a Day.   10/17/2024    castor oil-balsam peru  (VENELEX) ointment Apply 1 Application topically to the appropriate area as directed 2 (Two) Times a Day. Apply with medihoney to rangel/buttocks and cover w/abd pad   10/17/2024    Cholecalciferol (vitamin D3) 125 MCG (5000 UT) tablet tablet Take 1 tablet by mouth Daily.   10/17/2024    Dimethicone (Remedy Skin Repair) 1.5 % cream Apply 1 dose topically Every Morning. Apply to all extremities upon rising for dry skin   10/17/2024    dimethicone 1 % cream Apply 1 Application topically to the appropriate area as directed 2 (Two) Times a Day As Needed for Dry Skin. Apply to buttocks every shift for prophylactic  - and after each incontinent episode   10/17/2024    ferrous sulfate 325 (65 FE) MG tablet Take 1 tablet by mouth Daily With Breakfast. 30 tablet 3 10/17/2024    losartan-hydrochlorothiazide (HYZAAR) 50-12.5 MG per tablet Take 1 tablet by mouth Daily. 90 tablet 1 10/17/2024    OLANZapine (ZyPREXA) 5 MG tablet Take 1 tablet by mouth Every Night. 30 tablet 5 10/16/2024    pantoprazole (PROTONIX) 40 MG EC tablet Take 1 tablet by mouth 2 (Two) Times a Day.   10/17/2024    pramipexole (MIRAPEX) 1.5 MG tablet Take 1 tablet by mouth 3 (Three) Times a Day.   10/17/2024    predniSONE (DELTASONE) 20 MG tablet Take 1 tablet by mouth Daily.   10/17/2024    sennosides-docusate (senna-docusate sodium) 8.6-50 MG per tablet Take 1 tablet by mouth Daily. 30 tablet 2 10/17/2024    Wound Dressings (Medihoney Wound/Burn Dressing) gel Apply 1 dose topically Daily. Every day shift for friction - cleanse with saline, pat dry, apply to wound and venelex to periwound and cover with abd pad   10/17/2024    clonazePAM (KlonoPIN) 2 MG tablet Take 1 tablet by mouth 2 (Two) Times a Day As Needed for Anxiety (Sleep). 6 tablet 0     ondansetron (ZOFRAN) 8 MG tablet Take 1 tablet by mouth 3 (Three) Times a Day As Needed for Nausea or Vomiting. (Patient not taking: Reported on 10/4/2024) 30 tablet 5 Unknown    prochlorperazine (COMPAZINE) 10 MG  tablet Take 1 tablet by mouth Every 6 (Six) Hours As Needed for Nausea or Vomiting. (Patient not taking: Reported on 10/4/2024) 90 tablet 5 Unknown    promethazine (PHENERGAN) 12.5 MG tablet Take 1 tablet by mouth Every 6 (Six) Hours As Needed for Nausea or Vomiting. 60 tablet 3 Unknown    ribociclib succinate (Kisqali, 200 MG Dose,) 200 MG tablet therapy pack tablet Take 1 tablet by mouth Daily. Take for 21 days on, then 7 days off 21 tablet 0      Current Meds:     Current Facility-Administered Medications:     albuterol (PROVENTIL) nebulizer solution 0.083% 2.5 mg/3mL, 2.5 mg, Nebulization, Q6H PRN, Farooq Vicente MD    anastrozole (ARIMIDEX) tablet 1 mg, 1 mg, Oral, Daily, Farooq Vicente MD, 1 mg at 10/18/24 1316    baclofen (LIORESAL) tablet 10 mg, 10 mg, Oral, TID PRN, Farooq Vicente MD, 10 mg at 10/18/24 1638    cefTRIAXone (ROCEPHIN) 1,000 mg in sodium chloride 0.9 % 100 mL MBP, 1,000 mg, Intravenous, Daily, Farooq Vicente MD    cholecalciferol (VITAMIN D3) tablet 5,000 Units, 5,000 Units, Oral, Daily, Farooq Vicente MD, 5,000 Units at 10/18/24 1315    fluconazole (DIFLUCAN) tablet 100 mg, 100 mg, Oral, Q24H, Farooq Vicente MD, 100 mg at 10/18/24 1316    Menthol-Zinc Oxide 1 Application, 1 Application, Topical, BID, Farooq Vicente MD    morphine injection 2 mg, 2 mg, Intravenous, Q4H PRN, Farooq Vicente MD    OLANZapine (zyPREXA) tablet 5 mg, 5 mg, Oral, Nightly, Farooq Vicente MD    pantoprazole (PROTONIX) EC tablet 40 mg, 40 mg, Oral, BID AC, Farooq Vicente MD, 40 mg at 10/18/24 1635    pramipexole (MIRAPEX) tablet 1.5 mg, 1.5 mg, Oral, TID, Farooq Vicente MD, 1.5 mg at 10/18/24 1635    predniSONE (DELTASONE) tablet 20 mg, 20 mg, Oral, Daily, Farooq Vicente MD    prochlorperazine (COMPAZINE) injection 10 mg, 10 mg, Intravenous, Q4H PRN, Farooq Vicente MD    sennosides-docusate (PERICOLACE) 8.6-50 MG per tablet 1 tablet, 1 tablet, Oral, Daily, Farooq Vicente MD, 1 tablet at 10/18/24 1315    [COMPLETED] Insert Peripheral  IV, , , Once **AND** sodium chloride 0.9 % flush 10 mL, 10 mL, Intravenous, PRN, Nidia Ji PA-C    sodium chloride 0.9 % infusion, 75 mL/hr, Intravenous, Continuous, Farooq Vicente MD, Last Rate: 75 mL/hr at 10/18/24 1531, 75 mL/hr at 10/18/24 1531  Allergies:  No Known Allergies  Social History:   Social History     Tobacco Use    Smoking status: Former     Current packs/day: 0.00     Average packs/day: 2.0 packs/day for 30.0 years (60.0 ttl pk-yrs)     Types: Cigarettes     Start date: 10/22/1973     Quit date: 10/22/2003     Years since quittin.0    Smokeless tobacco: Never   Substance Use Topics    Alcohol use: Not Currently      Family History:  Family History   Problem Relation Age of Onset    Hypertension Mother     Hyperlipidemia Mother     Aortic aneurysm Mother         thoracic    Diabetes Mother     Hypertension Father         charissa heart failure    Heart failure Father     Hyperlipidemia Father     Miscarriages / Stillbirths Sister     Multiple sclerosis Brother     Atrial fibrillation Brother     Diabetes Maternal Grandfather     Stroke Maternal Grandfather     Parkinsonism Paternal Grandmother     Tremor Paternal Grandmother     Stomach cancer Paternal Grandfather     Diabetes Maternal Grandmother           Review of Systems  See history of present illness and past medical history.  Patient denies headache, dizziness, syncope, falls, trauma, change in vision, change in hearing, change in taste, changes in weight, changes in appetite, focal weakness, numbness, or paresthesia.  Patient denies chest pain, palpitations, dyspnea, orthopnea, PND, cough, sinus pressure, rhinorrhea, epistaxis, hemoptysis, nausea, vomiting,hematemesis, diarrhea, constipation or hematchezia.  Denies cold or heat intolerance, polydipsia, polyuria, polyphagia. Denies hematuria, pyuria, dysuria, hesitancy, frequency or urgency. Denies consumption of raw and under cooked meats foods or change in water source.  Denies  "fever, chills, sweats, night sweats.  Denies missing any routine medications. Remainder of ROS is negative.      Vitals:   /61 (BP Location: Right arm, Patient Position: Lying)   Pulse 94   Temp 97.3 °F (36.3 °C) (Oral)   Resp 16   Ht 152.4 cm (60\")   Wt 83.4 kg (183 lb 13.8 oz)   LMP  (LMP Unknown) Comment: PT. STATES HER LAST PERIOD WAS GREATER THAN FIVE YEARS AGO  SpO2 93%   BMI 35.91 kg/m²   I/O:   Intake/Output Summary (Last 24 hours) at 10/18/2024 2010  Last data filed at 10/18/2024 1730  Gross per 24 hour   Intake --   Output 1300 ml   Net -1300 ml     Exam:  Patient is examined using the personal protective equipment as per guidelines from infection control for this particular patient as enacted.  Hand washing was performed before and after patient interaction.  General Appearance:    Alert, cooperative, no distress, appears stated age   Head:    Normocephalic, without obvious abnormality, atraumatic   Eyes:    PERRL, conjunctivae/corneas clear, EOM's intact, both eyes   Ears:    Normal external ear canals, both ears   Nose:   Nares normal, septum midline, mucosa normal, no drainage    or sinus tenderness   Throat:   Lips, tongue, gums normal; oral mucosa pink and moist   Neck:   Supple, symmetrical, trachea midline, no adenopathy;     thyroid:  no enlargement/tenderness/nodules; no carotid    bruit or JVD   Back:     Symmetric, no curvature, ROM normal, no CVA tenderness   Lungs:     Clear to auscultation bilaterally, respirations unlabored   Chest Wall:    No tenderness or deformity    Heart:    Regular rate and rhythm, S1 and S2 normal, no murmur, rub   or gallop   Abdomen:   Soft mild right-sided tenderness with Cobos catheter in place   Extremities:   Extremities normal, atraumatic, no cyanosis or edema   Pulses:   Pulses palpable in all extremities; symmetric all extremities   Skin:   Skin color normal, Skin is warm and dry,  no rashes or palpable lesions   Neurologic: Alert and oriented " with residual effects of MS.         Data Review:    I reviewed the patient's new clinical results.  Results from last 7 days   Lab Units 10/18/24  0438 10/17/24  2037 10/14/24  0749   WBC 10*3/mm3 14.34* 19.06* 11.42*   HEMOGLOBIN g/dL 12.3 13.9 12.7   PLATELETS 10*3/mm3 352 341 294     Results from last 7 days   Lab Units 10/18/24  0932 10/17/24  2037 10/14/24  0749   SODIUM mmol/L 139 134* 139   POTASSIUM mmol/L 3.7 4.0 3.7   CHLORIDE mmol/L 103 102 102   CO2 mmol/L 26.6 22.0 27.8   BUN mg/dL 16 21 18   CREATININE mg/dL 1.00 0.99 0.57   CALCIUM mg/dL 8.2* 8.5* 9.3   GLUCOSE mg/dL 184* 160* 111*     Microbiology Results (last 10 days)       Procedure Component Value - Date/Time    Urine Culture - Urine, Urine, Catheter [[id number]]  (Normal) Collected: 10/17/24 2038    Lab Status: Preliminary result Specimen: Urine, Catheter Updated: 10/18/24 0737     Urine Culture No growth              Assessment:  Active Hospital Problems    Diagnosis  POA    **Acute UTI [N39.0]  Yes      Resolved Hospital Problems   No resolved problems to display.         Plan:    Luisa Child MD   10/18/2024  20:10 EDT    Parts of this note may be an electronic transcription/translation of spoken language to printed text using the Dragon dictation system.

## 2024-10-19 NOTE — PROGRESS NOTES
"  Infectious Diseases Progress Note    Luisa Child MD     [facility]  Los: 0 days  Patient Identification:  Name: Maritza Bejarano  Age: 63 y.o.  Sex: female  :  1960  MRN: [id number]         Primary Care Physician: Doris Faria MD        Subjective: Feeling better denies any specific complaints.  Interval History: See consultation note.    Objective:    Scheduled Meds:anastrozole, 1 mg, Oral, Daily  cefTRIAXone, 1,000 mg, Intravenous, Daily  vitamin D3, 5,000 Units, Oral, Daily  fluconazole, 100 mg, Oral, Q24H  Menthol-Zinc Oxide, 1 Application, Topical, BID  OLANZapine, 5 mg, Oral, Nightly  pantoprazole, 40 mg, Oral, BID AC  pramipexole, 1.5 mg, Oral, TID  predniSONE, 20 mg, Oral, Daily  sennosides-docusate, 1 tablet, Oral, Daily      Continuous Infusions:sodium chloride 0.9 % with KCl 20 mEq, 75 mL/hr        Vital signs in last 24 hours:  Temp:  [96.4 °F (35.8 °C)-98.6 °F (37 °C)] 97 °F (36.1 °C)  Heart Rate:  [78-83] 78  Resp:  [15-16] 16  BP: (106-127)/(65-70) 127/68    Intake/Output:    Intake/Output Summary (Last 24 hours) at 10/19/2024 1943  Last data filed at 10/19/2024 1652  Gross per 24 hour   Intake --   Output 1800 ml   Net -1800 ml       Exam:  /68 (BP Location: Right arm, Patient Position: Lying)   Pulse 78   Temp 97 °F (36.1 °C) (Oral)   Resp 16   Ht 152.4 cm (60\")   Wt 83.4 kg (183 lb 13.8 oz)   LMP  (LMP Unknown) Comment: PT. STATES HER LAST PERIOD WAS GREATER THAN FIVE YEARS AGO  SpO2 96%   BMI 35.91 kg/m²   Patient is examined using the personal protective equipment as per guidelines from infection control for this particular patient as enacted.  Hand washing was performed before and after patient interaction.  General Appearance:    Alert, cooperative, no distress, AAOx3                          Head:    Normocephalic, without obvious abnormality, atraumatic                           Eyes:    PERRL, conjunctivae/corneas clear, EOM's intact, both eyes       "                   Throat:   Lips, tongue, gums normal; oral mucosa pink and moist                           Neck:   Supple, symmetrical, trachea midline, no JVD                         Lungs:    Clear to auscultation bilaterally, respirations unlabored                 Chest Wall:    No tenderness or deformity                          Heart:  S1-S2 regular                  Abdomen:   Cobos catheter in place                 Extremities:   Extremities normal, atraumatic, no cyanosis or edema                        Pulses:   Pulses palpable in all extremities                            Skin:   Skin is warm and dry,  no rashes or palpable lesions                  Neurologic: Alert and oriented x 3       Data Review:    I reviewed the patient's new clinical results.  Results from last 7 days   Lab Units 10/19/24  0336 10/18/24  0438 10/17/24  2037 10/14/24  0749   WBC 10*3/mm3 8.73 14.34* 19.06* 11.42*   HEMOGLOBIN g/dL 11.0* 12.3 13.9 12.7   PLATELETS 10*3/mm3 314 352 341 294     Results from last 7 days   Lab Units 10/19/24  0336 10/18/24  0932 10/17/24  2037 10/14/24  0749   SODIUM mmol/L 138 139 134* 139   POTASSIUM mmol/L 3.4* 3.7 4.0 3.7   CHLORIDE mmol/L 107 103 102 102   CO2 mmol/L 23.7 26.6 22.0 27.8   BUN mg/dL 14 16 21 18   CREATININE mg/dL 0.60 1.00 0.99 0.57   CALCIUM mg/dL 7.4* 8.2* 8.5* 9.3   GLUCOSE mg/dL 106* 184* 160* 111*     Microbiology Results (last 10 days)       Procedure Component Value - Date/Time    CANDIDA AURIS SCREEN - Swab, Axilla Right, Axilla Left and Groin [[id number]]  (Normal) Collected: 10/18/24 0948    Lab Status: Preliminary result Specimen: Swab from Axilla Right, Axilla Left and Groin Updated: 10/19/24 1712     Candida Auris Screen Culture No Candida auris isolated at 24 hours    Urine Culture - Urine, Urine, Catheter [[id number]]  (Normal) Collected: 10/17/24 2038    Lab Status: Final result Specimen: Urine, Catheter Updated: 10/19/24 1133     Urine Culture No growth               Assessment:    Acute UTI  1-systemic illness due to ascending urinary tract infection in the setting of neurogenic bladder and intermittent catheterizations and now indwelling catheter because of the frequency with which catheterization is needed is not a comfortable in the nursing home setting with risk of colonization with resistant chasity currently improving on ceftriaxone with improvement in her white blood cell count and sense of wellbeing  2-superimposed yeast colonization of  tract  3-multiple sclerosis with recent flare requiring steroid  4-recent hospitalization for sepsis for urinary tract infection  5-other diagnoses per primary team.     Recommendations/Discussions:  See my discussion and recommendations on 10/18/2024.  If urine culture remains negative then using previous culture results from 9/7/2024 can be used as a guide for oral antibiotic therapy.  Patient can be switched to oral regimen anytime from infectious disease standpoint if she is considered ready to be discharged.  Based on the sensitivity of Klebsiella pneumonia on 9/7/2024 oral Bactrim for total of 2 weeks should be sufficient with close monitoring of side effects including keeping an eye on her potassium and renal function.  Side effects of antibiotic therapy in general discussed with the patient.  Luisa Child MD  10/19/2024  19:43 EDT    Parts of this note may be an electronic transcription/translation of spoken language to printed text using the Dragon dictation system.

## 2024-10-19 NOTE — THERAPY EVALUATION
Patient Name: Maritza Nance Darci  : 1960    MRN: [id number]                              Today's Date: 10/19/2024       Admit Date: 10/17/2024    Visit Dx:     ICD-10-CM ICD-9-CM   1. Acute urinary retention  R33.8 788.29   2. Leukocytosis, unspecified type  D72.829 288.60   3. History of multiple sclerosis  G35 340   4. 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    FOM (frequency of micturition)    Headache    History of diplopia    History of optic neuritis    History of vitamin D deficiency    Migraine syndrome    Mixed incontinence    Nausea    Neurogenic bladder    Pain in joint of right shoulder    Restless legs syndrome    Rupture of rotator cuff of shoulder    Secondary progressive multiple sclerosis    S/P cubital tunnel release    Vitamin D deficiency    Multiple sclerosis exacerbation    Sepsis due to Gram negative bacteria    Lower extremity cellulitis    Malignant neoplasm of overlapping sites of left breast in female, estrogen receptor positive    Encounter for long-term (current) use of other medications    Cancer, metastatic to bone    Colitis    Weakness    Elevated LFTs    Acute UTI     Past Medical History:   Diagnosis Date    Anemia     `Treated with iron    Dawn esophagus     per patient    Blurred vision     R/T MS    Breast cancer 2024+++++    Carpal tunnel syndrome     Clotting disorder , ,     3 g/i bleeds w/ transfus/ions    Colon polyp 2013    removed w/ colonoscopy    Deep vein thrombosis phlebitis 1980    Depression     Diplopia      GERD (gastroesophageal reflux disease)     GI (gastrointestinal bleed) 3 bleeds    2 transfusions    H/O Skin cancer, basal cell     Headache     History of blood transfusion     History of GI bleed     R/T NSAIDS AND STEROIDS, multiple times    History of urinary tract infection     Hypercalcemia     s/p parathyroidectomy    Hyperlipidemia     Hypertension     Movement disorder     Multiple sclerosis     Optic neuritis     PONV (postoperative nausea and vomiting)      Past Surgical History:   Procedure Laterality Date    APPENDECTOMY      BLADDER SURGERY      bladder stimulator    BREAST BIOPSY  don't remember    BREAST SURGERY      augmentation wtih subsequent removal    CARPAL TUNNEL RELEASE Bilateral     Left 2018, right 2020    CUBITAL TUNNEL RELEASE Left     CYSTOSCOPY BOTOX INJECTION OF BLADDER  2018    Cystoscopy with Botox    FRACTURE SURGERY  2019    rt shoulder    PARATHYROIDECTOMY      one gland removed    ROTATOR CUFF REPAIR Right 2017    TOE SURGERY      bilateral great toes    TOTAL SHOULDER ARTHROPLASTY W/ DISTAL CLAVICLE EXCISION Right 10/22/2018    Procedure: RT TOTAL SHOULDER REVERSE ARTHROPLASTY;  Surgeon: Bipin Dangelo MD;  Location: [facility] OR;  Service: Orthopedics      General Information       Row Name 10/19/24 1515          OT Time and Intention    Document Type evaluation  -RD     Mode of Treatment occupational therapy;co-treatment;physical therapy  co-treat w/ PT d/t pt endurance level and safety considerations  -RD     Patient Effort excellent  -RD       Row Name 10/19/24 1518          General Information    Patient Profile Reviewed yes  -RD     Prior Level of Function mod assist:;ADL's  pt reports she required significant assist w/ LB ADLs, but completes most UB ADLs in her w/c by herself  -RD     Existing Precautions/Restrictions fall  -RD     Barriers to Rehab medically complex;previous functional deficit  -RD       Row Name 10/19/24 1512          Living Environment    People  in Home spouse  lives w/ spouse; most recently has been in a SNF following hospital stay  -       Row Name 10/19/24 1515          Cognition    Orientation Status (Cognition) oriented x 3  -       Row Name 10/19/24 1515          Safety Issues/Impairments Affecting Functional Mobility    Impairments Affecting Function (Mobility) balance;coordination;endurance/activity tolerance;motor control;motor planning;postural/trunk control;strength  -               User Key  (r) = Recorded By, (t) = Taken By, (c) = Cosigned By      Initials Name Provider Type    Mihaela Escobar OT Occupational Therapist                     Mobility/ADL's       Row Name 10/19/24 1517          Bed Mobility    Sit-Supine [place] (Bed Mobility) moderate assist (50% patient effort);2 person assist;verbal cues;nonverbal cues (demo/gesture)  -     Assistive Device (Bed Mobility) bed rails;head of bed elevated  -       Row Name 10/19/24 1517          Transfers    Comment, (Transfers) not appropriate as pt has not ambulated or completed transfers in 3+ years. pt completes leandra lifts on a daily basis  -       Row Name 10/19/24 1517          Activities of Daily Living    BADL Assessment/Intervention lower body dressing;grooming;toileting  -       Row Name 10/19/24 1517          Lower Body Dressing Assessment/Training    [place] Level (Lower Body Dressing) lower body dressing skills;dependent (less than 25% patient effort)  -       Row Name 10/19/24 1517          Grooming Assessment/Training    [place] Level (Grooming) grooming skills;set up;supervision  -     Position (Grooming) sitting up in bed  -       Row Name 10/19/24 1517          Toileting Assessment/Training    [place] Level (Toileting) toileting skills;dependent (less than 25% patient effort)  -               User Key  (r) = Recorded By, (t) = Taken By, (c) = Cosigned By      Initials Name Provider Type    Mihaela Escobar OT Occupational  Therapist                   Obj/Interventions       Row Name 10/19/24 1519          Sensory Assessment (Somatosensory)    Sensory Assessment (Somatosensory) other (see comments)  reports tingling in her B fingers which is her baseline  -RD       Row Name 10/19/24 1519          Range of Motion Comprehensive    Comment, General Range of Motion L UE AROM approx WFL for shoulder flexion; R UE AROM to approx. 90 degrees shoulder flexion  -RD       Row Name 10/19/24 1519          Strength Comprehensive (MMT)    Comment, General Manual Muscle Testing (MMT) Assessment significant generalized weakness noted in B UE; L UE MMT: grossly approx. 3/5. R UE MMT: grossly approx. 2+/5  -       Row Name 10/19/24 1519          Elbow/Forearm (Therapeutic Exercise)    Elbow/Forearm (Therapeutic Exercise) AROM (active range of motion)  -     Elbow/Forearm AROM (Therapeutic Exercise) bilateral;flexion;extension;5 repetitions  -       Row Name 10/19/24 1519          Wrist (Therapeutic Exercise)    Wrist (Therapeutic Exercise) AROM (active range of motion)  -     Wrist AROM (Therapeutic Exercise) bilateral;flexion;extension;5 repetitions  -RD       Row Name 10/19/24 1519          Hand (Therapeutic Exercise)    Hand (Therapeutic Exercise) AROM (active range of motion)  -     Hand AROM/AAROM (Therapeutic Exercise) AROM (active range of motion);bilateral;finger flexion;finger extension;5 repetitions  -       Row Name 10/19/24 1519          Motor Skills    Motor Skills functional endurance;coordination  -     Coordination bilateral;upper extremity;fine motor deficit;minimal impairment  reports tingling in her fingers which is her baseline  -     Functional Endurance fair -  -     Therapeutic Exercise elbow/forearm;wrist;hand  -       Row Name 10/19/24 1519          Balance    Balance Assessment sitting static balance  -RD     Static Sitting Balance contact guard;moderate assist  -     Position, Sitting Balance sitting edge  of bed  -RD     Comment, Balance Pt able to sit at EOB approx. 10 minutes w/ varried assistance from CGA to mod A pending fatigue, UE support, and VCs. Pt reports she has been working on sitting balance at rehab and has increased her sitting balance time to approx. 20-30 minutes unsupported. Pt motivated to work on trunk control and sitting balance in therapy. While at EOB, pt also completes some UB thera ex  -RD               User Key  (r) = Recorded By, (t) = Taken By, (c) = Cosigned By      Initials Name Provider Type    RD Mihaela Mojica, OT Occupational Therapist                   Goals/Plan       Row Name 10/19/24 1524          Bathing Goal 1 (OT)    Activity/Device (Bathing Goal 1, OT) upper body bathing  -RD     [place] Level/Cues Needed (Bathing Goal 1, OT) minimum assist (75% or more patient effort)  -RD     Time Frame (Bathing Goal 1, OT) long term goal (LTG)  -RD     Progress/Outcomes (Bathing Goal 1, OT) goal ongoing  -RD       Row Name 10/19/24 1521          Dressing Goal 1 (OT)    Activity/Device (Dressing Goal 1, OT) upper body dressing  -RD     [place]/Cues Needed (Dressing Goal 1, OT) minimum assist (75% or more patient effort)  -RD     Time Frame (Dressing Goal 1, OT) long term goal (LTG)  -RD     Progress/Outcome (Dressing Goal 1, OT) goal ongoing  -RD       Row Name 10/19/24 1523          Grooming Goal 1 (OT)    Activity/Device (Grooming Goal 1, OT) grooming skills, all  -RD     [place] (Grooming Goal 1, OT) modified independence  -RD     Time Frame (Grooming Goal 1, OT) long term goal (LTG)  -RD     Progress/Outcome (Grooming Goal 1, OT) goal ongoing  -RD       Row Name 10/19/24 8785          Strength Goal 1 (OT)    Strength Goal 1 (OT) Pt will increase R UE strength to approx. 3-/5 and L UE strength to approx 3+/5 to assist w/ ADLs.  -RD       Row Name 10/19/24 1527          Problem Specific Goal 1 (OT)    Problem Specific Goal 1 (OT) Pt will increase sitting balance to  supervision level in order to increase indep and safety w/ ADLs.  -RD     Time Frame (Problem Specific Goal 1, OT) long term goal (LTG)  -RD     Progress/Outcome (Problem Specific Goal 1, OT) goal ongoing  -RD       Row Name 10/19/24 1524          Therapy Assessment/Plan (OT)    Planned Therapy Interventions (OT) activity tolerance training;BADL retraining;patient/caregiver education/training;adaptive equipment training;ROM/therapeutic exercise;strengthening exercise;transfer/mobility retraining;functional balance retraining  -RD               User Key  (r) = Recorded By, (t) = Taken By, (c) = Cosigned By      Initials Name Provider Type    RD Mihaela Mojica, OT Occupational Therapist                   Clinical Impression       Row Name 10/19/24 1527          Pain Assessment    Pretreatment Pain Rating 0/10 - no pain  -RD     Posttreatment Pain Rating 0/10 - no pain  -RD       Row Name 10/19/24 1527          Plan of Care Review    Plan of Care Reviewed With patient  -RD     Progress improving  -RD     Outcome Evaluation Pt is  a63 year old female admitted d/t UTI. Pt w/ h/o MS and metastatic breast cancer, and immobility syndrome. Pt has been non-ambulatory for the past 3 years and transfers to w/c daily w/ a leandra lift. Pt does participate in ADLs and reports she is typically able to complete most of her UB ADLs by herself in her w/c. Pt has been at rehab for the past 3 weeks following a hospital stay. Pt presents to OT eval w/ decreased activity tolerance, significant generalized weakness, and overall decreased indep and safety w/ ADLs. Pt requires mod A for supine to sit transition and mod A x2 for sit to supine transition. Pt able to sit at EOB approx. 10 minutes w/ varried assistance from CGA to mod A pending fatigue, UE support, and VCs. Pt reports she has been working on sitting balance at rehab and has increased her sitting balance time to approx. 20-30 minutes unsupported. Pt motivated to work on trunk  control and sitting balance in therapy. Pt also complete UB thera ex while at EOB. Pt currently requires total A for LB dressing tasks but is able to complete grooming w/ s/u and SBA. Pt will benefit from skilled OT services to address deficits and maximize indep w/ ADLs.  -RD       Row Name 10/19/24 1527          Therapy Assessment/Plan (OT)    Rehab Potential (OT) good  -RD     Criteria for Skilled Therapeutic Interventions Met (OT) yes;meets criteria;skilled treatment is necessary  -RD     Therapy Frequency (OT) 5 times/wk  -RD       Row Name 10/19/24 1527          Therapy Plan Review/Discharge Plan (OT)    Anticipated Discharge Disposition (OT) skilled nursing facility;inpatient rehabilitation facility  pt hopeful to get into inpatient rehab  -RD       Row Name 10/19/24 1527          Positioning and Restraints    Pre-Treatment Position in bed  -RD     Post Treatment Position bed  -RD     In Bed fowlers;call light within reach;encouraged to call for assist;exit alarm on  -RD               User Key  (r) = Recorded By, (t) = Taken By, (c) = Cosigned By      Initials Name Provider Type    RD Mihaela Mojica, OT Occupational Therapist                   Outcome Measures       Row Name 10/19/24 1525          How much help from another is currently needed...    Putting on and taking off regular lower body clothing? 1  -RD     Bathing (including washing, rinsing, and drying) 2  -RD     Toileting (which includes using toilet bed pan or urinal) 1  -RD     Putting on and taking off regular upper body clothing 2  -RD     Taking care of personal grooming (such as brushing teeth) 3  -RD     Eating meals 3  -RD     AM-PAC 6 Clicks Score (OT) 12  -RD       Row Name 10/19/24 4062          How much help from another person do you currently need...    Turning from your back to your side while in flat bed without using bedrails? 2  -GJ     Moving from lying on back to sitting on the side of a flat bed without bedrails? 2  -GJ      Moving to and from a bed to a chair (including a wheelchair)? 1  -GJ     Standing up from a chair using your arms (e.g., wheelchair, bedside chair)? 1  -GJ     Climbing 3-5 steps with a railing? 1  -GJ     To walk in hospital room? 1  -GJ     AM-PAC 6 Clicks Score (PT) 8  -GJ     Highest Level of Mobility Goal 3 --> Sit at edge of bed  -GJ       Row Name 10/19/24 1525 10/19/24 1404       Functional Assessment    Outcome Measure Options AM-PAC 6 Clicks Daily Activity (OT)  -JAVIER AM-PAC 6 Clicks Basic Mobility (PT)  -GJ              User Key  (r) = Recorded By, (t) = Taken By, (c) = Cosigned By      Initials Name Provider Type    Luan Santos, PT Physical Therapist    Mihaela Escobar, OT Occupational Therapist                    Occupational Therapy Education       Title: PT OT SLP Therapies (In Progress)       Topic: Occupational Therapy (In Progress)       Point: ADL training (Done)       Description:   Instruct learner(s) on proper safety adaptation and remediation techniques during self care or transfers.   Instruct in proper use of assistive devices.                  Learning Progress Summary            Patient Acceptance, E, VU by JAVIER at 10/19/2024 1526    Comment: OT educ on OT role in therapeutic process and pt's POC.                      Point: Home exercise program (Not Started)       Description:   Instruct learner(s) on appropriate technique for monitoring, assisting and/or progressing therapeutic exercises/activities.                  Learner Progress:  Not documented in this visit.              Point: Precautions (Not Started)       Description:   Instruct learner(s) on prescribed precautions during self-care and functional transfers.                  Learner Progress:  Not documented in this visit.              Point: Body mechanics (Not Started)       Description:   Instruct learner(s) on proper positioning and spine alignment during self-care, functional mobility activities and/or  exercises.                  Learner Progress:  Not documented in this visit.                              User Key       Initials Effective Dates Name Provider Type Discipline    RD 06/16/21 -  Mihaela Mojica, OT Occupational Therapist OT                  OT Recommendation and Plan  Planned Therapy Interventions (OT): activity tolerance training, BADL retraining, patient/caregiver education/training, adaptive equipment training, ROM/therapeutic exercise, strengthening exercise, transfer/mobility retraining, functional balance retraining  Therapy Frequency (OT): 5 times/wk  Plan of Care Review  Plan of Care Reviewed With: patient  Progress: improving  Outcome Evaluation: Pt is  a63 year old female admitted d/t UTI. Pt w/ h/o MS and metastatic breast cancer, and immobility syndrome. Pt has been non-ambulatory for the past 3 years and transfers to w/c daily w/ a leandra lift. Pt does participate in ADLs and reports she is typically able to complete most of her UB ADLs by herself in her w/c. Pt has been at rehab for the past 3 weeks following a hospital stay. Pt presents to OT eval w/ decreased activity tolerance, significant generalized weakness, and overall decreased indep and safety w/ ADLs. Pt requires mod A for supine to sit transition and mod A x2 for sit to supine transition. Pt able to sit at EOB approx. 10 minutes w/ varried assistance from CGA to mod A pending fatigue, UE support, and VCs. Pt reports she has been working on sitting balance at rehab and has increased her sitting balance time to approx. 20-30 minutes unsupported. Pt motivated to work on trunk control and sitting balance in therapy. Pt also complete UB thera ex while at EOB. Pt currently requires total A for LB dressing tasks but is able to complete grooming w/ s/u and SBA. Pt will benefit from skilled OT services to address deficits and maximize indep w/ ADLs.     Time Calculation:   Evaluation Complexity (OT)  Review Occupational  Profile/Medical/Therapy History Complexity: expanded/moderate complexity  Assessment, Occupational Performance/Identification of Deficit Complexity: 3-5 performance deficits  Clinical Decision Making Complexity (OT): detailed assessment/moderate complexity  Overall Complexity of Evaluation (OT): moderate complexity     Time Calculation- OT       Row Name 10/19/24 1532             Time Calculation- OT    OT Start Time 1206  -RD      OT Stop Time 1233  -RD      OT Time Calculation (min) 27 min  -RD      Total Timed Code Minutes- OT 20 minute(s)  -RD      OT Received On 10/19/24  -RD      OT - Next Appointment 10/21/24  -RD      OT Goal Re-Cert Due Date 11/01/24  -RD         Timed Charges    77624 - OT Therapeutic Activity Minutes 12  -RD      71144 - OT Self Care/Mgmt Minutes 8  -RD         Untimed Charges    OT Eval/Re-eval Minutes 7  -RD         Total Minutes    Timed Charges Total Minutes 20  -RD      Untimed Charges Total Minutes 7  -RD       Total Minutes 27  -RD                User Key  (r) = Recorded By, (t) = Taken By, (c) = Cosigned By      Initials Name Provider Type    RD Mihaela Mojica OT Occupational Therapist                  Therapy Charges for Today       Code Description Service Date Service Provider Modifiers Qty    40275694595  OT THERAPEUTIC ACT EA 15 MIN 10/19/2024 Mihaela Mojica OT GO 1    95645552056 HC OT EVAL MOD COMPLEXITY 3 10/19/2024 Mihaela Mojica OT GO 1                 Mihaela Mojica OT  10/19/2024

## 2024-10-19 NOTE — PLAN OF CARE
Goal Outcome Evaluation:  Plan of Care Reviewed With: patient        Progress: improving  Outcome Evaluation: Pt is  a63 year old female admitted d/t UTI. Pt w/ h/o MS and metastatic breast cancer, and immobility syndrome. Pt has been non-ambulatory for the past 3 years and transfers to w/c daily w/ a leandra lift. Pt does participate in ADLs and reports she is typically able to complete most of her UB ADLs by herself in her w/c. Pt has been at rehab for the past 3 weeks following a hospital stay. Pt presents to OT eval w/ decreased activity tolerance, significant generalized weakness, and overall decreased indep and safety w/ ADLs. Pt requires mod A for supine to sit transition and mod A x2 for sit to supine transition. Pt able to sit at EOB approx. 10 minutes w/ varried assistance from CGA to mod A pending fatigue, UE support, and VCs. Pt reports she has been working on sitting balance at rehab and has increased her sitting balance time to approx. 20-30 minutes unsupported. Pt motivated to work on trunk control and sitting balance in therapy. Pt also complete UB thera ex while at EOB. Pt currently requires total A for LB dressing tasks but is able to complete grooming w/ s/u and SBA. Pt will benefit from skilled OT services to address deficits and maximize indep w/ ADLs.    Anticipated Discharge Disposition (OT): skilled nursing facility, inpatient rehabilitation facility (pt hopeful to get into inpatient rehab)

## 2024-10-19 NOTE — THERAPY EVALUATION
Patient Name: Maritza Nance Darci  : 1960    MRN: [id number]                              Today's Date: 10/19/2024       Admit Date: 10/17/2024    Visit Dx:     ICD-10-CM ICD-9-CM   1. Acute urinary retention  R33.8 788.29   2. Leukocytosis, unspecified type  D72.829 288.60   3. History of multiple sclerosis  G35 340   4. 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    FOM (frequency of micturition)    Headache    History of diplopia    History of optic neuritis    History of vitamin D deficiency    Migraine syndrome    Mixed incontinence    Nausea    Neurogenic bladder    Pain in joint of right shoulder    Restless legs syndrome    Rupture of rotator cuff of shoulder    Secondary progressive multiple sclerosis    S/P cubital tunnel release    Vitamin D deficiency    Multiple sclerosis exacerbation    Sepsis due to Gram negative bacteria    Lower extremity cellulitis    Malignant neoplasm of overlapping sites of left breast in female, estrogen receptor positive    Encounter for long-term (current) use of other medications    Cancer, metastatic to bone    Colitis    Weakness    Elevated LFTs    Acute UTI     Past Medical History:   Diagnosis Date    Anemia     `Treated with iron    Dawn esophagus     per patient    Blurred vision     R/T MS    Breast cancer 2024+++++    Carpal tunnel syndrome     Clotting disorder , ,     3 g/i bleeds w/ transfus/ions    Colon polyp 2013    removed w/ colonoscopy    Deep vein thrombosis phlebitis 1980    Depression     Diplopia      GERD (gastroesophageal reflux disease)     GI (gastrointestinal bleed) 3 bleeds    2 transfusions    H/O Skin cancer, basal cell     Headache     History of blood transfusion     History of GI bleed     R/T NSAIDS AND STEROIDS, multiple times    History of urinary tract infection     Hypercalcemia     s/p parathyroidectomy    Hyperlipidemia     Hypertension     Movement disorder     Multiple sclerosis     Optic neuritis     PONV (postoperative nausea and vomiting)      Past Surgical History:   Procedure Laterality Date    APPENDECTOMY      BLADDER SURGERY      bladder stimulator    BREAST BIOPSY  don't remember    BREAST SURGERY      augmentation wtih subsequent removal    CARPAL TUNNEL RELEASE Bilateral     Left 2018, right 2020    CUBITAL TUNNEL RELEASE Left     CYSTOSCOPY BOTOX INJECTION OF BLADDER  2018    Cystoscopy with Botox    FRACTURE SURGERY  2019    rt shoulder    PARATHYROIDECTOMY      one gland removed    ROTATOR CUFF REPAIR Right 2017    TOE SURGERY      bilateral great toes    TOTAL SHOULDER ARTHROPLASTY W/ DISTAL CLAVICLE EXCISION Right 10/22/2018    Procedure: RT TOTAL SHOULDER REVERSE ARTHROPLASTY;  Surgeon: Bipin Dangelo MD;  Location: [facility];  Service: Orthopedics      General Information       Row Name 10/19/24 6582          Physical Therapy Time and Intention    Document Type evaluation  -GJ     Mode of Treatment individual therapy;physical therapy;co-treatment  -GJ       Row Name 10/19/24 8555          General Information    Patient Profile Reviewed yes  -GJ     Prior Level of Function mod assist:;max assist:;dependent:;transfer;bed mobility;ADL's  -GJ     Existing Precautions/Restrictions fall  Patient has not ambulated for 3 years.  She is a Rachell lift at baseline for transfers  -GJ     Barriers to Rehab medically complex;previous functional deficit  -GJ       Row Name 10/19/24 5152          Living Environment    People in Home spouse  -GJ       Row Name 10/19/24 0229           Cognition    Orientation Status (Cognition) oriented x 3  -       Row Name 10/19/24 7388          Safety Issues/Impairments Affecting Functional Mobility    Impairments Affecting Function (Mobility) balance;coordination;endurance/activity tolerance;motor control;motor planning;postural/trunk control;strength  -               User Key  (r) = Recorded By, (t) = Taken By, (c) = Cosigned By      Initials Name Provider Type    Luan Santos, PT Physical Therapist                   Mobility       Row Name 10/19/24 1401          Bed Mobility    Bed Mobility supine-sit;sit-supine  -     Supine-Sit Jennings (Bed Mobility) set up;verbal cues;nonverbal cues (demo/gesture);moderate assist (50% patient effort)  -     Sit-Supine Jennings (Bed Mobility) set up;verbal cues;nonverbal cues (demo/gesture);moderate assist (50% patient effort);2 person assist  -     Assistive Device (Bed Mobility) bed rails;head of bed elevated  -               User Key  (r) = Recorded By, (t) = Taken By, (c) = Cosigned By      Initials Name Provider Type    Luan Santos, PT Physical Therapist                   Obj/Interventions       Row Name 10/19/24 1404          Range of Motion Comprehensive    Comment, General Range of Motion Bilateral shoulders active range of motion grossly 90 to 120 degrees.  Patient demonstrates minimal with any bilateral hip and knee range of motion.  She does perform some bilateral ankle range of motion  -       Row Name 10/19/24 1403          Strength Comprehensive (MMT)    General Manual Muscle Testing (MMT) Assessment lower extremity strength deficits identified;neck/trunk strength deficits identified;hand strength deficits identified;upper extremity strength deficits identified  -       Row Name 10/19/24 1401          Balance    Balance Assessment --  Sat edge of bed x 10 minutes with perturbations for forward and backward trunk control, and left and right trunk control.  Performed  scapular elevation and scapular retraction, with min to mod assist.  Patient also utilizing bilateral hands for balance  -GJ               User Key  (r) = Recorded By, (t) = Taken By, (c) = Cosigned By      Initials Name Provider Type    Luan Santos, PT Physical Therapist                   Goals/Plan       Row Name 10/19/24 1403          Bed Mobility Goal 1 (PT)    Activity/Assistive Device (Bed Mobility Goal 1, PT) sit to supine;supine to sit  -GJ     Grainger Level/Cues Needed (Bed Mobility Goal 1, PT) minimum assist (75% or more patient effort)  -GJ     Time Frame (Bed Mobility Goal 1, PT) long term goal (LTG);5 days  -       Row Name 10/19/24 1403          Problem Specific Goal 1 (PT)    Problem Specific Goal 1 (PT) Patient to be able to sit at edge of bed independently greater than equal to 20 minutes with use of upper extremities to facilitate improved trunk for stability  -GJ     Time Frame (Problem Specific Goal 1, PT) long-term goal (LTG);5 days  -       Row Name 10/19/24 1403          Therapy Assessment/Plan (PT)    Planned Therapy Interventions (PT) balance training;bed mobility training;neuromuscular re-education;transfer training;strengthening;patient/family education  -               User Key  (r) = Recorded By, (t) = Taken By, (c) = Cosigned By      Initials Name Provider Type    Luan Santos, PT Physical Therapist                   Clinical Impression       Row Name 10/19/24 1403          Plan of Care Review    Plan of Care Reviewed With patient  -       Row Name 10/19/24 1403          Therapy Assessment/Plan (PT)    Rehab Potential (PT) good  -GJ     Criteria for Skilled Interventions Met (PT) yes;skilled treatment is necessary  -     Therapy Frequency (PT) 5 times/wk  -       Row Name 10/19/24 1403          Positioning and Restraints    Pre-Treatment Position in bed  -GJ     Post Treatment Position bed  -GJ     In Bed notified nsg;fowlers;call light within  reach;encouraged to call for assist;with other staff  -GJ               User Key  (r) = Recorded By, (t) = Taken By, (c) = Cosigned By      Initials Name Provider Type    Luan Santos, PT Physical Therapist                   Outcome Measures       Row Name 10/19/24 1404          How much help from another person do you currently need...    Turning from your back to your side while in flat bed without using bedrails? 2  -GJ     Moving from lying on back to sitting on the side of a flat bed without bedrails? 2  -GJ     Moving to and from a bed to a chair (including a wheelchair)? 1  -GJ     Standing up from a chair using your arms (e.g., wheelchair, bedside chair)? 1  -GJ     Climbing 3-5 steps with a railing? 1  -GJ     To walk in hospital room? 1  -GJ     AM-PAC 6 Clicks Score (PT) 8  -GJ     Highest Level of Mobility Goal 3 --> Sit at edge of bed  -GJ       Row Name 10/19/24 1404          Functional Assessment    Outcome Measure Options AM-PAC 6 Clicks Basic Mobility (PT)  -GJ               User Key  (r) = Recorded By, (t) = Taken By, (c) = Cosigned By      Initials Name Provider Type    Luan Santos, PT Physical Therapist                                 Physical Therapy Education        No education to display                  PT Recommendation and Plan  Planned Therapy Interventions (PT): balance training, bed mobility training, neuromuscular re-education, transfer training, strengthening, patient/family education        Time Calculation:         PT Charges       Row Name 10/19/24 1408             Time Calculation    Start Time 1155  -GJ      Stop Time 1219  -GJ      Time Calculation (min) 24 min  -GJ      PT Received On 10/19/24  -GJ      PT - Next Appointment 10/21/24  -GJ         Timed Charges    08474 - PT Therapeutic Activity Minutes 20  -GJ         Total Minutes    Timed Charges Total Minutes 20  -GJ       Total Minutes 20  -GJ                User Key  (r) = Recorded By, (t) = Taken By, (c) =  Cosigned By      Initials Name Provider Type     Luan Mendez, PT Physical Therapist                  Therapy Charges for Today       Code Description Service Date Service Provider Modifiers Qty    50004197396 HC PT THERAPEUTIC ACT EA 15 MIN 10/19/2024 Luan Mendez, PT GP 1    49311366064 HC PT EVAL HIGH COMPLEXITY 1 10/19/2024 Luan Menedz, PT GP 1            PT G-Codes  Outcome Measure Options: AM-PAC 6 Clicks Basic Mobility (PT)  AM-PAC 6 Clicks Score (PT): 8  PT Discharge Summary  Anticipated Discharge Disposition (PT): skilled nursing facility    Luan Mendez, PT  10/19/2024

## 2024-10-19 NOTE — PROGRESS NOTES
"Daily progress note    Primary care physician      Subjective  Doing better with no new complaints    History of present illness  63-year-old white female with history of multiple sclerosis metastatic breast cancer hypertension hyperlipidemia neurogenic bladder with chronic Cobos immobilization syndrome nursing home resident presented to [facility] emergency room with multiple complaint including generalized weakness pain all over nausea.  Patient workup in ER revealed recurrent UTI secondary to indwelling Cobos catheter admitted for management.  Patient denies any fever chills chest pain increase shortness of breath vomiting diarrhea.    REVIEW OF SYSTEMS  All systems reviewed and negative except for those discussed in HPI.     PHYSICAL EXAM  Blood pressure 118/70, pulse 80, temperature 97.2 °F (36.2 °C), temperature source Oral, resp. rate 16, height 152.4 cm (60\"), weight 83.4 kg (183 lb 13.8 oz), SpO2 97%, not currently breastfeeding.    Constitutional:       General: She is not in acute distress.     Appearance: She is well-developed.   HENT:      Head: Normocephalic and atraumatic.   Eyes:      Extraocular Movements: Extraocular movements intact.   Cardiovascular:      Rate and Rhythm: Normal rate and regular rhythm.      Heart sounds: Normal heart sounds.      Comments: Distal pulses intact  Pulmonary:      Effort: Pulmonary effort is normal.      Breath sounds: Normal breath sounds.   Abdominal:      General: There is no distension.      Tenderness: There is abdominal tenderness.   Skin:     General: Skin is warm.   Neurological:      General: No focal deficit present.      Mental Status: She is alert and oriented to person, place, and time.   Psychiatric:         Mood and Affect: Mood normal.      LAB RESULTS  Lab Results (last 24 hours)       Procedure Component Value Units Date/Time    Urine Culture - Urine, Urine, Catheter [[id number]]  (Normal) Collected: 10/17/24 2038    Specimen: " Urine, Catheter Updated: 10/19/24 1133     Urine Culture No growth    TSH [[id number]]  (Normal) Collected: 10/19/24 0336    Specimen: Blood Updated: 10/19/24 0444     TSH 1.080 uIU/mL     Lipid Panel [[id number]]  (Abnormal) Collected: 10/19/24 0336    Specimen: Blood Updated: 10/19/24 0437     Total Cholesterol 168 mg/dL      Triglycerides 115 mg/dL      HDL Cholesterol 38 mg/dL      LDL Cholesterol  109 mg/dL      VLDL Cholesterol 21 mg/dL      LDL/HDL Ratio 2.82    Narrative:      Cholesterol Reference Ranges  (U.S. Department of Health and Human Services ATP III Classifications)    Desirable          <200 mg/dL  Borderline High    200-239 mg/dL  High Risk          >240 mg/dL      Triglyceride Reference Ranges  (U.S. Department of Health and Human Services ATP III Classifications)    Normal           <150 mg/dL  Borderline High  150-199 mg/dL  High             200-499 mg/dL  Very High        >500 mg/dL    HDL Reference Ranges  (U.S. Department of Health and Human Services ATP III Classifications)    Low     <40 mg/dl (major risk factor for CHD)  High    >60 mg/dl ('negative' risk factor for CHD)        LDL Reference Ranges  (U.S. Department of Health and Human Services ATP III Classifications)    Optimal          <100 mg/dL  Near Optimal     100-129 mg/dL  Borderline High  130-159 mg/dL  High             160-189 mg/dL  Very High        >189 mg/dL    Comprehensive Metabolic Panel [[id number]]  (Abnormal) Collected: 10/19/24 0336    Specimen: Blood Updated: 10/19/24 0437     Glucose 106 mg/dL      BUN 14 mg/dL      Creatinine 0.60 mg/dL      Sodium 138 mmol/L      Potassium 3.4 mmol/L      Chloride 107 mmol/L      CO2 23.7 mmol/L      Calcium 7.4 mg/dL      Total Protein 5.6 g/dL      Albumin 2.9 g/dL      ALT (SGPT) 18 U/L      AST (SGOT) 24 U/L      Alkaline Phosphatase 96 U/L      Total Bilirubin 0.2 mg/dL      Globulin 2.7 gm/dL      A/G Ratio 1.1 g/dL      BUN/Creatinine Ratio 23.3     Anion Gap 7.3 mmol/L       eGFR 102.3 mL/min/1.73     Narrative:      GFR Normal >60  Chronic Kidney Disease <60  Kidney Failure <15      Hemoglobin A1c [[id number]]  (Abnormal) Collected: 10/19/24 0336    Specimen: Blood Updated: 10/19/24 0430     Hemoglobin A1C 6.80 %     Narrative:      Hemoglobin A1C Ranges:    Increased Risk for Diabetes  5.7% to 6.4%  Diabetes                     >= 6.5%  Diabetic Goal                < 7.0%    CBC & Differential [[id number]]  (Abnormal) Collected: 10/19/24 0336    Specimen: Blood Updated: 10/19/24 0415    Narrative:      The following orders were created for panel order CBC & Differential.  Procedure                               Abnormality         Status                     ---------                               -----------         ------                     CBC Auto Differential[[id number]]        Abnormal            Final result                 Please view results for these tests on the individual orders.    CBC Auto Differential [[id number]]  (Abnormal) Collected: 10/19/24 0336    Specimen: Blood Updated: 10/19/24 0415     WBC 8.73 10*3/mm3      RBC 4.05 10*6/mm3      Hemoglobin 11.0 g/dL      Hematocrit 34.4 %      MCV 84.9 fL      MCH 27.2 pg      MCHC 32.0 g/dL      RDW 19.2 %      RDW-SD 58.4 fl      MPV 9.0 fL      Platelets 314 10*3/mm3      Neutrophil % 71.0 %      Lymphocyte % 17.5 %      Monocyte % 6.6 %      Eosinophil % 3.7 %      Basophil % 0.5 %      Immature Grans % 0.7 %      Neutrophils, Absolute 6.20 10*3/mm3      Lymphocytes, Absolute 1.53 10*3/mm3      Monocytes, Absolute 0.58 10*3/mm3      Eosinophils, Absolute 0.32 10*3/mm3      Basophils, Absolute 0.04 10*3/mm3      Immature Grans, Absolute 0.06 10*3/mm3      nRBC 0.0 /100 WBC     BNP [[id number]]  (Normal) Collected: 10/18/24 0932    Specimen: Blood from Arm, Left Updated: 10/18/24 1620     proBNP 94.0 pg/mL     Narrative:      This assay is used as an aid in the diagnosis of individuals suspected of having heart failure. It can  be used as an aid in the diagnosis of acute decompensated heart failure (ADHF) in patients presenting with signs and symptoms of ADHF to the emergency department (ED). In addition, NT-proBNP of <300 pg/mL indicates ADHF is not likely.    Age Range Result Interpretation  NT-proBNP Concentration (pg/mL:      <50             Positive            >450                   Gray                 300-450                    Negative             <300    50-75           Positive            >900                  Gray                300-900                  Negative            <300      >75             Positive            >1800                  Gray                300-1800                  Negative            <300    CANDIDA AURIS SCREEN - Swab, Axilla Right, Axilla Left and Groin [[id number]] Collected: 10/18/24 0948    Specimen: Swab from Axilla Right, Axilla Left and Groin Updated: 10/18/24 1502          Imaging Results (Last 24 Hours)       ** No results found for the last 24 hours. **          Scan on 9/18/2024 1733 by [employer], [place]: ECG 12-LEAD         Author: -- Service: -- Author Type: --   Filed: Date of Service: Creation Time:   Status: (Other)   HEART RATE=67  bpm  RR Lqolqvrz=721  ms  IA Jekdwucd=329  ms  P Horizontal Axis=14  deg  P Front Axis=73  deg  QRSD Interval=98  ms  QT Jlrljybb=373  ms  RPvO=072  ms  QRS Axis=38  deg  T Wave Axis=239  deg  - NORMAL ECG -  Sinus rhythm  No change from previous tracing            Current Facility-Administered Medications:     albuterol (PROVENTIL) nebulizer solution 0.083% 2.5 mg/3mL, 2.5 mg, Nebulization, Q6H PRN, Farooq Vicente MD    anastrozole (ARIMIDEX) tablet 1 mg, 1 mg, Oral, Daily, Farooq Vicente MD, 1 mg at 10/19/24 1005    baclofen (LIORESAL) tablet 10 mg, 10 mg, Oral, TID PRN, Farooq Vicente MD, 10 mg at 10/18/24 1638    cefTRIAXone (ROCEPHIN) 1,000 mg in sodium chloride 0.9 % 100 mL MBP, 1,000 mg, Intravenous, Daily, Farooq Vicente MD, Stopped at 10/18/24 6100     cholecalciferol (VITAMIN D3) tablet 5,000 Units, 5,000 Units, Oral, Daily, Farooq Vicente MD, 5,000 Units at 10/19/24 1002    clonazePAM (KlonoPIN) tablet 0.5 mg, 0.5 mg, Oral, BID PRN, Farooq Vicente MD, 0.5 mg at 10/19/24 0201    fluconazole (DIFLUCAN) tablet 100 mg, 100 mg, Oral, Q24H, Farooq Vicente MD, 100 mg at 10/19/24 1325    influenza virus vacc split PF FLUZONE 0.5 mL, 0.5 mL, Intramuscular, During Hospitalization, Farooq Vicente MD    Menthol-Zinc Oxide 1 Application, 1 Application, Topical, BID, Farooq Vicente MD, 1 Application at 10/19/24 1003    morphine injection 2 mg, 2 mg, Intravenous, Q4H PRN, Farooq Vicente MD    OLANZapine (zyPREXA) tablet 5 mg, 5 mg, Oral, Nightly, Farooq Vicente MD, 5 mg at 10/18/24 2055    pantoprazole (PROTONIX) EC tablet 40 mg, 40 mg, Oral, BID AC, Farooq Vicente MD, 40 mg at 10/19/24 1002    pramipexole (MIRAPEX) tablet 1.5 mg, 1.5 mg, Oral, TID, Farooq Vicente MD, 1.5 mg at 10/19/24 1005    predniSONE (DELTASONE) tablet 20 mg, 20 mg, Oral, Daily, Farooq Vicente MD    prochlorperazine (COMPAZINE) injection 10 mg, 10 mg, Intravenous, Q4H PRN, Farooq Vicente MD    sennosides-docusate (PERICOLACE) 8.6-50 MG per tablet 1 tablet, 1 tablet, Oral, Daily, Farooq Vicente MD, 1 tablet at 10/19/24 1002    [COMPLETED] Insert Peripheral IV, , , Once **AND** sodium chloride 0.9 % flush 10 mL, 10 mL, Intravenous, PRN, Nidia Ji PA-C    sodium chloride 0.9 % infusion, 75 mL/hr, Intravenous, Continuous, Farooq Vicente MD, Last Rate: 75 mL/hr at 10/19/24 0607, 75 mL/hr at 10/19/24 0607     ASSESSMENT  Acute cystitis with sepsis  Yeast UTI  Urine retention with chronic Cobos catheter  Multiple sclerosis  Hypertension  Hyperlipidemia  Metastatic breast cancer  Immobilization syndrome    PLAN  CPM  Continue IVF  Continue IV antibiotics   Diflucan for 3 days  Infectious disease consult appreciated  Adjust Cobos catheter  Continue nursing home medications  Stress ulcer DVT prophylaxis  Supportive  care  PT OT  Discussed with nursing staff  Follow closely further recommendation current hospital course    LATRELL BRITO MD    Copied text in this note has been reviewed and is accurate as of 10/19/24

## 2024-10-19 NOTE — PLAN OF CARE
Goal Outcome Evaluation:  Plan of Care Reviewed With: patient        Progress: improving  Outcome Evaluation: Denies pain. ABX given per MAR. Klonopin reordered for sleep. Turn in bed wth assist. F/C draining clear/yellow urine.

## 2024-10-19 NOTE — PLAN OF CARE
Goal Outcome Evaluation:  Plan of Care Reviewed With: patient      Ms. Bejarano is a 63-year-old female.  History of MS and immobilization syndrome.  At baseline she is a Rachell lift transfer.  She has not ambulated in approximately 3 years.  She was admitted to [facility] from 9/7/2024 to 9/22/2024 secondary to UTI.  She transferred to a skilled nursing facility reports ability to sit edge of bed x 15 to 20 minutes doing her tenure at the SNF.  She is in bed and agreeable to therapy with therapy enters the room.  She demonstrates approximately 90 to 120 degrees of bilateral shoulder flexion.  She is able to move her ankles however demonstrates minimal with any hip and knee range of motion.  Requires mod assist of 1 for supine to sit transfer and mod assist of 2 for sit to supine transfer.  She is able to sit edge of bed with use of bilateral upper extremities with supervision however does fatigue.  She sat edge of bed x 10 minutes.  Anticipated discharge location SNF. Ms. Bejarano is a good candidate for skilled physical therapy to address bed mobility and trunk strength and endurance.  Anticipated Discharge Disposition (PT): skilled nursing facility

## 2024-10-20 LAB
ANION GAP SERPL CALCULATED.3IONS-SCNC: 6 MMOL/L (ref 5–15)
BASOPHILS # BLD AUTO: 0.04 10*3/MM3 (ref 0–0.2)
BASOPHILS NFR BLD AUTO: 0.7 % (ref 0–1.5)
BUN SERPL-MCNC: 13 MG/DL (ref 8–23)
BUN/CREAT SERPL: 24.5 (ref 7–25)
CALCIUM SPEC-SCNC: 8.1 MG/DL (ref 8.6–10.5)
CHLORIDE SERPL-SCNC: 110 MMOL/L (ref 98–107)
CO2 SERPL-SCNC: 25 MMOL/L (ref 22–29)
CREAT SERPL-MCNC: 0.53 MG/DL (ref 0.57–1)
DEPRECATED RDW RBC AUTO: 60.5 FL (ref 37–54)
EGFRCR SERPLBLD CKD-EPI 2021: 105.4 ML/MIN/1.73
EOSINOPHIL # BLD AUTO: 0.3 10*3/MM3 (ref 0–0.4)
EOSINOPHIL NFR BLD AUTO: 5.3 % (ref 0.3–6.2)
ERYTHROCYTE [DISTWIDTH] IN BLOOD BY AUTOMATED COUNT: 19.5 % (ref 12.3–15.4)
GLUCOSE SERPL-MCNC: 95 MG/DL (ref 65–99)
HCT VFR BLD AUTO: 33.2 % (ref 34–46.6)
HGB BLD-MCNC: 10.5 G/DL (ref 12–15.9)
IMM GRANULOCYTES # BLD AUTO: 0.02 10*3/MM3 (ref 0–0.05)
IMM GRANULOCYTES NFR BLD AUTO: 0.4 % (ref 0–0.5)
LYMPHOCYTES # BLD AUTO: 1.24 10*3/MM3 (ref 0.7–3.1)
LYMPHOCYTES NFR BLD AUTO: 22 % (ref 19.6–45.3)
MCH RBC QN AUTO: 27.3 PG (ref 26.6–33)
MCHC RBC AUTO-ENTMCNC: 31.6 G/DL (ref 31.5–35.7)
MCV RBC AUTO: 86.2 FL (ref 79–97)
MONOCYTES # BLD AUTO: 0.35 10*3/MM3 (ref 0.1–0.9)
MONOCYTES NFR BLD AUTO: 6.2 % (ref 5–12)
NEUTROPHILS NFR BLD AUTO: 3.69 10*3/MM3 (ref 1.7–7)
NEUTROPHILS NFR BLD AUTO: 65.4 % (ref 42.7–76)
NRBC BLD AUTO-RTO: 0 /100 WBC (ref 0–0.2)
PLATELET # BLD AUTO: 271 10*3/MM3 (ref 140–450)
PMV BLD AUTO: 8.9 FL (ref 6–12)
POTASSIUM SERPL-SCNC: 3.6 MMOL/L (ref 3.5–5.2)
RBC # BLD AUTO: 3.85 10*6/MM3 (ref 3.77–5.28)
SODIUM SERPL-SCNC: 141 MMOL/L (ref 136–145)
WBC NRBC COR # BLD AUTO: 5.64 10*3/MM3 (ref 3.4–10.8)

## 2024-10-20 PROCEDURE — 80048 BASIC METABOLIC PNL TOTAL CA: CPT | Performed by: HOSPITALIST

## 2024-10-20 PROCEDURE — 25010000002 SODIUM CHLORIDE 0.9 % WITH KCL 20 MEQ 20-0.9 MEQ/L-% SOLUTION: Performed by: HOSPITALIST

## 2024-10-20 PROCEDURE — 85025 COMPLETE CBC W/AUTO DIFF WBC: CPT | Performed by: HOSPITALIST

## 2024-10-20 RX ORDER — BACLOFEN 10 MG/1
10 TABLET ORAL EVERY 12 HOURS SCHEDULED
Status: DISCONTINUED | OUTPATIENT
Start: 2024-10-20 | End: 2024-10-23 | Stop reason: HOSPADM

## 2024-10-20 RX ORDER — SULFAMETHOXAZOLE/TRIMETHOPRIM 800-160 MG
1 TABLET ORAL EVERY 12 HOURS SCHEDULED
Status: DISCONTINUED | OUTPATIENT
Start: 2024-10-20 | End: 2024-10-23 | Stop reason: HOSPADM

## 2024-10-20 RX ADMIN — POTASSIUM CHLORIDE AND SODIUM CHLORIDE 75 ML/HR: 900; 150 INJECTION, SOLUTION INTRAVENOUS at 12:42

## 2024-10-20 RX ADMIN — BACLOFEN 10 MG: 10 TABLET ORAL at 17:21

## 2024-10-20 RX ADMIN — PRAMIPEXOLE DIHYDROCHLORIDE 1.5 MG: 1.5 TABLET ORAL at 17:12

## 2024-10-20 RX ADMIN — PRAMIPEXOLE DIHYDROCHLORIDE 1.5 MG: 1.5 TABLET ORAL at 09:40

## 2024-10-20 RX ADMIN — SENNOSIDES AND DOCUSATE SODIUM 1 TABLET: 50; 8.6 TABLET ORAL at 09:36

## 2024-10-20 RX ADMIN — FLUCONAZOLE 100 MG: 100 TABLET ORAL at 12:42

## 2024-10-20 RX ADMIN — SULFAMETHOXAZOLE AND TRIMETHOPRIM 1 TABLET: 800; 160 TABLET ORAL at 22:07

## 2024-10-20 RX ADMIN — OLANZAPINE 5 MG: 5 TABLET, FILM COATED ORAL at 22:06

## 2024-10-20 RX ADMIN — MENTHOL, ZINC OXIDE 1 APPLICATION: .44; 20.6 OINTMENT TOPICAL at 22:30

## 2024-10-20 RX ADMIN — PANTOPRAZOLE SODIUM 40 MG: 40 TABLET, DELAYED RELEASE ORAL at 17:22

## 2024-10-20 RX ADMIN — MENTHOL, ZINC OXIDE 1 APPLICATION: .44; 20.6 OINTMENT TOPICAL at 09:38

## 2024-10-20 RX ADMIN — SULFAMETHOXAZOLE AND TRIMETHOPRIM 1 TABLET: 800; 160 TABLET ORAL at 12:42

## 2024-10-20 RX ADMIN — ANASTROZOLE 1 MG: 1 TABLET ORAL at 09:40

## 2024-10-20 RX ADMIN — PANTOPRAZOLE SODIUM 40 MG: 40 TABLET, DELAYED RELEASE ORAL at 07:34

## 2024-10-20 RX ADMIN — Medication 5000 UNITS: at 09:36

## 2024-10-20 RX ADMIN — PRAMIPEXOLE DIHYDROCHLORIDE 1.5 MG: 1.5 TABLET ORAL at 22:07

## 2024-10-20 NOTE — PLAN OF CARE
Goal Outcome Evaluation:              Outcome Evaluation: Initiated po antibiotic, matias catheter has good urine output, urology consult in, had small bowel movement, sat in electrical chair today, shifting weight q2 hours, vss, afebrile, saline locked, falls and contact precautions maintained.

## 2024-10-20 NOTE — PROGRESS NOTES
"Daily progress note    Primary care physician      Subjective  Doing better with no new complaints and making good progress    History of present illness  63-year-old white female with history of multiple sclerosis metastatic breast cancer hypertension hyperlipidemia neurogenic bladder with chronic Cobos immobilization syndrome nursing home resident presented to [facility] emergency room with multiple complaint including generalized weakness pain all over nausea.  Patient workup in ER revealed recurrent UTI secondary to indwelling Cobos catheter admitted for management.  Patient denies any fever chills chest pain increase shortness of breath vomiting diarrhea.    REVIEW OF SYSTEMS  Unremarkable except generalized weakness     PHYSICAL EXAM  Blood pressure 137/74, pulse 77, temperature 97.1 °F (36.2 °C), temperature source Oral, resp. rate 16, height 152.4 cm (60\"), weight 83.4 kg (183 lb 13.8 oz), SpO2 95%, not currently breastfeeding.    Constitutional:       General: She is not in acute distress.     Appearance: She is well-developed.   HENT:      Head: Normocephalic and atraumatic.   Eyes:      Extraocular Movements: Extraocular movements intact.   Cardiovascular:      Rate and Rhythm: Normal rate and regular rhythm.      Heart sounds: Normal heart sounds.      Comments: Distal pulses intact  Pulmonary:      Effort: Pulmonary effort is normal.      Breath sounds: Normal breath sounds.   Abdominal:      General: There is no distension.      Tenderness: There is abdominal tenderness.   Skin:     General: Skin is warm.   Neurological:      General: No focal deficit present.      Mental Status: She is alert and oriented to person, place, and time.   Psychiatric:         Mood and Affect: Mood normal.      LAB RESULTS  Lab Results (last 24 hours)       Procedure Component Value Units Date/Time    CANDIDA AURIS SCREEN - Swab, Axilla Right, Axilla Left and Groin [[id number]]  (Normal) Collected: " 10/18/24 0948    Specimen: Swab from Axilla Right, Axilla Left and Groin Updated: 10/20/24 1223     Candida Auris Screen Culture No Candida auris isolated at 2 days    Basic Metabolic Panel [[id number]]  (Abnormal) Collected: 10/20/24 0444    Specimen: Blood Updated: 10/20/24 0528     Glucose 95 mg/dL      BUN 13 mg/dL      Creatinine 0.53 mg/dL      Sodium 141 mmol/L      Potassium 3.6 mmol/L      Chloride 110 mmol/L      CO2 25.0 mmol/L      Calcium 8.1 mg/dL      BUN/Creatinine Ratio 24.5     Anion Gap 6.0 mmol/L      eGFR 105.4 mL/min/1.73     Narrative:      GFR Normal >60  Chronic Kidney Disease <60  Kidney Failure <15      CBC & Differential [[id number]]  (Abnormal) Collected: 10/20/24 0444    Specimen: Blood Updated: 10/20/24 0507    Narrative:      The following orders were created for panel order CBC & Differential.  Procedure                               Abnormality         Status                     ---------                               -----------         ------                     CBC Auto Differential[[id number]]        Abnormal            Final result                 Please view results for these tests on the individual orders.    CBC Auto Differential [[id number]]  (Abnormal) Collected: 10/20/24 0444    Specimen: Blood Updated: 10/20/24 0507     WBC 5.64 10*3/mm3      RBC 3.85 10*6/mm3      Hemoglobin 10.5 g/dL      Hematocrit 33.2 %      MCV 86.2 fL      MCH 27.3 pg      MCHC 31.6 g/dL      RDW 19.5 %      RDW-SD 60.5 fl      MPV 8.9 fL      Platelets 271 10*3/mm3      Neutrophil % 65.4 %      Lymphocyte % 22.0 %      Monocyte % 6.2 %      Eosinophil % 5.3 %      Basophil % 0.7 %      Immature Grans % 0.4 %      Neutrophils, Absolute 3.69 10*3/mm3      Lymphocytes, Absolute 1.24 10*3/mm3      Monocytes, Absolute 0.35 10*3/mm3      Eosinophils, Absolute 0.30 10*3/mm3      Basophils, Absolute 0.04 10*3/mm3      Immature Grans, Absolute 0.02 10*3/mm3      nRBC 0.0 /100 WBC           Imaging Results  (Last 24 Hours)       ** No results found for the last 24 hours. **          Scan on 9/18/2024 1733 by [employer], [place]: ECG 12-LEAD         Author: -- Service: -- Author Type: --   Filed: Date of Service: Creation Time:   Status: (Other)   HEART RATE=67  bpm  RR Aidndfgx=404  ms  VA Lnypjjsw=836  ms  P Horizontal Axis=14  deg  P Front Axis=73  deg  QRSD Interval=98  ms  QT Nrugnlzh=257  ms  SAqF=116  ms  QRS Axis=38  deg  T Wave Axis=239  deg  - NORMAL ECG -  Sinus rhythm  No change from previous tracing            Current Facility-Administered Medications:     albuterol (PROVENTIL) nebulizer solution 0.083% 2.5 mg/3mL, 2.5 mg, Nebulization, Q6H PRN, Farooq Vicente MD    anastrozole (ARIMIDEX) tablet 1 mg, 1 mg, Oral, Daily, Farooq Vicente MD, 1 mg at 10/20/24 0940    baclofen (LIORESAL) tablet 10 mg, 10 mg, Oral, TID PRN, Farooq Vicente MD, 10 mg at 10/18/24 1638    cholecalciferol (VITAMIN D3) tablet 5,000 Units, 5,000 Units, Oral, Daily, Farooq Vicente MD, 5,000 Units at 10/20/24 0936    clonazePAM (KlonoPIN) tablet 0.5 mg, 0.5 mg, Oral, BID PRN, Farooq Vicente MD, 0.5 mg at 10/19/24 0201    HYDROcodone-acetaminophen (NORCO) 5-325 MG per tablet 1 tablet, 1 tablet, Oral, Q4H PRN, Farooq Vicente MD, 1 tablet at 10/19/24 2141    influenza virus vacc split PF FLUZONE 0.5 mL, 0.5 mL, Intramuscular, During Hospitalization, Farooq Vicente MD    Menthol-Zinc Oxide 1 Application, 1 Application, Topical, BID, Farooq Vicente MD, 1 Application at 10/20/24 0938    OLANZapine (zyPREXA) tablet 5 mg, 5 mg, Oral, Nightly, Farooq Vicente MD, 5 mg at 10/19/24 2141    pantoprazole (PROTONIX) EC tablet 40 mg, 40 mg, Oral, BID AC, Farooq Vicente MD, 40 mg at 10/20/24 0734    pramipexole (MIRAPEX) tablet 1.5 mg, 1.5 mg, Oral, TID, Farooq Vicente MD, 1.5 mg at 10/20/24 0940    predniSONE (DELTASONE) tablet 20 mg, 20 mg, Oral, Daily, Farooq Vicente MD    prochlorperazine (COMPAZINE) injection 10 mg, 10 mg, Intravenous, Q4H PRN, Farooq Vicente,  MD    sennosides-docusate (PERICOLACE) 8.6-50 MG per tablet 1 tablet, 1 tablet, Oral, Daily, Latrell Vicente MD, 1 tablet at 10/20/24 0936    [COMPLETED] Insert Peripheral IV, , , Once **AND** sodium chloride 0.9 % flush 10 mL, 10 mL, Intravenous, PRN, Nidia Ji PA-C    sodium chloride 0.9 % with KCl 20 mEq/L infusion, 75 mL/hr, Intravenous, Continuous, Latrell Vicente MD, Last Rate: 75 mL/hr at 10/20/24 1242, 75 mL/hr at 10/20/24 1242    sulfamethoxazole-trimethoprim (BACTRIM DS,SEPTRA DS) 800-160 MG per tablet 1 tablet, 1 tablet, Oral, Q12H, MateuszLuisa MD, 1 tablet at 10/20/24 1242     ASSESSMENT  Acute cystitis with sepsis  Yeast UTI  Urine retention with chronic Cobos catheter  Multiple sclerosis  Hypertension  Hyperlipidemia  Metastatic breast cancer  Immobilization syndrome    PLAN  CPM  Discontinue IV fluid  Continue antibiotics per infectious disease  Adjust Cobos catheter  Continue nursing home medications  Stress ulcer DVT prophylaxis  Supportive care  PT OT  Discussed with nursing staff  Discharge planning    LATRELL VICENTE MD    Copied text in this note has been reviewed and is accurate as of 10/20/24

## 2024-10-20 NOTE — PLAN OF CARE
Goal Outcome Evaluation:  Plan of Care Reviewed With: patient           Outcome Evaluation: VSS, F/C in place with matias care completed, turns q2, Barrier cream applied to buttocks for protection/prevention of excoriation, heels elevated and gait belt in use to asst with alignment of legs to prevent spasms, wears 2LO2 nightly for sleep apena, new IV placed, iv abx given, norco for discomfort/pressure per pt., no nausea, IV fluids infusing, will continue to monitor.

## 2024-10-21 LAB
ANION GAP SERPL CALCULATED.3IONS-SCNC: 9.5 MMOL/L (ref 5–15)
BASOPHILS # BLD AUTO: 0.04 10*3/MM3 (ref 0–0.2)
BASOPHILS NFR BLD AUTO: 0.6 % (ref 0–1.5)
BUN SERPL-MCNC: 7 MG/DL (ref 8–23)
BUN/CREAT SERPL: 13 (ref 7–25)
CALCIUM SPEC-SCNC: 8.2 MG/DL (ref 8.6–10.5)
CHLORIDE SERPL-SCNC: 106 MMOL/L (ref 98–107)
CO2 SERPL-SCNC: 20.5 MMOL/L (ref 22–29)
CREAT SERPL-MCNC: 0.54 MG/DL (ref 0.57–1)
DEPRECATED RDW RBC AUTO: 57.6 FL (ref 37–54)
EGFRCR SERPLBLD CKD-EPI 2021: 104.9 ML/MIN/1.73
EOSINOPHIL # BLD AUTO: 0.26 10*3/MM3 (ref 0–0.4)
EOSINOPHIL NFR BLD AUTO: 4 % (ref 0.3–6.2)
ERYTHROCYTE [DISTWIDTH] IN BLOOD BY AUTOMATED COUNT: 19 % (ref 12.3–15.4)
GLUCOSE SERPL-MCNC: 99 MG/DL (ref 65–99)
HCT VFR BLD AUTO: 36.3 % (ref 34–46.6)
HGB BLD-MCNC: 11.8 G/DL (ref 12–15.9)
IMM GRANULOCYTES # BLD AUTO: 0.03 10*3/MM3 (ref 0–0.05)
IMM GRANULOCYTES NFR BLD AUTO: 0.5 % (ref 0–0.5)
LYMPHOCYTES # BLD AUTO: 0.97 10*3/MM3 (ref 0.7–3.1)
LYMPHOCYTES NFR BLD AUTO: 14.7 % (ref 19.6–45.3)
MCH RBC QN AUTO: 27.8 PG (ref 26.6–33)
MCHC RBC AUTO-ENTMCNC: 32.5 G/DL (ref 31.5–35.7)
MCV RBC AUTO: 85.4 FL (ref 79–97)
MONOCYTES # BLD AUTO: 0.39 10*3/MM3 (ref 0.1–0.9)
MONOCYTES NFR BLD AUTO: 5.9 % (ref 5–12)
NEUTROPHILS NFR BLD AUTO: 4.89 10*3/MM3 (ref 1.7–7)
NEUTROPHILS NFR BLD AUTO: 74.3 % (ref 42.7–76)
NRBC BLD AUTO-RTO: 0 /100 WBC (ref 0–0.2)
PLATELET # BLD AUTO: 304 10*3/MM3 (ref 140–450)
PMV BLD AUTO: 8.7 FL (ref 6–12)
POTASSIUM SERPL-SCNC: 3.7 MMOL/L (ref 3.5–5.2)
RBC # BLD AUTO: 4.25 10*6/MM3 (ref 3.77–5.28)
SODIUM SERPL-SCNC: 136 MMOL/L (ref 136–145)
WBC NRBC COR # BLD AUTO: 6.58 10*3/MM3 (ref 3.4–10.8)

## 2024-10-21 PROCEDURE — 97110 THERAPEUTIC EXERCISES: CPT

## 2024-10-21 PROCEDURE — 97530 THERAPEUTIC ACTIVITIES: CPT

## 2024-10-21 PROCEDURE — 80048 BASIC METABOLIC PNL TOTAL CA: CPT | Performed by: HOSPITALIST

## 2024-10-21 PROCEDURE — 25010000002 PROCHLORPERAZINE 10 MG/2ML SOLUTION: Performed by: HOSPITALIST

## 2024-10-21 PROCEDURE — 63710000001 PREDNISONE PER 1 MG: Performed by: HOSPITALIST

## 2024-10-21 PROCEDURE — 85025 COMPLETE CBC W/AUTO DIFF WBC: CPT | Performed by: HOSPITALIST

## 2024-10-21 RX ORDER — DOCUSATE SODIUM 100 MG/1
100 CAPSULE, LIQUID FILLED ORAL 2 TIMES DAILY
Status: DISCONTINUED | OUTPATIENT
Start: 2024-10-21 | End: 2024-10-23 | Stop reason: HOSPADM

## 2024-10-21 RX ORDER — LACTULOSE 10 G/15ML
30 SOLUTION ORAL DAILY
Status: DISCONTINUED | OUTPATIENT
Start: 2024-10-21 | End: 2024-10-23 | Stop reason: HOSPADM

## 2024-10-21 RX ORDER — POLYETHYLENE GLYCOL 3350 17 G/17G
17 POWDER, FOR SOLUTION ORAL DAILY
Status: DISCONTINUED | OUTPATIENT
Start: 2024-10-21 | End: 2024-10-23 | Stop reason: HOSPADM

## 2024-10-21 RX ORDER — BISACODYL 10 MG
10 SUPPOSITORY, RECTAL RECTAL DAILY PRN
Status: DISCONTINUED | OUTPATIENT
Start: 2024-10-21 | End: 2024-10-23 | Stop reason: HOSPADM

## 2024-10-21 RX ADMIN — BACLOFEN 10 MG: 10 TABLET ORAL at 09:11

## 2024-10-21 RX ADMIN — MENTHOL, ZINC OXIDE 1 APPLICATION: .44; 20.6 OINTMENT TOPICAL at 01:00

## 2024-10-21 RX ADMIN — SULFAMETHOXAZOLE AND TRIMETHOPRIM 1 TABLET: 800; 160 TABLET ORAL at 09:13

## 2024-10-21 RX ADMIN — POLYETHYLENE GLYCOL 3350 17 G: 17 POWDER, FOR SOLUTION ORAL at 13:43

## 2024-10-21 RX ADMIN — LACTULOSE 30 G: 10 SOLUTION ORAL at 19:05

## 2024-10-21 RX ADMIN — PROCHLORPERAZINE EDISYLATE 10 MG: 5 INJECTION INTRAMUSCULAR; INTRAVENOUS at 21:24

## 2024-10-21 RX ADMIN — PANTOPRAZOLE SODIUM 40 MG: 40 TABLET, DELAYED RELEASE ORAL at 08:12

## 2024-10-21 RX ADMIN — Medication 5000 UNITS: at 09:12

## 2024-10-21 RX ADMIN — SENNOSIDES AND DOCUSATE SODIUM 1 TABLET: 50; 8.6 TABLET ORAL at 09:11

## 2024-10-21 RX ADMIN — PRAMIPEXOLE DIHYDROCHLORIDE 1.5 MG: 1.5 TABLET ORAL at 09:12

## 2024-10-21 RX ADMIN — OLANZAPINE 5 MG: 5 TABLET, FILM COATED ORAL at 21:25

## 2024-10-21 RX ADMIN — Medication 5 MG: at 21:25

## 2024-10-21 RX ADMIN — BACLOFEN 10 MG: 10 TABLET ORAL at 21:24

## 2024-10-21 RX ADMIN — PRAMIPEXOLE DIHYDROCHLORIDE 1.5 MG: 1.5 TABLET ORAL at 21:25

## 2024-10-21 RX ADMIN — PREDNISONE 20 MG: 20 TABLET ORAL at 09:12

## 2024-10-21 RX ADMIN — ANASTROZOLE 1 MG: 1 TABLET ORAL at 09:13

## 2024-10-21 RX ADMIN — PRAMIPEXOLE DIHYDROCHLORIDE 1.5 MG: 1.5 TABLET ORAL at 19:04

## 2024-10-21 RX ADMIN — PANTOPRAZOLE SODIUM 40 MG: 40 TABLET, DELAYED RELEASE ORAL at 19:04

## 2024-10-21 RX ADMIN — MENTHOL, ZINC OXIDE 1 APPLICATION: .44; 20.6 OINTMENT TOPICAL at 22:00

## 2024-10-21 NOTE — PROGRESS NOTES
"  Infectious Diseases Progress Note    Luisa Child MD     [facility]  [place]: 1 day  Patient Identification:  Name: Maritza Bejarano  Age: 63 y.o.  Sex: female  :  1960  MRN: [id number]         Primary Care Physician: Doris Faria MD        Subjective: Feeling better tolerating Bactrim without any problem.  Interval History: See consultation note.    Objective:    Scheduled Meds:anastrozole, 1 mg, Oral, Daily  baclofen, 10 mg, Oral, Q12H  vitamin D3, 5,000 Units, Oral, Daily  Menthol-Zinc Oxide, 1 Application, Topical, BID  OLANZapine, 5 mg, Oral, Nightly  pantoprazole, 40 mg, Oral, BID AC  pramipexole, 1.5 mg, Oral, TID  predniSONE, 20 mg, Oral, Daily  sennosides-docusate, 1 tablet, Oral, Daily  sulfamethoxazole-trimethoprim, 1 tablet, Oral, Q12H      Continuous Infusions:       Vital signs in last 24 hours:  Temp:  [97 °F (36.1 °C)-97.7 °F (36.5 °C)] 97 °F (36.1 °C)  Heart Rate:  [68-78] 68  Resp:  [16] 16  BP: (117-148)/(58-82) 117/58    Intake/Output:    Intake/Output Summary (Last 24 hours) at 10/21/2024 0652  Last data filed at 10/21/2024 0156  Gross per 24 hour   Intake --   Output 3100 ml   Net -3100 ml       Exam:  /58 (BP Location: Right arm, Patient Position: Lying)   Pulse 68   Temp 97 °F (36.1 °C) (Oral)   Resp 16   Ht 152.4 cm (60\")   Wt 83.4 kg (183 lb 13.8 oz)   LMP  (LMP Unknown) Comment: PT. STATES HER LAST PERIOD WAS GREATER THAN FIVE YEARS AGO  SpO2 96%   BMI 35.91 kg/m²   Patient is examined using the personal protective equipment as per guidelines from infection control for this particular patient as enacted.  Hand washing was performed before and after patient interaction.  General Appearance:    Alert, cooperative, no distress, AAOx3                          Head:    Normocephalic, without obvious abnormality, atraumatic                           Eyes:    PERRL, conjunctivae/corneas clear, EOM's intact, both eyes                         Throat:   Lips, " tongue, gums normal; oral mucosa pink and moist                           Neck:   Supple, symmetrical, trachea midline, no JVD                         Lungs:    Clear to auscultation bilaterally, respirations unlabored                 Chest Wall:    No tenderness or deformity                          Heart:  S1-S2 regular                  Abdomen:   Cobos catheter in place                 Extremities:   Extremities normal, atraumatic, no cyanosis or edema                        Pulses:   Pulses palpable in all extremities                            Skin:   Skin is warm and dry,  no rashes or palpable lesions                  Neurologic: Alert and oriented x 3       Data Review:    I reviewed the patient's new clinical results.  Results from last 7 days   Lab Units 10/20/24  0444 10/19/24  0336 10/18/24  0438 10/17/24  2037 10/14/24  0749   WBC 10*3/mm3 5.64 8.73 14.34* 19.06* 11.42*   HEMOGLOBIN g/dL 10.5* 11.0* 12.3 13.9 12.7   PLATELETS 10*3/mm3 271 314 352 341 294     Results from last 7 days   Lab Units 10/20/24  0444 10/19/24  0336 10/18/24  0932 10/17/24  2037 10/14/24  0749   SODIUM mmol/L 141 138 139 134* 139   POTASSIUM mmol/L 3.6 3.4* 3.7 4.0 3.7   CHLORIDE mmol/L 110* 107 103 102 102   CO2 mmol/L 25.0 23.7 26.6 22.0 27.8   BUN mg/dL 13 14 16 21 18   CREATININE mg/dL 0.53* 0.60 1.00 0.99 0.57   CALCIUM mg/dL 8.1* 7.4* 8.2* 8.5* 9.3   GLUCOSE mg/dL 95 106* 184* 160* 111*     Microbiology Results (last 10 days)       Procedure Component Value - Date/Time    CANDIDA AURIS SCREEN - Swab, Axilla Right, Axilla Left and Groin [[id number]]  (Normal) Collected: 10/18/24 0948    Lab Status: Preliminary result Specimen: Swab from Axilla Right, Axilla Left and Groin Updated: 10/20/24 1223     Candida Auris Screen Culture No Candida auris isolated at 2 days    Urine Culture - Urine, Urine, Catheter [[id number]]  (Normal) Collected: 10/17/24 2038    Lab Status: Final result Specimen: Urine, Catheter Updated: 10/19/24 1139      Urine Culture No growth              Assessment:    Acute UTI  1-systemic illness due to ascending urinary tract infection in the setting of neurogenic bladder and intermittent catheterizations and now indwelling catheter because of the frequency with which catheterization is needed is not a comfortable in the nursing home setting with risk of colonization with resistant chasity currently improving on ceftriaxone with improvement in her white blood cell count and sense of wellbeing  2-superimposed yeast colonization of  tract  3-multiple sclerosis with recent flare requiring steroid  4-recent hospitalization for sepsis for urinary tract infection  5-other diagnoses per primary team.     Recommendations/Discussions:  See my discussion and recommendations on 10/18/2024.  If urine culture remains negative then using previous culture results from 9/7/2024 can be used as a guide for oral antibiotic therapy.  Patient can be switched to oral regimen anytime from infectious disease standpoint if she is considered ready to be discharged.  Based on the sensitivity of Klebsiella pneumonia on 9/7/2024 continue oral Bactrim for total of 2 weeks should be sufficient with close monitoring of side effects including keeping an eye on her potassium and renal function.  Side effects of antibiotic therapy in general discussed with the patient.  Luisa Child MD  10/21/2024  06:52 EDT    Parts of this note may be an electronic transcription/translation of spoken language to printed text using the Dragon dictation system.

## 2024-10-21 NOTE — PROGRESS NOTES
Continued Stay Note  Psychiatric     Patient Name: Maritza Bergn  MRN: [id number]  Today's Date: 10/21/2024    Admit Date: 10/17/2024    Plan: Rehab (Referrals in EPIC/Pending)   Discharge Plan       Row Name 10/21/24 1535       Plan    Plan Rehab (Referrals in EPIC/Pending)    Plan Comments Received msg from Mihaela with [facility] who stated they are unable to accept at this time      Row Name 10/21/24 1320       Plan    Plan Rehab (referrals in EPIC/Pending)    Plan Comments Per Azalia with [facility], they are unable to accept. Per Christal with [facility], no beds available. Msg sent to Shandra with [facility] regarding referrral.                   Discharge Codes    No documentation.                 Expected Discharge Date and Time       Expected Discharge Date Expected Discharge Time    Oct 23, 2024               Jess Mathis RN

## 2024-10-21 NOTE — PROGRESS NOTES
LOS: 1 day   Patient Care Team:  Doris Faria MD as PCP - General (Internal Medicine)  Brittney Ortiz, RN as Nurse Navigator (Oncology)  Kim Barber MD as Referring Physician (General Surgery)  Zainab Lopes MD as Consulting Physician (Hematology and Oncology)    Chief Complaint: Urinary retention    Subjective     Interval History:     Patient Complaints: none    Review of Systems:    All systems were reviewed and negative except for:  Genitourinary: postivie for  difficulty / inability to void    Objective     Vital Signs  Temp:  [97 °F (36.1 °C)-98.5 °F (36.9 °C)] 98.5 °F (36.9 °C)  Heart Rate:  [68-78] 77  Resp:  [16] 16  BP: (117-148)/(58-82) 137/74    Physical Exam:   No exam performed today,     Results Review:     I reviewed the patient's new clinical results.    Medication Review:     Assessment & Plan       Acute UTI      Acute UTI.  Urine cultures are currently negative.  Urinary retention with history of urge incontinence.  Patient states prior to admission 1 month ago she was voiding with incontinence and was not in retention.  I discussed giving her a voiding trial today and she agrees with that and would like to have the Cobos removed.  If she fails then we will reinsert and consider suprapubic tube at a later date.  Will arrange follow-up with Dr. Olson her urologist.    Plan for disposition:    Yohan Mays MD  10/21/24  12:47 EDT      Time:

## 2024-10-21 NOTE — THERAPY TREATMENT NOTE
Patient Name: Maritza Nance Darci  : 1960    MRN: [id number]                              Today's Date: 10/21/2024       Admit Date: 10/17/2024    Visit Dx:     ICD-10-CM ICD-9-CM   1. Acute urinary retention  R33.8 788.29   2. Leukocytosis, unspecified type  D72.829 288.60   3. History of multiple sclerosis  G35 340   4. 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    FOM (frequency of micturition)    Headache    History of diplopia    History of optic neuritis    History of vitamin D deficiency    Migraine syndrome    Mixed incontinence    Nausea    Neurogenic bladder    Pain in joint of right shoulder    Restless legs syndrome    Rupture of rotator cuff of shoulder    Secondary progressive multiple sclerosis    S/P cubital tunnel release    Vitamin D deficiency    Multiple sclerosis exacerbation    Sepsis due to Gram negative bacteria    Lower extremity cellulitis    Malignant neoplasm of overlapping sites of left breast in female, estrogen receptor positive    Encounter for long-term (current) use of other medications    Cancer, metastatic to bone    Colitis    Weakness    Elevated LFTs    Acute UTI     Past Medical History:   Diagnosis Date    Anemia     `Treated with iron    Dawn esophagus     per patient    Blurred vision     R/T MS    Breast cancer 2024+++++    Carpal tunnel syndrome     Clotting disorder , ,     3 g/i bleeds w/ transfus/ions    Colon polyp 2013    removed w/ colonoscopy    Deep vein thrombosis phlebitis 1980    Depression     Diplopia      GERD (gastroesophageal reflux disease)     GI (gastrointestinal bleed) 3 bleeds    2 transfusions    H/O Skin cancer, basal cell     Headache     History of blood transfusion     History of GI bleed     R/T NSAIDS AND STEROIDS, multiple times    History of urinary tract infection     Hypercalcemia     s/p parathyroidectomy    Hyperlipidemia     Hypertension     Movement disorder     Multiple sclerosis     Optic neuritis     PONV (postoperative nausea and vomiting)      Past Surgical History:   Procedure Laterality Date    APPENDECTOMY      BLADDER SURGERY      bladder stimulator    BREAST BIOPSY  don't remember    BREAST SURGERY      augmentation wtih subsequent removal    CARPAL TUNNEL RELEASE Bilateral     Left 2018, right 2020    CUBITAL TUNNEL RELEASE Left     CYSTOSCOPY BOTOX INJECTION OF BLADDER  2018    Cystoscopy with Botox    FRACTURE SURGERY  2019    rt shoulder    PARATHYROIDECTOMY      one gland removed    ROTATOR CUFF REPAIR Right 2017    TOE SURGERY      bilateral great toes    TOTAL SHOULDER ARTHROPLASTY W/ DISTAL CLAVICLE EXCISION Right 10/22/2018    Procedure: RT TOTAL SHOULDER REVERSE ARTHROPLASTY;  Surgeon: Bipin Dangelo MD;  Location: [facility];  Service: Orthopedics      General Information       Row Name 10/21/24 1019          Physical Therapy Time and Intention    Document Type therapy note (daily note)  -PC     Mode of Treatment physical therapy  -PC       Row Name 10/21/24 1019          General Information    Existing Precautions/Restrictions fall  -PC       Row Name 10/21/24 1019          Cognition    Orientation Status (Cognition) oriented x 4  -PC               User Key  (r) = Recorded By, (t) = Taken By, (c) = Cosigned By      Initials Name Provider Type    PC Adeline Damon PT Physical Therapist                   Mobility       Row Name 10/21/24 1019          Bed Mobility    Supine-Sit [place] (Bed Mobility) moderate assist (50% patient effort)  -PC     Sit-Supine  [place] (Bed Mobility) moderate assist (50% patient effort)  -PC     Assistive Device (Bed Mobility) bed rails;head of bed elevated  -PC     Comment, (Bed Mobility) pt initiates movement with her trunk and uses her UEs to assist coming to sit, then able to transition down to her elbow to get back to supine  -PC               User Key  (r) = Recorded By, (t) = Taken By, (c) = Cosigned By      Initials Name Provider Type    PC Adeline Damon PT Physical Therapist                   Obj/Interventions       Row Name 10/21/24 1021          Motor Skills    Therapeutic Exercise --  pt completed 10 reps B LE strengthening ex, AP, QS, abd sets, hip abd/add, B LE stretching adn tone reducing techniques performed  -PC       Row Name 10/21/24 1021          Balance    Balance Interventions sitting;supported;moderate challenge  -PC     Comment, Balance pt able to sit edge fo bed for approx 8 minutes with varying assist from CGA to mod A, pt using UEs on bedrail to support self  -PC               User Key  (r) = Recorded By, (t) = Taken By, (c) = Cosigned By      Initials Name Provider Type    PC Adeline Damon, PT Physical Therapist                   Goals/Plan       Row Name 10/21/24 1026          Bed Mobility Goal 1 (PT)    Activity/Assistive Device (Bed Mobility Goal 1, PT) sit to supine;supine to sit  -PC     [place] Level/Cues Needed (Bed Mobility Goal 1, PT) minimum assist (75% or more patient effort)  -PC     Time Frame (Bed Mobility Goal 1, PT) long term goal (LTG);2 weeks  -PC       Row Name 10/21/24 1026          Problem Specific Goal 1 (PT)    Problem Specific Goal 1 (PT) Patient to be able to sit at edge of bed independently greater than equal to 20 minutes with use of upper extremities to facilitate improved trunk for stability  -PC     Time Frame (Problem Specific Goal 1, PT) long-term goal (LTG);5 days  -PC               User Key  (r) = Recorded By, (t) = Taken By, (c) = Cosigned By      Initials Name  Provider Type    PC Adeline Damon PT Physical Therapist                   Clinical Impression       Row Name 10/21/24 1023          Pain    Pretreatment Pain Rating 0/10 - no pain  -PC     Posttreatment Pain Rating 0/10 - no pain  -PC       Row Name 10/21/24 1023          Plan of Care Review    Plan of Care Reviewed With patient  -PC     Outcome Evaluation Pt tired today but with good effort and participation, she fatigued quickly but able to work on some LE and core strengthening ex and work on sitting balance and trunk control  -PC       Row Name 10/21/24 1023          Positioning and Restraints    Pre-Treatment Position in bed  -PC     Post Treatment Position bed  -PC     In Bed supine;call light within reach;encouraged to call for assist;exit alarm on  -PC               User Key  (r) = Recorded By, (t) = Taken By, (c) = Cosigned By      Initials Name Provider Type    PC Adeline Damon PT Physical Therapist                   Outcome Measures       Row Name 10/21/24 1025          How much help from another person do you currently need...    Turning from your back to your side while in flat bed without using bedrails? 2  -PC     Moving from lying on back to sitting on the side of a flat bed without bedrails? 1  -PC     Moving to and from a bed to a chair (including a wheelchair)? 1  -PC     Standing up from a chair using your arms (e.g., wheelchair, bedside chair)? 1  -PC     Climbing 3-5 steps with a railing? 1  -PC     To walk in hospital room? 1  -PC     AM-PAC 6 Clicks Score (PT) 7  -PC     Highest Level of Mobility Goal 2 --> Bed activities/dependent transfer  -PC               User Key  (r) = Recorded By, (t) = Taken By, (c) = Cosigned By      Initials Name Provider Type    Adeline Walsh PT Physical Therapist                                 Physical Therapy Education       Title: PT OT SLP Therapies (Done)       Topic: Physical Therapy (Done)       Point: Mobility training (Done)       Learning  Progress Summary            Patient Acceptance, E,D, DU by PC at 10/21/2024 1025                      Point: Home exercise program (Done)       Learning Progress Summary            Patient Acceptance, E,D, DU by PC at 10/21/2024 1025                      Point: Body mechanics (Done)       Learning Progress Summary            Patient Acceptance, E,D, DU by PC at 10/21/2024 1025                      Point: Precautions (Done)       Learning Progress Summary            Patient Acceptance, E,D, DU by PC at 10/21/2024 1025                                      User Key       Initials Effective Dates Name Provider Type Discipline     06/16/21 -  Adeline Damon, PT Physical Therapist PT                  PT Recommendation and Plan     Outcome Evaluation: Pt tired today but with good effort and participation, she fatigued quickly but able to work on some LE and core strengthening ex and work on sitting balance and trunk control     Time Calculation:         PT Charges       Row Name 10/21/24 1026             Time Calculation    Start Time 0939  -PC      Stop Time 1007  -PC      Time Calculation (min) 28 min  -PC      PT Received On 10/21/24  -PC      PT - Next Appointment 10/22/24  -PC      PT Goal Re-Cert Due Date 11/04/24  -                User Key  (r) = Recorded By, (t) = Taken By, (c) = Cosigned By      Initials Name Provider Type    PC Adeline Damon, PT Physical Therapist                  Therapy Charges for Today       Code Description Service Date Service Provider Modifiers Qty    38607833710 HC PT THER PROC EA 15 MIN 10/21/2024 Adeline Damon, PT GP 1    65090443520 HC PT THERAPEUTIC ACT EA 15 MIN 10/21/2024 Adeline Damon, PT GP 1            PT G-Codes  Outcome Measure Options: AM-PAC 6 Clicks Daily Activity (OT)  AM-PAC 6 Clicks Score (PT): 7  AM-PAC 6 Clicks Score (OT): 12  PT Discharge Summary  Anticipated Discharge Disposition (PT): skilled nursing facility    Adeline Damon PT  10/21/2024

## 2024-10-21 NOTE — PLAN OF CARE
Goal Outcome Evaluation:  Plan of Care Reviewed With: patient           Outcome Evaluation: VSS, F/C has adaquate output, pt c/o pressure unsure if it is a BM or Bladder issue, shifting weight anf turns provided every 2.5 to 3 hrs per pt wishes, on falls with bed alarm,saline locked, contact precautions maintained.

## 2024-10-21 NOTE — PLAN OF CARE
Goal Outcome Evaluation:  Plan of Care Reviewed With: patient           Outcome Evaluation: Pt tired today but with good effort and participation, she fatigued quickly but able to work on some LE and core strengthening ex and work on sitting balance and trunk control    Anticipated Discharge Disposition (PT): skilled nursing facility

## 2024-10-21 NOTE — PROGRESS NOTES
Continued Stay Note  [facility]     Patient Name: Maritza Bergn  MRN: [id number]  Today's Date: 10/21/2024    Admit Date: 10/17/2024    Plan: Rehab (referrals in [facility]/Pending)   Discharge Plan       Row Name 10/21/24 1320       Plan    Plan Rehab (referrals in [facility]/Pending)    Plan Comments Per [facility] with [facility], they are unable to accept. Per Christal with [facility], no beds available. Msg sent to [facility] with [facility] regarding referrral.                   Discharge Codes    No documentation.                 Expected Discharge Date and Time       Expected Discharge Date Expected Discharge Time    Oct 23, 2024               Jess Mathis RN

## 2024-10-22 LAB
ANION GAP SERPL CALCULATED.3IONS-SCNC: 11.9 MMOL/L (ref 5–15)
BASOPHILS # BLD AUTO: 0.03 10*3/MM3 (ref 0–0.2)
BASOPHILS NFR BLD AUTO: 0.4 % (ref 0–1.5)
BUN SERPL-MCNC: 10 MG/DL (ref 8–23)
BUN/CREAT SERPL: 14.1 (ref 7–25)
CALCIUM SPEC-SCNC: 8.6 MG/DL (ref 8.6–10.5)
CHLORIDE SERPL-SCNC: 109 MMOL/L (ref 98–107)
CO2 SERPL-SCNC: 20.1 MMOL/L (ref 22–29)
CREAT SERPL-MCNC: 0.71 MG/DL (ref 0.57–1)
DEPRECATED RDW RBC AUTO: 59.8 FL (ref 37–54)
EGFRCR SERPLBLD CKD-EPI 2021: 96.9 ML/MIN/1.73
EOSINOPHIL # BLD AUTO: 0.25 10*3/MM3 (ref 0–0.4)
EOSINOPHIL NFR BLD AUTO: 3.5 % (ref 0.3–6.2)
ERYTHROCYTE [DISTWIDTH] IN BLOOD BY AUTOMATED COUNT: 19.5 % (ref 12.3–15.4)
GLUCOSE SERPL-MCNC: 138 MG/DL (ref 65–99)
HCT VFR BLD AUTO: 36 % (ref 34–46.6)
HGB BLD-MCNC: 11.3 G/DL (ref 12–15.9)
IMM GRANULOCYTES # BLD AUTO: 0.04 10*3/MM3 (ref 0–0.05)
IMM GRANULOCYTES NFR BLD AUTO: 0.6 % (ref 0–0.5)
LYMPHOCYTES # BLD AUTO: 1.02 10*3/MM3 (ref 0.7–3.1)
LYMPHOCYTES NFR BLD AUTO: 14.3 % (ref 19.6–45.3)
MCH RBC QN AUTO: 26.8 PG (ref 26.6–33)
MCHC RBC AUTO-ENTMCNC: 31.4 G/DL (ref 31.5–35.7)
MCV RBC AUTO: 85.3 FL (ref 79–97)
MONOCYTES # BLD AUTO: 0.47 10*3/MM3 (ref 0.1–0.9)
MONOCYTES NFR BLD AUTO: 6.6 % (ref 5–12)
NEUTROPHILS NFR BLD AUTO: 5.32 10*3/MM3 (ref 1.7–7)
NEUTROPHILS NFR BLD AUTO: 74.6 % (ref 42.7–76)
NRBC BLD AUTO-RTO: 0 /100 WBC (ref 0–0.2)
PLATELET # BLD AUTO: 359 10*3/MM3 (ref 140–450)
PMV BLD AUTO: 9.2 FL (ref 6–12)
POTASSIUM SERPL-SCNC: 4.1 MMOL/L (ref 3.5–5.2)
RBC # BLD AUTO: 4.22 10*6/MM3 (ref 3.77–5.28)
SODIUM SERPL-SCNC: 141 MMOL/L (ref 136–145)
WBC NRBC COR # BLD AUTO: 7.13 10*3/MM3 (ref 3.4–10.8)

## 2024-10-22 PROCEDURE — 97110 THERAPEUTIC EXERCISES: CPT

## 2024-10-22 PROCEDURE — 85025 COMPLETE CBC W/AUTO DIFF WBC: CPT | Performed by: HOSPITALIST

## 2024-10-22 PROCEDURE — 97530 THERAPEUTIC ACTIVITIES: CPT

## 2024-10-22 PROCEDURE — 80048 BASIC METABOLIC PNL TOTAL CA: CPT | Performed by: HOSPITALIST

## 2024-10-22 RX ADMIN — PRAMIPEXOLE DIHYDROCHLORIDE 1.5 MG: 1.5 TABLET ORAL at 09:28

## 2024-10-22 RX ADMIN — SULFAMETHOXAZOLE AND TRIMETHOPRIM 1 TABLET: 800; 160 TABLET ORAL at 09:28

## 2024-10-22 RX ADMIN — Medication 5000 UNITS: at 09:28

## 2024-10-22 RX ADMIN — OLANZAPINE 5 MG: 5 TABLET, FILM COATED ORAL at 20:52

## 2024-10-22 RX ADMIN — DOCUSATE SODIUM 100 MG: 100 CAPSULE, LIQUID FILLED ORAL at 20:52

## 2024-10-22 RX ADMIN — PRAMIPEXOLE DIHYDROCHLORIDE 1.5 MG: 1.5 TABLET ORAL at 16:39

## 2024-10-22 RX ADMIN — ANASTROZOLE 1 MG: 1 TABLET ORAL at 09:28

## 2024-10-22 RX ADMIN — SULFAMETHOXAZOLE AND TRIMETHOPRIM 1 TABLET: 800; 160 TABLET ORAL at 03:00

## 2024-10-22 RX ADMIN — BACLOFEN 10 MG: 10 TABLET ORAL at 20:52

## 2024-10-22 RX ADMIN — DOCUSATE SODIUM 100 MG: 100 CAPSULE, LIQUID FILLED ORAL at 09:28

## 2024-10-22 RX ADMIN — PRAMIPEXOLE DIHYDROCHLORIDE 1.5 MG: 1.5 TABLET ORAL at 20:52

## 2024-10-22 RX ADMIN — MENTHOL, ZINC OXIDE 1 APPLICATION: .44; 20.6 OINTMENT TOPICAL at 20:53

## 2024-10-22 RX ADMIN — PANTOPRAZOLE SODIUM 40 MG: 40 TABLET, DELAYED RELEASE ORAL at 07:44

## 2024-10-22 RX ADMIN — BACLOFEN 10 MG: 10 TABLET ORAL at 09:28

## 2024-10-22 RX ADMIN — SULFAMETHOXAZOLE AND TRIMETHOPRIM 1 TABLET: 800; 160 TABLET ORAL at 20:52

## 2024-10-22 RX ADMIN — PANTOPRAZOLE SODIUM 40 MG: 40 TABLET, DELAYED RELEASE ORAL at 16:39

## 2024-10-22 RX ADMIN — POLYETHYLENE GLYCOL 3350 17 G: 17 POWDER, FOR SOLUTION ORAL at 09:28

## 2024-10-22 NOTE — PROGRESS NOTES
"Daily progress note    Primary care physician      Subjective  Doing better with no new complaints     History of present illness  63-year-old white female with history of multiple sclerosis metastatic breast cancer hypertension hyperlipidemia neurogenic bladder with chronic Cobos immobilization syndrome nursing home resident presented to [facility] [place] emergency room with multiple complaint including generalized weakness pain all over nausea.  Patient workup in ER revealed recurrent UTI secondary to indwelling Cobos catheter admitted for management.  Patient denies any fever chills chest pain increase shortness of breath vomiting diarrhea.    REVIEW OF SYSTEMS  Unremarkable except generalized weakness     PHYSICAL EXAM  Blood pressure 120/69, pulse 84, temperature 97.2 °F (36.2 °C), temperature source Oral, resp. rate 16, height 152.4 cm (60\"), weight 83.4 kg (183 lb 13.8 oz), SpO2 95%, not currently breastfeeding.    Constitutional:       General: She is not in acute distress.     Appearance: She is well-developed.   HENT:      Head: Normocephalic and atraumatic.   Eyes:      Extraocular Movements: Extraocular movements intact.   Cardiovascular:      Rate and Rhythm: Normal rate and regular rhythm.      Heart sounds: Normal heart sounds.      Comments: Distal pulses intact  Pulmonary:      Effort: Pulmonary effort is normal.      Breath sounds: Normal breath sounds.   Abdominal:      General: There is no distension.      Tenderness: There is abdominal tenderness.   Skin:     General: Skin is warm.   Neurological:      General: No focal deficit present.      Mental Status: She is alert and oriented to person, place, and time.   Psychiatric:         Mood and Affect: Mood normal.      LAB RESULTS  Lab Results (last 24 hours)       Procedure Component Value Units Date/Time    CANDIDA AURIS SCREEN - Swab, Axilla Right, Axilla Left and Groin [[id number]]  (Normal) Collected: 10/18/24 0948    Specimen: Swab " from Axilla Right, Axilla Left and Groin Updated: 10/22/24 1218     Carissa Auris Screen Culture No Candida auris isolated at 4 days    Basic Metabolic Panel [[id number]]  (Abnormal) Collected: 10/22/24 0638    Specimen: Blood Updated: 10/22/24 0718     Glucose 138 mg/dL      BUN 10 mg/dL      Creatinine 0.71 mg/dL      Sodium 141 mmol/L      Potassium 4.1 mmol/L      Chloride 109 mmol/L      CO2 20.1 mmol/L      Calcium 8.6 mg/dL      BUN/Creatinine Ratio 14.1     Anion Gap 11.9 mmol/L      eGFR 96.9 mL/min/1.73     Narrative:      GFR Normal >60  Chronic Kidney Disease <60  Kidney Failure <15      CBC & Differential [[id number]]  (Abnormal) Collected: 10/22/24 0638    Specimen: Blood Updated: 10/22/24 0702    Narrative:      The following orders were created for panel order CBC & Differential.  Procedure                               Abnormality         Status                     ---------                               -----------         ------                     CBC Auto Differential[[id number]]        Abnormal            Final result                 Please view results for these tests on the individual orders.    CBC Auto Differential [[id number]]  (Abnormal) Collected: 10/22/24 0638    Specimen: Blood Updated: 10/22/24 0702     WBC 7.13 10*3/mm3      RBC 4.22 10*6/mm3      Hemoglobin 11.3 g/dL      Hematocrit 36.0 %      MCV 85.3 fL      MCH 26.8 pg      MCHC 31.4 g/dL      RDW 19.5 %      RDW-SD 59.8 fl      MPV 9.2 fL      Platelets 359 10*3/mm3      Neutrophil % 74.6 %      Lymphocyte % 14.3 %      Monocyte % 6.6 %      Eosinophil % 3.5 %      Basophil % 0.4 %      Immature Grans % 0.6 %      Neutrophils, Absolute 5.32 10*3/mm3      Lymphocytes, Absolute 1.02 10*3/mm3      Monocytes, Absolute 0.47 10*3/mm3      Eosinophils, Absolute 0.25 10*3/mm3      Basophils, Absolute 0.03 10*3/mm3      Immature Grans, Absolute 0.04 10*3/mm3      nRBC 0.0 /100 WBC           Imaging Results (Last 24 Hours)       ** No  results found for the last 24 hours. **          Scan on 9/18/2024 1733 by [employer], [place]: ECG 12-LEAD         Author: -- Service: -- Author Type: --   Filed: Date of Service: Creation Time:   Status: (Other)   HEART RATE=67  bpm  RR Rpwytstr=791  ms  ID Ibtmanrd=764  ms  P Horizontal Axis=14  deg  P Front Axis=73  deg  QRSD Interval=98  ms  QT Dbjcidvs=867  ms  PHwY=990  ms  QRS Axis=38  deg  T Wave Axis=239  deg  - NORMAL ECG -  Sinus rhythm  No change from previous tracing            Current Facility-Administered Medications:     albuterol (PROVENTIL) nebulizer solution 0.083% 2.5 mg/3mL, 2.5 mg, Nebulization, Q6H PRN, Farooq Vicente MD    anastrozole (ARIMIDEX) tablet 1 mg, 1 mg, Oral, Daily, Farooq Vicente MD, 1 mg at 10/22/24 0928    baclofen (LIORESAL) tablet 10 mg, 10 mg, Oral, Q12H, Farooq Vicnete MD, 10 mg at 10/22/24 0928    bisacodyl (DULCOLAX) suppository 10 mg, 10 mg, Rectal, Daily PRN, Farooq Vicente MD    cholecalciferol (VITAMIN D3) tablet 5,000 Units, 5,000 Units, Oral, Daily, Farooq Vicente MD, 5,000 Units at 10/22/24 0928    clonazePAM (KlonoPIN) tablet 0.5 mg, 0.5 mg, Oral, BID PRN, Farooq Vicente MD, 0.5 mg at 10/19/24 0201    docusate sodium (COLACE) capsule 100 mg, 100 mg, Oral, BID, Farooq Vicente MD, 100 mg at 10/22/24 0928    HYDROcodone-acetaminophen (NORCO) 5-325 MG per tablet 1 tablet, 1 tablet, Oral, Q4H PRN, Farooq Vicente MD, 1 tablet at 10/19/24 2141    influenza virus vacc split PF FLUZONE 0.5 mL, 0.5 mL, Intramuscular, During Hospitalization, Farooq Vicente MD    lactulose (CHRONULAC) 10 GM/15ML solution 30 g, 30 g, Oral, Daily, Farooq Vicente MD, 30 g at 10/21/24 1905    melatonin tablet 5 mg, 5 mg, Oral, Nightly PRN, Farooq Vicente MD, 5 mg at 10/21/24 2125    Menthol-Zinc Oxide 1 Application, 1 Application, Topical, BID, Farooq Vicente MD, 1 Application at 10/21/24 2200    OLANZapine (zyPREXA) tablet 5 mg, 5 mg, Oral, Nightly, Farooq Vicente MD, 5 mg at 10/21/24 2125    pantoprazole  (PROTONIX) EC tablet 40 mg, 40 mg, Oral, BID AC, Latrell Vicente MD, 40 mg at 10/22/24 0744    polyethylene glycol (MIRALAX) packet 17 g, 17 g, Oral, Daily, Latrell Vicente MD, 17 g at 10/22/24 0928    pramipexole (MIRAPEX) tablet 1.5 mg, 1.5 mg, Oral, TID, Latrell Vicente MD, 1.5 mg at 10/22/24 0928    predniSONE (DELTASONE) tablet 20 mg, 20 mg, Oral, Daily, Latrell Vicente MD, 20 mg at 10/21/24 0912    prochlorperazine (COMPAZINE) injection 10 mg, 10 mg, Intravenous, Q4H PRN, Latrell Vicente MD, 10 mg at 10/21/24 2124    sennosides-docusate (PERICOLACE) 8.6-50 MG per tablet 1 tablet, 1 tablet, Oral, Daily, Latrell Vicente MD, 1 tablet at 10/21/24 0911    [COMPLETED] Insert Peripheral IV, , , Once **AND** sodium chloride 0.9 % flush 10 mL, 10 mL, Intravenous, PRN, Nidia Ji PA-C    sulfamethoxazole-trimethoprim (BACTRIM DS,SEPTRA DS) 800-160 MG per tablet 1 tablet, 1 tablet, Oral, Q12H, Luisa Child MD, 1 tablet at 10/22/24 0928     ASSESSMENT  Acute cystitis with sepsis  Yeast UTI  Urine retention with chronic Cobos catheter  Multiple sclerosis  Hypertension  Hyperlipidemia  Chronic constipation  Metastatic breast cancer  Immobilization syndrome    PLAN  CPM  Continue antibiotics per infectious disease  Bowel program  Replace Cobos catheter  Melatonin as needed to help sleep  Continue nursing home medications  Stress ulcer DVT prophylaxis  Supportive care  PT OT  Discussed with nursing staff  Discharge planning    LATRELL VICENTE MD    Copied text in this note has been reviewed and is accurate as of 10/22/24

## 2024-10-22 NOTE — THERAPY TREATMENT NOTE
Patient Name: Maritza Nance Darci  : 1960    MRN: [id number]                              Today's Date: 10/22/2024       Admit Date: 10/17/2024    Visit Dx:     ICD-10-CM ICD-9-CM   1. Acute urinary retention  R33.8 788.29   2. Leukocytosis, unspecified type  D72.829 288.60   3. History of multiple sclerosis  G35 340   4. 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    FOM (frequency of micturition)    Headache    History of diplopia    History of optic neuritis    History of vitamin D deficiency    Migraine syndrome    Mixed incontinence    Nausea    Neurogenic bladder    Pain in joint of right shoulder    Restless legs syndrome    Rupture of rotator cuff of shoulder    Secondary progressive multiple sclerosis    S/P cubital tunnel release    Vitamin D deficiency    Multiple sclerosis exacerbation    Sepsis due to Gram negative bacteria    Lower extremity cellulitis    Malignant neoplasm of overlapping sites of left breast in female, estrogen receptor positive    Encounter for long-term (current) use of other medications    Cancer, metastatic to bone    Colitis    Weakness    Elevated LFTs    Acute UTI     Past Medical History:   Diagnosis Date    Anemia     `Treated with iron    Dawn esophagus     per patient    Blurred vision     R/T MS    Breast cancer 2024+++++    Carpal tunnel syndrome     Clotting disorder , ,     3 g/i bleeds w/ transfus/ions    Colon polyp 2013    removed w/ colonoscopy    Deep vein thrombosis phlebitis 1980    Depression     Diplopia      GERD (gastroesophageal reflux disease)     GI (gastrointestinal bleed) 3 bleeds    2 transfusions    H/O Skin cancer, basal cell     Headache     History of blood transfusion     History of GI bleed     R/T NSAIDS AND STEROIDS, multiple times    History of urinary tract infection     Hypercalcemia     s/p parathyroidectomy    Hyperlipidemia     Hypertension     Movement disorder     Multiple sclerosis     Optic neuritis     PONV (postoperative nausea and vomiting)      Past Surgical History:   Procedure Laterality Date    APPENDECTOMY      BLADDER SURGERY      bladder stimulator    BREAST BIOPSY  don't remember    BREAST SURGERY      augmentation wtih subsequent removal    CARPAL TUNNEL RELEASE Bilateral     Left 2018, right 2020    CUBITAL TUNNEL RELEASE Left     CYSTOSCOPY BOTOX INJECTION OF BLADDER  2018    Cystoscopy with Botox    FRACTURE SURGERY  2019    rt shoulder    PARATHYROIDECTOMY      one gland removed    ROTATOR CUFF REPAIR Right 2017    TOE SURGERY      bilateral great toes    TOTAL SHOULDER ARTHROPLASTY W/ DISTAL CLAVICLE EXCISION Right 10/22/2018    Procedure: RT TOTAL SHOULDER REVERSE ARTHROPLASTY;  Surgeon: Bipin Dangelo MD;  Location: [facility] OR;  Service: Orthopedics      General Information       Row Name 10/22/24 1301          OT Time and Intention    Document Type therapy note (daily note)  -KR     Mode of Treatment co-treatment;physical therapy;occupational therapy  -KR       Row Name 10/22/24 1301          General Information    Patient Profile Reviewed yes  -KR     Existing Precautions/Restrictions fall  -KR       Row Name 10/22/24 1301          Cognition    Orientation Status (Cognition) oriented x 4  -KR       Row Name 10/22/24 1301          Safety Issues/Impairments Affecting Functional Mobility    Impairments Affecting Function (Mobility) balance;coordination;endurance/activity tolerance;motor control;motor planning;postural/trunk control;strength  -KR     Comment, Safety  Issues/Impairments (Mobility) Cotreat medically appropriate and necessary due to pt acuity, activity tolerance, to maximize mobility efforts and safety of pt and staff. Focus on progression of care and goals established in plan of care.  -KR               User Key  (r) = Recorded By, (t) = Taken By, (c) = Cosigned By      Initials Name Provider Type    Meredith Dong OT Occupational Therapist                     Mobility/ADL's       Row Name 10/22/24 1452          Bed Mobility    Supine-Sit [place] (Bed Mobility) moderate assist (50% patient effort);2 person assist;verbal cues  -KR     Sit-Supine [place] (Bed Mobility) moderate assist (50% patient effort);2 person assist;verbal cues  -KR     Assistive Device (Bed Mobility) bed rails;head of bed elevated;repositioning sheet  -KR       Row Name 10/22/24 1452          Transfers    Comment, (Transfers) Uses a leandra lift  -KR       Row Name 10/22/24 1452          Activities of Daily Living    BADL Assessment/Intervention grooming  -       Row Name 10/22/24 1452          Grooming Assessment/Training    Comment, (Grooming) max A for sitting balance as pt brought washcloth to her face with RUE. Pt with heavy L lateral/posterior lean today seated EOB  -KR       Row Name 10/22/24 1452          Toileting Assessment/Training    [place] Level (Toileting) toileting skills;dependent (less than 25% patient effort)  -KR               User Key  (r) = Recorded By, (t) = Taken By, (c) = Cosigned By      Initials Name Provider Type    Meredith Dong OT Occupational Therapist                   Obj/Interventions       Row Name 10/22/24 1453          Shoulder (Therapeutic Exercise)    Shoulder (Therapeutic Exercise) AROM (active range of motion)  -KR     Shoulder AROM (Therapeutic Exercise) right;flexion;extension;10 repetitions  1 # free weight  -KR       Row Name 10/22/24 1453          Elbow/Forearm (Therapeutic Exercise)    Elbow/Forearm (Therapeutic Exercise)  AROM (active range of motion)  -KR     Elbow/Forearm AROM (Therapeutic Exercise) bilateral;flexion;extension;supination;pronation;sitting;10 repetitions  with 1# free weight on RUE. Was using her LUE to assist with holding herself up on EOB  -KR       Row Name 10/22/24 1453          Motor Skills    Therapeutic Exercise elbow/forearm;shoulder  -KR       Row Name 10/22/24 1453          Balance    Static Sitting Balance moderate assist;2-person assist  -KR     Dynamic Sitting Balance maximum assist;2-person assist  -KR     Position, Sitting Balance sitting edge of bed  -KR     Balance Interventions sitting  -KR               User Key  (r) = Recorded By, (t) = Taken By, (c) = Cosigned By      Initials Name Provider Type    Meredith Dong OT Occupational Therapist                   Goals/Plan    No documentation.                  Clinical Impression       Row Name 10/22/24 1454          Pain Assessment    Pretreatment Pain Rating 0/10 - no pain  -KR     Posttreatment Pain Rating 0/10 - no pain  -KR       Row Name 10/22/24 1454          Plan of Care Review    Plan of Care Reviewed With patient  -KR     Outcome Evaluation Pt seen for OT/PT cotreat session this AM. She required mod Ax2 for bed mobility. Once seated EOB pt demonstrated a heavy posterior/L lateral lean requiring mod-max Ax2. 1 therapist behind pt supporting and another in front also helping support pt. She used a 1# free weight with her RUE to perform shoulder flex, forward press, elbow flex/extension and pronation/supination. Pt was using her LUE to attempt and assist with sitting balance. Brought wash cloth to her face with RUE while she required max A for sitting balance. Pt fatigued while sitting EOB working on trunk control sitting tolerance. Pt continues to benefit from skilled OT to address deficits.  -KR       Row Name 10/22/24 1456          Therapy Plan Review/Discharge Plan (OT)    Anticipated Discharge Disposition (OT) skilled nursing facility   -KR       Row Name 10/22/24 1454          Vital Signs    Pre Patient Position Supine  -KR     Intra Patient Position Sitting  -KR     Post Patient Position Supine  -KR       Row Name 10/22/24 1454          Positioning and Restraints    Pre-Treatment Position in bed  -KR     Post Treatment Position bed  -KR     In Bed notified nsg;supine;call light within reach;encouraged to call for assist;exit alarm on  -KR               User Key  (r) = Recorded By, (t) = Taken By, (c) = Cosigned By      Initials Name Provider Type    Meredith Dong OT Occupational Therapist                   Outcome Measures       Row Name 10/22/24 1319 10/22/24 0927       How much help from another person do you currently need...    Turning from your back to your side while in flat bed without using bedrails? 2  -EB 2  -MA    Moving from lying on back to sitting on the side of a flat bed without bedrails? 2  -EB 1  -MA    Moving to and from a bed to a chair (including a wheelchair)? 1  -EB 1  -MA    Standing up from a chair using your arms (e.g., wheelchair, bedside chair)? 1  -EB 1  -MA    Climbing 3-5 steps with a railing? 1  -EB 1  -MA    To walk in hospital room? 1  -EB 1  -MA    AM-PAC 6 Clicks Score (PT) 8  -EB 7  -MA    Highest Level of Mobility Goal 3 --> Sit at edge of bed  -EB 2 --> Bed activities/dependent transfer  -MA              User Key  (r) = Recorded By, (t) = Taken By, (c) = Cosigned By      Initials Name Provider Type    Brunilda Dubois RN Registered Nurse    Serena Owen PTA Physical Therapist Assistant                    Occupational Therapy Education       Title: PT OT SLP Therapies (Done)       Topic: Occupational Therapy (Done)       Point: ADL training (Done)       Description:   Instruct learner(s) on proper safety adaptation and remediation techniques during self care or transfers.   Instruct in proper use of assistive devices.                  Learning Progress Summary            Patient Acceptance,  E,TB, VU by RC at 10/21/2024 2142    Acceptance, E,D, DU by PC at 10/21/2024 1025    Acceptance, E,TB, VU by JS at 10/21/2024 0544    Acceptance, E,TB, VU by JS at 10/20/2024 0401    Acceptance, E, VU by  at 10/19/2024 1526    Comment: OT educ on OT role in therapeutic process and pt's POC.                      Point: Home exercise program (Done)       Description:   Instruct learner(s) on appropriate technique for monitoring, assisting and/or progressing therapeutic exercises/activities.                  Learning Progress Summary            Patient Acceptance, E,TB, VU by  at 10/21/2024 2142    Acceptance, E,D, DU by PC at 10/21/2024 1025                      Point: Precautions (Done)       Description:   Instruct learner(s) on prescribed precautions during self-care and functional transfers.                  Learning Progress Summary            Patient Acceptance, E,TB, VU by  at 10/21/2024 2142    Acceptance, E,D, DU by PC at 10/21/2024 1025                      Point: Body mechanics (Done)       Description:   Instruct learner(s) on proper positioning and spine alignment during self-care, functional mobility activities and/or exercises.                  Learning Progress Summary            Patient Acceptance, E,TB, VU by  at 10/21/2024 2142    Acceptance, E,D, DU by  at 10/21/2024 1025                                      User Key       Initials Effective Dates Name Provider Type Discipline     06/16/21 -  Adeline Damon PT Physical Therapist PT    RD 06/16/21 -  Mihaela Mojica OT Occupational Therapist OT     10/01/20 -  Berkley Wick, RN Registered Nurse Nurse     06/25/24 -  Mindi Bethea, RN Registered Nurse Nurse                  OT Recommendation and Plan     Plan of Care Review  Plan of Care Reviewed With: patient  Outcome Evaluation: Pt seen for OT/PT cotreat session this AM. She required mod Ax2 for bed mobility. Once seated EOB pt demonstrated a heavy posterior/L lateral  lean requiring mod-max Ax2. 1 therapist behind pt supporting and another in front also helping support pt. She used a 1# free weight with her RUE to perform shoulder flex, forward press, elbow flex/extension and pronation/supination. Pt was using her LUE to attempt and assist with sitting balance. Brought wash cloth to her face with RUE while she required max A for sitting balance. Pt fatigued while sitting EOB working on trunk control sitting tolerance. Pt continues to benefit from skilled OT to address deficits.     Time Calculation:         Time Calculation- OT       Row Name 10/22/24 1459             Time Calculation- OT    OT Start Time 1014  -KR      OT Stop Time 1049  -KR      OT Time Calculation (min) 35 min  -KR      Total Timed Code Minutes- OT 35 minute(s)  -KR      OT Received On 10/22/24  -KR      OT - Next Appointment 10/23/24  -KR         Timed Charges    07134 - OT Therapeutic Exercise Minutes 10  -KR      99733 - OT Therapeutic Activity Minutes 25  -KR         Total Minutes    Timed Charges Total Minutes 35  -KR       Total Minutes 35  -KR                User Key  (r) = Recorded By, (t) = Taken By, (c) = Cosigned By      Initials Name Provider Type    KR Meredith Agustin OT Occupational Therapist                  Therapy Charges for Today       Code Description Service Date Service Provider Modifiers Qty    29946362244  OT THER PROC EA 15 MIN 10/22/2024 Meredith Agustin OT GO 1    [id number]  OT THERAPEUTIC ACT EA 15 MIN 10/22/2024 Meredith Agustin OT GO 1                 Meredith Agustin OT  10/22/2024

## 2024-10-22 NOTE — PLAN OF CARE
Goal Outcome Evaluation:  Plan of Care Reviewed With: patient        Progress: improving  Outcome Evaluation: pt with MS, weakness to BLE and unable to bear weight on her legs, assisted to her motorized wheelchair today via mechanical lift, pt unable to void - or voiding small amts - urology aware and order received to place matias, i did straight cath her once this morning for 400cc, tolerating regular diet, pt had large bm today, alert and oriented

## 2024-10-22 NOTE — PLAN OF CARE
Goal Outcome Evaluation:  VSS, Up with assist & lift, Up to BR, sat in wheelchair most of afternoon, miralax given, no results, latulose given near end of shift per pt. Request, baclofen given x 1, F/C d/c'd at 1600, DTV  Plan of Care Reviewed With: patient

## 2024-10-22 NOTE — PLAN OF CARE
Goal Outcome Evaluation:  Plan of Care Reviewed With: patient           Outcome Evaluation: Pt seen for OT/PT cotreat session this AM. She required mod Ax2 for bed mobility. Once seated EOB pt demonstrated a heavy posterior/L lateral lean requiring mod-max Ax2. 1 therapist behind pt supporting and another in front also helping support pt. She used a 1# free weight with her RUE to perform shoulder flex, forward press, elbow flex/extension and pronation/supination. Pt was using her LUE to attempt and assist with sitting balance. Brought wash cloth to her face with RUE while she required max A for sitting balance. Pt fatigued while sitting EOB working on trunk control sitting tolerance. Pt continues to benefit from skilled OT to address deficits.    Anticipated Discharge Disposition (OT): skilled nursing facility

## 2024-10-22 NOTE — PLAN OF CARE
Goal Outcome Evaluation:  Plan of Care Reviewed With: patient        Progress: no change  Outcome Evaluation: Pt seen for PT/OT co-tx session today. Pt required ModAX2 with bed mobility. Once seated EOB, pt had a heavy posterior, lateral lean to the left requiring ModA-MaxA of 2 to correct. Pt needed support from the back and also from the front. AROM/stretching performed on concepcion LEs. Pt continued to need cues for sitting balance while completing activities with OT. Pt able to sit with support for at least 10 minutes. Pt fatigued and returned pt to supine. Will continue to follow and progress as able.

## 2024-10-22 NOTE — THERAPY TREATMENT NOTE
Patient Name: Maritza Nance Darci  : 1960    MRN: [id number]                              Today's Date: 10/22/2024       Admit Date: 10/17/2024    Visit Dx:     ICD-10-CM ICD-9-CM   1. Acute urinary retention  R33.8 788.29   2. Leukocytosis, unspecified type  D72.829 288.60   3. History of multiple sclerosis  G35 340   4. 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    FOM (frequency of micturition)    Headache    History of diplopia    History of optic neuritis    History of vitamin D deficiency    Migraine syndrome    Mixed incontinence    Nausea    Neurogenic bladder    Pain in joint of right shoulder    Restless legs syndrome    Rupture of rotator cuff of shoulder    Secondary progressive multiple sclerosis    S/P cubital tunnel release    Vitamin D deficiency    Multiple sclerosis exacerbation    Sepsis due to Gram negative bacteria    Lower extremity cellulitis    Malignant neoplasm of overlapping sites of left breast in female, estrogen receptor positive    Encounter for long-term (current) use of other medications    Cancer, metastatic to bone    Colitis    Weakness    Elevated LFTs    Acute UTI     Past Medical History:   Diagnosis Date    Anemia     `Treated with iron    Dawn esophagus     per patient    Blurred vision     R/T MS    Breast cancer 2024+++++    Carpal tunnel syndrome     Clotting disorder , ,     3 g/i bleeds w/ transfus/ions    Colon polyp 2013    removed w/ colonoscopy    Deep vein thrombosis phlebitis 1980    Depression     Diplopia      GERD (gastroesophageal reflux disease)     GI (gastrointestinal bleed) 3 bleeds    2 transfusions    H/O Skin cancer, basal cell     Headache     History of blood transfusion     History of GI bleed     R/T NSAIDS AND STEROIDS, multiple times    History of urinary tract infection     Hypercalcemia     s/p parathyroidectomy    Hyperlipidemia     Hypertension     Movement disorder     Multiple sclerosis     Optic neuritis     PONV (postoperative nausea and vomiting)      Past Surgical History:   Procedure Laterality Date    APPENDECTOMY      BLADDER SURGERY      bladder stimulator    BREAST BIOPSY  don't remember    BREAST SURGERY      augmentation wtih subsequent removal    CARPAL TUNNEL RELEASE Bilateral     Left 2018, right 2020    CUBITAL TUNNEL RELEASE Left     CYSTOSCOPY BOTOX INJECTION OF BLADDER  2018    Cystoscopy with Botox    FRACTURE SURGERY  2019    rt shoulder    PARATHYROIDECTOMY      one gland removed    ROTATOR CUFF REPAIR Right 2017    TOE SURGERY      bilateral great toes    TOTAL SHOULDER ARTHROPLASTY W/ DISTAL CLAVICLE EXCISION Right 10/22/2018    Procedure: RT TOTAL SHOULDER REVERSE ARTHROPLASTY;  Surgeon: Bipin Dangelo MD;  Location: [facility];  Service: Orthopedics      General Information       Row Name 10/22/24 1306          Physical Therapy Time and Intention    Document Type therapy note (daily note)  -EB     Mode of Treatment co-treatment;occupational therapy;physical therapy  -EB       Row Name 10/22/24 1306          General Information    Patient Profile Reviewed yes  -EB     Existing Precautions/Restrictions fall  -EB       Row Name 10/22/24 1306          Cognition    Orientation Status (Cognition) oriented x 4  -EB       Row Name 10/22/24 1306          Safety Issues/Impairments Affecting Functional Mobility    Impairments Affecting Function (Mobility) balance;coordination;endurance/activity tolerance;motor control;motor planning;postural/trunk control;strength  -EB      Comment, Safety Issues/Impairments (Mobility) Co treatment medically appropriate and necessary due to patient acuity level, activity tolerance and safety of patient and staff. Treatment is focusing on progression of care and goals established in the POC.  -EB               User Key  (r) = Recorded By, (t) = Taken By, (c) = Cosigned By      Initials Name Provider Type    Serena Owen PTA Physical Therapist Assistant                   Mobility       Row Name 10/22/24 1315          Bed Mobility    Supine-Sit [place] (Bed Mobility) moderate assist (50% patient effort);2 person assist;verbal cues  -EB     Sit-Supine [place] (Bed Mobility) moderate assist (50% patient effort);2 person assist;verbal cues  -EB     Assistive Device (Bed Mobility) bed rails;head of bed elevated;repositioning sheet  -EB     Comment, (Bed Mobility) assist with LEs but pt able to initiate movement with trunk and UEs to assist with coming to a sit.  -       Row Name 10/22/24 1315          Transfers    Comment, (Transfers) not appropriate. Uses a leandra lift daily.  -EB               User Key  (r) = Recorded By, (t) = Taken By, (c) = Cosigned By      Initials Name Provider Type    Serena Owen PTA Physical Therapist Assistant                   Obj/Interventions       Row Name 10/22/24 1317          Motor Skills    Therapeutic Exercise --  ROM/stretching of BLE (10Xs)  -       Row Name 10/22/24 1317          Balance    Balance Assessment sitting static balance;sitting dynamic balance  -EB     Static Sitting Balance moderate assist;2-person assist  -EB     Dynamic Sitting Balance maximum assist;2-person assist  -EB     Position, Sitting Balance supported;sitting edge of bed  -EB     Comment, Balance pt needing increased assist with sittin balance today. varied assist, leaning left.able to tolerate at least 10 minutes of sitting EOB.  -EB               User Key  (r) = Recorded By, (t) = Taken By, (c) = Cosigned By      Initials  Name Provider Type    Serena Owen PTA Physical Therapist Assistant                   Goals/Plan    No documentation.                  Clinical Impression       Row Name 10/22/24 1319          Plan of Care Review    Plan of Care Reviewed With patient  -EB     Progress no change  -EB     Outcome Evaluation Pt seen for PT/OT co-tx session today. Pt required ModAX2 with bed mobility. Once seated EOB, pt had a heavy posterior, lateral lean to the left requiring ModA-MaxA of 2 to correct. Pt needed support from the back and also from the front. AROM/stretching performed on concepcion LEs. Pt continued to need cues for sitting balance while completing activities with OT. Pt able to sit with support for at least 10 minutes. Pt fatigued and returned pt to supine. Will continue to follow and progress as able.  -EB       Row Name 10/22/24 1319          Therapy Assessment/Plan (PT)    Therapy Frequency (PT) 5 times/wk  -EB       Row Name 10/22/24 1319          Positioning and Restraints    Pre-Treatment Position in bed  -EB     Post Treatment Position bed  -EB     In Bed supine;call light within reach;encouraged to call for assist;exit alarm on  -EB               User Key  (r) = Recorded By, (t) = Taken By, (c) = Cosigned By      Initials Name Provider Type    Serena Owen PTA Physical Therapist Assistant                   Outcome Measures       Row Name 10/22/24 1319 10/22/24 0927       How much help from another person do you currently need...    Turning from your back to your side while in flat bed without using bedrails? 2  -EB 2  -MA    Moving from lying on back to sitting on the side of a flat bed without bedrails? 2  -EB 1  -MA    Moving to and from a bed to a chair (including a wheelchair)? 1  -EB 1  -MA    Standing up from a chair using your arms (e.g., wheelchair, bedside chair)? 1  -EB 1  -MA    Climbing 3-5 steps with a railing? 1  -EB 1  -MA    To walk in hospital room? 1  -EB 1  -MA    AM-PAC 6 Clicks Score  (PT) 8  -EB 7  -MA    Highest Level of Mobility Goal 3 --> Sit at edge of bed  -EB 2 --> Bed activities/dependent transfer  -MA              User Key  (r) = Recorded By, (t) = Taken By, (c) = Cosigned By      Initials Name Provider Type    Brunilda Dubois, RN Registered Nurse    Serena Owen PTA Physical Therapist Assistant                                 Physical Therapy Education       Title: PT OT SLP Therapies (Done)       Topic: Physical Therapy (Done)       Point: Mobility training (Done)       Learning Progress Summary            Patient Acceptance, E,TB, VU by  at 10/21/2024 2142    Acceptance, E,D, DU by  at 10/21/2024 1025                      Point: Home exercise program (Done)       Learning Progress Summary            Patient Acceptance, E,D, VU,NR by  at 10/22/2024 1320    Acceptance, E,TB, VU by  at 10/21/2024 2142    Acceptance, E,D, DU by  at 10/21/2024 1025                      Point: Body mechanics (Done)       Learning Progress Summary            Patient Acceptance, E,D, VU,NR by  at 10/22/2024 1320    Acceptance, E,TB, VU by  at 10/21/2024 2142    Acceptance, E,D, DU by PC at 10/21/2024 1025                      Point: Precautions (Done)       Learning Progress Summary            Patient Acceptance, E,TB, VU by  at 10/21/2024 2142    Acceptance, E,D, DU by  at 10/21/2024 1025                                      User Key       Initials Effective Dates Name Provider Type Discipline     06/16/21 -  Adeline Damon PT Physical Therapist PT     02/14/23 -  Serena Aguila PTA Physical Therapist Assistant PT     06/25/24 -  Mindi Bethea, RN Registered Nurse Nurse                  PT Recommendation and Plan     Progress: no change  Outcome Evaluation: Pt seen for PT/OT co-tx session today. Pt required ModAX2 with bed mobility. Once seated EOB, pt had a heavy posterior, lateral lean to the left requiring ModA-MaxA of 2 to correct. Pt needed support from  the back and also from the front. AROM/stretching performed on concepcion LEs. Pt continued to need cues for sitting balance while completing activities with OT. Pt able to sit with support for at least 10 minutes. Pt fatigued and returned pt to supine. Will continue to follow and progress as able.     Time Calculation:         PT Charges       Row Name 10/22/24 1320             Time Calculation    Start Time 1014  -EB      Stop Time 1049  -EB      Time Calculation (min) 35 min  -EB      PT Received On 10/22/24  -EB      PT - Next Appointment 10/23/24  -EB         Time Calculation- PT    Total Timed Code Minutes- PT 35 minute(s)  -EB                User Key  (r) = Recorded By, (t) = Taken By, (c) = Cosigned By      Initials Name Provider Type    EB Serena Aguila PTA Physical Therapist Assistant                  Therapy Charges for Today       Code Description Service Date Service Provider Modifiers Qty    53732748524 HC PT THER PROC EA 15 MIN 10/22/2024 Serena Aguila PTA GP 1    44347586737 HC PT THERAPEUTIC ACT EA 15 MIN 10/22/2024 Serena Aguila PTA GP 1            PT G-Codes  Outcome Measure Options: AM-PAC 6 Clicks Daily Activity (OT)  AM-PAC 6 Clicks Score (PT): 8  AM-PAC 6 Clicks Score (OT): 12       Serena Aguila PTA  10/22/2024

## 2024-10-22 NOTE — PLAN OF CARE
Goal Outcome Evaluation:  Plan of Care Reviewed With: patient        Progress: no change  Outcome Evaluation: Patient is A&Ox4, VSS, afebrile. No complaints of pain. Compazine given for nausea x1. Lifted to bathroom to void- urinated 100mLs and some out of hat. Pt was bladder scanned, showed >500 so pt was straight cathed per order. 525ccs emptied. Q2 turns. SCDs on throughout night. Fall precautions maintained. New IV placed via U/S. Pt refused colace. CPAP placed at night. Pt's limbs spasm frequently. POC ongoing.

## 2024-10-22 NOTE — PROGRESS NOTES
Continued Stay Note  [facility]     Patient Name: Maritza Bejarano  MRN: [id number]  Today's Date: 10/22/2024    Admit Date: 10/17/2024    Plan: Home with family assist and Outpatient therapy   Discharge Plan       Row Name 10/22/24 1404       Plan    Plan Home with family assist and Outpatient therapy    Plan Comments Per Shandra with [facility], cost is $203/day with 7 days upfront. Spoke to patient inform. She stated she will speak to her spouse but poss [place] plan is to go home with spouse and do outpatient therapy. Stated they have a ramp, Rachell lift, BSC and eletric wheelchair. Stated they are getting quotes to have the bathroom redone. Followed up with patient who stated she spoke to her spouse and plan is home and outpatient therapy. Spouse is able to transport at [place]. Denies any neeeds/equipment.  Updated MD, RN and Shandra with [facility].                   Discharge Codes    No documentation.                 Expected Discharge Date and Time       Expected Discharge Date Expected Discharge Time    Oct 23, 2024               Jess Mathis, RN

## 2024-10-22 NOTE — PROGRESS NOTES
LOS: 2 days   Patient Care Team:  Doris Faria MD as PCP - General (Internal Medicine)  Brittney Ortiz, RN as Nurse Navigator (Oncology)  Kim Barber MD as Referring Physician (General Surgery)  Zainab Lopes MD as Consulting Physician (Hematology and Oncology)    Chief Complaint: Retention    Subjective     Interval History:     Patient Complaints: Patient failed voiding trial.  Only voiding minimal amounts.  Requiring intermittent straight catheterization.    Review of Systems:    All systems were reviewed and negative except for:  Genitourinary: postivie for  difficulty / inability to void    Objective     Vital Signs  Temp:  [97.2 °F (36.2 °C)-98.7 °F (37.1 °C)] 97.2 °F (36.2 °C)  Heart Rate:  [75-84] 84  Resp:  [16] 16  BP: (112-149)/(67-84) 120/69    Physical Exam:   No exam performed today,     Results Review:     I reviewed the patient's new clinical results.    Medication Review:     Assessment & Plan       Acute UTI      Urine culture negative.  Urinary retention.  Patient has voiding trial.  Will have nursing staff replace Cobos catheter and will set up with follow-up in the office in 2 weeks with Dr. Olson for possible SP tube placement.    Plan for disposition:    Yohan Mays MD  10/22/24  12:53 EDT      Time:

## 2024-10-23 ENCOUNTER — READMISSION MANAGEMENT (OUTPATIENT)
Dept: CALL CENTER | Facility: HOSPITAL | Age: 64
End: 2024-10-23
Payer: MEDICARE

## 2024-10-23 VITALS
HEIGHT: 60 IN | DIASTOLIC BLOOD PRESSURE: 62 MMHG | SYSTOLIC BLOOD PRESSURE: 113 MMHG | BODY MASS INDEX: 36.1 KG/M2 | OXYGEN SATURATION: 94 % | WEIGHT: 183.86 LBS | TEMPERATURE: 97.1 F | RESPIRATION RATE: 16 BRPM | HEART RATE: 80 BPM

## 2024-10-23 LAB — BACTERIA ISLT: NORMAL

## 2024-10-23 RX ORDER — SULFAMETHOXAZOLE/TRIMETHOPRIM 800-160 MG
1 TABLET ORAL EVERY 12 HOURS SCHEDULED
Qty: 13 TABLET | Refills: 0 | Status: SHIPPED | OUTPATIENT
Start: 2024-10-23 | End: 2024-10-30

## 2024-10-23 RX ADMIN — PRAMIPEXOLE DIHYDROCHLORIDE 1.5 MG: 1.5 TABLET ORAL at 08:47

## 2024-10-23 RX ADMIN — SULFAMETHOXAZOLE AND TRIMETHOPRIM 1 TABLET: 800; 160 TABLET ORAL at 08:47

## 2024-10-23 RX ADMIN — PANTOPRAZOLE SODIUM 40 MG: 40 TABLET, DELAYED RELEASE ORAL at 17:10

## 2024-10-23 RX ADMIN — ANASTROZOLE 1 MG: 1 TABLET ORAL at 08:47

## 2024-10-23 RX ADMIN — DOCUSATE SODIUM 100 MG: 100 CAPSULE, LIQUID FILLED ORAL at 08:49

## 2024-10-23 RX ADMIN — SENNOSIDES AND DOCUSATE SODIUM 1 TABLET: 50; 8.6 TABLET ORAL at 08:51

## 2024-10-23 RX ADMIN — Medication 5 MG: at 00:41

## 2024-10-23 RX ADMIN — LACTULOSE 30 G: 10 SOLUTION ORAL at 08:47

## 2024-10-23 RX ADMIN — POLYETHYLENE GLYCOL 3350 17 G: 17 POWDER, FOR SOLUTION ORAL at 08:47

## 2024-10-23 RX ADMIN — MENTHOL, ZINC OXIDE 1 APPLICATION: .44; 20.6 OINTMENT TOPICAL at 08:56

## 2024-10-23 RX ADMIN — PRAMIPEXOLE DIHYDROCHLORIDE 1.5 MG: 1.5 TABLET ORAL at 15:54

## 2024-10-23 RX ADMIN — Medication 5000 UNITS: at 08:47

## 2024-10-23 RX ADMIN — PANTOPRAZOLE SODIUM 40 MG: 40 TABLET, DELAYED RELEASE ORAL at 06:33

## 2024-10-23 RX ADMIN — BACLOFEN 10 MG: 10 TABLET ORAL at 08:47

## 2024-10-23 NOTE — DISCHARGE SUMMARY
Discharge summary    Date of admission 10/17/2024  Date of discharge 10/23/2024    Final diagnosis  Acute cystitis with sepsis resolving  Yeast UTI resolved  Neurogenic bladder  Urinary retention with chronic Cobos catheter  Multiple sclerosis  Hypertension  Hyperlipidemia  Chronic constipation  Metastatic breast cancer  Immobilization syndrome    Discharge medications    Current Facility-Administered Medications:     albuterol (PROVENTIL) nebulizer solution 0.083% 2.5 mg/3mL, 2.5 mg, Nebulization, Q6H PRN, Farooq Vicente MD    anastrozole (ARIMIDEX) tablet 1 mg, 1 mg, Oral, Daily, Farooq Vicente MD, 1 mg at 10/23/24 0847    baclofen (LIORESAL) tablet 10 mg, 10 mg, Oral, Q12H, Farooq Vicnete MD, 10 mg at 10/23/24 0847    bisacodyl (DULCOLAX) suppository 10 mg, 10 mg, Rectal, Daily PRN, Farooq Vicente MD    cholecalciferol (VITAMIN D3) tablet 5,000 Units, 5,000 Units, Oral, Daily, Farooq Vicente MD, 5,000 Units at 10/23/24 0847    clonazePAM (KlonoPIN) tablet 0.5 mg, 0.5 mg, Oral, BID PRN, Farooq Vicente MD, 0.5 mg at 10/19/24 0201    docusate sodium (COLACE) capsule 100 mg, 100 mg, Oral, BID, Farooq Vicente MD, 100 mg at 10/23/24 0849    HYDROcodone-acetaminophen (NORCO) 5-325 MG per tablet 1 tablet, 1 tablet, Oral, Q4H PRN, Farooq Vicente MD, 1 tablet at 10/19/24 2141    influenza virus vacc split PF FLUZONE 0.5 mL, 0.5 mL, Intramuscular, During Hospitalization, Farooq Vicente MD    lactulose (CHRONULAC) 10 GM/15ML solution 30 g, 30 g, Oral, Daily, Farooq Vicente MD, 30 g at 10/23/24 0847    melatonin tablet 5 mg, 5 mg, Oral, Nightly PRN, Farooq Vicente MD, 5 mg at 10/23/24 0041    Menthol-Zinc Oxide 1 Application, 1 Application, Topical, BID, Farooq Vicente MD, 1 Application at 10/23/24 0856    OLANZapine (zyPREXA) tablet 5 mg, 5 mg, Oral, Nightly, Farooq Vicente MD, 5 mg at 10/22/24 2052    pantoprazole (PROTONIX) EC tablet 40 mg, 40 mg, Oral, BID AC, Farooq Vicente MD, 40 mg at 10/23/24 0633    polyethylene glycol (MIRALAX)  packet 17 g, 17 g, Oral, Daily, Farooq Vicente MD, 17 g at 10/23/24 0847    pramipexole (MIRAPEX) tablet 1.5 mg, 1.5 mg, Oral, TID, Farooq Vicente MD, 1.5 mg at 10/23/24 0847    predniSONE (DELTASONE) tablet 20 mg, 20 mg, Oral, Daily, Farooq Vicente MD, 20 mg at 10/21/24 0912    prochlorperazine (COMPAZINE) injection 10 mg, 10 mg, Intravenous, Q4H PRN, Farooq Vicente MD, 10 mg at 10/21/24 2124    sennosides-docusate (PERICOLACE) 8.6-50 MG per tablet 1 tablet, 1 tablet, Oral, Daily, Farooq Vicente MD, 1 tablet at 10/23/24 0851    [COMPLETED] Insert Peripheral IV, , , Once **AND** sodium chloride 0.9 % flush 10 mL, 10 mL, Intravenous, PRN, Nidia Ji PA-C    sulfamethoxazole-trimethoprim (BACTRIM DS,SEPTRA DS) 800-160 MG per tablet 1 tablet, 1 tablet, Oral, Q12H, Luisa Child MD, 1 tablet at 10/23/24 0847     Consults obtained  Urology  Infectious disease    Procedures  None    Hospital course  64-year-old female with history of multiple sclerosis metastatic breast cancer hypertension hyperlipidemia neurogenic bladder with chronic Cobos admitted to emergency room with generalized pain nausea and workup in ER revealed recurrent UTI with sepsis secondary to Cobos catheter admitted for management.  Patient admitted treated with IV fluid empiric antibiotics and had urine cultures positive for yeast and infectious recommend to cover Klebsiella.  Patient started feeling better and her antibiotics changed to by mouth.  Patient was agreeable to go to subacute rehab initially and then decided to go home with family support and refusing home health also.  Patient failed voiding trial and her Cobos catheter replaced and also followed by urology and they cleared for discharge.  Patient blood pressure medication adjusted during this hospitalization.    Discharge diet regular    Activity as tolerated    Medication as above    Follow-up with prime doctor in 1 week and follow-up with urology and infectious disease per the  instructions and take medication as directed.    LATRELL BRITO MD

## 2024-10-23 NOTE — NURSING NOTE
Reviewed AVS with patient and her spouse, educated on folet catheter care, all questions answered, patient and spouse verbalized understanding, iv removed, patient to retrieve discharge medications at her home pharmacy.

## 2024-10-23 NOTE — PROGRESS NOTES
"Daily progress note    Primary care physician      Subjective  Doing better with no new complaints and wants to go home and refusing subacute rehab    History of present illness  63-year-old white female with history of multiple sclerosis metastatic breast cancer hypertension hyperlipidemia neurogenic bladder with chronic Cobos immobilization syndrome nursing home resident presented to [facility] emergency room with multiple complaint including generalized weakness pain all over nausea.  Patient workup in ER revealed recurrent UTI secondary to indwelling Cobos catheter admitted for management.  Patient denies any fever chills chest pain increase shortness of breath vomiting diarrhea.    REVIEW OF SYSTEMS  Unremarkable except generalized weakness     PHYSICAL EXAM  Blood pressure 113/62, pulse 80, temperature 97.1 °F (36.2 °C), temperature source Oral, resp. rate 16, height 152.4 cm (60\"), weight 83.4 kg (183 lb 13.8 oz), SpO2 94%, not currently breastfeeding.    Constitutional:       General: She is not in acute distress.     Appearance: She is well-developed.   HENT:      Head: Normocephalic and atraumatic.   Eyes:      Extraocular Movements: Extraocular movements intact.   Cardiovascular:      Rate and Rhythm: Normal rate and regular rhythm.      Heart sounds: Normal heart sounds.      Comments: Distal pulses intact  Pulmonary:      Effort: Pulmonary effort is normal.      Breath sounds: Normal breath sounds.   Abdominal:      General: There is no distension.      Tenderness: There is abdominal tenderness.   Skin:     General: Skin is warm.   Neurological:      General: No focal deficit present.      Mental Status: She is alert and oriented to person, place, and time.   Psychiatric:         Mood and Affect: Mood normal.      LAB RESULTS  Lab Results (last 24 hours)       Procedure Component Value Units Date/Time    CANDIDA AURIS SCREEN - Swab, Axilla Right, Axilla Left and Groin [[id number]]  " (Normal) Collected: 10/18/24 0948    Specimen: Swab from Axilla Right, Axilla Left and Groin Updated: 10/23/24 1153     Candida Auris Screen Culture No Candida auris isolated at 5 days          Imaging Results (Last 24 Hours)       ** No results found for the last 24 hours. **          Scan on 9/18/2024 1733 by [facility], [place]: ECG 12-LEAD         Author: -- Service: -- Author Type: --   Filed: Date of Service: Creation Time:   Status: (Other)   HEART RATE=67  bpm  RR Nwkiigrs=296  ms  RI Bactmdgj=319  ms  P Horizontal Axis=14  deg  P Front Axis=73  deg  QRSD Interval=98  ms  QT Beltbhlq=529  ms  SHkL=773  ms  QRS Axis=38  deg  T Wave Axis=239  deg  - NORMAL ECG -  Sinus rhythm  No change from previous tracing            Current Facility-Administered Medications:     albuterol (PROVENTIL) nebulizer solution 0.083% 2.5 mg/3mL, 2.5 mg, Nebulization, Q6H PRN, Farooq Vicente MD    anastrozole (ARIMIDEX) tablet 1 mg, 1 mg, Oral, Daily, Farooq Vicente MD, 1 mg at 10/23/24 0847    baclofen (LIORESAL) tablet 10 mg, 10 mg, Oral, Q12H, Farooq Vicente MD, 10 mg at 10/23/24 0847    bisacodyl (DULCOLAX) suppository 10 mg, 10 mg, Rectal, Daily PRN, Farooq Vicente MD    cholecalciferol (VITAMIN D3) tablet 5,000 Units, 5,000 Units, Oral, Daily, Farooq Vicente MD, 5,000 Units at 10/23/24 0847    clonazePAM (KlonoPIN) tablet 0.5 mg, 0.5 mg, Oral, BID PRN, Farooq Vicente MD, 0.5 mg at 10/19/24 0201    docusate sodium (COLACE) capsule 100 mg, 100 mg, Oral, BID, Farooq Vicente MD, 100 mg at 10/23/24 0849    HYDROcodone-acetaminophen (NORCO) 5-325 MG per tablet 1 tablet, 1 tablet, Oral, Q4H PRN, Farooq Vicente MD, 1 tablet at 10/19/24 2141    influenza virus vacc split PF FLUZONE 0.5 mL, 0.5 mL, Intramuscular, During Hospitalization, Farooq Vicente MD    lactulose (CHRONULAC) 10 GM/15ML solution 30 g, 30 g, Oral, Daily, Farooq Vicente MD, 30 g at 10/23/24 0847    melatonin tablet 5 mg, 5 mg, Oral, Nightly PRN, Farooq Vicente MD, 5 mg at  10/23/24 0041    Menthol-Zinc Oxide 1 Application, 1 Application, Topical, BID, Latrell Vicente MD, 1 Application at 10/23/24 0856    OLANZapine (zyPREXA) tablet 5 mg, 5 mg, Oral, Nightly, Latrell Vicente MD, 5 mg at 10/22/24 2052    pantoprazole (PROTONIX) EC tablet 40 mg, 40 mg, Oral, BID AC, Latrell Vicente MD, 40 mg at 10/23/24 0633    polyethylene glycol (MIRALAX) packet 17 g, 17 g, Oral, Daily, Latrell Vicente MD, 17 g at 10/23/24 0847    pramipexole (MIRAPEX) tablet 1.5 mg, 1.5 mg, Oral, TID, Latrell Vicente MD, 1.5 mg at 10/23/24 0847    predniSONE (DELTASONE) tablet 20 mg, 20 mg, Oral, Daily, Latrell Vicente MD, 20 mg at 10/21/24 0912    prochlorperazine (COMPAZINE) injection 10 mg, 10 mg, Intravenous, Q4H PRN, Latrell Vicente MD, 10 mg at 10/21/24 2124    sennosides-docusate (PERICOLACE) 8.6-50 MG per tablet 1 tablet, 1 tablet, Oral, Daily, Latrell Vicente MD, 1 tablet at 10/23/24 0851    [COMPLETED] Insert Peripheral IV, , , Once **AND** sodium chloride 0.9 % flush 10 mL, 10 mL, Intravenous, PRN, Nidia Ji PA-C    sulfamethoxazole-trimethoprim (BACTRIM DS,SEPTRA DS) 800-160 MG per tablet 1 tablet, 1 tablet, Oral, Q12H, Luisa Child MD, 1 tablet at 10/23/24 0847     ASSESSMENT  Acute cystitis with sepsis resolving  Yeast UTI completed Diflucan  Urinary retention with chronic Cobos catheter  Multiple sclerosis  Hypertension  Hyperlipidemia  Chronic constipation  Metastatic breast cancer  Immobilization syndrome    PLAN  Discharge home with family support  Discharge summary dictated    LATRELL VICENTE MD    Copied text in this note has been reviewed and is accurate as of 10/23/24

## 2024-10-23 NOTE — PLAN OF CARE
Goal Outcome Evaluation:  Plan of Care Reviewed With: patient        Progress: improving  Outcome Evaluation: Alert and oriented.Saline locked. Cobos catheter in place with adequate clear yellow urine output. Pericare done, barrier cream applied to redness and excoriated skin. Turned to sides, wedges utilized. VSS, CPAP while asleep. Slept on and off.

## 2024-10-23 NOTE — PROGRESS NOTES
"  Infectious Diseases Progress Note    Luisa Child MD     [facility]  [place]: 2 days  Patient Identification:  Name: Maritza Bejarano  Age: 63 y.o.  Sex: female  :  1960  MRN: [id number]         Primary Care Physician: Doris Faria MD        Subjective: Feeling better.  Tolerating antibiotics without any problem.  Interval History: See consultation note.    Objective:    Scheduled Meds:anastrozole, 1 mg, Oral, Daily  baclofen, 10 mg, Oral, Q12H  vitamin D3, 5,000 Units, Oral, Daily  docusate sodium, 100 mg, Oral, BID  lactulose, 30 g, Oral, Daily  Menthol-Zinc Oxide, 1 Application, Topical, BID  OLANZapine, 5 mg, Oral, Nightly  pantoprazole, 40 mg, Oral, BID AC  polyethylene glycol, 17 g, Oral, Daily  pramipexole, 1.5 mg, Oral, TID  predniSONE, 20 mg, Oral, Daily  sennosides-docusate, 1 tablet, Oral, Daily  sulfamethoxazole-trimethoprim, 1 tablet, Oral, Q12H      Continuous Infusions:       Vital signs in last 24 hours:  Temp:  [97.2 °F (36.2 °C)-98.7 °F (37.1 °C)] 97.7 °F (36.5 °C)  Heart Rate:  [74-84] 74  Resp:  [16] 16  BP: (112-149)/(67-84) 148/77    Intake/Output:    Intake/Output Summary (Last 24 hours) at 10/22/2024 2053  Last data filed at 10/22/2024 1648  Gross per 24 hour   Intake 120 ml   Output 800 ml   Net -680 ml       Exam:  /77 (BP Location: Right arm, Patient Position: Lying)   Pulse 74   Temp 97.7 °F (36.5 °C) (Oral)   Resp 16   Ht 152.4 cm (60\")   Wt 83.4 kg (183 lb 13.8 oz)   LMP  (LMP Unknown) Comment: PT. STATES HER LAST PERIOD WAS GREATER THAN FIVE YEARS AGO  SpO2 98%   BMI 35.91 kg/m²   Patient is examined using the personal protective equipment as per guidelines from infection control for this particular patient as enacted.  Hand washing was performed before and after patient interaction.  General Appearance:    Alert, cooperative, no distress, AAOx3                          Head:    Normocephalic, without obvious abnormality, atraumatic                  "          Eyes:    PERRL, conjunctivae/corneas clear, EOM's intact, both eyes                         Throat:   Lips, tongue, gums normal; oral mucosa pink and moist                           Neck:   Supple, symmetrical, trachea midline, no JVD                         Lungs:    Clear to auscultation bilaterally, respirations unlabored                 Chest Wall:    No tenderness or deformity                          Heart:  S1-S2 regular                  Abdomen:   Cobos catheter in place                 Extremities:   Extremities normal, atraumatic, no cyanosis or edema                        Pulses:   Pulses palpable in all extremities                            Skin:   Skin is warm and dry,  no rashes or palpable lesions                  Neurologic: Alert and oriented x 3       Data Review:    I reviewed the patient's new clinical results.  Results from last 7 days   Lab Units 10/22/24  0638 10/21/24  0721 10/20/24  0444 10/19/24  0336 10/18/24  0438 10/17/24  2037   WBC 10*3/mm3 7.13 6.58 5.64 8.73 14.34* 19.06*   HEMOGLOBIN g/dL 11.3* 11.8* 10.5* 11.0* 12.3 13.9   PLATELETS 10*3/mm3 359 304 271 314 352 341     Results from last 7 days   Lab Units 10/22/24  0638 10/21/24  0721 10/20/24  0444 10/19/24  0336 10/18/24  0932 10/17/24  2037   SODIUM mmol/L 141 136 141 138 139 134*   POTASSIUM mmol/L 4.1 3.7 3.6 3.4* 3.7 4.0   CHLORIDE mmol/L 109* 106 110* 107 103 102   CO2 mmol/L 20.1* 20.5* 25.0 23.7 26.6 22.0   BUN mg/dL 10 7* 13 14 16 21   CREATININE mg/dL 0.71 0.54* 0.53* 0.60 1.00 0.99   CALCIUM mg/dL 8.6 8.2* 8.1* 7.4* 8.2* 8.5*   GLUCOSE mg/dL 138* 99 95 106* 184* 160*     Microbiology Results (last 10 days)       Procedure Component Value - Date/Time    CANDIDA AURIS SCREEN - Swab, Axilla Right, Axilla Left and Groin [[id number]]  (Normal) Collected: 10/18/24 0948    Lab Status: Preliminary result Specimen: Swab from Axilla Right, Axilla Left and Groin Updated: 10/22/24 1218     Carissa Auris Screen Culture No  Candida auris isolated at 4 days    Urine Culture - Urine, Urine, Catheter [[id number]]  (Normal) Collected: 10/17/24 2038    Lab Status: Final result Specimen: Urine, Catheter Updated: 10/19/24 1133     Urine Culture No growth              Assessment:    Acute UTI  1-systemic illness due to ascending urinary tract infection in the setting of neurogenic bladder and intermittent catheterizations and now indwelling catheter because of the frequency with which catheterization is needed is not a comfortable in the nursing home setting with risk of colonization with resistant chasity currently improving on ceftriaxone with improvement in her white blood cell count and sense of wellbeing  2-superimposed yeast colonization of  tract  3-multiple sclerosis with recent flare requiring steroid  4-recent hospitalization for sepsis for urinary tract infection  5-other diagnoses per primary team.     Recommendations/Discussions:  See my discussion and recommendations on 10/18/2024.  If urine culture remains negative then using previous culture results from 9/7/2024 can be used as a guide for oral antibiotic therapy.  Patient can be switched to oral regimen anytime from infectious disease standpoint if she is considered ready to be discharged.  Based on the sensitivity of Klebsiella pneumonia on 9/7/2024 continue oral Bactrim for total of 2 weeks should be sufficient with close monitoring of side effects including keeping an eye on her potassium and renal function.  Side effects of antibiotic therapy in general discussed with the patient.  Luisa Child MD  10/22/2024  20:53 EDT    Parts of this note may be an electronic transcription/translation of spoken language to printed text using the Dragon dictation system.

## 2024-10-24 NOTE — PROGRESS NOTES
Case Management Discharge Note      Final Note: Discharged home. Jess Mathis RN    Provided Post Acute Provider List?: N/A  N/A Provider List Comment: Requested Jose BLAKE, [facility] [place] or [facility]    Selected Continued Care - Discharged on 10/23/2024 Admission date: 10/17/2024 - Discharge disposition: Home or Self Care   Transportation Services  Private: Car    Final Discharge Disposition Code: 01 - home or self-care

## 2024-10-24 NOTE — OUTREACH NOTE
Prep Survey      Flowsheet Row Responses   Religious facility patient discharged from? [place]   Is LACE score < 7 ? No   Eligibility Readm Mgmt   Discharge diagnosis Acute UTI   Does the patient have one of the following disease processes/diagnoses(primary or secondary)? Other   Prep survey completed? Yes            Eugenia GONZALES - Registered Nurse

## 2024-10-28 ENCOUNTER — READMISSION MANAGEMENT (OUTPATIENT)
Dept: CALL CENTER | Facility: HOSPITAL | Age: 64
End: 2024-10-28
Payer: MEDICARE

## 2024-10-28 NOTE — OUTREACH NOTE
Medical Week 1 Survey      Flowsheet Row Responses   [facility] [place] patient discharged from? [place]   Does the patient have one of the following disease processes/diagnoses(primary or secondary)? Other   Week 1 attempt successful? No   Unsuccessful attempts Attempt 1  [UTR 5 contact numbers]            Cehlle ZALDIVAR - Registered Nurse

## 2024-10-29 ENCOUNTER — HOSPITAL ENCOUNTER (OUTPATIENT)
Dept: CT IMAGING | Facility: HOSPITAL | Age: 64
Discharge: HOME | End: 2024-10-29
Admitting: INTERNAL MEDICINE
Payer: MEDICARE

## 2024-10-29 DIAGNOSIS — C79.51 CANCER, METASTATIC TO BONE: ICD-10-CM

## 2024-10-29 DIAGNOSIS — C50.912 INVASIVE LOBULAR CARCINOMA OF BREAST, STAGE 4, LEFT: ICD-10-CM

## 2024-10-29 DIAGNOSIS — Z17.0 MALIGNANT NEOPLASM OF OVERLAPPING SITES OF LEFT BREAST IN FEMALE, ESTROGEN RECEPTOR POSITIVE: ICD-10-CM

## 2024-10-29 DIAGNOSIS — C50.812 MALIGNANT NEOPLASM OF OVERLAPPING SITES OF LEFT BREAST IN FEMALE, ESTROGEN RECEPTOR POSITIVE: ICD-10-CM

## 2024-10-29 PROCEDURE — 25510000001 IOPAMIDOL 61 % SOLUTION: Performed by: INTERNAL MEDICINE

## 2024-10-29 PROCEDURE — 71260 CT THORAX DX C+: CPT

## 2024-10-29 RX ORDER — IOPAMIDOL 612 MG/ML
100 INJECTION, SOLUTION INTRAVASCULAR
Status: COMPLETED | OUTPATIENT
Start: 2024-10-29 | End: 2024-10-29

## 2024-10-29 RX ADMIN — IOPAMIDOL 75 ML: 612 INJECTION, SOLUTION INTRAVENOUS at 15:04

## 2024-10-31 ENCOUNTER — READMISSION MANAGEMENT (OUTPATIENT)
Dept: CALL CENTER | Facility: HOSPITAL | Age: 64
End: 2024-10-31
Payer: MEDICARE

## 2024-10-31 NOTE — OUTREACH NOTE
Medical Week 1 Survey      Flowsheet Row Responses   [facility] patient discharged from? [place]   Does the patient have one of the following disease processes/diagnoses(primary or secondary)? Other   Week 1 attempt successful? Yes   Call start time 1005   Call end time 1014   Discharge diagnosis Acute UTI   Meds reviewed with patient/caregiver? Yes   Is the patient having any side effects they believe may be caused by any medication additions or changes? No   Does the patient have all medications ordered at discharge? Yes   Is the patient taking all medications as directed (includes completed medication regime)? Yes   Comments regarding appointments Urology appt on 11/1/24   Does the patient have a primary care provider?  Yes   Does the patient have an appointment with their PCP within 7 days of discharge? No   What is preventing the patient from scheduling follow up appointments within 7 days of discharge? Haven't had time   Nursing Interventions Advised patient to make appointment   Has the patient kept scheduled appointments due by today? N/A   Has home health visited the patient within 72 hours of discharge? N/A   Home health comments Pt reports she is in the referral process of starting an outpatient specilaty rehab   Psychosocial issues? No   Did the patient receive a copy of their discharge instructions? Yes   Nursing interventions Reviewed instructions with patient   What is the patient's perception of their health status since discharge? Improving   Is the patient/caregiver able to teach back the hierarchy of who to call/visit for symptoms/problems? PCP, Specialist, Home health nurse, Urgent Care, ED, 911 Yes   Week 1 call completed? Yes   Would this patient benefit from a Referral to Amb Social Work? No   Is the patient interested in additional calls from an ambulatory ? No   Graduated/Revoked comments Pt reports doing well today, no needs at this time   Call end time 1014             Karen S - Registered Nurse

## 2024-11-05 ENCOUNTER — SPECIALTY PHARMACY (OUTPATIENT)
Dept: PHARMACY | Facility: HOSPITAL | Age: 64
End: 2024-11-05
Payer: MEDICARE

## 2024-11-06 ENCOUNTER — TRANSCRIBE ORDERS (OUTPATIENT)
Dept: HOME HEALTH SERVICES | Facility: HOME HEALTHCARE | Age: 64
End: 2024-11-06
Payer: MEDICARE

## 2024-11-06 ENCOUNTER — TELEPHONE (OUTPATIENT)
Dept: ONCOLOGY | Facility: CLINIC | Age: 64
End: 2024-11-06

## 2024-11-06 DIAGNOSIS — N39.0 URINARY TRACT INFECTION WITHOUT HEMATURIA, SITE UNSPECIFIED: Primary | ICD-10-CM

## 2024-11-06 NOTE — TELEPHONE ENCOUNTER
Caller: Maritza Bejarano    Relationship: Self    Best call back number: [phone]     Caller requesting test results: YES    What test was performed: CT    When was the test performed: 10/29/24    Where was the test performed:  [facility]    Additional notes: PT WAS NOT ABLE TO COME TO HER APPT TODAY AND WOULD LIKE TO BE R/S AND SEE DR HARDIN.

## 2024-11-06 NOTE — TELEPHONE ENCOUNTER
Caller: Maritza Bejarano    Relationship to patient: Self    Best call back number: [phone]     Chief complaint: R/S    Type of visit: LAB/INJECTION    Requested date: PLEASE CALL PT TO R/S FOR AN AFTERNOON APPT, DOESN'T HAVE TO BE SAME DAY BUT NEEDS AFTERNOON.     If rescheduling, when is the original appointment: 11/11

## 2024-11-07 ENCOUNTER — TELEPHONE (OUTPATIENT)
Dept: ONCOLOGY | Facility: CLINIC | Age: 64
End: 2024-11-07
Payer: MEDICARE

## 2024-11-11 ENCOUNTER — OFFICE VISIT (OUTPATIENT)
Dept: ONCOLOGY | Facility: CLINIC | Age: 64
End: 2024-11-11
Payer: MEDICARE

## 2024-11-11 ENCOUNTER — APPOINTMENT (OUTPATIENT)
Dept: LAB | Facility: HOSPITAL | Age: 64
End: 2024-11-11
Payer: MEDICARE

## 2024-11-11 ENCOUNTER — LAB (OUTPATIENT)
Dept: LAB | Facility: HOSPITAL | Age: 64
End: 2024-11-11
Payer: MEDICARE

## 2024-11-11 ENCOUNTER — INFUSION (OUTPATIENT)
Dept: ONCOLOGY | Facility: HOSPITAL | Age: 64
End: 2024-11-11
Payer: MEDICARE

## 2024-11-11 VITALS
OXYGEN SATURATION: 98 % | WEIGHT: 168 LBS | HEIGHT: 60 IN | HEART RATE: 80 BPM | DIASTOLIC BLOOD PRESSURE: 91 MMHG | RESPIRATION RATE: 16 BRPM | TEMPERATURE: 97.7 F | SYSTOLIC BLOOD PRESSURE: 150 MMHG | BODY MASS INDEX: 32.98 KG/M2

## 2024-11-11 DIAGNOSIS — C79.51 CANCER, METASTATIC TO BONE: Primary | ICD-10-CM

## 2024-11-11 DIAGNOSIS — C50.812 MALIGNANT NEOPLASM OF OVERLAPPING SITES OF LEFT BREAST IN FEMALE, ESTROGEN RECEPTOR POSITIVE: ICD-10-CM

## 2024-11-11 DIAGNOSIS — C50.812 MALIGNANT NEOPLASM OF OVERLAPPING SITES OF LEFT BREAST IN FEMALE, ESTROGEN RECEPTOR POSITIVE: Primary | ICD-10-CM

## 2024-11-11 DIAGNOSIS — L89.109 PRESSURE INJURY OF SKIN OF BACK, UNSPECIFIED INJURY STAGE: ICD-10-CM

## 2024-11-11 DIAGNOSIS — Z17.0 MALIGNANT NEOPLASM OF OVERLAPPING SITES OF LEFT BREAST IN FEMALE, ESTROGEN RECEPTOR POSITIVE: ICD-10-CM

## 2024-11-11 DIAGNOSIS — C79.51 CANCER, METASTATIC TO BONE: ICD-10-CM

## 2024-11-11 DIAGNOSIS — Z17.0 MALIGNANT NEOPLASM OF OVERLAPPING SITES OF LEFT BREAST IN FEMALE, ESTROGEN RECEPTOR POSITIVE: Primary | ICD-10-CM

## 2024-11-11 LAB
ALBUMIN SERPL-MCNC: 3.8 G/DL (ref 3.5–5.2)
ALBUMIN/GLOB SERPL: 1.1 G/DL
ALP SERPL-CCNC: 113 U/L (ref 39–117)
ALT SERPL W P-5'-P-CCNC: 22 U/L (ref 1–33)
ANION GAP SERPL CALCULATED.3IONS-SCNC: 15.4 MMOL/L (ref 5–15)
AST SERPL-CCNC: 49 U/L (ref 1–32)
BASOPHILS # BLD AUTO: 0.05 10*3/MM3 (ref 0–0.2)
BASOPHILS NFR BLD AUTO: 0.6 % (ref 0–1.5)
BILIRUB SERPL-MCNC: 0.3 MG/DL (ref 0–1.2)
BUN SERPL-MCNC: 13 MG/DL (ref 8–23)
BUN/CREAT SERPL: 23.2 (ref 7–25)
CALCIUM SPEC-SCNC: 8.8 MG/DL (ref 8.6–10.5)
CHLORIDE SERPL-SCNC: 104 MMOL/L (ref 98–107)
CO2 SERPL-SCNC: 21.6 MMOL/L (ref 22–29)
CREAT SERPL-MCNC: 0.56 MG/DL (ref 0.57–1)
DEPRECATED RDW RBC AUTO: 63.1 FL (ref 37–54)
EGFRCR SERPLBLD CKD-EPI 2021: 103.3 ML/MIN/1.73
EOSINOPHIL # BLD AUTO: 0.13 10*3/MM3 (ref 0–0.4)
EOSINOPHIL NFR BLD AUTO: 1.6 % (ref 0.3–6.2)
ERYTHROCYTE [DISTWIDTH] IN BLOOD BY AUTOMATED COUNT: 19.4 % (ref 12.3–15.4)
GLOBULIN UR ELPH-MCNC: 3.4 GM/DL
GLUCOSE SERPL-MCNC: 125 MG/DL (ref 65–99)
HCT VFR BLD AUTO: 42.9 % (ref 34–46.6)
HGB BLD-MCNC: 13.5 G/DL (ref 12–15.9)
IMM GRANULOCYTES # BLD AUTO: 0.02 10*3/MM3 (ref 0–0.05)
IMM GRANULOCYTES NFR BLD AUTO: 0.3 % (ref 0–0.5)
LYMPHOCYTES # BLD AUTO: 1.18 10*3/MM3 (ref 0.7–3.1)
LYMPHOCYTES NFR BLD AUTO: 14.8 % (ref 19.6–45.3)
MAGNESIUM SERPL-MCNC: 2 MG/DL (ref 1.6–2.4)
MCH RBC QN AUTO: 27.7 PG (ref 26.6–33)
MCHC RBC AUTO-ENTMCNC: 31.5 G/DL (ref 31.5–35.7)
MCV RBC AUTO: 88.1 FL (ref 79–97)
MONOCYTES # BLD AUTO: 0.5 10*3/MM3 (ref 0.1–0.9)
MONOCYTES NFR BLD AUTO: 6.3 % (ref 5–12)
NEUTROPHILS NFR BLD AUTO: 6.07 10*3/MM3 (ref 1.7–7)
NEUTROPHILS NFR BLD AUTO: 76.4 % (ref 42.7–76)
NRBC BLD AUTO-RTO: 0 /100 WBC (ref 0–0.2)
PHOSPHATE SERPL-MCNC: 2.5 MG/DL (ref 2.5–4.5)
PLATELET # BLD AUTO: 334 10*3/MM3 (ref 140–450)
PMV BLD AUTO: 9 FL (ref 6–12)
POTASSIUM SERPL-SCNC: 3.9 MMOL/L (ref 3.5–5.2)
PROT SERPL-MCNC: 7.2 G/DL (ref 6–8.5)
RBC # BLD AUTO: 4.87 10*6/MM3 (ref 3.77–5.28)
SODIUM SERPL-SCNC: 141 MMOL/L (ref 136–145)
WBC NRBC COR # BLD AUTO: 7.95 10*3/MM3 (ref 3.4–10.8)

## 2024-11-11 PROCEDURE — 80053 COMPREHEN METABOLIC PANEL: CPT

## 2024-11-11 PROCEDURE — 25010000002 DENOSUMAB 120 MG/1.7ML SOLUTION: Performed by: INTERNAL MEDICINE

## 2024-11-11 PROCEDURE — 84100 ASSAY OF PHOSPHORUS: CPT

## 2024-11-11 PROCEDURE — 36415 COLL VENOUS BLD VENIPUNCTURE: CPT

## 2024-11-11 PROCEDURE — 85025 COMPLETE CBC W/AUTO DIFF WBC: CPT

## 2024-11-11 PROCEDURE — 83735 ASSAY OF MAGNESIUM: CPT

## 2024-11-11 PROCEDURE — 96372 THER/PROPH/DIAG INJ SC/IM: CPT

## 2024-11-11 RX ADMIN — DENOSUMAB 120 MG: 120 INJECTION SUBCUTANEOUS at 14:02

## 2024-11-11 NOTE — PROGRESS NOTES
Subjective   Maritza Bejarano is a 64 y.o. female.   With metastatic invasive lobular carcinoma, ER/KS strongly positive cancer with metastatic disease to the bones.    History of Present Illness   Maritza returns today for follow-up.  She was seen in the clinic in late September and recommended to start back on Ribociclib.  She was readmitted to the hospital from 10/17/2024 to 10/23/2024 for recurrent UTI with sepsis.  She was treated with IV antibiotics.  It was recommended to hold the Ribociclib.  She reports feeling weak.  She has not yet started the steroids which was planned by her neurologist .   She plans to start physical therapy tomorrow.  She is concerned about the fact that she has not been able to receive much Ribociclib and had overall received only 1 cycle.   She is also worried about the lytic sclerotic lesion of the rib with seems to have gotten bigger.  She lost her dog and she is extremely tearful today about that.  Her Sister Shana joined the conversation via telephone.  Maritza is also reporting a decubitus ulcer starting on her back.  [facility]    Oncologic history:    Patient is a 63-year-old postmenopausal lady who has not had a mammogram in 4 years presented with a screen detected abnormality.  She had a left breast biopsy in 2014 which was benign.  Denies palpating any breast masses skin changes or nipple discharge prior to the abnormal mammogram.  She does have left nipple inversion.    Patient has a longstanding diagnosis of multiple sclerosis and has not been on any treatment.  She was previously seen by Dr. Ozzy France and now being seen by Dr. Soto with neurology.  She has been nonambulatory for the past 4 years and requires a wheelchair.  She is unable to transfer herself in and out of wheelchairs and her  has to lift her for all the transfers.  She is also incontinent of the bladder and requiring a suprapubic catheter placed by urology to help with  the same.  She had a bladder stimulator in the past which was turned off.  Other comorbidities include hypertension and hyperlipidemia.      Family history significant for maternal great grandmother with breast cancer, maternal great aunt and mother with breast cancer in her 70s.  Denies any family history of ovarian cancer, pancreatic cancer or melanoma.    5/21/2024-bilateral screening mammogram  Finding 1.new nipple retraction along with diffuse skin and trabecular thickening of the left breast.  There is suggestion of subtle architectural distortion seen on the MLO projection in the posterior central region.  3 biopsy markers are noted.  No new suspicious calcifications.  Send finding 2.few axillary lymph node seen in the left breast which display interval increase in size and density.    Finding 3.focal asymmetry measuring 10 mm seen in the anterior one third of the right breast in the medial subareolar region.    Impression  Finding 1.new nipple retraction, skin and trabecular thickening in the left breast requires additional evaluation.  There is also question architectural distortion in the posterior central breast seen on the MLO projection.  Diagnostic mammogram to include repeat full-field CC with additional posterior tissue and complete breast ultrasound is recommended.  Finding 2.axillary lymph nodes in the left breast require additional evaluation, ultrasound recommended.  Finding 3.focal asymmetry in the right breast requires additional evaluation.  Diagnostic mammogram and limited breast ultrasound is recommended.    5/29/2024-bilateral diagnostic mammogram and ultrasound  Finding 1.4 0.7 x 5.6 x 6.8 cm developing asymmetry with associated nipple retraction, skin thickening and trabecular thickening of the left breast in the central region, inferior region in the upper outer region and the lower outer region.  Finding 2.axillary lymph node in the left breast.  Finding 3.suspected finding completely  resolves upon further evaluation representing benign fibroglandular breast tissue.    Bilateral breast ultrasound  Finding 1.nonparallel hypoechoic mass with indistinct margins and skin thickening and internal vascularity in the left breast in the central region, inferior region, upper outer region and in the lower outer region.  The finding is palpable and appears to involve all 4 quadrants.  Most discrete portion is located at 130, 3 cm from the nipple, skin thickening up to 6 mm.  Send finding 2.rounded enlarged left axillary lymph node measuring 11 mm.  Cortical thickening of 9 mm present.    Finding 3.no sonographic correlate.  Send finding 4.enlarged right axillary lymph node measuring 14 mm.  Cortical thickening of 5 mm.    Impression  Finding 1.ultrasound-guided biopsy recommended  Finding 2.ultrasound-guided biopsy recommended  Finding 3.previously described right breast asymmetry resolves  Finding 4.ultrasound-guided biopsy recommended.    Ultrasound-guided biopsy of the left breast and left axilla lymph node and right axilla lymph node    1.left breast  Invasive lobular carcinoma with crush artifact  Grade 2  Invasive carcinoma measures 8.5 mm  No lymphovascular space invasion  ER +91 to 100% strong  IL +31 to 40% moderate  HER2 negative, 0  Ki-67 35%    2.left axillary lymph node focus of metastatic Dastech lobular carcinoma measuring 10 mm  ER +91 to 100% strong  IL +21 to 30%  HER2 -0  Ki-67 30%    3.right axillary lymph node with a focus of metastatic carcinoma measuring 4 mm.  Grade 2.  ER +91 to 100% strong  IL +11 to 20% moderate  HER2 negative, 0  Ki-67 35%    Pathology of all the 3 sites appear similar and findings could represent left breast lobular carcinoma metastatic to the right as well as left axillary lymph nodes.    6/14/2024-CT of the chest abdomen and pelvis  1.large ill-defined infiltrative central left breast mass measuring 6.7 x 4 cm, medially there is an irregular 2.5 x 2.5 cm mass.   Significant thickening of the skin in the left breast and there is left axilla lymphadenopathy.  Left axilla lymph node measures 1.3 x 1.3 cm.  There is also a new enlarged right axillary lymph node measuring 1.3 x 1 cm.  All of the subcentimeter right axillary lymph nodes are slightly larger than previous.  2.no mediastinal hilar or internal mammary chain lymphadenopathy  3.new indeterminate mixed density measuring 1.3 x 1.2 cm right apical pulmonary nodule.  Follow-up recommended.  4.pleural parenchymal thickening and nodules inferiorly and posterior to the right upper lobe are stable.  Chronic scarring and subsegmental atelectatic change in both lower lobes.    CT abdomen pelvis  1.moderate fatty infiltration of the liver.  No suspicious liver lesions.  2.moderate-sized paraesophageal hernia.  No acute bowel abnormality.  3.right sacral stimulator noted at the S3 neuroforamina.  No suspicious bone lesions are noted.    6/14/2024-bone scan  1.focal abnormal uptake in the left anterior inferior iliac spine correlating with lucent lesion on the CT concerning for metastatic disease.  Further evaluation with MRI of the pelvis and left hip could be performed.  2.focal uptake in the left frontal calvarium again concerning for metastatic disease.  Noncontrast CT or MRI could be obtained.  3.soft tissue uptake noted in the left breast at the site of known left breast cancer.  4.changes from arthroplasty on the right shoulder.  5.multifocal likely degenerative uptake in each knee, ankle and foot and increased uptake in the medial and patellofemoral cortex of the right knee could be posttraumatic.      Invitae 9 gene stat panel negative.    MRI of the brain which showed T2 hyperintensity involving the frontal bone measuring 1.6 cm in size and a smaller area of enhancement in the frontal bone laterally and inferiorly concerning for metastasis.  No brain mets    MRI of the hip and pelvis showed multiple enhancing bone  lesions consistent with metastatic disease to the sacrum and pelvis.  Dominant lesion in the anterior inferior left pelvic spine and rectus femoris muscle origin that correlates with the site of bone scan uptake.  She has some right joint hip joint effusion.  Her CA 15-3 16.2    She was seen by Dr. Saavedra on 7/16/2024 and recommended to start on Ribociclib along with anastrozole.    Started Ribociclib in the first week of August 2024    Had profound nausea and vomiting requiring antiemetics.  Completed 3 weeks of Ribociclib on 8/30/2024.    Hospitalized from 9/11/2024 to 9/16/2024 for pneumonia and KINZA and since then Ribociclib has been on hold    Readmitted to the hospital from 10/17/2024 to 10/23/2024 requiring holding Ribociclib.  She was admitted for UTI with sepsis.    10/29/2024-CT of the chest which was compared to the CT chest from 6/14/2024-stable 1.3 cm subsolid nodule in the right lung apex.  Decrease in size of the left axillary lymph nodes.  Ill-defined left breast mass appears unchanged.  Expansile lytic and sclerotic lesion in the posterior left 10th rib which appears increased compared to the prior CT.  Stable subtle area of sclerosis in the left anterior third rib.    10/3/2024-MRI of the cervical spine-rounded area of marrow replacement in C7 suspicious for metastatic disease.    10/3/2024-MRI of the thoracic spine-suspicious patchy areas of marrow replacement in the thoracic spine at T5, T6, T7, T11, T12, L1 suspicious for metastatic disease.      The following portions of the patient's history were reviewed and updated as appropriate: allergies, current medications, past family history, past medical history, past social history, past surgical history, and problem list.    Past Medical History:   Diagnosis Date    Anemia 2024    `Treated with iron    Dawn esophagus     per patient    Blurred vision     R/T MS    Breast cancer 05/2024+++++    Carpal tunnel syndrome     Clotting disorder 1993,  2003, 2012    3 g/i bleeds w/ transfus/ions    Colon polyp 2013    removed w/ colonoscopy    Deep vein thrombosis phlebitis 1980    Depression     Diplopia 2013    GERD (gastroesophageal reflux disease)     GI (gastrointestinal bleed) 3 bleeds    2 transfusions    H/O Skin cancer, basal cell     Headache     History of blood transfusion     History of GI bleed     R/T NSAIDS AND STEROIDS, multiple times    History of urinary tract infection     Hypercalcemia     s/p parathyroidectomy    Hyperlipidemia     Hypertension     Movement disorder     Multiple sclerosis     Optic neuritis     PONV (postoperative nausea and vomiting)         Past Surgical History:   Procedure Laterality Date    APPENDECTOMY      BLADDER SURGERY      bladder stimulator    BREAST BIOPSY  don't remember    BREAST SURGERY      augmentation wtih subsequent removal    CARPAL TUNNEL RELEASE Bilateral     Left 2018, right 2020    CUBITAL TUNNEL RELEASE Left     CYSTOSCOPY BOTOX INJECTION OF BLADDER  2018    Cystoscopy with Botox    FRACTURE SURGERY  2019    rt shoulder    PARATHYROIDECTOMY      one gland removed    ROTATOR CUFF REPAIR Right 2017    TOE SURGERY      bilateral great toes    TOTAL SHOULDER ARTHROPLASTY W/ DISTAL CLAVICLE EXCISION Right 10/22/2018    Procedure: RT TOTAL SHOULDER REVERSE ARTHROPLASTY;  Surgeon: Bipin Dangelo MD;  Location: [facility];  Service: Orthopedics        Family History   Problem Relation Age of Onset    Hypertension Mother     Hyperlipidemia Mother     Aortic aneurysm Mother         thoracic    Diabetes Mother     Hypertension Father         charissa heart failure    Heart failure Father     Hyperlipidemia Father     Miscarriages / Stillbirths Sister     Multiple sclerosis Brother     Atrial fibrillation Brother     Diabetes Maternal Grandfather     Stroke Maternal Grandfather     Parkinsonism Paternal Grandmother     Tremor Paternal Grandmother     Stomach cancer Paternal Grandfather     Diabetes Maternal  Grandmother         Social History     Socioeconomic History    Marital status:      Spouse name: Donald    Number of children: 0   Tobacco Use    Smoking status: Former     Current packs/day: 0.00     Average packs/day: 2.0 packs/day for 30.0 years (60.0 ttl pk-yrs)     Types: Cigarettes     Start date: 10/22/1973     Quit date: 10/22/2003     Years since quittin.0    Smokeless tobacco: Never   Vaping Use    Vaping status: Never Used   Substance and Sexual Activity    Alcohol use: Not Currently    Drug use: Not Currently     Types: Marijuana     Comment: advised by neurologist for sleep    Sexual activity: Not Currently     Partners: Male     Birth control/protection: Post-menopausal        OB History    No obstetric history on file.     Age at menarche-15  Age at first live childbirth-not applicable   2 para 0  0  Age of menopause-57  No use of hormone replacement therapy      No Known Allergies         Review of Systems   Constitutional:  Positive for activity change and fatigue.   HENT: Negative.     Eyes: Negative.    Respiratory:  Positive for shortness of breath.    Cardiovascular: Negative.    Gastrointestinal: Negative.    Endocrine: Negative.    Genitourinary:  Positive for urinary incontinence.   Musculoskeletal: Negative.    Skin: Negative.    Allergic/Immunologic: Negative.    Neurological:  Positive for weakness.   Psychiatric/Behavioral: Negative.       Review of systems as mentioned in the HPI otherwise negative    Objective   not currently breastfeeding.   Physical Exam  Vitals reviewed.   Constitutional:       Appearance: Normal appearance. She is normal weight.   HENT:      Right Ear: External ear normal.      Left Ear: External ear normal.      Nose: Nose normal.      Mouth/Throat:      Pharynx: Oropharynx is clear.   Eyes:      Conjunctiva/sclera: Conjunctivae normal.   Cardiovascular:      Rate and Rhythm: Normal rate.   Pulmonary:      Effort: Pulmonary effort is  normal.   Abdominal:      General: Abdomen is flat.   Musculoskeletal:         General: Normal range of motion.      Cervical back: Normal range of motion.   Skin:     General: Skin is warm.   Neurological:      General: No focal deficit present.      Mental Status: She is alert and oriented to person, place, and time.   Psychiatric:         Mood and Affect: Mood normal.         Behavior: Behavior normal.         Thought Content: Thought content normal.         Judgment: Judgment normal.     Breast Exam: Right breast appears normal on inspection.  No palpable right axilla lymphadenopathy or right breast masses.  Left breast on inspection there is nipple retraction.  On palpation there is a 13 x 9 cm mass in the retroareolar region pretty much occupying the whole breast.  There is palpable left axillary lymphadenopathy.    I have reexamined the patient and the results are consistent with the previously documented exam. Zainab Lopes MD      Lab on 11/11/2024   Component Date Value Ref Range Status    WBC 11/11/2024 7.95  3.40 - 10.80 10*3/mm3 Final    RBC 11/11/2024 4.87  3.77 - 5.28 10*6/mm3 Final    Hemoglobin 11/11/2024 13.5  12.0 - 15.9 g/dL Final    Hematocrit 11/11/2024 42.9  34.0 - 46.6 % Final    MCV 11/11/2024 88.1  79.0 - 97.0 fL Final    MCH 11/11/2024 27.7  26.6 - 33.0 pg Final    MCHC 11/11/2024 31.5  31.5 - 35.7 g/dL Final    RDW 11/11/2024 19.4 (H)  12.3 - 15.4 % Final    RDW-SD 11/11/2024 63.1 (H)  37.0 - 54.0 fl Final    MPV 11/11/2024 9.0  6.0 - 12.0 fL Final    Platelets 11/11/2024 334  140 - 450 10*3/mm3 Final    Neutrophil % 11/11/2024 76.4 (H)  42.7 - 76.0 % Final    Lymphocyte % 11/11/2024 14.8 (L)  19.6 - 45.3 % Final    Monocyte % 11/11/2024 6.3  5.0 - 12.0 % Final    Eosinophil % 11/11/2024 1.6  0.3 - 6.2 % Final    Basophil % 11/11/2024 0.6  0.0 - 1.5 % Final    Immature Grans % 11/11/2024 0.3  0.0 - 0.5 % Final    Neutrophils, Absolute 11/11/2024 6.07  1.70 - 7.00 10*3/mm3 Final     Lymphocytes, Absolute 11/11/2024 1.18  0.70 - 3.10 10*3/mm3 Final    Monocytes, Absolute 11/11/2024 0.50  0.10 - 0.90 10*3/mm3 Final    Eosinophils, Absolute 11/11/2024 0.13  0.00 - 0.40 10*3/mm3 Final    Basophils, Absolute 11/11/2024 0.05  0.00 - 0.20 10*3/mm3 Final    Immature Grans, Absolute 11/11/2024 0.02  0.00 - 0.05 10*3/mm3 Final    nRBC 11/11/2024 0.0  0.0 - 0.2 /100 WBC Final   No results displayed because visit has over 200 results.      Lab on 10/14/2024   Component Date Value Ref Range Status    Glucose 10/14/2024 111 (H)  65 - 99 mg/dL Final    BUN 10/14/2024 18  8 - 23 mg/dL Final    Creatinine 10/14/2024 0.57  0.57 - 1.00 mg/dL Final    Sodium 10/14/2024 139  136 - 145 mmol/L Final    Potassium 10/14/2024 3.7  3.5 - 5.2 mmol/L Final    Chloride 10/14/2024 102  98 - 107 mmol/L Final    CO2 10/14/2024 27.8  22.0 - 29.0 mmol/L Final    Calcium 10/14/2024 9.3  8.6 - 10.5 mg/dL Final    Total Protein 10/14/2024 7.0  6.0 - 8.5 g/dL Final    Albumin 10/14/2024 4.0  3.5 - 5.2 g/dL Final    ALT (SGPT) 10/14/2024 27  1 - 33 U/L Final    AST (SGOT) 10/14/2024 32  1 - 32 U/L Final    Alkaline Phosphatase 10/14/2024 135 (H)  39 - 117 U/L Final    Total Bilirubin 10/14/2024 0.2  0.0 - 1.2 mg/dL Final    Globulin 10/14/2024 3.0  gm/dL Final    A/G Ratio 10/14/2024 1.3  g/dL Final    BUN/Creatinine Ratio 10/14/2024 31.6 (H)  7.0 - 25.0 Final    Anion Gap 10/14/2024 9.2  5.0 - 15.0 mmol/L Final    eGFR 10/14/2024 103.5  >60.0 mL/min/1.73 Final    Magnesium 10/14/2024 2.4  1.6 - 2.4 mg/dL Final    Phosphorus 10/14/2024 3.0  2.5 - 4.5 mg/dL Final    WBC 10/14/2024 11.42 (H)  3.40 - 10.80 10*3/mm3 Final    RBC 10/14/2024 4.64  3.77 - 5.28 10*6/mm3 Final    Hemoglobin 10/14/2024 12.7  12.0 - 15.9 g/dL Final    Hematocrit 10/14/2024 41.2  34.0 - 46.6 % Final    MCV 10/14/2024 88.8  79.0 - 97.0 fL Final    MCH 10/14/2024 27.4  26.6 - 33.0 pg Final    MCHC 10/14/2024 30.8 (L)  31.5 - 35.7 g/dL Final    RDW 10/14/2024 21.4  (H)  12.3 - 15.4 % Final    RDW-SD 10/14/2024 68.2 (H)  37.0 - 54.0 fl Final    MPV 10/14/2024 9.3  6.0 - 12.0 fL Final    Platelets 10/14/2024 294  140 - 450 10*3/mm3 Final    Neutrophil % 10/14/2024 74.2  42.7 - 76.0 % Final    Lymphocyte % 10/14/2024 16.0 (L)  19.6 - 45.3 % Final    Monocyte % 10/14/2024 5.7  5.0 - 12.0 % Final    Eosinophil % 10/14/2024 2.5  0.3 - 6.2 % Final    Basophil % 10/14/2024 0.5  0.0 - 1.5 % Final    Immature Grans % 10/14/2024 1.1 (H)  0.0 - 0.5 % Final    Neutrophils, Absolute 10/14/2024 8.47 (H)  1.70 - 7.00 10*3/mm3 Final    Lymphocytes, Absolute 10/14/2024 1.83  0.70 - 3.10 10*3/mm3 Final    Monocytes, Absolute 10/14/2024 0.65  0.10 - 0.90 10*3/mm3 Final    Eosinophils, Absolute 10/14/2024 0.29  0.00 - 0.40 10*3/mm3 Final    Basophils, Absolute 10/14/2024 0.06  0.00 - 0.20 10*3/mm3 Final    Immature Grans, Absolute 10/14/2024 0.12 (H)  0.00 - 0.05 10*3/mm3 Final    nRBC 10/14/2024 0.0  0.0 - 0.2 /100 WBC Final        CT Chest With Contrast Diagnostic    Result Date: 11/2/2024  1. Stable 1.3 cm subsolid nodule in the right lung apex. Continued follow-up is recommended 2. Decrease in size of left axillary lymph nodes 3. Ill-defined left breast mass does not appear significantly changed although difficult to accurately measure 4. There is an expansile mixed lytic and sclerotic lesion of the posterior left 10th rib that appears increased compared with the prior CT. There is a stable subtle area of sclerosis left anterior third rib    Radiation dose reduction techniques were utilized, including automated exposure control and exposure modulation based on body size.   This report was finalized on 11/2/2024 10:33 AM by Dr. James Carlos M.D on Workstation: [id number]      CT Abdomen Pelvis Without Contrast    Result Date: 10/17/2024  While the urinary bladder is decompressed with a Cobos catheter, there is extensive perivesical stranding, as well as periureteral and perinephric  stranding. Appearance is concerning for cystitis and ascending urinary tract infection.  Radiation dose reduction techniques were utilized, including automated exposure control and exposure modulation based on body size.   This report was finalized on 10/17/2024 11:48 PM by Dr. Yisel Shafer M.D on Workstation: [id number]            Assessment & Plan       *Left breast invasive lobular carcinoma  The tumor is estrogen receptor +91 to 100%, progesterone receptor +31 to 40%, HER2 negative, 0, Ki-67 35%  The tumor measures greater than 10 cm on exam, lymph node positive.  CT of the chest abdomen and pelvis with right upper lobe pulmonary nodule which is new and the bone scan also showsUptake in the left anterior inferior iliac spine which correlates to a lucent area on the CT scan and also focal uptake noted in the left frontal calvarium concerning for metastatic disease.  Further evaluation with a MRI of the pelvis and hip as well as MRI of the brain is underway.  The scans are scheduled for 7/10/2024.  Clinical T3 N1 M1, stage IV invasive lobular carcinoma.  There is also a right axillary lymph node which has been biopsied and consistent with invasive lobular carcinoma with similar morphology as well as receptors concerning for metastatic disease rather than a primary right breast cancer.  Recommended Ribociclib 400 mg 3 weeks on and 1 week off.  July 16, 2024: Reviewed MRI of the pelvis and hip consistent with many bony metastasis.  MRI brain shows left frontal bone mets but no brain involvement.  Patient is here to start day 1 ribociclib along with anastrozole.  Continues on anastrozole.  Tempus performed on the left axilla lymph node biopsy shows PIK3CA mutation, CDH 1 mutation and Erb B2 mutation.  Therefore patient would benefit from alpelisib and Faslodex after progression on Ribociclib and AI.  Other options include neratinib plus Faslodex.  Initiated Ribociclib in August 2024.  8/30/2024-last day of  week 3 of Ribociclib.    Patient completed 1 cycle of Ribociclib 400 mg and subsequently has not been able to go back on Ribociclib due to recurrent hospitalizations and abnormal LFTs  10/14/2024-LFTs are normal today.  Recommend resuming Ribociclib at 200 mg and subsequently we will increase the dose to 400 mg with the next cycle.  Patient was unable to resume Ribociclib as she was admitted to the hospital from 10/17/2024 to 10/23/2024  11/11/2024-Labs reviewed and overall stable except for an ALT of 49.  Recommend resuming Ribociclib at 200 mg.  CT of the chest performed 10/29/2024 shows stable size of the left breast mass, decrease in size of the left axillary lymph node and mixed lytic and blastic lesion of the rib slightly increased in size.  Reinitiate Ribociclib at 200 mg daily 3 out of 4 weeks, labs in 2 weeks  Patient is interested in starting ivermectin which she believes helps with breast cancer.  I recommend again starting ivermectin however patient tearful and feels like she has not been able to get any consistent treatment.  Reviewed the interactions with Ribociclib and none.  Recommend that she discuss with her neurologist regarding possible neurological side effects from  ivermectin.      *Right axillary lymphadenopathy  Biopsied and consistent with invasive lobular carcinoma, grade 2, ER/CA positive and HER2 negative  Patient initiated on AI and Ribociclib  Please refer to the above discussion for details    *Right breast non-mass enhancement  6/28/2024-MRI of the breast with non-mass enhancement noted in the right breast and patient had questions regarding if this should be biopsied.  Given extensive metastatic disease in the bones and right axilla lymph node consistent with invasive lobular carcinoma management would not change with the biopsy and hence I would recommend against it.    *Severe nausea  Secondary to Ribociclib  Improved with Compazine and Zofran.  Recommend using the same regimen  once she starts back on Ribociclib.    *Skeletal metastasis  Patient will be started on Xgeva  8/16/2024 Xgeva initiation will be held as the patient has not been to the dentist in over 2 years.  Discussed possible side effects of Xgeva and she will schedule a dental visit and we will have her back in 1 week to initiate Xgeva pending this is complete.  10/14/2024-second dose of Xgeva will be administered.  Labs reviewed and stable to proceed.  11/11/2024-scheduled for Xgeva, proceed with the same.    *Multiple sclerosis  Patient was not on any treatment previously.  She sees Dr. Jacobson at [place] [facility].  Due to profound weakness patient requested her neurologist to start steroids.  They plan to initiate high-dose IV steroids to help with this.  High-dose IV steroids have not been initiated.  Patient reports that she starts physical therapy tomorrow and plans to concurrently start high-dose IV steroids.    *Hypertension-continue current medication  Blood pressure 150/91  Continue to monitor  No changes in medications      Hyperlipidemia-continue current medication  No changes      *Urinary incontinence-patient had  a suprapubic catheter placed by urology.      *History of iron deficiency anemia-  3/8/2024 hemoglobin was low at 10.9 and subsequently patient took oral iron which resulted in improvement of hemoglobin and normal.  She was scheduled for colonoscopy however she canceled that due to ongoing management of breast cancer.  She proceed with a colonoscopy.  8/16/2024 patient's hemoglobin is 10.2 today.  She states she recently was told to begin daily iron supplementation.  Baseline iron studies ordered today she is only been taking the iron for a few days.  Ferritin 32, iron saturation 5%, TIBC 440.  We will repeat this in 6 to 8 weeks.  Continue oral iron    *Genetic testing-9 gene stat panel negative      *Dyspnea  CT chest shows some bibasilar atelectasis/pneumonia  CT chest from June 2024 without any  evidence of metastatic disease  CT of the chest 10/29/2024-images reviewed and interpreted by me in detail summarized above     *Right upper lobe pulmonary nodule   Concerning for metastatic disease  PET/CT may not be helpful as invasive lobular carcinomas typically are not very PET avid.  Could consider PET-FES    *Follow-up-after the MRIs.  Reviewed MRI of the pelvis and MRI of the brain which shows mets in the bone  Reviewed MRI of the cervical and thoracic spine performed at outside facility on 10/3/2024    *Abnormal LFTs  Likely secondary to Ribociclib  Will start at a lower dose of 200 mg daily and subsequently increased to 400 mg if LFTs remain stable  LFTs from level Víctor 2024 stable    Plan  Resume Ribociclib at 200 mg for the second cycle and subsequent increase to 400 mg of LFT stay stable  Third dose of Xgeva   Continue Compazine for nausea induced by Ribociclib  Not using Zofran  Does not wish to start Zyprexa  Continue daily oral ferrous sulfate.    Xgeva in 4 weeks  CBC CMP and MD in 2 weeks    Patient continues high risk medication requiring frequent monitoring.  Recent hospitalization requiring extensive review of medical records, discussed with patient Sister Shana.  Reviewed CT of the chest images independently and interpreted them independently.70 minutes total spent on the encounter including all of the above activities along with documentation on the same day.    Zainab Lopes MD

## 2024-11-12 ENCOUNTER — SPECIALTY PHARMACY (OUTPATIENT)
Dept: PHARMACY | Facility: HOSPITAL | Age: 64
End: 2024-11-12
Payer: MEDICARE

## 2024-11-12 ENCOUNTER — PATIENT OUTREACH (OUTPATIENT)
Dept: OTHER | Facility: HOSPITAL | Age: 64
End: 2024-11-12
Payer: MEDICARE

## 2024-11-12 NOTE — PROGRESS NOTES
Review chart.  Patient with metastatic breast cancer, MS.  IP 10/17-10/23/24 for UTI; saw Dr. Lopes yesterday. Resumed Ribociclib at 200 mg daily 3 out of 4 weeks. Sees Dr. Lopes 11/25.    Called patient. Left message that I work with Brittney, breast navigator. I was just checking in to see how she is doing and if she has any ongoing needs. Requested call back. Left my number and Brittney's

## 2024-11-12 NOTE — PROGRESS NOTES
Specialty Pharmacy Note: Kisqali (ribociclib)    Maritza Bejarano is a 64 y.o. female with breast cancer was seen 11/11/24 by Dr. Lopes. Per provider dictation, no changes to oral oncology regimen Kisqali (ribociclib).  Labs Review: The CMP and CBC from 11/11/24 have been reviewed. No dose adjustments are needed for the oral specialty medication(s) based on the labs.    Specialty pharmacy will continue to follow patient.    Alyssa Rich, PharmD, BCPS  11/12/2024  13:01 EST

## 2024-11-15 ENCOUNTER — SPECIALTY PHARMACY (OUTPATIENT)
Dept: PHARMACY | Facility: HOSPITAL | Age: 64
End: 2024-11-15
Payer: MEDICARE

## 2024-11-15 NOTE — PROGRESS NOTES
I spoke with pt regarding her Kisqali supply. She states she has restarted on 11/13/2024 and she started with a full supply.    We agreed to talk again in a couple weeks to coordinate the next delivery.    [place], Pharmacy Technician  11/15/24  14:45 EST

## 2024-11-18 ENCOUNTER — PATIENT OUTREACH (OUTPATIENT)
Dept: OTHER | Facility: HOSPITAL | Age: 64
End: 2024-11-18
Payer: MEDICARE

## 2024-11-22 ENCOUNTER — TELEPHONE (OUTPATIENT)
Dept: ONCOLOGY | Facility: CLINIC | Age: 64
End: 2024-11-22
Payer: MEDICARE

## 2024-11-22 DIAGNOSIS — L89.109 PRESSURE INJURY OF SKIN OF BACK, UNSPECIFIED INJURY STAGE: Primary | ICD-10-CM

## 2024-11-22 NOTE — TELEPHONE ENCOUNTER
Spoke with Maritza by phone regarding the referral for wound care. I explained that I did speak with the wound care clinic and they do have all the appropriate equipment to help get her out of her wheelchair to evaluate her wound.  I did contact [facility] and they cannot accept her due to staffing.  We discussed the role of the wound care clinic and if she is able to get there, I feel this is the most appropriate place to start.  She was agreeable to this.  I contacted the Wound care clinic, spoke with Vijaya and she will call Maritza to get her scheduled.

## 2024-11-22 NOTE — TELEPHONE ENCOUNTER
Caller: Maritza Bejarano    Relationship: Self    Best call back number: [phone]    What is the best time to reach you: ANYTIME ASAP     PLEASE LEAVE A DETAILED VOICEMAIL MESSAGE IF UNABLE TO REACH     Who are you requesting to speak with (clinical staff, provider,  specific staff member): ERIC       What was the call regarding:     HAD BEEN WORKING WITH ERIC, SHE WAS WORKING ON GETTING HOME HEALTH WOUND CARE,     HAS BEEN CALLED TWICE BY COMPANY FOR OUTPATIENT WOUND CARE  BUT IS NEEDING WOUND CARE FOR AT HOME     NEEDING TO SPEAK WITH ERIC ON THIS.     Is it okay if the provider responds through [employer]: NO

## 2024-11-25 ENCOUNTER — LAB (OUTPATIENT)
Dept: LAB | Facility: HOSPITAL | Age: 64
End: 2024-11-25
Payer: MEDICARE

## 2024-11-25 ENCOUNTER — OFFICE VISIT (OUTPATIENT)
Dept: ONCOLOGY | Facility: CLINIC | Age: 64
End: 2024-11-25
Payer: MEDICARE

## 2024-11-25 VITALS
OXYGEN SATURATION: 97 % | RESPIRATION RATE: 16 BRPM | SYSTOLIC BLOOD PRESSURE: 126 MMHG | TEMPERATURE: 98.2 F | WEIGHT: 170 LBS | BODY MASS INDEX: 33.38 KG/M2 | HEART RATE: 75 BPM | DIASTOLIC BLOOD PRESSURE: 80 MMHG | HEIGHT: 60 IN

## 2024-11-25 DIAGNOSIS — Z17.0 MALIGNANT NEOPLASM OF OVERLAPPING SITES OF LEFT BREAST IN FEMALE, ESTROGEN RECEPTOR POSITIVE: ICD-10-CM

## 2024-11-25 DIAGNOSIS — C50.812 MALIGNANT NEOPLASM OF OVERLAPPING SITES OF LEFT BREAST IN FEMALE, ESTROGEN RECEPTOR POSITIVE: ICD-10-CM

## 2024-11-25 DIAGNOSIS — C79.51 CANCER, METASTATIC TO BONE: Primary | ICD-10-CM

## 2024-11-25 LAB
ALBUMIN SERPL-MCNC: 3.6 G/DL (ref 3.5–5.2)
ALBUMIN/GLOB SERPL: 1.2 G/DL
ALP SERPL-CCNC: 115 U/L (ref 39–117)
ALT SERPL W P-5'-P-CCNC: 39 U/L (ref 1–33)
ANION GAP SERPL CALCULATED.3IONS-SCNC: 10.9 MMOL/L (ref 5–15)
AST SERPL-CCNC: 65 U/L (ref 1–32)
BASOPHILS # BLD AUTO: 0.05 10*3/MM3 (ref 0–0.2)
BASOPHILS NFR BLD AUTO: 1 % (ref 0–1.5)
BILIRUB SERPL-MCNC: 0.3 MG/DL (ref 0–1.2)
BUN SERPL-MCNC: 17 MG/DL (ref 8–23)
BUN/CREAT SERPL: 23.3 (ref 7–25)
CALCIUM SPEC-SCNC: 9.3 MG/DL (ref 8.6–10.5)
CHLORIDE SERPL-SCNC: 106 MMOL/L (ref 98–107)
CO2 SERPL-SCNC: 24.1 MMOL/L (ref 22–29)
CREAT SERPL-MCNC: 0.73 MG/DL (ref 0.57–1)
DEPRECATED RDW RBC AUTO: 58.7 FL (ref 37–54)
EGFRCR SERPLBLD CKD-EPI 2021: 93.1 ML/MIN/1.73
EOSINOPHIL # BLD AUTO: 0.11 10*3/MM3 (ref 0–0.4)
EOSINOPHIL NFR BLD AUTO: 2.2 % (ref 0.3–6.2)
ERYTHROCYTE [DISTWIDTH] IN BLOOD BY AUTOMATED COUNT: 18 % (ref 12.3–15.4)
GLOBULIN UR ELPH-MCNC: 2.9 GM/DL
GLUCOSE SERPL-MCNC: 149 MG/DL (ref 65–99)
HCT VFR BLD AUTO: 37.6 % (ref 34–46.6)
HGB BLD-MCNC: 11.7 G/DL (ref 12–15.9)
IMM GRANULOCYTES # BLD AUTO: 0.02 10*3/MM3 (ref 0–0.05)
IMM GRANULOCYTES NFR BLD AUTO: 0.4 % (ref 0–0.5)
LYMPHOCYTES # BLD AUTO: 0.83 10*3/MM3 (ref 0.7–3.1)
LYMPHOCYTES NFR BLD AUTO: 16.6 % (ref 19.6–45.3)
MCH RBC QN AUTO: 27.8 PG (ref 26.6–33)
MCHC RBC AUTO-ENTMCNC: 31.1 G/DL (ref 31.5–35.7)
MCV RBC AUTO: 89.3 FL (ref 79–97)
MONOCYTES # BLD AUTO: 0.27 10*3/MM3 (ref 0.1–0.9)
MONOCYTES NFR BLD AUTO: 5.4 % (ref 5–12)
NEUTROPHILS NFR BLD AUTO: 3.73 10*3/MM3 (ref 1.7–7)
NEUTROPHILS NFR BLD AUTO: 74.4 % (ref 42.7–76)
NRBC BLD AUTO-RTO: 0 /100 WBC (ref 0–0.2)
PLATELET # BLD AUTO: 366 10*3/MM3 (ref 140–450)
PMV BLD AUTO: 8.9 FL (ref 6–12)
POTASSIUM SERPL-SCNC: 3.8 MMOL/L (ref 3.5–5.2)
PROT SERPL-MCNC: 6.5 G/DL (ref 6–8.5)
RBC # BLD AUTO: 4.21 10*6/MM3 (ref 3.77–5.28)
SODIUM SERPL-SCNC: 141 MMOL/L (ref 136–145)
WBC NRBC COR # BLD AUTO: 5.01 10*3/MM3 (ref 3.4–10.8)

## 2024-11-25 PROCEDURE — 1159F MED LIST DOCD IN RCRD: CPT | Performed by: INTERNAL MEDICINE

## 2024-11-25 PROCEDURE — 1160F RVW MEDS BY RX/DR IN RCRD: CPT | Performed by: INTERNAL MEDICINE

## 2024-11-25 PROCEDURE — 3074F SYST BP LT 130 MM HG: CPT | Performed by: INTERNAL MEDICINE

## 2024-11-25 PROCEDURE — 36415 COLL VENOUS BLD VENIPUNCTURE: CPT

## 2024-11-25 PROCEDURE — 80053 COMPREHEN METABOLIC PANEL: CPT

## 2024-11-25 PROCEDURE — G2211 COMPLEX E/M VISIT ADD ON: HCPCS | Performed by: INTERNAL MEDICINE

## 2024-11-25 PROCEDURE — 3079F DIAST BP 80-89 MM HG: CPT | Performed by: INTERNAL MEDICINE

## 2024-11-25 PROCEDURE — 99215 OFFICE O/P EST HI 40 MIN: CPT | Performed by: INTERNAL MEDICINE

## 2024-11-25 PROCEDURE — 85025 COMPLETE CBC W/AUTO DIFF WBC: CPT

## 2024-11-25 RX ORDER — ANASTROZOLE 1 MG/1
1 TABLET ORAL DAILY
Qty: 90 TABLET | Refills: 3 | Status: SHIPPED | OUTPATIENT
Start: 2024-11-25

## 2024-11-25 NOTE — PROGRESS NOTES
Subjective   Maritza Bejarano is a 64 y.o. female.   With metastatic invasive lobular carcinoma, ER/ID strongly positive cancer with metastatic disease to the bones.    History of Present Illness   Maritza returns today for follow-up.  She is accompanied by her spouse.  She resumed Ribociclib on 11/11/2024.  She is taking 200 mg of Ribociclib daily.  She reports no issues with it.  She denies any nausea  She was unable to get the silver alginate dressing for the decubitus ulcer.  She has been referred to wound care but that appointment is not until another week.  She finally found her dog and seems to be much happier today than at her last visit.    Oncologic history:    Patient is a 63-year-old postmenopausal lady who has not had a mammogram in 4 years presented with a screen detected abnormality.  She had a left breast biopsy in 2014 which was benign.  Denies palpating any breast masses skin changes or nipple discharge prior to the abnormal mammogram.  She does have left nipple inversion.    Patient has a longstanding diagnosis of multiple sclerosis and has not been on any treatment.  She was previously seen by Dr. Ozzy France and now being seen by Dr. Soto with neurology.  She has been nonambulatory for the past 4 years and requires a wheelchair.  She is unable to transfer herself in and out of wheelchairs and her  has to lift her for all the transfers.  She is also incontinent of the bladder and requiring a suprapubic catheter placed by urology to help with the same.  She had a bladder stimulator in the past which was turned off.  Other comorbidities include hypertension and hyperlipidemia.      Family history significant for maternal great grandmother with breast cancer, maternal great aunt and mother with breast cancer in her 70s.  Denies any family history of ovarian cancer, pancreatic cancer or melanoma.    5/21/2024-bilateral screening mammogram  Finding 1.new nipple retraction along with diffuse skin and  trabecular thickening of the left breast.  There is suggestion of subtle architectural distortion seen on the MLO projection in the posterior central region.  3 biopsy markers are noted.  No new suspicious calcifications.  Send finding 2.few axillary lymph node seen in the left breast which display interval increase in size and density.    Finding 3.focal asymmetry measuring 10 mm seen in the anterior one third of the right breast in the medial subareolar region.    Impression  Finding 1.new nipple retraction, skin and trabecular thickening in the left breast requires additional evaluation.  There is also question architectural distortion in the posterior central breast seen on the MLO projection.  Diagnostic mammogram to include repeat full-field CC with additional posterior tissue and complete breast ultrasound is recommended.  Finding 2.axillary lymph nodes in the left breast require additional evaluation, ultrasound recommended.  Finding 3.focal asymmetry in the right breast requires additional evaluation.  Diagnostic mammogram and limited breast ultrasound is recommended.    5/29/2024-bilateral diagnostic mammogram and ultrasound  Finding 1.4 0.7 x 5.6 x 6.8 cm developing asymmetry with associated nipple retraction, skin thickening and trabecular thickening of the left breast in the central region, inferior region in the upper outer region and the lower outer region.  Finding 2.axillary lymph node in the left breast.  Finding 3.suspected finding completely resolves upon further evaluation representing benign fibroglandular breast tissue.    Bilateral breast ultrasound  Finding 1.nonparallel hypoechoic mass with indistinct margins and skin thickening and internal vascularity in the left breast in the central region, inferior region, upper outer region and in the lower outer region.  The finding is palpable and appears to involve all 4 quadrants.  Most discrete portion is located at 130, 3 cm from the nipple,  skin thickening up to 6 mm.  Send finding 2.rounded enlarged left axillary lymph node measuring 11 mm.  Cortical thickening of 9 mm present.    Finding 3.no sonographic correlate.  Send finding 4.enlarged right axillary lymph node measuring 14 mm.  Cortical thickening of 5 mm.    Impression  Finding 1.ultrasound-guided biopsy recommended  Finding 2.ultrasound-guided biopsy recommended  Finding 3.previously described right breast asymmetry resolves  Finding 4.ultrasound-guided biopsy recommended.    Ultrasound-guided biopsy of the left breast and left axilla lymph node and right axilla lymph node    1.left breast  Invasive lobular carcinoma with crush artifact  Grade 2  Invasive carcinoma measures 8.5 mm  No lymphovascular space invasion  ER +91 to 100% strong  OH +31 to 40% moderate  HER2 negative, 0  Ki-67 35%    2.left axillary lymph node focus of metastatic Dastech lobular carcinoma measuring 10 mm  ER +91 to 100% strong  OH +21 to 30%  HER2 -0  Ki-67 30%    3.right axillary lymph node with a focus of metastatic carcinoma measuring 4 mm.  Grade 2.  ER +91 to 100% strong  OH +11 to 20% moderate  HER2 negative, 0  Ki-67 35%    Pathology of all the 3 sites appear similar and findings could represent left breast lobular carcinoma metastatic to the right as well as left axillary lymph nodes.    6/14/2024-CT of the chest abdomen and pelvis  1.large ill-defined infiltrative central left breast mass measuring 6.7 x 4 cm, medially there is an irregular 2.5 x 2.5 cm mass.  Significant thickening of the skin in the left breast and there is left axilla lymphadenopathy.  Left axilla lymph node measures 1.3 x 1.3 cm.  There is also a new enlarged right axillary lymph node measuring 1.3 x 1 cm.  All of the subcentimeter right axillary lymph nodes are slightly larger than previous.  2.no mediastinal hilar or internal mammary chain lymphadenopathy  3.new indeterminate mixed density measuring 1.3 x 1.2 cm right apical pulmonary  nodule.  Follow-up recommended.  4.pleural parenchymal thickening and nodules inferiorly and posterior to the right upper lobe are stable.  Chronic scarring and subsegmental atelectatic change in both lower lobes.    CT abdomen pelvis  1.moderate fatty infiltration of the liver.  No suspicious liver lesions.  2.moderate-sized paraesophageal hernia.  No acute bowel abnormality.  3.right sacral stimulator noted at the S3 neuroforamina.  No suspicious bone lesions are noted.    6/14/2024-bone scan  1.focal abnormal uptake in the left anterior inferior iliac spine correlating with lucent lesion on the CT concerning for metastatic disease.  Further evaluation with MRI of the pelvis and left hip could be performed.  2.focal uptake in the left frontal calvarium again concerning for metastatic disease.  Noncontrast CT or MRI could be obtained.  3.soft tissue uptake noted in the left breast at the site of known left breast cancer.  4.changes from arthroplasty on the right shoulder.  5.multifocal likely degenerative uptake in each knee, ankle and foot and increased uptake in the medial and patellofemoral cortex of the right knee could be posttraumatic.      Invitae 9 gene stat panel negative.    MRI of the brain which showed T2 hyperintensity involving the frontal bone measuring 1.6 cm in size and a smaller area of enhancement in the frontal bone laterally and inferiorly concerning for metastasis.  No brain mets    MRI of the hip and pelvis showed multiple enhancing bone lesions consistent with metastatic disease to the sacrum and pelvis.  Dominant lesion in the anterior inferior left pelvic spine and rectus femoris muscle origin that correlates with the site of bone scan uptake.  She has some right joint hip joint effusion.  Her CA 15-3 16.2    She was seen by Dr. Saavedra on 7/16/2024 and recommended to start on Ribociclib along with anastrozole.    Started Ribociclib in the first week of August 2024    Had profound nausea  and vomiting requiring antiemetics.  Completed 3 weeks of Ribociclib on 8/30/2024.    Hospitalized from 9/11/2024 to 9/16/2024 for pneumonia and KINZA and since then Ribociclib has been on hold    Readmitted to the hospital from 10/17/2024 to 10/23/2024 requiring holding Ribociclib.  She was admitted for UTI with sepsis.    10/29/2024-CT of the chest which was compared to the CT chest from 6/14/2024-stable 1.3 cm subsolid nodule in the right lung apex.  Decrease in size of the left axillary lymph nodes.  Ill-defined left breast mass appears unchanged.  Expansile lytic and sclerotic lesion in the posterior left 10th rib which appears increased compared to the prior CT.  Stable subtle area of sclerosis in the left anterior third rib.    10/3/2024-MRI of the cervical spine-rounded area of marrow replacement in C7 suspicious for metastatic disease.    10/3/2024-MRI of the thoracic spine-suspicious patchy areas of marrow replacement in the thoracic spine at T5, T6, T7, T11, T12, L1 suspicious for metastatic disease.      The following portions of the patient's history were reviewed and updated as appropriate: allergies, current medications, past family history, past medical history, past social history, past surgical history, and problem list.    Past Medical History:   Diagnosis Date    Anemia 2024    `Treated with iron    Dawn esophagus     per patient    Blurred vision     R/T MS    Breast cancer 05/2024+++++    Carpal tunnel syndrome     Clotting disorder 1993, 2003, 2012    3 g/i bleeds w/ transfus/ions    Colon polyp 2013    removed w/ colonoscopy    Deep vein thrombosis phlebitis 1980    Depression     Diplopia 2013    GERD (gastroesophageal reflux disease)     GI (gastrointestinal bleed) 3 bleeds    2 transfusions    H/O Skin cancer, basal cell     Headache     History of blood transfusion     History of GI bleed     R/T NSAIDS AND STEROIDS, multiple times    History of urinary tract infection     Hypercalcemia      s/p parathyroidectomy    Hyperlipidemia     Hypertension     Movement disorder     Multiple sclerosis     Optic neuritis     PONV (postoperative nausea and vomiting)         Past Surgical History:   Procedure Laterality Date    APPENDECTOMY      BLADDER SURGERY      bladder stimulator    BREAST BIOPSY  don't remember    BREAST SURGERY      augmentation wtih subsequent removal    CARPAL TUNNEL RELEASE Bilateral     Left 2018, right 2020    CUBITAL TUNNEL RELEASE Left     CYSTOSCOPY BOTOX INJECTION OF BLADDER  2018    Cystoscopy with Botox    FRACTURE SURGERY  2019    rt shoulder    PARATHYROIDECTOMY      one gland removed    ROTATOR CUFF REPAIR Right 2017    TOE SURGERY      bilateral great toes    TOTAL SHOULDER ARTHROPLASTY W/ DISTAL CLAVICLE EXCISION Right 10/22/2018    Procedure: RT TOTAL SHOULDER REVERSE ARTHROPLASTY;  Surgeon: Bipin Dangelo MD;  Location: [facility] MAIN OR;  Service: Orthopedics        Family History   Problem Relation Age of Onset    Hypertension Mother     Hyperlipidemia Mother     Aortic aneurysm Mother         thoracic    Diabetes Mother     Hypertension Father         charissa heart failure    Heart failure Father     Hyperlipidemia Father     Miscarriages / Stillbirths Sister     Multiple sclerosis Brother     Atrial fibrillation Brother     Diabetes Maternal Grandfather     Stroke Maternal Grandfather     Parkinsonism Paternal Grandmother     Tremor Paternal Grandmother     Stomach cancer Paternal Grandfather     Diabetes Maternal Grandmother         Social History     Socioeconomic History    Marital status:      Spouse name: Donald    Number of children: 0   Tobacco Use    Smoking status: Former     Current packs/day: 0.00     Average packs/day: 2.0 packs/day for 30.0 years (60.0 ttl pk-yrs)     Types: Cigarettes     Start date: 10/22/1973     Quit date: 10/22/2003     Years since quittin.1    Smokeless tobacco: Never   Vaping Use    Vaping status: Never Used   Substance and  "Sexual Activity    Alcohol use: Not Currently    Drug use: Not Currently     Types: Marijuana     Comment: advised by neurologist for sleep    Sexual activity: Not Currently     Partners: Male     Birth control/protection: Post-menopausal        OB History    No obstetric history on file.     Age at menarche-15  Age at first live childbirth-not applicable   2 para 0  0  Age of menopause-57  No use of hormone replacement therapy      No Known Allergies         Review of Systems   Constitutional:  Positive for activity change and fatigue.   HENT: Negative.     Eyes: Negative.    Respiratory:  Positive for shortness of breath.    Cardiovascular: Negative.    Gastrointestinal: Negative.    Endocrine: Negative.    Genitourinary:  Positive for urinary incontinence.   Musculoskeletal: Negative.    Skin: Negative.    Allergic/Immunologic: Negative.    Neurological:  Positive for weakness.   Psychiatric/Behavioral: Negative.       Review of systems as mentioned HPI otherwise negative    Objective   Blood pressure 126/80, pulse 75, temperature 98.2 °F (36.8 °C), temperature source Oral, resp. rate 16, height 152.4 cm (60\"), weight 77.1 kg (170 lb), SpO2 97%, not currently breastfeeding.   Physical Exam  Vitals reviewed.   Constitutional:       Appearance: Normal appearance. She is normal weight.   HENT:      Right Ear: External ear normal.      Left Ear: External ear normal.      Nose: Nose normal.      Mouth/Throat:      Pharynx: Oropharynx is clear.   Eyes:      Conjunctiva/sclera: Conjunctivae normal.   Cardiovascular:      Rate and Rhythm: Normal rate.   Pulmonary:      Effort: Pulmonary effort is normal.   Abdominal:      General: Abdomen is flat.   Musculoskeletal:         General: Normal range of motion.      Cervical back: Normal range of motion.   Skin:     General: Skin is warm.   Neurological:      General: No focal deficit present.      Mental Status: She is alert and oriented to person, place, and " time.   Psychiatric:         Mood and Affect: Mood normal.         Behavior: Behavior normal.         Thought Content: Thought content normal.         Judgment: Judgment normal.       Breast Exam: Right breast appears normal on inspection.  No palpable right axilla lymphadenopathy or right breast masses.  Left breast on inspection there is nipple retraction.  On palpation there is a 13 x 9 cm mass in the retroareolar region pretty much occupying the whole breast.  There is palpable left axillary lymphadenopathy.    I have reexamined the patient and the results are consistent with the previously documented exam. Zainab Lopes MD      Lab on 11/25/2024   Component Date Value Ref Range Status    WBC 11/25/2024 5.01  3.40 - 10.80 10*3/mm3 Final    RBC 11/25/2024 4.21  3.77 - 5.28 10*6/mm3 Final    Hemoglobin 11/25/2024 11.7 (L)  12.0 - 15.9 g/dL Final    Hematocrit 11/25/2024 37.6  34.0 - 46.6 % Final    MCV 11/25/2024 89.3  79.0 - 97.0 fL Final    MCH 11/25/2024 27.8  26.6 - 33.0 pg Final    MCHC 11/25/2024 31.1 (L)  31.5 - 35.7 g/dL Final    RDW 11/25/2024 18.0 (H)  12.3 - 15.4 % Final    RDW-SD 11/25/2024 58.7 (H)  37.0 - 54.0 fl Final    MPV 11/25/2024 8.9  6.0 - 12.0 fL Final    Platelets 11/25/2024 366  140 - 450 10*3/mm3 Final    Neutrophil % 11/25/2024 74.4  42.7 - 76.0 % Final    Lymphocyte % 11/25/2024 16.6 (L)  19.6 - 45.3 % Final    Monocyte % 11/25/2024 5.4  5.0 - 12.0 % Final    Eosinophil % 11/25/2024 2.2  0.3 - 6.2 % Final    Basophil % 11/25/2024 1.0  0.0 - 1.5 % Final    Immature Grans % 11/25/2024 0.4  0.0 - 0.5 % Final    Neutrophils, Absolute 11/25/2024 3.73  1.70 - 7.00 10*3/mm3 Final    Lymphocytes, Absolute 11/25/2024 0.83  0.70 - 3.10 10*3/mm3 Final    Monocytes, Absolute 11/25/2024 0.27  0.10 - 0.90 10*3/mm3 Final    Eosinophils, Absolute 11/25/2024 0.11  0.00 - 0.40 10*3/mm3 Final    Basophils, Absolute 11/25/2024 0.05  0.00 - 0.20 10*3/mm3 Final    Immature Grans, Absolute 11/25/2024 0.02   0.00 - 0.05 10*3/mm3 Final    nRBC 11/25/2024 0.0  0.0 - 0.2 /100 WBC Final   Lab on 11/11/2024   Component Date Value Ref Range Status    Glucose 11/11/2024 125 (H)  65 - 99 mg/dL Final    BUN 11/11/2024 13  8 - 23 mg/dL Final    Creatinine 11/11/2024 0.56 (L)  0.57 - 1.00 mg/dL Final    Sodium 11/11/2024 141  136 - 145 mmol/L Final    Potassium 11/11/2024 3.9  3.5 - 5.2 mmol/L Final    Chloride 11/11/2024 104  98 - 107 mmol/L Final    CO2 11/11/2024 21.6 (L)  22.0 - 29.0 mmol/L Final    Calcium 11/11/2024 8.8  8.6 - 10.5 mg/dL Final    Total Protein 11/11/2024 7.2  6.0 - 8.5 g/dL Final    Albumin 11/11/2024 3.8  3.5 - 5.2 g/dL Final    ALT (SGPT) 11/11/2024 22  1 - 33 U/L Final    AST (SGOT) 11/11/2024 49 (H)  1 - 32 U/L Final    Alkaline Phosphatase 11/11/2024 113  39 - 117 U/L Final    Total Bilirubin 11/11/2024 0.3  0.0 - 1.2 mg/dL Final    Globulin 11/11/2024 3.4  gm/dL Final    A/G Ratio 11/11/2024 1.1  g/dL Final    BUN/Creatinine Ratio 11/11/2024 23.2  7.0 - 25.0 Final    Anion Gap 11/11/2024 15.4 (H)  5.0 - 15.0 mmol/L Final    eGFR 11/11/2024 103.3  >60.0 mL/min/1.73 Final    Magnesium 11/11/2024 2.0  1.6 - 2.4 mg/dL Final    Phosphorus 11/11/2024 2.5  2.5 - 4.5 mg/dL Final    WBC 11/11/2024 7.95  3.40 - 10.80 10*3/mm3 Final    RBC 11/11/2024 4.87  3.77 - 5.28 10*6/mm3 Final    Hemoglobin 11/11/2024 13.5  12.0 - 15.9 g/dL Final    Hematocrit 11/11/2024 42.9  34.0 - 46.6 % Final    MCV 11/11/2024 88.1  79.0 - 97.0 fL Final    MCH 11/11/2024 27.7  26.6 - 33.0 pg Final    MCHC 11/11/2024 31.5  31.5 - 35.7 g/dL Final    RDW 11/11/2024 19.4 (H)  12.3 - 15.4 % Final    RDW-SD 11/11/2024 63.1 (H)  37.0 - 54.0 fl Final    MPV 11/11/2024 9.0  6.0 - 12.0 fL Final    Platelets 11/11/2024 334  140 - 450 10*3/mm3 Final    Neutrophil % 11/11/2024 76.4 (H)  42.7 - 76.0 % Final    Lymphocyte % 11/11/2024 14.8 (L)  19.6 - 45.3 % Final    Monocyte % 11/11/2024 6.3  5.0 - 12.0 % Final    Eosinophil % 11/11/2024 1.6  0.3 -  6.2 % Final    Basophil % 11/11/2024 0.6  0.0 - 1.5 % Final    Immature Grans % 11/11/2024 0.3  0.0 - 0.5 % Final    Neutrophils, Absolute 11/11/2024 6.07  1.70 - 7.00 10*3/mm3 Final    Lymphocytes, Absolute 11/11/2024 1.18  0.70 - 3.10 10*3/mm3 Final    Monocytes, Absolute 11/11/2024 0.50  0.10 - 0.90 10*3/mm3 Final    Eosinophils, Absolute 11/11/2024 0.13  0.00 - 0.40 10*3/mm3 Final    Basophils, Absolute 11/11/2024 0.05  0.00 - 0.20 10*3/mm3 Final    Immature Grans, Absolute 11/11/2024 0.02  0.00 - 0.05 10*3/mm3 Final    nRBC 11/11/2024 0.0  0.0 - 0.2 /100 WBC Final        CT Chest With Contrast Diagnostic    Result Date: 11/2/2024  1. Stable 1.3 cm subsolid nodule in the right lung apex. Continued follow-up is recommended 2. Decrease in size of left axillary lymph nodes 3. Ill-defined left breast mass does not appear significantly changed although difficult to accurately measure 4. There is an expansile mixed lytic and sclerotic lesion of the posterior left 10th rib that appears increased compared with the prior CT. There is a stable subtle area of sclerosis left anterior third rib    Radiation dose reduction techniques were utilized, including automated exposure control and exposure modulation based on body size.   This report was finalized on 11/2/2024 10:33 AM by Dr. James Carlos M.D on Workstation: [id number]            Assessment & Plan       *Left breast invasive lobular carcinoma  The tumor is estrogen receptor +91 to 100%, progesterone receptor +31 to 40%, HER2 negative, 0, Ki-67 35%  The tumor measures greater than 10 cm on exam, lymph node positive.  CT of the chest abdomen and pelvis with right upper lobe pulmonary nodule which is new and the bone scan also showsUptake in the left anterior inferior iliac spine which correlates to a lucent area on the CT scan and also focal uptake noted in the left frontal calvarium concerning for metastatic disease.  Further evaluation with a MRI of the pelvis and  hip as well as MRI of the brain is underway.  The scans are scheduled for 7/10/2024.  Clinical T3 N1 M1, stage IV invasive lobular carcinoma.  There is also a right axillary lymph node which has been biopsied and consistent with invasive lobular carcinoma with similar morphology as well as receptors concerning for metastatic disease rather than a primary right breast cancer.  Recommended Ribociclib 400 mg 3 weeks on and 1 week off.  July 16, 2024: Reviewed MRI of the pelvis and hip consistent with many bony metastasis.  MRI brain shows left frontal bone mets but no brain involvement.  Patient is here to start day 1 ribociclib along with anastrozole.  Continues on anastrozole.  Tempus performed on the left axilla lymph node biopsy shows PIK3CA mutation, CDH 1 mutation and Erb B2 mutation.  Therefore patient would benefit from alpelisib and Faslodex after progression on Ribociclib and AI.  Other options include neratinib plus Faslodex.  Initiated Ribociclib in August 2024.  8/30/2024-last day of week 3 of Ribociclib.    Patient completed 1 cycle of Ribociclib 400 mg and subsequently has not been able to go back on Ribociclib due to recurrent hospitalizations and abnormal LFTs  10/14/2024-LFTs are normal today.  Recommend resuming Ribociclib at 200 mg and subsequently we will increase the dose to 400 mg with the next cycle.  Patient was unable to resume Ribociclib as she was admitted to the hospital from 10/17/2024 to 10/23/2024  11/11/2024-Labs reviewed and overall stable except for an ALT of 49.  Recommend resuming Ribociclib at 200 mg.  CT of the chest performed 10/29/2024 shows stable size of the left breast mass, decrease in size of the left axillary lymph node and mixed lytic and blastic lesion of the rib slightly increased in size.  Reinitiate Ribociclib at 200 mg daily 3 out of 4 weeks, labs in 2 weeks  Patient is interested in starting ivermectin which she believes helps with breast cancer.  I recommend again  starting ivermectin however patient tearful and feels like she has not been able to get any consistent treatment.  Reviewed the interactions with Ribociclib and none.  Recommend that she discuss with her neurologist regarding possible neurological side effects from  ivermectin.  11/11/2024-resumed Ribociclib 200 mg daily.  CBC from 11/25/2024 stable.  Patient tolerating this dose well.  CMP-       *Right axillary lymphadenopathy  Biopsied and consistent with invasive lobular carcinoma, grade 2, ER/OK positive and HER2 negative  Patient initiated on AI and Ribociclib  Please refer to the above discussion for details    *Right breast non-mass enhancement  6/28/2024-MRI of the breast with non-mass enhancement noted in the right breast and patient had questions regarding if this should be biopsied.  Given extensive metastatic disease in the bones and right axilla lymph node consistent with invasive lobular carcinoma management would not change with the biopsy and hence I would recommend against it.    *Severe nausea  Secondary to Ribociclib  Improved with Compazine and Zofran.  Recommend using the same regimen once she starts back on Ribociclib.  Nausea well-controlled at this time    *Skeletal metastasis  Patient will be started on Xgeva  8/16/2024 Xgeva initiation will be held as the patient has not been to the dentist in over 2 years.  Discussed possible side effects of Xgeva and she will schedule a dental visit and we will have her back in 1 week to initiate Xgeva pending this is complete.  10/14/2024-second dose of Xgeva will be administered.  Labs reviewed and stable to proceed.  11/11/2024-scheduled for Xgeva, proceed with the same.    *Multiple sclerosis  Patient was not on any treatment previously.  She sees Dr. Jacobson at [place] [facility].  Due to profound weakness patient requested her neurologist to start steroids.  They plan to initiate high-dose IV steroids to help with this.  High-dose IV steroids have not  been initiated.  Patient reports that she starts physical therapy tomorrow and plans to concurrently start high-dose IV steroids.    *Hypertension-continue current medication  Blood pressure 126/80  Continue to monitor  No changes in medications      Hyperlipidemia-continue current medication  No changes      *Urinary incontinence-patient had  a suprapubic catheter placed by urology.      *History of iron deficiency anemia-  3/8/2024 hemoglobin was low at 10.9 and subsequently patient took oral iron which resulted in improvement of hemoglobin and normal.  She was scheduled for colonoscopy however she canceled that due to ongoing management of breast cancer.  She proceed with a colonoscopy.  8/16/2024 patient's hemoglobin is 10.2 today.  She states she recently was told to begin daily iron supplementation.  Baseline iron studies ordered today she is only been taking the iron for a few days.  Ferritin 32, iron saturation 5%, TIBC 440.  We will repeat this in 6 to 8 weeks.  Continue oral iron    *Genetic testing-9 gene stat panel negative      *Dyspnea  CT chest shows some bibasilar atelectasis/pneumonia  CT chest from June 2024 without any evidence of metastatic disease  CT of the chest 10/29/2024-images reviewed and interpreted by me in detail summarized above     *Right upper lobe pulmonary nodule   Concerning for metastatic disease  PET/CT may not be helpful as invasive lobular carcinomas typically are not very PET avid.  Could consider PET-FES    *Follow-up-after the MRIs.  Reviewed MRI of the pelvis and MRI of the brain which shows mets in the bone  Reviewed MRI of the cervical and thoracic spine performed at outside facility on 10/3/2024    *Abnormal LFTs  Likely secondary to Ribociclib  Will start at a lower dose of 200 mg daily and subsequently increased to 400 mg if LFTs remain stable  LFTs from level Víctor 2024 stable    Plan  Continue Ribociclib 200 mg daily, as long as LFTs are stable plan to increase the  dose to 400 mg daily with a subsequent cycle  Xgeva due in 2 weeks  Continue Compazine for nausea induced by Ribociclib  Not using Zofran  Does not wish to start Zyprexa  Continue daily oral ferrous sulfate.    Xgeva in 2 weeks, CBC CMP and APRN in 2 weeks          Zainab Lopes MD

## 2024-11-26 ENCOUNTER — TELEPHONE (OUTPATIENT)
Dept: ONCOLOGY | Facility: CLINIC | Age: 64
End: 2024-11-26
Payer: MEDICARE

## 2024-11-26 NOTE — TELEPHONE ENCOUNTER
----- Message from Zainab Lopes sent at 11/26/2024  4:26 PM EST -----  Please let Maritza know that the LFTs are slightly elevated.   We will recheck on follow-up and if they remain high I may have to switch the treatment to Verzenio.  ----- Message -----  From: Lab, Background User  Sent: 11/25/2024   2:31 PM EST  To: Zainab Lopes MD

## 2024-11-27 ENCOUNTER — TELEPHONE (OUTPATIENT)
Dept: ONCOLOGY | Facility: CLINIC | Age: 64
End: 2024-11-27
Payer: MEDICARE

## 2024-12-02 ENCOUNTER — SPECIALTY PHARMACY (OUTPATIENT)
Dept: PHARMACY | Facility: HOSPITAL | Age: 64
End: 2024-12-02
Payer: MEDICARE

## 2024-12-02 ENCOUNTER — OFFICE VISIT (OUTPATIENT)
Dept: WOUND CARE | Facility: HOSPITAL | Age: 64
End: 2024-12-02
Payer: MEDICARE

## 2024-12-02 NOTE — PROGRESS NOTES
Specialty Pharmacy Note: Kisqali (ribociclib)    Maritza Bejarano is a 64 y.o. female with breast cancer was seen 11/25/24 by Dr. Lopes. Per provider dictation, no changes to oral oncology regimen Kisqali (ribociclib).  Labs Review: The CMP and CBC from 11/25/24 have been reviewed. No dose adjustments are needed for the oral specialty medication(s) based on the labs.    Specialty pharmacy will continue to follow patient.    Alyssa Rich, PharmD, BCPS  12/2/2024  12:47 EST

## 2024-12-03 ENCOUNTER — SPECIALTY PHARMACY (OUTPATIENT)
Dept: PHARMACY | Facility: HOSPITAL | Age: 64
End: 2024-12-03
Payer: MEDICARE

## 2024-12-03 ENCOUNTER — TRANSCRIBE ORDERS (OUTPATIENT)
Dept: HOME HEALTH SERVICES | Facility: HOME HEALTHCARE | Age: 64
End: 2024-12-03
Payer: MEDICARE

## 2024-12-03 DIAGNOSIS — L97.419 HEEL ULCERATION, RIGHT, WITH UNSPECIFIED SEVERITY: Primary | ICD-10-CM

## 2024-12-03 NOTE — PROGRESS NOTES
Pt in queue for a refill of her Kisqali. Her next cycle is due to begin on 12/11/2024.    Per note in pts chart-Pt will be rechecked and if LFT's remain high pt may have to switch to Verzenio.    Pt is due in the office on 12/9/2024 for recheck. If she is to continue there will be time to get pt a delivery prior to next cycle start.    Task moved to 12/9/2024 to follow up.     Message from Zainab Lopes sent at 11/26/2024  4:26 PM EST -----  Please let Maritza know that the LFTs are slightly elevated.   We will recheck on follow-up and if they remain high I may have to switch the treatment to Verzenio.    Belle Razo, Pharmacy Technician  12/03/24  13:58 EST

## 2024-12-10 ENCOUNTER — LAB (OUTPATIENT)
Dept: LAB | Facility: HOSPITAL | Age: 64
End: 2024-12-10
Payer: MEDICARE

## 2024-12-10 ENCOUNTER — SPECIALTY PHARMACY (OUTPATIENT)
Dept: PHARMACY | Facility: HOSPITAL | Age: 64
End: 2024-12-10
Payer: MEDICARE

## 2024-12-10 ENCOUNTER — OFFICE VISIT (OUTPATIENT)
Dept: ONCOLOGY | Facility: CLINIC | Age: 64
End: 2024-12-10
Payer: MEDICARE

## 2024-12-10 ENCOUNTER — INFUSION (OUTPATIENT)
Dept: ONCOLOGY | Facility: HOSPITAL | Age: 64
End: 2024-12-10
Payer: MEDICARE

## 2024-12-10 VITALS
BODY MASS INDEX: 33.2 KG/M2 | HEART RATE: 82 BPM | HEIGHT: 60 IN | OXYGEN SATURATION: 96 % | DIASTOLIC BLOOD PRESSURE: 98 MMHG | SYSTOLIC BLOOD PRESSURE: 171 MMHG | TEMPERATURE: 98.4 F

## 2024-12-10 DIAGNOSIS — C50.812 MALIGNANT NEOPLASM OF OVERLAPPING SITES OF LEFT BREAST IN FEMALE, ESTROGEN RECEPTOR POSITIVE: ICD-10-CM

## 2024-12-10 DIAGNOSIS — Z17.0 MALIGNANT NEOPLASM OF OVERLAPPING SITES OF LEFT BREAST IN FEMALE, ESTROGEN RECEPTOR POSITIVE: ICD-10-CM

## 2024-12-10 DIAGNOSIS — C79.51 CANCER, METASTATIC TO BONE: Primary | ICD-10-CM

## 2024-12-10 DIAGNOSIS — C79.51 CANCER, METASTATIC TO BONE: ICD-10-CM

## 2024-12-10 DIAGNOSIS — C50.912 INVASIVE LOBULAR CARCINOMA OF BREAST, STAGE 4, LEFT: ICD-10-CM

## 2024-12-10 DIAGNOSIS — Z79.899 HIGH RISK MEDICATION USE: ICD-10-CM

## 2024-12-10 LAB
ALBUMIN SERPL-MCNC: 3.8 G/DL (ref 3.5–5.2)
ALBUMIN/GLOB SERPL: 1.2 G/DL
ALP SERPL-CCNC: 130 U/L (ref 39–117)
ALT SERPL W P-5'-P-CCNC: 22 U/L (ref 1–33)
ANION GAP SERPL CALCULATED.3IONS-SCNC: 12.1 MMOL/L (ref 5–15)
AST SERPL-CCNC: 61 U/L (ref 1–32)
BASOPHILS # BLD AUTO: 0.04 10*3/MM3 (ref 0–0.2)
BASOPHILS NFR BLD AUTO: 0.7 % (ref 0–1.5)
BILIRUB SERPL-MCNC: 0.2 MG/DL (ref 0–1.2)
BUN SERPL-MCNC: 12 MG/DL (ref 8–23)
BUN/CREAT SERPL: 20.7 (ref 7–25)
CALCIUM SPEC-SCNC: 8.8 MG/DL (ref 8.6–10.5)
CHLORIDE SERPL-SCNC: 106 MMOL/L (ref 98–107)
CO2 SERPL-SCNC: 22.9 MMOL/L (ref 22–29)
CREAT SERPL-MCNC: 0.58 MG/DL (ref 0.57–1)
DEPRECATED RDW RBC AUTO: 54.3 FL (ref 37–54)
EGFRCR SERPLBLD CKD-EPI 2021: 102.5 ML/MIN/1.73
EOSINOPHIL # BLD AUTO: 0.12 10*3/MM3 (ref 0–0.4)
EOSINOPHIL NFR BLD AUTO: 2 % (ref 0.3–6.2)
ERYTHROCYTE [DISTWIDTH] IN BLOOD BY AUTOMATED COUNT: 16.6 % (ref 12.3–15.4)
GLOBULIN UR ELPH-MCNC: 3.1 GM/DL
GLUCOSE SERPL-MCNC: 146 MG/DL (ref 65–99)
HCT VFR BLD AUTO: 37.5 % (ref 34–46.6)
HGB BLD-MCNC: 12 G/DL (ref 12–15.9)
IMM GRANULOCYTES # BLD AUTO: 0.03 10*3/MM3 (ref 0–0.05)
IMM GRANULOCYTES NFR BLD AUTO: 0.5 % (ref 0–0.5)
LYMPHOCYTES # BLD AUTO: 1 10*3/MM3 (ref 0.7–3.1)
LYMPHOCYTES NFR BLD AUTO: 16.6 % (ref 19.6–45.3)
MAGNESIUM SERPL-MCNC: 2.2 MG/DL (ref 1.6–2.4)
MCH RBC QN AUTO: 28.7 PG (ref 26.6–33)
MCHC RBC AUTO-ENTMCNC: 32 G/DL (ref 31.5–35.7)
MCV RBC AUTO: 89.7 FL (ref 79–97)
MONOCYTES # BLD AUTO: 0.46 10*3/MM3 (ref 0.1–0.9)
MONOCYTES NFR BLD AUTO: 7.6 % (ref 5–12)
NEUTROPHILS NFR BLD AUTO: 4.38 10*3/MM3 (ref 1.7–7)
NEUTROPHILS NFR BLD AUTO: 72.6 % (ref 42.7–76)
NRBC BLD AUTO-RTO: 0 /100 WBC (ref 0–0.2)
PHOSPHATE SERPL-MCNC: 2.5 MG/DL (ref 2.5–4.5)
PLATELET # BLD AUTO: 344 10*3/MM3 (ref 140–450)
PMV BLD AUTO: 9 FL (ref 6–12)
POTASSIUM SERPL-SCNC: 4 MMOL/L (ref 3.5–5.2)
PROT SERPL-MCNC: 6.9 G/DL (ref 6–8.5)
RBC # BLD AUTO: 4.18 10*6/MM3 (ref 3.77–5.28)
SODIUM SERPL-SCNC: 141 MMOL/L (ref 136–145)
WBC NRBC COR # BLD AUTO: 6.03 10*3/MM3 (ref 3.4–10.8)

## 2024-12-10 PROCEDURE — 96372 THER/PROPH/DIAG INJ SC/IM: CPT

## 2024-12-10 PROCEDURE — 36415 COLL VENOUS BLD VENIPUNCTURE: CPT

## 2024-12-10 PROCEDURE — 25010000002 DENOSUMAB 120 MG/1.7ML SOLUTION: Performed by: NURSE PRACTITIONER

## 2024-12-10 PROCEDURE — 84100 ASSAY OF PHOSPHORUS: CPT

## 2024-12-10 PROCEDURE — 83735 ASSAY OF MAGNESIUM: CPT

## 2024-12-10 PROCEDURE — 80053 COMPREHEN METABOLIC PANEL: CPT

## 2024-12-10 PROCEDURE — 85025 COMPLETE CBC W/AUTO DIFF WBC: CPT

## 2024-12-10 RX ADMIN — DENOSUMAB 120 MG: 120 INJECTION SUBCUTANEOUS at 09:08

## 2024-12-10 NOTE — PROGRESS NOTES
Specialty Pharmacy Patient Management Program  Per Protocol Prescription Order or Refill     Patient will be filling or currently fills medications at [facility] and is enrolled in the Patient Management Program.    Requested Prescriptions     Signed Prescriptions Disp Refills    ribociclib succinate (Kisqali, 400 MG Dose,) 200 MG tablet therapy pack tablet 42 tablet 5     Sig: Take 2 tablets by mouth Daily. Take for 21 days on then 7 days off.     Prescription orders above were sent to the pharmacy per Collaborative Care Agreement Protocol.     Last Office Visit: 12/10/24  Next Office Visit: 1/6/24    Alyssa Rich PharmD, [facility]  Clinical Specialty Pharmacist, Oncology  12/10/2024  11:30 EST

## 2024-12-10 NOTE — PROGRESS NOTES
Specialty Pharmacy Patient Management Program  Per Protocol Prescription Order or Refill       Requested Prescriptions     Signed Prescriptions Disp Refills    ribociclib succinate (Kisqali, 400 MG Dose,) 200 MG tablet therapy pack tablet 42 tablet 5     Sig: Take 2 tablets by mouth Daily. Take for 21 days on then 7 days off.     Prescription orders above were sent to [facility] per Collaborative Care Agreement Protocol.     Completed independent double check on medication order/RX.    Martin Conrad PharmD, BCOP  Clinical Specialty Pharmacist, Oncology  12/10/2024  11:39 EST

## 2024-12-10 NOTE — PROGRESS NOTES
Staff message rec from [facility] Pharmcist-pt saw Rosy BAEZ NP today and is due to increase her Kisqali to 400 mg 3 out of 4 weeks. Next cycle starts 12/16/24.    I will contact pt to coordinate the delivery.    Danielle Conrad, [facility] sent to Alyssa Rich [facility]; Belle Razo, Pharmacy Technician         Previous Messages       ----- Message -----  From: Nilda Ovalles APRN  Sent: 12/10/2024  10:36 AM EST  To: Rosina Sethi RN; *  Subject: kisqali                                          Dr. Lopes patient is to increase Kisqali up to 400 mg daily for 3 out of 4 weeks.  She will be due to start the next round of pills next Monday.  She is out of pills completely.  Please arrange for new shipment.  Thank you.    Belle Razo, Pharmacy Technician  12/10/24  11:39 EST

## 2024-12-10 NOTE — PROGRESS NOTES
Specialty Pharmacy Patient Management Program  Oncology / Hematology Refill Outreach      Maritza was contacted today regarding refills of her Kisqali specialty medication(s).    Specialty medication(s) and dose(s) confirmed: Yes  Kisqali 200 mg tabs-2 tabs daily for 21 days on then 7 days off. Next cycle starts 12/16/2024.    Refill Questions      Flowsheet Row Most Recent Value   Changes to allergies? No   Changes to medications? Yes  [Dose increased to 400 mg daily]   New conditions or infections since last clinic visit No   Unplanned office visit, urgent care, ED, or hospital admission in the last 4 weeks  No   How does patient/caregiver feel medication is working? Good   Financial problems or insurance changes  No   Since the previous refill, were any specialty medication doses or scheduled injections missed or delayed?  No   Does this patient require a clinical escalation to a pharmacist? No          Delivery Questions      Flowsheet Row Most Recent Value   Delivery method UPS   Delivery address verified with patient/caregiver? Yes  [Ship to home address]   Delivery address Home  [Ship to home address-ship 12/12 for delivery 12/13-$0 copay with insurance and [employer] [place]-Address Confirmed]   Number of medications in delivery 1   Medication(s) being filled and delivered Ribociclib Succinate   Doses left of specialty medications 0-New dose and she is in her off week. Next cycle starts 12/16/2024   Copay verified? Yes   Copay amount $0 copay with [employer]   Copay form of payment No copayment ($0)   Ship Date 12/12/2024   Delivery Date 12/13/2024   Signature Required No          Kisqali delivery coordinated with pt for 12/13/2024 to her home address. $0 copay with [employer] and [employer]. Her last delivery was on 10/17/2024. No questions or concerns to report to MTM Team today. She reports no missed doses since restarting after her hospital discharge. PDC is  58%.    Follow-Up: 21 Days    Belle Razo, Pharmacy Technician  12/10/2024  16:10 EST

## 2024-12-11 ENCOUNTER — SPECIALTY PHARMACY (OUTPATIENT)
Dept: PHARMACY | Facility: HOSPITAL | Age: 64
End: 2024-12-11
Payer: MEDICARE

## 2024-12-11 NOTE — PROGRESS NOTES
Specialty Pharmacy Note: Kisqali (ribociclib)    Maritza Nance Darci is a 64 y.o. female with breast cancer was seen 12/10/24 by APRN. Per provider dictation, no changes to oral oncology regimen Kisqali (ribociclib).  Labs Review: The CMP and CBC from 12/10/24 have been reviewed. No dose adjustments are needed for the oral specialty medication(s) based on the labs. Dose increase to 400 mg daily 21/28 for next cycle starting 12/16.     Specialty pharmacy will continue to follow patient.    Alyssa Rich, PharmD, BCPS  12/11/2024  09:01 EST

## 2024-12-12 ENCOUNTER — OFFICE VISIT (OUTPATIENT)
Dept: INTERNAL MEDICINE | Facility: CLINIC | Age: 64
End: 2024-12-12
Payer: MEDICARE

## 2024-12-12 VITALS
OXYGEN SATURATION: 95 % | HEART RATE: 82 BPM | DIASTOLIC BLOOD PRESSURE: 100 MMHG | SYSTOLIC BLOOD PRESSURE: 160 MMHG | TEMPERATURE: 97.7 F

## 2024-12-12 DIAGNOSIS — T38.0X5A STEROID-INDUCED DIABETES MELLITUS, INITIAL ENCOUNTER: ICD-10-CM

## 2024-12-12 DIAGNOSIS — E09.9 STEROID-INDUCED DIABETES MELLITUS, INITIAL ENCOUNTER: ICD-10-CM

## 2024-12-12 DIAGNOSIS — I10 ESSENTIAL HYPERTENSION: Primary | ICD-10-CM

## 2024-12-12 PROCEDURE — 3077F SYST BP >= 140 MM HG: CPT | Performed by: STUDENT IN AN ORGANIZED HEALTH CARE EDUCATION/TRAINING PROGRAM

## 2024-12-12 PROCEDURE — 1126F AMNT PAIN NOTED NONE PRSNT: CPT | Performed by: STUDENT IN AN ORGANIZED HEALTH CARE EDUCATION/TRAINING PROGRAM

## 2024-12-12 PROCEDURE — 99214 OFFICE O/P EST MOD 30 MIN: CPT | Performed by: STUDENT IN AN ORGANIZED HEALTH CARE EDUCATION/TRAINING PROGRAM

## 2024-12-12 PROCEDURE — G2211 COMPLEX E/M VISIT ADD ON: HCPCS | Performed by: STUDENT IN AN ORGANIZED HEALTH CARE EDUCATION/TRAINING PROGRAM

## 2024-12-12 PROCEDURE — 3080F DIAST BP >= 90 MM HG: CPT | Performed by: STUDENT IN AN ORGANIZED HEALTH CARE EDUCATION/TRAINING PROGRAM

## 2024-12-12 NOTE — PROGRESS NOTES
"    Chief Complaint  Follow-up (From last hospital visit)  High blood pressure follow up    SUBJECTIVE    History of Present Illness    Maritza Bejarano is a 64 y.o. female who presents to the office today as an established patient that last saw me on 10/4/2024.     HTN: previously prescribed losartan-HCTZ 50-12.5 once daily. States she has been high at her dentist recently. States she checked her BP at home and it was \"something over 100\".     No Known Allergies     Outpatient Medications Marked as Taking for the 12/12/24 encounter (Office Visit) with Enoc Carbone,    Medication Sig Dispense Refill    albuterol sulfate  (90 Base) MCG/ACT inhaler Inhale 2 puffs Every 4 (Four) Hours As Needed for Wheezing or Shortness of Air. 18 g 0    anastrozole (ARIMIDEX) 1 MG tablet Take 1 tablet by mouth Daily. 90 tablet 3    baclofen (LIORESAL) 20 MG tablet Take 1 tablet by mouth 2 (Two) Times a Day.      castor oil-balsam peru (VENELEX) ointment Apply 1 Application topically to the appropriate area as directed 2 (Two) Times a Day. Apply with medihoney to rangel/buttocks and cover w/abd pad      Cholecalciferol (vitamin D3) 125 MCG (5000 UT) tablet tablet Take 1 tablet by mouth Daily.      clonazePAM (KlonoPIN) 2 MG tablet Take 1 tablet by mouth 2 (Two) Times a Day As Needed for Anxiety (Sleep). 6 tablet 0    Dimethicone (Remedy Skin Repair) 1.5 % cream Apply 1 dose topically Every Morning. Apply to all extremities upon rising for dry skin      dimethicone 1 % cream Apply 1 Application topically to the appropriate area as directed 2 (Two) Times a Day As Needed for Dry Skin. Apply to buttocks every shift for prophylactic  - and after each incontinent episode      ferrous sulfate 325 (65 FE) MG tablet Take 1 tablet by mouth Daily With Breakfast. 30 tablet 3    pantoprazole (PROTONIX) 40 MG EC tablet Take 1 tablet by mouth 2 (Two) Times a Day.      pramipexole (MIRAPEX) 1.5 MG tablet Take 1 tablet by mouth 3 (Three) Times a " Day.      prochlorperazine (COMPAZINE) 10 MG tablet Take 1 tablet by mouth Every 6 (Six) Hours As Needed for Nausea or Vomiting. 90 tablet 5    promethazine (PHENERGAN) 12.5 MG tablet Take 1 tablet by mouth Every 6 (Six) Hours As Needed for Nausea or Vomiting. 60 tablet 3    ribociclib succinate (Kisqali, 400 MG Dose,) 200 MG tablet therapy pack tablet Take 2 tablets by mouth Daily. Take for 21 days on then 7 days off. 42 tablet 5    sennosides-docusate (senna-docusate sodium) 8.6-50 MG per tablet Take 1 tablet by mouth Daily. 30 tablet 2    Wound Dressings ([facility] Wound/Burn Dressing) gel Apply 1 dose topically Daily. Every day shift for friction - cleanse with saline, pat dry, apply to wound and venelex to periwound and cover with abd pad          Past Medical History:   Diagnosis Date    Anemia 2024    `Treated with iron    Dawn esophagus     per patient    Blurred vision     R/T MS    Breast cancer 05/2024+++++    Carpal tunnel syndrome     Clotting disorder 1993, 2003, 2012    3 g/i bleeds w/ transfus/ions    Colon polyp 2013    removed w/ colonoscopy    Deep vein thrombosis phlebitis 1980    Depression     Diplopia 2013    GERD (gastroesophageal reflux disease)     GI (gastrointestinal bleed) 3 bleeds    2 transfusions    H/O Skin cancer, basal cell     Headache     History of blood transfusion     History of GI bleed     R/T NSAIDS AND STEROIDS, multiple times    History of urinary tract infection     Hypercalcemia     s/p parathyroidectomy    Hyperlipidemia     Hypertension     Movement disorder     Multiple sclerosis     Optic neuritis     PONV (postoperative nausea and vomiting)        OBJECTIVE    Vital Signs:   /100   Pulse 82   Temp 97.7 °F (36.5 °C) (Infrared)   SpO2 95%        Physical Exam  Vitals reviewed.   Constitutional:       General: She is not in acute distress.     Appearance: Normal appearance. She is not ill-appearing.   Eyes:      General: No scleral  "icterus.  Cardiovascular:      Rate and Rhythm: Normal rate and regular rhythm.      Heart sounds: Murmur heard.      Systolic murmur is present with a grade of 3/6.   Pulmonary:      Effort: Pulmonary effort is normal. No respiratory distress.      Breath sounds: Normal breath sounds. No wheezing.   Musculoskeletal:      Comments: Ambulating via motorized wheelchair   Skin:     Coloration: Skin is not jaundiced.   Neurological:      Mental Status: She is alert.   Psychiatric:         Mood and Affect: Mood normal.         Behavior: Behavior normal.         Thought Content: Thought content normal.            The following data was reviewed by: Enoc Carbone DO on 12/12/2024:  Common labs          11/11/2024    12:59 11/25/2024    14:25 12/10/2024    08:09   Common Labs   Glucose 125  149  146    BUN 13  17  12    Creatinine 0.56  0.73  0.58    Sodium 141  141  141    Potassium 3.9  3.8  4.0    Chloride 104  106  106    Calcium 8.8  9.3  8.8    Albumin 3.8  3.6  3.8    Total Bilirubin 0.3  0.3  0.2    Alkaline Phosphatase 113  115  130    AST (SGOT) 49  65  61    ALT (SGPT) 22  39  22    WBC 7.95  5.01  6.03    Hemoglobin 13.5  11.7  12.0    Hematocrit 42.9  37.6  37.5    Platelets 334  366  344                   ASSESSMENT & PLAN     Diagnoses and all orders for this visit:    1. Essential hypertension (Primary)  -previously prescribed losartan-HCTZ 50-12.5 once daily. States she has been high at her dentist recently. States she checked her BP at home and it was \"something over 100\".   -while she was in skilled rehab earlier this year she reports not needing medication and her BP remained normal  -160/100 today, recent office visits at [facility] are 171/98, 126/80, 150/91. At this point I recommend she check BP 3 times daily and send me in a BP log after 2-3 weeks. We may need to restart some antihypertensives at that time.   -I reviewed most recent CBC and CMP    2. Steroid-induced diabetes mellitus, initial " encounter  Lab Results   Component Value Date    HGBA1C 6.80 (H) 10/19/2024     -A1c met diabetes criteria 10/2024 after being on prednisone for her MS. She is now off prednisone therapy and we will recheck A1c next month to assess for improvement.           Follow Up  Return in about 7 weeks (around 1/30/2025) for Medicare Wellness.    Patient/family had no further questions at this time and verbalized understanding of the plan discussed today.

## 2024-12-13 ENCOUNTER — TELEPHONE (OUTPATIENT)
Dept: ONCOLOGY | Facility: CLINIC | Age: 64
End: 2024-12-13
Payer: MEDICARE

## 2024-12-13 NOTE — TELEPHONE ENCOUNTER
Maritza called my direct line and left a voicemail reporting pain in her back/ribs under her shoulder.  She also wanted to know if we offered massage therapy and or accupuncture.  I returned Maritza's call, no answer. Left message for her to call me back.

## 2024-12-16 ENCOUNTER — OFFICE VISIT (OUTPATIENT)
Dept: WOUND CARE | Facility: HOSPITAL | Age: 64
End: 2024-12-16
Payer: MEDICARE

## 2024-12-17 ENCOUNTER — LAB (OUTPATIENT)
Dept: LAB | Facility: HOSPITAL | Age: 64
End: 2024-12-17
Payer: MEDICARE

## 2024-12-17 ENCOUNTER — CLINICAL SUPPORT (OUTPATIENT)
Dept: ONCOLOGY | Facility: HOSPITAL | Age: 64
End: 2024-12-17
Payer: MEDICARE

## 2024-12-17 DIAGNOSIS — Z17.0 MALIGNANT NEOPLASM OF OVERLAPPING SITES OF LEFT BREAST IN FEMALE, ESTROGEN RECEPTOR POSITIVE: ICD-10-CM

## 2024-12-17 DIAGNOSIS — C50.912 INVASIVE LOBULAR CARCINOMA OF BREAST, STAGE 4, LEFT: ICD-10-CM

## 2024-12-17 DIAGNOSIS — C50.812 MALIGNANT NEOPLASM OF OVERLAPPING SITES OF LEFT BREAST IN FEMALE, ESTROGEN RECEPTOR POSITIVE: ICD-10-CM

## 2024-12-17 DIAGNOSIS — Z79.899 HIGH RISK MEDICATION USE: ICD-10-CM

## 2024-12-17 DIAGNOSIS — C79.51 CANCER, METASTATIC TO BONE: ICD-10-CM

## 2024-12-17 LAB
ALBUMIN SERPL-MCNC: 3.7 G/DL (ref 3.5–5.2)
ALBUMIN/GLOB SERPL: 1.2 G/DL
ALP SERPL-CCNC: 126 U/L (ref 39–117)
ALT SERPL W P-5'-P-CCNC: 18 U/L (ref 1–33)
ANION GAP SERPL CALCULATED.3IONS-SCNC: 12.1 MMOL/L (ref 5–15)
AST SERPL-CCNC: 55 U/L (ref 1–32)
BASOPHILS # BLD AUTO: 0.08 10*3/MM3 (ref 0–0.2)
BASOPHILS NFR BLD AUTO: 1.8 % (ref 0–1.5)
BILIRUB SERPL-MCNC: 0.3 MG/DL (ref 0–1.2)
BUN SERPL-MCNC: 16 MG/DL (ref 8–23)
BUN/CREAT SERPL: 25.8 (ref 7–25)
CALCIUM SPEC-SCNC: 8.6 MG/DL (ref 8.6–10.5)
CHLORIDE SERPL-SCNC: 106 MMOL/L (ref 98–107)
CO2 SERPL-SCNC: 21.9 MMOL/L (ref 22–29)
CREAT SERPL-MCNC: 0.62 MG/DL (ref 0.57–1)
DEPRECATED RDW RBC AUTO: 53.2 FL (ref 37–54)
EGFRCR SERPLBLD CKD-EPI 2021: 100.8 ML/MIN/1.73
EOSINOPHIL # BLD AUTO: 0.15 10*3/MM3 (ref 0–0.4)
EOSINOPHIL NFR BLD AUTO: 3.4 % (ref 0.3–6.2)
ERYTHROCYTE [DISTWIDTH] IN BLOOD BY AUTOMATED COUNT: 16 % (ref 12.3–15.4)
GLOBULIN UR ELPH-MCNC: 3.2 GM/DL
GLUCOSE SERPL-MCNC: 121 MG/DL (ref 65–99)
HCT VFR BLD AUTO: 39.1 % (ref 34–46.6)
HGB BLD-MCNC: 12.4 G/DL (ref 12–15.9)
IMM GRANULOCYTES # BLD AUTO: 0.02 10*3/MM3 (ref 0–0.05)
IMM GRANULOCYTES NFR BLD AUTO: 0.5 % (ref 0–0.5)
LYMPHOCYTES # BLD AUTO: 1 10*3/MM3 (ref 0.7–3.1)
LYMPHOCYTES NFR BLD AUTO: 22.6 % (ref 19.6–45.3)
MCH RBC QN AUTO: 28.9 PG (ref 26.6–33)
MCHC RBC AUTO-ENTMCNC: 31.7 G/DL (ref 31.5–35.7)
MCV RBC AUTO: 91.1 FL (ref 79–97)
MONOCYTES # BLD AUTO: 0.35 10*3/MM3 (ref 0.1–0.9)
MONOCYTES NFR BLD AUTO: 7.9 % (ref 5–12)
NEUTROPHILS NFR BLD AUTO: 2.83 10*3/MM3 (ref 1.7–7)
NEUTROPHILS NFR BLD AUTO: 63.8 % (ref 42.7–76)
NRBC BLD AUTO-RTO: 0 /100 WBC (ref 0–0.2)
PLATELET # BLD AUTO: 325 10*3/MM3 (ref 140–450)
PMV BLD AUTO: 8.9 FL (ref 6–12)
POTASSIUM SERPL-SCNC: 3.8 MMOL/L (ref 3.5–5.2)
PROT SERPL-MCNC: 6.9 G/DL (ref 6–8.5)
RBC # BLD AUTO: 4.29 10*6/MM3 (ref 3.77–5.28)
SODIUM SERPL-SCNC: 140 MMOL/L (ref 136–145)
WBC NRBC COR # BLD AUTO: 4.43 10*3/MM3 (ref 3.4–10.8)

## 2024-12-17 PROCEDURE — 36415 COLL VENOUS BLD VENIPUNCTURE: CPT

## 2024-12-17 PROCEDURE — 85025 COMPLETE CBC W/AUTO DIFF WBC: CPT

## 2024-12-17 PROCEDURE — 80053 COMPREHEN METABOLIC PANEL: CPT

## 2024-12-18 NOTE — PROGRESS NOTES
Reviewed labs from yesterday with Maritza by phone on 12/18/24. Plan was to follow her liver enzymes closely on increased dose of kisqali.  Her liver enzymes today are stable to improved. Dr Lopes recommends continuing her kisqali and the same dose.  Maritza also inquired about speaking with the dietitian and also wanted to see if we offered massage therapy.  I have sent a message to the Dietitian's inbox and I provided Maritza the MDC number.

## 2024-12-20 ENCOUNTER — TELEMEDICINE - AUDIO (OUTPATIENT)
Dept: OTHER | Facility: HOSPITAL | Age: 64
End: 2024-12-20
Payer: MEDICARE

## 2024-12-20 NOTE — PROGRESS NOTES
OUTPATIENT ONCOLOGY NUTRITION ASSESSMENT    Patient Name: Maritza Bejarano  YOB: 1960  MRN: [id number]  Assessment Date: 12/20/2024    COMMENTS:   Spoke to patient on the phone today for nutrition education.  Last HgAIC was elevated, 6.8.  Reviewed basics of a DM diet and emailed resources and information. Will plan to meet with her in person 1/6 after her MD appointment.  Encouraged her to call with any questions until then.             Reason for Assessment Nursing referral, Education , Patient request     Diagnosis/Problem   Breast cancer   Treatment Plan Chemotherapy Kisqali, anastrazole                 Medical/Surgical History Past Medical History:   Diagnosis Date    Anemia 2024    `Treated with iron    Dawn esophagus     per patient    Blurred vision     R/T MS    Breast cancer 05/2024+++++    Carpal tunnel syndrome     Clotting disorder 1993, 2003, 2012    3 g/i bleeds w/ transfus/ions    Colon polyp 2013    removed w/ colonoscopy    Deep vein thrombosis phlebitis 1980    Depression     Diplopia 2013    GERD (gastroesophageal reflux disease)     GI (gastrointestinal bleed) 3 bleeds    2 transfusions    H/O Skin cancer, basal cell     Headache     History of blood transfusion     History of GI bleed     R/T NSAIDS AND STEROIDS, multiple times    History of urinary tract infection     Hypercalcemia     s/p parathyroidectomy    Hyperlipidemia     Hypertension     Movement disorder     Multiple sclerosis     Optic neuritis     PONV (postoperative nausea and vomiting)     Steroid-induced diabetes 2024       Past Surgical History:   Procedure Laterality Date    APPENDECTOMY      BLADDER SURGERY      bladder stimulator    BREAST BIOPSY  don't remember    BREAST SURGERY      augmentation wtih subsequent removal    CARPAL TUNNEL RELEASE Bilateral     Left 2018, right 2020    CUBITAL TUNNEL RELEASE Left     CYSTOSCOPY BOTOX INJECTION OF BLADDER  2018    Cystoscopy with Botox    FRACTURE SURGERY  2019  "   rt shoulder    PARATHYROIDECTOMY      one gland removed    ROTATOR CUFF REPAIR Right 2017    TOE SURGERY      bilateral great toes    TOTAL SHOULDER ARTHROPLASTY W/ DISTAL CLAVICLE EXCISION Right 10/22/2018    Procedure: RT TOTAL SHOULDER REVERSE ARTHROPLASTY;  Surgeon: Bipin Dangelo MD;  Location: [facility];  Service: Orthopedics            Anthropometrics        Current Height Ht Readings from Last 1 Encounters:   12/10/24 152.4 cm (60\")      Current Weight Wt Readings from Last 1 Encounters:   11/25/24 77.1 kg (170 lb)      Weight History Wt Readings from Last 30 Encounters:   11/25/24 1434 77.1 kg (170 lb)   11/11/24 1311 76.2 kg (168 lb)   10/18/24 0203 83.4 kg (183 lb 13.8 oz)   10/17/24 1938 76.2 kg (168 lb)   10/14/24 0758 81.6 kg (180 lb)   10/04/24 1543 75.3 kg (166 lb)   09/18/24 1537 87 kg (191 lb 12.8 oz)   09/08/24 0520 84.4 kg (186 lb 1.1 oz)   08/30/24 1513 74.8 kg (165 lb)   08/16/24 1426 74.8 kg (165 lb)   07/16/24 0907 79.4 kg (175 lb)   07/01/24 1530 79.4 kg (175 lb)   06/27/24 1332 79.4 kg (175 lb)   06/12/24 1538 79.4 kg (175 lb)   05/23/24 1132 79.4 kg (175 lb)   04/08/24 1521 77.1 kg (170 lb)   04/01/24 1526 77.6 kg (171 lb)   03/26/24 1520 77.6 kg (171 lb)   03/09/24 0513 77.9 kg (171 lb 11.8 oz)   03/07/24 1156 77.1 kg (170 lb)   11/16/23 1558 79.4 kg (175 lb)   09/15/23 1313 79.4 kg (175 lb)   06/13/22 1524 72.6 kg (160 lb)   06/01/22 1528 72.6 kg (160 lb)   03/25/22 1435 74.8 kg (165 lb)   02/26/22 1444 80.3 kg (177 lb)   02/26/22 1158 90.7 kg (200 lb)   01/31/21 1208 83 kg (183 lb)   01/28/21 2117 83 kg (183 lb)   01/28/21 1354 83.4 kg (183 lb 12.8 oz)   12/06/19 0535 83.9 kg (185 lb)   06/14/19 0930 83.9 kg (185 lb)   11/14/18 2016 71.8 kg (158 lb 4.8 oz)   10/22/18 1050 74.8 kg (165 lb)   06/21/18 1346 72.6 kg (160 lb)          Medications           Current medications: Dimethicone, Medihoney Wound/Burn Dressing, albuterol sulfate HFA, anastrozole, baclofen, castor oil-balsam " peru, clonazePAM, dimethicone, ferrous sulfate, ondansetron, pantoprazole, pramipexole, prochlorperazine, promethazine, ribociclib succinate, sennosides-docusate, and vitamin D3                 Labs       Pertinent Labs    Results from last 7 days   Lab Units 12/17/24  1442   SODIUM mmol/L 140   POTASSIUM mmol/L 3.8   CHLORIDE mmol/L 106   CO2 mmol/L 21.9*   BUN mg/dL 16   CREATININE mg/dL 0.62   CALCIUM mg/dL 8.6   BILIRUBIN mg/dL 0.3   ALK PHOS U/L 126*   ALT (SGPT) U/L 18   AST (SGOT) U/L 55*   GLUCOSE mg/dL 121*     Results from last 7 days   Lab Units 12/17/24  1442   HEMOGLOBIN g/dL 12.4   HEMATOCRIT % 39.1   WBC 10*3/mm3 4.43   ALBUMIN g/dL 3.7     Results from last 7 days   Lab Units 12/17/24  1442   PLATELETS 10*3/mm3 325     COVID19   Date Value Ref Range Status   09/07/2024 Not Detected Not Detected - Ref. Range Final     Lab Results   Component Value Date    HGBA1C 6.80 (H) 10/19/2024          Electronically signed by:  Andria Taylor RD, LD  12/20/24 12:32 EST

## 2024-12-24 ENCOUNTER — PATIENT OUTREACH (OUTPATIENT)
Dept: OTHER | Facility: HOSPITAL | Age: 64
End: 2024-12-24
Payer: MEDICARE

## 2024-12-27 ENCOUNTER — TELEPHONE (OUTPATIENT)
Dept: ONCOLOGY | Facility: CLINIC | Age: 64
End: 2024-12-27
Payer: MEDICARE

## 2024-12-27 NOTE — TELEPHONE ENCOUNTER
Caller: Maritza Bejarano    Relationship: Self    Best call back number: [phone]       Who are you requesting to speak with (clinical staff, provider,  specific staff member): CLINICAL      What was the call regarding: PATIENT STATES SHE DISCUSSED A RX FOR IVERMECTIN, CALLING TO VERIFY IF THIS CAN BE ORDERED TO [facility]

## 2024-12-30 ENCOUNTER — OFFICE VISIT (OUTPATIENT)
Dept: WOUND CARE | Facility: HOSPITAL | Age: 64
End: 2024-12-30
Payer: MEDICARE

## 2024-12-31 ENCOUNTER — LAB (OUTPATIENT)
Dept: LAB | Facility: HOSPITAL | Age: 64
End: 2024-12-31
Payer: MEDICARE

## 2024-12-31 ENCOUNTER — CLINICAL SUPPORT (OUTPATIENT)
Dept: ONCOLOGY | Facility: HOSPITAL | Age: 64
End: 2024-12-31
Payer: MEDICARE

## 2024-12-31 DIAGNOSIS — C79.51 CANCER, METASTATIC TO BONE: ICD-10-CM

## 2024-12-31 DIAGNOSIS — Z79.899 HIGH RISK MEDICATION USE: ICD-10-CM

## 2024-12-31 DIAGNOSIS — Z17.0 MALIGNANT NEOPLASM OF OVERLAPPING SITES OF LEFT BREAST IN FEMALE, ESTROGEN RECEPTOR POSITIVE: ICD-10-CM

## 2024-12-31 DIAGNOSIS — C50.912 INVASIVE LOBULAR CARCINOMA OF BREAST, STAGE 4, LEFT: ICD-10-CM

## 2024-12-31 DIAGNOSIS — C50.812 MALIGNANT NEOPLASM OF OVERLAPPING SITES OF LEFT BREAST IN FEMALE, ESTROGEN RECEPTOR POSITIVE: ICD-10-CM

## 2024-12-31 LAB
ALBUMIN SERPL-MCNC: 3.7 G/DL (ref 3.5–5.2)
ALBUMIN/GLOB SERPL: 1.2 G/DL
ALP SERPL-CCNC: 131 U/L (ref 39–117)
ALT SERPL W P-5'-P-CCNC: 19 U/L (ref 1–33)
ANION GAP SERPL CALCULATED.3IONS-SCNC: 11.9 MMOL/L (ref 5–15)
AST SERPL-CCNC: 51 U/L (ref 1–32)
BASOPHILS # BLD AUTO: 0.04 10*3/MM3 (ref 0–0.2)
BASOPHILS NFR BLD AUTO: 1 % (ref 0–1.5)
BILIRUB SERPL-MCNC: 0.3 MG/DL (ref 0–1.2)
BUN SERPL-MCNC: 14 MG/DL (ref 8–23)
BUN/CREAT SERPL: 21.2 (ref 7–25)
CALCIUM SPEC-SCNC: 8.6 MG/DL (ref 8.6–10.5)
CHLORIDE SERPL-SCNC: 105 MMOL/L (ref 98–107)
CO2 SERPL-SCNC: 24.1 MMOL/L (ref 22–29)
CREAT SERPL-MCNC: 0.66 MG/DL (ref 0.57–1)
DEPRECATED RDW RBC AUTO: 51.1 FL (ref 37–54)
EGFRCR SERPLBLD CKD-EPI 2021: 99.3 ML/MIN/1.73
EOSINOPHIL # BLD AUTO: 0.09 10*3/MM3 (ref 0–0.4)
EOSINOPHIL NFR BLD AUTO: 2.3 % (ref 0.3–6.2)
ERYTHROCYTE [DISTWIDTH] IN BLOOD BY AUTOMATED COUNT: 15.5 % (ref 12.3–15.4)
GLOBULIN UR ELPH-MCNC: 3.2 GM/DL
GLUCOSE SERPL-MCNC: 175 MG/DL (ref 65–99)
HCT VFR BLD AUTO: 40.9 % (ref 34–46.6)
HGB BLD-MCNC: 13.1 G/DL (ref 12–15.9)
IMM GRANULOCYTES # BLD AUTO: 0.02 10*3/MM3 (ref 0–0.05)
IMM GRANULOCYTES NFR BLD AUTO: 0.5 % (ref 0–0.5)
LYMPHOCYTES # BLD AUTO: 0.76 10*3/MM3 (ref 0.7–3.1)
LYMPHOCYTES NFR BLD AUTO: 19.6 % (ref 19.6–45.3)
MCH RBC QN AUTO: 29.2 PG (ref 26.6–33)
MCHC RBC AUTO-ENTMCNC: 32 G/DL (ref 31.5–35.7)
MCV RBC AUTO: 91.3 FL (ref 79–97)
MONOCYTES # BLD AUTO: 0.16 10*3/MM3 (ref 0.1–0.9)
MONOCYTES NFR BLD AUTO: 4.1 % (ref 5–12)
NEUTROPHILS NFR BLD AUTO: 2.81 10*3/MM3 (ref 1.7–7)
NEUTROPHILS NFR BLD AUTO: 72.5 % (ref 42.7–76)
NRBC BLD AUTO-RTO: 0 /100 WBC (ref 0–0.2)
PLATELET # BLD AUTO: 306 10*3/MM3 (ref 140–450)
PMV BLD AUTO: 8.6 FL (ref 6–12)
POTASSIUM SERPL-SCNC: 3.7 MMOL/L (ref 3.5–5.2)
PROT SERPL-MCNC: 6.9 G/DL (ref 6–8.5)
RBC # BLD AUTO: 4.48 10*6/MM3 (ref 3.77–5.28)
SODIUM SERPL-SCNC: 141 MMOL/L (ref 136–145)
WBC NRBC COR # BLD AUTO: 3.88 10*3/MM3 (ref 3.4–10.8)

## 2024-12-31 PROCEDURE — 85025 COMPLETE CBC W/AUTO DIFF WBC: CPT

## 2024-12-31 PROCEDURE — 80053 COMPREHEN METABOLIC PANEL: CPT

## 2024-12-31 PROCEDURE — 36415 COLL VENOUS BLD VENIPUNCTURE: CPT

## 2024-12-31 NOTE — PROGRESS NOTES
Reviewed CBC and CMP with Maritza by phone. Liver enzymes continue to remain stable to improved. She will finish this cycle of kisqali this week.  She is scheduled to see Dr Lopes Monday.  She reports alternating constipation and diarrhea.  We discussed continued use of imodium and stool softeners as needed but not in excess to keep both to a minimum.  She voiced understanding.

## 2025-01-07 ENCOUNTER — TELEPHONE (OUTPATIENT)
Dept: ONCOLOGY | Facility: CLINIC | Age: 65
End: 2025-01-07

## 2025-01-07 ENCOUNTER — TELEPHONE (OUTPATIENT)
Dept: ONCOLOGY | Facility: CLINIC | Age: 65
End: 2025-01-07
Payer: MEDICARE

## 2025-01-07 NOTE — TELEPHONE ENCOUNTER
Caller: Maritza Bejarano    Relationship: Self    Best call back number: [phone]     What is the best time to reach you: ANYTIME    Who are you requesting to speak with (clinical staff, provider,  specific staff member):     What was the call regarding: PATIENT WANTS TO KNOW IF SHE CAN'T GET OUT TOMORROW FOR HER APPT IF DR HARDIN WOULD STILL BE ABLE TO CALL HER?    PLEASE ADVISE.

## 2025-01-07 NOTE — TELEPHONE ENCOUNTER
Caller: Maritza Bejarano    Relationship: Self    Best call back number: [phone]     What is the best time to reach you: ANYTIME    Who are you requesting to speak with (clinical staff, provider,  specific staff member): CLINICAL    What was the call regarding: PATIENT WOULD LIKE TO KNOW HER ACTUAL DIAGNOSIS, CAN LEAVE DETAILED VM IF NO ANSWER.

## 2025-01-08 ENCOUNTER — TELEPHONE (OUTPATIENT)
Dept: ONCOLOGY | Facility: CLINIC | Age: 65
End: 2025-01-08

## 2025-01-08 ENCOUNTER — TELEMEDICINE (OUTPATIENT)
Dept: ONCOLOGY | Facility: CLINIC | Age: 65
End: 2025-01-08
Payer: MEDICARE

## 2025-01-08 DIAGNOSIS — Z17.0 MALIGNANT NEOPLASM OF OVERLAPPING SITES OF LEFT BREAST IN FEMALE, ESTROGEN RECEPTOR POSITIVE: Primary | ICD-10-CM

## 2025-01-08 DIAGNOSIS — C50.812 MALIGNANT NEOPLASM OF OVERLAPPING SITES OF LEFT BREAST IN FEMALE, ESTROGEN RECEPTOR POSITIVE: Primary | ICD-10-CM

## 2025-01-08 RX ORDER — TRAZODONE HYDROCHLORIDE 50 MG/1
50 TABLET, FILM COATED ORAL NIGHTLY
Qty: 30 TABLET | Refills: 0 | Status: SHIPPED | OUTPATIENT
Start: 2025-01-08

## 2025-01-08 RX ORDER — ZOLPIDEM TARTRATE 5 MG/1
5 TABLET ORAL NIGHTLY PRN
Qty: 30 TABLET | Refills: 0 | Status: SHIPPED | OUTPATIENT
Start: 2025-01-08

## 2025-01-08 NOTE — PROGRESS NOTES
Subjective   Maritza Bejarano is a 64 y.o. female.   With metastatic invasive lobular carcinoma, ER/KS strongly positive cancer with metastatic disease to the bones.    History of Present Illness   Maritza presents today for a video visit.  She has been on 400 mg of   Ribociclib since December 2024.  She is scheduled to complete 3-week Of Ribociclib 400 mg dose on 1/12/2025.  She was scheduled to come in for follow-up with labs today however she was unable to do so due to her  being ill and her mom also not being well.  Due to this she has requested a video visit.  She is complaining of severe insomnia and has used Klonopin a few times however she does not want to use that regularly as she knows that this is addictive.  She is also complaining of intermittent nausea for which she has been using Compazine that has helped with the nausea.  Other than that she seems to be feeling well.    Oncologic history:    Patient is a 64-year-old postmenopausal lady who has not had a mammogram in 4 years presented with a screen detected abnormality.  She had a left breast biopsy in 2014 which was benign.  Denies palpating any breast masses skin changes or nipple discharge prior to the abnormal mammogram.  She does have left nipple inversion.    Patient has a longstanding diagnosis of multiple sclerosis and has not been on any treatment.  She was previously seen by Dr. Ozzy France and now being seen by Dr. Soto with neurology.  She has been nonambulatory for the past 4 years and requires a wheelchair.  She is unable to transfer herself in and out of wheelchairs and her  has to lift her for all the transfers.  She is also incontinent of the bladder and requiring a suprapubic catheter placed by urology to help with the same.  She had a bladder stimulator in the past which was turned off.  Other comorbidities include hypertension and hyperlipidemia.      Family history significant for maternal great grandmother with breast  cancer, maternal great aunt and mother with breast cancer in her 70s.  Denies any family history of ovarian cancer, pancreatic cancer or melanoma.    5/21/2024-bilateral screening mammogram  Finding 1.new nipple retraction along with diffuse skin and trabecular thickening of the left breast.  There is suggestion of subtle architectural distortion seen on the MLO projection in the posterior central region.  3 biopsy markers are noted.  No new suspicious calcifications.  Send finding 2.few axillary lymph node seen in the left breast which display interval increase in size and density.    Finding 3.focal asymmetry measuring 10 mm seen in the anterior one third of the right breast in the medial subareolar region.    Impression  Finding 1.new nipple retraction, skin and trabecular thickening in the left breast requires additional evaluation.  There is also question architectural distortion in the posterior central breast seen on the MLO projection.  Diagnostic mammogram to include repeat full-field CC with additional posterior tissue and complete breast ultrasound is recommended.  Finding 2.axillary lymph nodes in the left breast require additional evaluation, ultrasound recommended.  Finding 3.focal asymmetry in the right breast requires additional evaluation.  Diagnostic mammogram and limited breast ultrasound is recommended.    5/29/2024-bilateral diagnostic mammogram and ultrasound  Finding 1.4 0.7 x 5.6 x 6.8 cm developing asymmetry with associated nipple retraction, skin thickening and trabecular thickening of the left breast in the central region, inferior region in the upper outer region and the lower outer region.  Finding 2.axillary lymph node in the left breast.  Finding 3.suspected finding completely resolves upon further evaluation representing benign fibroglandular breast tissue.    Bilateral breast ultrasound  Finding 1.nonparallel hypoechoic mass with indistinct margins and skin thickening and internal  vascularity in the left breast in the central region, inferior region, upper outer region and in the lower outer region.  The finding is palpable and appears to involve all 4 quadrants.  Most discrete portion is located at 130, 3 cm from the nipple, skin thickening up to 6 mm.  Send finding 2.rounded enlarged left axillary lymph node measuring 11 mm.  Cortical thickening of 9 mm present.    Finding 3.no sonographic correlate.  Send finding 4.enlarged right axillary lymph node measuring 14 mm.  Cortical thickening of 5 mm.    Impression  Finding 1.ultrasound-guided biopsy recommended  Finding 2.ultrasound-guided biopsy recommended  Finding 3.previously described right breast asymmetry resolves  Finding 4.ultrasound-guided biopsy recommended.    Ultrasound-guided biopsy of the left breast and left axilla lymph node and right axilla lymph node    1.left breast  Invasive lobular carcinoma with crush artifact  Grade 2  Invasive carcinoma measures 8.5 mm  No lymphovascular space invasion  ER +91 to 100% strong  TX +31 to 40% moderate  HER2 negative, 0  Ki-67 35%    2.left axillary lymph node focus of metastatic Dastech lobular carcinoma measuring 10 mm  ER +91 to 100% strong  TX +21 to 30%  HER2 -0  Ki-67 30%    3.right axillary lymph node with a focus of metastatic carcinoma measuring 4 mm.  Grade 2.  ER +91 to 100% strong  TX +11 to 20% moderate  HER2 negative, 0  Ki-67 35%    Pathology of all the 3 sites appear similar and findings could represent left breast lobular carcinoma metastatic to the right as well as left axillary lymph nodes.    6/14/2024-CT of the chest abdomen and pelvis  1.large ill-defined infiltrative central left breast mass measuring 6.7 x 4 cm, medially there is an irregular 2.5 x 2.5 cm mass.  Significant thickening of the skin in the left breast and there is left axilla lymphadenopathy.  Left axilla lymph node measures 1.3 x 1.3 cm.  There is also a new enlarged right axillary lymph node measuring  1.3 x 1 cm.  All of the subcentimeter right axillary lymph nodes are slightly larger than previous.  2.no mediastinal hilar or internal mammary chain lymphadenopathy  3.new indeterminate mixed density measuring 1.3 x 1.2 cm right apical pulmonary nodule.  Follow-up recommended.  4.pleural parenchymal thickening and nodules inferiorly and posterior to the right upper lobe are stable.  Chronic scarring and subsegmental atelectatic change in both lower lobes.    CT abdomen pelvis  1.moderate fatty infiltration of the liver.  No suspicious liver lesions.  2.moderate-sized paraesophageal hernia.  No acute bowel abnormality.  3.right sacral stimulator noted at the S3 neuroforamina.  No suspicious bone lesions are noted.    6/14/2024-bone scan  1.focal abnormal uptake in the left anterior inferior iliac spine correlating with lucent lesion on the CT concerning for metastatic disease.  Further evaluation with MRI of the pelvis and left hip could be performed.  2.focal uptake in the left frontal calvarium again concerning for metastatic disease.  Noncontrast CT or MRI could be obtained.  3.soft tissue uptake noted in the left breast at the site of known left breast cancer.  4.changes from arthroplasty on the right shoulder.  5.multifocal likely degenerative uptake in each knee, ankle and foot and increased uptake in the medial and patellofemoral cortex of the right knee could be posttraumatic.      Invitae 9 gene stat panel negative.    MRI of the brain which showed T2 hyperintensity involving the frontal bone measuring 1.6 cm in size and a smaller area of enhancement in the frontal bone laterally and inferiorly concerning for metastasis.  No brain mets    MRI of the hip and pelvis showed multiple enhancing bone lesions consistent with metastatic disease to the sacrum and pelvis.  Dominant lesion in the anterior inferior left pelvic spine and rectus femoris muscle origin that correlates with the site of bone scan uptake.   She has some right joint hip joint effusion.  Her CA 15-3 16.2    She was seen by Dr. Saavedra on 7/16/2024 and recommended to start on Ribociclib along with anastrozole.    Started Ribociclib in the first week of August 2024    Had profound nausea and vomiting requiring antiemetics.  Completed 3 weeks of Ribociclib on 8/30/2024.    Hospitalized from 9/11/2024 to 9/16/2024 for pneumonia and KINZA and since then Ribociclib has been on hold    Readmitted to the hospital from 10/17/2024 to 10/23/2024 requiring holding Ribociclib.  She was admitted for UTI with sepsis.    10/29/2024-CT of the chest which was compared to the CT chest from 6/14/2024-stable 1.3 cm subsolid nodule in the right lung apex.  Decrease in size of the left axillary lymph nodes.  Ill-defined left breast mass appears unchanged.  Expansile lytic and sclerotic lesion in the posterior left 10th rib which appears increased compared to the prior CT.  Stable subtle area of sclerosis in the left anterior third rib.    10/3/2024-MRI of the cervical spine-rounded area of marrow replacement in C7 suspicious for metastatic disease.    10/3/2024-MRI of the thoracic spine-suspicious patchy areas of marrow replacement in the thoracic spine at T5, T6, T7, T11, T12, L1 suspicious for metastatic disease.      The following portions of the patient's history were reviewed and updated as appropriate: allergies, current medications, past family history, past medical history, past social history, past surgical history, and problem list.    Past Medical History:   Diagnosis Date    Anemia 2024    `Treated with iron    Dawn esophagus     per patient    Blurred vision     R/T MS    Breast cancer 05/2024+++++    Carpal tunnel syndrome     Clotting disorder 1993, 2003, 2012    3 g/i bleeds w/ transfus/ions    Colon polyp 2013    removed w/ colonoscopy    Deep vein thrombosis phlebitis 1980    Depression     Diplopia 2013    GERD (gastroesophageal reflux disease)     GI  (gastrointestinal bleed) 3 bleeds    2 transfusions    H/O Skin cancer, basal cell     Headache     History of blood transfusion     History of GI bleed     R/T NSAIDS AND STEROIDS, multiple times    History of urinary tract infection     Hypercalcemia     s/p parathyroidectomy    Hyperlipidemia     Hypertension     Movement disorder     Multiple sclerosis     Optic neuritis     PONV (postoperative nausea and vomiting)     Steroid-induced diabetes 2024        Past Surgical History:   Procedure Laterality Date    APPENDECTOMY      BLADDER SURGERY      bladder stimulator    BREAST BIOPSY  don't remember    BREAST SURGERY      augmentation wtih subsequent removal    CARPAL TUNNEL RELEASE Bilateral     Left 2018, right 2020    CUBITAL TUNNEL RELEASE Left     CYSTOSCOPY BOTOX INJECTION OF BLADDER  2018    Cystoscopy with Botox    FRACTURE SURGERY  2019    rt shoulder    PARATHYROIDECTOMY      one gland removed    ROTATOR CUFF REPAIR Right 2017    TOE SURGERY      bilateral great toes    TOTAL SHOULDER ARTHROPLASTY W/ DISTAL CLAVICLE EXCISION Right 10/22/2018    Procedure: RT TOTAL SHOULDER REVERSE ARTHROPLASTY;  Surgeon: Bipin Dangelo MD;  Location: [facility];  Service: Orthopedics        Family History   Problem Relation Age of Onset    Hypertension Mother     Hyperlipidemia Mother     Aortic aneurysm Mother         thoracic    Diabetes Mother     Hypertension Father         charissa heart failure    Heart failure Father     Hyperlipidemia Father     Miscarriages / Stillbirths Sister     Multiple sclerosis Brother     Atrial fibrillation Brother     Diabetes Maternal Grandfather     Stroke Maternal Grandfather     Parkinsonism Paternal Grandmother     Tremor Paternal Grandmother     Stomach cancer Paternal Grandfather     Diabetes Maternal Grandmother         Social History     Socioeconomic History    Marital status:      Spouse name: Donald    Number of children: 0   Tobacco Use    Smoking status: Former      Current packs/day: 0.00     Average packs/day: 2.0 packs/day for 30.0 years (60.0 ttl pk-yrs)     Types: Cigarettes     Start date: 10/22/1973     Quit date: 10/22/2003     Years since quittin.2    Smokeless tobacco: Never   Vaping Use    Vaping status: Never Used   Substance and Sexual Activity    Alcohol use: Not Currently    Drug use: Not Currently     Types: Marijuana     Comment: advised by neurologist for sleep    Sexual activity: Not Currently     Partners: Male     Birth control/protection: Post-menopausal        OB History    No obstetric history on file.     Age at menarche-15  Age at first live childbirth-not applicable   2 para 0  0  Age of menopause-57  No use of hormone replacement therapy      No Known Allergies         Review of Systems   Constitutional:  Positive for activity change and fatigue.   HENT: Negative.     Eyes: Negative.    Respiratory:  Positive for shortness of breath.    Cardiovascular: Negative.    Gastrointestinal: Negative.    Endocrine: Negative.    Genitourinary:  Positive for urinary incontinence.   Musculoskeletal: Negative.    Skin: Negative.    Allergic/Immunologic: Negative.    Neurological:  Positive for weakness.   Psychiatric/Behavioral: Negative.       Review of systems as mentioned HPI otherwise negative    Objective   not currently breastfeeding.   Physical Exam  Vitals reviewed.   Constitutional:       Appearance: Normal appearance. She is normal weight.   HENT:      Right Ear: External ear normal.      Left Ear: External ear normal.      Nose: Nose normal.      Mouth/Throat:      Pharynx: Oropharynx is clear.   Eyes:      Conjunctiva/sclera: Conjunctivae normal.   Cardiovascular:      Rate and Rhythm: Normal rate.   Pulmonary:      Effort: Pulmonary effort is normal.   Abdominal:      General: Abdomen is flat.   Musculoskeletal:         General: Normal range of motion.      Cervical back: Normal range of motion.   Skin:     General: Skin is warm.    Neurological:      General: No focal deficit present.      Mental Status: She is alert and oriented to person, place, and time.   Psychiatric:         Mood and Affect: Mood normal.         Behavior: Behavior normal.         Thought Content: Thought content normal.         Judgment: Judgment normal.       Breast Exam: Right breast appears normal on inspection.  No palpable right axilla lymphadenopathy or right breast masses.  Right nipple inverted.  Left breast on inspection there is nipple retraction crusting over the nipple region.  On palpation there is a 8 x 6 cm mass (improved) in the retroareolar region occupying much of the breast.  Palpable left axillary adenopathy as well.          Lab on 12/31/2024   Component Date Value Ref Range Status    Glucose 12/31/2024 175 (H)  65 - 99 mg/dL Final    BUN 12/31/2024 14  8 - 23 mg/dL Final    Creatinine 12/31/2024 0.66  0.57 - 1.00 mg/dL Final    Sodium 12/31/2024 141  136 - 145 mmol/L Final    Potassium 12/31/2024 3.7  3.5 - 5.2 mmol/L Final    Chloride 12/31/2024 105  98 - 107 mmol/L Final    CO2 12/31/2024 24.1  22.0 - 29.0 mmol/L Final    Calcium 12/31/2024 8.6  8.6 - 10.5 mg/dL Final    Total Protein 12/31/2024 6.9  6.0 - 8.5 g/dL Final    Albumin 12/31/2024 3.7  3.5 - 5.2 g/dL Final    ALT (SGPT) 12/31/2024 19  1 - 33 U/L Final    AST (SGOT) 12/31/2024 51 (H)  1 - 32 U/L Final    Alkaline Phosphatase 12/31/2024 131 (H)  39 - 117 U/L Final    Total Bilirubin 12/31/2024 0.3  0.0 - 1.2 mg/dL Final    Globulin 12/31/2024 3.2  gm/dL Final    A/G Ratio 12/31/2024 1.2  g/dL Final    BUN/Creatinine Ratio 12/31/2024 21.2  7.0 - 25.0 Final    Anion Gap 12/31/2024 11.9  5.0 - 15.0 mmol/L Final    eGFR 12/31/2024 99.3  >60.0 mL/min/1.73 Final    WBC 12/31/2024 3.88  3.40 - 10.80 10*3/mm3 Final    RBC 12/31/2024 4.48  3.77 - 5.28 10*6/mm3 Final    Hemoglobin 12/31/2024 13.1  12.0 - 15.9 g/dL Final    Hematocrit 12/31/2024 40.9  34.0 - 46.6 % Final    MCV 12/31/2024 91.3   79.0 - 97.0 fL Final    MCH 12/31/2024 29.2  26.6 - 33.0 pg Final    MCHC 12/31/2024 32.0  31.5 - 35.7 g/dL Final    RDW 12/31/2024 15.5 (H)  12.3 - 15.4 % Final    RDW-SD 12/31/2024 51.1  37.0 - 54.0 fl Final    MPV 12/31/2024 8.6  6.0 - 12.0 fL Final    Platelets 12/31/2024 306  140 - 450 10*3/mm3 Final    Neutrophil % 12/31/2024 72.5  42.7 - 76.0 % Final    Lymphocyte % 12/31/2024 19.6  19.6 - 45.3 % Final    Monocyte % 12/31/2024 4.1 (L)  5.0 - 12.0 % Final    Eosinophil % 12/31/2024 2.3  0.3 - 6.2 % Final    Basophil % 12/31/2024 1.0  0.0 - 1.5 % Final    Immature Grans % 12/31/2024 0.5  0.0 - 0.5 % Final    Neutrophils, Absolute 12/31/2024 2.81  1.70 - 7.00 10*3/mm3 Final    Lymphocytes, Absolute 12/31/2024 0.76  0.70 - 3.10 10*3/mm3 Final    Monocytes, Absolute 12/31/2024 0.16  0.10 - 0.90 10*3/mm3 Final    Eosinophils, Absolute 12/31/2024 0.09  0.00 - 0.40 10*3/mm3 Final    Basophils, Absolute 12/31/2024 0.04  0.00 - 0.20 10*3/mm3 Final    Immature Grans, Absolute 12/31/2024 0.02  0.00 - 0.05 10*3/mm3 Final    nRBC 12/31/2024 0.0  0.0 - 0.2 /100 WBC Final   Lab on 12/17/2024   Component Date Value Ref Range Status    Glucose 12/17/2024 121 (H)  65 - 99 mg/dL Final    BUN 12/17/2024 16  8 - 23 mg/dL Final    Creatinine 12/17/2024 0.62  0.57 - 1.00 mg/dL Final    Sodium 12/17/2024 140  136 - 145 mmol/L Final    Potassium 12/17/2024 3.8  3.5 - 5.2 mmol/L Final    Chloride 12/17/2024 106  98 - 107 mmol/L Final    CO2 12/17/2024 21.9 (L)  22.0 - 29.0 mmol/L Final    Calcium 12/17/2024 8.6  8.6 - 10.5 mg/dL Final    Total Protein 12/17/2024 6.9  6.0 - 8.5 g/dL Final    Albumin 12/17/2024 3.7  3.5 - 5.2 g/dL Final    ALT (SGPT) 12/17/2024 18  1 - 33 U/L Final    AST (SGOT) 12/17/2024 55 (H)  1 - 32 U/L Final    Alkaline Phosphatase 12/17/2024 126 (H)  39 - 117 U/L Final    Total Bilirubin 12/17/2024 0.3  0.0 - 1.2 mg/dL Final    Globulin 12/17/2024 3.2  gm/dL Final    A/G Ratio 12/17/2024 1.2  g/dL Final     BUN/Creatinine Ratio 12/17/2024 25.8 (H)  7.0 - 25.0 Final    Anion Gap 12/17/2024 12.1  5.0 - 15.0 mmol/L Final    eGFR 12/17/2024 100.8  >60.0 mL/min/1.73 Final    WBC 12/17/2024 4.43  3.40 - 10.80 10*3/mm3 Final    RBC 12/17/2024 4.29  3.77 - 5.28 10*6/mm3 Final    Hemoglobin 12/17/2024 12.4  12.0 - 15.9 g/dL Final    Hematocrit 12/17/2024 39.1  34.0 - 46.6 % Final    MCV 12/17/2024 91.1  79.0 - 97.0 fL Final    MCH 12/17/2024 28.9  26.6 - 33.0 pg Final    MCHC 12/17/2024 31.7  31.5 - 35.7 g/dL Final    RDW 12/17/2024 16.0 (H)  12.3 - 15.4 % Final    RDW-SD 12/17/2024 53.2  37.0 - 54.0 fl Final    MPV 12/17/2024 8.9  6.0 - 12.0 fL Final    Platelets 12/17/2024 325  140 - 450 10*3/mm3 Final    Neutrophil % 12/17/2024 63.8  42.7 - 76.0 % Final    Lymphocyte % 12/17/2024 22.6  19.6 - 45.3 % Final    Monocyte % 12/17/2024 7.9  5.0 - 12.0 % Final    Eosinophil % 12/17/2024 3.4  0.3 - 6.2 % Final    Basophil % 12/17/2024 1.8 (H)  0.0 - 1.5 % Final    Immature Grans % 12/17/2024 0.5  0.0 - 0.5 % Final    Neutrophils, Absolute 12/17/2024 2.83  1.70 - 7.00 10*3/mm3 Final    Lymphocytes, Absolute 12/17/2024 1.00  0.70 - 3.10 10*3/mm3 Final    Monocytes, Absolute 12/17/2024 0.35  0.10 - 0.90 10*3/mm3 Final    Eosinophils, Absolute 12/17/2024 0.15  0.00 - 0.40 10*3/mm3 Final    Basophils, Absolute 12/17/2024 0.08  0.00 - 0.20 10*3/mm3 Final    Immature Grans, Absolute 12/17/2024 0.02  0.00 - 0.05 10*3/mm3 Final    nRBC 12/17/2024 0.0  0.0 - 0.2 /100 WBC Final   Lab on 12/10/2024   Component Date Value Ref Range Status    Phosphorus 12/10/2024 2.5  2.5 - 4.5 mg/dL Final    Glucose 12/10/2024 146 (H)  65 - 99 mg/dL Final    BUN 12/10/2024 12  8 - 23 mg/dL Final    Creatinine 12/10/2024 0.58  0.57 - 1.00 mg/dL Final    Sodium 12/10/2024 141  136 - 145 mmol/L Final    Potassium 12/10/2024 4.0  3.5 - 5.2 mmol/L Final    Chloride 12/10/2024 106  98 - 107 mmol/L Final    CO2 12/10/2024 22.9  22.0 - 29.0 mmol/L Final    Calcium  12/10/2024 8.8  8.6 - 10.5 mg/dL Final    Total Protein 12/10/2024 6.9  6.0 - 8.5 g/dL Final    Albumin 12/10/2024 3.8  3.5 - 5.2 g/dL Final    ALT (SGPT) 12/10/2024 22  1 - 33 U/L Final    AST (SGOT) 12/10/2024 61 (H)  1 - 32 U/L Final    Alkaline Phosphatase 12/10/2024 130 (H)  39 - 117 U/L Final    Total Bilirubin 12/10/2024 0.2  0.0 - 1.2 mg/dL Final    Globulin 12/10/2024 3.1  gm/dL Final    A/G Ratio 12/10/2024 1.2  g/dL Final    BUN/Creatinine Ratio 12/10/2024 20.7  7.0 - 25.0 Final    Anion Gap 12/10/2024 12.1  5.0 - 15.0 mmol/L Final    eGFR 12/10/2024 102.5  >60.0 mL/min/1.73 Final    Magnesium 12/10/2024 2.2  1.6 - 2.4 mg/dL Final    WBC 12/10/2024 6.03  3.40 - 10.80 10*3/mm3 Final    RBC 12/10/2024 4.18  3.77 - 5.28 10*6/mm3 Final    Hemoglobin 12/10/2024 12.0  12.0 - 15.9 g/dL Final    Hematocrit 12/10/2024 37.5  34.0 - 46.6 % Final    MCV 12/10/2024 89.7  79.0 - 97.0 fL Final    MCH 12/10/2024 28.7  26.6 - 33.0 pg Final    MCHC 12/10/2024 32.0  31.5 - 35.7 g/dL Final    RDW 12/10/2024 16.6 (H)  12.3 - 15.4 % Final    RDW-SD 12/10/2024 54.3 (H)  37.0 - 54.0 fl Final    MPV 12/10/2024 9.0  6.0 - 12.0 fL Final    Platelets 12/10/2024 344  140 - 450 10*3/mm3 Final    Neutrophil % 12/10/2024 72.6  42.7 - 76.0 % Final    Lymphocyte % 12/10/2024 16.6 (L)  19.6 - 45.3 % Final    Monocyte % 12/10/2024 7.6  5.0 - 12.0 % Final    Eosinophil % 12/10/2024 2.0  0.3 - 6.2 % Final    Basophil % 12/10/2024 0.7  0.0 - 1.5 % Final    Immature Grans % 12/10/2024 0.5  0.0 - 0.5 % Final    Neutrophils, Absolute 12/10/2024 4.38  1.70 - 7.00 10*3/mm3 Final    Lymphocytes, Absolute 12/10/2024 1.00  0.70 - 3.10 10*3/mm3 Final    Monocytes, Absolute 12/10/2024 0.46  0.10 - 0.90 10*3/mm3 Final    Eosinophils, Absolute 12/10/2024 0.12  0.00 - 0.40 10*3/mm3 Final    Basophils, Absolute 12/10/2024 0.04  0.00 - 0.20 10*3/mm3 Final    Immature Grans, Absolute 12/10/2024 0.03  0.00 - 0.05 10*3/mm3 Final    nRBC 12/10/2024 0.0  0.0 -  0.2 /100 WBC Final        No radiology results for the last 30 days.         Assessment & Plan       *Left breast invasive lobular carcinoma  The tumor is estrogen receptor +91 to 100%, progesterone receptor +31 to 40%, HER2 negative, 0, Ki-67 35%  The tumor measures greater than 10 cm on exam, lymph node positive.  CT of the chest abdomen and pelvis with right upper lobe pulmonary nodule which is new and the bone scan also shows uptake in the left anterior inferior iliac spine which correlates to a lucent area on the CT scan and also focal uptake noted in the left frontal calvarium concerning for metastatic disease.  Further evaluation with a MRI of the pelvis and hip as well as MRI of the brain is underway.  The scans are scheduled for 7/10/2024.  Clinical T3 N1 M1, stage IV invasive lobular carcinoma.  There is also a right axillary lymph node which has been biopsied and consistent with invasive lobular carcinoma with similar morphology as well as receptors concerning for metastatic disease rather than a primary right breast cancer.  Recommended Ribociclib 400 mg 3 weeks on and 1 week off.  July 16, 2024: Reviewed MRI of the pelvis and hip consistent with many bony metastasis.  MRI brain shows left frontal bone mets but no brain involvement.  Patient is here to start day 1 ribociclib along with anastrozole.  Continues on anastrozole.  Tempus performed on the left axilla lymph node biopsy shows PIK3CA mutation, CDH 1 mutation and Erb B2 mutation.  Therefore patient would benefit from alpelisib and Faslodex after progression on Ribociclib and AI.  Other options include neratinib plus Faslodex.  Initiated Ribociclib in August 2024.  8/30/2024-last day of week 3 of Ribociclib.    Patient completed 1 cycle of Ribociclib 400 mg and subsequently has not been able to go back on Ribociclib due to recurrent hospitalizations and abnormal LFTs.  10/14/2024-LFTs are normal today.  Recommend resuming Ribociclib at 200 mg and  subsequently we will increase the dose to 400 mg with the next cycle.  Patient was unable to resume Ribociclib as she was admitted to the hospital from 10/17/2024 to 10/23/2024  11/11/2024-Labs reviewed and overall stable except for an ALT of 49.  Recommend resuming Ribociclib at 200 mg.  CT of the chest performed 10/29/2024 shows stable size of the left breast mass, decrease in size of the left axillary lymph node and mixed lytic and blastic lesion of the rib slightly increased in size.  11/11/2024-resumed Ribociclib 200 mg daily for 3/4 weeks.  CBC from 11/25/2024 stable.  Patient tolerating this dose well.  12/10/2024 clinically breast mass does seem to be responding.  Ribociclib dose increased to 400 mg daily.  12/31/2024-Labs reviewed and LFTs remain stable with an AST of 51 ALT 19 and alkaline phosphatase 131, total bilirubin 0.3  Patient scheduled for labs today but unable to come in  She completes this cycle of Ribociclib on 1/12/2025.  We will have her come back to clinic on 1/17/2025 to obtain a CBC and CMP prior to the start of the  : 1/19/2025.    *Right axillary lymphadenopathy  Biopsied and consistent with invasive lobular carcinoma, grade 2, ER/WI positive and HER2 negative  Patient initiated on AI and Ribociclib  Please refer to the above discussion for details    *Right breast non-mass enhancement  6/28/2024-MRI of the breast with non-mass enhancement noted in the right breast and patient had questions regarding if this should be biopsied.  Given extensive metastatic disease in the bones and right axilla lymph node consistent with invasive lobular carcinoma management would not change with the biopsy and hence I would recommend against it.    *Severe nausea  Secondary to Ribociclib  Improved with Compazine and Zofran.  Does not want to take Zyprexa.  Nausea well-controlled at this time    *Skeletal metastasis  Patient will be started on Xgeva  8/16/2024 Xgeva initiation will be held as the patient  has not been to the dentist in over 2 years.  Discussed possible side effects of Xgeva and she will schedule a dental visit and we will have her back in 1 week to initiate Xgeva pending this is complete.  10/14/2024-second dose of Xgeva will be administered.  Labs reviewed and stable to proceed.  12/10/2024-scheduled for Xgeva, proceed with the same.    *Multiple sclerosis  Patient was not on any treatment previously.  She sees Dr. Jacobson at [facility].  Due to profound weakness patient requested her neurologist to start steroids.  They plan to initiate high-dose IV steroids to help with this.  High-dose IV steroids have not been initiated.  Patient reports that she starts physical therapy tomorrow and plans to concurrently start high-dose IV steroids.    *Hypertension-continue current medication  Blood pressure BP: (not recorded)   Continue to monitor  No changes in medications    *Hyperlipidemia-continue current medication  No changes    *Urinary incontinence-patient had  a suprapubic catheter placed by urology.    *History of iron deficiency anemia-  3/8/2024 hemoglobin was low at 10.9 and subsequently patient took oral iron which resulted in improvement of hemoglobin and normal.  She was scheduled for colonoscopy however she canceled that due to ongoing management of breast cancer.  She proceed with a colonoscopy.  8/16/2024 hemoglobin is 10.2.  She states she recently was told to begin daily iron supplementation.  Baseline iron studies ordered today she is only been taking the iron for a few days.  Ferritin 32, iron saturation 5%, TIBC 440.  We will repeat this in 6 to 8 weeks.  12/10/2024 Hgb 12.0. Continue oral iron    *Genetic testing-9 gene stat panel negative    *Dyspnea  CT chest shows some bibasilar atelectasis/pneumonia  CT chest from June 2024 without any evidence of metastatic disease  CT of the chest 10/29/2024- details summarized above     *Right upper lobe pulmonary nodule   Concerning for  metastatic disease  PET/CT may not be helpful as invasive lobular carcinomas typically are not very PET avid.  Could consider PET-FES    *Follow-up-after the MRIs.  Reviewed MRI of the pelvis and MRI of the brain which shows mets in the bone  MRI of the cervical and thoracic spine performed at outside facility on 10/3/2024    *Abnormal LFTs  Likely secondary to Ribociclib  Ribociclib dose increased to 400 mg daily on 12/10/2024  12/31/2024-Labs reviewed and stable.  Patient wishes to start ivermectin and warned about the possibility of abnormal LFTs with that as well.      *Insomnia  Severe  Start trazodone if it does not help she can use Ambien.    Plan  Proceed with monthly Xgeva.  Continue ribociclib to 400 mg daily taken for 3 weeks on, 1 week off.    3 weeks of Ribociclib will be completed on 1/12/2025  Repeat labs again on 1/17/2025 with APRN visit  MD follow-up on 1/31/2025  Trazodone for insomnia, Ambien will be sent in as well in the event that trazodone is not enough  Continue Compazine for nausea  Continue daily oral ferrous sulfate.      This patient is on high risk drug therapy requiring intensive monitoring for toxicity.  She is experiencing side effects from Ribociclib including nausea as well as abnormal LFTs requiring close monitoring.      You have chosen to receive care through a telehealth visit.  Do you consent to use a video/audio connection for your medical care today? Yes    Patient was located at her home at the time of the video visit and I was located at the office.      Zainab Lopes MD

## 2025-01-08 NOTE — TELEPHONE ENCOUNTER
Caller: Maritza Bejarano    Relationship: Self    Best call back number: [phone]    What was the call regarding: PATIENT CANNOT GET OUT FOR HER LAB, F/U 1 & INJECTION, SHE IS IN A WHEELCHAIR. CAN THEY MAKE THE F/U A MYCHART VISIT.     CALL TO ADVISE SHE REALLY NEEDS TO SPEAK WITH DR. HARDIN TODAY

## 2025-01-08 NOTE — TELEPHONE ENCOUNTER
Returned call to Maritza.  I let her know that It is ok for her to do a video visit today, however she will have to come into the office before the end of the week to have her labs checked.  She understands the importance.  She notes that her mother is dying and unfortunately her  is also battling pneumonia presently.  I asked her to call me if she needed assistance signing on for her video visit this afternoon.

## 2025-01-10 ENCOUNTER — TELEPHONE (OUTPATIENT)
Dept: ONCOLOGY | Facility: CLINIC | Age: 65
End: 2025-01-10
Payer: MEDICARE

## 2025-01-10 DIAGNOSIS — C50.812 MALIGNANT NEOPLASM OF OVERLAPPING SITES OF LEFT BREAST IN FEMALE, ESTROGEN RECEPTOR POSITIVE: Primary | ICD-10-CM

## 2025-01-10 DIAGNOSIS — Z17.0 MALIGNANT NEOPLASM OF OVERLAPPING SITES OF LEFT BREAST IN FEMALE, ESTROGEN RECEPTOR POSITIVE: Primary | ICD-10-CM

## 2025-01-10 NOTE — TELEPHONE ENCOUNTER
Lynn Quigley LPN     Maritza called yesterday and unfortunately I did not receive the message until today.  I have returned her call, she is requesting an appointment Monday afternoon to check her CMP.  She states she is planning to start ivermectin in conjuction with her kisqali and she would like an assessment of her liver enzymes prior to initation of the ivermectin and prior to the start of her next cycle of kisqali.  She states she will be taking ivermectin Monday-Friday and off on Saturday and Sundays.  Message to scheduling for appt Monday. Lab order entered.

## 2025-01-13 ENCOUNTER — CLINICAL SUPPORT (OUTPATIENT)
Dept: ONCOLOGY | Facility: HOSPITAL | Age: 65
End: 2025-01-13
Payer: MEDICARE

## 2025-01-13 ENCOUNTER — OFFICE VISIT (OUTPATIENT)
Dept: WOUND CARE | Facility: HOSPITAL | Age: 65
End: 2025-01-13
Payer: MEDICARE

## 2025-01-13 ENCOUNTER — TELEPHONE (OUTPATIENT)
Dept: ONCOLOGY | Facility: CLINIC | Age: 65
End: 2025-01-13
Payer: MEDICARE

## 2025-01-13 ENCOUNTER — SPECIALTY PHARMACY (OUTPATIENT)
Dept: PHARMACY | Facility: HOSPITAL | Age: 65
End: 2025-01-13
Payer: MEDICARE

## 2025-01-13 ENCOUNTER — LAB (OUTPATIENT)
Dept: LAB | Facility: HOSPITAL | Age: 65
End: 2025-01-13
Payer: MEDICARE

## 2025-01-13 DIAGNOSIS — C79.51 CANCER, METASTATIC TO BONE: ICD-10-CM

## 2025-01-13 DIAGNOSIS — C50.812 MALIGNANT NEOPLASM OF OVERLAPPING SITES OF LEFT BREAST IN FEMALE, ESTROGEN RECEPTOR POSITIVE: ICD-10-CM

## 2025-01-13 DIAGNOSIS — Z17.0 MALIGNANT NEOPLASM OF OVERLAPPING SITES OF LEFT BREAST IN FEMALE, ESTROGEN RECEPTOR POSITIVE: ICD-10-CM

## 2025-01-13 LAB
ALBUMIN SERPL-MCNC: 3.7 G/DL (ref 3.5–5.2)
ALBUMIN/GLOB SERPL: 1.2 G/DL
ALP SERPL-CCNC: 126 U/L (ref 39–117)
ALT SERPL W P-5'-P-CCNC: 22 U/L (ref 1–33)
ANION GAP SERPL CALCULATED.3IONS-SCNC: 12.3 MMOL/L (ref 5–15)
AST SERPL-CCNC: 72 U/L (ref 1–32)
BASOPHILS # BLD AUTO: 0.05 10*3/MM3 (ref 0–0.2)
BASOPHILS NFR BLD AUTO: 1.4 % (ref 0–1.5)
BILIRUB SERPL-MCNC: 0.3 MG/DL (ref 0–1.2)
BUN SERPL-MCNC: 18 MG/DL (ref 8–23)
BUN/CREAT SERPL: 30 (ref 7–25)
CALCIUM SPEC-SCNC: 8.9 MG/DL (ref 8.6–10.5)
CHLORIDE SERPL-SCNC: 102 MMOL/L (ref 98–107)
CO2 SERPL-SCNC: 22.7 MMOL/L (ref 22–29)
CREAT SERPL-MCNC: 0.6 MG/DL (ref 0.57–1)
DEPRECATED RDW RBC AUTO: 57.4 FL (ref 37–54)
EGFRCR SERPLBLD CKD-EPI 2021: 101.6 ML/MIN/1.73
EOSINOPHIL # BLD AUTO: 0.04 10*3/MM3 (ref 0–0.4)
EOSINOPHIL NFR BLD AUTO: 1.1 % (ref 0.3–6.2)
ERYTHROCYTE [DISTWIDTH] IN BLOOD BY AUTOMATED COUNT: 17.4 % (ref 12.3–15.4)
GLOBULIN UR ELPH-MCNC: 3.2 GM/DL
GLUCOSE SERPL-MCNC: 135 MG/DL (ref 65–99)
HCT VFR BLD AUTO: 41.1 % (ref 34–46.6)
HGB BLD-MCNC: 13.8 G/DL (ref 12–15.9)
IMM GRANULOCYTES # BLD AUTO: 0.01 10*3/MM3 (ref 0–0.05)
IMM GRANULOCYTES NFR BLD AUTO: 0.3 % (ref 0–0.5)
LYMPHOCYTES # BLD AUTO: 1.01 10*3/MM3 (ref 0.7–3.1)
LYMPHOCYTES NFR BLD AUTO: 28.3 % (ref 19.6–45.3)
MAGNESIUM SERPL-MCNC: 2.2 MG/DL (ref 1.6–2.4)
MCH RBC QN AUTO: 30.3 PG (ref 26.6–33)
MCHC RBC AUTO-ENTMCNC: 33.6 G/DL (ref 31.5–35.7)
MCV RBC AUTO: 90.3 FL (ref 79–97)
MONOCYTES # BLD AUTO: 0.4 10*3/MM3 (ref 0.1–0.9)
MONOCYTES NFR BLD AUTO: 11.2 % (ref 5–12)
NEUTROPHILS NFR BLD AUTO: 2.06 10*3/MM3 (ref 1.7–7)
NEUTROPHILS NFR BLD AUTO: 57.7 % (ref 42.7–76)
NRBC BLD AUTO-RTO: 0 /100 WBC (ref 0–0.2)
PHOSPHATE SERPL-MCNC: 2.5 MG/DL (ref 2.5–4.5)
PLATELET # BLD AUTO: 297 10*3/MM3 (ref 140–450)
PMV BLD AUTO: 8.6 FL (ref 6–12)
POTASSIUM SERPL-SCNC: 3.6 MMOL/L (ref 3.5–5.2)
PROT SERPL-MCNC: 6.9 G/DL (ref 6–8.5)
RBC # BLD AUTO: 4.55 10*6/MM3 (ref 3.77–5.28)
SODIUM SERPL-SCNC: 137 MMOL/L (ref 136–145)
WBC NRBC COR # BLD AUTO: 3.57 10*3/MM3 (ref 3.4–10.8)

## 2025-01-13 PROCEDURE — 83735 ASSAY OF MAGNESIUM: CPT

## 2025-01-13 PROCEDURE — 36415 COLL VENOUS BLD VENIPUNCTURE: CPT

## 2025-01-13 PROCEDURE — 84100 ASSAY OF PHOSPHORUS: CPT

## 2025-01-13 PROCEDURE — 85025 COMPLETE CBC W/AUTO DIFF WBC: CPT

## 2025-01-13 PROCEDURE — 80053 COMPREHEN METABOLIC PANEL: CPT

## 2025-01-13 NOTE — PROGRESS NOTES
Specialty Pharmacy Patient Management Program  Oncology / Hematology Refill Outreach      Maritza was contacted today regarding refills of her Kisqali specialty medication(s).    Specialty medication(s) and dose(s) confirmed: Yes  Kisqali 200 mg tabs-2 tabs daily for 21 days on then 7 days off. Next cycle starts 1/19/2025    Refill Questions      Flowsheet Row Most Recent Value   Changes to allergies? No   Changes to medications? No   New conditions or infections since last clinic visit No   Unplanned office visit, urgent care, ED, or hospital admission in the last 4 weeks  No   How does patient/caregiver feel medication is working? Good   Financial problems or insurance changes  No   Since the previous refill, were any specialty medication doses or scheduled injections missed or delayed?  No   Does this patient require a clinical escalation to a pharmacist? No          Delivery Questions      Flowsheet Row Most Recent Value   Delivery method [employer]   Delivery address verified with patient/caregiver? Yes  [Ship to home address]   Delivery address Home  [Ship to home address-Ship 1/15 for delivery 1/16-$0 copay with insurance and [employer]-Address Confirmed]   Number of medications in delivery 1   Medication(s) being filled and delivered Ribociclib Succinate   Doses left of specialty medications 0-She is in her off week. Next cycle starts 1/19/2025   Copay verified? Yes   Copay amount $0 copay with [employer]   Copay form of payment No copayment ($0)   Ship Date 1/16/2025   Delivery Date 1/17/2025   Signature Required No          Kisqali delivery coordinated with pt for 1/17/2025 to her home address. $0 copay with insurance and [employer] [place]. Her last delivery was on 12/13/2024. No questions or concerns to report to MTM Team today. She reports no missed doses since last delivery. PDC is 64%. Since starting the medication, she has been held and dose has been changed.    Follow-Up: 21  Days    [place], Pharmacy Technician  1/13/2025  11:07 EST

## 2025-01-15 NOTE — PROGRESS NOTES
Reviewed CMP with Maritza by phone on the date of this note.  Advised her liver enzymes have increased further.  She has unfortunately had to cancel her appt on Friday for her mothers  services.  We agreed for her to return on Monday or Tuesday.  She will not start her kisqali until after labs are checked.  She also intends to start ivermectin at this time as well.  Notified scheduling for appt on Tuesday with NP per availability.

## 2025-01-21 ENCOUNTER — TELEPHONE (OUTPATIENT)
Dept: ONCOLOGY | Facility: CLINIC | Age: 65
End: 2025-01-21
Payer: MEDICARE

## 2025-01-21 NOTE — TELEPHONE ENCOUNTER
Yes, my name is Maritza Bejarano. I was just left a voicemail at 5:05 regarding an appointment I have tomorrow for lab work at [facility]. I was under the impression that I was also seeing the nurse practitioner at that location, but the person who called me said I'm scheduled for [place], which I cannot make. I have told people in the past, when they've tried to make my appointments, that I have to make them at [facility]. I cannot get out to [place]. So I will not be able to make the nurse practitioner appointment at [place]. She may call me after I get my labs done, or we can do a telehealth thing or something, but I cannot make it to the location. Thank you.

## 2025-01-24 ENCOUNTER — LAB (OUTPATIENT)
Dept: LAB | Facility: HOSPITAL | Age: 65
End: 2025-01-24
Payer: MEDICARE

## 2025-01-24 ENCOUNTER — OFFICE VISIT (OUTPATIENT)
Dept: ONCOLOGY | Facility: CLINIC | Age: 65
End: 2025-01-24
Payer: MEDICARE

## 2025-01-24 VITALS
RESPIRATION RATE: 16 BRPM | WEIGHT: 150 LBS | SYSTOLIC BLOOD PRESSURE: 125 MMHG | HEIGHT: 60 IN | DIASTOLIC BLOOD PRESSURE: 76 MMHG | OXYGEN SATURATION: 97 % | BODY MASS INDEX: 29.45 KG/M2 | TEMPERATURE: 97.5 F | HEART RATE: 68 BPM

## 2025-01-24 DIAGNOSIS — C50.812 MALIGNANT NEOPLASM OF OVERLAPPING SITES OF LEFT BREAST IN FEMALE, ESTROGEN RECEPTOR POSITIVE: ICD-10-CM

## 2025-01-24 DIAGNOSIS — Z17.0 MALIGNANT NEOPLASM OF OVERLAPPING SITES OF LEFT BREAST IN FEMALE, ESTROGEN RECEPTOR POSITIVE: ICD-10-CM

## 2025-01-24 DIAGNOSIS — C50.812 MALIGNANT NEOPLASM OF OVERLAPPING SITES OF LEFT BREAST IN FEMALE, ESTROGEN RECEPTOR POSITIVE: Primary | ICD-10-CM

## 2025-01-24 DIAGNOSIS — Z17.0 MALIGNANT NEOPLASM OF OVERLAPPING SITES OF LEFT BREAST IN FEMALE, ESTROGEN RECEPTOR POSITIVE: Primary | ICD-10-CM

## 2025-01-24 DIAGNOSIS — Z79.899 HIGH RISK MEDICATION USE: ICD-10-CM

## 2025-01-24 LAB
ALBUMIN SERPL-MCNC: 3.7 G/DL (ref 3.5–5.2)
ALBUMIN/GLOB SERPL: 1 G/DL
ALP SERPL-CCNC: 146 U/L (ref 39–117)
ALT SERPL W P-5'-P-CCNC: 23 U/L (ref 1–33)
ANION GAP SERPL CALCULATED.3IONS-SCNC: 13.5 MMOL/L (ref 5–15)
AST SERPL-CCNC: 67 U/L (ref 1–32)
BASOPHILS # BLD AUTO: 0.07 10*3/MM3 (ref 0–0.2)
BASOPHILS NFR BLD AUTO: 1.8 % (ref 0–1.5)
BILIRUB SERPL-MCNC: 0.3 MG/DL (ref 0–1.2)
BUN SERPL-MCNC: 11 MG/DL (ref 8–23)
BUN/CREAT SERPL: 16.2 (ref 7–25)
CALCIUM SPEC-SCNC: 8.6 MG/DL (ref 8.6–10.5)
CHLORIDE SERPL-SCNC: 100 MMOL/L (ref 98–107)
CO2 SERPL-SCNC: 25.5 MMOL/L (ref 22–29)
CREAT SERPL-MCNC: 0.68 MG/DL (ref 0.57–1)
DEPRECATED RDW RBC AUTO: 55.5 FL (ref 37–54)
EGFRCR SERPLBLD CKD-EPI 2021: 98.6 ML/MIN/1.73
EOSINOPHIL # BLD AUTO: 0.06 10*3/MM3 (ref 0–0.4)
EOSINOPHIL NFR BLD AUTO: 1.5 % (ref 0.3–6.2)
ERYTHROCYTE [DISTWIDTH] IN BLOOD BY AUTOMATED COUNT: 16.5 % (ref 12.3–15.4)
GLOBULIN UR ELPH-MCNC: 3.6 GM/DL
GLUCOSE SERPL-MCNC: 122 MG/DL (ref 65–99)
HCT VFR BLD AUTO: 41.4 % (ref 34–46.6)
HGB BLD-MCNC: 13.4 G/DL (ref 12–15.9)
IMM GRANULOCYTES # BLD AUTO: 0.02 10*3/MM3 (ref 0–0.05)
IMM GRANULOCYTES NFR BLD AUTO: 0.5 % (ref 0–0.5)
LYMPHOCYTES # BLD AUTO: 0.87 10*3/MM3 (ref 0.7–3.1)
LYMPHOCYTES NFR BLD AUTO: 21.9 % (ref 19.6–45.3)
MCH RBC QN AUTO: 29.6 PG (ref 26.6–33)
MCHC RBC AUTO-ENTMCNC: 32.4 G/DL (ref 31.5–35.7)
MCV RBC AUTO: 91.4 FL (ref 79–97)
MONOCYTES # BLD AUTO: 0.18 10*3/MM3 (ref 0.1–0.9)
MONOCYTES NFR BLD AUTO: 4.5 % (ref 5–12)
NEUTROPHILS NFR BLD AUTO: 2.78 10*3/MM3 (ref 1.7–7)
NEUTROPHILS NFR BLD AUTO: 69.8 % (ref 42.7–76)
NRBC BLD AUTO-RTO: 0 /100 WBC (ref 0–0.2)
PLATELET # BLD AUTO: 396 10*3/MM3 (ref 140–450)
PMV BLD AUTO: 8.8 FL (ref 6–12)
POTASSIUM SERPL-SCNC: 3.7 MMOL/L (ref 3.5–5.2)
PROT SERPL-MCNC: 7.3 G/DL (ref 6–8.5)
RBC # BLD AUTO: 4.53 10*6/MM3 (ref 3.77–5.28)
SODIUM SERPL-SCNC: 139 MMOL/L (ref 136–145)
WBC NRBC COR # BLD AUTO: 3.98 10*3/MM3 (ref 3.4–10.8)

## 2025-01-24 PROCEDURE — 85025 COMPLETE CBC W/AUTO DIFF WBC: CPT

## 2025-01-24 PROCEDURE — 80053 COMPREHEN METABOLIC PANEL: CPT

## 2025-01-24 PROCEDURE — 36415 COLL VENOUS BLD VENIPUNCTURE: CPT

## 2025-01-24 RX ORDER — LOSARTAN POTASSIUM AND HYDROCHLOROTHIAZIDE 12.5; 5 MG/1; MG/1
1 TABLET ORAL DAILY
COMMUNITY

## 2025-01-24 NOTE — PROGRESS NOTES
Subjective   Maritza Bejarano is a 64 y.o. female.   With metastatic invasive lobular carcinoma, ER/VA strongly positive cancer with metastatic disease to the bones.    History of Present Illness   Maritza returns today for lab review and follow-up.      She continues on ribociclib 400 mg daily days 1-21 every 28 days.  She started her next cycle of ribociclib on 1/21/2025.  She has experienced some constipation, has been utilizing a stool softener every other day.  Recommended starting Senokot-S, provided instructions for this.  Has occasional nausea, controlled with Compazine, no emesis.  Denies fever or chills.  Denies vomiting.  Denies new or worsening pain.    Has started on ivermectin, is following with a provider who manages this.    Oncologic history:    Patient is a 64-year-old postmenopausal lady who has not had a mammogram in 4 years presented with a screen detected abnormality.  She had a left breast biopsy in 2014 which was benign.  Denies palpating any breast masses skin changes or nipple discharge prior to the abnormal mammogram.  She does have left nipple inversion.    Patient has a longstanding diagnosis of multiple sclerosis and has not been on any treatment.  She was previously seen by Dr. Ozzy France and now being seen by Dr. Soto with neurology.  She has been nonambulatory for the past 4 years and requires a wheelchair.  She is unable to transfer herself in and out of wheelchairs and her  has to lift her for all the transfers.  She is also incontinent of the bladder and requiring a suprapubic catheter placed by urology to help with the same.  She had a bladder stimulator in the past which was turned off.  Other comorbidities include hypertension and hyperlipidemia.      Family history significant for maternal great grandmother with breast cancer, maternal great aunt and mother with breast cancer in her 70s.  Denies any family history of ovarian cancer, pancreatic cancer or  melanoma.    5/21/2024-bilateral screening mammogram  Finding 1.new nipple retraction along with diffuse skin and trabecular thickening of the left breast.  There is suggestion of subtle architectural distortion seen on the MLO projection in the posterior central region.  3 biopsy markers are noted.  No new suspicious calcifications.  Send finding 2.few axillary lymph node seen in the left breast which display interval increase in size and density.    Finding 3.focal asymmetry measuring 10 mm seen in the anterior one third of the right breast in the medial subareolar region.    Impression  Finding 1.new nipple retraction, skin and trabecular thickening in the left breast requires additional evaluation.  There is also question architectural distortion in the posterior central breast seen on the MLO projection.  Diagnostic mammogram to include repeat full-field CC with additional posterior tissue and complete breast ultrasound is recommended.  Finding 2.axillary lymph nodes in the left breast require additional evaluation, ultrasound recommended.  Finding 3.focal asymmetry in the right breast requires additional evaluation.  Diagnostic mammogram and limited breast ultrasound is recommended.    5/29/2024-bilateral diagnostic mammogram and ultrasound  Finding 1.4 0.7 x 5.6 x 6.8 cm developing asymmetry with associated nipple retraction, skin thickening and trabecular thickening of the left breast in the central region, inferior region in the upper outer region and the lower outer region.  Finding 2.axillary lymph node in the left breast.  Finding 3.suspected finding completely resolves upon further evaluation representing benign fibroglandular breast tissue.    Bilateral breast ultrasound  Finding 1.nonparallel hypoechoic mass with indistinct margins and skin thickening and internal vascularity in the left breast in the central region, inferior region, upper outer region and in the lower outer region.  The finding is  palpable and appears to involve all 4 quadrants.  Most discrete portion is located at 130, 3 cm from the nipple, skin thickening up to 6 mm.  Send finding 2.rounded enlarged left axillary lymph node measuring 11 mm.  Cortical thickening of 9 mm present.    Finding 3.no sonographic correlate.  Send finding 4.enlarged right axillary lymph node measuring 14 mm.  Cortical thickening of 5 mm.    Impression  Finding 1.ultrasound-guided biopsy recommended  Finding 2.ultrasound-guided biopsy recommended  Finding 3.previously described right breast asymmetry resolves  Finding 4.ultrasound-guided biopsy recommended.    Ultrasound-guided biopsy of the left breast and left axilla lymph node and right axilla lymph node    1.left breast  Invasive lobular carcinoma with crush artifact  Grade 2  Invasive carcinoma measures 8.5 mm  No lymphovascular space invasion  ER +91 to 100% strong  [place] +31 to 40% moderate  HER2 negative, 0  Ki-67 35%    2.left axillary lymph node focus of metastatic Dastech lobular carcinoma measuring 10 mm  ER +91 to 100% strong  N[place] +21 to 30%  HER2 -0  Ki-67 30%    3.right axillary lymph node with a focus of metastatic carcinoma measuring 4 mm.  Grade 2.  ER +91 to 100% strong  [place] +11 to 20% moderate  HER2 negative, 0  Ki-67 35%    Pathology of all the 3 sites appear similar and findings could represent left breast lobular carcinoma metastatic to the right as well as left axillary lymph nodes.    6/14/2024-CT of the chest abdomen and pelvis  1.large ill-defined infiltrative central left breast mass measuring 6.7 x 4 cm, medially there is an irregular 2.5 x 2.5 cm mass.  Significant thickening of the skin in the left breast and there is left axilla lymphadenopathy.  Left axilla lymph node measures 1.3 x 1.3 cm.  There is also a new enlarged right axillary lymph node measuring 1.3 x 1 cm.  All of the subcentimeter right axillary lymph nodes are slightly larger than previous.  2.no mediastinal hilar or internal  mammary chain lymphadenopathy  3.new indeterminate mixed density measuring 1.3 x 1.2 cm right apical pulmonary nodule.  Follow-up recommended.  4.pleural parenchymal thickening and nodules inferiorly and posterior to the right upper lobe are stable.  Chronic scarring and subsegmental atelectatic change in both lower lobes.    CT abdomen pelvis  1.moderate fatty infiltration of the liver.  No suspicious liver lesions.  2.moderate-sized paraesophageal hernia.  No acute bowel abnormality.  3.right sacral stimulator noted at the S3 neuroforamina.  No suspicious bone lesions are noted.    6/14/2024-bone scan  1.focal abnormal uptake in the left anterior inferior iliac spine correlating with lucent lesion on the CT concerning for metastatic disease.  Further evaluation with MRI of the pelvis and left hip could be performed.  2.focal uptake in the left frontal calvarium again concerning for metastatic disease.  Noncontrast CT or MRI could be obtained.  3.soft tissue uptake noted in the left breast at the site of known left breast cancer.  4.changes from arthroplasty on the right shoulder.  5.multifocal likely degenerative uptake in each knee, ankle and foot and increased uptake in the medial and patellofemoral cortex of the right knee could be posttraumatic.      Invitae 9 gene stat panel negative.    MRI of the brain which showed T2 hyperintensity involving the frontal bone measuring 1.6 cm in size and a smaller area of enhancement in the frontal bone laterally and inferiorly concerning for metastasis.  No brain mets    MRI of the hip and pelvis showed multiple enhancing bone lesions consistent with metastatic disease to the sacrum and pelvis.  Dominant lesion in the anterior inferior left pelvic spine and rectus femoris muscle origin that correlates with the site of bone scan uptake.  She has some right joint hip joint effusion.  Her CA 15-3 16.2    She was seen by Dr. Saavedra on 7/16/2024 and recommended to start on  Ribociclib along with anastrozole.    Started Ribociclib in the first week of August 2024    Had profound nausea and vomiting requiring antiemetics.  Completed 3 weeks of Ribociclib on 8/30/2024.    Hospitalized from 9/11/2024 to 9/16/2024 for pneumonia and KINZA and since then Ribociclib has been on hold    Readmitted to the hospital from 10/17/2024 to 10/23/2024 requiring holding Ribociclib.  She was admitted for UTI with sepsis.    10/29/2024-CT of the chest which was compared to the CT chest from 6/14/2024-stable 1.3 cm subsolid nodule in the right lung apex.  Decrease in size of the left axillary lymph nodes.  Ill-defined left breast mass appears unchanged.  Expansile lytic and sclerotic lesion in the posterior left 10th rib which appears increased compared to the prior CT.  Stable subtle area of sclerosis in the left anterior third rib.    10/3/2024-MRI of the cervical spine-rounded area of marrow replacement in C7 suspicious for metastatic disease.    10/3/2024-MRI of the thoracic spine-suspicious patchy areas of marrow replacement in the thoracic spine at T5, T6, T7, T11, T12, L1 suspicious for metastatic disease.      The following portions of the patient's history were reviewed and updated as appropriate: allergies, current medications, past family history, past medical history, past social history, past surgical history, and problem list.    Past Medical History:   Diagnosis Date    Anemia 2024    `Treated with iron    Dawn esophagus     per patient    Blurred vision     R/T MS    Breast cancer 05/2024+++++    Carpal tunnel syndrome     Clotting disorder 1993, 2003, 2012    3 g/i bleeds w/ transfus/ions    Colon polyp 2013    removed w/ colonoscopy    Deep vein thrombosis phlebitis 1980    Depression     Diplopia 2013    GERD (gastroesophageal reflux disease)     GI (gastrointestinal bleed) 3 bleeds    2 transfusions    H/O Skin cancer, basal cell     Headache     History of blood transfusion     History  of GI bleed     R/T NSAIDS AND STEROIDS, multiple times    History of urinary tract infection     Hypercalcemia     s/p parathyroidectomy    Hyperlipidemia     Hypertension     Movement disorder     Multiple sclerosis     Optic neuritis     PONV (postoperative nausea and vomiting)     Steroid-induced diabetes 2024        Past Surgical History:   Procedure Laterality Date    APPENDECTOMY      BLADDER SURGERY      bladder stimulator    BREAST BIOPSY  don't remember    BREAST SURGERY      augmentation wtih subsequent removal    CARPAL TUNNEL RELEASE Bilateral     Left 2018, right 2020    CUBITAL TUNNEL RELEASE Left     CYSTOSCOPY BOTOX INJECTION OF BLADDER  2018    Cystoscopy with Botox    FRACTURE SURGERY  2019    rt shoulder    PARATHYROIDECTOMY      one gland removed    ROTATOR CUFF REPAIR Right 2017    TOE SURGERY      bilateral great toes    TOTAL SHOULDER ARTHROPLASTY W/ DISTAL CLAVICLE EXCISION Right 10/22/2018    Procedure: RT TOTAL SHOULDER REVERSE ARTHROPLASTY;  Surgeon: Bipin Dangelo MD;  Location: [facility] OR;  Service: Orthopedics        Family History   Problem Relation Age of Onset    Hypertension Mother     Hyperlipidemia Mother     Aortic aneurysm Mother         thoracic    Diabetes Mother     Hypertension Father         charissa heart failure    Heart failure Father     Hyperlipidemia Father     Miscarriages / Stillbirths Sister     Multiple sclerosis Brother     Atrial fibrillation Brother     Diabetes Maternal Grandfather     Stroke Maternal Grandfather     Parkinsonism Paternal Grandmother     Tremor Paternal Grandmother     Stomach cancer Paternal Grandfather     Diabetes Maternal Grandmother         Social History     Socioeconomic History    Marital status:      Spouse name: Donald    Number of children: 0   Tobacco Use    Smoking status: Former     Current packs/day: 0.00     Average packs/day: 2.0 packs/day for 30.0 years (60.0 ttl pk-yrs)     Types: Cigarettes     Start date: 10/22/1973  "    Quit date: 10/22/2003     Years since quittin.2    Smokeless tobacco: Never   Vaping Use    Vaping status: Never Used   Substance and Sexual Activity    Alcohol use: Not Currently    Drug use: Not Currently     Types: Marijuana     Comment: advised by neurologist for sleep    Sexual activity: Not Currently     Partners: Male     Birth control/protection: Post-menopausal        OB History    No obstetric history on file.     Age at menarche-15  Age at first live childbirth-not applicable   2 para 0  0  Age of menopause-57  No use of hormone replacement therapy      No Known Allergies     Review of systems as mentioned HPI otherwise negative    Objective   Blood pressure 125/76, pulse 68, temperature 97.5 °F (36.4 °C), temperature source Oral, resp. rate 16, height 152.4 cm (60\"), weight 68 kg (150 lb), SpO2 97%, not currently breastfeeding.     Physical Exam  Vitals reviewed.   Constitutional:       Appearance: Normal appearance. She is normal weight.   HENT:      Right Ear: External ear normal.      Left Ear: External ear normal.      Nose: Nose normal.      Mouth/Throat:      Pharynx: Oropharynx is clear.   Eyes:      Conjunctiva/sclera: Conjunctivae normal.   Cardiovascular:      Rate and Rhythm: Normal rate.   Pulmonary:      Effort: Pulmonary effort is normal.   Abdominal:      General: Abdomen is flat.   Musculoskeletal:         General: Normal range of motion.      Cervical back: Normal range of motion.   Skin:     General: Skin is warm.   Neurological:      General: No focal deficit present.      Mental Status: She is alert and oriented to person, place, and time.   Psychiatric:         Mood and Affect: Mood normal.         Behavior: Behavior normal.         Thought Content: Thought content normal.         Judgment: Judgment normal.       Breast Exam: Right breast appears normal on inspection.  No palpable right axilla lymphadenopathy or right breast masses.  Right nipple " inverted.  Left breast on inspection there is nipple retraction crusting over the nipple region.  On palpation there is a 8 x 6 cm mass (improved) in the retroareolar region occupying much of the breast.  Palpable left axillary adenopathy as well.          Lab on 01/24/2025   Component Date Value Ref Range Status    Glucose 01/24/2025 122 (H)  65 - 99 mg/dL Final    BUN 01/24/2025 11  8 - 23 mg/dL Final    Creatinine 01/24/2025 0.68  0.57 - 1.00 mg/dL Final    Sodium 01/24/2025 139  136 - 145 mmol/L Final    Potassium 01/24/2025 3.7  3.5 - 5.2 mmol/L Final    Chloride 01/24/2025 100  98 - 107 mmol/L Final    CO2 01/24/2025 25.5  22.0 - 29.0 mmol/L Final    Calcium 01/24/2025 8.6  8.6 - 10.5 mg/dL Final    Total Protein 01/24/2025 7.3  6.0 - 8.5 g/dL Final    Albumin 01/24/2025 3.7  3.5 - 5.2 g/dL Final    ALT (SGPT) 01/24/2025 23  1 - 33 U/L Final    AST (SGOT) 01/24/2025 67 (H)  1 - 32 U/L Final    Alkaline Phosphatase 01/24/2025 146 (H)  39 - 117 U/L Final    Total Bilirubin 01/24/2025 0.3  0.0 - 1.2 mg/dL Final    Globulin 01/24/2025 3.6  gm/dL Final    A/G Ratio 01/24/2025 1.0  g/dL Final    BUN/Creatinine Ratio 01/24/2025 16.2  7.0 - 25.0 Final    Anion Gap 01/24/2025 13.5  5.0 - 15.0 mmol/L Final    eGFR 01/24/2025 98.6  >60.0 mL/min/1.73 Final    WBC 01/24/2025 3.98  3.40 - 10.80 10*3/mm3 Final    RBC 01/24/2025 4.53  3.77 - 5.28 10*6/mm3 Final    Hemoglobin 01/24/2025 13.4  12.0 - 15.9 g/dL Final    Hematocrit 01/24/2025 41.4  34.0 - 46.6 % Final    MCV 01/24/2025 91.4  79.0 - 97.0 fL Final    MCH 01/24/2025 29.6  26.6 - 33.0 pg Final    MCHC 01/24/2025 32.4  31.5 - 35.7 g/dL Final    RDW 01/24/2025 16.5 (H)  12.3 - 15.4 % Final    RDW-SD 01/24/2025 55.5 (H)  37.0 - 54.0 fl Final    MPV 01/24/2025 8.8  6.0 - 12.0 fL Final    Platelets 01/24/2025 396  140 - 450 10*3/mm3 Final    Neutrophil % 01/24/2025 69.8  42.7 - 76.0 % Final    Lymphocyte % 01/24/2025 21.9  19.6 - 45.3 % Final    Monocyte % 01/24/2025 4.5  (L)  5.0 - 12.0 % Final    Eosinophil % 01/24/2025 1.5  0.3 - 6.2 % Final    Basophil % 01/24/2025 1.8 (H)  0.0 - 1.5 % Final    Immature Grans % 01/24/2025 0.5  0.0 - 0.5 % Final    Neutrophils, Absolute 01/24/2025 2.78  1.70 - 7.00 10*3/mm3 Final    Lymphocytes, Absolute 01/24/2025 0.87  0.70 - 3.10 10*3/mm3 Final    Monocytes, Absolute 01/24/2025 0.18  0.10 - 0.90 10*3/mm3 Final    Eosinophils, Absolute 01/24/2025 0.06  0.00 - 0.40 10*3/mm3 Final    Basophils, Absolute 01/24/2025 0.07  0.00 - 0.20 10*3/mm3 Final    Immature Grans, Absolute 01/24/2025 0.02  0.00 - 0.05 10*3/mm3 Final    nRBC 01/24/2025 0.0  0.0 - 0.2 /100 WBC Final   Lab on 01/13/2025   Component Date Value Ref Range Status    Glucose 01/13/2025 135 (H)  65 - 99 mg/dL Final    BUN 01/13/2025 18  8 - 23 mg/dL Final    Creatinine 01/13/2025 0.60  0.57 - 1.00 mg/dL Final    Sodium 01/13/2025 137  136 - 145 mmol/L Final    Potassium 01/13/2025 3.6  3.5 - 5.2 mmol/L Final    Chloride 01/13/2025 102  98 - 107 mmol/L Final    CO2 01/13/2025 22.7  22.0 - 29.0 mmol/L Final    Calcium 01/13/2025 8.9  8.6 - 10.5 mg/dL Final    Total Protein 01/13/2025 6.9  6.0 - 8.5 g/dL Final    Albumin 01/13/2025 3.7  3.5 - 5.2 g/dL Final    ALT (SGPT) 01/13/2025 22  1 - 33 U/L Final    AST (SGOT) 01/13/2025 72 (H)  1 - 32 U/L Final    Alkaline Phosphatase 01/13/2025 126 (H)  39 - 117 U/L Final    Total Bilirubin 01/13/2025 0.3  0.0 - 1.2 mg/dL Final    Globulin 01/13/2025 3.2  gm/dL Final    A/G Ratio 01/13/2025 1.2  g/dL Final    BUN/Creatinine Ratio 01/13/2025 30.0 (H)  7.0 - 25.0 Final    Anion Gap 01/13/2025 12.3  5.0 - 15.0 mmol/L Final    eGFR 01/13/2025 101.6  >60.0 mL/min/1.73 Final    Magnesium 01/13/2025 2.2  1.6 - 2.4 mg/dL Final    Phosphorus 01/13/2025 2.5  2.5 - 4.5 mg/dL Final    WBC 01/13/2025 3.57  3.40 - 10.80 10*3/mm3 Final    RBC 01/13/2025 4.55  3.77 - 5.28 10*6/mm3 Final    Hemoglobin 01/13/2025 13.8  12.0 - 15.9 g/dL Final    Hematocrit 01/13/2025  41.1  34.0 - 46.6 % Final    MCV 01/13/2025 90.3  79.0 - 97.0 fL Final    MCH 01/13/2025 30.3  26.6 - 33.0 pg Final    MCHC 01/13/2025 33.6  31.5 - 35.7 g/dL Final    RDW 01/13/2025 17.4 (H)  12.3 - 15.4 % Final    RDW-SD 01/13/2025 57.4 (H)  37.0 - 54.0 fl Final    MPV 01/13/2025 8.6  6.0 - 12.0 fL Final    Platelets 01/13/2025 297  140 - 450 10*3/mm3 Final    Neutrophil % 01/13/2025 57.7  42.7 - 76.0 % Final    Lymphocyte % 01/13/2025 28.3  19.6 - 45.3 % Final    Monocyte % 01/13/2025 11.2  5.0 - 12.0 % Final    Eosinophil % 01/13/2025 1.1  0.3 - 6.2 % Final    Basophil % 01/13/2025 1.4  0.0 - 1.5 % Final    Immature Grans % 01/13/2025 0.3  0.0 - 0.5 % Final    Neutrophils, Absolute 01/13/2025 2.06  1.70 - 7.00 10*3/mm3 Final    Lymphocytes, Absolute 01/13/2025 1.01  0.70 - 3.10 10*3/mm3 Final    Monocytes, Absolute 01/13/2025 0.40  0.10 - 0.90 10*3/mm3 Final    Eosinophils, Absolute 01/13/2025 0.04  0.00 - 0.40 10*3/mm3 Final    Basophils, Absolute 01/13/2025 0.05  0.00 - 0.20 10*3/mm3 Final    Immature Grans, Absolute 01/13/2025 0.01  0.00 - 0.05 10*3/mm3 Final    nRBC 01/13/2025 0.0  0.0 - 0.2 /100 WBC Final   Lab on 12/31/2024   Component Date Value Ref Range Status    Glucose 12/31/2024 175 (H)  65 - 99 mg/dL Final    BUN 12/31/2024 14  8 - 23 mg/dL Final    Creatinine 12/31/2024 0.66  0.57 - 1.00 mg/dL Final    Sodium 12/31/2024 141  136 - 145 mmol/L Final    Potassium 12/31/2024 3.7  3.5 - 5.2 mmol/L Final    Chloride 12/31/2024 105  98 - 107 mmol/L Final    CO2 12/31/2024 24.1  22.0 - 29.0 mmol/L Final    Calcium 12/31/2024 8.6  8.6 - 10.5 mg/dL Final    Total Protein 12/31/2024 6.9  6.0 - 8.5 g/dL Final    Albumin 12/31/2024 3.7  3.5 - 5.2 g/dL Final    ALT (SGPT) 12/31/2024 19  1 - 33 U/L Final    AST (SGOT) 12/31/2024 51 (H)  1 - 32 U/L Final    Alkaline Phosphatase 12/31/2024 131 (H)  39 - 117 U/L Final    Total Bilirubin 12/31/2024 0.3  0.0 - 1.2 mg/dL Final    Globulin 12/31/2024 3.2  gm/dL Final     A/G Ratio 12/31/2024 1.2  g/dL Final    BUN/Creatinine Ratio 12/31/2024 21.2  7.0 - 25.0 Final    Anion Gap 12/31/2024 11.9  5.0 - 15.0 mmol/L Final    eGFR 12/31/2024 99.3  >60.0 mL/min/1.73 Final    WBC 12/31/2024 3.88  3.40 - 10.80 10*3/mm3 Final    RBC 12/31/2024 4.48  3.77 - 5.28 10*6/mm3 Final    Hemoglobin 12/31/2024 13.1  12.0 - 15.9 g/dL Final    Hematocrit 12/31/2024 40.9  34.0 - 46.6 % Final    MCV 12/31/2024 91.3  79.0 - 97.0 fL Final    MCH 12/31/2024 29.2  26.6 - 33.0 pg Final    MCHC 12/31/2024 32.0  31.5 - 35.7 g/dL Final    RDW 12/31/2024 15.5 (H)  12.3 - 15.4 % Final    RDW-SD 12/31/2024 51.1  37.0 - 54.0 fl Final    MPV 12/31/2024 8.6  6.0 - 12.0 fL Final    Platelets 12/31/2024 306  140 - 450 10*3/mm3 Final    Neutrophil % 12/31/2024 72.5  42.7 - 76.0 % Final    Lymphocyte % 12/31/2024 19.6  19.6 - 45.3 % Final    Monocyte % 12/31/2024 4.1 (L)  5.0 - 12.0 % Final    Eosinophil % 12/31/2024 2.3  0.3 - 6.2 % Final    Basophil % 12/31/2024 1.0  0.0 - 1.5 % Final    Immature Grans % 12/31/2024 0.5  0.0 - 0.5 % Final    Neutrophils, Absolute 12/31/2024 2.81  1.70 - 7.00 10*3/mm3 Final    Lymphocytes, Absolute 12/31/2024 0.76  0.70 - 3.10 10*3/mm3 Final    Monocytes, Absolute 12/31/2024 0.16  0.10 - 0.90 10*3/mm3 Final    Eosinophils, Absolute 12/31/2024 0.09  0.00 - 0.40 10*3/mm3 Final    Basophils, Absolute 12/31/2024 0.04  0.00 - 0.20 10*3/mm3 Final    Immature Grans, Absolute 12/31/2024 0.02  0.00 - 0.05 10*3/mm3 Final    nRBC 12/31/2024 0.0  0.0 - 0.2 /100 WBC Final        No radiology results for the last 30 days.         Assessment & Plan       *Left breast invasive lobular carcinoma  The tumor is estrogen receptor +91 to 100%, progesterone receptor +31 to 40%, HER2 negative, 0, Ki-67 35%  The tumor measures greater than 10 cm on exam, lymph node positive.  CT of the chest abdomen and pelvis with right upper lobe pulmonary nodule which is new and the bone scan also shows uptake in the left  anterior inferior iliac spine which correlates to a lucent area on the CT scan and also focal uptake noted in the left frontal calvarium concerning for metastatic disease.  Further evaluation with a MRI of the pelvis and hip as well as MRI of the brain is underway.  The scans are scheduled for 7/10/2024.  Clinical T3 N1 M1, stage IV invasive lobular carcinoma.  There is also a right axillary lymph node which has been biopsied and consistent with invasive lobular carcinoma with similar morphology as well as receptors concerning for metastatic disease rather than a primary right breast cancer.  Recommended Ribociclib 400 mg 3 weeks on and 1 week off.  July 16, 2024: Reviewed MRI of the pelvis and hip consistent with many bony metastasis.  MRI brain shows left frontal bone mets but no brain involvement.  Patient is here to start day 1 ribociclib along with anastrozole.  Continues on anastrozole.  Tempus performed on the left axilla lymph node biopsy shows PIK3CA mutation, CDH 1 mutation and Erb B2 mutation.  Therefore patient would benefit from alpelisib and Faslodex after progression on Ribociclib and AI.  Other options include neratinib plus Faslodex.  Initiated Ribociclib in August 2024.  8/30/2024-last day of week 3 of Ribociclib.    Patient completed 1 cycle of Ribociclib 400 mg and subsequently has not been able to go back on Ribociclib due to recurrent hospitalizations and abnormal LFTs.  10/14/2024-LFTs are normal today.  Recommend resuming Ribociclib at 200 mg and subsequently we will increase the dose to 400 mg with the next cycle.  Patient was unable to resume Ribociclib as she was admitted to the hospital from 10/17/2024 to 10/23/2024  11/11/2024-Labs reviewed and overall stable except for an ALT of 49.  Recommend resuming Ribociclib at 200 mg.  CT of the chest performed 10/29/2024 shows stable size of the left breast mass, decrease in size of the left axillary lymph node and mixed lytic and blastic lesion  of the rib slightly increased in size.  11/11/2024-resumed Ribociclib 200 mg daily for 3/4 weeks.  CBC from 11/25/2024 stable.  Patient tolerating this dose well.  12/10/2024 clinically breast mass does seem to be responding.  Ribociclib dose increased to 400 mg daily.  12/31/2024-Labs reviewed and LFTs remain stable with an AST of 51 ALT 19 and alkaline phosphatase 131, total bilirubin 0.3  1/13/2025: CMP with further elevation in LFTs, she was advised to hold off on starting next cycle of Kisqali until after labs are rechecked.  1/24/2025: Continues on ribociclib, initiated current cycle on 1/21/2025.  Tolerating well with some nausea well controlled with compazine.  Having some constipation and we discussed starting Senokot-S.  Otherwise no new concerns.  She is starting Ivermectin, having this managed by another provider.    *Right axillary lymphadenopathy  Biopsied and consistent with invasive lobular carcinoma, grade 2, ER/MI positive and HER2 negative  Patient initiated on AI and Ribociclib  Please refer to the above discussion for details    *Right breast non-mass enhancement  6/28/2024-MRI of the breast with non-mass enhancement noted in the right breast and patient had questions regarding if this should be biopsied.  Given extensive metastatic disease in the bones and right axilla lymph node consistent with invasive lobular carcinoma management would not change with the biopsy and hence I would recommend against it.    *Severe nausea  Secondary to Ribociclib  Improved with Compazine and Zofran.  Does not want to take Zyprexa.  Nausea well-controlled at this time    *Skeletal metastasis  Patient will be started on Xgeva  8/16/2024 Xgeva initiation will be held as the patient has not been to the dentist in over 2 years.  Discussed possible side effects of Xgeva and she will schedule a dental visit and we will have her back in 1 week to initiate Xgeva pending this is complete.  10/14/2024-second dose of Xgeva  will be administered.  Labs reviewed and stable to proceed.  12/10/2024-scheduled for Xgeva, proceed with the same.  1/24/205: Xgeva due, labs were not drawn for this so we will proceed with Xgeva next week.    *Multiple sclerosis  Patient was not on any treatment previously.  She sees Dr. Jacobson at [facility].  Due to profound weakness patient requested her neurologist to start steroids.  They plan to initiate high-dose IV steroids to help with this.  High-dose IV steroids have not been initiated.  Doing physical therapy and high-dose IV steroids.    *Hypertension-continue current medication  Blood pressure BP: 125/76   Continue to monitor  No changes in medications    *Hyperlipidemia-continue current medication  No changes    *Urinary incontinence-patient had  a suprapubic catheter placed by urology.    *History of iron deficiency anemia-  3/8/2024 hemoglobin was low at 10.9 and subsequently patient took oral iron which resulted in improvement of hemoglobin and normal.  She was scheduled for colonoscopy however she canceled that due to ongoing management of breast cancer.  She proceed with a colonoscopy.  8/16/2024 hemoglobin is 10.2.  She states she recently was told to begin daily iron supplementation.  Baseline iron studies ordered today she is only been taking the iron for a few days.  Ferritin 32, iron saturation 5%, TIBC 440.  We will repeat this in 6 to 8 weeks.  12/10/2024 Hgb 12.0. Continue oral iron  1/21/2025: Hemoglobin 13.4    *Genetic testing-9 gene stat panel negative    *Dyspnea  CT chest shows some bibasilar atelectasis/pneumonia  CT chest from June 2024 without any evidence of metastatic disease  CT of the chest 10/29/2024- details summarized above     *Right upper lobe pulmonary nodule   Concerning for metastatic disease  PET/CT may not be helpful as invasive lobular carcinomas typically are not very PET avid.  Could consider PET-FES    *Follow-up-after the MRIs.  Reviewed MRI of the pelvis  and MRI of the brain which shows mets in the bone  MRI of the cervical and thoracic spine performed at outside facility on 10/3/2024    *Abnormal LFTs  Likely secondary to Ribociclib  Ribociclib dose increased to 400 mg daily on 12/10/2024  12/31/2024-Labs reviewed and stable.  1/13/2025: LFTs further increased with AST 72, ALT 22.  Decision made to hold off on starting next cycle of ribociclib.  1/24/2025: LFTs stable  Patient wishes to start ivermectin and warned about the possibility of abnormal LFTs with that as well.     *Insomnia  Severe  Using trazodone and Ambien.    Plan:    Ribociclib to 400 mg daily taken for 3 weeks on, 1 week off.   MD follow-up on 1/31/2025, Xgeva his day.  Trazodone and Ambien for insomnia.  Continue Compazine for nausea  Continue daily oral ferrous sulfate.      This patient is on high risk drug therapy requiring intensive monitoring for toxicity.     DANA Pham

## 2025-01-27 ENCOUNTER — SPECIALTY PHARMACY (OUTPATIENT)
Dept: PHARMACY | Facility: HOSPITAL | Age: 65
End: 2025-01-27
Payer: MEDICARE

## 2025-01-27 NOTE — PROGRESS NOTES
Specialty Pharmacy Note: Kisqali (ribociclib)    Maritza Nance Darci is a 64 y.o. female with breast cancer was seen 1/24/25 by APRN. Per provider dictation, no changes to oral oncology regimen Kisqali (ribociclib).  Labs Review: The CMP and CBC from 1/24/25 have been reviewed. No dose adjustments are needed for the oral specialty medication(s) based on the labs.    Specialty pharmacy will continue to follow patient.    Alyssa Rich, PharmD, BCPS  1/27/2025  10:19 EST

## 2025-01-31 ENCOUNTER — INFUSION (OUTPATIENT)
Dept: ONCOLOGY | Facility: HOSPITAL | Age: 65
End: 2025-01-31
Payer: MEDICARE

## 2025-01-31 ENCOUNTER — OFFICE VISIT (OUTPATIENT)
Dept: ONCOLOGY | Facility: CLINIC | Age: 65
End: 2025-01-31
Payer: MEDICARE

## 2025-01-31 ENCOUNTER — LAB (OUTPATIENT)
Dept: LAB | Facility: HOSPITAL | Age: 65
End: 2025-01-31
Payer: MEDICARE

## 2025-01-31 VITALS
TEMPERATURE: 97.4 F | SYSTOLIC BLOOD PRESSURE: 112 MMHG | BODY MASS INDEX: 29.45 KG/M2 | HEART RATE: 79 BPM | OXYGEN SATURATION: 96 % | DIASTOLIC BLOOD PRESSURE: 72 MMHG | RESPIRATION RATE: 16 BRPM | HEIGHT: 60 IN | WEIGHT: 150 LBS

## 2025-01-31 DIAGNOSIS — Z17.0 MALIGNANT NEOPLASM OF OVERLAPPING SITES OF LEFT BREAST IN FEMALE, ESTROGEN RECEPTOR POSITIVE: ICD-10-CM

## 2025-01-31 DIAGNOSIS — Z17.0 MALIGNANT NEOPLASM OF OVERLAPPING SITES OF LEFT BREAST IN FEMALE, ESTROGEN RECEPTOR POSITIVE: Primary | ICD-10-CM

## 2025-01-31 DIAGNOSIS — C79.51 CANCER, METASTATIC TO BONE: ICD-10-CM

## 2025-01-31 DIAGNOSIS — C50.812 MALIGNANT NEOPLASM OF OVERLAPPING SITES OF LEFT BREAST IN FEMALE, ESTROGEN RECEPTOR POSITIVE: Primary | ICD-10-CM

## 2025-01-31 DIAGNOSIS — C50.812 MALIGNANT NEOPLASM OF OVERLAPPING SITES OF LEFT BREAST IN FEMALE, ESTROGEN RECEPTOR POSITIVE: ICD-10-CM

## 2025-01-31 LAB
ALBUMIN SERPL-MCNC: 3.6 G/DL (ref 3.5–5.2)
ALBUMIN/GLOB SERPL: 1.1 G/DL
ALP SERPL-CCNC: 147 U/L (ref 39–117)
ALT SERPL W P-5'-P-CCNC: 16 U/L (ref 1–33)
ANION GAP SERPL CALCULATED.3IONS-SCNC: 13.8 MMOL/L (ref 5–15)
AST SERPL-CCNC: 72 U/L (ref 1–32)
BASOPHILS # BLD AUTO: 0.06 10*3/MM3 (ref 0–0.2)
BASOPHILS NFR BLD AUTO: 1.4 % (ref 0–1.5)
BILIRUB SERPL-MCNC: 0.3 MG/DL (ref 0–1.2)
BUN SERPL-MCNC: 20 MG/DL (ref 8–23)
BUN/CREAT SERPL: 24.4 (ref 7–25)
CALCIUM SPEC-SCNC: 8.7 MG/DL (ref 8.6–10.5)
CHLORIDE SERPL-SCNC: 102 MMOL/L (ref 98–107)
CO2 SERPL-SCNC: 22.2 MMOL/L (ref 22–29)
CREAT SERPL-MCNC: 0.82 MG/DL (ref 0.57–1)
DEPRECATED RDW RBC AUTO: 60.9 FL (ref 37–54)
EGFRCR SERPLBLD CKD-EPI 2021: 81 ML/MIN/1.73
EOSINOPHIL # BLD AUTO: 0.04 10*3/MM3 (ref 0–0.4)
EOSINOPHIL NFR BLD AUTO: 0.9 % (ref 0.3–6.2)
ERYTHROCYTE [DISTWIDTH] IN BLOOD BY AUTOMATED COUNT: 17.7 % (ref 12.3–15.4)
GLOBULIN UR ELPH-MCNC: 3.3 GM/DL
GLUCOSE SERPL-MCNC: 216 MG/DL (ref 65–99)
HCT VFR BLD AUTO: 39.6 % (ref 34–46.6)
HGB BLD-MCNC: 12.8 G/DL (ref 12–15.9)
IMM GRANULOCYTES # BLD AUTO: 0.03 10*3/MM3 (ref 0–0.05)
IMM GRANULOCYTES NFR BLD AUTO: 0.7 % (ref 0–0.5)
LYMPHOCYTES # BLD AUTO: 1.04 10*3/MM3 (ref 0.7–3.1)
LYMPHOCYTES NFR BLD AUTO: 24.3 % (ref 19.6–45.3)
MAGNESIUM SERPL-MCNC: 2.6 MG/DL (ref 1.6–2.4)
MCH RBC QN AUTO: 30.3 PG (ref 26.6–33)
MCHC RBC AUTO-ENTMCNC: 32.3 G/DL (ref 31.5–35.7)
MCV RBC AUTO: 93.6 FL (ref 79–97)
MONOCYTES # BLD AUTO: 0.16 10*3/MM3 (ref 0.1–0.9)
MONOCYTES NFR BLD AUTO: 3.7 % (ref 5–12)
NEUTROPHILS NFR BLD AUTO: 2.95 10*3/MM3 (ref 1.7–7)
NEUTROPHILS NFR BLD AUTO: 69 % (ref 42.7–76)
NRBC BLD AUTO-RTO: 0 /100 WBC (ref 0–0.2)
PHOSPHATE SERPL-MCNC: 2.9 MG/DL (ref 2.5–4.5)
PLATELET # BLD AUTO: 416 10*3/MM3 (ref 140–450)
PMV BLD AUTO: 9.5 FL (ref 6–12)
POTASSIUM SERPL-SCNC: 3.8 MMOL/L (ref 3.5–5.2)
PROT SERPL-MCNC: 6.9 G/DL (ref 6–8.5)
RBC # BLD AUTO: 4.23 10*6/MM3 (ref 3.77–5.28)
SODIUM SERPL-SCNC: 138 MMOL/L (ref 136–145)
WBC NRBC COR # BLD AUTO: 4.28 10*3/MM3 (ref 3.4–10.8)

## 2025-01-31 PROCEDURE — 36415 COLL VENOUS BLD VENIPUNCTURE: CPT

## 2025-01-31 PROCEDURE — 83735 ASSAY OF MAGNESIUM: CPT

## 2025-01-31 PROCEDURE — 25010000002 DENOSUMAB 120 MG/1.7ML SOLUTION: Performed by: INTERNAL MEDICINE

## 2025-01-31 PROCEDURE — 84100 ASSAY OF PHOSPHORUS: CPT

## 2025-01-31 PROCEDURE — 96372 THER/PROPH/DIAG INJ SC/IM: CPT

## 2025-01-31 PROCEDURE — 80053 COMPREHEN METABOLIC PANEL: CPT

## 2025-01-31 PROCEDURE — 85025 COMPLETE CBC W/AUTO DIFF WBC: CPT

## 2025-01-31 RX ORDER — FENBENDAZOLE 100 %
440 POWDER (GRAM) MISCELLANEOUS
COMMUNITY

## 2025-01-31 RX ADMIN — DENOSUMAB 120 MG: 120 INJECTION SUBCUTANEOUS at 11:57

## 2025-01-31 NOTE — PROGRESS NOTES
Subjective   Maritza Bejarano is a 64 y.o. female.   With metastatic invasive lobular carcinoma, ER/NE strongly positive cancer with metastatic disease to the bones.    History of Present Illness   Maritza returns today for lab review and follow-up.      She continues on ribociclib 400 mg daily days 1-21 every 28 days.    Resumed Ribociclib 400 mg daily on 1/21/2025.  She is now a week into the treatment.  She also continues on anastrozole and tolerating that well.  Nausea is well-controlled with the Compazine.  She has been using fenbendazole and ivermectin prescribed by her alternate  medicine doctor.  She is complaining of constipation and has used senna S with  but has not had a good bowel movement.  She had pellety stools.  Scheduled for Xgeva today.    Oncologic history:    Patient is a 64-year-old postmenopausal lady who has not had a mammogram in 4 years presented with a screen detected abnormality.  She had a left breast biopsy in 2014 which was benign.  Denies palpating any breast masses skin changes or nipple discharge prior to the abnormal mammogram.  She does have left nipple inversion.    Patient has a longstanding diagnosis of multiple sclerosis and has not been on any treatment.  She was previously seen by Dr. Ozzy France and now being seen by Dr. Soto with neurology.  She has been nonambulatory for the past 4 years and requires a wheelchair.  She is unable to transfer herself in and out of wheelchairs and her  has to lift her for all the transfers.  She is also incontinent of the bladder and requiring a suprapubic catheter placed by urology to help with the same.  She had a bladder stimulator in the past which was turned off.  Other comorbidities include hypertension and hyperlipidemia.      Family history significant for maternal great grandmother with breast cancer, maternal great aunt and mother with breast cancer in her 70s.  Denies any family history of ovarian cancer, pancreatic cancer or  melanoma.    5/21/2024-bilateral screening mammogram  Finding 1.new nipple retraction along with diffuse skin and trabecular thickening of the left breast.  There is suggestion of subtle architectural distortion seen on the MLO projection in the posterior central region.  3 biopsy markers are noted.  No new suspicious calcifications.  Send finding 2.few axillary lymph node seen in the left breast which display interval increase in size and density.    Finding 3.focal asymmetry measuring 10 mm seen in the anterior one third of the right breast in the medial subareolar region.    Impression  Finding 1.new nipple retraction, skin and trabecular thickening in the left breast requires additional evaluation.  There is also question architectural distortion in the posterior central breast seen on the MLO projection.  Diagnostic mammogram to include repeat full-field CC with additional posterior tissue and complete breast ultrasound is recommended.  Finding 2.axillary lymph nodes in the left breast require additional evaluation, ultrasound recommended.  Finding 3.focal asymmetry in the right breast requires additional evaluation.  Diagnostic mammogram and limited breast ultrasound is recommended.    5/29/2024-bilateral diagnostic mammogram and ultrasound  Finding 1.4 0.7 x 5.6 x 6.8 cm developing asymmetry with associated nipple retraction, skin thickening and trabecular thickening of the left breast in the central region, inferior region in the upper outer region and the lower outer region.  Finding 2.axillary lymph node in the left breast.  Finding 3.suspected finding completely resolves upon further evaluation representing benign fibroglandular breast tissue.    Bilateral breast ultrasound  Finding 1.nonparallel hypoechoic mass with indistinct margins and skin thickening and internal vascularity in the left breast in the central region, inferior region, upper outer region and in the lower outer region.  The finding is  palpable and appears to involve all 4 quadrants.  Most discrete portion is located at 130, 3 cm from the nipple, skin thickening up to 6 mm.  Send finding 2.rounded enlarged left axillary lymph node measuring 11 mm.  Cortical thickening of 9 mm present.    Finding 3.no sonographic correlate.  Send finding 4.enlarged right axillary lymph node measuring 14 mm.  Cortical thickening of 5 mm.    Impression  Finding 1.ultrasound-guided biopsy recommended  Finding 2.ultrasound-guided biopsy recommended  Finding 3.previously described right breast asymmetry resolves  Finding 4.ultrasound-guided biopsy recommended.    Ultrasound-guided biopsy of the left breast and left axilla lymph node and right axilla lymph node    1.left breast  Invasive lobular carcinoma with crush artifact  Grade 2  Invasive carcinoma measures 8.5 mm  No lymphovascular space invasion  ER +91 to 100% strong  MO +31 to 40% moderate  HER2 negative, 0  Ki-67 35%    2.left axillary lymph node focus of metastatic Dastech lobular carcinoma measuring 10 mm  ER +91 to 100% strong  MO +21 to 30%  HER2 -0  Ki-67 30%    3.right axillary lymph node with a focus of metastatic carcinoma measuring 4 mm.  Grade 2.  ER +91 to 100% strong  MO +11 to 20% moderate  HER2 negative, 0  Ki-67 35%    Pathology of all the 3 sites appear similar and findings could represent left breast lobular carcinoma metastatic to the right as well as left axillary lymph nodes.    6/14/2024-CT of the chest abdomen and pelvis  1.large ill-defined infiltrative central left breast mass measuring 6.7 x 4 cm, medially there is an irregular 2.5 x 2.5 cm mass.  Significant thickening of the skin in the left breast and there is left axilla lymphadenopathy.  Left axilla lymph node measures 1.3 x 1.3 cm.  There is also a new enlarged right axillary lymph node measuring 1.3 x 1 cm.  All of the subcentimeter right axillary lymph nodes are slightly larger than previous.  2.no mediastinal hilar or internal  mammary chain lymphadenopathy  3.new indeterminate mixed density measuring 1.3 x 1.2 cm right apical pulmonary nodule.  Follow-up recommended.  4.pleural parenchymal thickening and nodules inferiorly and posterior to the right upper lobe are stable.  Chronic scarring and subsegmental atelectatic change in both lower lobes.    CT abdomen pelvis  1.moderate fatty infiltration of the liver.  No suspicious liver lesions.  2.moderate-sized paraesophageal hernia.  No acute bowel abnormality.  3.right sacral stimulator noted at the S3 neuroforamina.  No suspicious bone lesions are noted.    6/14/2024-bone scan  1.focal abnormal uptake in the left anterior inferior iliac spine correlating with lucent lesion on the CT concerning for metastatic disease.  Further evaluation with MRI of the pelvis and left hip could be performed.  2.focal uptake in the left frontal calvarium again concerning for metastatic disease.  Noncontrast CT or MRI could be obtained.  3.soft tissue uptake noted in the left breast at the site of known left breast cancer.  4.changes from arthroplasty on the right shoulder.  5.multifocal likely degenerative uptake in each knee, ankle and foot and increased uptake in the medial and patellofemoral cortex of the right knee could be posttraumatic.      Invitae 9 gene stat panel negative.    MRI of the brain which showed T2 hyperintensity involving the frontal bone measuring 1.6 cm in size and a smaller area of enhancement in the frontal bone laterally and inferiorly concerning for metastasis.  No brain mets    MRI of the hip and pelvis showed multiple enhancing bone lesions consistent with metastatic disease to the sacrum and pelvis.  Dominant lesion in the anterior inferior left pelvic spine and rectus femoris muscle origin that correlates with the site of bone scan uptake.  She has some right joint hip joint effusion.  Her CA 15-3 16.2    She was seen by Dr. Saavedra on 7/16/2024 and recommended to start on  Ribociclib along with anastrozole.    Started Ribociclib in the first week of August 2024    Had profound nausea and vomiting requiring antiemetics.  Completed 3 weeks of Ribociclib on 8/30/2024.    Hospitalized from 9/11/2024 to 9/16/2024 for pneumonia and KINZA and since then Ribociclib has been on hold    Readmitted to the hospital from 10/17/2024 to 10/23/2024 requiring holding Ribociclib.  She was admitted for UTI with sepsis.    10/29/2024-CT of the chest which was compared to the CT chest from 6/14/2024-stable 1.3 cm subsolid nodule in the right lung apex.  Decrease in size of the left axillary lymph nodes.  Ill-defined left breast mass appears unchanged.  Expansile lytic and sclerotic lesion in the posterior left 10th rib which appears increased compared to the prior CT.  Stable subtle area of sclerosis in the left anterior third rib.    10/3/2024-MRI of the cervical spine-rounded area of marrow replacement in C7 suspicious for metastatic disease.    10/3/2024-MRI of the thoracic spine-suspicious patchy areas of marrow replacement in the thoracic spine at T5, T6, T7, T11, T12, L1 suspicious for metastatic disease.      The following portions of the patient's history were reviewed and updated as appropriate: allergies, current medications, past family history, past medical history, past social history, past surgical history, and problem list.    Past Medical History:   Diagnosis Date    Anemia 2024    `Treated with iron    Dawn esophagus     per patient    Blurred vision     R/T MS    Breast cancer 05/2024+++++    Carpal tunnel syndrome     Clotting disorder 1993, 2003, 2012    3 g/i bleeds w/ transfus/ions    Colon polyp 2013    removed w/ colonoscopy    Deep vein thrombosis phlebitis 1980    Depression     Diplopia 2013    GERD (gastroesophageal reflux disease)     GI (gastrointestinal bleed) 3 bleeds    2 transfusions    H/O Skin cancer, basal cell     Headache     History of blood transfusion     History  of GI bleed     R/T NSAIDS AND STEROIDS, multiple times    History of urinary tract infection     Hypercalcemia     s/p parathyroidectomy    Hyperlipidemia     Hypertension     Movement disorder     Multiple sclerosis     Optic neuritis     PONV (postoperative nausea and vomiting)     Steroid-induced diabetes 2024        Past Surgical History:   Procedure Laterality Date    APPENDECTOMY      BLADDER SURGERY      bladder stimulator    BREAST BIOPSY  don't remember    BREAST SURGERY      augmentation wtih subsequent removal    CARPAL TUNNEL RELEASE Bilateral     Left 2018, right 2020    CUBITAL TUNNEL RELEASE Left     CYSTOSCOPY BOTOX INJECTION OF BLADDER  2018    Cystoscopy with Botox    FRACTURE SURGERY  2019    rt shoulder    PARATHYROIDECTOMY      one gland removed    ROTATOR CUFF REPAIR Right 2017    TOE SURGERY      bilateral great toes    TOTAL SHOULDER ARTHROPLASTY W/ DISTAL CLAVICLE EXCISION Right 10/22/2018    Procedure: RT TOTAL SHOULDER REVERSE ARTHROPLASTY;  Surgeon: Bipin Dangelo MD;  Location: [facility] OR;  Service: Orthopedics        Family History   Problem Relation Age of Onset    Hypertension Mother     Hyperlipidemia Mother     Aortic aneurysm Mother         thoracic    Diabetes Mother     Hypertension Father         charissa heart failure    Heart failure Father     Hyperlipidemia Father     Miscarriages / Stillbirths Sister     Multiple sclerosis Brother     Atrial fibrillation Brother     Diabetes Maternal Grandfather     Stroke Maternal Grandfather     Parkinsonism Paternal Grandmother     Tremor Paternal Grandmother     Stomach cancer Paternal Grandfather     Diabetes Maternal Grandmother         Social History     Socioeconomic History    Marital status:      Spouse name: Donald    Number of children: 0   Tobacco Use    Smoking status: Former     Current packs/day: 0.00     Average packs/day: 2.0 packs/day for 30.0 years (60.0 ttl pk-yrs)     Types: Cigarettes     Start date: 10/22/1973  "    Quit date: 10/22/2003     Years since quittin.2    Smokeless tobacco: Never   Vaping Use    Vaping status: Never Used   Substance and Sexual Activity    Alcohol use: Not Currently    Drug use: Not Currently     Types: Marijuana     Comment: advised by neurologist for sleep    Sexual activity: Not Currently     Partners: Male     Birth control/protection: Post-menopausal        OB History    No obstetric history on file.     Age at menarche-15  Age at first live childbirth-not applicable   2 para 0  0  Age of menopause-57  No use of hormone replacement therapy      No Known Allergies     Review of systems as mentioned HPI otherwise negative    Objective   Blood pressure 112/72, pulse 79, temperature 97.4 °F (36.3 °C), temperature source Oral, resp. rate 16, height 152.4 cm (60\"), weight 68 kg (150 lb), SpO2 96%, not currently breastfeeding.     Physical Exam  Vitals reviewed.   Constitutional:       Appearance: Normal appearance. She is normal weight.   HENT:      Right Ear: External ear normal.      Left Ear: External ear normal.      Nose: Nose normal.      Mouth/Throat:      Pharynx: Oropharynx is clear.   Eyes:      Conjunctiva/sclera: Conjunctivae normal.   Cardiovascular:      Rate and Rhythm: Normal rate.   Pulmonary:      Effort: Pulmonary effort is normal.   Abdominal:      General: Abdomen is flat.   Musculoskeletal:         General: Normal range of motion.      Cervical back: Normal range of motion.   Skin:     General: Skin is warm.   Neurological:      General: No focal deficit present.      Mental Status: She is alert and oriented to person, place, and time.   Psychiatric:         Mood and Affect: Mood normal.         Behavior: Behavior normal.         Thought Content: Thought content normal.         Judgment: Judgment normal.       Breast Exam: Right breast appears normal on inspection.  No palpable right axilla lymphadenopathy or right breast masses.  Right nipple " inverted.  Left breast on inspection there is nipple retraction crusting over the nipple region.  On palpation there is a 8 x 6 cm mass (improved) in the retroareolar region occupying much of the breast.  Palpable left axillary adenopathy as well.      I have reexamined the patient and the results are consistent with the previously documented exam. Zainab Lopes MD        Lab on 01/31/2025   Component Date Value Ref Range Status    WBC 01/31/2025 4.28  3.40 - 10.80 10*3/mm3 Final    RBC 01/31/2025 4.23  3.77 - 5.28 10*6/mm3 Final    Hemoglobin 01/31/2025 12.8  12.0 - 15.9 g/dL Final    Hematocrit 01/31/2025 39.6  34.0 - 46.6 % Final    MCV 01/31/2025 93.6  79.0 - 97.0 fL Final    MCH 01/31/2025 30.3  26.6 - 33.0 pg Final    MCHC 01/31/2025 32.3  31.5 - 35.7 g/dL Final    RDW 01/31/2025 17.7 (H)  12.3 - 15.4 % Final    RDW-SD 01/31/2025 60.9 (H)  37.0 - 54.0 fl Final    MPV 01/31/2025 9.5  6.0 - 12.0 fL Final    Platelets 01/31/2025 416  140 - 450 10*3/mm3 Final    Neutrophil % 01/31/2025 69.0  42.7 - 76.0 % Final    Lymphocyte % 01/31/2025 24.3  19.6 - 45.3 % Final    Monocyte % 01/31/2025 3.7 (L)  5.0 - 12.0 % Final    Eosinophil % 01/31/2025 0.9  0.3 - 6.2 % Final    Basophil % 01/31/2025 1.4  0.0 - 1.5 % Final    Immature Grans % 01/31/2025 0.7 (H)  0.0 - 0.5 % Final    Neutrophils, Absolute 01/31/2025 2.95  1.70 - 7.00 10*3/mm3 Final    Lymphocytes, Absolute 01/31/2025 1.04  0.70 - 3.10 10*3/mm3 Final    Monocytes, Absolute 01/31/2025 0.16  0.10 - 0.90 10*3/mm3 Final    Eosinophils, Absolute 01/31/2025 0.04  0.00 - 0.40 10*3/mm3 Final    Basophils, Absolute 01/31/2025 0.06  0.00 - 0.20 10*3/mm3 Final    Immature Grans, Absolute 01/31/2025 0.03  0.00 - 0.05 10*3/mm3 Final    nRBC 01/31/2025 0.0  0.0 - 0.2 /100 WBC Final   Lab on 01/24/2025   Component Date Value Ref Range Status    Glucose 01/24/2025 122 (H)  65 - 99 mg/dL Final    BUN 01/24/2025 11  8 - 23 mg/dL Final    Creatinine 01/24/2025 0.68  0.57 -  1.00 mg/dL Final    Sodium 01/24/2025 139  136 - 145 mmol/L Final    Potassium 01/24/2025 3.7  3.5 - 5.2 mmol/L Final    Chloride 01/24/2025 100  98 - 107 mmol/L Final    CO2 01/24/2025 25.5  22.0 - 29.0 mmol/L Final    Calcium 01/24/2025 8.6  8.6 - 10.5 mg/dL Final    Total Protein 01/24/2025 7.3  6.0 - 8.5 g/dL Final    Albumin 01/24/2025 3.7  3.5 - 5.2 g/dL Final    ALT (SGPT) 01/24/2025 23  1 - 33 U/L Final    AST (SGOT) 01/24/2025 67 (H)  1 - 32 U/L Final    Alkaline Phosphatase 01/24/2025 146 (H)  39 - 117 U/L Final    Total Bilirubin 01/24/2025 0.3  0.0 - 1.2 mg/dL Final    Globulin 01/24/2025 3.6  gm/dL Final    A/G Ratio 01/24/2025 1.0  g/dL Final    BUN/Creatinine Ratio 01/24/2025 16.2  7.0 - 25.0 Final    Anion Gap 01/24/2025 13.5  5.0 - 15.0 mmol/L Final    eGFR 01/24/2025 98.6  >60.0 mL/min/1.73 Final    WBC 01/24/2025 3.98  3.40 - 10.80 10*3/mm3 Final    RBC 01/24/2025 4.53  3.77 - 5.28 10*6/mm3 Final    Hemoglobin 01/24/2025 13.4  12.0 - 15.9 g/dL Final    Hematocrit 01/24/2025 41.4  34.0 - 46.6 % Final    MCV 01/24/2025 91.4  79.0 - 97.0 fL Final    MCH 01/24/2025 29.6  26.6 - 33.0 pg Final    MCHC 01/24/2025 32.4  31.5 - 35.7 g/dL Final    RDW 01/24/2025 16.5 (H)  12.3 - 15.4 % Final    RDW-SD 01/24/2025 55.5 (H)  37.0 - 54.0 fl Final    MPV 01/24/2025 8.8  6.0 - 12.0 fL Final    Platelets 01/24/2025 396  140 - 450 10*3/mm3 Final    Neutrophil % 01/24/2025 69.8  42.7 - 76.0 % Final    Lymphocyte % 01/24/2025 21.9  19.6 - 45.3 % Final    Monocyte % 01/24/2025 4.5 (L)  5.0 - 12.0 % Final    Eosinophil % 01/24/2025 1.5  0.3 - 6.2 % Final    Basophil % 01/24/2025 1.8 (H)  0.0 - 1.5 % Final    Immature Grans % 01/24/2025 0.5  0.0 - 0.5 % Final    Neutrophils, Absolute 01/24/2025 2.78  1.70 - 7.00 10*3/mm3 Final    Lymphocytes, Absolute 01/24/2025 0.87  0.70 - 3.10 10*3/mm3 Final    Monocytes, Absolute 01/24/2025 0.18  0.10 - 0.90 10*3/mm3 Final    Eosinophils, Absolute 01/24/2025 0.06  0.00 - 0.40  10*3/mm3 Final    Basophils, Absolute 01/24/2025 0.07  0.00 - 0.20 10*3/mm3 Final    Immature Grans, Absolute 01/24/2025 0.02  0.00 - 0.05 10*3/mm3 Final    nRBC 01/24/2025 0.0  0.0 - 0.2 /100 WBC Final   Lab on 01/13/2025   Component Date Value Ref Range Status    Glucose 01/13/2025 135 (H)  65 - 99 mg/dL Final    BUN 01/13/2025 18  8 - 23 mg/dL Final    Creatinine 01/13/2025 0.60  0.57 - 1.00 mg/dL Final    Sodium 01/13/2025 137  136 - 145 mmol/L Final    Potassium 01/13/2025 3.6  3.5 - 5.2 mmol/L Final    Chloride 01/13/2025 102  98 - 107 mmol/L Final    CO2 01/13/2025 22.7  22.0 - 29.0 mmol/L Final    Calcium 01/13/2025 8.9  8.6 - 10.5 mg/dL Final    Total Protein 01/13/2025 6.9  6.0 - 8.5 g/dL Final    Albumin 01/13/2025 3.7  3.5 - 5.2 g/dL Final    ALT (SGPT) 01/13/2025 22  1 - 33 U/L Final    AST (SGOT) 01/13/2025 72 (H)  1 - 32 U/L Final    Alkaline Phosphatase 01/13/2025 126 (H)  39 - 117 U/L Final    Total Bilirubin 01/13/2025 0.3  0.0 - 1.2 mg/dL Final    Globulin 01/13/2025 3.2  gm/dL Final    A/G Ratio 01/13/2025 1.2  g/dL Final    BUN/Creatinine Ratio 01/13/2025 30.0 (H)  7.0 - 25.0 Final    Anion Gap 01/13/2025 12.3  5.0 - 15.0 mmol/L Final    eGFR 01/13/2025 101.6  >60.0 mL/min/1.73 Final    Magnesium 01/13/2025 2.2  1.6 - 2.4 mg/dL Final    Phosphorus 01/13/2025 2.5  2.5 - 4.5 mg/dL Final    WBC 01/13/2025 3.57  3.40 - 10.80 10*3/mm3 Final    RBC 01/13/2025 4.55  3.77 - 5.28 10*6/mm3 Final    Hemoglobin 01/13/2025 13.8  12.0 - 15.9 g/dL Final    Hematocrit 01/13/2025 41.1  34.0 - 46.6 % Final    MCV 01/13/2025 90.3  79.0 - 97.0 fL Final    MCH 01/13/2025 30.3  26.6 - 33.0 pg Final    MCHC 01/13/2025 33.6  31.5 - 35.7 g/dL Final    RDW 01/13/2025 17.4 (H)  12.3 - 15.4 % Final    RDW-SD 01/13/2025 57.4 (H)  37.0 - 54.0 fl Final    MPV 01/13/2025 8.6  6.0 - 12.0 fL Final    Platelets 01/13/2025 297  140 - 450 10*3/mm3 Final    Neutrophil % 01/13/2025 57.7  42.7 - 76.0 % Final    Lymphocyte % 01/13/2025  28.3  19.6 - 45.3 % Final    Monocyte % 01/13/2025 11.2  5.0 - 12.0 % Final    Eosinophil % 01/13/2025 1.1  0.3 - 6.2 % Final    Basophil % 01/13/2025 1.4  0.0 - 1.5 % Final    Immature Grans % 01/13/2025 0.3  0.0 - 0.5 % Final    Neutrophils, Absolute 01/13/2025 2.06  1.70 - 7.00 10*3/mm3 Final    Lymphocytes, Absolute 01/13/2025 1.01  0.70 - 3.10 10*3/mm3 Final    Monocytes, Absolute 01/13/2025 0.40  0.10 - 0.90 10*3/mm3 Final    Eosinophils, Absolute 01/13/2025 0.04  0.00 - 0.40 10*3/mm3 Final    Basophils, Absolute 01/13/2025 0.05  0.00 - 0.20 10*3/mm3 Final    Immature Grans, Absolute 01/13/2025 0.01  0.00 - 0.05 10*3/mm3 Final    nRBC 01/13/2025 0.0  0.0 - 0.2 /100 WBC Final        No radiology results for the last 30 days.         Assessment & Plan       *Left breast invasive lobular carcinoma  The tumor is estrogen receptor +91 to 100%, progesterone receptor +31 to 40%, HER2 negative, 0, Ki-67 35%  The tumor measures greater than 10 cm on exam, lymph node positive.  CT of the chest abdomen and pelvis with right upper lobe pulmonary nodule which is new and the bone scan also shows uptake in the left anterior inferior iliac spine which correlates to a lucent area on the CT scan and also focal uptake noted in the left frontal calvarium concerning for metastatic disease.  Further evaluation with a MRI of the pelvis and hip as well as MRI of the brain is underway.  The scans are scheduled for 7/10/2024.  Clinical T3 N1 M1, stage IV invasive lobular carcinoma.  There is also a right axillary lymph node which has been biopsied and consistent with invasive lobular carcinoma with similar morphology as well as receptors concerning for metastatic disease rather than a primary right breast cancer.  Recommended Ribociclib 400 mg 3 weeks on and 1 week off.  July 16, 2024: Reviewed MRI of the pelvis and hip consistent with many bony metastasis.  MRI brain shows left frontal bone mets but no brain involvement.  Patient is  here to start day 1 ribociclib along with anastrozole.  Continues on anastrozole.  Tempus performed on the left axilla lymph node biopsy shows PIK3CA mutation, CDH 1 mutation and Erb B2 mutation.  Therefore patient would benefit from alpelisib and Faslodex after progression on Ribociclib and AI.  Other options include neratinib plus Faslodex.  Initiated Ribociclib in August 2024.  8/30/2024-last day of week 3 of Ribociclib.    Patient completed 1 cycle of Ribociclib 400 mg and subsequently has not been able to go back on Ribociclib due to recurrent hospitalizations and abnormal LFTs.  10/14/2024-LFTs are normal today.  Recommend resuming Ribociclib at 200 mg and subsequently we will increase the dose to 400 mg with the next cycle.  Patient was unable to resume Ribociclib as she was admitted to the hospital from 10/17/2024 to 10/23/2024  11/11/2024-Labs reviewed and overall stable except for an ALT of 49.  Recommend resuming Ribociclib at 200 mg.  CT of the chest performed 10/29/2024 shows stable size of the left breast mass, decrease in size of the left axillary lymph node and mixed lytic and blastic lesion of the rib slightly increased in size.  11/11/2024-resumed Ribociclib 200 mg daily for 3/4 weeks.  CBC from 11/25/2024 stable.  Patient tolerating this dose well.  12/10/2024 clinically breast mass does seem to be responding.  Ribociclib dose increased to 400 mg daily.  12/31/2024-Labs reviewed and LFTs remain stable with an AST of 51 ALT 19 and alkaline phosphatase 131, total bilirubin 0.3  1/13/2025: CMP with further elevation in LFTs, she was advised to hold off on starting next cycle of Kisqali until after labs are rechecked.  1/24/2025: Continues on ribociclib, initiated current cycle on 1/21/2025.  Tolerating well with some nausea well controlled with compazine.  Having some constipation and we discussed starting Senokot-S.  Otherwise no new concerns.  She is starting Ivermectin, having this managed by  another provider.  1/31/2025-continues on Ribociclib 400 mg daily, LFTs slightly worse today with an AST of 72, ALT normal and total bilirubin normal.  Continue current dose of Ribociclib.  Continue anastrozole.  Repeat labs again in 2 weeks    *Right axillary lymphadenopathy  Biopsied and consistent with invasive lobular carcinoma, grade 2, ER/PA positive and HER2 negative  Patient initiated on AI and Ribociclib  Please refer to the above discussion for details    *Right breast non-mass enhancement  6/28/2024-MRI of the breast with non-mass enhancement noted in the right breast and patient had questions regarding if this should be biopsied.  Given extensive metastatic disease in the bones and right axilla lymph node consistent with invasive lobular carcinoma management would not change with the biopsy and hence I would recommend against it.    *Severe nausea  Secondary to Ribociclib  Does not want to take Zyprexa.  Nausea controlled with Compazine and Zofran    *Skeletal metastasis  Patient will be started on Xgeva  8/16/2024 Xgeva initiation will be held as the patient has not been to the dentist in over 2 years.  Discussed possible side effects of Xgeva and she will schedule a dental visit and we will have her back in 1 week to initiate Xgeva pending this is complete.  10/14/2024-second dose of Xgeva will be administered.  Labs reviewed and stable to proceed.  12/10/2024-scheduled for Xgeva, proceed with the same.  1/31/2025-Labs reviewed and stable to proceed with Xgeva today    *Multiple sclerosis  Patient was not on any treatment previously.  She sees Dr. Jacobson at [facility].  Due to profound weakness patient requested her neurologist to start steroids.  They plan to initiate high-dose IV steroids to help with this.  High-dose IV steroids have not been initiated.  Doing physical therapy and received high-dose steroids  Stable    *Hypertension-continue current medication  Blood pressure BP: 112/72    Continue to monitor  No changes in medications    *Hyperlipidemia-continue current medication  No changes    *Urinary incontinence-patient had  a suprapubic catheter placed by urology.    *History of iron deficiency anemia-  3/8/2024 hemoglobin was low at 10.9 and subsequently patient took oral iron which resulted in improvement of hemoglobin and normal.  She was scheduled for colonoscopy however she canceled that due to ongoing management of breast cancer.  She proceed with a colonoscopy.  8/16/2024 hemoglobin is 10.2.  She states she recently was told to begin daily iron supplementation.  Baseline iron studies ordered today she is only been taking the iron for a few days.  Ferritin 32, iron saturation 5%, TIBC 440.  We will repeat this in 6 to 8 weeks.  12/10/2024 Hgb 12.0. Continue oral iron  1/31/2025-hemoglobin normal    *Genetic testing-9 gene stat panel negative    *Dyspnea  CT chest shows some bibasilar atelectasis/pneumonia  CT chest from June 2024 without any evidence of metastatic disease  CT of the chest 10/29/2024- details summarized above   Repeat scans in 2 weeks    *Right upper lobe pulmonary nodule   Concerning for metastatic disease  PET/CT may not be helpful as invasive lobular carcinomas typically are not very PET avid.  Could consider PET-FES    *Follow-up-after the MRIs.  Reviewed MRI of the pelvis and MRI of the brain which shows mets in the bone  MRI of the cervical and thoracic spine performed at outside facility on 10/3/2024    *Abnormal LFTs  Likely secondary to Ribociclib  Ribociclib dose increased to 400 mg daily on 12/10/2024  12/31/2024-Labs reviewed and stable.  1/13/2025: LFTs further increased with AST 72, ALT 22.  Decision made to hold off on starting next cycle of ribociclib.  1/31/2025-AST 72, ALT normal, total bilirubin normal  Repeat LFTs again in 2 weeks    *Insomnia  Severe  Using trazodone and Ambien.    *Alternate medication  Patient sees a outside provider and receives  ivermectin and fenbendazole  We have ran an interaction check with the medications and does not appear to be any interaction however I want her against taking these medications and potential side effects  Patient however wishes to proceed with his medications.    Plan:    Ribociclib to 400 mg daily taken for 3 weeks on, 1 week off.   Proceed with Xgeva today  Xgeva in 4 weeks  CT of the chest abdomen pelvis and bone scan in 2 weeks  Labs on the same day as CT scans as patient reports that she is having extreme trouble with these multiple appointments and has been to a doctor's office every day of the week and she is at a point where she does not want to see any doctors due to these frequent appointments.  Trazodone and Ambien for insomnia.  Continue Compazine for nausea  Continue daily oral ferrous sulfate.      This patient is on high risk drug therapy requiring intensive monitoring for toxicity.  Patient experiencing hepatic dysfunction secondary to Ribociclib.  LFTs being monitored closely for this reason.    Zainab Lopes MD

## 2025-02-03 ENCOUNTER — SPECIALTY PHARMACY (OUTPATIENT)
Dept: PHARMACY | Facility: HOSPITAL | Age: 65
End: 2025-02-03
Payer: MEDICARE

## 2025-02-03 ENCOUNTER — TELEPHONE (OUTPATIENT)
Dept: ONCOLOGY | Facility: CLINIC | Age: 65
End: 2025-02-03
Payer: MEDICARE

## 2025-02-03 ENCOUNTER — OFFICE VISIT (OUTPATIENT)
Dept: WOUND CARE | Facility: HOSPITAL | Age: 65
End: 2025-02-03
Payer: MEDICARE

## 2025-02-03 NOTE — TELEPHONE ENCOUNTER
Caller: Maritza Bejarano    Relationship to patient: Self    Best call back number: [phone]    Chief complaint: PATIENT CALLED TO RESCHEDULE     Type of visit: LAB FOLLOW UP  AND INJECTION     Requested date: 2-24-25     If rescheduling, when is the original appointment: 2-28-25

## 2025-02-04 NOTE — PROGRESS NOTES
Specialty Pharmacy Note: Kisqali (ribociclib)    Maritza Bejarano is a 64 y.o. female with breast cancer was seen 1/31/25 by Dr. Lopes. Per provider dictation, no changes to oral oncology regimen Kisqali (ribociclib).  Labs Review: The CMP and CBC from 1/31/25 have been reviewed. No dose adjustments are needed for the oral specialty medication(s) based on the labs.    Specialty pharmacy will continue to follow patient.    Alyssa Rich, JackyD, BCPS  2/4/2025  11:02 EST

## 2025-02-05 RX ORDER — TRAZODONE HYDROCHLORIDE 50 MG/1
50 TABLET, FILM COATED ORAL NIGHTLY
Qty: 30 TABLET | Refills: 0 | Status: SHIPPED | OUTPATIENT
Start: 2025-02-05

## 2025-02-10 ENCOUNTER — SPECIALTY PHARMACY (OUTPATIENT)
Dept: PHARMACY | Facility: HOSPITAL | Age: 65
End: 2025-02-10
Payer: MEDICARE

## 2025-02-10 NOTE — PROGRESS NOTES
Specialty Pharmacy Patient Management Program  Oncology / Hematology Refill Outreach      Maritza was contacted today regarding refills of her Kisqali specialty medication(s).    Specialty medication(s) and dose(s) confirmed: Yes  Kisqali 200 mg tabs-2 tab daily for 21 days on then 7 days off. Next cycle starts 2/22/2025.    Refill Questions      Flowsheet Row Most Recent Value   Changes to allergies? No   Changes to medications? No   New conditions or infections since last clinic visit No   Unplanned office visit, urgent care, ED, or hospital admission in the last 4 weeks  No   How does patient/caregiver feel medication is working? Good   Financial problems or insurance changes  No   Since the previous refill, were any specialty medication doses or scheduled injections missed or delayed?  Yes   If yes, please provide the amount 1 day last week   Why were doses missed? She forgot   Does this patient require a clinical escalation to a pharmacist? No          Delivery Questions      Flowsheet Row Most Recent Value   Delivery method UPS   Delivery address verified with patient/caregiver? Yes  [Ship to home address]   Delivery address Home  [Ship to home address-Ship 2/13 for delivery 2/14-$0 copay with insurance covering 100%-Address Confirmed]   Number of medications in delivery 1   Medication(s) being filled and delivered Ribociclib Succinate (KISQALI)   Doses left of specialty medications 4 days then starts her off week. Next cycle starts 2/22/2025   Copay verified? Yes   Copay amount $0 copay with insurance covering 100%   Copay form of payment No copayment ($0)   Ship Date 2/13/2025   Delivery Date Selection 02/14/25   Signature Required No          Kisqali delivery coordinated with pt for 2/14/2025 to her home address. $0 copay with insurance covering 100%. Her last delivery was on 1/16/2025. No questions or concerns to report to MTM Team today. She reports one missed day last week since last delivery. PDC is  69%.    Follow-Up: 21 Days    Belle Razo, Pharmacy Technician  2/10/2025  12:40 EST

## 2025-02-11 ENCOUNTER — TELEPHONE (OUTPATIENT)
Dept: ONCOLOGY | Facility: CLINIC | Age: 65
End: 2025-02-11

## 2025-02-11 NOTE — TELEPHONE ENCOUNTER
Caller: Maritza Bejarano    Relationship: Self    Best call back number: [phone]    What is the best time to reach you: ANYTIME    Who are you requesting to speak with (clinical staff, provider,  specific staff member): CLINICAL    What was the call regarding: PT WANTS TO LET ERIC KNOW IN THE PHARMACY THAT SHE IS OUT OF HER KISQALI.

## 2025-02-18 ENCOUNTER — LAB (OUTPATIENT)
Dept: LAB | Facility: HOSPITAL | Age: 65
End: 2025-02-18
Payer: MEDICARE

## 2025-02-18 ENCOUNTER — HOSPITAL ENCOUNTER (OUTPATIENT)
Dept: NUCLEAR MEDICINE | Facility: HOSPITAL | Age: 65
Discharge: HOME | End: 2025-02-18
Payer: MEDICARE

## 2025-02-18 ENCOUNTER — HOSPITAL ENCOUNTER (OUTPATIENT)
Dept: CT IMAGING | Facility: HOSPITAL | Age: 65
Discharge: HOME | End: 2025-02-18
Admitting: INTERNAL MEDICINE
Payer: MEDICARE

## 2025-02-18 DIAGNOSIS — C50.812 MALIGNANT NEOPLASM OF OVERLAPPING SITES OF LEFT BREAST IN FEMALE, ESTROGEN RECEPTOR POSITIVE: ICD-10-CM

## 2025-02-18 DIAGNOSIS — Z17.0 MALIGNANT NEOPLASM OF OVERLAPPING SITES OF LEFT BREAST IN FEMALE, ESTROGEN RECEPTOR POSITIVE: ICD-10-CM

## 2025-02-18 DIAGNOSIS — C79.51 CANCER, METASTATIC TO BONE: ICD-10-CM

## 2025-02-18 LAB
ALBUMIN SERPL-MCNC: 4.1 G/DL (ref 3.5–5.2)
ALBUMIN/GLOB SERPL: 1.1 G/DL
ALP SERPL-CCNC: 219 U/L (ref 39–117)
ALT SERPL W P-5'-P-CCNC: 28 U/L (ref 1–33)
ANION GAP SERPL CALCULATED.3IONS-SCNC: 12.8 MMOL/L (ref 5–15)
AST SERPL-CCNC: 82 U/L (ref 1–32)
BILIRUB SERPL-MCNC: 0.3 MG/DL (ref 0–1.2)
BUN SERPL-MCNC: 23 MG/DL (ref 8–23)
BUN/CREAT SERPL: 27.7 (ref 7–25)
CALCIUM SPEC-SCNC: 8.8 MG/DL (ref 8.6–10.5)
CHLORIDE SERPL-SCNC: 101 MMOL/L (ref 98–107)
CO2 SERPL-SCNC: 22.2 MMOL/L (ref 22–29)
CREAT SERPL-MCNC: 0.83 MG/DL (ref 0.57–1)
EGFRCR SERPLBLD CKD-EPI 2021: 79.8 ML/MIN/1.73
GLOBULIN UR ELPH-MCNC: 3.7 GM/DL
GLUCOSE SERPL-MCNC: 136 MG/DL (ref 65–99)
POTASSIUM SERPL-SCNC: 3.8 MMOL/L (ref 3.5–5.2)
PROT SERPL-MCNC: 7.8 G/DL (ref 6–8.5)
SODIUM SERPL-SCNC: 136 MMOL/L (ref 136–145)

## 2025-02-18 PROCEDURE — 74177 CT ABD & PELVIS W/CONTRAST: CPT

## 2025-02-18 PROCEDURE — 25510000001 IOPAMIDOL 61 % SOLUTION: Performed by: INTERNAL MEDICINE

## 2025-02-18 PROCEDURE — A9503 TC99M MEDRONATE: HCPCS | Performed by: INTERNAL MEDICINE

## 2025-02-18 PROCEDURE — 71260 CT THORAX DX C+: CPT

## 2025-02-18 PROCEDURE — 80053 COMPREHEN METABOLIC PANEL: CPT | Performed by: INTERNAL MEDICINE

## 2025-02-18 PROCEDURE — 78306 BONE IMAGING WHOLE BODY: CPT

## 2025-02-18 PROCEDURE — 34310000005 TECHNETIUM MEDRONATE KIT: Performed by: INTERNAL MEDICINE

## 2025-02-18 PROCEDURE — 25510000002 DIATRIZOATE MEGLUMINE & SODIUM PER 1 ML: Performed by: INTERNAL MEDICINE

## 2025-02-18 RX ORDER — DIATRIZOATE MEGLUMINE AND DIATRIZOATE SODIUM 660; 100 MG/ML; MG/ML
30 SOLUTION ORAL; RECTAL
Status: COMPLETED | OUTPATIENT
Start: 2025-02-18 | End: 2025-02-18

## 2025-02-18 RX ORDER — TC 99M MEDRONATE 20 MG/10ML
21.7 INJECTION, POWDER, LYOPHILIZED, FOR SOLUTION INTRAVENOUS
Status: COMPLETED | OUTPATIENT
Start: 2025-02-18 | End: 2025-02-18

## 2025-02-18 RX ORDER — IOPAMIDOL 612 MG/ML
100 INJECTION, SOLUTION INTRAVASCULAR
Status: COMPLETED | OUTPATIENT
Start: 2025-02-18 | End: 2025-02-18

## 2025-02-18 RX ADMIN — Medication 21.7 MILLICURIE: at 10:07

## 2025-02-18 RX ADMIN — DIATRIZOATE MEGLUMINE AND DIATRIZOATE SODIUM 30 ML: 660; 100 LIQUID ORAL; RECTAL at 11:00

## 2025-02-18 RX ADMIN — IOPAMIDOL 85 ML: 612 INJECTION, SOLUTION INTRAVENOUS at 11:00

## 2025-02-24 ENCOUNTER — OFFICE VISIT (OUTPATIENT)
Dept: WOUND CARE | Facility: HOSPITAL | Age: 65
End: 2025-02-24
Payer: MEDICARE

## 2025-02-24 ENCOUNTER — TELEPHONE (OUTPATIENT)
Dept: ONCOLOGY | Facility: CLINIC | Age: 65
End: 2025-02-24
Payer: MEDICARE

## 2025-02-24 PROCEDURE — G0463 HOSPITAL OUTPT CLINIC VISIT: HCPCS

## 2025-02-24 NOTE — TELEPHONE ENCOUNTER
Maritza called today with questions about an upcoming procedure she will need for her bladder stimulator. She states the batteries need to be changed but they may place a new device altogether and wanted to make sure it was ok from our standpoint.  Reviewed with Dr Lopes, Maritza's counts have been stable specifically her white blood cells and therefor should be ok for her to proceed with the procedure.  She voiced understandning.

## 2025-02-28 ENCOUNTER — SPECIALTY PHARMACY (OUTPATIENT)
Dept: PHARMACY | Facility: HOSPITAL | Age: 65
End: 2025-02-28
Payer: MEDICARE

## 2025-02-28 ENCOUNTER — LAB (OUTPATIENT)
Dept: LAB | Facility: HOSPITAL | Age: 65
End: 2025-02-28
Payer: MEDICARE

## 2025-02-28 ENCOUNTER — INFUSION (OUTPATIENT)
Dept: ONCOLOGY | Facility: HOSPITAL | Age: 65
End: 2025-02-28
Payer: MEDICARE

## 2025-02-28 ENCOUNTER — OFFICE VISIT (OUTPATIENT)
Dept: ONCOLOGY | Facility: CLINIC | Age: 65
End: 2025-02-28
Payer: MEDICARE

## 2025-02-28 ENCOUNTER — APPOINTMENT (OUTPATIENT)
Dept: ONCOLOGY | Facility: HOSPITAL | Age: 65
End: 2025-02-28
Payer: MEDICARE

## 2025-02-28 VITALS
OXYGEN SATURATION: 94 % | DIASTOLIC BLOOD PRESSURE: 77 MMHG | HEART RATE: 67 BPM | TEMPERATURE: 97.3 F | WEIGHT: 144 LBS | RESPIRATION RATE: 16 BRPM | SYSTOLIC BLOOD PRESSURE: 118 MMHG | HEIGHT: 60 IN | BODY MASS INDEX: 28.27 KG/M2

## 2025-02-28 DIAGNOSIS — C50.812 MALIGNANT NEOPLASM OF OVERLAPPING SITES OF LEFT BREAST IN FEMALE, ESTROGEN RECEPTOR POSITIVE: ICD-10-CM

## 2025-02-28 DIAGNOSIS — G47.9 SLEEP DISTURBANCE: ICD-10-CM

## 2025-02-28 DIAGNOSIS — C79.51 CANCER, METASTATIC TO BONE: ICD-10-CM

## 2025-02-28 DIAGNOSIS — C50.812 MALIGNANT NEOPLASM OF OVERLAPPING SITES OF LEFT BREAST IN FEMALE, ESTROGEN RECEPTOR POSITIVE: Primary | ICD-10-CM

## 2025-02-28 DIAGNOSIS — Z17.0 MALIGNANT NEOPLASM OF OVERLAPPING SITES OF LEFT BREAST IN FEMALE, ESTROGEN RECEPTOR POSITIVE: ICD-10-CM

## 2025-02-28 DIAGNOSIS — C79.51 CANCER, METASTATIC TO BONE: Primary | ICD-10-CM

## 2025-02-28 DIAGNOSIS — Z17.0 MALIGNANT NEOPLASM OF OVERLAPPING SITES OF LEFT BREAST IN FEMALE, ESTROGEN RECEPTOR POSITIVE: Primary | ICD-10-CM

## 2025-02-28 LAB
ALBUMIN SERPL-MCNC: 3.7 G/DL (ref 3.5–5.2)
ALBUMIN/GLOB SERPL: 1.1 G/DL
ALP SERPL-CCNC: 171 U/L (ref 39–117)
ALT SERPL W P-5'-P-CCNC: 25 U/L (ref 1–33)
ANION GAP SERPL CALCULATED.3IONS-SCNC: 11 MMOL/L (ref 5–15)
AST SERPL-CCNC: 69 U/L (ref 1–32)
BASOPHILS # BLD AUTO: 0.07 10*3/MM3 (ref 0–0.2)
BASOPHILS NFR BLD AUTO: 1.7 % (ref 0–1.5)
BILIRUB SERPL-MCNC: 0.4 MG/DL (ref 0–1.2)
BUN SERPL-MCNC: 17 MG/DL (ref 8–23)
BUN/CREAT SERPL: 29.3 (ref 7–25)
CALCIUM SPEC-SCNC: 8.7 MG/DL (ref 8.6–10.5)
CHLORIDE SERPL-SCNC: 100 MMOL/L (ref 98–107)
CO2 SERPL-SCNC: 24 MMOL/L (ref 22–29)
CREAT SERPL-MCNC: 0.58 MG/DL (ref 0.57–1)
DEPRECATED RDW RBC AUTO: 64.6 FL (ref 37–54)
EGFRCR SERPLBLD CKD-EPI 2021: 102.5 ML/MIN/1.73
EOSINOPHIL # BLD AUTO: 0.02 10*3/MM3 (ref 0–0.4)
EOSINOPHIL NFR BLD AUTO: 0.5 % (ref 0.3–6.2)
ERYTHROCYTE [DISTWIDTH] IN BLOOD BY AUTOMATED COUNT: 19.2 % (ref 12.3–15.4)
GLOBULIN UR ELPH-MCNC: 3.3 GM/DL
GLUCOSE SERPL-MCNC: 130 MG/DL (ref 65–99)
HCT VFR BLD AUTO: 37.3 % (ref 34–46.6)
HGB BLD-MCNC: 12.6 G/DL (ref 12–15.9)
IMM GRANULOCYTES # BLD AUTO: 0.03 10*3/MM3 (ref 0–0.05)
IMM GRANULOCYTES NFR BLD AUTO: 0.7 % (ref 0–0.5)
LYMPHOCYTES # BLD AUTO: 0.77 10*3/MM3 (ref 0.7–3.1)
LYMPHOCYTES NFR BLD AUTO: 18.4 % (ref 19.6–45.3)
MAGNESIUM SERPL-MCNC: 2.1 MG/DL (ref 1.6–2.4)
MCH RBC QN AUTO: 31.2 PG (ref 26.6–33)
MCHC RBC AUTO-ENTMCNC: 33.8 G/DL (ref 31.5–35.7)
MCV RBC AUTO: 92.3 FL (ref 79–97)
MONOCYTES # BLD AUTO: 0.18 10*3/MM3 (ref 0.1–0.9)
MONOCYTES NFR BLD AUTO: 4.3 % (ref 5–12)
NEUTROPHILS NFR BLD AUTO: 3.12 10*3/MM3 (ref 1.7–7)
NEUTROPHILS NFR BLD AUTO: 74.4 % (ref 42.7–76)
NRBC BLD AUTO-RTO: 0 /100 WBC (ref 0–0.2)
PHOSPHATE SERPL-MCNC: 2.7 MG/DL (ref 2.5–4.5)
PLATELET # BLD AUTO: 349 10*3/MM3 (ref 140–450)
PMV BLD AUTO: 8.4 FL (ref 6–12)
POTASSIUM SERPL-SCNC: 3.3 MMOL/L (ref 3.5–5.2)
PROT SERPL-MCNC: 7 G/DL (ref 6–8.5)
RBC # BLD AUTO: 4.04 10*6/MM3 (ref 3.77–5.28)
SODIUM SERPL-SCNC: 135 MMOL/L (ref 136–145)
WBC NRBC COR # BLD AUTO: 4.19 10*3/MM3 (ref 3.4–10.8)

## 2025-02-28 PROCEDURE — 84100 ASSAY OF PHOSPHORUS: CPT

## 2025-02-28 PROCEDURE — 85025 COMPLETE CBC W/AUTO DIFF WBC: CPT

## 2025-02-28 PROCEDURE — 36415 COLL VENOUS BLD VENIPUNCTURE: CPT

## 2025-02-28 PROCEDURE — 83735 ASSAY OF MAGNESIUM: CPT

## 2025-02-28 PROCEDURE — 80053 COMPREHEN METABOLIC PANEL: CPT

## 2025-02-28 RX ORDER — POTASSIUM CHLORIDE 750 MG/1
10 TABLET, EXTENDED RELEASE ORAL DAILY
Qty: 30 TABLET | Refills: 5 | Status: SHIPPED | OUTPATIENT
Start: 2025-02-28

## 2025-02-28 NOTE — PROGRESS NOTES
Truqap approved from 1/1/2025-2/28/2026 through [employer].    Test claim ran and it returned paid with a $0 copay for pt. She has met her deductible for 2025 so insurance will cover 100% until 12/31/2025.    Pt will be able to continue to fill with [facility].     Once she is fully educated the rx will be sent and I will coordinate delivery.    Belle Razo, Pharmacy Technician  02/28/25  14:53 EST

## 2025-02-28 NOTE — PROGRESS NOTES
Benefits Investigation Summary    Prescription: New Therapy-Truqap    Dispensing pharmacy: [facility]    Copay amount: $0 copay with insurance covering 100%    PLAN: [employer]   BIN: [id number]  PCN: [id number]  RX GROUP: [id number]    Prior Auth and Med Assistance notes: Truqap approved from 1/1/2025-2/28/2026 through [employer].    Test claim ran and it returned paid with a $0 copay for pt. She has met her deductible for 2025 so insurance will cover 100% until 12/31/2025.    Pt will be able to continue to fill with [facility].     Once she is fully educated the rx will be sent and I will coordinate delivery.    Belle Razo, Pharmacy Technician  02/28/25  14:55 EST

## 2025-02-28 NOTE — PROGRESS NOTES
Subjective   Maritza Bejarano is a 64 y.o. female.   With metastatic invasive lobular carcinoma, ER/NC strongly positive cancer with metastatic disease to the bones.    History of Present Illness     Maritza returns today for follow-up accompanied by her .  She is here to discuss the results of the scans performed 2/18/2025 and further recommendations of treatment  She is currently being treated for UTI by Dr. Olson.  Her weight has decreased by about 6 pounds since her last visit.  She has remained compliant with anastrozole as well as Ribociclib.  Continues on ivermectin and for febendazole       Oncologic history:    Patient is a 64-year-old postmenopausal lady who has not had a mammogram in 4 years presented with a screen detected abnormality.  She had a left breast biopsy in 2014 which was benign.  Denies palpating any breast masses skin changes or nipple discharge prior to the abnormal mammogram.  She does have left nipple inversion.    Patient has a longstanding diagnosis of multiple sclerosis and has not been on any treatment.  She was previously seen by Dr. Ozzy France and now being seen by Dr. Soto with neurology.  She has been nonambulatory for the past 4 years and requires a wheelchair.  She is unable to transfer herself in and out of wheelchairs and her  has to lift her for all the transfers.  She is also incontinent of the bladder and requiring a suprapubic catheter placed by urology to help with the same.  She had a bladder stimulator in the past which was turned off.  Other comorbidities include hypertension and hyperlipidemia.      Family history significant for maternal great grandmother with breast cancer, maternal great aunt and mother with breast cancer in her 70s.  Denies any family history of ovarian cancer, pancreatic cancer or melanoma.    5/21/2024-bilateral screening mammogram  Finding 1.new nipple retraction along with diffuse skin and trabecular thickening of the left breast.   There is suggestion of subtle architectural distortion seen on the MLO projection in the posterior central region.  3 biopsy markers are noted.  No new suspicious calcifications.  Send finding 2.few axillary lymph node seen in the left breast which display interval increase in size and density.    Finding 3.focal asymmetry measuring 10 mm seen in the anterior one third of the right breast in the medial subareolar region.    Impression  Finding 1.new nipple retraction, skin and trabecular thickening in the left breast requires additional evaluation.  There is also question architectural distortion in the posterior central breast seen on the MLO projection.  Diagnostic mammogram to include repeat full-field CC with additional posterior tissue and complete breast ultrasound is recommended.  Finding 2.axillary lymph nodes in the left breast require additional evaluation, ultrasound recommended.  Finding 3.focal asymmetry in the right breast requires additional evaluation.  Diagnostic mammogram and limited breast ultrasound is recommended.    5/29/2024-bilateral diagnostic mammogram and ultrasound  Finding 1.4 0.7 x 5.6 x 6.8 cm developing asymmetry with associated nipple retraction, skin thickening and trabecular thickening of the left breast in the central region, inferior region in the upper outer region and the lower outer region.  Finding 2.axillary lymph node in the left breast.  Finding 3.suspected finding completely resolves upon further evaluation representing benign fibroglandular breast tissue.    Bilateral breast ultrasound  Finding 1.nonparallel hypoechoic mass with indistinct margins and skin thickening and internal vascularity in the left breast in the central region, inferior region, upper outer region and in the lower outer region.  The finding is palpable and appears to involve all 4 quadrants.  Most discrete portion is located at 130, 3 cm from the nipple, skin thickening up to 6 mm.  Send finding  2.rounded enlarged left axillary lymph node measuring 11 mm.  Cortical thickening of 9 mm present.    Finding 3.no sonographic correlate.  Send finding 4.enlarged right axillary lymph node measuring 14 mm.  Cortical thickening of 5 mm.    Impression  Finding 1.ultrasound-guided biopsy recommended  Finding 2.ultrasound-guided biopsy recommended  Finding 3.previously described right breast asymmetry resolves  Finding 4.ultrasound-guided biopsy recommended.    Ultrasound-guided biopsy of the left breast and left axilla lymph node and right axilla lymph node    1.left breast  Invasive lobular carcinoma with crush artifact  Grade 2  Invasive carcinoma measures 8.5 mm  No lymphovascular space invasion  ER +91 to 100% strong  MD +31 to 40% moderate  HER2 negative, 0  Ki-67 35%    2.left axillary lymph node focus of metastatic Dastech lobular carcinoma measuring 10 mm  ER +91 to 100% strong  MD +21 to 30%  HER2 -0  Ki-67 30%    3.right axillary lymph node with a focus of metastatic carcinoma measuring 4 mm.  Grade 2.  ER +91 to 100% strong  MD +11 to 20% moderate  HER2 negative, 0  Ki-67 35%    Pathology of all the 3 sites appear similar and findings could represent left breast lobular carcinoma metastatic to the right as well as left axillary lymph nodes.    6/14/2024-CT of the chest abdomen and pelvis  1.large ill-defined infiltrative central left breast mass measuring 6.7 x 4 cm, medially there is an irregular 2.5 x 2.5 cm mass.  Significant thickening of the skin in the left breast and there is left axilla lymphadenopathy.  Left axilla lymph node measures 1.3 x 1.3 cm.  There is also a new enlarged right axillary lymph node measuring 1.3 x 1 cm.  All of the subcentimeter right axillary lymph nodes are slightly larger than previous.  2.no mediastinal hilar or internal mammary chain lymphadenopathy  3.new indeterminate mixed density measuring 1.3 x 1.2 cm right apical pulmonary nodule.  Follow-up recommended.  4.pleural  parenchymal thickening and nodules inferiorly and posterior to the right upper lobe are stable.  Chronic scarring and subsegmental atelectatic change in both lower lobes.    CT abdomen pelvis  1.moderate fatty infiltration of the liver.  No suspicious liver lesions.  2.moderate-sized paraesophageal hernia.  No acute bowel abnormality.  3.right sacral stimulator noted at the S3 neuroforamina.  No suspicious bone lesions are noted.    6/14/2024-bone scan  1.focal abnormal uptake in the left anterior inferior iliac spine correlating with lucent lesion on the CT concerning for metastatic disease.  Further evaluation with MRI of the pelvis and left hip could be performed.  2.focal uptake in the left frontal calvarium again concerning for metastatic disease.  Noncontrast CT or MRI could be obtained.  3.soft tissue uptake noted in the left breast at the site of known left breast cancer.  4.changes from arthroplasty on the right shoulder.  5.multifocal likely degenerative uptake in each knee, ankle and foot and increased uptake in the medial and patellofemoral cortex of the right knee could be posttraumatic.      Invitae 9 gene stat panel negative.    MRI of the brain which showed T2 hyperintensity involving the frontal bone measuring 1.6 cm in size and a smaller area of enhancement in the frontal bone laterally and inferiorly concerning for metastasis.  No brain mets    MRI of the hip and pelvis showed multiple enhancing bone lesions consistent with metastatic disease to the sacrum and pelvis.  Dominant lesion in the anterior inferior left pelvic spine and rectus femoris muscle origin that correlates with the site of bone scan uptake.  She has some right joint hip joint effusion.  Her CA 15-3 16.2    She was seen by Dr. Saavedra on 7/16/2024 and recommended to start on Ribociclib along with anastrozole.    Started Ribociclib in the first week of August 2024    Had profound nausea and vomiting requiring antiemetics.   Completed 3 weeks of Ribociclib on 8/30/2024.    Hospitalized from 9/11/2024 to 9/16/2024 for pneumonia and KINZA and since then Ribociclib has been on hold    Readmitted to the hospital from 10/17/2024 to 10/23/2024 requiring holding Ribociclib.  She was admitted for UTI with sepsis.    10/29/2024-CT of the chest which was compared to the CT chest from 6/14/2024-stable 1.3 cm subsolid nodule in the right lung apex.  Decrease in size of the left axillary lymph nodes.  Ill-defined left breast mass appears unchanged.  Expansile lytic and sclerotic lesion in the posterior left 10th rib which appears increased compared to the prior CT.  Stable subtle area of sclerosis in the left anterior third rib.    10/3/2024-MRI of the cervical spine-rounded area of marrow replacement in C7 suspicious for metastatic disease.    10/3/2024-MRI of the thoracic spine-suspicious patchy areas of marrow replacement in the thoracic spine at T5, T6, T7, T11, T12, L1 suspicious for metastatic disease.    Resumed Ribociclib in November 2024 and has been on it continuously up until February 2025 2/18/2025-CT of the chest abdomen and pelvis  13 mm right apical lesion unchanged micronodules in the right upper lobe micronodules in the left upper lobe  Left 11th rib metastasis unchanged with subtle increase sclerosis  Several hypodense lesions in the liver with the most prominent measuring 16 mm consistent with metastatic deposits.  Not present on scans from September and October 2024.  Bladder is collapsed with Cobos catheter.  Fecal impaction  Small sclerotic metastasis throughout the lumbar spine pelvis, some of which are new.  Largest lesion being in the inferior iliac spine which has become more sclerotic.    Bone scan 2/18/2025-widespread osseous metastatic disease vast majority of which are new/newly appreciable from the prior bone scan.      The following portions of the patient's history were reviewed and updated as appropriate:  allergies, current medications, past family history, past medical history, past social history, past surgical history, and problem list.    Past Medical History:   Diagnosis Date    Anemia 2024    `Treated with iron    Dawn esophagus     per patient    Blurred vision     R/T MS    Breast cancer 05/2024+++++    Carpal tunnel syndrome     Clotting disorder 1993, 2003, 2012    3 g/i bleeds w/ transfus/ions    Colon polyp 2013    removed w/ colonoscopy    Deep vein thrombosis phlebitis 1980    Depression     Diplopia 2013    GERD (gastroesophageal reflux disease)     GI (gastrointestinal bleed) 3 bleeds    2 transfusions    H/O Skin cancer, basal cell     Headache     History of blood transfusion     History of GI bleed     R/T NSAIDS AND STEROIDS, multiple times    History of urinary tract infection     Hypercalcemia     s/p parathyroidectomy    Hyperlipidemia     Hypertension     Movement disorder     Multiple sclerosis     Optic neuritis     PONV (postoperative nausea and vomiting)     Steroid-induced diabetes 2024        Past Surgical History:   Procedure Laterality Date    APPENDECTOMY      BLADDER SURGERY      bladder stimulator    BREAST BIOPSY  don't remember    BREAST SURGERY      augmentation wtih subsequent removal    CARPAL TUNNEL RELEASE Bilateral     Left 2018, right 2020    CUBITAL TUNNEL RELEASE Left     CYSTOSCOPY BOTOX INJECTION OF BLADDER  2018    Cystoscopy with Botox    FRACTURE SURGERY  2019    rt shoulder    PARATHYROIDECTOMY      one gland removed    ROTATOR CUFF REPAIR Right 2017    TOE SURGERY      bilateral great toes    TOTAL SHOULDER ARTHROPLASTY W/ DISTAL CLAVICLE EXCISION Right 10/22/2018    Procedure: RT TOTAL SHOULDER REVERSE ARTHROPLASTY;  Surgeon: Bipin Dangelo MD;  Location: [facility];  Service: Orthopedics        Family History   Problem Relation Age of Onset    Hypertension Mother     Hyperlipidemia Mother     Aortic aneurysm Mother         thoracic    Diabetes Mother      "Hypertension Father         charissa heart failure    Heart failure Father     Hyperlipidemia Father     Miscarriages / Stillbirths Sister     Multiple sclerosis Brother     Atrial fibrillation Brother     Diabetes Maternal Grandfather     Stroke Maternal Grandfather     Parkinsonism Paternal Grandmother     Tremor Paternal Grandmother     Stomach cancer Paternal Grandfather     Diabetes Maternal Grandmother         Social History     Socioeconomic History    Marital status:      Spouse name: Donald    Number of children: 0   Tobacco Use    Smoking status: Former     Current packs/day: 0.00     Average packs/day: 2.0 packs/day for 30.0 years (60.0 ttl pk-yrs)     Types: Cigarettes     Start date: 10/22/1973     Quit date: 10/22/2003     Years since quittin.3    Smokeless tobacco: Never   Vaping Use    Vaping status: Never Used   Substance and Sexual Activity    Alcohol use: Not Currently    Drug use: Not Currently     Types: Marijuana     Comment: advised by neurologist for sleep    Sexual activity: Not Currently     Partners: Male     Birth control/protection: Post-menopausal        OB History    No obstetric history on file.     Age at menarche-15  Age at first live childbirth-not applicable   2 para 0  0  Age of menopause-57  No use of hormone replacement therapy      No Known Allergies     Review of systems as mentioned HPI otherwise negative    Objective   Blood pressure 118/77, pulse 67, temperature 97.3 °F (36.3 °C), temperature source Oral, resp. rate 16, height 152.4 cm (60\"), weight 65.3 kg (144 lb), SpO2 94%, not currently breastfeeding.     Physical Exam  Vitals reviewed.   Constitutional:       Appearance: Normal appearance. She is normal weight.   HENT:      Right Ear: External ear normal.      Left Ear: External ear normal.      Nose: Nose normal.      Mouth/Throat:      Pharynx: Oropharynx is clear.   Eyes:      Conjunctiva/sclera: Conjunctivae normal.   Cardiovascular:      " Rate and Rhythm: Normal rate.   Pulmonary:      Effort: Pulmonary effort is normal.   Abdominal:      General: Abdomen is flat.   Musculoskeletal:         General: Normal range of motion.      Cervical back: Normal range of motion.   Skin:     General: Skin is warm.   Neurological:      General: No focal deficit present.      Mental Status: She is alert and oriented to person, place, and time.   Psychiatric:         Mood and Affect: Mood normal.         Behavior: Behavior normal.         Thought Content: Thought content normal.         Judgment: Judgment normal.       Breast Exam: Right breast appears normal on inspection.  No palpable right axilla lymphadenopathy or right breast masses.  Right nipple inverted.  Left breast on inspection there is nipple retraction crusting over the nipple region.  On palpation there is a 8 x 6 cm mass (improved) in the retroareolar region occupying much of the breast.  Palpable left axillary adenopathy as well.      I have reexamined the patient and the results are consistent with the previously documented exam. Zainab Lopes MD        Lab on 02/28/2025   Component Date Value Ref Range Status    WBC 02/28/2025 4.19  3.40 - 10.80 10*3/mm3 Final    RBC 02/28/2025 4.04  3.77 - 5.28 10*6/mm3 Final    Hemoglobin 02/28/2025 12.6  12.0 - 15.9 g/dL Final    Hematocrit 02/28/2025 37.3  34.0 - 46.6 % Final    MCV 02/28/2025 92.3  79.0 - 97.0 fL Final    MCH 02/28/2025 31.2  26.6 - 33.0 pg Final    MCHC 02/28/2025 33.8  31.5 - 35.7 g/dL Final    RDW 02/28/2025 19.2 (H)  12.3 - 15.4 % Final    RDW-SD 02/28/2025 64.6 (H)  37.0 - 54.0 fl Final    MPV 02/28/2025 8.4  6.0 - 12.0 fL Final    Platelets 02/28/2025 349  140 - 450 10*3/mm3 Final    Neutrophil % 02/28/2025 74.4  42.7 - 76.0 % Final    Lymphocyte % 02/28/2025 18.4 (L)  19.6 - 45.3 % Final    Monocyte % 02/28/2025 4.3 (L)  5.0 - 12.0 % Final    Eosinophil % 02/28/2025 0.5  0.3 - 6.2 % Final    Basophil % 02/28/2025 1.7 (H)  0.0 - 1.5 %  Final    Immature Grans % 02/28/2025 0.7 (H)  0.0 - 0.5 % Final    Neutrophils, Absolute 02/28/2025 3.12  1.70 - 7.00 10*3/mm3 Final    Lymphocytes, Absolute 02/28/2025 0.77  0.70 - 3.10 10*3/mm3 Final    Monocytes, Absolute 02/28/2025 0.18  0.10 - 0.90 10*3/mm3 Final    Eosinophils, Absolute 02/28/2025 0.02  0.00 - 0.40 10*3/mm3 Final    Basophils, Absolute 02/28/2025 0.07  0.00 - 0.20 10*3/mm3 Final    Immature Grans, Absolute 02/28/2025 0.03  0.00 - 0.05 10*3/mm3 Final    nRBC 02/28/2025 0.0  0.0 - 0.2 /100 WBC Final   Hospital Outpatient Visit on 02/18/2025   Component Date Value Ref Range Status    Glucose 02/18/2025 136 (H)  65 - 99 mg/dL Final    BUN 02/18/2025 23  8 - 23 mg/dL Final    Creatinine 02/18/2025 0.83  0.57 - 1.00 mg/dL Final    Sodium 02/18/2025 136  136 - 145 mmol/L Final    Potassium 02/18/2025 3.8  3.5 - 5.2 mmol/L Final    Chloride 02/18/2025 101  98 - 107 mmol/L Final    CO2 02/18/2025 22.2  22.0 - 29.0 mmol/L Final    Calcium 02/18/2025 8.8  8.6 - 10.5 mg/dL Final    Total Protein 02/18/2025 7.8  6.0 - 8.5 g/dL Final    Albumin 02/18/2025 4.1  3.5 - 5.2 g/dL Final    ALT (SGPT) 02/18/2025 28  1 - 33 U/L Final    AST (SGOT) 02/18/2025 82 (H)  1 - 32 U/L Final    Alkaline Phosphatase 02/18/2025 219 (H)  39 - 117 U/L Final    Total Bilirubin 02/18/2025 0.3  0.0 - 1.2 mg/dL Final    Globulin 02/18/2025 3.7  gm/dL Final    A/G Ratio 02/18/2025 1.1  g/dL Final    BUN/Creatinine Ratio 02/18/2025 27.7 (H)  7.0 - 25.0 Final    Anion Gap 02/18/2025 12.8  5.0 - 15.0 mmol/L Final    eGFR 02/18/2025 79.8  >60.0 mL/min/1.73 Final   Lab on 01/31/2025   Component Date Value Ref Range Status    Glucose 01/31/2025 216 (H)  65 - 99 mg/dL Final    BUN 01/31/2025 20  8 - 23 mg/dL Final    Creatinine 01/31/2025 0.82  0.57 - 1.00 mg/dL Final    Sodium 01/31/2025 138  136 - 145 mmol/L Final    Potassium 01/31/2025 3.8  3.5 - 5.2 mmol/L Final    Chloride 01/31/2025 102  98 - 107 mmol/L Final    CO2 01/31/2025 22.2   22.0 - 29.0 mmol/L Final    Calcium 01/31/2025 8.7  8.6 - 10.5 mg/dL Final    Total Protein 01/31/2025 6.9  6.0 - 8.5 g/dL Final    Albumin 01/31/2025 3.6  3.5 - 5.2 g/dL Final    ALT (SGPT) 01/31/2025 16  1 - 33 U/L Final    AST (SGOT) 01/31/2025 72 (H)  1 - 32 U/L Final    Alkaline Phosphatase 01/31/2025 147 (H)  39 - 117 U/L Final    Total Bilirubin 01/31/2025 0.3  0.0 - 1.2 mg/dL Final    Globulin 01/31/2025 3.3  gm/dL Final    A/G Ratio 01/31/2025 1.1  g/dL Final    BUN/Creatinine Ratio 01/31/2025 24.4  7.0 - 25.0 Final    Anion Gap 01/31/2025 13.8  5.0 - 15.0 mmol/L Final    eGFR 01/31/2025 81.0  >60.0 mL/min/1.73 Final    WBC 01/31/2025 4.28  3.40 - 10.80 10*3/mm3 Final    RBC 01/31/2025 4.23  3.77 - 5.28 10*6/mm3 Final    Hemoglobin 01/31/2025 12.8  12.0 - 15.9 g/dL Final    Hematocrit 01/31/2025 39.6  34.0 - 46.6 % Final    MCV 01/31/2025 93.6  79.0 - 97.0 fL Final    MCH 01/31/2025 30.3  26.6 - 33.0 pg Final    MCHC 01/31/2025 32.3  31.5 - 35.7 g/dL Final    RDW 01/31/2025 17.7 (H)  12.3 - 15.4 % Final    RDW-SD 01/31/2025 60.9 (H)  37.0 - 54.0 fl Final    MPV 01/31/2025 9.5  6.0 - 12.0 fL Final    Platelets 01/31/2025 416  140 - 450 10*3/mm3 Final    Neutrophil % 01/31/2025 69.0  42.7 - 76.0 % Final    Lymphocyte % 01/31/2025 24.3  19.6 - 45.3 % Final    Monocyte % 01/31/2025 3.7 (L)  5.0 - 12.0 % Final    Eosinophil % 01/31/2025 0.9  0.3 - 6.2 % Final    Basophil % 01/31/2025 1.4  0.0 - 1.5 % Final    Immature Grans % 01/31/2025 0.7 (H)  0.0 - 0.5 % Final    Neutrophils, Absolute 01/31/2025 2.95  1.70 - 7.00 10*3/mm3 Final    Lymphocytes, Absolute 01/31/2025 1.04  0.70 - 3.10 10*3/mm3 Final    Monocytes, Absolute 01/31/2025 0.16  0.10 - 0.90 10*3/mm3 Final    Eosinophils, Absolute 01/31/2025 0.04  0.00 - 0.40 10*3/mm3 Final    Basophils, Absolute 01/31/2025 0.06  0.00 - 0.20 10*3/mm3 Final    Immature Grans, Absolute 01/31/2025 0.03  0.00 - 0.05 10*3/mm3 Final    nRBC 01/31/2025 0.0  0.0 - 0.2 /100 WBC  Final    Magnesium 01/31/2025 2.6 (H)  1.6 - 2.4 mg/dL Final    Phosphorus 01/31/2025 2.9  2.5 - 4.5 mg/dL Final        NM Bone Scan Whole Body    Result Date: 2/19/2025  Scintigraphic findings suggesting widespread osseous metastatic disease throughout the axial and proximal appendicular skeleton, the vast majority of which are new/newly appreciable from prior bone scan. The few that were present on prior bone scan have mildly increased in extent/size. Recommend correlation with tumor markers to assess for progression as systemic therapy was reportedly begun greater than 6 months ago (expected imaging interval for flare phenomenon).   This report was finalized on 2/19/2025 1:32 PM by Dr. Jose Quinonez M.D on Workstation: [id number]        CT of the chest abdomen and pelvis and bone scan 2/18/2025 images independently reviewed and interpreted by me in detail summarized above    Assessment & Plan       *Left breast invasive lobular carcinoma  The tumor is estrogen receptor +91 to 100%, progesterone receptor +31 to 40%, HER2 negative, 0, Ki-67 35%  The tumor measures greater than 10 cm on exam, lymph node positive.  CT of the chest abdomen and pelvis with right upper lobe pulmonary nodule which is new and the bone scan also shows uptake in the left anterior inferior iliac spine which correlates to a lucent area on the CT scan and also focal uptake noted in the left frontal calvarium concerning for metastatic disease.  Further evaluation with a MRI of the pelvis and hip as well as MRI of the brain is underway.  The scans are scheduled for 7/10/2024.  Clinical T3 N1 M1, stage IV invasive lobular carcinoma.  There is also a right axillary lymph node which has been biopsied and consistent with invasive lobular carcinoma with similar morphology as well as receptors concerning for metastatic disease rather than a primary right breast cancer.  Recommended Ribociclib 400 mg 3 weeks on and 1 week off.  July 16, 2024:  Reviewed MRI of the pelvis and hip consistent with many bony metastasis.  MRI brain shows left frontal bone mets but no brain involvement.  Patient is here to start day 1 ribociclib along with anastrozole.  Continues on anastrozole.  Tempus performed on the left axilla lymph node biopsy shows PIK3CA mutation, CDH 1 mutation and Erb B2 mutation.  Therefore patient would benefit from alpelisib and Faslodex after progression on Ribociclib and AI.  Other options include neratinib plus Faslodex.  Initiated Ribociclib in August 2024.  8/30/2024-last day of week 3 of Ribociclib.    Patient completed 1 cycle of Ribociclib 400 mg and subsequently has not been able to go back on Ribociclib due to recurrent hospitalizations and abnormal LFTs.  10/14/2024-LFTs are normal today.  Recommend resuming Ribociclib at 200 mg and subsequently we will increase the dose to 400 mg with the next cycle.  Patient was unable to resume Ribociclib as she was admitted to the hospital from 10/17/2024 to 10/23/2024  11/11/2024-Labs reviewed and overall stable except for an ALT of 49.  Recommend resuming Ribociclib at 200 mg.  CT of the chest performed 10/29/2024 shows stable size of the left breast mass, decrease in size of the left axillary lymph node and mixed lytic and blastic lesion of the rib slightly increased in size.  11/11/2024-resumed Ribociclib 200 mg daily for 3/4 weeks.  12/10/2024 clinically breast mass does seem to be responding.  Ribociclib dose increased to 400 mg daily.  1/31/2025-continues on Ribociclib 400 mg daily, LFTs slightly worse today with an AST of 72, ALT normal and total bilirubin normal.  Continue current dose of Ribociclib.  Continue anastrozole.  2/18/2025-scans with disease progression in the bones in the liver.  Discontinue Ribociclib and anastrozole  Start Faslodex along with truqap, Faslodex to be administered every 4 weeks,truqap     4 out of 7 days, started 400 mg dose however may require dose reduction if  066 side effects are intolerable.  Counseled on side effects including but not limited to nausea, vomiting, fatigue, diarrhea, rashes, stomatitis, abnormal labs  Formal chemotherapy education will be performed    *Right axillary lymphadenopathy  Biopsied and consistent with invasive lobular carcinoma, grade 2, ER/IL positive and HER2 negative  Please refer to the above discussion for details    *Right breast non-mass enhancement  6/28/2024-MRI of the breast with non-mass enhancement noted in the right breast and patient had questions regarding if this should be biopsied.  Given extensive metastatic disease in the bones and right axilla lymph node consistent with invasive lobular carcinoma management would not change with the biopsy and hence I would recommend against it.    *Severe nausea  Continue Compazine and Zofran as needed    *Skeletal metastasis  Patient will be started on Xgeva  8/16/2024 Xgeva initiation will be held as the patient has not been to the dentist in over 2 years.  Discussed possible side effects of Xgeva and she will schedule a dental visit and we will have her back in 1 week to initiate Xgeva pending this is complete.  10/14/2024-second dose of Xgeva will be administered.  Labs reviewed and stable to proceed.  continue Xgeva every 4 weeks alongside with Faslodex    *Multiple sclerosis  Patient was not on any treatment previously.  She sees Dr. Jacobson at [place] [facility].  Due to profound weakness patient requested her neurologist to start steroids.  They plan to initiate high-dose IV steroids to help with this.  High-dose IV steroids have not been initiated.  Doing physical therapy and received high-dose steroids  Stable    *Hypertension-continue current medication  Blood pressure BP: 118/77   Continue to monitor  No changes in medications    *Hyperlipidemia-continue current medication  No changes    *Urinary incontinence-patient had  a suprapubic catheter placed by urology.  Patient with recurrent  UTIs  UTIs are a side effect of truqap  Discussed with her primary care physician regarding suppressive therapy    *History of iron deficiency anemia-  3/8/2024 hemoglobin was low at 10.9 and subsequently patient took oral iron which resulted in improvement of hemoglobin and normal.  She was scheduled for colonoscopy however she canceled that due to ongoing management of breast cancer.  She proceed with a colonoscopy.  8/16/2024 hemoglobin is 10.2.  She states she recently was told to begin daily iron supplementation.  Baseline iron studies ordered today she is only been taking the iron for a few days.  Ferritin 32, iron saturation 5%, TIBC 440.  We will repeat this in 6 to 8 weeks.  12/10/2024 Hgb 12.0. Continue oral iron  Hemoglobin normal at 12.6 on 2/28/2025    *Genetic testing-9 gene stat panel negative    *Dyspnea  Stable to improved    *Right upper lobe pulmonary nodule   Concerning for metastatic disease  PET/CT may not be helpful as invasive lobular carcinomas typically are not very PET avid.  Could consider PET-FES    *Follow-up-after the MRIs.  Reviewed MRI of the pelvis and MRI of the brain which shows mets in the bone  MRI of the cervical and thoracic spine performed at outside facility on 10/3/2024    *Abnormal LFTs  Likely secondary to Ribociclib  Ribociclib dose increased to 400 mg daily on 12/10/2024  12/31/2024-Labs reviewed and stable.  1/13/2025: LFTs further increased with AST 72, ALT 22.  Decision made to hold off on starting next cycle of ribociclib.  1/31/2025-AST 72, ALT normal, total bilirubin normal  2/28/2025-AST 69,     *Insomnia  Severe  Using trazodone and Ambien.  Neither of them helping  Only clonazepam helps  Referral to supportive oncology    *Alternate medication  Patient sees a outside provider and receives ivermectin and fenbendazole  We have ran an interaction check with the medications and does not appear to be any interaction however I want her against taking these  medications and potential side effects  Patient however wishes to proceed with his medications.    Plan:   Discontinue Ribociclib and anastrozole  Start Faslodex and  truqap  Xgeva every  4 weeks administered along with Faslodex  Chemotherapy education  Referral to supportive oncology for severe insomnia.      This patient is on high risk drug therapy requiring intensive monitoring for toxicity.  CT of the chest abdomen and pelvis and bone scan images independently reviewed and interpreted by me.  Disease progression noted on scans  Reviewed changing treatment today  Discussed with her primary care physician regarding prophylactic antibiotics given increased risk of UTIs with the new treatment  Referral to supportive oncology made.    76 minutes total spent on the encounter on the same day    Zainab Lopes MD

## 2025-02-28 NOTE — PROGRESS NOTES
Staff message rec from Rosina BAEZ, Clinic RN, Dr Lopes will be starting pt on Truqap and Faslodex due to progression on Kisqali. Plan entered-400 mg twice per day for 4 days on then 3 days off.    Kisqali to be discontinued.    I have submitted the PA to [facility] through covermymeds. Currently waiting for a decision.    Rosina Sethi, RN sent to Alyssa Rich, [facility]; Belle Razo, Pharmacy Technician  Maritza has progressed on Kisqali and anastrozole.  DC'ing both and planning for truqap + faslodex    Let me know how I can help; I did enter the plan so precert can work on the faslodex    Thanks!    Belle Razo, Pharmacy Technician  02/28/25  13:14 EST

## 2025-03-03 ENCOUNTER — TELEPHONE (OUTPATIENT)
Dept: SLEEP MEDICINE | Facility: HOSPITAL | Age: 65
End: 2025-03-03
Payer: MEDICARE

## 2025-03-03 NOTE — TELEPHONE ENCOUNTER
Pt requesting a new order for supplies sent to [facility]. Pt is needing a new frame. Message sent to  for an order. Pt is also calling [facility]

## 2025-03-07 ENCOUNTER — LAB (OUTPATIENT)
Dept: LAB | Facility: HOSPITAL | Age: 65
End: 2025-03-07
Payer: MEDICARE

## 2025-03-07 ENCOUNTER — INFUSION (OUTPATIENT)
Dept: ONCOLOGY | Facility: HOSPITAL | Age: 65
End: 2025-03-07
Payer: MEDICARE

## 2025-03-07 DIAGNOSIS — Z17.0 MALIGNANT NEOPLASM OF OVERLAPPING SITES OF LEFT BREAST IN FEMALE, ESTROGEN RECEPTOR POSITIVE: ICD-10-CM

## 2025-03-07 DIAGNOSIS — C79.51 CANCER, METASTATIC TO BONE: ICD-10-CM

## 2025-03-07 DIAGNOSIS — C50.812 MALIGNANT NEOPLASM OF OVERLAPPING SITES OF LEFT BREAST IN FEMALE, ESTROGEN RECEPTOR POSITIVE: Primary | ICD-10-CM

## 2025-03-07 DIAGNOSIS — Z17.0 MALIGNANT NEOPLASM OF OVERLAPPING SITES OF LEFT BREAST IN FEMALE, ESTROGEN RECEPTOR POSITIVE: Primary | ICD-10-CM

## 2025-03-07 DIAGNOSIS — C50.812 MALIGNANT NEOPLASM OF OVERLAPPING SITES OF LEFT BREAST IN FEMALE, ESTROGEN RECEPTOR POSITIVE: ICD-10-CM

## 2025-03-07 LAB
ALBUMIN SERPL-MCNC: 3.6 G/DL (ref 3.5–5.2)
ALBUMIN/GLOB SERPL: 1.1 G/DL
ALP SERPL-CCNC: 158 U/L (ref 39–117)
ALT SERPL W P-5'-P-CCNC: 32 U/L (ref 1–33)
ANION GAP SERPL CALCULATED.3IONS-SCNC: 11.1 MMOL/L (ref 5–15)
AST SERPL-CCNC: 89 U/L (ref 1–32)
BASOPHILS # BLD AUTO: 0.05 10*3/MM3 (ref 0–0.2)
BASOPHILS NFR BLD AUTO: 1.7 % (ref 0–1.5)
BILIRUB SERPL-MCNC: 0.3 MG/DL (ref 0–1.2)
BUN SERPL-MCNC: 13 MG/DL (ref 8–23)
BUN/CREAT SERPL: 24.5 (ref 7–25)
CALCIUM SPEC-SCNC: 8.7 MG/DL (ref 8.6–10.5)
CHLORIDE SERPL-SCNC: 106 MMOL/L (ref 98–107)
CO2 SERPL-SCNC: 25.9 MMOL/L (ref 22–29)
CREAT SERPL-MCNC: 0.53 MG/DL (ref 0.57–1)
DEPRECATED RDW RBC AUTO: 67.7 FL (ref 37–54)
EGFRCR SERPLBLD CKD-EPI 2021: 104.7 ML/MIN/1.73
EOSINOPHIL # BLD AUTO: 0.04 10*3/MM3 (ref 0–0.4)
EOSINOPHIL NFR BLD AUTO: 1.3 % (ref 0.3–6.2)
ERYTHROCYTE [DISTWIDTH] IN BLOOD BY AUTOMATED COUNT: 19.6 % (ref 12.3–15.4)
GLOBULIN UR ELPH-MCNC: 3.2 GM/DL
GLUCOSE SERPL-MCNC: 98 MG/DL (ref 65–99)
HBA1C MFR BLD: 6.4 % (ref 4.8–5.6)
HCT VFR BLD AUTO: 38.1 % (ref 34–46.6)
HGB BLD-MCNC: 12.5 G/DL (ref 12–15.9)
IMM GRANULOCYTES # BLD AUTO: 0.02 10*3/MM3 (ref 0–0.05)
IMM GRANULOCYTES NFR BLD AUTO: 0.7 % (ref 0–0.5)
LYMPHOCYTES # BLD AUTO: 0.84 10*3/MM3 (ref 0.7–3.1)
LYMPHOCYTES NFR BLD AUTO: 27.9 % (ref 19.6–45.3)
MAGNESIUM SERPL-MCNC: 2.1 MG/DL (ref 1.6–2.4)
MCH RBC QN AUTO: 31 PG (ref 26.6–33)
MCHC RBC AUTO-ENTMCNC: 32.8 G/DL (ref 31.5–35.7)
MCV RBC AUTO: 94.5 FL (ref 79–97)
MONOCYTES # BLD AUTO: 0.32 10*3/MM3 (ref 0.1–0.9)
MONOCYTES NFR BLD AUTO: 10.6 % (ref 5–12)
NEUTROPHILS NFR BLD AUTO: 1.74 10*3/MM3 (ref 1.7–7)
NEUTROPHILS NFR BLD AUTO: 57.8 % (ref 42.7–76)
NRBC BLD AUTO-RTO: 0 /100 WBC (ref 0–0.2)
PHOSPHATE SERPL-MCNC: 2.3 MG/DL (ref 2.5–4.5)
PLATELET # BLD AUTO: 287 10*3/MM3 (ref 140–450)
PMV BLD AUTO: 8.6 FL (ref 6–12)
POTASSIUM SERPL-SCNC: 3.1 MMOL/L (ref 3.5–5.2)
PROT SERPL-MCNC: 6.8 G/DL (ref 6–8.5)
RBC # BLD AUTO: 4.03 10*6/MM3 (ref 3.77–5.28)
SODIUM SERPL-SCNC: 143 MMOL/L (ref 136–145)
WBC NRBC COR # BLD AUTO: 3.01 10*3/MM3 (ref 3.4–10.8)

## 2025-03-07 PROCEDURE — 83036 HEMOGLOBIN GLYCOSYLATED A1C: CPT | Performed by: INTERNAL MEDICINE

## 2025-03-07 PROCEDURE — 96372 THER/PROPH/DIAG INJ SC/IM: CPT

## 2025-03-07 PROCEDURE — 96402 CHEMO HORMON ANTINEOPL SQ/IM: CPT

## 2025-03-07 PROCEDURE — 36415 COLL VENOUS BLD VENIPUNCTURE: CPT

## 2025-03-07 PROCEDURE — 84100 ASSAY OF PHOSPHORUS: CPT

## 2025-03-07 PROCEDURE — 25010000002 DENOSUMAB 120 MG/1.7ML SOLUTION: Performed by: INTERNAL MEDICINE

## 2025-03-07 PROCEDURE — 83735 ASSAY OF MAGNESIUM: CPT

## 2025-03-07 PROCEDURE — 25010000002 FULVESTRANT PER 25 MG: Performed by: NURSE PRACTITIONER

## 2025-03-07 PROCEDURE — 85025 COMPLETE CBC W/AUTO DIFF WBC: CPT

## 2025-03-07 PROCEDURE — 80053 COMPREHEN METABOLIC PANEL: CPT

## 2025-03-07 RX ORDER — LAMOTRIGINE 25 MG/1
500 TABLET ORAL ONCE
Status: COMPLETED | OUTPATIENT
Start: 2025-03-07 | End: 2025-03-07

## 2025-03-07 RX ORDER — LAMOTRIGINE 25 MG/1
500 TABLET ORAL ONCE
Status: CANCELLED | OUTPATIENT
Start: 2025-03-07

## 2025-03-07 RX ADMIN — FULVESTRANT 500 MG: 250 INJECTION, SOLUTION INTRAMUSCULAR at 14:23

## 2025-03-07 RX ADMIN — DENOSUMAB 120 MG: 120 INJECTION SUBCUTANEOUS at 14:23

## 2025-03-07 NOTE — PROGRESS NOTES
Reviewed K+ of 3.1 and AST of 89 with Dr Lopes, and okay to treat today.   [facility] clinic RN to call in K+ to patient pharmacy.

## 2025-03-10 ENCOUNTER — SPECIALTY PHARMACY (OUTPATIENT)
Dept: ONCOLOGY | Facility: HOSPITAL | Age: 65
End: 2025-03-10
Payer: MEDICARE

## 2025-03-10 ENCOUNTER — OFFICE VISIT (OUTPATIENT)
Dept: ONCOLOGY | Facility: CLINIC | Age: 65
End: 2025-03-10
Payer: MEDICARE

## 2025-03-10 ENCOUNTER — SPECIALTY PHARMACY (OUTPATIENT)
Dept: PHARMACY | Facility: HOSPITAL | Age: 65
End: 2025-03-10
Payer: MEDICARE

## 2025-03-10 VITALS
SYSTOLIC BLOOD PRESSURE: 120 MMHG | HEART RATE: 69 BPM | DIASTOLIC BLOOD PRESSURE: 72 MMHG | OXYGEN SATURATION: 96 % | TEMPERATURE: 98.2 F | RESPIRATION RATE: 15 BRPM | HEIGHT: 60 IN | BODY MASS INDEX: 28.12 KG/M2

## 2025-03-10 DIAGNOSIS — Z17.0 MALIGNANT NEOPLASM OF OVERLAPPING SITES OF LEFT BREAST IN FEMALE, ESTROGEN RECEPTOR POSITIVE: Primary | ICD-10-CM

## 2025-03-10 DIAGNOSIS — C79.51 CANCER, METASTATIC TO BONE: ICD-10-CM

## 2025-03-10 DIAGNOSIS — C50.812 MALIGNANT NEOPLASM OF OVERLAPPING SITES OF LEFT BREAST IN FEMALE, ESTROGEN RECEPTOR POSITIVE: Primary | ICD-10-CM

## 2025-03-10 RX ORDER — CAPIVASERTIB 200 MG/1
400 TABLET, FILM COATED ORAL 2 TIMES DAILY
Qty: 64 TABLET | Refills: 5 | Status: SHIPPED | OUTPATIENT
Start: 2025-03-10

## 2025-03-10 NOTE — PROGRESS NOTES
TREATMENT  PREPARATION    Maritza Bejarano  [id number]  1960    Chief Complaint: Treatment preparation and needs assessment    History of present illness:  Maritza Bejarano is a 64 y.o. year old female who is here today for treatment preparation and needs assessment.  The patient has been diagnosed with   Encounter Diagnoses   Name Primary?    Malignant neoplasm of overlapping sites of left breast in female, estrogen receptor positive Yes    Cancer, metastatic to bone     and is scheduled to begin treatment with:     Oncology History:    Oncology/Hematology History   Malignant neoplasm of overlapping sites of left breast in female, estrogen receptor positive   6/28/2024 Initial Diagnosis    Malignant neoplasm of overlapping sites of left breast in female, estrogen receptor positive     7/15/2024 - 1/31/2025 Chemotherapy    OP BREAST Letrozole / Ribociclib     8/30/2024 -  Chemotherapy    OP SUPPORTIVE Denosumab (Xgeva) Q28D     3/7/2025 -  Chemotherapy    OP BREAST Capivasertib / Fulvestrant     Cancer, metastatic to bone   7/31/2024 Initial Diagnosis    Cancer, metastatic to bone     8/30/2024 -  Chemotherapy    OP SUPPORTIVE Denosumab (Xgeva) Q28D         The current medication list and allergy list were reviewed and reconciled.     Past Medical History, Past Surgical History, Social History, Family History have been reviewed and are without significant changes except as mentioned.    Physical Exam:    Vitals:    03/10/25 1418   BP: 120/72   Pulse: 69   Resp: 15   Temp: 98.2 °F (36.8 °C)   SpO2: 96%     Vitals:    03/10/25 1418   PainSc: 0-No pain        ECOG score: 0             Physical Exam  HENT:      Head: Normocephalic and atraumatic.   Eyes:      Extraocular Movements: Extraocular movements intact.      Conjunctiva/sclera: Conjunctivae normal.   Pulmonary:      Effort: Pulmonary effort is normal. No respiratory distress.   Neurological:      General: No focal deficit present.      Mental Status: She is  alert and oriented to person, place, and time.   Psychiatric:         Mood and Affect: Mood normal.         Behavior: Behavior normal.           NEEDS ASSESSMENTS    Genetics  The patient's new diagnosis and family history have been reviewed for genetic counseling needs. The patient will not be referred..     Psychosocial and Barriers to care  The patient has completed a PHQ-9 Depression Screening and the Distress Thermometer (DT) today.  PHQ-9 results show PHQ-2 Total Score:   PHQ-9 Total Score:        The patient scored their distress today as   on a scale of 0-10 with 0 being no distress and 10 being extreme distress. Problems marked by the patient as being an issue for them within the last week include   .      Results were reviewed along with psychosocial resources offered by our cancer center.  Our Supportive Oncology team will be flagged for a score of 4 or above, and a same day call will be made for a score of 9 or 10.  A mental health referral is offered at that time. Patients who score less than 4 have been educated on our support services and can be referred to our  upon request.  The patient will not be referred to our . She has follow up scheduled with DANA Barnes    Nutrition  The patient has completed the malnutrition screening today. They scored Malnutrition Screening Tool  Have you recently lost weight without trying?  If yes, how much weight have you lost?: 0--> No  Have you been eating poorly because of a decreased appetite?: 0--> No  MST score: 0   with a score of 0-1 meaning not at risk in a score of 2 or greater meaning at risk.  Patients with a score of 3 or higher will be referred to our oncology dietitian for support. Patients beginning at risk treatment regimens or who have dietary concerns will also be referred to our oncology dietitian. The patient will not be referred.    Intravenous Access Assessment  The patient and I discussed planned intravenous  "chemo/biotherapy as well as other IV treatments that are often needed throughout the course of treatment. These may include, but are not limited to blood transfusions, antibiotics, and IV hydration. Discussed that depending on selected treatment and vein assessment, patient may require venous access device (VAD) which could include but not limited to a Mediport or PICC line. Risks and benefits of VADs reviewed. The patient will be treated via Oral Treatment.    Reproductive/Sexual Activity   People should avoid becoming pregnant and should not get a partner pregnant while undergoing chemo/biotherapy.  People of childbearing age should use effective contraception during active therapy. The best recommendation for all people is to use a barrier method for a minimum of 1 week after the last infusion of chemo/biotherapy to prevent your partner being exposed to byproducts from treatment medications in bodily fluids. Effective contraception should be discussed with your oncology team to make sure it is safe to take based on your diagnosis. Possible options include oral contraceptives, barrier methods. Chemo/biotherapy can change your ability to reproduce children in the future.  There are options for fertility preservation. NOT APPLICABLE    Advanced Care Planning  Advance Care Planning   The patient and I discussed advanced care planning, \"Conversations that Matter\".   This service is offered for development of advance directives with a certified ACP facilitator.  The patient does have an up-to-date advanced directive. This document is not on file with our office. The patient is not interested in an appointment with one of our facilitators to create or update their advanced directives.    Have you reviewed your Advance Directive and is it valid for this stay?: Yes          Smoking cessation  Tobacco Use: Medium Risk (3/10/2025)    Patient History     Smoking Tobacco Use: Former     Smokeless Tobacco Use: Never     " Passive Exposure: Not on file       Patient and I discussed their tobacco use history. Referral will not be made for smoking cessation.      Palliative Care  When appropriate, the patient and I discussed the availability palliative care services and when appropriate Hospice care. Palliative care is not the same as Hospice care which was explained to the patient.The patient is not interested in additional information from our  on these services.    Survivorship   When appropriate, we discussed that we will refer the patient to survivorship clinic to discuss next steps following completion of planned treatment.  Reviewed this visit will include assessment of your physical, psychological, functional, and spiritual needs as a survivor and the need at attend this visit when scheduled.    TREATMENT EDUCATION    Today I met with the patient to discuss the chemo/biotherapy regimen recommended for treatment of Malignant neoplasm of overlapping sites of left breast in female, estrogen receptor positive    Cancer, metastatic to bone  .  The patient was given explanation of treatment premed side effects including office policy that prohibits patients to drive if sedating medications are administered, MD explanation given regarding benefits, side effects, toxicities and goals of treatment.  The patient received a Chemotherapy/Biotherapy Plan Summary including diagnosis and explanation of specific treatment plan.    Discussion also included side effects specific to drugs in the treatment plan, specifically:    Treatment Plans       Name Type Plan Dates Plan Provider         Active    OP SUPPORTIVE Denosumab (Xgeva) Q28D ONCOLOGY SUPPORTIVE CARE 1 8/30/2024 - Present Zainab Lopes MD     OP BREAST Capivasertib / Fulvestrant ONCOLOGY TREATMENT 3/6/2025 - Present Zainab Lopes MD                        Assessment and Plan:    Diagnoses and all orders for this visit:    1. Malignant neoplasm of overlapping sites of  left breast in female, estrogen receptor positive (Primary)    2. Cancer, metastatic to bone      No orders of the defined types were placed in this encounter.        The patient and I have reviewed their diagnosis and scheduled treatment plan. Needs assessment was completed where applicable including genetics, psychosocial needs, barriers to care, VAD evaluation, advanced care planning, survivorship, and palliative care services where indicated. Referrals have been ordered as appropriate based upon evaluation today and patient desires.   Chemo/biotherapy teaching was completed today and consent obtained. See separate documentation for further details.  Adequate time was given to answer questions.  Patient made aware of their care team members and contact information if they have questions or problems throughout the treatment course.  Discussion held and written information provided describing frequency of office visits and ongoing monitoring throughout the treatment plan.     Reviewed with patient any prescribed medication sent to pharmacy.  Education provided regarding proper storage, safe handling, and proper disposal of unused medication.  Proper handling of body fluids and waste discussed and written information provided.  If appropriate, patient had pretreatment labs drawn today.    Learning assessment completed at initial patient encounter. See separate flowsheet. Chemo/biotherapy education comprehension assessed at today's visit.    I spent 20 minutes caring for Maritza on this date of service. This time includes time spent by me in the following activities: preparing for the visit, reviewing tests, obtaining and/or reviewing a separately obtained history, performing a medically appropriate examination and/or evaluation, counseling and educating the patient/family/caregiver, documenting information in the medical record, and care coordination.     Rosina Recio, APRN   03/10/25

## 2025-03-10 NOTE — PROGRESS NOTES
Specialty Pharmacy Patient Management Program  Hematology / Oncology Initial Fill Outreach      Maritza was contacted today regarding initial fill of her Truqap specialty medication(s).    Specialty medication(s) and dose(s) confirmed: Yes  Truqap 200 mg tabs-2 tabs twice per day for 4 days on then 3 days off. New start medication.    Delivery Questions      Flowsheet Row Most Recent Value   Delivery method UPS   Delivery address verified with patient/caregiver? Yes  [Ship to home address]   Delivery address Home  [Ship to home addres-Ship 3/11 for delivery 3/12-$0 copay with insurance covering 100%-Address Confirmed]   Number of medications in delivery 1   Medication(s) being filled and delivered Capivasertib (Truqap)  [200 mg]   Doses left of specialty medications 0-New Start   Copay verified? Yes   Copay amount $0 copay with insurance covering 100%   Copay form of payment No copayment ($0)   Delivery Date Selection 03/12/25   Signature Required No          Truqap delivery coordinated with pt for 3/12/2025 to her home address. $0 copay with insurance covering 100%. This is a new start medication for her. She had education with [facility] Pharmacist today, 3/10/2025.    Follow-Up: 21 Days    Belle Razo, Pharmacy Technician  3/10/2025  15:40 EDT

## 2025-03-10 NOTE — PROGRESS NOTES
Specialty Pharmacy Patient Management Program  Per Protocol Prescription Order or Refill     Patient will be filling or currently fills medications at [facility] and is enrolled in the Patient Management Program.    Requested Prescriptions     Signed Prescriptions Disp Refills    Capivasertib (Truqap) 200 MG tablet 64 tablet 5     Sig: Take 2 tablets by mouth 2 (Two) Times a Day. Take for 4 days on then 3 days off. Repeat.     Prescription orders above were sent to the pharmacy per Collaborative Care Agreement Protocol.     Alyssa Rich PharmD, [facility]  Clinical Specialty Pharmacist, Oncology  3/10/2025  15:25 EDT

## 2025-03-10 NOTE — PROGRESS NOTES
Specialty Pharmacy Patient Management Program  Per Protocol Prescription Order or Refill       Requested Prescriptions     Signed Prescriptions Disp Refills    Capivasertib (Truqap) 200 MG tablet 64 tablet 5     Sig: Take 2 tablets by mouth 2 (Two) Times a Day. Take for 4 days on then 3 days off. Repeat.     Prescription orders above were sent to [facility] per Collaborative Care Agreement Protocol.     Completed independent double check on medication order/RX.    Quita Montemayor Rph, BCOP  Clinical Specialty Pharmacist, Oncology  3/10/2025  15:38 EDT

## 2025-03-10 NOTE — PROGRESS NOTES
Specialty Pharmacy Patient Management Program  Oncology Initial Assessment     Maritza Bejarano was referred by their provider to the Oncology Patient Management program offered by [facility] for breast cancer on 03/10/25.  An initial outreach was conducted, including assessment of therapy appropriateness and specialty medication education for Truqap + Faslodex. The patient was introduced to services offered by [facility], including: regular assessments, refill coordination, curbside pick-up or mail order delivery options, prior authorization maintenance, and financial assistance programs as applicable. The patient was also provided with contact information for the pharmacy team.     Goal of chemotherapy: palliative and disease control    Treatment Medication(s) / Frequency and Dosing    Truqap 400 mg po bid for 4 days on then 3 days off. Repeat.  Faslodex 500 mg IM- cycle 1: every other week; then once monthly thereafter    Number of cycles: until disease progression or unacceptable toxicity    Start date of oral specialty medication: As soon as oral specialty medication is available.    Follow-up Testing to be determined after TBD cycles by MD.     Items for home use: Imodium AD (for diarrhea)    Rx written for: [] Nausea    [] Pre-Chemo   prochlorperazine 10 mg by mouth every 6 hours as needed for nausea    Completing Pharmacist: Alyssa Rich RPH             Date/time: 03/10/2025 15:19 EDT     Insurance Coverage & Financial Support  $0 copay     Relevant Past Medical History and Comorbidities  Relevant medical history and concomitant health conditions were discussed with the patient. The patient's chart has been reviewed for relevant past medical history and comorbid conditions and updated as necessary.  Past Medical History:   Diagnosis Date    Anemia 2024    `Treated with iron    Dawn esophagus     per patient    Blurred vision     R/T MS    Breast cancer  2024+++++    Carpal tunnel syndrome     Clotting disorder , ,     3 g/i bleeds w/ transfus/ions    Colon polyp     removed w/ colonoscopy    Deep vein thrombosis phlebitis 1980    Depression     Diplopia     GERD (gastroesophageal reflux disease)     GI (gastrointestinal bleed) 3 bleeds    2 transfusions    H/O Skin cancer, basal cell     Headache     History of blood transfusion     History of GI bleed     R/T NSAIDS AND STEROIDS, multiple times    History of urinary tract infection     Hypercalcemia     s/p parathyroidectomy    Hyperlipidemia     Hypertension     Movement disorder     Multiple sclerosis     Optic neuritis     PONV (postoperative nausea and vomiting)     Steroid-induced diabetes      Social History     Socioeconomic History    Marital status:      Spouse name: Donald    Number of children: 0   Tobacco Use    Smoking status: Former     Current packs/day: 0.00     Average packs/day: 2.0 packs/day for 30.0 years (60.0 ttl pk-yrs)     Types: Cigarettes     Start date: 10/22/1973     Quit date: 10/22/2003     Years since quittin.3    Smokeless tobacco: Never   Vaping Use    Vaping status: Never Used   Substance and Sexual Activity    Alcohol use: Not Currently    Drug use: Not Currently     Types: Marijuana     Comment: advised by neurologist for sleep    Sexual activity: Not Currently     Partners: Male     Birth control/protection: Post-menopausal       Problem list reviewed by Alyssa Rich RPH on 3/10/2025 at  3:18 PM    Allergies  Known allergies and reactions were discussed with the patient. The patient's chart has been reviewed for  allergy information and updated as necessary.   No Known Allergies    Allergies reviewed by Alyssa Rich RPH on 3/10/2025 at  3:18 PM    Relevant Laboratory Values  Relevant laboratory values were discussed with the patient. The following specialty medication dose adjustment(s) are recommended: none  Lab Results   Component  Value Date    GLUCOSE 98 03/07/2025    CALCIUM 8.7 03/07/2025     03/07/2025    K 3.1 (L) 03/07/2025    CO2 25.9 03/07/2025     03/07/2025    BUN 13 03/07/2025    CREATININE 0.53 (L) 03/07/2025    EGFRIFAFRI >60 11/23/2020    EGFRIFNONA 80 02/27/2022    BCR 24.5 03/07/2025    ANIONGAP 11.1 03/07/2025     Lab Results   Component Value Date    WBC 3.01 (L) 03/07/2025    RBC 4.03 03/07/2025    HGB 12.5 03/07/2025    HCT 38.1 03/07/2025    MCV 94.5 03/07/2025    MCH 31.0 03/07/2025    MCHC 32.8 03/07/2025    RDW 19.6 (H) 03/07/2025    RDWSD 67.7 (H) 03/07/2025    MPV 8.6 03/07/2025     03/07/2025    NEUTRORELPCT 57.8 03/07/2025    LYMPHORELPCT 27.9 03/07/2025    MONORELPCT 10.6 03/07/2025    EOSRELPCT 1.3 03/07/2025    BASORELPCT 1.7 (H) 03/07/2025    AUTOIGPER 0.7 (H) 03/07/2025    NEUTROABS 1.74 03/07/2025    LYMPHSABS 0.84 03/07/2025    MONOSABS 0.32 03/07/2025    EOSABS 0.04 03/07/2025    BASOSABS 0.05 03/07/2025    AUTOIGNUM 0.02 03/07/2025    NRBC 0.0 03/07/2025       Current Medication List  This medication list has been reviewed with the patient and evaluated for any interactions or necessary modifications/recommendations, and updated to include all prescription medications, OTC medications, and supplements the patient is currently taking.  This list reflects what is contained in the patient's profile, which has also been marked as reviewed to communicate to other providers it is the most up to date version of the patient's current medication therapy.     Current Outpatient Medications:     albuterol sulfate  (90 Base) MCG/ACT inhaler, Inhale 2 puffs Every 4 (Four) Hours As Needed for Wheezing or Shortness of Air., Disp: 18 g, Rfl: 0    baclofen (LIORESAL) 20 MG tablet, Take 1 tablet by mouth 2 (Two) Times a Day., Disp: , Rfl:     Capivasertib (Truqap) 200 MG tablet, Take 2 tablets by mouth 2 (Two) Times a Day. Take for 4 days on then 3 days off. Repeat., Disp: 64 tablet, Rfl: 5     castor oil-balsam peru (VENELEX) ointment, Apply 1 Application topically to the appropriate area as directed 2 (Two) Times a Day. Apply with medihoney to rangel/buttocks and cover w/abd pad, Disp: , Rfl:     Cholecalciferol (vitamin D3) 125 MCG (5000 UT) tablet tablet, Take 1 tablet by mouth Daily., Disp: , Rfl:     clonazePAM (KlonoPIN) 2 MG tablet, Take 1 tablet by mouth 2 (Two) Times a Day As Needed for Anxiety (Sleep)., Disp: 6 tablet, Rfl: 0    Dimethicone (Remedy Skin Repair) 1.5 % cream, Apply 1 dose topically Every Morning. Apply to all extremities upon rising for dry skin, Disp: , Rfl:     dimethicone 1 % cream, Apply 1 Application topically to the appropriate area as directed 2 (Two) Times a Day As Needed for Dry Skin. Apply to buttocks every shift for prophylactic  - and after each incontinent episode, Disp: , Rfl:     Fenbendazole powder, Use 440 mg. capsule, Disp: , Rfl:     ferrous sulfate 325 (65 FE) MG tablet, Take 1 tablet by mouth Daily With Breakfast., Disp: 30 tablet, Rfl: 3    FLUoxetine (PROzac) 20 MG capsule, Take 1 capsule by mouth Daily., Disp: , Rfl:     losartan-hydrochlorothiazide (HYZAAR) 50-12.5 MG per tablet, Take 1 tablet by mouth Daily., Disp: , Rfl:     ondansetron (ZOFRAN) 8 MG tablet, Take 1 tablet by mouth 3 (Three) Times a Day As Needed for Nausea or Vomiting. (Patient not taking: Reported on 12/12/2024), Disp: 30 tablet, Rfl: 5    pantoprazole (PROTONIX) 40 MG EC tablet, Take 1 tablet by mouth 2 (Two) Times a Day., Disp: , Rfl:     potassium chloride (KLOR-CON M10) 10 MEQ CR tablet, Take 1 tablet by mouth Daily., Disp: 30 tablet, Rfl: 5    pramipexole (MIRAPEX) 1.5 MG tablet, Take 1 tablet by mouth 3 (Three) Times a Day., Disp: , Rfl:     prochlorperazine (COMPAZINE) 10 MG tablet, Take 1 tablet by mouth Every 6 (Six) Hours As Needed for Nausea or Vomiting., Disp: 90 tablet, Rfl: 5    promethazine (PHENERGAN) 12.5 MG tablet, Take 1 tablet by mouth Every 6 (Six) Hours As Needed for  Nausea or Vomiting., Disp: 60 tablet, Rfl: 3    sennosides-docusate (senna-docusate sodium) 8.6-50 MG per tablet, Take 1 tablet by mouth Daily., Disp: 30 tablet, Rfl: 2    traZODone (DESYREL) 50 MG tablet, TAKE 1 TABLET BY MOUTH EVERY NIGHT, Disp: 30 tablet, Rfl: 0    Wound Dressings (Medihoney Wound/Burn Dressing) gel, Apply 1 dose topically Daily. Every day shift for friction - cleanse with saline, pat dry, apply to wound and venelex to periwound and cover with abd pad, Disp: , Rfl:     zolpidem (AMBIEN) 5 MG tablet, Take 1 tablet by mouth At Night As Needed for Sleep., Disp: 30 tablet, Rfl: 0  No current facility-administered medications for this visit.    Medicines reviewed by Alyssa Rich RPH on 3/10/2025 at  3:18 PM    Drug Interactions  Reviewed concomitant medications, allergies, labs, comorbidities/medical history, quality of life, and immunization history.   Drug-drug interactions noted and discussed during education: no significant drug interactions noted. . Reminded the patient to let us know before making any changes or starting any new prescription or OTC medications so we can first assess drug interactions.  Drug-food interactions noted and discussed during education: Patient was instructed to avoid eating grapefruit and drinking grapefruit juice    Initial Education Provided for Specialty Medication  The patient has been provided with the following education and any applicable administration techniques (i.e. self-injection) have been demonstrated for the therapies indicated. All questions and concerns have been addressed prior to the patient receiving the medication, and the patient has verbalized comprehension of the education and any materials provided. Additional patient education shall be provided and documented upon request by the patient, provider, or payer.    Provided patient with:   Education sheets about the medication, 24-hour clinic phone number and my contact information and  instructions to call should additional questions arise.     Medication Education Sheets Provided:   Oral Specialty Medication: Truqap (capivasertib)  IV:  Fulvestrant (IM)    TOPICS COMMENTS   Storage and Handling of Oral Specialty Medication Store in the original container, in a dry location out of direct sunlight, and out of reach of children or pets. Store at room temperature.  Discussed safe handling and what to do with any unused medication.   Administration of Oral Specialty Medication Take with or without food at the same time(s) each day.   Adherence to Oral Specialty Regimen and Handling Missed Doses Patient is likely to have good treatment adherence; reinforced the importance of adherence. Reviewed how to address missed doses and to let us know of any missed doses.   Anemia: role of RBC, cause, s/s, ways to manage, role of transfusion Reviewed the role of RBC and the use of transfusions if hemoglobin decreases too much.  Patient to notify us if they experience shortness of breath, dizziness, or palpitations.  Also let patient know they could feel more tired than usual and to try to stay active, but rest if they need to.    Thrombocytopenia: role of platelet, cause, s/s, ways to prevent bleeding, things to avoid, when to seek help Reviewed the role of platelets in blood clotting and when to call clinic (bloody nose that bleeds for 5 mins despite pressure, a cut that won't stop bleeding despite pressure, gums that bleed excessively with brushing or flossing). Recommended using an electric razor, soft bristle toothbrush, and blowing your nose gently.    Neutropenia: role of WBC, cause, infection precautions, s/s of infection, when to call MD Reviewed the role of WBC, good infection prevention practices, and when to call the clinic (temperature 100.4F, sore throat, burning urination, etc)   Nutrition and Appetite Changes:  importance of maintaining healthy diet & weight, ways to manage to improve intake,  dietary consult, exercise regimen, electrolyte and/or blood glucose abnormalities Decreased Appetite: Discussed the risk of decreased appetite. Recommended eating smaller, more frequent meals. Instructed the patient to contact the clinic if losing weight or having difficulty eating enough to maintain energy level. Increased Blood Sugar: This patient's oncology therapy can increase blood sugar.  Recommended that the patient monitor blood sugar more closely and contact their primary care provider for management recommendations if blood glucose values start trending upward. Lipid Panel Abnormalities:  Explained that the oncology therapy may lead to abnormalities in lipid values, specifically: TG   Diarrhea: causes, s/s of dehydration, ways to manage, dietary changes, when to call MD Discussed the risk of diarrhea. Instructed patient on use OTC loperamide with diarrhea onset, but emphasized the importance of calling the clinic if 4-6 episodes in 24 hours not relieved by OTC loperamide.   Constipation: causes, ways to manage, dietary changes, when to call MD Provided supplementary handout with instructions for use of docusate and other OTC therapies to manage constipation.  Instructed to call us if medications aren't working.   Nausea/Vomiting: cause, use of antiemetics, dietary changes, when to call MD Emetic risk: Low-Minimal  PRN home meds: Prochlorperazine 10mg PO every 6 hours PRN nausea / vomiting Promethazine    Instructed the patient to take a dose of the PRN medication at the first onset of nausea and if it's not working to call us for additional medications.  Also provided non-drug measures to mitigate nausea.   Mouth Sores: causes, oral care, ways to manage Mouth sores can be prevented by making a mouth wash mixture of salt, baking soda, and water. The patient was instructed to swish and spit four times daily after meals and before bedtime.  Use of a soft bristle toothbrush was recommended.  The patient was  instructed to avoid alcohol-containing OTC mouthwashes.        Nervous System Changes: causes, s/s, neuropathies, cognitive changes, ways to manage Headache: Can occur with this treatment. Patient encouraged to call clinic if they develop new or severe headaches. and Hot Flashes: Discussed the possibility of hot flashes as well as mitigation strategies.   Pain: causes, ways to manage Discussed muscle and joint aches/pains with therapy, and recommended the use of OTC pain relief with ibuprofen or acetaminophen if needed. and Headache: Reviewed the potential for headache, and encouraged the patient to call the clinic if the headache follows a head injury, is severe or starts suddenly, if it last more than 3 days, or if it is associated with vomiting, visual disturbance, confusion, etc.   Skin/Nail Changes: cause, s/s, ways to manage Hand-foot syndrome was discussed, including the s/s, prevention measures, and treatment measures. A supplementary handout with this information was also given to the patient.        Organ Toxicities: cause, s/s, need for diagnostic tests, labs, when to notify MD Discussed potential effects on organ systems, monitoring, diagnostic tests, labs, and when to notify their MD. Discussed the signs/symptoms of the following:  hyperglycemia and skin changes       Miscellaneous Financial Issues: none  Lab Draws:  per MD discretion   Infertility and Sexuality:  causes, fertility preservation options, sexuality changes, ways to manage, importance of birth control The patient is not of childbearing potential.   Home Care: how to manage bodily fluids Counseled on management of soiled linens and proper flush technique.  Discussed how to manage all the side effects at home and advised when to contact the MD office           Adherence and Self-Administration  Adherence related to the patient's specialty therapy was discussed with the patient. The Adherence segment of this outreach has been reviewed and  updated.     Is there a concern with patient's ability to self administer the medication correctly and without issue?: No  Were any potential barriers to adherence identified during the initial assessment or patient education?: No  Are there any concerns regarding the patient's understanding of the importance of medication adherence?: No  Methods for Supporting Patient Adherence and/or Self-Administration:  not needed  Expected duration of therapy: Until disease progression or intolerable toxicity    Open Medication Therapy Problems  No medication therapy recommendations to display    Goals of Therapy  Goals related to the patient's specialty therapy were discussed with the patient. The Patient Goals segment of this outreach has been reviewed and updated.   Goals Addressed Today        Specialty Pharmacy General Goal      Progression free survival based on scans  9/18/24 patient has completed 1 full cycle of kisqali, no new scans at this time.  3/10/25 progression of disease. Education for Truqap + Faslodex provided today            Wrap up  Discussed aforementioned material with patient in person, face-to-face, in clinic.   Chemo consents/CCA were signed at today's visit.   Medication availability: patient will receive medication from [facility] pharmacy on: to be coordinated by [facility]  Patient expressed understanding.   Patient demonstrates ability to self-administer medication. No barriers to adherence identified.  All questions and concerns addressed.     Reassessment Plan & Follow-Up  1. Medication Therapy Changes: none  2. Related Plans, Therapy Recommendations, or Therapy Problems to Be Addressed: none  3. Pharmacist to perform regular assessments no more than (6) months from the previous assessment.  4. Care Coordinator to set up future refill outreaches, coordinate prescription delivery, and escalate clinical questions to pharmacist.  5. Welcome information and patient satisfaction survey to be sent by  specialty pharmacy team with patient's initial fill.    Attestation  Therapeutic appropriateness: Appropriate   I attest the patient was actively involved in and has agreed to the above plan of care. If the prescribed therapy is at any point deemed not appropriate based on the current or future assessments, a consultation will be initiated with the patient's specialty care provider to determine the best course of action. The revised plan of therapy will be documented along with any required assessments and/or additional patient education provided.     Alyssa Rich, PharmD, [facility]  Clinical Specialty Pharmacist, Oncology  3/10/2025  15:19 EDT

## 2025-03-10 NOTE — PROGRESS NOTES
Specialty Pharmacy Patient Management Program  Per Protocol Prescription Order or Refill       Requested Prescriptions     Signed Prescriptions Disp Refills    Capivasertib (Truqap) 200 MG tablet 64 tablet 5     Sig: Take 2 tablets by mouth 2 (Two) Times a Day. Take for 4 days on then 3 days off. Repeat.     Prescription orders above were sent to [facility] per Collaborative Care Agreement Protocol.     Completed independent double check on medication order/RX.    Martin Conrad PharmD, BCOP  Clinical Specialty Pharmacist, Oncology  3/10/2025  15:36 EDT

## 2025-03-12 ENCOUNTER — SPECIALTY PHARMACY (OUTPATIENT)
Dept: PHARMACY | Facility: HOSPITAL | Age: 65
End: 2025-03-12
Payer: MEDICARE

## 2025-03-12 ENCOUNTER — HOSPITAL ENCOUNTER (EMERGENCY)
Facility: HOSPITAL | Age: 65
Discharge: HOME | End: 2025-03-13
Attending: EMERGENCY MEDICINE
Payer: MEDICARE

## 2025-03-12 DIAGNOSIS — R33.8 ACUTE URINARY RETENTION: ICD-10-CM

## 2025-03-12 DIAGNOSIS — T83.011A MALFUNCTION OF FOLEY CATHETER, INITIAL ENCOUNTER: Primary | ICD-10-CM

## 2025-03-12 LAB
ALBUMIN SERPL-MCNC: 3.7 G/DL (ref 3.5–5.2)
ALBUMIN/GLOB SERPL: 1.1 G/DL
ALP SERPL-CCNC: 183 U/L (ref 39–117)
ALT SERPL W P-5'-P-CCNC: 54 U/L (ref 1–33)
ANION GAP SERPL CALCULATED.3IONS-SCNC: 13.6 MMOL/L (ref 5–15)
AST SERPL-CCNC: 107 U/L (ref 1–32)
BACTERIA UR QL AUTO: ABNORMAL /HPF
BASOPHILS # BLD AUTO: 0.05 10*3/MM3 (ref 0–0.2)
BASOPHILS NFR BLD AUTO: 1.2 % (ref 0–1.5)
BILIRUB SERPL-MCNC: 0.4 MG/DL (ref 0–1.2)
BILIRUB UR QL STRIP: NEGATIVE
BUN SERPL-MCNC: 13 MG/DL (ref 8–23)
BUN/CREAT SERPL: 21.3 (ref 7–25)
CALCIUM SPEC-SCNC: 8.8 MG/DL (ref 8.6–10.5)
CHLORIDE SERPL-SCNC: 106 MMOL/L (ref 98–107)
CLARITY UR: ABNORMAL
CO2 SERPL-SCNC: 24.4 MMOL/L (ref 22–29)
COLOR UR: YELLOW
CREAT SERPL-MCNC: 0.61 MG/DL (ref 0.57–1)
DEPRECATED RDW RBC AUTO: 64.2 FL (ref 37–54)
EGFRCR SERPLBLD CKD-EPI 2021: 101.2 ML/MIN/1.73
EOSINOPHIL # BLD AUTO: 0.03 10*3/MM3 (ref 0–0.4)
EOSINOPHIL NFR BLD AUTO: 0.7 % (ref 0.3–6.2)
ERYTHROCYTE [DISTWIDTH] IN BLOOD BY AUTOMATED COUNT: 19.6 % (ref 12.3–15.4)
GLOBULIN UR ELPH-MCNC: 3.3 GM/DL
GLUCOSE SERPL-MCNC: 129 MG/DL (ref 65–99)
GLUCOSE UR STRIP-MCNC: NEGATIVE MG/DL
HCT VFR BLD AUTO: 38.7 % (ref 34–46.6)
HGB BLD-MCNC: 13.5 G/DL (ref 12–15.9)
HGB UR QL STRIP.AUTO: ABNORMAL
HYALINE CASTS UR QL AUTO: ABNORMAL /LPF
IMM GRANULOCYTES # BLD AUTO: 0.04 10*3/MM3 (ref 0–0.05)
IMM GRANULOCYTES NFR BLD AUTO: 1 % (ref 0–0.5)
KETONES UR QL STRIP: NEGATIVE
LEUKOCYTE ESTERASE UR QL STRIP.AUTO: ABNORMAL
LYMPHOCYTES # BLD AUTO: 0.86 10*3/MM3 (ref 0.7–3.1)
LYMPHOCYTES NFR BLD AUTO: 20.4 % (ref 19.6–45.3)
MCH RBC QN AUTO: 31.5 PG (ref 26.6–33)
MCHC RBC AUTO-ENTMCNC: 34.9 G/DL (ref 31.5–35.7)
MCV RBC AUTO: 90.4 FL (ref 79–97)
MONOCYTES # BLD AUTO: 0.28 10*3/MM3 (ref 0.1–0.9)
MONOCYTES NFR BLD AUTO: 6.7 % (ref 5–12)
NEUTROPHILS NFR BLD AUTO: 2.95 10*3/MM3 (ref 1.7–7)
NEUTROPHILS NFR BLD AUTO: 70 % (ref 42.7–76)
NITRITE UR QL STRIP: NEGATIVE
NRBC BLD AUTO-RTO: 0 /100 WBC (ref 0–0.2)
PH UR STRIP.AUTO: 6.5 [PH] (ref 5–8)
PLATELET # BLD AUTO: 352 10*3/MM3 (ref 140–450)
PMV BLD AUTO: 8.5 FL (ref 6–12)
POTASSIUM SERPL-SCNC: 3.4 MMOL/L (ref 3.5–5.2)
PROT SERPL-MCNC: 7 G/DL (ref 6–8.5)
PROT UR QL STRIP: ABNORMAL
RBC # BLD AUTO: 4.28 10*6/MM3 (ref 3.77–5.28)
RBC # UR STRIP: ABNORMAL /HPF
REF LAB TEST METHOD: ABNORMAL
SODIUM SERPL-SCNC: 144 MMOL/L (ref 136–145)
SP GR UR STRIP: 1.01 (ref 1–1.03)
SQUAMOUS #/AREA URNS HPF: ABNORMAL /HPF
UROBILINOGEN UR QL STRIP: ABNORMAL
WBC # UR STRIP: ABNORMAL /HPF
WBC NRBC COR # BLD AUTO: 4.21 10*3/MM3 (ref 3.4–10.8)
YEAST URNS QL MICRO: ABNORMAL /HPF

## 2025-03-12 PROCEDURE — 81001 URINALYSIS AUTO W/SCOPE: CPT | Performed by: PHYSICIAN ASSISTANT

## 2025-03-12 PROCEDURE — 99283 EMERGENCY DEPT VISIT LOW MDM: CPT

## 2025-03-12 PROCEDURE — 51798 US URINE CAPACITY MEASURE: CPT

## 2025-03-12 PROCEDURE — 85025 COMPLETE CBC W/AUTO DIFF WBC: CPT | Performed by: PHYSICIAN ASSISTANT

## 2025-03-12 PROCEDURE — 80053 COMPREHEN METABOLIC PANEL: CPT | Performed by: PHYSICIAN ASSISTANT

## 2025-03-12 PROCEDURE — 51702 INSERT TEMP BLADDER CATH: CPT

## 2025-03-12 RX ORDER — CLONAZEPAM 0.5 MG/1
1 TABLET ORAL ONCE
Status: COMPLETED | OUTPATIENT
Start: 2025-03-12 | End: 2025-03-12

## 2025-03-12 RX ORDER — PANTOPRAZOLE SODIUM 40 MG/1
40 TABLET, DELAYED RELEASE ORAL ONCE
Status: COMPLETED | OUTPATIENT
Start: 2025-03-12 | End: 2025-03-12

## 2025-03-12 RX ORDER — PRAMIPEXOLE DIHYDROCHLORIDE 1.5 MG/1
1.5 TABLET ORAL ONCE
Status: COMPLETED | OUTPATIENT
Start: 2025-03-12 | End: 2025-03-12

## 2025-03-12 RX ORDER — BACLOFEN 10 MG/1
20 TABLET ORAL ONCE
Status: COMPLETED | OUTPATIENT
Start: 2025-03-12 | End: 2025-03-12

## 2025-03-12 RX ORDER — SODIUM CHLORIDE 0.9 % (FLUSH) 0.9 %
10 SYRINGE (ML) INJECTION AS NEEDED
Status: DISCONTINUED | OUTPATIENT
Start: 2025-03-12 | End: 2025-03-13 | Stop reason: HOSPADM

## 2025-03-12 RX ADMIN — CLONAZEPAM 1 MG: 0.5 TABLET ORAL at 22:55

## 2025-03-12 RX ADMIN — PANTOPRAZOLE SODIUM 40 MG: 40 TABLET, DELAYED RELEASE ORAL at 22:55

## 2025-03-12 RX ADMIN — BACLOFEN 20 MG: 10 TABLET ORAL at 22:55

## 2025-03-12 RX ADMIN — PRAMIPEXOLE DIHYDROCHLORIDE 1.5 MG: 1.5 TABLET ORAL at 22:55

## 2025-03-12 NOTE — ED TRIAGE NOTES
Pt was brought in by ems from home for urinary retention. Pt emptied her catheter bag this am. No ouput since

## 2025-03-12 NOTE — ED PROVIDER NOTES
EMERGENCY DEPARTMENT ENCOUNTER      Room Number:  05/05  PCP: Enoc Carbone DO  Independent Historians: Patient  Patient Care Team:  Enoc Carbone DO as PCP - General (Internal Medicine)  Brittney Ortiz RN as Nurse Navigator (Oncology)  Kim Barber MD as Referring Physician (General Surgery)  Zainab Lopes MD as Consulting Physician (Hematology and Oncology)       HPI:  Chief Complaint: Urinary retention    A complete HPI/ROS/PMH/PSH/SH/FH are unobtainable due to: None    Chronic or social conditions impacting patient care (Social Determinants of Health): None      Context: Maritza Bejarano is a 64 y.o. female with a PMH significant for hyperlipidemia, movement disorder, clotting disorder, anemia, GERD, Dawn's esophagus, MS, breast cancer, DVT who presents to the ED c/o acute urinary retention.  Patient states that she awoke at 7 AM this morning and her , he typically empties her catheter, noticed that her catheter wasn't much urine as expected and that the line lumen had no urine as well. The patient has a diagnosis of MS and states that she has had one previous episode of urinary retention that resolved with catheter flushing. The patient stated prior to September, that she was primarily. became septic with a UTI  The patient denies any flank pain, abdominal pain or chest pain. The patient states that she is not currently nauseous, but is uncomfortable.       Upon review of prior external notes (non-ED) -and- Review of prior external test results outside of this encounter it appears the patient was evaluated in the office with oncology for breast cancer on March 10, 2025.  The patient had a normal phosphorus and magnesium on 1/31/2025 in 2/28/2025 respectively.      PAST MEDICAL HISTORY  Active Ambulatory Problems     Diagnosis Date Noted    H/O total shoulder replacement, right 10/22/2018    Cough 01/28/2021    Acute UTI (urinary tract infection) 01/28/2021    Multiple sclerosis  01/28/2021    Essential hypertension 01/28/2021    Hyperlipidemia 01/28/2021    Shortness of breath 01/28/2021    Oropharyngeal dysphagia 02/04/2021    Abnormal finding on mammography 08/09/2021    Acid reflux 08/09/2021    Anxiety and depression 05/01/2018    Brash 08/09/2021    Carpal tunnel syndrome 01/10/2013    Chronic low back pain 01/22/2014    Diplopia 01/10/2013    Disease with a predominantly sexual mode of transmission 08/09/2021    FOM (frequency of micturition) 08/09/2021    Headache 01/10/2013    History of diplopia 01/07/2020    History of optic neuritis 01/07/2020    History of vitamin D deficiency 10/31/2017    Migraine syndrome 01/22/2014    Mixed incontinence 07/31/2017    Nausea 08/10/2015    Neurogenic bladder 02/23/2017    Pain in joint of right shoulder 07/31/2017    Restless legs syndrome 07/23/2014    Rupture of rotator cuff of shoulder 08/31/2017    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    S/P cubital tunnel release 01/04/2019    Vitamin D deficiency 01/22/2014    Multiple sclerosis exacerbation 02/26/2022    Sepsis due to Gram negative bacteria 02/27/2022    Lower extremity cellulitis 03/07/2024    Malignant neoplasm of overlapping sites of left breast in female, estrogen receptor positive 06/28/2024    Encounter for long-term (current) use of other medications 07/16/2024    Cancer, metastatic to bone 07/31/2024    Colitis 09/10/2024    Weakness 09/18/2024    Elevated LFTs 09/21/2024    Acute UTI 10/18/2024     Resolved Ambulatory Problems     Diagnosis Date Noted    No Resolved Ambulatory Problems     Past Medical History:   Diagnosis Date    Anemia 2024    Dawn esophagus     Blurred vision     Breast cancer 05/2024+++++    Clotting disorder 1993, 2003, 2012    Colon polyp 2013    Deep vein thrombosis phlebitis 1980    Depression     GERD (gastroesophageal reflux disease)     GI (gastrointestinal bleed) 3 bleeds    H/O Skin cancer, basal cell     History of blood  transfusion     History of GI bleed     History of urinary tract infection     Hypercalcemia     Hypertension     Movement disorder     Optic neuritis     PONV (postoperative nausea and vomiting)     Steroid-induced diabetes 2024         PAST SURGICAL HISTORY  Past Surgical History:   Procedure Laterality Date    APPENDECTOMY      BLADDER SURGERY      bladder stimulator    BREAST BIOPSY  don't remember    BREAST SURGERY      augmentation wtih subsequent removal    CARPAL TUNNEL RELEASE Bilateral     Left 2018, right 2020    CUBITAL TUNNEL RELEASE Left     CYSTOSCOPY BOTOX INJECTION OF BLADDER  2018    Cystoscopy with Botox    FRACTURE SURGERY  2019    rt shoulder    PARATHYROIDECTOMY      one gland removed    ROTATOR CUFF REPAIR Right 2017    TOE SURGERY      bilateral great toes    TOTAL SHOULDER ARTHROPLASTY W/ DISTAL CLAVICLE EXCISION Right 10/22/2018    Procedure: RT TOTAL SHOULDER REVERSE ARTHROPLASTY;  Surgeon: Bipin Dangelo MD;  Location: [facility] OR;  Service: Orthopedics         FAMILY HISTORY  Family History   Problem Relation Age of Onset    Hypertension Mother     Hyperlipidemia Mother     Aortic aneurysm Mother         thoracic    Diabetes Mother     Hypertension Father         charissa heart failure    Heart failure Father     Hyperlipidemia Father     Miscarriages / Stillbirths Sister     Multiple sclerosis Brother     Atrial fibrillation Brother     Diabetes Maternal Grandfather     Stroke Maternal Grandfather     Parkinsonism Paternal Grandmother     Tremor Paternal Grandmother     Stomach cancer Paternal Grandfather     Diabetes Maternal Grandmother          SOCIAL HISTORY  Social History     Socioeconomic History    Marital status:      Spouse name: Donald    Number of children: 0   Tobacco Use    Smoking status: Former     Current packs/day: 0.00     Average packs/day: 2.0 packs/day for 30.0 years (60.0 ttl pk-yrs)     Types: Cigarettes     Start date: 10/22/1973     Quit date: 10/22/2003      Years since quittin.4    Smokeless tobacco: Never   Vaping Use    Vaping status: Never Used   Substance and Sexual Activity    Alcohol use: Not Currently    Drug use: Not Currently     Types: Marijuana     Comment: advised by neurologist for sleep    Sexual activity: Not Currently     Partners: Male     Birth control/protection: Post-menopausal         ALLERGIES  Patient has no known allergies.      REVIEW OF SYSTEMS  Included in HPI  All systems reviewed and negative except for those discussed in HPI.      PHYSICAL EXAM    I have reviewed the triage vital signs and nursing notes.    ED Triage Vitals [25]   Temp Heart Rate Resp BP SpO2   97.9 °F (36.6 °C) 85 18 179/94 94 %      Temp src Heart Rate Source Patient Position BP Location FiO2 (%)   Tympanic Monitor -- -- --       Physical Exam  Constitutional:       General: She is not in acute distress.     Appearance: She is well-developed. She is not ill-appearing.   HENT:      Head: Normocephalic and atraumatic.   Eyes:      General: No scleral icterus.     Conjunctiva/sclera: Conjunctivae normal.   Neck:      Trachea: No tracheal deviation.   Cardiovascular:      Rate and Rhythm: Normal rate and regular rhythm.   Pulmonary:      Effort: Pulmonary effort is normal.      Breath sounds: Normal breath sounds.   Abdominal:      Palpations: Abdomen is soft.      Tenderness: There is abdominal tenderness in the suprapubic area.   Musculoskeletal:         General: No deformity.      Cervical back: Normal range of motion.   Lymphadenopathy:      Cervical: No cervical adenopathy.   Skin:     General: Skin is warm and dry.   Neurological:      Mental Status: She is alert and oriented to person, place, and time.   Psychiatric:         Behavior: Behavior normal.         Vital signs and nursing notes reviewed.      PPE: I wore a surgical mask throughout my encounters with the pt. I performed hand hygiene on entry into the pt room and upon exit.     LAB  RESULTS  Recent Results (from the past 24 hours)   Urinalysis With Microscopic If Indicated (No Culture) - Urine, Catheter    Collection Time: 03/12/25  8:09 PM    Specimen: Urine, Catheter   Result Value Ref Range    Color, UA Yellow Yellow, Straw    Appearance, UA Cloudy (A) Clear    pH, UA 6.5 5.0 - 8.0    Specific Gravity, UA 1.015 1.005 - 1.030    Glucose, UA Negative Negative    Ketones, UA Negative Negative    Bilirubin, UA Negative Negative    Blood, UA Trace (A) Negative    Protein, UA Trace (A) Negative    Leuk Esterase, UA Moderate (2+) (A) Negative    Nitrite, UA Negative Negative    Urobilinogen, UA 0.2 E.U./dL 0.2 - 1.0 E.U./dL   Comprehensive Metabolic Panel    Collection Time: 03/12/25  8:20 PM    Specimen: Hand, Left; Blood   Result Value Ref Range    Glucose 129 (H) 65 - 99 mg/dL    BUN 13 8 - 23 mg/dL    Creatinine 0.61 0.57 - 1.00 mg/dL    Sodium 144 136 - 145 mmol/L    Potassium 3.4 (L) 3.5 - 5.2 mmol/L    Chloride 106 98 - 107 mmol/L    CO2 24.4 22.0 - 29.0 mmol/L    Calcium 8.8 8.6 - 10.5 mg/dL    Total Protein 7.0 6.0 - 8.5 g/dL    Albumin 3.7 3.5 - 5.2 g/dL    ALT (SGPT) 54 (H) 1 - 33 U/L    AST (SGOT) 107 (H) 1 - 32 U/L    Alkaline Phosphatase 183 (H) 39 - 117 U/L    Total Bilirubin 0.4 0.0 - 1.2 mg/dL    Globulin 3.3 gm/dL    A/G Ratio 1.1 g/dL    BUN/Creatinine Ratio 21.3 7.0 - 25.0    Anion Gap 13.6 5.0 - 15.0 mmol/L    eGFR 101.2 >60.0 mL/min/1.73   CBC Auto Differential    Collection Time: 03/12/25  8:20 PM    Specimen: Hand, Left; Blood   Result Value Ref Range    WBC 4.21 3.40 - 10.80 10*3/mm3    RBC 4.28 3.77 - 5.28 10*6/mm3    Hemoglobin 13.5 12.0 - 15.9 g/dL    Hematocrit 38.7 34.0 - 46.6 %    MCV 90.4 79.0 - 97.0 fL    MCH 31.5 26.6 - 33.0 pg    MCHC 34.9 31.5 - 35.7 g/dL    RDW 19.6 (H) 12.3 - 15.4 %    RDW-SD 64.2 (H) 37.0 - 54.0 fl    MPV 8.5 6.0 - 12.0 fL    Platelets 352 140 - 450 10*3/mm3    Neutrophil % 70.0 42.7 - 76.0 %    Lymphocyte % 20.4 19.6 - 45.3 %    Monocyte %  6.7 5.0 - 12.0 %    Eosinophil % 0.7 0.3 - 6.2 %    Basophil % 1.2 0.0 - 1.5 %    Immature Grans % 1.0 (H) 0.0 - 0.5 %    Neutrophils, Absolute 2.95 1.70 - 7.00 10*3/mm3    Lymphocytes, Absolute 0.86 0.70 - 3.10 10*3/mm3    Monocytes, Absolute 0.28 0.10 - 0.90 10*3/mm3    Eosinophils, Absolute 0.03 0.00 - 0.40 10*3/mm3    Basophils, Absolute 0.05 0.00 - 0.20 10*3/mm3    Immature Grans, Absolute 0.04 0.00 - 0.05 10*3/mm3    nRBC 0.0 0.0 - 0.2 /100 WBC         RADIOLOGY  No Radiology Exams Resulted Within Past 24 Hours      MEDICATIONS GIVEN IN ER  Medications   sodium chloride 0.9 % flush 10 mL (has no administration in time range)         ORDERS PLACED DURING THIS VISIT:  Orders Placed This Encounter   Procedures    Comprehensive Metabolic Panel    Urinalysis With Microscopic If Indicated (No Culture) - Urine, Catheter    CBC Auto Differential    Urinalysis, Microscopic Only - Urine, Clean Catch    Bladder scan    Replace Current Indwelling Urinary Catheter Prior to Collecting Urine Specimen    Assess Need for Indwelling Urinary Catheter - Follow Removal Protocol    Urinary Catheter Care    Insert Peripheral IV    CBC & Differential         OUTPATIENT MEDICATION MANAGEMENT:  Current Facility-Administered Medications Ordered in Epic   Medication Dose Route Frequency Provider Last Rate Last Admin    sodium chloride 0.9 % flush 10 mL  10 mL Intravenous PRN Bipin Montiel PA         Current Outpatient Medications Ordered in Epic   Medication Sig Dispense Refill    albuterol sulfate  (90 Base) MCG/ACT inhaler Inhale 2 puffs Every 4 (Four) Hours As Needed for Wheezing or Shortness of Air. 18 g 0    baclofen (LIORESAL) 20 MG tablet Take 1 tablet by mouth 2 (Two) Times a Day.      Capivasertib (Truqap) 200 MG tablet Take 2 tablets by mouth 2 (Two) Times a Day. Take for 4 days on then 3 days off. Repeat. 64 tablet 5    castor oil-balsam peru (VENELEX) ointment Apply 1 Application topically to the appropriate area  as directed 2 (Two) Times a Day. Apply with medihoney to rangel/buttocks and cover w/abd pad      Cholecalciferol (vitamin D3) 125 MCG (5000 UT) tablet tablet Take 1 tablet by mouth Daily.      clonazePAM (KlonoPIN) 2 MG tablet Take 1 tablet by mouth 2 (Two) Times a Day As Needed for Anxiety (Sleep). 6 tablet 0    Dimethicone (Remedy Skin Repair) 1.5 % cream Apply 1 dose topically Every Morning. Apply to all extremities upon rising for dry skin      dimethicone 1 % cream Apply 1 Application topically to the appropriate area as directed 2 (Two) Times a Day As Needed for Dry Skin. Apply to buttocks every shift for prophylactic  - and after each incontinent episode      Fenbendazole powder Use 440 mg. capsule      ferrous sulfate 325 (65 FE) MG tablet Take 1 tablet by mouth Daily With Breakfast. 30 tablet 3    FLUoxetine (PROzac) 20 MG capsule Take 1 capsule by mouth Daily.      losartan-hydrochlorothiazide (HYZAAR) 50-12.5 MG per tablet Take 1 tablet by mouth Daily.      ondansetron (ZOFRAN) 8 MG tablet Take 1 tablet by mouth 3 (Three) Times a Day As Needed for Nausea or Vomiting. (Patient not taking: Reported on 12/12/2024) 30 tablet 5    pantoprazole (PROTONIX) 40 MG EC tablet Take 1 tablet by mouth 2 (Two) Times a Day.      potassium chloride (KLOR-CON M10) 10 MEQ CR tablet Take 1 tablet by mouth Daily. 30 tablet 5    pramipexole (MIRAPEX) 1.5 MG tablet Take 1 tablet by mouth 3 (Three) Times a Day.      prochlorperazine (COMPAZINE) 10 MG tablet Take 1 tablet by mouth Every 6 (Six) Hours As Needed for Nausea or Vomiting. 90 tablet 5    promethazine (PHENERGAN) 12.5 MG tablet Take 1 tablet by mouth Every 6 (Six) Hours As Needed for Nausea or Vomiting. 60 tablet 3    sennosides-docusate (senna-docusate sodium) 8.6-50 MG per tablet Take 1 tablet by mouth Daily. 30 tablet 2    traZODone (DESYREL) 50 MG tablet TAKE 1 TABLET BY MOUTH EVERY NIGHT 30 tablet 0    Wound Dressings (Medihoney Wound/Burn Dressing) gel Apply 1 dose  topically Daily. Every day shift for friction - cleanse with saline, pat dry, apply to wound and venelex to periwound and cover with abd pad      zolpidem (AMBIEN) 5 MG tablet Take 1 tablet by mouth At Night As Needed for Sleep. 30 tablet 0             PROGRESS, DATA ANALYSIS, CONSULTS, AND MEDICAL DECISION MAKING  All labs have been independently interpreted by me.  All radiology studies have been reviewed by me. All EKG's have been independently viewed and interpreted by me.  Discussion below represents my analysis of pertinent findings related to patient's condition, differential diagnosis, treatment plan and final disposition.      DIFFERENTIAL DIAGNOSIS INCLUDE BUT NOT LIMITED TO:     Cobos catheter malfunction, Cobos catheter obstruction, urinary retention, KINZA    Clinical Scores: N/A      ED Course as of 03/12/25 2114   Wed Mar 12, 2025   2111 Patient presentation and evaluation consistent with acute urinary retention related to dysfunctional indwelling Cobos catheter.  Her catheter has been replaced without issues and subsequently she had greater than 450cc of urinary output.  Her workup is reassuring in regards to labs and I feel she is appropriate for outpatient management with urology follow-up at this time.  Patient and family agreeable with this plan and all questions answered. [DC]      ED Course User Index  [DC] Bipin Montiel, PA         2114 I rechecked the patient.  I discussed the patient's labs, radiology findings (including all incidental findings), diagnosis, and plan for discharge.  A repeat exam reveals no new worrisome changes from my initial exam findings.  The patient understands that the fact that they are being discharged does not denote that nothing is abnormal, it indicates that no clinical emergency is present and that they must follow-up as directed in order to properly maintain their health.  Follow-up instructions (specifically listed below) and return to ER precautions were  given at this time.  I specifically instructed the patient to follow-up with their PCP.  The patient understands and agrees with the plan, and is ready for discharge.  All questions answered.         AS OF 21:14 EDT VITALS:    BP - 179/94  HR - 85  TEMP - 97.9 °F (36.6 °C) (Tympanic)  O2 SATS - 94%    COMPLEXITY OF CARE  Admission was considered but after careful review of the patient's presentation, physical examination, diagnostic results, and response to treatment the patient may be safely discharged with outpatient follow-up.      DIAGNOSIS  Final diagnoses:   Malfunction of Cobos catheter, initial encounter   Acute urinary retention         DISPOSITION  ED Disposition       ED Disposition   Discharge    Condition   Stable    Comment   --                Please note that portions of this document were completed with a voice recognition program.    Note Disclaimer: At [facility], we believe that sharing information builds trust and better relationships. You are receiving this note because you recently visited [facility]. It is possible you will see health information before a provider has talked with you about it. This kind of information can be easy to misunderstand. To help you fully understand what it means for your health, we urge you to discuss this note with your provider.         Bipin Montiel PA  03/12/25 1125

## 2025-03-12 NOTE — PROGRESS NOTES
Truqap supply from [facility] delivered to pt on 3/12/2025 at 11:14 am.    UPS tracking # [id number]     [place], Pharmacy Technician  03/12/25  15:17 EDT

## 2025-03-13 ENCOUNTER — TELEPHONE (OUTPATIENT)
Dept: SLEEP MEDICINE | Facility: HOSPITAL | Age: 65
End: 2025-03-13
Payer: MEDICARE

## 2025-03-13 VITALS
BODY MASS INDEX: 23.99 KG/M2 | TEMPERATURE: 97.9 F | WEIGHT: 144 LBS | DIASTOLIC BLOOD PRESSURE: 61 MMHG | HEIGHT: 65 IN | SYSTOLIC BLOOD PRESSURE: 107 MMHG | RESPIRATION RATE: 18 BRPM | OXYGEN SATURATION: 93 % | HEART RATE: 80 BPM

## 2025-03-13 DIAGNOSIS — G47.33 OSA (OBSTRUCTIVE SLEEP APNEA): Primary | ICD-10-CM

## 2025-03-13 NOTE — ED NOTES
Pt updated that wc van not available until 0930 on 3/13 - spoke with TANIYA Will, ordered night time meds, gave pt box lunch and ordered breakfast for the morning.     Use Enhanced Medication Counseling?: No

## 2025-03-13 NOTE — ED PROVIDER NOTES
SHARED VISIT: This visit was performed by BOTH a physician and an APC. The substantive portion of the medical decision making was performed by this attesting physician who made or approved the management plan and takes responsibility for patient management. All studies in the APC note (if performed) were independently interpreted by me.      The MEGHA and I have discussed this patient's history, physical exam, and treatment plan.  I have reviewed the documentation and personally had a face to face interaction with the patient. I affirm the documentation and agree with the treatment and plan.  The attached note describes my personal findings.       I provided a substantive portion of the care of the patient.  I personally performed the physical exam in its entirety, and below are my findings.        History  64-year-old female with history of MS, prior DVT presents with bladder distention and discomfort.  Patient does report poor output from urinary catheter since this morning.    Physical Exam  Vital Signs reviewed  GENERAL: Alert well-appearing female no obvious distress.  Triage vitals reviewed notable for elevated blood pressure 179/94.  Temperature, heart rate and O2 sats unremarkable  HENT: nares patent  EYES: no scleral icterus  CV: regular rhythm, regular rate-no murmur  RESPIRATORY: normal effort, clear to auscultation bilaterally  ABDOMEN: soft, mild suprapubic tenderness  MUSCULOSKELETAL: no deformity  NEURO: Strength sensation and coordination are grossly intact.  Speech and mentation are unremarkable  SKIN: warm, dry      Assessment and Data Review    ED Course as of 03/13/25 0221   Wed Mar 12, 2025   2111 Patient presentation and evaluation consistent with acute urinary retention related to dysfunctional indwelling Cobos catheter.  Her catheter has been replaced without issues and subsequently she had greater than 450cc of urinary output.  Her workup is reassuring in regards to labs and I feel she is  appropriate for outpatient management with urology follow-up at this time.  Patient and family agreeable with this plan and all questions answered. [DC]      ED Course User Index  [DC] Bipin Montiel, PA       I did discuss treatment and evaluation of this patient with TANIYA Montiel.    I did independently interpret and evaluate ED testing which is notable for the following:    CBC benign without evidence to suggest significant infection or anemia  Chemistries showed normal renal function.  Electrolytes unremarkable    Patient's catheter was malfunctioning and when a new catheter was placed she had return of almost 500 cc of urine and felt much better.     Bipin Mott MD  03/13/25 0222

## 2025-03-13 NOTE — ED NOTES
Pt with history of sleep apnea, doesn't have cpap with her - drops spo2 into upper 80's when asleep - 2 lpm nc o2 placed

## 2025-03-13 NOTE — ED NOTES
Pt here for urinary retention - has indwelling catheter that was replaced 3 weeks ago - bladder scan revealed 466ml in bladder, old catheter removed, noted kink near fenestrations at tip, and possible sediment clogging catheter.  Replaced with new catheter w/out problems, with urine draining appropriately.  Same sent for analysis

## 2025-03-17 RX ORDER — TRAZODONE HYDROCHLORIDE 50 MG/1
50 TABLET ORAL NIGHTLY
Qty: 30 TABLET | Refills: 0 | Status: SHIPPED | OUTPATIENT
Start: 2025-03-17

## 2025-03-18 ENCOUNTER — SPECIALTY PHARMACY (OUTPATIENT)
Dept: PHARMACY | Facility: HOSPITAL | Age: 65
End: 2025-03-18
Payer: MEDICARE

## 2025-03-18 NOTE — PROGRESS NOTES
Specialty Pharmacy Patient Management Program  Clinical Outreach     I called Maritza Bejarano on 3/18/2025 13:03 EDT who is enrolled in the Oncology Patient Management program offered by [facility].  Patient did not answer today. I left a voice message with my call back number, [phone].    Alyssa Rich, PharmD, [facility]  Clinical Specialty Pharmacist, Oncology  3/18/2025  13:03 EDT

## 2025-03-19 DIAGNOSIS — C50.812 MALIGNANT NEOPLASM OF OVERLAPPING SITES OF LEFT BREAST IN FEMALE, ESTROGEN RECEPTOR POSITIVE: Primary | ICD-10-CM

## 2025-03-19 DIAGNOSIS — Z17.0 MALIGNANT NEOPLASM OF OVERLAPPING SITES OF LEFT BREAST IN FEMALE, ESTROGEN RECEPTOR POSITIVE: Primary | ICD-10-CM

## 2025-03-19 RX ORDER — LAMOTRIGINE 25 MG/1
500 TABLET ORAL ONCE
Status: CANCELLED | OUTPATIENT
Start: 2025-03-21

## 2025-03-20 ENCOUNTER — TELEPHONE (OUTPATIENT)
Dept: ONCOLOGY | Facility: CLINIC | Age: 65
End: 2025-03-20
Payer: MEDICARE

## 2025-03-20 RX ORDER — DICYCLOMINE HYDROCHLORIDE 10 MG/1
10 CAPSULE ORAL
Qty: 120 CAPSULE | Refills: 0 | Status: SHIPPED | OUTPATIENT
Start: 2025-03-20

## 2025-03-20 NOTE — TELEPHONE ENCOUNTER
Maritza called reporting some loose stools, not yet diarrhea and abdominal cramping.  She seemed hesitant to take imodium but explained that this is drug related and would not want to let it get out of hand. Strongly recommend using imodium.  Reviewed with Dr Lopes, suggests adding bentyl for the cramping and again encourages use of imodium. Bentyl sent to pt pharmacy.

## 2025-03-21 ENCOUNTER — LAB (OUTPATIENT)
Dept: LAB | Facility: HOSPITAL | Age: 65
End: 2025-03-21
Payer: MEDICARE

## 2025-03-21 ENCOUNTER — OFFICE VISIT (OUTPATIENT)
Dept: ONCOLOGY | Facility: CLINIC | Age: 65
End: 2025-03-21
Payer: MEDICARE

## 2025-03-21 ENCOUNTER — INFUSION (OUTPATIENT)
Dept: ONCOLOGY | Facility: HOSPITAL | Age: 65
End: 2025-03-21
Payer: MEDICARE

## 2025-03-21 VITALS
DIASTOLIC BLOOD PRESSURE: 80 MMHG | HEART RATE: 74 BPM | HEIGHT: 65 IN | BODY MASS INDEX: 23.96 KG/M2 | RESPIRATION RATE: 16 BRPM | TEMPERATURE: 97.5 F | SYSTOLIC BLOOD PRESSURE: 118 MMHG | OXYGEN SATURATION: 97 %

## 2025-03-21 DIAGNOSIS — Z17.0 MALIGNANT NEOPLASM OF OVERLAPPING SITES OF LEFT BREAST IN FEMALE, ESTROGEN RECEPTOR POSITIVE: Primary | ICD-10-CM

## 2025-03-21 DIAGNOSIS — C50.812 MALIGNANT NEOPLASM OF OVERLAPPING SITES OF LEFT BREAST IN FEMALE, ESTROGEN RECEPTOR POSITIVE: Primary | ICD-10-CM

## 2025-03-21 DIAGNOSIS — C79.51 CANCER, METASTATIC TO BONE: ICD-10-CM

## 2025-03-21 DIAGNOSIS — C50.812 MALIGNANT NEOPLASM OF OVERLAPPING SITES OF LEFT BREAST IN FEMALE, ESTROGEN RECEPTOR POSITIVE: ICD-10-CM

## 2025-03-21 DIAGNOSIS — Z17.0 MALIGNANT NEOPLASM OF OVERLAPPING SITES OF LEFT BREAST IN FEMALE, ESTROGEN RECEPTOR POSITIVE: ICD-10-CM

## 2025-03-21 LAB
ALBUMIN SERPL-MCNC: 3.8 G/DL (ref 3.5–5.2)
ALBUMIN/GLOB SERPL: 1 G/DL
ALP SERPL-CCNC: 185 U/L (ref 39–117)
ALT SERPL W P-5'-P-CCNC: 33 U/L (ref 1–33)
ANION GAP SERPL CALCULATED.3IONS-SCNC: 13.4 MMOL/L (ref 5–15)
AST SERPL-CCNC: 71 U/L (ref 1–32)
BASOPHILS # BLD AUTO: 0.09 10*3/MM3 (ref 0–0.2)
BASOPHILS NFR BLD AUTO: 1.9 % (ref 0–1.5)
BILIRUB SERPL-MCNC: 0.2 MG/DL (ref 0–1.2)
BUN SERPL-MCNC: 18 MG/DL (ref 8–23)
BUN/CREAT SERPL: 26.1 (ref 7–25)
CALCIUM SPEC-SCNC: 9.9 MG/DL (ref 8.6–10.5)
CHLORIDE SERPL-SCNC: 101 MMOL/L (ref 98–107)
CO2 SERPL-SCNC: 21.6 MMOL/L (ref 22–29)
CREAT SERPL-MCNC: 0.69 MG/DL (ref 0.57–1)
DEPRECATED RDW RBC AUTO: 64.5 FL (ref 37–54)
EGFRCR SERPLBLD CKD-EPI 2021: 98.3 ML/MIN/1.73
EOSINOPHIL # BLD AUTO: 0.13 10*3/MM3 (ref 0–0.4)
EOSINOPHIL NFR BLD AUTO: 2.7 % (ref 0.3–6.2)
ERYTHROCYTE [DISTWIDTH] IN BLOOD BY AUTOMATED COUNT: 18.7 % (ref 12.3–15.4)
GLOBULIN UR ELPH-MCNC: 3.7 GM/DL
GLUCOSE SERPL-MCNC: 409 MG/DL (ref 65–99)
HCT VFR BLD AUTO: 42.7 % (ref 34–46.6)
HGB BLD-MCNC: 13.9 G/DL (ref 12–15.9)
IMM GRANULOCYTES # BLD AUTO: 0.05 10*3/MM3 (ref 0–0.05)
IMM GRANULOCYTES NFR BLD AUTO: 1 % (ref 0–0.5)
LYMPHOCYTES # BLD AUTO: 1.15 10*3/MM3 (ref 0.7–3.1)
LYMPHOCYTES NFR BLD AUTO: 23.9 % (ref 19.6–45.3)
MCH RBC QN AUTO: 30.8 PG (ref 26.6–33)
MCHC RBC AUTO-ENTMCNC: 32.6 G/DL (ref 31.5–35.7)
MCV RBC AUTO: 94.5 FL (ref 79–97)
MONOCYTES # BLD AUTO: 0.21 10*3/MM3 (ref 0.1–0.9)
MONOCYTES NFR BLD AUTO: 4.4 % (ref 5–12)
NEUTROPHILS NFR BLD AUTO: 3.18 10*3/MM3 (ref 1.7–7)
NEUTROPHILS NFR BLD AUTO: 66.1 % (ref 42.7–76)
NRBC BLD AUTO-RTO: 0 /100 WBC (ref 0–0.2)
PLATELET # BLD AUTO: 595 10*3/MM3 (ref 140–450)
PMV BLD AUTO: 9.3 FL (ref 6–12)
POTASSIUM SERPL-SCNC: 4.5 MMOL/L (ref 3.5–5.2)
PROT SERPL-MCNC: 7.5 G/DL (ref 6–8.5)
RBC # BLD AUTO: 4.52 10*6/MM3 (ref 3.77–5.28)
SODIUM SERPL-SCNC: 136 MMOL/L (ref 136–145)
WBC NRBC COR # BLD AUTO: 4.81 10*3/MM3 (ref 3.4–10.8)

## 2025-03-21 PROCEDURE — 36415 COLL VENOUS BLD VENIPUNCTURE: CPT

## 2025-03-21 PROCEDURE — 80053 COMPREHEN METABOLIC PANEL: CPT

## 2025-03-21 PROCEDURE — 99215 OFFICE O/P EST HI 40 MIN: CPT | Performed by: INTERNAL MEDICINE

## 2025-03-21 PROCEDURE — 3074F SYST BP LT 130 MM HG: CPT | Performed by: INTERNAL MEDICINE

## 2025-03-21 PROCEDURE — 96402 CHEMO HORMON ANTINEOPL SQ/IM: CPT

## 2025-03-21 PROCEDURE — 85025 COMPLETE CBC W/AUTO DIFF WBC: CPT

## 2025-03-21 PROCEDURE — 25010000002 FULVESTRANT PER 25 MG: Performed by: NURSE PRACTITIONER

## 2025-03-21 PROCEDURE — 3079F DIAST BP 80-89 MM HG: CPT | Performed by: INTERNAL MEDICINE

## 2025-03-21 PROCEDURE — G2211 COMPLEX E/M VISIT ADD ON: HCPCS | Performed by: INTERNAL MEDICINE

## 2025-03-21 PROCEDURE — 1126F AMNT PAIN NOTED NONE PRSNT: CPT | Performed by: INTERNAL MEDICINE

## 2025-03-21 RX ORDER — DEXAMETHASONE 0.5 MG/5ML
1 SOLUTION ORAL DAILY
Qty: 240 ML | Refills: 5 | Status: SHIPPED | OUTPATIENT
Start: 2025-03-21

## 2025-03-21 RX ORDER — BLOOD-GLUCOSE METER
1 KIT MISCELLANEOUS AS NEEDED
Qty: 1 EACH | Refills: 0 | Status: SHIPPED | OUTPATIENT
Start: 2025-03-21

## 2025-03-21 RX ORDER — GLIPIZIDE 2.5 MG/1
2.5 TABLET ORAL
Qty: 60 TABLET | Refills: 3 | Status: SHIPPED | OUTPATIENT
Start: 2025-03-21

## 2025-03-21 RX ORDER — LAMOTRIGINE 25 MG/1
500 TABLET ORAL ONCE
Status: COMPLETED | OUTPATIENT
Start: 2025-03-21 | End: 2025-03-21

## 2025-03-21 RX ADMIN — FULVESTRANT 500 MG: 50 INJECTION INTRAMUSCULAR at 14:42

## 2025-03-21 NOTE — PROGRESS NOTES
Subjective   Maritza Bejarano is a 64 y.o. female.   With metastatic invasive lobular carcinoma, ER/OH strongly positive cancer with metastatic disease to the bones.    History of Present Illness   Patient is due for second dose of Faslodex.  She is taking truqap   Reporting some abdominal discomfort and some loose stool yesterday.  She took 1 Imodium yesterday and 1 Imodium this morning.  Denies any stomatitis.    Oncologic history:    Patient is a 64-year-old postmenopausal lady who has not had a mammogram in 4 years presented with a screen detected abnormality.  She had a left breast biopsy in 2014 which was benign.  Denies palpating any breast masses skin changes or nipple discharge prior to the abnormal mammogram.  She does have left nipple inversion.    Patient has a longstanding diagnosis of multiple sclerosis and has not been on any treatment.  She was previously seen by Dr. Ozzy France and now being seen by Dr. Soto with neurology.  She has been nonambulatory for the past 4 years and requires a wheelchair.  She is unable to transfer herself in and out of wheelchairs and her  has to lift her for all the transfers.  She is also incontinent of the bladder and requiring a suprapubic catheter placed by urology to help with the same.  She had a bladder stimulator in the past which was turned off.  Other comorbidities include hypertension and hyperlipidemia.      Family history significant for maternal great grandmother with breast cancer, maternal great aunt and mother with breast cancer in her 70s.  Denies any family history of ovarian cancer, pancreatic cancer or melanoma.    5/21/2024-bilateral screening mammogram  Finding 1.new nipple retraction along with diffuse skin and trabecular thickening of the left breast.  There is suggestion of subtle architectural distortion seen on the MLO projection in the posterior central region.  3 biopsy markers are noted.  No new suspicious calcifications.  Send finding  2.few axillary lymph node seen in the left breast which display interval increase in size and density.    Finding 3.focal asymmetry measuring 10 mm seen in the anterior one third of the right breast in the medial subareolar region.    Impression  Finding 1.new nipple retraction, skin and trabecular thickening in the left breast requires additional evaluation.  There is also question architectural distortion in the posterior central breast seen on the MLO projection.  Diagnostic mammogram to include repeat full-field CC with additional posterior tissue and complete breast ultrasound is recommended.  Finding 2.axillary lymph nodes in the left breast require additional evaluation, ultrasound recommended.  Finding 3.focal asymmetry in the right breast requires additional evaluation.  Diagnostic mammogram and limited breast ultrasound is recommended.    5/29/2024-bilateral diagnostic mammogram and ultrasound  Finding 1.4 0.7 x 5.6 x 6.8 cm developing asymmetry with associated nipple retraction, skin thickening and trabecular thickening of the left breast in the central region, inferior region in the upper outer region and the lower outer region.  Finding 2.axillary lymph node in the left breast.  Finding 3.suspected finding completely resolves upon further evaluation representing benign fibroglandular breast tissue.    Bilateral breast ultrasound  Finding 1.nonparallel hypoechoic mass with indistinct margins and skin thickening and internal vascularity in the left breast in the central region, inferior region, upper outer region and in the lower outer region.  The finding is palpable and appears to involve all 4 quadrants.  Most discrete portion is located at 130, 3 cm from the nipple, skin thickening up to 6 mm.  Send finding 2.rounded enlarged left axillary lymph node measuring 11 mm.  Cortical thickening of 9 mm present.    Finding 3.no sonographic correlate.  Send finding 4.enlarged right axillary lymph node  measuring 14 mm.  Cortical thickening of 5 mm.    Impression  Finding 1.ultrasound-guided biopsy recommended  Finding 2.ultrasound-guided biopsy recommended  Finding 3.previously described right breast asymmetry resolves  Finding 4.ultrasound-guided biopsy recommended.    Ultrasound-guided biopsy of the left breast and left axilla lymph node and right axilla lymph node    1.left breast  Invasive lobular carcinoma with crush artifact  Grade 2  Invasive carcinoma measures 8.5 mm  No lymphovascular space invasion  ER +91 to 100% strong  NH +31 to 40% moderate  HER2 negative, 0  Ki-67 35%    2.left axillary lymph node focus of metastatic Dastech lobular carcinoma measuring 10 mm  ER +91 to 100% strong  NH +21 to 30%  HER2 -0  Ki-67 30%    3.right axillary lymph node with a focus of metastatic carcinoma measuring 4 mm.  Grade 2.  ER +91 to 100% strong  NH +11 to 20% moderate  HER2 negative, 0  Ki-67 35%    Pathology of all the 3 sites appear similar and findings could represent left breast lobular carcinoma metastatic to the right as well as left axillary lymph nodes.    6/14/2024-CT of the chest abdomen and pelvis  1.large ill-defined infiltrative central left breast mass measuring 6.7 x 4 cm, medially there is an irregular 2.5 x 2.5 cm mass.  Significant thickening of the skin in the left breast and there is left axilla lymphadenopathy.  Left axilla lymph node measures 1.3 x 1.3 cm.  There is also a new enlarged right axillary lymph node measuring 1.3 x 1 cm.  All of the subcentimeter right axillary lymph nodes are slightly larger than previous.  2.no mediastinal hilar or internal mammary chain lymphadenopathy  3.new indeterminate mixed density measuring 1.3 x 1.2 cm right apical pulmonary nodule.  Follow-up recommended.  4.pleural parenchymal thickening and nodules inferiorly and posterior to the right upper lobe are stable.  Chronic scarring and subsegmental atelectatic change in both lower lobes.    CT abdomen  pelvis  1.moderate fatty infiltration of the liver.  No suspicious liver lesions.  2.moderate-sized paraesophageal hernia.  No acute bowel abnormality.  3.right sacral stimulator noted at the S3 neuroforamina.  No suspicious bone lesions are noted.    6/14/2024-bone scan  1.focal abnormal uptake in the left anterior inferior iliac spine correlating with lucent lesion on the CT concerning for metastatic disease.  Further evaluation with MRI of the pelvis and left hip could be performed.  2.focal uptake in the left frontal calvarium again concerning for metastatic disease.  Noncontrast CT or MRI could be obtained.  3.soft tissue uptake noted in the left breast at the site of known left breast cancer.  4.changes from arthroplasty on the right shoulder.  5.multifocal likely degenerative uptake in each knee, ankle and foot and increased uptake in the medial and patellofemoral cortex of the right knee could be posttraumatic.      Inv[employer]ae 9 gene stat panel negative.    MRI of the brain which showed T2 hyperintensity involving the frontal bone measuring 1.6 cm in size and a smaller area of enhancement in the frontal bone laterally and inferiorly concerning for metastasis.  No brain mets    MRI of the hip and pelvis showed multiple enhancing bone lesions consistent with metastatic disease to the sacrum and pelvis.  Dominant lesion in the anterior inferior left pelvic spine and rectus femoris muscle origin that correlates with the site of bone scan uptake.  She has some right joint hip joint effusion.  Her CA 15-3 16.2    She was seen by Dr. Saavedra on 7/16/2024 and recommended to start on Ribociclib along with anastrozole.    Started Ribociclib in the first week of August 2024    Had profound nausea and vomiting requiring antiemetics.  Completed 3 weeks of Ribociclib on 8/30/2024.    Hospitalized from 9/11/2024 to 9/16/2024 for pneumonia and KINZA and since then Ribociclib has been on hold    Readmitted to the hospital from  10/17/2024 to 10/23/2024 requiring holding Ribociclib.  She was admitted for UTI with sepsis.    10/29/2024-CT of the chest which was compared to the CT chest from 6/14/2024-stable 1.3 cm subsolid nodule in the right lung apex.  Decrease in size of the left axillary lymph nodes.  Ill-defined left breast mass appears unchanged.  Expansile lytic and sclerotic lesion in the posterior left 10th rib which appears increased compared to the prior CT.  Stable subtle area of sclerosis in the left anterior third rib.    10/3/2024-MRI of the cervical spine-rounded area of marrow replacement in C7 suspicious for metastatic disease.    10/3/2024-MRI of the thoracic spine-suspicious patchy areas of marrow replacement in the thoracic spine at T5, T6, T7, T11, T12, L1 suspicious for metastatic disease.    Resumed Ribociclib in November 2024 and has been on it continuously up until February 2025 2/18/2025-CT of the chest abdomen and pelvis  13 mm right apical lesion unchanged micronodules in the right upper lobe micronodules in the left upper lobe  Left 11th rib metastasis unchanged with subtle increase sclerosis  Several hypodense lesions in the liver with the most prominent measuring 16 mm consistent with metastatic deposits.  Not present on scans from September and October 2024.  Bladder is collapsed with Cobos catheter.  Fecal impaction  Small sclerotic metastasis throughout the lumbar spine pelvis, some of which are new.  Largest lesion being in the inferior iliac spine which has become more sclerotic.    Bone scan 2/18/2025-widespread osseous metastatic disease vast majority of which are new/newly appreciable from the prior bone scan.    3/17/2025-initiated Trucap    3/20/2025-patient called complaining of abdominal cramping and loose stools.  She was recommended to start Bentyl and use Imodium for diarrhea.    3/21/2025-patient continues on Faslodex and Truqap.          The following portions of the patient's history were  reviewed and updated as appropriate: allergies, current medications, past family history, past medical history, past social history, past surgical history, and problem list.    Past Medical History:   Diagnosis Date    Anemia 2024    `Treated with iron    Dawn esophagus     per patient    Blurred vision     R/T MS    Breast cancer 05/2024+++++    Carpal tunnel syndrome     Clotting disorder 1993, 2003, 2012    3 g/i bleeds w/ transfus/ions    Colon polyp 2013    removed w/ colonoscopy    Deep vein thrombosis phlebitis 1980    Depression     Diplopia 2013    GERD (gastroesophageal reflux disease)     GI (gastrointestinal bleed) 3 bleeds    2 transfusions    H/O Skin cancer, basal cell     Headache     History of blood transfusion     History of GI bleed     R/T NSAIDS AND STEROIDS, multiple times    History of urinary tract infection     Hypercalcemia     s/p parathyroidectomy    Hyperlipidemia     Hypertension     Movement disorder     Multiple sclerosis     Optic neuritis     PONV (postoperative nausea and vomiting)     Steroid-induced diabetes 2024        Past Surgical History:   Procedure Laterality Date    APPENDECTOMY      BLADDER SURGERY      bladder stimulator    BREAST BIOPSY  don't remember    BREAST SURGERY      augmentation wtih subsequent removal    CARPAL TUNNEL RELEASE Bilateral     Left 2018, right 2020    CUBITAL TUNNEL RELEASE Left     CYSTOSCOPY BOTOX INJECTION OF BLADDER  2018    Cystoscopy with Botox    FRACTURE SURGERY  2019    rt shoulder    PARATHYROIDECTOMY      one gland removed    ROTATOR CUFF REPAIR Right 2017    TOE SURGERY      bilateral great toes    TOTAL SHOULDER ARTHROPLASTY W/ DISTAL CLAVICLE EXCISION Right 10/22/2018    Procedure: RT TOTAL SHOULDER REVERSE ARTHROPLASTY;  Surgeon: Bipin Dangelo MD;  Location: [facility];  Service: Orthopedics        Family History   Problem Relation Age of Onset    Hypertension Mother     Hyperlipidemia Mother     Aortic aneurysm Mother       "   thoracic    Diabetes Mother     Hypertension Father         charissa heart failure    Heart failure Father     Hyperlipidemia Father     Miscarriages / Stillbirths Sister     Multiple sclerosis Brother     Atrial fibrillation Brother     Diabetes Maternal Grandfather     Stroke Maternal Grandfather     Parkinsonism Paternal Grandmother     Tremor Paternal Grandmother     Stomach cancer Paternal Grandfather     Diabetes Maternal Grandmother         Social History     Socioeconomic History    Marital status:      Spouse name: Donald    Number of children: 0   Tobacco Use    Smoking status: Former     Current packs/day: 0.00     Average packs/day: 2.0 packs/day for 30.0 years (60.0 ttl pk-yrs)     Types: Cigarettes     Start date: 10/22/1973     Quit date: 10/22/2003     Years since quittin.4    Smokeless tobacco: Never   Vaping Use    Vaping status: Never Used   Substance and Sexual Activity    Alcohol use: Not Currently    Drug use: Not Currently     Types: Marijuana     Comment: advised by neurologist for sleep    Sexual activity: Not Currently     Partners: Male     Birth control/protection: Post-menopausal        OB History    No obstetric history on file.     Age at menarche-15  Age at first live childbirth-not applicable   2 para 0  0  Age of menopause-57  No use of hormone replacement therapy      No Known Allergies     Review of systems as mentioned HPI otherwise negative    Objective   Height 165.1 cm (65\"), not currently breastfeeding.     Physical Exam  Vitals reviewed.   Constitutional:       Appearance: Normal appearance. She is normal weight.   HENT:      Right Ear: External ear normal.      Left Ear: External ear normal.      Nose: Nose normal.      Mouth/Throat:      Pharynx: Oropharynx is clear.   Eyes:      Conjunctiva/sclera: Conjunctivae normal.   Cardiovascular:      Rate and Rhythm: Normal rate.   Pulmonary:      Effort: Pulmonary effort is normal.   Abdominal:      " General: Abdomen is flat.   Musculoskeletal:         General: Normal range of motion.      Cervical back: Normal range of motion.   Skin:     General: Skin is warm.   Neurological:      General: No focal deficit present.      Mental Status: She is alert and oriented to person, place, and time.   Psychiatric:         Mood and Affect: Mood normal.         Behavior: Behavior normal.         Thought Content: Thought content normal.         Judgment: Judgment normal.       Breast Exam: Right breast appears normal on inspection.  No palpable right axilla lymphadenopathy or right breast masses.  Right nipple inverted.  Left breast on inspection there is nipple retraction crusting over the nipple region.  On palpation there is a 8 x 6 cm mass (improved) in the retroareolar region occupying much of the breast.  Palpable left axillary adenopathy as well.      I have reexamined the patient and the results are consistent with the previously documented exam. Zainab Lopes MD        Admission on 03/12/2025, Discharged on 03/13/2025   Component Date Value Ref Range Status    Glucose 03/12/2025 129 (H)  65 - 99 mg/dL Final    BUN 03/12/2025 13  8 - 23 mg/dL Final    Creatinine 03/12/2025 0.61  0.57 - 1.00 mg/dL Final    Sodium 03/12/2025 144  136 - 145 mmol/L Final    Potassium 03/12/2025 3.4 (L)  3.5 - 5.2 mmol/L Final    Chloride 03/12/2025 106  98 - 107 mmol/L Final    CO2 03/12/2025 24.4  22.0 - 29.0 mmol/L Final    Calcium 03/12/2025 8.8  8.6 - 10.5 mg/dL Final    Total Protein 03/12/2025 7.0  6.0 - 8.5 g/dL Final    Albumin 03/12/2025 3.7  3.5 - 5.2 g/dL Final    ALT (SGPT) 03/12/2025 54 (H)  1 - 33 U/L Final    AST (SGOT) 03/12/2025 107 (H)  1 - 32 U/L Final    Alkaline Phosphatase 03/12/2025 183 (H)  39 - 117 U/L Final    Total Bilirubin 03/12/2025 0.4  0.0 - 1.2 mg/dL Final    Globulin 03/12/2025 3.3  gm/dL Final    A/G Ratio 03/12/2025 1.1  g/dL Final    BUN/Creatinine Ratio 03/12/2025 21.3  7.0 - 25.0 Final    Anion  Gap 03/12/2025 13.6  5.0 - 15.0 mmol/L Final    eGFR 03/12/2025 101.2  >60.0 mL/min/1.73 Final    Color, UA 03/12/2025 Yellow  Yellow, Straw Final    Appearance, UA 03/12/2025 Cloudy (A)  Clear Final    pH, UA 03/12/2025 6.5  5.0 - 8.0 Final    Specific Gravity, UA 03/12/2025 1.015  1.005 - 1.030 Final    Glucose, UA 03/12/2025 Negative  Negative Final    Ketones, UA 03/12/2025 Negative  Negative Final    Bilirubin, UA 03/12/2025 Negative  Negative Final    Blood, UA 03/12/2025 Trace (A)  Negative Final    Protein, UA 03/12/2025 Trace (A)  Negative Final    Leuk Esterase, UA 03/12/2025 Moderate (2+) (A)  Negative Final    Nitrite, UA 03/12/2025 Negative  Negative Final    Urobilinogen, UA 03/12/2025 0.2 E.U./dL  0.2 - 1.0 E.U./dL Final    WBC 03/12/2025 4.21  3.40 - 10.80 10*3/mm3 Final    RBC 03/12/2025 4.28  3.77 - 5.28 10*6/mm3 Final    Hemoglobin 03/12/2025 13.5  12.0 - 15.9 g/dL Final    Hematocrit 03/12/2025 38.7  34.0 - 46.6 % Final    MCV 03/12/2025 90.4  79.0 - 97.0 fL Final    MCH 03/12/2025 31.5  26.6 - 33.0 pg Final    MCHC 03/12/2025 34.9  31.5 - 35.7 g/dL Final    RDW 03/12/2025 19.6 (H)  12.3 - 15.4 % Final    RDW-SD 03/12/2025 64.2 (H)  37.0 - 54.0 fl Final    MPV 03/12/2025 8.5  6.0 - 12.0 fL Final    Platelets 03/12/2025 352  140 - 450 10*3/mm3 Final    Neutrophil % 03/12/2025 70.0  42.7 - 76.0 % Final    Lymphocyte % 03/12/2025 20.4  19.6 - 45.3 % Final    Monocyte % 03/12/2025 6.7  5.0 - 12.0 % Final    Eosinophil % 03/12/2025 0.7  0.3 - 6.2 % Final    Basophil % 03/12/2025 1.2  0.0 - 1.5 % Final    Immature Grans % 03/12/2025 1.0 (H)  0.0 - 0.5 % Final    Neutrophils, Absolute 03/12/2025 2.95  1.70 - 7.00 10*3/mm3 Final    Lymphocytes, Absolute 03/12/2025 0.86  0.70 - 3.10 10*3/mm3 Final    Monocytes, Absolute 03/12/2025 0.28  0.10 - 0.90 10*3/mm3 Final    Eosinophils, Absolute 03/12/2025 0.03  0.00 - 0.40 10*3/mm3 Final    Basophils, Absolute 03/12/2025 0.05  0.00 - 0.20 10*3/mm3 Final     Immature Grans, Absolute 03/12/2025 0.04  0.00 - 0.05 10*3/mm3 Final    nRBC 03/12/2025 0.0  0.0 - 0.2 /100 WBC Final    RBC, UA 03/12/2025 None Seen  None Seen, 0-2 /HPF Final    WBC, UA 03/12/2025 Too Numerous to Count (A)  None Seen, 0-2 /HPF Final    Bacteria, UA 03/12/2025 3+ (A)  None Seen /HPF Final    Squamous Epithelial Cells, UA 03/12/2025 0-2  None Seen, 0-2 /HPF Final    Yeast, UA 03/12/2025 Small/1+ Yeast (A)  None Seen /HPF Final    Hyaline Casts, UA 03/12/2025 None Seen  None Seen /LPF Final    Methodology 03/12/2025 Manual Light Microscopy   Final   Lab on 03/07/2025   Component Date Value Ref Range Status    Glucose 03/07/2025 98  65 - 99 mg/dL Final    BUN 03/07/2025 13  8 - 23 mg/dL Final    Creatinine 03/07/2025 0.53 (L)  0.57 - 1.00 mg/dL Final    Sodium 03/07/2025 143  136 - 145 mmol/L Final    Potassium 03/07/2025 3.1 (L)  3.5 - 5.2 mmol/L Final    Chloride 03/07/2025 106  98 - 107 mmol/L Final    CO2 03/07/2025 25.9  22.0 - 29.0 mmol/L Final    Calcium 03/07/2025 8.7  8.6 - 10.5 mg/dL Final    Total Protein 03/07/2025 6.8  6.0 - 8.5 g/dL Final    Albumin 03/07/2025 3.6  3.5 - 5.2 g/dL Final    ALT (SGPT) 03/07/2025 32  1 - 33 U/L Final    AST (SGOT) 03/07/2025 89 (C)  1 - 32 U/L Final    Alkaline Phosphatase 03/07/2025 158 (H)  39 - 117 U/L Final    Total Bilirubin 03/07/2025 0.3  0.0 - 1.2 mg/dL Final    Globulin 03/07/2025 3.2  gm/dL Final    A/G Ratio 03/07/2025 1.1  g/dL Final    BUN/Creatinine Ratio 03/07/2025 24.5  7.0 - 25.0 Final    Anion Gap 03/07/2025 11.1  5.0 - 15.0 mmol/L Final    eGFR 03/07/2025 104.7  >60.0 mL/min/1.73 Final    Magnesium 03/07/2025 2.1  1.6 - 2.4 mg/dL Final    Phosphorus 03/07/2025 2.3 (L)  2.5 - 4.5 mg/dL Final    Hemoglobin A1C 03/07/2025 6.40 (H)  4.80 - 5.60 % Final    WBC 03/07/2025 3.01 (L)  3.40 - 10.80 10*3/mm3 Final    RBC 03/07/2025 4.03  3.77 - 5.28 10*6/mm3 Final    Hemoglobin 03/07/2025 12.5  12.0 - 15.9 g/dL Final    Hematocrit 03/07/2025 38.1   34.0 - 46.6 % Final    MCV 03/07/2025 94.5  79.0 - 97.0 fL Final    MCH 03/07/2025 31.0  26.6 - 33.0 pg Final    MCHC 03/07/2025 32.8  31.5 - 35.7 g/dL Final    RDW 03/07/2025 19.6 (H)  12.3 - 15.4 % Final    RDW-SD 03/07/2025 67.7 (H)  37.0 - 54.0 fl Final    MPV 03/07/2025 8.6  6.0 - 12.0 fL Final    Platelets 03/07/2025 287  140 - 450 10*3/mm3 Final    Neutrophil % 03/07/2025 57.8  42.7 - 76.0 % Final    Lymphocyte % 03/07/2025 27.9  19.6 - 45.3 % Final    Monocyte % 03/07/2025 10.6  5.0 - 12.0 % Final    Eosinophil % 03/07/2025 1.3  0.3 - 6.2 % Final    Basophil % 03/07/2025 1.7 (H)  0.0 - 1.5 % Final    Immature Grans % 03/07/2025 0.7 (H)  0.0 - 0.5 % Final    Neutrophils, Absolute 03/07/2025 1.74  1.70 - 7.00 10*3/mm3 Final    Lymphocytes, Absolute 03/07/2025 0.84  0.70 - 3.10 10*3/mm3 Final    Monocytes, Absolute 03/07/2025 0.32  0.10 - 0.90 10*3/mm3 Final    Eosinophils, Absolute 03/07/2025 0.04  0.00 - 0.40 10*3/mm3 Final    Basophils, Absolute 03/07/2025 0.05  0.00 - 0.20 10*3/mm3 Final    Immature Grans, Absolute 03/07/2025 0.02  0.00 - 0.05 10*3/mm3 Final    nRBC 03/07/2025 0.0  0.0 - 0.2 /100 WBC Final   Lab on 02/28/2025   Component Date Value Ref Range Status    Magnesium 02/28/2025 2.1  1.6 - 2.4 mg/dL Final    Phosphorus 02/28/2025 2.7  2.5 - 4.5 mg/dL Final    Glucose 02/28/2025 130 (H)  65 - 99 mg/dL Final    BUN 02/28/2025 17  8 - 23 mg/dL Final    Creatinine 02/28/2025 0.58  0.57 - 1.00 mg/dL Final    Sodium 02/28/2025 135 (L)  136 - 145 mmol/L Final    Potassium 02/28/2025 3.3 (L)  3.5 - 5.2 mmol/L Final    Chloride 02/28/2025 100  98 - 107 mmol/L Final    CO2 02/28/2025 24.0  22.0 - 29.0 mmol/L Final    Calcium 02/28/2025 8.7  8.6 - 10.5 mg/dL Final    Total Protein 02/28/2025 7.0  6.0 - 8.5 g/dL Final    Albumin 02/28/2025 3.7  3.5 - 5.2 g/dL Final    ALT (SGPT) 02/28/2025 25  1 - 33 U/L Final    AST (SGOT) 02/28/2025 69 (H)  1 - 32 U/L Final    Alkaline Phosphatase 02/28/2025 171 (H)  39 -  117 U/L Final    Total Bilirubin 02/28/2025 0.4  0.0 - 1.2 mg/dL Final    Globulin 02/28/2025 3.3  gm/dL Final    A/G Ratio 02/28/2025 1.1  g/dL Final    BUN/Creatinine Ratio 02/28/2025 29.3 (H)  7.0 - 25.0 Final    Anion Gap 02/28/2025 11.0  5.0 - 15.0 mmol/L Final    eGFR 02/28/2025 102.5  >60.0 mL/min/1.73 Final    WBC 02/28/2025 4.19  3.40 - 10.80 10*3/mm3 Final    RBC 02/28/2025 4.04  3.77 - 5.28 10*6/mm3 Final    Hemoglobin 02/28/2025 12.6  12.0 - 15.9 g/dL Final    Hematocrit 02/28/2025 37.3  34.0 - 46.6 % Final    MCV 02/28/2025 92.3  79.0 - 97.0 fL Final    MCH 02/28/2025 31.2  26.6 - 33.0 pg Final    MCHC 02/28/2025 33.8  31.5 - 35.7 g/dL Final    RDW 02/28/2025 19.2 (H)  12.3 - 15.4 % Final    RDW-SD 02/28/2025 64.6 (H)  37.0 - 54.0 fl Final    MPV 02/28/2025 8.4  6.0 - 12.0 fL Final    Platelets 02/28/2025 349  140 - 450 10*3/mm3 Final    Neutrophil % 02/28/2025 74.4  42.7 - 76.0 % Final    Lymphocyte % 02/28/2025 18.4 (L)  19.6 - 45.3 % Final    Monocyte % 02/28/2025 4.3 (L)  5.0 - 12.0 % Final    Eosinophil % 02/28/2025 0.5  0.3 - 6.2 % Final    Basophil % 02/28/2025 1.7 (H)  0.0 - 1.5 % Final    Immature Grans % 02/28/2025 0.7 (H)  0.0 - 0.5 % Final    Neutrophils, Absolute 02/28/2025 3.12  1.70 - 7.00 10*3/mm3 Final    Lymphocytes, Absolute 02/28/2025 0.77  0.70 - 3.10 10*3/mm3 Final    Monocytes, Absolute 02/28/2025 0.18  0.10 - 0.90 10*3/mm3 Final    Eosinophils, Absolute 02/28/2025 0.02  0.00 - 0.40 10*3/mm3 Final    Basophils, Absolute 02/28/2025 0.07  0.00 - 0.20 10*3/mm3 Final    Immature Grans, Absolute 02/28/2025 0.03  0.00 - 0.05 10*3/mm3 Final    nRBC 02/28/2025 0.0  0.0 - 0.2 /100 WBC Final        No radiology results for the last 30 days.     CT of the chest abdomen and pelvis and bone scan 2/18/2025 images independently reviewed and interpreted by me in detail summarized above    Assessment & Plan       *Left breast invasive lobular carcinoma  The tumor is estrogen receptor +91 to 100%,  progesterone receptor +31 to 40%, HER2 negative, 0, Ki-67 35%  The tumor measures greater than 10 cm on exam, lymph node positive.  CT of the chest abdomen and pelvis with right upper lobe pulmonary nodule which is new and the bone scan also shows uptake in the left anterior inferior iliac spine which correlates to a lucent area on the CT scan and also focal uptake noted in the left frontal calvarium concerning for metastatic disease.  Further evaluation with a MRI of the pelvis and hip as well as MRI of the brain is underway.  The scans are scheduled for 7/10/2024.  Clinical T3 N1 M1, stage IV invasive lobular carcinoma.  There is also a right axillary lymph node which has been biopsied and consistent with invasive lobular carcinoma with similar morphology as well as receptors concerning for metastatic disease rather than a primary right breast cancer.  Recommended Ribociclib 400 mg 3 weeks on and 1 week off.  July 16, 2024: Reviewed MRI of the pelvis and hip consistent with many bony metastasis.  MRI brain shows left frontal bone mets but no brain involvement.  Patient is here to start day 1 ribociclib along with anastrozole.  Continues on anastrozole.  Tempus performed on the left axilla lymph node biopsy shows PIK3CA mutation, CDH 1 mutation and Erb B2 mutation.  Therefore patient would benefit from alpelisib and Faslodex after progression on Ribociclib and AI.  Other options include neratinib plus Faslodex.  Initiated Ribociclib in August 2024.  8/30/2024-last day of week 3 of Ribociclib.    Patient completed 1 cycle of Ribociclib 400 mg and subsequently has not been able to go back on Ribociclib due to recurrent hospitalizations and abnormal LFTs.  10/14/2024-LFTs are normal today.  Recommend resuming Ribociclib at 200 mg and subsequently we will increase the dose to 400 mg with the next cycle.  Patient was unable to resume Ribociclib as she was admitted to the hospital from 10/17/2024 to  10/23/2024  11/11/2024-Labs reviewed and overall stable except for an ALT of 49.  Recommend resuming Ribociclib at 200 mg.  CT of the chest performed 10/29/2024 shows stable size of the left breast mass, decrease in size of the left axillary lymph node and mixed lytic and blastic lesion of the rib slightly increased in size.  11/11/2024-resumed Ribociclib 200 mg daily for 3/4 weeks.  2/18/2025-scans with disease progression in the bones in the liver.  Discontinue Ribociclib and anastrozole  2/28/2025-received the first dose of Faslodex.  3/17/2025-started truqap  3/21/2025- CBC reviewed and WBC 4.81, hemoglobin 13.9 platelets 595,000, CMP reviewed and LFTs have improved with ALT which is normal at 33, AST 71, alkaline phosphatase 185, total bilirubin normal at 0.2, blood sugar elevated at 409  .  Completed 1 week of trucap, resume again on 3/24/2025  Proceed with Faslodex today    *Severe hypoglycemia  Blood sugar greater than 400  Administer 5 units of insulin  Start glipizide 2.5 mg  Remain glucometer and diabetic education.    *Abdominal cramping and diarrhea  Secondary to capivasertib.  Continue Imodium as needed  If no control in symptoms Lomotil will be prescribed.    *Right axillary lymphadenopathy  Biopsied and consistent with invasive lobular carcinoma, grade 2, ER/NV positive and HER2 negative  Please refer to the above discussion for details    *Right breast non-mass enhancement  6/28/2024-MRI of the breast with non-mass enhancement noted in the right breast and patient had questions regarding if this should be biopsied.  Given extensive metastatic disease in the bones and right axilla lymph node consistent with invasive lobular carcinoma management would not change with the biopsy and hence I would recommend against it.    *Severe nausea  Continue Compazine and Zofran as needed  Well-controlled    *Skeletal metastasis  Patient will be started on Xgeva  8/16/2024 Xgeva initiation will be held as the  patient has not been to the dentist in over 2 years.  Discussed possible side effects of Xgeva and she will schedule a dental visit and we will have her back in 1 week to initiate Xgeva pending this is complete.  10/14/2024-second dose of Xgeva will be administered.  Labs reviewed and stable to proceed.  continue Xgeva every 4 weeks alongside with Faslodex    *Multiple sclerosis  Patient was not on any treatment previously.  She sees Dr. Jacobson at [facility].  Due to profound weakness patient requested her neurologist to start steroids.  They plan to initiate high-dose IV steroids to help with this.  High-dose IV steroids have not been initiated.  Doing physical therapy and received high-dose steroids  Stable    *Hypertension-continue current medication  Blood pressure BP: 118/80   Continue to monitor  No changes in medications    *Hyperlipidemia-continue current medication  No changes    *Urinary incontinence-patient had  a suprapubic catheter placed by urology.  Patient with recurrent UTIs  UTIs are a side effect of truqap  Discussed with her primary care physician regarding suppressive therapy    *History of iron deficiency anemia-  3/8/2024 hemoglobin was low at 10.9 and subsequently patient took oral iron which resulted in improvement of hemoglobin and normal.  She was scheduled for colonoscopy however she canceled that due to ongoing management of breast cancer.  She proceed with a colonoscopy.  8/16/2024 hemoglobin is 10.2.  She states she recently was told to begin daily iron supplementation.  Baseline iron studies ordered today she is only been taking the iron for a few days.  Ferritin 32, iron saturation 5%, TIBC 440.  We will repeat this in 6 to 8 weeks.  12/10/2024 Hgb 12.0. Continue oral iron  Hemoglobin normal at 12.6 on 2/28/2025  Hemoglobin normal    *Genetic testing-9 gene stat panel negative    *Dyspnea  Stable to improved    *Right upper lobe pulmonary nodule   Concerning for metastatic  disease  PET/CT may not be helpful as invasive lobular carcinomas typically are not very PET avid.  Could consider PET-FES    *Follow-up-after the MRIs.  Reviewed MRI of the pelvis and MRI of the brain which shows mets in the bone  MRI of the cervical and thoracic spine performed at outside facility on 10/3/2024    *Abnormal LFTs  Likely secondary to Ribociclib  Ribociclib dose increased to 400 mg daily on 12/10/2024  12/31/2024-Labs reviewed and stable.  1/13/2025: LFTs further increased with AST 72, ALT 22.  Decision made to hold off on starting next cycle of ribociclib.  1/31/2025-AST 72, ALT normal, total bilirubin normal  2/28/2025-AST 69,     *Insomnia  Severe  Using trazodone and Ambien.  Neither of them helping  Only clonazepam helps  Referral to supportive oncology    *Alternate medication  Patient sees a outside provider and receives ivermectin and fenbendazole  We have ran an interaction check with the medications and does not appear to be any interaction however I want her against taking these medications and potential side effects  Patient however wishes to proceed with his medications.    Plan:   Continue Faslodex and  truqap  Xgeva every  4 weeks administered along with Faslodex  APRN in 2 weeks with Faslodex and Xgeva  MD in 4 weeks with labs    This patient is on high risk drug therapy requiring intensive monitoring for toxicity.  Severe hyperglycemia secondary to capivasertib      Zainab Lopes MD

## 2025-03-24 RX ORDER — BLOOD SUGAR DIAGNOSTIC
STRIP MISCELLANEOUS
Qty: 100 EACH | Refills: 12 | Status: SHIPPED | OUTPATIENT
Start: 2025-03-24

## 2025-03-25 ENCOUNTER — HOSPITAL ENCOUNTER (EMERGENCY)
Facility: HOSPITAL | Age: 65
Discharge: HOME | End: 2025-03-25
Attending: EMERGENCY MEDICINE
Payer: MEDICARE

## 2025-03-25 ENCOUNTER — APPOINTMENT (OUTPATIENT)
Dept: CT IMAGING | Facility: HOSPITAL | Age: 65
End: 2025-03-25
Payer: MEDICARE

## 2025-03-25 VITALS
TEMPERATURE: 98.4 F | OXYGEN SATURATION: 95 % | DIASTOLIC BLOOD PRESSURE: 57 MMHG | RESPIRATION RATE: 16 BRPM | BODY MASS INDEX: 24.16 KG/M2 | WEIGHT: 145 LBS | SYSTOLIC BLOOD PRESSURE: 146 MMHG | HEIGHT: 65 IN | HEART RATE: 75 BPM

## 2025-03-25 DIAGNOSIS — T83.9XXA COMPLICATION OF FOLEY CATHETER, INITIAL ENCOUNTER: Primary | ICD-10-CM

## 2025-03-25 DIAGNOSIS — R93.89 ABNORMAL CT SCAN: ICD-10-CM

## 2025-03-25 DIAGNOSIS — N39.0 ACUTE UTI: ICD-10-CM

## 2025-03-25 LAB
ALBUMIN SERPL-MCNC: 4.2 G/DL (ref 3.5–5.2)
ALBUMIN/GLOB SERPL: 1.1 G/DL
ALP SERPL-CCNC: 205 U/L (ref 39–117)
ALT SERPL W P-5'-P-CCNC: 34 U/L (ref 1–33)
ANION GAP SERPL CALCULATED.3IONS-SCNC: 14 MMOL/L (ref 5–15)
AST SERPL-CCNC: 53 U/L (ref 1–32)
BACTERIA UR QL AUTO: ABNORMAL /HPF
BASOPHILS # BLD AUTO: 0.1 10*3/MM3 (ref 0–0.2)
BASOPHILS NFR BLD AUTO: 1.8 % (ref 0–1.5)
BILIRUB SERPL-MCNC: 0.2 MG/DL (ref 0–1.2)
BILIRUB UR QL STRIP: NEGATIVE
BUN SERPL-MCNC: 22 MG/DL (ref 8–23)
BUN/CREAT SERPL: 26.5 (ref 7–25)
CALCIUM SPEC-SCNC: 9.2 MG/DL (ref 8.6–10.5)
CHLORIDE SERPL-SCNC: 105 MMOL/L (ref 98–107)
CLARITY UR: ABNORMAL
CO2 SERPL-SCNC: 23 MMOL/L (ref 22–29)
COLOR UR: ABNORMAL
CREAT SERPL-MCNC: 0.83 MG/DL (ref 0.57–1)
DEPRECATED RDW RBC AUTO: 61.5 FL (ref 37–54)
EGFRCR SERPLBLD CKD-EPI 2021: 79.8 ML/MIN/1.73
EOSINOPHIL # BLD AUTO: 0.2 10*3/MM3 (ref 0–0.4)
EOSINOPHIL NFR BLD AUTO: 3.7 % (ref 0.3–6.2)
ERYTHROCYTE [DISTWIDTH] IN BLOOD BY AUTOMATED COUNT: 18.4 % (ref 12.3–15.4)
GLOBULIN UR ELPH-MCNC: 3.7 GM/DL
GLUCOSE SERPL-MCNC: 235 MG/DL (ref 65–99)
GLUCOSE UR STRIP-MCNC: NEGATIVE MG/DL
HCT VFR BLD AUTO: 43.3 % (ref 34–46.6)
HGB BLD-MCNC: 14.7 G/DL (ref 12–15.9)
HGB UR QL STRIP.AUTO: ABNORMAL
HYALINE CASTS UR QL AUTO: ABNORMAL /LPF
IMM GRANULOCYTES # BLD AUTO: 0.04 10*3/MM3 (ref 0–0.05)
IMM GRANULOCYTES NFR BLD AUTO: 0.7 % (ref 0–0.5)
KETONES UR QL STRIP: NEGATIVE
LEUKOCYTE ESTERASE UR QL STRIP.AUTO: ABNORMAL
LYMPHOCYTES # BLD AUTO: 1.21 10*3/MM3 (ref 0.7–3.1)
LYMPHOCYTES NFR BLD AUTO: 22.4 % (ref 19.6–45.3)
MCH RBC QN AUTO: 31.4 PG (ref 26.6–33)
MCHC RBC AUTO-ENTMCNC: 33.9 G/DL (ref 31.5–35.7)
MCV RBC AUTO: 92.5 FL (ref 79–97)
MONOCYTES # BLD AUTO: 0.21 10*3/MM3 (ref 0.1–0.9)
MONOCYTES NFR BLD AUTO: 3.9 % (ref 5–12)
NEUTROPHILS NFR BLD AUTO: 3.65 10*3/MM3 (ref 1.7–7)
NEUTROPHILS NFR BLD AUTO: 67.5 % (ref 42.7–76)
NITRITE UR QL STRIP: NEGATIVE
NRBC BLD AUTO-RTO: 0 /100 WBC (ref 0–0.2)
PH UR STRIP.AUTO: 5.5 [PH] (ref 5–8)
PLATELET # BLD AUTO: 726 10*3/MM3 (ref 140–450)
PMV BLD AUTO: 8.7 FL (ref 6–12)
POTASSIUM SERPL-SCNC: 3.4 MMOL/L (ref 3.5–5.2)
PROT SERPL-MCNC: 7.9 G/DL (ref 6–8.5)
PROT UR QL STRIP: ABNORMAL
RBC # BLD AUTO: 4.68 10*6/MM3 (ref 3.77–5.28)
RBC # UR STRIP: ABNORMAL /HPF
REF LAB TEST METHOD: ABNORMAL
SODIUM SERPL-SCNC: 142 MMOL/L (ref 136–145)
SP GR UR STRIP: 1.02 (ref 1–1.03)
SQUAMOUS #/AREA URNS HPF: ABNORMAL /HPF
UROBILINOGEN UR QL STRIP: ABNORMAL
WBC # UR STRIP: ABNORMAL /HPF
WBC NRBC COR # BLD AUTO: 5.41 10*3/MM3 (ref 3.4–10.8)

## 2025-03-25 PROCEDURE — 80053 COMPREHEN METABOLIC PANEL: CPT | Performed by: EMERGENCY MEDICINE

## 2025-03-25 PROCEDURE — 87086 URINE CULTURE/COLONY COUNT: CPT | Performed by: EMERGENCY MEDICINE

## 2025-03-25 PROCEDURE — 25810000003 SODIUM CHLORIDE 0.9 % SOLUTION: Performed by: EMERGENCY MEDICINE

## 2025-03-25 PROCEDURE — 81001 URINALYSIS AUTO W/SCOPE: CPT | Performed by: EMERGENCY MEDICINE

## 2025-03-25 PROCEDURE — 96365 THER/PROPH/DIAG IV INF INIT: CPT

## 2025-03-25 PROCEDURE — 74177 CT ABD & PELVIS W/CONTRAST: CPT

## 2025-03-25 PROCEDURE — 99285 EMERGENCY DEPT VISIT HI MDM: CPT

## 2025-03-25 PROCEDURE — 85025 COMPLETE CBC W/AUTO DIFF WBC: CPT | Performed by: EMERGENCY MEDICINE

## 2025-03-25 PROCEDURE — 96361 HYDRATE IV INFUSION ADD-ON: CPT

## 2025-03-25 PROCEDURE — 51702 INSERT TEMP BLADDER CATH: CPT

## 2025-03-25 PROCEDURE — 25510000001 IOPAMIDOL 61 % SOLUTION: Performed by: EMERGENCY MEDICINE

## 2025-03-25 PROCEDURE — 25010000002 CEFTRIAXONE PER 250 MG: Performed by: EMERGENCY MEDICINE

## 2025-03-25 RX ORDER — NAPROXEN 500 MG/1
500 TABLET ORAL 2 TIMES DAILY PRN
Qty: 15 TABLET | Refills: 0 | Status: SHIPPED | OUTPATIENT
Start: 2025-03-25

## 2025-03-25 RX ORDER — SULFAMETHOXAZOLE AND TRIMETHOPRIM 800; 160 MG/1; MG/1
1 TABLET ORAL 2 TIMES DAILY
Qty: 14 TABLET | Refills: 0 | Status: SHIPPED | OUTPATIENT
Start: 2025-03-25

## 2025-03-25 RX ORDER — IOPAMIDOL 612 MG/ML
100 INJECTION, SOLUTION INTRAVASCULAR
Status: COMPLETED | OUTPATIENT
Start: 2025-03-25 | End: 2025-03-25

## 2025-03-25 RX ORDER — HYDROCODONE BITARTRATE AND ACETAMINOPHEN 5; 325 MG/1; MG/1
1 TABLET ORAL ONCE
Refills: 0 | Status: COMPLETED | OUTPATIENT
Start: 2025-03-25 | End: 2025-03-25

## 2025-03-25 RX ORDER — PHENAZOPYRIDINE HYDROCHLORIDE 200 MG/1
200 TABLET, FILM COATED ORAL 3 TIMES DAILY PRN
Qty: 20 TABLET | Refills: 0 | Status: SHIPPED | OUTPATIENT
Start: 2025-03-25

## 2025-03-25 RX ADMIN — SODIUM CHLORIDE 1000 ML: 0.9 INJECTION, SOLUTION INTRAVENOUS at 04:40

## 2025-03-25 RX ADMIN — IOPAMIDOL 85 ML: 612 INJECTION, SOLUTION INTRAVENOUS at 04:54

## 2025-03-25 RX ADMIN — HYDROCODONE BITARTRATE AND ACETAMINOPHEN 1 TABLET: 5; 325 TABLET ORAL at 09:00

## 2025-03-25 RX ADMIN — CEFTRIAXONE SODIUM 1000 MG: 1 INJECTION, POWDER, FOR SOLUTION INTRAMUSCULAR; INTRAVENOUS at 05:41

## 2025-03-25 NOTE — ED PROVIDER NOTES
ED Course as of 03/25/25 0840   Tue Mar 25, 2025   0332 Creatinine: 0.83 [AB]   0332 WBC: 5.41 [AB]   0435 Nursing staff unable to pass Cobos catheter.  They are meeting a lot of resistance for getting urine returning.  Attempted multiple catheters of decreasing sizes.  There is some concern for fistula.  Will order CT imaging and likely consult urology after. [AB]   0500 CT Abdomen Pelvis With Contrast  My independent interpretation of the imaging study is no free air [AB]   0535 Bacteria, UA(!): 4+ [AB]   0535 WBC, UA(!): Too Numerous to Count [AB]   0653 MDM: Patient presents with Cobos catheter issues.  Catheter was removed but when they attempted to replace it, they met resistance and were unsuccessful.  CT imaging is pending.  Urology will need to be consulted to placing catheter.  Transfer care to Suman Montiel pending CT imaging and completion of workup. [AB]   0746 I reassessed the patient at this time.  She is resting comfortably in bed.  Updated about CT findings including incidental findings of lesions to the liver and bones that she was aware of regarding her breast cancer history.  Reportedly has stage IV breast cancer and is being treated by the oncology service here at [facility].  In regards to her acute symptoms, no obvious evidence of fistula formation on CT imaging.  Plan at this time for attempted catheter placement then consult with urology for further guidance. [DC]   1773 I discussed the case with MD Terrance with Urology at this time regarding the patient.  I discussed work-up, results, concerns.  I discussed the consulting provider's desire for discharging the patient with oral antibiotics.  He believes that her difficulty with catheter placement is related to inflammatory cystitis/urethritis from her infection versus traumatic injury from multiple attempts at catheter placement.  After an IV dose of Rocephin he feels she is appropriate for oral antibiotics and close follow-up in the office with  urology within the next week.   [DC]   8207 Patient presentation and evaluation consistent with acute cystitis with dysfunctional urinary catheter.  After several failed attempts her catheter has now been placed and is draining properly.  Plan for outpatient management with oral antibiotics for presumed cystitis and encourage close follow-up with urology.  Patient agreeable with all questions answered. [DC]      ED Course User Index  [AB] Gurpreet Kunz MD  [DC] Bipin Montiel, Bipin Woods PA  03/25/25 1451

## 2025-03-25 NOTE — ED PROVIDER NOTES
EMERGENCY DEPARTMENT ENCOUNTER  Room Number:  10/10  PCP: Enoc Carbone DO  Independent Historians: Patient  Date of encounter:  3/25/2025  Patient Care Team:  Enoc Carbone DO as PCP - General (Internal Medicine)  Brittney Ortiz, RN as Nurse Navigator (Oncology)  Kim Barber MD as Referring Physician (General Surgery)  Zainab Lopes MD as Consulting Physician (Hematology and Oncology)     HPI:  Chief Complaint: had concerns including matias catheter problem.     A complete HPI/ROS/PMH/PSH/SH/FH are unobtainable due to: None    Chronic or social conditions impacting patient care (Social Determinants of Health): None    Context: Maritza Bejarano is a 64 y.o. female with a medical history of hypertension, hyperlipidemia, MS, chronic urinary retention with Matias catheter, stage IV breast cancer who presents to the ED c/o acute issue with Matias catheter.  Patient states that her Matias catheter was exchanged 1 week ago and became occluded.  It became slightly displaced and has been leaking since this evening.  She is also had some discomfort which she does not typically have.  No nausea, vomiting, fever or chills.  She denies any change in urinary character.    Review of prior external notes (non-ED) -and- Review of prior external test results outside of this encounter:  Recent positive urine culture was from 9/7/2024.  It grew Klebsiella that was sensitive to most antibiotics but resistant to ampicillin and intermediate to Macrobid.    PAST MEDICAL HISTORY  Active Ambulatory Problems     Diagnosis Date Noted    H/O total shoulder replacement, right 10/22/2018    Cough 01/28/2021    Acute UTI (urinary tract infection) 01/28/2021    Multiple sclerosis 01/28/2021    Essential hypertension 01/28/2021    Hyperlipidemia 01/28/2021    Shortness of breath 01/28/2021    Oropharyngeal dysphagia 02/04/2021    Abnormal finding on mammography 08/09/2021    Acid reflux 08/09/2021    Anxiety and depression 05/01/2018     Brash 08/09/2021    Carpal tunnel syndrome 01/10/2013    Chronic low back pain 01/22/2014    Diplopia 01/10/2013    Disease with a predominantly sexual mode of transmission 08/09/2021    FOM (frequency of micturition) 08/09/2021    Headache 01/10/2013    History of diplopia 01/07/2020    History of optic neuritis 01/07/2020    History of vitamin D deficiency 10/31/2017    Migraine syndrome 01/22/2014    Mixed incontinence 07/31/2017    Nausea 08/10/2015    Neurogenic bladder 02/23/2017    Pain in joint of right shoulder 07/31/2017    Restless legs syndrome 07/23/2014    Rupture of rotator cuff of shoulder 08/31/2017    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    S/P cubital tunnel release 01/04/2019    Vitamin D deficiency 01/22/2014    Multiple sclerosis exacerbation 02/26/2022    Sepsis due to Gram negative bacteria 02/27/2022    Lower extremity cellulitis 03/07/2024    Malignant neoplasm of overlapping sites of left breast in female, estrogen receptor positive 06/28/2024    Encounter for long-term (current) use of other medications 07/16/2024    Cancer, metastatic to bone 07/31/2024    Colitis 09/10/2024    Weakness 09/18/2024    Elevated LFTs 09/21/2024    Acute UTI 10/18/2024     Resolved Ambulatory Problems     Diagnosis Date Noted    No Resolved Ambulatory Problems     Past Medical History:   Diagnosis Date    Anemia 2024    Dawn esophagus     Blurred vision     Breast cancer 05/2024+++++    Clotting disorder 1993, 2003, 2012    Colon polyp 2013    Deep vein thrombosis phlebitis 1980    Depression     GERD (gastroesophageal reflux disease)     GI (gastrointestinal bleed) 3 bleeds    H/O Skin cancer, basal cell     History of blood transfusion     History of GI bleed     History of urinary tract infection     Hypercalcemia     Hypertension     Movement disorder     Optic neuritis     PONV (postoperative nausea and vomiting)     Steroid-induced diabetes 2024       PAST SURGICAL HISTORY  Past  Surgical History:   Procedure Laterality Date    APPENDECTOMY      BLADDER SURGERY      bladder stimulator    BREAST BIOPSY  don't remember    BREAST SURGERY      augmentation wtih subsequent removal    CARPAL TUNNEL RELEASE Bilateral     Left 2018, right 2020    CUBITAL TUNNEL RELEASE Left     CYSTOSCOPY BOTOX INJECTION OF BLADDER  2018    Cystoscopy with Botox    FRACTURE SURGERY  2019    rt shoulder    PARATHYROIDECTOMY      one gland removed    ROTATOR CUFF REPAIR Right 2017    TOE SURGERY      bilateral great toes    TOTAL SHOULDER ARTHROPLASTY W/ DISTAL CLAVICLE EXCISION Right 10/22/2018    Procedure: RT TOTAL SHOULDER REVERSE ARTHROPLASTY;  Surgeon: Bipin Dangelo MD;  Location: [facility] OR;  Service: Orthopedics       FAMILY HISTORY  Family History   Problem Relation Age of Onset    Hypertension Mother     Hyperlipidemia Mother     Aortic aneurysm Mother         thoracic    Diabetes Mother     Hypertension Father         charissa heart failure    Heart failure Father     Hyperlipidemia Father     Miscarriages / Stillbirths Sister     Multiple sclerosis Brother     Atrial fibrillation Brother     Diabetes Maternal Grandfather     Stroke Maternal Grandfather     Parkinsonism Paternal Grandmother     Tremor Paternal Grandmother     Stomach cancer Paternal Grandfather     Diabetes Maternal Grandmother        SOCIAL HISTORY  Social History     Socioeconomic History    Marital status:      Spouse name: Donald    Number of children: 0   Tobacco Use    Smoking status: Former     Current packs/day: 0.00     Average packs/day: 2.0 packs/day for 30.0 years (60.0 ttl pk-yrs)     Types: Cigarettes     Start date: 10/22/1973     Quit date: 10/22/2003     Years since quittin.4    Smokeless tobacco: Never   Vaping Use    Vaping status: Never Used   Substance and Sexual Activity    Alcohol use: Not Currently    Drug use: Not Currently     Types: Marijuana     Comment: advised by neurologist for sleep    Sexual  activity: Not Currently     Partners: Male     Birth control/protection: Post-menopausal       ALLERGIES  Patient has no known allergies.    REVIEW OF SYSTEMS  Review of Systems  Included in HPI  All systems reviewed and negative except for those discussed in HPI.    PHYSICAL EXAM    I have reviewed the triage vital signs and nursing notes.    ED Triage Vitals   Temp Heart Rate Resp BP SpO2   03/25/25 0229 03/25/25 0226 03/25/25 0226 -- 03/25/25 0226   98.4 °F (36.9 °C) 79 16  95 %      Temp src Heart Rate Source Patient Position BP Location FiO2 (%)   -- -- -- -- --              Physical Exam  Constitutional:       General: She is not in acute distress.     Appearance: Normal appearance. She is not ill-appearing, toxic-appearing or diaphoretic.   HENT:      Head: Normocephalic and atraumatic.      Nose: Nose normal.      Mouth/Throat:      Mouth: Mucous membranes are moist.   Eyes:      Extraocular Movements: Extraocular movements intact.      Conjunctiva/sclera: Conjunctivae normal.      Pupils: Pupils are equal, round, and reactive to light.   Cardiovascular:      Rate and Rhythm: Normal rate and regular rhythm.      Pulses: Normal pulses.      Heart sounds: Normal heart sounds.   Pulmonary:      Effort: Pulmonary effort is normal.   Abdominal:      General: Abdomen is flat. There is no distension.      Palpations: Abdomen is soft.      Tenderness: There is no abdominal tenderness. There is no guarding or rebound.   Genitourinary:     Comments: Cobos catheter in place with milky urinary drainage  Musculoskeletal:         General: Normal range of motion.      Cervical back: Normal range of motion.   Skin:     General: Skin is warm and dry.      Capillary Refill: Capillary refill takes less than 2 seconds.   Neurological:      General: No focal deficit present.      Mental Status: She is alert.   Psychiatric:         Mood and Affect: Mood normal.         Behavior: Behavior normal.         Thought Content: Thought  content normal.         Judgment: Judgment normal.         LAB RESULTS  No results found for this or any previous visit (from the past 24 hours).    RADIOLOGY  No Radiology Exams Resulted Within Past 24 Hours    MEDICATIONS GIVEN IN ER  Medications - No data to display    ORDERS PLACED DURING THIS VISIT:  Orders Placed This Encounter   Procedures    Comprehensive Metabolic Panel    Urinalysis With Culture If Indicated - Urine, Catheter    Replace Cobos Catheter    Assess Need for Indwelling Urinary Catheter - Follow Removal Protocol    Urinary Catheter Care    CBC & Differential       OUTPATIENT MEDICATION MANAGEMENT:  No current Epic-ordered facility-administered medications on file.     Current Outpatient Medications Ordered in Epic   Medication Sig Dispense Refill    albuterol sulfate  (90 Base) MCG/ACT inhaler Inhale 2 puffs Every 4 (Four) Hours As Needed for Wheezing or Shortness of Air. 18 g 0    baclofen (LIORESAL) 20 MG tablet Take 1 tablet by mouth 2 (Two) Times a Day.      Capivasertib (Truqap) 200 MG tablet Take 2 tablets by mouth 2 (Two) Times a Day. Take for 4 days on then 3 days off. Repeat. 64 tablet 5    castor oil-balsam peru (VENELEX) ointment Apply 1 Application topically to the appropriate area as directed 2 (Two) Times a Day. Apply with medihoney to rangel/buttocks and cover w/abd pad      Cholecalciferol (vitamin D3) 125 MCG (5000 UT) tablet tablet Take 1 tablet by mouth Daily.      clonazePAM (KlonoPIN) 2 MG tablet Take 1 tablet by mouth 2 (Two) Times a Day As Needed for Anxiety (Sleep). 6 tablet 0    dexAMETHasone 0.5 MG/5ML solution Take 10 mL by mouth Daily. 240 mL 5    dicyclomine (BENTYL) 10 MG capsule Take 1 capsule by mouth 4 (Four) Times a Day Before Meals & at Bedtime. 120 capsule 0    Dimethicone (Remedy Skin Repair) 1.5 % cream Apply 1 dose topically Every Morning. Apply to all extremities upon rising for dry skin      dimethicone 1 % cream Apply 1 Application topically to the  appropriate area as directed 2 (Two) Times a Day As Needed for Dry Skin. Apply to buttocks every shift for prophylactic  - and after each incontinent episode      Fenbendazole powder Use 440 mg. capsule      ferrous sulfate 325 (65 FE) MG tablet Take 1 tablet by mouth Daily With Breakfast. 30 tablet 3    FLUoxetine (PROzac) 20 MG capsule Take 1 capsule by mouth Daily.      glipizide 2.5 MG tablet Take 2.5 mg by mouth 2 (Two) Times a Day Before Meals. 60 tablet 3    glucose blood (IGlucose Test Strips) test strip Use as instructed 100 each 12    glucose monitor monitoring kit Use 1 each As Needed (elevated blood sugar). 1 each 0    losartan-hydrochlorothiazide (HYZAAR) 50-12.5 MG per tablet Take 1 tablet by mouth Daily.      ondansetron (ZOFRAN) 8 MG tablet Take 1 tablet by mouth 3 (Three) Times a Day As Needed for Nausea or Vomiting. (Patient not taking: Reported on 12/12/2024) 30 tablet 5    pantoprazole (PROTONIX) 40 MG EC tablet Take 1 tablet by mouth 2 (Two) Times a Day.      potassium chloride (KLOR-CON M10) 10 MEQ CR tablet Take 1 tablet by mouth Daily. 30 tablet 5    pramipexole (MIRAPEX) 1.5 MG tablet Take 1 tablet by mouth 3 (Three) Times a Day.      prochlorperazine (COMPAZINE) 10 MG tablet Take 1 tablet by mouth Every 6 (Six) Hours As Needed for Nausea or Vomiting. 90 tablet 5    promethazine (PHENERGAN) 12.5 MG tablet Take 1 tablet by mouth Every 6 (Six) Hours As Needed for Nausea or Vomiting. 60 tablet 3    sennosides-docusate (senna-docusate sodium) 8.6-50 MG per tablet Take 1 tablet by mouth Daily. 30 tablet 2    traZODone (DESYREL) 50 MG tablet TAKE 1 TABLET BY MOUTH EVERY NIGHT 30 tablet 0    Wound Dressings ([facility] Wound/Burn Dressing) gel Apply 1 dose topically Daily. Every day shift for friction - cleanse with saline, pat dry, apply to wound and venelex to periwound and cover with abd pad      zolpidem (AMBIEN) 5 MG tablet Take 1 tablet by mouth At Night As Needed for Sleep. 30 tablet 0        PROCEDURES  Procedures    PROGRESS, DATA ANALYSIS, CONSULTS, AND MEDICAL DECISION MAKING  All labs have been independently interpreted by me.  All radiology studies have been reviewed by me. All EKG's have been independently viewed and interpreted by me.  Discussion below represents my analysis of pertinent findings related to patient's condition, differential diagnosis, treatment plan and final disposition.    Differential diagnosis includes but is not limited to dislodged Cobos catheter, UTI, KINZA, electrolyte derangement.    Clinical Scores:                              AS OF 02:39 EDT VITALS:    BP -    HR - 79  TEMP - 98.4 °F (36.9 °C)  O2 SATS - 95%    COMPLEXITY OF CARE  {Admission Statement:87524}      DIAGNOSIS  Final diagnoses:   None         DISPOSITION  ED Disposition       None             Please note that portions of this document were completed with a voice recognition program.    Note Disclaimer: At [facility], we believe that sharing information builds trust and better relationships. You are receiving this note because you recently visited [facility]. It is possible you will see health information before a provider has talked with you about it. This kind of information can be easy to misunderstand. To help you fully understand what it means for your health, we urge you to discuss this note with your provider.       urology for further guidance. [DC]   5471 I discussed the case with MD Terrance with Urology at this time regarding the patient.  I discussed work-up, results, concerns.  I discussed the consulting provider's desire for discharging the patient with oral antibiotics.  He believes that her difficulty with catheter placement is related to inflammatory cystitis/urethritis from her infection versus traumatic injury from multiple attempts at catheter placement.  After an IV dose of Rocephin he feels she is appropriate for oral antibiotics and close follow-up in the office with urology within the next week.   [DC]   0838 Patient presentation and evaluation consistent with acute cystitis with dysfunctional urinary catheter.  After several failed attempts her catheter has now been placed and is draining properly.  Plan for outpatient management with oral antibiotics for presumed cystitis and encourage close follow-up with urology.  Patient agreeable with all questions answered. [DC]      ED Course User Index  [AB] Gurpreet Kunz MD  [DC] Bipin Montiel PA             AS OF 02:39 EDT VITALS:    BP -    HR - 79  TEMP - 98.4 °F (36.9 °C)  O2 SATS - 95%    COMPLEXITY OF CARE  Admission was considered but after careful review of the patient's presentation, physical examination, diagnostic results, and response to treatment the patient may be safely discharged with outpatient follow-up.      DIAGNOSIS  Final diagnoses:   Complication of Cobos catheter, initial encounter   Acute UTI   Abnormal CT scan         DISPOSITION  ED Disposition       ED Disposition   Discharge    Condition   Stable    Comment   --                Please note that portions of this document were completed with a voice recognition program.    Note Disclaimer: At [facility], we believe that sharing information builds trust and better relationships. You are receiving this note because you recently visited [facility]. It is possible you will see [facility]  information before a provider has talked with you about it. This kind of information can be easy to misunderstand. To help you fully understand what it means for your health, we urge you to discuss this note with your provider.         Gurpreet Kunz MD  04/01/25 1212

## 2025-03-26 LAB — BACTERIA SPEC AEROBE CULT: NORMAL

## 2025-03-27 ENCOUNTER — TELEPHONE (OUTPATIENT)
Dept: ONCOLOGY | Facility: CLINIC | Age: 65
End: 2025-03-27
Payer: MEDICARE

## 2025-03-27 NOTE — TELEPHONE ENCOUNTER
Caller: Maritza Bejarano    Relationship: Self    Best call back number: [phone]      What was the call regarding: PATIENT CALLED TO SPEAK TO HASEEB TO LET HER KNOW THAT HER FASTING BLOOD SUGAR LEVEL  THIS MORNING

## 2025-03-27 NOTE — TELEPHONE ENCOUNTER
Caller: Maritza Bejarano    Relationship: Self    Best call back number: [phone]    Who are you requesting to speak with (clinical staff, provider,  specific staff member): HASEEB    What was the call regarding: PT RETURNING CALL, STATES SHE WAS HAVING A HARD TIME SENDING THE MESSAGE ON [employer].     PT STARTS HER TRUQAP TONIGHT.

## 2025-03-27 NOTE — TELEPHONE ENCOUNTER
Returned call to Maritza left message to let her know I received her message.  I asked her to let me know via [employer] when she started her Truqap.  Will follow up.    Doxycycline Counseling:  Patient counseled regarding possible photosensitivity and increased risk for sunburn.  Patient instructed to avoid sunlight, if possible.  When exposed to sunlight, patients should wear protective clothing, sunglasses, and sunscreen.  The patient was instructed to call the office immediately if the following severe adverse effects occur:  hearing changes, easy bruising/bleeding, severe headache, or vision changes.  The patient verbalized understanding of the proper use and possible adverse effects of doxycycline.  All of the patient's questions and concerns were addressed.

## 2025-03-30 ENCOUNTER — HOSPITAL ENCOUNTER (EMERGENCY)
Facility: HOSPITAL | Age: 65
Discharge: HOME | End: 2025-03-31
Attending: EMERGENCY MEDICINE | Admitting: EMERGENCY MEDICINE
Payer: MEDICARE

## 2025-03-30 VITALS
RESPIRATION RATE: 18 BRPM | DIASTOLIC BLOOD PRESSURE: 60 MMHG | HEART RATE: 75 BPM | TEMPERATURE: 97.4 F | OXYGEN SATURATION: 93 % | SYSTOLIC BLOOD PRESSURE: 109 MMHG

## 2025-03-30 DIAGNOSIS — T83.011A MALFUNCTION OF FOLEY CATHETER, INITIAL ENCOUNTER: Primary | ICD-10-CM

## 2025-03-30 PROCEDURE — 99282 EMERGENCY DEPT VISIT SF MDM: CPT

## 2025-03-30 PROCEDURE — 51798 US URINE CAPACITY MEASURE: CPT

## 2025-03-31 NOTE — ED NOTES
RN deflated matias balloon and pushed catheter in per MD and reflated with 10ML of saline.  RN flushed catheter with 100ml of saline and pulled out 100ml of saline.  Matias was draining on its own at this time

## 2025-03-31 NOTE — ED PROVIDER NOTES
I supervised care provided by the midlevel provider.   We have discussed this patient's history, physical exam, and treatment plan.  I have reviewed the note and personally saw and examined the patient and agree with the plan of care.   This patient came to the emergency department today with spouse because of concerns that her urinary catheter and Coobs cath was not working after her first  was cleaning and adjusting it.  Denied any pain.  She has significant chronic comorbidities including stage IV breast cancer, advanced multiple sclerosis, hyperlipidemia and hypertension.  Denies any fevers or chills.  I am seeing her after the Cobos catheter was adjusted.  Balloon was deflated and advanced with good urine output.  She is completely asymptomatic.    GENERAL: Female that appears older than her stated age.  Significant chronic medical problems but no acute cardiovascular respiratory distress.  Not distressed  HENT: nares patent  Head/neck/ face are symmetric without gross deformity or swelling    CV: regular rhythm, regular rate with intact distal pulses  RESPIRATORY: normal effort and no respiratory distress  ABDOMEN: soft and nontender and obese  NEURO: alert and appropriate, chronic weakness with chronic neurologic deficit no acute abnormality seen.  SKIN: warm, dry    Vital signs and nursing notes reviewed.    Plan     Catheter been is adjusted patient is good to be discharged home.  Catheters been functioning well.         Alistair Knowles MD  03/31/25 9447

## 2025-03-31 NOTE — ED PROVIDER NOTES
EMERGENCY DEPARTMENT ENCOUNTER    Room Number:  04/04  Date seen:  3/31/2025  Time seen: 21:41 EDT  PCP: Enoc Carbone DO  Historian: Patient    Discussed/obtained information from independent historians: N/A    HPI:  Chief complaint: Urinary retention  A complete HPI/ROS/PMH/PSH/SH/FH are unobtainable due to: Nothing  Context:Maritza Bejarano is a 64 y.o. female with significant past medical history of hypertension, hyperlipidemia, MS, stage IV breast cancer, chronic urinary retention with Cobos catheter who presents to the ED with c/o urinary retention.  Patient reports she was seen in this hospital on the 25th for the same.      External (non-ED) record review: ED visit 3/25/2025.  On the ED the patient's catheter was removed and multiple attempts were made to place a new catheter decreasing catheter size.  Urology ultimately need to be consulted to place the cath.  Staff was eventually able to get the catheter placed improperly draining.  Urology was consulted and thought that the resistance was secondary to  infection versus traumatic injury from multiple attempts for cath placement.  She was to be placed on oral antibiotics and to follow-up on an outpatient basis with urology.    Chronic or social conditions impacting care:    ALLERGIES  Patient has no known allergies.    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 08/09/2021    FOM (frequency of micturition) 08/09/2021    Headache 01/10/2013    History of diplopia 01/07/2020    History of optic neuritis 01/07/2020    History of vitamin D deficiency 10/31/2017    Migraine syndrome 01/22/2014    Mixed incontinence 07/31/2017    Nausea 08/10/2015    Neurogenic bladder 02/23/2017    Pain in joint of right shoulder 07/31/2017    Restless legs syndrome 07/23/2014    Rupture of rotator cuff of shoulder 08/31/2017    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    S/P cubital tunnel release 01/04/2019    Vitamin D deficiency 01/22/2014    Multiple sclerosis exacerbation 02/26/2022    Sepsis due to Gram negative bacteria 02/27/2022    Lower extremity cellulitis 03/07/2024    Malignant neoplasm of overlapping sites of left breast in female, estrogen receptor positive 06/28/2024    Encounter for long-term (current) use of other medications 07/16/2024    Cancer, metastatic to bone 07/31/2024    Colitis 09/10/2024    Weakness 09/18/2024    Elevated LFTs 09/21/2024    Acute UTI 10/18/2024     Resolved Ambulatory Problems     Diagnosis Date Noted    No Resolved Ambulatory Problems     Past Medical History:   Diagnosis Date    Anemia 2024    Dawn esophagus     Blurred vision     Breast cancer 05/2024+++++    Clotting disorder 1993, 2003, 2012    Colon polyp 2013    Deep vein thrombosis phlebitis 1980    Depression     GERD (gastroesophageal reflux disease)     GI (gastrointestinal bleed) 3 bleeds    H/O Skin cancer, basal cell     History of blood transfusion     History of GI bleed     History of urinary tract infection     Hypercalcemia     Hypertension     Movement disorder     Optic neuritis     PONV (postoperative nausea and vomiting)     Steroid-induced diabetes 2024       PAST SURGICAL HISTORY  Past Surgical History:   Procedure Laterality Date    APPENDECTOMY      BLADDER SURGERY      bladder stimulator    BREAST BIOPSY  don't remember    BREAST SURGERY       augmentation wtih subsequent removal    CARPAL TUNNEL RELEASE Bilateral     Left 2018, right 2020    CUBITAL TUNNEL RELEASE Left     CYSTOSCOPY BOTOX INJECTION OF BLADDER  2018    Cystoscopy with Botox    FRACTURE SURGERY  2019    rt shoulder    PARATHYROIDECTOMY      one gland removed    ROTATOR CUFF REPAIR Right 2017    TOE SURGERY      bilateral great toes    TOTAL SHOULDER ARTHROPLASTY W/ DISTAL CLAVICLE EXCISION Right 10/22/2018    Procedure: RT TOTAL SHOULDER REVERSE ARTHROPLASTY;  Surgeon: Bipin Dangelo MD;  Location: [facility] OR;  Service: Orthopedics       FAMILY HISTORY  Family History   Problem Relation Age of Onset    Hypertension Mother     Hyperlipidemia Mother     Aortic aneurysm Mother         thoracic    Diabetes Mother     Hypertension Father         charissa heart failure    Heart failure Father     Hyperlipidemia Father     Miscarriages / Stillbirths Sister     Multiple sclerosis Brother     Atrial fibrillation Brother     Diabetes Maternal Grandfather     Stroke Maternal Grandfather     Parkinsonism Paternal Grandmother     Tremor Paternal Grandmother     Stomach cancer Paternal Grandfather     Diabetes Maternal Grandmother        SOCIAL HISTORY  Social History     Socioeconomic History    Marital status:      Spouse name: Donald    Number of children: 0   Tobacco Use    Smoking status: Former     Current packs/day: 0.00     Average packs/day: 2.0 packs/day for 30.0 years (60.0 ttl pk-yrs)     Types: Cigarettes     Start date: 10/22/1973     Quit date: 10/22/2003     Years since quittin.4    Smokeless tobacco: Never   Vaping Use    Vaping status: Never Used   Substance and Sexual Activity    Alcohol use: Not Currently    Drug use: Not Currently     Types: Marijuana     Comment: advised by neurologist for sleep    Sexual activity: Not Currently     Partners: Male     Birth control/protection: Post-menopausal       REVIEW OF SYSTEMS  Review of Systems    All systems reviewed and  negative except for those discussed in HPI.     PHYSICAL EXAM    I have reviewed the triage vital signs and nursing notes.  Vitals:    03/30/25 2300   BP: 109/60   Pulse: 75   Resp:    Temp:    SpO2: 93%     Physical Exam    GENERAL: WDWN female, not distressed  HENT: nares patent  EYES: no scleral icterus  NECK: no ROM limitations  CV: regular rhythm, regular rate  RESPIRATORY: normal effort  ABDOMEN: soft nontender  : CVA tenderness.  MUSCULOSKELETAL: no deformity  NEURO: alert, moves all extremities, follows commands  SKIN: warm, dry    LAB RESULTS  No results found for this or any previous visit (from the past 24 hours).    Ordered the above labs and independently interpreted results.  My findings will be discussed in the ED course or medical decision making section below    RADIOLOGY RESULTS  No Radiology Exams Resulted Within Past 24 Hours     Ordered the above noted radiological studies.  Independently interpreted by me.  My findings will be discussed in the medical decision section below.     PROGRESS, DATA ANALYSIS, CONSULTS AND MEDICAL DECISION MAKING    Please note that this section constitutes my independent interpretation of clinical data including lab results, radiology, EKG's.  This constitutes my independent professional opinion regarding differential diagnosis and management of this patient.  It may include any factors such as history from outside sources, review of external records, social determinants of health, management of medications, response to those treatments, and discussions with other providers.    ED Course as of 03/31/25 2016   Sun Mar 30, 2025   2305 Catheter was deflated and advanced by the RN.  It was then flushed.  It is since drained 400 cc of urine.  Bladder scan shows 0 to 2 cc in the bladder.  Patient feeling much better.  No leaking.  Will DC home at this time. [RC]      ED Course User Index  [RC] Chris Paulino III, PA     Orders placed during this visit:  Orders  Placed This Encounter   Procedures    Bladder scan            Medical Decision Making  Problems Addressed:  Malfunction of Cobos catheter, initial encounter: acute illness or injury        Dislodged Cobos catheter, keep Cobos catheter, sediment clogged Cobos catheter, clot clogged Cobos catheter.  Patient reports her  and been cleaning the catheter early.  Suspected slightly dislodged.  It looks as if they had a very tough time replacing the patient's catheter a few days back.  I want to try to avoid putting the patient through that discomfort.  We will attempt to deflate the Cobos catheter and advance and reinflate the balloon.  We will then flush the Cobos catheter and ensure is working correctly.  Will follow this up with a bladder scan.  See ED course for MDM process.    DIAGNOSIS  Final diagnoses:   Malfunction of Cobos catheter, initial encounter          Medication List      No changes were made to your prescriptions during this visit.         FOLLOW-UP  No follow-up provider specified.      Latest Documented Vital Signs:  As of 20:16 EDT  BP- 109/60 HR- 75 Temp- 97.4 °F (36.3 °C) (Tympanic) O2 sat- 93%    Appropriate PPE utilized throughout this patient encounter to include mask, if indicated, per current protocol. Hand hygiene was performed before donning PPE and after removal when leaving the room.    Please note that portions of this were completed with a voice recognition program.     Note Disclaimer: At [facility], we believe that sharing information builds trust and better relationships. You are receiving this note because you are receiving care at [facility] or recently visited. It is possible you will see health information before a provider has talked with you about it. This kind of information can be easy to misunderstand. To help you fully understand what it means for your health, we urge you to discuss this note with your provider.                Chris Paulino III,  PA  03/31/25 2016     no headache

## 2025-03-31 NOTE — DISCHARGE INSTRUCTIONS
Continue with your home antibiotics previously prescribed until complete.  Follow-up with urology however as scheduled.  Return to the ER should you have any further problems with your catheter, should you develop fever, or should you have any further concerns.

## 2025-04-01 ENCOUNTER — TELEPHONE (OUTPATIENT)
Dept: ONCOLOGY | Facility: CLINIC | Age: 65
End: 2025-04-01
Payer: MEDICARE

## 2025-04-01 NOTE — TELEPHONE ENCOUNTER
Caller: Maritza Bejarano    Relationship to patient: Self    Best call back number:   Telephone Information:   Mobile [phone]     Type of visit: LAB, F/U 1 & INJECTION     Requested date: CALL TO R/S     If rescheduling, when is the original appointment: 4/4

## 2025-04-01 NOTE — TELEPHONE ENCOUNTER
Caller: Maritza Bejarano    Relationship: Self    Best call back number: [phone]    What is the best time to reach you: ANYTIME    Who are you requesting to speak with (clinical staff, provider,  specific staff member): CLINICAL    What was the call regarding: PATIENT WOULD LIKE TO SPEAK WITH HASEEB REGARDING QUESTION ABOUT TRUQAP AND SPECIFICALLY WHEN TO HOLD/START MEDICATION.    PATIENT ALSO RESCHEDULED HER FRIDAY 4/4 APPT TO 3:10 PM AT THE EP OFFICE.     PLEASE ADVISE.

## 2025-04-01 NOTE — TELEPHONE ENCOUNTER
Returned call to Maritza. We discussed timing of restarting her Truqap since she took her day 4 yesterday.  Will plan for her to restart on Friday.  Discussed this with Dr Lopes who agreed.

## 2025-04-02 RX ORDER — LANCETS
EACH MISCELLANEOUS
Qty: 100 EACH | Refills: 12 | Status: SHIPPED | OUTPATIENT
Start: 2025-04-02 | End: 2025-04-02

## 2025-04-02 RX ORDER — LANCETS
EACH MISCELLANEOUS
Qty: 100 EACH | Refills: 12 | Status: SHIPPED | OUTPATIENT
Start: 2025-04-02

## 2025-04-04 ENCOUNTER — LAB (OUTPATIENT)
Dept: OTHER | Facility: HOSPITAL | Age: 65
End: 2025-04-04
Payer: MEDICARE

## 2025-04-04 ENCOUNTER — OFFICE VISIT (OUTPATIENT)
Dept: ONCOLOGY | Facility: CLINIC | Age: 65
End: 2025-04-04
Payer: MEDICARE

## 2025-04-04 ENCOUNTER — INFUSION (OUTPATIENT)
Dept: ONCOLOGY | Facility: HOSPITAL | Age: 65
End: 2025-04-04
Payer: MEDICARE

## 2025-04-04 VITALS
SYSTOLIC BLOOD PRESSURE: 151 MMHG | TEMPERATURE: 97.6 F | RESPIRATION RATE: 16 BRPM | HEIGHT: 65 IN | OXYGEN SATURATION: 97 % | HEART RATE: 76 BPM | BODY MASS INDEX: 24.13 KG/M2 | DIASTOLIC BLOOD PRESSURE: 90 MMHG

## 2025-04-04 DIAGNOSIS — C79.51 CANCER, METASTATIC TO BONE: ICD-10-CM

## 2025-04-04 DIAGNOSIS — Z17.0 MALIGNANT NEOPLASM OF OVERLAPPING SITES OF LEFT BREAST IN FEMALE, ESTROGEN RECEPTOR POSITIVE: Primary | ICD-10-CM

## 2025-04-04 DIAGNOSIS — T50.905A DRUG-INDUCED HYPERGLYCEMIA: ICD-10-CM

## 2025-04-04 DIAGNOSIS — C50.812 MALIGNANT NEOPLASM OF OVERLAPPING SITES OF LEFT BREAST IN FEMALE, ESTROGEN RECEPTOR POSITIVE: ICD-10-CM

## 2025-04-04 DIAGNOSIS — Z79.899 HIGH RISK MEDICATION USE: ICD-10-CM

## 2025-04-04 DIAGNOSIS — Z17.0 MALIGNANT NEOPLASM OF OVERLAPPING SITES OF LEFT BREAST IN FEMALE, ESTROGEN RECEPTOR POSITIVE: ICD-10-CM

## 2025-04-04 DIAGNOSIS — C50.812 MALIGNANT NEOPLASM OF OVERLAPPING SITES OF LEFT BREAST IN FEMALE, ESTROGEN RECEPTOR POSITIVE: Primary | ICD-10-CM

## 2025-04-04 DIAGNOSIS — R73.9 DRUG-INDUCED HYPERGLYCEMIA: ICD-10-CM

## 2025-04-04 LAB
ALBUMIN SERPL-MCNC: 4.3 G/DL (ref 3.5–5.2)
ALBUMIN/GLOB SERPL: 1.3 G/DL
ALP SERPL-CCNC: 174 U/L (ref 39–117)
ALT SERPL W P-5'-P-CCNC: 52 U/L (ref 1–33)
ANION GAP SERPL CALCULATED.3IONS-SCNC: 13.6 MMOL/L (ref 5–15)
AST SERPL-CCNC: 73 U/L (ref 1–32)
BASOPHILS # BLD AUTO: 0.1 10*3/MM3 (ref 0–0.2)
BASOPHILS NFR BLD AUTO: 1.1 % (ref 0–1.5)
BILIRUB SERPL-MCNC: 0.4 MG/DL (ref 0–1.2)
BUN SERPL-MCNC: 20 MG/DL (ref 8–23)
BUN/CREAT SERPL: 37.7 (ref 7–25)
CALCIUM SPEC-SCNC: 9.5 MG/DL (ref 8.6–10.5)
CHLORIDE SERPL-SCNC: 103 MMOL/L (ref 98–107)
CO2 SERPL-SCNC: 23.4 MMOL/L (ref 22–29)
CREAT SERPL-MCNC: 0.53 MG/DL (ref 0.57–1)
DEPRECATED RDW RBC AUTO: 64.8 FL (ref 37–54)
EGFRCR SERPLBLD CKD-EPI 2021: 104.7 ML/MIN/1.73
EOSINOPHIL # BLD AUTO: 0.4 10*3/MM3 (ref 0–0.4)
EOSINOPHIL NFR BLD AUTO: 4.4 % (ref 0.3–6.2)
ERYTHROCYTE [DISTWIDTH] IN BLOOD BY AUTOMATED COUNT: 18.6 % (ref 12.3–15.4)
GLOBULIN UR ELPH-MCNC: 3.4 GM/DL
GLUCOSE SERPL-MCNC: 142 MG/DL (ref 65–99)
HCT VFR BLD AUTO: 40.6 % (ref 34–46.6)
HGB BLD-MCNC: 13.3 G/DL (ref 12–15.9)
IMM GRANULOCYTES # BLD AUTO: 0.07 10*3/MM3 (ref 0–0.05)
IMM GRANULOCYTES NFR BLD AUTO: 0.8 % (ref 0–0.5)
LYMPHOCYTES # BLD AUTO: 1.66 10*3/MM3 (ref 0.7–3.1)
LYMPHOCYTES NFR BLD AUTO: 18.5 % (ref 19.6–45.3)
MAGNESIUM SERPL-MCNC: 2.2 MG/DL (ref 1.6–2.4)
MCH RBC QN AUTO: 30.8 PG (ref 26.6–33)
MCHC RBC AUTO-ENTMCNC: 32.8 G/DL (ref 31.5–35.7)
MCV RBC AUTO: 94 FL (ref 79–97)
MONOCYTES # BLD AUTO: 0.35 10*3/MM3 (ref 0.1–0.9)
MONOCYTES NFR BLD AUTO: 3.9 % (ref 5–12)
NEUTROPHILS NFR BLD AUTO: 6.41 10*3/MM3 (ref 1.7–7)
NEUTROPHILS NFR BLD AUTO: 71.3 % (ref 42.7–76)
NRBC BLD AUTO-RTO: 0 /100 WBC (ref 0–0.2)
PHOSPHATE SERPL-MCNC: 2.5 MG/DL (ref 2.5–4.5)
PLATELET # BLD AUTO: 487 10*3/MM3 (ref 140–450)
PMV BLD AUTO: 8.7 FL (ref 6–12)
POTASSIUM SERPL-SCNC: 4.1 MMOL/L (ref 3.5–5.2)
PROT SERPL-MCNC: 7.7 G/DL (ref 6–8.5)
RBC # BLD AUTO: 4.32 10*6/MM3 (ref 3.77–5.28)
SODIUM SERPL-SCNC: 140 MMOL/L (ref 136–145)
WBC NRBC COR # BLD AUTO: 8.99 10*3/MM3 (ref 3.4–10.8)

## 2025-04-04 PROCEDURE — 96372 THER/PROPH/DIAG INJ SC/IM: CPT

## 2025-04-04 PROCEDURE — 96402 CHEMO HORMON ANTINEOPL SQ/IM: CPT

## 2025-04-04 PROCEDURE — 80053 COMPREHEN METABOLIC PANEL: CPT | Performed by: INTERNAL MEDICINE

## 2025-04-04 PROCEDURE — 36415 COLL VENOUS BLD VENIPUNCTURE: CPT

## 2025-04-04 PROCEDURE — 84100 ASSAY OF PHOSPHORUS: CPT | Performed by: INTERNAL MEDICINE

## 2025-04-04 PROCEDURE — 83735 ASSAY OF MAGNESIUM: CPT | Performed by: INTERNAL MEDICINE

## 2025-04-04 PROCEDURE — 25010000002 FULVESTRANT PER 25 MG: Performed by: NURSE PRACTITIONER

## 2025-04-04 PROCEDURE — 85025 COMPLETE CBC W/AUTO DIFF WBC: CPT | Performed by: INTERNAL MEDICINE

## 2025-04-04 PROCEDURE — 25010000002 DENOSUMAB 120 MG/1.7ML SOLUTION: Performed by: NURSE PRACTITIONER

## 2025-04-04 RX ORDER — LAMOTRIGINE 25 MG/1
500 TABLET ORAL ONCE
Status: CANCELLED | OUTPATIENT
Start: 2025-04-04

## 2025-04-04 RX ORDER — LAMOTRIGINE 25 MG/1
500 TABLET ORAL ONCE
Status: COMPLETED | OUTPATIENT
Start: 2025-04-04 | End: 2025-04-04

## 2025-04-04 RX ADMIN — FULVESTRANT 500 MG: 50 INJECTION, SOLUTION INTRAMUSCULAR at 16:08

## 2025-04-04 RX ADMIN — DENOSUMAB 120 MG: 120 INJECTION SUBCUTANEOUS at 15:53

## 2025-04-04 NOTE — PROGRESS NOTES
Subjective   Maritza Bejarano is a 64 y.o. female.   With metastatic invasive lobular carcinoma, ER/PA strongly positive cancer with metastatic disease to the bones.    History of Present Illness   The patient returns to the office today in anticipation of cycle 2-day 1 Faslodex.  She also continues on truqap.  We have added glipizide due to hyperglycemia.  She has been checking a fasting blood glucose in the morning.  Over the past few weeks she has been struggling with her suprapubic catheter which is being managed by urology.  She did complete antibiotics that were prescribed due to multiple rounds of urinary tract infection.  She reports she has had intermittent loose bowels related to the Truqap.  Prior to therapy she had bowel movements every few days.  She is now experiencing soft more frequent bowel movements.  She does respond to Imodium.  She denies any new pain.  She has no nausea.    Oncologic history:    Patient is a 64-year-old postmenopausal lady who has not had a mammogram in 4 years presented with a screen detected abnormality.  She had a left breast biopsy in 2014 which was benign.  Denies palpating any breast masses skin changes or nipple discharge prior to the abnormal mammogram.  She does have left nipple inversion.    Patient has a longstanding diagnosis of multiple sclerosis and has not been on any treatment.  She was previously seen by Dr. Ozzy France and now being seen by Dr. Soto with neurology.  She has been nonambulatory for the past 4 years and requires a wheelchair.  She is unable to transfer herself in and out of wheelchairs and her  has to lift her for all the transfers.  She is also incontinent of the bladder and requiring a suprapubic catheter placed by urology to help with the same.  She had a bladder stimulator in the past which was turned off.  Other comorbidities include hypertension and hyperlipidemia.      Family history significant for maternal great grandmother with  breast cancer, maternal great aunt and mother with breast cancer in her 70s.  Denies any family history of ovarian cancer, pancreatic cancer or melanoma.    5/21/2024-bilateral screening mammogram  Finding 1.new nipple retraction along with diffuse skin and trabecular thickening of the left breast.  There is suggestion of subtle architectural distortion seen on the MLO projection in the posterior central region.  3 biopsy markers are noted.  No new suspicious calcifications.  Send finding 2.few axillary lymph node seen in the left breast which display interval increase in size and density.    Finding 3.focal asymmetry measuring 10 mm seen in the anterior one third of the right breast in the medial subareolar region.    Impression  Finding 1.new nipple retraction, skin and trabecular thickening in the left breast requires additional evaluation.  There is also question architectural distortion in the posterior central breast seen on the MLO projection.  Diagnostic mammogram to include repeat full-field CC with additional posterior tissue and complete breast ultrasound is recommended.  Finding 2.axillary lymph nodes in the left breast require additional evaluation, ultrasound recommended.  Finding 3.focal asymmetry in the right breast requires additional evaluation.  Diagnostic mammogram and limited breast ultrasound is recommended.    5/29/2024-bilateral diagnostic mammogram and ultrasound  Finding 1.4 0.7 x 5.6 x 6.8 cm developing asymmetry with associated nipple retraction, skin thickening and trabecular thickening of the left breast in the central region, inferior region in the upper outer region and the lower outer region.  Finding 2.axillary lymph node in the left breast.  Finding 3.suspected finding completely resolves upon further evaluation representing benign fibroglandular breast tissue.    Bilateral breast ultrasound  Finding 1.nonparallel hypoechoic mass with indistinct margins and skin thickening and  internal vascularity in the left breast in the central region, inferior region, upper outer region and in the lower outer region.  The finding is palpable and appears to involve all 4 quadrants.  Most discrete portion is located at 130, 3 cm from the nipple, skin thickening up to 6 mm.  Send finding 2.rounded enlarged left axillary lymph node measuring 11 mm.  Cortical thickening of 9 mm present.    Finding 3.no sonographic correlate.  Send finding 4.enlarged right axillary lymph node measuring 14 mm.  Cortical thickening of 5 mm.    Impression  Finding 1.ultrasound-guided biopsy recommended  Finding 2.ultrasound-guided biopsy recommended  Finding 3.previously described right breast asymmetry resolves  Finding 4.ultrasound-guided biopsy recommended.    Ultrasound-guided biopsy of the left breast and left axilla lymph node and right axilla lymph node    1.left breast  Invasive lobular carcinoma with crush artifact  Grade 2  Invasive carcinoma measures 8.5 mm  No lymphovascular space invasion  ER +91 to 100% strong  HI +31 to 40% moderate  HER2 negative, 0  Ki-67 35%    2.left axillary lymph node focus of metastatic Dastech lobular carcinoma measuring 10 mm  ER +91 to 100% strong  HI +21 to 30%  HER2 -0  Ki-67 30%    3.right axillary lymph node with a focus of metastatic carcinoma measuring 4 mm.  Grade 2.  ER +91 to 100% strong  HI +11 to 20% moderate  HER2 negative, 0  Ki-67 35%    Pathology of all the 3 sites appear similar and findings could represent left breast lobular carcinoma metastatic to the right as well as left axillary lymph nodes.    6/14/2024-CT of the chest abdomen and pelvis  1.large ill-defined infiltrative central left breast mass measuring 6.7 x 4 cm, medially there is an irregular 2.5 x 2.5 cm mass.  Significant thickening of the skin in the left breast and there is left axilla lymphadenopathy.  Left axilla lymph node measures 1.3 x 1.3 cm.  There is also a new enlarged right axillary lymph node  measuring 1.3 x 1 cm.  All of the subcentimeter right axillary lymph nodes are slightly larger than previous.  2.no mediastinal hilar or internal mammary chain lymphadenopathy  3.new indeterminate mixed density measuring 1.3 x 1.2 cm right apical pulmonary nodule.  Follow-up recommended.  4.pleural parenchymal thickening and nodules inferiorly and posterior to the right upper lobe are stable.  Chronic scarring and subsegmental atelectatic change in both lower lobes.    CT abdomen pelvis  1.moderate fatty infiltration of the liver.  No suspicious liver lesions.  2.moderate-sized paraesophageal hernia.  No acute bowel abnormality.  3.right sacral stimulator noted at the S3 neuroforamina.  No suspicious bone lesions are noted.    6/14/2024-bone scan  1.focal abnormal uptake in the left anterior inferior iliac spine correlating with lucent lesion on the CT concerning for metastatic disease.  Further evaluation with MRI of the pelvis and left hip could be performed.  2.focal uptake in the left frontal calvarium again concerning for metastatic disease.  Noncontrast CT or MRI could be obtained.  3.soft tissue uptake noted in the left breast at the site of known left breast cancer.  4.changes from arthroplasty on the right shoulder.  5.multifocal likely degenerative uptake in each knee, ankle and foot and increased uptake in the medial and patellofemoral cortex of the right knee could be posttraumatic.      Inv[employer]ae 9 gene stat panel negative.    MRI of the brain which showed T2 hyperintensity involving the frontal bone measuring 1.6 cm in size and a smaller area of enhancement in the frontal bone laterally and inferiorly concerning for metastasis.  No brain mets    MRI of the hip and pelvis showed multiple enhancing bone lesions consistent with metastatic disease to the sacrum and pelvis.  Dominant lesion in the anterior inferior left pelvic spine and rectus femoris muscle origin that correlates with the site of bone scan  uptake.  She has some right joint hip joint effusion.  Her CA 15-3 16.2    She was seen by Dr. Saavedra on 7/16/2024 and recommended to start on Ribociclib along with anastrozole.    Started Ribociclib in the first week of August 2024    Had profound nausea and vomiting requiring antiemetics.  Completed 3 weeks of Ribociclib on 8/30/2024.    Hospitalized from 9/11/2024 to 9/16/2024 for pneumonia and KINZA and since then Ribociclib has been on hold    Readmitted to the hospital from 10/17/2024 to 10/23/2024 requiring holding Ribociclib.  She was admitted for UTI with sepsis.    10/29/2024-CT of the chest which was compared to the CT chest from 6/14/2024-stable 1.3 cm subsolid nodule in the right lung apex.  Decrease in size of the left axillary lymph nodes.  Ill-defined left breast mass appears unchanged.  Expansile lytic and sclerotic lesion in the posterior left 10th rib which appears increased compared to the prior CT.  Stable subtle area of sclerosis in the left anterior third rib.    10/3/2024-MRI of the cervical spine-rounded area of marrow replacement in C7 suspicious for metastatic disease.    10/3/2024-MRI of the thoracic spine-suspicious patchy areas of marrow replacement in the thoracic spine at T5, T6, T7, T11, T12, L1 suspicious for metastatic disease.    Resumed Ribociclib in November 2024 and has been on it continuously up until February 2025 2/18/2025-CT of the chest abdomen and pelvis  13 mm right apical lesion unchanged micronodules in the right upper lobe micronodules in the left upper lobe  Left 11th rib metastasis unchanged with subtle increase sclerosis  Several hypodense lesions in the liver with the most prominent measuring 16 mm consistent with metastatic deposits.  Not present on scans from September and October 2024.  Bladder is collapsed with Cobos catheter.  Fecal impaction  Small sclerotic metastasis throughout the lumbar spine pelvis, some of which are new.  Largest lesion being in the  inferior iliac spine which has become more sclerotic.    Bone scan 2/18/2025-widespread osseous metastatic disease vast majority of which are new/newly appreciable from the prior bone scan.    3/17/2025-initiated Trucap    3/20/2025-patient called complaining of abdominal cramping and loose stools.  She was recommended to start Bentyl and use Imodium for diarrhea.    3/21/2025-patient continues on Faslodex and Truqap.          The following portions of the patient's history were reviewed and updated as appropriate: allergies, current medications, past family history, past medical history, past social history, past surgical history, and problem list.    Past Medical History:   Diagnosis Date    Anemia 2024    `Treated with iron    Dawn esophagus     per patient    Blurred vision     R/T MS    Breast cancer 05/2024+++++    Carpal tunnel syndrome     Clotting disorder 1993, 2003, 2012    3 g/i bleeds w/ transfus/ions    Colon polyp 2013    removed w/ colonoscopy    Deep vein thrombosis phlebitis 1980    Depression     Diplopia 2013    GERD (gastroesophageal reflux disease)     GI (gastrointestinal bleed) 3 bleeds    2 transfusions    H/O Skin cancer, basal cell     Headache     History of blood transfusion     History of GI bleed     R/T NSAIDS AND STEROIDS, multiple times    History of urinary tract infection     Hypercalcemia     s/p parathyroidectomy    Hyperlipidemia     Hypertension     Movement disorder     Multiple sclerosis     Optic neuritis     PONV (postoperative nausea and vomiting)     Steroid-induced diabetes 2024        Past Surgical History:   Procedure Laterality Date    APPENDECTOMY      BLADDER SURGERY      bladder stimulator    BREAST BIOPSY  don't remember    BREAST SURGERY      augmentation wtih subsequent removal    CARPAL TUNNEL RELEASE Bilateral     Left 2018, right 2020    CUBITAL TUNNEL RELEASE Left     CYSTOSCOPY BOTOX INJECTION OF BLADDER  2018    Cystoscopy with Botox    FRACTURE  SURGERY  2019    rt shoulder    PARATHYROIDECTOMY      one gland removed    ROTATOR CUFF REPAIR Right 2017    TOE SURGERY      bilateral great toes    TOTAL SHOULDER ARTHROPLASTY W/ DISTAL CLAVICLE EXCISION Right 10/22/2018    Procedure: RT TOTAL SHOULDER REVERSE ARTHROPLASTY;  Surgeon: Bipin Dangelo MD;  Location: [facility];  Service: Orthopedics        Family History   Problem Relation Age of Onset    Hypertension Mother     Hyperlipidemia Mother     Aortic aneurysm Mother         thoracic    Diabetes Mother     Hypertension Father         charissa heart failure    Heart failure Father     Hyperlipidemia Father     Miscarriages / Stillbirths Sister     Multiple sclerosis Brother     Atrial fibrillation Brother     Diabetes Maternal Grandfather     Stroke Maternal Grandfather     Parkinsonism Paternal Grandmother     Tremor Paternal Grandmother     Stomach cancer Paternal Grandfather     Diabetes Maternal Grandmother         Social History     Socioeconomic History    Marital status:      Spouse name: Donald    Number of children: 0   Tobacco Use    Smoking status: Former     Current packs/day: 0.00     Average packs/day: 2.0 packs/day for 30.0 years (60.0 ttl pk-yrs)     Types: Cigarettes     Start date: 10/22/1973     Quit date: 10/22/2003     Years since quittin.4    Smokeless tobacco: Never   Vaping Use    Vaping status: Never Used   Substance and Sexual Activity    Alcohol use: Not Currently    Drug use: Not Currently     Types: Marijuana     Comment: advised by neurologist for sleep    Sexual activity: Not Currently     Partners: Male     Birth control/protection: Post-menopausal        OB History    No obstetric history on file.     Age at menarche-15  Age at first live childbirth-not applicable   2 para 0  0  Age of menopause-57  No use of hormone replacement therapy      No Known Allergies     Review of systems as mentioned HPI otherwise negative    Objective   Blood pressure  "151/90, pulse 76, temperature 97.6 °F (36.4 °C), temperature source Infrared, resp. rate 16, height 165.1 cm (65\"), SpO2 97%, not currently breastfeeding.     Physical Exam  Vitals reviewed.   Constitutional:       Appearance: Normal appearance. She is normal weight.   HENT:      Right Ear: External ear normal.      Left Ear: External ear normal.      Nose: Nose normal.      Mouth/Throat:      Pharynx: Oropharynx is clear.   Eyes:      Conjunctiva/sclera: Conjunctivae normal.   Cardiovascular:      Rate and Rhythm: Normal rate.   Pulmonary:      Effort: Pulmonary effort is normal.   Abdominal:      General: Abdomen is flat.   Musculoskeletal:         General: Normal range of motion.      Cervical back: Normal range of motion.   Skin:     General: Skin is warm.   Neurological:      Mental Status: She is alert.      Comments: Non weight bearing, wheelchair bound.    Psychiatric:         Mood and Affect: Mood normal.         Behavior: Behavior normal.         Thought Content: Thought content normal.         Judgment: Judgment normal.       Breast Exam:Not examined today, 4/4/2025 Right breast appears normal on inspection.  No palpable right axilla lymphadenopathy or right breast masses.  Right nipple inverted.  Left breast on inspection there is nipple retraction crusting over the nipple region.  On palpation there is a 8 x 6 cm mass (improved) in the retroareolar region occupying much of the breast.  Palpable left axillary adenopathy as well.      I have reexamined the patient and the results are consistent with the previously documented exam. Rosina Recio, APRN      Results from last 7 days   Lab Units 04/04/25  1523   WBC 10*3/mm3 8.99   NEUTROS ABS 10*3/mm3 6.41   HEMOGLOBIN g/dL 13.3   HEMATOCRIT % 40.6   PLATELETS 10*3/mm3 487*     Results from last 7 days   Lab Units 04/04/25  1523   SODIUM mmol/L 140   POTASSIUM mmol/L 4.1   CHLORIDE mmol/L 103   CO2 mmol/L 23.4   BUN mg/dL 20   CREATININE mg/dL 0.53* "   CALCIUM mg/dL 9.5   ALBUMIN g/dL 4.3   BILIRUBIN mg/dL 0.4   ALK PHOS U/L 174*   ALT (SGPT) U/L 52*   AST (SGOT) U/L 73*   GLUCOSE mg/dL 142*   MAGNESIUM mg/dL 2.2                Assessment & Plan       *Left breast invasive lobular carcinoma  The tumor is estrogen receptor +91 to 100%, progesterone receptor +31 to 40%, HER2 negative, 0, Ki-67 35%  The tumor measures greater than 10 cm on exam, lymph node positive.  CT of the chest abdomen and pelvis with right upper lobe pulmonary nodule which is new and the bone scan also shows uptake in the left anterior inferior iliac spine which correlates to a lucent area on the CT scan and also focal uptake noted in the left frontal calvarium concerning for metastatic disease.  Further evaluation with a MRI of the pelvis and hip as well as MRI of the brain is underway.  The scans are scheduled for 7/10/2024.  Clinical T3 N1 M1, stage IV invasive lobular carcinoma.  There is also a right axillary lymph node which has been biopsied and consistent with invasive lobular carcinoma with similar morphology as well as receptors concerning for metastatic disease rather than a primary right breast cancer.  Recommended Ribociclib 400 mg 3 weeks on and 1 week off.  July 16, 2024: Reviewed MRI of the pelvis and hip consistent with many bony metastasis.  MRI brain shows left frontal bone mets but no brain involvement.  Patient is here to start day 1 ribociclib along with anastrozole.  Continues on anastrozole.  Tempus performed on the left axilla lymph node biopsy shows PIK3CA mutation, CDH 1 mutation and Erb B2 mutation.  Therefore patient would benefit from alpelisib and Faslodex after progression on Ribociclib and AI.  Other options include neratinib plus Faslodex.  Initiated Ribociclib in August 2024.  8/30/2024-last day of week 3 of Ribociclib.    Patient completed 1 cycle of Ribociclib 400 mg and subsequently has not been able to go back on Ribociclib due to recurrent  hospitalizations and abnormal LFTs.  10/14/2024-LFTs are normal today.  Recommend resuming Ribociclib at 200 mg and subsequently we will increase the dose to 400 mg with the next cycle.  Patient was unable to resume Ribociclib as she was admitted to the hospital from 10/17/2024 to 10/23/2024  11/11/2024-Labs reviewed and overall stable except for an ALT of 49.  Recommend resuming Ribociclib at 200 mg.  CT of the chest performed 10/29/2024 shows stable size of the left breast mass, decrease in size of the left axillary lymph node and mixed lytic and blastic lesion of the rib slightly increased in size.  11/11/2024-resumed Ribociclib 200 mg daily for 3/4 weeks.  2/18/2025-scans with disease progression in the bones in the liver.  Discontinue Ribociclib and anastrozole  2/28/2025-received the first dose of Faslodex.  3/17/2025-started truqap  3/21/2025- CBC reviewed and WBC 4.81, hemoglobin 13.9 platelets 595,000, CMP reviewed and LFTs have improved with ALT which is normal at 33, AST 71, alkaline phosphatase 185, total bilirubin normal at 0.2, blood sugar elevated at 409  Completed 1 week of trucap, resume again on 3/24/2025  4/4/2025 due for cycle 2-day 1 Faslodex.  She is struggling with intermittent hyperglycemia related to Truqap which is improved with glipizide    *Severe hypoglycemia  Blood sugar greater than 400  Administer 5 units of insulin  Start glipizide 2.5 mg  Remain glucometer and diabetic education.  Continue glipizide 2.5 mg daily, blood glucose 142 today.  The patient understands to continue truqap if AM fasting blood glucose is less than 250.     *Abdominal cramping and diarrhea  Secondary to capivasertib.  Continue Imodium as needed  If no control in symptoms Lomotil will be prescribed.  Intermittent loose bowels.  We discussed taking 0.5 to 1 tablet of Imodium each morning.  Her bowels are responsive to Imodium    *Right axillary lymphadenopathy  Biopsied and consistent with invasive lobular  carcinoma, grade 2, ER/VT positive and HER2 negative  Please refer to the above discussion for details    *Right breast non-mass enhancement  6/28/2024-MRI of the breast with non-mass enhancement noted in the right breast and patient had questions regarding if this should be biopsied.  Given extensive metastatic disease in the bones and right axilla lymph node consistent with invasive lobular carcinoma management would not change with the biopsy and hence I would recommend against it.    *Severe nausea  Continue Compazine and Zofran as needed  Well-controlled    *Skeletal metastasis  Patient will be started on Xgeva  8/16/2024 Xgeva initiation will be held as the patient has not been to the dentist in over 2 years.  Discussed possible side effects of Xgeva and she will schedule a dental visit and we will have her back in 1 week to initiate Xgeva pending this is complete.  10/14/2024-second dose of Xgeva will be administered.  Labs reviewed and stable to proceed.  continue Xgeva every 4 weeks alongside with Faslodex    *Multiple sclerosis  Patient was not on any treatment previously.  She sees Dr. Jacobson at [place] [facility].  Due to profound weakness patient requested her neurologist to start steroids.  They plan to initiate high-dose IV steroids to help with this.  High-dose IV steroids have not been initiated.  Doing physical therapy and received high-dose steroids  Stable    *Hypertension-continue current medication  Blood pressure BP: 151/90   Continue to monitor  No changes in medications    *Hyperlipidemia-continue current medication  No changes    *Urinary incontinence-patient had  a suprapubic catheter placed by urology.  Patient with recurrent UTIs  UTIs are a side effect of truqap  Discussed with her primary care physician regarding suppressive therapy    *History of iron deficiency anemia-  3/8/2024 hemoglobin was low at 10.9 and subsequently patient took oral iron which resulted in improvement of  hemoglobin and normal.  She was scheduled for colonoscopy however she canceled that due to ongoing management of breast cancer.  She proceed with a colonoscopy.  8/16/2024 hemoglobin is 10.2.  She states she recently was told to begin daily iron supplementation.  Baseline iron studies ordered today she is only been taking the iron for a few days.  Ferritin 32, iron saturation 5%, TIBC 440.  We will repeat this in 6 to 8 weeks.  12/10/2024 Hgb 12.0. Continue oral iron  Hemoglobin normal at 12.6 on 2/28/2025  Hemoglobin normal    *Genetic testing-9 gene stat panel negative    *Dyspnea  Stable to improved    *Right upper lobe pulmonary nodule   Concerning for metastatic disease  PET/CT may not be helpful as invasive lobular carcinomas typically are not very PET avid.  Could consider PET-FES    *Follow-up-after the MRIs.  Reviewed MRI of the pelvis and MRI of the brain which shows mets in the bone  MRI of the cervical and thoracic spine performed at outside facility on 10/3/2024    *Abnormal LFTs  Likely secondary to Ribociclib  Ribociclib dose increased to 400 mg daily on 12/10/2024  12/31/2024-Labs reviewed and stable.  1/13/2025: LFTs further increased with AST 72, ALT 22.  Decision made to hold off on starting next cycle of ribociclib.  1/31/2025-AST 72, ALT normal, total bilirubin normal  Liver function studies minimally elevated today, 4/4/2025 with AST 73, ALT 52, total bilirubin is normal at 0.4    *Insomnia  Severe  Using trazodone and Ambien.  Neither of them helping  Only clonazepam helps  Referral to supportive oncology    *Alternate medication  Patient sees a outside provider and receives ivermectin and fenbendazole  We have ran an interaction check with the medications and does not appear to be any interaction however I want her against taking these medications and potential side effects  Patient however wishes to proceed with his medications.    Plan:   Proceed today with cycle 2-day 1 Faslodex  Proceed  with Xgeva which is continued monthly  Continue Truqap, though the patient will begin this on Monday, 4/7/2025.  Plans to take this Monday through Thursday as she is unable to contact our office regarding her fasting blood glucose  We discussed taking Truqap if fasting blood glucose is less than 250  Continue glipizide 2.5 mg daily  Begin Imodium 0.5 to 1 tablet each morning  Continue to follow-up with urology regularly  MD follow-up with Dr. Lopes in 2 weeks    This patient is on high risk drug therapy requiring intensive monitoring for toxicity.  Severe hyperglycemia secondary to capivasertib      Rosina Recio, APRN   04/04/2025

## 2025-04-07 ENCOUNTER — SPECIALTY PHARMACY (OUTPATIENT)
Dept: PHARMACY | Facility: HOSPITAL | Age: 65
End: 2025-04-07
Payer: MEDICARE

## 2025-04-07 NOTE — PROGRESS NOTES
Specialty Pharmacy Note: Truqap (capivasertib)    Maritza Nance Darci is a 64 y.o. female with breast cancer was seen 4/4/25 by APRN. Per provider dictation, no changes to oral oncology regimen Truqap (capivasertib).  Labs Review: The CMP and CBC from 4/4/25 have been reviewed. No dose adjustments are needed for the oral specialty medication(s) based on the labs.    Specialty pharmacy will continue to follow patient.    Alyssa Rich, PharmD, BCPS  4/7/2025  11:01 EDT

## 2025-04-08 ENCOUNTER — SPECIALTY PHARMACY (OUTPATIENT)
Dept: PHARMACY | Facility: HOSPITAL | Age: 65
End: 2025-04-08
Payer: MEDICARE

## 2025-04-08 NOTE — PROGRESS NOTES
Specialty Pharmacy Patient Management Program  Oncology / Hematology Refill Outreach      Maritza was contacted today regarding refills of her Truqap specialty medication(s).    Specialty medication(s) and dose(s) confirmed: Yes  Truqap 200 mg tabs-2 tabs twice per day for 4 days on then 3 days off.    Refill Questions      Flowsheet Row Most Recent Value   Changes to allergies? No   Changes to medications? No   New conditions or infections since last clinic visit No  [Pt still has issues with diarrhea]   Unplanned office visit, urgent care, ED, or hospital admission in the last 4 weeks  No   How does patient/caregiver feel medication is working? Fair   Financial problems or insurance changes  No   Since the previous refill, were any specialty medication doses or scheduled injections missed or delayed?  Yes   If yes, please provide the amount She was to start her Truqap on Friday, 4/4/2025. She started this today, 4/8/2025   Why were doses missed? She was hestitant on starting due to diarrhea   Does this patient require a clinical escalation to a pharmacist? No          Delivery Questions      Flowsheet Row Most Recent Value   Delivery method UPS   Delivery address verified with patient/caregiver? Yes  [Ship to home address]   Delivery address Home  [Ship to home address-Ship 4/9 for delivery 4/10-$0 copay with insurance covering 100%-Address Confirmed]   Number of medications in delivery 1   Medication(s) being filled and delivered Capivasertib (Truqap)  [200 mg]   Doses left of specialty medications 3 days of current supply then has 3 days off. Will restart on 4/15/2025   Copay verified? Yes   Copay amount $0 copay with insurance covering 100%   Copay form of payment No copayment ($0)   Delivery Date Selection 04/10/25   Signature Required No   Do you consent to receive electronic handouts?  Yes          Truqap delivery coordinated with pt for 4/10/2025 to her home address. $0 copay with insurance covering 100%. Her  last delivery was on 3/12/2025. No questions or concerns to report to MTM Team today. She reports she delayed her start date from 4/4/2025 to 4/8/2025 due to diarrhea. PDC is 97%.    Follow-Up: 21 Days    [place], Pharmacy Technician  4/8/2025  14:50 EDT

## 2025-04-17 ENCOUNTER — TELEPHONE (OUTPATIENT)
Dept: INTERNAL MEDICINE | Facility: CLINIC | Age: 65
End: 2025-04-17
Payer: MEDICARE

## 2025-04-17 ENCOUNTER — APPOINTMENT (OUTPATIENT)
Dept: GENERAL RADIOLOGY | Facility: HOSPITAL | Age: 65
End: 2025-04-17
Payer: MEDICARE

## 2025-04-17 ENCOUNTER — TELEPHONE (OUTPATIENT)
Dept: ONCOLOGY | Facility: CLINIC | Age: 65
End: 2025-04-17
Payer: MEDICARE

## 2025-04-17 ENCOUNTER — HOSPITAL ENCOUNTER (INPATIENT)
Facility: HOSPITAL | Age: 65
LOS: 13 days | Discharge: HOME | End: 2025-04-30
Attending: EMERGENCY MEDICINE | Admitting: INTERNAL MEDICINE
Payer: MEDICARE

## 2025-04-17 DIAGNOSIS — N39.0 ACUTE UTI: ICD-10-CM

## 2025-04-17 DIAGNOSIS — A41.9 SEPSIS, DUE TO UNSPECIFIED ORGANISM, UNSPECIFIED WHETHER ACUTE ORGAN DYSFUNCTION PRESENT: Primary | ICD-10-CM

## 2025-04-17 DIAGNOSIS — R79.89 ELEVATED TROPONIN: ICD-10-CM

## 2025-04-17 LAB
ALBUMIN SERPL-MCNC: 3.7 G/DL (ref 3.5–5.2)
ALBUMIN/GLOB SERPL: 1 G/DL
ALP SERPL-CCNC: 238 U/L (ref 39–117)
ALT SERPL W P-5'-P-CCNC: 38 U/L (ref 1–33)
ANION GAP SERPL CALCULATED.3IONS-SCNC: 20 MMOL/L (ref 5–15)
ARTERIAL PATENCY WRIST A: POSITIVE
AST SERPL-CCNC: 67 U/L (ref 1–32)
ATMOSPHERIC PRESS: 746 MMHG
B PARAPERT DNA SPEC QL NAA+PROBE: NOT DETECTED
B PERT DNA SPEC QL NAA+PROBE: NOT DETECTED
BACTERIA BLD CULT: ABNORMAL
BACTERIA UR QL AUTO: ABNORMAL /HPF
BASE EXCESS BLDA CALC-SCNC: -2 MMOL/L (ref 0–2)
BASOPHILS # BLD MANUAL: 0 10*3/MM3 (ref 0–0.2)
BASOPHILS NFR BLD MANUAL: 0 % (ref 0–1.5)
BDY SITE: ABNORMAL
BILIRUB SERPL-MCNC: 0.6 MG/DL (ref 0–1.2)
BILIRUB UR QL STRIP: NEGATIVE
BOTTLE TYPE: ABNORMAL
BUN SERPL-MCNC: 29 MG/DL (ref 8–23)
BUN/CREAT SERPL: 19.1 (ref 7–25)
C PNEUM DNA NPH QL NAA+NON-PROBE: NOT DETECTED
CALCIUM SPEC-SCNC: 8.6 MG/DL (ref 8.6–10.5)
CHLORIDE SERPL-SCNC: 99 MMOL/L (ref 98–107)
CLARITY UR: ABNORMAL
CO2 SERPL-SCNC: 17 MMOL/L (ref 22–29)
COLOR UR: ABNORMAL
CREAT SERPL-MCNC: 1.52 MG/DL (ref 0.57–1)
D-LACTATE SERPL-SCNC: 2.7 MMOL/L (ref 0.5–2)
D-LACTATE SERPL-SCNC: 6.1 MMOL/L (ref 0.5–2)
D-LACTATE SERPL-SCNC: 6.4 MMOL/L (ref 0.5–2)
DEPRECATED RDW RBC AUTO: 53.6 FL (ref 37–54)
DEVICE COMMENT: ABNORMAL
EGFRCR SERPLBLD CKD-EPI 2021: 38.6 ML/MIN/1.73
EOSINOPHIL # BLD MANUAL: 0.15 10*3/MM3 (ref 0–0.4)
EOSINOPHIL NFR BLD MANUAL: 1 % (ref 0.3–6.2)
ERYTHROCYTE [DISTWIDTH] IN BLOOD BY AUTOMATED COUNT: 16.1 % (ref 12.3–15.4)
FLUAV SUBTYP SPEC NAA+PROBE: NOT DETECTED
FLUBV RNA ISLT QL NAA+PROBE: NOT DETECTED
GAS FLOW AIRWAY: 15 LPM
GEN 5 1HR TROPONIN T REFLEX: 351 NG/L
GLOBULIN UR ELPH-MCNC: 3.8 GM/DL
GLUCOSE BLDC GLUCOMTR-MCNC: 213 MG/DL (ref 70–130)
GLUCOSE BLDC GLUCOMTR-MCNC: 230 MG/DL (ref 70–130)
GLUCOSE SERPL-MCNC: 294 MG/DL (ref 65–99)
GLUCOSE UR STRIP-MCNC: ABNORMAL MG/DL
HADV DNA SPEC NAA+PROBE: NOT DETECTED
HCO3 BLDA-SCNC: 20.7 MMOL/L (ref 22–28)
HCOV 229E RNA SPEC QL NAA+PROBE: NOT DETECTED
HCOV HKU1 RNA SPEC QL NAA+PROBE: NOT DETECTED
HCOV NL63 RNA SPEC QL NAA+PROBE: NOT DETECTED
HCOV OC43 RNA SPEC QL NAA+PROBE: NOT DETECTED
HCT VFR BLD AUTO: 39 % (ref 34–46.6)
HEMODILUTION: NO
HGB BLD-MCNC: 13.4 G/DL (ref 12–15.9)
HGB UR QL STRIP.AUTO: ABNORMAL
HMPV RNA NPH QL NAA+NON-PROBE: NOT DETECTED
HPIV1 RNA ISLT QL NAA+PROBE: NOT DETECTED
HPIV2 RNA SPEC QL NAA+PROBE: NOT DETECTED
HPIV3 RNA NPH QL NAA+PROBE: NOT DETECTED
HPIV4 P GENE NPH QL NAA+PROBE: NOT DETECTED
HYALINE CASTS UR QL AUTO: ABNORMAL /LPF
KETONES UR QL STRIP: ABNORMAL
LEUKOCYTE ESTERASE UR QL STRIP.AUTO: ABNORMAL
LYMPHOCYTES # BLD MANUAL: 0.15 10*3/MM3 (ref 0.7–3.1)
LYMPHOCYTES NFR BLD MANUAL: 7 % (ref 5–12)
M PNEUMO IGG SER IA-ACNC: NOT DETECTED
MCH RBC QN AUTO: 31.3 PG (ref 26.6–33)
MCHC RBC AUTO-ENTMCNC: 34.4 G/DL (ref 31.5–35.7)
MCV RBC AUTO: 91.1 FL (ref 79–97)
METAMYELOCYTES NFR BLD MANUAL: 2 % (ref 0–0)
MODALITY: ABNORMAL
MONOCYTES # BLD: 1.08 10*3/MM3 (ref 0.1–0.9)
NEUTROPHILS # BLD AUTO: 13.77 10*3/MM3 (ref 1.7–7)
NEUTROPHILS NFR BLD MANUAL: 89 % (ref 42.7–76)
NITRITE UR QL STRIP: POSITIVE
NRBC BLD AUTO-RTO: 0 /100 WBC (ref 0–0.2)
NT-PROBNP SERPL-MCNC: 8881 PG/ML (ref 0–900)
PCO2 BLDA: 28.9 MM HG (ref 35–45)
PH BLDA: 7.46 PH UNITS (ref 7.35–7.45)
PH UR STRIP.AUTO: 5.5 [PH] (ref 5–8)
PLAT MORPH BLD: NORMAL
PLATELET # BLD AUTO: 396 10*3/MM3 (ref 140–450)
PMV BLD AUTO: 8.9 FL (ref 6–12)
PO2 BLDA: 175.6 MM HG (ref 80–100)
POTASSIUM SERPL-SCNC: 3.7 MMOL/L (ref 3.5–5.2)
PROCALCITONIN SERPL-MCNC: 44.9 NG/ML (ref 0–0.25)
PROT SERPL-MCNC: 7.5 G/DL (ref 6–8.5)
PROT UR QL STRIP: ABNORMAL
QT INTERVAL: 309 MS
QTC INTERVAL: 464 MS
RBC # BLD AUTO: 4.28 10*6/MM3 (ref 3.77–5.28)
RBC # UR STRIP: ABNORMAL /HPF
RBC MORPH BLD: NORMAL
REF LAB TEST METHOD: ABNORMAL
RHINOVIRUS RNA SPEC NAA+PROBE: NOT DETECTED
RSV RNA NPH QL NAA+NON-PROBE: NOT DETECTED
SAO2 % BLDCOA: 99.7 % (ref 92–98.5)
SARS-COV-2 RNA NPH QL NAA+NON-PROBE: NOT DETECTED
SODIUM SERPL-SCNC: 136 MMOL/L (ref 136–145)
SP GR UR STRIP: 1.01 (ref 1–1.03)
SQUAMOUS #/AREA URNS HPF: ABNORMAL /HPF
TOTAL RATE: 18 BREATHS/MINUTE
TROPONIN T % DELTA: 115
TROPONIN T NUMERIC DELTA: 188 NG/L
TROPONIN T SERPL HS-MCNC: 163 NG/L
UROBILINOGEN UR QL STRIP: ABNORMAL
VARIANT LYMPHS NFR BLD MANUAL: 1 % (ref 19.6–45.3)
WBC # UR STRIP: ABNORMAL /HPF
WBC MORPH BLD: NORMAL
WBC NRBC COR # BLD AUTO: 15.47 10*3/MM3 (ref 3.4–10.8)

## 2025-04-17 PROCEDURE — 87086 URINE CULTURE/COLONY COUNT: CPT | Performed by: EMERGENCY MEDICINE

## 2025-04-17 PROCEDURE — P9612 CATHETERIZE FOR URINE SPEC: HCPCS

## 2025-04-17 PROCEDURE — 82803 BLOOD GASES ANY COMBINATION: CPT

## 2025-04-17 PROCEDURE — 36600 WITHDRAWAL OF ARTERIAL BLOOD: CPT

## 2025-04-17 PROCEDURE — 81001 URINALYSIS AUTO W/SCOPE: CPT | Performed by: EMERGENCY MEDICINE

## 2025-04-17 PROCEDURE — 63710000001 INSULIN LISPRO (HUMAN) PER 5 UNITS: Performed by: INTERNAL MEDICINE

## 2025-04-17 PROCEDURE — 71045 X-RAY EXAM CHEST 1 VIEW: CPT

## 2025-04-17 PROCEDURE — 82948 REAGENT STRIP/BLOOD GLUCOSE: CPT

## 2025-04-17 PROCEDURE — 83880 ASSAY OF NATRIURETIC PEPTIDE: CPT | Performed by: EMERGENCY MEDICINE

## 2025-04-17 PROCEDURE — 85025 COMPLETE CBC W/AUTO DIFF WBC: CPT | Performed by: EMERGENCY MEDICINE

## 2025-04-17 PROCEDURE — 87154 CUL TYP ID BLD PTHGN 6+ TRGT: CPT | Performed by: EMERGENCY MEDICINE

## 2025-04-17 PROCEDURE — 25010000003 DEXTROSE 5 % SOLUTION: Performed by: INTERNAL MEDICINE

## 2025-04-17 PROCEDURE — 99291 CRITICAL CARE FIRST HOUR: CPT

## 2025-04-17 PROCEDURE — 25810000003 SODIUM CHLORIDE 0.9 % SOLUTION: Performed by: EMERGENCY MEDICINE

## 2025-04-17 PROCEDURE — 25810000003 LACTATED RINGERS SOLUTION: Performed by: EMERGENCY MEDICINE

## 2025-04-17 PROCEDURE — 87040 BLOOD CULTURE FOR BACTERIA: CPT | Performed by: EMERGENCY MEDICINE

## 2025-04-17 PROCEDURE — 87077 CULTURE AEROBIC IDENTIFY: CPT | Performed by: EMERGENCY MEDICINE

## 2025-04-17 PROCEDURE — 25010000002 CEFEPIME PER 500 MG: Performed by: EMERGENCY MEDICINE

## 2025-04-17 PROCEDURE — 25010000002 HEPARIN (PORCINE) PER 1000 UNITS: Performed by: INTERNAL MEDICINE

## 2025-04-17 PROCEDURE — 25010000002 VANCOMYCIN 10 G RECONSTITUTED SOLUTION: Performed by: EMERGENCY MEDICINE

## 2025-04-17 PROCEDURE — 85007 BL SMEAR W/DIFF WBC COUNT: CPT | Performed by: EMERGENCY MEDICINE

## 2025-04-17 PROCEDURE — 25810000003 LACTATED RINGERS SOLUTION: Performed by: INTERNAL MEDICINE

## 2025-04-17 PROCEDURE — 36415 COLL VENOUS BLD VENIPUNCTURE: CPT | Performed by: EMERGENCY MEDICINE

## 2025-04-17 PROCEDURE — 87186 SC STD MICRODIL/AGAR DIL: CPT | Performed by: EMERGENCY MEDICINE

## 2025-04-17 PROCEDURE — 02HV33Z INSERTION OF INFUSION DEVICE INTO SUPERIOR VENA CAVA, PERCUTANEOUS APPROACH: ICD-10-PCS

## 2025-04-17 PROCEDURE — 83605 ASSAY OF LACTIC ACID: CPT | Performed by: EMERGENCY MEDICINE

## 2025-04-17 PROCEDURE — 0202U NFCT DS 22 TRGT SARS-COV-2: CPT | Performed by: EMERGENCY MEDICINE

## 2025-04-17 PROCEDURE — 84484 ASSAY OF TROPONIN QUANT: CPT | Performed by: EMERGENCY MEDICINE

## 2025-04-17 PROCEDURE — 93010 ELECTROCARDIOGRAM REPORT: CPT | Performed by: INTERNAL MEDICINE

## 2025-04-17 PROCEDURE — 93005 ELECTROCARDIOGRAM TRACING: CPT | Performed by: EMERGENCY MEDICINE

## 2025-04-17 PROCEDURE — 84145 PROCALCITONIN (PCT): CPT | Performed by: EMERGENCY MEDICINE

## 2025-04-17 PROCEDURE — 80053 COMPREHEN METABOLIC PANEL: CPT | Performed by: EMERGENCY MEDICINE

## 2025-04-17 PROCEDURE — 25010000002 HYDROCORTISONE SOD SUC (PF) 100 MG RECONSTITUTED SOLUTION: Performed by: INTERNAL MEDICINE

## 2025-04-17 RX ORDER — CLONAZEPAM 1 MG/1
2 TABLET ORAL 2 TIMES DAILY PRN
Status: DISCONTINUED | OUTPATIENT
Start: 2025-04-17 | End: 2025-04-24

## 2025-04-17 RX ORDER — ONDANSETRON 2 MG/ML
4 INJECTION INTRAMUSCULAR; INTRAVENOUS EVERY 6 HOURS PRN
Status: DISCONTINUED | OUTPATIENT
Start: 2025-04-17 | End: 2025-04-30 | Stop reason: HOSPADM

## 2025-04-17 RX ORDER — BACLOFEN 10 MG/1
20 TABLET ORAL 2 TIMES DAILY
Status: DISCONTINUED | OUTPATIENT
Start: 2025-04-17 | End: 2025-04-17

## 2025-04-17 RX ORDER — ACETAMINOPHEN 500 MG
1000 TABLET ORAL ONCE
Status: COMPLETED | OUTPATIENT
Start: 2025-04-17 | End: 2025-04-17

## 2025-04-17 RX ORDER — ANASTROZOLE 1 MG/1
1 TABLET ORAL DAILY
COMMUNITY
Start: 2025-03-26

## 2025-04-17 RX ORDER — ACETAMINOPHEN 325 MG/1
650 TABLET ORAL EVERY 4 HOURS PRN
Status: DISCONTINUED | OUTPATIENT
Start: 2025-04-17 | End: 2025-04-30 | Stop reason: HOSPADM

## 2025-04-17 RX ORDER — BISACODYL 5 MG/1
5 TABLET, DELAYED RELEASE ORAL DAILY PRN
Status: DISCONTINUED | OUTPATIENT
Start: 2025-04-17 | End: 2025-04-30 | Stop reason: HOSPADM

## 2025-04-17 RX ORDER — NITROGLYCERIN 0.4 MG/1
0.4 TABLET SUBLINGUAL
Status: DISCONTINUED | OUTPATIENT
Start: 2025-04-17 | End: 2025-04-30 | Stop reason: HOSPADM

## 2025-04-17 RX ORDER — VANCOMYCIN/0.9 % SOD CHLORIDE 1.5G/250ML
20 PLASTIC BAG, INJECTION (ML) INTRAVENOUS ONCE
Status: COMPLETED | OUTPATIENT
Start: 2025-04-17 | End: 2025-04-17

## 2025-04-17 RX ORDER — ONDANSETRON 4 MG/1
4 TABLET, ORALLY DISINTEGRATING ORAL EVERY 6 HOURS PRN
Status: DISCONTINUED | OUTPATIENT
Start: 2025-04-17 | End: 2025-04-30 | Stop reason: HOSPADM

## 2025-04-17 RX ORDER — AMOXICILLIN 250 MG
2 CAPSULE ORAL 2 TIMES DAILY
Status: DISCONTINUED | OUTPATIENT
Start: 2025-04-17 | End: 2025-04-30 | Stop reason: HOSPADM

## 2025-04-17 RX ORDER — PANTOPRAZOLE SODIUM 40 MG/1
40 TABLET, DELAYED RELEASE ORAL 2 TIMES DAILY
Status: DISCONTINUED | OUTPATIENT
Start: 2025-04-17 | End: 2025-04-30 | Stop reason: HOSPADM

## 2025-04-17 RX ORDER — ACETAMINOPHEN 650 MG/1
650 SUPPOSITORY RECTAL EVERY 4 HOURS PRN
Status: DISCONTINUED | OUTPATIENT
Start: 2025-04-17 | End: 2025-04-30 | Stop reason: HOSPADM

## 2025-04-17 RX ORDER — HEPARIN SODIUM 5000 [USP'U]/ML
5000 INJECTION, SOLUTION INTRAVENOUS; SUBCUTANEOUS EVERY 8 HOURS SCHEDULED
Status: DISCONTINUED | OUTPATIENT
Start: 2025-04-17 | End: 2025-04-30 | Stop reason: HOSPADM

## 2025-04-17 RX ORDER — SODIUM CHLORIDE 0.9 % (FLUSH) 0.9 %
10 SYRINGE (ML) INJECTION EVERY 12 HOURS SCHEDULED
Status: DISCONTINUED | OUTPATIENT
Start: 2025-04-17 | End: 2025-04-30 | Stop reason: HOSPADM

## 2025-04-17 RX ORDER — PROCHLORPERAZINE MALEATE 10 MG
10 TABLET ORAL EVERY 6 HOURS PRN
Status: DISCONTINUED | OUTPATIENT
Start: 2025-04-17 | End: 2025-04-30 | Stop reason: HOSPADM

## 2025-04-17 RX ORDER — NICOTINE POLACRILEX 4 MG
15 LOZENGE BUCCAL
Status: DISCONTINUED | OUTPATIENT
Start: 2025-04-17 | End: 2025-04-30 | Stop reason: HOSPADM

## 2025-04-17 RX ORDER — INSULIN LISPRO 100 [IU]/ML
3-14 INJECTION, SOLUTION INTRAVENOUS; SUBCUTANEOUS
Status: DISCONTINUED | OUTPATIENT
Start: 2025-04-17 | End: 2025-04-20

## 2025-04-17 RX ORDER — PRAMIPEXOLE DIHYDROCHLORIDE 1.5 MG/1
1.5 TABLET ORAL 2 TIMES DAILY
Status: DISCONTINUED | OUTPATIENT
Start: 2025-04-17 | End: 2025-04-30 | Stop reason: HOSPADM

## 2025-04-17 RX ORDER — PHENAZOPYRIDINE HYDROCHLORIDE 100 MG/1
200 TABLET, FILM COATED ORAL 3 TIMES DAILY PRN
Status: DISCONTINUED | OUTPATIENT
Start: 2025-04-17 | End: 2025-04-21

## 2025-04-17 RX ORDER — PHENYLEPHRINE HCL IN 0.9% NACL 0.5 MG/5ML
.5-3 SYRINGE (ML) INTRAVENOUS
Status: DISCONTINUED | OUTPATIENT
Start: 2025-04-17 | End: 2025-04-20

## 2025-04-17 RX ORDER — BACLOFEN 10 MG/1
20 TABLET ORAL 2 TIMES DAILY
Status: DISCONTINUED | OUTPATIENT
Start: 2025-04-17 | End: 2025-04-30 | Stop reason: HOSPADM

## 2025-04-17 RX ORDER — SODIUM CHLORIDE 0.9 % (FLUSH) 0.9 %
10 SYRINGE (ML) INJECTION AS NEEDED
Status: DISCONTINUED | OUTPATIENT
Start: 2025-04-17 | End: 2025-04-30 | Stop reason: HOSPADM

## 2025-04-17 RX ORDER — POLYETHYLENE GLYCOL 3350 17 G/17G
17 POWDER, FOR SOLUTION ORAL DAILY PRN
Status: DISCONTINUED | OUTPATIENT
Start: 2025-04-17 | End: 2025-04-30 | Stop reason: HOSPADM

## 2025-04-17 RX ORDER — HYDROCORTISONE SODIUM SUCCINATE 100 MG/2ML
50 INJECTION INTRAMUSCULAR; INTRAVENOUS EVERY 6 HOURS
Status: DISCONTINUED | OUTPATIENT
Start: 2025-04-17 | End: 2025-04-20

## 2025-04-17 RX ORDER — SODIUM CHLORIDE 9 MG/ML
40 INJECTION, SOLUTION INTRAVENOUS AS NEEDED
Status: DISCONTINUED | OUTPATIENT
Start: 2025-04-17 | End: 2025-04-30 | Stop reason: HOSPADM

## 2025-04-17 RX ORDER — NOREPINEPHRINE BITARTRATE 0.03 MG/ML
.02-.3 INJECTION, SOLUTION INTRAVENOUS
Status: DISCONTINUED | OUTPATIENT
Start: 2025-04-17 | End: 2025-04-20

## 2025-04-17 RX ORDER — BISACODYL 10 MG
10 SUPPOSITORY, RECTAL RECTAL DAILY PRN
Status: DISCONTINUED | OUTPATIENT
Start: 2025-04-17 | End: 2025-04-30 | Stop reason: HOSPADM

## 2025-04-17 RX ORDER — SODIUM CHLORIDE, SODIUM LACTATE, POTASSIUM CHLORIDE, CALCIUM CHLORIDE 600; 310; 30; 20 MG/100ML; MG/100ML; MG/100ML; MG/100ML
100 INJECTION, SOLUTION INTRAVENOUS CONTINUOUS
Status: ACTIVE | OUTPATIENT
Start: 2025-04-17 | End: 2025-04-17

## 2025-04-17 RX ORDER — IBUPROFEN 600 MG/1
1 TABLET ORAL
Status: DISCONTINUED | OUTPATIENT
Start: 2025-04-17 | End: 2025-04-30 | Stop reason: HOSPADM

## 2025-04-17 RX ORDER — ANASTROZOLE 1 MG/1
1 TABLET ORAL DAILY
Status: DISCONTINUED | OUTPATIENT
Start: 2025-04-17 | End: 2025-04-30 | Stop reason: HOSPADM

## 2025-04-17 RX ORDER — ALBUTEROL SULFATE 90 UG/1
2 INHALANT RESPIRATORY (INHALATION) EVERY 4 HOURS PRN
Status: DISCONTINUED | OUTPATIENT
Start: 2025-04-17 | End: 2025-04-30 | Stop reason: HOSPADM

## 2025-04-17 RX ORDER — DEXTROSE MONOHYDRATE 25 G/50ML
25 INJECTION, SOLUTION INTRAVENOUS
Status: DISCONTINUED | OUTPATIENT
Start: 2025-04-17 | End: 2025-04-30 | Stop reason: HOSPADM

## 2025-04-17 RX ORDER — TRAZODONE HYDROCHLORIDE 50 MG/1
50 TABLET ORAL NIGHTLY
Status: DISCONTINUED | OUTPATIENT
Start: 2025-04-17 | End: 2025-04-18

## 2025-04-17 RX ADMIN — ACETAMINOPHEN 1000 MG: 500 TABLET, FILM COATED ORAL at 13:40

## 2025-04-17 RX ADMIN — HEPARIN SODIUM 5000 UNITS: 5000 INJECTION INTRAVENOUS; SUBCUTANEOUS at 21:33

## 2025-04-17 RX ADMIN — Medication 10 ML: at 21:37

## 2025-04-17 RX ADMIN — PANTOPRAZOLE SODIUM 40 MG: 40 TABLET, DELAYED RELEASE ORAL at 21:37

## 2025-04-17 RX ADMIN — ACETAMINOPHEN 650 MG: 325 TABLET, FILM COATED ORAL at 23:54

## 2025-04-17 RX ADMIN — INSULIN LISPRO 5 UNITS: 100 INJECTION, SOLUTION INTRAVENOUS; SUBCUTANEOUS at 22:10

## 2025-04-17 RX ADMIN — TRAZODONE HYDROCHLORIDE 50 MG: 50 TABLET ORAL at 21:36

## 2025-04-17 RX ADMIN — ANASTROZOLE 1 MG: 1 TABLET, FILM COATED ORAL at 17:41

## 2025-04-17 RX ADMIN — FLUOXETINE HYDROCHLORIDE 20 MG: 20 CAPSULE ORAL at 16:33

## 2025-04-17 RX ADMIN — VANCOMYCIN HYDROCHLORIDE 1500 MG: 10 INJECTION, POWDER, LYOPHILIZED, FOR SOLUTION INTRAVENOUS at 14:36

## 2025-04-17 RX ADMIN — HYDROCORTISONE SODIUM SUCCINATE 50 MG: 100 INJECTION, POWDER, FOR SOLUTION INTRAMUSCULAR; INTRAVENOUS at 22:11

## 2025-04-17 RX ADMIN — MUPIROCIN 1 APPLICATION: 20 OINTMENT TOPICAL at 16:19

## 2025-04-17 RX ADMIN — BACLOFEN 20 MG: 10 TABLET ORAL at 17:40

## 2025-04-17 RX ADMIN — SODIUM CHLORIDE 2109 ML: 0.9 INJECTION, SOLUTION INTRAVENOUS at 13:58

## 2025-04-17 RX ADMIN — SODIUM BICARBONATE 150 MEQ: 84 INJECTION, SOLUTION INTRAVENOUS at 21:14

## 2025-04-17 RX ADMIN — INSULIN LISPRO 5 UNITS: 100 INJECTION, SOLUTION INTRAVENOUS; SUBCUTANEOUS at 16:34

## 2025-04-17 RX ADMIN — PRAMIPEXOLE DIHYDROCHLORIDE 1.5 MG: 1.5 TABLET ORAL at 21:37

## 2025-04-17 RX ADMIN — SODIUM CHLORIDE, POTASSIUM CHLORIDE, SODIUM LACTATE AND CALCIUM CHLORIDE 1000 ML: 600; 310; 30; 20 INJECTION, SOLUTION INTRAVENOUS at 13:25

## 2025-04-17 RX ADMIN — SODIUM CHLORIDE, POTASSIUM CHLORIDE, SODIUM LACTATE AND CALCIUM CHLORIDE 1000 ML: 600; 310; 30; 20 INJECTION, SOLUTION INTRAVENOUS at 16:33

## 2025-04-17 RX ADMIN — CEFEPIME 2000 MG: 2 INJECTION, POWDER, FOR SOLUTION INTRAVENOUS at 14:01

## 2025-04-17 RX ADMIN — HYDROCORTISONE SODIUM SUCCINATE 50 MG: 100 INJECTION, POWDER, FOR SOLUTION INTRAMUSCULAR; INTRAVENOUS at 16:32

## 2025-04-17 NOTE — PROGRESS NOTES
[facility] Clinical Pharmacy Services: Cefepime Consult    Pt Name: Maritza Nance Darci   : 1960  Weight: 76.7 kg (169 lb 1.5 oz)  Antibiotic: cefepime  Indication: Urinary Tract Infection, severe sepsis    Relevant clinical data and objective history reviewed:    Past Medical History:   Diagnosis Date    Anemia     `Treated with iron    Dawn esophagus     per patient    Blurred vision     R/T MS    Breast cancer 2024+++++    Carpal tunnel syndrome     Clotting disorder , ,     3 g/i bleeds w/ transfus/ions    Colon polyp 2013    removed w/ colonoscopy    Deep vein thrombosis phlebitis     Depression     Diplopia     GERD (gastroesophageal reflux disease)     GI (gastrointestinal bleed) 3 bleeds    2 transfusions    H/O Skin cancer, basal cell     Headache     History of blood transfusion     History of GI bleed     R/T NSAIDS AND STEROIDS, multiple times    History of urinary tract infection     Hypercalcemia     s/p parathyroidectomy    Hyperlipidemia     Hypertension     Movement disorder     Multiple sclerosis     Optic neuritis     PONV (postoperative nausea and vomiting)     Steroid-induced diabetes      Creatinine   Date Value Ref Range Status   2025 1.52 (H) 0.57 - 1.00 mg/dL Final   2025 0.53 (L) 0.57 - 1.00 mg/dL Final   2025 0.83 0.57 - 1.00 mg/dL Final   07/10/2024 0.70 0.60 - 1.30 mg/dL Final     Comment:     Serial Number: [id number]:  [id number]   2024 0.80 0.60 - 1.30 mg/dL Final     Comment:     Serial Number: [id number]:  [id number]   2024 0.80 0.60 - 1.30 mg/dL Final     Comment:     Serial Number: [id number]:  [id number]   2020 0.80 0.7 - 1.5 mg/dL Final   2019 0.6 (L) 0.7 - 1.5 mg/dL Final   2018 0.8 0.7 - 1.5 mg/dL Final     BUN   Date Value Ref Range Status   2025 29 (H) 8 - 23 mg/dL Final   2020 19 7 - 20 mg/dL Final     Estimated Creatinine Clearance: 39.3 mL/min (A) (by C-G formula based on  SCr of 1.52 mg/dL (H)).    Lab Results   Component Value Date    WBC 15.47 (H) 04/17/2025     Temp Readings from Last 3 Encounters:   04/17/25 (!) 102.6 °F (39.2 °C) (Tympanic)   04/04/25 97.6 °F (36.4 °C) (Infrared)   03/30/25 97.4 °F (36.3 °C) (Tympanic)      Assessment/Plan    Ordered cefepime 2000 mg IV q12h, adjusted for renal function, for a total of 7 days. Will monitor and adjust if culture data or pertinent lab values indicate this is best for the patient.     Thank you for this consult and please contact pharmacy with any questions or concerns.     Annamaria Abbasi [facility]  Clinical Pharmacist

## 2025-04-17 NOTE — TELEPHONE ENCOUNTER
Maritza called reporting severe nausea yesterday and today.  She also noted her heart rate has been in the 110's.  She states she took phenergan yesterday and compazine today of which neither gave her much relief.  She is not vomiting.   She states she's drinking 'tons of water' but not really eating much. Wondering if this could be related to her Truqap. Advised this could be certainly related to her medication.  Reviewed with Dr Lopes, discussed sending in zyprexa nightly for her. She is scheduled for follow up tomorrow with Dr Lopes and can discuss further.  At the time of my entering this note, Maritza has been in the ER and now in ICU with a diagnosis of sepsis.  Will follow.  Dr Lopes updated as well.

## 2025-04-17 NOTE — TELEPHONE ENCOUNTER
Caller: Maritza Bejarano    Relationship to patient: Self    Best call back number: [phone]     Patient is needing: STATES BEEN  NAUSEOUS AND HEART RATE EVALUATED  LAST COUPLE DAYS WANTS TO KNOW WHAT SHE SHOULD DO PLEASE CALL AND ADVISE

## 2025-04-17 NOTE — PROGRESS NOTES
Clinical Pharmacy Services: Medication History    Maritza Bejarano is a 64 y.o. female presenting to [facility] for   Chief Complaint   Patient presents with    Dizziness    Fever    Shortness of Breath       She  has a past medical history of Anemia (2024), Dawn esophagus, Blurred vision, Breast cancer (05/2024+++++), Carpal tunnel syndrome, Clotting disorder (1993, 2003, 2012), Colon polyp (2013), Deep vein thrombosis (phlebitis 1980), Depression, Diplopia (2013), GERD (gastroesophageal reflux disease), GI (gastrointestinal bleed) (3 bleeds), H/O Skin cancer, basal cell, Headache, History of blood transfusion, History of GI bleed, History of urinary tract infection, Hypercalcemia, Hyperlipidemia, Hypertension, Movement disorder, Multiple sclerosis, Optic neuritis, PONV (postoperative nausea and vomiting), and Steroid-induced diabetes (2024).    Allergies as of 04/17/2025    (No Known Allergies)       Medication information was obtained from: Patient   Pharmacy and Phone Number:     Prior to Admission Medications       Prescriptions Last Dose Informant Patient Reported? Taking?    albuterol sulfate  (90 Base) MCG/ACT inhaler  Self No Yes    Inhale 2 puffs Every 4 (Four) Hours As Needed for Wheezing or Shortness of Air.    anastrozole (ARIMIDEX) 1 MG tablet   Yes Yes    Take 1 tablet by mouth Daily.    baclofen (LIORESAL) 20 MG tablet  Self Yes Yes    Take 1 tablet by mouth 2 (Two) Times a Day.    Capivasertib (Truqap) 200 MG tablet  Self No Yes    Take 2 tablets by mouth 2 (Two) Times a Day. Take for 4 days on then 3 days off. Repeat.    Cholecalciferol (vitamin D3) 125 MCG (5000 UT) tablet tablet  Self Yes Yes    Take 1 tablet by mouth Daily.    clonazePAM (KlonoPIN) 2 MG tablet  Self No Yes    Take 1 tablet by mouth 2 (Two) Times a Day As Needed for Anxiety (Sleep).    dexAMETHasone 0.5 MG/5ML solution  Pharmacy No Yes    Take 10 mL by mouth Daily.    Patient taking differently:  Take 10 mL  by mouth Daily As Needed.    Fenbendazole powder  Self Yes Yes    Use 440 mg 3 (Three) Times a Week. 2 capsules 3 times weekly    FLUoxetine (PROzac) 20 MG capsule  Pharmacy Yes Yes    Take 1 capsule by mouth Daily.    glipizide 2.5 MG tablet  Pharmacy No Yes    Take 2.5 mg by mouth 2 (Two) Times a Day Before Meals.    IVERMECTIN PO  Self Yes Yes    Take 15 mg by mouth 3 (Three) Times a Week.    losartan-hydrochlorothiazide (HYZAAR) 50-12.5 MG per tablet  Self Yes Yes    Take 1 tablet by mouth Daily.    metFORMIN (GLUCOPHAGE) 500 MG tablet  Self Yes Yes    Take 1 tablet by mouth Daily With Breakfast.    pantoprazole (PROTONIX) 40 MG EC tablet  Self No Yes    Take 1 tablet by mouth 2 (Two) Times a Day.    phenazopyridine (PYRIDIUM) 200 MG tablet  Self No Yes    Take 1 tablet by mouth 3 (Three) Times a Day As Needed for Dysuria.    pramipexole (MIRAPEX) 1.5 MG tablet  Self Yes Yes    Take 1 tablet by mouth 2 (Two) Times a Day.    prochlorperazine (COMPAZINE) 10 MG tablet  Self No Yes    Take 1 tablet by mouth Every 6 (Six) Hours As Needed for Nausea or Vomiting.    promethazine (PHENERGAN) 12.5 MG tablet  Self No Yes    Take 1 tablet by mouth Every 6 (Six) Hours As Needed for Nausea or Vomiting.    zolpidem (AMBIEN) 5 MG tablet  Self No Yes    Take 1 tablet by mouth At Night As Needed for Sleep.    traZODone (DESYREL) 50 MG tablet   No No    TAKE 1 TABLET BY MOUTH EVERY NIGHT              Medication notes:     This medication list is complete to the best of my knowledge as of 4/17/2025    Please call if questions.    Tony Mcneal  Medication History Technician  [phone]    4/17/2025 15:04 EDT

## 2025-04-17 NOTE — H&P
"  History and Physical    Patient Name: Maritza Bejarano  Age/Sex: 64 y.o. female  : 1960  MRN: [id number]    Date of Admission: 2025  Date of Encounter Visit: 25  Encounter Provider: Brian Lucas MD  Place of Service: [facility]   Patient Care Team:  Enoc Carbone DO as PCP - General (Internal Medicine)  Brittney Ortiz RN as Nurse Navigator (Oncology)  Kim Barber MD as Referring Physician (General Surgery)  Zainab Lopes MD as Consulting Physician (Hematology and Oncology)      Subjective:     Chief Complaint: Urosepsis    History of Present Illness:  Maritza Bejarano is a 64 y.o. female with history of neurogenic bladder and history of UTIs in the past caused by MS.  Patient is on oral prednisone based on the prescription even though she denies being on any immunosuppressive medication, she also has hypertension and diabetes and restless leg and muscle spasm and chronic nausea  She came in because of fever and chills that started several hours after she had her Cobos catheter flushed and then replaced at the clinic.  On arrival she was febrile, tachypneic, tachycardic and hypotensive  She was started on the sepsis bundle with IV fluid hydration and antibiotics, she has not required any pressors but her blood pressure is labile and after discussing the case with the emergency doctor we decided to admit to the ICU for close monitoring    Pulmonary Functions Testing Results:    No results found for: \"FEV1\", \"FVC\", \"DUX5GAV\", \"TLC\", \"DLCO\"    Review of Systems:   Review of Systems  As per above    Past Medical History:  Past Medical History:   Diagnosis Date    Anemia     `Treated with iron    Dawn esophagus     per patient    Blurred vision     R/T MS    Breast cancer 2024+++++    Carpal tunnel syndrome     Clotting disorder , ,     3 g/i bleeds w/ transfus/ions    Colon polyp 2013    removed w/ colonoscopy    Deep vein thrombosis phlebitis     " Depression     Diplopia     GERD (gastroesophageal reflux disease)     GI (gastrointestinal bleed) 3 bleeds    2 transfusions    H/O Skin cancer, basal cell     Headache     History of blood transfusion     History of GI bleed     R/T NSAIDS AND STEROIDS, multiple times    History of urinary tract infection     Hypercalcemia     s/p parathyroidectomy    Hyperlipidemia     Hypertension     Movement disorder     Multiple sclerosis     Optic neuritis     PONV (postoperative nausea and vomiting)     Steroid-induced diabetes        Past Surgical History:   Procedure Laterality Date    APPENDECTOMY      BLADDER SURGERY      bladder stimulator    BREAST BIOPSY  don't remember    BREAST SURGERY      augmentation wtih subsequent removal    CARPAL TUNNEL RELEASE Bilateral     Left 2018, right 2020    CUBITAL TUNNEL RELEASE Left     CYSTOSCOPY BOTOX INJECTION OF BLADDER  2018    Cystoscopy with Botox    FRACTURE SURGERY  2019    rt shoulder    PARATHYROIDECTOMY      one gland removed    ROTATOR CUFF REPAIR Right 2017    TOE SURGERY      bilateral great toes    TOTAL SHOULDER ARTHROPLASTY W/ DISTAL CLAVICLE EXCISION Right 10/22/2018    Procedure: RT TOTAL SHOULDER REVERSE ARTHROPLASTY;  Surgeon: Bipin Dangelo MD;  Location: [facility];  Service: Orthopedics       Home Medications:   (Not in a hospital admission)      Inpatient Medications:  Scheduled Meds:vancomycin, 20 mg/kg, Intravenous, Once      Continuous Infusions:   PRN Meds:.  [COMPLETED] Insert Peripheral IV **AND** sodium chloride    Allergies:  No Known Allergies    Past Social History:  Social History     Socioeconomic History    Marital status:      Spouse name: Donald    Number of children: 0   Tobacco Use    Smoking status: Former     Current packs/day: 0.00     Average packs/day: 2.0 packs/day for 30.0 years (60.0 ttl pk-yrs)     Types: Cigarettes     Start date: 10/22/1973     Quit date: 10/22/2003     Years since quittin.5    Smokeless  tobacco: Never   Vaping Use    Vaping status: Never Used   Substance and Sexual Activity    Alcohol use: Not Currently    Drug use: Not Currently     Types: Marijuana     Comment: advised by neurologist for sleep    Sexual activity: Not Currently     Partners: Male     Birth control/protection: Post-menopausal       Past Family History:  Family History   Problem Relation Age of Onset    Hypertension Mother     Hyperlipidemia Mother     Aortic aneurysm Mother         thoracic    Diabetes Mother     Hypertension Father         charissa heart failure    Heart failure Father     Hyperlipidemia Father     Miscarriages / Stillbirths Sister     Multiple sclerosis Brother     Atrial fibrillation Brother     Diabetes Maternal Grandfather     Stroke Maternal Grandfather     Parkinsonism Paternal Grandmother     Tremor Paternal Grandmother     Stomach cancer Paternal Grandfather     Diabetes Maternal Grandmother            Objective:   Temp:  [100.1 °F (37.8 °C)-102.6 °F (39.2 °C)] 102.6 °F (39.2 °C)  Heart Rate:  [127] 127  Resp:  [14-24] 14  BP: (152)/(68) 152/68   SpO2:  [94 %-98 %] 94 %  on    Device (Oxygen Therapy): room air  No intake or output data in the 24 hours ending 04/17/25 1508  Body mass index is 25.79 kg/m².      04/17/25  1259   Weight: 70.3 kg (155 lb)     Weight change:     Physical Exam:   Physical Exam   General:  Sick looking lady, awake and responsive, in mild distress                   Head:    Normocephalic, atraumatic.    Eyes:          Conjunctivae and sclerae normal, no icterus, PERRLA   Throat:   No oral lesions, no thrush,    Dry mucous membrane   Neck:   Supple, trachea midline.  Obese neck   Lungs:     Normal chest on inspection, CTAB, no wheezes. No rhonchi. No crackles. Respirations regular,   Patient is slightly tachypneic in the breathing is minimally labored on room air with good oxygenation    Heart:    Regular rhythm and rapid heart rate.  No murmurs, gallops, or rubs noted.   Abdomen:      "Soft, nontender, nondistended, positive bowel sounds.  Truncal obesity   Extremities:   No clubbing, cyanosis, or edema.  Patient has significant lower extremity weakness and to a lesser degree upper extremity weakness because of her MS   Pulses:   Pulses palpable and equal bilaterally.    Skin:   No bleeding or rash.   Neuro: Generalized weakness more so over the lower extremities.  Can move the upper extremities   Psychiatric:   Normal mood and affect.     Lab Review:   Results from last 7 days   Lab Units 04/17/25  1304   SODIUM mmol/L 136   POTASSIUM mmol/L 3.7   CHLORIDE mmol/L 99   CO2 mmol/L 17.0*   BUN mg/dL 29*   CREATININE mg/dL 1.52*   GLUCOSE mg/dL 294*   CALCIUM mg/dL 8.6   AST (SGOT) U/L 67*   ALT (SGPT) U/L 38*   ALBUMIN g/dL 3.7     Results from last 7 days   Lab Units 04/17/25  1419 04/17/25  1304   HSTROP T ng/L 351* 163*     Results from last 7 days   Lab Units 04/17/25  1304   WBC 10*3/mm3 15.47*   HEMOGLOBIN g/dL 13.4   HEMATOCRIT % 39.0   PLATELETS 10*3/mm3 396   MCV fL 91.1   MCH pg 31.3   MCHC g/dL 34.4   RDW % 16.1*   RDW-SD fl 53.6   MPV fL 8.9   NEUTROS ABS 10*3/mm3 13.77*   EOS ABS 10*3/mm3 0.15   BASOS ABS 10*3/mm3 0.00   NRBC /100 WBC 0.0                   Invalid input(s): \"LDLCALC\"  Results from last 7 days   Lab Units 04/17/25  1304   PROBNP pg/mL 8,881.0*             Results from last 7 days   Lab Units 04/17/25  1304   PROCALCITONIN ng/mL 44.90*   LACTATE mmol/L 6.4*                 Results from last 7 days   Lab Units 04/17/25  1326   NITRITE UA  Positive*   WBC UA /HPF Too Numerous to Count*   BACTERIA UA /HPF 4+*   SQUAM EPITHEL UA /HPF 0-2     Results from last 7 days   Lab Units 04/17/25  1341   ADENOVIRUS DETECTION BY PCR  Not Detected   CORONAVIRUS 229E  Not Detected   CORONAVIRUS HKU1  Not Detected   CORONAVIRUS NL63  Not Detected   CORONAVIRUS OC43  Not Detected   HUMAN METAPNEUMOVIRUS  Not Detected   HUMAN RHINOVIRUS/ENTEROVIRUS  Not Detected   INFLUENZA B PCR  Not " Detected   PARAINFLUENZA 1  Not Detected   PARAINFLUENZA VIRUS 2  Not Detected   PARAINFLUENZA VIRUS 3  Not Detected   PARAINFLUENZA VIRUS 4  Not Detected   BORDETELLA PERTUSSIS PCR  Not Detected   BORDETELLA PARAPERTUSSIS PCR  Not Detected   CHLAMYDOPHILA PNEUMONIAE PCR  Not Detected   MYCOPLAMA PNEUMO PCR  Not Detected   RSV, PCR  Not Detected           Imaging:  Imaging Results (Most Recent)       Procedure Component Value Units Date/Time    XR Chest 1 View [[id number]] Collected: 04/17/25 1343     Updated: 04/17/25 1349    Narrative:      XR CHEST 1 VW-     HISTORY: Female who is 64 years-old, short of breath     TECHNIQUE: Frontal view of the chest     COMPARISON: 9/10/2024     FINDINGS: The heart size is borderline. Pulmonary vasculature is  unremarkable. No focal pulmonary consolidation, pleural effusion, or  pneumothorax. No acute osseous process.       Impression:      No focal pulmonary consolidation. Borderline heart size.  Follow-up as clinical indications persist.     This report was finalized on 4/17/2025 1:46 PM by Dr. Luan Estrada M.D on Workstation: [employer]               I personally viewed and interpreted the patient's imaging studies.    Assessment:   Sepsis, severe  UTI  Neurogenic bladder  Essential hypertension  History of GI bleed  Recurrent UTI  Type II non-ST elevation MI with further increase in high-sensitivity troponin  Acute renal failure  Transaminitis  Anion gap metabolic acidosis  MS, on no immunosuppressive medication or immunomodulators  Dawn's esophagus  Breast cancer        Plan:     Urine and blood cultures have been collected, no Cobos catheter according to the patient was replaced yesterday , The patient has typical septic picture with fever, leukocytosis, hypotension and tachycardia, with strongly suggestive urinalysis  She is feels dyspneic however she has good oxygenation on room air probably from the acidemia  Treatment is supportive, the patient is frail with poor  reserve, she will be monitored will track the lactic acid level, will follow-up on his heart rate, we will continue the maintenance IV fluid after the initial fluid bolus given in the ER per the sepsis protocol with 30 cc/kg  Will continue with cefepime, culture reviewed, there is no culture in the system suggestive of any prior MDR pathogens  Will check repeat labs and if the anion gap is worsening we will consider an ABG specially if the patient is having worsening dyspnea sensation.  I will hold on any further vancomycin unless culture result showing some gram-positive organisms  Hold all the antihypertensive medication  Follow-up on the urine culture and de-escalate on the antibiotics accordingly for now we will start with IV Rocephin at 2 g per 24-hour dose  Patient was supposed to have a suprapubic catheter, that is to be addressed with her urologist  Will follow-up on the liver enzyme expect to improve with the above supportive measures and consider more workup if needed  Stress ulcer and DVT prophylaxis necessary  When asked about stress immunomodulation patient denies any but I see orders for dexamethasone that was started in March 2025 and the patient needs to be on stress dose of steroids given the above.  Hold on the metformin and monitor for the blood sugar and treat with sliding scale  Patient will be admitted to the intensive care unit for additional management and will follow-up on the response with further recommendations accordingly    Care time 39 minutes  Brian Lucas MD  [facility]   04/17/25  15:08 EDT    Dictated utilizing Dragon dictation

## 2025-04-17 NOTE — ED PROVIDER NOTES
EMERGENCY DEPARTMENT ENCOUNTER    Room Number:  32/32  PCP: Enoc Carbone DO  Historian: Patient      HPI:  Chief Complaint: Fever  A complete HPI/ROS/PMH/PSH/SH/FH are unobtainable due to: None  Context: Maritza Bejarano is a 64 y.o. female who presents to the ED c/o fever.  Patient has history of MS.  Patient has indwelling catheter.  Patient states he went to urology yesterday.  Patient had catheter flushed and drained and then changed.  Patient states this morning started having fever.  Patient had some dizziness lightheadedness.  Has had some chills.  Has had some shortness of breath.  Does not wear oxygen normally.  Has had no vomiting or diarrhea.  No abdominal pain            PAST MEDICAL HISTORY  Active Ambulatory Problems     Diagnosis Date Noted    H/O total shoulder replacement, right 10/22/2018    Cough 01/28/2021    Acute UTI (urinary tract infection) 01/28/2021    Multiple sclerosis 01/28/2021    Essential hypertension 01/28/2021    Hyperlipidemia 01/28/2021    Shortness of breath 01/28/2021    Oropharyngeal dysphagia 02/04/2021    Abnormal finding on mammography 08/09/2021    Acid reflux 08/09/2021    Anxiety and depression 05/01/2018    Brash 08/09/2021    Carpal tunnel syndrome 01/10/2013    Chronic low back pain 01/22/2014    Diplopia 01/10/2013    Disease with a predominantly sexual mode of transmission 08/09/2021    FOM (frequency of micturition) 08/09/2021    Headache 01/10/2013    History of diplopia 01/07/2020    History of optic neuritis 01/07/2020    History of vitamin D deficiency 10/31/2017    Migraine syndrome 01/22/2014    Mixed incontinence 07/31/2017    Nausea 08/10/2015    Neurogenic bladder 02/23/2017    Pain in joint of right shoulder 07/31/2017    Restless legs syndrome 07/23/2014    Rupture of rotator cuff of shoulder 08/31/2017    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    S/P cubital tunnel release 01/04/2019    Vitamin D deficiency 01/22/2014    Multiple sclerosis  exacerbation 02/26/2022    Sepsis due to Gram negative bacteria 02/27/2022    Lower extremity cellulitis 03/07/2024    Malignant neoplasm of overlapping sites of left breast in female, estrogen receptor positive 06/28/2024    Encounter for long-term (current) use of other medications 07/16/2024    Cancer, metastatic to bone 07/31/2024    Colitis 09/10/2024    Weakness 09/18/2024    Elevated LFTs 09/21/2024    Acute UTI 10/18/2024     Resolved Ambulatory Problems     Diagnosis Date Noted    No Resolved Ambulatory Problems     Past Medical History:   Diagnosis Date    Anemia 2024    Dawn esophagus     Blurred vision     Breast cancer 05/2024+++++    Clotting disorder 1993, 2003, 2012    Colon polyp 2013    Deep vein thrombosis phlebitis 1980    Depression     GERD (gastroesophageal reflux disease)     GI (gastrointestinal bleed) 3 bleeds    H/O Skin cancer, basal cell     History of blood transfusion     History of GI bleed     History of urinary tract infection     Hypercalcemia     Hypertension     Movement disorder     Optic neuritis     PONV (postoperative nausea and vomiting)     Steroid-induced diabetes 2024         PAST SURGICAL HISTORY  Past Surgical History:   Procedure Laterality Date    APPENDECTOMY      BLADDER SURGERY      bladder stimulator    BREAST BIOPSY  don't remember    BREAST SURGERY      augmentation wtih subsequent removal    CARPAL TUNNEL RELEASE Bilateral     Left 2018, right 2020    CUBITAL TUNNEL RELEASE Left     CYSTOSCOPY BOTOX INJECTION OF BLADDER  2018    Cystoscopy with Botox    FRACTURE SURGERY  2019    rt shoulder    PARATHYROIDECTOMY      one gland removed    ROTATOR CUFF REPAIR Right 2017    TOE SURGERY      bilateral great toes    TOTAL SHOULDER ARTHROPLASTY W/ DISTAL CLAVICLE EXCISION Right 10/22/2018    Procedure: RT TOTAL SHOULDER REVERSE ARTHROPLASTY;  Surgeon: Bipin Dangelo MD;  Location: [facility];  Service: Orthopedics         FAMILY HISTORY  Family History    Problem Relation Age of Onset    Hypertension Mother     Hyperlipidemia Mother     Aortic aneurysm Mother         thoracic    Diabetes Mother     Hypertension Father         charissa heart failure    Heart failure Father     Hyperlipidemia Father     Miscarriages / Stillbirths Sister     Multiple sclerosis Brother     Atrial fibrillation Brother     Diabetes Maternal Grandfather     Stroke Maternal Grandfather     Parkinsonism Paternal Grandmother     Tremor Paternal Grandmother     Stomach cancer Paternal Grandfather     Diabetes Maternal Grandmother          SOCIAL HISTORY  Social History     Socioeconomic History    Marital status:      Spouse name: Donald    Number of children: 0   Tobacco Use    Smoking status: Former     Current packs/day: 0.00     Average packs/day: 2.0 packs/day for 30.0 years (60.0 ttl pk-yrs)     Types: Cigarettes     Start date: 10/22/1973     Quit date: 10/22/2003     Years since quittin.5    Smokeless tobacco: Never   Vaping Use    Vaping status: Never Used   Substance and Sexual Activity    Alcohol use: Not Currently    Drug use: Not Currently     Types: Marijuana     Comment: advised by neurologist for sleep    Sexual activity: Not Currently     Partners: Male     Birth control/protection: Post-menopausal         ALLERGIES  Patient has no known allergies.        REVIEW OF SYSTEMS  Review of Systems   Fever lightheadedness      PHYSICAL EXAM  ED Triage Vitals [25 1224]   Temp Heart Rate Resp BP SpO2   100.1 °F (37.8 °C) (!) 127 24 152/68 98 %      Temp src Heart Rate Source Patient Position BP Location FiO2 (%)   -- -- -- -- --       Physical Exam      GENERAL: no acute distress  HENT: nares patent  EYES: no scleral icterus  CV: regular rhythm, normal rate  RESPIRATORY: normal effort  ABDOMEN: soft, nondistended nontender  MUSCULOSKELETAL: no deformity  NEURO: alert, moves all extremities, follows commands.  Most weakness  PSYCH:  calm, cooperative  SKIN: warm,  dry    Vital signs and nursing notes reviewed.          LAB RESULTS  Recent Results (from the past 24 hours)   ECG 12 Lead Dyspnea    Collection Time: 04/17/25 12:57 PM   Result Value Ref Range    QT Interval 309 ms    QTC Interval 464 ms   Comprehensive Metabolic Panel    Collection Time: 04/17/25  1:04 PM    Specimen: Arm, Left; Blood   Result Value Ref Range    Glucose 294 (H) 65 - 99 mg/dL    BUN 29 (H) 8 - 23 mg/dL    Creatinine 1.52 (H) 0.57 - 1.00 mg/dL    Sodium 136 136 - 145 mmol/L    Potassium 3.7 3.5 - 5.2 mmol/L    Chloride 99 98 - 107 mmol/L    CO2 17.0 (L) 22.0 - 29.0 mmol/L    Calcium 8.6 8.6 - 10.5 mg/dL    Total Protein 7.5 6.0 - 8.5 g/dL    Albumin 3.7 3.5 - 5.2 g/dL    ALT (SGPT) 38 (H) 1 - 33 U/L    AST (SGOT) 67 (H) 1 - 32 U/L    Alkaline Phosphatase 238 (H) 39 - 117 U/L    Total Bilirubin 0.6 0.0 - 1.2 mg/dL    Globulin 3.8 gm/dL    A/G Ratio 1.0 g/dL    BUN/Creatinine Ratio 19.1 7.0 - 25.0    Anion Gap 20.0 (H) 5.0 - 15.0 mmol/L    eGFR 38.6 (L) >60.0 mL/min/1.73   Lactic Acid, Plasma    Collection Time: 04/17/25  1:04 PM    Specimen: Arm, Left; Blood   Result Value Ref Range    Lactate 6.4 (C) 0.5 - 2.0 mmol/L   Procalcitonin    Collection Time: 04/17/25  1:04 PM    Specimen: Arm, Left; Blood   Result Value Ref Range    Procalcitonin 44.90 (H) 0.00 - 0.25 ng/mL   BNP    Collection Time: 04/17/25  1:04 PM    Specimen: Arm, Left; Blood   Result Value Ref Range    proBNP 8,881.0 (H) 0.0 - 900.0 pg/mL   High Sensitivity Troponin T    Collection Time: 04/17/25  1:04 PM    Specimen: Arm, Left; Blood   Result Value Ref Range    HS Troponin T 163 (C) <14 ng/L   CBC Auto Differential    Collection Time: 04/17/25  1:04 PM    Specimen: Arm, Left; Blood   Result Value Ref Range    WBC 15.47 (H) 3.40 - 10.80 10*3/mm3    RBC 4.28 3.77 - 5.28 10*6/mm3    Hemoglobin 13.4 12.0 - 15.9 g/dL    Hematocrit 39.0 34.0 - 46.6 %    MCV 91.1 79.0 - 97.0 fL    MCH 31.3 26.6 - 33.0 pg    MCHC 34.4 31.5 - 35.7 g/dL     RDW 16.1 (H) 12.3 - 15.4 %    RDW-SD 53.6 37.0 - 54.0 fl    MPV 8.9 6.0 - 12.0 fL    Platelets 396 140 - 450 10*3/mm3    nRBC 0.0 0.0 - 0.2 /100 WBC   Manual Differential    Collection Time: 04/17/25  1:04 PM    Specimen: Arm, Left; Blood   Result Value Ref Range    Neutrophil % 89.0 (H) 42.7 - 76.0 %    Lymphocyte % 1.0 (L) 19.6 - 45.3 %    Monocyte % 7.0 5.0 - 12.0 %    Eosinophil % 1.0 0.3 - 6.2 %    Basophil % 0.0 0.0 - 1.5 %    Metamyelocyte % 2.0 (H) 0.0 - 0.0 %    Neutrophils Absolute 13.77 (H) 1.70 - 7.00 10*3/mm3    Lymphocytes Absolute 0.15 (L) 0.70 - 3.10 10*3/mm3    Monocytes Absolute 1.08 (H) 0.10 - 0.90 10*3/mm3    Eosinophils Absolute 0.15 0.00 - 0.40 10*3/mm3    Basophils Absolute 0.00 0.00 - 0.20 10*3/mm3    RBC Morphology Normal Normal    WBC Morphology Normal Normal    Platelet Morphology Normal Normal   Urinalysis With Culture If Indicated - Urine, Catheter    Collection Time: 04/17/25  1:26 PM    Specimen: Urine, Catheter   Result Value Ref Range    Color, UA Dark Yellow (A) Yellow, Straw    Appearance, UA Turbid (A) Clear    pH, UA 5.5 5.0 - 8.0    Specific Gravity, UA 1.015 1.005 - 1.030    Glucose,  mg/dL (2+) (A) Negative    Ketones, UA Trace (A) Negative    Bilirubin, UA Negative Negative    Blood, UA Moderate (2+) (A) Negative    Protein, UA >=300 mg/dL (3+) (A) Negative    Leuk Esterase, UA Moderate (2+) (A) Negative    Nitrite, UA Positive (A) Negative    Urobilinogen, UA 1.0 E.U./dL 0.2 - 1.0 E.U./dL   Urinalysis, Microscopic Only - Urine, Catheter    Collection Time: 04/17/25  1:26 PM    Specimen: Urine, Catheter   Result Value Ref Range    RBC, UA 3-5 (A) None Seen, 0-2 /HPF    WBC, UA Too Numerous to Count (A) None Seen, 0-2 /HPF    Bacteria, UA 4+ (A) None Seen /HPF    Squamous Epithelial Cells, UA 0-2 None Seen, 0-2 /HPF    Hyaline Casts, UA 13-20 None Seen /LPF    Methodology Automated Microscopy        Ordered the above labs and reviewed the results.        RADIOLOGY  XR  Chest 1 View  Result Date: 4/17/2025  XR CHEST 1 VW-  HISTORY: Female who is 64 years-old, short of breath  TECHNIQUE: Frontal view of the chest  COMPARISON: 9/10/2024  FINDINGS: The heart size is borderline. Pulmonary vasculature is unremarkable. No focal pulmonary consolidation, pleural effusion, or pneumothorax. No acute osseous process.      No focal pulmonary consolidation. Borderline heart size. Follow-up as clinical indications persist.  This report was finalized on 4/17/2025 1:46 PM by Dr. Luan Estrada M.D on Workstation: [employer]        Ordered the above noted radiological studies. Reviewed by me in PACS.            PROCEDURES  Critical Care    Performed by: Kimo Costello MD  Authorized by: Kimo Costello MD    Critical care provider statement:     Critical care time (minutes):  35    Critical care time was exclusive of:  Separately billable procedures and treating other patients    Critical care was necessary to treat or prevent imminent or life-threatening deterioration of the following conditions:  Sepsis    Critical care was time spent personally by me on the following activities:  Ordering and performing treatments and interventions, ordering and review of laboratory studies, re-evaluation of patient's condition and discussions with consultants      EKG          EKG time: 1257  Rhythm/Rate: Sinus tachycardia 135  P waves and UT: Normal P waves  QRS, axis: Normal QRS  ST and T waves: Nonspecific ST-T wave    Interpreted Contemporaneously by me, independently viewed  Changed compared to prior 9/18/24 and that the rate is much faster          MEDICATIONS GIVEN IN ER  Medications   sodium chloride 0.9 % flush 10 mL (has no administration in time range)   sodium chloride 0.9 % bolus 2,109 mL (2,109 mL Intravenous New Bag 4/17/25 1358)   cefepime 2000 mg IVPB in 100 mL NS (MBP) (2,000 mg Intravenous New Bag 4/17/25 1401)   vancomycin IVPB 1500 mg in 0.9% NaCl (Premix) 500 mL (has no  administration in time range)   acetaminophen (TYLENOL) tablet 1,000 mg (1,000 mg Oral Given 4/17/25 1340)   lactated ringers bolus 1,000 mL (0 mL Intravenous Stopped 4/17/25 1359)                   MEDICAL DECISION MAKING, PROGRESS, and CONSULTS    All labs have been independently reviewed by me.  All radiology studies have been reviewed by me and I have also reviewed the radiology report.   EKG's independently viewed and interpreted by me.  Discussion below represents my analysis of pertinent findings related to patient's condition, differential diagnosis, treatment plan and final disposition.      Additional sources:  - Discussed/ obtained information from independent historians: None     - External (non-ED) record review: Epic reviewed patient seen 4/4/2025 by oncology for breast cancer    - Chronic or social conditions impacting care: None    - Shared decision making: None      Orders placed during this visit:  Orders Placed This Encounter   Procedures    Blood Culture - Blood,    Blood Culture - Blood,    Respiratory Panel PCR w/COVID-19(SARS-CoV-2) YAMILE/FRANK/ROSE/PAD/COR/CHIDI In-House, NP Swab in UTM/VTM, 2 HR TAT - Swab, Nasopharynx    Urine Culture - Urine,    XR Chest 1 View    Comprehensive Metabolic Panel    Lactic Acid, Plasma    Procalcitonin    BNP    High Sensitivity Troponin T    CBC Auto Differential    Urinalysis With Culture If Indicated - Urine, Catheter    Manual Differential    Urinalysis, Microscopic Only - Urine, Clean Catch    STAT Lactic Acid, Reflex    High Sensitivity Troponin T 1Hr    OK to stick feet for blood draw per MD ontiveros  [facility] Nursing Order (Specify)    Pulmonology (on-call MD unless specified)    ECG 12 Lead Dyspnea    Insert Peripheral IV    Inpatient Admission    CBC & Differential         Additional orders considered but not ordered:  None        Differential diagnosis includes but is not limited to:    Sepsis from pneumonia versus UTI versus intra-abdominal  infection      Independent interpretation of labs, radiology studies, and discussions with consultants:  ED Course as of 04/17/25 1427   Thu Apr 17, 2025   1413 14:14 EDT   [SL]   1414 Patient presents for fevers lightheadedness.  Patient is septic.  Patient had urinary catheter irrigated and replaced last night.  This may have been when she became septic as she started feeling bad after that.  Patient is febrile.  Patient's lactic acid elevated procalcitonin markedly elevated.  Patient has been given sepsis fluids.  Patient's troponin elevated as well.  Has been given cefepime and vancomycin.  Patient discussed with Dr. Lucas who will admit to ICU. [SL]      ED Course User Index  [SL] Kimo Costello MD                 DIAGNOSIS  Final diagnoses:   Sepsis, due to unspecified organism, unspecified whether acute organ dysfunction present   Elevated troponin   Acute UTI         DISPOSITION  admit            Latest Documented Vital Signs:  As of 14:27 EDT  BP- 152/68 HR- (!) 127 Temp- 100.1 °F (37.8 °C) O2 sat- 94%              --    Please note that portions of this were completed with a voice recognition program.       Note Disclaimer: At [facility], we believe that sharing information builds trust and better relationships. You are receiving this note because you are receiving care at [facility] or recently visited. It is possible you will see health information before a provider has talked with you about it. This kind of information can be easy to misunderstand. To help you fully understand what it means for your health, we urge you to discuss this note with your provider.            Kimo Costello MD  04/17/25 [id number]

## 2025-04-18 ENCOUNTER — APPOINTMENT (OUTPATIENT)
Dept: CARDIOLOGY | Facility: HOSPITAL | Age: 65
End: 2025-04-18
Payer: MEDICARE

## 2025-04-18 ENCOUNTER — APPOINTMENT (OUTPATIENT)
Dept: ULTRASOUND IMAGING | Facility: HOSPITAL | Age: 65
End: 2025-04-18
Payer: MEDICARE

## 2025-04-18 LAB
ALBUMIN SERPL-MCNC: 2.9 G/DL (ref 3.5–5.2)
ALBUMIN/GLOB SERPL: 1 G/DL
ALP SERPL-CCNC: 195 U/L (ref 39–117)
ALT SERPL W P-5'-P-CCNC: 31 U/L (ref 1–33)
ANION GAP SERPL CALCULATED.3IONS-SCNC: 13.7 MMOL/L (ref 5–15)
AORTIC DIMENSIONLESS INDEX: 0.84 (DI)
AST SERPL-CCNC: 54 U/L (ref 1–32)
AV MEAN PRESS GRAD SYS DOP V1V2: 7 MMHG
AV VMAX SYS DOP: 177.6 CM/SEC
BH CV ECHO MEAS - ACS: 2.13 CM
BH CV ECHO MEAS - AO MAX PG: 12.6 MMHG
BH CV ECHO MEAS - AO ROOT DIAM: 2.8 CM
BH CV ECHO MEAS - AO V2 VTI: 38.9 CM
BH CV ECHO MEAS - AVA(I,D): 2.7 CM2
BH CV ECHO MEAS - EDV(CUBED): 87.6 ML
BH CV ECHO MEAS - EDV(MOD-SP2): 102 ML
BH CV ECHO MEAS - EDV(MOD-SP4): 103 ML
BH CV ECHO MEAS - EF(MOD-SP2): 56.9 %
BH CV ECHO MEAS - EF(MOD-SP4): 59.2 %
BH CV ECHO MEAS - ESV(CUBED): 22.1 ML
BH CV ECHO MEAS - ESV(MOD-SP2): 44 ML
BH CV ECHO MEAS - ESV(MOD-SP4): 42 ML
BH CV ECHO MEAS - FS: 36.8 %
BH CV ECHO MEAS - IVS/LVPW: 0.93 CM
BH CV ECHO MEAS - IVSD: 0.91 CM
BH CV ECHO MEAS - LAT PEAK E' VEL: 7.7 CM/SEC
BH CV ECHO MEAS - LV DIASTOLIC VOL/BSA (35-75): 82.4 CM2
BH CV ECHO MEAS - LV MASS(C)D: 138.2 GRAMS
BH CV ECHO MEAS - LV MAX PG: 10.7 MMHG
BH CV ECHO MEAS - LV MEAN PG: 5.5 MMHG
BH CV ECHO MEAS - LV SYSTOLIC VOL/BSA (12-30): 33.6 CM2
BH CV ECHO MEAS - LV V1 MAX: 163.2 CM/SEC
BH CV ECHO MEAS - LV V1 VTI: 32.7 CM
BH CV ECHO MEAS - LVIDD: 4.4 CM
BH CV ECHO MEAS - LVIDS: 2.8 CM
BH CV ECHO MEAS - LVOT AREA: 3.3 CM2
BH CV ECHO MEAS - LVOT DIAM: 2.04 CM
BH CV ECHO MEAS - LVPWD: 0.98 CM
BH CV ECHO MEAS - MED PEAK E' VEL: 7 CM/SEC
BH CV ECHO MEAS - MV A DUR: 0.1 SEC
BH CV ECHO MEAS - MV A MAX VEL: 119.4 CM/SEC
BH CV ECHO MEAS - MV DEC SLOPE: 504.4 CM/SEC2
BH CV ECHO MEAS - MV DEC TIME: 0.22 SEC
BH CV ECHO MEAS - MV E MAX VEL: 100 CM/SEC
BH CV ECHO MEAS - MV E/A: 0.84
BH CV ECHO MEAS - MV MAX PG: 6.2 MMHG
BH CV ECHO MEAS - MV MEAN PG: 2.9 MMHG
BH CV ECHO MEAS - MV P1/2T: 58.1 MSEC
BH CV ECHO MEAS - MV V2 VTI: 22.9 CM
BH CV ECHO MEAS - MVA(P1/2T): 3.8 CM2
BH CV ECHO MEAS - MVA(VTI): 4.7 CM2
BH CV ECHO MEAS - PA ACC TIME: 0.1 SEC
BH CV ECHO MEAS - PA V2 MAX: 95 CM/SEC
BH CV ECHO MEAS - PULM A REVS DUR: 0.11 SEC
BH CV ECHO MEAS - PULM A REVS VEL: 38.9 CM/SEC
BH CV ECHO MEAS - PULM DIAS VEL: 34.1 CM/SEC
BH CV ECHO MEAS - PULM S/D: 1.41
BH CV ECHO MEAS - PULM SYS VEL: 48 CM/SEC
BH CV ECHO MEAS - QP/QS: 0.87
BH CV ECHO MEAS - RV MAX PG: 4.6 MMHG
BH CV ECHO MEAS - RV V1 MAX: 107.2 CM/SEC
BH CV ECHO MEAS - RV V1 VTI: 18.2 CM
BH CV ECHO MEAS - RVOT DIAM: 2.5 CM
BH CV ECHO MEAS - SV(LVOT): 106.6 ML
BH CV ECHO MEAS - SV(MOD-SP2): 58 ML
BH CV ECHO MEAS - SV(MOD-SP4): 61 ML
BH CV ECHO MEAS - SV(RVOT): 92.7 ML
BH CV ECHO MEAS - SVI(LVOT): 85.3 ML/M2
BH CV ECHO MEAS - SVI(MOD-SP2): 46.4 ML/M2
BH CV ECHO MEAS - SVI(MOD-SP4): 48.8 ML/M2
BH CV ECHO MEASUREMENTS AVERAGE E/E' RATIO: 13.61
BH CV XLRA - TDI S': 14.9 CM/SEC
BILIRUB SERPL-MCNC: 0.4 MG/DL (ref 0–1.2)
BUN SERPL-MCNC: 28 MG/DL (ref 8–23)
BUN/CREAT SERPL: 20.4 (ref 7–25)
CA-I SERPL ISE-MCNC: 0.97 MMOL/L (ref 1.15–1.35)
CALCIUM SPEC-SCNC: 7.2 MG/DL (ref 8.6–10.5)
CHLORIDE SERPL-SCNC: 98 MMOL/L (ref 98–107)
CO2 SERPL-SCNC: 24.3 MMOL/L (ref 22–29)
CREAT SERPL-MCNC: 1.37 MG/DL (ref 0.57–1)
D-LACTATE SERPL-SCNC: 3.8 MMOL/L (ref 0.5–2)
D-LACTATE SERPL-SCNC: 4.6 MMOL/L (ref 0.5–2)
DEPRECATED RDW RBC AUTO: 55.2 FL (ref 37–54)
EGFRCR SERPLBLD CKD-EPI 2021: 43.7 ML/MIN/1.73
ERYTHROCYTE [DISTWIDTH] IN BLOOD BY AUTOMATED COUNT: 16.4 % (ref 12.3–15.4)
GLOBULIN UR ELPH-MCNC: 2.8 GM/DL
GLUCOSE BLDC GLUCOMTR-MCNC: 205 MG/DL (ref 70–130)
GLUCOSE BLDC GLUCOMTR-MCNC: 213 MG/DL (ref 70–130)
GLUCOSE BLDC GLUCOMTR-MCNC: 239 MG/DL (ref 70–130)
GLUCOSE BLDC GLUCOMTR-MCNC: 345 MG/DL (ref 70–130)
GLUCOSE SERPL-MCNC: 225 MG/DL (ref 65–99)
HCT VFR BLD AUTO: 31.9 % (ref 34–46.6)
HGB BLD-MCNC: 11.1 G/DL (ref 12–15.9)
LV EF BIPLANE MOD: 58.3 %
MAGNESIUM SERPL-MCNC: 1.9 MG/DL (ref 1.6–2.4)
MCH RBC QN AUTO: 31.6 PG (ref 26.6–33)
MCHC RBC AUTO-ENTMCNC: 34.8 G/DL (ref 31.5–35.7)
MCV RBC AUTO: 90.9 FL (ref 79–97)
PHOSPHATE SERPL-MCNC: 1.5 MG/DL (ref 2.5–4.5)
PHOSPHATE SERPL-MCNC: 1.7 MG/DL (ref 2.5–4.5)
PHOSPHATE SERPL-MCNC: 2.3 MG/DL (ref 2.5–4.5)
PLATELET # BLD AUTO: 265 10*3/MM3 (ref 140–450)
PMV BLD AUTO: 9.1 FL (ref 6–12)
POTASSIUM SERPL-SCNC: 2.8 MMOL/L (ref 3.5–5.2)
POTASSIUM SERPL-SCNC: 4 MMOL/L (ref 3.5–5.2)
PROT SERPL-MCNC: 5.7 G/DL (ref 6–8.5)
QT INTERVAL: 457 MS
QTC INTERVAL: 520 MS
RBC # BLD AUTO: 3.51 10*6/MM3 (ref 3.77–5.28)
SINUS: 2.7 CM
SODIUM SERPL-SCNC: 136 MMOL/L (ref 136–145)
TROPONIN T SERPL HS-MCNC: 594 NG/L
WBC NRBC COR # BLD AUTO: 16.96 10*3/MM3 (ref 3.4–10.8)

## 2025-04-18 PROCEDURE — 84100 ASSAY OF PHOSPHORUS: CPT | Performed by: INTERNAL MEDICINE

## 2025-04-18 PROCEDURE — 25510000001 PERFLUTREN 6.52 MG/ML SUSPENSION 2 ML VIAL: Performed by: STUDENT IN AN ORGANIZED HEALTH CARE EDUCATION/TRAINING PROGRAM

## 2025-04-18 PROCEDURE — 93010 ELECTROCARDIOGRAM REPORT: CPT | Performed by: INTERNAL MEDICINE

## 2025-04-18 PROCEDURE — 93306 TTE W/DOPPLER COMPLETE: CPT | Performed by: INTERNAL MEDICINE

## 2025-04-18 PROCEDURE — 99222 1ST HOSP IP/OBS MODERATE 55: CPT | Performed by: STUDENT IN AN ORGANIZED HEALTH CARE EDUCATION/TRAINING PROGRAM

## 2025-04-18 PROCEDURE — 93005 ELECTROCARDIOGRAM TRACING: CPT | Performed by: STUDENT IN AN ORGANIZED HEALTH CARE EDUCATION/TRAINING PROGRAM

## 2025-04-18 PROCEDURE — 25010000002 HEPARIN (PORCINE) PER 1000 UNITS: Performed by: INTERNAL MEDICINE

## 2025-04-18 PROCEDURE — 84484 ASSAY OF TROPONIN QUANT: CPT | Performed by: INTERNAL MEDICINE

## 2025-04-18 PROCEDURE — 83605 ASSAY OF LACTIC ACID: CPT

## 2025-04-18 PROCEDURE — 80053 COMPREHEN METABOLIC PANEL: CPT | Performed by: INTERNAL MEDICINE

## 2025-04-18 PROCEDURE — 63710000001 INSULIN LISPRO (HUMAN) PER 5 UNITS: Performed by: INTERNAL MEDICINE

## 2025-04-18 PROCEDURE — 85027 COMPLETE CBC AUTOMATED: CPT | Performed by: INTERNAL MEDICINE

## 2025-04-18 PROCEDURE — 83735 ASSAY OF MAGNESIUM: CPT | Performed by: INTERNAL MEDICINE

## 2025-04-18 PROCEDURE — 25010000002 CALCIUM GLUCONATE-NACL 1-0.675 GM/50ML-% SOLUTION: Performed by: INTERNAL MEDICINE

## 2025-04-18 PROCEDURE — 84100 ASSAY OF PHOSPHORUS: CPT

## 2025-04-18 PROCEDURE — 83605 ASSAY OF LACTIC ACID: CPT | Performed by: EMERGENCY MEDICINE

## 2025-04-18 PROCEDURE — 82948 REAGENT STRIP/BLOOD GLUCOSE: CPT

## 2025-04-18 PROCEDURE — 84132 ASSAY OF SERUM POTASSIUM: CPT

## 2025-04-18 PROCEDURE — 25010000002 HYDROCORTISONE SOD SUC (PF) 100 MG RECONSTITUTED SOLUTION: Performed by: INTERNAL MEDICINE

## 2025-04-18 PROCEDURE — 76775 US EXAM ABDO BACK WALL LIM: CPT

## 2025-04-18 PROCEDURE — 25010000002 CEFEPIME PER 500 MG: Performed by: INTERNAL MEDICINE

## 2025-04-18 PROCEDURE — 93306 TTE W/DOPPLER COMPLETE: CPT

## 2025-04-18 PROCEDURE — 82330 ASSAY OF CALCIUM: CPT | Performed by: INTERNAL MEDICINE

## 2025-04-18 RX ORDER — POTASSIUM CHLORIDE 1500 MG/1
40 TABLET, EXTENDED RELEASE ORAL EVERY 4 HOURS
Status: COMPLETED | OUTPATIENT
Start: 2025-04-18 | End: 2025-04-18

## 2025-04-18 RX ORDER — HYDROCODONE BITARTRATE AND ACETAMINOPHEN 5; 325 MG/1; MG/1
1 TABLET ORAL EVERY 6 HOURS PRN
Refills: 0 | Status: DISCONTINUED | OUTPATIENT
Start: 2025-04-18 | End: 2025-04-24

## 2025-04-18 RX ORDER — LOPERAMIDE HYDROCHLORIDE 2 MG/1
2 CAPSULE ORAL 4 TIMES DAILY PRN
Status: DISCONTINUED | OUTPATIENT
Start: 2025-04-18 | End: 2025-04-30 | Stop reason: HOSPADM

## 2025-04-18 RX ORDER — CALCIUM GLUCONATE 20 MG/ML
1000 INJECTION, SOLUTION INTRAVENOUS ONCE
Status: COMPLETED | OUTPATIENT
Start: 2025-04-18 | End: 2025-04-18

## 2025-04-18 RX ORDER — ZOLPIDEM TARTRATE 5 MG/1
5 TABLET ORAL NIGHTLY PRN
Status: DISCONTINUED | OUTPATIENT
Start: 2025-04-18 | End: 2025-04-24

## 2025-04-18 RX ADMIN — PRAMIPEXOLE DIHYDROCHLORIDE 1.5 MG: 1.5 TABLET ORAL at 20:59

## 2025-04-18 RX ADMIN — MUPIROCIN 1 APPLICATION: 20 OINTMENT TOPICAL at 20:59

## 2025-04-18 RX ADMIN — Medication 2 PACKET: at 06:01

## 2025-04-18 RX ADMIN — INSULIN LISPRO 5 UNITS: 100 INJECTION, SOLUTION INTRAVENOUS; SUBCUTANEOUS at 08:50

## 2025-04-18 RX ADMIN — INSULIN LISPRO 5 UNITS: 100 INJECTION, SOLUTION INTRAVENOUS; SUBCUTANEOUS at 12:14

## 2025-04-18 RX ADMIN — Medication 2 PACKET: at 16:32

## 2025-04-18 RX ADMIN — POTASSIUM CHLORIDE 40 MEQ: 1500 TABLET, EXTENDED RELEASE ORAL at 10:47

## 2025-04-18 RX ADMIN — SODIUM CHLORIDE 2000 MG: 9 INJECTION, SOLUTION INTRAVENOUS at 15:19

## 2025-04-18 RX ADMIN — SODIUM CHLORIDE 2000 MG: 9 INJECTION, SOLUTION INTRAVENOUS at 01:37

## 2025-04-18 RX ADMIN — NOREPINEPHRINE BITARTRATE 0.08 MCG/KG/MIN: 0.03 INJECTION, SOLUTION INTRAVENOUS at 04:04

## 2025-04-18 RX ADMIN — PANTOPRAZOLE SODIUM 40 MG: 40 TABLET, DELAYED RELEASE ORAL at 20:59

## 2025-04-18 RX ADMIN — PANTOPRAZOLE SODIUM 40 MG: 40 TABLET, DELAYED RELEASE ORAL at 08:50

## 2025-04-18 RX ADMIN — SENNOSIDES AND DOCUSATE SODIUM 2 TABLET: 50; 8.6 TABLET ORAL at 08:54

## 2025-04-18 RX ADMIN — HYDROCORTISONE SODIUM SUCCINATE 50 MG: 100 INJECTION, POWDER, FOR SOLUTION INTRAMUSCULAR; INTRAVENOUS at 10:47

## 2025-04-18 RX ADMIN — INSULIN LISPRO 5 UNITS: 100 INJECTION, SOLUTION INTRAVENOUS; SUBCUTANEOUS at 20:58

## 2025-04-18 RX ADMIN — BACLOFEN 20 MG: 10 TABLET ORAL at 20:58

## 2025-04-18 RX ADMIN — PRAMIPEXOLE DIHYDROCHLORIDE 1.5 MG: 1.5 TABLET ORAL at 08:51

## 2025-04-18 RX ADMIN — ACETAMINOPHEN 650 MG: 325 TABLET, FILM COATED ORAL at 04:20

## 2025-04-18 RX ADMIN — POTASSIUM CHLORIDE 40 MEQ: 1500 TABLET, EXTENDED RELEASE ORAL at 05:38

## 2025-04-18 RX ADMIN — HYDROCORTISONE SODIUM SUCCINATE 50 MG: 100 INJECTION, POWDER, FOR SOLUTION INTRAMUSCULAR; INTRAVENOUS at 05:39

## 2025-04-18 RX ADMIN — Medication 10 ML: at 20:59

## 2025-04-18 RX ADMIN — PERFLUTREN 2 ML: 6.52 INJECTION, SUSPENSION INTRAVENOUS at 10:19

## 2025-04-18 RX ADMIN — MUPIROCIN 1 APPLICATION: 20 OINTMENT TOPICAL at 08:51

## 2025-04-18 RX ADMIN — BACLOFEN 20 MG: 10 TABLET ORAL at 08:51

## 2025-04-18 RX ADMIN — INSULIN LISPRO 10 UNITS: 100 INJECTION, SOLUTION INTRAVENOUS; SUBCUTANEOUS at 16:32

## 2025-04-18 RX ADMIN — HEPARIN SODIUM 5000 UNITS: 5000 INJECTION INTRAVENOUS; SUBCUTANEOUS at 05:41

## 2025-04-18 RX ADMIN — HYDROCODONE BITARTRATE AND ACETAMINOPHEN 1 TABLET: 5; 325 TABLET ORAL at 20:58

## 2025-04-18 RX ADMIN — HEPARIN SODIUM 5000 UNITS: 5000 INJECTION INTRAVENOUS; SUBCUTANEOUS at 15:19

## 2025-04-18 RX ADMIN — CALCIUM GLUCONATE 1000 MG: 20 INJECTION, SOLUTION INTRAVENOUS at 11:45

## 2025-04-18 RX ADMIN — HYDROCORTISONE SODIUM SUCCINATE 50 MG: 100 INJECTION, POWDER, FOR SOLUTION INTRAMUSCULAR; INTRAVENOUS at 16:32

## 2025-04-18 RX ADMIN — Medication 10 ML: at 08:52

## 2025-04-18 RX ADMIN — SENNOSIDES AND DOCUSATE SODIUM 2 TABLET: 50; 8.6 TABLET ORAL at 20:58

## 2025-04-18 RX ADMIN — HYDROCORTISONE SODIUM SUCCINATE 50 MG: 100 INJECTION, POWDER, FOR SOLUTION INTRAMUSCULAR; INTRAVENOUS at 22:42

## 2025-04-18 RX ADMIN — POTASSIUM CHLORIDE 40 MEQ: 1500 TABLET, EXTENDED RELEASE ORAL at 15:19

## 2025-04-18 RX ADMIN — HEPARIN SODIUM 5000 UNITS: 5000 INJECTION INTRAVENOUS; SUBCUTANEOUS at 22:41

## 2025-04-18 RX ADMIN — FLUOXETINE HYDROCHLORIDE 20 MG: 20 CAPSULE ORAL at 08:52

## 2025-04-18 RX ADMIN — ANASTROZOLE 1 MG: 1 TABLET, FILM COATED ORAL at 10:47

## 2025-04-18 NOTE — NURSING NOTE
Reason for Visit: WOC Team consult for sacral pressure injuries POA. Pt presented to the ED with c/o fever, found to have urosepsis. PMH positive for MS with neurogenic bladder, DM, and HTN. She is currently in CICU and has limited mobility, requires assistance of 2 to turn and reposition. She is incontinent of bowel and bladder with matias cath in place. She is W/C bound at home. Sacral and gluteal skin as noted below. Sacral Allevyn dressing is in place for protection and she is on a Progressa bed for adequate pressure redistribution. Right heel is noted to have a DTI, left heel is negative, Heel boots are in place.       04/18/25 1320   Wound 04/17/25 1557 coccyx   Placement Date/Time: 04/17/25 1557   Present on Original Admission: Yes  Location: coccyx   Pressure Injury Stage 2   Base moist;pink   Periwound intact;dry   Periwound Temperature warm   Periwound Skin Turgor soft   Wound Length (cm) 2 cm   Wound Width (cm) 1.5 cm   Wound Depth (cm) 0.1 cm   Wound Surface Area (cm^2) 2.36 cm^2   Wound Volume (cm^3) 0.157 cm^3   Drainage Characteristics/Odor serosanguineous   Drainage Amount scant   Care, Wound   (peeled back Allevyn dressing to assess wounds)   Wound 04/18/25 0855 Right heel Pressure Injury   Placement Date/Time: 04/18/25 0855   Present on Original Admission: Yes  Side: Right  Location: heel  Primary Wound Type: Pressure Injury   Pressure Injury Stage DTPI   Base maroon/purple;moist   Periwound intact;dry   Periwound Temperature warm   Wound Length (cm) 1.5 cm   Wound Width (cm) 1.5 cm   Wound Depth (cm) 0.1 cm   Wound Surface Area (cm^2) 1.77 cm^2   Wound Volume (cm^3) 0.118 cm^3   Drainage Characteristics/Odor serosanguineous   Drainage Amount scant   Care, Wound   (heel boots in place, staff to place heel dressings for protection)   Wound 04/18/25 Right lower gluteal Pressure Injury   Placement Date: 04/18/25   Present on Original Admission: Yes  Side: Right  Orientation: lower  Location: gluteal   Primary Wound Type: Pressure Injury   Pressure Injury Stage 2   Base clean;moist;pink   Periwound intact;dry   Periwound Temperature warm   Periwound Skin Turgor soft   Wound Length (cm) 1.2 cm   Wound Width (cm) 1.2 cm   Wound Depth (cm) 0.1 cm   Wound Surface Area (cm^2) 1.13 cm^2   Wound Volume (cm^3) 0.075 cm^3   Drainage Amount none   Care, Wound   (peeled back Allevyn dressing to assess wound)   Skin Interventions   Pressure Reduction Devices specialty bed utilized;positioning supports utilized;heel offloading device utilized  (Progressa bed)   Pressure Reduction Techniques heels elevated off bed;positioned off wounds;pressure points protected   Skin Protection silicone foam dressing in place     Treatment Plan/Recommendations: Continue on Progressa bed, she will need a Pro Plus mattress once she transfers out of ICU. Sacral and heel dressing should be placed for protection/ treatment and changed every 3 days and prn. She should be turned side to side with heels off-loaded at all times. Wound care and prevention standing orders placed into [facility], discussed with RN.     Wound Team Follow up Plan: No WOCN follow up needed. Please re-consult if wounds do not improve with current treatment/ interventions.

## 2025-04-18 NOTE — CONSULTS
CONSULT NOTE    Infectious Diseases - Luisa Lima MD  [facility]       Patient Identification:  Name: Maritza Bejarano  Age: 64 y.o.  Sex: female  :  1960  MRN: [id number]             Date of Consultation: 2025      Primary Care Physician: Enoc Carbone DO                               Requesting Physician: Dr. Kilpatrick/Dr. Melgar  Reason for Consultation: Gram-negative bacteremia with Klebsiella    History of presenting illness: Patient is a 64-year-old female with past medical history remarkable for recurrent urinary tract infection and bacteremic sepsis due to UTI, history of multiple sclerosis with neurogenic bladder with chronic indwelling Cobos catheter, immunosuppressed status due to prednisone as well as hypertension and diabetes and restless syndrome presented to her urologist for catheter malfunction on 2025.  According to the patient there was some issues with catheter it has to be removed and replaced and flushed and afterwards she was sent home.  Later she started having dizziness and lightheadedness and next morning on 2025 she started running fever and chills.  Because of this change in status and concern that she could be becoming septic as a test before she came to the emergency room for further evaluation and was noted to have temperature of 100.1 and heart rate of 127 bpm.  Patient parameters consistent with sepsis blood cultures were drawn and patient was started on broad-spectrum antibiotic therapy consisting of vancomycin and cefepime and she received IV fluid bolus per sepsis protocol.  Patient was admitted to ICU and started on pressors.  Blood cultures drawn at the time of admission come back positive for gram-negative bacilli identified as Klebsiella species.  Respiratory viral panel was negative and ultrasound of her kidneys earlier today did not show any evidence of hydronephrosis and bladder noted to be compressed by Cobos catheter.  Patient is feeling  better but still on pressors.  Infectious disease service is consulted.    Impression: 64-year-old female with  1-Klebsiella bacteremic sepsis with septic shock secondary  2-urinary tract infection due to recent indwelling Cobos catheter malfunction  3-immobility with MS and neurogenic bladder  4-immunocompromised host  5-acute on chronic renal insufficiency  6-anemia multifactorial  7-hyperglycemia  8-other diagnoses per primary team    Recommendations/Discussions:  At this juncture I agree with the care plan consisting of empiric broad-spectrum antibiotics consisting of cefepime while awaiting the final sensitivity of the Klebsiella species.  She seems to have improved significantly and her pressor dosing is being titrated down.  Would recommend repeating blood cultures and follow-up on the sensitivity of the Klebsiella to further de-escalate antibiotic therapy.  Would recommend 10 to 14 days of IV antibiotic therapy from last negative blood culture with close monitoring of side effects.  She would benefit from urology consultation with possibility of transitioning her to intermittent self catheterizations if it is practical for her.  Management of other issues per primary team.  Thank you very much for letting me be the part of your patient care please see above impression and recommendations            Past Medical History:  Past Medical History:   Diagnosis Date    Anemia 2024    `Treated with iron    Dawn esophagus     per patient    Blurred vision     R/T MS    Breast cancer 05/2024+++++    Carpal tunnel syndrome     Clotting disorder 1993, 2003, 2012    3 g/i bleeds w/ transfus/ions    Colon polyp 2013    removed w/ colonoscopy    Deep vein thrombosis phlebitis 1980    Depression     Diplopia 2013    GERD (gastroesophageal reflux disease)     GI (gastrointestinal bleed) 3 bleeds    2 transfusions    H/O Skin cancer, basal cell     Headache     History of blood transfusion     History of GI bleed     R/T  NSAIDS AND STEROIDS, multiple times    History of urinary tract infection     Hypercalcemia     s/p parathyroidectomy    Hyperlipidemia     Hypertension     Movement disorder     Multiple sclerosis     Optic neuritis     PONV (postoperative nausea and vomiting)     Steroid-induced diabetes 2024     Past Surgical History:  Past Surgical History:   Procedure Laterality Date    APPENDECTOMY      BLADDER SURGERY      bladder stimulator    BREAST BIOPSY  don't remember    BREAST SURGERY      augmentation wtih subsequent removal    CARPAL TUNNEL RELEASE Bilateral     Left 2018, right 2020    CUBITAL TUNNEL RELEASE Left     CYSTOSCOPY BOTOX INJECTION OF BLADDER  2018    Cystoscopy with Botox    FRACTURE SURGERY  2019    rt shoulder    PARATHYROIDECTOMY      one gland removed    ROTATOR CUFF REPAIR Right 2017    TOE SURGERY      bilateral great toes    TOTAL SHOULDER ARTHROPLASTY W/ DISTAL CLAVICLE EXCISION Right 10/22/2018    Procedure: RT TOTAL SHOULDER REVERSE ARTHROPLASTY;  Surgeon: Bipin Dangelo MD;  Location: [facility] OR;  Service: Orthopedics      Home Meds:  Medications Prior to Admission   Medication Sig Dispense Refill Last Dose/Taking    albuterol sulfate  (90 Base) MCG/ACT inhaler Inhale 2 puffs Every 4 (Four) Hours As Needed for Wheezing or Shortness of Air. 18 g 0 Taking As Needed    anastrozole (ARIMIDEX) 1 MG tablet Take 1 tablet by mouth Daily.   Taking    baclofen (LIORESAL) 20 MG tablet Take 1 tablet by mouth 2 (Two) Times a Day.   Taking    Capivasertib (Truqap) 200 MG tablet Take 2 tablets by mouth 2 (Two) Times a Day. Take for 4 days on then 3 days off. Repeat. 64 tablet 5 Taking    Cholecalciferol (vitamin D3) 125 MCG (5000 UT) tablet tablet Take 1 tablet by mouth Daily.   Taking    clonazePAM (KlonoPIN) 2 MG tablet Take 1 tablet by mouth 2 (Two) Times a Day As Needed for Anxiety (Sleep). 6 tablet 0 Taking As Needed    dexAMETHasone 0.5 MG/5ML solution Take 10 mL by mouth Daily. (Patient  taking differently: Take 10 mL by mouth Daily As Needed.) 240 mL 5 Taking Differently    Fenbendazole powder Use 440 mg 3 (Three) Times a Week. 2 capsules 3 times weekly   Taking    FLUoxetine (PROzac) 20 MG capsule Take 1 capsule by mouth Daily.   Taking    glipizide 2.5 MG tablet Take 2.5 mg by mouth 2 (Two) Times a Day Before Meals. 60 tablet 3 Taking    IVERMECTIN PO Take 15 mg by mouth 3 (Three) Times a Week.   Taking    losartan-hydrochlorothiazide (HYZAAR) 50-12.5 MG per tablet Take 1 tablet by mouth Daily.   Taking    metFORMIN (GLUCOPHAGE) 500 MG tablet Take 1 tablet by mouth Daily With Breakfast.   Taking    pantoprazole (PROTONIX) 40 MG EC tablet Take 1 tablet by mouth 2 (Two) Times a Day.   Taking    phenazopyridine (PYRIDIUM) 200 MG tablet Take 1 tablet by mouth 3 (Three) Times a Day As Needed for Dysuria. 20 tablet 0 Taking As Needed    pramipexole (MIRAPEX) 1.5 MG tablet Take 1 tablet by mouth 2 (Two) Times a Day.   Taking    prochlorperazine (COMPAZINE) 10 MG tablet Take 1 tablet by mouth Every 6 (Six) Hours As Needed for Nausea or Vomiting. 90 tablet 5 Taking As Needed    promethazine (PHENERGAN) 12.5 MG tablet Take 1 tablet by mouth Every 6 (Six) Hours As Needed for Nausea or Vomiting. 60 tablet 3 Taking As Needed    zolpidem (AMBIEN) 5 MG tablet Take 1 tablet by mouth At Night As Needed for Sleep. 30 tablet 0 Taking As Needed    traZODone (DESYREL) 50 MG tablet TAKE 1 TABLET BY MOUTH EVERY NIGHT 30 tablet 0      Current Meds:     Current Facility-Administered Medications:     acetaminophen (TYLENOL) tablet 650 mg, 650 mg, Oral, Q4H PRN, 650 mg at 04/18/25 0420 **OR** acetaminophen (TYLENOL) suppository 650 mg, 650 mg, Rectal, Q4H PRN, Brian Lucas MD    albuterol sulfate HFA (PROVENTIL HFA;VENTOLIN HFA;PROAIR HFA) inhaler 2 puff, 2 puff, Inhalation, Q4H PRN, Brian Lucas MD    anastrozole (ARIMIDEX) tablet 1 mg, 1 mg, Oral, Daily, Brian Lucas MD, 1 mg at 04/18/25 1041    baclofen  (LIORESAL) tablet 20 mg, 20 mg, Oral, BID, Brian Lucas MD, 20 mg at 04/18/25 0851    sennosides-docusate (PERICOLACE) 8.6-50 MG per tablet 2 tablet, 2 tablet, Oral, BID, 2 tablet at 04/18/25 0854 **AND** polyethylene glycol (MIRALAX) packet 17 g, 17 g, Oral, Daily PRN **AND** bisacodyl (DULCOLAX) EC tablet 5 mg, 5 mg, Oral, Daily PRN **AND** bisacodyl (DULCOLAX) suppository 10 mg, 10 mg, Rectal, Daily PRN, Brian Lucas MD    cefepime 2000 mg IVPB in 100 mL NS (MBP), 2,000 mg, Intravenous, Q12H, Brian Lucas MD, 2,000 mg at 04/18/25 1519    clonazePAM (KlonoPIN) tablet 2 mg, 2 mg, Oral, BID PRN, Brian Lucas MD    dextrose (D50W) (25 g/50 mL) IV injection 25 g, 25 g, Intravenous, Q15 Min PRN, Brian Lucas MD    dextrose (GLUTOSE) oral gel 15 g, 15 g, Oral, Q15 Min PRN, Brian Lucas MD    FLUoxetine (PROzac) capsule 20 mg, 20 mg, Oral, Daily, Brian Lucas MD, 20 mg at 04/18/25 0852    glucagon (GLUCAGEN) injection 1 mg, 1 mg, Intramuscular, Q15 Min PRN, Brian Lucas MD    heparin (porcine) 5000 UNIT/ML injection 5,000 Units, 5,000 Units, Subcutaneous, Q8H, Brian Lucas MD, 5,000 Units at 04/18/25 1519    HYDROcodone-acetaminophen (NORCO) 5-325 MG per tablet 1 tablet, 1 tablet, Oral, Q6H PRN, Brian Lucas MD    Hydrocortisone Sod Suc (PF) (Solu-CORTEF) injection 50 mg, 50 mg, Intravenous, Q6H, Brian Lucas MD, 50 mg at 04/18/25 1632    insulin lispro (HUMALOG/ADMELOG) injection 3-14 Units, 3-14 Units, Subcutaneous, 4x Daily AC & at Bedtime, Brian Lucas MD, 10 Units at 04/18/25 1632    loperamide (IMODIUM) capsule 2 mg, 2 mg, Oral, 4x Daily PRN, Brian Lucas MD    mupirocin (BACTROBAN) 2 % nasal ointment 1 Application, 1 Application, Each Nare, BID, Brian Lucas MD, 1 Application at 04/18/25 0851    nitroglycerin (NITROSTAT) SL tablet 0.4 mg, 0.4 mg, Sublingual, Q5 Min PRN, Brian Lucas MD    norepinephrine (LEVOPHED) 8 mg in 250 mL NS infusion (premix), 0.02-0.3  mcg/kg/min, Intravenous, Titrated, Brian Lucas MD, Last Rate: 5.75 mL/hr at 25 1256, 0.04 mcg/kg/min at 25 1256    ondansetron ODT (ZOFRAN-ODT) disintegrating tablet 4 mg, 4 mg, Oral, Q6H PRN **OR** ondansetron (ZOFRAN) injection 4 mg, 4 mg, Intravenous, Q6H PRN, Brian Lucas MD    pantoprazole (PROTONIX) EC tablet 40 mg, 40 mg, Oral, BID, Brian Lucas MD, 40 mg at 25 0850    Pharmacy Consult - Pharmacy to dose, , Not Applicable, Continuous PRN, Brian Lucas MD    phenazopyridine (PYRIDIUM) tablet 200 mg, 200 mg, Oral, TID PRN, Brian Lucas MD    phenylephrine (ROBERT-SYNEPHRINE) 50 mg in 250 mL NS infusion, 0.5-3 mcg/kg/min, Intravenous, Titrated, Ramesh Clark MD, Held at 25 1841    Phosphorus Replacement - Follow Nurse / BPA Driven Protocol, , Not Applicable, PRN, Amanda Allen APRN    Potassium Replacement - Follow Nurse / BPA Driven Protocol, , Not Applicable, Alejandro BRENNER Haley R, APRN    pramipexole (MIRAPEX) tablet 1.5 mg, 1.5 mg, Oral, BID, Brian Lucas MD, 1.5 mg at 25 0851    prochlorperazine (COMPAZINE) tablet 10 mg, 10 mg, Oral, Q6H PRN, Brian Lucas MD    [COMPLETED] Insert Peripheral IV, , , Once **AND** sodium chloride 0.9 % flush 10 mL, 10 mL, Intravenous, PRN, Brian Lucas MD    sodium chloride 0.9 % flush 10 mL, 10 mL, Intravenous, Q12H, Brian Lucas MD, 10 mL at 25 0852    sodium chloride 0.9 % flush 10 mL, 10 mL, Intravenous, PRN, Brian Lucas MD    sodium chloride 0.9 % infusion 40 mL, 40 mL, Intravenous, PRLance JIÉMNEZ Lebnan S, MD    zolpidem (AMBIEN) tablet 5 mg, 5 mg, Oral, Nightly PRLance JIMÉNEZ Lebnan S, MD  Allergies:  No Known Allergies  Social History:   Social History     Tobacco Use    Smoking status: Former     Current packs/day: 0.00     Average packs/day: 2.0 packs/day for 30.0 years (60.0 ttl pk-yrs)     Types: Cigarettes     Start date: 10/22/1973     Quit date: 10/22/2003     Years since quittin.5    Smokeless  "tobacco: Never   Substance Use Topics    Alcohol use: Not Currently      Family History:  Family History   Problem Relation Age of Onset    Hypertension Mother     Hyperlipidemia Mother     Aortic aneurysm Mother         thoracic    Diabetes Mother     Hypertension Father         charissa heart failure    Heart failure Father     Hyperlipidemia Father     Miscarriages / Stillbirths Sister     Multiple sclerosis Brother     Atrial fibrillation Brother     Diabetes Maternal Grandfather     Stroke Maternal Grandfather     Parkinsonism Paternal Grandmother     Tremor Paternal Grandmother     Stomach cancer Paternal Grandfather     Diabetes Maternal Grandmother           Review of Systems  See history of present illness and past medical history.       Vitals:   /71   Pulse 86   Temp 98.7 °F (37.1 °C) (Oral)   Resp 16   Ht 165.1 cm (65\")   Wt 76.2 kg (168 lb)   LMP  (LMP Unknown) Comment: PT. STATES HER LAST PERIOD WAS GREATER THAN FIVE YEARS AGO  SpO2 91%   BMI 27.96 kg/m²   I/O:   Intake/Output Summary (Last 24 hours) at 4/18/2025 1809  Last data filed at 4/18/2025 1330  Gross per 24 hour   Intake 4494.25 ml   Output 3225 ml   Net 1269.25 ml     Exam:  Patient is examined using the personal protective equipment as per guidelines from infection control for this particular patient as enacted.  Hand washing was performed before and after patient interaction.  General Appearance:    Alert, cooperative, no distress, appears stated age   Head:    Normocephalic, without obvious abnormality, atraumatic   Eyes:    PERRL, conjunctivae/corneas clear, EOM's intact, both eyes   Ears:    Normal external ear canals, both ears   Nose:   Nares normal, septum midline, mucosa normal, no drainage    or sinus tenderness   Throat:   Lips, tongue, gums normal; oral mucosa pink and moist   Neck:   Supple   Back:     Symmetric, no curvature, ROM normal, no CVA tenderness   Lungs:     Clear to auscultation bilaterally, respirations " unlabored   Chest Wall:    No tenderness or deformity    Heart:  S1-S2 regular   Abdomen:   Soft nontender, indwelling catheter in place   Extremities:   Extremities normal, atraumatic, no cyanosis or edema   Pulses:   Pulses palpable in all extremities; symmetric all extremities   Skin: No rash noted   Neurologic: Alert and oriented x 3 residual deficit due to previous MS.       Data Review:    I reviewed the patient's new clinical results.  Results from last 7 days   Lab Units 04/18/25  0411 04/17/25  1304   WBC 10*3/mm3 16.96* 15.47*   HEMOGLOBIN g/dL 11.1* 13.4   PLATELETS 10*3/mm3 265 396     Results from last 7 days   Lab Units 04/18/25  0411 04/17/25  1304   SODIUM mmol/L 136 136   POTASSIUM mmol/L 2.8* 3.7   CHLORIDE mmol/L 98 99   CO2 mmol/L 24.3 17.0*   BUN mg/dL 28* 29*   CREATININE mg/dL 1.37* 1.52*   CALCIUM mg/dL 7.2* 8.6   GLUCOSE mg/dL 225* 294*     Microbiology Results (last 10 days)       Procedure Component Value - Date/Time    Respiratory Panel PCR w/COVID-19(SARS-CoV-2) YAMILE/FRANK/ROSE/[facility]/CH[facility]I In-House, NP Swab in UTM/VTM, 2 HR TAT - Swab, Nasopharynx [[id number]]  (Normal) Collected: 04/17/25 1341    Lab Status: Final result Specimen: Swab from Nasopharynx Updated: 04/17/25 1443     ADENOVIRUS, PCR Not Detected     Coronavirus 229E Not Detected     Coronavirus HKU1 Not Detected     Coronavirus NL63 Not Detected     Coronavirus OC43 Not Detected     COVID19 Not Detected     Human Metapneumovirus Not Detected     Human Rhinovirus/Enterovirus Not Detected     Influenza A PCR Not Detected     Influenza B PCR Not Detected     Parainfluenza Virus 1 Not Detected     Parainfluenza Virus 2 Not Detected     Parainfluenza Virus 3 Not Detected     Parainfluenza Virus 4 Not Detected     RSV, PCR Not Detected     Bordetella pertussis pcr Not Detected     Bordetella parapertussis PCR Not Detected     Chlamydophila pneumoniae PCR Not Detected     Mycoplasma pneumo by PCR Not Detected    Narrative:      In the  setting of a positive respiratory panel with a viral infection PLUS a negative procalcitonin without other underlying concern for bacterial infection, consider observing off antibiotics or discontinuation of antibiotics and continue supportive care. If the respiratory panel is positive for atypical bacterial infection (Bordetella pertussis, Chlamydophila pneumoniae, or Mycoplasma pneumoniae), consider antibiotic de-escalation to target atypical bacterial infection.    Urine Culture - Urine, Urine, Catheter [[id number]]  (Abnormal) Collected: 04/17/25 1326    Lab Status: Preliminary result Specimen: Urine, Catheter Updated: 04/18/25 1145     Urine Culture >100,000 CFU/mL Gram Negative Bacilli    Narrative:      Colonization of the urinary tract without infection is common. Treatment is discouraged unless the patient is symptomatic, pregnant, or undergoing an invasive urologic procedure.    Blood Culture - Blood, Foot, Right [[id number]]  (Abnormal) Collected: 04/17/25 1313    Lab Status: Preliminary result Specimen: Blood from Foot, Right Updated: 04/18/25 1002     Blood Culture Gram Negative Bacilli     Isolated from Aerobic and Anaerobic Bottles     Gram Stain Anaerobic Bottle Gram negative bacilli      Aerobic Bottle Gram negative bacilli    Blood Culture ID, PCR - Blood, Foot, Right [[id number]]  (Abnormal) Collected: 04/17/25 1313    Lab Status: Final result Specimen: Blood from Foot, Right Updated: 04/17/25 2355     BCID, PCR Klebsiella pneumoniae group. Identification by BCID2 PCR.     BOTTLE TYPE Anaerobic Bottle    Narrative:      No resistance genes detected.    Blood Culture - Blood, Foot, Left [[id number]]  (Abnormal) Collected: 04/17/25 1304    Lab Status: Preliminary result Specimen: Blood from Foot, Left Updated: 04/18/25 1003     Blood Culture Gram Negative Bacilli     Isolated from Aerobic and Anaerobic Bottles     Gram Stain Anaerobic Bottle Gram negative bacilli      Aerobic Bottle Gram negative  bacilli              Assessment:  Active Hospital Problems    Diagnosis  POA    **Sepsis [A41.9]  Yes      Resolved Hospital Problems   No resolved problems to display.         Plan:  See above  Luisa Child MD   4/18/2025  18:09 EDT    Parts of this note may be an electronic transcription/translation of spoken language to printed text using the Dragon dictation system.

## 2025-04-18 NOTE — PROGRESS NOTES
PROGRESS NOTE  Patient Name: Maritza Bejarano  Age/Sex: 64 y.o. female  : 1960  MRN: [id number]    Date of Admission: 2025  Date of Encounter Visit: 25   LOS: 1 day   Patient Care Team:  Enoc Carbone DO as PCP - General (Internal Medicine)  Brittney Ortiz, RN as Nurse Navigator (Oncology)  Kim Barber MD as Referring Physician (General Surgery)  Zainab Lopes MD as Consulting Physician (Hematology and Oncology)    Chief Complaint: Gram-negative sepsis of urinary origin, septic shock    Hospital course: Patient came in with urosepsis, she had the initial fluid boluses with 3 cc/kg with an additional liter given in the CICU before she was started on IV pressors  She has been on IV pressors since yesterday and her blood cultures now are growing Klebsiella pneumonia.  Patient is on cefepime, she had 1 dose of vancomycin yesterday that was discontinued afterwards  She was using her home CPAP last night with oxygen bled in at 2 L but she did have hypoxemia requiring 15 L at 1 point.  Now she is only requiring 2 L/min while awake  The IV pressors dose has been reduced, she had a central line placed in yesterday in the right IJ with no problem  Patient was asking about resuming her home medication which was addressed, we also reviewed home medication list  Had mild prolongation of the QT segment      REVIEW OF SYSTEMS:   CONSTITUTIONAL: no fever or chills  CARDIOVASCULAR: No chest pain, chest pressure or chest discomfort. No palpitations or edema.   RESPIRATORY: No shortness of breath, cough or sputum.   GASTROINTESTINAL: No anorexia, nausea, vomiting or diarrhea. No abdominal pain or blood.  Patient usually have the diarrhea when she takes her oral chemotherapy  HEMATOLOGIC: No bleeding or bruising.     Ventilator/Non-Invasive Ventilation Settings (From admission, onward)      None              Vital Signs  Temp:  [98.9 °F (37.2 °C)-102.6 °F (39.2 °C)] 98.9 °F (37.2 °C)  Heart Rate:   "[] 101  Resp:  [14-24] 16  BP: ()/() 132/77  SpO2:  [73 %-100 %] 94 %  on  Flow (L/min) (Oxygen Therapy):  [2-15] 4 Device (Oxygen Therapy): nasal cannula    Intake/Output Summary (Last 24 hours) at 4/18/2025 0938  Last data filed at 4/18/2025 0900  Gross per 24 hour   Intake 4004.25 ml   Output 2925 ml   Net 1079.25 ml     Flowsheet Rows      Flowsheet Row First Filed Value   Admission Height 165.1 cm (65\") Documented at 04/17/2025 1259   Admission Weight 70.3 kg (155 lb) Documented at 04/17/2025 1259          Body mass index is 28.07 kg/m².      04/17/25  1259 04/17/25  1600 04/18/25  0607   Weight: 70.3 kg (155 lb) 76.7 kg (169 lb 1.5 oz) 76.5 kg (168 lb 10.4 oz)       Physical Exam:  GEN:, Pleasant, responsive, looks better than yesterday  EYES:   Sclerae clear. No icterus. PERRL. Normal EOM  ENT:   External ears/nose normal, no oral lesions, no thrush, mucous membranes moist  NECK:  Supple, midline trachea, no JVD, she has central line in the right IJ  LUNGS: Normal chest on inspection, minimal crackles over the bases improved with deep breathing, no wheezes or rhonchi.. Respirations regular, even and unlabored.   CV:  Regular rhythm and normal tachycardia. Normal S1/S2. No murmurs, gallops, or rubs noted.  Distant heart sounds  ABD:  Soft, nontender and nondistended. Normal bowel sounds. No guarding  EXT: Bilateral lower extremity weakness. No cyanosis. No redness.  1+ pitting edema.   Skin: Dry, intact, no bleeding    Results Review:    Results From Last 14 Days   Lab Units 04/18/25  0411 04/18/25  0050 04/17/25  2151   LACTATE mmol/L 3.8* 4.6* 2.7*     Results from last 7 days   Lab Units 04/18/25  0411 04/17/25  1304   SODIUM mmol/L 136 136   POTASSIUM mmol/L 2.8* 3.7   CHLORIDE mmol/L 98 99   CO2 mmol/L 24.3 17.0*   BUN mg/dL 28* 29*   CREATININE mg/dL 1.37* 1.52*   CALCIUM mg/dL 7.2* 8.6   AST (SGOT) U/L 54* 67*   ALT (SGPT) U/L 31 38*   ANION GAP mmol/L 13.7 20.0*   ALBUMIN g/dL 2.9* " "3.7     Results from last 7 days   Lab Units 04/18/25  0411 04/17/25  1419 04/17/25  1304   HSTROP T ng/L 594* 351* 163*         Results from last 7 days   Lab Units 04/17/25  1304   PROBNP pg/mL 8,881.0*     Results from last 7 days   Lab Units 04/18/25  0411 04/17/25  1304   WBC 10*3/mm3 16.96* 15.47*   HEMOGLOBIN g/dL 11.1* 13.4   HEMATOCRIT % 31.9* 39.0   PLATELETS 10*3/mm3 265 396   MCV fL 90.9 91.1         Results from last 7 days   Lab Units 04/18/25  0411   MAGNESIUM mg/dL 1.9           Invalid input(s): \"LDLCALC\"  Results from last 7 days   Lab Units 04/17/25  2235   PH, ARTERIAL pH units 7.463*   PCO2, ARTERIAL mm Hg 28.9*   PO2 ART mm Hg 175.6*   HCO3 ART mmol/L 20.7*         Glucose   Date/Time Value Ref Range Status   04/18/2025 0710 239 (H) 70 - 130 mg/dL Final   04/17/2025 2202 230 (H) 70 - 130 mg/dL Final   04/17/2025 1558 213 (H) 70 - 130 mg/dL Final     Results from last 7 days   Lab Units 04/18/25  0411 04/18/25  0050 04/17/25  2151 04/17/25  1715 04/17/25  1304   PROCALCITONIN ng/mL  --   --   --   --  44.90*   LACTATE mmol/L 3.8* 4.6* 2.7* 6.1* 6.4*     Results from last 7 days   Lab Units 04/17/25  1313 04/17/25  1304   BLOODCX  Abnormal Stain* Abnormal Stain*   BCIDPCR  Klebsiella pneumoniae group. Identification by BCID2 PCR.*  --      Results from last 7 days   Lab Units 04/17/25  1326   NITRITE UA  Positive*   WBC UA /HPF Too Numerous to Count*   BACTERIA UA /HPF 4+*   SQUAM EPITHEL UA /HPF 0-2     Results from last 7 days   Lab Units 04/17/25  1341   COVID19  Not Detected   ADENOVIRUS DETECTION BY PCR  Not Detected   CORONAVIRUS 229E  Not Detected   CORONAVIRUS HKU1  Not Detected   CORONAVIRUS NL63  Not Detected   CORONAVIRUS OC43  Not Detected   HUMAN METAPNEUMOVIRUS  Not Detected   HUMAN RHINOVIRUS/ENTEROVIRUS  Not Detected   INFLUENZA B PCR  Not Detected   PARAINFLUENZA 1  Not Detected   PARAINFLUENZA VIRUS 2  Not Detected   PARAINFLUENZA VIRUS 3  Not Detected   PARAINFLUENZA VIRUS 4  " Not Detected   BORDETELLA PERTUSSIS PCR  Not Detected   BORDETELLA PARAPERTUSSIS PCR  Not Detected   CHLAMYDOPHILA PNEUMONIAE PCR  Not Detected   MYCOPLAMA PNEUMO PCR  Not Detected   RSV, PCR  Not Detected               Imaging:   Imaging Results (All)       Procedure Component Value Units Date/Time    XR Chest 1 View [[id number]] Collected: 04/17/25 2103     Updated: 04/17/25 2107    Narrative:      XR CHEST 1 VW-     Clinical: Central line placement     COMPARISON examination 4/17/2025     FINDINGS: Right-sided IJ catheter tip superimposes the superior vena  cava near the right atrium. If the desired position is the mid superior  vena cava this can be retracted 4 cm. No pneumothorax.     The cardiomediastinal silhouette is stable. The degree of opacity at the  left lung base has diminished, there appears to be a small pleural  effusion with a patchy area of consolidation at this time. The right  lung is clear and there is no vascular congestion. The remainder is  unremarkable.     This report was finalized on 4/17/2025 9:04 PM by Dr. Cayden Lopez M.D  on Workstation: [id number]       XR Chest 1 View [[id number]] Collected: 04/17/25 1343     Updated: 04/17/25 1349    Narrative:      XR CHEST 1 VW-     HISTORY: Female who is 64 years-old, short of breath     TECHNIQUE: Frontal view of the chest     COMPARISON: 9/10/2024     FINDINGS: The heart size is borderline. Pulmonary vasculature is  unremarkable. No focal pulmonary consolidation, pleural effusion, or  pneumothorax. No acute osseous process.       Impression:      No focal pulmonary consolidation. Borderline heart size.  Follow-up as clinical indications persist.     This report was finalized on 4/17/2025 1:46 PM by Dr. Luan Estrada M.D on Workstation: [id number]               I reviewed the patient's new clinical results.  I personally viewed and interpreted the patient's imaging results:        Medication Review:   anastrozole, 1 mg, Oral,  Daily  baclofen, 20 mg, Oral, BID  cefepime, 2,000 mg, Intravenous, Q12H  FLUoxetine, 20 mg, Oral, Daily  heparin (porcine), 5,000 Units, Subcutaneous, Q8H  hydrocortisone sodium succinate, 50 mg, Intravenous, Q6H  insulin lispro, 3-14 Units, Subcutaneous, 4x Daily AC & at Bedtime  mupirocin, 1 Application, Each Nare, BID  pantoprazole, 40 mg, Oral, BID  potassium chloride ER, 40 mEq, Oral, Q4H  pramipexole, 1.5 mg, Oral, BID  senna-docusate sodium, 2 tablet, Oral, BID  sodium chloride, 10 mL, Intravenous, Q12H  traZODone, 50 mg, Oral, Nightly        norepinephrine, 0.02-0.3 mcg/kg/min, Last Rate: 0.08 mcg/kg/min (04/18/25 5993)  Pharmacy Consult - Pharmacy to dose,   phenylephrine, 0.5-3 mcg/kg/min, Last Rate: Stopped (04/17/25 3914)        ASSESSMENT:   Sepsis, severe  Takotsubo cardiomyopathy  UTI  Neurogenic bladder  Essential hypertension  History of GI bleed  Recurrent UTI  Type II non-ST elevation MI with further increase in high-sensitivity troponin  Acute renal failure  Transaminitis  Anion gap metabolic acidosis  MS, on no immunosuppressive medication or immunomodulators  Dawn's esophagus  Breast cancer  Mild QT segment prolongation  Patient is a full code    PLAN:  Summary of recommendations:  Hold the trazodone and resume the other home medication  Clarification of CODE STATUS, patient is full code  As needed pain medication  Her chemotherapy medicine usually causes diarrhea will have as needed Imodium  Will start tapering the steroids once she is off the IV pressors  Consult infectious disease for gram-negative sepsis  Check renal ultrasound to rule out any obstruction or kidney stone  Continue cefepime unless requested otherwise by infectious disease  Follow-up on the echocardiogram, rule out Takotsubo  Continue sliding scale of insulin  Continue with the stress ulcer and DVT prophylaxis significant    Discussed with CICU rounding team, with the patient  Labs/Notes/films were independently reviewed  and pertinent results are summarized above  The copied texts in this note were reviewed and they are accurate as of 04/18/25    Disposition: Continue to monitor in the CICU awake    Brian Lucas MD  04/18/25  09:38 EDT      Time: Critical care 35 min      Dictated utilizing Dragon dictation

## 2025-04-18 NOTE — CASE MANAGEMENT/SOCIAL WORK
Discharge Planning Assessment  [facility]     Patient Name: Maritza Bejarano  MRN: [id number]  Today's Date: 4/18/2025    Admit Date: 4/17/2025    Plan: Home w/ current [facility], family to transport   Discharge Needs Assessment       Row Name 04/18/25 1032       Living Environment    People in Home spouse    Current Living Arrangements home    Primary Care Provided by self    Provides Primary Care For no one    Family Caregiver if Needed spouse    Able to Return to Prior Arrangements yes       Resource/Environmental Concerns    Resource/Environmental Concerns none       Transition Planning    Patient/Family Anticipates Transition to home with help/services    Patient/Family Anticipated Services at Transition home health care    Transportation Anticipated family or friend will provide       Discharge Needs Assessment    Equipment Currently Used at Home wheelchair, motorized;hospital bed;lift device;cpap    Concerns to be Addressed discharge planning    Discharge Facility/Level of Care Needs home with home health                   Discharge Plan       Row Name 04/18/25 1033       Plan    Plan Home w/ current [facility], family to transport    Patient/Family in Agreement with Plan yes    Plan Comments CCP spoke with patient at bedside; explained role, verified facesheet, and discussed dc plan. Patient uses a leandra lift, cpap, electric wheelchair, and hospital bed at home. She lives with her spouse in a 1 level home with a ramp to enter. She reports she is current with [facility]. She has been to [facility] in the past. Patient states the plan is home w/ [facility] and denies any dc needs at this time. She states her spouse can transport her. NICK WRIGHT                  Continued Care and Services - Admitted Since 4/17/2025       Home Medical Care       Service Provider Request Status Services Address Phone Fax Patient Preferred    [facility] Pending - Request Sent -- [phone], [facility] [id number] [phone] --                  Selected Continued Care - Episodes Includes continued care and service providers with selected services from the active episodes listed below          Expected Discharge Date and Time       Expected Discharge Date Expected Discharge Time    Apr 22, 2025            Demographic Summary       Row Name 04/18/25 1031       General Information    Admission Type inpatient    Arrived From home    Referral Source admission list    Reason for Consult discharge planning    Preferred Language English                   Functional Status       Row Name 04/18/25 1031       Functional Status    Usual Activity Tolerance poor    Current Activity Tolerance poor       Functional Status, IADL    Medications assistive equipment and person    Meal Preparation assistive equipment and person    Housekeeping assistive equipment and person    Laundry assistive equipment and person    Shopping assistive equipment and person       Mental Status    General Appearance WDL WDL                   Psychosocial    No documentation.                  Abuse/Neglect    No documentation.                  Legal    No documentation.                  Substance Abuse    No documentation.                  Patient Forms    No documentation.                     NICK Webber

## 2025-04-18 NOTE — NURSING NOTE
Attempted to ultrasound an IV in right upper arm.  Unable to obtain.  Primary RN notified that we are unable to place a PIV at this time.

## 2025-04-18 NOTE — CONSULTS
Date of Hospital Visit: 25  Encounter Provider: Paulo Garcia MD  Place of Service: [facility] CARDIOLOGY  Patient Name: Maritza Nance Darci  :1960  Referral Provider: Brian Lucas MD    Chief complaint  Fever, elevated troponin    History of Present Illness  64-year-old woman with hypertension, hyperlipidemia, multiple sclerosis, Stage 4 breast cancer, depression, indwelling Cobos who presented to the ED with fever.  She had apparently been to her urologist and her catheter was flushed and drained and then changed.  She woke up and felt that she was having a fever with associated dizziness and lightheadedness.  On arrival to the ED she was febrile to 39.2, tachycardic to 120s, hypotensive to 78/54, O2 saturations were initially normal but downtrended to the 80s and she was placed on high flow nasal cannula.    Blood work with KINZA with creatinine up to 1.52 (baseline 0.5-0.7), lactate has been elevated, initially 6.4, has downtrended to 3.8.  CBC with leukocytosis up to 17.  Urine culture with Klebsiella. High sensitivity troponins elevated 163 -> 351 -> 594.    She currently feels okay.    Past Medical History:   Diagnosis Date    Anemia     `Treated with iron    Dawn esophagus     per patient    Blurred vision     R/T MS    Breast cancer 2024+++++    Carpal tunnel syndrome     Clotting disorder , ,     3 g/i bleeds w/ transfus/ions    Colon polyp     removed w/ colonoscopy    Deep vein thrombosis phlebitis     Depression     Diplopia     GERD (gastroesophageal reflux disease)     GI (gastrointestinal bleed) 3 bleeds    2 transfusions    H/O Skin cancer, basal cell     Headache     History of blood transfusion     History of GI bleed     R/T NSAIDS AND STEROIDS, multiple times    History of urinary tract infection     Hypercalcemia     s/p parathyroidectomy    Hyperlipidemia     Hypertension     Movement disorder     Multiple sclerosis      Optic neuritis     PONV (postoperative nausea and vomiting)     Steroid-induced diabetes 2024       Past Surgical History:   Procedure Laterality Date    APPENDECTOMY      BLADDER SURGERY      bladder stimulator    BREAST BIOPSY  don't remember    BREAST SURGERY      augmentation wtih subsequent removal    CARPAL TUNNEL RELEASE Bilateral     Left 2018, right 2020    CUBITAL TUNNEL RELEASE Left     CYSTOSCOPY BOTOX INJECTION OF BLADDER  2018    Cystoscopy with Botox    FRACTURE SURGERY  2019    rt shoulder    PARATHYROIDECTOMY      one gland removed    ROTATOR CUFF REPAIR Right 2017    TOE SURGERY      bilateral great toes    TOTAL SHOULDER ARTHROPLASTY W/ DISTAL CLAVICLE EXCISION Right 10/22/2018    Procedure: RT TOTAL SHOULDER REVERSE ARTHROPLASTY;  Surgeon: Bipin Dangelo MD;  Location: [facility] OR;  Service: Orthopedics       Medications Prior to Admission   Medication Sig Dispense Refill Last Dose/Taking    albuterol sulfate  (90 Base) MCG/ACT inhaler Inhale 2 puffs Every 4 (Four) Hours As Needed for Wheezing or Shortness of Air. 18 g 0 Taking As Needed    anastrozole (ARIMIDEX) 1 MG tablet Take 1 tablet by mouth Daily.   Taking    baclofen (LIORESAL) 20 MG tablet Take 1 tablet by mouth 2 (Two) Times a Day.   Taking    Capivasertib (Truqap) 200 MG tablet Take 2 tablets by mouth 2 (Two) Times a Day. Take for 4 days on then 3 days off. Repeat. 64 tablet 5 Taking    Cholecalciferol (vitamin D3) 125 MCG (5000 UT) tablet tablet Take 1 tablet by mouth Daily.   Taking    clonazePAM (KlonoPIN) 2 MG tablet Take 1 tablet by mouth 2 (Two) Times a Day As Needed for Anxiety (Sleep). 6 tablet 0 Taking As Needed    dexAMETHasone 0.5 MG/5ML solution Take 10 mL by mouth Daily. (Patient taking differently: Take 10 mL by mouth Daily As Needed.) 240 mL 5 Taking Differently    Fenbendazole powder Use 440 mg 3 (Three) Times a Week. 2 capsules 3 times weekly   Taking    FLUoxetine (PROzac) 20 MG capsule Take 1 capsule by  mouth Daily.   Taking    glipizide 2.5 MG tablet Take 2.5 mg by mouth 2 (Two) Times a Day Before Meals. 60 tablet 3 Taking    IVERMECTIN PO Take 15 mg by mouth 3 (Three) Times a Week.   Taking    losartan-hydrochlorothiazide (HYZAAR) 50-12.5 MG per tablet Take 1 tablet by mouth Daily.   Taking    metFORMIN (GLUCOPHAGE) 500 MG tablet Take 1 tablet by mouth Daily With Breakfast.   Taking    pantoprazole (PROTONIX) 40 MG EC tablet Take 1 tablet by mouth 2 (Two) Times a Day.   Taking    phenazopyridine (PYRIDIUM) 200 MG tablet Take 1 tablet by mouth 3 (Three) Times a Day As Needed for Dysuria. 20 tablet 0 Taking As Needed    pramipexole (MIRAPEX) 1.5 MG tablet Take 1 tablet by mouth 2 (Two) Times a Day.   Taking    prochlorperazine (COMPAZINE) 10 MG tablet Take 1 tablet by mouth Every 6 (Six) Hours As Needed for Nausea or Vomiting. 90 tablet 5 Taking As Needed    promethazine (PHENERGAN) 12.5 MG tablet Take 1 tablet by mouth Every 6 (Six) Hours As Needed for Nausea or Vomiting. 60 tablet 3 Taking As Needed    zolpidem (AMBIEN) 5 MG tablet Take 1 tablet by mouth At Night As Needed for Sleep. 30 tablet 0 Taking As Needed    traZODone (DESYREL) 50 MG tablet TAKE 1 TABLET BY MOUTH EVERY NIGHT 30 tablet 0        Current Meds  Scheduled Meds:anastrozole, 1 mg, Oral, Daily  baclofen, 20 mg, Oral, BID  cefepime, 2,000 mg, Intravenous, Q12H  FLUoxetine, 20 mg, Oral, Daily  heparin (porcine), 5,000 Units, Subcutaneous, Q8H  hydrocortisone sodium succinate, 50 mg, Intravenous, Q6H  insulin lispro, 3-14 Units, Subcutaneous, 4x Daily AC & at Bedtime  mupirocin, 1 Application, Each Nare, BID  pantoprazole, 40 mg, Oral, BID  potassium chloride ER, 40 mEq, Oral, Q4H  pramipexole, 1.5 mg, Oral, BID  senna-docusate sodium, 2 tablet, Oral, BID  sodium chloride, 10 mL, Intravenous, Q12H  traZODone, 50 mg, Oral, Nightly      Continuous Infusions:norepinephrine, 0.02-0.3 mcg/kg/min, Last Rate: 0.1 mcg/kg/min (04/18/25 0550)  Pharmacy Consult  - Pharmacy to dose,   phenylephrine, 0.5-3 mcg/kg/min, Last Rate: Stopped (25 184)      PRN Meds:.  acetaminophen **OR** acetaminophen    albuterol sulfate HFA    senna-docusate sodium **AND** polyethylene glycol **AND** bisacodyl **AND** bisacodyl    clonazePAM    dextrose    dextrose    glucagon (human recombinant)    nitroglycerin    ondansetron ODT **OR** ondansetron    Pharmacy Consult - Pharmacy to dose    phenazopyridine    Phosphorus Replacement - Follow Nurse / BPA Driven Protocol    Potassium Replacement - Follow Nurse / BPA Driven Protocol    prochlorperazine    [COMPLETED] Insert Peripheral IV **AND** sodium chloride    sodium chloride    sodium chloride    Allergies as of 2025    (No Known Allergies)       Social History     Socioeconomic History    Marital status:      Spouse name: Donald    Number of children: 0   Tobacco Use    Smoking status: Former     Current packs/day: 0.00     Average packs/day: 2.0 packs/day for 30.0 years (60.0 ttl pk-yrs)     Types: Cigarettes     Start date: 10/22/1973     Quit date: 10/22/2003     Years since quittin.5    Smokeless tobacco: Never   Vaping Use    Vaping status: Never Used   Substance and Sexual Activity    Alcohol use: Not Currently    Drug use: Not Currently     Types: Marijuana     Comment: advised by neurologist for sleep    Sexual activity: Not Currently     Partners: Male     Birth control/protection: Post-menopausal       Family History   Problem Relation Age of Onset    Hypertension Mother     Hyperlipidemia Mother     Aortic aneurysm Mother         thoracic    Diabetes Mother     Hypertension Father         charissa heart failure    Heart failure Father     Hyperlipidemia Father     Miscarriages / Stillbirths Sister     Multiple sclerosis Brother     Atrial fibrillation Brother     Diabetes Maternal Grandfather     Stroke Maternal Grandfather     Parkinsonism Paternal Grandmother     Tremor Paternal Grandmother     Stomach cancer  "Paternal Grandfather     Diabetes Maternal Grandmother        REVIEW OF SYSTEMS:   12 point ROS was performed and is negative except as outlined in HPI          Objective:   Temp:  [99.3 °F (37.4 °C)-102.6 °F (39.2 °C)] 100.9 °F (38.3 °C)  Heart Rate:  [] 85  Resp:  [14-24] 16  BP: ()/() 114/71  Body mass index is 28.07 kg/m².  Flowsheet Rows      Flowsheet Row First Filed Value   Admission Height 165.1 cm (65\") Documented at 04/17/2025 1259   Admission Weight 70.3 kg (155 lb) Documented at 04/17/2025 1259          Vitals:    04/18/25 0700   BP: 114/71   Pulse: 85   Resp:    Temp:    SpO2: 91%       GEN: no distress, alert and oriented  HEENT: NACT, EOMI, moist mucous membranes, nasal cannula in place  Lungs: CTAB, no wheezes, rales or rhonchi  CV: normal rate, regular rhythm, normal S1, S2, no murmurs, +2 radial pulses b/l, no carotid bruit  Abdomen: soft, nontender, nondistended, NABS  Extremities: no edema  Skin: no rash, warm, dry  Heme/Lymph: no bruising  Psych: organized thought, normal behavior and affect      Lab Review:   Results from last 7 days   Lab Units 04/18/25  0411   SODIUM mmol/L 136   POTASSIUM mmol/L 2.8*   CHLORIDE mmol/L 98   CO2 mmol/L 24.3   BUN mg/dL 28*   CREATININE mg/dL 1.37*   CALCIUM mg/dL 7.2*   BILIRUBIN mg/dL 0.4   ALK PHOS U/L 195*   ALT (SGPT) U/L 31   AST (SGOT) U/L 54*   GLUCOSE mg/dL 225*     Results from last 7 days   Lab Units 04/18/25  0411 04/17/25  1419 04/17/25  1304   HSTROP T ng/L 594* 351* 163*     Results from last 7 days   Lab Units 04/18/25  0411 04/17/25  1304   WBC 10*3/mm3 16.96* 15.47*   HEMOGLOBIN g/dL 11.1* 13.4   HEMATOCRIT % 31.9* 39.0   PLATELETS 10*3/mm3 265 396         Results from last 7 days   Lab Units 04/18/25  0411   MAGNESIUM mg/dL 1.9         I personally viewed and interpreted the patient's EKG/Telemetry data)      Sepsis    Assessment and Plan:  #Severe sepsis 2/2 Klebsiella UTI  #Multiple sclerosis, " wheelchair-bound  #Hyperlipidemia  #Stage IV breast cancer  #Elevated troponin    64-year-old woman with hypertension, hyperlipidemia, multiple sclerosis, Stage 4 breast cancer, depression, indwelling Cobos who presented to the ED with fever, found to be in septic shock secondary to Klebsiella UTI.  She is currently on Levophed in the ICU.  Blood work revealed KINZA, leukocytosis, and elevated troponins that were uptrending up to 594.    Her initial EKG was sinus tachycardia without evidence of ischemia.  Repeat EKG with lower voltage and anterolateral T wave flattening which is new from baseline.  She denies any chest discomfort.    Her elevated troponin is likely secondary to demand given her septic shock.  May have stress/septic cardiomyopathy given the degree of elevation.  Would not start treatment for ACS at this time given her lack of symptoms.  Would instead obtain an echocardiogram.  If abnormal, would await for full recovery from her septic shock and then discussed possible need for coronary angiography for definitive evaluation.    Paulo Ahmadi MD, [facility], [facility]  04/18/25  08:17 EDT.

## 2025-04-18 NOTE — DISCHARGE PLACEMENT REQUEST
"Maritza Purdy (64 y.o. Female)       Date of Birth   1960    Social Security Number       Address   [address] [place] [address]    Home Phone   [phone]    MRN   [id number]       Scientologist   [employer]    Marital Status                               Admission Date   4/17/2025    Admission Type   Emergency    Admitting Provider   Brian Lucas MD    Attending Provider   Brian Lucas MD    Department, Room/Bed   [facility] CARDIAC INTENSIVE CARE, 3007/1       Discharge Date       Discharge Disposition       Discharge Destination                                 Attending Provider: Brian Lucas MD    Allergies: No Known Allergies    Isolation: None   Infection: None   Code Status: CPR    Ht: 165.1 cm (65\")   Wt: 76.2 kg (168 lb)    Admission Cmt: None   Principal Problem: Sepsis [A41.9]                   Active Insurance as of 4/17/2025       Primary Coverage       Payor Plan Insurance Group Employer/Plan Group    ANTHEM MEDICARE REPLACEMENT ANTH MEDICARE ADVANTAGE HMO [id number]       Payor Plan Address Payor Plan Phone Number Payor Plan Fax Number Effective Dates    [address] [phone]  1/1/2025 - None Entered    [facility] [id number]         Subscriber Name Subscriber Birth Date Member ID       MARITZA PURDY 1960 [id number]                     Emergency Contacts        (Rel.) Home Phone Work Phone Mobile Phone    Donald Purdy (Spouse) -- -- [phone]    Shana Klein (HCS) (Sister) -- -- [phone]    Maru Rodriguez (Sister) -- -- [phone]                "

## 2025-04-18 NOTE — POST-PROCEDURE NOTE
Ultrasound Guided Central Venous Catheter Insertion Procedure Note      Indications:  vascular access    Procedure Details   Informed consent was obtained for the procedure, including sedation.  Risks of lung perforation, hemorrhage, arrhythmia, and adverse drug reaction were discussed.     Maximum sterile technique was used including usual patient drapes, antiseptics and physician sterile garments.    Under sterile conditions the skin above the at base of throat, on the right internal jugular vein was prepped with chlorhexidine and covered with a sterile drape. A sterile ultrasound probe was used to localize the target vein. It was clearly visualized. Local anesthesia was applied to the skin and subcutaneous tissues with lidocaine 1%. An 18-gauge needle was then inserted into the vein under ultrasound guidance. A guide wire was then threaded into the vein. A central venous catheter was then inserted into the vessel over the guide wire. The catheter was sutured into place and dressed following sterile protocol with Biopatch placed. All 4 ports were flushed and deemed patent.    Findings:  There were no changes to vital signs. All catheter ports were flushed with saline. Patient tolerated procedure well.    Recommendations:  CXR ordered to verify placement.     Amanda Allen, APRN  4/17/2025

## 2025-04-18 NOTE — PLAN OF CARE
Goal Outcome Evaluation:      RN took over care at around 1600, pt a/ox4. No pain, good oral intake, remains on levo, vss

## 2025-04-18 NOTE — PLAN OF CARE
Goal Outcome Evaluation:              Outcome Evaluation: Patient remains in CICU. Central line placed due to poor venous access. Levo gtt titrated when able. Provider made aware of positive blood cultures. Tmax 100.9 treated with PRN meds x2. Over 2L UOP via matias. K and Phos replaced per protocol. Patient unable to wear home CPAP while sleeping due to poor oxygen saturation. ABG drawn and O2 provided, currently on 4L NC. Continue to monitor.

## 2025-04-19 LAB
ALBUMIN SERPL-MCNC: 2.5 G/DL (ref 3.5–5.2)
ALBUMIN/GLOB SERPL: 0.8 G/DL
ALP SERPL-CCNC: 192 U/L (ref 39–117)
ALT SERPL W P-5'-P-CCNC: 26 U/L (ref 1–33)
ANION GAP SERPL CALCULATED.3IONS-SCNC: 9.7 MMOL/L (ref 5–15)
AST SERPL-CCNC: 45 U/L (ref 1–32)
BACTERIA SPEC AEROBE CULT: ABNORMAL
BILIRUB SERPL-MCNC: 0.4 MG/DL (ref 0–1.2)
BUN SERPL-MCNC: 34 MG/DL (ref 8–23)
BUN/CREAT SERPL: 29.3 (ref 7–25)
CALCIUM SPEC-SCNC: 8 MG/DL (ref 8.6–10.5)
CHLORIDE SERPL-SCNC: 105 MMOL/L (ref 98–107)
CO2 SERPL-SCNC: 25.3 MMOL/L (ref 22–29)
CREAT SERPL-MCNC: 1.16 MG/DL (ref 0.57–1)
DEPRECATED RDW RBC AUTO: 54.5 FL (ref 37–54)
EGFRCR SERPLBLD CKD-EPI 2021: 53.4 ML/MIN/1.73
ERYTHROCYTE [DISTWIDTH] IN BLOOD BY AUTOMATED COUNT: 16.6 % (ref 12.3–15.4)
GLOBULIN UR ELPH-MCNC: 3.3 GM/DL
GLUCOSE BLDC GLUCOMTR-MCNC: 129 MG/DL (ref 70–130)
GLUCOSE BLDC GLUCOMTR-MCNC: 230 MG/DL (ref 70–130)
GLUCOSE BLDC GLUCOMTR-MCNC: 250 MG/DL (ref 70–130)
GLUCOSE BLDC GLUCOMTR-MCNC: 291 MG/DL (ref 70–130)
GLUCOSE SERPL-MCNC: 216 MG/DL (ref 65–99)
GRAM STN SPEC: ABNORMAL
HCT VFR BLD AUTO: 28.6 % (ref 34–46.6)
HGB BLD-MCNC: 9.7 G/DL (ref 12–15.9)
ISOLATED FROM: ABNORMAL
ISOLATED FROM: ABNORMAL
MCH RBC QN AUTO: 30.8 PG (ref 26.6–33)
MCHC RBC AUTO-ENTMCNC: 33.9 G/DL (ref 31.5–35.7)
MCV RBC AUTO: 90.8 FL (ref 79–97)
PLATELET # BLD AUTO: 221 10*3/MM3 (ref 140–450)
PMV BLD AUTO: 9.6 FL (ref 6–12)
POTASSIUM SERPL-SCNC: 4.2 MMOL/L (ref 3.5–5.2)
PROT SERPL-MCNC: 5.8 G/DL (ref 6–8.5)
RBC # BLD AUTO: 3.15 10*6/MM3 (ref 3.77–5.28)
SODIUM SERPL-SCNC: 140 MMOL/L (ref 136–145)
WBC NRBC COR # BLD AUTO: 12.19 10*3/MM3 (ref 3.4–10.8)

## 2025-04-19 PROCEDURE — 63710000001 INSULIN LISPRO (HUMAN) PER 5 UNITS: Performed by: INTERNAL MEDICINE

## 2025-04-19 PROCEDURE — 99232 SBSQ HOSP IP/OBS MODERATE 35: CPT | Performed by: STUDENT IN AN ORGANIZED HEALTH CARE EDUCATION/TRAINING PROGRAM

## 2025-04-19 PROCEDURE — 87040 BLOOD CULTURE FOR BACTERIA: CPT | Performed by: INTERNAL MEDICINE

## 2025-04-19 PROCEDURE — 85027 COMPLETE CBC AUTOMATED: CPT | Performed by: INTERNAL MEDICINE

## 2025-04-19 PROCEDURE — 25010000002 CEFEPIME PER 500 MG: Performed by: INTERNAL MEDICINE

## 2025-04-19 PROCEDURE — 82948 REAGENT STRIP/BLOOD GLUCOSE: CPT

## 2025-04-19 PROCEDURE — 80053 COMPREHEN METABOLIC PANEL: CPT | Performed by: INTERNAL MEDICINE

## 2025-04-19 PROCEDURE — 25010000002 HYDROCORTISONE SOD SUC (PF) 100 MG RECONSTITUTED SOLUTION: Performed by: INTERNAL MEDICINE

## 2025-04-19 PROCEDURE — 25010000002 CEFTRIAXONE PER 250 MG: Performed by: INTERNAL MEDICINE

## 2025-04-19 PROCEDURE — 25010000002 HEPARIN (PORCINE) PER 1000 UNITS: Performed by: INTERNAL MEDICINE

## 2025-04-19 RX ORDER — ASPIRIN 81 MG/1
81 TABLET ORAL DAILY
Status: DISCONTINUED | OUTPATIENT
Start: 2025-04-19 | End: 2025-04-30 | Stop reason: HOSPADM

## 2025-04-19 RX ORDER — ROSUVASTATIN CALCIUM 20 MG/1
20 TABLET, COATED ORAL NIGHTLY
Status: DISCONTINUED | OUTPATIENT
Start: 2025-04-19 | End: 2025-04-21

## 2025-04-19 RX ADMIN — HEPARIN SODIUM 5000 UNITS: 5000 INJECTION INTRAVENOUS; SUBCUTANEOUS at 21:13

## 2025-04-19 RX ADMIN — BACLOFEN 20 MG: 10 TABLET ORAL at 21:13

## 2025-04-19 RX ADMIN — MUPIROCIN 1 APPLICATION: 20 OINTMENT TOPICAL at 21:12

## 2025-04-19 RX ADMIN — BACLOFEN 20 MG: 10 TABLET ORAL at 09:41

## 2025-04-19 RX ADMIN — Medication 10 ML: at 21:13

## 2025-04-19 RX ADMIN — ASPIRIN 81 MG: 81 TABLET, COATED ORAL at 09:41

## 2025-04-19 RX ADMIN — HYDROCORTISONE SODIUM SUCCINATE 50 MG: 100 INJECTION, POWDER, FOR SOLUTION INTRAMUSCULAR; INTRAVENOUS at 06:12

## 2025-04-19 RX ADMIN — CEFTRIAXONE 2000 MG: 2 INJECTION, POWDER, FOR SOLUTION INTRAMUSCULAR; INTRAVENOUS at 22:42

## 2025-04-19 RX ADMIN — INSULIN LISPRO 8 UNITS: 100 INJECTION, SOLUTION INTRAVENOUS; SUBCUTANEOUS at 21:27

## 2025-04-19 RX ADMIN — SODIUM CHLORIDE 2000 MG: 9 INJECTION, SOLUTION INTRAVENOUS at 15:28

## 2025-04-19 RX ADMIN — PANTOPRAZOLE SODIUM 40 MG: 40 TABLET, DELAYED RELEASE ORAL at 21:12

## 2025-04-19 RX ADMIN — INSULIN LISPRO 8 UNITS: 100 INJECTION, SOLUTION INTRAVENOUS; SUBCUTANEOUS at 11:52

## 2025-04-19 RX ADMIN — PRAMIPEXOLE DIHYDROCHLORIDE 1.5 MG: 1.5 TABLET ORAL at 09:41

## 2025-04-19 RX ADMIN — PRAMIPEXOLE DIHYDROCHLORIDE 1.5 MG: 1.5 TABLET ORAL at 22:42

## 2025-04-19 RX ADMIN — HEPARIN SODIUM 5000 UNITS: 5000 INJECTION INTRAVENOUS; SUBCUTANEOUS at 06:12

## 2025-04-19 RX ADMIN — HYDROCORTISONE SODIUM SUCCINATE 50 MG: 100 INJECTION, POWDER, FOR SOLUTION INTRAMUSCULAR; INTRAVENOUS at 22:42

## 2025-04-19 RX ADMIN — ROSUVASTATIN CALCIUM 20 MG: 20 TABLET, FILM COATED ORAL at 21:12

## 2025-04-19 RX ADMIN — SENNOSIDES AND DOCUSATE SODIUM 2 TABLET: 50; 8.6 TABLET ORAL at 09:41

## 2025-04-19 RX ADMIN — SENNOSIDES AND DOCUSATE SODIUM 2 TABLET: 50; 8.6 TABLET ORAL at 21:12

## 2025-04-19 RX ADMIN — HYDROCORTISONE SODIUM SUCCINATE 50 MG: 100 INJECTION, POWDER, FOR SOLUTION INTRAMUSCULAR; INTRAVENOUS at 17:33

## 2025-04-19 RX ADMIN — HEPARIN SODIUM 5000 UNITS: 5000 INJECTION INTRAVENOUS; SUBCUTANEOUS at 15:28

## 2025-04-19 RX ADMIN — Medication 10 ML: at 11:52

## 2025-04-19 RX ADMIN — FLUOXETINE HYDROCHLORIDE 20 MG: 20 CAPSULE ORAL at 09:41

## 2025-04-19 RX ADMIN — HYDROCORTISONE SODIUM SUCCINATE 50 MG: 100 INJECTION, POWDER, FOR SOLUTION INTRAMUSCULAR; INTRAVENOUS at 11:52

## 2025-04-19 RX ADMIN — SODIUM CHLORIDE 2000 MG: 9 INJECTION, SOLUTION INTRAVENOUS at 01:52

## 2025-04-19 RX ADMIN — INSULIN LISPRO 5 UNITS: 100 INJECTION, SOLUTION INTRAVENOUS; SUBCUTANEOUS at 17:36

## 2025-04-19 RX ADMIN — PANTOPRAZOLE SODIUM 40 MG: 40 TABLET, DELAYED RELEASE ORAL at 09:41

## 2025-04-19 RX ADMIN — ANASTROZOLE 1 MG: 1 TABLET, FILM COATED ORAL at 09:41

## 2025-04-19 RX ADMIN — MUPIROCIN 1 APPLICATION: 20 OINTMENT TOPICAL at 09:41

## 2025-04-19 NOTE — PLAN OF CARE
Goal Outcome Evaluation:  Plan of Care Reviewed With: patient        Progress: improving     Pt a&o x4. On 2L O2 or CPAP. Levophed off since 0200. PRN norco given once. Chronic matias in place.

## 2025-04-19 NOTE — PROGRESS NOTES
Hospital Follow Up    LOS:  LOS: 2 days   Patient Name: Maritza Bejarano  Age/Sex: 64 y.o. female  : 1960  MRN: [id number]    Day of Service: 25   Length of Stay: 2  Encounter Provider: Boy Curiel MD  Place of Service: [facility] CARDIOLOGY  Patient Care Team:  Enoc Carbone DO as PCP - General (Internal Medicine)  Brittney Ortiz RN as Nurse Navigator (Oncology)  Kim Barber MD as Referring Physician (General Surgery)  Zainab Lopes MD as Consulting Physician (Hematology and Oncology)    Subjective:     Chief Complaint: Fever, elevated troponin    Interval History: No acute events overnight.  Yesterday she was febrile to 102.6.  Last 24 hours was 100.4.  Blood pressure is improving.  She is off pressors this morning.    Objective:     Objective:  Temp:  [97.9 °F (36.6 °C)-100.4 °F (38 °C)] 97.9 °F (36.6 °C)  Heart Rate:  [] 52  Resp:  [15-18] 18  BP: ()/(50-94) 93/63     Intake/Output Summary (Last 24 hours) at 2025 0730  Last data filed at 2025 0600  Gross per 24 hour   Intake 1230.76 ml   Output 2275 ml   Net -1044.24 ml     Body mass index is 28.47 kg/m².      25  0607 25  1022 25  0400   Weight: 76.5 kg (168 lb 10.4 oz) 76.2 kg (168 lb) 77.6 kg (171 lb 1.2 oz)     Weight change: 5.897 kg (13 lb)      Physical Exam:   General : Alert, cooperative, in no acute distress.  Neuro: Alert,cooperative and oriented.  Lungs: CTAB. Normal respiratory effort and rate.  CV: Regular rate and rhythm, normal S1 and S2, no murmurs, gallops or rubs.  ABD: Soft, nontender, nondistended. Positive bowel sounds.  Extr: No edema or cyanosis, moves all extremities.    Lab Review:   Results from last 7 days   Lab Units 25  0352 25  1823 25  0411   SODIUM mmol/L 140  --  136   POTASSIUM mmol/L 4.2 4.0 2.8*   CHLORIDE mmol/L 105  --  98   CO2 mmol/L 25.3  --  24.3   BUN mg/dL 34*  --  28*   CREATININE mg/dL 1.16*  " --  1.37*   GLUCOSE mg/dL 216*  --  225*   CALCIUM mg/dL 8.0*  --  7.2*   AST (SGOT) U/L 45*  --  54*   ALT (SGPT) U/L 26  --  31     Results from last 7 days   Lab Units 04/18/25  0411 04/17/25  1419 04/17/25  1304   HSTROP T ng/L 594* 351* 163*     Results from last 7 days   Lab Units 04/19/25  0352 04/18/25  0411   WBC 10*3/mm3 12.19* 16.96*   HEMOGLOBIN g/dL 9.7* 11.1*   HEMATOCRIT % 28.6* 31.9*   PLATELETS 10*3/mm3 221 265         Results from last 7 days   Lab Units 04/18/25  0411   MAGNESIUM mg/dL 1.9           Invalid input(s): \"LDLCALC\"  Results from last 7 days   Lab Units 04/17/25  1304   PROBNP pg/mL 8,881.0*         I reviewed the patient's new clinical results.  I personally viewed and interpreted the patient's EKG  Current Medications:   Scheduled Meds:anastrozole, 1 mg, Oral, Daily  baclofen, 20 mg, Oral, BID  cefepime, 2,000 mg, Intravenous, Q12H  FLUoxetine, 20 mg, Oral, Daily  heparin (porcine), 5,000 Units, Subcutaneous, Q8H  hydrocortisone sodium succinate, 50 mg, Intravenous, Q6H  insulin lispro, 3-14 Units, Subcutaneous, 4x Daily AC & at Bedtime  mupirocin, 1 Application, Each Nare, BID  pantoprazole, 40 mg, Oral, BID  pramipexole, 1.5 mg, Oral, BID  senna-docusate sodium, 2 tablet, Oral, BID  sodium chloride, 10 mL, Intravenous, Q12H      Continuous Infusions:norepinephrine, 0.02-0.3 mcg/kg/min, Last Rate: Stopped (04/19/25 0200)  Pharmacy Consult - Pharmacy to dose,   phenylephrine, 0.5-3 mcg/kg/min, Last Rate: Stopped (04/17/25 1841)        Allergies:  No Known Allergies    Assessment:   1.  Klebsiella bacteremia with septic shock  2.  UTI with indwelling Cobos catheter  3. NSTEMI, type 2   4.  Multiple sclerosis, wheelchair-bound  5.  Stage IV breast cancer with mets  6. KINZA, improving     Plan:      -Patient is asymptomatic from a cardiac standpoint.  Troponin consistent with type II NSTEMI.  She has regional wall motion abnormalities on her echocardiogram otherwise.  EF is normal.  She " has several comorbidities including stage IV breast cancer and ongoing Klebsiella bacteremia and KINZA. I would recommend medical management for now since she is asymptomatic with bacteremia and advanced breast cancer. ASA/statin. Can consider BB once her infection clears up if BP stabilizes. Will reevaulate on Monday.           Boy Curiel MD  04/19/25  07:30 EDT

## 2025-04-19 NOTE — PROGRESS NOTES
"      [facility]         Dr Hanley Sayied   LOS: 2 days   Patient Care Team:  Enoc Carbone DO as PCP - General (Internal Medicine)  Brittney Ortiz, RN as Nurse Navigator (Oncology)  Kim Barber MD as Referring Physician (General Surgery)  Zainab Lopes MD as Consulting Physician (Hematology and Oncology)    Chief Complaint: sepsis with Klebsiella bacteremia/UTI neurogenic bladder multiple medical issues as listed below    Interval History: Hemodynamically more stable off pressors.  On nasal cannula oxygen.  Overall feels much better.      REVIEW OF SYSTEMS:   CARDIOVASCULAR: No chest pain, chest pressure or chest discomfort. No palpitations or edema.   RESPIRATORY: No shortness of breath, cough or sputum.   GASTROINTESTINAL: No anorexia, nausea, vomiting or diarrhea. No abdominal pain or blood.   HEMATOLOGIC: No bleeding or bruising.     Ventilator/Non-Invasive Ventilation Settings (From admission, onward)      None              Vital Signs  Temp:  [97.9 °F (36.6 °C)-100.4 °F (38 °C)] 97.9 °F (36.6 °C)  Heart Rate:  [52-94] 79  Resp:  [15-18] 18  BP: ()/(50-99) 112/58    Intake/Output Summary (Last 24 hours) at 4/19/2025 1301  Last data filed at 4/19/2025 0800  Gross per 24 hour   Intake 696 ml   Output 1800 ml   Net -1104 ml     Flowsheet Rows      Flowsheet Row First Filed Value   Admission Height 165.1 cm (65\") Documented at 04/17/2025 1259   Admission Weight 70.3 kg (155 lb) Documented at 04/17/2025 1259                  Physical Exam:  Patient is examined using the personal protective equipment as per guidelines from infection control for this particular patient as enacted.  Hand hygiene was performed before and after patient interaction.   General Appearance:    Alert, cooperative, in no acute distress.  Following simple commands  ENT Mallampati between 3 and 4 no nasal congestion  Neck midline trachea, no thyromegaly   Lungs:    diminished breath sounds bilaterally    " Heart:    Regular rhythm and normal rate, normal S1 and S2, no            murmur, no gallop, no rub, no click   Chest Wall:    No abnormalities observed   Abdomen:     Normal bowel sounds, no masses, no organomegaly, soft        nontender, nondistended, no guarding, no rebound                tenderness   Extremities:   Moves all extremities well, no edema, no cyanosis, no             redness  CNS no focal neurological deficits normal sensory exam  Skin no rashes no nodules  Musculoskeletal no cyanosis no clubbing normal range of motion     Results Review:        Results from last 7 days   Lab Units 04/19/25  0352 04/18/25  1823 04/18/25  0411 04/17/25  1304   SODIUM mmol/L 140  --  136 136   POTASSIUM mmol/L 4.2 4.0 2.8* 3.7   CHLORIDE mmol/L 105  --  98 99   CO2 mmol/L 25.3  --  24.3 17.0*   BUN mg/dL 34*  --  28* 29*   CREATININE mg/dL 1.16*  --  1.37* 1.52*   GLUCOSE mg/dL 216*  --  225* 294*   CALCIUM mg/dL 8.0*  --  7.2* 8.6     Results from last 7 days   Lab Units 04/18/25  0411 04/17/25  1419 04/17/25  1304   HSTROP T ng/L 594* 351* 163*     Results from last 7 days   Lab Units 04/19/25  0352 04/18/25  0411 04/17/25  1304   WBC 10*3/mm3 12.19* 16.96* 15.47*   HEMOGLOBIN g/dL 9.7* 11.1* 13.4   HEMATOCRIT % 28.6* 31.9* 39.0   PLATELETS 10*3/mm3 221 265 396             Results from last 7 days   Lab Units 04/18/25  0411   MAGNESIUM mg/dL 1.9         Results from last 7 days   Lab Units 04/17/25  2235   PH, ARTERIAL pH units 7.463*   PO2 ART mm Hg 175.6*   PCO2, ARTERIAL mm Hg 28.9*   HCO3 ART mmol/L 20.7*       I reviewed the patient's new clinical results.  I personally viewed and interpreted the patient's chest x-ray.        Medication Review:   anastrozole, 1 mg, Oral, Daily  aspirin, 81 mg, Oral, Daily  baclofen, 20 mg, Oral, BID  cefepime, 2,000 mg, Intravenous, Q12H  FLUoxetine, 20 mg, Oral, Daily  heparin (porcine), 5,000 Units, Subcutaneous, Q8H  hydrocortisone sodium succinate, 50 mg, Intravenous,  Q6H  insulin lispro, 3-14 Units, Subcutaneous, 4x Daily AC & at Bedtime  mupirocin, 1 Application, Each Nare, BID  pantoprazole, 40 mg, Oral, BID  pramipexole, 1.5 mg, Oral, BID  rosuvastatin, 20 mg, Oral, Nightly  senna-docusate sodium, 2 tablet, Oral, BID  sodium chloride, 10 mL, Intravenous, Q12H        norepinephrine, 0.02-0.3 mcg/kg/min, Last Rate: Stopped (04/19/25 0200)  Pharmacy Consult - Pharmacy to dose,   phenylephrine, 0.5-3 mcg/kg/min, Last Rate: Stopped (04/17/25 1841)        ASSESSMENT:   Sepsis, severe  Klebsiella bacteremia  Takotsubo cardiomyopathy  UTI  Neurogenic bladder  Essential hypertension  History of GI bleed  Recurrent UTI  Type II non-ST elevation MI with further increase in high-sensitivity troponin  Acute renal failure  Transaminitis  Anion gap metabolic acidosis  MS, on no immunosuppressive medication or immunomodulators  Dawn's esophagus  Breast cancer  Mild QT segment prolongation  Patient is a full code    PLAN:  As noted chart reviewed  Hemodynamically more stable now off pressors  Will continue weaning down steroid as tolerated  Appreciate input from infectious diseases  Current antibiotics on cefepime per infectious diseases.  They recommend 10 to 14 days of IV antibiotic therapy from last negative blood culture  Keep volume status on the dry side  Wean off oxygen maintain sats above 90%  Renal ultrasound negative for any obstruction  Blood glucose monitoring per ICU protocol  Transfer patient out of the ICU  Hospitalist for medical management      Dayan Frost MD  04/19/25  13:01 EDT

## 2025-04-20 LAB
ALBUMIN SERPL-MCNC: 2.8 G/DL (ref 3.5–5.2)
ALBUMIN/GLOB SERPL: 1 G/DL
ALP SERPL-CCNC: 229 U/L (ref 39–117)
ALT SERPL W P-5'-P-CCNC: 40 U/L (ref 1–33)
ANION GAP SERPL CALCULATED.3IONS-SCNC: 11 MMOL/L (ref 5–15)
AST SERPL-CCNC: 68 U/L (ref 1–32)
BASOPHILS # BLD AUTO: 0.01 10*3/MM3 (ref 0–0.2)
BASOPHILS NFR BLD AUTO: 0.1 % (ref 0–1.5)
BILIRUB SERPL-MCNC: 0.2 MG/DL (ref 0–1.2)
BUN SERPL-MCNC: 36 MG/DL (ref 8–23)
BUN/CREAT SERPL: 29.3 (ref 7–25)
CALCIUM SPEC-SCNC: 8.5 MG/DL (ref 8.6–10.5)
CHLORIDE SERPL-SCNC: 105 MMOL/L (ref 98–107)
CO2 SERPL-SCNC: 21 MMOL/L (ref 22–29)
CREAT SERPL-MCNC: 1.23 MG/DL (ref 0.57–1)
DEPRECATED RDW RBC AUTO: 54.5 FL (ref 37–54)
EGFRCR SERPLBLD CKD-EPI 2021: 49.8 ML/MIN/1.73
EOSINOPHIL # BLD AUTO: 0.03 10*3/MM3 (ref 0–0.4)
EOSINOPHIL NFR BLD AUTO: 0.3 % (ref 0.3–6.2)
ERYTHROCYTE [DISTWIDTH] IN BLOOD BY AUTOMATED COUNT: 16.4 % (ref 12.3–15.4)
GLOBULIN UR ELPH-MCNC: 2.8 GM/DL
GLUCOSE BLDC GLUCOMTR-MCNC: 159 MG/DL (ref 70–130)
GLUCOSE BLDC GLUCOMTR-MCNC: 180 MG/DL (ref 70–130)
GLUCOSE BLDC GLUCOMTR-MCNC: 212 MG/DL (ref 70–130)
GLUCOSE BLDC GLUCOMTR-MCNC: 256 MG/DL (ref 70–130)
GLUCOSE SERPL-MCNC: 242 MG/DL (ref 65–99)
HCT VFR BLD AUTO: 27.5 % (ref 34–46.6)
HGB BLD-MCNC: 9.5 G/DL (ref 12–15.9)
IMM GRANULOCYTES # BLD AUTO: 0.23 10*3/MM3 (ref 0–0.05)
IMM GRANULOCYTES NFR BLD AUTO: 2.2 % (ref 0–0.5)
IRON 24H UR-MRATE: 49 MCG/DL (ref 37–145)
IRON SATN MFR SERPL: 21 % (ref 20–50)
LYMPHOCYTES # BLD AUTO: 0.46 10*3/MM3 (ref 0.7–3.1)
LYMPHOCYTES NFR BLD AUTO: 4.4 % (ref 19.6–45.3)
MCH RBC QN AUTO: 31.5 PG (ref 26.6–33)
MCHC RBC AUTO-ENTMCNC: 34.5 G/DL (ref 31.5–35.7)
MCV RBC AUTO: 91.1 FL (ref 79–97)
MONOCYTES # BLD AUTO: 0.34 10*3/MM3 (ref 0.1–0.9)
MONOCYTES NFR BLD AUTO: 3.3 % (ref 5–12)
NEUTROPHILS NFR BLD AUTO: 89.7 % (ref 42.7–76)
NEUTROPHILS NFR BLD AUTO: 9.35 10*3/MM3 (ref 1.7–7)
NRBC BLD AUTO-RTO: 0.2 /100 WBC (ref 0–0.2)
PLATELET # BLD AUTO: 232 10*3/MM3 (ref 140–450)
PMV BLD AUTO: 9.8 FL (ref 6–12)
POTASSIUM SERPL-SCNC: 3.8 MMOL/L (ref 3.5–5.2)
PROT SERPL-MCNC: 5.6 G/DL (ref 6–8.5)
RBC # BLD AUTO: 3.02 10*6/MM3 (ref 3.77–5.28)
SODIUM SERPL-SCNC: 137 MMOL/L (ref 136–145)
TIBC SERPL-MCNC: 228 MCG/DL (ref 298–536)
TRANSFERRIN SERPL-MCNC: 153 MG/DL (ref 200–360)
WBC NRBC COR # BLD AUTO: 10.42 10*3/MM3 (ref 3.4–10.8)

## 2025-04-20 PROCEDURE — 25010000002 CEFTRIAXONE PER 250 MG: Performed by: INTERNAL MEDICINE

## 2025-04-20 PROCEDURE — 25010000002 HYDROCORTISONE SOD SUC (PF) 100 MG RECONSTITUTED SOLUTION: Performed by: INTERNAL MEDICINE

## 2025-04-20 PROCEDURE — 63710000001 INSULIN GLARGINE PER 5 UNITS: Performed by: INTERNAL MEDICINE

## 2025-04-20 PROCEDURE — 63710000001 INSULIN LISPRO (HUMAN) PER 5 UNITS: Performed by: INTERNAL MEDICINE

## 2025-04-20 PROCEDURE — 82948 REAGENT STRIP/BLOOD GLUCOSE: CPT

## 2025-04-20 PROCEDURE — 25010000002 HEPARIN (PORCINE) PER 1000 UNITS: Performed by: INTERNAL MEDICINE

## 2025-04-20 PROCEDURE — 80053 COMPREHEN METABOLIC PANEL: CPT | Performed by: HOSPITALIST

## 2025-04-20 PROCEDURE — 85025 COMPLETE CBC W/AUTO DIFF WBC: CPT | Performed by: HOSPITALIST

## 2025-04-20 PROCEDURE — 83540 ASSAY OF IRON: CPT | Performed by: HOSPITALIST

## 2025-04-20 PROCEDURE — 84466 ASSAY OF TRANSFERRIN: CPT | Performed by: HOSPITALIST

## 2025-04-20 RX ORDER — HYDROCORTISONE SODIUM SUCCINATE 100 MG/2ML
50 INJECTION INTRAMUSCULAR; INTRAVENOUS EVERY 8 HOURS
Status: DISCONTINUED | OUTPATIENT
Start: 2025-04-20 | End: 2025-04-21

## 2025-04-20 RX ORDER — INSULIN LISPRO 100 [IU]/ML
2-7 INJECTION, SOLUTION INTRAVENOUS; SUBCUTANEOUS
Status: DISCONTINUED | OUTPATIENT
Start: 2025-04-20 | End: 2025-04-30 | Stop reason: HOSPADM

## 2025-04-20 RX ADMIN — HYDROCORTISONE SODIUM SUCCINATE 50 MG: 100 INJECTION, POWDER, FOR SOLUTION INTRAMUSCULAR; INTRAVENOUS at 21:26

## 2025-04-20 RX ADMIN — INSULIN LISPRO 4 UNITS: 100 INJECTION, SOLUTION INTRAVENOUS; SUBCUTANEOUS at 12:40

## 2025-04-20 RX ADMIN — HEPARIN SODIUM 5000 UNITS: 5000 INJECTION INTRAVENOUS; SUBCUTANEOUS at 05:01

## 2025-04-20 RX ADMIN — INSULIN LISPRO 3 UNITS: 100 INJECTION, SOLUTION INTRAVENOUS; SUBCUTANEOUS at 21:28

## 2025-04-20 RX ADMIN — HEPARIN SODIUM 5000 UNITS: 5000 INJECTION INTRAVENOUS; SUBCUTANEOUS at 21:27

## 2025-04-20 RX ADMIN — MUPIROCIN 1 APPLICATION: 20 OINTMENT TOPICAL at 21:26

## 2025-04-20 RX ADMIN — CEFTRIAXONE 2000 MG: 2 INJECTION, POWDER, FOR SOLUTION INTRAMUSCULAR; INTRAVENOUS at 21:26

## 2025-04-20 RX ADMIN — MUPIROCIN 1 APPLICATION: 20 OINTMENT TOPICAL at 08:02

## 2025-04-20 RX ADMIN — Medication 10 ML: at 21:27

## 2025-04-20 RX ADMIN — INSULIN LISPRO 2 UNITS: 100 INJECTION, SOLUTION INTRAVENOUS; SUBCUTANEOUS at 17:19

## 2025-04-20 RX ADMIN — INSULIN LISPRO 3 UNITS: 100 INJECTION, SOLUTION INTRAVENOUS; SUBCUTANEOUS at 08:02

## 2025-04-20 RX ADMIN — PRAMIPEXOLE DIHYDROCHLORIDE 1.5 MG: 1.5 TABLET ORAL at 21:27

## 2025-04-20 RX ADMIN — Medication 10 ML: at 09:13

## 2025-04-20 RX ADMIN — HYDROCORTISONE SODIUM SUCCINATE 50 MG: 100 INJECTION, POWDER, FOR SOLUTION INTRAMUSCULAR; INTRAVENOUS at 05:01

## 2025-04-20 RX ADMIN — HYDROCORTISONE SODIUM SUCCINATE 50 MG: 100 INJECTION, POWDER, FOR SOLUTION INTRAMUSCULAR; INTRAVENOUS at 12:43

## 2025-04-20 RX ADMIN — HEPARIN SODIUM 5000 UNITS: 5000 INJECTION INTRAVENOUS; SUBCUTANEOUS at 15:42

## 2025-04-20 RX ADMIN — ROSUVASTATIN CALCIUM 20 MG: 20 TABLET, FILM COATED ORAL at 21:26

## 2025-04-20 RX ADMIN — INSULIN GLARGINE 10 UNITS: 100 INJECTION, SOLUTION SUBCUTANEOUS at 12:42

## 2025-04-20 RX ADMIN — ASPIRIN 81 MG: 81 TABLET, COATED ORAL at 08:01

## 2025-04-20 RX ADMIN — ANASTROZOLE 1 MG: 1 TABLET, FILM COATED ORAL at 08:00

## 2025-04-20 RX ADMIN — SENNOSIDES AND DOCUSATE SODIUM 2 TABLET: 50; 8.6 TABLET ORAL at 08:01

## 2025-04-20 RX ADMIN — BACLOFEN 20 MG: 10 TABLET ORAL at 21:27

## 2025-04-20 RX ADMIN — PRAMIPEXOLE DIHYDROCHLORIDE 1.5 MG: 1.5 TABLET ORAL at 10:13

## 2025-04-20 RX ADMIN — BACLOFEN 20 MG: 10 TABLET ORAL at 08:01

## 2025-04-20 RX ADMIN — PANTOPRAZOLE SODIUM 40 MG: 40 TABLET, DELAYED RELEASE ORAL at 08:02

## 2025-04-20 RX ADMIN — ACETAMINOPHEN 650 MG: 325 TABLET, FILM COATED ORAL at 23:40

## 2025-04-20 RX ADMIN — FLUOXETINE HYDROCHLORIDE 20 MG: 20 CAPSULE ORAL at 08:00

## 2025-04-20 RX ADMIN — PANTOPRAZOLE SODIUM 40 MG: 40 TABLET, DELAYED RELEASE ORAL at 21:27

## 2025-04-20 NOTE — PROGRESS NOTES
"DAILY PROGRESS NOTE  [facility]    Patient Identification:  Name: Maritza Bejarano  Age: 64 y.o.  Sex: female  :  1960  MRN: [id number]         Primary Care Physician: Enoc Carbone DO Subjective  Pleasant 64-year-old female with a history of MS with neurogenic bladder with a chronic indwelling Cobos since 2024.  She presents emergency room in septic shock secondary to UTI with Klebsiella pneumonia septicemia.  Admitted to ICU requiring fluid resuscitation as well as pressor agents.  Now doing well on cefepime.  Presently has no acute complaints.    Objective:  General Appearance:  Comfortable, well-appearing, in no acute distress and not in pain.    Vital signs: (most recent): Blood pressure 111/80, pulse 82, temperature 97.9 °F (36.6 °C), temperature source Oral, resp. rate 18, height 165.1 cm (65\"), weight 77.6 kg (171 lb 1.2 oz), SpO2 97%, not currently breastfeeding.    Lungs:  Normal effort and normal respiratory rate.  Breath sounds clear to auscultation.    Heart: Normal rate.  Regular rhythm.    Extremities: There is no dependent edema.    Neurological: Patient is alert and oriented to person, place and time.    Skin:  Warm and dry.                  Vital signs in last 24 hours:  Temp:  [97.9 °F (36.6 °C)-99.3 °F (37.4 °C)] 97.9 °F (36.6 °C)  Heart Rate:  [52-89] 79  Resp:  [18] 18  BP: ()/(50-99) 112/58    Intake/Output:    Intake/Output Summary (Last 24 hours) at 2025  Last data filed at 2025 0800  Gross per 24 hour   Intake 419 ml   Output 1300 ml   Net -881 ml         Results from last 7 days   Lab Units 25  0352 25  0411 25  1304   WBC 10*3/mm3 12.19* 16.96* 15.47*   HEMOGLOBIN g/dL 9.7* 11.1* 13.4   PLATELETS 10*3/mm3 221 265 396     Results from last 7 days   Lab Units 25  0352 25  1823 25  0411 25  1304   SODIUM mmol/L 140  --  136 136   POTASSIUM mmol/L 4.2 4.0 2.8* 3.7   CHLORIDE mmol/L 105  --  98 99 "   CO2 mmol/L 25.3  --  24.3 17.0*   BUN mg/dL 34*  --  28* 29*   CREATININE mg/dL 1.16*  --  1.37* 1.52*   GLUCOSE mg/dL 216*  --  225* 294*   Estimated Creatinine Clearance: 51.8 mL/min (A) (by C-G formula based on SCr of 1.16 mg/dL (H)).  Results from last 7 days   Lab Units 04/19/25  0352 04/18/25  2250 04/18/25  1351 04/18/25  0411 04/17/25  1304   CALCIUM mg/dL 8.0*  --   --  7.2* 8.6   ALBUMIN g/dL 2.5*  --   --  2.9* 3.7   MAGNESIUM mg/dL  --   --   --  1.9  --    PHOSPHORUS mg/dL  --  2.3* 1.7* 1.5*  --      Results from last 7 days   Lab Units 04/19/25  0352 04/18/25 0411 04/17/25  1304   ALBUMIN g/dL 2.5* 2.9* 3.7   BILIRUBIN mg/dL 0.4 0.4 0.6   ALK PHOS U/L 192* 195* 238*   AST (SGOT) U/L 45* 54* 67*   ALT (SGPT) U/L 26 31 38*       Assessment:  Septic shock: With Klebsiella pneumonia septicemia: Resolved.  Secondary to   UTI: Secondary to   Chronic indwelling Cobos catheter: Cobos catheter reportedly changed a day prior to admission.  KINZA: Secondary to septic shock.  Resolving.  Multiple sclerosis:  Neurogenic bladder:  Non-ST elevated MI type II: Secondary to septic shock.  Diabetes 2: Continue to monitor sliding scale insulin.  Anemia: Acute component may be secondary to hemodilution.  Monitor.  Previously noted to have severe iron deficiency.  Will recheck.  Breast cancer stage IV:    All problems new to this examiner.    Plan:  Please see above.  The patient be transferred to [facility] service.    Alan Carr MD  4/19/2025  20:03 EDT

## 2025-04-20 NOTE — PLAN OF CARE
Goal Outcome Evaluation:  Plan of Care Reviewed With: patient        Progress: improving        Pt A/Ox4, VSS, on 2L NC. Blood glucose monitored. CVL removed. Pt transferred to step down unit.

## 2025-04-20 NOTE — PROGRESS NOTES
Name: Maritza Nance Darci ADMIT: 2025   : 1960  PCP: Enoc Carbone DO    MRN: [id number] LOS: 3 days   AGE/SEX: 64 y.o. female  ROOM: [facility]     Subjective   Subjective   Chief Complaint   Patient presents with    Dizziness    Fever    Shortness of Breath     She reports she has had some right flank pain.  She is urinating and has not noticed any overt hematuria.  No current nausea or vomiting.  Not having any chest pain or palpitations.  She reports that when she gets fluids she does have shortness of breath and then it seems to get better throughout the day.     Objective   Objective   Vital Signs  Temp:  [97.8 °F (36.6 °C)-99 °F (37.2 °C)] 97.8 °F (36.6 °C)  Heart Rate:  [71-82] 78  Resp:  [18] 18  BP: ()/(57-80) 138/78  SpO2:  [93 %-97 %] 95 %  on  Flow (L/min) (Oxygen Therapy):  [1.5-2] 1.5;   Device (Oxygen Therapy): room air  Body mass index is 29.46 kg/m².    Physical Exam  Vitals and nursing note reviewed.   Constitutional:       General: She is not in acute distress.     Appearance: She is ill-appearing. She is not diaphoretic.   HENT:      Head: Atraumatic.   Cardiovascular:      Rate and Rhythm: Normal rate and regular rhythm.      Pulses: Normal pulses.   Pulmonary:      Effort: Pulmonary effort is normal.      Breath sounds: Decreased breath sounds present. No wheezing.   Abdominal:      Palpations: Abdomen is soft.      Tenderness: There is no abdominal tenderness. There is no guarding or rebound.   Musculoskeletal:         General: No tenderness.   Skin:     General: Skin is warm and dry.   Neurological:      Mental Status: She is alert. Mental status is at baseline.   Psychiatric:         Mood and Affect: Mood normal.         Behavior: Behavior normal.       Results Review  I reviewed the patient's new clinical results.    Results from last 7 days   Lab Units 25  0248 25  0352 25  0411 25  1304   WBC 10*3/mm3 10.42 12.19* 16.96* 15.47*   HEMOGLOBIN g/dL 9.5*  9.7* 11.1* 13.4   PLATELETS 10*3/mm3 232 221 265 396     Results from last 7 days   Lab Units 04/20/25  0248 04/19/25  0352 04/18/25  1823 04/18/25  0411 04/17/25  1304   SODIUM mmol/L 137 140  --  136 136   POTASSIUM mmol/L 3.8 4.2 4.0 2.8* 3.7   CHLORIDE mmol/L 105 105  --  98 99   CO2 mmol/L 21.0* 25.3  --  24.3 17.0*   BUN mg/dL 36* 34*  --  28* 29*   CREATININE mg/dL 1.23* 1.16*  --  1.37* 1.52*   GLUCOSE mg/dL 242* 216*  --  225* 294*   EGFR mL/min/1.73 49.8* 53.4*  --  43.7* 38.6*     Results from last 7 days   Lab Units 04/20/25 0248 04/19/25 0352 04/18/25 0411 04/17/25  1304   ALBUMIN g/dL 2.8* 2.5* 2.9* 3.7   BILIRUBIN mg/dL 0.2 0.4 0.4 0.6   ALK PHOS U/L 229* 192* 195* 238*   AST (SGOT) U/L 68* 45* 54* 67*   ALT (SGPT) U/L 40* 26 31 38*     Results from last 7 days   Lab Units 04/20/25 0248 04/19/25 0352 04/18/25  2250 04/18/25  1351 04/18/25 0411 04/17/25  1304   CALCIUM mg/dL 8.5* 8.0*  --   --  7.2* 8.6   ALBUMIN g/dL 2.8* 2.5*  --   --  2.9* 3.7   MAGNESIUM mg/dL  --   --   --   --  1.9  --    PHOSPHORUS mg/dL  --   --  2.3* 1.7* 1.5*  --      Results from last 7 days   Lab Units 04/18/25  0411 04/18/25  0050 04/17/25  2151 04/17/25  1715 04/17/25  1304   PROCALCITONIN ng/mL  --   --   --   --  44.90*   LACTATE mmol/L 3.8* 4.6* 2.7* 6.1* 6.4*     Glucose   Date/Time Value Ref Range Status   04/20/2025 1055 256 (H) 70 - 130 mg/dL Final   04/20/2025 0618 159 (H) 70 - 130 mg/dL Final   04/19/2025 2119 291 (H) 70 - 130 mg/dL Final   04/19/2025 1732 230 (H) 70 - 130 mg/dL Final   04/19/2025 1146 250 (H) 70 - 130 mg/dL Final   04/19/2025 0751 129 70 - 130 mg/dL Final   04/18/2025 2017 205 (H) 70 - 130 mg/dL Final       US Renal Bilateral  Result Date: 4/18/2025  1.  No hydronephrosis. Bladder decompressed by Cobos catheter. Please note evaluation of the left kidney is suboptimal due to limited patient mobility.    This report was finalized on 4/18/2025 5:07 PM by Dr. Amarjit Curiel M.D on Workstation:  [id number]        I have personally reviewed all medications:  Scheduled Medications  anastrozole, 1 mg, Oral, Daily  aspirin, 81 mg, Oral, Daily  baclofen, 20 mg, Oral, BID  cefTRIAXone, 2,000 mg, Intravenous, Q24H  FLUoxetine, 20 mg, Oral, Daily  heparin (porcine), 5,000 Units, Subcutaneous, Q8H  hydrocortisone sodium succinate, 50 mg, Intravenous, Q6H  insulin lispro, 3-14 Units, Subcutaneous, 4x Daily AC & at Bedtime  mupirocin, 1 Application, Each Nare, BID  pantoprazole, 40 mg, Oral, BID  pramipexole, 1.5 mg, Oral, BID  rosuvastatin, 20 mg, Oral, Nightly  senna-docusate sodium, 2 tablet, Oral, BID  sodium chloride, 10 mL, Intravenous, Q12H      Infusions  norepinephrine, 0.02-0.3 mcg/kg/min, Last Rate: Stopped (04/19/25 0200)  phenylephrine, 0.5-3 mcg/kg/min, Last Rate: Stopped (04/17/25 1841)      Diet  Diet: Diabetic; Consistent Carbohydrate; Fluid Consistency: Thin (IDDSI 0)       Assessment/Plan     Active Hospital Problems    Diagnosis  POA    **Sepsis [A41.9]  Yes      Resolved Hospital Problems   No resolved problems to display.       64 y.o. female admitted with Sepsis.    UTI with resulting septicemia and septic shock: Shock has resolved.  She was weaned off of the pressors.  Still on stress dose steroids.  Will start to wean the Cortef.  Repeat blood cultures are pending.  Continue antibiotics.  Check CT abdomen and pelvis.  Infectious disease following.  Chronic indwelling Cobos/multiple sclerosis with neurogenic bladder  Type II NSTEMI: Continue medical management.  Cardiology following.  Diabetes: Check A1c.  Start Lantus.  Continue SSI.  Monitor requirements  GERD: PPI  PPX: Heparin subcutaneous  Disposition: TBD    Expected Discharge Date: 4/22/2025; Expected Discharge Time:      Christpoher Caicedo MD  [facility]  04/20/25  11:53 EDT    Dictated portions of note using Dragon dictation software.  Copied text in this note has been reviewed by me and remains accurate as of  04/20/25

## 2025-04-20 NOTE — PROGRESS NOTES
"      [place] [facility]         Dr Hanley Sayied   LOS: 3 days   Patient Care Team:  Enoc Carbone DO as PCP - General (Internal Medicine)  Brittney Ortiz, RN as Nurse Navigator (Oncology)  Kim Barber MD as Referring Physician (General Surgery)  Zainab Lopes MD as Consulting Physician (Hematology and Oncology)    Chief Complaint: sepsis with Klebsiella bacteremia/UTI neurogenic bladder multiple medical issues as listed below    Interval History: Resting comfortably no overnight issues.    REVIEW OF SYSTEMS:   CARDIOVASCULAR: No chest pain, chest pressure or chest discomfort. No palpitations or edema.   RESPIRATORY: No shortness of breath, cough or sputum.   GASTROINTESTINAL: No anorexia, nausea, vomiting or diarrhea. No abdominal pain or blood.   HEMATOLOGIC: No bleeding or bruising.     Ventilator/Non-Invasive Ventilation Settings (From admission, onward)      None              Vital Signs  Temp:  [97.8 °F (36.6 °C)-99 °F (37.2 °C)] 97.8 °F (36.6 °C)  Heart Rate:  [71-82] 78  Resp:  [18] 18  BP: ()/(57-80) 138/78    Intake/Output Summary (Last 24 hours) at 4/20/2025 1255  Last data filed at 4/20/2025 0340  Gross per 24 hour   Intake 1866.89 ml   Output 1100 ml   Net 766.89 ml     Flowsheet Rows      Flowsheet Row First Filed Value   Admission Height 165.1 cm (65\") Documented at 04/17/2025 1259   Admission Weight 70.3 kg (155 lb) Documented at 04/17/2025 1259                  Physical Exam:  Patient is examined using the personal protective equipment as per guidelines from infection control for this particular patient as enacted.  Hand hygiene was performed before and after patient interaction.   General Appearance:    Alert, cooperative, in no acute distress.  Following simple commands  ENT Mallampati between 3 and 4 no nasal congestion  Neck midline trachea, no thyromegaly   Lungs:    diminished breath sounds bilaterally    Heart:    Regular rhythm and normal rate, normal S1 and " S2, no            murmur, no gallop, no rub, no click   Chest Wall:    No abnormalities observed   Abdomen:     Normal bowel sounds, no masses, no organomegaly, soft        nontender, nondistended, no guarding, no rebound                tenderness   Extremities:   Moves all extremities well, no edema, no cyanosis, no             redness  CNS no focal neurological deficits normal sensory exam  Skin no rashes no nodules  Musculoskeletal no cyanosis no clubbing normal range of motion     Results Review:        Results from last 7 days   Lab Units 04/20/25  0248 04/19/25  0352 04/18/25  1823 04/18/25  0411   SODIUM mmol/L 137 140  --  136   POTASSIUM mmol/L 3.8 4.2 4.0 2.8*   CHLORIDE mmol/L 105 105  --  98   CO2 mmol/L 21.0* 25.3  --  24.3   BUN mg/dL 36* 34*  --  28*   CREATININE mg/dL 1.23* 1.16*  --  1.37*   GLUCOSE mg/dL 242* 216*  --  225*   CALCIUM mg/dL 8.5* 8.0*  --  7.2*     Results from last 7 days   Lab Units 04/18/25  0411 04/17/25  1419 04/17/25  1304   HSTROP T ng/L 594* 351* 163*     Results from last 7 days   Lab Units 04/20/25  0248 04/19/25  0352 04/18/25  0411   WBC 10*3/mm3 10.42 12.19* 16.96*   HEMOGLOBIN g/dL 9.5* 9.7* 11.1*   HEMATOCRIT % 27.5* 28.6* 31.9*   PLATELETS 10*3/mm3 232 221 265             Results from last 7 days   Lab Units 04/18/25  0411   MAGNESIUM mg/dL 1.9         Results from last 7 days   Lab Units 04/17/25  2235   PH, ARTERIAL pH units 7.463*   PO2 ART mm Hg 175.6*   PCO2, ARTERIAL mm Hg 28.9*   HCO3 ART mmol/L 20.7*       I reviewed the patient's new clinical results.  I personally viewed and interpreted the patient's chest x-ray.        Medication Review:   anastrozole, 1 mg, Oral, Daily  aspirin, 81 mg, Oral, Daily  baclofen, 20 mg, Oral, BID  cefTRIAXone, 2,000 mg, Intravenous, Q24H  FLUoxetine, 20 mg, Oral, Daily  heparin (porcine), 5,000 Units, Subcutaneous, Q8H  hydrocortisone sodium succinate, 50 mg, Intravenous, Q8H  insulin glargine, 10 Units, Subcutaneous,  Daily  insulin lispro, 2-7 Units, Subcutaneous, 4x Daily AC & at Bedtime  mupirocin, 1 Application, Each Nare, BID  pantoprazole, 40 mg, Oral, BID  pramipexole, 1.5 mg, Oral, BID  rosuvastatin, 20 mg, Oral, Nightly  senna-docusate sodium, 2 tablet, Oral, BID  sodium chloride, 10 mL, Intravenous, Q12H               ASSESSMENT:   Sepsis, severe  Klebsiella bacteremia  Takotsubo cardiomyopathy  UTI  Neurogenic bladder  Essential hypertension  History of GI bleed  Recurrent UTI  Type II non-ST elevation MI with further increase in high-sensitivity troponin  Acute renal failure  Transaminitis  Anion gap metabolic acidosis  MS, on no immunosuppressive medication or immunomodulators  Dawn's esophagus  Breast cancer  Mild QT segment prolongation  Patient is a full code    PLAN:  Wean steroid post discharge.  Appreciate input from infectious diseases  Current antibiotics on cefepime per infectious diseases.  They recommend 10 to 14 days of IV antibiotic therapy from last negative blood culture  Keep volume status on the dry side  Wean off oxygen maintain sats above 90%  Renal ultrasound negative for any obstruction  Appreciate input from hospitalist  Nothing further to add we will sign off      Dayan Frost MD  04/20/25  12:55 EDT

## 2025-04-20 NOTE — PROGRESS NOTES
"  Infectious Diseases Progress Note    Luisa Child MD     [facility]  [place]: 2 days  Patient Identification:  Name: Maritza Bejarano  Age: 64 y.o.  Sex: female  :  1960  MRN: [id number]         Primary Care Physician: Enoc Carbone,         Subjective: Continues to feel well denies any specific complaints.  Appetite and personality coming back.  Denies any new pain.  Interval History: See consultation note.    Objective:    Scheduled Meds:anastrozole, 1 mg, Oral, Daily  aspirin, 81 mg, Oral, Daily  baclofen, 20 mg, Oral, BID  cefepime, 2,000 mg, Intravenous, Q12H  FLUoxetine, 20 mg, Oral, Daily  heparin (porcine), 5,000 Units, Subcutaneous, Q8H  hydrocortisone sodium succinate, 50 mg, Intravenous, Q6H  insulin lispro, 3-14 Units, Subcutaneous, 4x Daily AC & at Bedtime  mupirocin, 1 Application, Each Nare, BID  pantoprazole, 40 mg, Oral, BID  pramipexole, 1.5 mg, Oral, BID  rosuvastatin, 20 mg, Oral, Nightly  senna-docusate sodium, 2 tablet, Oral, BID  sodium chloride, 10 mL, Intravenous, Q12H      Continuous Infusions:norepinephrine, 0.02-0.3 mcg/kg/min, Last Rate: Stopped (25 0200)  Pharmacy Consult - Pharmacy to dose,   phenylephrine, 0.5-3 mcg/kg/min, Last Rate: Stopped (25 1841)        Vital signs in last 24 hours:  Temp:  [97.9 °F (36.6 °C)-99.3 °F (37.4 °C)] 97.9 °F (36.6 °C)  Heart Rate:  [52-89] 82  Resp:  [18] 18  BP: ()/(50-99) 111/80    Intake/Output:    Intake/Output Summary (Last 24 hours) at 2025  Last data filed at 2025 1800  Gross per 24 hour   Intake 565.89 ml   Output 1250 ml   Net -684.11 ml       Exam:  /80   Pulse 82   Temp 97.9 °F (36.6 °C) (Oral)   Resp 18   Ht 165.1 cm (65\")   Wt 77.6 kg (171 lb 1.2 oz)   LMP  (LMP Unknown) Comment: PT. STATES HER LAST PERIOD WAS GREATER THAN FIVE YEARS AGO  SpO2 97%   BMI 28.47 kg/m²   Patient is examined using the personal protective equipment as per guidelines from infection control for " this particular patient as enacted.  Hand washing was performed before and after patient interaction.  General Appearance:    Alert, cooperative, no distress, AAOx3                          Head:    Normocephalic, without obvious abnormality, atraumatic                           Eyes:  Pale conjunctiva                         Throat:   Lips, tongue, gums normal; oral mucosa pink and moist                           Neck:   Supple, symmetrical, trachea midline, no JVD                         Lungs:    Clear to auscultation bilaterally, respirations unlabored                 Chest Wall:    No tenderness or deformity                          Heart:  S1-S2 regular                  Abdomen:   Catheter in place, obese soft nontender                 Extremities:   Extremities normal, atraumatic, no cyanosis or edema                        Pulses:   Pulses palpable in all extremities                            Skin:   Skin is warm and dry,  no rashes or palpable lesions                  Neurologic: Alert and oriented x 3       Data Review:    I reviewed the patient's new clinical results.  Results from last 7 days   Lab Units 04/19/25  0352 04/18/25  0411 04/17/25  1304   WBC 10*3/mm3 12.19* 16.96* 15.47*   HEMOGLOBIN g/dL 9.7* 11.1* 13.4   PLATELETS 10*3/mm3 221 265 396     Results from last 7 days   Lab Units 04/19/25  0352 04/18/25  1823 04/18/25  0411 04/17/25  1304   SODIUM mmol/L 140  --  136 136   POTASSIUM mmol/L 4.2 4.0 2.8* 3.7   CHLORIDE mmol/L 105  --  98 99   CO2 mmol/L 25.3  --  24.3 17.0*   BUN mg/dL 34*  --  28* 29*   CREATININE mg/dL 1.16*  --  1.37* 1.52*   CALCIUM mg/dL 8.0*  --  7.2* 8.6   GLUCOSE mg/dL 216*  --  225* 294*     Microbiology Results (last 10 days)       Procedure Component Value - Date/Time    Respiratory Panel PCR w/COVID-19(SARS-CoV-2) YAMILE/FRANK/ROSE/PAD/COR/CHIDI In-House, NP Swab in UTM/VTM, 2 HR TAT - Swab, Nasopharynx [[id number]]  (Normal) Collected: 04/17/25 1341    Lab Status: Final  result Specimen: Swab from Nasopharynx Updated: 04/17/25 1443     ADENOVIRUS, PCR Not Detected     Coronavirus 229E Not Detected     Coronavirus HKU1 Not Detected     Coronavirus NL63 Not Detected     Coronavirus OC43 Not Detected     COVID19 Not Detected     Human Metapneumovirus Not Detected     Human Rhinovirus/Enterovirus Not Detected     Influenza A PCR Not Detected     Influenza B PCR Not Detected     Parainfluenza Virus 1 Not Detected     Parainfluenza Virus 2 Not Detected     Parainfluenza Virus 3 Not Detected     Parainfluenza Virus 4 Not Detected     RSV, PCR Not Detected     Bordetella pertussis pcr Not Detected     Bordetella parapertussis PCR Not Detected     Chlamydophila pneumoniae PCR Not Detected     Mycoplasma pneumo by PCR Not Detected    Narrative:      In the setting of a positive respiratory panel with a viral infection PLUS a negative procalcitonin without other underlying concern for bacterial infection, consider observing off antibiotics or discontinuation of antibiotics and continue supportive care. If the respiratory panel is positive for atypical bacterial infection (Bordetella pertussis, Chlamydophila pneumoniae, or Mycoplasma pneumoniae), consider antibiotic de-escalation to target atypical bacterial infection.    Urine Culture - Urine, Urine, Catheter [[id number]]  (Abnormal)  (Susceptibility) Collected: 04/17/25 1326    Lab Status: Final result Specimen: Urine, Catheter Updated: 04/19/25 1037     Urine Culture >100,000 CFU/mL Klebsiella pneumoniae ssp pneumoniae    Narrative:      Colonization of the urinary tract without infection is common. Treatment is discouraged unless the patient is symptomatic, pregnant, or undergoing an invasive urologic procedure.    Susceptibility        Klebsiella pneumoniae ssp pneumoniae      JOSE      Amoxicillin + Clavulanate Susceptible      Ampicillin Resistant      Ampicillin + Sulbactam Susceptible      Cefazolin (Urine) Susceptible      Cefepime  Susceptible      Ceftazidime Susceptible      Ceftriaxone Susceptible      Gentamicin Susceptible      Levofloxacin Intermediate      Nitrofurantoin Susceptible      Piperacillin + Tazobactam Susceptible      Trimethoprim + Sulfamethoxazole Resistant                           Blood Culture - Blood, Foot, Right [[id number]]  (Abnormal)  (Susceptibility) Collected: 04/17/25 1313    Lab Status: Final result Specimen: Blood from Foot, Right Updated: 04/19/25 0700     Blood Culture Klebsiella pneumoniae ssp pneumoniae     Isolated from Aerobic and Anaerobic Bottles     Gram Stain Anaerobic Bottle Gram negative bacilli      Aerobic Bottle Gram negative bacilli    Susceptibility        Klebsiella pneumoniae ssp pneumoniae      JOSE      Amoxicillin + Clavulanate Susceptible      Ampicillin Resistant      Ampicillin + Sulbactam Susceptible      Cefazolin (Non Urine) Susceptible      Cefepime Susceptible      Ceftazidime Susceptible      Ceftriaxone Susceptible      Gentamicin Susceptible      Levofloxacin Intermediate      Piperacillin + Tazobactam Susceptible      Trimethoprim + Sulfamethoxazole Resistant                       Susceptibility Comments       Klebsiella pneumoniae ssp pneumoniae    With the exception of urinary-sourced infections, aminoglycosides should not be used as monotherapy.               Blood Culture ID, PCR - Blood, Foot, Right [[id number]]  (Abnormal) Collected: 04/17/25 1313    Lab Status: Final result Specimen: Blood from Foot, Right Updated: 04/17/25 6815     BCID, PCR Klebsiella pneumoniae group. Identification by BCID2 PCR.     BOTTLE TYPE Anaerobic Bottle    Narrative:      No resistance genes detected.    Blood Culture - Blood, Foot, Left [[id number]]  (Abnormal) Collected: 04/17/25 1304    Lab Status: Final result Specimen: Blood from Foot, Left Updated: 04/19/25 0700     Blood Culture Klebsiella pneumoniae ssp pneumoniae     Isolated from Aerobic and Anaerobic Bottles     Gram Stain Anaerobic  Bottle Gram negative bacilli      Aerobic Bottle Gram negative bacilli    Narrative:      Refer to previous blood culture collected on 04/17/2025 1313 for MICs.              Assessment: Improving    Sepsis   64-year-old female with  1-Klebsiella bacteremic sepsis with septic shock secondary  2-urinary tract infection due to recent indwelling Cobos catheter malfunction  3-immobility with MS and neurogenic bladder  4-immunocompromised host  5-acute on chronic renal insufficiency  6-anemia multifactorial  7-hyperglycemia  8-other diagnoses per primary team     Recommendations/Discussions:  See my discussion and recommendations on 4/18/2025  Based on the sensitivity of the Klebsiella pneumoniae I think her antibiotic therapy can be de-escalated to ceftriaxone.  Given the resistance pattern and sensitivity profile there are no good oral antibiotic choices to transition from IV.  Follow-up on repeat blood cultures.  Patient would need 10 to 14 days of antibiotic treatment from last negative blood culture for bacteremic Klebsiella pneumonia urinary tract infection due to malfunctioning catheter and transient obstruction.  Patient will need some sort of IV access to administer antibiotic treatment in the out of the hospital setting and options are either her to come to ambulatory care center on a daily basis to receive IV Rocephin for total of 10 to 14 days from last negative blood culture for arrangements with home health with at home antibiotic delivery.  Will defer to our case management to sort the structure of antibiotic administration in the out of hospital setting.  Side effects of antibiotic therapy including possibility of nausea vomiting diarrhea rash risk of C. difficile infection yeast infection selection of resistant pathogen cytopenias hepatic and renal dysfunction and interaction with medications discussed with the patient.  Patient understands that the side effects could occur but also understand the  importance and need for antibiotic therapy at this bacteremic sepsis and wants to proceed with antibiotic therapy.  Luisa Child MD  4/19/2025  21:08 EDT    Parts of this note may be an electronic transcription/translation of spoken language to printed text using the Dragon dictation system.

## 2025-04-21 ENCOUNTER — APPOINTMENT (OUTPATIENT)
Dept: CT IMAGING | Facility: HOSPITAL | Age: 65
End: 2025-04-21
Payer: MEDICARE

## 2025-04-21 ENCOUNTER — APPOINTMENT (OUTPATIENT)
Dept: ULTRASOUND IMAGING | Facility: HOSPITAL | Age: 65
End: 2025-04-21
Payer: MEDICARE

## 2025-04-21 LAB
ALBUMIN SERPL-MCNC: 2.7 G/DL (ref 3.5–5.2)
ALBUMIN/GLOB SERPL: 0.9 G/DL
ALP SERPL-CCNC: 250 U/L (ref 39–117)
ALT SERPL W P-5'-P-CCNC: 47 U/L (ref 1–33)
ANION GAP SERPL CALCULATED.3IONS-SCNC: 11.9 MMOL/L (ref 5–15)
AST SERPL-CCNC: 70 U/L (ref 1–32)
BILIRUB SERPL-MCNC: 0.3 MG/DL (ref 0–1.2)
BUN SERPL-MCNC: 32 MG/DL (ref 8–23)
BUN/CREAT SERPL: 28.8 (ref 7–25)
CALCIUM SPEC-SCNC: 8 MG/DL (ref 8.6–10.5)
CHLORIDE SERPL-SCNC: 106 MMOL/L (ref 98–107)
CO2 SERPL-SCNC: 21.1 MMOL/L (ref 22–29)
CREAT SERPL-MCNC: 1.11 MG/DL (ref 0.57–1)
DEPRECATED RDW RBC AUTO: 55.4 FL (ref 37–54)
EGFRCR SERPLBLD CKD-EPI 2021: 56.3 ML/MIN/1.73
ERYTHROCYTE [DISTWIDTH] IN BLOOD BY AUTOMATED COUNT: 16.2 % (ref 12.3–15.4)
GLOBULIN UR ELPH-MCNC: 3 GM/DL
GLUCOSE BLDC GLUCOMTR-MCNC: 123 MG/DL (ref 70–130)
GLUCOSE BLDC GLUCOMTR-MCNC: 211 MG/DL (ref 70–130)
GLUCOSE BLDC GLUCOMTR-MCNC: 224 MG/DL (ref 70–130)
GLUCOSE BLDC GLUCOMTR-MCNC: 243 MG/DL (ref 70–130)
GLUCOSE SERPL-MCNC: 231 MG/DL (ref 65–99)
HAV IGM SERPL QL IA: NORMAL
HBA1C MFR BLD: 7.3 % (ref 4.8–5.6)
HBV CORE IGM SERPL QL IA: NORMAL
HBV SURFACE AG SERPL QL IA: NORMAL
HCT VFR BLD AUTO: 27.7 % (ref 34–46.6)
HCV AB SER QL: NORMAL
HGB BLD-MCNC: 9.3 G/DL (ref 12–15.9)
MAGNESIUM SERPL-MCNC: 2.1 MG/DL (ref 1.6–2.4)
MCH RBC QN AUTO: 31.2 PG (ref 26.6–33)
MCHC RBC AUTO-ENTMCNC: 33.6 G/DL (ref 31.5–35.7)
MCV RBC AUTO: 93 FL (ref 79–97)
PHOSPHATE SERPL-MCNC: 2.2 MG/DL (ref 2.5–4.5)
PHOSPHATE SERPL-MCNC: 2.3 MG/DL (ref 2.5–4.5)
PLATELET # BLD AUTO: 274 10*3/MM3 (ref 140–450)
PMV BLD AUTO: 10 FL (ref 6–12)
POTASSIUM SERPL-SCNC: 3.4 MMOL/L (ref 3.5–5.2)
POTASSIUM SERPL-SCNC: 4.4 MMOL/L (ref 3.5–5.2)
PROT SERPL-MCNC: 5.7 G/DL (ref 6–8.5)
RBC # BLD AUTO: 2.98 10*6/MM3 (ref 3.77–5.28)
SODIUM SERPL-SCNC: 139 MMOL/L (ref 136–145)
WBC NRBC COR # BLD AUTO: 9.22 10*3/MM3 (ref 3.4–10.8)

## 2025-04-21 PROCEDURE — 76705 ECHO EXAM OF ABDOMEN: CPT

## 2025-04-21 PROCEDURE — 82948 REAGENT STRIP/BLOOD GLUCOSE: CPT

## 2025-04-21 PROCEDURE — 80053 COMPREHEN METABOLIC PANEL: CPT | Performed by: INTERNAL MEDICINE

## 2025-04-21 PROCEDURE — 85027 COMPLETE CBC AUTOMATED: CPT | Performed by: INTERNAL MEDICINE

## 2025-04-21 PROCEDURE — 80074 ACUTE HEPATITIS PANEL: CPT | Performed by: INTERNAL MEDICINE

## 2025-04-21 PROCEDURE — 63710000001 INSULIN LISPRO (HUMAN) PER 5 UNITS: Performed by: INTERNAL MEDICINE

## 2025-04-21 PROCEDURE — 84132 ASSAY OF SERUM POTASSIUM: CPT | Performed by: INTERNAL MEDICINE

## 2025-04-21 PROCEDURE — 83735 ASSAY OF MAGNESIUM: CPT | Performed by: INTERNAL MEDICINE

## 2025-04-21 PROCEDURE — 63710000001 INSULIN GLARGINE PER 5 UNITS: Performed by: INTERNAL MEDICINE

## 2025-04-21 PROCEDURE — 25010000002 ONDANSETRON PER 1 MG: Performed by: INTERNAL MEDICINE

## 2025-04-21 PROCEDURE — 84100 ASSAY OF PHOSPHORUS: CPT | Performed by: INTERNAL MEDICINE

## 2025-04-21 PROCEDURE — 25010000002 CEFTRIAXONE PER 250 MG: Performed by: INTERNAL MEDICINE

## 2025-04-21 PROCEDURE — 74176 CT ABD & PELVIS W/O CONTRAST: CPT

## 2025-04-21 PROCEDURE — 25010000002 HEPARIN (PORCINE) PER 1000 UNITS: Performed by: INTERNAL MEDICINE

## 2025-04-21 PROCEDURE — 83036 HEMOGLOBIN GLYCOSYLATED A1C: CPT | Performed by: INTERNAL MEDICINE

## 2025-04-21 PROCEDURE — 25010000002 HYDROCORTISONE SOD SUC (PF) 100 MG RECONSTITUTED SOLUTION: Performed by: INTERNAL MEDICINE

## 2025-04-21 PROCEDURE — 99232 SBSQ HOSP IP/OBS MODERATE 35: CPT | Performed by: STUDENT IN AN ORGANIZED HEALTH CARE EDUCATION/TRAINING PROGRAM

## 2025-04-21 RX ORDER — POTASSIUM CHLORIDE 1500 MG/1
40 TABLET, EXTENDED RELEASE ORAL EVERY 4 HOURS
Status: COMPLETED | OUTPATIENT
Start: 2025-04-21 | End: 2025-04-21

## 2025-04-21 RX ORDER — PHENAZOPYRIDINE HYDROCHLORIDE 100 MG/1
200 TABLET, FILM COATED ORAL
Status: COMPLETED | OUTPATIENT
Start: 2025-04-21 | End: 2025-04-23

## 2025-04-21 RX ORDER — HYDROCORTISONE SODIUM SUCCINATE 100 MG/2ML
50 INJECTION INTRAMUSCULAR; INTRAVENOUS EVERY 12 HOURS
Status: DISCONTINUED | OUTPATIENT
Start: 2025-04-21 | End: 2025-04-22

## 2025-04-21 RX ADMIN — HYDROCORTISONE SODIUM SUCCINATE 50 MG: 100 INJECTION, POWDER, FOR SOLUTION INTRAMUSCULAR; INTRAVENOUS at 17:11

## 2025-04-21 RX ADMIN — HEPARIN SODIUM 5000 UNITS: 5000 INJECTION INTRAVENOUS; SUBCUTANEOUS at 22:27

## 2025-04-21 RX ADMIN — INSULIN LISPRO 3 UNITS: 100 INJECTION, SOLUTION INTRAVENOUS; SUBCUTANEOUS at 12:40

## 2025-04-21 RX ADMIN — HYDROCORTISONE SODIUM SUCCINATE 50 MG: 100 INJECTION, POWDER, FOR SOLUTION INTRAMUSCULAR; INTRAVENOUS at 06:26

## 2025-04-21 RX ADMIN — PRAMIPEXOLE DIHYDROCHLORIDE 1.5 MG: 1.5 TABLET ORAL at 09:08

## 2025-04-21 RX ADMIN — ANASTROZOLE 1 MG: 1 TABLET, FILM COATED ORAL at 09:06

## 2025-04-21 RX ADMIN — INSULIN LISPRO 3 UNITS: 100 INJECTION, SOLUTION INTRAVENOUS; SUBCUTANEOUS at 09:07

## 2025-04-21 RX ADMIN — Medication 2 PACKET: at 20:13

## 2025-04-21 RX ADMIN — BACLOFEN 20 MG: 10 TABLET ORAL at 09:07

## 2025-04-21 RX ADMIN — PANTOPRAZOLE SODIUM 40 MG: 40 TABLET, DELAYED RELEASE ORAL at 20:14

## 2025-04-21 RX ADMIN — BACLOFEN 20 MG: 10 TABLET ORAL at 20:14

## 2025-04-21 RX ADMIN — ACETAMINOPHEN 650 MG: 325 TABLET, FILM COATED ORAL at 20:13

## 2025-04-21 RX ADMIN — ONDANSETRON 4 MG: 2 INJECTION, SOLUTION INTRAMUSCULAR; INTRAVENOUS at 00:24

## 2025-04-21 RX ADMIN — Medication 10 ML: at 09:08

## 2025-04-21 RX ADMIN — Medication 10 ML: at 20:16

## 2025-04-21 RX ADMIN — PRAMIPEXOLE DIHYDROCHLORIDE 1.5 MG: 1.5 TABLET ORAL at 20:14

## 2025-04-21 RX ADMIN — PANTOPRAZOLE SODIUM 40 MG: 40 TABLET, DELAYED RELEASE ORAL at 09:06

## 2025-04-21 RX ADMIN — HEPARIN SODIUM 5000 UNITS: 5000 INJECTION INTRAVENOUS; SUBCUTANEOUS at 06:27

## 2025-04-21 RX ADMIN — MUPIROCIN 1 APPLICATION: 20 OINTMENT TOPICAL at 09:07

## 2025-04-21 RX ADMIN — ASPIRIN 81 MG: 81 TABLET, COATED ORAL at 09:06

## 2025-04-21 RX ADMIN — INSULIN LISPRO 3 UNITS: 100 INJECTION, SOLUTION INTRAVENOUS; SUBCUTANEOUS at 17:11

## 2025-04-21 RX ADMIN — PHENAZOPYRIDINE 200 MG: 100 TABLET ORAL at 18:33

## 2025-04-21 RX ADMIN — CEFTRIAXONE 2000 MG: 2 INJECTION, POWDER, FOR SOLUTION INTRAMUSCULAR; INTRAVENOUS at 22:26

## 2025-04-21 RX ADMIN — POTASSIUM CHLORIDE 40 MEQ: 1500 TABLET, EXTENDED RELEASE ORAL at 09:06

## 2025-04-21 RX ADMIN — HEPARIN SODIUM 5000 UNITS: 5000 INJECTION INTRAVENOUS; SUBCUTANEOUS at 14:33

## 2025-04-21 RX ADMIN — SENNOSIDES AND DOCUSATE SODIUM 2 TABLET: 50; 8.6 TABLET ORAL at 20:14

## 2025-04-21 RX ADMIN — INSULIN GLARGINE 10 UNITS: 100 INJECTION, SOLUTION SUBCUTANEOUS at 09:07

## 2025-04-21 RX ADMIN — FLUOXETINE HYDROCHLORIDE 20 MG: 20 CAPSULE ORAL at 09:06

## 2025-04-21 RX ADMIN — SENNOSIDES AND DOCUSATE SODIUM 2 TABLET: 50; 8.6 TABLET ORAL at 09:06

## 2025-04-21 RX ADMIN — MUPIROCIN 1 APPLICATION: 20 OINTMENT TOPICAL at 20:16

## 2025-04-21 RX ADMIN — POTASSIUM CHLORIDE 40 MEQ: 1500 TABLET, EXTENDED RELEASE ORAL at 12:37

## 2025-04-21 RX ADMIN — Medication 2 PACKET: at 09:06

## 2025-04-21 NOTE — PROGRESS NOTES
Name: Maritza Nance Darci ADMIT: 2025   : 1960  PCP: Enoc Carbone     MRN: [id number] LOS: 4 days   AGE/SEX: 64 y.o. female  ROOM: [facility]     Subjective   Subjective   Chief Complaint   Patient presents with    Dizziness    Fever    Shortness of Breath     She is not reporting any chest pain.  Her shortness of breath has improved.  She did not report any flank pain to me today.  She did have borderline fever last night and she reports whenever she has a near fever she always feels like she has some flare of MS symptoms and vision blurriness.  Her vision is at baseline today.  Overall she feels improved compared to yesterday and the past few days.     Objective   Objective   Vital Signs  Temp:  [98.4 °F (36.9 °C)-100.2 °F (37.9 °C)] 98.4 °F (36.9 °C)  Heart Rate:  [77-94] 88  Resp:  [16-18] 18  BP: (137-153)/(73-92) 140/78  SpO2:  [93 %-100 %] 100 %  on   ;   Device (Oxygen Therapy): room air  Body mass index is 30.08 kg/m².    Physical Exam  Vitals and nursing note reviewed.   Constitutional:       General: She is not in acute distress.     Appearance: She is not diaphoretic.   HENT:      Head: Atraumatic.   Cardiovascular:      Rate and Rhythm: Normal rate and regular rhythm.      Pulses: Normal pulses.   Pulmonary:      Effort: Pulmonary effort is normal.      Breath sounds: Decreased breath sounds present. No wheezing.   Abdominal:      Palpations: Abdomen is soft.      Tenderness: There is no abdominal tenderness. There is no guarding or rebound.   Musculoskeletal:         General: No tenderness.   Skin:     General: Skin is warm and dry.   Neurological:      Mental Status: She is alert. Mental status is at baseline.   Psychiatric:         Mood and Affect: Mood normal.         Behavior: Behavior normal.       Results Review  I reviewed the patient's new clinical results.    Results from last 7 days   Lab Units 25  0444 25  0248 25  0352 25  0411   WBC 10*3/mm3 9.22 10.42  12.19* 16.96*   HEMOGLOBIN g/dL 9.3* 9.5* 9.7* 11.1*   PLATELETS 10*3/mm3 274 232 221 265     Results from last 7 days   Lab Units 04/21/25 0444 04/20/25 0248 04/19/25 0352 04/18/25  1823 04/18/25  0411   SODIUM mmol/L 139 137 140  --  136   POTASSIUM mmol/L 3.4* 3.8 4.2 4.0 2.8*   CHLORIDE mmol/L 106 105 105  --  98   CO2 mmol/L 21.1* 21.0* 25.3  --  24.3   BUN mg/dL 32* 36* 34*  --  28*   CREATININE mg/dL 1.11* 1.23* 1.16*  --  1.37*   GLUCOSE mg/dL 231* 242* 216*  --  225*   EGFR mL/min/1.73 56.3* 49.8* 53.4*  --  43.7*     Results from last 7 days   Lab Units 04/21/25 0444 04/20/25 0248 04/19/25 0352 04/18/25  0411   ALBUMIN g/dL 2.7* 2.8* 2.5* 2.9*   BILIRUBIN mg/dL 0.3 0.2 0.4 0.4   ALK PHOS U/L 250* 229* 192* 195*   AST (SGOT) U/L 70* 68* 45* 54*   ALT (SGPT) U/L 47* 40* 26 31     Results from last 7 days   Lab Units 04/21/25 0444 04/20/25 0248 04/19/25 0352 04/18/25  2250 04/18/25  1351 04/18/25  0411   CALCIUM mg/dL 8.0* 8.5* 8.0*  --   --  7.2*   ALBUMIN g/dL 2.7* 2.8* 2.5*  --   --  2.9*   MAGNESIUM mg/dL 2.1  --   --   --   --  1.9   PHOSPHORUS mg/dL 2.2*  --   --  2.3* 1.7* 1.5*     Results from last 7 days   Lab Units 04/18/25  0411 04/18/25  0050 04/17/25  2151 04/17/25  1715 04/17/25  1304   PROCALCITONIN ng/mL  --   --   --   --  44.90*   LACTATE mmol/L 3.8* 4.6* 2.7* 6.1* 6.4*     Hemoglobin A1C   Date/Time Value Ref Range Status   04/21/2025 0444 7.30 (H) 4.80 - 5.60 % Final     Glucose   Date/Time Value Ref Range Status   04/21/2025 0628 243 (H) 70 - 130 mg/dL Final   04/20/2025 2013 212 (H) 70 - 130 mg/dL Final   04/20/2025 1622 180 (H) 70 - 130 mg/dL Final   04/20/2025 1055 256 (H) 70 - 130 mg/dL Final   04/20/2025 0618 159 (H) 70 - 130 mg/dL Final   04/19/2025 2119 291 (H) 70 - 130 mg/dL Final   04/19/2025 1732 230 (H) 70 - 130 mg/dL Final       No radiology results for the last day      I have personally reviewed all medications:  Scheduled Medications  anastrozole, 1 mg, Oral,  Daily  aspirin, 81 mg, Oral, Daily  baclofen, 20 mg, Oral, BID  cefTRIAXone, 2,000 mg, Intravenous, Q24H  FLUoxetine, 20 mg, Oral, Daily  heparin (porcine), 5,000 Units, Subcutaneous, Q8H  hydrocortisone sodium succinate, 50 mg, Intravenous, Q12H  insulin glargine, 10 Units, Subcutaneous, Daily  insulin lispro, 2-7 Units, Subcutaneous, 4x Daily AC & at Bedtime  mupirocin, 1 Application, Each Nare, BID  pantoprazole, 40 mg, Oral, BID  potassium chloride ER, 40 mEq, Oral, Q4H  pramipexole, 1.5 mg, Oral, BID  rosuvastatin, 20 mg, Oral, Nightly  senna-docusate sodium, 2 tablet, Oral, BID  sodium chloride, 10 mL, Intravenous, Q12H      Infusions       Diet  Diet: Diabetic; Consistent Carbohydrate; Fluid Consistency: Thin (IDDSI 0)       Assessment/Plan     Active Hospital Problems    Diagnosis  POA    **Sepsis [A41.9]  Yes      Resolved Hospital Problems   No resolved problems to display.       64 y.o. female admitted with Sepsis.    UTI with resulting Klebsiella septicemia and septic shock: Shock has resolved.  She was weaned off of the pressors.  Weaning stress dose steroids.  Repeat blood culture no growth to date repeat blood cultures are pending.  Continue antibiotics.  CT abdomen and pelvis pending.  Infectious disease following.  Chronic indwelling Cobos/multiple sclerosis with neurogenic bladder  Type II NSTEMI: Continue medical management.  Cardiology following.  Diabetes: A1c 7.3.  Continue 10 units of Lantus and SSI.  Decreasing steroids as above.  Monitor requirements.  GERD: PPI  Elevated LFT: CT pending as above.  Will hold statin.  Repeat to monitor.  PPX: Heparin subcutaneous  Disposition: TBD    Expected Discharge Date: 4/22/2025; Expected Discharge Time:      Christopher Caicedo MD  [facility]  04/21/25  11:35 EDT    Dictated portions of note using Dragon dictation software.  Copied text in this note has been reviewed by me and remains accurate as of 04/21/25

## 2025-04-21 NOTE — PLAN OF CARE
Problem: Adult Inpatient Plan of Care  Goal: Absence of Hospital-Acquired Illness or Injury  Intervention: Identify and Manage Fall Risk  Recent Flowsheet Documentation  Taken 4/21/2025 1834 by Anita Mcmahan RN  Safety Promotion/Fall Prevention:   room organization consistent   safety round/check completed   activity supervised   assistive device/personal items within reach   clutter free environment maintained  Taken 4/21/2025 1420 by Anita Mcmahan RN  Safety Promotion/Fall Prevention:   safety round/check completed   room organization consistent   assistive device/personal items within reach   activity supervised  Taken 4/21/2025 0907 by Anita Mcmahan RN  Safety Promotion/Fall Prevention:   activity supervised   assistive device/personal items within reach   nonskid shoes/slippers when out of bed   room organization consistent   safety round/check completed     Problem: Adult Inpatient Plan of Care  Goal: Absence of Hospital-Acquired Illness or Injury  Intervention: Prevent Skin Injury  Recent Flowsheet Documentation  Taken 4/21/2025 1834 by Anita Mcmahan RN  Body Position:   right   turned   lower extremity elevated   tilted  Taken 4/21/2025 1420 by Anita Mcmahan RN  Body Position:   left   right   turned   lower extremity elevated   tilted  Taken 4/21/2025 0907 by Anita Mcmahan RN  Body Position:   turned   right   left   tilted   heels elevated   Goal Outcome Evaluation:  Plan of Care Reviewed With: patient        Progress: no change  Outcome Evaluation: Patient AO x 4 VSS, on bed rest unable to ambulated, with matias catheter in place complain bladder spasm obtained order for pyridium will continue monitor

## 2025-04-21 NOTE — PROGRESS NOTES
Enter Query Response Below      Query Response: Stage 2 coccyx pressure injury, right heel DTPI pressure injury, and stage 2 right lower gluteal pressure injury, present on admission              If applicable, please update the problem list.

## 2025-04-21 NOTE — PROGRESS NOTES
"  Infectious Diseases Progress Note    Luisa Child MD     [facility]  [place]: 4 days  Patient Identification:  Name: Maritza Bejarano  Age: 64 y.o.  Sex: female  :  1960  MRN: [id number]         Primary Care Physician: Enoc Carbone DO        Subjective: Complains of discomfort in her chest upon coughing and taking deep breath.  Did have a low-grade fever yesterday that she felt.  Denies any diarrhea but did have 1 formed stool earlier.  Interval History: See consultation note.    Objective:    Scheduled Meds:anastrozole, 1 mg, Oral, Daily  aspirin, 81 mg, Oral, Daily  baclofen, 20 mg, Oral, BID  cefTRIAXone, 2,000 mg, Intravenous, Q24H  FLUoxetine, 20 mg, Oral, Daily  heparin (porcine), 5,000 Units, Subcutaneous, Q8H  hydrocortisone sodium succinate, 50 mg, Intravenous, Q8H  insulin glargine, 10 Units, Subcutaneous, Daily  insulin lispro, 2-7 Units, Subcutaneous, 4x Daily AC & at Bedtime  mupirocin, 1 Application, Each Nare, BID  pantoprazole, 40 mg, Oral, BID  potassium & sodium phosphates, 2 packet, Oral, Once  potassium chloride ER, 40 mEq, Oral, Q4H  pramipexole, 1.5 mg, Oral, BID  rosuvastatin, 20 mg, Oral, Nightly  senna-docusate sodium, 2 tablet, Oral, BID  sodium chloride, 10 mL, Intravenous, Q12H      Continuous Infusions:       Vital signs in last 24 hours:  Temp:  [97.8 °F (36.6 °C)-100.2 °F (37.9 °C)] 99.3 °F (37.4 °C)  Heart Rate:  [77-94] 94  Resp:  [16-18] 18  BP: (137-153)/(73-92) 150/84    Intake/Output:    Intake/Output Summary (Last 24 hours) at 2025 0731  Last data filed at 2025 0630  Gross per 24 hour   Intake 240 ml   Output 1650 ml   Net -1410 ml       Exam:  /84 (BP Location: Right arm, Patient Position: Lying)   Pulse 94   Temp 99.3 °F (37.4 °C) (Oral)   Resp 18   Ht 165.1 cm (65\")   Wt 82 kg (180 lb 12.4 oz)   LMP  (LMP Unknown) Comment: PT. STATES HER LAST PERIOD WAS GREATER THAN FIVE YEARS AGO  SpO2 96%   BMI 30.08 kg/m²   Patient is examined " using the personal protective equipment as per guidelines from infection control for this particular patient as enacted.  Hand washing was performed before and after patient interaction.  General Appearance:    Alert, cooperative, no distress, AAOx3                          Head:    Normocephalic, without obvious abnormality, atraumatic                           Eyes:  Pale conjunctiva                         Throat:   Lips, tongue, gums normal; oral mucosa pink and moist                           Neck:   Supple, symmetrical, trachea midline, no JVD                         Lungs:    Clear to auscultation bilaterally, respirations unlabored                 Chest Wall:    No tenderness or deformity                          Heart:  S1-S2 regular                  Abdomen:   Catheter in place, obese soft nontender                 Extremities:   Extremities normal, atraumatic, no cyanosis or edema                        Pulses:   Pulses palpable in all extremities                            Skin:   Skin is warm and dry,  no rashes or palpable lesions                  Neurologic: Alert and oriented x 3       Data Review:    I reviewed the patient's new clinical results.  Results from last 7 days   Lab Units 04/21/25 0444 04/20/25 0248 04/19/25  0352 04/18/25  0411 04/17/25  1304   WBC 10*3/mm3 9.22 10.42 12.19* 16.96* 15.47*   HEMOGLOBIN g/dL 9.3* 9.5* 9.7* 11.1* 13.4   PLATELETS 10*3/mm3 274 232 221 265 396     Results from last 7 days   Lab Units 04/21/25 0444 04/20/25 0248 04/19/25  0352 04/18/25  1823 04/18/25  0411 04/17/25  1304   SODIUM mmol/L 139 137 140  --  136 136   POTASSIUM mmol/L 3.4* 3.8 4.2 4.0 2.8* 3.7   CHLORIDE mmol/L 106 105 105  --  98 99   CO2 mmol/L 21.1* 21.0* 25.3  --  24.3 17.0*   BUN mg/dL 32* 36* 34*  --  28* 29*   CREATININE mg/dL 1.11* 1.23* 1.16*  --  1.37* 1.52*   CALCIUM mg/dL 8.0* 8.5* 8.0*  --  7.2* 8.6   GLUCOSE mg/dL 231* 242* 216*  --  225* 294*     Microbiology Results (last 10  days)       Procedure Component Value - Date/Time    Blood Culture - Blood, Hand, Right [[id number]]  (Normal) Collected: 04/19/25 1639    Lab Status: Preliminary result Specimen: Blood from Hand, Right Updated: 04/20/25 1700     Blood Culture No growth at 24 hours    Narrative:      Less than seven (7) mL's of blood was collected.  Insufficient quantity may yield false negative results.    Blood Culture - Blood, Arm, Right [[id number]]  (Normal) Collected: 04/19/25 0908    Lab Status: Preliminary result Specimen: Blood from Arm, Right Updated: 04/20/25 0945     Blood Culture No growth at 24 hours    Respiratory Panel PCR w/COVID-19(SARS-CoV-2) YAMILE/FRANK/ROSE/PAD/COR/CHIDI In-House, NP Swab in UTM/VTM, 2 HR TAT - Swab, Nasopharynx [[id number]]  (Normal) Collected: 04/17/25 1341    Lab Status: Final result Specimen: Swab from Nasopharynx Updated: 04/17/25 1443     ADENOVIRUS, PCR Not Detected     Coronavirus 229E Not Detected     Coronavirus HKU1 Not Detected     Coronavirus NL63 Not Detected     Coronavirus OC43 Not Detected     COVID19 Not Detected     Human Metapneumovirus Not Detected     Human Rhinovirus/Enterovirus Not Detected     Influenza A PCR Not Detected     Influenza B PCR Not Detected     Parainfluenza Virus 1 Not Detected     Parainfluenza Virus 2 Not Detected     Parainfluenza Virus 3 Not Detected     Parainfluenza Virus 4 Not Detected     RSV, PCR Not Detected     Bordetella pertussis pcr Not Detected     Bordetella parapertussis PCR Not Detected     Chlamydophila pneumoniae PCR Not Detected     Mycoplasma pneumo by PCR Not Detected    Narrative:      In the setting of a positive respiratory panel with a viral infection PLUS a negative procalcitonin without other underlying concern for bacterial infection, consider observing off antibiotics or discontinuation of antibiotics and continue supportive care. If the respiratory panel is positive for atypical bacterial infection (Bordetella pertussis, Chlamydophila  pneumoniae, or Mycoplasma pneumoniae), consider antibiotic de-escalation to target atypical bacterial infection.    Urine Culture - Urine, Urine, Catheter [[id number]]  (Abnormal)  (Susceptibility) Collected: 04/17/25 1326    Lab Status: Final result Specimen: Urine, Catheter Updated: 04/19/25 1037     Urine Culture >100,000 CFU/mL Klebsiella pneumoniae ssp pneumoniae    Narrative:      Colonization of the urinary tract without infection is common. Treatment is discouraged unless the patient is symptomatic, pregnant, or undergoing an invasive urologic procedure.    Susceptibility        Klebsiella pneumoniae ssp pneumoniae      JOSE      Amoxicillin + Clavulanate Susceptible      Ampicillin Resistant      Ampicillin + Sulbactam Susceptible      Cefazolin (Urine) Susceptible      Cefepime Susceptible      Ceftazidime Susceptible      Ceftriaxone Susceptible      Gentamicin Susceptible      Levofloxacin Intermediate      Nitrofurantoin Susceptible      Piperacillin + Tazobactam Susceptible      Trimethoprim + Sulfamethoxazole Resistant                           Blood Culture - Blood, Foot, Right [[id number]]  (Abnormal)  (Susceptibility) Collected: 04/17/25 1313    Lab Status: Final result Specimen: Blood from Foot, Right Updated: 04/19/25 0700     Blood Culture Klebsiella pneumoniae ssp pneumoniae     Isolated from Aerobic and Anaerobic Bottles     Gram Stain Anaerobic Bottle Gram negative bacilli      Aerobic Bottle Gram negative bacilli    Susceptibility        Klebsiella pneumoniae ssp pneumoniae      JOSE      Amoxicillin + Clavulanate Susceptible      Ampicillin Resistant      Ampicillin + Sulbactam Susceptible      Cefazolin (Non Urine) Susceptible      Cefepime Susceptible      Ceftazidime Susceptible      Ceftriaxone Susceptible      Gentamicin Susceptible      Levofloxacin Intermediate      Piperacillin + Tazobactam Susceptible      Trimethoprim + Sulfamethoxazole Resistant                       Susceptibility  Comments       Klebsiella pneumoniae ssp pneumoniae    With the exception of urinary-sourced infections, aminoglycosides should not be used as monotherapy.               Blood Culture ID, PCR - Blood, Foot, Right [[id number]]  (Abnormal) Collected: 04/17/25 1313    Lab Status: Final result Specimen: Blood from Foot, Right Updated: 04/17/25 2355     BCID, PCR Klebsiella pneumoniae group. Identification by BCID2 PCR.     BOTTLE TYPE Anaerobic Bottle    Narrative:      No resistance genes detected.    Blood Culture - Blood, Foot, Left [[id number]]  (Abnormal) Collected: 04/17/25 1304    Lab Status: Final result Specimen: Blood from Foot, Left Updated: 04/19/25 0700     Blood Culture Klebsiella pneumoniae ssp pneumoniae     Isolated from Aerobic and Anaerobic Bottles     Gram Stain Anaerobic Bottle Gram negative bacilli      Aerobic Bottle Gram negative bacilli    Narrative:      Refer to previous blood culture collected on 04/17/2025 1313 for MICs.              Assessment: Improving    Sepsis   64-year-old female with  1-Klebsiella bacteremic sepsis with septic shock secondary  2-urinary tract infection due to recent indwelling Cobos catheter malfunction  3-immobility with MS and neurogenic bladder  4-immunocompromised host  5-acute on chronic renal insufficiency  6-anemia multifactorial  7-hyperglycemia  8-episode of low-grade fever with some chest discomfort upon coughing and taking deep breath-concerning for atelectasis versus evolving pneumonia.     Recommendations/Discussions:  See my discussion and recommendations on 4/18/2025  Provided with incentive spirometry.  Based on the sensitivity of the Klebsiella pneumoniae I think her antibiotic therapy can be de-escalated to ceftriaxone.  Given the resistance pattern and sensitivity profile there are no good oral antibiotic choices to transition from IV.  Follow-up on repeat blood cultures.  Patient would need 10 to 14 days of antibiotic treatment from last negative  blood culture for bacteremic Klebsiella pneumonia urinary tract infection due to malfunctioning catheter and transient obstruction.  Patient will need some sort of IV access to administer antibiotic treatment in the out of the hospital setting and options are either her to come to ambulatory care center on a daily basis to receive IV Rocephin for total of 10 to 14 days from last negative blood culture for arrangements with home health with at home antibiotic delivery.  Will defer to our case management to sort the structure of antibiotic administration in the out of hospital setting.  Side effects of antibiotic therapy including possibility of nausea vomiting diarrhea rash risk of C. difficile infection yeast infection selection of resistant pathogen cytopenias hepatic and renal dysfunction and interaction with medications discussed with the patient.  Patient understands that the side effects could occur but also understand the importance and need for antibiotic therapy at this bacteremic sepsis and wants to proceed with antibiotic therapy.  Luisa Child MD  4/21/2025  07:31 EDT    Parts of this note may be an electronic transcription/translation of spoken language to printed text using the Dragon dictation system.

## 2025-04-21 NOTE — PROGRESS NOTES
"    Patient Name: Maritza Nance Darci  :1960  64 y.o.      Patient Care Team:  Enoc Carbone DO as PCP - General (Internal Medicine)  Brittney Ortiz, RN as Nurse Navigator (Oncology)  Kim Barber MD as Referring Physician (General Surgery)  Zainab Lopes MD as Consulting Physician (Hematology and Oncology)    Chief Complaint:   Sepsis 2/2 UTI    Interval History:   NAEO, feels ok.    Objective   Vital Signs  Temp:  [98.4 °F (36.9 °C)-100.2 °F (37.9 °C)] 98.4 °F (36.9 °C)  Heart Rate:  [77-94] 88  Resp:  [16-18] 18  BP: (137-153)/(73-92) 140/78    Intake/Output Summary (Last 24 hours) at 2025 1056  Last data filed at 2025 0630  Gross per 24 hour   Intake 240 ml   Output 1650 ml   Net -1410 ml     Flowsheet Rows      Flowsheet Row First Filed Value   Admission Height 165.1 cm (65\") Documented at 2025 1259   Admission Weight 70.3 kg (155 lb) Documented at 2025 1259            GEN: no distress, alert and oriented  HEENT: NACT, EOMI, moist mucous membranes  Lungs: CTAB, no wheezes, rales or rhonchi  CV: normal rate, regular rhythm, normal S1, S2, no murmurs, +2 radial pulses b/l, no carotid bruit  Abdomen: soft, nontender, nondistended, NABS  Extremities: no edema  Skin: no rash, warm, dry  Heme/Lymph: no bruising  Psych: organized thought, normal behavior and affect    Results Review:    Results from last 7 days   Lab Units 25  0444   SODIUM mmol/L 139   POTASSIUM mmol/L 3.4*   CHLORIDE mmol/L 106   CO2 mmol/L 21.1*   BUN mg/dL 32*   CREATININE mg/dL 1.11*   GLUCOSE mg/dL 231*   CALCIUM mg/dL 8.0*     Results from last 7 days   Lab Units 25  0411 25  1419 25  1304   HSTROP T ng/L 594* 351* 163*     Results from last 7 days   Lab Units 25  0444   WBC 10*3/mm3 9.22   HEMOGLOBIN g/dL 9.3*   HEMATOCRIT % 27.7*   PLATELETS 10*3/mm3 274         Results from last 7 days   Lab Units 25  0444   MAGNESIUM mg/dL 2.1                   Medication Review: "   anastrozole, 1 mg, Oral, Daily  aspirin, 81 mg, Oral, Daily  baclofen, 20 mg, Oral, BID  cefTRIAXone, 2,000 mg, Intravenous, Q24H  FLUoxetine, 20 mg, Oral, Daily  heparin (porcine), 5,000 Units, Subcutaneous, Q8H  hydrocortisone sodium succinate, 50 mg, Intravenous, Q12H  insulin glargine, 10 Units, Subcutaneous, Daily  insulin lispro, 2-7 Units, Subcutaneous, 4x Daily AC & at Bedtime  mupirocin, 1 Application, Each Nare, BID  pantoprazole, 40 mg, Oral, BID  potassium chloride ER, 40 mEq, Oral, Q4H  pramipexole, 1.5 mg, Oral, BID  rosuvastatin, 20 mg, Oral, Nightly  senna-docusate sodium, 2 tablet, Oral, BID  sodium chloride, 10 mL, Intravenous, Q12H              Assessment & Plan   #Severe sepsis 2/2 Klebsiella UTI  #Multiple sclerosis, wheelchair-bound  #Hyperlipidemia  #Stage IV breast cancer  #Elevated troponin     64-year-old woman with hypertension, hyperlipidemia, multiple sclerosis, Stage 4 breast cancer, depression, indwelling Cobos who presented to the ED with fever, found to be in septic shock secondary to Klebsiella UTI.  She is currently on Levophed in the ICU.  Blood work revealed KINZA, leukocytosis, and elevated troponins that were uptrending up to 594.     Her initial EKG was sinus tachycardia without evidence of ischemia.  Repeat EKG with lower voltage and anterolateral T wave flattening which is new from baseline.  She denies any chest discomfort. Her elevated troponin was secondary to demand given her septic shock.  Echocardiogram with normal EF but with regional wall motion normalities that appeared consistent with stress cardiomyopathy.    - start Toprol 50 mg daily    We will sign off at this time. She can follow up with me in a month.    Paulo Ahmadi MD, FACC, [facility]  04/21/25  10:56 EDT

## 2025-04-21 NOTE — PROGRESS NOTES
Nutrition Services    Patient Name: Maritza Bejarano  YOB: 1960  MRN: [id number]  Admission date: 4/17/2025    Assessment Date:  04/21/25    NUTRITION EVALUATION      Reason for Encounter Pressure Injury and/or Non-Healing Wound   Diagnosis/Problem Admission Diagnosis:  Elevated troponin [R79.89]  Acute UTI [N39.0]  Sepsis [A41.9]  Sepsis, due to unspecified organism, unspecified whether acute organ dysfunction present [A41.9]    Problem List:    Sepsis     Narrative RD visted pt at bedside. Pt alert and responsive with slurred speech. Speech possibly related to MS. Pt wheelchair bound and relies on her  to do all cooking for the family. Pt reports they have been eating out or having fast food more often in the past few weeks. Pt reports may be due to her  experiencing caretaker fatigue.    Completed NFPE with patient consent - did not meet criteria for malnutrition based on AND/ASPEN criteria.    RD offered Dio to increase protein intake and assist with wound healing. Pt initially unsure due to concern about weight gain. RD educated on benefits of extra protein with wound healing. Pt consented to Dio BID.        PO Diet Diet: Diabetic; Consistent Carbohydrate; Fluid Consistency: Thin (IDDSI 0)   Allergies NKFA   Supplements n/a   PO Intake % 100% per nursing documentation       Chewing/Swallowing Difficulty no issues identified at this time and other:Pt reports some newly onset challenges with swallowing multiple pills at a time.       Medications reviewed Lantus, Humalog, Protonix, Crestor, Pericolace, Zofran, abx   Labs  reviewed hyperglycemia with consistent carb diet, insulin, steroid, sepsis; hypokalemia and hypophosphatemia replacement given       Physical Findings alert, impaired speech, obese, room air     Edema generalized, 2+ (mild)    GI Function fecal incontinence, nausea, normoactive, last bowel movement: 4/20   Skin Status pressure injury: right heel and lower gluteal,  "location of wound: coccyx     Lines/Drains none   I/O reviewed, net fluid loss, and amount/timeframe:< 1 L since admission         Height  Weight  BMI  Weight Trend     Height: 165.1 cm (65\")  Weight: 82 kg (180 lb 12.4 oz) (04/21/25 0630)  Body mass index is 30.08 kg/m².  Gain, Amount/Timeframe: 11.75 lbs since 4/17 (bed scale)    Weight change: weight gain of 11.75 lbs (7%) over 4 day(s)    Significant?  Yes       NFPE No clinical signs of muscle wasting or fat loss, Consented to exam, Date Completed: 4/21       Nutrition Problem (PES) Problem: Increased Nutrient Needs  Etiology: Medical Diagnosis - sepsis and wounds    Signs/Symptoms: Other (comment)loss of skin integrity       Intervention/Plan Monitor PO intake.     Monitor blood glucose levels.     Add protein supplementation:  Dio BID to provide 90 calories, 7 grams L-Arginine, 7 grams L-Glutamine and 2.5 grams Protein (Collagen) per serving.    RD to follow up per protocol.     Protein Requirements    EST Needs, Method, Wt used  85.5 - 114 g / daily, 1.5-2.0 g/kg IBW, IBW 57 kg         Results from last 7 days   Lab Units 04/21/25  0444 04/20/25  0248 04/19/25  0352   SODIUM mmol/L 139 137 140   POTASSIUM mmol/L 3.4* 3.8 4.2   CHLORIDE mmol/L 106 105 105   CO2 mmol/L 21.1* 21.0* 25.3   BUN mg/dL 32* 36* 34*   CREATININE mg/dL 1.11* 1.23* 1.16*   CALCIUM mg/dL 8.0* 8.5* 8.0*   BILIRUBIN mg/dL 0.3 0.2 0.4   ALK PHOS U/L 250* 229* 192*   ALT (SGPT) U/L 47* 40* 26   AST (SGOT) U/L 70* 68* 45*   GLUCOSE mg/dL 231* 242* 216*     Results from last 7 days   Lab Units 04/21/25  0444 04/18/25  1351 04/18/25  0411   MAGNESIUM mg/dL 2.1  --  1.9   PHOSPHORUS mg/dL 2.2*   < > 1.5*   HEMOGLOBIN g/dL 9.3*   < > 11.1*   HEMATOCRIT % 27.7*   < > 31.9*    < > = values in this interval not displayed.     Lab Results   Component Value Date    HGBA1C 7.30 (H) 04/21/2025     Wt Readings from Last 10 Encounters:   04/21/25 82 kg (180 lb 12.4 oz)   03/25/25 65.8 kg (145 lb) "   03/12/25 65.3 kg (144 lb)   02/28/25 65.3 kg (144 lb)   01/31/25 68 kg (150 lb)   01/24/25 68 kg (150 lb)   11/25/24 77.1 kg (170 lb)   11/11/24 76.2 kg (168 lb)   10/18/24 83.4 kg (183 lb 13.8 oz)   10/14/24 81.6 kg (180 lb)       Electronically signed by:  Mi Alberto RD  04/21/25 09:55 EDT

## 2025-04-22 PROBLEM — I21.4 NSTEMI (NON-ST ELEVATED MYOCARDIAL INFARCTION): Status: ACTIVE | Noted: 2025-04-22

## 2025-04-22 PROBLEM — E11.9 TYPE 2 DIABETES MELLITUS: Status: ACTIVE | Noted: 2025-04-22

## 2025-04-22 LAB
ALBUMIN SERPL-MCNC: 2.7 G/DL (ref 3.5–5.2)
ALBUMIN/GLOB SERPL: 0.9 G/DL
ALP SERPL-CCNC: 269 U/L (ref 39–117)
ALT SERPL W P-5'-P-CCNC: 65 U/L (ref 1–33)
ANION GAP SERPL CALCULATED.3IONS-SCNC: 7.3 MMOL/L (ref 5–15)
AST SERPL-CCNC: 88 U/L (ref 1–32)
BILIRUB SERPL-MCNC: 0.3 MG/DL (ref 0–1.2)
BUN SERPL-MCNC: 33 MG/DL (ref 8–23)
BUN/CREAT SERPL: 28.7 (ref 7–25)
CALCIUM SPEC-SCNC: 8.4 MG/DL (ref 8.6–10.5)
CHLORIDE SERPL-SCNC: 111 MMOL/L (ref 98–107)
CO2 SERPL-SCNC: 22.7 MMOL/L (ref 22–29)
CREAT SERPL-MCNC: 1.15 MG/DL (ref 0.57–1)
DEPRECATED RDW RBC AUTO: 55.3 FL (ref 37–54)
EGFRCR SERPLBLD CKD-EPI 2021: 54 ML/MIN/1.73
ERYTHROCYTE [DISTWIDTH] IN BLOOD BY AUTOMATED COUNT: 16.4 % (ref 12.3–15.4)
GLOBULIN UR ELPH-MCNC: 3.1 GM/DL
GLUCOSE BLDC GLUCOMTR-MCNC: 117 MG/DL (ref 70–130)
GLUCOSE BLDC GLUCOMTR-MCNC: 127 MG/DL (ref 70–130)
GLUCOSE BLDC GLUCOMTR-MCNC: 181 MG/DL (ref 70–130)
GLUCOSE BLDC GLUCOMTR-MCNC: 96 MG/DL (ref 70–130)
GLUCOSE SERPL-MCNC: 153 MG/DL (ref 65–99)
HCT VFR BLD AUTO: 26.4 % (ref 34–46.6)
HGB BLD-MCNC: 8.8 G/DL (ref 12–15.9)
MAGNESIUM SERPL-MCNC: 2.2 MG/DL (ref 1.6–2.4)
MCH RBC QN AUTO: 31 PG (ref 26.6–33)
MCHC RBC AUTO-ENTMCNC: 33.3 G/DL (ref 31.5–35.7)
MCV RBC AUTO: 93 FL (ref 79–97)
PHOSPHATE SERPL-MCNC: 2.7 MG/DL (ref 2.5–4.5)
PLATELET # BLD AUTO: 340 10*3/MM3 (ref 140–450)
PMV BLD AUTO: 10 FL (ref 6–12)
POTASSIUM SERPL-SCNC: 4.5 MMOL/L (ref 3.5–5.2)
PROT SERPL-MCNC: 5.8 G/DL (ref 6–8.5)
RBC # BLD AUTO: 2.84 10*6/MM3 (ref 3.77–5.28)
SODIUM SERPL-SCNC: 141 MMOL/L (ref 136–145)
WBC NRBC COR # BLD AUTO: 10.84 10*3/MM3 (ref 3.4–10.8)

## 2025-04-22 PROCEDURE — 83735 ASSAY OF MAGNESIUM: CPT | Performed by: INTERNAL MEDICINE

## 2025-04-22 PROCEDURE — 25010000002 HEPARIN (PORCINE) PER 1000 UNITS: Performed by: INTERNAL MEDICINE

## 2025-04-22 PROCEDURE — 63710000001 INSULIN LISPRO (HUMAN) PER 5 UNITS: Performed by: INTERNAL MEDICINE

## 2025-04-22 PROCEDURE — 92610 EVALUATE SWALLOWING FUNCTION: CPT

## 2025-04-22 PROCEDURE — 25010000002 HYDROCORTISONE SOD SUC (PF) 100 MG RECONSTITUTED SOLUTION: Performed by: INTERNAL MEDICINE

## 2025-04-22 PROCEDURE — 84100 ASSAY OF PHOSPHORUS: CPT | Performed by: INTERNAL MEDICINE

## 2025-04-22 PROCEDURE — 25010000002 CEFTRIAXONE PER 250 MG: Performed by: INTERNAL MEDICINE

## 2025-04-22 PROCEDURE — 85027 COMPLETE CBC AUTOMATED: CPT | Performed by: INTERNAL MEDICINE

## 2025-04-22 PROCEDURE — 25010000002 ONDANSETRON PER 1 MG: Performed by: INTERNAL MEDICINE

## 2025-04-22 PROCEDURE — 82948 REAGENT STRIP/BLOOD GLUCOSE: CPT

## 2025-04-22 PROCEDURE — 80053 COMPREHEN METABOLIC PANEL: CPT | Performed by: INTERNAL MEDICINE

## 2025-04-22 PROCEDURE — 63710000001 INSULIN GLARGINE PER 5 UNITS: Performed by: INTERNAL MEDICINE

## 2025-04-22 RX ORDER — METOPROLOL SUCCINATE 50 MG/1
50 TABLET, EXTENDED RELEASE ORAL
Status: DISCONTINUED | OUTPATIENT
Start: 2025-04-23 | End: 2025-04-30 | Stop reason: HOSPADM

## 2025-04-22 RX ADMIN — INSULIN GLARGINE 10 UNITS: 100 INJECTION, SOLUTION SUBCUTANEOUS at 08:50

## 2025-04-22 RX ADMIN — HYDROCORTISONE SODIUM SUCCINATE 50 MG: 100 INJECTION, POWDER, FOR SOLUTION INTRAMUSCULAR; INTRAVENOUS at 06:11

## 2025-04-22 RX ADMIN — Medication 10 ML: at 08:54

## 2025-04-22 RX ADMIN — CEFTRIAXONE 2000 MG: 2 INJECTION, POWDER, FOR SOLUTION INTRAMUSCULAR; INTRAVENOUS at 21:45

## 2025-04-22 RX ADMIN — ANASTROZOLE 1 MG: 1 TABLET, FILM COATED ORAL at 08:51

## 2025-04-22 RX ADMIN — Medication 10 ML: at 21:47

## 2025-04-22 RX ADMIN — ASPIRIN 81 MG: 81 TABLET, COATED ORAL at 08:51

## 2025-04-22 RX ADMIN — PHENAZOPYRIDINE 200 MG: 100 TABLET ORAL at 11:51

## 2025-04-22 RX ADMIN — FLUOXETINE HYDROCHLORIDE 20 MG: 20 CAPSULE ORAL at 08:51

## 2025-04-22 RX ADMIN — PANTOPRAZOLE SODIUM 40 MG: 40 TABLET, DELAYED RELEASE ORAL at 21:43

## 2025-04-22 RX ADMIN — CLONAZEPAM 2 MG: 1 TABLET ORAL at 10:59

## 2025-04-22 RX ADMIN — PHENAZOPYRIDINE 200 MG: 100 TABLET ORAL at 17:28

## 2025-04-22 RX ADMIN — PRAMIPEXOLE DIHYDROCHLORIDE 1.5 MG: 1.5 TABLET ORAL at 08:50

## 2025-04-22 RX ADMIN — BACLOFEN 20 MG: 10 TABLET ORAL at 21:43

## 2025-04-22 RX ADMIN — PRAMIPEXOLE DIHYDROCHLORIDE 1.5 MG: 1.5 TABLET ORAL at 21:42

## 2025-04-22 RX ADMIN — BACLOFEN 20 MG: 10 TABLET ORAL at 08:51

## 2025-04-22 RX ADMIN — PHENAZOPYRIDINE 200 MG: 100 TABLET ORAL at 08:51

## 2025-04-22 RX ADMIN — HEPARIN SODIUM 5000 UNITS: 5000 INJECTION INTRAVENOUS; SUBCUTANEOUS at 21:46

## 2025-04-22 RX ADMIN — PANTOPRAZOLE SODIUM 40 MG: 40 TABLET, DELAYED RELEASE ORAL at 08:51

## 2025-04-22 RX ADMIN — MUPIROCIN 1 APPLICATION: 20 OINTMENT TOPICAL at 08:51

## 2025-04-22 RX ADMIN — HEPARIN SODIUM 5000 UNITS: 5000 INJECTION INTRAVENOUS; SUBCUTANEOUS at 06:11

## 2025-04-22 RX ADMIN — INSULIN LISPRO 2 UNITS: 100 INJECTION, SOLUTION INTRAVENOUS; SUBCUTANEOUS at 11:51

## 2025-04-22 RX ADMIN — SENNOSIDES AND DOCUSATE SODIUM 2 TABLET: 50; 8.6 TABLET ORAL at 08:51

## 2025-04-22 RX ADMIN — ONDANSETRON 4 MG: 2 INJECTION, SOLUTION INTRAMUSCULAR; INTRAVENOUS at 22:16

## 2025-04-22 RX ADMIN — SENNOSIDES AND DOCUSATE SODIUM 2 TABLET: 50; 8.6 TABLET ORAL at 21:43

## 2025-04-22 RX ADMIN — HEPARIN SODIUM 5000 UNITS: 5000 INJECTION INTRAVENOUS; SUBCUTANEOUS at 13:53

## 2025-04-22 NOTE — CASE MANAGEMENT/SOCIAL WORK
Continued Stay Note  [facility]     Patient Name: Maritza Bejarano  MRN: [id number]  Today's Date: 4/22/2025    Admit Date: 4/17/2025    Plan: Home with spouse, [facility]  and [facility] for IV Abx   Discharge Plan       Row Name 04/22/25 1340       Plan    Plan Home with spouse, [facility]  and [facility] for IV Abx    Patient/Family in Agreement with Plan yes    Plan Comments CCP s/w Donald, pts spouse, via phone to discuss DC. Donald stated DC plan is to return home, he will transport at DC. CCP discussed IV antibiotics at DC. Donald verbalized understanding, is agreeable to learning IV antibiotic administration and denies preference of infusion company. [facility] notified of referral. Corin/Eric JOHNSON updated. Tyler BOSE/CCP                   Discharge Codes    No documentation.                 Expected Discharge Date and Time       Expected Discharge Date Expected Discharge Time    Apr 23, 2025               Jyothi Douglas RN

## 2025-04-22 NOTE — THERAPY EVALUATION
Acute Care - Speech Language Pathology   Swallow Initial Evaluation [facility]     Patient Name: Maritza Nance Darci  : 1960  MRN: [id number]  Today's Date: 2025               Admit Date: 2025    Visit Dx:     ICD-10-CM ICD-9-CM   1. Sepsis, due to unspecified organism, unspecified whether acute organ dysfunction present  A41.9 038.9     995.91   2. Elevated troponin  R79.89 790.6   3. 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    FOM (frequency of micturition)    Headache    History of diplopia    History of optic neuritis    History of vitamin D deficiency    Migraine syndrome    Mixed incontinence    Nausea    Neurogenic bladder    Pain in joint of right shoulder    Restless legs syndrome    Rupture of rotator cuff of shoulder    Secondary progressive multiple sclerosis    S/P cubital tunnel release    Vitamin D deficiency    Multiple sclerosis exacerbation    Sepsis due to Gram negative bacteria    Lower extremity cellulitis    Malignant neoplasm of overlapping sites of left breast in female, estrogen receptor positive    Encounter for long-term (current) use of other medications    Cancer, metastatic to bone    Colitis    Weakness    Elevated LFTs    Acute UTI    Sepsis     Past Medical History:   Diagnosis Date    Anemia     `Treated with iron    Dawn esophagus     per patient    Blurred vision     R/T MS    Breast cancer 2024+++++    Carpal tunnel syndrome     Clotting disorder , ,     3 g/i bleeds w/ transfus/ions    Colon polyp      removed w/ colonoscopy    Deep vein thrombosis phlebitis 1980    Depression     Diplopia 2013    GERD (gastroesophageal reflux disease)     GI (gastrointestinal bleed) 3 bleeds    2 transfusions    H/O Skin cancer, basal cell     Headache     History of blood transfusion     History of GI bleed     R/T NSAIDS AND STEROIDS, multiple times    History of urinary tract infection     Hypercalcemia     s/p parathyroidectomy    Hyperlipidemia     Hypertension     Movement disorder     Multiple sclerosis     Optic neuritis     PONV (postoperative nausea and vomiting)     Steroid-induced diabetes 2024     Past Surgical History:   Procedure Laterality Date    APPENDECTOMY      BLADDER SURGERY      bladder stimulator    BREAST BIOPSY  don't remember    BREAST SURGERY      augmentation wtih subsequent removal    CARPAL TUNNEL RELEASE Bilateral     Left 2018, right 2020    CUBITAL TUNNEL RELEASE Left     CYSTOSCOPY BOTOX INJECTION OF BLADDER  2018    Cystoscopy with Botox    FRACTURE SURGERY  2019    rt shoulder    PARATHYROIDECTOMY      one gland removed    ROTATOR CUFF REPAIR Right 2017    TOE SURGERY      bilateral great toes    TOTAL SHOULDER ARTHROPLASTY W/ DISTAL CLAVICLE EXCISION Right 10/22/2018    Procedure: RT TOTAL SHOULDER REVERSE ARTHROPLASTY;  Surgeon: Bipin Dangelo MD;  Location: [facility];  Service: Orthopedics       SLP Recommendation and Plan  SLP Swallowing Diagnosis: oral dysphagia, R/O pharyngeal dysphagia (04/22/25 0900)  SLP Diet Recommendation: soft to chew textures, chopped, thin liquids (04/22/25 0900)  Recommended Precautions and Strategies: upright posture during/after eating, small bites of food and sips of liquid, general aspiration precautions (04/22/25 0900)  SLP Rec. for Method of Medication Administration: meds whole, as tolerated (04/22/25 0900)     Monitor for Signs of Aspiration: yes, notify SLP if any concerns (04/22/25 0900)  Recommended Diagnostics: reassess via clinical swallow  evaluation (04/22/25 0900)  Swallow Criteria for Skilled Therapeutic Interventions Met: demonstrates skilled criteria (04/22/25 0900)     Rehab Potential/Prognosis, Swallowing: good, to achieve stated therapy goals (04/22/25 0900)  Therapy Frequency (Swallow): PRN (04/22/25 0900)  Predicted Duration Therapy Intervention (Days): until discharge (04/22/25 0900)  Oral Care Recommendations: Oral Care BID/PRN (04/22/25 0900)                                        Outcome Evaluation: Clinical swallow evaluation completed. Recommend soft/chopped solid diet with thin liquids. Meds as tolerated. Sitting upright during/after meals, slow rate, small single bites/sips. Will follow for diet tolerance.      SWALLOW EVALUATION (Last 72 Hours)       SLP Adult Swallow Evaluation       Row Name 04/22/25 0900                   Rehab Evaluation    Document Type evaluation  -CR        Subjective Information no complaints  -CR        Patient Observations alert;cooperative  -CR        Patient Effort good  -CR        Symptoms Noted During/After Treatment none  -CR           General Information    Patient Profile Reviewed yes  -CR        Pertinent History Of Current Problem 63 y/o female admitted with fever, septic shock secondary to klebsiella UTI. New orders received due to pt reporting difficulties swallowing liquids and solids.  -CR        Current Method of Nutrition regular textures;thin liquids  -CR        Precautions/Limitations, Vision WFL;for purposes of eval  -CR        Precautions/Limitations, Hearing WFL;for purposes of eval  -CR        Prior Level of Function-Communication WFL  -CR        Prior Level of Function-Swallowing no diet consistency restrictions  -CR        Plans/Goals Discussed with patient and family;agreed upon  -CR        Barriers to Rehab none identified  -CR           Pain    Pretreatment Pain Rating 0/10 - no pain  -CR        Posttreatment Pain Rating 0/10 - no pain  -CR           Oral Motor Structure and  Function    Dentition Assessment natural, present and adequate  -CR        Secretion Management WNL/WFL  -CR        Mucosal Quality moist, healthy  -CR           Oral Musculature and Cranial Nerve Assessment    Oral Motor General Assessment generalized oral motor weakness  -CR        Vocal Impairment, Detail. Cranial Nerve X (Vagus) other (see comments)  decreased breath support  -CR           General Eating/Swallowing Observations    Respiratory Support Currently in Use room air  -CR           Clinical Swallow Eval    Clinical Swallow Evaluation Summary Clinical swallow evaluation completed. Family member at bedside. Pt reports acute difficulties swallowing solids and liquids. Reports piecemeal deglutition, appreciated during evaluation. Oral mech exam revealed generalized oral motor weakness and decreased breath support during unstructured conversation. No overt s/s of aspiration with ice chips, thins via cup/straw, puree, soft/mixed solids, or regular solids. Slightly prolonged mastication. Required liquid wash to clear regular solid residue from oral cavity. Pt requesting soft and chopped diet. Recommend soft/chopped solid diet with thin liquids. Meds as tolerated. Sitting upright during/after meals, slow rate, small single bites/sips. Will follow for diet tolerance.  -CR           SLP Evaluation Clinical Impression    SLP Swallowing Diagnosis oral dysphagia;R/O pharyngeal dysphagia  -CR        Functional Impact risk of aspiration/pneumonia  -CR        Rehab Potential/Prognosis, Swallowing good, to achieve stated therapy goals  -CR        Swallow Criteria for Skilled Therapeutic Interventions Met demonstrates skilled criteria  -CR           Recommendations    Therapy Frequency (Swallow) PRN  -CR        Predicted Duration Therapy Intervention (Days) until discharge  -CR        SLP Diet Recommendation soft to chew textures;chopped;thin liquids  -CR        Recommended Diagnostics reassess via clinical swallow  evaluation  -CR        Recommended Precautions and Strategies upright posture during/after eating;small bites of food and sips of liquid;general aspiration precautions  -CR        Oral Care Recommendations Oral Care BID/PRN  -CR        SLP Rec. for Method of Medication Administration meds whole;as tolerated  -CR        Monitor for Signs of Aspiration yes;notify SLP if any concerns  -CR           Swallow Goals (SLP)    Swallow STGs diet tolerance goal selection (SLP)  -CR        Diet Tolerance Goal Selection (SLP) Patient will tolerate trials of  -CR           (STG) Patient will tolerate trials of    Consistencies Trialed (Tolerate trials) soft to chew (chopped) textures;mixed consistencies;thin liquids  -CR        Desired Outcome (Tolerate trials) without signs/symptoms of aspiration  -CR        Major (Tolerate trials) independently (over 90% accuracy)  -CR        Time Frame (Tolerate trials) by discharge  -CR        Progress/Outcomes (Tolerate trials) new goal  -CR                  User Key  (r) = Recorded By, (t) = Taken By, (c) = Cosigned By      Initials Name Effective Dates    Valery Simms SLP 12/03/24 -                     EDUCATION  The patient has been educated in the following areas:   Dysphagia (Swallowing Impairment).        SLP GOALS       Row Name 04/22/25 0900             (STG) Patient will tolerate trials of    Consistencies Trialed (Tolerate trials) soft to chew (chopped) textures;mixed consistencies;thin liquids  -CR      Desired Outcome (Tolerate trials) without signs/symptoms of aspiration  -CR      Major (Tolerate trials) independently (over 90% accuracy)  -CR      Time Frame (Tolerate trials) by discharge  -CR      Progress/Outcomes (Tolerate trials) new goal  -CR                User Key  (r) = Recorded By, (t) = Taken By, (c) = Cosigned By      Initials Name Provider Type    Valery Simms SLP Speech and Language Pathologist                         Time Calculation:     Time Calculation- SLP       Row Name 04/22/25 1121             Time Calculation- SLP    SLP Start Time 0730  -CR      SLP Received On 04/22/25  -CR         Untimed Charges    24574-XC Eval Oral Pharyng Swallow Minutes 45  -CR         Total Minutes    Untimed Charges Total Minutes 45  -CR       Total Minutes 45  -CR                User Key  (r) = Recorded By, (t) = Taken By, (c) = Cosigned By      Initials Name Provider Type    Valery Simms SLP Speech and Language Pathologist                    Therapy Charges for Today       Code Description Service Date Service Provider Modifiers Qty    [id number]  ST EVAL ORAL PHARYNG SWALLOW 3 4/22/2025 Valery Davis SLP GN 1                 ZARA Chávez  4/22/2025

## 2025-04-22 NOTE — SIGNIFICANT NOTE
04/22/25 1017   OTHER   Discipline physical therapist   Rehab Time/Intention   Session Not Performed other (see comments)  (Spoke with patient, reports she is current with HHPT working on sitting up to EOB. Declines PT eval this date but requests f/u tomorrow.)   Recommendation   PT - Next Appointment 04/23/25

## 2025-04-22 NOTE — PLAN OF CARE
Problem: Adult Inpatient Plan of Care  Goal: Plan of Care Review  Outcome: Progressing  Flowsheets (Taken 4/22/2025 1344)  Progress: improving  Plan of Care Reviewed With: patient  Goal: Patient-Specific Goal (Individualized)  Outcome: Progressing  Goal: Absence of Hospital-Acquired Illness or Injury  Outcome: Progressing  Intervention: Identify and Manage Fall Risk  Recent Flowsheet Documentation  Taken 4/22/2025 1154 by Belén Merino RN  Safety Promotion/Fall Prevention:   assistive device/personal items within reach   clutter free environment maintained   fall prevention program maintained   nonskid shoes/slippers when out of bed   room organization consistent   safety round/check completed  Taken 4/22/2025 1029 by Belén Merino RN  Safety Promotion/Fall Prevention:   assistive device/personal items within reach   clutter free environment maintained   fall prevention program maintained   nonskid shoes/slippers when out of bed   room organization consistent   safety round/check completed  Taken 4/22/2025 0851 by Belén Merino RN  Safety Promotion/Fall Prevention:   assistive device/personal items within reach   clutter free environment maintained   fall prevention program maintained   nonskid shoes/slippers when out of bed   room organization consistent   safety round/check completed  Taken 4/22/2025 0730 by Belén Merino RN  Safety Promotion/Fall Prevention:   assistive device/personal items within reach   clutter free environment maintained   fall prevention program maintained   nonskid shoes/slippers when out of bed   room organization consistent   safety round/check completed  Intervention: Prevent Skin Injury  Recent Flowsheet Documentation  Taken 4/22/2025 1154 by Belén Merino RN  Body Position: sitting up in bed  Taken 4/22/2025 1029 by Belén Merino RN  Body Position:   supine   tilted   left  Taken 4/22/2025 0851 by Belén Merino RN  Body Position:   supine   tilted    left  Taken 4/22/2025 0730 by Belén Merino RN  Body Position: supine  Goal: Optimal Comfort and Wellbeing  Outcome: Progressing  Goal: Readiness for Transition of Care  Outcome: Progressing  Goal: Plan of Care Review  Outcome: Progressing  Flowsheets (Taken 4/22/2025 1344)  Progress: improving  Plan of Care Reviewed With: patient  Goal: Patient-Specific Goal (Individualized)  Outcome: Progressing  Goal: Absence of Hospital-Acquired Illness or Injury  Outcome: Progressing  Intervention: Identify and Manage Fall Risk  Recent Flowsheet Documentation  Taken 4/22/2025 1154 by Belén Merino, RN  Safety Promotion/Fall Prevention:   assistive device/personal items within reach   clutter free environment maintained   fall prevention program maintained   nonskid shoes/slippers when out of bed   room organization consistent   safety round/check completed  Taken 4/22/2025 1029 by Belén Merino, RN  Safety Promotion/Fall Prevention:   assistive device/personal items within reach   clutter free environment maintained   fall prevention program maintained   nonskid shoes/slippers when out of bed   room organization consistent   safety round/check completed  Taken 4/22/2025 0851 by Belén Merino, RN  Safety Promotion/Fall Prevention:   assistive device/personal items within reach   clutter free environment maintained   fall prevention program maintained   nonskid shoes/slippers when out of bed   room organization consistent   safety round/check completed  Taken 4/22/2025 0730 by Belén Merino, RN  Safety Promotion/Fall Prevention:   assistive device/personal items within reach   clutter free environment maintained   fall prevention program maintained   nonskid shoes/slippers when out of bed   room organization consistent   safety round/check completed  Intervention: Prevent Skin Injury  Recent Flowsheet Documentation  Taken 4/22/2025 1154 by Belén Merino, RN  Body Position: sitting up in bed  Taken  4/22/2025 1029 by Belén Merino RN  Body Position:   supine   tilted   left  Taken 4/22/2025 0851 by Belén Merino RN  Body Position:   supine   tilted   left  Taken 4/22/2025 0730 by Belén Merino RN  Body Position: supine     Problem: Fall Injury Risk  Goal: Absence of Fall and Fall-Related Injury  Outcome: Progressing  Intervention: Promote Injury-Free Environment  Recent Flowsheet Documentation  Taken 4/22/2025 1154 by Belén Merino RN  Safety Promotion/Fall Prevention:   assistive device/personal items within reach   clutter free environment maintained   fall prevention program maintained   nonskid shoes/slippers when out of bed   room organization consistent   safety round/check completed  Taken 4/22/2025 1029 by Belén Merino RN  Safety Promotion/Fall Prevention:   assistive device/personal items within reach   clutter free environment maintained   fall prevention program maintained   nonskid shoes/slippers when out of bed   room organization consistent   safety round/check completed  Taken 4/22/2025 0851 by Belén Merino RN  Safety Promotion/Fall Prevention:   assistive device/personal items within reach   clutter free environment maintained   fall prevention program maintained   nonskid shoes/slippers when out of bed   room organization consistent   safety round/check completed  Taken 4/22/2025 0730 by Belén Merino RN  Safety Promotion/Fall Prevention:   assistive device/personal items within reach   clutter free environment maintained   fall prevention program maintained   nonskid shoes/slippers when out of bed   room organization consistent   safety round/check completed     Problem: Skin Injury Risk Increased  Goal: Skin Health and Integrity  Outcome: Progressing  Intervention: Optimize Skin Protection  Recent Flowsheet Documentation  Taken 4/22/2025 1154 by Belén Merino RN  Head of Bed (HOB) Positioning: HOB at 20 degrees  Taken 4/22/2025 1029 by Belén Merino  H, RN  Head of Bed (HOB) Positioning: HOB at 20-30 degrees  Taken 4/22/2025 0851 by Belén Merino RN  Head of Bed (HOB) Positioning: HOB at 30-45 degrees  Taken 4/22/2025 0730 by Belén Merino RN  Head of Bed (HOB) Positioning: HOB at 30-45 degrees     Problem: Sepsis/Septic Shock  Goal: Optimal Coping  Outcome: Progressing  Goal: Absence of Bleeding  Outcome: Progressing  Goal: Blood Glucose Level Within Target Range  Outcome: Progressing  Goal: Absence of Infection Signs and Symptoms  Outcome: Progressing  Goal: Optimal Nutrition Delivery  Outcome: Progressing   Goal Outcome Evaluation:  Plan of Care Reviewed With: patient        Progress: improving    Pt is alert and oriented.  VSS.  She agreed to take something to hep her rest - she is not sleeping at all.  Meds given be order.  Family at bedside.  F/C to bedside drainage.  Will continue to monitor patient.

## 2025-04-22 NOTE — PLAN OF CARE
Goal Outcome Evaluation:              Outcome Evaluation: Clinical swallow evaluation completed. Recommend soft/chopped solid diet with thin liquids. Meds as tolerated. Sitting upright during/after meals, slow rate, small single bites/sips. Will follow for diet tolerance.

## 2025-04-22 NOTE — PROGRESS NOTES
Name: Maritza Nance Darci ADMIT: 2025   : 1960  PCP: Carbone, Enoc,     MRN: [id number] LOS: 5 days   AGE/SEX: 64 y.o. female  ROOM: [facility]     Subjective   Subjective     Her RN tells me that she hasn't slept in 4 days and she got a dose of klonopin right before I saw her. When I went to see her, she seemed confused. She was mumbling despite multiple requests for her for enunciate and speak louder. We discussed her current treatment plan and the timing for discharge and she repeatedly asked me about this despite the explanation 4 times.        Objective   Objective   Vital Signs  Temp:  [97.3 °F (36.3 °C)-99.9 °F (37.7 °C)] 97.3 °F (36.3 °C)  Heart Rate:  [67-92] 67  Resp:  [18] 18  BP: (124-170)/(61-85) 137/73  SpO2:  [95 %-100 %] 100 %  on   ;   Device (Oxygen Therapy): room air  Body mass index is 30.49 kg/m².  Physical Exam  Constitutional:       General: She is not in acute distress.     Appearance: She is obese. She is not toxic-appearing.   Cardiovascular:      Rate and Rhythm: Normal rate and regular rhythm.      Heart sounds: Normal heart sounds.   Pulmonary:      Effort: Pulmonary effort is normal.      Breath sounds: Normal breath sounds.   Abdominal:      General: Bowel sounds are normal.      Palpations: Abdomen is soft.   Musculoskeletal:         General: No tenderness.      Right lower leg: No edema.      Left lower leg: No edema.   Neurological:      General: No focal deficit present.      Mental Status: She is alert. She is confused.   Psychiatric:         Mood and Affect: Mood normal.         Behavior: Behavior normal.         Results Review     I reviewed the patient's new clinical results.  Results from last 7 days   Lab Units 25  0322 25  0444 25  0248 25  0352   WBC 10*3/mm3 10.84* 9.22 10.42 12.19*   HEMOGLOBIN g/dL 8.8* 9.3* 9.5* 9.7*   PLATELETS 10*3/mm3 340 274 232 221     Results from last 7 days   Lab Units 25  0322 25  1544 25  0444  04/20/25 0248 04/19/25  0352   SODIUM mmol/L 141  --  139 137 140   POTASSIUM mmol/L 4.5 4.4 3.4* 3.8 4.2   CHLORIDE mmol/L 111*  --  106 105 105   CO2 mmol/L 22.7  --  21.1* 21.0* 25.3   BUN mg/dL 33*  --  32* 36* 34*   CREATININE mg/dL 1.15*  --  1.11* 1.23* 1.16*   GLUCOSE mg/dL 153*  --  231* 242* 216*   Estimated Creatinine Clearance: 54 mL/min (A) (by C-G formula based on SCr of 1.15 mg/dL (H)).  Results from last 7 days   Lab Units 04/22/25 0322 04/21/25 0444 04/20/25 0248 04/19/25  0352   ALBUMIN g/dL 2.7* 2.7* 2.8* 2.5*   BILIRUBIN mg/dL 0.3 0.3 0.2 0.4   ALK PHOS U/L 269* 250* 229* 192*   AST (SGOT) U/L 88* 70* 68* 45*   ALT (SGPT) U/L 65* 47* 40* 26     Results from last 7 days   Lab Units 04/22/25 0322 04/21/25  1544 04/21/25 0444 04/20/25 0248 04/19/25  0352 04/18/25  2250 04/18/25  1351 04/18/25  0411   CALCIUM mg/dL 8.4*  --  8.0* 8.5* 8.0*  --   --  7.2*   ALBUMIN g/dL 2.7*  --  2.7* 2.8* 2.5*  --   --  2.9*   MAGNESIUM mg/dL 2.2  --  2.1  --   --   --   --  1.9   PHOSPHORUS mg/dL 2.7 2.3* 2.2*  --   --  2.3*   < > 1.5*    < > = values in this interval not displayed.     Results from last 7 days   Lab Units 04/18/25  0411 04/18/25  0050 04/17/25  2151 04/17/25  1715 04/17/25  1304   PROCALCITONIN ng/mL  --   --   --   --  44.90*   LACTATE mmol/L 3.8* 4.6* 2.7* 6.1* 6.4*     COVID19   Date Value Ref Range Status   04/17/2025 Not Detected Not Detected - Ref. Range Final   09/07/2024 Not Detected Not Detected - Ref. Range Final   02/26/2022 Not Detected Not Detected - Ref. Range Final     SARS-CoV-2 PCR   Date Value Ref Range Status   03/24/2021 Not Detected Not Detected Final     Comment:     Nucleic acid specific to SARS-CoV-2 (COVID-19) virus was not detected in  this sample by the TaqPath (TM) COVID-19 Combo Kit.          SARS-CoV-2 (COVID-19) nucleic acid testing performed using [employer] Aptima (R) SARS-CoV-2 Assay or [facility] TaqPath (TM)  COVID-19 Combo Kit as indicated above  under Emergency Use Authorization (EUA) from the FDA. Aptima (R) and TaqPath (TM) test performance  characteristics verified by [facility] in accordance with the FDAs Guidance Document (Policy for Diagnostic Tests for Coronavirus Disease-2019  during the Public Health Emergency) issued on March 16, 2020. The laboratory is regulated under CLIA as qualified to perform high-complexity testing  Unless otherwise noted, all testing was performed at [facility], CLIA No. [id number], [place] State Licensee No. [id number]   02/26/2021 Not Detected Not Detected Final     Comment:     Nucleic acid specific to SARS-CoV-2 (COVID-19) virus was not detected in  this sample by the Aptima (R) SARS-CoV-2 Assay.                SARS-CoV-2 (COVID-19) nucleic acid testing performed using [facility] Aptima (R) SARS-CoV-2 Assay or [facility] TaqPath (TM)  COVID-19 Combo Kit as indicated above under Emergency Use Authorization (EUA) from the FDA. Aptima (R) and TaqPath (TM) test performance  characteristics verified by [facility] in accordance with the FDAs Guidance Document (Policy for Diagnostic Tests for Coronavirus Disease-2019  during the Public Health Emergency) issued on March 16, 2020. The laboratory is regulated under CLIA as qualified to perform high-complexity testing  Unless otherwise noted, all testing was performed at [facility], CLIA No. [id number], [place] State Licensee No. [id number]     Hemoglobin A1C   Date/Time Value Ref Range Status   04/21/2025 0444 7.30 (H) 4.80 - 5.60 % Final     Glucose   Date/Time Value Ref Range Status   04/22/2025 1631 127 70 - 130 mg/dL Final   04/22/2025 1043 181 (H) 70 - 130 mg/dL Final   04/22/2025 0620 96 70 - 130 mg/dL Final   04/21/2025 2056 123 70 - 130 mg/dL Final   04/21/2025 1538 211 (H) 70 - 130 mg/dL Final   04/21/2025 1238 224 (H) 70 - 130 mg/dL Final   04/21/2025 0628 243 (H) 70 - 130 mg/dL Final       CT Abdomen Pelvis Without Contrast  Narrative: CT OF THE ABDOMEN AND  PELVIS WITHOUT CONTRAST 04/21/2025     HISTORY: Right flank pain. Urinary tract infection.     Spiral images were obtained from the lung bases to the symphysis pubis.  No intravenous or oral contrast was given.     There are small bilateral pleural effusions with bilateral lower lobe  atelectasis or consolidation.     Small to moderate size hiatal hernia is seen.     There is a small amount of high attenuation material within the  gallbladder which could represent small amount of gallbladder sludge. No  gallbladder inflammatory changes are seen. The liver, spleen and  pancreas appear unremarkable except for some pancreatic atrophy. Mild  fullness of the adrenal glands is seen similar to the 03/25/2025 study.  Bilateral kidneys appear unremarkable.     Cobos catheter is present in the urinary bladder. Several tiny  calcifications are seen layering dependently in the bladder, likely tiny  urinary bladder stones. Small uterine calcifications are seen consistent  with tiny fibroids.     No bowel wall thickening or bowel dilatation is seen.     Impression: 1. Unremarkable bilateral kidneys with no evidence of obstructive  uropathy or urolithiasis.  2. Tiny amount of high attenuation material within the gallbladder could  represent small amount of gallbladder sludge. No definite gallstones are  seen. No gallbladder inflammatory changes are seen.  3. Several tiny urinary bladder stones are seen. Urinary bladder is  otherwise unremarkable.     Radiation dose reduction techniques were utilized, including automated  exposure control and exposure modulation based on body size.        This report was finalized on 4/21/2025 8:21 PM by Dr. Curt Byers M.D on Workstation: [id number]     US Gallbladder  Narrative: US GALLBLADDER-4/21/2025     HISTORY: Possible gallbladder sludge.     The gallbladder is well distended with no gallstones wall thickening or  pericholecystic fluid. The common bile duct is not dilated  measuring 4.4  mm.     Pancreas appears grossly normal but not optimally seen. Liver and right  kidney appear normal. Right kidney measures 12.4 cm in length.     Impression: 1. Normal study. Gallbladder appears within normal limits.        This report was finalized on 4/21/2025 6:58 PM by Dr. Curt Byers M.D on Workstation: [id number]       Scheduled Medications  anastrozole, 1 mg, Oral, Daily  aspirin, 81 mg, Oral, Daily  baclofen, 20 mg, Oral, BID  cefTRIAXone, 2,000 mg, Intravenous, Q24H  FLUoxetine, 20 mg, Oral, Daily  heparin (porcine), 5,000 Units, Subcutaneous, Q8H  insulin glargine, 10 Units, Subcutaneous, Daily  insulin lispro, 2-7 Units, Subcutaneous, 4x Daily AC & at Bedtime  pantoprazole, 40 mg, Oral, BID  phenazopyridine, 200 mg, Oral, TID With Meals  pramipexole, 1.5 mg, Oral, BID  senna-docusate sodium, 2 tablet, Oral, BID  sodium chloride, 10 mL, Intravenous, Q12H    Infusions   Diet  Diet: Diabetic; Consistent Carbohydrate; Texture: Soft to Chew (NDD 3); Soft to Chew: Chopped Meat; Fluid Consistency: Thin (IDDSI 0)       Assessment/Plan     Active Hospital Problems    Diagnosis  POA    **Sepsis [A41.9]  Yes    NSTEMI (non-ST elevated myocardial infarction) [I21.4]  Yes    Type 2 diabetes mellitus [E11.9]  Yes    Elevated LFTs [R79.89]  Yes    Malignant neoplasm of overlapping sites of left breast in female, estrogen receptor positive [C50.812, Z17.0]  Not Applicable    Multiple sclerosis [G35]  Yes    Essential hypertension [I10]  Yes    Anxiety and depression [F41.9, F32.A]  Yes    Neurogenic bladder [N31.9]  Yes    Restless legs syndrome [G25.81]  Yes      Resolved Hospital Problems   No resolved problems to display.       64 y.o. female admitted with Sepsis.    Klebsiella UTI and bacteremia causing septic shock-on ceftriaxone with plans for 14 days of IV therapy per ID. Repeat blood cultures are negative. Complete hydrocortisone wean today.  Type 2 NSTEMI-related to the above. Normal  EF on echocardiogram, but WMA consistent with stress cardiomyopathy. Cardiology recommends a betablocker and baby aspirin. She'll need to follow up with them in 1 month  Elevated LFTs-gallbladder ultrasound and CT abdomen/pelvis without obvious biliary disease. Acute hepatitis profile negative as well. Previously thought to be secondary to ribociclib per oncology  Type 2 diabetes-glucose at goal  Essential hypertension-home antihypertensives held acutely  GERD-ppi  Multiple sclerosis-not on medications chronically for this  Neurogenic bladder with a chronic indwelling matias catheter  Breast cancer-on faslodex, xgeva, truqap as an outpatient   Insomnia-ambien prn  Anxiety-clonazepam prn  Heparin SC for DVT prophylaxis.  Full code.  Discussed with patient and nursing staff.  Anticipate discharge home with home health in 2-3 days.      Oskar Spence MD  [facility]  04/22/25  17:31 EDT

## 2025-04-23 ENCOUNTER — PATIENT OUTREACH (OUTPATIENT)
Dept: OTHER | Facility: HOSPITAL | Age: 65
End: 2025-04-23
Payer: MEDICARE

## 2025-04-23 ENCOUNTER — APPOINTMENT (OUTPATIENT)
Dept: CT IMAGING | Facility: HOSPITAL | Age: 65
End: 2025-04-23
Payer: MEDICARE

## 2025-04-23 LAB
ANION GAP SERPL CALCULATED.3IONS-SCNC: 8.3 MMOL/L (ref 5–15)
ARTERIAL PATENCY WRIST A: POSITIVE
ATMOSPHERIC PRESS: 753.3 MMHG
BASE EXCESS BLDA CALC-SCNC: -1.4 MMOL/L (ref 0–2)
BASOPHILS # BLD MANUAL: 0 10*3/MM3 (ref 0–0.2)
BASOPHILS NFR BLD MANUAL: 0 % (ref 0–1.5)
BDY SITE: ABNORMAL
BUN SERPL-MCNC: 38 MG/DL (ref 8–23)
BUN/CREAT SERPL: 20.1 (ref 7–25)
CALCIUM SPEC-SCNC: 7.2 MG/DL (ref 8.6–10.5)
CHLORIDE SERPL-SCNC: 108 MMOL/L (ref 98–107)
CO2 SERPL-SCNC: 21.7 MMOL/L (ref 22–29)
CREAT SERPL-MCNC: 1.89 MG/DL (ref 0.57–1)
DEPRECATED RDW RBC AUTO: 53.7 FL (ref 37–54)
DEVICE COMMENT: ABNORMAL
EGFRCR SERPLBLD CKD-EPI 2021: 29.7 ML/MIN/1.73
EOSINOPHIL # BLD MANUAL: 0.16 10*3/MM3 (ref 0–0.4)
EOSINOPHIL NFR BLD MANUAL: 1 % (ref 0.3–6.2)
ERYTHROCYTE [DISTWIDTH] IN BLOOD BY AUTOMATED COUNT: 16 % (ref 12.3–15.4)
GLUCOSE BLDC GLUCOMTR-MCNC: 109 MG/DL (ref 70–130)
GLUCOSE BLDC GLUCOMTR-MCNC: 76 MG/DL (ref 70–130)
GLUCOSE BLDC GLUCOMTR-MCNC: 76 MG/DL (ref 70–130)
GLUCOSE BLDC GLUCOMTR-MCNC: 78 MG/DL (ref 70–130)
GLUCOSE BLDC GLUCOMTR-MCNC: 84 MG/DL (ref 70–130)
GLUCOSE BLDC GLUCOMTR-MCNC: 95 MG/DL (ref 70–130)
GLUCOSE SERPL-MCNC: 91 MG/DL (ref 65–99)
HCO3 BLDA-SCNC: 21.9 MMOL/L (ref 22–28)
HCT VFR BLD AUTO: 28 % (ref 34–46.6)
HEMODILUTION: NO
HGB BLD-MCNC: 9.1 G/DL (ref 12–15.9)
HYPOCHROMIA BLD QL: ABNORMAL
LYMPHOCYTES # BLD MANUAL: 0.66 10*3/MM3 (ref 0.7–3.1)
LYMPHOCYTES NFR BLD MANUAL: 4 % (ref 5–12)
MCH RBC QN AUTO: 30.2 PG (ref 26.6–33)
MCHC RBC AUTO-ENTMCNC: 32.5 G/DL (ref 31.5–35.7)
MCV RBC AUTO: 93 FL (ref 79–97)
METAMYELOCYTES NFR BLD MANUAL: 2 % (ref 0–0)
MODALITY: ABNORMAL
MONOCYTES # BLD: 0.66 10*3/MM3 (ref 0.1–0.9)
MYELOCYTES NFR BLD MANUAL: 2 % (ref 0–0)
NEUTROPHILS # BLD AUTO: 14.32 10*3/MM3 (ref 1.7–7)
NEUTROPHILS NFR BLD MANUAL: 87 % (ref 42.7–76)
NRBC BLD AUTO-RTO: 0.2 /100 WBC (ref 0–0.2)
NRBC SPEC MANUAL: 1 /100 WBC (ref 0–0.2)
PCO2 BLDA: 30 MM HG (ref 35–45)
PH BLDA: 7.47 PH UNITS (ref 7.35–7.45)
PLAT MORPH BLD: NORMAL
PLATELET # BLD AUTO: 416 10*3/MM3 (ref 140–450)
PMV BLD AUTO: 9.7 FL (ref 6–12)
PO2 BLDA: 88.7 MM HG (ref 80–100)
POTASSIUM SERPL-SCNC: 4.8 MMOL/L (ref 3.5–5.2)
RBC # BLD AUTO: 3.01 10*6/MM3 (ref 3.77–5.28)
SAO2 % BLDCOA: 97.5 % (ref 92–98.5)
SODIUM SERPL-SCNC: 138 MMOL/L (ref 136–145)
TOTAL RATE: 15 BREATHS/MINUTE
VARIANT LYMPHS NFR BLD MANUAL: 4 % (ref 19.6–45.3)
WBC MORPH BLD: NORMAL
WBC NRBC COR # BLD AUTO: 16.46 10*3/MM3 (ref 3.4–10.8)

## 2025-04-23 PROCEDURE — 25010000002 CEFTRIAXONE PER 250 MG: Performed by: INTERNAL MEDICINE

## 2025-04-23 PROCEDURE — 25010000002 HEPARIN (PORCINE) PER 1000 UNITS: Performed by: INTERNAL MEDICINE

## 2025-04-23 PROCEDURE — 85025 COMPLETE CBC W/AUTO DIFF WBC: CPT | Performed by: STUDENT IN AN ORGANIZED HEALTH CARE EDUCATION/TRAINING PROGRAM

## 2025-04-23 PROCEDURE — 25510000001 IOPAMIDOL PER 1 ML: Performed by: STUDENT IN AN ORGANIZED HEALTH CARE EDUCATION/TRAINING PROGRAM

## 2025-04-23 PROCEDURE — 80048 BASIC METABOLIC PNL TOTAL CA: CPT | Performed by: STUDENT IN AN ORGANIZED HEALTH CARE EDUCATION/TRAINING PROGRAM

## 2025-04-23 PROCEDURE — 0042T HC CT CEREBRAL PERFUSION W/WO CONTRAST: CPT

## 2025-04-23 PROCEDURE — 82803 BLOOD GASES ANY COMBINATION: CPT

## 2025-04-23 PROCEDURE — 82948 REAGENT STRIP/BLOOD GLUCOSE: CPT

## 2025-04-23 PROCEDURE — 36600 WITHDRAWAL OF ARTERIAL BLOOD: CPT

## 2025-04-23 PROCEDURE — 70496 CT ANGIOGRAPHY HEAD: CPT

## 2025-04-23 PROCEDURE — 63710000001 INSULIN GLARGINE PER 5 UNITS: Performed by: INTERNAL MEDICINE

## 2025-04-23 PROCEDURE — 70498 CT ANGIOGRAPHY NECK: CPT

## 2025-04-23 PROCEDURE — 94799 UNLISTED PULMONARY SVC/PX: CPT

## 2025-04-23 PROCEDURE — 85007 BL SMEAR W/DIFF WBC COUNT: CPT | Performed by: STUDENT IN AN ORGANIZED HEALTH CARE EDUCATION/TRAINING PROGRAM

## 2025-04-23 RX ORDER — IOPAMIDOL 755 MG/ML
150 INJECTION, SOLUTION INTRAVASCULAR
Status: COMPLETED | OUTPATIENT
Start: 2025-04-23 | End: 2025-04-23

## 2025-04-23 RX ADMIN — PANTOPRAZOLE SODIUM 40 MG: 40 TABLET, DELAYED RELEASE ORAL at 09:54

## 2025-04-23 RX ADMIN — HEPARIN SODIUM 5000 UNITS: 5000 INJECTION INTRAVENOUS; SUBCUTANEOUS at 13:44

## 2025-04-23 RX ADMIN — BACLOFEN 20 MG: 10 TABLET ORAL at 09:44

## 2025-04-23 RX ADMIN — PHENAZOPYRIDINE 200 MG: 100 TABLET ORAL at 13:45

## 2025-04-23 RX ADMIN — Medication 10 ML: at 23:46

## 2025-04-23 RX ADMIN — METOPROLOL SUCCINATE 50 MG: 50 TABLET, EXTENDED RELEASE ORAL at 09:54

## 2025-04-23 RX ADMIN — PHENAZOPYRIDINE 200 MG: 100 TABLET ORAL at 09:52

## 2025-04-23 RX ADMIN — FLUOXETINE HYDROCHLORIDE 20 MG: 20 CAPSULE ORAL at 09:54

## 2025-04-23 RX ADMIN — Medication 10 ML: at 10:10

## 2025-04-23 RX ADMIN — HEPARIN SODIUM 5000 UNITS: 5000 INJECTION INTRAVENOUS; SUBCUTANEOUS at 21:59

## 2025-04-23 RX ADMIN — CEFTRIAXONE 2000 MG: 2 INJECTION, POWDER, FOR SOLUTION INTRAMUSCULAR; INTRAVENOUS at 21:59

## 2025-04-23 RX ADMIN — HEPARIN SODIUM 5000 UNITS: 5000 INJECTION INTRAVENOUS; SUBCUTANEOUS at 07:04

## 2025-04-23 RX ADMIN — ANASTROZOLE 1 MG: 1 TABLET, FILM COATED ORAL at 09:54

## 2025-04-23 RX ADMIN — INSULIN GLARGINE 10 UNITS: 100 INJECTION, SOLUTION SUBCUTANEOUS at 09:58

## 2025-04-23 RX ADMIN — ASPIRIN 81 MG: 81 TABLET, COATED ORAL at 09:54

## 2025-04-23 RX ADMIN — SENNOSIDES AND DOCUSATE SODIUM 2 TABLET: 50; 8.6 TABLET ORAL at 09:52

## 2025-04-23 RX ADMIN — IOPAMIDOL 150 ML: 755 INJECTION, SOLUTION INTRAVENOUS at 03:16

## 2025-04-23 RX ADMIN — PRAMIPEXOLE DIHYDROCHLORIDE 1.5 MG: 1.5 TABLET ORAL at 09:54

## 2025-04-23 NOTE — PLAN OF CARE
Goal Outcome Evaluation:      Patient vitals stable today. Patient only speaking garbled speech, sleeping between care. Cobos catheter irrigated this morning due to poor drainage, adequate drainage from bag following irrigation.   Problem: Adult Inpatient Plan of Care  Goal: Plan of Care Review  Outcome: Not Progressing  Goal: Optimal Comfort and Wellbeing  Outcome: Not Progressing  Intervention: Provide Person-Centered Care  Recent Flowsheet Documentation  Taken 4/23/2025 0954 by Edda Cabral, RN  Trust Relationship/Rapport:   care explained   choices provided   thoughts/feelings acknowledged  Goal: Readiness for Transition of Care  Outcome: Not Progressing  Goal: Plan of Care Review  Outcome: Not Progressing  Goal: Patient-Specific Goal (Individualized)  Outcome: Not Progressing  Goal: Absence of Hospital-Acquired Illness or Injury  Outcome: Not Progressing  Intervention: Identify and Manage Fall Risk  Recent Flowsheet Documentation  Taken 4/23/2025 1610 by Edda Cabral, RN  Safety Promotion/Fall Prevention:   safety round/check completed   room organization consistent   nonskid shoes/slippers when out of bed   lighting adjusted   fall prevention program maintained   clutter free environment maintained   assistive device/personal items within reach   activity supervised  Taken 4/23/2025 1340 by Edda Cabral, RN  Safety Promotion/Fall Prevention:   safety round/check completed   room organization consistent   nonskid shoes/slippers when out of bed   lighting adjusted   fall prevention program maintained   clutter free environment maintained   assistive device/personal items within reach   activity supervised  Taken 4/23/2025 1210 by Edda Cabral, RN  Safety Promotion/Fall Prevention:   safety round/check completed   room organization consistent   nonskid shoes/slippers when out of bed   lighting adjusted   fall prevention program maintained   assistive device/personal items within  reach   activity supervised   clutter free environment maintained  Taken 4/23/2025 1020 by Edda Cabral RN  Safety Promotion/Fall Prevention:   safety round/check completed   room organization consistent   nonskid shoes/slippers when out of bed   lighting adjusted   fall prevention program maintained   clutter free environment maintained   assistive device/personal items within reach   activity supervised  Taken 4/23/2025 0954 by Edda Cabral RN  Safety Promotion/Fall Prevention:   safety round/check completed   room organization consistent   nonskid shoes/slippers when out of bed   lighting adjusted   fall prevention program maintained   clutter free environment maintained   assistive device/personal items within reach   activity supervised  Taken 4/23/2025 0830 by Edda Cabral RN  Safety Promotion/Fall Prevention:   safety round/check completed   room organization consistent   nonskid shoes/slippers when out of bed   lighting adjusted   fall prevention program maintained   clutter free environment maintained   assistive device/personal items within reach   activity supervised  Intervention: Prevent Skin Injury  Recent Flowsheet Documentation  Taken 4/23/2025 1610 by Edda Cabral RN  Body Position:   left   tilted  Taken 4/23/2025 1340 by Edda Cabral RN  Body Position:   tilted   left  Taken 4/23/2025 1210 by Edda Cabral RN  Body Position:   turned   right  Taken 4/23/2025 1020 by Edda Cabral RN  Body Position:   right   tilted  Taken 4/23/2025 0954 by Edda Cabral RN  Body Position: supine  Taken 4/23/2025 0830 by Edda Cabral RN  Body Position: supine  Intervention: Prevent and Manage VTE (Venous Thromboembolism) Risk  Recent Flowsheet Documentation  Taken 4/23/2025 1340 by Edad Cabral RN  VTE Prevention/Management: (heparin) other (see comments)  Taken 4/23/2025 0954 by Edda Cabral RN  VTE  Prevention/Management: (Heparin) other (see comments)  Intervention: Prevent Infection  Recent Flowsheet Documentation  Taken 4/23/2025 1610 by Edda Cabral RN  Infection Prevention:   single patient room provided   rest/sleep promoted   hand hygiene promoted  Taken 4/23/2025 1340 by Edda Cabral RN  Infection Prevention:   single patient room provided   rest/sleep promoted   hand hygiene promoted  Taken 4/23/2025 1210 by Edda Cabral RN  Infection Prevention:   single patient room provided   rest/sleep promoted   hand hygiene promoted  Taken 4/23/2025 1020 by Edda Cabral RN  Infection Prevention:   single patient room provided   rest/sleep promoted   hand hygiene promoted  Taken 4/23/2025 0954 by Edda Cabral RN  Infection Prevention:   single patient room provided   rest/sleep promoted   hand hygiene promoted  Taken 4/23/2025 0830 by Edda Cabral RN  Infection Prevention:   single patient room provided   rest/sleep promoted   hand hygiene promoted     Problem: Fall Injury Risk  Goal: Absence of Fall and Fall-Related Injury  Outcome: Not Progressing  Intervention: Identify and Manage Contributors  Recent Flowsheet Documentation  Taken 4/23/2025 1340 by Edda Cabral RN  Medication Review/Management: medications reviewed  Intervention: Promote Injury-Free Environment  Recent Flowsheet Documentation  Taken 4/23/2025 1610 by Edda Cabral RN  Safety Promotion/Fall Prevention:   safety round/check completed   room organization consistent   nonskid shoes/slippers when out of bed   lighting adjusted   fall prevention program maintained   clutter free environment maintained   assistive device/personal items within reach   activity supervised  Taken 4/23/2025 1340 by Edda Cabral, RN  Safety Promotion/Fall Prevention:   safety round/check completed   room organization consistent   nonskid shoes/slippers when out of bed   lighting adjusted   fall  prevention program maintained   clutter free environment maintained   assistive device/personal items within reach   activity supervised  Taken 4/23/2025 1210 by Edda Cabral, RN  Safety Promotion/Fall Prevention:   safety round/check completed   room organization consistent   nonskid shoes/slippers when out of bed   lighting adjusted   fall prevention program maintained   assistive device/personal items within reach   activity supervised   clutter free environment maintained  Taken 4/23/2025 1020 by Edda Cabral, RN  Safety Promotion/Fall Prevention:   safety round/check completed   room organization consistent   nonskid shoes/slippers when out of bed   lighting adjusted   fall prevention program maintained   clutter free environment maintained   assistive device/personal items within reach   activity supervised  Taken 4/23/2025 0954 by Edda Cabral, RN  Safety Promotion/Fall Prevention:   safety round/check completed   room organization consistent   nonskid shoes/slippers when out of bed   lighting adjusted   fall prevention program maintained   clutter free environment maintained   assistive device/personal items within reach   activity supervised  Taken 4/23/2025 0830 by Edda Cabral, RN  Safety Promotion/Fall Prevention:   safety round/check completed   room organization consistent   nonskid shoes/slippers when out of bed   lighting adjusted   fall prevention program maintained   clutter free environment maintained   assistive device/personal items within reach   activity supervised     Problem: Skin Injury Risk Increased  Goal: Skin Health and Integrity  Outcome: Not Progressing  Intervention: Optimize Skin Protection  Recent Flowsheet Documentation  Taken 4/23/2025 1610 by Edda Cabral, RN  Activity Management: activity encouraged  Head of Bed (HOB) Positioning: HOB at 30-45 degrees  Taken 4/23/2025 1340 by Edda Cabral, RN  Activity Management: activity  encouraged  Head of Bed (HOB) Positioning: HOB at 30-45 degrees  Taken 4/23/2025 1210 by Edda Cabral, RN  Activity Management: activity encouraged  Head of Bed (HOB) Positioning: HOB at 30-45 degrees  Taken 4/23/2025 1020 by Edda Cabral, RN  Activity Management: activity encouraged  Head of Bed (HOB) Positioning: HOB at 30-45 degrees  Taken 4/23/2025 0954 by Edda Cabral, RN  Activity Management: activity encouraged  Head of Bed (HOB) Positioning: HOB at 30-45 degrees  Taken 4/23/2025 0830 by Edda Cabral, RN  Activity Management: activity encouraged  Head of Bed (HOB) Positioning: HOB at 30-45 degrees

## 2025-04-23 NOTE — PROGRESS NOTES
Reviewed chart.  Patient admitted 4/17, remains IP as of today. Patient has history of neurogenic bladder and history of UTIs in the past caused by MS. + sepsis. Has been on Faslodex  for her breast cancer.

## 2025-04-23 NOTE — PLAN OF CARE
"Goal Outcome Evaluation:  Plan of Care Reviewed With: patient, spouse           Outcome Evaluation: Pt remains AOx4. Pt began to exhibit expressive aphasia overnight, see previous note. RRT called and head CT completed. Pts Bigg, states this has happened once before about a year ago when \"something was wrong with her kidneys.\" Hospital notes from September of 2024 show an KINZA causing toxic and metabolic encephalopathy. AM labs to be collected. Neurology consulted. Pt refusing wound care and turns at times, states \"i only want my wound care done during the day.\" Education provided on risk for further skin breakdown. Dysphagia screen performed and passed.                             Problem: Adult Inpatient Plan of Care  Goal: Plan of Care Review  Flowsheets (Taken 4/23/2025 0529)  Outcome Evaluation: Pt remains AOx4. Pt began to exhibit expressive aphasia overnight, see previous note. RRT called and head CT completed. Pts Bigg, states this has happened once before about a year ago when \"something was wrong with her kidneys.\" Hospital notes from September of 2024 show an KINZA causing toxic and metabolic encephalopathy. AM labs to be collected. Neurology consulted. Pt refusing wound care and turns at times, states \"i only want my wound care done during the day.\" Education provided on risk for further skin breakdown. Dysphagia screen performed and passed.  Plan of Care Reviewed With:   patient   spouse  Goal: Absence of Hospital-Acquired Illness or Injury  Intervention: Prevent Skin Injury  Recent Flowsheet Documentation  Taken 4/23/2025 0400 by Sara Farmer, RN  Body Position:   right   weight shifting   tilted  Goal: Plan of Care Review  Recent Flowsheet Documentation  Taken 4/23/2025 0596 by Sara Farmer, RN  Outcome Evaluation: Pt remains AOx4. Pt began to exhibit expressive aphasia overnight, see previous note. RRT called and head CT completed. Pts Bigg, states this has happened once " "before about a year ago when \"something was wrong with her kidneys.\" Hospital notes from September of 2024 show an KINZA causing toxic and metabolic encephalopathy. AM labs to be collected. Neurology consulted. Pt refusing wound care and turns at times, states \"i only want my wound care done during the day.\" Education provided on risk for further skin breakdown. Dysphagia screen performed and passed.  Plan of Care Reviewed With:   patient   spouse  Goal: Absence of Hospital-Acquired Illness or Injury  Intervention: Prevent Skin Injury  Recent Flowsheet Documentation  Taken 4/23/2025 0400 by Sara Farmer, RN  Body Position:   right   weight shifting   tilted     Problem: Skin Injury Risk Increased  Goal: Skin Health and Integrity  Intervention: Optimize Skin Protection  Recent Flowsheet Documentation  Taken 4/23/2025 0400 by Sara Farmer, RN  Head of Bed (HOB) Positioning: HOB at 45 degrees     "

## 2025-04-23 NOTE — NURSING NOTE
Pt catheter leaking.  Bladder scan showed 756.  F/C flushed with good urine return.  Urine no longer leaking around catheter.  Sediment noted.  Will continue to monitor and re-scan bladder.  MD notified.

## 2025-04-23 NOTE — PROGRESS NOTES
"  Infectious Diseases Progress Note    Luisa Child MD     [facility]  [place]: 5 days  Patient Identification:  Name: Maritza Bejarano  Age: 64 y.o.  Sex: female  :  1960  MRN: [id number]         Primary Care Physician: Enoc Carbone,         Subjective: Overall feeling better but interested in her catheter being removed.  Denies any fever and chills.  Interval History: See consultation note.    Objective:    Scheduled Meds:anastrozole, 1 mg, Oral, Daily  aspirin, 81 mg, Oral, Daily  baclofen, 20 mg, Oral, BID  cefTRIAXone, 2,000 mg, Intravenous, Q24H  FLUoxetine, 20 mg, Oral, Daily  heparin (porcine), 5,000 Units, Subcutaneous, Q8H  insulin glargine, 10 Units, Subcutaneous, Daily  insulin lispro, 2-7 Units, Subcutaneous, 4x Daily AC & at Bedtime  [START ON 2025] metoprolol succinate XL, 50 mg, Oral, Q24H  pantoprazole, 40 mg, Oral, BID  phenazopyridine, 200 mg, Oral, TID With Meals  pramipexole, 1.5 mg, Oral, BID  senna-docusate sodium, 2 tablet, Oral, BID  sodium chloride, 10 mL, Intravenous, Q12H      Continuous Infusions:       Vital signs in last 24 hours:  Temp:  [97.3 °F (36.3 °C)-99.9 °F (37.7 °C)] 98.1 °F (36.7 °C)  Heart Rate:  [67-92] 85  Resp:  [18] 18  BP: (124-149)/(61-97) 145/97    Intake/Output:    Intake/Output Summary (Last 24 hours) at 2025  Last data filed at 2025 2226  Gross per 24 hour   Intake 220 ml   Output --   Net 220 ml       Exam:  /97 (BP Location: Right arm, Patient Position: Lying)   Pulse 85   Temp 98.1 °F (36.7 °C) (Oral)   Resp 18   Ht 165.1 cm (65\")   Wt 83.1 kg (183 lb 3.2 oz)   LMP  (LMP Unknown) Comment: PT. STATES HER LAST PERIOD WAS GREATER THAN FIVE YEARS AGO  SpO2 99%   BMI 30.49 kg/m²   Patient is examined using the personal protective equipment as per guidelines from infection control for this particular patient as enacted.  Hand washing was performed before and after patient interaction.  General Appearance:    Alert, " cooperative, no distress, AAOx3                          Head:    Normocephalic, without obvious abnormality, atraumatic                           Eyes:  Pale conjunctiva                         Throat:   Lips, tongue, gums normal; oral mucosa pink and moist                           Neck:   Supple, symmetrical, trachea midline, no JVD                         Lungs:    Clear to auscultation bilaterally, respirations unlabored                 Chest Wall:    No tenderness or deformity                          Heart:  S1-S2 regular                  Abdomen:   Catheter in place, obese soft nontender                 Extremities:   Extremities normal, atraumatic, no cyanosis or edema                        Pulses:   Pulses palpable in all extremities                            Skin:   Skin is warm and dry,  no rashes or palpable lesions                  Neurologic: Alert and oriented x 3       Data Review:    I reviewed the patient's new clinical results.  Results from last 7 days   Lab Units 04/22/25  0322 04/21/25  0444 04/20/25  0248 04/19/25  0352 04/18/25  0411 04/17/25  1304   WBC 10*3/mm3 10.84* 9.22 10.42 12.19* 16.96* 15.47*   HEMOGLOBIN g/dL 8.8* 9.3* 9.5* 9.7* 11.1* 13.4   PLATELETS 10*3/mm3 340 274 232 221 265 396     Results from last 7 days   Lab Units 04/22/25  0322 04/21/25  1544 04/21/25  0444 04/20/25  0248 04/19/25  0352 04/18/25  1823 04/18/25  0411 04/17/25  1304   SODIUM mmol/L 141  --  139 137 140  --  136 136   POTASSIUM mmol/L 4.5 4.4 3.4* 3.8 4.2 4.0 2.8* 3.7   CHLORIDE mmol/L 111*  --  106 105 105  --  98 99   CO2 mmol/L 22.7  --  21.1* 21.0* 25.3  --  24.3 17.0*   BUN mg/dL 33*  --  32* 36* 34*  --  28* 29*   CREATININE mg/dL 1.15*  --  1.11* 1.23* 1.16*  --  1.37* 1.52*   CALCIUM mg/dL 8.4*  --  8.0* 8.5* 8.0*  --  7.2* 8.6   GLUCOSE mg/dL 153*  --  231* 242* 216*  --  225* 294*     Microbiology Results (last 10 days)       Procedure Component Value - Date/Time    Blood Culture - Blood, Hand,  Right [[id number]]  (Normal) Collected: 04/19/25 1639    Lab Status: Preliminary result Specimen: Blood from Hand, Right Updated: 04/22/25 1700     Blood Culture No growth at 3 days    Narrative:      Less than seven (7) mL's of blood was collected.  Insufficient quantity may yield false negative results.    Blood Culture - Blood, Arm, Right [[id number]]  (Normal) Collected: 04/19/25 0908    Lab Status: Preliminary result Specimen: Blood from Arm, Right Updated: 04/22/25 0945     Blood Culture No growth at 3 days    Respiratory Panel PCR w/COVID-19(SARS-CoV-2) YAMILE/FRANK/ROSE/PAD/COR/CHIDI In-House, NP Swab in UTM/VTM, 2 HR TAT - Swab, Nasopharynx [[id number]]  (Normal) Collected: 04/17/25 1341    Lab Status: Final result Specimen: Swab from Nasopharynx Updated: 04/17/25 1443     ADENOVIRUS, PCR Not Detected     Coronavirus 229E Not Detected     Coronavirus HKU1 Not Detected     Coronavirus NL63 Not Detected     Coronavirus OC43 Not Detected     COVID19 Not Detected     Human Metapneumovirus Not Detected     Human Rhinovirus/Enterovirus Not Detected     Influenza A PCR Not Detected     Influenza B PCR Not Detected     Parainfluenza Virus 1 Not Detected     Parainfluenza Virus 2 Not Detected     Parainfluenza Virus 3 Not Detected     Parainfluenza Virus 4 Not Detected     RSV, PCR Not Detected     Bordetella pertussis pcr Not Detected     Bordetella parapertussis PCR Not Detected     Chlamydophila pneumoniae PCR Not Detected     Mycoplasma pneumo by PCR Not Detected    Narrative:      In the setting of a positive respiratory panel with a viral infection PLUS a negative procalcitonin without other underlying concern for bacterial infection, consider observing off antibiotics or discontinuation of antibiotics and continue supportive care. If the respiratory panel is positive for atypical bacterial infection (Bordetella pertussis, Chlamydophila pneumoniae, or Mycoplasma pneumoniae), consider antibiotic de-escalation to target  atypical bacterial infection.    Urine Culture - Urine, Urine, Catheter [[id number]]  (Abnormal)  (Susceptibility) Collected: 04/17/25 1326    Lab Status: Final result Specimen: Urine, Catheter Updated: 04/19/25 1037     Urine Culture >100,000 CFU/mL Klebsiella pneumoniae ssp pneumoniae    Narrative:      Colonization of the urinary tract without infection is common. Treatment is discouraged unless the patient is symptomatic, pregnant, or undergoing an invasive urologic procedure.    Susceptibility        Klebsiella pneumoniae ssp pneumoniae      JOSE      Amoxicillin + Clavulanate Susceptible      Ampicillin Resistant      Ampicillin + Sulbactam Susceptible      Cefazolin (Urine) Susceptible      Cefepime Susceptible      Ceftazidime Susceptible      Ceftriaxone Susceptible      Gentamicin Susceptible      Levofloxacin Intermediate      Nitrofurantoin Susceptible      Piperacillin + Tazobactam Susceptible      Trimethoprim + Sulfamethoxazole Resistant                           Blood Culture - Blood, Foot, Right [[id number]]  (Abnormal)  (Susceptibility) Collected: 04/17/25 1313    Lab Status: Final result Specimen: Blood from Foot, Right Updated: 04/19/25 0700     Blood Culture Klebsiella pneumoniae ssp pneumoniae     Isolated from Aerobic and Anaerobic Bottles     Gram Stain Anaerobic Bottle Gram negative bacilli      Aerobic Bottle Gram negative bacilli    Susceptibility        Klebsiella pneumoniae ssp pneumoniae      JOSE      Amoxicillin + Clavulanate Susceptible      Ampicillin Resistant      Ampicillin + Sulbactam Susceptible      Cefazolin (Non Urine) Susceptible      Cefepime Susceptible      Ceftazidime Susceptible      Ceftriaxone Susceptible      Gentamicin Susceptible      Levofloxacin Intermediate      Piperacillin + Tazobactam Susceptible      Trimethoprim + Sulfamethoxazole Resistant                       Susceptibility Comments       Klebsiella pneumoniae ssp pneumoniae    With the exception of  urinary-sourced infections, aminoglycosides should not be used as monotherapy.               Blood Culture ID, PCR - Blood, Foot, Right [[id number]]  (Abnormal) Collected: 04/17/25 1313    Lab Status: Final result Specimen: Blood from Foot, Right Updated: 04/17/25 2355     BCID, PCR Klebsiella pneumoniae group. Identification by BCID2 PCR.     BOTTLE TYPE Anaerobic Bottle    Narrative:      No resistance genes detected.    Blood Culture - Blood, Foot, Left [[id number]]  (Abnormal) Collected: 04/17/25 1304    Lab Status: Final result Specimen: Blood from Foot, Left Updated: 04/19/25 0700     Blood Culture Klebsiella pneumoniae ssp pneumoniae     Isolated from Aerobic and Anaerobic Bottles     Gram Stain Anaerobic Bottle Gram negative bacilli      Aerobic Bottle Gram negative bacilli    Narrative:      Refer to previous blood culture collected on 04/17/2025 1313 for MICs.              Assessment: Improving    Sepsis    Multiple sclerosis    Essential hypertension    Anxiety and depression    Neurogenic bladder    Restless legs syndrome    Malignant neoplasm of overlapping sites of left breast in female, estrogen receptor positive    Elevated LFTs    NSTEMI (non-ST elevated myocardial infarction)    Type 2 diabetes mellitus   64-year-old female with  1-Klebsiella bacteremic sepsis with septic shock secondary  2-urinary tract infection due to recent indwelling Cobos catheter malfunction  3-immobility with MS and neurogenic bladder  4-immunocompromised host  5-acute on chronic renal insufficiency  6-anemia multifactorial  7-hyperglycemia  8-episode of low-grade fever with some chest discomfort upon coughing and taking deep breath-concerning for atelectasis versus evolving pneumonia.     Recommendations/Discussions:  See my discussion and recommendations on 4/18/2025  Continue with incentive spirometry  Continue with IV Rocephin  Follow-up on repeat blood cultures.  Patient would need 10 to 14 days of antibiotic treatment  from last negative blood culture for bacteremic Klebsiella pneumonia urinary tract infection due to malfunctioning catheter and transient obstruction.  Patient will need some sort of IV access to administer antibiotic treatment in the out of the hospital setting and options are either her to come to ambulatory care center on a daily basis to receive IV Rocephin for total of 10 to 14 days from last negative blood culture for arrangements with home health with at home antibiotic delivery.  Will defer to our case management to sort the structure of antibiotic administration in the out of hospital setting.  Side effects of antibiotic therapy including possibility of nausea vomiting diarrhea rash risk of C. difficile infection yeast infection selection of resistant pathogen cytopenias hepatic and renal dysfunction and interaction with medications discussed with the patient.  Patient understands that the side effects could occur but also understand the importance and need for antibiotic therapy at this bacteremic sepsis and wants to proceed with antibiotic therapy.  Luisa Child MD  4/22/2025  20:51 EDT    Parts of this note may be an electronic transcription/translation of spoken language to printed text using the Dragon dictation system.

## 2025-04-23 NOTE — SIGNIFICANT NOTE
04/23/25 0932   OTHER   Discipline physical therapist   Rehab Time/Intention   Session Not Performed other (see comments)  (Rapid called last night. Patient disoriented at this time. Patient uses a leandra at home with assist of her spouse. Acute PT will s/o. Please reconsult if change in status.)   Therapy Assessment/Plan (PT)   Criteria for Skilled Interventions Met (PT) no;no problems identified which require skilled intervention

## 2025-04-23 NOTE — CONSULTS
Neurology Consult Note    Consult Date: 4/23/2025    Referring MD: Brian Lucas MD    Reason for Consult I have been asked to see the patient in neurological consultation to render advice and opinion regarding slurred speech    Maritza Bejarano is a 64 y.o. female past medical history of relapsing remitting multiple sclerosis with baseline paraparesis not on disease modifying therapy for the last 10 years, neurogenic bladder, history of recurrent UTIs, hypertension, type 2 diabetes mellitus restless leg syndrome and chronic nausea, patient presented to the ED with catheter malfunction on 4/16/2025, next day she started having signs of sepsis fever chills dizziness lightheadedness she also had a temperature of 100.1 blood cultures were drawn she was started on broad-spectrum antibiotics with Vanco and cefepime.  Cultures came back positive for Klebsiella seep sepsis.  Neurology was consulted last night because she started having some slurred speech and NIH of 4.  She had CT head CTA head and neck and CT perfusion which were unremarkable for acute pathology.          Past Medical History:   Diagnosis Date    Anemia 2024    `Treated with iron    Dawn esophagus     per patient    Blurred vision     R/T MS    Breast cancer 05/2024+++++    Carpal tunnel syndrome     Clotting disorder 1993, 2003, 2012    3 g/i bleeds w/ transfus/ions    Colon polyp 2013    removed w/ colonoscopy    Deep vein thrombosis phlebitis 1980    Depression     Diplopia 2013    GERD (gastroesophageal reflux disease)     GI (gastrointestinal bleed) 3 bleeds    2 transfusions    H/O Skin cancer, basal cell     Headache     History of blood transfusion     History of GI bleed     R/T NSAIDS AND STEROIDS, multiple times    History of urinary tract infection     Hypercalcemia     s/p parathyroidectomy    Hyperlipidemia     Hypertension     Movement disorder     Multiple sclerosis     Optic neuritis     PONV (postoperative nausea and vomiting)      "Steroid-induced diabetes 2024       Exam  /84 (BP Location: Right arm, Patient Position: Lying)   Pulse 83   Temp 99.7 °F (37.6 °C) (Oral)   Resp 18   Ht 165.1 cm (65\")   Wt 83.1 kg (183 lb 3.2 oz)   LMP  (LMP Unknown) Comment: PT. STATES HER LAST PERIOD WAS GREATER THAN FIVE YEARS AGO  SpO2 97%   BMI 30.49 kg/m²     Today morning on my exam patient is very lethargic drowsy and was snoring  She wakes up to sternal rub able to state his name but cannot answer any of my other questions  Blink to threat present bilaterally  Extra ocular movements intact  No facial droop  Moving bilateral upper extremities good strength 4+/5  Bilateral lower extremities no movement 1/5    DATA:    Lab Results   Component Value Date    GLUCOSE 91 04/23/2025    CALCIUM 7.2 (L) 04/23/2025     04/23/2025    K 4.8 04/23/2025    CO2 21.7 (L) 04/23/2025     (H) 04/23/2025    BUN 38 (H) 04/23/2025    CREATININE 1.89 (H) 04/23/2025    EGFRIFAFRI >60 11/23/2020    EGFRIFNONA 80 02/27/2022    BCR 20.1 04/23/2025    ANIONGAP 8.3 04/23/2025     Lab Results   Component Value Date    WBC 16.46 (H) 04/23/2025    HGB 9.1 (L) 04/23/2025    HCT 28.0 (L) 04/23/2025    MCV 93.0 04/23/2025     04/23/2025   Vitals  99.7 Tmax  Respiratory rate 80s to 90  Systolic blood pressure 130s to 150s    Lab review:   Sodium 138  Creatinine 1.89  Glucose 91  Elevated LFTs 88 and 65    A1c 7.3  TSH 1.08  B12 865 folate 12      WBC 16.46  Hemoglobin 9.1 and also platelets 416    Urine analysis from 4/17/2025 concerning for UTI    Imaging review:   CT head without no acute hypodensity or hyperdensity    CTA head and neck no acute large vessel occlusion or stenosis    CT perfusion right parietal lobe small perfusion deficit most likely artifact    Diagnoses:  Concern for stroke  Lapsing remitting multiple sclerosis  Neurogenic bladder  Bacteremia with septic shock from Klebsiella  Type II NSTEMI  Transaminitis  Hypertension  Breast " cancer  Insomnia  Anxiety  Restless leg syndrome  Recurrent UTIs    Pre-stroke MRS: 4    Patient has a history of multiple sclerosis not on disease modifying therapy at baseline she has paraparesis at baseline she has thoracic cord lesions    Not a candidate for TNKase secondary to low NIH stroke scale and also concern for endocarditis in the setting of bacteremia  No large vessel occlusion seen on CTA's    Concern for acute ischemic stroke  Exam not consistent with acute ischemic stroke looks more like encephalopathy patient's nurse stated that this happened after she received a dose of Klonopin patient's family told the nurse that this usually happens when she takes Klonopin.  Initial CT head CTA CT perfusion unremarkable for acute pathology  Systolic blood pressure goal less than 150  Aspirin rectally and hold off Lipitor because of elevated LFTs  MRI brain pending  If MRI brain is positive she might need TTE to rule out endocarditis    No family at bedside  We will continue to follow the patient and I will speak to Dr. Spence about the Klonopin.      MDM   Reviewed: Previous charts, nursing notes and vitals   Reviewed: Previous labs and CT/CTA/CTP scan    Interpretation: Labs and CT CTA/CTP scan   Total time providing  care is :30 minutes. This excluded time spent performing separately reportable procedures and services  Consults :Neurology/Stroke    Please note that portions of this note were completed with a voice recognition program.     Bentley Pace MD  Neuro Hospitalist /Vascular Neurology.

## 2025-04-23 NOTE — PROGRESS NOTES
Name: Maritza Nance Darci ADMIT: 2025   : 1960  PCP: CarboneEnoc guzman     MRN: [id number] LOS: 6 days   AGE/SEX: 64 y.o. female  ROOM: [facility]     Subjective   Subjective     She remains significantly confused. Apparently this kind of thing has happened in the past with klonopin. Her  is at bedside and reports that she seldomly takes it at home. She's only received 1 dose this admission, and although the timing is suspicious, her confusion is out of proportion. It also appears that her catheter wasn't draining (it has been flushed and is functioning now), and I think this is a more likely cause of her confusion. Discussed with Dr. Pace earlier today and there are plans for a brain MRI, which I think is appropriate        Objective   Objective   Vital Signs  Temp:  [97.9 °F (36.6 °C)-99.7 °F (37.6 °C)] 97.9 °F (36.6 °C)  Heart Rate:  [71-98] 71  Resp:  [16-18] 18  BP: ()/() 97/57  SpO2:  [91 %-100 %] 94 %  on   ;   Device (Oxygen Therapy): room air  Body mass index is 30.49 kg/m².  Physical Exam  Constitutional:       General: She is not in acute distress.     Appearance: She is obese. She is not toxic-appearing.   Cardiovascular:      Rate and Rhythm: Normal rate and regular rhythm.      Heart sounds: Normal heart sounds.   Pulmonary:      Effort: Pulmonary effort is normal.      Breath sounds: Normal breath sounds.   Abdominal:      General: Bowel sounds are normal.      Palpations: Abdomen is soft.   Musculoskeletal:         General: No tenderness.      Right lower leg: No edema.      Left lower leg: No edema.   Neurological:      General: No focal deficit present.      Mental Status: She is alert. She is confused.   Psychiatric:         Mood and Affect: Mood normal.         Behavior: Behavior normal.         Results Review     I reviewed the patient's new clinical results.  Results from last 7 days   Lab Units 25  0655 25  0322 25  0444 25  0248   WBC  10*3/mm3 16.46* 10.84* 9.22 10.42   HEMOGLOBIN g/dL 9.1* 8.8* 9.3* 9.5*   PLATELETS 10*3/mm3 416 340 274 232     Results from last 7 days   Lab Units 04/23/25  0655 04/22/25 0322 04/21/25  1544 04/21/25 0444 04/20/25  0248   SODIUM mmol/L 138 141  --  139 137   POTASSIUM mmol/L 4.8 4.5 4.4 3.4* 3.8   CHLORIDE mmol/L 108* 111*  --  106 105   CO2 mmol/L 21.7* 22.7  --  21.1* 21.0*   BUN mg/dL 38* 33*  --  32* 36*   CREATININE mg/dL 1.89* 1.15*  --  1.11* 1.23*   GLUCOSE mg/dL 91 153*  --  231* 242*   Estimated Creatinine Clearance: 32.8 mL/min (A) (by C-G formula based on SCr of 1.89 mg/dL (H)).  Results from last 7 days   Lab Units 04/22/25  0322 04/21/25 0444 04/20/25  0248 04/19/25  0352   ALBUMIN g/dL 2.7* 2.7* 2.8* 2.5*   BILIRUBIN mg/dL 0.3 0.3 0.2 0.4   ALK PHOS U/L 269* 250* 229* 192*   AST (SGOT) U/L 88* 70* 68* 45*   ALT (SGPT) U/L 65* 47* 40* 26     Results from last 7 days   Lab Units 04/23/25  0655 04/22/25 0322 04/21/25  1544 04/21/25  0444 04/20/25  0248 04/19/25  0352 04/18/25  2250 04/18/25  1351 04/18/25  0411   CALCIUM mg/dL 7.2* 8.4*  --  8.0* 8.5* 8.0*  --   --  7.2*   ALBUMIN g/dL  --  2.7*  --  2.7* 2.8* 2.5*  --   --  2.9*   MAGNESIUM mg/dL  --  2.2  --  2.1  --   --   --   --  1.9   PHOSPHORUS mg/dL  --  2.7 2.3* 2.2*  --   --  2.3*   < > 1.5*    < > = values in this interval not displayed.     Results from last 7 days   Lab Units 04/18/25  0411 04/18/25  0050 04/17/25  2151 04/17/25  1715 04/17/25  1304   PROCALCITONIN ng/mL  --   --   --   --  44.90*   LACTATE mmol/L 3.8* 4.6* 2.7* 6.1* 6.4*     COVID19   Date Value Ref Range Status   04/17/2025 Not Detected Not Detected - Ref. Range Final   09/07/2024 Not Detected Not Detected - Ref. Range Final   02/26/2022 Not Detected Not Detected - Ref. Range Final     SARS-CoV-2 PCR   Date Value Ref Range Status   03/24/2021 Not Detected Not Detected Final     Comment:     Nucleic acid specific to SARS-CoV-2 (COVID-19) virus was not detected  in  this sample by the TaqPath (TM) COVID-19 Combo Kit.          SARS-CoV-2 (COVID-19) nucleic acid testing performed using [facility] Aptima (R) SARS-CoV-2 Assay or [facility] TaqPath (TM)  COVID-19 Combo Kit as indicated above under Emergency Use Authorization (EUA) from the FDA. Aptima (R) and TaqPath (TM) test performance  characteristics verified by [facility] in accordance with the FDAs Guidance Document (Policy for Diagnostic Tests for Coronavirus Disease-2019  during the Public Health Emergency) issued on March 16, 2020. The laboratory is regulated under CLIA as qualified to perform high-complexity testing  Unless otherwise noted, all testing was performed at [facility], [address] [id number], [place] State Licensee No. [id number]   02/26/2021 Not Detected Not Detected Final     Comment:     Nucleic acid specific to SARS-CoV-2 (COVID-19) virus was not detected in  this sample by the Aptima (R) SARS-CoV-2 Assay.                SARS-CoV-2 (COVID-19) nucleic acid testing performed using [employer] Aptima (R) SARS-CoV-2 Assay or [employer] TaqPath (TM)  COVID-19 Combo Kit as indicated above under Emergency Use Authorization (EUA) from the FDA. Aptima (R) and TaqPath (TM) test performance  characteristics verified by [facility] in accordance with the FDAs Guidance Document (Policy for Diagnostic Tests for Coronavirus Disease-2019  during the Public Health Emergency) issued on March 16, 2020. The laboratory is regulated under CLIA as qualified to perform high-complexity testing  Unless otherwise noted, all testing was performed at [facility], [employer] [address] [id number], [place] State Licensee No. [id number]     Hemoglobin A1C   Date/Time Value Ref Range Status   04/21/2025 0444 7.30 (H) 4.80 - 5.60 % Final     Glucose   Date/Time Value Ref Range Status   04/23/2025 1327 78 70 - 130 mg/dL Final   04/23/2025 1103 84 70 - 130 mg/dL Final   04/23/2025 0604 95 70 - 130 mg/dL Final   04/23/2025 0237 109 70 -  130 mg/dL Final   04/22/2025 2051 117 70 - 130 mg/dL Final   04/22/2025 1631 127 70 - 130 mg/dL Final   04/22/2025 1043 181 (H) 70 - 130 mg/dL Final       CT Angiogram Head w AI Analysis of LVO  Addendum: ADDENDUM:   04 23 25 03:43 Call Doctor Regarding Stroke, called Neurologist, STROKE @      (Pager) Dr. Pace on 04 23 03:43 (-04:00)  Narrative: CR  Patient: RAMILA PURDY  Time Out: 03:40  Exam(s): CTA HEAD     EXAM:    CT Angiography Head With and without Intravenous Contrast    CLINICAL HISTORY:     Reason for exam: alt ment stat team d r o ni 4.    TECHNIQUE:  AI analysis of LVO was utilized    Axial computed tomographic angiography images of the head with and   without intravenous contrast.  CTDI is 12 mGy and DLP is 125 mGy-cm.    This CT exam was performed according to the principle of ALARA (As Low As   Reasonably Achievable) by using one or more of the following dose   reduction techniques: automated exposure control, adjustment of the mA   and or kV according to patient size, and or use of iterative   reconstruction technique.    MIP reconstructed images were created and reviewed.    COMPARISON:    No relevant prior studies available.    FINDINGS:    Right internal carotid artery:  No significant stenosis.  No aneurysm.    Right anterior cerebral artery:  No significant stenosis.  No aneurysm.    Right middle cerebral artery:  No significant stenosis.  No aneurysm.    Right posterior cerebral artery:  No significant stenosis.  No aneurysm.      Right vertebral artery:  Unremarkable.      Left internal carotid artery:  No significant stenosis.  No aneurysm.    Left anterior cerebral artery:  No significant stenosis.  No aneurysm.    Left middle cerebral artery:  No significant stenosis.  No aneurysm.    Left posterior cerebral artery:  No significant stenosis.  No aneurysm.    Left vertebral artery:  Unremarkable.      Basilar artery:  No significant stenosis.  No aneurysm.    Brain: No acute hemorrhage,  hydrocephalus, or herniation.    IMPRESSION:         No significant stenosis.    Brain: No acute hemorrhage, hydrocephalus, or herniation.  Impression: Communications:     Call Doctor Stroke; Neurologist, STROKE @ [phone] (Pager)    Electronically signed by Lory Holman MD on 04-23-25 at 0340  CT CEREBRAL PERFUSION WITH & WITHOUT CONTRAST  Addendum: ADDENDUM:   04 23 25 03:44 Call Doctor Regarding Stroke, called Dr. Pace on    04 23 03:43 (-04:00)  Narrative: CR  Patient: RAMILA PURDY  Time Out: 03:38  Exam(s): CT PERFUSION     EXAM:    CT Brain Perfusion With Intravenous Contrast    CLINICAL HISTORY:     Reason for exam: Neuro deficit, acute, stroke suspected. alt ment stat   team d r o.    TECHNIQUE:    Routine CT brain cerebral perfusion study was performed using IV   contrast.  Reformats and postprocessing were performed by the   technologist.  CTDI is 300 mGy and DLP is 2700 mGy-cm.  This CT exam was   performed according to the principle of ALARA (As Low As Reasonably   Achievable) by using one or more of the following dose reduction   techniques: automated exposure control, adjustment of the mA and or kV   according to patient size, and or use of iterative reconstruction   technique.    COMPARISON:    None.    FINDINGS:    Core infarct:  None.  rCBF > 30% throughout the brain.  No findings to   suggest acute core infarct.    Reversible ischemia: 9 cc increase in transit time in the beau and   right temporal lobe    Brain and extra-axial spaces:  No intra- or extra-axial hemorrhage.    IMPRESSION:     9 cc penumbra in the beau and right temporal lobe.    Unsure if this is real finding of small vessel disease or artifact.  Impression: Communications:     Call Doctor Stroke    Electronically signed by Lory Holman MD on 04-23-25 at 0338  CT Angiogram Neck  Addendum: ADDENDUM:   04 23 25 03:43 Call Doctor Regarding Stroke, called Dr. Pace on    04 23 03:43 (-04:00)  Narrative: CR  Patient:  RAMILA PURDY  Time Out: 03:39  Exam(s): CTA NECK     EXAM:    CT Angiography Neck With Intravenous Contrast    CLINICAL HISTORY:     Reason for exam: at ment status rapid.    TECHNIQUE:    Routine carotid CT angiography protocol was performed with intravenous   contrast.  NASCET criteria using the distal ICAs for comparison were used   for evaluation of stenoses.  CTDI is 12 mGy and DLP is 125 mGy-cm.  This   CT exam was performed according to the principle of ALARA (As Low As   Reasonably Achievable) by using one or more of the following dose   reduction techniques: automated exposure control, adjustment of the mA   and or kV according to patient size, and or use of iterative   reconstruction technique.    MIP reconstructed images were created and reviewed.    COMPARISON:    None.    FINDINGS:     VASCULATURE:    Right common carotid artery:  No significant stenosis.  No dissection.    Right internal carotid artery:  No significant stenosis.  No dissection.      Right vertebral artery:  No significant stenosis.  No dissection.      Left common carotid artery:  No significant stenosis.  No dissection.    Left internal carotid artery:  No significant stenosis.  No dissection.    Left vertebral artery:  No significant stenosis.  No dissection.     NECK:    Lung apices: Moderate bilateral pleural effusions.  Mild pulmonary   edema..     CAROTID STENOSIS REFERENCE USING NASCET CRITERIA:    % ICA stenosis = (1 - narrowest ICA diameter diameter of distal   cervical ICA) x 100.    Mild - <50% stenosis.    Moderate - 50-69% stenosis.    Severe - 70-94% stenosis.    Near occlusion - 95-99% stenosis.    Occluded - 100% stenosis.    IMPRESSION:         No significant stenosis.  Moderate bilateral pleural effusions.  Mild pulmonary edema  Impression: Communications:     Call Doctor Stroke    Electronically signed by Lory Holman MD on 04-23-25 at 0336    Scheduled Medications  anastrozole, 1 mg, Oral, Daily  aspirin, 81 mg,  Oral, Daily  baclofen, 20 mg, Oral, BID  cefTRIAXone, 2,000 mg, Intravenous, Q24H  FLUoxetine, 20 mg, Oral, Daily  heparin (porcine), 5,000 Units, Subcutaneous, Q8H  insulin glargine, 10 Units, Subcutaneous, Daily  insulin lispro, 2-7 Units, Subcutaneous, 4x Daily AC & at Bedtime  metoprolol succinate XL, 50 mg, Oral, Q24H  pantoprazole, 40 mg, Oral, BID  pramipexole, 1.5 mg, Oral, BID  senna-docusate sodium, 2 tablet, Oral, BID  sodium chloride, 10 mL, Intravenous, Q12H    Infusions   Diet  NPO Diet NPO Type: Strict NPO       Assessment/Plan     Active Hospital Problems    Diagnosis  POA    **Sepsis [A41.9]  Yes    NSTEMI (non-ST elevated myocardial infarction) [I21.4]  Yes    Type 2 diabetes mellitus [E11.9]  Yes    Elevated LFTs [R79.89]  Yes    Malignant neoplasm of overlapping sites of left breast in female, estrogen receptor positive [C50.812, Z17.0]  Not Applicable    Multiple sclerosis [G35]  Yes    Essential hypertension [I10]  Yes    Anxiety and depression [F41.9, F32.A]  Yes    Neurogenic bladder [N31.9]  Yes    Restless legs syndrome [G25.81]  Yes      Resolved Hospital Problems   No resolved problems to display.       64 y.o. female admitted with Sepsis.    Klebsiella UTI and bacteremia causing septic shock-on ceftriaxone with plans for 14 days of IV therapy per ID. Repeat blood cultures are negative.   Type 2 NSTEMI-related to the above. Normal EF on echocardiogram, but WMA consistent with stress cardiomyopathy. Cardiology recommends a betablocker and baby aspirin. She'll need to follow up with them in 1 month  KINZA-initially prerenal due to septic shock, but now likely related to obstructive uropathy with matias malfunction. Her matias has been flushed and is draining now  Worsened leukocytosis-I think most likely related to urinary retention as she's currently on antibiotics as above.  Metabolic/toxic encephalopathy-possibly related to her klonopin which has been discontinued or her urinary retention  which has been addressed. Neurology is consulted and a brain MRI is pending given her history of metastatic breast cancer and MS  Elevated LFTs-gallbladder ultrasound and CT abdomen/pelvis without obvious biliary disease. Acute hepatitis profile negative as well. Previously thought to be secondary to ribociclib per oncology  Type 2 diabetes-A1c 7.3. glucose is too tightly controlled. Stop lantus. Continue sliding scale  Essential hypertension-home antihypertensives held acutely  GERD-ppi  Multiple sclerosis-not on medications chronically for this  Neurogenic bladder with a chronic indwelling matias catheter-this wasn't draining earlier today and was flushed and is draining appropriately once more  Metastatic breast cancer-on faslodex, xgeva, truqap as an outpatient   Insomnia-ambien prn  Anxiety-discontinue clonazepam permanently given it's association with confusion this admission and as an outpatient  Heparin SC for DVT prophylaxis.  Full code.  Discussed with patient, spouse, and nursing staff.  Anticipate discharge home with home health timing yet to be determined.      Oskar Spence MD  [facility]  04/23/25  14:36 EDT

## 2025-04-23 NOTE — PAYOR COMM NOTE
"Maritza Purdy (64 y.o. Female)     ATTN: NURSE REVIEWER  [facility]   REF: [id number]  PLS REPLY TO ROSE MARY ANAYA [phone] OR FAX# [phone]      Date of Birth   1960    Social Security Number       Address   [address] [place] [address]    Home Phone   [phone]    MRN   [id number]       Christian   Cheondoism    Marital Status                               Admission Date   4/17/2025    Admission Type   Emergency    Admitting Provider   Brian Lucas MD    Attending Provider   Oskar Spence MD    Department, Room/Bed   [address], S407/1       Discharge Date       Discharge Disposition       Discharge Destination                                 Attending Provider: Oskar Spence MD    Allergies: No Known Allergies    Isolation: None   Infection: None   Code Status: CPR    Ht: 165.1 cm (65\")   Wt: 83.1 kg (183 lb 3.2 oz)    Admission Cmt: None   Principal Problem: Sepsis [A41.9]                   Active Insurance as of 4/17/2025       Primary Coverage       Payor Plan Insurance Group Employer/Plan Group    ANTHEM MEDICARE REPLACEMENT ANTHEM MEDICARE ADVANTAGE HMO [id number]       Payor Plan Address Payor Plan Phone Number Payor Plan Fax Number Effective Dates    [address] [phone]  1/1/2025 - None Entered    [facility] [id number]         Subscriber Name Subscriber Birth Date Member ID       MARITZA PURDY 1960 [id number]                     Emergency Contacts        (Rel.) Home Phone Work Phone Mobile Phone    Donald Purdy (Spouse) -- -- [phone]    Shana Klein (HCS) (Sister) -- -- [phone]    Maru Rodriguez (Sister) -- -- [phone]              Facility-Administered Medications as of 4/23/2025   Medication Dose Route Frequency Provider Last Rate Last Admin    acetaminophen (TYLENOL) tablet 650 mg  650 mg Oral Q4H PRN Dayan Frost MD   650 mg at 04/21/25 2013    Or    acetaminophen " (TYLENOL) suppository 650 mg  650 mg Rectal Q4H PRN Dayan Frost MD        [COMPLETED] acetaminophen (TYLENOL) tablet 1,000 mg  1,000 mg Oral Once Kimo Costello MD   1,000 mg at 04/17/25 1340    albuterol sulfate HFA (PROVENTIL HFA;VENTOLIN HFA;PROAIR HFA) inhaler 2 puff  2 puff Inhalation Q4H PRN Dayan Frost MD        anastrozole (ARIMIDEX) tablet 1 mg  1 mg Oral Daily Dayan Frost MD   1 mg at 04/23/25 0954    aspirin EC tablet 81 mg  81 mg Oral Daily Dayan Frost MD   81 mg at 04/23/25 0954    baclofen (LIORESAL) tablet 20 mg  20 mg Oral BID Dayan Frost MD   20 mg at 04/23/25 0944    sennosides-docusate (PERICOLACE) 8.6-50 MG per tablet 2 tablet  2 tablet Oral BID Dayan Frost MD   2 tablet at 04/23/25 0952    And    polyethylene glycol (MIRALAX) packet 17 g  17 g Oral Daily PRN Dayan Frost MD        And    bisacodyl (DULCOLAX) EC tablet 5 mg  5 mg Oral Daily PRN Dayan Frost MD        And    bisacodyl (DULCOLAX) suppository 10 mg  10 mg Rectal Daily PRN Dayan Frost MD        [COMPLETED] calcium gluconate 1000 Mg/50ml 0.675% NaCl IV SOLN  1,000 mg Intravenous Once Brian Lucas MD   1,000 mg at 04/18/25 1145    [COMPLETED] cefepime 2000 mg IVPB in 100 mL NS (MBP)  2,000 mg Intravenous Once Kimo Costello MD   Stopped at 04/17/25 1436    cefTRIAXone (ROCEPHIN) 2,000 mg in sodium chloride 0.9 % 100 mL MBP  2,000 mg Intravenous Q24H Luisa Child  mL/hr at 04/22/25 2145 2,000 mg at 04/22/25 2145    [Held by provider] clonazePAM (KlonoPIN) tablet 2 mg  2 mg Oral BID PRN Christopher Caicedo MD   2 mg at 04/22/25 1059    dextrose (D50W) (25 g/50 mL) IV injection 25 g  25 g Intravenous Q15 Min PRN Dayan Frost MD        dextrose (GLUTOSE) oral gel 15 g  15 g Oral Q15 Min PRN Dayan Frost MD        FLUoxetine (PROzac) capsule 20 mg  20 mg Oral Daily Dayan Frost MD   20 mg at 04/23/25 0954    glucagon (GLUCAGEN) injection 1 mg  1 mg Intramuscular Q15 Min PRN  Dayan Frost MD        heparin (porcine) 5000 UNIT/ML injection 5,000 Units  5,000 Units Subcutaneous Q8H Dayan Frost MD   5,000 Units at 25 1344    HYDROcodone-acetaminophen (NORCO) 5-325 MG per tablet 1 tablet  1 tablet Oral Q6H PRN Christopher Caicedo MD   1 tablet at 25 2058    insulin lispro (HUMALOG/ADMELOG) injection 2-7 Units  2-7 Units Subcutaneous 4x Daily AC & at Bedtime Christopher Caicedo MD   2 Units at 25 1151    [COMPLETED] iopamidol (ISOVUE-370) 76 % injection 150 mL  150 mL Intravenous Once in imaging Oskar pSence MD   150 mL at 25 0316    [COMPLETED] lactated ringers bolus 1,000 mL  1,000 mL Intravenous Once Kimo Costello MD   Stopped at 25 1359    [COMPLETED] lactated ringers bolus 1,000 mL  1,000 mL Intravenous Once Brian Lucas MD 1,000 mL/hr at 25 1633 1,000 mL at 25 1633    [] lactated ringers infusion  100 mL/hr Intravenous Continuous Brian Lucas MD   Stopped at 25 1810    loperamide (IMODIUM) capsule 2 mg  2 mg Oral 4x Daily PRN Dayan Frost MD        metoprolol succinate XL (TOPROL-XL) 24 hr tablet 50 mg  50 mg Oral Q24H Oskar Spence MD   50 mg at 25 0954    [COMPLETED] mupirocin (BACTROBAN) 2 % nasal ointment 1 Application  1 Application Each Nare BID Dayan Frost MD   1 Application at 25 0851    nitroglycerin (NITROSTAT) SL tablet 0.4 mg  0.4 mg Sublingual Q5 Min PRN Dayan Frost MD        ondansetron ODT (ZOFRAN-ODT) disintegrating tablet 4 mg  4 mg Oral Q6H PRN Dayan Frost MD        Or    ondansetron (ZOFRAN) injection 4 mg  4 mg Intravenous Q6H PRN Dayan Frots MD   4 mg at 25 2216    pantoprazole (PROTONIX) EC tablet 40 mg  40 mg Oral BID Dayan Frost MD   40 mg at 25 0954    [COMPLETED] perflutren (DEFINITY) 8.476 mg in Sodium Chloride (PF) 0.9 % 10 mL injection  2 mL Intravenous Once in imaging Paulo Garcia MD   2 mL at 25 1019    [COMPLETED] phenazopyridine  (PYRIDIUM) tablet 200 mg  200 mg Oral TID With Meals Christopher Caicedo MD   200 mg at 04/23/25 1345    Phosphorus Replacement - Follow Nurse / BPA Driven Protocol   Not Applicable PRN Dayan Frost MD        [COMPLETED] potassium & sodium phosphates (PHOS-NAK) 280-160-250 MG packet 2 packet  2 packet Oral Once Amanda Allen APRN   2 packet at 04/18/25 0601    [COMPLETED] potassium & sodium phosphates (PHOS-NAK) 280-160-250 MG packet 2 packet  2 packet Oral Once Brian Lucas MD   2 packet at 04/18/25 1632    [COMPLETED] potassium & sodium phosphates (PHOS-NAK) 280-160-250 MG packet 2 packet  2 packet Oral Once Christopher Caicedo MD   2 packet at 04/21/25 0906    [COMPLETED] potassium & sodium phosphates (PHOS-NAK) 280-160-250 MG packet 2 packet  2 packet Oral Once Christopher Caicedo MD   2 packet at 04/21/25 2013    [COMPLETED] potassium chloride (KLOR-CON M20) CR tablet 40 mEq  40 mEq Oral Q4H Amanda Allen, APRN   40 mEq at 04/18/25 1519    [COMPLETED] potassium chloride (KLOR-CON M20) CR tablet 40 mEq  40 mEq Oral Q4H Christopher Caicedo MD   40 mEq at 04/21/25 1237    Potassium Replacement - Follow Nurse / BPA Driven Protocol   Not Applicable PRN Dayan Frost MD        pramipexole (MIRAPEX) tablet 1.5 mg  1.5 mg Oral BID Dayan Frost MD   1.5 mg at 04/23/25 0954    prochlorperazine (COMPAZINE) tablet 10 mg  10 mg Oral Q6H PRN Dayan Frost MD        [COMPLETED] sodium chloride 0.9 % bolus 2,109 mL  30 mL/kg Intravenous Once Kimo Costello MD 2,109 mL/hr at 04/17/25 1358 2,109 mL at 04/17/25 1358    sodium chloride 0.9 % flush 10 mL  10 mL Intravenous PRN Dayan Frost MD        sodium chloride 0.9 % flush 10 mL  10 mL Intravenous Q12H Dayan Frost MD   10 mL at 04/23/25 1010    sodium chloride 0.9 % flush 10 mL  10 mL Intravenous PRN Dayan Frost MD        sodium chloride 0.9 % infusion 40 mL  40 mL Intravenous PRN Dayna Frost MD        [COMPLETED] vancomycin IVPB 1500 mg  in 0.9% NaCl (Premix) 500 mL  20 mg/kg Intravenous Once Kimo Costello .3 mL/hr at 04/17/25 1436 1,500 mg at 04/17/25 1436    zolpidem (AMBIEN) tablet 5 mg  5 mg Oral Nightly PRN Christopher Caicedo MD         Lab Results (last 24 hours)       Procedure Component Value Units Date/Time    POC Glucose Once [[id number]]  (Normal) Collected: 04/23/25 1614    Specimen: Blood Updated: 04/23/25 1616     Glucose 76 mg/dL     POC Glucose Once [[id number]]  (Normal) Collected: 04/23/25 1327    Specimen: Blood Updated: 04/23/25 1329     Glucose 78 mg/dL     POC Glucose Once [[id number]]  (Normal) Collected: 04/23/25 1103    Specimen: Blood Updated: 04/23/25 1105     Glucose 84 mg/dL     Blood Culture - Blood, Arm, Right [[id number]]  (Normal) Collected: 04/19/25 0908    Specimen: Blood from Arm, Right Updated: 04/23/25 0945     Blood Culture No growth at 4 days    Basic Metabolic Panel [[id number]]  (Abnormal) Collected: 04/23/25 0655    Specimen: Blood Updated: 04/23/25 0811     Glucose 91 mg/dL      BUN 38 mg/dL      Creatinine 1.89 mg/dL      Sodium 138 mmol/L      Potassium 4.8 mmol/L      Chloride 108 mmol/L      CO2 21.7 mmol/L      Calcium 7.2 mg/dL      BUN/Creatinine Ratio 20.1     Anion Gap 8.3 mmol/L      eGFR 29.7 mL/min/1.73     Narrative:      GFR Categories in Chronic Kidney Disease (CKD)      GFR Category          GFR (mL/min/1.73)    Interpretation  G1                     90 or greater         Normal or high (1)  G2                      60-89                Mild decrease (1)  G3a                   45-59                Mild to moderate decrease  G3b                   30-44                Moderate to severe decrease  G4                    15-29                Severe decrease  G5                    14 or less           Kidney failure          (1)In the absence of evidence of kidney disease, neither GFR category G1 or G2 fulfill the criteria for CKD.    eGFR calculation 2021 CKD-EPI creatinine equation, which  does not include race as a factor    Manual Differential [[id number]]  (Abnormal) Collected: 04/23/25 0655    Specimen: Blood Updated: 04/23/25 0756     Neutrophil % 87.0 %      Lymphocyte % 4.0 %      Monocyte % 4.0 %      Eosinophil % 1.0 %      Basophil % 0.0 %      Metamyelocyte % 2.0 %      Myelocyte % 2.0 %      Neutrophils Absolute 14.32 10*3/mm3      Lymphocytes Absolute 0.66 10*3/mm3      Monocytes Absolute 0.66 10*3/mm3      Eosinophils Absolute 0.16 10*3/mm3      Basophils Absolute 0.00 10*3/mm3      nRBC 1.0 /100 WBC      Hypochromia Mod/2+     WBC Morphology Normal     Platelet Morphology Normal    Blood Gas, Arterial - [[id number]]  (Abnormal) Collected: 04/23/25 0733    Specimen: Arterial Blood Updated: 04/23/25 0736     Site Left Radial     Parag's Test Positive     pH, Arterial 7.472 pH units      pCO2, Arterial 30.0 mm Hg      pO2, Arterial 88.7 mm Hg      HCO3, Arterial 21.9 mmol/L      Base Excess, Arterial -1.4 mmol/L      Comment: Serial Number: [id number]:  [id number]        O2 Saturation, Arterial 97.5 %      Barometric Pressure for Blood Gas 753.3000 mmHg      Modality Room Air     Rate 15 Breaths/minute      Hemodilution No     Device Comment 97‰    CBC & Differential [[id number]]  (Abnormal) Collected: 04/23/25 0655    Specimen: Blood Updated: 04/23/25 0732    Narrative:      The following orders were created for panel order CBC & Differential.  Procedure                               Abnormality         Status                     ---------                               -----------         ------                     CBC Auto Differential[[id number]]        Abnormal            Final result                 Please view results for these tests on the individual orders.    CBC Auto Differential [[id number]]  (Abnormal) Collected: 04/23/25 0655    Specimen: Blood Updated: 04/23/25 0732     WBC 16.46 10*3/mm3      RBC 3.01 10*6/mm3      Hemoglobin 9.1 g/dL      Hematocrit 28.0 %      MCV 93.0 fL       MCH 30.2 pg      MCHC 32.5 g/dL      RDW 16.0 %      RDW-SD 53.7 fl      MPV 9.7 fL      Platelets 416 10*3/mm3      nRBC 0.2 /100 WBC     POC Glucose Once [[id number]]  (Normal) Collected: 04/23/25 0604    Specimen: Blood Updated: 04/23/25 0605     Glucose 95 mg/dL     POC Glucose Once [[id number]]  (Normal) Collected: 04/23/25 0237    Specimen: Blood Updated: 04/23/25 0242     Glucose 109 mg/dL     POC Glucose Once [[id number]]  (Normal) Collected: 04/22/25 2051    Specimen: Blood Updated: 04/22/25 2052     Glucose 117 mg/dL     Blood Culture - Blood, Hand, Right [[id number]]  (Normal) Collected: 04/19/25 1639    Specimen: Blood from Hand, Right Updated: 04/22/25 1700     Blood Culture No growth at 3 days    Narrative:      Less than seven (7) mL's of blood was collected.  Insufficient quantity may yield false negative results.    POC Glucose Once [[id number]]  (Normal) Collected: 04/22/25 1631    Specimen: Blood Updated: 04/22/25 1632     Glucose 127 mg/dL           Imaging Results (Last 24 Hours)       Procedure Component Value Units Date/Time    CT CEREBRAL PERFUSION WITH & WITHOUT CONTRAST [[id number]] Collected: 04/23/25 0339     Updated: 04/23/25 0344    Addenda:        ADDENDUM:  04 23 25 03:44 Call Doctor Regarding Stroke, called Dr. Pace on   04 23 03:43 (-04:00)      Signed: 04/23/25 0338 by Lory Holman MD    Narrative:      CR  Patient: RAMILA PURDY  Time Out: 03:38  Exam(s): CT PERFUSION     EXAM:    CT Brain Perfusion With Intravenous Contrast    CLINICAL HISTORY:     Reason for exam: Neuro deficit, acute, stroke suspected. alt ment stat   team d r o.    TECHNIQUE:    Routine CT brain cerebral perfusion study was performed using IV   contrast.  Reformats and postprocessing were performed by the   technologist.  CTDI is 300 mGy and DLP is 2700 mGy-cm.  This CT exam was   performed according to the principle of ALARA (As Low As Reasonably   Achievable) by using one or more of the following dose  reduction   techniques: automated exposure control, adjustment of the mA and or kV   according to patient size, and or use of iterative reconstruction   technique.    COMPARISON:    None.    FINDINGS:    Core infarct:  None.  rCBF > 30% throughout the brain.  No findings to   suggest acute core infarct.    Reversible ischemia: 9 cc increase in transit time in the beau and   right temporal lobe    Brain and extra-axial spaces:  No intra- or extra-axial hemorrhage.    IMPRESSION:     9 cc penumbra in the beau and right temporal lobe.    Unsure if this is real finding of small vessel disease or artifact.      Impression:            Communications:     Call Doctor Stroke    Electronically signed by Lory Holman MD on 04-23-25 at 0338    CT Angiogram Head w AI Analysis of LVO [[id number]] Collected: 04/23/25 0341     Updated: 04/23/25 0344    Addenda:        ADDENDUM:  04 23 25 03:43 Call Doctor Regarding Stroke, called Neurologist, STROKE @    (Pager) Dr. Pace on 04 23 03:43 (-04:00)      Signed: 04/23/25 0340 by Lory Holman MD    Narrative:      CR  Patient: RAMILA PURDY  Time Out: 03:40  Exam(s): CTA HEAD     EXAM:    CT Angiography Head With and without Intravenous Contrast    CLINICAL HISTORY:     Reason for exam: alt ment stat team d r o ni 4.    TECHNIQUE:  AI analysis of LVO was utilized    Axial computed tomographic angiography images of the head with and   without intravenous contrast.  CTDI is 12 mGy and DLP is 125 mGy-cm.    This CT exam was performed according to the principle of ALARA (As Low As   Reasonably Achievable) by using one or more of the following dose   reduction techniques: automated exposure control, adjustment of the mA   and or kV according to patient size, and or use of iterative   reconstruction technique.    MIP reconstructed images were created and reviewed.    COMPARISON:    No relevant prior studies available.    FINDINGS:    Right internal carotid artery:  No significant  stenosis.  No aneurysm.    Right anterior cerebral artery:  No significant stenosis.  No aneurysm.    Right middle cerebral artery:  No significant stenosis.  No aneurysm.    Right posterior cerebral artery:  No significant stenosis.  No aneurysm.      Right vertebral artery:  Unremarkable.      Left internal carotid artery:  No significant stenosis.  No aneurysm.    Left anterior cerebral artery:  No significant stenosis.  No aneurysm.    Left middle cerebral artery:  No significant stenosis.  No aneurysm.    Left posterior cerebral artery:  No significant stenosis.  No aneurysm.    Left vertebral artery:  Unremarkable.      Basilar artery:  No significant stenosis.  No aneurysm.    Brain: No acute hemorrhage, hydrocephalus, or herniation.    IMPRESSION:         No significant stenosis.    Brain: No acute hemorrhage, hydrocephalus, or herniation.      Impression:            Communications:     Call Doctor Stroke; Neurologist, STROKE @ [phone] (Pager)    Electronically signed by Lory Holman MD on 04-23-25 at 0340    CT Angiogram Neck [[id number]] Collected: 04/23/25 0340     Updated: 04/23/25 0344    Addenda:        ADDENDUM:  04 23 25 03:43 Call Doctor Regarding Stroke, called Dr. Pace on   04 23 03:43 (-04:00)      Signed: 04/23/25 0339 by Lory Holman MD    Narrative:      CR  Patient: RAMILA PURDY  Time Out: 03:39  Exam(s): CTA NECK     EXAM:    CT Angiography Neck With Intravenous Contrast    CLINICAL HISTORY:     Reason for exam: at ment status rapid.    TECHNIQUE:    Routine carotid CT angiography protocol was performed with intravenous   contrast.  NASCET criteria using the distal ICAs for comparison were used   for evaluation of stenoses.  CTDI is 12 mGy and DLP is 125 mGy-cm.  This   CT exam was performed according to the principle of ALARA (As Low As   Reasonably Achievable) by using one or more of the following dose   reduction techniques: automated exposure control, adjustment of the mA    and or kV according to patient size, and or use of iterative   reconstruction technique.    MIP reconstructed images were created and reviewed.    COMPARISON:    None.    FINDINGS:     VASCULATURE:    Right common carotid artery:  No significant stenosis.  No dissection.    Right internal carotid artery:  No significant stenosis.  No dissection.      Right vertebral artery:  No significant stenosis.  No dissection.      Left common carotid artery:  No significant stenosis.  No dissection.    Left internal carotid artery:  No significant stenosis.  No dissection.    Left vertebral artery:  No significant stenosis.  No dissection.     NECK:    Lung apices: Moderate bilateral pleural effusions.  Mild pulmonary   edema..     CAROTID STENOSIS REFERENCE USING NASCET CRITERIA:    % ICA stenosis = (1 - narrowest ICA diameter diameter of distal   cervical ICA) x 100.    Mild - <50% stenosis.    Moderate - 50-69% stenosis.    Severe - 70-94% stenosis.    Near occlusion - 95-99% stenosis.    Occluded - 100% stenosis.    IMPRESSION:         No significant stenosis.  Moderate bilateral pleural effusions.  Mild pulmonary edema      Impression:            Communications:     Call Doctor Stroke    Electronically signed by Lory Holman MD on 04-23-25 at 0339          ECG/EMG Results (last 24 hours)       Procedure Component Value Units Date/Time    Telemetry Scan [[id number]] Resulted: 04/17/25     Updated: 04/23/25 0306          Orders (last 24 hrs)        Start     Ordered    04/24/25 0600  CBC (No Diff)  Morning Draw         04/23/25 1442    04/24/25 0600  Basic Metabolic Panel  Morning Draw         04/23/25 1442    04/23/25 1617  POC Glucose Once  PROCEDURE ONCE        Comments: Complete no more than 45 minutes prior to patient eating      04/23/25 1614    04/23/25 1330  POC Glucose Once  PROCEDURE ONCE        Comments: Complete no more than 45 minutes prior to patient eating      04/23/25 1327    04/23/25 1106  POC Glucose  Once  PROCEDURE ONCE        Comments: Complete no more than 45 minutes prior to patient eating      04/23/25 1103    04/23/25 0935  MRI Brain Without Contrast  1 Time Imaging         04/23/25 0934    04/23/25 0900  metoprolol succinate XL (TOPROL-XL) 24 hr tablet 50 mg  Every 24 Hours Scheduled         04/22/25 1738    04/23/25 0737  Blood Gas, Arterial -  PROCEDURE ONCE         04/23/25 0733    04/23/25 0723  Manual Differential  Once         04/23/25 0722    04/23/25 0633  Blood Gas, Arterial -  STAT         04/23/25 0632    04/23/25 0617  Basic Metabolic Panel  STAT         04/23/25 0616    04/23/25 0617  CBC & Differential  STAT         04/23/25 0616    04/23/25 0617  CBC Auto Differential  PROCEDURE ONCE         04/23/25 0616    04/23/25 0606  POC Glucose Once  PROCEDURE ONCE        Comments: Complete no more than 45 minutes prior to patient eating      04/23/25 0604    04/23/25 0604  Basic Metabolic Panel  STAT,   Status:  Canceled         04/23/25 0603    04/23/25 0600  CBC (No Diff)  Morning Draw,   Status:  Canceled         04/22/25 1746    04/23/25 0600  Basic Metabolic Panel  Morning Draw,   Status:  Canceled         04/22/25 1746    04/23/25 0600  Blood Gas, Arterial -  STAT,   Status:  Canceled         04/23/25 0559    04/23/25 0558  Creatinine Serum (kidney function) GFR component  STAT,   Status:  Canceled         04/23/25 0558    04/23/25 0430  Inpatient Neurology Consult General  Once        Specialty:  Neurology  Provider:  Bentley Pace MD    04/23/25 0430    04/23/25 0400  iopamidol (ISOVUE-370) 76 % injection 150 mL  Once in Imaging         04/23/25 0312    04/23/25 0255  CT Angiogram Neck  1 Time Imaging         04/23/25 0257    04/23/25 0255  CT Head Without Contrast Stroke Protocol  1 Time Imaging,   Status:  Canceled         04/23/25 0257    04/23/25 0255  CT CEREBRAL PERFUSION WITH & WITHOUT CONTRAST  1 Time Imaging         04/23/25 0257    04/23/25 0255  CT Angiogram Head w  "AI Analysis of LVO  1 Time Imaging         04/23/25 0257    04/23/25 0255  NPO Diet NPO Type: Strict NPO  Diet Effective Now        Comments: Until Dysphagia Screen Complete    04/23/25 0257    04/23/25 0243  POC Glucose Once  PROCEDURE ONCE        Comments: Complete no more than 45 minutes prior to patient eating      04/23/25 0237    04/22/25 2053  POC Glucose Once  PROCEDURE ONCE        Comments: Complete no more than 45 minutes prior to patient eating      04/22/25 2051 04/22/25 1633  POC Glucose Once  PROCEDURE ONCE        Comments: Complete no more than 45 minutes prior to patient eating      04/22/25 1631    04/21/25 1815  phenazopyridine (PYRIDIUM) tablet 200 mg  3 Times Daily With Meals         04/21/25 1726    04/21/25 1800  Dietary Nutrition Supplements Other (see comment); Dio orange  2 Times Daily       04/21/25 1313    04/21/25 1000  Incentive Spirometry  Every 4 Hours While Awake       04/21/25 0912    04/21/25 0803  Urinary Catheter Care  Every Shift      Placed in \"And\" Linked Group    04/21/25 0805    04/20/25 1700  POC Glucose 4x Daily Before Meals & at Bedtime  4 Times Daily Before Meals & at Bedtime      Comments: Complete no more than 45 minutes prior to patient eating      04/20/25 1200    04/20/25 1600  Vital Signs  Every 4 Hours       04/20/25 1340    04/20/25 1300  insulin glargine (LANTUS, SEMGLEE) injection 10 Units  Daily,   Status:  Discontinued         04/20/25 1200    04/20/25 1300  insulin lispro (HUMALOG/ADMELOG) injection 2-7 Units  4 Times Daily Before Meals & Nightly         04/20/25 1200    04/19/25 2200  cefTRIAXone (ROCEPHIN) 2,000 mg in sodium chloride 0.9 % 100 mL MBP  Every 24 Hours         04/19/25 2109    04/19/25 0930  aspirin EC tablet 81 mg  Daily         04/19/25 0833    04/18/25 1021  zolpidem (AMBIEN) tablet 5 mg  Nightly PRN         04/18/25 1021    04/18/25 1017  loperamide (IMODIUM) capsule 2 mg  4 Times Daily PRN         04/18/25 1018    04/18/25 1006  " "HYDROcodone-acetaminophen (NORCO) 5-325 MG per tablet 1 tablet  Every 6 Hours PRN         04/18/25 1006    04/18/25 0532  Phosphorus Replacement - Follow Nurse / BPA Driven Protocol  As Needed         04/18/25 0533    04/18/25 0509  Potassium Replacement - Follow Nurse / BPA Driven Protocol  As Needed         04/18/25 0509    04/17/25 2200  heparin (porcine) 5000 UNIT/ML injection 5,000 Units  Every 8 Hours Scheduled         04/17/25 1609    04/17/25 2100  pantoprazole (PROTONIX) EC tablet 40 mg  2 Times Daily         04/17/25 1609    04/17/25 2100  pramipexole (MIRAPEX) tablet 1.5 mg  2 Times Daily         04/17/25 1609    04/17/25 2100  sodium chloride 0.9 % flush 10 mL  Every 12 Hours Scheduled         04/17/25 1609    04/17/25 2100  sennosides-docusate (PERICOLACE) 8.6-50 MG per tablet 2 tablet  2 Times Daily        Placed in \"And\" Linked Group    04/17/25 1609    04/17/25 1800  baclofen (LIORESAL) tablet 20 mg  2 Times Daily         04/17/25 1726    04/17/25 1700  anastrozole (ARIMIDEX) tablet 1 mg  Daily         04/17/25 1609    04/17/25 1700  FLUoxetine (PROzac) capsule 20 mg  Daily         04/17/25 1609    04/17/25 1700  Intake & Output  Every Hour       04/17/25 1609    04/17/25 1610  Daily Weights  Daily       04/17/25 1609    04/17/25 1610  Oral Care - Patient Not on NPPV & Not Intubated  Every Shift       04/17/25 1609    04/17/25 1610  Daily CHG Bath While in Critical Care  Daily      Comments: Use for admission bath and length of critical care stay.    04/17/25 1609    04/17/25 1609  polyethylene glycol (MIRALAX) packet 17 g  Daily PRN        Placed in \"And\" Linked Group    04/17/25 1609    04/17/25 1609  bisacodyl (DULCOLAX) EC tablet 5 mg  Daily PRN        Placed in \"And\" Linked Group    04/17/25 1609    04/17/25 1609  bisacodyl (DULCOLAX) suppository 10 mg  Daily PRN        Placed in \"And\" Linked Group    04/17/25 1609    04/17/25 1609  acetaminophen (TYLENOL) tablet 650 mg  Every 4 Hours PRN      " "  Placed in \"Or\" Linked Group    04/17/25 1609    04/17/25 1609  acetaminophen (TYLENOL) suppository 650 mg  Every 4 Hours PRN        Placed in \"Or\" Linked Group    04/17/25 1609    04/17/25 1609  ondansetron ODT (ZOFRAN-ODT) disintegrating tablet 4 mg  Every 6 Hours PRN        Placed in \"Or\" Linked Group    04/17/25 1609    04/17/25 1609  ondansetron (ZOFRAN) injection 4 mg  Every 6 Hours PRN        Placed in \"Or\" Linked Group    04/17/25 1609    04/17/25 1609  albuterol sulfate HFA (PROVENTIL HFA;VENTOLIN HFA;PROAIR HFA) inhaler 2 puff  Every 4 Hours PRN         04/17/25 1609    04/17/25 1609  [Held by provider]  clonazePAM (KlonoPIN) tablet 2 mg  2 Times Daily PRN        (On hold since today at 1114 until manually unheld; held by Oskar Spence MDHold Reason: Other (Comment Required)Hold Comment: encephalopathy)    04/17/25 1609    04/17/25 1609  prochlorperazine (COMPAZINE) tablet 10 mg  Every 6 Hours PRN         04/17/25 1609    04/17/25 1609  nitroglycerin (NITROSTAT) SL tablet 0.4 mg  Every 5 Minutes PRN         04/17/25 1609    04/17/25 1609  sodium chloride 0.9 % flush 10 mL  As Needed         04/17/25 1609    04/17/25 1609  sodium chloride 0.9 % infusion 40 mL  As Needed         04/17/25 1609    04/17/25 1609  dextrose (GLUTOSE) oral gel 15 g  Every 15 Minutes PRN         04/17/25 1609    04/17/25 1609  dextrose (D50W) (25 g/50 mL) IV injection 25 g  Every 15 Minutes PRN         04/17/25 1609    04/17/25 1609  glucagon (GLUCAGEN) injection 1 mg  Every 15 Minutes PRN         04/17/25 1609    04/17/25 1237  sodium chloride 0.9 % flush 10 mL  As Needed        Placed in \"And\" Linked Group    04/17/25 1237    03/26/25 0000  anastrozole (ARIMIDEX) 1 MG tablet  Daily         04/17/25 1438    Unscheduled  Follow Hypoglycemia Standing Orders For Blood Glucose <70 & Notify Provider of Treatment  As Needed      Comments: Follow Hypoglycemia Orders As Outlined in Process Instructions (Open Order Report to View Full " Instructions)  Notify Provider Any Time Hypoglycemia Treatment is Administered    25 1609    Unscheduled  Wound Care  As Needed       25 1358    --  metFORMIN (GLUCOPHAGE) 500 MG tablet  Daily With Breakfast         25 1439    --  IVERMECTIN PO  3 Times Weekly         25 1500                  Operative/Procedure Notes (last 24 hours)  Notes from 25 1617 through 25 1617   No notes of this type exist for this encounter.          Physician Progress Notes (last 48 hours)        Oskar Spence MD at 25 1436              Name: Maritza Nance Darci ADMIT: 2025   : 1960  PCP: Enoc Carbone DO    MRN: [id number] LOS: 6 days   AGE/SEX: 64 y.o. female  ROOM: [facility]     Subjective   Subjective     She remains significantly confused. Apparently this kind of thing has happened in the past with klonopin. Her  is at bedside and reports that she seldomly takes it at home. She's only received 1 dose this admission, and although the timing is suspicious, her confusion is out of proportion. It also appears that her catheter wasn't draining (it has been flushed and is functioning now), and I think this is a more likely cause of her confusion. Discussed with Dr. Pace earlier today and there are plans for a brain MRI, which I think is appropriate       Objective   Objective   Vital Signs  Temp:  [97.9 °F (36.6 °C)-99.7 °F (37.6 °C)] 97.9 °F (36.6 °C)  Heart Rate:  [71-98] 71  Resp:  [16-18] 18  BP: ()/() 97/57  SpO2:  [91 %-100 %] 94 %  on   ;   Device (Oxygen Therapy): room air  Body mass index is 30.49 kg/m².  Physical Exam  Constitutional:       General: She is not in acute distress.     Appearance: She is obese. She is not toxic-appearing.   Cardiovascular:      Rate and Rhythm: Normal rate and regular rhythm.      Heart sounds: Normal heart sounds.   Pulmonary:      Effort: Pulmonary effort is normal.      Breath sounds: Normal breath sounds.   Abdominal:       General: Bowel sounds are normal.      Palpations: Abdomen is soft.   Musculoskeletal:         General: No tenderness.      Right lower leg: No edema.      Left lower leg: No edema.   Neurological:      General: No focal deficit present.      Mental Status: She is alert. She is confused.   Psychiatric:         Mood and Affect: Mood normal.         Behavior: Behavior normal.         Results Review     I reviewed the patient's new clinical results.  Results from last 7 days   Lab Units 04/23/25  0655 04/22/25 0322 04/21/25 0444 04/20/25  0248   WBC 10*3/mm3 16.46* 10.84* 9.22 10.42   HEMOGLOBIN g/dL 9.1* 8.8* 9.3* 9.5*   PLATELETS 10*3/mm3 416 340 274 232     Results from last 7 days   Lab Units 04/23/25 0655 04/22/25 0322 04/21/25 1544 04/21/25 0444 04/20/25  0248   SODIUM mmol/L 138 141  --  139 137   POTASSIUM mmol/L 4.8 4.5 4.4 3.4* 3.8   CHLORIDE mmol/L 108* 111*  --  106 105   CO2 mmol/L 21.7* 22.7  --  21.1* 21.0*   BUN mg/dL 38* 33*  --  32* 36*   CREATININE mg/dL 1.89* 1.15*  --  1.11* 1.23*   GLUCOSE mg/dL 91 153*  --  231* 242*   Estimated Creatinine Clearance: 32.8 mL/min (A) (by C-G formula based on SCr of 1.89 mg/dL (H)).  Results from last 7 days   Lab Units 04/22/25 0322 04/21/25 0444 04/20/25 0248 04/19/25  0352   ALBUMIN g/dL 2.7* 2.7* 2.8* 2.5*   BILIRUBIN mg/dL 0.3 0.3 0.2 0.4   ALK PHOS U/L 269* 250* 229* 192*   AST (SGOT) U/L 88* 70* 68* 45*   ALT (SGPT) U/L 65* 47* 40* 26     Results from last 7 days   Lab Units 04/23/25  0655 04/22/25 0322 04/21/25  1544 04/21/25 0444 04/20/25  0248 04/19/25  0352 04/18/25  2250 04/18/25  1351 04/18/25 0411   CALCIUM mg/dL 7.2* 8.4*  --  8.0* 8.5* 8.0*  --   --  7.2*   ALBUMIN g/dL  --  2.7*  --  2.7* 2.8* 2.5*  --   --  2.9*   MAGNESIUM mg/dL  --  2.2  --  2.1  --   --   --   --  1.9   PHOSPHORUS mg/dL  --  2.7 2.3* 2.2*  --   --  2.3*   < > 1.5*    < > = values in this interval not displayed.     Results from last 7 days   Lab Units 04/18/25  0411  04/18/25  0050 04/17/25  2151 04/17/25  1715 04/17/25  1304   PROCALCITONIN ng/mL  --   --   --   --  44.90*   LACTATE mmol/L 3.8* 4.6* 2.7* 6.1* 6.4*     COVID19   Date Value Ref Range Status   04/17/2025 Not Detected Not Detected - Ref. Range Final   09/07/2024 Not Detected Not Detected - Ref. Range Final   02/26/2022 Not Detected Not Detected - Ref. Range Final     SARS-CoV-2 PCR   Date Value Ref Range Status   03/24/2021 Not Detected Not Detected Final     Comment:     Nucleic acid specific to SARS-CoV-2 (COVID-19) virus was not detected in  this sample by the TaqPath (TM) COVID-19 Combo Kit.          SARS-CoV-2 (COVID-19) nucleic acid testing performed using [employer] Aptima (R) SARS-CoV-2 Assay or [employer] TaqPath (TM)  COVID-19 Combo Kit as indicated above under Emergency Use Authorization (EUA) from the FDA. Aptima (R) and TaqPath (TM) test performance  characteristics verified by [facility] in accordance with the FDAs Guidance Document (Policy for Diagnostic Tests for Coronavirus Disease-2019  during the Public Health Emergency) issued on March 16, 2020. The laboratory is regulated under CLIA as qualified to perform high-complexity testing  Unless otherwise noted, all testing was performed at [facility], CLIA No. [id number], [place] State Licensee No. [id number]   02/26/2021 Not Detected Not Detected Final     Comment:     Nucleic acid specific to SARS-CoV-2 (COVID-19) virus was not detected in  this sample by the Aptima (R) SARS-CoV-2 Assay.                SARS-CoV-2 (COVID-19) nucleic acid testing performed using [employer] Aptima (R) SARS-CoV-2 Assay or [employer] TaqPath (TM)  COVID-19 Combo Kit as indicated above under Emergency Use Authorization (EUA) from the FDA. Aptima (R) and TaqPath (TM) test performance  characteristics verified by [facility] in accordance with the FDAs Guidance Document (Policy for Diagnostic Tests for Coronavirus Disease-2019  during the Public Health  Emergency) issued on March 16, 2020. The laboratory is regulated under CLIA as qualified to perform high-complexity testing  Unless otherwise noted, all testing was performed at [facility], CLIA No. [id number], [place] State Licensee No. [id number]     Hemoglobin A1C   Date/Time Value Ref Range Status   04/21/2025 0444 7.30 (H) 4.80 - 5.60 % Final     Glucose   Date/Time Value Ref Range Status   04/23/2025 1327 78 70 - 130 mg/dL Final   04/23/2025 1103 84 70 - 130 mg/dL Final   04/23/2025 0604 95 70 - 130 mg/dL Final   04/23/2025 0237 109 70 - 130 mg/dL Final   04/22/2025 2051 117 70 - 130 mg/dL Final   04/22/2025 1631 127 70 - 130 mg/dL Final   04/22/2025 1043 181 (H) 70 - 130 mg/dL Final       CT Angiogram Head w AI Analysis of LVO  Addendum: ADDENDUM:   04 23 25 03:43 Call Doctor Regarding Stroke, called Neurologist, STROKE @      (Pager) Dr. Pace on 04 23 03:43 (-04:00)  Narrative: CR  Patient: RAMILA PURDY  Time Out: 03:40  Exam(s): CTA HEAD     EXAM:    CT Angiography Head With and without Intravenous Contrast    CLINICAL HISTORY:     Reason for exam: alt ment stat team d r o ni 4.    TECHNIQUE:  AI analysis of LVO was utilized    Axial computed tomographic angiography images of the head with and   without intravenous contrast.  CTDI is 12 mGy and DLP is 125 mGy-cm.    This CT exam was performed according to the principle of ALARA (As Low As   Reasonably Achievable) by using one or more of the following dose   reduction techniques: automated exposure control, adjustment of the mA   and or kV according to patient size, and or use of iterative   reconstruction technique.    MIP reconstructed images were created and reviewed.    COMPARISON:    No relevant prior studies available.    FINDINGS:    Right internal carotid artery:  No significant stenosis.  No aneurysm.    Right anterior cerebral artery:  No significant stenosis.  No aneurysm.    Right middle cerebral artery:  No significant stenosis.  No aneurysm.     Right posterior cerebral artery:  No significant stenosis.  No aneurysm.      Right vertebral artery:  Unremarkable.      Left internal carotid artery:  No significant stenosis.  No aneurysm.    Left anterior cerebral artery:  No significant stenosis.  No aneurysm.    Left middle cerebral artery:  No significant stenosis.  No aneurysm.    Left posterior cerebral artery:  No significant stenosis.  No aneurysm.    Left vertebral artery:  Unremarkable.      Basilar artery:  No significant stenosis.  No aneurysm.    Brain: No acute hemorrhage, hydrocephalus, or herniation.    IMPRESSION:         No significant stenosis.    Brain: No acute hemorrhage, hydrocephalus, or herniation.  Impression: Communications:     Call Doctor Stroke; Neurologist, STROKE @ [phone] (Pager)    Electronically signed by Lory Holman MD on 04-23-25 at 0340  CT CEREBRAL PERFUSION WITH & WITHOUT CONTRAST  Addendum: ADDENDUM:   04 23 25 03:44 Call Doctor Regarding Stroke, called Dr. Paec on    04 23 03:43 (-04:00)  Narrative: CR  Patient: RAMILA PURDY  Time Out: 03:38  Exam(s): CT PERFUSION     EXAM:    CT Brain Perfusion With Intravenous Contrast    CLINICAL HISTORY:     Reason for exam: Neuro deficit, acute, stroke suspected. alt ment stat   team d r o.    TECHNIQUE:    Routine CT brain cerebral perfusion study was performed using IV   contrast.  Reformats and postprocessing were performed by the   technologist.  CTDI is 300 mGy and DLP is 2700 mGy-cm.  This CT exam was   performed according to the principle of ALARA (As Low As Reasonably   Achievable) by using one or more of the following dose reduction   techniques: automated exposure control, adjustment of the mA and or kV   according to patient size, and or use of iterative reconstruction   technique.    COMPARISON:    None.    FINDINGS:    Core infarct:  None.  rCBF > 30% throughout the brain.  No findings to   suggest acute core infarct.    Reversible ischemia: 9 cc increase  in transit time in the beau and   right temporal lobe    Brain and extra-axial spaces:  No intra- or extra-axial hemorrhage.    IMPRESSION:     9 cc penumbra in the beau and right temporal lobe.    Unsure if this is real finding of small vessel disease or artifact.  Impression: Communications:     Call Doctor Stroke    Electronically signed by Lory Holman MD on 04-23-25 at 0338  CT Angiogram Neck  Addendum: ADDENDUM:   04 23 25 03:43 Call Doctor Regarding Stroke, called Dr. Pace on    04 23 03:43 (-04:00)  Narrative: CR  Patient: RAMILA PURDY  Time Out: 03:39  Exam(s): CTA NECK     EXAM:    CT Angiography Neck With Intravenous Contrast    CLINICAL HISTORY:     Reason for exam: at ment status rapid.    TECHNIQUE:    Routine carotid CT angiography protocol was performed with intravenous   contrast.  NASCET criteria using the distal ICAs for comparison were used   for evaluation of stenoses.  CTDI is 12 mGy and DLP is 125 mGy-cm.  This   CT exam was performed according to the principle of ALARA (As Low As   Reasonably Achievable) by using one or more of the following dose   reduction techniques: automated exposure control, adjustment of the mA   and or kV according to patient size, and or use of iterative   reconstruction technique.    MIP reconstructed images were created and reviewed.    COMPARISON:    None.    FINDINGS:     VASCULATURE:    Right common carotid artery:  No significant stenosis.  No dissection.    Right internal carotid artery:  No significant stenosis.  No dissection.      Right vertebral artery:  No significant stenosis.  No dissection.      Left common carotid artery:  No significant stenosis.  No dissection.    Left internal carotid artery:  No significant stenosis.  No dissection.    Left vertebral artery:  No significant stenosis.  No dissection.     NECK:    Lung apices: Moderate bilateral pleural effusions.  Mild pulmonary   edema..     CAROTID STENOSIS REFERENCE USING NASCET  CRITERIA:    % ICA stenosis = (1 - narrowest ICA diameter diameter of distal   cervical ICA) x 100.    Mild - <50% stenosis.    Moderate - 50-69% stenosis.    Severe - 70-94% stenosis.    Near occlusion - 95-99% stenosis.    Occluded - 100% stenosis.    IMPRESSION:         No significant stenosis.  Moderate bilateral pleural effusions.  Mild pulmonary edema  Impression: Communications:     Call Doctor Stroke    Electronically signed by Lory Holman MD on 04-23-25 at 0339    Scheduled Medications  anastrozole, 1 mg, Oral, Daily  aspirin, 81 mg, Oral, Daily  baclofen, 20 mg, Oral, BID  cefTRIAXone, 2,000 mg, Intravenous, Q24H  FLUoxetine, 20 mg, Oral, Daily  heparin (porcine), 5,000 Units, Subcutaneous, Q8H  insulin glargine, 10 Units, Subcutaneous, Daily  insulin lispro, 2-7 Units, Subcutaneous, 4x Daily AC & at Bedtime  metoprolol succinate XL, 50 mg, Oral, Q24H  pantoprazole, 40 mg, Oral, BID  pramipexole, 1.5 mg, Oral, BID  senna-docusate sodium, 2 tablet, Oral, BID  sodium chloride, 10 mL, Intravenous, Q12H    Infusions   Diet  NPO Diet NPO Type: Strict NPO      Assessment/Plan     Active Hospital Problems    Diagnosis  POA    **Sepsis [A41.9]  Yes    NSTEMI (non-ST elevated myocardial infarction) [I21.4]  Yes    Type 2 diabetes mellitus [E11.9]  Yes    Elevated LFTs [R79.89]  Yes    Malignant neoplasm of overlapping sites of left breast in female, estrogen receptor positive [C50.812, Z17.0]  Not Applicable    Multiple sclerosis [G35]  Yes    Essential hypertension [I10]  Yes    Anxiety and depression [F41.9, F32.A]  Yes    Neurogenic bladder [N31.9]  Yes    Restless legs syndrome [G25.81]  Yes      Resolved Hospital Problems   No resolved problems to display.       64 y.o. female admitted with Sepsis.    Klebsiella UTI and bacteremia causing septic shock-on ceftriaxone with plans for 14 days of IV therapy per ID. Repeat blood cultures are negative.   Type 2 NSTEMI-related to the above. Normal EF on  echocardiogram, but WMA consistent with stress cardiomyopathy. Cardiology recommends a betablocker and baby aspirin. She'll need to follow up with them in 1 month  KINZA-initially prerenal due to septic shock, but now likely related to obstructive uropathy with matias malfunction. Her matias has been flushed and is draining now  Worsened leukocytosis-I think most likely related to urinary retention as she's currently on antibiotics as above.  Metabolic/toxic encephalopathy-possibly related to her klonopin which has been discontinued or her urinary retention which has been addressed. Neurology is consulted and a brain MRI is pending given her history of metastatic breast cancer and MS  Elevated LFTs-gallbladder ultrasound and CT abdomen/pelvis without obvious biliary disease. Acute hepatitis profile negative as well. Previously thought to be secondary to ribociclib per oncology  Type 2 diabetes-A1c 7.3. glucose is too tightly controlled. Stop lantus. Continue sliding scale  Essential hypertension-home antihypertensives held acutely  GERD-ppi  Multiple sclerosis-not on medications chronically for this  Neurogenic bladder with a chronic indwelling matias catheter-this wasn't draining earlier today and was flushed and is draining appropriately once more  Metastatic breast cancer-on faslodex, xgeva, truqap as an outpatient   Insomnia-ambien prn  Anxiety-discontinue clonazepam permanently given it's association with confusion this admission and as an outpatient  Heparin SC for DVT prophylaxis.  Full code.  Discussed with patient, spouse, and nursing staff.  Anticipate discharge home with home health timing yet to be determined.      Oskar Spence MD  [facility]  04/23/25  14:36 EDT             Electronically signed by Oskar Spence MD at 04/23/25 2699       Luisa Child MD at 04/22/25 2051            Infectious Diseases Progress Note    Luisa Child MD     [facility]  Los: 5  "days  Patient Identification:  Name: Maritza Bejarano  Age: 64 y.o.  Sex: female  :  1960  MRN: [id number]         Primary Care Physician: Enoc Carbone DO        Subjective: Overall feeling better but interested in her catheter being removed.  Denies any fever and chills.  Interval History: See consultation note.    Objective:    Scheduled Meds:anastrozole, 1 mg, Oral, Daily  aspirin, 81 mg, Oral, Daily  baclofen, 20 mg, Oral, BID  cefTRIAXone, 2,000 mg, Intravenous, Q24H  FLUoxetine, 20 mg, Oral, Daily  heparin (porcine), 5,000 Units, Subcutaneous, Q8H  insulin glargine, 10 Units, Subcutaneous, Daily  insulin lispro, 2-7 Units, Subcutaneous, 4x Daily AC & at Bedtime  [START ON 2025] metoprolol succinate XL, 50 mg, Oral, Q24H  pantoprazole, 40 mg, Oral, BID  phenazopyridine, 200 mg, Oral, TID With Meals  pramipexole, 1.5 mg, Oral, BID  senna-docusate sodium, 2 tablet, Oral, BID  sodium chloride, 10 mL, Intravenous, Q12H      Continuous Infusions:       Vital signs in last 24 hours:  Temp:  [97.3 °F (36.3 °C)-99.9 °F (37.7 °C)] 98.1 °F (36.7 °C)  Heart Rate:  [67-92] 85  Resp:  [18] 18  BP: (124-149)/(61-97) 145/97    Intake/Output:    Intake/Output Summary (Last 24 hours) at 2025  Last data filed at [address]  Gross per 24 hour   Intake 220 ml   Output --   Net 220 ml       Exam:  /97 (BP Location: Right arm, Patient Position: Lying)   Pulse 85   Temp 98.1 °F (36.7 °C) (Oral)   Resp 18   Ht 165.1 cm (65\")   Wt 83.1 kg (183 lb 3.2 oz)   LMP  (LMP Unknown) Comment: PT. STATES HER LAST PERIOD WAS GREATER THAN FIVE YEARS AGO  SpO2 99%   BMI 30.49 kg/m²   Patient is examined using the personal protective equipment as per guidelines from infection control for this particular patient as enacted.  Hand washing was performed before and after patient interaction.  General Appearance:    Alert, cooperative, no distress, AAOx3                          Head:    Normocephalic, without " obvious abnormality, atraumatic                           Eyes:  Pale conjunctiva                         Throat:   Lips, tongue, gums normal; oral mucosa pink and moist                           Neck:   Supple, symmetrical, trachea midline, no JVD                         Lungs:    Clear to auscultation bilaterally, respirations unlabored                 Chest Wall:    No tenderness or deformity                          Heart:  S1-S2 regular                  Abdomen:   Catheter in place, obese soft nontender                 Extremities:   Extremities normal, atraumatic, no cyanosis or edema                        Pulses:   Pulses palpable in all extremities                            Skin:   Skin is warm and dry,  no rashes or palpable lesions                  Neurologic: Alert and oriented x 3       Data Review:    I reviewed the patient's new clinical results.  Results from last 7 days   Lab Units 04/22/25  0322 04/21/25  0444 04/20/25  0248 04/19/25  0352 04/18/25  0411 04/17/25  1304   WBC 10*3/mm3 10.84* 9.22 10.42 12.19* 16.96* 15.47*   HEMOGLOBIN g/dL 8.8* 9.3* 9.5* 9.7* 11.1* 13.4   PLATELETS 10*3/mm3 340 274 232 221 265 396     Results from last 7 days   Lab Units 04/22/25  0322 04/21/25  1544 04/21/25  0444 04/20/25  0248 04/19/25  0352 04/18/25  1823 04/18/25  0411 04/17/25  1304   SODIUM mmol/L 141  --  139 137 140  --  136 136   POTASSIUM mmol/L 4.5 4.4 3.4* 3.8 4.2 4.0 2.8* 3.7   CHLORIDE mmol/L 111*  --  106 105 105  --  98 99   CO2 mmol/L 22.7  --  21.1* 21.0* 25.3  --  24.3 17.0*   BUN mg/dL 33*  --  32* 36* 34*  --  28* 29*   CREATININE mg/dL 1.15*  --  1.11* 1.23* 1.16*  --  1.37* 1.52*   CALCIUM mg/dL 8.4*  --  8.0* 8.5* 8.0*  --  7.2* 8.6   GLUCOSE mg/dL 153*  --  231* 242* 216*  --  225* 294*     Microbiology Results (last 10 days)       Procedure Component Value - Date/Time    Blood Culture - Blood, Hand, Right [[id number]]  (Normal) Collected: 04/19/25 7176    Lab Status: Preliminary result  Specimen: Blood from Hand, Right Updated: 04/22/25 1700     Blood Culture No growth at 3 days    Narrative:      Less than seven (7) mL's of blood was collected.  Insufficient quantity may yield false negative results.    Blood Culture - Blood, Arm, Right [[id number]]  (Normal) Collected: 04/19/25 0908    Lab Status: Preliminary result Specimen: Blood from Arm, Right Updated: 04/22/25 0945     Blood Culture No growth at 3 days    Respiratory Panel PCR w/COVID-19(SARS-CoV-2) YAMILE/FRANK/ROSE/PAD/COR/CHIDI In-House, NP Swab in UTM/VTM, 2 HR TAT - Swab, Nasopharynx [[id number]]  (Normal) Collected: 04/17/25 1341    Lab Status: Final result Specimen: Swab from Nasopharynx Updated: 04/17/25 1443     ADENOVIRUS, PCR Not Detected     Coronavirus 229E Not Detected     Coronavirus HKU1 Not Detected     Coronavirus NL63 Not Detected     Coronavirus OC43 Not Detected     COVID19 Not Detected     Human Metapneumovirus Not Detected     Human Rhinovirus/Enterovirus Not Detected     Influenza A PCR Not Detected     Influenza B PCR Not Detected     Parainfluenza Virus 1 Not Detected     Parainfluenza Virus 2 Not Detected     Parainfluenza Virus 3 Not Detected     Parainfluenza Virus 4 Not Detected     RSV, PCR Not Detected     Bordetella pertussis pcr Not Detected     Bordetella parapertussis PCR Not Detected     Chlamydophila pneumoniae PCR Not Detected     Mycoplasma pneumo by PCR Not Detected    Narrative:      In the setting of a positive respiratory panel with a viral infection PLUS a negative procalcitonin without other underlying concern for bacterial infection, consider observing off antibiotics or discontinuation of antibiotics and continue supportive care. If the respiratory panel is positive for atypical bacterial infection (Bordetella pertussis, Chlamydophila pneumoniae, or Mycoplasma pneumoniae), consider antibiotic de-escalation to target atypical bacterial infection.    Urine Culture - Urine, Urine, Catheter [[id number]]   (Abnormal)  (Susceptibility) Collected: 04/17/25 1326    Lab Status: Final result Specimen: Urine, Catheter Updated: 04/19/25 1037     Urine Culture >100,000 CFU/mL Klebsiella pneumoniae ssp pneumoniae    Narrative:      Colonization of the urinary tract without infection is common. Treatment is discouraged unless the patient is symptomatic, pregnant, or undergoing an invasive urologic procedure.    Susceptibility        Klebsiella pneumoniae ssp pneumoniae      JOSE      Amoxicillin + Clavulanate Susceptible      Ampicillin Resistant      Ampicillin + Sulbactam Susceptible      Cefazolin (Urine) Susceptible      Cefepime Susceptible      Ceftazidime Susceptible      Ceftriaxone Susceptible      Gentamicin Susceptible      Levofloxacin Intermediate      Nitrofurantoin Susceptible      Piperacillin + Tazobactam Susceptible      Trimethoprim + Sulfamethoxazole Resistant                           Blood Culture - Blood, Foot, Right [[id number]]  (Abnormal)  (Susceptibility) Collected: 04/17/25 1313    Lab Status: Final result Specimen: Blood from Foot, Right Updated: 04/19/25 0700     Blood Culture Klebsiella pneumoniae ssp pneumoniae     Isolated from Aerobic and Anaerobic Bottles     Gram Stain Anaerobic Bottle Gram negative bacilli      Aerobic Bottle Gram negative bacilli    Susceptibility        Klebsiella pneumoniae ssp pneumoniae      JOSE      Amoxicillin + Clavulanate Susceptible      Ampicillin Resistant      Ampicillin + Sulbactam Susceptible      Cefazolin (Non Urine) Susceptible      Cefepime Susceptible      Ceftazidime Susceptible      Ceftriaxone Susceptible      Gentamicin Susceptible      Levofloxacin Intermediate      Piperacillin + Tazobactam Susceptible      Trimethoprim + Sulfamethoxazole Resistant                       Susceptibility Comments       Klebsiella pneumoniae ssp pneumoniae    With the exception of urinary-sourced infections, aminoglycosides should not be used as monotherapy.                Blood Culture ID, PCR - Blood, Foot, Right [[id number]]  (Abnormal) Collected: 04/17/25 1313    Lab Status: Final result Specimen: Blood from Foot, Right Updated: 04/17/25 2355     BCID, PCR Klebsiella pneumoniae group. Identification by BCID2 PCR.     BOTTLE TYPE Anaerobic Bottle    Narrative:      No resistance genes detected.    Blood Culture - Blood, Foot, Left [[id number]]  (Abnormal) Collected: 04/17/25 1304    Lab Status: Final result Specimen: Blood from Foot, Left Updated: 04/19/25 0700     Blood Culture Klebsiella pneumoniae ssp pneumoniae     Isolated from Aerobic and Anaerobic Bottles     Gram Stain Anaerobic Bottle Gram negative bacilli      Aerobic Bottle Gram negative bacilli    Narrative:      Refer to previous blood culture collected on 04/17/2025 1313 for MICs.              Assessment: Improving    Sepsis    Multiple sclerosis    Essential hypertension    Anxiety and depression    Neurogenic bladder    Restless legs syndrome    Malignant neoplasm of overlapping sites of left breast in female, estrogen receptor positive    Elevated LFTs    NSTEMI (non-ST elevated myocardial infarction)    Type 2 diabetes mellitus   64-year-old female with  1-Klebsiella bacteremic sepsis with septic shock secondary  2-urinary tract infection due to recent indwelling Cobos catheter malfunction  3-immobility with MS and neurogenic bladder  4-immunocompromised host  5-acute on chronic renal insufficiency  6-anemia multifactorial  7-hyperglycemia  8-episode of low-grade fever with some chest discomfort upon coughing and taking deep breath-concerning for atelectasis versus evolving pneumonia.     Recommendations/Discussions:  See my discussion and recommendations on 4/18/2025  Continue with incentive spirometry  Continue with IV Rocephin  Follow-up on repeat blood cultures.  Patient would need 10 to 14 days of antibiotic treatment from last negative blood culture for bacteremic Klebsiella pneumonia urinary tract infection  due to malfunctioning catheter and transient obstruction.  Patient will need some sort of IV access to administer antibiotic treatment in the out of the hospital setting and options are either her to come to ambulatory care center on a daily basis to receive IV Rocephin for total of 10 to 14 days from last negative blood culture for arrangements with home health with at home antibiotic delivery.  Will defer to our case management to sort the structure of antibiotic administration in the out of hospital setting.  Side effects of antibiotic therapy including possibility of nausea vomiting diarrhea rash risk of C. difficile infection yeast infection selection of resistant pathogen cytopenias hepatic and renal dysfunction and interaction with medications discussed with the patient.  Patient understands that the side effects could occur but also understand the importance and need for antibiotic therapy at this bacteremic sepsis and wants to proceed with antibiotic therapy.  Luisa Child MD  2025  20:51 EDT    Parts of this note may be an electronic transcription/translation of spoken language to printed text using the Dragon dictation system.      Electronically signed by Luisa Child MD at 25 [id number]       Oskar Spence MD at 25 [phone]              Name: Maritza Nance Darci ADMIT: 2025   : 1960  PCP: Enoc Carbone DO    MRN: [id number] LOS: 5 days   AGE/SEX: 64 y.o. female  ROOM: [facility], [place]     Subjective   Subjective     Her RN tells me that she hasn't slept in 4 days and she got a dose of klonopin right before I saw her. When I went to see her, she seemed confused. She was mumbling despite multiple requests for her for enunciate and speak louder. We discussed her current treatment plan and the timing for discharge and she repeatedly asked me about this despite the explanation 4 times.       Objective   Objective   Vital Signs  Temp:  [97.3 °F (36.3 °C)-99.9 °F (37.7 °C)] 97.3 °F (36.3 °C)  Heart Rate:   [67-92] 67  Resp:  [18] 18  BP: (124-170)/(61-85) 137/73  SpO2:  [95 %-100 %] 100 %  on   ;   Device (Oxygen Therapy): room air  Body mass index is 30.49 kg/m².  Physical Exam  Constitutional:       General: She is not in acute distress.     Appearance: She is obese. She is not toxic-appearing.   Cardiovascular:      Rate and Rhythm: Normal rate and regular rhythm.      Heart sounds: Normal heart sounds.   Pulmonary:      Effort: Pulmonary effort is normal.      Breath sounds: Normal breath sounds.   Abdominal:      General: Bowel sounds are normal.      Palpations: Abdomen is soft.   Musculoskeletal:         General: No tenderness.      Right lower leg: No edema.      Left lower leg: No edema.   Neurological:      General: No focal deficit present.      Mental Status: She is alert. She is confused.   Psychiatric:         Mood and Affect: Mood normal.         Behavior: Behavior normal.         Results Review     I reviewed the patient's new clinical results.  Results from last 7 days   Lab Units 04/22/25 0322 04/21/25 0444 04/20/25 0248 04/19/25  0352   WBC 10*3/mm3 10.84* 9.22 10.42 12.19*   HEMOGLOBIN g/dL 8.8* 9.3* 9.5* 9.7*   PLATELETS 10*3/mm3 340 274 232 221     Results from last 7 days   Lab Units 04/22/25 0322 04/21/25  1544 04/21/25 0444 04/20/25 0248 04/19/25  0352   SODIUM mmol/L 141  --  139 137 140   POTASSIUM mmol/L 4.5 4.4 3.4* 3.8 4.2   CHLORIDE mmol/L 111*  --  106 105 105   CO2 mmol/L 22.7  --  21.1* 21.0* 25.3   BUN mg/dL 33*  --  32* 36* 34*   CREATININE mg/dL 1.15*  --  1.11* 1.23* 1.16*   GLUCOSE mg/dL 153*  --  231* 242* 216*   Estimated Creatinine Clearance: 54 mL/min (A) (by C-G formula based on SCr of 1.15 mg/dL (H)).  Results from last 7 days   Lab Units 04/22/25 0322 04/21/25  0444 04/20/25  0248 04/19/25  0352   ALBUMIN g/dL 2.7* 2.7* 2.8* 2.5*   BILIRUBIN mg/dL 0.3 0.3 0.2 0.4   ALK PHOS U/L 269* 250* 229* 192*   AST (SGOT) U/L 88* 70* 68* 45*   ALT (SGPT) U/L 65* 47* 40* 26      Results from last 7 days   Lab Units 04/22/25  0322 04/21/25  1544 04/21/25  0444 04/20/25  0248 04/19/25  0352 04/18/25  2250 04/18/25  1351 04/18/25  0411   CALCIUM mg/dL 8.4*  --  8.0* 8.5* 8.0*  --   --  7.2*   ALBUMIN g/dL 2.7*  --  2.7* 2.8* 2.5*  --   --  2.9*   MAGNESIUM mg/dL 2.2  --  2.1  --   --   --   --  1.9   PHOSPHORUS mg/dL 2.7 2.3* 2.2*  --   --  2.3*   < > 1.5*    < > = values in this interval not displayed.     Results from last 7 days   Lab Units 04/18/25  0411 04/18/25  0050 04/17/25  2151 04/17/25  1715 04/17/25  1304   PROCALCITONIN ng/mL  --   --   --   --  44.90*   LACTATE mmol/L 3.8* 4.6* 2.7* 6.1* 6.4*     COVID19   Date Value Ref Range Status   04/17/2025 Not Detected Not Detected - Ref. Range Final   09/07/2024 Not Detected Not Detected - Ref. Range Final   02/26/2022 Not Detected Not Detected - Ref. Range Final     SARS-CoV-2 PCR   Date Value Ref Range Status   03/24/2021 Not Detected Not Detected Final     Comment:     Nucleic acid specific to SARS-CoV-2 (COVID-19) virus was not detected in  this sample by the TaqPath (TM) COVID-19 Combo Kit.          SARS-CoV-2 (COVID-19) nucleic acid testing performed using [employer] Aptima (R) SARS-CoV-2 Assay or [employer] TaqPath (TM)  COVID-19 Combo Kit as indicated above under Emergency Use Authorization (EUA) from the FDA. Aptima (R) and TaqPath (TM) test performance  characteristics verified by [facility] in accordance with the FDAs Guidance Document (Policy for Diagnostic Tests for Coronavirus Disease-2019  during the Public Health Emergency) issued on March 16, 2020. The laboratory is regulated under CLIA as qualified to perform high-complexity testing  Unless otherwise noted, all testing was performed at [facility], CLIA No. [id number], [place] State Licensee No. [id number]   02/26/2021 Not Detected Not Detected Final     Comment:     Nucleic acid specific to SARS-CoV-2 (COVID-19) virus was not detected in  this sample by the  Aptima (R) SARS-CoV-2 Assay.                SARS-CoV-2 (COVID-19) nucleic acid testing performed using "[employer]" Aptima (R) SARS-CoV-2 Assay or [employer] TaqPath (TM)  COVID-19 Combo Kit as indicated above under Emergency Use Authorization (EUA) from the FDA. Aptima (R) and TaqPath (TM) test performance  characteristics verified by [employer] [facility] in accordance with the FDAs Guidance Document (Policy for Diagnostic Tests for Coronavirus Disease-2019  during the Public Health Emergency) issued on March 16, 2020. The laboratory is regulated under CLIA as qualified to perform high-complexity testing  Unless otherwise noted, all testing was performed at [facility], CLIA No. [id number], [facility] Licensee No. [id number]     Hemoglobin A1C   Date/Time Value Ref Range Status   04/21/2025 0444 7.30 (H) 4.80 - 5.60 % Final     Glucose   Date/Time Value Ref Range Status   04/22/2025 1631 127 70 - 130 mg/dL Final   04/22/2025 1043 181 (H) 70 - 130 mg/dL Final   04/22/2025 0620 96 70 - 130 mg/dL Final   04/21/2025 2056 123 70 - 130 mg/dL Final   04/21/2025 1538 211 (H) 70 - 130 mg/dL Final   04/21/2025 1238 224 (H) 70 - 130 mg/dL Final   04/21/2025 0628 243 (H) 70 - 130 mg/dL Final       CT Abdomen Pelvis Without Contrast  Narrative: CT OF THE ABDOMEN AND PELVIS WITHOUT CONTRAST 04/21/2025     HISTORY: Right flank pain. Urinary tract infection.     Spiral images were obtained from the lung bases to the symphysis pubis.  No intravenous or oral contrast was given.     There are small bilateral pleural effusions with bilateral lower lobe  atelectasis or consolidation.     Small to moderate size hiatal hernia is seen.     There is a small amount of high attenuation material within the  gallbladder which could represent small amount of gallbladder sludge. No  gallbladder inflammatory changes are seen. The liver, spleen and  pancreas appear unremarkable except for some pancreatic atrophy. Mild  fullness of the adrenal  glands is seen similar to the 03/25/2025 study.  Bilateral kidneys appear unremarkable.     Cobos catheter is present in the urinary bladder. Several tiny  calcifications are seen layering dependently in the bladder, likely tiny  urinary bladder stones. Small uterine calcifications are seen consistent  with tiny fibroids.     No bowel wall thickening or bowel dilatation is seen.     Impression: 1. Unremarkable bilateral kidneys with no evidence of obstructive  uropathy or urolithiasis.  2. Tiny amount of high attenuation material within the gallbladder could  represent small amount of gallbladder sludge. No definite gallstones are  seen. No gallbladder inflammatory changes are seen.  3. Several tiny urinary bladder stones are seen. Urinary bladder is  otherwise unremarkable.     Radiation dose reduction techniques were utilized, including automated  exposure control and exposure modulation based on body size.        This report was finalized on 4/21/2025 8:21 PM by Dr. Curt Byers M.D on Workstation: [id number]     US Gallbladder  Narrative: US GALLBLADDER-4/21/2025     HISTORY: Possible gallbladder sludge.     The gallbladder is well distended with no gallstones wall thickening or  pericholecystic fluid. The common bile duct is not dilated measuring 4.4  mm.     Pancreas appears grossly normal but not optimally seen. Liver and right  kidney appear normal. Right kidney measures 12.4 cm in length.     Impression: 1. Normal study. Gallbladder appears within normal limits.        This report was finalized on 4/21/2025 6:58 PM by Dr. Curt Byers M.D on Workstation: [id number]       Scheduled Medications  anastrozole, 1 mg, Oral, Daily  aspirin, 81 mg, Oral, Daily  baclofen, 20 mg, Oral, BID  cefTRIAXone, 2,000 mg, Intravenous, Q24H  FLUoxetine, 20 mg, Oral, Daily  heparin (porcine), 5,000 Units, Subcutaneous, Q8H  insulin glargine, 10 Units, Subcutaneous, Daily  insulin lispro, 2-7 Units,  Subcutaneous, 4x Daily AC & at Bedtime  pantoprazole, 40 mg, Oral, BID  phenazopyridine, 200 mg, Oral, TID With Meals  pramipexole, 1.5 mg, Oral, BID  senna-docusate sodium, 2 tablet, Oral, BID  sodium chloride, 10 mL, Intravenous, Q12H    Infusions   Diet  Diet: Diabetic; Consistent Carbohydrate; Texture: Soft to Chew (NDD 3); Soft to Chew: Chopped Meat; Fluid Consistency: Thin (IDDSI 0)      Assessment/Plan     Active Hospital Problems    Diagnosis  POA    **Sepsis [A41.9]  Yes    NSTEMI (non-ST elevated myocardial infarction) [I21.4]  Yes    Type 2 diabetes mellitus [E11.9]  Yes    Elevated LFTs [R79.89]  Yes    Malignant neoplasm of overlapping sites of left breast in female, estrogen receptor positive [C50.812, Z17.0]  Not Applicable    Multiple sclerosis [G35]  Yes    Essential hypertension [I10]  Yes    Anxiety and depression [F41.9, F32.A]  Yes    Neurogenic bladder [N31.9]  Yes    Restless legs syndrome [G25.81]  Yes      Resolved Hospital Problems   No resolved problems to display.       64 y.o. female admitted with Sepsis.    Klebsiella UTI and bacteremia causing septic shock-on ceftriaxone with plans for 14 days of IV therapy per ID. Repeat blood cultures are negative. Complete hydrocortisone wean today.  Type 2 NSTEMI-related to the above. Normal EF on echocardiogram, but WMA consistent with stress cardiomyopathy. Cardiology recommends a betablocker and baby aspirin. She'll need to follow up with them in 1 month  Elevated LFTs-gallbladder ultrasound and CT abdomen/pelvis without obvious biliary disease. Acute hepatitis profile negative as well. Previously thought to be secondary to ribociclib per oncology  Type 2 diabetes-glucose at goal  Essential hypertension-home antihypertensives held acutely  GERD-ppi  Multiple sclerosis-not on medications chronically for this  Neurogenic bladder with a chronic indwelling matias catheter  Breast cancer-on faslodex, xgeva, truqap as an outpatient   Insomnia-ambien  prn  Anxiety-clonazepam prn  Heparin SC for DVT prophylaxis.  Full code.  Discussed with patient and nursing staff.  Anticipate discharge home with home health in 2-3 days.      Oskar Spence MD  [facility]  04/22/25  17:31 EDT             Electronically signed by Oskar Spence MD at 04/22/25 1746          Consult Notes (last 48 hours)        Bentley Pace MD at 04/23/25 0854        Consult Orders    1. Inpatient Neurology Consult General [[id number]] ordered by Azalia Davis APRN at 04/23/25 0430                 Neurology Consult Note    Consult Date: 4/23/2025    Referring MD: Brian Lucas MD    Reason for Consult I have been asked to see the patient in neurological consultation to render advice and opinion regarding slurred speech    Maritza Bergn is a 64 y.o. female past medical history of relapsing remitting multiple sclerosis with baseline paraparesis not on disease modifying therapy for the last 10 years, neurogenic bladder, history of recurrent UTIs, hypertension, type 2 diabetes mellitus restless leg syndrome and chronic nausea, patient presented to the ED with catheter malfunction on 4/16/2025, next day she started having signs of sepsis fever chills dizziness lightheadedness she also had a temperature of 100.1 blood cultures were drawn she was started on broad-spectrum antibiotics with Vanco and cefepime.  Cultures came back positive for Klebsiella seep sepsis.  Neurology was consulted last night because she started having some slurred speech and NIH of 4.  She had CT head CTA head and neck and CT perfusion which were unremarkable for acute pathology.          Past Medical History:   Diagnosis Date    Anemia 2024    `Treated with iron    Dawn esophagus     per patient    Blurred vision     R/T MS    Breast cancer 05/2024+++++    Carpal tunnel syndrome     Clotting disorder 1993, 2003, 2012    3 g/i bleeds w/ transfus/ions    Colon polyp 2013    removed  "w/ colonoscopy    Deep vein thrombosis phlebitis 1980    Depression     Diplopia 2013    GERD (gastroesophageal reflux disease)     GI (gastrointestinal bleed) 3 bleeds    2 transfusions    H/O Skin cancer, basal cell     Headache     History of blood transfusion     History of GI bleed     R/T NSAIDS AND STEROIDS, multiple times    History of urinary tract infection     Hypercalcemia     s/p parathyroidectomy    Hyperlipidemia     Hypertension     Movement disorder     Multiple sclerosis     Optic neuritis     PONV (postoperative nausea and vomiting)     Steroid-induced diabetes 2024       Exam  /84 (BP Location: Right arm, Patient Position: Lying)   Pulse 83   Temp 99.7 °F (37.6 °C) (Oral)   Resp 18   Ht 165.1 cm (65\")   Wt 83.1 kg (183 lb 3.2 oz)   LMP  (LMP Unknown) Comment: PT. STATES HER LAST PERIOD WAS GREATER THAN FIVE YEARS AGO  SpO2 97%   BMI 30.49 kg/m²     Today morning on my exam patient is very lethargic drowsy and was snoring  She wakes up to sternal rub able to state his name but cannot answer any of my other questions  Blink to threat present bilaterally  Extra ocular movements intact  No facial droop  Moving bilateral upper extremities good strength 4+/5  Bilateral lower extremities no movement 1/5    DATA:    Lab Results   Component Value Date    GLUCOSE 91 04/23/2025    CALCIUM 7.2 (L) 04/23/2025     04/23/2025    K 4.8 04/23/2025    CO2 21.7 (L) 04/23/2025     (H) 04/23/2025    BUN 38 (H) 04/23/2025    CREATININE 1.89 (H) 04/23/2025    EGFRIFAFRI >60 11/23/2020    EGFRIFNONA 80 02/27/2022    BCR 20.1 04/23/2025    ANIONGAP 8.3 04/23/2025     Lab Results   Component Value Date    WBC 16.46 (H) 04/23/2025    HGB 9.1 (L) 04/23/2025    HCT 28.0 (L) 04/23/2025    MCV 93.0 04/23/2025     04/23/2025   Vitals  99.7 Tmax  Respiratory rate 80s to 90  Systolic blood pressure 130s to 150s    Lab review:   Sodium 138  Creatinine 1.89  Glucose 91  Elevated LFTs 88 and " 65    A1c 7.3  TSH 1.08  B12 865 folate 12      WBC 16.46  Hemoglobin 9.1 and also platelets 416    Urine analysis from 4/17/2025 concerning for UTI    Imaging review:   CT head without no acute hypodensity or hyperdensity    CTA head and neck no acute large vessel occlusion or stenosis    CT perfusion right parietal lobe small perfusion deficit most likely artifact    Diagnoses:  Concern for stroke  Lapsing remitting multiple sclerosis  Neurogenic bladder  Bacteremia with septic shock from Klebsiella  Type II NSTEMI  Transaminitis  Hypertension  Breast cancer  Insomnia  Anxiety  Restless leg syndrome  Recurrent UTIs    Pre-stroke MRS: 4    Patient has a history of multiple sclerosis not on disease modifying therapy at baseline she has paraparesis at baseline she has thoracic cord lesions    Not a candidate for TNKase secondary to low NIH stroke scale and also concern for endocarditis in the setting of bacteremia  No large vessel occlusion seen on CTA's    Concern for acute ischemic stroke  Exam not consistent with acute ischemic stroke looks more like encephalopathy patient's nurse stated that this happened after she received a dose of Klonopin patient's family told the nurse that this usually happens when she takes Klonopin.  Initial CT head CTA CT perfusion unremarkable for acute pathology  Systolic blood pressure goal less than 150  Aspirin rectally and hold off Lipitor because of elevated LFTs  MRI brain pending  If MRI brain is positive she might need TTE to rule out endocarditis    No family at bedside  We will continue to follow the patient and I will speak to Dr. Spence about the Klonopin.      MDM   Reviewed: Previous charts, nursing notes and vitals   Reviewed: Previous labs and CT/CTA/CTP scan    Interpretation: Labs and CT CTA/CTP scan   Total time providing  care is :30 minutes. This excluded time spent performing separately reportable procedures and services  Consults  :Neurology/Stroke    Please note that portions of this note were completed with a voice recognition program.     Bentley Pace MD  Neuro Hospitalist /Vascular Neurology.                 Electronically signed by Bentley Pace MD at 04/23/25 0474

## 2025-04-23 NOTE — NURSING NOTE
Pt awoke from sleep and is now exhibiting expressive aphasia along with constant muscle jerking. RRT called.

## 2025-04-24 ENCOUNTER — APPOINTMENT (OUTPATIENT)
Dept: MRI IMAGING | Facility: HOSPITAL | Age: 65
End: 2025-04-24
Payer: MEDICARE

## 2025-04-24 LAB
ANION GAP SERPL CALCULATED.3IONS-SCNC: 9.1 MMOL/L (ref 5–15)
BACTERIA SPEC AEROBE CULT: NORMAL
BACTERIA SPEC AEROBE CULT: NORMAL
BUN SERPL-MCNC: 28 MG/DL (ref 8–23)
BUN/CREAT SERPL: 21.7 (ref 7–25)
CALCIUM SPEC-SCNC: 7.2 MG/DL (ref 8.6–10.5)
CHLORIDE SERPL-SCNC: 115 MMOL/L (ref 98–107)
CO2 SERPL-SCNC: 22.9 MMOL/L (ref 22–29)
CREAT SERPL-MCNC: 1.29 MG/DL (ref 0.57–1)
DEPRECATED RDW RBC AUTO: 53.8 FL (ref 37–54)
EGFRCR SERPLBLD CKD-EPI 2021: 47 ML/MIN/1.73
ERYTHROCYTE [DISTWIDTH] IN BLOOD BY AUTOMATED COUNT: 15.9 % (ref 12.3–15.4)
GLUCOSE BLDC GLUCOMTR-MCNC: 137 MG/DL (ref 70–130)
GLUCOSE BLDC GLUCOMTR-MCNC: 69 MG/DL (ref 70–130)
GLUCOSE BLDC GLUCOMTR-MCNC: 73 MG/DL (ref 70–130)
GLUCOSE BLDC GLUCOMTR-MCNC: 89 MG/DL (ref 70–130)
GLUCOSE SERPL-MCNC: 65 MG/DL (ref 65–99)
HCT VFR BLD AUTO: 27.1 % (ref 34–46.6)
HGB BLD-MCNC: 8.9 G/DL (ref 12–15.9)
MCH RBC QN AUTO: 30.7 PG (ref 26.6–33)
MCHC RBC AUTO-ENTMCNC: 32.8 G/DL (ref 31.5–35.7)
MCV RBC AUTO: 93.4 FL (ref 79–97)
PLATELET # BLD AUTO: 526 10*3/MM3 (ref 140–450)
PMV BLD AUTO: 9.9 FL (ref 6–12)
POTASSIUM SERPL-SCNC: 3.9 MMOL/L (ref 3.5–5.2)
RBC # BLD AUTO: 2.9 10*6/MM3 (ref 3.77–5.28)
SODIUM SERPL-SCNC: 147 MMOL/L (ref 136–145)
WBC NRBC COR # BLD AUTO: 16.12 10*3/MM3 (ref 3.4–10.8)

## 2025-04-24 PROCEDURE — 85027 COMPLETE CBC AUTOMATED: CPT | Performed by: STUDENT IN AN ORGANIZED HEALTH CARE EDUCATION/TRAINING PROGRAM

## 2025-04-24 PROCEDURE — 25010000002 HEPARIN (PORCINE) PER 1000 UNITS: Performed by: INTERNAL MEDICINE

## 2025-04-24 PROCEDURE — 25010000002 CEFTRIAXONE PER 250 MG: Performed by: INTERNAL MEDICINE

## 2025-04-24 PROCEDURE — 92526 ORAL FUNCTION THERAPY: CPT

## 2025-04-24 PROCEDURE — 82948 REAGENT STRIP/BLOOD GLUCOSE: CPT

## 2025-04-24 PROCEDURE — 80048 BASIC METABOLIC PNL TOTAL CA: CPT | Performed by: STUDENT IN AN ORGANIZED HEALTH CARE EDUCATION/TRAINING PROGRAM

## 2025-04-24 RX ORDER — DEXTROSE MONOHYDRATE 50 MG/ML
50 INJECTION, SOLUTION INTRAVENOUS CONTINUOUS
Status: DISCONTINUED | OUTPATIENT
Start: 2025-04-24 | End: 2025-04-24

## 2025-04-24 RX ORDER — CEPHALEXIN 500 MG/1
1000 CAPSULE ORAL EVERY 8 HOURS SCHEDULED
Status: CANCELLED | OUTPATIENT
Start: 2025-04-24 | End: 2025-04-27

## 2025-04-24 RX ORDER — SODIUM CHLORIDE 450 MG/100ML
100 INJECTION, SOLUTION INTRAVENOUS CONTINUOUS
Status: DISCONTINUED | OUTPATIENT
Start: 2025-04-24 | End: 2025-04-27

## 2025-04-24 RX ORDER — DEXTROSE MONOHYDRATE AND SODIUM CHLORIDE 5; .45 G/100ML; G/100ML
75 INJECTION, SOLUTION INTRAVENOUS CONTINUOUS
Status: DISCONTINUED | OUTPATIENT
Start: 2025-04-24 | End: 2025-04-24

## 2025-04-24 RX ADMIN — SODIUM CHLORIDE 75 ML/HR: 450 INJECTION, SOLUTION INTRAVENOUS at 15:32

## 2025-04-24 RX ADMIN — HEPARIN SODIUM 5000 UNITS: 5000 INJECTION INTRAVENOUS; SUBCUTANEOUS at 05:20

## 2025-04-24 RX ADMIN — PRAMIPEXOLE DIHYDROCHLORIDE 1.5 MG: 1.5 TABLET ORAL at 21:55

## 2025-04-24 RX ADMIN — HEPARIN SODIUM 5000 UNITS: 5000 INJECTION INTRAVENOUS; SUBCUTANEOUS at 14:00

## 2025-04-24 RX ADMIN — BACLOFEN 20 MG: 10 TABLET ORAL at 21:54

## 2025-04-24 RX ADMIN — SENNOSIDES AND DOCUSATE SODIUM 2 TABLET: 50; 8.6 TABLET ORAL at 21:55

## 2025-04-24 RX ADMIN — PANTOPRAZOLE SODIUM 40 MG: 40 TABLET, DELAYED RELEASE ORAL at 21:55

## 2025-04-24 RX ADMIN — HEPARIN SODIUM 5000 UNITS: 5000 INJECTION INTRAVENOUS; SUBCUTANEOUS at 21:55

## 2025-04-24 RX ADMIN — CEFTRIAXONE 2000 MG: 2 INJECTION, POWDER, FOR SOLUTION INTRAMUSCULAR; INTRAVENOUS at 21:55

## 2025-04-24 RX ADMIN — Medication 10 ML: at 21:59

## 2025-04-24 NOTE — PLAN OF CARE
"Goal Outcome Evaluation:  Plan of Care Reviewed With: patient           Outcome Evaluation: Patient seen for clinical swallow assessment d/t neuro change and coughing with thins. Oral mech exam unremarkable. Poor coordination with self feeding. No overt s/s of aspiration with ice, thins via spoon/cup/straw, puree, mixed, or regular solids. Voice clear post swallow. Pt reports coughing with thins is unpredictable and only occurs when she \"goes spastic\". SLP recs resuming soft/chopped and thins. Med as xochitl.                             "

## 2025-04-24 NOTE — PROGRESS NOTES
"  Infectious Diseases Progress Note    Luisa Child MD     [facility]  [place]: 7 days  Patient Identification:  Name: Maritza Bejarano  Age: 64 y.o.  Sex: female  :  1960  MRN: [id number]         Primary Care Physician: Enoc Carbone, DO        Subjective: Not as confused and more appropriate and able to answer questions within the context.  According to the  at bedside this is much improved compared to 24 hours ago.  Both her  and patient wants her Cobos catheter to be out and they want to go back to In-N-Out catheterization if it is possible.  Denies any diarrhea or difficulty breathing.  Interval History: See consultation note.    Objective:    Scheduled Meds:anastrozole, 1 mg, Oral, Daily  aspirin, 81 mg, Oral, Daily  baclofen, 20 mg, Oral, BID  cefTRIAXone, 2,000 mg, Intravenous, Q24H  FLUoxetine, 20 mg, Oral, Daily  heparin (porcine), 5,000 Units, Subcutaneous, Q8H  insulin lispro, 2-7 Units, Subcutaneous, 4x Daily AC & at Bedtime  metoprolol succinate XL, 50 mg, Oral, Q24H  pantoprazole, 40 mg, Oral, BID  pramipexole, 1.5 mg, Oral, BID  senna-docusate sodium, 2 tablet, Oral, BID  sodium chloride, 10 mL, Intravenous, Q12H      Continuous Infusions:       Vital signs in last 24 hours:  Temp:  [97.7 °F (36.5 °C)-99.7 °F (37.6 °C)] 98.4 °F (36.9 °C)  Heart Rate:  [69-83] 69  Resp:  [18-20] 18  BP: ()/(57-84) 151/67    Intake/Output:    Intake/Output Summary (Last 24 hours) at 2025 0658  Last data filed at 2025 0250  Gross per 24 hour   Intake 140 ml   Output 4550 ml   Net -4410 ml       Exam:  /67 (BP Location: Left arm, Patient Position: Lying)   Pulse 69   Temp 98.4 °F (36.9 °C) (Oral)   Resp 18   Ht 165.1 cm (65\")   Wt 83.1 kg (183 lb 3.2 oz)   LMP  (LMP Unknown) Comment: PT. STATES HER LAST PERIOD WAS GREATER THAN FIVE YEARS AGO  SpO2 95%   BMI 30.49 kg/m²   Patient is examined using the personal protective equipment as per guidelines from infection " control for this particular patient as enacted.  Hand washing was performed before and after patient interaction.  General Appearance:  Awake interactive and oriented x 3 and not as confused according to the .                          Head:    Normocephalic, without obvious abnormality, atraumatic                           Eyes:  Pale conjunctiva                         Throat:   Lips, tongue, gums normal; oral mucosa pink and moist                           Neck:   Supple, symmetrical, trachea midline, no JVD                         Lungs:    Clear to auscultation bilaterally, respirations unlabored                 Chest Wall:    No tenderness or deformity                          Heart:  S1-S2 regular                  Abdomen:   Catheter in place, obese soft nontender                 Extremities:   Extremities normal, atraumatic, no cyanosis or edema                        Pulses:   Pulses palpable in all extremities                            Skin:   Skin is warm and dry,  no rashes or palpable lesions                  Neurologic: Alert and oriented x 3       Data Review:    I reviewed the patient's new clinical results.  Results from last 7 days   Lab Units 04/24/25  0253 04/23/25  0655 04/22/25  0322 04/21/25  0444 04/20/25  0248 04/19/25  0352 04/18/25  0411   WBC 10*3/mm3 16.12* 16.46* 10.84* 9.22 10.42 12.19* 16.96*   HEMOGLOBIN g/dL 8.9* 9.1* 8.8* 9.3* 9.5* 9.7* 11.1*   PLATELETS 10*3/mm3 526* 416 340 274 232 221 265     Results from last 7 days   Lab Units 04/24/25  0253 04/23/25  0655 04/22/25  0322 04/21/25  1544 04/21/25  0444 04/20/25  0248 04/19/25  0352 04/18/25  1823 04/18/25  0411   SODIUM mmol/L 147* 138 141  --  139 137 140  --  136   POTASSIUM mmol/L 3.9 4.8 4.5 4.4 3.4* 3.8 4.2   < > 2.8*   CHLORIDE mmol/L 115* 108* 111*  --  106 105 105  --  98   CO2 mmol/L 22.9 21.7* 22.7  --  21.1* 21.0* 25.3  --  24.3   BUN mg/dL 28* 38* 33*  --  32* 36* 34*  --  28*   CREATININE mg/dL 1.29* 1.89*  1.15*  --  1.11* 1.23* 1.16*  --  1.37*   CALCIUM mg/dL 7.2* 7.2* 8.4*  --  8.0* 8.5* 8.0*  --  7.2*   GLUCOSE mg/dL 65 91 153*  --  231* 242* 216*  --  225*    < > = values in this interval not displayed.     Microbiology Results (last 10 days)       Procedure Component Value - Date/Time    Blood Culture - Blood, Hand, Right [[id number]]  (Normal) Collected: 04/19/25 1639    Lab Status: Preliminary result Specimen: Blood from Hand, Right Updated: 04/23/25 1700     Blood Culture No growth at 4 days    Narrative:      Less than seven (7) mL's of blood was collected.  Insufficient quantity may yield false negative results.    Blood Culture - Blood, Arm, Right [[id number]]  (Normal) Collected: 04/19/25 0908    Lab Status: Preliminary result Specimen: Blood from Arm, Right Updated: 04/23/25 0945     Blood Culture No growth at 4 days    Respiratory Panel PCR w/COVID-19(SARS-CoV-2) YAMILE/FRANK/ROSE/PAD/COR/CHIDI In-House, NP Swab in UTM/VTM, 2 HR TAT - Swab, Nasopharynx [[id number]]  (Normal) Collected: 04/17/25 1341    Lab Status: Final result Specimen: Swab from Nasopharynx Updated: 04/17/25 1443     ADENOVIRUS, PCR Not Detected     Coronavirus 229E Not Detected     Coronavirus HKU1 Not Detected     Coronavirus NL63 Not Detected     Coronavirus OC43 Not Detected     COVID19 Not Detected     Human Metapneumovirus Not Detected     Human Rhinovirus/Enterovirus Not Detected     Influenza A PCR Not Detected     Influenza B PCR Not Detected     Parainfluenza Virus 1 Not Detected     Parainfluenza Virus 2 Not Detected     Parainfluenza Virus 3 Not Detected     Parainfluenza Virus 4 Not Detected     RSV, PCR Not Detected     Bordetella pertussis pcr Not Detected     Bordetella parapertussis PCR Not Detected     Chlamydophila pneumoniae PCR Not Detected     Mycoplasma pneumo by PCR Not Detected    Narrative:      In the setting of a positive respiratory panel with a viral infection PLUS a negative procalcitonin without other underlying  concern for bacterial infection, consider observing off antibiotics or discontinuation of antibiotics and continue supportive care. If the respiratory panel is positive for atypical bacterial infection (Bordetella pertussis, Chlamydophila pneumoniae, or Mycoplasma pneumoniae), consider antibiotic de-escalation to target atypical bacterial infection.    Urine Culture - Urine, Urine, Catheter [[id number]]  (Abnormal)  (Susceptibility) Collected: 04/17/25 1326    Lab Status: Final result Specimen: Urine, Catheter Updated: 04/19/25 1037     Urine Culture >100,000 CFU/mL Klebsiella pneumoniae ssp pneumoniae    Narrative:      Colonization of the urinary tract without infection is common. Treatment is discouraged unless the patient is symptomatic, pregnant, or undergoing an invasive urologic procedure.    Susceptibility        Klebsiella pneumoniae ssp pneumoniae      JOSE      Amoxicillin + Clavulanate Susceptible      Ampicillin Resistant      Ampicillin + Sulbactam Susceptible      Cefazolin (Urine) Susceptible      Cefepime Susceptible      Ceftazidime Susceptible      Ceftriaxone Susceptible      Gentamicin Susceptible      Levofloxacin Intermediate      Nitrofurantoin Susceptible      Piperacillin + Tazobactam Susceptible      Trimethoprim + Sulfamethoxazole Resistant                           Blood Culture - Blood, Foot, Right [[id number]]  (Abnormal)  (Susceptibility) Collected: 04/17/25 1313    Lab Status: Final result Specimen: Blood from Foot, Right Updated: 04/19/25 0700     Blood Culture Klebsiella pneumoniae ssp pneumoniae     Isolated from Aerobic and Anaerobic Bottles     Gram Stain Anaerobic Bottle Gram negative bacilli      Aerobic Bottle Gram negative bacilli    Susceptibility        Klebsiella pneumoniae ssp pneumoniae      JOSE      Amoxicillin + Clavulanate Susceptible      Ampicillin Resistant      Ampicillin + Sulbactam Susceptible      Cefazolin (Non Urine) Susceptible      Cefepime Susceptible       Ceftazidime Susceptible      Ceftriaxone Susceptible      Gentamicin Susceptible      Levofloxacin Intermediate      Piperacillin + Tazobactam Susceptible      Trimethoprim + Sulfamethoxazole Resistant                       Susceptibility Comments       Klebsiella pneumoniae ssp pneumoniae    With the exception of urinary-sourced infections, aminoglycosides should not be used as monotherapy.               Blood Culture ID, PCR - Blood, Foot, Right [[id number]]  (Abnormal) Collected: 04/17/25 1313    Lab Status: Final result Specimen: Blood from Foot, Right Updated: 04/17/25 2355     BCID, PCR Klebsiella pneumoniae group. Identification by BCID2 PCR.     BOTTLE TYPE Anaerobic Bottle    Narrative:      No resistance genes detected.    Blood Culture - Blood, Foot, Left [[id number]]  (Abnormal) Collected: 04/17/25 1304    Lab Status: Final result Specimen: Blood from Foot, Left Updated: 04/19/25 0700     Blood Culture Klebsiella pneumoniae ssp pneumoniae     Isolated from Aerobic and Anaerobic Bottles     Gram Stain Anaerobic Bottle Gram negative bacilli      Aerobic Bottle Gram negative bacilli    Narrative:      Refer to previous blood culture collected on 04/17/2025 1313 for MICs.              Assessment: Improving    Sepsis    Multiple sclerosis    Essential hypertension    Anxiety and depression    Neurogenic bladder    Restless legs syndrome    Malignant neoplasm of overlapping sites of left breast in female, estrogen receptor positive    Elevated LFTs    NSTEMI (non-ST elevated myocardial infarction)    Type 2 diabetes mellitus   64-year-old female with  1-Klebsiella bacteremic sepsis with septic shock secondary  2-urinary tract infection due to recent indwelling Cobos catheter malfunction  3-immobility with MS and neurogenic bladder  4-immunocompromised host  5-acute on chronic renal insufficiency  6-anemia multifactorial  7-hyperglycemia  8-episode of low-grade fever with some chest discomfort upon coughing and  taking deep breath-concerning for atelectasis versus evolving pneumonia.     Recommendations/Discussions:  See my discussion and recommendations on 4/22/2025.  Her leukocytosis and confusion are concerning for underlying new infectious process or exacerbation of pre-existing infectious process but her improvement without modifying current antibiotic therapy suggests transient obstruction of the Cobos catheter could have been the culprit.  Recurrent aspiration is a concern.  Since she is clinically improving would recommend observing her white blood cell count and mentation while continuing her on ceftriaxone as she is getting and providing her with aspiration precautions low threshold to check for complications of antibiotic treatment such as C. difficile infection or selection of resistant pathogens or yeast infection.  Luisa Child MD  4/24/2025  06:58 EDT    Parts of this note may be an electronic transcription/translation of spoken language to printed text using the Dragon dictation system.

## 2025-04-24 NOTE — THERAPY RE-EVALUATION
Acute Care - Speech Language Pathology   Swallow Initial Evaluation [facility]     Patient Name: Maritza Nance Darci  : 1960  MRN: [id number]  Today's Date: 2025               Admit Date: 2025    Visit Dx:     ICD-10-CM ICD-9-CM   1. Sepsis, due to unspecified organism, unspecified whether acute organ dysfunction present  A41.9 038.9     995.91   2. Elevated troponin  R79.89 790.6   3. 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    FOM (frequency of micturition)    Headache    History of diplopia    History of optic neuritis    History of vitamin D deficiency    Migraine syndrome    Mixed incontinence    Nausea    Neurogenic bladder    Pain in joint of right shoulder    Restless legs syndrome    Rupture of rotator cuff of shoulder    Secondary progressive multiple sclerosis    S/P cubital tunnel release    Vitamin D deficiency    Multiple sclerosis exacerbation    Sepsis due to Gram negative bacteria    Lower extremity cellulitis    Malignant neoplasm of overlapping sites of left breast in female, estrogen receptor positive    Encounter for long-term (current) use of other medications    Cancer, metastatic to bone    Colitis    Weakness    Elevated LFTs    Acute UTI    Sepsis    NSTEMI (non-ST elevated myocardial infarction)    Type 2 diabetes mellitus     Past Medical History:   Diagnosis Date    Anemia     `Treated with iron    Dawn esophagus     per patient    Blurred vision     R/T MS    Breast cancer 2024+++++    Carpal tunnel syndrome     Clotting disorder  "1993, 2003, 2012    3 g/i bleeds w/ transfus/ions    Colon polyp 2013    removed w/ colonoscopy    Deep vein thrombosis phlebitis 1980    Depression     Diplopia 2013    GERD (gastroesophageal reflux disease)     GI (gastrointestinal bleed) 3 bleeds    2 transfusions    H/O Skin cancer, basal cell     Headache     History of blood transfusion     History of GI bleed     R/T NSAIDS AND STEROIDS, multiple times    History of urinary tract infection     Hypercalcemia     s/p parathyroidectomy    Hyperlipidemia     Hypertension     Movement disorder     Multiple sclerosis     Optic neuritis     PONV (postoperative nausea and vomiting)     Steroid-induced diabetes 2024     Past Surgical History:   Procedure Laterality Date    APPENDECTOMY      BLADDER SURGERY      bladder stimulator    BREAST BIOPSY  don't remember    BREAST SURGERY      augmentation wtih subsequent removal    CARPAL TUNNEL RELEASE Bilateral     Left 2018, right 2020    CUBITAL TUNNEL RELEASE Left     CYSTOSCOPY BOTOX INJECTION OF BLADDER  2018    Cystoscopy with Botox    FRACTURE SURGERY  2019    rt shoulder    PARATHYROIDECTOMY      one gland removed    ROTATOR CUFF REPAIR Right 2017    TOE SURGERY      bilateral great toes    TOTAL SHOULDER ARTHROPLASTY W/ DISTAL CLAVICLE EXCISION Right 10/22/2018    Procedure: RT TOTAL SHOULDER REVERSE ARTHROPLASTY;  Surgeon: Bipin Dangelo MD;  Location: [facility];  Service: Orthopedics       SLP Recommendation and Plan                                                                               Outcome Evaluation: Patient seen for clinical swallow assessment d/t neuro change and coughing with thins. Oral mech exam unremarkable. Poor coordination with self feeding. No overt s/s of aspiration with ice, thins via spoon/cup/straw, puree, mixed, or regular solids. Voice clear post swallow. Pt reports coughing with thins is unpredictable and only occurs when she \"goes spastic\". SLP recs resuming soft/chopped and " "thins. Med as xochitl.      SWALLOW EVALUATION (Last 72 Hours)       SLP Adult Swallow Evaluation       Row Name 04/24/25 1500       Rehab Evaluation    Document Type re-evaluation  -    Subjective Information --    Patient Observations --    Patient Effort good  -SH    Symptoms Noted During/After Treatment --       General Information    Patient Profile Reviewed yes  -SH    Pertinent History Of Current Problem AMS, coughing with thins        Pain    Pretreatment Pain Rating 0/10 - no pain  -SH    Posttreatment Pain Rating 0/10 - no pain  -SH       Oral Motor Structure and Function              User Key  (r) = Recorded By, (t) = Taken By, (c) = Cosigned By      Initials Name Effective Dates    SH Lacie Steinberg, SLP 01/05/24 -     CR Valery Davis SLP 12/03/24 -                     EDUCATION  The patient has been educated in the following areas:   Dysphagia (Swallowing Impairment).        SLP GOALS       Row Name 04/24/25 1500 04/22/25 0900          (STG) Patient will tolerate trials of    Consistencies Trialed (Tolerate trials) soft to chew (chopped) textures;mixed consistencies;thin liquids  - soft to chew (chopped) textures;mixed consistencies;thin liquids  -CR     Desired Outcome (Tolerate trials) without signs/symptoms of aspiration  -SH without signs/symptoms of aspiration  -CR     [place] (Tolerate trials) independently (over 90% accuracy)  -SH independently (over 90% accuracy)  -CR     Time Frame (Tolerate trials) by discharge  -SH by discharge  -CR     Progress/Outcomes (Tolerate trials) -- new goal  -CR     Comment (Tolerate trials) Patient seen for clinical swallow assessment d/t neuro change and coughing with thins. Oral mech exam unremarkable. Poor coordination with self feeding. No overt s/s of aspiration with ice, thins via spoon/cup/straw, puree, mixed, or regular solids. Voice clear post swallow. Pt reports coughing with thins is unpredictable and only occurs when she \"goes spastic\". SLP " recs resuming soft/chopped and thins. Med as xochitl.  -SH --               User Key  (r) = Recorded By, (t) = Taken By, (c) = Cosigned By      Initials Name Provider Type     Lacie Steinberg SLP Speech and Language Pathologist    Valery Simms SLP Speech and Language Pathologist                         Time Calculation:    Time Calculation- SLP       Row Name 04/24/25 1539             Time Calculation- SLP    SLP Start Time 1300  -      SLP Received On 04/24/25  -                User Key  (r) = Recorded By, (t) = Taken By, (c) = Cosigned By      Initials Name Provider Type     Lacie Steinberg SLP Speech and Language Pathologist                    Therapy Charges for Today       Code Description Service Date Service Provider Modifiers Qty    [id number]  ST TREATMENT SWALLOW 4 4/24/2025 Lacie Steinberg SLP GN 1                 ZARA Rubin  4/24/2025

## 2025-04-24 NOTE — PROGRESS NOTES
"DOS: 2025  NAME: Maritza Nance Darci   : 1960  PCP: Enoc Carbone DO  Chief Complaint   Patient presents with    Dizziness    Fever    Shortness of Breath     Neurology    Subjective: Patient's sister and spouse at bedside when I came to see her.  They reports she is improved today, \"80%\" back to baseline.  They brought her bladder stimulator remote for pending MRI. Pt seen in follow up today, however the problem is new to the examiner.      Objective:  Vital signs: /70 (BP Location: Left arm, Patient Position: Lying)   Pulse 74   Temp 98.8 °F (37.1 °C) (Oral)   Resp 18   Ht 165.1 cm (65\")   Wt 81 kg (178 lb 9.2 oz)   LMP  (LMP Unknown) Comment: PT. STATES HER LAST PERIOD WAS GREATER THAN FIVE YEARS AGO  SpO2 97%   BMI 29.72 kg/m²       GEN: NAD, v/s reviewed  HEENT: Normocephalic, atraumatic   COR: RRR  Resp: Even and unlabored, clear to auscultation bilaterally  Extremities: No edema    Neurological:   MS: Alert, oriented to self, month and year, hospital.  Mildly impaired recent/remote memory, attention and concentration.  Language intact.  No neglect.  CN: Visual fields intact bilaterally, PERRL, extraocular movements intact, normal facial sensation, facial movements symmetric, tongue midline, no dysarthria  Motor: Right upper extremity 4 out of 5, left upper extremity 4+ out of 5, bilateral lower extremities 1 out of 5, no abnormal movements  Coordination: Mildly ataxic with feeding to nose bilaterally    Scheduled Meds:anastrozole, 1 mg, Oral, Daily  aspirin, 81 mg, Oral, Daily  baclofen, 20 mg, Oral, BID  cefTRIAXone, 2,000 mg, Intravenous, Q24H  FLUoxetine, 20 mg, Oral, Daily  heparin (porcine), 5,000 Units, Subcutaneous, Q8H  insulin lispro, 2-7 Units, Subcutaneous, 4x Daily AC & at Bedtime  metoprolol succinate XL, 50 mg, Oral, Q24H  pantoprazole, 40 mg, Oral, BID  pramipexole, 1.5 mg, Oral, BID  senna-docusate sodium, 2 tablet, Oral, BID  sodium chloride, 10 mL, Intravenous, " Q12H      Continuous Infusions:sodium chloride, 75 mL/hr      PRN Meds:.  acetaminophen **OR** acetaminophen    albuterol sulfate HFA    senna-docusate sodium **AND** polyethylene glycol **AND** bisacodyl **AND** bisacodyl    [Held by provider] clonazePAM    dextrose    dextrose    glucagon (human recombinant)    HYDROcodone-acetaminophen    loperamide    nitroglycerin    ondansetron ODT **OR** ondansetron    Phosphorus Replacement - Follow Nurse / BPA Driven Protocol    Potassium Replacement - Follow Nurse / BPA Driven Protocol    prochlorperazine    [COMPLETED] Insert Peripheral IV **AND** sodium chloride    sodium chloride    sodium chloride    zolpidem    Laboratory results:  Lab Results   Component Value Date    GLUCOSE 65 04/24/2025    CALCIUM 7.2 (L) 04/24/2025     (H) 04/24/2025    K 3.9 04/24/2025    CO2 22.9 04/24/2025     (H) 04/24/2025    BUN 28 (H) 04/24/2025    CREATININE 1.29 (H) 04/24/2025    EGFRIFAFRI >60 11/23/2020    EGFRIFNONA 80 02/27/2022    BCR 21.7 04/24/2025    ANIONGAP 9.1 04/24/2025     Lab Results   Component Value Date    WBC 16.12 (H) 04/24/2025    HGB 8.9 (L) 04/24/2025    HCT 27.1 (L) 04/24/2025    MCV 93.4 04/24/2025     (H) 04/24/2025     Lab Results   Component Value Date    CHOL 168 10/19/2024     Lab Results   Component Value Date    HDL 38 (L) 10/19/2024    HDL 46 09/15/2023     Lab Results   Component Value Date     (H) 10/19/2024    LDL 94 09/15/2023     Lab Results   Component Value Date    TRIG 115 10/19/2024    TRIG 155 (H) 09/15/2023         Lab 04/21/25  0444   HEMOGLOBIN A1C 7.30*   Lab review: Sodium 147, BUN 28, creatinine 1.29, AST 88, ALT 65, hemoglobin A1c 7.3%, white count 16.12, H&H 8.9/27.1  Review and interpretation of imaging:  CT Angiogram Head w AI Analysis of LVO  Addendum Date: 4/23/2025  ADDENDUM: 04 23 25 03:43 Call Doctor Regarding Stroke, called Neurologist, STROKE @  (Pager) Dr. Pace on 04 23 03:43 (-04:00)     Result  Date: 4/23/2025  CR Patient: RAMILA PURDY  Time Out: 03:40 Exam(s): CTA HEAD EXAM:   CT Angiography Head With and without Intravenous Contrast CLINICAL HISTORY:    Reason for exam: alt ment stat team d r o ni 4. TECHNIQUE: AI analysis of LVO was utilized   Axial computed tomographic angiography images of the head with and without intravenous contrast.  CTDI is 12 mGy and DLP is 125 mGy-cm.  This CT exam was performed according to the principle of ALARA (As Low As Reasonably Achievable) by using one or more of the following dose reduction techniques: automated exposure control, adjustment of the mA and or kV according to patient size, and or use of iterative reconstruction technique.   MIP reconstructed images were created and reviewed. COMPARISON:   No relevant prior studies available. FINDINGS:   Right internal carotid artery:  No significant stenosis.  No aneurysm.   Right anterior cerebral artery:  No significant stenosis.  No aneurysm.   Right middle cerebral artery:  No significant stenosis.  No aneurysm.   Right posterior cerebral artery:  No significant stenosis.  No aneurysm.   Right vertebral artery:  Unremarkable.   Left internal carotid artery:  No significant stenosis.  No aneurysm.   Left anterior cerebral artery:  No significant stenosis.  No aneurysm.   Left middle cerebral artery:  No significant stenosis.  No aneurysm.   Left posterior cerebral artery:  No significant stenosis.  No aneurysm.   Left vertebral artery:  Unremarkable.   Basilar artery:  No significant stenosis.  No aneurysm. Brain: No acute hemorrhage, hydrocephalus, or herniation. IMPRESSION:       No significant stenosis. Brain: No acute hemorrhage, hydrocephalus, or herniation.     Communications:  Call Doctor Stroke; Neurologist, STROKE @ [phone] (Pager) Electronically signed by Lory Holman MD on 04-23-25 at 0340    CT Angiogram Neck  Addendum Date: 4/23/2025  ADDENDUM: 04 23 25 03:43 Call Doctor Regarding Stroke, called  Dr. Pace on 04 23 03:43 (-04:00)     Result Date: 4/23/2025  CR Patient: RAMILA PURDY  Time Out: 03:39 Exam(s): CTA NECK EXAM:   CT Angiography Neck With Intravenous Contrast CLINICAL HISTORY:    Reason for exam: at ment status rapid. TECHNIQUE:   Routine carotid CT angiography protocol was performed with intravenous contrast.  NASCET criteria using the distal ICAs for comparison were used for evaluation of stenoses.  CTDI is 12 mGy and DLP is 125 mGy-cm.  This CT exam was performed according to the principle of ALARA (As Low As Reasonably Achievable) by using one or more of the following dose reduction techniques: automated exposure control, adjustment of the mA and or kV according to patient size, and or use of iterative reconstruction technique.   MIP reconstructed images were created and reviewed. COMPARISON:   None. FINDINGS:  VASCULATURE:   Right common carotid artery:  No significant stenosis.  No dissection.   Right internal carotid artery:  No significant stenosis.  No dissection.   Right vertebral artery:  No significant stenosis.  No dissection.   Left common carotid artery:  No significant stenosis.  No dissection.   Left internal carotid artery:  No significant stenosis.  No dissection.   Left vertebral artery:  No significant stenosis.  No dissection.  NECK:   Lung apices: Moderate bilateral pleural effusions.  Mild pulmonary edema..  CAROTID STENOSIS REFERENCE USING NASCET CRITERIA:   % ICA stenosis = (1 - narrowest ICA diameter diameter of distal cervical ICA) x 100.   Mild - <50% stenosis.   Moderate - 50-69% stenosis.   Severe - 70-94% stenosis.   Near occlusion - 95-99% stenosis.   Occluded - 100% stenosis. IMPRESSION:       No significant stenosis. Moderate bilateral pleural effusions.  Mild pulmonary edema     Communications:  Call Doctor Stroke Electronically signed by Lory Holman MD on 04-23-25 at 0339    CT CEREBRAL PERFUSION WITH & WITHOUT CONTRAST  Addendum Date: 4/23/2025  ADDENDUM:  04 23 25 03:44 Call Doctor Regarding Stroke, called Dr. Pace on 04 23 03:43 (-04:00)     Result Date: 4/23/2025  CR Patient: RAMILA PURDY  Time Out: 03:38 Exam(s): CT PERFUSION EXAM:   CT Brain Perfusion With Intravenous Contrast CLINICAL HISTORY:    Reason for exam: Neuro deficit, acute, stroke suspected. alt [facility] stat team d r o. TECHNIQUE:   Routine CT brain cerebral perfusion study was performed using IV contrast.  Reformats and postprocessing were performed by the technologist.  CTDI is 300 mGy and DLP is 2700 mGy-cm.  This CT exam was performed according to the principle of ALARA (As Low As Reasonably Achievable) by using one or more of the following dose reduction techniques: automated exposure control, adjustment of the mA and or kV according to patient size, and or use of iterative reconstruction technique. COMPARISON:   None. FINDINGS:   Core infarct:  None.  rCBF > 30% throughout the brain.  No findings to suggest acute core infarct.   Reversible ischemia: 9 cc increase in transit time in the beau and right temporal lobe   Brain and extra-axial spaces:  No intra- or extra-axial hemorrhage. IMPRESSION:     9 cc penumbra in the beau and right temporal lobe.  Unsure if this is real finding of small vessel disease or artifact.     Communications:  Call Doctor Stroke Electronically signed by Lory Holman MD on 04-23-25 at 0338    CT Abdomen Pelvis Without Contrast  Result Date: 4/21/2025  CT OF THE ABDOMEN AND PELVIS WITHOUT CONTRAST 04/21/2025  HISTORY: Right flank pain. Urinary tract infection.  Spiral images were obtained from the lung bases to the symphysis pubis. No intravenous or oral contrast was given.  There are small bilateral pleural effusions with bilateral lower lobe atelectasis or consolidation.  Small to moderate size hiatal hernia is seen.  There is a small amount of high attenuation material within the gallbladder which could represent small amount of gallbladder sludge. No gallbladder  inflammatory changes are seen. The liver, spleen and pancreas appear unremarkable except for some pancreatic atrophy. Mild fullness of the adrenal glands is seen similar to the 03/25/2025 study. Bilateral kidneys appear unremarkable.  Cobos catheter is present in the urinary bladder. Several tiny calcifications are seen layering dependently in the bladder, likely tiny urinary bladder stones. Small uterine calcifications are seen consistent with tiny fibroids.  No bowel wall thickening or bowel dilatation is seen.      1. Unremarkable bilateral kidneys with no evidence of obstructive uropathy or urolithiasis. 2. Tiny amount of high attenuation material within the gallbladder could represent small amount of gallbladder sludge. No definite gallstones are seen. No gallbladder inflammatory changes are seen. 3. Several tiny urinary bladder stones are seen. Urinary bladder is otherwise unremarkable.  Radiation dose reduction techniques were utilized, including automated exposure control and exposure modulation based on body size.   This report was finalized on 4/21/2025 8:21 PM by Dr. Curt Byers M.D on Workstation: [id number]       Gallbladder  Result Date: 4/21/2025  US GALLBLADDER-4/21/2025  HISTORY: Possible gallbladder sludge.  The gallbladder is well distended with no gallstones wall thickening or pericholecystic fluid. The common bile duct is not dilated measuring 4.4 mm.  Pancreas appears grossly normal but not optimally seen. Liver and right kidney appear normal. Right kidney measures 12.4 cm in length.      1. Normal study. Gallbladder appears within normal limits.   This report was finalized on 4/21/2025 6:58 PM by Dr. Curt Byers M.D on Workstation: [id number]        Impression:  64-year-old female with past medical history of HTN, HLD, steroid-induced diabetes, relapsing remitting multiple sclerosis with baseline paraparesis, not on any disease modifying therapy for the last 10 years,  neurogenic bladder bladder stimulator with chronic indwelling Cobos catheter, history of recurrent UTIs, and metastatic breast cancer who presented 4/17 with complaints of fever and chills after seeing her urologist for catheter malfunction on 4/16.  She was found to have Klebsiella UTI and bacteremia causing septic shock and type II NSTEMI.  Neurology consulted 4/23 due to patient having altered mental status and difficulty speaking around 3 AM.  Team D imaging was completed, no acute intracranial findings, no significant stenosis or LVO noted on CTA.  Family reports similar responses to Klonopin in the past which she received on 4/22.    Diagnosis:  Acute AMS, difficulty speaking, this occurred several hours after receiving Klonopin.  Improving.  Consider toxic/metabolic encephalopathy versus hospital delirium, less likely stroke  Klebsiella UTI/bacteremia  Neurogenic bladder  Relapsing remitting MS  Hypertension  Hyperlipidemia  Diabetes  Metastatic breast cancer    Plan:  On low-dose aspirin, statin deferred due to elevated LFTs  Follow-up MRI brain, family brought bladder stimulator remote  Recommend delirium precautions, avoiding sedating medications as tolerated  Discussed with Dr. Pace today.  Will follow.

## 2025-04-24 NOTE — CASE MANAGEMENT/SOCIAL WORK
Continued Stay Note  [facility]     Patient Name: Maritza Nance Darci  MRN: [id number]  Today's Date: 4/24/2025    Admit Date: 4/17/2025    Plan: Home with spouse, [facility] and [facility] for IV Abx   Discharge Plan       Row Name 04/24/25 1056       Plan    Plan Comments Clinicals reviewed. MRI pending, anticipcated IV Abx at DC. CCP continues to follow for medical readiness olivares DC planning.                   Discharge Codes    No documentation.                 Expected Discharge Date and Time       Expected Discharge Date Expected Discharge Time    Apr 25, 2025               Jyothi Douglas RN

## 2025-04-24 NOTE — PROGRESS NOTES
Name: Maritza Nance Darci ADMIT: 2025   : 1960  PCP: CarboneEnoc guzman     MRN: [id number] LOS: 7 days   AGE/SEX: 64 y.o. female  ROOM: [facility]     Subjective   Subjective     Dramatically less confused when I saw her today. She's almost entirely oriented too, missing the date by only 3 days. She has no complaints currently       Objective   Objective   Vital Signs  Temp:  [97.7 °F (36.5 °C)-100 °F (37.8 °C)] 98.8 °F (37.1 °C)  Heart Rate:  [69-77] 74  Resp:  [18-20] 18  BP: (122-153)/(67-83) 141/70  SpO2:  [94 %-97 %] 97 %  on   ;   Device (Oxygen Therapy): room air  Body mass index is 29.72 kg/m².  Physical Exam  Constitutional:       General: She is not in acute distress.     Appearance: She is obese. She is not toxic-appearing.   Cardiovascular:      Rate and Rhythm: Normal rate and regular rhythm.      Heart sounds: Normal heart sounds.   Pulmonary:      Effort: Pulmonary effort is normal.      Breath sounds: Normal breath sounds.   Abdominal:      General: Bowel sounds are normal.      Palpations: Abdomen is soft.   Musculoskeletal:         General: No tenderness.      Right lower leg: No edema.      Left lower leg: No edema.   Neurological:      General: No focal deficit present.      Mental Status: She is alert and oriented to person, place, and time.   Psychiatric:         Mood and Affect: Mood normal.         Behavior: Behavior normal.         Results Review     I reviewed the patient's new clinical results.  Results from last 7 days   Lab Units 25  0253 25  0655 25  0322 25  0444   WBC 10*3/mm3 16.12* 16.46* 10.84* 9.22   HEMOGLOBIN g/dL 8.9* 9.1* 8.8* 9.3*   PLATELETS 10*3/mm3 526* 416 340 274     Results from last 7 days   Lab Units 25  0253 25  0655 25  0322 25  1544 25  0444   SODIUM mmol/L 147* 138 141  --  139   POTASSIUM mmol/L 3.9 4.8 4.5 4.4 3.4*   CHLORIDE mmol/L 115* 108* 111*  --  106   CO2 mmol/L 22.9 21.7* 22.7  --  21.1*   BUN  mg/dL 28* 38* 33*  --  32*   CREATININE mg/dL 1.29* 1.89* 1.15*  --  1.11*   GLUCOSE mg/dL 65 91 153*  --  231*   Estimated Creatinine Clearance: 47.5 mL/min (A) (by C-G formula based on SCr of 1.29 mg/dL (H)).  Results from last 7 days   Lab Units 04/22/25  0322 04/21/25 0444 04/20/25  0248 04/19/25  0352   ALBUMIN g/dL 2.7* 2.7* 2.8* 2.5*   BILIRUBIN mg/dL 0.3 0.3 0.2 0.4   ALK PHOS U/L 269* 250* 229* 192*   AST (SGOT) U/L 88* 70* 68* 45*   ALT (SGPT) U/L 65* 47* 40* 26     Results from last 7 days   Lab Units 04/24/25  0253 04/23/25  0655 04/22/25 0322 04/21/25  1544 04/21/25 0444 04/20/25  0248 04/19/25  0352 04/18/25  2250 04/18/25  1351 04/18/25  0411   CALCIUM mg/dL 7.2* 7.2* 8.4*  --  8.0* 8.5* 8.0*  --   --  7.2*   ALBUMIN g/dL  --   --  2.7*  --  2.7* 2.8* 2.5*  --   --  2.9*   MAGNESIUM mg/dL  --   --  2.2  --  2.1  --   --   --   --  1.9   PHOSPHORUS mg/dL  --   --  2.7 2.3* 2.2*  --   --  2.3*   < > 1.5*    < > = values in this interval not displayed.     Results from last 7 days   Lab Units 04/18/25  0411 04/18/25  0050 04/17/25  2151 04/17/25  1715   LACTATE mmol/L 3.8* 4.6* 2.7* 6.1*     COVID19   Date Value Ref Range Status   04/17/2025 Not Detected Not Detected - Ref. Range Final   09/07/2024 Not Detected Not Detected - Ref. Range Final   02/26/2022 Not Detected Not Detected - Ref. Range Final     SARS-CoV-2 PCR   Date Value Ref Range Status   03/24/2021 Not Detected Not Detected Final     Comment:     Nucleic acid specific to SARS-CoV-2 (COVID-19) virus was not detected in  this sample by the TaqPath (TM) COVID-19 Combo Kit.          SARS-CoV-2 (COVID-19) nucleic acid testing performed using [employer] Aptima (R) SARS-CoV-2 Assay or [facility] TaqPath (TM)  COVID-19 Combo Kit as indicated above under Emergency Use Authorization (EUA) from the FDA. Aptima (R) and TaqPath (TM) test performance  characteristics verified by [facility] in accordance with the FDAs Guidance Document  (Policy for Diagnostic Tests for Coronavirus Disease-2019  during the Public Health Emergency) issued on March 16, 2020. The laboratory is regulated under CLIA as qualified to perform high-complexity testing  Unless otherwise noted, all testing was performed at [facility], CLIA No. [id number], [place] State Licensee No. [id number]   02/26/2021 Not Detected Not Detected Final     Comment:     Nucleic acid specific to SARS-CoV-2 (COVID-19) virus was not detected in  this sample by the Aptima (R) SARS-CoV-2 Assay.                SARS-CoV-2 (COVID-19) nucleic acid testing performed using [employer] Aptima (R) SARS-CoV-2 Assay or [facility] TaqPath (TM)  COVID-19 Combo Kit as indicated above under Emergency Use Authorization (EUA) from the FDA. Aptima (R) and TaqPath (TM) test performance  characteristics verified by [facility] in accordance with the FDAs Guidance Document (Policy for Diagnostic Tests for Coronavirus Disease-2019  during the Public Health Emergency) issued on March 16, 2020. The laboratory is regulated under CLIA as qualified to perform high-complexity testing  Unless otherwise noted, all testing was performed at [facility], CLIA No. [id number], [place] State Licensee No. [id number]     Glucose   Date/Time Value Ref Range Status   04/24/2025 1125 69 (L) 70 - 130 mg/dL Final   04/24/2025 0635 73 70 - 130 mg/dL Final   04/23/2025 2125 76 70 - 130 mg/dL Final   04/23/2025 1614 76 70 - 130 mg/dL Final   04/23/2025 1327 78 70 - 130 mg/dL Final   04/23/2025 1103 84 70 - 130 mg/dL Final   04/23/2025 0604 95 70 - 130 mg/dL Final       CT Angiogram Head w AI Analysis of LVO  Addendum: ADDENDUM:   04 23 25 03:43 Call Doctor Regarding Stroke, called Neurologist, STROKE @      (Pager) Dr. Pace on 04 23 03:43 (-04:00)  Narrative: CR  Patient: RAMILA PURDY  Time Out: 03:40  Exam(s): CTA HEAD     EXAM:    CT Angiography Head With and without Intravenous Contrast    CLINICAL HISTORY:     Reason for exam: alt ment  stat team d r o ni 4.    TECHNIQUE:  AI analysis of LVO was utilized    Axial computed tomographic angiography images of the head with and   without intravenous contrast.  CTDI is 12 mGy and DLP is 125 mGy-cm.    This CT exam was performed according to the principle of ALARA (As Low As   Reasonably Achievable) by using one or more of the following dose   reduction techniques: automated exposure control, adjustment of the mA   and or kV according to patient size, and or use of iterative   reconstruction technique.    MIP reconstructed images were created and reviewed.    COMPARISON:    No relevant prior studies available.    FINDINGS:    Right internal carotid artery:  No significant stenosis.  No aneurysm.    Right anterior cerebral artery:  No significant stenosis.  No aneurysm.    Right middle cerebral artery:  No significant stenosis.  No aneurysm.    Right posterior cerebral artery:  No significant stenosis.  No aneurysm.      Right vertebral artery:  Unremarkable.      Left internal carotid artery:  No significant stenosis.  No aneurysm.    Left anterior cerebral artery:  No significant stenosis.  No aneurysm.    Left middle cerebral artery:  No significant stenosis.  No aneurysm.    Left posterior cerebral artery:  No significant stenosis.  No aneurysm.    Left vertebral artery:  Unremarkable.      Basilar artery:  No significant stenosis.  No aneurysm.    Brain: No acute hemorrhage, hydrocephalus, or herniation.    IMPRESSION:         No significant stenosis.    Brain: No acute hemorrhage, hydrocephalus, or herniation.  Impression: Communications:     Call Doctor Stroke; Neurologist, STROKE @ [phone] (Pager)    Electronically signed by Lory Holman MD on 04-23-25 at 0340  CT CEREBRAL PERFUSION WITH & WITHOUT CONTRAST  Addendum: ADDENDUM:   04 23 25 03:44 Call Doctor Regarding Stroke, called Dr. Pace on    04 23 03:43 (-04:00)  Narrative: CR  Patient: RAMILA PURDY  Time Out: 03:38  Exam(s): CT  PERFUSION     EXAM:    CT Brain Perfusion With Intravenous Contrast    CLINICAL HISTORY:     Reason for exam: Neuro deficit, acute, stroke suspected. alt ment stat   team d r o.    TECHNIQUE:    Routine CT brain cerebral perfusion study was performed using IV   contrast.  Reformats and postprocessing were performed by the   technologist.  CTDI is 300 mGy and DLP is 2700 mGy-cm.  This CT exam was   performed according to the principle of ALARA (As Low As Reasonably   Achievable) by using one or more of the following dose reduction   techniques: automated exposure control, adjustment of the mA and or kV   according to patient size, and or use of iterative reconstruction   technique.    COMPARISON:    None.    FINDINGS:    Core infarct:  None.  rCBF > 30% throughout the brain.  No findings to   suggest acute core infarct.    Reversible ischemia: 9 cc increase in transit time in the beau and   right temporal lobe    Brain and extra-axial spaces:  No intra- or extra-axial hemorrhage.    IMPRESSION:     9 cc penumbra in the beau and right temporal lobe.    Unsure if this is real finding of small vessel disease or artifact.  Impression: Communications:     Call Doctor Stroke    Electronically signed by Lory Holman MD on 04-23-25 at 0338  CT Angiogram Neck  Addendum: ADDENDUM:   04 23 25 03:43 Call Doctor Regarding Stroke, called Dr. Pace on    04 23 03:43 (-04:00)  Narrative: CR  Patient: RAMILA PURDY  Time Out: 03:39  Exam(s): CTA NECK     EXAM:    CT Angiography Neck With Intravenous Contrast    CLINICAL HISTORY:     Reason for exam: at ment status rapid.    TECHNIQUE:    Routine carotid CT angiography protocol was performed with intravenous   contrast.  NASCET criteria using the distal ICAs for comparison were used   for evaluation of stenoses.  CTDI is 12 mGy and DLP is 125 mGy-cm.  This   CT exam was performed according to the principle of ALARA (As Low As   Reasonably Achievable) by using one or more of the  following dose   reduction techniques: automated exposure control, adjustment of the mA   and or kV according to patient size, and or use of iterative   reconstruction technique.    MIP reconstructed images were created and reviewed.    COMPARISON:    None.    FINDINGS:     VASCULATURE:    Right common carotid artery:  No significant stenosis.  No dissection.    Right internal carotid artery:  No significant stenosis.  No dissection.      Right vertebral artery:  No significant stenosis.  No dissection.      Left common carotid artery:  No significant stenosis.  No dissection.    Left internal carotid artery:  No significant stenosis.  No dissection.    Left vertebral artery:  No significant stenosis.  No dissection.     NECK:    Lung apices: Moderate bilateral pleural effusions.  Mild pulmonary   edema..     CAROTID STENOSIS REFERENCE USING NASCET CRITERIA:    % ICA stenosis = (1 - narrowest ICA diameter diameter of distal   cervical ICA) x 100.    Mild - <50% stenosis.    Moderate - 50-69% stenosis.    Severe - 70-94% stenosis.    Near occlusion - 95-99% stenosis.    Occluded - 100% stenosis.    IMPRESSION:         No significant stenosis.  Moderate bilateral pleural effusions.  Mild pulmonary edema  Impression: Communications:     Call Doctor Stroke    Electronically signed by Lory Holman MD on 04-23-25 at 0339    Scheduled Medications  anastrozole, 1 mg, Oral, Daily  aspirin, 81 mg, Oral, Daily  baclofen, 20 mg, Oral, BID  cefTRIAXone, 2,000 mg, Intravenous, Q24H  FLUoxetine, 20 mg, Oral, Daily  heparin (porcine), 5,000 Units, Subcutaneous, Q8H  insulin lispro, 2-7 Units, Subcutaneous, 4x Daily AC & at Bedtime  metoprolol succinate XL, 50 mg, Oral, Q24H  pantoprazole, 40 mg, Oral, BID  pramipexole, 1.5 mg, Oral, BID  senna-docusate sodium, 2 tablet, Oral, BID  sodium chloride, 10 mL, Intravenous, Q12H    Infusions  sodium chloride, 75 mL/hr    Diet  Diet: Regular/House; Texture: Soft to Chew (NDD 3); Soft to  Chew: Chopped Meat; Fluid Consistency: Thin (IDDSI 0)       Assessment/Plan     Active Hospital Problems    Diagnosis  POA    **Sepsis [A41.9]  Yes    NSTEMI (non-ST elevated myocardial infarction) [I21.4]  Yes    Type 2 diabetes mellitus [E11.9]  Yes    Elevated LFTs [R79.89]  Yes    Malignant neoplasm of overlapping sites of left breast in female, estrogen receptor positive [C50.812, Z17.0]  Not Applicable    Multiple sclerosis [G35]  Yes    Essential hypertension [I10]  Yes    Anxiety and depression [F41.9, F32.A]  Yes    Neurogenic bladder [N31.9]  Yes    Restless legs syndrome [G25.81]  Yes      Resolved Hospital Problems   No resolved problems to display.       64 y.o. female admitted with Sepsis.    Klebsiella UTI and bacteremia causing septic shock-on ceftriaxone with plans for 14 days of IV therapy per ID. Repeat blood cultures are negative.   Type 2 NSTEMI-related to the above. Normal EF on echocardiogram, but WMA consistent with stress cardiomyopathy. Cardiology recommends a betablocker and baby aspirin. She'll need to follow up with them in 1 month  KINZA-initially prerenal due to septic shock, but now likely related to obstructive uropathy with matias malfunction. Her matias has been flushed and is draining with improvement in her creatinine  Post obstructive diuresis-start 1/2 NS  Worsened leukocytosis-I think most likely related to urinary retention as she's currently on antibiotics as above. Stable today. Continue to monitor.  Metabolic/toxic encephalopathy-possibly related to her klonopin which has been discontinued or her urinary retention which has been addressed. Neurology is consulted and a brain MRI is pending given her history of metastatic breast cancer and MS. Largely improved.   Elevated LFTs-gallbladder ultrasound and CT abdomen/pelvis without obvious biliary disease. Acute hepatitis profile negative as well. Previously thought to be secondary to ribociclib per oncology  Type 2 diabetes-A1c  7.3. sliding scale  Essential hypertension-home antihypertensives held acutely  GERD-ppi  Multiple sclerosis-not on medications chronically for this  Neurogenic bladder with a chronic indwelling matias catheter  Metastatic breast cancer-on faslodex, xgeva, truqap as an outpatient   Insomnia-ambien prn  Anxiety-discontinue clonazepam permanently given it's association with confusion this admission and as an outpatient  Heparin SC for DVT prophylaxis.  Full code.  Discussed with patient and nursing staff.  Anticipate discharge home with home health timing yet to be determined.      Oskar Spence MD  [facility]  04/24/25  15:30 EDT

## 2025-04-25 ENCOUNTER — APPOINTMENT (OUTPATIENT)
Dept: MRI IMAGING | Facility: HOSPITAL | Age: 65
End: 2025-04-25
Payer: MEDICARE

## 2025-04-25 LAB
ALBUMIN SERPL-MCNC: 2.7 G/DL (ref 3.5–5.2)
ANION GAP SERPL CALCULATED.3IONS-SCNC: 13.5 MMOL/L (ref 5–15)
BUN SERPL-MCNC: 15 MG/DL (ref 8–23)
BUN/CREAT SERPL: 17.6 (ref 7–25)
CALCIUM SPEC-SCNC: 6.6 MG/DL (ref 8.6–10.5)
CHLORIDE SERPL-SCNC: 115 MMOL/L (ref 98–107)
CO2 SERPL-SCNC: 20.5 MMOL/L (ref 22–29)
CREAT SERPL-MCNC: 0.85 MG/DL (ref 0.57–1)
DEPRECATED RDW RBC AUTO: 54.1 FL (ref 37–54)
EGFRCR SERPLBLD CKD-EPI 2021: 77.6 ML/MIN/1.73
ERYTHROCYTE [DISTWIDTH] IN BLOOD BY AUTOMATED COUNT: 15.7 % (ref 12.3–15.4)
GLUCOSE BLDC GLUCOMTR-MCNC: 121 MG/DL (ref 70–130)
GLUCOSE BLDC GLUCOMTR-MCNC: 155 MG/DL (ref 70–130)
GLUCOSE BLDC GLUCOMTR-MCNC: 163 MG/DL (ref 70–130)
GLUCOSE BLDC GLUCOMTR-MCNC: 179 MG/DL (ref 70–130)
GLUCOSE SERPL-MCNC: 117 MG/DL (ref 65–99)
HCT VFR BLD AUTO: 25.3 % (ref 34–46.6)
HGB BLD-MCNC: 8 G/DL (ref 12–15.9)
MAGNESIUM SERPL-MCNC: 2 MG/DL (ref 1.6–2.4)
MCH RBC QN AUTO: 30.2 PG (ref 26.6–33)
MCHC RBC AUTO-ENTMCNC: 31.6 G/DL (ref 31.5–35.7)
MCV RBC AUTO: 95.5 FL (ref 79–97)
PHOSPHATE SERPL-MCNC: 1.8 MG/DL (ref 2.5–4.5)
PHOSPHATE SERPL-MCNC: 2.5 MG/DL (ref 2.5–4.5)
PLATELET # BLD AUTO: 586 10*3/MM3 (ref 140–450)
PMV BLD AUTO: 9.7 FL (ref 6–12)
POTASSIUM SERPL-SCNC: 3.4 MMOL/L (ref 3.5–5.2)
POTASSIUM SERPL-SCNC: 4.7 MMOL/L (ref 3.5–5.2)
RBC # BLD AUTO: 2.65 10*6/MM3 (ref 3.77–5.28)
SODIUM SERPL-SCNC: 149 MMOL/L (ref 136–145)
WBC NRBC COR # BLD AUTO: 9.92 10*3/MM3 (ref 3.4–10.8)

## 2025-04-25 PROCEDURE — 80069 RENAL FUNCTION PANEL: CPT | Performed by: STUDENT IN AN ORGANIZED HEALTH CARE EDUCATION/TRAINING PROGRAM

## 2025-04-25 PROCEDURE — 70551 MRI BRAIN STEM W/O DYE: CPT

## 2025-04-25 PROCEDURE — 85027 COMPLETE CBC AUTOMATED: CPT | Performed by: STUDENT IN AN ORGANIZED HEALTH CARE EDUCATION/TRAINING PROGRAM

## 2025-04-25 PROCEDURE — 25810000003 SODIUM CHLORIDE 0.9 % SOLUTION: Performed by: STUDENT IN AN ORGANIZED HEALTH CARE EDUCATION/TRAINING PROGRAM

## 2025-04-25 PROCEDURE — 25010000002 HEPARIN (PORCINE) PER 1000 UNITS: Performed by: INTERNAL MEDICINE

## 2025-04-25 PROCEDURE — 84100 ASSAY OF PHOSPHORUS: CPT | Performed by: STUDENT IN AN ORGANIZED HEALTH CARE EDUCATION/TRAINING PROGRAM

## 2025-04-25 PROCEDURE — 25010000002 CEFTRIAXONE PER 250 MG: Performed by: INTERNAL MEDICINE

## 2025-04-25 PROCEDURE — 83735 ASSAY OF MAGNESIUM: CPT | Performed by: STUDENT IN AN ORGANIZED HEALTH CARE EDUCATION/TRAINING PROGRAM

## 2025-04-25 PROCEDURE — 84132 ASSAY OF SERUM POTASSIUM: CPT | Performed by: STUDENT IN AN ORGANIZED HEALTH CARE EDUCATION/TRAINING PROGRAM

## 2025-04-25 PROCEDURE — 63710000001 INSULIN LISPRO (HUMAN) PER 5 UNITS: Performed by: INTERNAL MEDICINE

## 2025-04-25 PROCEDURE — 82948 REAGENT STRIP/BLOOD GLUCOSE: CPT

## 2025-04-25 RX ORDER — FENTANYL/ROPIVACAINE/NS/PF 2-625MCG/1
15 PLASTIC BAG, INJECTION (ML) EPIDURAL ONCE
Status: COMPLETED | OUTPATIENT
Start: 2025-04-25 | End: 2025-04-25

## 2025-04-25 RX ORDER — POTASSIUM CHLORIDE 1500 MG/1
40 TABLET, EXTENDED RELEASE ORAL ONCE
Status: COMPLETED | OUTPATIENT
Start: 2025-04-25 | End: 2025-04-25

## 2025-04-25 RX ORDER — POTASSIUM CHLORIDE 1500 MG/1
40 TABLET, EXTENDED RELEASE ORAL DAILY
Status: DISCONTINUED | OUTPATIENT
Start: 2025-04-25 | End: 2025-04-25

## 2025-04-25 RX ORDER — POTASSIUM CHLORIDE 1.5 G/1.58G
40 POWDER, FOR SOLUTION ORAL EVERY 4 HOURS
Status: DISPENSED | OUTPATIENT
Start: 2025-04-25 | End: 2025-04-25

## 2025-04-25 RX ADMIN — Medication 10 ML: at 20:27

## 2025-04-25 RX ADMIN — HEPARIN SODIUM 5000 UNITS: 5000 INJECTION INTRAVENOUS; SUBCUTANEOUS at 14:09

## 2025-04-25 RX ADMIN — CEFTRIAXONE 2000 MG: 2 INJECTION, POWDER, FOR SOLUTION INTRAMUSCULAR; INTRAVENOUS at 22:41

## 2025-04-25 RX ADMIN — Medication 10 ML: at 09:59

## 2025-04-25 RX ADMIN — ANASTROZOLE 1 MG: 1 TABLET, FILM COATED ORAL at 08:26

## 2025-04-25 RX ADMIN — BACLOFEN 20 MG: 10 TABLET ORAL at 08:28

## 2025-04-25 RX ADMIN — PRAMIPEXOLE DIHYDROCHLORIDE 1.5 MG: 1.5 TABLET ORAL at 20:25

## 2025-04-25 RX ADMIN — PANTOPRAZOLE SODIUM 40 MG: 40 TABLET, DELAYED RELEASE ORAL at 08:28

## 2025-04-25 RX ADMIN — POTASSIUM CHLORIDE 40 MEQ: 1500 TABLET, EXTENDED RELEASE ORAL at 14:27

## 2025-04-25 RX ADMIN — SENNOSIDES AND DOCUSATE SODIUM 2 TABLET: 50; 8.6 TABLET ORAL at 09:57

## 2025-04-25 RX ADMIN — PRAMIPEXOLE DIHYDROCHLORIDE 1.5 MG: 1.5 TABLET ORAL at 08:26

## 2025-04-25 RX ADMIN — SENNOSIDES AND DOCUSATE SODIUM 2 TABLET: 50; 8.6 TABLET ORAL at 20:26

## 2025-04-25 RX ADMIN — INSULIN LISPRO 2 UNITS: 100 INJECTION, SOLUTION INTRAVENOUS; SUBCUTANEOUS at 22:42

## 2025-04-25 RX ADMIN — ASPIRIN 81 MG: 81 TABLET, COATED ORAL at 08:28

## 2025-04-25 RX ADMIN — PANTOPRAZOLE SODIUM 40 MG: 40 TABLET, DELAYED RELEASE ORAL at 20:26

## 2025-04-25 RX ADMIN — Medication 15 MMOL: at 13:12

## 2025-04-25 RX ADMIN — HEPARIN SODIUM 5000 UNITS: 5000 INJECTION INTRAVENOUS; SUBCUTANEOUS at 22:41

## 2025-04-25 RX ADMIN — INSULIN LISPRO 2 UNITS: 100 INJECTION, SOLUTION INTRAVENOUS; SUBCUTANEOUS at 12:38

## 2025-04-25 RX ADMIN — INSULIN LISPRO 2 UNITS: 100 INJECTION, SOLUTION INTRAVENOUS; SUBCUTANEOUS at 17:55

## 2025-04-25 RX ADMIN — METOPROLOL SUCCINATE 50 MG: 50 TABLET, EXTENDED RELEASE ORAL at 08:26

## 2025-04-25 RX ADMIN — BACLOFEN 20 MG: 10 TABLET ORAL at 20:26

## 2025-04-25 RX ADMIN — HEPARIN SODIUM 5000 UNITS: 5000 INJECTION INTRAVENOUS; SUBCUTANEOUS at 06:28

## 2025-04-25 RX ADMIN — POTASSIUM CHLORIDE 40 MEQ: 1.5 POWDER, FOR SOLUTION ORAL at 12:38

## 2025-04-25 NOTE — PROGRESS NOTES
Nutrition Services    Patient Name: Maritza Bejarano  YOB: 1960  MRN: [id number]  Admission date: 4/17/2025    PROGRESS NOTE      Encounter Information: RD visited pt at bedside. Pt with friend during visit. Pt was alert and responsive. Pt drinking the Dio but not the full amount since the taste is a little too strong. Pt asked if she could dilute it to be able to tolerate full amount. Speech is following pt. Friends and family assist with feeds due to MS.       PO Diet: Diet: Regular/House; Texture: Soft to Chew (NDD 3); Soft to Chew: Chopped Meat; Fluid Consistency: Thin (IDDSI 0)   PO Supplements: Dio, BID   PO Intake:   % per patient once diet changed to soft to chew       Weight: Weight: 81 kg (178 lb 9.2 oz) (04/24/25 0500)       Medications: reviewed - Baclofen, Humalog, Toprol, Protonix, Klor-Con, Pericolace, IVF   Labs: reviewed - hypokalemia, hypernatremia, hyperchloremia, hyperglycemia, hypocalcemia, hypophosphatemia       GI Function:  WDL, constipation, hypoactive bowel sounds, last bowel movement: 4/24       Nutrition Intervention Updates: Continue to monitor PO intake and encourage >75%  Replace elytes per protocol  Hypernatremia- Encourage fluids    Monitor Dio PO intake.        Results from last 7 days   Lab Units 04/25/25  0456 04/24/25  0253 04/23/25  0655 04/22/25  0322 04/21/25  1544 04/21/25  0444 04/20/25  0248   SODIUM mmol/L 149* 147* 138 141  --  139 137   POTASSIUM mmol/L 3.4* 3.9 4.8 4.5   < > 3.4* 3.8   CHLORIDE mmol/L 115* 115* 108* 111*  --  106 105   CO2 mmol/L 20.5* 22.9 21.7* 22.7  --  21.1* 21.0*   BUN mg/dL 15 28* 38* 33*  --  32* 36*   CREATININE mg/dL 0.85 1.29* 1.89* 1.15*  --  1.11* 1.23*   CALCIUM mg/dL 6.6* 7.2* 7.2* 8.4*  --  8.0* 8.5*   BILIRUBIN mg/dL  --   --   --  0.3  --  0.3 0.2   ALK PHOS U/L  --   --   --  269*  --  250* 229*   ALT (SGPT) U/L  --   --   --  65*  --  47* 40*   AST (SGOT) U/L  --   --   --  88*  --  70* 68*   GLUCOSE mg/dL  117* 65 91 153*  --  231* 242*    < > = values in this interval not displayed.     Results from last 7 days   Lab Units 04/25/25  0456 04/23/25  0655 04/22/25  0322 04/21/25  1544 04/21/25  0444   MAGNESIUM mg/dL 2.0  --  2.2  --  2.1   PHOSPHORUS mg/dL 1.8*  --  2.7   < > 2.2*   HEMOGLOBIN g/dL 8.0*   < > 8.8*  --  9.3*   HEMATOCRIT % 25.3*   < > 26.4*  --  27.7*    < > = values in this interval not displayed.     COVID19   Date Value Ref Range Status   04/17/2025 Not Detected Not Detected - Ref. Range Final     Lab Results   Component Value Date    HGBA1C 7.30 (H) 04/21/2025       RD to follow up per protocol.    Electronically signed by:  Mi Alberto RD  04/25/25 14:58 EDT

## 2025-04-25 NOTE — PROGRESS NOTES
"  Infectious Diseases Progress Note    Luisa Child MD     [facility]  [place]: 8 days  Patient Identification:  Name: Maritza Bejarano  Age: 64 y.o.  Sex: female  :  1960  MRN: [id number]         Primary Care Physician: Enoc Carbone,         Subjective: Complaining of low-grade fever and some discomfort in her buttock area.  Denies any fever and chills.  Interval History: See consultation note.    Objective:    Scheduled Meds:anastrozole, 1 mg, Oral, Daily  aspirin, 81 mg, Oral, Daily  baclofen, 20 mg, Oral, BID  cefTRIAXone, 2,000 mg, Intravenous, Q24H  FLUoxetine, 20 mg, Oral, Daily  heparin (porcine), 5,000 Units, Subcutaneous, Q8H  insulin lispro, 2-7 Units, Subcutaneous, 4x Daily AC & at Bedtime  metoprolol succinate XL, 50 mg, Oral, Q24H  pantoprazole, 40 mg, Oral, BID  potassium chloride, 40 mEq, Oral, Q4H  potassium phosphate, 15 mmol, Intravenous, Once  pramipexole, 1.5 mg, Oral, BID  senna-docusate sodium, 2 tablet, Oral, BID  sodium chloride, 10 mL, Intravenous, Q12H      Continuous Infusions:sodium chloride, 75 mL/hr, Last Rate: 75 mL/hr (25 1532)          Vital signs in last 24 hours:  Temp:  [98.4 °F (36.9 °C)-99.3 °F (37.4 °C)] 99.3 °F (37.4 °C)  Heart Rate:  [74-87] 80  Resp:  [18] 18  BP: (122-154)/(64-85) 154/85    Intake/Output:    Intake/Output Summary (Last 24 hours) at 2025 1159  Last data filed at 2025 2340  Gross per 24 hour   Intake --   Output 925 ml   Net -925 ml       Exam:  /85 (BP Location: Right arm, Patient Position: Lying)   Pulse 80   Temp 99.3 °F (37.4 °C) (Oral)   Resp 18   Ht 165.1 cm (65\")   Wt 81 kg (178 lb 9.2 oz)   LMP  (LMP Unknown) Comment: PT. STATES HER LAST PERIOD WAS GREATER THAN FIVE YEARS AGO  SpO2 96%   BMI 29.72 kg/m²   Patient is examined using the personal protective equipment as per guidelines from infection control for this particular patient as enacted.  Hand washing was performed before and after patient " interaction.  General Appearance: Awake and interactive family members at the bedside.                          Head:    Normocephalic, without obvious abnormality, atraumatic                           Eyes:  Pale conjunctiva                         Throat:   Lips, tongue, gums normal; oral mucosa pink and moist                           Neck:   Supple, symmetrical, trachea midline, no JVD                         Lungs:    Clear to auscultation bilaterally, respirations unlabored                 Chest Wall:    No tenderness or deformity                          Heart:  S1-S2 regular                  Abdomen:   Catheter in place, obese soft nontender                 Extremities:   Extremities normal, atraumatic, no cyanosis or edema                        Pulses:   Pulses palpable in all extremities                            Skin:                    Neurologic: Alert and oriented x 3       Data Review:    I reviewed the patient's new clinical results.  Results from last 7 days   Lab Units 04/25/25  0456 04/24/25  0253 04/23/25  0655 04/22/25  0322 04/21/25  0444 04/20/25  0248 04/19/25  0352   WBC 10*3/mm3 9.92 16.12* 16.46* 10.84* 9.22 10.42 12.19*   HEMOGLOBIN g/dL 8.0* 8.9* 9.1* 8.8* 9.3* 9.5* 9.7*   PLATELETS 10*3/mm3 586* 526* 416 340 274 232 221     Results from last 7 days   Lab Units 04/25/25  0456 04/24/25  0253 04/23/25  0655 04/22/25  0322 04/21/25  1544 04/21/25  0444 04/20/25  0248 04/19/25  0352   SODIUM mmol/L 149* 147* 138 141  --  139 137 140   POTASSIUM mmol/L 3.4* 3.9 4.8 4.5 4.4 3.4* 3.8 4.2   CHLORIDE mmol/L 115* 115* 108* 111*  --  106 105 105   CO2 mmol/L 20.5* 22.9 21.7* 22.7  --  21.1* 21.0* 25.3   BUN mg/dL 15 28* 38* 33*  --  32* 36* 34*   CREATININE mg/dL 0.85 1.29* 1.89* 1.15*  --  1.11* 1.23* 1.16*   CALCIUM mg/dL 6.6* 7.2* 7.2* 8.4*  --  8.0* 8.5* 8.0*   GLUCOSE mg/dL 117* 65 91 153*  --  231* 242* 216*     Microbiology Results (last 10 days)       Procedure Component Value - Date/Time     Blood Culture - Blood, Hand, Right [[id number]]  (Normal) Collected: 04/19/25 1639    Lab Status: Final result Specimen: Blood from Hand, Right Updated: 04/24/25 1700     Blood Culture No growth at 5 days    Narrative:      Less than seven (7) mL's of blood was collected.  Insufficient quantity may yield false negative results.    Blood Culture - Blood, Arm, Right [[id number]]  (Normal) Collected: 04/19/25 0908    Lab Status: Final result Specimen: Blood from Arm, Right Updated: 04/24/25 0945     Blood Culture No growth at 5 days    Respiratory Panel PCR w/COVID-19(SARS-CoV-2) YAMILE/FRANK/ROSE/PAD/[facility]IDI In-House, NP Swab in UTM/VTM, 2 HR TAT - Swab, Nasopharynx [[id number]]  (Normal) Collected: 04/17/25 1341    Lab Status: Final result Specimen: Swab from Nasopharynx Updated: 04/17/25 1443     ADENOVIRUS, PCR Not Detected     Coronavirus 229E Not Detected     Coronavirus HKU1 Not Detected     Coronavirus NL63 Not Detected     Coronavirus OC43 Not Detected     COVID19 Not Detected     Human Metapneumovirus Not Detected     Human Rhinovirus/Enterovirus Not Detected     Influenza A PCR Not Detected     Influenza B PCR Not Detected     Parainfluenza Virus 1 Not Detected     Parainfluenza Virus 2 Not Detected     Parainfluenza Virus 3 Not Detected     Parainfluenza Virus 4 Not Detected     RSV, PCR Not Detected     Bordetella pertussis pcr Not Detected     Bordetella parapertussis PCR Not Detected     Chlamydophila pneumoniae PCR Not Detected     Mycoplasma pneumo by PCR Not Detected    Narrative:      In the setting of a positive respiratory panel with a viral infection PLUS a negative procalcitonin without other underlying concern for bacterial infection, consider observing off antibiotics or discontinuation of antibiotics and continue supportive care. If the respiratory panel is positive for atypical bacterial infection (Bordetella pertussis, Chlamydophila pneumoniae, or Mycoplasma pneumoniae), consider antibiotic  de-escalation to target atypical bacterial infection.    Urine Culture - Urine, Urine, Catheter [[id number]]  (Abnormal)  (Susceptibility) Collected: 04/17/25 1326    Lab Status: Final result Specimen: Urine, Catheter Updated: 04/19/25 1037     Urine Culture >100,000 CFU/mL Klebsiella pneumoniae ssp pneumoniae    Narrative:      Colonization of the urinary tract without infection is common. Treatment is discouraged unless the patient is symptomatic, pregnant, or undergoing an invasive urologic procedure.    Susceptibility        Klebsiella pneumoniae ssp pneumoniae      JOSE      Amoxicillin + Clavulanate Susceptible      Ampicillin Resistant      Ampicillin + Sulbactam Susceptible      Cefazolin (Urine) Susceptible      Cefepime Susceptible      Ceftazidime Susceptible      Ceftriaxone Susceptible      Gentamicin Susceptible      Levofloxacin Intermediate      Nitrofurantoin Susceptible      Piperacillin + Tazobactam Susceptible      Trimethoprim + Sulfamethoxazole Resistant                           Blood Culture - Blood, Foot, Right [[id number]]  (Abnormal)  (Susceptibility) Collected: 04/17/25 1313    Lab Status: Final result Specimen: Blood from Foot, Right Updated: 04/19/25 0700     Blood Culture Klebsiella pneumoniae ssp pneumoniae     Isolated from Aerobic and Anaerobic Bottles     Gram Stain Anaerobic Bottle Gram negative bacilli      Aerobic Bottle Gram negative bacilli    Susceptibility        Klebsiella pneumoniae ssp pneumoniae      JOSE      Amoxicillin + Clavulanate Susceptible      Ampicillin Resistant      Ampicillin + Sulbactam Susceptible      Cefazolin (Non Urine) Susceptible      Cefepime Susceptible      Ceftazidime Susceptible      Ceftriaxone Susceptible      Gentamicin Susceptible      Levofloxacin Intermediate      Piperacillin + Tazobactam Susceptible      Trimethoprim + Sulfamethoxazole Resistant                       Susceptibility Comments       Klebsiella pneumoniae ssp pneumoniae    With  the exception of urinary-sourced infections, aminoglycosides should not be used as monotherapy.               Blood Culture ID, PCR - Blood, Foot, Right [[id number]]  (Abnormal) Collected: 04/17/25 1313    Lab Status: Final result Specimen: Blood from Foot, Right Updated: 04/17/25 2355     BCID, PCR Klebsiella pneumoniae group. Identification by BCID2 PCR.     BOTTLE TYPE Anaerobic Bottle    Narrative:      No resistance genes detected.    Blood Culture - Blood, Foot, Left [[id number]]  (Abnormal) Collected: 04/17/25 1304    Lab Status: Final result Specimen: Blood from Foot, Left Updated: 04/19/25 0700     Blood Culture Klebsiella pneumoniae ssp pneumoniae     Isolated from Aerobic and Anaerobic Bottles     Gram Stain Anaerobic Bottle Gram negative bacilli      Aerobic Bottle Gram negative bacilli    Narrative:      Refer to previous blood culture collected on 04/17/2025 1313 for MICs.              Assessment: Improving    Sepsis    Multiple sclerosis    Essential hypertension    Anxiety and depression    Neurogenic bladder    Restless legs syndrome    Malignant neoplasm of overlapping sites of left breast in female, estrogen receptor positive    Elevated LFTs    NSTEMI (non-ST elevated myocardial infarction)    Type 2 diabetes mellitus   64-year-old female with  1-Klebsiella bacteremic sepsis with septic shock secondary  2-urinary tract infection due to recent indwelling Cobos catheter malfunction  3-immobility with MS and neurogenic bladder  4-immunocompromised host  5-acute on chronic renal insufficiency  6-anemia multifactorial  7-hyperglycemia  8-episode of low-grade fever with some chest discomfort upon coughing and taking deep breath-concerning for atelectasis versus evolving pneumonia.     Recommendations/Discussions:  See my discussion and recommendations on 4/22/2025.  Her leukocytosis and confusion are concerning for underlying new infectious process or exacerbation of pre-existing infectious process but  her improvement without modifying current antibiotic therapy suggests transient obstruction of the Cobos catheter could have been the culprit.  Recurrent aspiration is a concern.  Since she is clinically improving would recommend observing her white blood cell count and mentation while continuing her on ceftriaxone as she is getting and providing her with aspiration precautions low threshold to check for complications of antibiotic treatment such as C. difficile infection or selection of resistant pathogens or yeast infection.  Luisa Child MD  4/25/2025  11:59 EDT    Parts of this note may be an electronic transcription/translation of spoken language to printed text using the Dragon dictation system.

## 2025-04-25 NOTE — CODE DOCUMENTATION
Arrived to [facility], [facility] completed (4) as well as vitals and assessment. Pt has been awake for several days, had klonopin and woke with  was aphasia,  0252 Pt Team D line called, spoke with Dr Pace, team D imaging ordered and completed.  0328 Called Dr Pace, he stated imaging negative for stroke or bleed.  Pt returned to room, handed pt off to primary nurse.  Primary nurse will call [facility] to inform and advise.    Noted order received awaiting notes to be scan into chart.    Addedum: scan notes are for BP recheck only no provider note/assessment. Will request additional office note for chart review.

## 2025-04-25 NOTE — PROGRESS NOTES
DOS: 2025  NAME: Maritza Nance Darci   : 1960  PCP: Enoc Carbone DO    Chief Complaint   Patient presents with    Dizziness    Fever    Shortness of Breath         Subjective: Pt seen in follow up, however the problem is new to me.  Patient sitting up in bed with her family member at bedside.  States she feels better today.  States she feels like she has hemorrhoids and is having some discomfort in her rectum.    Objective:  Vital signs:   Vitals:    25 1900 25 2340 25 0717 25 1327   BP: 122/65 128/64 154/85 132/64   BP Location: Left arm Right arm Right arm Left arm   Patient Position: Lying Lying Lying Lying   Pulse: 87 76 80 79   Resp: 18 18 18 18   Temp: 98.4 °F (36.9 °C) 98.6 °F (37 °C) 99.3 °F (37.4 °C) 98.8 °F (37.1 °C)   TempSrc: Oral Oral Oral Oral   SpO2: 95% 96% 96% 93%   Weight:       Height:           Current Facility-Administered Medications:     acetaminophen (TYLENOL) tablet 650 mg, 650 mg, Oral, Q4H PRN, 650 mg at 25 **OR** acetaminophen (TYLENOL) suppository 650 mg, 650 mg, Rectal, Q4H PRN, Dayan Frost MD    albuterol sulfate HFA (PROVENTIL HFA;VENTOLIN HFA;PROAIR HFA) inhaler 2 puff, 2 puff, Inhalation, Q4H PRN, Dayan Frost MD    anastrozole (ARIMIDEX) tablet 1 mg, 1 mg, Oral, Daily, Dayan Frost MD, 1 mg at 25 0826    aspirin EC tablet 81 mg, 81 mg, Oral, Daily, Dayan Frost MD, 81 mg at 25 0828    baclofen (LIORESAL) tablet 20 mg, 20 mg, Oral, BID, Dayan Frost MD, 20 mg at 25 0828    sennosides-docusate (PERICOLACE) 8.6-50 MG per tablet 2 tablet, 2 tablet, Oral, BID, 2 tablet at 25 0957 **AND** polyethylene glycol (MIRALAX) packet 17 g, 17 g, Oral, Daily PRN **AND** bisacodyl (DULCOLAX) EC tablet 5 mg, 5 mg, Oral, Daily PRN **AND** bisacodyl (DULCOLAX) suppository 10 mg, 10 mg, Rectal, Daily PRN, Dayan Frost MD    cefTRIAXone (ROCEPHIN) 2,000 mg in sodium chloride 0.9 % 100 mL MBP, 2,000 mg, Intravenous,  Q24H, Luisa Child MD, Last Rate: 200 mL/hr at 04/24/25 2155, 2,000 mg at 04/24/25 2155    dextrose (D50W) (25 g/50 mL) IV injection 25 g, 25 g, Intravenous, Q15 Min PRN, Dayan Frost MD    dextrose (GLUTOSE) oral gel 15 g, 15 g, Oral, Q15 Min PRN, Dayan Frost MD    FLUoxetine (PROzac) capsule 20 mg, 20 mg, Oral, Daily, Dayan Frost MD, 20 mg at 04/23/25 0954    glucagon (GLUCAGEN) injection 1 mg, 1 mg, Intramuscular, Q15 Min PRN, Dayan Frost MD    heparin (porcine) 5000 UNIT/ML injection 5,000 Units, 5,000 Units, Subcutaneous, Q8H, Dayan Frost MD, 5,000 Units at 04/25/25 0628    insulin lispro (HUMALOG/ADMELOG) injection 2-7 Units, 2-7 Units, Subcutaneous, 4x Daily AC & at Bedtime, Christopher Caicedo MD, 2 Units at 04/25/25 1238    loperamide (IMODIUM) capsule 2 mg, 2 mg, Oral, 4x Daily PRN, Dayan Frost MD    metoprolol succinate XL (TOPROL-XL) 24 hr tablet 50 mg, 50 mg, Oral, Q24H, Oskar Spence MD, 50 mg at 04/25/25 0826    nitroglycerin (NITROSTAT) SL tablet 0.4 mg, 0.4 mg, Sublingual, Q5 Min PRN, Dayan Frost MD    ondansetron ODT (ZOFRAN-ODT) disintegrating tablet 4 mg, 4 mg, Oral, Q6H PRN **OR** ondansetron (ZOFRAN) injection 4 mg, 4 mg, Intravenous, Q6H PRN, Dayan Frost MD, 4 mg at 04/22/25 2216    pantoprazole (PROTONIX) EC tablet 40 mg, 40 mg, Oral, BID, Dayan Frost MD, 40 mg at 04/25/25 0828    Phosphorus Replacement - Follow Nurse / BPA Driven Protocol, , Not Applicable, PRN, Dayan Frost MD    potassium chloride (KLOR-CON) packet 40 mEq, 40 mEq, Oral, Q4H, Oskar Spence MD, 40 mEq at 04/25/25 1238    potassium phosphate 15 mmol in 0.9% normal saline 250 mL IVPB, 15 mmol, Intravenous, Once, Oskar Spence MD, 15 mmol at 04/25/25 1312    Potassium Replacement - Follow Nurse / BPA Driven Protocol, , Not Applicable, PRN, Dayan Frost MD    pramipexole (MIRAPEX) tablet 1.5 mg, 1.5 mg, Oral, BID, Dayan Frost MD, 1.5 mg at 04/25/25 0842    prochlorperazine  (COMPAZINE) tablet 10 mg, 10 mg, Oral, Q6H PRN, Dayan Frost MD    sodium chloride 0.45 % infusion, 75 mL/hr, Intravenous, Continuous, Oskar Spence MD, Last Rate: 75 mL/hr at 04/24/25 1532, 75 mL/hr at 04/24/25 1532    [COMPLETED] Insert Peripheral IV, , , Once **AND** sodium chloride 0.9 % flush 10 mL, 10 mL, Intravenous, PRN, Dayan Frost MD    sodium chloride 0.9 % flush 10 mL, 10 mL, Intravenous, Q12H, Dayan Frost MD, 10 mL at 04/25/25 0959    sodium chloride 0.9 % flush 10 mL, 10 mL, Intravenous, PRN, Dayan Frost MD    sodium chloride 0.9 % infusion 40 mL, 40 mL, Intravenous, PRN, Dayan Frost MD    PRN meds    acetaminophen **OR** acetaminophen    albuterol sulfate HFA    senna-docusate sodium **AND** polyethylene glycol **AND** bisacodyl **AND** bisacodyl    dextrose    dextrose    glucagon (human recombinant)    loperamide    nitroglycerin    ondansetron ODT **OR** ondansetron    Phosphorus Replacement - Follow Nurse / BPA Driven Protocol    Potassium Replacement - Follow Nurse / BPA Driven Protocol    prochlorperazine    [COMPLETED] Insert Peripheral IV **AND** sodium chloride    sodium chloride    sodium chloride    No current facility-administered medications on file prior to encounter.     Current Outpatient Medications on File Prior to Encounter   Medication Sig    albuterol sulfate  (90 Base) MCG/ACT inhaler Inhale 2 puffs Every 4 (Four) Hours As Needed for Wheezing or Shortness of Air.    anastrozole (ARIMIDEX) 1 MG tablet Take 1 tablet by mouth Daily.    baclofen (LIORESAL) 20 MG tablet Take 1 tablet by mouth 2 (Two) Times a Day.    Capivasertib (Truqap) 200 MG tablet Take 2 tablets by mouth 2 (Two) Times a Day. Take for 4 days on then 3 days off. Repeat.    Cholecalciferol (vitamin D3) 125 MCG (5000 UT) tablet tablet Take 1 tablet by mouth Daily.    clonazePAM (KlonoPIN) 2 MG tablet Take 1 tablet by mouth 2 (Two) Times a Day As Needed for Anxiety (Sleep).    dexAMETHasone  0.5 MG/5ML solution Take 10 mL by mouth Daily. (Patient taking differently: Take 10 mL by mouth Daily As Needed.)    Fenbendazole powder Use 440 mg 3 (Three) Times a Week. 2 capsules 3 times weekly    FLUoxetine (PROzac) 20 MG capsule Take 1 capsule by mouth Daily.    glipizide 2.5 MG tablet Take 2.5 mg by mouth 2 (Two) Times a Day Before Meals.    IVERMECTIN PO Take 15 mg by mouth 3 (Three) Times a Week.    losartan-hydrochlorothiazide (HYZAAR) 50-12.5 MG per tablet Take 1 tablet by mouth Daily.    metFORMIN (GLUCOPHAGE) 500 MG tablet Take 1 tablet by mouth Daily With Breakfast.    pantoprazole (PROTONIX) 40 MG EC tablet Take 1 tablet by mouth 2 (Two) Times a Day.    phenazopyridine (PYRIDIUM) 200 MG tablet Take 1 tablet by mouth 3 (Three) Times a Day As Needed for Dysuria.    pramipexole (MIRAPEX) 1.5 MG tablet Take 1 tablet by mouth 2 (Two) Times a Day.    prochlorperazine (COMPAZINE) 10 MG tablet Take 1 tablet by mouth Every 6 (Six) Hours As Needed for Nausea or Vomiting.    promethazine (PHENERGAN) 12.5 MG tablet Take 1 tablet by mouth Every 6 (Six) Hours As Needed for Nausea or Vomiting.    zolpidem (AMBIEN) 5 MG tablet Take 1 tablet by mouth At Night As Needed for Sleep.    traZODone (DESYREL) 50 MG tablet TAKE 1 TABLET BY MOUTH EVERY NIGHT       General appearance: NAD, alert and cooperative  HEENT: Normocephalic, atraumatic    Neurological:   MS: oriented to person, place, year, normal attention/concentration, some paraphasic errors, no neglect  CN: visual fields full, PERRL, EOMI, facial sensation equal, tongue midline  Motor: 5/5 upper extremities, 0/5 lower extremities   Sensory: light touch sensation intact in all 4 ext.  Gait and station: deferred paraplegic at baseline     Laboratory results:  Lab Results   Component Value Date    TSH 1.080 10/19/2024     Lab Results   Component Value Date    KPFDEOPW82 865 03/25/2024     Lab Results   Component Value Date    HGBA1C 7.30 (H) 04/21/2025     Lab Results  "  Component Value Date    GLUCOSE 117 (H) 04/25/2025    BUN 15 04/25/2025    CREATININE 0.85 04/25/2025    EGFRIFNONA 80 02/27/2022    EGFRIFAFRI >60 11/23/2020    BCR 17.6 04/25/2025    K 3.4 (L) 04/25/2025    CO2 20.5 (L) 04/25/2025    CALCIUM 6.6 (L) 04/25/2025    ALBUMIN 2.7 (L) 04/25/2025    LABIL2 1.5 01/07/2020    AST 88 (H) 04/22/2025    ALT 65 (H) 04/22/2025     Lab Results   Component Value Date    WBC 9.92 04/25/2025    HGB 8.0 (L) 04/25/2025    HCT 25.3 (L) 04/25/2025    MCV 95.5 04/25/2025     (H) 04/25/2025     Brief Urine Lab Results  (Last result in the past 365 days)        Color   Clarity   Blood   Leuk Est   Nitrite   Protein   CREAT   Urine HCG        04/17/25 1326 Dark Yellow   Turbid   Moderate (2+)   Moderate (2+)   Positive   >=300 mg/dL (3+)                 Blood Culture   Date Value Ref Range Status   04/19/2025 No growth at 5 days  Final     No results found for: \"BCIDPCR\", \"CXREFLEX\", \"CSFCX\", \"CULTURETIS\"  No results found for: \"CULTURES\", \"HSVCX\", \"URCX\"  No results found for: \"EYECULTURE\", \"GCCX\", \"HSVCULTURE\", \"LABHSV\"  No results found for: \"LEGIONELLA\", \"MRSACX\", \"MUMPSCX\", \"MYCOPLASCX\"  No results found for: \"NOCARDIACX\", \"STOOLCX\"  No results found for: \"THROATCX\", \"UNSTIMCULT\", \"URINECX\", \"CULTURE\", \"VZVCULTUR\"  No results found for: \"VIRALCULTU\", \"WOUNDCX\"  Pain Management Panel          Latest Ref Rng & Units 9/11/2024   Pain Management Panel   Creatinine, Urine mg/dL 48.5        Review and interpretation of imaging:  MRI Brain Without Contrast  Result Date: 4/25/2025  IMPRESSION : Redemonstrated mild nonspecific white matter changes, no convincing interval change since 9/11/2024, no evidence of acute intracranial abnormality.  This report was finalized on 4/25/2025 1:11 AM by Dr. Juma Scott M.D on Workstation: [id number]      CT Angiogram Head w AI Analysis of LVO  Addendum Date: 4/23/2025  ADDENDUM: 04 23 25 03:43 Call Doctor Regarding Stroke, called " Neurologist, STROKE @  (Pager) Dr. Pace on 04 23 03:43 (-04:00)     Result Date: 4/23/2025  Communications:  Call Doctor Stroke; Neurologist, STROKE @ [phone] (Pager) Electronically signed by Lory Holman MD on 04-23-25 at [phone]    CT Angiogram Neck  Addendum Date: 4/23/2025  ADDENDUM: 04 23 25 03:43 Call Doctor Regarding Stroke, called Dr. Pace on 04 23 03:43 (-04:00)     Result Date: 4/23/2025  Communications:  Call Doctor Stroke Electronically signed by Lory Holman MD on 04-23-25 at [phone]    CT CEREBRAL PERFUSION WITH & WITHOUT CONTRAST  Addendum Date: 4/23/2025  ADDENDUM: 04 23 25 03:44 Call Doctor Regarding Stroke, called Dr. Pace on 04 23 03:43 (-04:00)     Result Date: 4/23/2025  Communications:  Call Doctor Stroke Electronically signed by Lory Holman MD on 04-23-25 at [phone]    CT Abdomen Pelvis Without Contrast  Result Date: 4/21/2025  1. Unremarkable bilateral kidneys with no evidence of obstructive uropathy or urolithiasis. 2. Tiny amount of high attenuation material within the gallbladder could represent small amount of gallbladder sludge. No definite gallstones are seen. No gallbladder inflammatory changes are seen. 3. Several tiny urinary bladder stones are seen. Urinary bladder is otherwise unremarkable.  Radiation dose reduction techniques were utilized, including automated exposure control and exposure modulation based on body size.   This report was finalized on 4/21/2025 8:21 PM by Dr. Curt Byers M.D on Workstation: [id number]      US Gallbladder  Result Date: 4/21/2025  1. Normal study. Gallbladder appears within normal limits.   This report was finalized on 4/21/2025 6:58 PM by Dr. Curt Byers M.D on Workstation: [id number]      US Renal Bilateral  Result Date: 4/18/2025  1.  No hydronephrosis. Bladder decompressed by Cobos catheter. Please note evaluation of the left kidney is suboptimal due to limited patient mobility.    This report was  finalized on 4/18/2025 5:07 PM by Dr. Amarjit Curiel M.D on Workstation: [id number]        Impression/Assessment:  This is a 64-year-old female with past medical history of hypertension, hyperlipidemia, relapsing remitting multiple sclerosis with baseline paraparesis not on any disease modifying therapy for at least the last 10 years, neurogenic bladder who presented to the hospital on 4/17/2025 with complaints of fever and chills.  She was found to have Klebsiella UTI and bacteremia causing septic shock and type II NSTEMI.    We were consulted on 4/23 for altered mental status and difficulty speaking.  She underwent CT/CTA/CTP that did not reveal any flow-limiting stenosis or LVO.  MRI brain without was obtained early this morning and does not show any acute intracranial abnormalities.  From review of her chart she has had fluctuating sodium levels with sodium level today 149.    Diagnosis:  Toxic metabolic encephalopathy  Klebsiella UTI/bacteremia  Hypernatremia  Relapsing remitting multiple sclerosis not on DMT    Plan:  - Family and patient reports she is better today.  Reviewed her MRI which does not show any acute findings.  - From review of her chart she had 2 mg of Klonopin on 4/22.  She is getting baclofen 20 mg twice daily, and Mirapex 1.5 mg twice daily which are both home meds. Would avoid further benzo use, has had previous reaction to Klonopin in the past.   - Na level 149 today, appears to be fluctuating. Will defer to primary.  - White count improved and fevers are more normalized. Hold off on LP unless declines again.   - Discussed with patient and family about delirium precautions today. They voiced understanding.  PT/OT.- pt states she was only able to sit on side of bed prior to admission with assistance.   We will sign off  and see again per request.     Evidence-based delirium precautions include:     1. Frequent reorientation, reminding patient not questioning patient   2. Shades up during  day/down at night   3. TV off except for soft music only   4. Familiar objects at bedside   5. Encourage friends/family to spend the night   6. Minimize anticholingeric medications   7. Minimize polypharmacy   8. Minimize restraints, includes lines and/or foleys and/or feeding tubes as able   9. Minimize overnight checks/vitals to encourage restful consistent sleep patterns   10. Sit on side of bed.     Case discussed with patient, family member at bedside, and Dr. Byrnes , and he agrees with plan above.     DANA Muller

## 2025-04-25 NOTE — PROGRESS NOTES
Name: Maritza Nance Darci ADMIT: 2025   : 1960  PCP: CarboneEnoc guzman     MRN: [id number] LOS: 8 days   AGE/SEX: 64 y.o. female  ROOM: [facility] [[email or web address]     Subjective   Subjective     No events overnight. She reports some rectal pain. It seems her UOP is slowing. Na is a little worse       Objective   Objective   Vital Signs  Temp:  [98.4 °F (36.9 °C)-99.3 °F (37.4 °C)] 98.8 °F (37.1 °C)  Heart Rate:  [76-87] 79  Resp:  [18] 18  BP: (122-154)/(64-85) 132/64  SpO2:  [93 %-96 %] 93 %  on   ;   Device (Oxygen Therapy): room air  Body mass index is 29.72 kg/m².  Physical Exam  Constitutional:       General: She is not in acute distress.     Appearance: She is obese. She is not toxic-appearing.   Cardiovascular:      Rate and Rhythm: Normal rate and regular rhythm.      Heart sounds: Normal heart sounds.   Pulmonary:      Effort: Pulmonary effort is normal.      Breath sounds: Normal breath sounds.   Abdominal:      General: Bowel sounds are normal.      Palpations: Abdomen is soft.   Musculoskeletal:         General: No tenderness.      Right lower leg: No edema.      Left lower leg: No edema.   Neurological:      General: No focal deficit present.      Mental Status: She is alert and oriented to person, place, and time.   Psychiatric:         Mood and Affect: Mood normal.         Behavior: Behavior normal.         Results Review     I reviewed the patient's new clinical results.  Results from last 7 days   Lab Units 25  0456 25  0253 25  0655 25  0322   WBC 10*3/mm3 9.92 16.12* 16.46* 10.84*   HEMOGLOBIN g/dL 8.0* 8.9* 9.1* 8.8*   PLATELETS 10*3/mm3 586* 526* 416 340     Results from last 7 days   Lab Units 25  0456 25  0253 25  0655 25  0322   SODIUM mmol/L 149* 147* 138 141   POTASSIUM mmol/L 3.4* 3.9 4.8 4.5   CHLORIDE mmol/L 115* 115* 108* 111*   CO2 mmol/L 20.5* 22.9 21.7* 22.7   BUN mg/dL 15 28* 38* 33*   CREATININE mg/dL 0.85 1.29* 1.89* 1.15*   GLUCOSE mg/dL 117*  65 91 153*   Estimated Creatinine Clearance: 72.2 mL/min (by C-G formula based on SCr of 0.85 mg/dL).  Results from last 7 days   Lab Units 04/25/25  0456 04/22/25  0322 04/21/25  0444 04/20/25  0248 04/19/25  0352   ALBUMIN g/dL 2.7* 2.7* 2.7* 2.8* 2.5*   BILIRUBIN mg/dL  --  0.3 0.3 0.2 0.4   ALK PHOS U/L  --  269* 250* 229* 192*   AST (SGOT) U/L  --  88* 70* 68* 45*   ALT (SGPT) U/L  --  65* 47* 40* 26     Results from last 7 days   Lab Units 04/25/25  0456 04/24/25  0253 04/23/25  0655 04/22/25  0322 04/21/25  1544 04/21/25  0444 04/20/25  0248   CALCIUM mg/dL 6.6* 7.2* 7.2* 8.4*  --  8.0* 8.5*   ALBUMIN g/dL 2.7*  --   --  2.7*  --  2.7* 2.8*   MAGNESIUM mg/dL 2.0  --   --  2.2  --  2.1  --    PHOSPHORUS mg/dL 1.8*  --   --  2.7 2.3* 2.2*  --            COVID19   Date Value Ref Range Status   04/17/2025 Not Detected Not Detected - Ref. Range Final   09/07/2024 Not Detected Not Detected - Ref. Range Final   02/26/2022 Not Detected Not Detected - Ref. Range Final     SARS-CoV-2 PCR   Date Value Ref Range Status   03/24/2021 Not Detected Not Detected Final     Comment:     Nucleic acid specific to SARS-CoV-2 (COVID-19) virus was not detected in  this sample by the TaqPath (TM) COVID-19 Combo Kit.          SARS-CoV-2 (COVID-19) nucleic acid testing performed using [employer] Aptima (R) SARS-CoV-2 Assay or [employer] TaqPath (TM)  COVID-19 Combo Kit as indicated above under Emergency Use Authorization (EUA) from the FDA. Aptima (R) and TaqPath (TM) test performance  characteristics verified by [facility] in accordance with the FDAs Guidance Document (Policy for Diagnostic Tests for Coronavirus Disease-2019  during the Public Health Emergency) issued on March 16, 2020. The laboratory is regulated under CLIA as qualified to perform high-complexity testing  Unless otherwise noted, all testing was performed at [facility], CLIA No. [id number], [place] State Licensee No. [id number]   02/26/2021 Not Detected Not  Detected Final     Comment:     Nucleic acid specific to SARS-CoV-2 (COVID-19) virus was not detected in  this sample by the Aptima (R) SARS-CoV-2 Assay.                SARS-CoV-2 (COVID-19) nucleic acid testing performed using [employer] Aptima (R) SARS-CoV-2 Assay or [employer] TaqPath (TM)  COVID-19 Combo Kit as indicated above under Emergency Use Authorization (EUA) from the FDA. Aptima (R) and TaqPath (TM) test performance  characteristics verified by [facility] in accordance with the FDAs Guidance Document (Policy for Diagnostic Tests for Coronavirus Disease-2019  during the Public Health Emergency) issued on March 16, 2020. The laboratory is regulated under CLIA as qualified to perform high-complexity testing  Unless otherwise noted, all testing was performed at [facility], CLIA No. [id number], [place] State Licensee No. [id number]     Glucose   Date/Time Value Ref Range Status   04/25/2025 1602 163 (H) 70 - 130 mg/dL Final   04/25/2025 1100 179 (H) 70 - 130 mg/dL Final   04/25/2025 0545 121 70 - 130 mg/dL Final   04/24/2025 2112 137 (H) 70 - 130 mg/dL Final   04/24/2025 1606 89 70 - 130 mg/dL Final   04/24/2025 1125 69 (L) 70 - 130 mg/dL Final   04/24/2025 0635 73 70 - 130 mg/dL Final       MRI Brain Without Contrast  Narrative: MRI BRAIN without contrast.     HISTORY: Altered mental status, confusion. HISTORY of breast cancer..     TECHNIQUE: Routine brain MRI without contrast.     COMPARISON: Brain MRI 9/11/2024.     FINDINGS:     There is no MR evidence of acute infarct or other acute restricted  diffusion. There is no evidence of acute or chronic intracranial  hemorrhage. There is no hydrocephalus or extra-axial fluid collection.  Modest nonspecific periventricular and subcortical white matter lesions  are redemonstrated, not convincingly changed since the prior study.      The brain is structurally normal, and there is no hydrocephalus or  extra-axial fluid collection. Normal flow-voids are  seen in the cerebral  vessels. There is no restricted diffusion, and there is no evidence of  acute or chronic intracranial hemorrhage.     Bone marrow signal is normal, and the extracranial soft tissues are  normal.     Impression: IMPRESSION : Redemonstrated mild nonspecific white matter changes, no  convincing interval change since 9/11/2024, no evidence of acute  intracranial abnormality.     This report was finalized on 4/25/2025 1:11 AM by Dr. Juma Scott M.D on Workstation: [id number]       Scheduled Medications  anastrozole, 1 mg, Oral, Daily  aspirin, 81 mg, Oral, Daily  baclofen, 20 mg, Oral, BID  cefTRIAXone, 2,000 mg, Intravenous, Q24H  FLUoxetine, 20 mg, Oral, Daily  heparin (porcine), 5,000 Units, Subcutaneous, Q8H  insulin lispro, 2-7 Units, Subcutaneous, 4x Daily AC & at Bedtime  metoprolol succinate XL, 50 mg, Oral, Q24H  pantoprazole, 40 mg, Oral, BID  potassium chloride, 40 mEq, Oral, Q4H  pramipexole, 1.5 mg, Oral, BID  senna-docusate sodium, 2 tablet, Oral, BID  sodium chloride, 10 mL, Intravenous, Q12H    Infusions  sodium chloride, 75 mL/hr, Last Rate: 75 mL/hr (04/24/25 1532)    Diet  Diet: Regular/House; Texture: Soft to Chew (NDD 3); Soft to Chew: Chopped Meat; Fluid Consistency: Thin (IDDSI 0)       Assessment/Plan     Active Hospital Problems    Diagnosis  POA    **Sepsis [A41.9]  Yes    NSTEMI (non-ST elevated myocardial infarction) [I21.4]  Yes    Type 2 diabetes mellitus [E11.9]  Yes    Elevated LFTs [R79.89]  Yes    Malignant neoplasm of overlapping sites of left breast in female, estrogen receptor positive [C50.812, Z17.0]  Not Applicable    Multiple sclerosis [G35]  Yes    Essential hypertension [I10]  Yes    Anxiety and depression [F41.9, F32.A]  Yes    Neurogenic bladder [N31.9]  Yes    Restless legs syndrome [G25.81]  Yes      Resolved Hospital Problems   No resolved problems to display.       64 y.o. female admitted with Sepsis.    Klebsiella UTI and bacteremia causing  septic shock-on ceftriaxone with plans for 14 days of IV therapy per ID. Repeat blood cultures are negative.   Type 2 NSTEMI-related to the above. Normal EF on echocardiogram, but WMA consistent with stress cardiomyopathy. Cardiology recommends a betablocker and baby aspirin. She'll need to follow up with them in 1 month  KINZA-initially prerenal due to septic shock, but now likely related to obstructive uropathy with matias malfunction. Her matias has been flushed and is draining with improvement in her creatinine  Post obstructive diuresis-increase 1/2 NS rate  Worsened leukocytosis-I think most likely related to urinary retention as she's currently on antibiotics as above. Back to normal today  Metabolic/toxic encephalopathy-possibly related to her klonopin which has been discontinued or her urinary retention which has been addressed. Brain MRI was negative acutely.   Elevated LFTs-gallbladder ultrasound and CT abdomen/pelvis without obvious biliary disease. Acute hepatitis profile negative as well. Previously thought to be secondary to ribociclib per oncology  Type 2 diabetes-A1c 7.3. sliding scale  Essential hypertension-home antihypertensives held acutely  GERD-ppi  Multiple sclerosis-not on medications chronically for this  Neurogenic bladder with a chronic indwelling matias catheter  Metastatic breast cancer-on faslodex, xgeva, truqap as an outpatient   Insomnia-ambien prn  Anxiety-discontinue clonazepam permanently given it's association with confusion this admission and as an outpatient  Heparin SC for DVT prophylaxis.  Full code.  Discussed with patient and nursing staff.  Anticipate discharge home with home health in 2-3 days.      Oskar Spence MD  [facility]  04/25/25  16:25 EDT

## 2025-04-25 NOTE — PLAN OF CARE
Goal Outcome Evaluation:         Patient vitals stable today. Continuous 0.45% Sodium Chloride increased to 100ml/hr per Order. CHG bath performed. Care plan ongoing.

## 2025-04-26 LAB
ANION GAP SERPL CALCULATED.3IONS-SCNC: 13 MMOL/L (ref 5–15)
BUN SERPL-MCNC: 10 MG/DL (ref 8–23)
BUN/CREAT SERPL: 14.3 (ref 7–25)
CALCIUM SPEC-SCNC: 6.8 MG/DL (ref 8.6–10.5)
CHLORIDE SERPL-SCNC: 116 MMOL/L (ref 98–107)
CO2 SERPL-SCNC: 18 MMOL/L (ref 22–29)
CREAT SERPL-MCNC: 0.7 MG/DL (ref 0.57–1)
DEPRECATED RDW RBC AUTO: 53.5 FL (ref 37–54)
EGFRCR SERPLBLD CKD-EPI 2021: 97.9 ML/MIN/1.73
ERYTHROCYTE [DISTWIDTH] IN BLOOD BY AUTOMATED COUNT: 15.6 % (ref 12.3–15.4)
GLUCOSE BLDC GLUCOMTR-MCNC: 129 MG/DL (ref 70–130)
GLUCOSE BLDC GLUCOMTR-MCNC: 139 MG/DL (ref 70–130)
GLUCOSE BLDC GLUCOMTR-MCNC: 141 MG/DL (ref 70–130)
GLUCOSE BLDC GLUCOMTR-MCNC: 90 MG/DL (ref 70–130)
GLUCOSE SERPL-MCNC: 89 MG/DL (ref 65–99)
HCT VFR BLD AUTO: 24.7 % (ref 34–46.6)
HGB BLD-MCNC: 8.1 G/DL (ref 12–15.9)
MCH RBC QN AUTO: 30.9 PG (ref 26.6–33)
MCHC RBC AUTO-ENTMCNC: 32.8 G/DL (ref 31.5–35.7)
MCV RBC AUTO: 94.3 FL (ref 79–97)
PLATELET # BLD AUTO: 648 10*3/MM3 (ref 140–450)
PMV BLD AUTO: 9.5 FL (ref 6–12)
POTASSIUM SERPL-SCNC: 4.1 MMOL/L (ref 3.5–5.2)
RBC # BLD AUTO: 2.62 10*6/MM3 (ref 3.77–5.28)
SODIUM SERPL-SCNC: 147 MMOL/L (ref 136–145)
WBC NRBC COR # BLD AUTO: 5.79 10*3/MM3 (ref 3.4–10.8)

## 2025-04-26 PROCEDURE — 25010000002 CEFTRIAXONE PER 250 MG: Performed by: INTERNAL MEDICINE

## 2025-04-26 PROCEDURE — 82948 REAGENT STRIP/BLOOD GLUCOSE: CPT

## 2025-04-26 PROCEDURE — 25010000002 ONDANSETRON PER 1 MG: Performed by: INTERNAL MEDICINE

## 2025-04-26 PROCEDURE — 80048 BASIC METABOLIC PNL TOTAL CA: CPT | Performed by: STUDENT IN AN ORGANIZED HEALTH CARE EDUCATION/TRAINING PROGRAM

## 2025-04-26 PROCEDURE — 85027 COMPLETE CBC AUTOMATED: CPT | Performed by: STUDENT IN AN ORGANIZED HEALTH CARE EDUCATION/TRAINING PROGRAM

## 2025-04-26 PROCEDURE — 25010000002 HEPARIN (PORCINE) PER 1000 UNITS: Performed by: INTERNAL MEDICINE

## 2025-04-26 RX ORDER — ZOLPIDEM TARTRATE 5 MG/1
5 TABLET ORAL NIGHTLY PRN
Status: DISCONTINUED | OUTPATIENT
Start: 2025-04-26 | End: 2025-04-30 | Stop reason: HOSPADM

## 2025-04-26 RX ADMIN — CEFTRIAXONE 2000 MG: 2 INJECTION, POWDER, FOR SOLUTION INTRAMUSCULAR; INTRAVENOUS at 22:11

## 2025-04-26 RX ADMIN — PRAMIPEXOLE DIHYDROCHLORIDE 1.5 MG: 1.5 TABLET ORAL at 20:55

## 2025-04-26 RX ADMIN — ANASTROZOLE 1 MG: 1 TABLET, FILM COATED ORAL at 08:37

## 2025-04-26 RX ADMIN — ONDANSETRON 4 MG: 2 INJECTION, SOLUTION INTRAMUSCULAR; INTRAVENOUS at 01:44

## 2025-04-26 RX ADMIN — ASPIRIN 81 MG: 81 TABLET, COATED ORAL at 08:36

## 2025-04-26 RX ADMIN — Medication 10 ML: at 20:56

## 2025-04-26 RX ADMIN — BACLOFEN 20 MG: 10 TABLET ORAL at 08:36

## 2025-04-26 RX ADMIN — PRAMIPEXOLE DIHYDROCHLORIDE 1.5 MG: 1.5 TABLET ORAL at 08:37

## 2025-04-26 RX ADMIN — SENNOSIDES AND DOCUSATE SODIUM 2 TABLET: 50; 8.6 TABLET ORAL at 08:36

## 2025-04-26 RX ADMIN — HEPARIN SODIUM 5000 UNITS: 5000 INJECTION INTRAVENOUS; SUBCUTANEOUS at 22:12

## 2025-04-26 RX ADMIN — METOPROLOL SUCCINATE 50 MG: 50 TABLET, EXTENDED RELEASE ORAL at 08:36

## 2025-04-26 RX ADMIN — ZOLPIDEM TARTRATE 5 MG: 5 TABLET, FILM COATED ORAL at 21:02

## 2025-04-26 RX ADMIN — ACETAMINOPHEN 650 MG: 325 TABLET, FILM COATED ORAL at 15:15

## 2025-04-26 RX ADMIN — SODIUM CHLORIDE 100 ML/HR: 450 INJECTION, SOLUTION INTRAVENOUS at 04:36

## 2025-04-26 RX ADMIN — HEPARIN SODIUM 5000 UNITS: 5000 INJECTION INTRAVENOUS; SUBCUTANEOUS at 06:18

## 2025-04-26 RX ADMIN — HEPARIN SODIUM 5000 UNITS: 5000 INJECTION INTRAVENOUS; SUBCUTANEOUS at 15:13

## 2025-04-26 RX ADMIN — SODIUM CHLORIDE 100 ML/HR: 450 INJECTION, SOLUTION INTRAVENOUS at 22:12

## 2025-04-26 RX ADMIN — BACLOFEN 20 MG: 10 TABLET ORAL at 20:55

## 2025-04-26 RX ADMIN — PANTOPRAZOLE SODIUM 40 MG: 40 TABLET, DELAYED RELEASE ORAL at 08:37

## 2025-04-26 RX ADMIN — PANTOPRAZOLE SODIUM 40 MG: 40 TABLET, DELAYED RELEASE ORAL at 20:55

## 2025-04-26 RX ADMIN — ACETAMINOPHEN 650 MG: 325 TABLET, FILM COATED ORAL at 04:31

## 2025-04-26 RX ADMIN — SENNOSIDES AND DOCUSATE SODIUM 2 TABLET: 50; 8.6 TABLET ORAL at 20:56

## 2025-04-26 RX ADMIN — Medication 10 ML: at 08:40

## 2025-04-26 NOTE — PROGRESS NOTES
"  Infectious Diseases Progress Note        [facility]  Los: 9 days  Patient Identification:  Name: Maritza Bejarano  Age: 64 y.o.  Sex: female  :  1960  MRN: [id number]         Primary Care Physician: Enoc Carbone,         Subjective: Complaining of low-grade fever and some discomfort in her buttock area.  Denies any fever and chills.  Interval History: See consultation note.    Objective:    Scheduled Meds:anastrozole, 1 mg, Oral, Daily  aspirin, 81 mg, Oral, Daily  baclofen, 20 mg, Oral, BID  cefTRIAXone, 2,000 mg, Intravenous, Q24H  FLUoxetine, 20 mg, Oral, Daily  heparin (porcine), 5,000 Units, Subcutaneous, Q8H  insulin lispro, 2-7 Units, Subcutaneous, 4x Daily AC & at Bedtime  metoprolol succinate XL, 50 mg, Oral, Q24H  pantoprazole, 40 mg, Oral, BID  pramipexole, 1.5 mg, Oral, BID  senna-docusate sodium, 2 tablet, Oral, BID  sodium chloride, 10 mL, Intravenous, Q12H      Continuous Infusions:sodium chloride, 100 mL/hr, Last Rate: 100 mL/hr (25 0436)          Vital signs in last 24 hours:  Temp:  [98.1 °F (36.7 °C)-98.8 °F (37.1 °C)] 98.1 °F (36.7 °C)  Heart Rate:  [66-79] 66  Resp:  [18] 18  BP: (132-149)/(64-74) 149/74    Intake/Output:    Intake/Output Summary (Last 24 hours) at 2025 1322  Last data filed at 2025 0202  Gross per 24 hour   Intake 240 ml   Output 2250 ml   Net - ml       Exam:  /74 (BP Location: Right arm, Patient Position: Lying)   Pulse 66   Temp 98.1 °F (36.7 °C) (Oral)   Resp 18   Ht 165.1 cm (65\")   Wt 83.1 kg (183 lb 3.2 oz)   LMP  (LMP Unknown) Comment: PT. STATES HER LAST PERIOD WAS GREATER THAN FIVE YEARS AGO  SpO2 97%   BMI 30.49 kg/m²   Patient is examined using the personal protective equipment as per guidelines from infection control for this particular patient as enacted.  Hand washing was performed before and after patient interaction.  General Appearance: Awake and interactive family members at the bedside.                "           Head:    Normocephalic, without obvious abnormality, atraumatic                           Eyes:  Pale conjunctiva                         Throat:   Lips, tongue, gums normal; oral mucosa pink and moist                           Neck:   Supple, symmetrical, trachea midline, no JVD                         Lungs:    Clear to auscultation bilaterally, respirations unlabored                 Chest Wall:    No tenderness or deformity                          Heart:  S1-S2 regular                  Abdomen:   Catheter in place, obese soft nontender                 Extremities:   Extremities normal, atraumatic, no cyanosis or edema                        Pulses:   Pulses palpable in all extremities                            Skin:                    Neurologic: Alert and oriented x 3       Data Review:    I reviewed the patient's new clinical results.  Results from last 7 days   Lab Units 04/26/25  0309 04/25/25  0456 04/24/25  0253 04/23/25  0655 04/22/25  0322 04/21/25  0444 04/20/25  0248   WBC 10*3/mm3 5.79 9.92 16.12* 16.46* 10.84* 9.22 10.42   HEMOGLOBIN g/dL 8.1* 8.0* 8.9* 9.1* 8.8* 9.3* 9.5*   PLATELETS 10*3/mm3 648* 586* 526* 416 340 274 232     Results from last 7 days   Lab Units 04/26/25  0309 04/25/25  1857 04/25/25  0456 04/24/25  0253 04/23/25  0655 04/22/25  0322 04/21/25  1544 04/21/25  0444 04/20/25  0248   SODIUM mmol/L 147*  --  149* 147* 138 141  --  139 137   POTASSIUM mmol/L 4.1 4.7 3.4* 3.9 4.8 4.5 4.4 3.4* 3.8   CHLORIDE mmol/L 116*  --  115* 115* 108* 111*  --  106 105   CO2 mmol/L 18.0*  --  20.5* 22.9 21.7* 22.7  --  21.1* 21.0*   BUN mg/dL 10  --  15 28* 38* 33*  --  32* 36*   CREATININE mg/dL 0.70  --  0.85 1.29* 1.89* 1.15*  --  1.11* 1.23*   CALCIUM mg/dL 6.8*  --  6.6* 7.2* 7.2* 8.4*  --  8.0* 8.5*   GLUCOSE mg/dL 89  --  117* 65 91 153*  --  231* 242*     Microbiology Results (last 10 days)       Procedure Component Value - Date/Time    Blood Culture - Blood, Hand, Right [[id number]]   (Normal) Collected: 04/19/25 1639    Lab Status: Final result Specimen: Blood from Hand, Right Updated: 04/24/25 1700     Blood Culture No growth at 5 days    Narrative:      Less than seven (7) mL's of blood was collected.  Insufficient quantity may yield false negative results.    Blood Culture - Blood, Arm, Right [[id number]]  (Normal) Collected: 04/19/25 0908    Lab Status: Final result Specimen: Blood from Arm, Right Updated: 04/24/25 0945     Blood Culture No growth at 5 days    Respiratory Panel PCR w/COVID-19(SARS-CoV-2) YAMILE/FRANK/ROSE/PAD/COR/CHIDI In-House, NP Swab in UTM/VTM, 2 HR TAT - Swab, Nasopharynx [[id number]]  (Normal) Collected: 04/17/25 1341    Lab Status: Final result Specimen: Swab from Nasopharynx Updated: 04/17/25 1443     ADENOVIRUS, PCR Not Detected     Coronavirus 229E Not Detected     Coronavirus HKU1 Not Detected     Coronavirus NL63 Not Detected     Coronavirus OC43 Not Detected     COVID19 Not Detected     Human Metapneumovirus Not Detected     Human Rhinovirus/Enterovirus Not Detected     Influenza A PCR Not Detected     Influenza B PCR Not Detected     Parainfluenza Virus 1 Not Detected     Parainfluenza Virus 2 Not Detected     Parainfluenza Virus 3 Not Detected     Parainfluenza Virus 4 Not Detected     RSV, PCR Not Detected     Bordetella pertussis pcr Not Detected     Bordetella parapertussis PCR Not Detected     Chlamydophila pneumoniae PCR Not Detected     Mycoplasma pneumo by PCR Not Detected    Narrative:      In the setting of a positive respiratory panel with a viral infection PLUS a negative procalcitonin without other underlying concern for bacterial infection, consider observing off antibiotics or discontinuation of antibiotics and continue supportive care. If the respiratory panel is positive for atypical bacterial infection (Bordetella pertussis, Chlamydophila pneumoniae, or Mycoplasma pneumoniae), consider antibiotic de-escalation to target atypical bacterial infection.     Urine Culture - Urine, Urine, Catheter [[id number]]  (Abnormal)  (Susceptibility) Collected: 04/17/25 1326    Lab Status: Final result Specimen: Urine, Catheter Updated: 04/19/25 1037     Urine Culture >100,000 CFU/mL Klebsiella pneumoniae ssp pneumoniae    Narrative:      Colonization of the urinary tract without infection is common. Treatment is discouraged unless the patient is symptomatic, pregnant, or undergoing an invasive urologic procedure.    Susceptibility        Klebsiella pneumoniae ssp pneumoniae      JOSE      Amoxicillin + Clavulanate Susceptible      Ampicillin Resistant      Ampicillin + Sulbactam Susceptible      Cefazolin (Urine) Susceptible      Cefepime Susceptible      Ceftazidime Susceptible      Ceftriaxone Susceptible      Gentamicin Susceptible      Levofloxacin Intermediate      Nitrofurantoin Susceptible      Piperacillin + Tazobactam Susceptible      Trimethoprim + Sulfamethoxazole Resistant                           Blood Culture - Blood, Foot, Right [[id number]]  (Abnormal)  (Susceptibility) Collected: 04/17/25 1313    Lab Status: Final result Specimen: Blood from Foot, Right Updated: 04/19/25 0700     Blood Culture Klebsiella pneumoniae ssp pneumoniae     Isolated from Aerobic and Anaerobic Bottles     Gram Stain Anaerobic Bottle Gram negative bacilli      Aerobic Bottle Gram negative bacilli    Susceptibility        Klebsiella pneumoniae ssp pneumoniae      JOSE      Amoxicillin + Clavulanate Susceptible      Ampicillin Resistant      Ampicillin + Sulbactam Susceptible      Cefazolin (Non Urine) Susceptible      Cefepime Susceptible      Ceftazidime Susceptible      Ceftriaxone Susceptible      Gentamicin Susceptible      Levofloxacin Intermediate      Piperacillin + Tazobactam Susceptible      Trimethoprim + Sulfamethoxazole Resistant                       Susceptibility Comments       Klebsiella pneumoniae ssp pneumoniae    With the exception of urinary-sourced infections,  aminoglycosides should not be used as monotherapy.               Blood Culture ID, PCR - Blood, Foot, Right [[id number]]  (Abnormal) Collected: 04/17/25 1313    Lab Status: Final result Specimen: Blood from Foot, Right Updated: 04/17/25 2355     BCID, PCR Klebsiella pneumoniae group. Identification by BCID2 PCR.     BOTTLE TYPE Anaerobic Bottle    Narrative:      No resistance genes detected.    Blood Culture - Blood, Foot, Left [[id number]]  (Abnormal) Collected: 04/17/25 1304    Lab Status: Final result Specimen: Blood from Foot, Left Updated: 04/19/25 0700     Blood Culture Klebsiella pneumoniae ssp pneumoniae     Isolated from Aerobic and Anaerobic Bottles     Gram Stain Anaerobic Bottle Gram negative bacilli      Aerobic Bottle Gram negative bacilli    Narrative:      Refer to previous blood culture collected on 04/17/2025 1313 for MICs.              Assessment: Improving    Sepsis    Multiple sclerosis    Essential hypertension    Anxiety and depression    Neurogenic bladder    Restless legs syndrome    Malignant neoplasm of overlapping sites of left breast in female, estrogen receptor positive    Elevated LFTs    NSTEMI (non-ST elevated myocardial infarction)    Type 2 diabetes mellitus   64-year-old female with  1-Klebsiella bacteremic sepsis with septic shock secondary  2-urinary tract infection due to recent indwelling Cobos catheter malfunction  3-immobility with MS and neurogenic bladder  4-immunocompromised host  5-acute on chronic renal insufficiency  6-anemia multifactorial  7-hyperglycemia  8-episode of low-grade fever with some chest discomfort upon coughing and taking deep breath-concerning for atelectasis versus evolving pneumonia.     Recommendations/Discussions:  See discussion and recommendations on 4/22/2025.  Her leukocytosis and confusion are concerning for underlying new infectious process or exacerbation of pre-existing infectious process but her improvement without modifying current  antibiotic therapy suggests transient obstruction of the Cobos catheter could have been the culprit.  Recurrent aspiration is a concern.  Since she is clinically improving would recommend observing her white blood cell count and mentation while continuing her on ceftriaxone as she is getting and providing her with aspiration precautions low threshold to check for complications of antibiotic treatment such as C. difficile infection or selection of resistant pathogens or yeast infection.  Raphael Vargas MD  4/26/2025  13:22 EDT    Parts of this note may be an electronic transcription/translation of spoken language to printed text using the Dragon dictation system.

## 2025-04-26 NOTE — PROGRESS NOTES
Name: Maritza Nance Darci ADMIT: 2025   : 1960  PCP: Piero Carboneaudrey     MRN: [id number] LOS: 9 days   AGE/SEX: 64 y.o. female  ROOM: [facility]     Subjective   Subjective     No events overnight. Her rectal pain is resolved. She reports some pleuritic flank pain today. Nontender to palpation in the area. Otherwise doing well       Objective   Objective   Vital Signs  Temp:  [98.1 °F (36.7 °C)-99.3 °F (37.4 °C)] 99.3 °F (37.4 °C)  Heart Rate:  [66-76] 76  Resp:  [18] 18  BP: (136-160)/(67-75) 160/75  SpO2:  [97 %-100 %] 99 %  on   ;   Device (Oxygen Therapy): room air  Body mass index is 30.49 kg/m².  Physical Exam  Constitutional:       General: She is not in acute distress.     Appearance: She is obese. She is not toxic-appearing.   Cardiovascular:      Rate and Rhythm: Normal rate and regular rhythm.      Heart sounds: Normal heart sounds.   Pulmonary:      Effort: Pulmonary effort is normal.      Breath sounds: Normal breath sounds.   Abdominal:      General: Bowel sounds are normal.      Palpations: Abdomen is soft.   Musculoskeletal:         General: No tenderness.      Right lower leg: No edema.      Left lower leg: No edema.   Neurological:      General: No focal deficit present.      Mental Status: She is alert and oriented to person, place, and time.   Psychiatric:         Mood and Affect: Mood normal.         Behavior: Behavior normal.         Results Review     I reviewed the patient's new clinical results.  Results from last 7 days   Lab Units 25  0309 25  0456 253 25  0655   WBC 10*3/mm3 5.79 9.92 16.12* 16.46*   HEMOGLOBIN g/dL 8.1* 8.0* 8.9* 9.1*   PLATELETS 10*3/mm3 648* 586* 526* 416     Results from last 7 days   Lab Units 25  0309 25  1857 25  0456 25  0253 25  0655   SODIUM mmol/L 147*  --  149* 147* 138   POTASSIUM mmol/L 4.1 4.7 3.4* 3.9 4.8   CHLORIDE mmol/L 116*  --  115* 115* 108*   CO2 mmol/L 18.0*  --  20.5* 22.9 21.7*    BUN mg/dL 10  --  15 28* 38*   CREATININE mg/dL 0.70  --  0.85 1.29* 1.89*   GLUCOSE mg/dL 89  --  117* 65 91   Estimated Creatinine Clearance: 88.7 mL/min (by C-G formula based on SCr of 0.7 mg/dL).  Results from last 7 days   Lab Units 04/25/25  0456 04/22/25  0322 04/21/25  0444 04/20/25  0248   ALBUMIN g/dL 2.7* 2.7* 2.7* 2.8*   BILIRUBIN mg/dL  --  0.3 0.3 0.2   ALK PHOS U/L  --  269* 250* 229*   AST (SGOT) U/L  --  88* 70* 68*   ALT (SGPT) U/L  --  65* 47* 40*     Results from last 7 days   Lab Units 04/26/25  0309 04/25/25  2207 04/25/25  0456 04/24/25  0253 04/23/25  0655 04/22/25  0322 04/21/25  1544 04/21/25  0444 04/20/25  0248 04/20/25  0248   CALCIUM mg/dL 6.8*  --  6.6* 7.2* 7.2* 8.4*  --  8.0*  --  8.5*   ALBUMIN g/dL  --   --  2.7*  --   --  2.7*  --  2.7*  --  2.8*   MAGNESIUM mg/dL  --   --  2.0  --   --  2.2  --  2.1  --   --    PHOSPHORUS mg/dL  --  2.5 1.8*  --   --  2.7 2.3* 2.2*   < >  --     < > = values in this interval not displayed.           COVID19   Date Value Ref Range Status   04/17/2025 Not Detected Not Detected - Ref. Range Final   09/07/2024 Not Detected Not Detected - Ref. Range Final   02/26/2022 Not Detected Not Detected - Ref. Range Final     SARS-CoV-2 PCR   Date Value Ref Range Status   03/24/2021 Not Detected Not Detected Final     Comment:     Nucleic acid specific to SARS-CoV-2 (COVID-19) virus was not detected in  this sample by the TaqPath (TM) COVID-19 Combo Kit.          SARS-CoV-2 (COVID-19) nucleic acid testing performed using [employer] Aptima (R) SARS-CoV-2 Assay or Open CS TaqPath (TM)  COVID-19 Combo Kit as indicated above under Emergency Use Authorization (EUA) from the FDA. Aptima (R) and TaqPath (TM) test performance  characteristics verified by [facility] in accordance with the FDAs Guidance Document (Policy for Diagnostic Tests for Coronavirus Disease-2019  during the Public Health Emergency) issued on March 16, 2020. The laboratory is  regulated under CLIA as qualified to perform high-complexity testing  Unless otherwise noted, all testing was performed at [facility], CLIA No. [id number], [place] State Licensee No. [id number]   02/26/2021 Not Detected Not Detected Final     Comment:     Nucleic acid specific to SARS-CoV-2 (COVID-19) virus was not detected in  this sample by the Aptima (R) SARS-CoV-2 Assay.                SARS-CoV-2 (COVID-19) nucleic acid testing performed using [email or web address] Aptima (R) SARS-CoV-2 Assay or [employer] TaqPath (TM)  COVID-19 Combo Kit as indicated above under Emergency Use Authorization (EUA) from the FDA. Aptima (R) and TaqPath (TM) test performance  characteristics verified by [facility] in accordance with the FDAs Guidance Document (Policy for Diagnostic Tests for Coronavirus Disease-2019  during the Public Health Emergency) issued on March 16, 2020. The laboratory is regulated under CLIA as qualified to perform high-complexity testing  Unless otherwise noted, all testing was performed at [facility], [place] [address] [id number], [place] State Licensee No. [id number]     Glucose   Date/Time Value Ref Range Status   04/26/2025 1621 129 70 - 130 mg/dL Final   04/26/2025 1136 139 (H) 70 - 130 mg/dL Final   04/26/2025 0624 90 70 - 130 mg/dL Final   04/25/2025 2042 155 (H) 70 - 130 mg/dL Final   04/25/2025 1602 163 (H) 70 - 130 mg/dL Final   04/25/2025 1100 179 (H) 70 - 130 mg/dL Final   04/25/2025 0545 121 70 - 130 mg/dL Final       MRI Brain Without Contrast  Narrative: MRI BRAIN without contrast.     HISTORY: Altered mental status, confusion. HISTORY of breast cancer..     TECHNIQUE: Routine brain MRI without contrast.     COMPARISON: Brain MRI 9/11/2024.     FINDINGS:     There is no MR evidence of acute infarct or other acute restricted  diffusion. There is no evidence of acute or chronic intracranial  hemorrhage. There is no hydrocephalus or extra-axial fluid collection.  Modest nonspecific periventricular and  subcortical white matter lesions  are redemonstrated, not convincingly changed since the prior study.      The brain is structurally normal, and there is no hydrocephalus or  extra-axial fluid collection. Normal flow-voids are seen in the cerebral  vessels. There is no restricted diffusion, and there is no evidence of  acute or chronic intracranial hemorrhage.     Bone marrow signal is normal, and the extracranial soft tissues are  normal.     Impression: IMPRESSION : Redemonstrated mild nonspecific white matter changes, no  convincing interval change since 9/11/2024, no evidence of acute  intracranial abnormality.     This report was finalized on 4/25/2025 1:11 AM by Dr. Juma Scott M.D on Workstation: [id number]       Scheduled Medications  anastrozole, 1 mg, Oral, Daily  aspirin, 81 mg, Oral, Daily  baclofen, 20 mg, Oral, BID  cefTRIAXone, 2,000 mg, Intravenous, Q24H  FLUoxetine, 20 mg, Oral, Daily  heparin (porcine), 5,000 Units, Subcutaneous, Q8H  insulin lispro, 2-7 Units, Subcutaneous, 4x Daily AC & at Bedtime  metoprolol succinate XL, 50 mg, Oral, Q24H  pantoprazole, 40 mg, Oral, BID  pramipexole, 1.5 mg, Oral, BID  senna-docusate sodium, 2 tablet, Oral, BID  sodium chloride, 10 mL, Intravenous, Q12H    Infusions  sodium chloride, 100 mL/hr, Last Rate: 100 mL/hr (04/26/25 0436)    Diet  Diet: Regular/House; Texture: Soft to Chew (NDD 3); Soft to Chew: Chopped Meat; Fluid Consistency: Thin (IDDSI 0)       Assessment/Plan     Active Hospital Problems    Diagnosis  POA    **Sepsis [A41.9]  Yes    NSTEMI (non-ST elevated myocardial infarction) [I21.4]  Yes    Type 2 diabetes mellitus [E11.9]  Yes    Elevated LFTs [R79.89]  Yes    Malignant neoplasm of overlapping sites of left breast in female, estrogen receptor positive [C50.812, Z17.0]  Not Applicable    Multiple sclerosis [G35]  Yes    Essential hypertension [I10]  Yes    Anxiety and depression [F41.9, F32.A]  Yes    Neurogenic bladder [N31.9]  Yes     Restless legs syndrome [G25.81]  Yes      Resolved Hospital Problems   No resolved problems to display.       64 y.o. female admitted with Sepsis.    Klebsiella UTI and bacteremia causing septic shock-on ceftriaxone with plans for 14 days of IV therapy per ID. Repeat blood cultures are negative.   Type 2 NSTEMI-related to the above. Normal EF on echocardiogram, but WMA consistent with stress cardiomyopathy. Cardiology recommends a betablocker and baby aspirin. She'll need to follow up with them in 1 month  KINZA-initially prerenal due to septic shock which initially improved, but had a recurrent KINZA related to obstructive uropathy with matias malfunction. Her matias has been flushed and is draining with improvement in her creatinine  Post obstructive diuresis-continue 1/2 NS rate  Metabolic/toxic encephalopathy-possibly related to her klonopin which has been discontinued or her urinary retention which has been addressed. Brain MRI was negative acutely.   Elevated LFTs-gallbladder ultrasound and CT abdomen/pelvis without obvious biliary disease. Acute hepatitis profile negative as well. Previously thought to be secondary to ribociclib per oncology  Type 2 diabetes-A1c 7.3. sliding scale  Essential hypertension-home antihypertensives held acutely  GERD-ppi  Multiple sclerosis-not on medications chronically for this  Neurogenic bladder with a chronic indwelling matias catheter  Metastatic breast cancer-on faslodex, xgeva, truqap as an outpatient   Insomnia-ambien prn  Anxiety-discontinue clonazepam permanently given it's association with confusion this admission and as an outpatient  Heparin SC for DVT prophylaxis.  Full code.  Discussed with patient and nursing staff.  Anticipate discharge home with home health in 2-3 days.      Oskar Spence MD  [facility]  04/26/25  16:30 EDT

## 2025-04-26 NOTE — PLAN OF CARE
Goal Outcome Evaluation:      Patient vitals stable today with elevated temp of 99.3 this afternoon. Napped with CPAP this afternoon between care.  At 1820 decreased urinary output from catheter was noted with urine leaking around catheter into brief. Patient complaining of lower abdominal pain, with bladder scanner returning residual volume of 467ml. Catheter was irrigated with 40ml Normal saline with urine return and drainage. Family requested replacement of catheter due to multiple episodes of poor drainage over the past 3 days. Dr Spence notified and instructed to attempt to replace catheter, if unsuccessful to consult urology.     16Fr Coude ordered by fax from distribution at 1910.  Replacement will be attempted once supplies arrive to unit.

## 2025-04-27 LAB
ANION GAP SERPL CALCULATED.3IONS-SCNC: 13 MMOL/L (ref 5–15)
BUN SERPL-MCNC: 9 MG/DL (ref 8–23)
BUN/CREAT SERPL: 12 (ref 7–25)
CALCIUM SPEC-SCNC: 7.4 MG/DL (ref 8.6–10.5)
CHLORIDE SERPL-SCNC: 114 MMOL/L (ref 98–107)
CO2 SERPL-SCNC: 16 MMOL/L (ref 22–29)
CREAT SERPL-MCNC: 0.75 MG/DL (ref 0.57–1)
DEPRECATED RDW RBC AUTO: 52.1 FL (ref 37–54)
EGFRCR SERPLBLD CKD-EPI 2021: 90.1 ML/MIN/1.73
ERYTHROCYTE [DISTWIDTH] IN BLOOD BY AUTOMATED COUNT: 15.1 % (ref 12.3–15.4)
GLUCOSE BLDC GLUCOMTR-MCNC: 143 MG/DL (ref 70–130)
GLUCOSE BLDC GLUCOMTR-MCNC: 148 MG/DL (ref 70–130)
GLUCOSE BLDC GLUCOMTR-MCNC: 181 MG/DL (ref 70–130)
GLUCOSE BLDC GLUCOMTR-MCNC: 83 MG/DL (ref 70–130)
GLUCOSE SERPL-MCNC: 82 MG/DL (ref 65–99)
HCT VFR BLD AUTO: 31.1 % (ref 34–46.6)
HGB BLD-MCNC: 9.9 G/DL (ref 12–15.9)
MAGNESIUM SERPL-MCNC: 2 MG/DL (ref 1.6–2.4)
MCH RBC QN AUTO: 30.5 PG (ref 26.6–33)
MCHC RBC AUTO-ENTMCNC: 31.8 G/DL (ref 31.5–35.7)
MCV RBC AUTO: 95.7 FL (ref 79–97)
PHOSPHATE SERPL-MCNC: 2.5 MG/DL (ref 2.5–4.5)
PLATELET # BLD AUTO: 618 10*3/MM3 (ref 140–450)
PMV BLD AUTO: 10 FL (ref 6–12)
POTASSIUM SERPL-SCNC: 5 MMOL/L (ref 3.5–5.2)
RBC # BLD AUTO: 3.25 10*6/MM3 (ref 3.77–5.28)
SODIUM SERPL-SCNC: 143 MMOL/L (ref 136–145)
WBC NRBC COR # BLD AUTO: 2.56 10*3/MM3 (ref 3.4–10.8)

## 2025-04-27 PROCEDURE — 25010000002 CEFTRIAXONE PER 250 MG: Performed by: INTERNAL MEDICINE

## 2025-04-27 PROCEDURE — 85027 COMPLETE CBC AUTOMATED: CPT | Performed by: STUDENT IN AN ORGANIZED HEALTH CARE EDUCATION/TRAINING PROGRAM

## 2025-04-27 PROCEDURE — 25010000002 HEPARIN (PORCINE) PER 1000 UNITS: Performed by: INTERNAL MEDICINE

## 2025-04-27 PROCEDURE — 63710000001 INSULIN LISPRO (HUMAN) PER 5 UNITS: Performed by: INTERNAL MEDICINE

## 2025-04-27 PROCEDURE — 84100 ASSAY OF PHOSPHORUS: CPT | Performed by: STUDENT IN AN ORGANIZED HEALTH CARE EDUCATION/TRAINING PROGRAM

## 2025-04-27 PROCEDURE — 83735 ASSAY OF MAGNESIUM: CPT | Performed by: STUDENT IN AN ORGANIZED HEALTH CARE EDUCATION/TRAINING PROGRAM

## 2025-04-27 PROCEDURE — 82948 REAGENT STRIP/BLOOD GLUCOSE: CPT

## 2025-04-27 PROCEDURE — 80048 BASIC METABOLIC PNL TOTAL CA: CPT | Performed by: STUDENT IN AN ORGANIZED HEALTH CARE EDUCATION/TRAINING PROGRAM

## 2025-04-27 RX ADMIN — PANTOPRAZOLE SODIUM 40 MG: 40 TABLET, DELAYED RELEASE ORAL at 21:25

## 2025-04-27 RX ADMIN — Medication 10 ML: at 21:26

## 2025-04-27 RX ADMIN — PRAMIPEXOLE DIHYDROCHLORIDE 1.5 MG: 1.5 TABLET ORAL at 09:41

## 2025-04-27 RX ADMIN — ANASTROZOLE 1 MG: 1 TABLET, FILM COATED ORAL at 09:39

## 2025-04-27 RX ADMIN — HEPARIN SODIUM 5000 UNITS: 5000 INJECTION INTRAVENOUS; SUBCUTANEOUS at 06:23

## 2025-04-27 RX ADMIN — INSULIN LISPRO 3 UNITS: 100 INJECTION, SOLUTION INTRAVENOUS; SUBCUTANEOUS at 17:41

## 2025-04-27 RX ADMIN — ASPIRIN 81 MG: 81 TABLET, COATED ORAL at 09:37

## 2025-04-27 RX ADMIN — PANTOPRAZOLE SODIUM 40 MG: 40 TABLET, DELAYED RELEASE ORAL at 09:40

## 2025-04-27 RX ADMIN — CEFTRIAXONE 2000 MG: 2 INJECTION, POWDER, FOR SOLUTION INTRAMUSCULAR; INTRAVENOUS at 21:27

## 2025-04-27 RX ADMIN — PRAMIPEXOLE DIHYDROCHLORIDE 1.5 MG: 1.5 TABLET ORAL at 21:25

## 2025-04-27 RX ADMIN — HEPARIN SODIUM 5000 UNITS: 5000 INJECTION INTRAVENOUS; SUBCUTANEOUS at 21:27

## 2025-04-27 RX ADMIN — ZOLPIDEM TARTRATE 5 MG: 5 TABLET, FILM COATED ORAL at 21:27

## 2025-04-27 RX ADMIN — BACLOFEN 20 MG: 10 TABLET ORAL at 09:40

## 2025-04-27 RX ADMIN — HEPARIN SODIUM 5000 UNITS: 5000 INJECTION INTRAVENOUS; SUBCUTANEOUS at 13:32

## 2025-04-27 RX ADMIN — METOPROLOL SUCCINATE 50 MG: 50 TABLET, EXTENDED RELEASE ORAL at 09:41

## 2025-04-27 RX ADMIN — SENNOSIDES AND DOCUSATE SODIUM 2 TABLET: 50; 8.6 TABLET ORAL at 21:26

## 2025-04-27 RX ADMIN — BACLOFEN 20 MG: 10 TABLET ORAL at 21:25

## 2025-04-27 RX ADMIN — SENNOSIDES AND DOCUSATE SODIUM 2 TABLET: 50; 8.6 TABLET ORAL at 09:36

## 2025-04-27 NOTE — PROGRESS NOTES
Name: Maritza Nance Darci ADMIT: 2025   : 1960  PCP: CarboneEnoc guzman     MRN: [id number] LOS: 10 days   AGE/SEX: 64 y.o. female  ROOM: [facility]     Subjective   Subjective     No events overnight. No complaints today.       Objective   Objective   Vital Signs  Temp:  [97.9 °F (36.6 °C)-98.6 °F (37 °C)] 98.6 °F (37 °C)  Heart Rate:  [67-80] 80  Resp:  [16-18] 18  BP: (134-144)/(63-68) 144/68  SpO2:  [93 %-97 %] 93 %  on   ;   Device (Oxygen Therapy): room air  Body mass index is 29.9 kg/m².  Physical Exam  Constitutional:       General: She is not in acute distress.     Appearance: She is obese. She is not toxic-appearing.   Cardiovascular:      Rate and Rhythm: Normal rate and regular rhythm.      Heart sounds: Normal heart sounds.   Pulmonary:      Effort: Pulmonary effort is normal.      Breath sounds: Normal breath sounds.   Abdominal:      General: Bowel sounds are normal.      Palpations: Abdomen is soft.   Musculoskeletal:         General: No tenderness.      Right lower leg: No edema.      Left lower leg: No edema.   Neurological:      General: No focal deficit present.      Mental Status: She is alert and oriented to person, place, and time.   Psychiatric:         Mood and Affect: Mood normal.         Behavior: Behavior normal.         Results Review     I reviewed the patient's new clinical results.  Results from last 7 days   Lab Units 25  0559 25  0309 25  0456 25  0253   WBC 10*3/mm3 2.56* 5.79 9.92 16.12*   HEMOGLOBIN g/dL 9.9* 8.1* 8.0* 8.9*   PLATELETS 10*3/mm3 618* 648* 586* 526*     Results from last 7 days   Lab Units 25  0559 25  0309 25  1857 25  0456 25  0253   SODIUM mmol/L 143 147*  --  149* 147*   POTASSIUM mmol/L 5.0 4.1 4.7 3.4* 3.9   CHLORIDE mmol/L 114* 116*  --  115* 115*   CO2 mmol/L 16.0* 18.0*  --  20.5* 22.9   BUN mg/dL 9 10  --  15 28*   CREATININE mg/dL 0.75 0.70  --  0.85 1.29*   GLUCOSE mg/dL 82 89  --  117* 65    Estimated Creatinine Clearance: 82 mL/min (by C-G formula based on SCr of 0.75 mg/dL).  Results from last 7 days   Lab Units 04/25/25  0456 04/22/25  0322 04/21/25  0444   ALBUMIN g/dL 2.7* 2.7* 2.7*   BILIRUBIN mg/dL  --  0.3 0.3   ALK PHOS U/L  --  269* 250*   AST (SGOT) U/L  --  88* 70*   ALT (SGPT) U/L  --  65* 47*     Results from last 7 days   Lab Units 04/27/25  0559 04/26/25  0309 04/25/25  2207 04/25/25  0456 04/24/25  0253 04/23/25  0655 04/22/25  0322 04/21/25  1544 04/21/25  0444   CALCIUM mg/dL 7.4* 6.8*  --  6.6* 7.2*   < > 8.4*  --  8.0*   ALBUMIN g/dL  --   --   --  2.7*  --   --  2.7*  --  2.7*   MAGNESIUM mg/dL 2.0  --   --  2.0  --   --  2.2  --  2.1   PHOSPHORUS mg/dL 2.5  --  2.5 1.8*  --   --  2.7   < > 2.2*    < > = values in this interval not displayed.           COVID19   Date Value Ref Range Status   04/17/2025 Not Detected Not Detected - Ref. Range Final   09/07/2024 Not Detected Not Detected - Ref. Range Final   02/26/2022 Not Detected Not Detected - Ref. Range Final     SARS-CoV-2 PCR   Date Value Ref Range Status   03/24/2021 Not Detected Not Detected Final     Comment:     Nucleic acid specific to SARS-CoV-2 (COVID-19) virus was not detected in  this sample by the TaqPath (TM) COVID-19 Combo Kit.          SARS-CoV-2 (COVID-19) nucleic acid testing performed using [employer] Aptima (R) SARS-CoV-2 Assay or [employer] TaqPath (TM)  COVID-19 Combo Kit as indicated above under Emergency Use Authorization (EUA) from the FDA. Aptima (R) and TaqPath (TM) test performance  characteristics verified by [facility] in accordance with the FDAs Guidance Document (Policy for Diagnostic Tests for Coronavirus Disease-2019  during the Public Health Emergency) issued on March 16, 2020. The laboratory is regulated under CLIA as qualified to perform high-complexity testing  Unless otherwise noted, all testing was performed at [facility], CLIA No. [id number], [place] State Licensee No. [id number]    02/26/2021 Not Detected Not Detected Final     Comment:     Nucleic acid specific to SARS-CoV-2 (COVID-19) virus was not detected in  this sample by the Aptima (R) SARS-CoV-2 Assay.                SARS-CoV-2 (COVID-19) nucleic acid testing performed using [employer] Aptima (R) SARS-CoV-2 Assay or [email or web address] TaqPath (TM)  COVID-19 Combo Kit as indicated above under Emergency Use Authorization (EUA) from the FDA. Aptima (R) and TaqPath (TM) test performance  characteristics verified by [facility] in accordance with the FDAs Guidance Document (Policy for Diagnostic Tests for Coronavirus Disease-2019  during the Public Health Emergency) issued on March 16, 2020. The laboratory is regulated under CLIA as qualified to perform high-complexity testing  Unless otherwise noted, all testing was performed at [facility], CLIA No. [id number], [place] State Licensee No. [id number]     Glucose   Date/Time Value Ref Range Status   04/27/2025 1131 143 (H) 70 - 130 mg/dL Final   04/27/2025 0627 83 70 - 130 mg/dL Final   04/26/2025 2019 141 (H) 70 - 130 mg/dL Final   04/26/2025 1621 129 70 - 130 mg/dL Final   04/26/2025 1136 139 (H) 70 - 130 mg/dL Final   04/26/2025 0624 90 70 - 130 mg/dL Final   04/25/2025 2042 155 (H) 70 - 130 mg/dL Final       MRI Brain Without Contrast  Narrative: MRI BRAIN without contrast.     HISTORY: Altered mental status, confusion. HISTORY of breast cancer..     TECHNIQUE: Routine brain MRI without contrast.     COMPARISON: Brain MRI 9/11/2024.     FINDINGS:     There is no MR evidence of acute infarct or other acute restricted  diffusion. There is no evidence of acute or chronic intracranial  hemorrhage. There is no hydrocephalus or extra-axial fluid collection.  Modest nonspecific periventricular and subcortical white matter lesions  are redemonstrated, not convincingly changed since the prior study.      The brain is structurally normal, and there is no hydrocephalus or  extra-axial fluid  collection. Normal flow-voids are seen in the cerebral  vessels. There is no restricted diffusion, and there is no evidence of  acute or chronic intracranial hemorrhage.     Bone marrow signal is normal, and the extracranial soft tissues are  normal.     Impression: IMPRESSION : Redemonstrated mild nonspecific white matter changes, no  convincing interval change since 9/11/2024, no evidence of acute  intracranial abnormality.     This report was finalized on 4/25/2025 1:11 AM by Dr. Juma Scott M.D on Workstation: [id number]       Scheduled Medications  anastrozole, 1 mg, Oral, Daily  aspirin, 81 mg, Oral, Daily  baclofen, 20 mg, Oral, BID  cefTRIAXone, 2,000 mg, Intravenous, Q24H  FLUoxetine, 20 mg, Oral, Daily  heparin (porcine), 5,000 Units, Subcutaneous, Q8H  insulin lispro, 2-7 Units, Subcutaneous, 4x Daily AC & at Bedtime  metoprolol succinate XL, 50 mg, Oral, Q24H  pantoprazole, 40 mg, Oral, BID  pramipexole, 1.5 mg, Oral, BID  senna-docusate sodium, 2 tablet, Oral, BID  sodium chloride, 10 mL, Intravenous, Q12H    Infusions     Diet  Diet: Regular/House; Texture: Soft to Chew (NDD 3); Soft to Chew: Chopped Meat; Fluid Consistency: Thin (IDDSI 0)       Assessment/Plan     Active Hospital Problems    Diagnosis  POA    **Sepsis [A41.9]  Yes    NSTEMI (non-ST elevated myocardial infarction) [I21.4]  Yes    Type 2 diabetes mellitus [E11.9]  Yes    Elevated LFTs [R79.89]  Yes    Malignant neoplasm of overlapping sites of left breast in female, estrogen receptor positive [C50.812, Z17.0]  Not Applicable    Multiple sclerosis [G35]  Yes    Essential hypertension [I10]  Yes    Anxiety and depression [F41.9, F32.A]  Yes    Neurogenic bladder [N31.9]  Yes    Restless legs syndrome [G25.81]  Yes      Resolved Hospital Problems   No resolved problems to display.       64 y.o. female admitted with Sepsis.    Klebsiella UTI and bacteremia causing septic shock-on ceftriaxone with plans for 14 days of IV therapy per  ID. Repeat blood cultures are negative. Today is day 11 of antibiotics  Type 2 NSTEMI-related to the above. Normal EF on echocardiogram, but WMA consistent with stress cardiomyopathy. Cardiology recommends a betablocker and baby aspirin. She'll need to follow up with them in 1 month  KINZA-initially prerenal due to septic shock which initially improved, but had a recurrent KINZA related to obstructive uropathy with matias malfunction. Her matias has been flushed and is draining with improvement in her creatinine  Post obstructive diuresis/hypernatremia-sodium level has improved. Still had a lot of UOP yesterday. Stop IVF and monitor  Metabolic/toxic encephalopathy-possibly related to her klonopin which has been discontinued or her urinary retention which has been addressed. Brain MRI was negative acutely. Resolved   Elevated LFTs-gallbladder ultrasound and CT abdomen/pelvis without obvious biliary disease. Acute hepatitis profile negative as well. Previously thought to be secondary to ribociclib per oncology  Type 2 diabetes-A1c 7.3. sliding scale  Essential hypertension-home antihypertensives held acutely  GERD-ppi  Multiple sclerosis-not on medications chronically for this  Neurogenic bladder with a chronic indwelling matias catheter  Metastatic breast cancer-on faslodex, xgeva, truqap as an outpatient   Insomnia-ambien prn  Anxiety-discontinue clonazepam permanently given it's association with confusion this admission and as an outpatient  Heparin SC for DVT prophylaxis.  Full code.  Discussed with patient and nursing staff.  Anticipate discharge home with home health in 1-2 days.      Oskar Spence MD  [facility]  04/27/25  14:03 EDT

## 2025-04-27 NOTE — PROGRESS NOTES
"  Infectious Diseases Progress Note        [facility]  [place]: 10 days  Patient Identification:  Name: Maritza Bejarano  Age: 64 y.o.  Sex: female  :  1960  MRN: [id number]         Primary Care Physician: Enoc Carbone,         Subjective: Complaining of low-grade fever and some discomfort in her buttock area.  Denies any fever and chills.  Interval History: See consultation note.    Objective:    Scheduled Meds:anastrozole, 1 mg, Oral, Daily  aspirin, 81 mg, Oral, Daily  baclofen, 20 mg, Oral, BID  cefTRIAXone, 2,000 mg, Intravenous, Q24H  FLUoxetine, 20 mg, Oral, Daily  heparin (porcine), 5,000 Units, Subcutaneous, Q8H  insulin lispro, 2-7 Units, Subcutaneous, 4x Daily AC & at Bedtime  metoprolol succinate XL, 50 mg, Oral, Q24H  pantoprazole, 40 mg, Oral, BID  pramipexole, 1.5 mg, Oral, BID  senna-docusate sodium, 2 tablet, Oral, BID  sodium chloride, 10 mL, Intravenous, Q12H      Continuous Infusions:         Vital signs in last 24 hours:  Temp:  [97.9 °F (36.6 °C)-98.6 °F (37 °C)] 98.6 °F (37 °C)  Heart Rate:  [67-80] 80  Resp:  [16-18] 18  BP: (134-144)/(63-68) 144/68    Intake/Output:    Intake/Output Summary (Last 24 hours) at 2025 1518  Last data filed at 2025 0454  Gross per 24 hour   Intake --   Output 3550 ml   Net -3550 ml       Exam:  /68 (BP Location: Right arm, Patient Position: Lying)   Pulse 80   Temp 98.6 °F (37 °C) (Oral)   Resp 18   Ht 165.1 cm (65\")   Wt 81.5 kg (179 lb 10.8 oz)   LMP  (LMP Unknown) Comment: PT. STATES HER LAST PERIOD WAS GREATER THAN FIVE YEARS AGO  SpO2 93%   BMI 29.90 kg/m²   Patient is examined using the personal protective equipment as per guidelines from infection control for this particular patient as enacted.  Hand washing was performed before and after patient interaction.  General Appearance: Awake and interactive family members at the bedside.                          Head:    Normocephalic, without obvious abnormality, " atraumatic                           Eyes:  Pale conjunctiva                         Throat:   Lips, tongue, gums normal; oral mucosa pink and moist                           Neck:   Supple, symmetrical, trachea midline, no JVD                         Lungs:    Clear to auscultation bilaterally, respirations unlabored                 Chest Wall:    No tenderness or deformity                          Heart:  S1-S2 regular                  Abdomen:   Catheter in place, obese soft nontender                 Extremities:   Extremities normal, atraumatic, no cyanosis or edema                        Pulses:   Pulses palpable in all extremities                            Skin:                    Neurologic: Alert and oriented x 3       Data Review:    I reviewed the patient's new clinical results.  Results from last 7 days   Lab Units 04/27/25  0559 04/26/25  0309 04/25/25  0456 04/24/25  0253 04/23/25  0655 04/22/25  0322 04/21/25  0444   WBC 10*3/mm3 2.56* 5.79 9.92 16.12* 16.46* 10.84* 9.22   HEMOGLOBIN g/dL 9.9* 8.1* 8.0* 8.9* 9.1* 8.8* 9.3*   PLATELETS 10*3/mm3 618* 648* 586* 526* 416 340 274     Results from last 7 days   Lab Units 04/27/25  0559 04/26/25  0309 04/25/25  1857 04/25/25  0456 04/24/25  0253 04/23/25  0655 04/22/25  0322 04/21/25  1544 04/21/25  0444   SODIUM mmol/L 143 147*  --  149* 147* 138 141  --  139   POTASSIUM mmol/L 5.0 4.1 4.7 3.4* 3.9 4.8 4.5   < > 3.4*   CHLORIDE mmol/L 114* 116*  --  115* 115* 108* 111*  --  106   CO2 mmol/L 16.0* 18.0*  --  20.5* 22.9 21.7* 22.7  --  21.1*   BUN mg/dL 9 10  --  15 28* 38* 33*  --  32*   CREATININE mg/dL 0.75 0.70  --  0.85 1.29* 1.89* 1.15*  --  1.11*   CALCIUM mg/dL 7.4* 6.8*  --  6.6* 7.2* 7.2* 8.4*  --  8.0*   GLUCOSE mg/dL 82 89  --  117* 65 91 153*  --  231*    < > = values in this interval not displayed.     Microbiology Results (last 10 days)       Procedure Component Value - Date/Time    Blood Culture - Blood, Hand, Right [[id number]]  (Normal)  Collected: 04/19/25 1639    Lab Status: Final result Specimen: Blood from Hand, Right Updated: 04/24/25 1700     Blood Culture No growth at 5 days    Narrative:      Less than seven (7) mL's of blood was collected.  Insufficient quantity may yield false negative results.    Blood Culture - Blood, Arm, Right [[id number]]  (Normal) Collected: 04/19/25 0908    Lab Status: Final result Specimen: Blood from Arm, Right Updated: 04/24/25 0945     Blood Culture No growth at 5 days              Assessment: Improving    Sepsis    Multiple sclerosis    Essential hypertension    Anxiety and depression    Neurogenic bladder    Restless legs syndrome    Malignant neoplasm of overlapping sites of left breast in female, estrogen receptor positive    Elevated LFTs    NSTEMI (non-ST elevated myocardial infarction)    Type 2 diabetes mellitus   64-year-old female with  1-Klebsiella bacteremic sepsis with septic shock secondary  2-urinary tract infection due to recent indwelling Cobos catheter malfunction  3-immobility with MS and neurogenic bladder  4-immunocompromised host  5-acute on chronic renal insufficiency  6-anemia multifactorial  7-hyperglycemia  8-episode of low-grade fever with some chest discomfort upon coughing and taking deep breath-concerning for atelectasis versus evolving pneumonia.     Recommendations/Discussions:    See discussion and recommendations on 4/22/2025.  Her leukocytosis and confusion are concerning for underlying new infectious process or exacerbation of pre-existing infectious process but her improvement without modifying current antibiotic therapy suggests transient obstruction of the Cobos catheter could have been the culprit.  Recurrent aspiration is a concern.  Since she is clinically improving would recommend observing her white blood cell count and mentation while continuing her on ceftriaxone as she is getting and providing her with aspiration precautions low threshold to check for complications  of antibiotic treatment such as C. difficile infection or selection of resistant pathogens or yeast infection.    Raphael Vargas MD  4/27/2025  15:18 EDT    .

## 2025-04-27 NOTE — PLAN OF CARE
Goal Outcome Evaluation:         Pt vital signs stable. alert and oriented. prescribed meds given. Q2 turns. Wound dressing and matias catheter care prn. Increase urine output during shift. Total output 2850mls. Plan of care ongoing.

## 2025-04-28 LAB
ANION GAP SERPL CALCULATED.3IONS-SCNC: 12 MMOL/L (ref 5–15)
BUN SERPL-MCNC: 13 MG/DL (ref 8–23)
BUN/CREAT SERPL: 20.3 (ref 7–25)
CALCIUM SPEC-SCNC: 7.8 MG/DL (ref 8.6–10.5)
CHLORIDE SERPL-SCNC: 113 MMOL/L (ref 98–107)
CO2 SERPL-SCNC: 20 MMOL/L (ref 22–29)
CREAT SERPL-MCNC: 0.64 MG/DL (ref 0.57–1)
DEPRECATED RDW RBC AUTO: 53.1 FL (ref 37–54)
EGFRCR SERPLBLD CKD-EPI 2021: 100.1 ML/MIN/1.73
ERYTHROCYTE [DISTWIDTH] IN BLOOD BY AUTOMATED COUNT: 15.5 % (ref 12.3–15.4)
GLUCOSE BLDC GLUCOMTR-MCNC: 106 MG/DL (ref 70–130)
GLUCOSE BLDC GLUCOMTR-MCNC: 137 MG/DL (ref 70–130)
GLUCOSE BLDC GLUCOMTR-MCNC: 179 MG/DL (ref 70–130)
GLUCOSE BLDC GLUCOMTR-MCNC: 83 MG/DL (ref 70–130)
GLUCOSE SERPL-MCNC: 146 MG/DL (ref 65–99)
HCT VFR BLD AUTO: 25.2 % (ref 34–46.6)
HGB BLD-MCNC: 8.4 G/DL (ref 12–15.9)
MCH RBC QN AUTO: 31.7 PG (ref 26.6–33)
MCHC RBC AUTO-ENTMCNC: 33.3 G/DL (ref 31.5–35.7)
MCV RBC AUTO: 95.1 FL (ref 79–97)
PLATELET # BLD AUTO: 620 10*3/MM3 (ref 140–450)
PMV BLD AUTO: 9.7 FL (ref 6–12)
POTASSIUM SERPL-SCNC: 3.4 MMOL/L (ref 3.5–5.2)
POTASSIUM SERPL-SCNC: 4.4 MMOL/L (ref 3.5–5.2)
RBC # BLD AUTO: 2.65 10*6/MM3 (ref 3.77–5.28)
SODIUM SERPL-SCNC: 145 MMOL/L (ref 136–145)
WBC NRBC COR # BLD AUTO: 2.26 10*3/MM3 (ref 3.4–10.8)

## 2025-04-28 PROCEDURE — 25010000002 CEFTRIAXONE PER 250 MG: Performed by: INTERNAL MEDICINE

## 2025-04-28 PROCEDURE — 82948 REAGENT STRIP/BLOOD GLUCOSE: CPT

## 2025-04-28 PROCEDURE — 84132 ASSAY OF SERUM POTASSIUM: CPT | Performed by: STUDENT IN AN ORGANIZED HEALTH CARE EDUCATION/TRAINING PROGRAM

## 2025-04-28 PROCEDURE — 80048 BASIC METABOLIC PNL TOTAL CA: CPT | Performed by: STUDENT IN AN ORGANIZED HEALTH CARE EDUCATION/TRAINING PROGRAM

## 2025-04-28 PROCEDURE — 25010000002 HEPARIN (PORCINE) PER 1000 UNITS: Performed by: INTERNAL MEDICINE

## 2025-04-28 PROCEDURE — 85027 COMPLETE CBC AUTOMATED: CPT | Performed by: STUDENT IN AN ORGANIZED HEALTH CARE EDUCATION/TRAINING PROGRAM

## 2025-04-28 PROCEDURE — 63710000001 INSULIN LISPRO (HUMAN) PER 5 UNITS: Performed by: INTERNAL MEDICINE

## 2025-04-28 RX ORDER — POTASSIUM CHLORIDE 1500 MG/1
40 TABLET, EXTENDED RELEASE ORAL EVERY 4 HOURS
Status: COMPLETED | OUTPATIENT
Start: 2025-04-28 | End: 2025-04-28

## 2025-04-28 RX ADMIN — FLUOXETINE HYDROCHLORIDE 20 MG: 20 CAPSULE ORAL at 08:37

## 2025-04-28 RX ADMIN — HEPARIN SODIUM 5000 UNITS: 5000 INJECTION INTRAVENOUS; SUBCUTANEOUS at 16:38

## 2025-04-28 RX ADMIN — Medication 10 ML: at 08:39

## 2025-04-28 RX ADMIN — INSULIN LISPRO 2 UNITS: 100 INJECTION, SOLUTION INTRAVENOUS; SUBCUTANEOUS at 08:38

## 2025-04-28 RX ADMIN — PRAMIPEXOLE DIHYDROCHLORIDE 1.5 MG: 1.5 TABLET ORAL at 08:36

## 2025-04-28 RX ADMIN — Medication 10 ML: at 21:19

## 2025-04-28 RX ADMIN — HEPARIN SODIUM 5000 UNITS: 5000 INJECTION INTRAVENOUS; SUBCUTANEOUS at 05:50

## 2025-04-28 RX ADMIN — PANTOPRAZOLE SODIUM 40 MG: 40 TABLET, DELAYED RELEASE ORAL at 21:19

## 2025-04-28 RX ADMIN — POTASSIUM CHLORIDE 40 MEQ: 1500 TABLET, EXTENDED RELEASE ORAL at 08:38

## 2025-04-28 RX ADMIN — POTASSIUM CHLORIDE 40 MEQ: 1500 TABLET, EXTENDED RELEASE ORAL at 11:40

## 2025-04-28 RX ADMIN — BACLOFEN 20 MG: 10 TABLET ORAL at 08:36

## 2025-04-28 RX ADMIN — SENNOSIDES AND DOCUSATE SODIUM 2 TABLET: 50; 8.6 TABLET ORAL at 08:36

## 2025-04-28 RX ADMIN — PANTOPRAZOLE SODIUM 40 MG: 40 TABLET, DELAYED RELEASE ORAL at 08:37

## 2025-04-28 RX ADMIN — ASPIRIN 81 MG: 81 TABLET, COATED ORAL at 08:36

## 2025-04-28 RX ADMIN — ANASTROZOLE 1 MG: 1 TABLET, FILM COATED ORAL at 08:37

## 2025-04-28 RX ADMIN — METOPROLOL SUCCINATE 50 MG: 50 TABLET, EXTENDED RELEASE ORAL at 08:36

## 2025-04-28 RX ADMIN — PRAMIPEXOLE DIHYDROCHLORIDE 1.5 MG: 1.5 TABLET ORAL at 21:19

## 2025-04-28 RX ADMIN — CEFTRIAXONE 2000 MG: 2 INJECTION, POWDER, FOR SOLUTION INTRAMUSCULAR; INTRAVENOUS at 21:19

## 2025-04-28 RX ADMIN — BACLOFEN 20 MG: 10 TABLET ORAL at 21:19

## 2025-04-28 RX ADMIN — HEPARIN SODIUM 5000 UNITS: 5000 INJECTION INTRAVENOUS; SUBCUTANEOUS at 21:19

## 2025-04-28 NOTE — PROGRESS NOTES
Name: Maritza Nance Darci ADMIT: 2025   : 1960  PCP: CarboneEnoc guzman     MRN: [id number] LOS: 11 days   AGE/SEX: 64 y.o. female  ROOM: [facility]     Subjective   Subjective     No events overnight. No complaints today. Na up slightly, but still in the normal range       Objective   Objective   Vital Signs  Temp:  [97.5 °F (36.4 °C)-99 °F (37.2 °C)] 97.5 °F (36.4 °C)  Heart Rate:  [70-77] 77  Resp:  [18-20] 18  BP: (129-141)/(61-68) 136/61  SpO2:  [95 %-98 %] 97 %  on   ;   Device (Oxygen Therapy): room air  Body mass index is 30.52 kg/m².  Physical Exam  Constitutional:       General: She is not in acute distress.     Appearance: She is obese. She is not toxic-appearing.   Cardiovascular:      Rate and Rhythm: Normal rate and regular rhythm.      Heart sounds: Normal heart sounds.   Pulmonary:      Effort: Pulmonary effort is normal.      Breath sounds: Normal breath sounds.   Abdominal:      General: Bowel sounds are normal.      Palpations: Abdomen is soft.   Musculoskeletal:         General: No tenderness.      Right lower leg: No edema.      Left lower leg: No edema.   Neurological:      General: No focal deficit present.      Mental Status: She is alert and oriented to person, place, and time.   Psychiatric:         Mood and Affect: Mood normal.         Behavior: Behavior normal.         Results Review     I reviewed the patient's new clinical results.  Results from last 7 days   Lab Units 25  0559 25  0309 25  0456   WBC 10*3/mm3 2.26* 2.56* 5.79 9.92   HEMOGLOBIN g/dL 8.4* 9.9* 8.1* 8.0*   PLATELETS 10*3/mm3 620* 618* 648* 586*     Results from last 7 days   Lab Units 25  0448 25  0559 25  0309 25  1857 25  0456   SODIUM mmol/L 145 143 147*  --  149*   POTASSIUM mmol/L 3.4* 5.0 4.1 4.7 3.4*   CHLORIDE mmol/L 113* 114* 116*  --  115*   CO2 mmol/L 20.0* 16.0* 18.0*  --  20.5*   BUN mg/dL 13 9 10  --  15   CREATININE mg/dL 0.64 0.75 0.70  --   0.85   GLUCOSE mg/dL 146* 82 89  --  117*   Estimated Creatinine Clearance: 97.1 mL/min (by C-G formula based on SCr of 0.64 mg/dL).  Results from last 7 days   Lab Units 04/25/25  0456 04/22/25  0322   ALBUMIN g/dL 2.7* 2.7*   BILIRUBIN mg/dL  --  0.3   ALK PHOS U/L  --  269*   AST (SGOT) U/L  --  88*   ALT (SGPT) U/L  --  65*     Results from last 7 days   Lab Units 04/28/25  0448 04/27/25  0559 04/26/25  0309 04/25/25  2207 04/25/25  0456 04/23/25  0655 04/22/25  0322   CALCIUM mg/dL 7.8* 7.4* 6.8*  --  6.6*   < > 8.4*   ALBUMIN g/dL  --   --   --   --  2.7*  --  2.7*   MAGNESIUM mg/dL  --  2.0  --   --  2.0  --  2.2   PHOSPHORUS mg/dL  --  2.5  --  2.5 1.8*  --  2.7    < > = values in this interval not displayed.           COVID19   Date Value Ref Range Status   04/17/2025 Not Detected Not Detected - Ref. Range Final   09/07/2024 Not Detected Not Detected - Ref. Range Final   02/26/2022 Not Detected Not Detected - Ref. Range Final     SARS-CoV-2 PCR   Date Value Ref Range Status   03/24/2021 Not Detected Not Detected Final     Comment:     Nucleic acid specific to SARS-CoV-2 (COVID-19) virus was not detected in  this sample by the TaqPath (TM) COVID-19 Combo Kit.          SARS-CoV-2 (COVID-19) nucleic acid testing performed using [employer]. Aptima (R) SARS-CoV-2 Assay or [employer] TaqPath (TM)  COVID-19 Combo Kit as indicated above under Emergency Use Authorization (EUA) from the FDA. Aptima (R) and TaqPath (TM) test performance  characteristics verified by [facility] in accordance with the FDAs Guidance Document (Policy for Diagnostic Tests for Coronavirus Disease-2019  during the Public Health Emergency) issued on March 16, 2020. The laboratory is regulated under CLIA as qualified to perform high-complexity testing  Unless otherwise noted, all testing was performed at [facility], CLIA No. [id number], [place] State Licensee No. [id number]   02/26/2021 Not Detected Not Detected Final     Comment:      Nucleic acid specific to SARS-CoV-2 (COVID-19) virus was not detected in  this sample by the Aptima (R) SARS-CoV-2 Assay.                SARS-CoV-2 (COVID-19) nucleic acid testing performed using [employer] Aptima (R) SARS-CoV-2 Assay or [employer] TaqPath (TM)  COVID-19 Combo Kit as indicated above under Emergency Use Authorization (EUA) from the FDA. Aptima (R) and TaqPath (TM) test performance  characteristics verified by [facility] in accordance with the FDAs Guidance Document (Policy for Diagnostic Tests for Coronavirus Disease-2019  during the Public Health Emergency) issued on March 16, 2020. The laboratory is regulated under CLIA as qualified to perform high-complexity testing  Unless otherwise noted, all testing was performed at [facility], CLIA No. [id number], [facility] Licensee No. [id number]     Glucose   Date/Time Value Ref Range Status   04/28/2025 1112 83 70 - 130 mg/dL Final   04/28/2025 0623 179 (H) 70 - 130 mg/dL Final   04/27/2025 2042 148 (H) 70 - 130 mg/dL Final   04/27/2025 1614 181 (H) 70 - 130 mg/dL Final   04/27/2025 1131 143 (H) 70 - 130 mg/dL Final   04/27/2025 0627 83 70 - 130 mg/dL Final   04/26/2025 2019 141 (H) 70 - 130 mg/dL Final       MRI Brain Without Contrast  Narrative: MRI BRAIN without contrast.     HISTORY: Altered mental status, confusion. HISTORY of breast cancer..     TECHNIQUE: Routine brain MRI without contrast.     COMPARISON: Brain MRI 9/11/2024.     FINDINGS:     There is no MR evidence of acute infarct or other acute restricted  diffusion. There is no evidence of acute or chronic intracranial  hemorrhage. There is no hydrocephalus or extra-axial fluid collection.  Modest nonspecific periventricular and subcortical white matter lesions  are redemonstrated, not convincingly changed since the prior study.      The brain is structurally normal, and there is no hydrocephalus or  extra-axial fluid collection. Normal flow-voids are seen in the cerebral  vessels.  There is no restricted diffusion, and there is no evidence of  acute or chronic intracranial hemorrhage.     Bone marrow signal is normal, and the extracranial soft tissues are  normal.     Impression: IMPRESSION : Redemonstrated mild nonspecific white matter changes, no  convincing interval change since 9/11/2024, no evidence of acute  intracranial abnormality.     This report was finalized on 4/25/2025 1:11 AM by Dr. Juma Scott M.D on Workstation: [id number]       Scheduled Medications  anastrozole, 1 mg, Oral, Daily  aspirin, 81 mg, Oral, Daily  baclofen, 20 mg, Oral, BID  cefTRIAXone, 2,000 mg, Intravenous, Q24H  FLUoxetine, 20 mg, Oral, Daily  heparin (porcine), 5,000 Units, Subcutaneous, Q8H  insulin lispro, 2-7 Units, Subcutaneous, 4x Daily AC & at Bedtime  metoprolol succinate XL, 50 mg, Oral, Q24H  pantoprazole, 40 mg, Oral, BID  pramipexole, 1.5 mg, Oral, BID  senna-docusate sodium, 2 tablet, Oral, BID  sodium chloride, 10 mL, Intravenous, Q12H    Infusions     Diet  Diet: Regular/House; Texture: Soft to Chew (NDD 3); Soft to Chew: Chopped Meat; Fluid Consistency: Thin (IDDSI 0)       Assessment/Plan     Active Hospital Problems    Diagnosis  POA    **Sepsis [A41.9]  Yes    NSTEMI (non-ST elevated myocardial infarction) [I21.4]  Yes    Type 2 diabetes mellitus [E11.9]  Yes    Elevated LFTs [R79.89]  Yes    Malignant neoplasm of overlapping sites of left breast in female, estrogen receptor positive [C50.812, Z17.0]  Not Applicable    Multiple sclerosis [G35]  Yes    Essential hypertension [I10]  Yes    Anxiety and depression [F41.9, F32.A]  Yes    Neurogenic bladder [N31.9]  Yes    Restless legs syndrome [G25.81]  Yes      Resolved Hospital Problems   No resolved problems to display.       64 y.o. female admitted with Sepsis.    Klebsiella UTI and bacteremia causing septic shock-on ceftriaxone with plans for 10-14 days of IV therapy per ID. Repeat blood cultures are negative. Today is the last day  of antibiotics  Type 2 NSTEMI-related to the above. Normal EF on echocardiogram, but WMA consistent with stress cardiomyopathy. Cardiology recommends a betablocker and baby aspirin. She'll need to follow up with them in 1 month  KINZA-initially prerenal due to septic shock which initially improved, but had a recurrent KINZA related to obstructive uropathy with matias malfunction. Her matias has been flushed and is draining with improvement in her creatinine  Post obstructive diuresis/hypernatremia-sodium level up slightly today, but still normal. I encouraged free water intake.   Metabolic/toxic encephalopathy-possibly related to her klonopin which has been discontinued or her urinary retention which has been addressed. Brain MRI was negative acutely. Resolved   Elevated LFTs-gallbladder ultrasound and CT abdomen/pelvis without obvious biliary disease. Acute hepatitis profile negative as well. Previously thought to be secondary to ribociclib per oncology  Type 2 diabetes-A1c 7.3. sliding scale  Essential hypertension-home antihypertensives held acutely  GERD-ppi  Multiple sclerosis-not on medications chronically for this  Neurogenic bladder with a chronic indwelling matias catheter  Metastatic breast cancer-on faslodex, xgeva, truqap as an outpatient   Insomnia-ambien prn  Anxiety-discontinue clonazepam permanently given it's association with confusion this admission and as an outpatient  Heparin SC for DVT prophylaxis.  Full code.  Discussed with patient and CCP.  Anticipate discharge home with home health in 1-2 days. If sodium remains in the normal range      Oskar Spence MD  [facility]  04/28/25  15:24 EDT

## 2025-04-28 NOTE — PROGRESS NOTES
"  Infectious Diseases Progress Note    Luisa Child MD     [facility]  [place]: 11 days  Patient Identification:  Name: Maritza Bejarano  Age: 64 y.o.  Sex: female  :  1960  MRN: [id number]         Primary Care Physician: Enoc Carbone DO        Subjective: Feeling better denies any complaints.  Perirectal and buttock pain improved after bowel movement.  Interval History: See consultation note.    Objective:    Scheduled Meds:anastrozole, 1 mg, Oral, Daily  aspirin, 81 mg, Oral, Daily  baclofen, 20 mg, Oral, BID  cefTRIAXone, 2,000 mg, Intravenous, Q24H  FLUoxetine, 20 mg, Oral, Daily  heparin (porcine), 5,000 Units, Subcutaneous, Q8H  insulin lispro, 2-7 Units, Subcutaneous, 4x Daily AC & at Bedtime  metoprolol succinate XL, 50 mg, Oral, Q24H  pantoprazole, 40 mg, Oral, BID  potassium chloride ER, 40 mEq, Oral, Q4H  pramipexole, 1.5 mg, Oral, BID  senna-docusate sodium, 2 tablet, Oral, BID  sodium chloride, 10 mL, Intravenous, Q12H      Continuous Infusions:         Vital signs in last 24 hours:  Temp:  [98.4 °F (36.9 °C)-99 °F (37.2 °C)] 99 °F (37.2 °C)  Heart Rate:  [69-80] 70  Resp:  [18-20] 18  BP: (130-144)/(64-68) 141/68    Intake/Output:    Intake/Output Summary (Last 24 hours) at 2025 0751  Last data filed at 2025 0356  Gross per 24 hour   Intake 600 ml   Output 3100 ml   Net -2500 ml       Exam:  /68 (BP Location: Right arm, Patient Position: Lying)   Pulse 70   Temp 99 °F (37.2 °C) (Oral)   Resp 18   Ht 165.1 cm (65\")   Wt 83.2 kg (183 lb 6.8 oz)   LMP  (LMP Unknown) Comment: PT. STATES HER LAST PERIOD WAS GREATER THAN FIVE YEARS AGO  SpO2 96%   BMI 30.52 kg/m²   Patient is examined using the personal protective equipment as per guidelines from infection control for this particular patient as enacted.  Hand washing was performed before and after patient interaction.  General Appearance: Awake and interactive family members at the bedside.                          " Head:    Normocephalic, without obvious abnormality, atraumatic                           Eyes:  Pale conjunctiva                         Throat:   Lips, tongue, gums normal; oral mucosa pink and moist                           Neck:   Supple, symmetrical, trachea midline, no JVD                         Lungs:    Clear to auscultation bilaterally, respirations unlabored                 Chest Wall:    No tenderness or deformity                          Heart:  S1-S2 regular                  Abdomen:   Catheter in place, obese soft nontender                 Extremities:   Extremities normal, atraumatic, no cyanosis or edema                        Pulses:   Pulses palpable in all extremities                            Skin:                    Neurologic: Alert and oriented x 3       Data Review:    I reviewed the patient's new clinical results.  Results from last 7 days   Lab Units 04/28/25  0448 04/27/25  0559 04/26/25  0309 04/25/25  0456 04/24/25  0253 04/23/25  0655 04/22/25  0322   WBC 10*3/mm3 2.26* 2.56* 5.79 9.92 16.12* 16.46* 10.84*   HEMOGLOBIN g/dL 8.4* 9.9* 8.1* 8.0* 8.9* 9.1* 8.8*   PLATELETS 10*3/mm3 620* 618* 648* 586* 526* 416 340     Results from last 7 days   Lab Units 04/28/25  0448 04/27/25  0559 04/26/25  0309 04/25/25  1857 04/25/25  0456 04/24/25  0253 04/23/25  0655 04/22/25  0322   SODIUM mmol/L 145 143 147*  --  149* 147* 138 141   POTASSIUM mmol/L 3.4* 5.0 4.1 4.7 3.4* 3.9 4.8 4.5   CHLORIDE mmol/L 113* 114* 116*  --  115* 115* 108* 111*   CO2 mmol/L 20.0* 16.0* 18.0*  --  20.5* 22.9 21.7* 22.7   BUN mg/dL 13 9 10  --  15 28* 38* 33*   CREATININE mg/dL 0.64 0.75 0.70  --  0.85 1.29* 1.89* 1.15*   CALCIUM mg/dL 7.8* 7.4* 6.8*  --  6.6* 7.2* 7.2* 8.4*   GLUCOSE mg/dL 146* 82 89  --  117* 65 91 153*     Microbiology Results (last 10 days)       Procedure Component Value - Date/Time    Blood Culture - Blood, Hand, Right [[id number]]  (Normal) Collected: 04/19/25 4118    Lab Status: Final result  Specimen: Blood from Hand, Right Updated: 04/24/25 1700     Blood Culture No growth at 5 days    Narrative:      Less than seven (7) mL's of blood was collected.  Insufficient quantity may yield false negative results.    Blood Culture - Blood, Arm, Right [[id number]]  (Normal) Collected: 04/19/25 0908    Lab Status: Final result Specimen: Blood from Arm, Right Updated: 04/24/25 0945     Blood Culture No growth at 5 days              Assessment: Improving    Sepsis    Multiple sclerosis    Essential hypertension    Anxiety and depression    Neurogenic bladder    Restless legs syndrome    Malignant neoplasm of overlapping sites of left breast in female, estrogen receptor positive    Elevated LFTs    NSTEMI (non-ST elevated myocardial infarction)    Type 2 diabetes mellitus   64-year-old female with  1-Klebsiella bacteremic sepsis with septic shock secondary  2-urinary tract infection due to recent indwelling Cobos catheter malfunction  3-immobility with MS and neurogenic bladder  4-immunocompromised host  5-acute on chronic renal insufficiency  6-anemia multifactorial  7-hyperglycemia  8-episode of low-grade fever with some chest discomfort upon coughing and taking deep breath-concerning for atelectasis versus evolving pneumonia.     Recommendations/Discussions:  See my discussion and recommendation on 4/25/2025.  Continue antibiotic treatment as planned  Continue supportive care per primary team.  Luisa Child MD  4/28/2025  07:51 EDT    Parts of this note may be an electronic transcription/translation of spoken language to printed text using the Dragon dictation system.

## 2025-04-28 NOTE — CASE MANAGEMENT/SOCIAL WORK
Continued Stay Note  [facility]     Patient Name: Maritza Nance Darci  MRN: [id number]  Today's Date: 4/28/2025    Admit Date: 4/17/2025    Plan: Home via spouse to transport with [facility] .   Discharge Plan       Row Name 04/28/25 1204       Plan    Plan Home via spouse to transport with [facility] .    Patient/Family in Agreement with Plan yes    Plan Comments CCP met with pt at the bedside to confirm DC. Pt confirmed DC plan is to return home with [facility]  and spouse to transport. Noted pt will complete IV Abx course prior to DC home. Tyler BOSE/CCP                   Discharge Codes    No documentation.                 Expected Discharge Date and Time       Expected Discharge Date Expected Discharge Time    Apr 28, 2025               Jyothi Douglas RN

## 2025-04-28 NOTE — PLAN OF CARE
Goal Outcome Evaluation:  Plan of Care Reviewed With: patient        Progress: improving    Pt is alert and oriented.  BM this shift.  VSS.  Dressing changed.  F/C to bedside drainage.  Probably home tomorrow.  Will continue to monitor.

## 2025-04-28 NOTE — PROGRESS NOTES
"Nutrition Services    Patient Name: Maritza Bejarano  YOB: 1960  MRN: [id number]  Admission date: 4/17/2025    Assessment Date:  04/28/25    NUTRITION EVALUATION      Reason for Encounter Pressure Injury and/or Non-Healing Wound   Diagnosis/Problem Admission Diagnosis:  Elevated troponin [R79.89]  Acute UTI [N39.0]  Sepsis [A41.9]  Sepsis, due to unspecified organism, unspecified whether acute organ dysfunction present [A41.9]    Problem List:    Sepsis    Multiple sclerosis    Essential hypertension    Anxiety and depression    Neurogenic bladder    Restless legs syndrome    Malignant neoplasm of overlapping sites of left breast in female, estrogen receptor positive    Elevated LFTs    NSTEMI (non-ST elevated myocardial infarction)    Type 2 diabetes mellitus     Narrative RD visted pt at bedside. Pt alert and responsive. Pt reported she believe too much food is served and has been eating about half of it each meal. Pt has been \"choking\" down the Dio because she knows it will help with her wounds. She has been diluting the Dio with extra water to help lessen the taste.   Pt mentioned she may be d/c'd soon and is not sure how she should be eating at home. Pt requested some diet education guidance. RD provided Heart Healthy / Carb Controlled diet examples.        PO Diet Diet: Regular/House; Texture: Soft to Chew (NDD 3); Soft to Chew: Chopped Meat; Fluid Consistency: Thin (IDDSI 0)   Allergies NKFA   Supplements n/a   PO Intake % 50% per nursing documentation       Chewing/Swallowing Difficulty no issues identified at this time and other:Pt reports some newly onset challenges with swallowing multiple pills at a time. (Same as 4/21)       Medications reviewed Lantus, Humalog, Protonix, Pericolace, abx   Labs  reviewed hyperglycemia with consistent carb diet, insulin, steroid, sepsis; hypokalemia replacement given; hypocalcemia       Physical Findings alert, impaired speech, obese, room air     Edema " "generalized, 2+ (mild)    GI Function fecal incontinence, nausea, normoactive, last bowel movement: 4/20   Skin Status pressure injury: right heel and lower gluteal, location of wound: coccyx     Lines/Drains none   I/O reviewed, net fluid loss, and amount/timeframe:< 1 L since admission         Height  Weight  BMI  Weight Trend     Height: 165.1 cm (65\")  Weight: 83.2 kg (183 lb 6.8 oz) (04/28/25 0553)  Body mass index is 30.52 kg/m².  Gain, Amount/Timeframe: 14.2 lbs since 4/17 (bed scale)    Weight change: weight gain of 14.2 lbs (8.4%) over 11 day(s)    Significant?  Yes       NFPE No clinical signs of muscle wasting or fat loss, Consented to exam, Date Completed: 4/21       Nutrition Problem (PES) Problem: Increased Nutrient Needs  Etiology: Medical Diagnosis - sepsis and wounds    Signs/Symptoms: Other (comment)loss of skin integrity       Intervention/Plan Continue to monitor PO intake and encourage >75%.     Continue to monitor blood glucose levels and electrolytes.     Continue protein supplementation:  Dio BID to provide 90 calories, 7 grams L-Arginine, 7 grams L-Glutamine and 2.5 grams Protein (Collagen) per serving.    RD to follow up per protocol.     Protein Requirements    EST Needs, Method, Wt used  85.5 - 114 g / daily, 1.5-2.0 g/kg IBW, IBW 57 kg         Results from last 7 days   Lab Units 04/28/25  0448 04/27/25  0559 04/26/25  0309 04/23/25  0655 04/22/25  0322   SODIUM mmol/L 145 143 147*   < > 141   POTASSIUM mmol/L 3.4* 5.0 4.1   < > 4.5   CHLORIDE mmol/L 113* 114* 116*   < > 111*   CO2 mmol/L 20.0* 16.0* 18.0*   < > 22.7   BUN mg/dL 13 9 10   < > 33*   CREATININE mg/dL 0.64 0.75 0.70   < > 1.15*   CALCIUM mg/dL 7.8* 7.4* 6.8*   < > 8.4*   BILIRUBIN mg/dL  --   --   --   --  0.3   ALK PHOS U/L  --   --   --   --  269*   ALT (SGPT) U/L  --   --   --   --  65*   AST (SGOT) U/L  --   --   --   --  88*   GLUCOSE mg/dL 146* 82 89   < > 153*    < > = values in this interval not displayed. "     Results from last 7 days   Lab Units 04/28/25  0448 04/27/25  0559 04/25/25  2207 04/25/25  0456 04/23/25  0655 04/22/25  0322   MAGNESIUM mg/dL  --  2.0  --  2.0  --  2.2   PHOSPHORUS mg/dL  --  2.5   < > 1.8*  --  2.7   HEMOGLOBIN g/dL 8.4* 9.9*   < > 8.0*   < > 8.8*   HEMATOCRIT % 25.2* 31.1*   < > 25.3*   < > 26.4*    < > = values in this interval not displayed.     Lab Results   Component Value Date    HGBA1C 7.30 (H) 04/21/2025     Wt Readings from Last 10 Encounters:   04/28/25 83.2 kg (183 lb 6.8 oz)   03/25/25 65.8 kg (145 lb)   03/12/25 65.3 kg (144 lb)   02/28/25 65.3 kg (144 lb)   01/31/25 68 kg (150 lb)   01/24/25 68 kg (150 lb)   11/25/24 77.1 kg (170 lb)   11/11/24 76.2 kg (168 lb)   10/18/24 83.4 kg (183 lb 13.8 oz)   10/14/24 81.6 kg (180 lb)       Electronically signed by:  Mi Alberto RD  04/28/25 14:44 EDT

## 2025-04-29 LAB
ALBUMIN SERPL-MCNC: 2.9 G/DL (ref 3.5–5.2)
ANION GAP SERPL CALCULATED.3IONS-SCNC: 11.5 MMOL/L (ref 5–15)
BUN SERPL-MCNC: 8 MG/DL (ref 8–23)
BUN/CREAT SERPL: 12.3 (ref 7–25)
CALCIUM SPEC-SCNC: 8.2 MG/DL (ref 8.6–10.5)
CHLORIDE SERPL-SCNC: 111 MMOL/L (ref 98–107)
CO2 SERPL-SCNC: 20.5 MMOL/L (ref 22–29)
CREAT SERPL-MCNC: 0.65 MG/DL (ref 0.57–1)
DEPRECATED RDW RBC AUTO: 52.1 FL (ref 37–54)
EGFRCR SERPLBLD CKD-EPI 2021: 99.7 ML/MIN/1.73
ERYTHROCYTE [DISTWIDTH] IN BLOOD BY AUTOMATED COUNT: 15.2 % (ref 12.3–15.4)
GEN 5 1HR TROPONIN T REFLEX: 29 NG/L
GLUCOSE BLDC GLUCOMTR-MCNC: 124 MG/DL (ref 70–130)
GLUCOSE BLDC GLUCOMTR-MCNC: 130 MG/DL (ref 70–130)
GLUCOSE BLDC GLUCOMTR-MCNC: 185 MG/DL (ref 70–130)
GLUCOSE BLDC GLUCOMTR-MCNC: 82 MG/DL (ref 70–130)
GLUCOSE SERPL-MCNC: 90 MG/DL (ref 65–99)
HCT VFR BLD AUTO: 27 % (ref 34–46.6)
HGB BLD-MCNC: 8.8 G/DL (ref 12–15.9)
MAGNESIUM SERPL-MCNC: 1.8 MG/DL (ref 1.6–2.4)
MCH RBC QN AUTO: 30.6 PG (ref 26.6–33)
MCHC RBC AUTO-ENTMCNC: 32.6 G/DL (ref 31.5–35.7)
MCV RBC AUTO: 93.8 FL (ref 79–97)
PHOSPHATE SERPL-MCNC: 2.1 MG/DL (ref 2.5–4.5)
PHOSPHATE SERPL-MCNC: 3.4 MG/DL (ref 2.5–4.5)
PLATELET # BLD AUTO: 611 10*3/MM3 (ref 140–450)
PMV BLD AUTO: 9.3 FL (ref 6–12)
POTASSIUM SERPL-SCNC: 4 MMOL/L (ref 3.5–5.2)
POTASSIUM SERPL-SCNC: 4.2 MMOL/L (ref 3.5–5.2)
RBC # BLD AUTO: 2.88 10*6/MM3 (ref 3.77–5.28)
SODIUM SERPL-SCNC: 143 MMOL/L (ref 136–145)
TROPONIN T % DELTA: -3
TROPONIN T NUMERIC DELTA: -1 NG/L
TROPONIN T SERPL HS-MCNC: 30 NG/L
WBC NRBC COR # BLD AUTO: 2.4 10*3/MM3 (ref 3.4–10.8)

## 2025-04-29 PROCEDURE — 80069 RENAL FUNCTION PANEL: CPT | Performed by: STUDENT IN AN ORGANIZED HEALTH CARE EDUCATION/TRAINING PROGRAM

## 2025-04-29 PROCEDURE — 25010000002 HEPARIN (PORCINE) PER 1000 UNITS: Performed by: INTERNAL MEDICINE

## 2025-04-29 PROCEDURE — 84484 ASSAY OF TROPONIN QUANT: CPT | Performed by: INTERNAL MEDICINE

## 2025-04-29 PROCEDURE — 93010 ELECTROCARDIOGRAM REPORT: CPT | Performed by: STUDENT IN AN ORGANIZED HEALTH CARE EDUCATION/TRAINING PROGRAM

## 2025-04-29 PROCEDURE — 25010000002 ONDANSETRON PER 1 MG: Performed by: INTERNAL MEDICINE

## 2025-04-29 PROCEDURE — 82948 REAGENT STRIP/BLOOD GLUCOSE: CPT

## 2025-04-29 PROCEDURE — 25810000003 SODIUM CHLORIDE 0.9 % SOLUTION: Performed by: INTERNAL MEDICINE

## 2025-04-29 PROCEDURE — 84100 ASSAY OF PHOSPHORUS: CPT | Performed by: INTERNAL MEDICINE

## 2025-04-29 PROCEDURE — 83735 ASSAY OF MAGNESIUM: CPT | Performed by: INTERNAL MEDICINE

## 2025-04-29 PROCEDURE — 85027 COMPLETE CBC AUTOMATED: CPT | Performed by: STUDENT IN AN ORGANIZED HEALTH CARE EDUCATION/TRAINING PROGRAM

## 2025-04-29 PROCEDURE — 84132 ASSAY OF SERUM POTASSIUM: CPT | Performed by: INTERNAL MEDICINE

## 2025-04-29 PROCEDURE — 63710000001 INSULIN LISPRO (HUMAN) PER 5 UNITS: Performed by: INTERNAL MEDICINE

## 2025-04-29 PROCEDURE — 93005 ELECTROCARDIOGRAM TRACING: CPT | Performed by: INTERNAL MEDICINE

## 2025-04-29 RX ADMIN — ONDANSETRON 4 MG: 2 INJECTION, SOLUTION INTRAMUSCULAR; INTRAVENOUS at 00:30

## 2025-04-29 RX ADMIN — INSULIN LISPRO 2 UNITS: 100 INJECTION, SOLUTION INTRAVENOUS; SUBCUTANEOUS at 18:46

## 2025-04-29 RX ADMIN — BACLOFEN 20 MG: 10 TABLET ORAL at 10:10

## 2025-04-29 RX ADMIN — ASPIRIN 81 MG: 81 TABLET, COATED ORAL at 10:10

## 2025-04-29 RX ADMIN — BACLOFEN 20 MG: 10 TABLET ORAL at 21:22

## 2025-04-29 RX ADMIN — HEPARIN SODIUM 5000 UNITS: 5000 INJECTION INTRAVENOUS; SUBCUTANEOUS at 21:21

## 2025-04-29 RX ADMIN — SENNOSIDES AND DOCUSATE SODIUM 2 TABLET: 50; 8.6 TABLET ORAL at 21:22

## 2025-04-29 RX ADMIN — PRAMIPEXOLE DIHYDROCHLORIDE 1.5 MG: 1.5 TABLET ORAL at 21:22

## 2025-04-29 RX ADMIN — PANTOPRAZOLE SODIUM 40 MG: 40 TABLET, DELAYED RELEASE ORAL at 21:23

## 2025-04-29 RX ADMIN — Medication 10 ML: at 21:22

## 2025-04-29 RX ADMIN — PANTOPRAZOLE SODIUM 40 MG: 40 TABLET, DELAYED RELEASE ORAL at 10:10

## 2025-04-29 RX ADMIN — HEPARIN SODIUM 5000 UNITS: 5000 INJECTION INTRAVENOUS; SUBCUTANEOUS at 14:38

## 2025-04-29 RX ADMIN — ANASTROZOLE 1 MG: 1 TABLET, FILM COATED ORAL at 10:10

## 2025-04-29 RX ADMIN — Medication 10 ML: at 10:12

## 2025-04-29 RX ADMIN — SODIUM PHOSPHATE, MONOBASIC, MONOHYDRATE AND SODIUM PHOSPHATE, DIBASIC, ANHYDROUS 15 MMOL: 276; 142 INJECTION, SOLUTION INTRAVENOUS at 10:00

## 2025-04-29 RX ADMIN — METOPROLOL SUCCINATE 50 MG: 50 TABLET, EXTENDED RELEASE ORAL at 10:10

## 2025-04-29 RX ADMIN — HEPARIN SODIUM 5000 UNITS: 5000 INJECTION INTRAVENOUS; SUBCUTANEOUS at 05:30

## 2025-04-29 RX ADMIN — PRAMIPEXOLE DIHYDROCHLORIDE 1.5 MG: 1.5 TABLET ORAL at 10:10

## 2025-04-29 NOTE — PROGRESS NOTES
"  Infectious Diseases Progress Note    Luisa Child MD     [facility]  Los: 12 days  Patient Identification:  Name: Maritza Bejarano  Age: 64 y.o.  Sex: female  :  1960  MRN: [id number]         Primary Care Physician: Enoc Carbone,         Subjective: Denies any new complaints.  Interval History: See consultation note.    Objective:    Scheduled Meds:anastrozole, 1 mg, Oral, Daily  aspirin, 81 mg, Oral, Daily  baclofen, 20 mg, Oral, BID  FLUoxetine, 20 mg, Oral, Daily  heparin (porcine), 5,000 Units, Subcutaneous, Q8H  insulin lispro, 2-7 Units, Subcutaneous, 4x Daily AC & at Bedtime  metoprolol succinate XL, 50 mg, Oral, Q24H  pantoprazole, 40 mg, Oral, BID  pramipexole, 1.5 mg, Oral, BID  senna-docusate sodium, 2 tablet, Oral, BID  sodium chloride, 10 mL, Intravenous, Q12H  sodium phosphate, 15 mmol, Intravenous, Once      Continuous Infusions:         Vital signs in last 24 hours:  Temp:  [97.5 °F (36.4 °C)-98.8 °F (37.1 °C)] 98.8 °F (37.1 °C)  Heart Rate:  [70-78] 73  Resp:  [18] 18  BP: (136-148)/(61-87) 137/69    Intake/Output:    Intake/Output Summary (Last 24 hours) at 2025 0851  Last data filed at 2025 0550  Gross per 24 hour   Intake 270 ml   Output 3300 ml   Net -3030 ml       Exam:  /69 (BP Location: Right arm, Patient Position: Lying)   Pulse 73   Temp 98.8 °F (37.1 °C) (Oral)   Resp 18   Ht 165.1 cm (65\")   Wt 83.2 kg (183 lb 6.8 oz)   LMP  (LMP Unknown) Comment: PT. STATES HER LAST PERIOD WAS GREATER THAN FIVE YEARS AGO  SpO2 95%   BMI 30.52 kg/m²   Patient is examined using the personal protective equipment as per guidelines from infection control for this particular patient as enacted.  Hand washing was performed before and after patient interaction.  General Appearance: Awake and interactive family members at the bedside.                          Head:    Normocephalic, without obvious abnormality, atraumatic                           Eyes:  Pale " conjunctiva                         Throat:   Lips, tongue, gums normal; oral mucosa pink and moist                           Neck:   Supple, symmetrical, trachea midline, no JVD                         Lungs:    Clear to auscultation bilaterally, respirations unlabored                 Chest Wall:    No tenderness or deformity                          Heart:  S1-S2 regular                  Abdomen:   Catheter in place, obese soft nontender                 Extremities:   Extremities normal, atraumatic, no cyanosis or edema                        Pulses:   Pulses palpable in all extremities                            Skin:                    Neurologic: Alert and oriented x 3       Data Review:    I reviewed the patient's new clinical results.  Results from last 7 days   Lab Units 04/29/25  0311 04/28/25  0448 04/27/25  0559 04/26/25  0309 04/25/25  0456 04/24/25  0253 04/23/25  0655   WBC 10*3/mm3 2.40* 2.26* 2.56* 5.79 9.92 16.12* 16.46*   HEMOGLOBIN g/dL 8.8* 8.4* 9.9* 8.1* 8.0* 8.9* 9.1*   PLATELETS 10*3/mm3 611* 620* 618* 648* 586* 526* 416     Results from last 7 days   Lab Units 04/29/25  0311 04/28/25  1537 04/28/25  0448 04/27/25  0559 04/26/25  0309 04/25/25  1857 04/25/25  0456 04/24/25  0253 04/23/25  0655   SODIUM mmol/L 143  --  145 143 147*  --  149* 147* 138   POTASSIUM mmol/L 4.2 4.4 3.4* 5.0 4.1 4.7 3.4* 3.9 4.8   CHLORIDE mmol/L 111*  --  113* 114* 116*  --  115* 115* 108*   CO2 mmol/L 20.5*  --  20.0* 16.0* 18.0*  --  20.5* 22.9 21.7*   BUN mg/dL 8  --  13 9 10  --  15 28* 38*   CREATININE mg/dL 0.65  --  0.64 0.75 0.70  --  0.85 1.29* 1.89*   CALCIUM mg/dL 8.2*  --  7.8* 7.4* 6.8*  --  6.6* 7.2* 7.2*   GLUCOSE mg/dL 90  --  146* 82 89  --  117* 65 91     Microbiology Results (last 10 days)       Procedure Component Value - Date/Time    Blood Culture - Blood, Hand, Right [[id number]]  (Normal) Collected: 04/19/25 1639    Lab Status: Final result Specimen: Blood from Hand, Right Updated: 04/24/25  1700     Blood Culture No growth at 5 days    Narrative:      Less than seven (7) mL's of blood was collected.  Insufficient quantity may yield false negative results.    Blood Culture - Blood, Arm, Right [[id number]]  (Normal) Collected: 04/19/25 0908    Lab Status: Final result Specimen: Blood from Arm, Right Updated: 04/24/25 0945     Blood Culture No growth at 5 days              Assessment: Improving    Sepsis    Multiple sclerosis    Essential hypertension    Anxiety and depression    Neurogenic bladder    Restless legs syndrome    Malignant neoplasm of overlapping sites of left breast in female, estrogen receptor positive    Elevated LFTs    NSTEMI (non-ST elevated myocardial infarction)    Type 2 diabetes mellitus   64-year-old female with  1-Klebsiella bacteremic sepsis with septic shock secondary  2-urinary tract infection due to recent indwelling Cobos catheter malfunction  3-immobility with MS and neurogenic bladder  4-immunocompromised host  5-acute on chronic renal insufficiency  6-anemia multifactorial  7-hyperglycemia  8-episode of low-grade fever with some chest discomfort upon coughing and taking deep breath-concerning for atelectasis versus evolving pneumonia.     Recommendations/Discussions:  See my discussion and recommendation on 4/25/2025.  Continue antibiotic treatment as planned to complete 10 days of antibiotic treatment from last negative blood culture on 4/19/2025 and received last dose on 4/28/2025.  Continue supportive care per primary team.  Going forward observe off antibiotic therapy.  Luisa Child MD  4/29/2025  08:51 EDT    Parts of this note may be an electronic transcription/translation of spoken language to printed text using the Dragon dictation system.

## 2025-04-29 NOTE — PLAN OF CARE
Goal Outcome Evaluation:  Plan of Care Reviewed With: patient           Outcome Evaluation: A&Ox4. VSS. 1 BM this shift, dressing changed. Cobos irrigated with significant urine output. Catheter care completed.

## 2025-04-29 NOTE — PROGRESS NOTES
Name: Maritza Nance Darci ADMIT: 2025   : 1960  PCP: CarboneEnoc guzman     MRN: [id number] LOS: 12 days   AGE/SEX: 64 y.o. female  ROOM: [facility]     Subjective   Subjective   Patient is seen at bedside, complains of chest pain in the afternoon.       Objective   Objective   Vital Signs  Temp:  [98.1 °F (36.7 °C)-98.8 °F (37.1 °C)] 98.1 °F (36.7 °C)  Heart Rate:  [70-80] 78  Resp:  [18] 18  BP: (107-148)/(43-87) 126/55  SpO2:  [93 %-98 %] 96 %  on   ;   Device (Oxygen Therapy): room air  Body mass index is 30.52 kg/m².  Physical Exam  Constitutional:       General: She is not in acute distress.     Appearance: She is obese. She is not toxic-appearing.   Cardiovascular:      Rate and Rhythm: Normal rate and regular rhythm.      Heart sounds: Normal heart sounds.   Pulmonary:      Effort: Pulmonary effort is normal.      Breath sounds: Normal breath sounds.   Abdominal:      General: Bowel sounds are normal.      Palpations: Abdomen is soft.   Musculoskeletal:         General: No tenderness.      Right lower leg: No edema.      Left lower leg: No edema.   Neurological:      General: No focal deficit present.      Mental Status: She is alert and oriented to person, place, and time.   Psychiatric:         Mood and Affect: Mood normal.         Behavior: Behavior normal.        Copied text material from yesterday's note has been reviewed for appropriate changes and remains accurate as of 25.        Results Review     I reviewed the patient's new clinical results.  Results from last 7 days   Lab Units 25  0311 25  0448 25  0559 25  0309   WBC 10*3/mm3 2.40* 2.26* 2.56* 5.79   HEMOGLOBIN g/dL 8.8* 8.4* 9.9* 8.1*   PLATELETS 10*3/mm3 611* 620* 618* 648*     Results from last 7 days   Lab Units 25  1501 25  0311 25  1537 25  0448 25  0559 25  0309   SODIUM mmol/L  --  143  --  145 143 147*   POTASSIUM mmol/L 4.0 4.2 4.4 3.4* 5.0 4.1   CHLORIDE mmol/L  --   111*  --  113* 114* 116*   CO2 mmol/L  --  20.5*  --  20.0* 16.0* 18.0*   BUN mg/dL  --  8  --  13 9 10   CREATININE mg/dL  --  0.65  --  0.64 0.75 0.70   GLUCOSE mg/dL  --  90  --  146* 82 89   EGFR mL/min/1.73  --  99.7  --  100.1 90.1 97.9     Results from last 7 days   Lab Units 04/29/25  0311 04/25/25  0456   ALBUMIN g/dL 2.9* 2.7*     Results from last 7 days   Lab Units 04/29/25  1501 04/29/25  0311 04/28/25  0448 04/27/25  0559 04/26/25  0309 04/25/25  2207 04/25/25  0456   CALCIUM mg/dL  --  8.2* 7.8* 7.4* 6.8*  --  6.6*   ALBUMIN g/dL  --  2.9*  --   --   --   --  2.7*   MAGNESIUM mg/dL 1.8  --   --  2.0  --   --  2.0   PHOSPHORUS mg/dL 3.4 2.1*  --  2.5  --  2.5 1.8*       Glucose   Date/Time Value Ref Range Status   04/29/2025 1544 185 (H) 70 - 130 mg/dL Final   04/29/2025 1120 130 70 - 130 mg/dL Final   04/29/2025 0628 82 70 - 130 mg/dL Final   04/28/2025 2040 106 70 - 130 mg/dL Final   04/28/2025 1631 137 (H) 70 - 130 mg/dL Final   04/28/2025 1112 83 70 - 130 mg/dL Final   04/28/2025 0623 179 (H) 70 - 130 mg/dL Final       No radiology results for the last day    I have personally reviewed all medications:  Scheduled Medications  anastrozole, 1 mg, Oral, Daily  aspirin, 81 mg, Oral, Daily  baclofen, 20 mg, Oral, BID  FLUoxetine, 20 mg, Oral, Daily  heparin (porcine), 5,000 Units, Subcutaneous, Q8H  insulin lispro, 2-7 Units, Subcutaneous, 4x Daily AC & at Bedtime  metoprolol succinate XL, 50 mg, Oral, Q24H  pantoprazole, 40 mg, Oral, BID  pramipexole, 1.5 mg, Oral, BID  senna-docusate sodium, 2 tablet, Oral, BID  sodium chloride, 10 mL, Intravenous, Q12H    Infusions   Diet  Diet: Regular/House; Texture: Soft to Chew (NDD 3); Soft to Chew: Chopped Meat; Fluid Consistency: Thin (IDDSI 0)    I have personally reviewed:  [x]  Laboratory   [x]  Microbiology   [x]  Radiology   [x]  EKG/Telemetry  [x]  Cardiology/Vascular   []  Pathology    []  Records       Assessment/Plan     Active Hospital Problems     Diagnosis  POA    **Sepsis [A41.9]  Yes    NSTEMI (non-ST elevated myocardial infarction) [I21.4]  Yes    Type 2 diabetes mellitus [E11.9]  Yes    Elevated LFTs [R79.89]  Yes    Malignant neoplasm of overlapping sites of left breast in female, estrogen receptor positive [C50.812, Z17.0]  Not Applicable    Multiple sclerosis [G35]  Yes    Essential hypertension [I10]  Yes    Anxiety and depression [F41.9, F32.A]  Yes    Neurogenic bladder [N31.9]  Yes    Restless legs syndrome [G25.81]  Yes      Resolved Hospital Problems   No resolved problems to display.       64 y.o. female admitted with Sepsis.    Klebsiella UTI and bacteremia causing septic shock-on ceftriaxone with plans for 10-14 days of IV therapy per ID. Repeat blood cultures are negative.  Completed antibiotic course.  Type 2 NSTEMI-related to the above. Normal EF on echocardiogram, but WMA consistent with stress cardiomyopathy. Cardiology recommends a betablocker and baby aspirin. She'll need to follow up with them in 1 month.  Patient complains of chest pain, reconsult cardiology.  KINZA-initially prerenal due to septic shock which initially improved, but had a recurrent KINZA related to obstructive uropathy with matias malfunction. Her matias has been flushed and is draining with improvement in her creatinine  Post obstructive diuresis/hypernatremia-sodium level up slightly today, but still normal. I encouraged free water intake.   Metabolic/toxic encephalopathy-possibly related to her klonopin which has been discontinued or her urinary retention which has been addressed. Brain MRI was negative acutely. Resolved   Elevated LFTs-gallbladder ultrasound and CT abdomen/pelvis without obvious biliary disease. Acute hepatitis profile negative as well. Previously thought to be secondary to ribociclib per oncology  Type 2 diabetes-A1c 7.3. sliding scale  Essential hypertension-home antihypertensives held acutely  GERD-ppi  Multiple sclerosis-not on medications  chronically for this  Neurogenic bladder with a chronic indwelling matias catheter  Metastatic breast cancer-on faslodex, xgeva, truqap as an outpatient   Insomnia-ambien prn  Anxiety-discontinue clonazepam permanently given it's association with confusion this admission and as an outpatient  Heparin SC for DVT prophylaxis.  Full code.  Discussed with patient and CCP.        Expected Discharge Date: 4/30/2025; Expected Discharge Time:       George Armstrong MD  [facility]  04/29/25  16:57 EDT

## 2025-04-29 NOTE — NURSING NOTE
Patient vitals stable today. Cobos catheter remains clear and draining well. Complaints of pressure in chest and head this afternoon. STAT EKG and Troponin performed. Dr Armstrong notified.

## 2025-04-30 ENCOUNTER — TELEPHONE (OUTPATIENT)
Dept: ONCOLOGY | Facility: CLINIC | Age: 65
End: 2025-04-30
Payer: MEDICARE

## 2025-04-30 ENCOUNTER — READMISSION MANAGEMENT (OUTPATIENT)
Dept: CALL CENTER | Facility: HOSPITAL | Age: 65
End: 2025-04-30
Payer: MEDICARE

## 2025-04-30 VITALS
HEART RATE: 75 BPM | RESPIRATION RATE: 18 BRPM | BODY MASS INDEX: 29.24 KG/M2 | TEMPERATURE: 99.1 F | HEIGHT: 65 IN | WEIGHT: 175.49 LBS | DIASTOLIC BLOOD PRESSURE: 67 MMHG | SYSTOLIC BLOOD PRESSURE: 124 MMHG | OXYGEN SATURATION: 97 %

## 2025-04-30 DIAGNOSIS — C50.812 MALIGNANT NEOPLASM OF OVERLAPPING SITES OF LEFT BREAST IN FEMALE, ESTROGEN RECEPTOR POSITIVE: Primary | ICD-10-CM

## 2025-04-30 DIAGNOSIS — C79.51 CANCER, METASTATIC TO BONE: ICD-10-CM

## 2025-04-30 DIAGNOSIS — Z17.0 MALIGNANT NEOPLASM OF OVERLAPPING SITES OF LEFT BREAST IN FEMALE, ESTROGEN RECEPTOR POSITIVE: Primary | ICD-10-CM

## 2025-04-30 LAB
ALBUMIN SERPL-MCNC: 2.8 G/DL (ref 3.5–5.2)
ALBUMIN/GLOB SERPL: 0.9 G/DL
ALP SERPL-CCNC: 145 U/L (ref 39–117)
ALT SERPL W P-5'-P-CCNC: 14 U/L (ref 1–33)
ANION GAP SERPL CALCULATED.3IONS-SCNC: 9.8 MMOL/L (ref 5–15)
AST SERPL-CCNC: 33 U/L (ref 1–32)
BASOPHILS # BLD MANUAL: 0.07 10*3/MM3 (ref 0–0.2)
BASOPHILS NFR BLD MANUAL: 3 % (ref 0–1.5)
BILIRUB SERPL-MCNC: <0.2 MG/DL (ref 0–1.2)
BUN SERPL-MCNC: 8 MG/DL (ref 8–23)
BUN/CREAT SERPL: 12.3 (ref 7–25)
CALCIUM SPEC-SCNC: 7.9 MG/DL (ref 8.6–10.5)
CHLORIDE SERPL-SCNC: 112 MMOL/L (ref 98–107)
CO2 SERPL-SCNC: 22.2 MMOL/L (ref 22–29)
CREAT SERPL-MCNC: 0.65 MG/DL (ref 0.57–1)
DEPRECATED RDW RBC AUTO: 54.6 FL (ref 37–54)
EGFRCR SERPLBLD CKD-EPI 2021: 99.7 ML/MIN/1.73
EOSINOPHIL # BLD MANUAL: 0.19 10*3/MM3 (ref 0–0.4)
EOSINOPHIL NFR BLD MANUAL: 8 % (ref 0.3–6.2)
ERYTHROCYTE [DISTWIDTH] IN BLOOD BY AUTOMATED COUNT: 15.4 % (ref 12.3–15.4)
GLOBULIN UR ELPH-MCNC: 3.2 GM/DL
GLUCOSE BLDC GLUCOMTR-MCNC: 115 MG/DL (ref 70–130)
GLUCOSE BLDC GLUCOMTR-MCNC: 173 MG/DL (ref 70–130)
GLUCOSE SERPL-MCNC: 149 MG/DL (ref 65–99)
HCT VFR BLD AUTO: 25.4 % (ref 34–46.6)
HGB BLD-MCNC: 8 G/DL (ref 12–15.9)
LYMPHOCYTES # BLD MANUAL: 1.09 10*3/MM3 (ref 0.7–3.1)
LYMPHOCYTES NFR BLD MANUAL: 26 % (ref 5–12)
MCH RBC QN AUTO: 30.7 PG (ref 26.6–33)
MCHC RBC AUTO-ENTMCNC: 31.5 G/DL (ref 31.5–35.7)
MCV RBC AUTO: 97.3 FL (ref 79–97)
MONOCYTES # BLD: 0.63 10*3/MM3 (ref 0.1–0.9)
NEUTROPHILS # BLD AUTO: 0.44 10*3/MM3 (ref 1.7–7)
NEUTROPHILS NFR BLD MANUAL: 18 % (ref 42.7–76)
NRBC BLD AUTO-RTO: 0.8 /100 WBC (ref 0–0.2)
NRBC SPEC MANUAL: 1 /100 WBC (ref 0–0.2)
PLAT MORPH BLD: NORMAL
PLATELET # BLD AUTO: 615 10*3/MM3 (ref 140–450)
PMV BLD AUTO: 9.5 FL (ref 6–12)
POTASSIUM SERPL-SCNC: 3.6 MMOL/L (ref 3.5–5.2)
PROT SERPL-MCNC: 6 G/DL (ref 6–8.5)
QT INTERVAL: 420 MS
QTC INTERVAL: 468 MS
RBC # BLD AUTO: 2.61 10*6/MM3 (ref 3.77–5.28)
RBC MORPH BLD: NORMAL
SODIUM SERPL-SCNC: 144 MMOL/L (ref 136–145)
VARIANT LYMPHS NFR BLD MANUAL: 45 % (ref 19.6–45.3)
WBC MORPH BLD: NORMAL
WBC NRBC COR # BLD AUTO: 2.42 10*3/MM3 (ref 3.4–10.8)

## 2025-04-30 PROCEDURE — 85025 COMPLETE CBC W/AUTO DIFF WBC: CPT | Performed by: INTERNAL MEDICINE

## 2025-04-30 PROCEDURE — 82948 REAGENT STRIP/BLOOD GLUCOSE: CPT

## 2025-04-30 PROCEDURE — 85007 BL SMEAR W/DIFF WBC COUNT: CPT | Performed by: INTERNAL MEDICINE

## 2025-04-30 PROCEDURE — 63710000001 INSULIN LISPRO (HUMAN) PER 5 UNITS: Performed by: INTERNAL MEDICINE

## 2025-04-30 PROCEDURE — 80053 COMPREHEN METABOLIC PANEL: CPT | Performed by: INTERNAL MEDICINE

## 2025-04-30 PROCEDURE — 25010000002 HEPARIN (PORCINE) PER 1000 UNITS: Performed by: INTERNAL MEDICINE

## 2025-04-30 RX ORDER — POTASSIUM CHLORIDE 1500 MG/1
40 TABLET, EXTENDED RELEASE ORAL EVERY 4 HOURS
Status: COMPLETED | OUTPATIENT
Start: 2025-04-30 | End: 2025-04-30

## 2025-04-30 RX ORDER — ASPIRIN 81 MG/1
81 TABLET ORAL DAILY
Qty: 30 TABLET | Refills: 0 | Status: SHIPPED | OUTPATIENT
Start: 2025-04-30

## 2025-04-30 RX ORDER — METOPROLOL SUCCINATE 50 MG/1
50 TABLET, EXTENDED RELEASE ORAL
Qty: 30 TABLET | Refills: 0 | Status: SHIPPED | OUTPATIENT
Start: 2025-04-30

## 2025-04-30 RX ORDER — ROSUVASTATIN CALCIUM 20 MG/1
20 TABLET, COATED ORAL DAILY
Qty: 30 TABLET | Refills: 0 | Status: SHIPPED | OUTPATIENT
Start: 2025-04-30

## 2025-04-30 RX ADMIN — ANASTROZOLE 1 MG: 1 TABLET, FILM COATED ORAL at 09:12

## 2025-04-30 RX ADMIN — POTASSIUM CHLORIDE 40 MEQ: 1500 TABLET, EXTENDED RELEASE ORAL at 11:48

## 2025-04-30 RX ADMIN — POTASSIUM CHLORIDE 40 MEQ: 1500 TABLET, EXTENDED RELEASE ORAL at 09:12

## 2025-04-30 RX ADMIN — INSULIN LISPRO 2 UNITS: 100 INJECTION, SOLUTION INTRAVENOUS; SUBCUTANEOUS at 11:50

## 2025-04-30 RX ADMIN — PRAMIPEXOLE DIHYDROCHLORIDE 1.5 MG: 1.5 TABLET ORAL at 09:11

## 2025-04-30 RX ADMIN — ASPIRIN 81 MG: 81 TABLET, COATED ORAL at 09:12

## 2025-04-30 RX ADMIN — METOPROLOL SUCCINATE 50 MG: 50 TABLET, EXTENDED RELEASE ORAL at 09:12

## 2025-04-30 RX ADMIN — HEPARIN SODIUM 5000 UNITS: 5000 INJECTION INTRAVENOUS; SUBCUTANEOUS at 05:26

## 2025-04-30 RX ADMIN — BACLOFEN 20 MG: 10 TABLET ORAL at 09:12

## 2025-04-30 RX ADMIN — PANTOPRAZOLE SODIUM 40 MG: 40 TABLET, DELAYED RELEASE ORAL at 09:12

## 2025-04-30 RX ADMIN — Medication 10 ML: at 11:23

## 2025-04-30 NOTE — CASE MANAGEMENT/SOCIAL WORK
Case Management Discharge Note      Final Note: Home via spouse, [facility]. No additional CCP needs.         Selected Continued Care - Admitted Since 4/17/2025       Destination    No services have been selected for the patient.                Durable Medical Equipment    No services have been selected for the patient.                Dialysis/Infusion    No services have been selected for the patient.                Home Medical Care Coordination complete.      Service Provider Services Address Phone Fax Patient Preferred    [facility] [address], [place], [address], [place] [address] [phone] --              Therapy    No services have been selected for the patient.                Community Resources    No services have been selected for the patient.                Community & DME    No services have been selected for the patient.                    Selected Continued Care - Episodes Includes continued care and service providers with selected services from the active episodes listed below          Transportation Services  Private: Car    Final Discharge Disposition Code: 06 - home with home health care

## 2025-04-30 NOTE — TELEPHONE ENCOUNTER
Maritza contacted me yesterday noting she would be discharged from the hospital in the next day or two.  She has not been receiving her Truqap or Anastrozole in the hospital.  She wanted to know if we can plan to get her scans in the next 1-2 weeks.  Reviewed with Dr Lopes today as she was out of the office yesterday. She recommends she resume her Truqap for at least two weeks prior to obtaining scans. She also had a CT abdomen and pelvis, will plan to do CT Chest and bone scan and follow up with Dr Lopes after.  Follow up call to Maritza to review this with her.  She did not answer, but I left a detailed message regarding the above.

## 2025-04-30 NOTE — PLAN OF CARE
Goal Outcome Evaluation:            Patient vitals have remained stable, catheter is draining well. Patient education complete

## 2025-04-30 NOTE — OUTREACH NOTE
Prep Survey      Flowsheet Row Responses   [facility] patient discharged from? [place]   Is LACE score < 7 ? No   Eligibility [facility]   Date of Admission 04/17/25   Date of Discharge 04/30/25   [place] [facility]   Discharge diagnosis septic shock secondary to Klebsiella UTI and bacteremia   Does the patient have one of the following disease processes/diagnoses(primary or secondary)? Sepsis   Does the patient have Home health ordered? Yes   What is the Home health agency?  [facility]    Is there a DME ordered? No   Prep survey completed? Yes            Angelique ZALDIVAR - Registered Nurse

## 2025-04-30 NOTE — PLAN OF CARE
Problem: Adult Inpatient Plan of Care  Goal: Absence of Hospital-Acquired Illness or Injury  Intervention: Prevent Skin Injury  Recent Flowsheet Documentation  Taken 4/30/2025 0009 by Suman Granados RN  Body Position:   right   side-lying  Taken 4/29/2025 2115 by Suman Granados RN  Skin Protection: incontinence pads utilized  Taken 4/29/2025 1930 by Suman Granados RN  Body Position: (floating on wedges)   sitting up in bed   other (see comments)     Problem: Adult Inpatient Plan of Care  Goal: Absence of Hospital-Acquired Illness or Injury  Intervention: Prevent and Manage VTE (Venous Thromboembolism) Risk  Recent Flowsheet Documentation  Taken 4/30/2025 0009 by Suman Granados RN  VTE Prevention/Management: (hep sq) --  Taken 4/29/2025 2115 by Suman Granados RN  VTE Prevention/Management: (hep sq) other (see comments)   Goal Outcome Evaluation:

## 2025-04-30 NOTE — PROGRESS NOTES
"  Infectious Diseases Progress Note    Luisa Child MD     [facility]  Los: 13 days  Patient Identification:  Name: Maritza Bejarano  Age: 64 y.o.  Sex: female  :  1960  MRN: [id number]         Primary Care Physician: Enoc Carbone DO        Subjective: Denies any complaints.  Denies any fever and chills.  Interval History: See consultation note.    Objective:    Scheduled Meds:anastrozole, 1 mg, Oral, Daily  aspirin, 81 mg, Oral, Daily  baclofen, 20 mg, Oral, BID  FLUoxetine, 20 mg, Oral, Daily  heparin (porcine), 5,000 Units, Subcutaneous, Q8H  insulin lispro, 2-7 Units, Subcutaneous, 4x Daily AC & at Bedtime  metoprolol succinate XL, 50 mg, Oral, Q24H  pantoprazole, 40 mg, Oral, BID  potassium chloride ER, 40 mEq, Oral, Q4H  pramipexole, 1.5 mg, Oral, BID  senna-docusate sodium, 2 tablet, Oral, BID  sodium chloride, 10 mL, Intravenous, Q12H      Continuous Infusions:         Vital signs in last 24 hours:  Temp:  [98.1 °F (36.7 °C)-99 °F (37.2 °C)] 98.8 °F (37.1 °C)  Heart Rate:  [73-80] 75  Resp:  [18] 18  BP: (107-148)/(43-71) 148/71    Intake/Output:    Intake/Output Summary (Last 24 hours) at 2025 1103  Last data filed at 2025 0900  Gross per 24 hour   Intake --   Output 3150 ml   Net -3150 ml       Exam:  /71 (BP Location: Right arm, Patient Position: Lying)   Pulse 75   Temp 98.8 °F (37.1 °C) (Oral)   Resp 18   Ht 165.1 cm (65\")   Wt 79.6 kg (175 lb 7.8 oz)   LMP  (LMP Unknown) Comment: PT. STATES HER LAST PERIOD WAS GREATER THAN FIVE YEARS AGO  SpO2 95%   BMI 29.20 kg/m²   Patient is examined using the personal protective equipment as per guidelines from infection control for this particular patient as enacted.  Hand washing was performed before and after patient interaction.  General Appearance: Awake and interactive family members at the bedside.                          Head:    Normocephalic, without obvious abnormality, atraumatic                           " Eyes:  Pale conjunctiva                         Throat:   Lips, tongue, gums normal; oral mucosa pink and moist                           Neck:   Supple, symmetrical, trachea midline, no JVD                         Lungs:    Clear to auscultation bilaterally, respirations unlabored                 Chest Wall:    No tenderness or deformity                          Heart:  S1-S2 regular                  Abdomen:   Catheter in place, obese soft nontender                 Extremities:   Extremities normal, atraumatic, no cyanosis or edema                        Pulses:   Pulses palpable in all extremities                            Skin:                    Neurologic: Alert and oriented x 3       Data Review:    I reviewed the patient's new clinical results.  Results from last 7 days   Lab Units 04/30/25  0346 04/29/25  0311 04/28/25  0448 04/27/25  0559 04/26/25  0309 04/25/25  0456 04/24/25  0253   WBC 10*3/mm3 2.42* 2.40* 2.26* 2.56* 5.79 9.92 16.12*   HEMOGLOBIN g/dL 8.0* 8.8* 8.4* 9.9* 8.1* 8.0* 8.9*   PLATELETS 10*3/mm3 615* 611* 620* 618* 648* 586* 526*     Results from last 7 days   Lab Units 04/30/25  0346 04/29/25  1501 04/29/25  0311 04/28/25  1537 04/28/25  0448 04/27/25  0559 04/26/25  0309 04/25/25  1857 04/25/25  0456 04/24/25  0253   SODIUM mmol/L 144  --  143  --  145 143 147*  --  149* 147*   POTASSIUM mmol/L 3.6 4.0 4.2 4.4 3.4* 5.0 4.1   < > 3.4* 3.9   CHLORIDE mmol/L 112*  --  111*  --  113* 114* 116*  --  115* 115*   CO2 mmol/L 22.2  --  20.5*  --  20.0* 16.0* 18.0*  --  20.5* 22.9   BUN mg/dL 8  --  8  --  13 9 10  --  15 28*   CREATININE mg/dL 0.65  --  0.65  --  0.64 0.75 0.70  --  0.85 1.29*   CALCIUM mg/dL 7.9*  --  8.2*  --  7.8* 7.4* 6.8*  --  6.6* 7.2*   GLUCOSE mg/dL 149*  --  90  --  146* 82 89  --  117* 65    < > = values in this interval not displayed.     Microbiology Results (last 10 days)       ** No results found for the last 240 hours. **              Assessment: Improving     Sepsis    Multiple sclerosis    Essential hypertension    Anxiety and depression    Neurogenic bladder    Restless legs syndrome    Malignant neoplasm of overlapping sites of left breast in female, estrogen receptor positive    Elevated LFTs    NSTEMI (non-ST elevated myocardial infarction)    Type 2 diabetes mellitus   64-year-old female with  1-Klebsiella bacteremic sepsis with septic shock secondary  2-urinary tract infection due to recent indwelling Cobos catheter malfunction  3-immobility with MS and neurogenic bladder  4-immunocompromised host  5-acute on chronic renal insufficiency  6-anemia multifactorial  7-hyperglycemia  8-episode of low-grade fever with some chest discomfort upon coughing and taking deep breath-concerning for atelectasis versus evolving pneumonia.     Recommendations/Discussions:  See my discussion and recommendation on 4/25/2025.  Patient has completed antibiotic treatment on 4/28/2025.  Continue supportive care per primary team.  Going forward observe off antibiotic therapy.  Luisa Child MD  4/30/2025  11:03 EDT    Parts of this note may be an electronic transcription/translation of spoken language to printed text using the Dragon dictation system.

## 2025-04-30 NOTE — DISCHARGE SUMMARY
Date of Discharge:  4/30/2025    PCP: Enoc Carbone DO    Discharge Diagnosis:   Active Hospital Problems    Diagnosis  POA    **Sepsis [A41.9]  Yes    NSTEMI (non-ST elevated myocardial infarction) [I21.4]  Yes    Type 2 diabetes mellitus [E11.9]  Yes    Elevated LFTs [R79.89]  Yes    Malignant neoplasm of overlapping sites of left breast in female, estrogen receptor positive [C50.812, Z17.0]  Not Applicable    Multiple sclerosis [G35]  Yes    Essential hypertension [I10]  Yes    Anxiety and depression [F41.9, F32.A]  Yes    Neurogenic bladder [N31.9]  Yes    Restless legs syndrome [G25.81]  Yes      Resolved Hospital Problems   No resolved problems to display.          Consults       Date and Time Order Name Status Description    4/23/2025  4:30 AM Inpatient Neurology Consult General Completed     4/19/2025  1:08 PM Inpatient Internal Medicine Consult      4/18/2025 11:37 AM Inpatient Infectious Diseases Consult Completed     4/17/2025  4:09 PM Inpatient Cardiology Consult Completed     4/17/2025  2:02 PM Pulmonology (on-call MD unless specified)            Hospital Course  Patient is a 64 y.o. female     64-year-old woman with a history of MS and a neurogenic bladder who presented with fevers and chills and was found to be in septic shock secondary to Klebsiella UTI and bacteremia.  She was admitted to the ICU and treated with broad-spectrum antibiotics and fluids.  Her course was complicated by an KINZA, type II NSTEMI, severe metabolic encephalopathy, Cobos catheter malfunction, and a postobstructive diuresis.  She is greatly improved, and finishing antibiotics.  She has now completed the course of antibiotics.  She also had clinical course complicated by type II non-STEMI, but she currently does not have any chest pain or cardiac symptoms.  Patient also had metabolic encephalopathy, mental status now appears to be close to baseline.  Other chronic conditions include type 2 diabetes mellitus, hypertension, GERD,  metastatic breast cancer, anxiety, clinically she appears to be close to baseline.  I have seen and examined patient at bedside, total time spent on discharge management is more than 30 minutes.  Plan of care was discussed with the patient and she has verbalized understanding.    Temp:  [98.1 °F (36.7 °C)-99 °F (37.2 °C)] 98.8 °F (37.1 °C)  Heart Rate:  [73-80] 75  Resp:  [18] 18  BP: (107-148)/(43-71) 148/71  Body mass index is 29.2 kg/m².    Physical Exam  General, awake and alert.  Head and ENT, normocephalic and atraumatic.  Lungs, symmetric expansion, equal air entry bilaterally.  Heart, regular rate and rhythm.  Abdomen, soft and nontender.  Extremities, no clubbing or cyanosis.  Neuro, no focal deficits.  Skin: Warm and no rash.  Psych, normal mood and affect.  Musculoskeletal, joint examination is grossly normal.      Disposition: Home or Self Care       Discharge Medications        New Medications        Instructions Start Date   aspirin 81 MG EC tablet   81 mg, Oral, Daily      metoprolol succinate XL 50 MG 24 hr tablet  Commonly known as: TOPROL-XL   50 mg, Oral, Every 24 Hours Scheduled             Continue These Medications        Instructions Start Date   albuterol sulfate  (90 Base) MCG/ACT inhaler  Commonly known as: PROVENTIL HFA;VENTOLIN HFA;PROAIR HFA   2 puffs, Inhalation, Every 4 Hours PRN      anastrozole 1 MG tablet  Commonly known as: ARIMIDEX   1 tablet, Daily      baclofen 20 MG tablet  Commonly known as: LIORESAL   20 mg, 2 Times Daily      clonazePAM 2 MG tablet  Commonly known as: KlonoPIN   2 mg, Oral, 2 Times Daily PRN      dexAMETHasone 0.5 MG/5ML solution   1 mg, Oral, Daily      Fenbendazole powder   440 mg, Not Applicable, 3 Times Weekly, 2 capsules 3 times weekly      FLUoxetine 20 MG capsule  Commonly known as: PROzac   20 mg, Daily      glipiZIDE 2.5 MG tablet   2.5 mg, Oral, 2 Times Daily Before Meals      IVERMECTIN PO   15 mg, Oral, 3 Times Weekly       losartan-hydrochlorothiazide 50-12.5 MG per tablet  Commonly known as: HYZAAR   1 tablet, Daily      metFORMIN 500 MG tablet  Commonly known as: GLUCOPHAGE   500 mg, Daily With Breakfast      pantoprazole 40 MG EC tablet  Commonly known as: PROTONIX   40 mg, Oral, 2 Times Daily      phenazopyridine 200 MG tablet  Commonly known as: PYRIDIUM   200 mg, Oral, 3 Times Daily PRN      pramipexole 1.5 MG tablet  Commonly known as: MIRAPEX   1.5 mg, 2 Times Daily      prochlorperazine 10 MG tablet  Commonly known as: COMPAZINE   10 mg, Oral, Every 6 Hours PRN      promethazine 12.5 MG tablet  Commonly known as: PHENERGAN   12.5 mg, Oral, Every 6 Hours PRN      traZODone 50 MG tablet  Commonly known as: DESYREL   50 mg, Oral, Nightly      Truqap 200 MG tablet  Generic drug: Capivasertib   400 mg, Oral, 2 Times Daily, Take for 4 days on then 3 days off. Repeat.      vitamin D3 125 MCG (5000 UT) tablet tablet   5,000 Units, Daily      zolpidem 5 MG tablet  Commonly known as: AMBIEN   5 mg, Oral, Nightly PRN                Additional Instructions for the Follow-ups that You Need to Schedule       Discharge Follow-up with PCP   As directed       Currently Documented PCP:    Enoc Carbone DO    PCP Phone Number:    [phone]     Follow Up Details: Follow-up with PCP in 7 days.               Contact information for follow-up providers       Enoc Carbone DO .    Specialty: Internal Medicine  Why: Follow-up with PCP in 7 days.  Contact information:  [address]  [address]  [place] [id number] [phone]                       Contact information for after-discharge care       Home Medical Care       [facility] .    Service: Home Health Services  Contact information:  [address], [address]  [facility] [address] [phone]                                  Future Appointments   Date Time Provider Department Center   5/9/2025  3:45 PM Enoc Carbone DO [facility]   5/23/2025 12:30  PM Paulo Garcia MD [id number] None   6/12/2025  3:00 PM Nokhbehzaeim, Mahrokh, MD [facility]  YAMILE Armstrong MD  [facility]  04/30/25 11:08 EDT    Discharge time spent greater than 30 minutes.

## 2025-05-01 ENCOUNTER — TRANSITIONAL CARE MANAGEMENT TELEPHONE ENCOUNTER (OUTPATIENT)
Dept: CALL CENTER | Facility: HOSPITAL | Age: 65
End: 2025-05-01
Payer: MEDICARE

## 2025-05-01 NOTE — OUTREACH NOTE
Call Center TCM Note      Flowsheet Row Responses   [facility] patient discharged from? [facility]   Does the patient have one of the following disease processes/diagnoses(primary or secondary)? Sepsis   TCM attempt successful? Yes   Call start time 0958   Call end time 1003   Discharge diagnosis septic shock secondary to Klebsiella UTI and bacteremia   Person spoke with today (if not patient) and relationship Patient   Meds reviewed with patient/caregiver? Yes   Is the patient having any side effects they believe may be caused by any medication additions or changes? No   Does the patient have all medications related to this admission filled (includes all antibiotics, inhalers, nebulizers,steroids,etc.) Yes   Is the patient taking all medications as directed (includes completed medication regime)? Yes   Comments Patient has wellness exam scheduled on 5/9/2025 with Dr Carbone PCP and Cardiology on 5/23. She will also schedule a followup with Urology.   Does the patient have an appointment with their PCP within 7-14 days of discharge? Yes   What is the Home health agency?  [facility]   Psychosocial issues? No   Did the patient receive a copy of their discharge instructions? Yes   Nursing interventions Reviewed instructions with patient   What is the patient's perception of their health status since discharge? Improving   Nursing interventions Nurse provided patient education   Is the patient/caregiver able to teach back TIME? T emperature - higher or lower than normal, I nfection - may have signs and symptoms of an infection, M ental Decline - confused, sleepy, difficult to arouse, E xtremely Ill - severe pain, discomfort, shortness of breath   Nursing interventions Nurse provided reassurance to patient   Is patient/caregiver able to teach back steps to recovery at home? Set small, achievable goals for return to baseline health, Rest and regain strength   Is the patient/caregiver able to teach back signs and  symptoms of worsening condition: Fever, Hyperthermia, Shortness of breath/rapid respiratory rate, Altered mental status(confusion/coma)   If the patient is a current smoker, are they able to teach back resources for cessation? Not a smoker   Is the patient/caregiver able to teach back the hierarchy of who to call/visit for symptoms/problems? PCP, Specialist, Home health nurse, Urgent Care, ED, 911 Yes   TCM call completed? Yes   Wrap up additional comments Doing well.   Call end time 1003   Would this patient benefit from a Referral to [facility] Social Work? No   Is the patient interested in additional calls from an ambulatory ? No            Wai HILL - Registered Nurse    5/1/2025, 10:03 EDT

## 2025-05-02 ENCOUNTER — TELEPHONE (OUTPATIENT)
Dept: ONCOLOGY | Facility: CLINIC | Age: 65
End: 2025-05-02
Payer: MEDICARE

## 2025-05-02 NOTE — TELEPHONE ENCOUNTER
Provider: Dr. Lopes  Caller: Maritza Bejarano  Relationship to Patient: Self  Call Back Phone Number: [phone]  Reason for Call: Pt stated her BP is high was release from  [facility] 2 days ago. Stated she feels like there is pressure in head. Please advise.

## 2025-05-02 NOTE — TELEPHONE ENCOUNTER
Returned call to patient, she states she is feeling a little better now. Her BP was slightly elevated (140s/80s) and she had some pressure in her head. No headaches, SOA, or numbness. Advised patient to stay hydrated and monitor her symptoms and if anything changes or her BP becomes more elevated she should go to the ER for evaluation. She v/u.

## 2025-05-05 ENCOUNTER — TELEPHONE (OUTPATIENT)
Dept: INTERNAL MEDICINE | Facility: CLINIC | Age: 65
End: 2025-05-05

## 2025-05-05 ENCOUNTER — SPECIALTY PHARMACY (OUTPATIENT)
Dept: PHARMACY | Facility: HOSPITAL | Age: 65
End: 2025-05-05
Payer: MEDICARE

## 2025-05-05 NOTE — TELEPHONE ENCOUNTER
Hub staff attempted to follow warm transfer process and was unsuccessful     Caller: Maritza Bejarano    Relationship to patient: Self    Best call back number: [phone]    Patient is needing: PT CALLING TO SCHEDULE HOSPITAL F/U. NO OPENINGS WITHIN [facility]. PT WAS DISCHARGED FROM [facility] ON 4/30/25.

## 2025-05-05 NOTE — PROGRESS NOTES
Specialty Pharmacy Patient Management Program       Pt in queue for Truqap refill-She was in the hospital from 4/17-4/30 and did not get her Truqap or anastrozole. She was instructed by Rosina BAEZ, Clinic RN to start back on Truqap on 4/30/25.    I contacted pt to check her supply and coordinate delivery if she needed. She did not need a delivery and will contact me when she has 10 days remaining. She reported having Truqap at home. Unsure how many.    Belle Razo, Pharmacy Technician  05/05/25  12:51 EDT

## 2025-05-06 ENCOUNTER — EXTERNAL PBMM DATA (OUTPATIENT)
Dept: PHARMACY | Facility: OTHER | Age: 65
End: 2025-05-06
Payer: MEDICARE

## 2025-05-07 ENCOUNTER — TELEPHONE (OUTPATIENT)
Dept: INTERNAL MEDICINE | Facility: CLINIC | Age: 65
End: 2025-05-07

## 2025-05-07 NOTE — TELEPHONE ENCOUNTER
Caller: SILKE    Relationship: Home [facility]    Best call back number: [phone]     What orders are you requesting (i.e. lab or imaging): VERBAL ORDER FOR PT ONCE A WEEK FOR 8 WEEKS       Additional notes: PLEASE CALL

## 2025-05-09 ENCOUNTER — HOSPITAL ENCOUNTER (EMERGENCY)
Facility: HOSPITAL | Age: 65
Discharge: HOME | End: 2025-05-10
Attending: EMERGENCY MEDICINE
Payer: MEDICARE

## 2025-05-09 ENCOUNTER — OFFICE VISIT (OUTPATIENT)
Dept: INTERNAL MEDICINE | Facility: CLINIC | Age: 65
End: 2025-05-09
Payer: MEDICARE

## 2025-05-09 ENCOUNTER — APPOINTMENT (OUTPATIENT)
Dept: GENERAL RADIOLOGY | Facility: HOSPITAL | Age: 65
End: 2025-05-09
Payer: MEDICARE

## 2025-05-09 VITALS
HEART RATE: 81 BPM | HEIGHT: 65 IN | TEMPERATURE: 97.7 F | BODY MASS INDEX: 24.99 KG/M2 | SYSTOLIC BLOOD PRESSURE: 140 MMHG | WEIGHT: 150 LBS | DIASTOLIC BLOOD PRESSURE: 80 MMHG | OXYGEN SATURATION: 100 %

## 2025-05-09 DIAGNOSIS — R65.21 SEPTIC SHOCK: ICD-10-CM

## 2025-05-09 DIAGNOSIS — A41.9 SEPTIC SHOCK: ICD-10-CM

## 2025-05-09 DIAGNOSIS — E83.51 HYPOCALCEMIA: ICD-10-CM

## 2025-05-09 DIAGNOSIS — N39.0 ACUTE UTI: Primary | ICD-10-CM

## 2025-05-09 DIAGNOSIS — D64.9 NORMOCYTIC ANEMIA: ICD-10-CM

## 2025-05-09 DIAGNOSIS — E88.09 HYPOALBUMINEMIA: ICD-10-CM

## 2025-05-09 DIAGNOSIS — Z09 HOSPITAL DISCHARGE FOLLOW-UP: Primary | ICD-10-CM

## 2025-05-09 DIAGNOSIS — R50.9 FEVER IN ADULT: ICD-10-CM

## 2025-05-09 LAB
ALBUMIN SERPL-MCNC: 3.5 G/DL (ref 3.5–5.2)
ALBUMIN/GLOB SERPL: 0.8 G/DL
ALP SERPL-CCNC: 183 U/L (ref 39–117)
ALT SERPL W P-5'-P-CCNC: 17 U/L (ref 1–33)
ANION GAP SERPL CALCULATED.3IONS-SCNC: 13.5 MMOL/L (ref 5–15)
AST SERPL-CCNC: 44 U/L (ref 1–32)
B PARAPERT DNA SPEC QL NAA+PROBE: NOT DETECTED
B PERT DNA SPEC QL NAA+PROBE: NOT DETECTED
BACTERIA UR QL AUTO: ABNORMAL /HPF
BASOPHILS # BLD AUTO: 0.04 10*3/MM3 (ref 0–0.2)
BASOPHILS NFR BLD AUTO: 0.7 % (ref 0–1.5)
BILIRUB SERPL-MCNC: 0.2 MG/DL (ref 0–1.2)
BILIRUB UR QL STRIP: NEGATIVE
BUN SERPL-MCNC: 12 MG/DL (ref 8–23)
BUN/CREAT SERPL: 18.2 (ref 7–25)
C PNEUM DNA NPH QL NAA+NON-PROBE: NOT DETECTED
CALCIUM SPEC-SCNC: 9.1 MG/DL (ref 8.6–10.5)
CHLORIDE SERPL-SCNC: 104 MMOL/L (ref 98–107)
CLARITY UR: ABNORMAL
CO2 SERPL-SCNC: 22.5 MMOL/L (ref 22–29)
COLOR UR: YELLOW
CREAT SERPL-MCNC: 0.66 MG/DL (ref 0.57–1)
DEPRECATED RDW RBC AUTO: 52.6 FL (ref 37–54)
EGFRCR SERPLBLD CKD-EPI 2021: 99.3 ML/MIN/1.73
EOSINOPHIL # BLD AUTO: 0.23 10*3/MM3 (ref 0–0.4)
EOSINOPHIL NFR BLD AUTO: 3.8 % (ref 0.3–6.2)
ERYTHROCYTE [DISTWIDTH] IN BLOOD BY AUTOMATED COUNT: 15.3 % (ref 12.3–15.4)
FLUAV SUBTYP SPEC NAA+PROBE: NOT DETECTED
FLUBV RNA ISLT QL NAA+PROBE: NOT DETECTED
GLOBULIN UR ELPH-MCNC: 4.3 GM/DL
GLUCOSE SERPL-MCNC: 128 MG/DL (ref 65–99)
GLUCOSE UR STRIP-MCNC: NEGATIVE MG/DL
HADV DNA SPEC NAA+PROBE: NOT DETECTED
HCOV 229E RNA SPEC QL NAA+PROBE: NOT DETECTED
HCOV HKU1 RNA SPEC QL NAA+PROBE: NOT DETECTED
HCOV NL63 RNA SPEC QL NAA+PROBE: NOT DETECTED
HCOV OC43 RNA SPEC QL NAA+PROBE: NOT DETECTED
HCT VFR BLD AUTO: 29.8 % (ref 34–46.6)
HGB BLD-MCNC: 9.8 G/DL (ref 12–15.9)
HGB UR QL STRIP.AUTO: ABNORMAL
HMPV RNA NPH QL NAA+NON-PROBE: NOT DETECTED
HOLD SPECIMEN: NORMAL
HOLD SPECIMEN: NORMAL
HPIV1 RNA ISLT QL NAA+PROBE: NOT DETECTED
HPIV2 RNA SPEC QL NAA+PROBE: NOT DETECTED
HPIV3 RNA NPH QL NAA+PROBE: NOT DETECTED
HPIV4 P GENE NPH QL NAA+PROBE: NOT DETECTED
HYALINE CASTS UR QL AUTO: ABNORMAL /LPF
IMM GRANULOCYTES # BLD AUTO: 0.07 10*3/MM3 (ref 0–0.05)
IMM GRANULOCYTES NFR BLD AUTO: 1.2 % (ref 0–0.5)
KETONES UR QL STRIP: NEGATIVE
LEUKOCYTE ESTERASE UR QL STRIP.AUTO: ABNORMAL
LYMPHOCYTES # BLD AUTO: 1.8 10*3/MM3 (ref 0.7–3.1)
LYMPHOCYTES NFR BLD AUTO: 30 % (ref 19.6–45.3)
M PNEUMO IGG SER IA-ACNC: NOT DETECTED
MCH RBC QN AUTO: 30.8 PG (ref 26.6–33)
MCHC RBC AUTO-ENTMCNC: 32.9 G/DL (ref 31.5–35.7)
MCV RBC AUTO: 93.7 FL (ref 79–97)
MONOCYTES # BLD AUTO: 0.53 10*3/MM3 (ref 0.1–0.9)
MONOCYTES NFR BLD AUTO: 8.8 % (ref 5–12)
NEUTROPHILS NFR BLD AUTO: 3.34 10*3/MM3 (ref 1.7–7)
NEUTROPHILS NFR BLD AUTO: 55.5 % (ref 42.7–76)
NITRITE UR QL STRIP: POSITIVE
NRBC BLD AUTO-RTO: 0.3 /100 WBC (ref 0–0.2)
PH UR STRIP.AUTO: 6.5 [PH] (ref 5–8)
PLATELET # BLD AUTO: 633 10*3/MM3 (ref 140–450)
PMV BLD AUTO: 8.9 FL (ref 6–12)
POTASSIUM SERPL-SCNC: 3.7 MMOL/L (ref 3.5–5.2)
PROT SERPL-MCNC: 7.8 G/DL (ref 6–8.5)
PROT UR QL STRIP: ABNORMAL
RBC # BLD AUTO: 3.18 10*6/MM3 (ref 3.77–5.28)
RBC # UR STRIP: ABNORMAL /HPF
REF LAB TEST METHOD: ABNORMAL
RHINOVIRUS RNA SPEC NAA+PROBE: NOT DETECTED
RSV RNA NPH QL NAA+NON-PROBE: NOT DETECTED
SARS-COV-2 RNA NPH QL NAA+NON-PROBE: NOT DETECTED
SODIUM SERPL-SCNC: 140 MMOL/L (ref 136–145)
SP GR UR STRIP: 1.01 (ref 1–1.03)
SQUAMOUS #/AREA URNS HPF: ABNORMAL /HPF
UROBILINOGEN UR QL STRIP: ABNORMAL
WBC # UR STRIP: ABNORMAL /HPF
WBC CLUMPS # UR AUTO: PRESENT /HPF
WBC NRBC COR # BLD AUTO: 6.01 10*3/MM3 (ref 3.4–10.8)
WHOLE BLOOD HOLD COAG: NORMAL
WHOLE BLOOD HOLD SPECIMEN: NORMAL
YEAST URNS QL MICRO: PRESENT /HPF

## 2025-05-09 PROCEDURE — 81001 URINALYSIS AUTO W/SCOPE: CPT | Performed by: PHYSICIAN ASSISTANT

## 2025-05-09 PROCEDURE — 87077 CULTURE AEROBIC IDENTIFY: CPT | Performed by: PHYSICIAN ASSISTANT

## 2025-05-09 PROCEDURE — 85025 COMPLETE CBC W/AUTO DIFF WBC: CPT | Performed by: EMERGENCY MEDICINE

## 2025-05-09 PROCEDURE — 71045 X-RAY EXAM CHEST 1 VIEW: CPT

## 2025-05-09 PROCEDURE — P9612 CATHETERIZE FOR URINE SPEC: HCPCS

## 2025-05-09 PROCEDURE — 87086 URINE CULTURE/COLONY COUNT: CPT | Performed by: PHYSICIAN ASSISTANT

## 2025-05-09 PROCEDURE — 99283 EMERGENCY DEPT VISIT LOW MDM: CPT

## 2025-05-09 PROCEDURE — 36415 COLL VENOUS BLD VENIPUNCTURE: CPT

## 2025-05-09 PROCEDURE — 0202U NFCT DS 22 TRGT SARS-COV-2: CPT | Performed by: PHYSICIAN ASSISTANT

## 2025-05-09 PROCEDURE — 80053 COMPREHEN METABOLIC PANEL: CPT | Performed by: EMERGENCY MEDICINE

## 2025-05-09 PROCEDURE — 87186 SC STD MICRODIL/AGAR DIL: CPT | Performed by: PHYSICIAN ASSISTANT

## 2025-05-09 NOTE — PROGRESS NOTES
"Transitional Care Follow Up Visit  Subjective     Maritza Bejarano is a 64 y.o. female who presents for a transitional care management visit.    Within 48 business hours after discharge our office contacted her via telephone to coordinate her care and needs.      I reviewed and discussed the details of that call along with the discharge summary, hospital problems, inpatient lab results, inpatient diagnostic studies, and consultation reports with Maritza.     Current outpatient and discharge medications have been reconciled for the patient.  Reviewed by: Enoc Carbone DO          4/30/2025     4:45 PM   Date of TCM Phone Call   [facility]   Date of Admission 4/17/2025   Date of Discharge 4/30/2025   Discharge Disposition Home-Health Care [facility]     Risk for Readmission (LACE) Score: 17 (4/30/2025  6:00 AM)      History of Present Illness   Course During Hospital Stay:      \"Date of Discharge:  4/30/2025     PCP: Enoc Carbone DO     Discharge Diagnosis:         Active Hospital Problems     Diagnosis   POA    **Sepsis [A41.9]   Yes    NSTEMI (non-ST elevated myocardial infarction) [I21.4]   Yes    Type 2 diabetes mellitus [E11.9]   Yes    Elevated LFTs [R79.89]   Yes    Malignant neoplasm of overlapping sites of left breast in female, estrogen receptor positive [C50.812, Z17.0]   Not Applicable    Multiple sclerosis [G35]   Yes    Essential hypertension [I10]   Yes    Anxiety and depression [F41.9, F32.A]   Yes    Neurogenic bladder [N31.9]   Yes    Restless legs syndrome [G25.81]   Yes       Resolved Hospital Problems   No resolved problems to display.         Consults         Date and Time Order Name Status Description     4/23/2025  4:30 AM Inpatient Neurology Consult General Completed       4/19/2025  1:08 PM Inpatient Internal Medicine Consult         4/18/2025 11:37 AM Inpatient Infectious Diseases Consult Completed       4/17/2025  4:09 PM Inpatient Cardiology Consult Completed       4/17/2025  2:02 " "PM Pulmonology (on-call MD unless specified)                 Hospital Course  Patient is a 64 y.o. female      64-year-old woman with a history of MS and a neurogenic bladder who presented with fevers and chills and was found to be in septic shock secondary to Klebsiella UTI and bacteremia.  She was admitted to the ICU and treated with broad-spectrum antibiotics and fluids.  Her course was complicated by an KINZA, type II NSTEMI, severe metabolic encephalopathy, Matias catheter malfunction, and a postobstructive diuresis.  She is greatly improved, and finishing antibiotics.  She has now completed the course of antibiotics.  She also had clinical course complicated by type II non-STEMI, but she currently does not have any chest pain or cardiac symptoms.  Patient also had metabolic encephalopathy, mental status now appears to be close to baseline.  Other chronic conditions include type 2 diabetes mellitus, hypertension, GERD, metastatic breast cancer, anxiety, clinically she appears to be close to baseline.  I have seen and examined patient at bedside, total time spent on discharge management is more than 30 minutes.  Plan of care was discussed with the patient and she has verbalized understanding.\"    Today, states she is much much much better. States this is the 3rd time with sepsis from urinary infection.states her urine in her matias has been clear since discharge. She has already been to the urologist since coming out of the hospital and determined straight catheterizing is not feasible so she is back with indwelling matias. States she has been checking vitals at home and \"are normal\". States she is not able to feel any urinary symptoms. She is back at home with home health PT/OT/Nursing.  She is not doing any protein supplement but is eating more red meat.       The following portions of the patient's history were reviewed and updated as appropriate: allergies, current medications, past family history, past medical " "history, past social history, past surgical history, and problem list.        Objective   /80   Pulse 81   Temp 97.7 °F (36.5 °C) (Infrared)   Ht 165.1 cm (65\")   Wt 68 kg (150 lb)   SpO2 100%   BMI 24.96 kg/m²   Physical Exam  Vitals reviewed.   Constitutional:       General: She is not in acute distress.     Appearance: Normal appearance. She is not ill-appearing.   Eyes:      General: No scleral icterus.  Pulmonary:      Effort: Pulmonary effort is normal. No respiratory distress.   Musculoskeletal:      Comments: Ambulating via motorized wheelchair   Skin:     Coloration: Skin is not jaundiced.   Neurological:      Mental Status: She is alert.   Psychiatric:         Mood and Affect: Mood normal.         Thought Content: Thought content normal.         Assessment & Plan   Diagnoses and all orders for this visit:    1. Hospital discharge follow-up (Primary)  2. Hypoalbuminemia  3. Septic shock  4. Normocytic anemia  5. Hypocalcemia  -Patient here today for hospital discharge follow-up.  Patient was hospitalized at [facility] from 4/17/2025 to 4/30/2025 with diagnosis of sepsis, NSTEMI.  I reviewed hospital discharge summary which states the patient was found to be in septic shock secondary to Klebsiella UTI and bacteremia.  She was admitted to the ICU and treated with broad-spectrum antibiotics and fluids.  Her hospital course was complicated by an KINZA, type II NSTEMI, severe metabolic encephalopathy, Cobos catheter malfunction and a postobstructive diuresis.  At time of discharge she was noted to be greatly improved and completed antibiotics.  Her mental status was documented to be \"close to baseline\" at time of discharge.  She was discharged home with home health.I reviewed CMP from day of discharge which showed hypoalbuminemia as well as hypocalcemia which is likely pseudo hypocalcemia.  CBC from day of discharge showed anemia with hemoglobin of 8.0, white blood cell count 2.61.  Her " anemia appears to be iatrogenic. Renal function was back to normal at time of discharge.   Lab Results   Component Value Date    GLUCOSE 149 (H) 04/30/2025    BUN 8 04/30/2025    CREATININE 0.65 04/30/2025     04/30/2025    K 3.6 04/30/2025     (H) 04/30/2025    CALCIUM 7.9 (L) 04/30/2025    PROTEINTOT 6.0 04/30/2025    ALBUMIN 2.8 (L) 04/30/2025    ALT 14 04/30/2025    AST 33 (H) 04/30/2025    ALKPHOS 145 (H) 04/30/2025    BILITOT <0.2 04/30/2025    GLOB 3.2 04/30/2025    AGRATIO 0.9 04/30/2025    BCR 12.3 04/30/2025    ANIONGAP 9.8 04/30/2025    EGFR 99.7 04/30/2025     Lab Results   Component Value Date    WBC 2.42 (L) 04/30/2025    HGB 8.0 (L) 04/30/2025    HCT 25.4 (L) 04/30/2025    MCV 97.3 (H) 04/30/2025     (H) 04/30/2025       -patient is doing remarkably well today, back to baseline essentially. She has already seen urology and getting care for her indwelling matias catheter there. She is participating with home health as directed. She has normal vitals today and no recurrent signs of infection, though she has limited ability to feel signs of UTI secondary to MS.   -I remain primarily concerned about some lab abnormalities, particularly her hypoalbuminemia and anemia that was present at discharge. Last CBC and CMP are as above. I will repeat labs below (patient will obtain these in 2-3 days when she is at the hospital for a separate CT scan). She is following with home health nursing for decubitus ulcers and I advised her with low protein her healing is going to be delayed. I recommend she drink 2-3 protein rich drinks daily that contain at least 20 grams of protein each. She is in agreement. Further plan based upon lab results but it doesn't appear she has any other post discharge needs at this point.  -     Calcium, Ionized  -     CBC & Differential  -     Iron and TIBC  -     Ferritin  -     Vitamin B12 Deficiency Cascade  -     Folate  -     Comprehensive Metabolic Panel

## 2025-05-10 VITALS
WEIGHT: 150 LBS | HEART RATE: 86 BPM | HEIGHT: 65 IN | BODY MASS INDEX: 24.99 KG/M2 | OXYGEN SATURATION: 94 % | SYSTOLIC BLOOD PRESSURE: 136 MMHG | TEMPERATURE: 98.6 F | DIASTOLIC BLOOD PRESSURE: 63 MMHG | RESPIRATION RATE: 18 BRPM

## 2025-05-10 RX ADMIN — AMOXICILLIN AND CLAVULANATE POTASSIUM 1 TABLET: 875; 125 TABLET, FILM COATED ORAL at 00:58

## 2025-05-10 NOTE — ED NOTES
Pt here for c/o fever today, states tmax 103 at home.  Pt seen by pcp earlier today, with no c/o at that time.  Pt has indwelling matias cath for history of MS.

## 2025-05-10 NOTE — DISCHARGE INSTRUCTIONS
Follow-up with primary care provider and [facility].  Take the Augmentin twice a day for the next week as prescribed.  Return to emergency department for any worsening symptoms.

## 2025-05-10 NOTE — ED NOTES
c/o high fever (103.8F) fever after being hospitalized for septic shock from UTI released on 4/30.

## 2025-05-10 NOTE — ED PROVIDER NOTES
EMERGENCY DEPARTMENT ENCOUNTER  Room Number:  08/08  PCP: Enoc Carbone DO  Independent Historians: Patient      HPI:  Chief Complaint: had concerns including Fever.     A complete HPI/ROS/PMH/PSH/SH/FH are unobtainable due to: None    Chronic or social conditions impacting patient care (Social Determinants of Health): None      Context: Maritza Bejarano is a 64 y.o. female with a medical history of multiple sclerosis, hypertension, hyperlipidemia, GERD, anxiety, depression, neurogenic bladder, vitamin D deficiency, breast cancer, diabetes who presents to the ED c/o acute fever.  Patient reports she was discharged from the hospital on 4/30/2025 after being admitted for urosepsis.  Had been feeling well including this morning when she followed up with her primary care provider.  During the day, patient developed fever with Tmax 103.8F.  Continues to use indwelling Cobos catheter.  Denies headache, cough, neck pain.  No vomiting or diarrhea.  No other systemic complaints at this time.      Review of prior external notes (non-ED) -and- Review of prior external test results outside of this encounter:  Patient seen in office by PCP today for hospital discharge follow-up.  Reviewed assessment and plan.  Will recheck labs and have patient follow-up as scheduled.  Reviewed labs collected on 4/30/2025.  CBC with hemoglobin 8.0, CMP with creatinine 0.65.    Prescription drug monitoring program review:     N/A    PAST MEDICAL HISTORY  Active Ambulatory Problems     Diagnosis Date Noted    H/O total shoulder replacement, right 10/22/2018    Cough 01/28/2021    Acute UTI (urinary tract infection) 01/28/2021    Multiple sclerosis 01/28/2021    Essential hypertension 01/28/2021    Hyperlipidemia 01/28/2021    Shortness of breath 01/28/2021    Oropharyngeal dysphagia 02/04/2021    Abnormal finding on mammography 08/09/2021    Acid reflux 08/09/2021    Anxiety and depression 05/01/2018    Brash 08/09/2021    Carpal tunnel syndrome  01/10/2013    Chronic low back pain 01/22/2014    Diplopia 01/10/2013    Disease with a predominantly sexual mode of transmission 08/09/2021    FOM (frequency of micturition) 08/09/2021    Headache 01/10/2013    History of diplopia 01/07/2020    History of optic neuritis 01/07/2020    History of vitamin D deficiency 10/31/2017    Migraine syndrome 01/22/2014    Mixed incontinence 07/31/2017    Nausea 08/10/2015    Neurogenic bladder 02/23/2017    Pain in joint of right shoulder 07/31/2017    Restless legs syndrome 07/23/2014    Rupture of rotator cuff of shoulder 08/31/2017    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    S/P cubital tunnel release 01/04/2019    Vitamin D deficiency 01/22/2014    Multiple sclerosis exacerbation 02/26/2022    Sepsis due to Gram negative bacteria 02/27/2022    Lower extremity cellulitis 03/07/2024    Malignant neoplasm of overlapping sites of left breast in female, estrogen receptor positive 06/28/2024    Encounter for long-term (current) use of other medications 07/16/2024    Cancer, metastatic to bone 07/31/2024    Colitis 09/10/2024    Weakness 09/18/2024    Elevated LFTs 09/21/2024    Acute UTI 10/18/2024    Sepsis 04/17/2025    NSTEMI (non-ST elevated myocardial infarction) 04/22/2025    Type 2 diabetes mellitus 04/22/2025     Resolved Ambulatory Problems     Diagnosis Date Noted    No Resolved Ambulatory Problems     Past Medical History:   Diagnosis Date    Anemia 2024    Dawn esophagus     Blurred vision     Breast cancer 05/2024+++++    Clotting disorder 1993, 2003, 2012    Colon polyp 2013    Deep vein thrombosis phlebitis 1980    Depression     GERD (gastroesophageal reflux disease)     GI (gastrointestinal bleed) 3 bleeds    H/O Skin cancer, basal cell     History of blood transfusion     History of GI bleed     History of urinary tract infection     Hypercalcemia     Hypertension     Movement disorder     Optic neuritis     PONV (postoperative nausea and  vomiting)     Steroid-induced diabetes          PAST SURGICAL HISTORY  Past Surgical History:   Procedure Laterality Date    APPENDECTOMY      BLADDER SURGERY      bladder stimulator    BREAST BIOPSY  don't remember    BREAST SURGERY      augmentation wtih subsequent removal    CARPAL TUNNEL RELEASE Bilateral     Left 2018, right 2020    CUBITAL TUNNEL RELEASE Left     CYSTOSCOPY BOTOX INJECTION OF BLADDER  2018    Cystoscopy with Botox    FRACTURE SURGERY  2019    rt shoulder    PARATHYROIDECTOMY      one gland removed    ROTATOR CUFF REPAIR Right 2017    TOE SURGERY      bilateral great toes    TOTAL SHOULDER ARTHROPLASTY W/ DISTAL CLAVICLE EXCISION Right 10/22/2018    Procedure: RT TOTAL SHOULDER REVERSE ARTHROPLASTY;  Surgeon: Bipin Dangelo MD;  Location: [facility] OR;  Service: Orthopedics         FAMILY HISTORY  Family History   Problem Relation Age of Onset    Hypertension Mother     Hyperlipidemia Mother     Aortic aneurysm Mother         thoracic    Diabetes Mother     Hypertension Father         charissa heart failure    Heart failure Father     Hyperlipidemia Father     Miscarriages / Stillbirths Sister     Multiple sclerosis Brother     Atrial fibrillation Brother     Diabetes Maternal Grandfather     Stroke Maternal Grandfather     Parkinsonism Paternal Grandmother     Tremor Paternal Grandmother     Stomach cancer Paternal Grandfather     Diabetes Maternal Grandmother          SOCIAL HISTORY  Social History     Socioeconomic History    Marital status:      Spouse name: Donald    Number of children: 0   Tobacco Use    Smoking status: Former     Current packs/day: 0.00     Average packs/day: 2.0 packs/day for 30.0 years (60.0 ttl pk-yrs)     Types: Cigarettes     Start date: 10/22/1973     Quit date: 10/22/2003     Years since quittin.5    Smokeless tobacco: Never   Vaping Use    Vaping status: Never Used   Substance and Sexual Activity    Alcohol use: Not Currently    Drug use: Not  Currently     Types: Marijuana     Comment: advised by neurologist for sleep    Sexual activity: Not Currently     Partners: Male     Birth control/protection: Post-menopausal         ALLERGIES  Klonopin [clonazepam]      REVIEW OF SYSTEMS  Included in HPI  All systems reviewed and negative except for those discussed in HPI.      PHYSICAL EXAM    I have reviewed the triage vital signs and nursing notes.    ED Triage Vitals   Temp Heart Rate Resp BP SpO2   05/09/25 2105 05/09/25 2103 05/09/25 2103 05/09/25 2109 05/09/25 2103   99.6 °F (37.6 °C) 94 18 134/86 93 %      Temp src Heart Rate Source Patient Position BP Location FiO2 (%)   05/09/25 2105 05/09/25 2103 05/09/25 2109 05/09/25 2109 --   Tympanic Monitor Sitting Right arm        Physical Exam  Constitutional:       General: She is not in acute distress.     Appearance: She is well-developed.   HENT:      Head: Normocephalic and atraumatic.   Eyes:      Extraocular Movements: Extraocular movements intact.   Cardiovascular:      Rate and Rhythm: Normal rate and regular rhythm.      Heart sounds: Normal heart sounds.   Pulmonary:      Effort: Pulmonary effort is normal.      Breath sounds: Normal breath sounds.   Abdominal:      General: There is no distension.   Genitourinary:     Comments: Indwelling Cobos catheter noted  Skin:     General: Skin is warm.   Neurological:      General: No focal deficit present.      Mental Status: She is alert and oriented to person, place, and time.   Psychiatric:         Mood and Affect: Mood normal.             LAB RESULTS  Recent Results (from the past 24 hours)   Comprehensive Metabolic Panel    Collection Time: 05/09/25  9:29 PM    Specimen: Blood   Result Value Ref Range    Glucose 128 (H) 65 - 99 mg/dL    BUN 12 8 - 23 mg/dL    Creatinine 0.66 0.57 - 1.00 mg/dL    Sodium 140 136 - 145 mmol/L    Potassium 3.7 3.5 - 5.2 mmol/L    Chloride 104 98 - 107 mmol/L    CO2 22.5 22.0 - 29.0 mmol/L    Calcium 9.1 8.6 - 10.5 mg/dL     Total Protein 7.8 6.0 - 8.5 g/dL    Albumin 3.5 3.5 - 5.2 g/dL    ALT (SGPT) 17 1 - 33 U/L    AST (SGOT) 44 (H) 1 - 32 U/L    Alkaline Phosphatase 183 (H) 39 - 117 U/L    Total Bilirubin 0.2 0.0 - 1.2 mg/dL    Globulin 4.3 gm/dL    A/G Ratio 0.8 g/dL    BUN/Creatinine Ratio 18.2 7.0 - 25.0    Anion Gap 13.5 5.0 - 15.0 mmol/L    eGFR 99.3 >60.0 mL/min/1.73   CBC Auto Differential    Collection Time: 05/09/25  9:29 PM    Specimen: Blood   Result Value Ref Range    WBC 6.01 3.40 - 10.80 10*3/mm3    RBC 3.18 (L) 3.77 - 5.28 10*6/mm3    Hemoglobin 9.8 (L) 12.0 - 15.9 g/dL    Hematocrit 29.8 (L) 34.0 - 46.6 %    MCV 93.7 79.0 - 97.0 fL    MCH 30.8 26.6 - 33.0 pg    MCHC 32.9 31.5 - 35.7 g/dL    RDW 15.3 12.3 - 15.4 %    RDW-SD 52.6 37.0 - 54.0 fl    MPV 8.9 6.0 - 12.0 fL    Platelets 633 (H) 140 - 450 10*3/mm3    Neutrophil % 55.5 42.7 - 76.0 %    Lymphocyte % 30.0 19.6 - 45.3 %    Monocyte % 8.8 5.0 - 12.0 %    Eosinophil % 3.8 0.3 - 6.2 %    Basophil % 0.7 0.0 - 1.5 %    Immature Grans % 1.2 (H) 0.0 - 0.5 %    Neutrophils, Absolute 3.34 1.70 - 7.00 10*3/mm3    Lymphocytes, Absolute 1.80 0.70 - 3.10 10*3/mm3    Monocytes, Absolute 0.53 0.10 - 0.90 10*3/mm3    Eosinophils, Absolute 0.23 0.00 - 0.40 10*3/mm3    Basophils, Absolute 0.04 0.00 - 0.20 10*3/mm3    Immature Grans, Absolute 0.07 (H) 0.00 - 0.05 10*3/mm3    nRBC 0.3 (H) 0.0 - 0.2 /100 WBC   Green Top (Gel)    Collection Time: 05/09/25  9:29 PM   Result Value Ref Range    Extra Tube Hold for add-ons.    Lavender Top    Collection Time: 05/09/25  9:29 PM   Result Value Ref Range    Extra Tube hold for add-on    Gold Top - SST    Collection Time: 05/09/25  9:29 PM   Result Value Ref Range    Extra Tube Hold for add-ons.    Light Blue Top    Collection Time: 05/09/25  9:29 PM   Result Value Ref Range    Extra Tube Hold for add-ons.    Respiratory Panel PCR w/COVID-19(SARS-CoV-2) YAMILE/FRANK/ROSE/PAD/COR/CHIDI In-House, NP Swab in UTM/VTM, 2 HR TAT - Swab, Nasopharynx     Collection Time: 05/09/25 10:27 PM    Specimen: Nasopharynx; Swab   Result Value Ref Range    ADENOVIRUS, PCR Not Detected Not Detected    Coronavirus 229E Not Detected Not Detected    Coronavirus HKU1 Not Detected Not Detected    Coronavirus NL63 Not Detected Not Detected    Coronavirus OC43 Not Detected Not Detected    COVID19 Not Detected Not Detected - Ref. Range    Human Metapneumovirus Not Detected Not Detected    Human Rhinovirus/Enterovirus Not Detected Not Detected    Influenza A PCR Not Detected Not Detected    Influenza B PCR Not Detected Not Detected    Parainfluenza Virus 1 Not Detected Not Detected    Parainfluenza Virus 2 Not Detected Not Detected    Parainfluenza Virus 3 Not Detected Not Detected    Parainfluenza Virus 4 Not Detected Not Detected    RSV, PCR Not Detected Not Detected    Bordetella pertussis pcr Not Detected Not Detected    Bordetella parapertussis PCR Not Detected Not Detected    Chlamydophila pneumoniae PCR Not Detected Not Detected    Mycoplasma pneumo by PCR Not Detected Not Detected   Urinalysis With Culture If Indicated - Indwelling Urethral Catheter    Collection Time: 05/09/25 10:28 PM    Specimen: Indwelling Urethral Catheter; Urine   Result Value Ref Range    Color, UA Yellow Yellow, Straw    Appearance, UA Cloudy (A) Clear    pH, UA 6.5 5.0 - 8.0    Specific Gravity, UA 1.008 1.005 - 1.030    Glucose, UA Negative Negative    Ketones, UA Negative Negative    Bilirubin, UA Negative Negative    Blood, UA Trace (A) Negative    Protein, UA 30 mg/dL (1+) (A) Negative    Leuk Esterase, UA Large (3+) (A) Negative    Nitrite, UA Positive (A) Negative    Urobilinogen, UA 0.2 E.U./dL 0.2 - 1.0 E.U./dL   Urinalysis, Microscopic Only - Indwelling Urethral Catheter    Collection Time: 05/09/25 10:28 PM    Specimen: Indwelling Urethral Catheter; Urine   Result Value Ref Range    RBC, UA 0-2 None Seen, 0-2 /HPF    WBC, UA Too Numerous to Count (A) None Seen, 0-2 /HPF    Bacteria, UA 4+ (A)  None Seen /HPF    Squamous Epithelial Cells, UA 0-2 None Seen, 0-2 /HPF    Yeast, UA Present (A) None Seen /HPF    Hyaline Casts, UA 3-6 None Seen /LPF    WBC Clumps, UA Present None Seen /HPF    Methodology Automated Microscopy          RADIOLOGY  XR Chest 1 View  Result Date: 5/9/2025  SINGLE VIEW OF THE CHEST  HISTORY: Fever  COMPARISON: April 17, 2025  FINDINGS: There is cardiomegaly. There is no vascular congestion. No pneumothorax or pleural effusion is seen. There is a hiatal hernia. Right lung is clear. There is some consolidation at the left lung base, although improved when compared to prior study. Patient is status post right shoulder arthroplasty. There are degenerative changes of the right AC joint.      There is some consolidation at the left lung base, although it appears improved when compared to prior exam from April 17, 2025.  This report was finalized on 5/9/2025 10:46 PM by Dr. Yisel Shafer M.D on Workstation: [id number]          MEDICATIONS GIVEN IN ER  Medications   amoxicillin-clavulanate (AUGMENTIN) 875-125 MG per tablet 1 tablet (has no administration in time range)         ORDERS PLACED DURING THIS VISIT:  Orders Placed This Encounter   Procedures    Respiratory Panel PCR w/COVID-19(SARS-CoV-2) [facility]/FRANK/ROSE/PAD/[facility]/CHIDI In-House, NP Swab in UTM/VTM, 2 HR TAT - Swab, Nasopharynx    Urine Culture - Urine,    XR Chest 1 View    Comprehensive Metabolic Panel    [place] Draw    CBC Auto Differential    Urinalysis With Culture If Indicated - Indwelling Urethral Catheter    Urinalysis, Microscopic Only - Urine, Clean Catch    CBC & Differential    Green Top (Gel)    Lavender Top    Gold Top - SST    Light Blue Top         OUTPATIENT MEDICATION MANAGEMENT:  Current Facility-Administered Medications Ordered in Epic   Medication Dose Route Frequency Provider Last Rate Last Admin    amoxicillin-clavulanate (AUGMENTIN) 875-125 MG per tablet 1 tablet  1 tablet Oral Once Nidia Ji PA-C          Current Outpatient Medications Ordered in Epic   Medication Sig Dispense Refill    albuterol sulfate  (90 Base) MCG/ACT inhaler Inhale 2 puffs Every 4 (Four) Hours As Needed for Wheezing or Shortness of Air. 18 g 0    amoxicillin-clavulanate (AUGMENTIN) 875-125 MG per tablet Take 1 tablet by mouth 2 (Two) Times a Day for 7 days. 14 tablet 0    anastrozole (ARIMIDEX) 1 MG tablet Take 1 tablet by mouth Daily.      aspirin 81 MG EC tablet Take 1 tablet by mouth Daily. 30 tablet 0    baclofen (LIORESAL) 20 MG tablet Take 1 tablet by mouth 2 (Two) Times a Day.      Capivasertib (Truqap) 200 MG tablet Take 2 tablets by mouth 2 (Two) Times a Day. Take for 4 days on then 3 days off. Repeat. (Patient not taking: Reported on 5/9/2025) 64 tablet 5    Cholecalciferol (vitamin D3) 125 MCG (5000 UT) tablet tablet Take 1 tablet by mouth Daily.      Fenbendazole powder Use 440 mg 3 (Three) Times a Week. 2 capsules 3 times weekly      IVERMECTIN PO Take 15 mg by mouth 3 (Three) Times a Week.      metoprolol succinate XL (TOPROL-XL) 50 MG 24 hr tablet Take 1 tablet by mouth Daily. 30 tablet 0    pantoprazole (PROTONIX) 40 MG EC tablet Take 1 tablet by mouth 2 (Two) Times a Day.      phenazopyridine (PYRIDIUM) 200 MG tablet Take 1 tablet by mouth 3 (Three) Times a Day As Needed for Dysuria. (Patient not taking: Reported on 5/9/2025) 20 tablet 0    pramipexole (MIRAPEX) 1.5 MG tablet Take 1 tablet by mouth 2 (Two) Times a Day.      prochlorperazine (COMPAZINE) 10 MG tablet Take 1 tablet by mouth Every 6 (Six) Hours As Needed for Nausea or Vomiting. 90 tablet 5    rosuvastatin (CRESTOR) 20 MG tablet Take 1 tablet by mouth Daily. 30 tablet 0    zolpidem (AMBIEN) 5 MG tablet Take 1 tablet by mouth At Night As Needed for Sleep. 30 tablet 0           PROGRESS, DATA ANALYSIS, CONSULTS, AND MEDICAL DECISION MAKING  All labs have been independently interpreted by me.  All radiology studies have been reviewed by me. All EKG's have  been independently viewed and interpreted by me.  Discussion below represents my analysis of pertinent findings related to patient's condition, differential diagnosis, treatment plan and final disposition.    Differential diagnosis includes but is not limited to UTI, viral illness, pneumonia.        ED Course as of 05/10/25 0051   Fri May 09, 2025   2252 WBC: 6.01 [MP]   2252 Hemoglobin(!): 9.8 [MP]   2252 BUN: 12 [MP]   2252 Creatinine: 0.66 [MP]   2252 Chest x-ray independently interpreted by me as no pneumothorax [MP]   2337 Nitrite, UA(!): Positive [MP]   2337 Leukocytes, UA(!): Large (3+) [MP]   2337 WBC, UA(!): Too Numerous to Count [MP]   2337 Bacteria, UA(!): 4+ [MP]   Sat May 10, 2025   0050 Patient with persistent nitrite positive urine with numerous white blood cells and 4+ bacteria.  Remainder of lab workup benign.  Given reported fever at home, will place patient back on antibiotics.  Per her last urine culture, the bacteria was susceptible to Augmentin.  Will have her follow-up closely with primary care provider and [facility].  Discussed ED return precautions.  She is otherwise well-appearing, hemodynamically stable, and therefore appropriate for discharge. [MP]      ED Course User Index  [MP] Nidia Ji PA-C             AS OF 00:51 EDT VITALS:    BP - 130/62  HR - 85  TEMP - 98.6 °F (37 °C) (Oral)  O2 SATS - 94%    COMPLEXITY OF CARE  Admission was considered but after careful review of the patient's presentation, physical examination, diagnostic results, and response to treatment the patient may be safely discharged with outpatient follow-up.      DIAGNOSIS  Final diagnoses:   Acute UTI   Fever in adult         DISPOSITION  ED Disposition       ED Disposition   Discharge    Condition   Stable    Comment   --                Please note that portions of this document were completed with a voice recognition program.    Note Disclaimer: At [facility], we believe that sharing information  builds trust and better relationships. You are receiving this note because you recently [facility]. It is possible you will see health information before a provider has talked with you about it. This kind of information can be easy to misunderstand. To help you fully understand what it means for your health, we urge you to discuss this note with your provider.     Nidia Ji PA-C  05/10/25 0051

## 2025-05-10 NOTE — ED PROVIDER NOTES
MD ATTESTATION NOTE    The MEGHA and I have discussed this patient's history, physical exam, and treatment plan.  I have reviewed the documentation and personally had a face to face interaction with the patient. I affirm the documentation and agree with the treatment and plan.  The attached note describes my personal findings.      I provided a substantive portion of the care of the patient.  I personally performed the physical exam in its entirety, and below are my findings.        Brief HPI: This patient is a 64-year-old female with a history of multiple sclerosis as well as a chronic indwelling Cobos catheter presenting to the emergency room today with a fever.  She states she was recently admitted to the hospital for IV antibiotics treating urosepsis.  Today, she reports a Tmax of 103 °F at home.  She denies abdominal pain, back pain, cough, shortness of breath, or neck pain/stiffness.      PHYSICAL EXAM  ED Triage Vitals   Temp Heart Rate Resp BP SpO2   05/09/25 2105 05/09/25 2103 05/09/25 2103 05/09/25 2109 05/09/25 2103   99.6 °F (37.6 °C) 94 18 134/86 93 %      Temp src Heart Rate Source Patient Position BP Location FiO2 (%)   05/09/25 2105 05/09/25 2103 05/09/25 2109 05/09/25 2109 --   Tympanic Monitor Sitting Right arm          GENERAL: Resting comfortably and in no acute distress, nontoxic in appearance  HENT: nares patent  EYES: no scleral icterus  CV: regular rhythm, normal rate, no M/R/G  RESPIRATORY: normal effort, lungs clear bilaterally  ABDOMEN: soft, nontender, no rebound or guarding  MUSCULOSKELETAL: no deformity, no edema  NEURO: alert, moves all extremities, follows commands  PSYCH:  calm, cooperative  SKIN: warm, dry    Vital signs and nursing notes reviewed.      Differential diagnosis includes but is not limited to sepsis, acute bacteremia, COVID-19, viral upper respiratory infection, pneumonia, or urinary tract infection.      Plan: We will obtain labs, urinalysis, RVP with COVID-19 testing,  as well as a chest x-ray in the ED today.  Will monitor and reassess following.      Chest x-ray independently interpreted by myself with my interpretation showing no cardiomegaly nor edema, infiltrate, or pneumothorax.       Aurelio Tena MD  05/10/25 [id number]

## 2025-05-12 ENCOUNTER — TELEPHONE (OUTPATIENT)
Dept: INTERNAL MEDICINE | Facility: CLINIC | Age: 65
End: 2025-05-12
Payer: MEDICARE

## 2025-05-12 ENCOUNTER — HOSPITAL ENCOUNTER (OUTPATIENT)
Dept: NUCLEAR MEDICINE | Facility: HOSPITAL | Age: 65
Discharge: HOME | End: 2025-05-12
Payer: MEDICARE

## 2025-05-12 ENCOUNTER — LAB (OUTPATIENT)
Dept: LAB | Facility: HOSPITAL | Age: 65
End: 2025-05-12
Payer: MEDICARE

## 2025-05-12 ENCOUNTER — HOSPITAL ENCOUNTER (OUTPATIENT)
Dept: CT IMAGING | Facility: HOSPITAL | Age: 65
Discharge: HOME | End: 2025-05-12
Payer: MEDICARE

## 2025-05-12 DIAGNOSIS — C50.812 MALIGNANT NEOPLASM OF OVERLAPPING SITES OF LEFT BREAST IN FEMALE, ESTROGEN RECEPTOR POSITIVE: ICD-10-CM

## 2025-05-12 DIAGNOSIS — Z79.899 HIGH RISK MEDICATION USE: ICD-10-CM

## 2025-05-12 DIAGNOSIS — Z17.0 MALIGNANT NEOPLASM OF OVERLAPPING SITES OF LEFT BREAST IN FEMALE, ESTROGEN RECEPTOR POSITIVE: ICD-10-CM

## 2025-05-12 DIAGNOSIS — C79.51 CANCER, METASTATIC TO BONE: ICD-10-CM

## 2025-05-12 DIAGNOSIS — C50.912 INVASIVE LOBULAR CARCINOMA OF BREAST, STAGE 4, LEFT: ICD-10-CM

## 2025-05-12 LAB — BACTERIA SPEC AEROBE CULT: ABNORMAL

## 2025-05-12 PROCEDURE — A9503 TC99M MEDRONATE: HCPCS | Performed by: INTERNAL MEDICINE

## 2025-05-12 PROCEDURE — 71260 CT THORAX DX C+: CPT

## 2025-05-12 PROCEDURE — 25510000001 IOPAMIDOL 61 % SOLUTION: Performed by: INTERNAL MEDICINE

## 2025-05-12 PROCEDURE — 34310000005 TECHNETIUM MEDRONATE KIT: Performed by: INTERNAL MEDICINE

## 2025-05-12 PROCEDURE — 78306 BONE IMAGING WHOLE BODY: CPT

## 2025-05-12 RX ORDER — IOPAMIDOL 612 MG/ML
100 INJECTION, SOLUTION INTRAVASCULAR
Status: COMPLETED | OUTPATIENT
Start: 2025-05-12 | End: 2025-05-12

## 2025-05-12 RX ORDER — TC 99M MEDRONATE 20 MG/10ML
23.8 INJECTION, POWDER, LYOPHILIZED, FOR SOLUTION INTRAVENOUS
Status: COMPLETED | OUTPATIENT
Start: 2025-05-12 | End: 2025-05-12

## 2025-05-12 RX ORDER — LEVOFLOXACIN 750 MG/1
750 TABLET, FILM COATED ORAL DAILY
Qty: 5 TABLET | Refills: 0 | Status: SHIPPED | OUTPATIENT
Start: 2025-05-12 | End: 2025-05-17

## 2025-05-12 RX ADMIN — Medication 23.8 MILLICURIE: at 11:20

## 2025-05-12 RX ADMIN — IOPAMIDOL 85 ML: 612 INJECTION, SOLUTION INTRAVENOUS at 11:51

## 2025-05-12 NOTE — TELEPHONE ENCOUNTER
Patient would like for Dennies to call her. It is regarding lab order. Patient unable to get labs done at the hospital today due to her being a hard stick. Wants to know if ok to wait until appointment with Dr. Carbone on 05/21/2025 to have labs done?    Best call back # [phone]

## 2025-05-16 ENCOUNTER — TELEPHONE (OUTPATIENT)
Dept: INTERNAL MEDICINE | Facility: CLINIC | Age: 65
End: 2025-05-16
Payer: MEDICARE

## 2025-05-16 NOTE — TELEPHONE ENCOUNTER
Caller: Maritza Bejarano    Relationship: Self    Best call back number: [phone]         Who are you requesting to speak with (clinical staff, provider,  specific staff member): PCP OR CLINICAL        What was the call regarding: PATIENT WAS IN THE ER ON 5/9/25 FOR A UTI.  THEY PUT HER ON MEDICATION BUT SHE WANTS TO MAKE SURE THEY HAVE HER ON THE RIGHT MEDICATION FOR THAT.  SHE HAS BEEN HOSPITALIZED 3 TIMES IN 6 MONTHS FOR SEPSIS BECAUSE OF UTI.  PLEASE CALL TO DISCUSS.

## 2025-05-16 NOTE — TELEPHONE ENCOUNTER
Levofloxacin 750 mg tablet that she was prescribed by the emergency department on 5/12/2025 should effectively treat the urinary tract infection per the urine culture results.

## 2025-05-19 ENCOUNTER — OFFICE VISIT (OUTPATIENT)
Dept: INTERNAL MEDICINE | Facility: CLINIC | Age: 65
End: 2025-05-19
Payer: MEDICARE

## 2025-05-19 ENCOUNTER — TELEPHONE (OUTPATIENT)
Dept: INTERNAL MEDICINE | Facility: CLINIC | Age: 65
End: 2025-05-19
Payer: MEDICARE

## 2025-05-19 VITALS
DIASTOLIC BLOOD PRESSURE: 84 MMHG | BODY MASS INDEX: 24.99 KG/M2 | TEMPERATURE: 98 F | WEIGHT: 150 LBS | HEART RATE: 66 BPM | OXYGEN SATURATION: 100 % | HEIGHT: 65 IN | SYSTOLIC BLOOD PRESSURE: 160 MMHG

## 2025-05-19 DIAGNOSIS — N39.0 RECURRENT UTI: Primary | ICD-10-CM

## 2025-05-19 DIAGNOSIS — I10 ESSENTIAL HYPERTENSION: Primary | ICD-10-CM

## 2025-05-19 RX ORDER — LEVOFLOXACIN 750 MG/1
750 TABLET, FILM COATED ORAL DAILY
Qty: 2 TABLET | Refills: 0 | Status: SHIPPED | OUTPATIENT
Start: 2025-05-19 | End: 2025-05-21

## 2025-05-19 NOTE — PROGRESS NOTES
"  Enoc Carbone DO, [facility]  Internal Medicine  [facility]  [address], [address]  [place] [address]  [phone]    Chief Complaint  BP concern, urinary tract concern.    SUBJECTIVE    History of Present Illness    Maritza Bejarano is a 64 y.o. female who presents to the office today as an established patient that last saw me on 5/9/2025.     States she is concerned she is developing sepsis again from urinary tract infection due to her indwelling matias. She reports everything is normal right now but she had a low grade fever overnight 101.0. She took Tylenol and her temperature reduced to 99.5. her last dosage of Tylenol was this morning around 10 AM. States she called EMS last night because of these symptoms .   Her BP overnight was 162/74 to 104/57 in a matter of 30 minutes.   She reports she felt \"shallow breathing\" during that time. States the urine output from her matias has been \"really clear\".   States she has completed recently prescribed levofloxacin from the ER 2-3 days ago.   States she never has burning or any other symptoms with her UTI.     Allergies   Allergen Reactions    Klonopin [Clonazepam] Mental Status Change, Confusion and Hallucinations        Outpatient Medications Marked as Taking for the 5/19/25 encounter (Office Visit) with Enoc Carbone DO   Medication Sig Dispense Refill    albuterol sulfate  (90 Base) MCG/ACT inhaler Inhale 2 puffs Every 4 (Four) Hours As Needed for Wheezing or Shortness of Air. 18 g 0    anastrozole (ARIMIDEX) 1 MG tablet Take 1 tablet by mouth Daily.      aspirin 81 MG EC tablet Take 1 tablet by mouth Daily. 30 tablet 0    baclofen (LIORESAL) 20 MG tablet Take 1 tablet by mouth 2 (Two) Times a Day.      IVERMECTIN PO Take 15 mg by mouth 3 (Three) Times a Week.      metoprolol succinate XL (TOPROL-XL) 50 MG 24 hr tablet Take 1 tablet by mouth Daily. 30 tablet 0    pantoprazole (PROTONIX) 40 MG EC tablet Take 1 tablet by mouth 2 (Two) " "Times a Day.      pramipexole (MIRAPEX) 1.5 MG tablet Take 1 tablet by mouth 2 (Two) Times a Day.      prochlorperazine (COMPAZINE) 10 MG tablet Take 1 tablet by mouth Every 6 (Six) Hours As Needed for Nausea or Vomiting. 90 tablet 5    rosuvastatin (CRESTOR) 20 MG tablet Take 1 tablet by mouth Daily. 30 tablet 0        Past Medical History:   Diagnosis Date    Anemia 2024    `Treated with iron    Dawn esophagus     per patient    Blurred vision     R/T MS    Breast cancer 05/2024+++++    Carpal tunnel syndrome     Clotting disorder 1993, 2003, 2012    3 g/i bleeds w/ transfus/ions    Colon polyp 2013    removed w/ colonoscopy    Deep vein thrombosis phlebitis 1980    Depression     Diplopia 2013    GERD (gastroesophageal reflux disease)     GI (gastrointestinal bleed) 3 bleeds    2 transfusions    H/O Skin cancer, basal cell     Headache     History of blood transfusion     History of GI bleed     R/T NSAIDS AND STEROIDS, multiple times    History of urinary tract infection     Hypercalcemia     s/p parathyroidectomy    Hyperlipidemia     Hypertension     Movement disorder     Multiple sclerosis     Optic neuritis     PONV (postoperative nausea and vomiting)     Steroid-induced diabetes 2024       OBJECTIVE    Vital Signs:   /84   Pulse 66   Temp 98 °F (36.7 °C) (Infrared)   Ht 165.1 cm (65\")   Wt 68 kg (150 lb)   SpO2 100%   BMI 24.96 kg/m²        Physical Exam  Vitals reviewed.   Constitutional:       General: She is not in acute distress.     Appearance: Normal appearance. She is not ill-appearing.   Eyes:      General: No scleral icterus.  Pulmonary:      Effort: Pulmonary effort is normal. No respiratory distress.   Skin:     Coloration: Skin is not jaundiced.   Neurological:      Mental Status: She is alert.   Psychiatric:         Mood and Affect: Mood normal.         Behavior: Behavior normal.         Thought Content: Thought content normal.                             ASSESSMENT & PLAN "     Diagnoses and all orders for this visit:    1. Recurrent UTI (Primary)    -pt presents to the office today with concerns of sepsis due to recurrent UTI. She has had numerous UTIs and admissions for sepsis this year due to indwelling matias cath. She follows with urology. See previous notes for details of those hospitalizations.   -Most recent urinary tract infection was 5/9/2025 at which time she presented to [facility] emergency department and had urine culture growth with Pseudomonas aeruginosa greater than 100,000 CFU and was initially started on Augmentin but transition to 5 days of levofloxacin 750 mg after susceptibilities resulted.  She reports finishing that approximately 2 to 3 days ago.  She presents to the office today with concern of fever overnight up to 101.0 with last Tylenol dosage approximately 6.5 hours ago.  She states her BP was down to 104/57 at home overnight as well.  Unfortunately her urinary tract infections are always asymptomatic until she gets systemically unwell.  She does have a history of multiple sclerosis which can mask UTI symptoms.   -on exam, she is slightly hypertensive, HR 66 , Temp 98 F. She is well appearing and normal mentation. I will check CBC today. I advised pt that it certainly doesn't seem that sepsis based upon her vital signs but use of beta blocker and tylenol can mask the HR and temperature criteria. She states urology has told her she can walk in with any symptoms so I recommend she go to their walk in clinic today or tomorrow morning and try to leave catheterized urine sample as that is not something I can do here. She is agreeable and will try to go today. In the meantime, I am agreeable to extend her levofloxacin regimen by 2 additional days for max 7 days total. Further plan per her urology team.   -she was advised to report to ER immediately with any confusion , recurrent fever, BP drops into the 100 systolic range again.         Follow  Up  No follow-ups on file.    Patient/family had no further questions at this time and verbalized understanding of the plan discussed today.

## 2025-05-20 ENCOUNTER — READMISSION MANAGEMENT (OUTPATIENT)
Dept: CALL CENTER | Facility: HOSPITAL | Age: 65
End: 2025-05-20
Payer: MEDICARE

## 2025-05-20 LAB
BASOPHILS # BLD AUTO: 0.1 X10E3/UL (ref 0–0.2)
BASOPHILS NFR BLD AUTO: 1 %
EOSINOPHIL # BLD AUTO: 0.2 X10E3/UL (ref 0–0.4)
EOSINOPHIL NFR BLD AUTO: 2 %
ERYTHROCYTE [DISTWIDTH] IN BLOOD BY AUTOMATED COUNT: 15 % (ref 11.7–15.4)
HCT VFR BLD AUTO: 35.2 % (ref 34–46.6)
HGB BLD-MCNC: 10.9 G/DL (ref 11.1–15.9)
IMM GRANULOCYTES # BLD AUTO: 0.1 X10E3/UL (ref 0–0.1)
IMM GRANULOCYTES NFR BLD AUTO: 1 %
LYMPHOCYTES # BLD AUTO: 1.9 X10E3/UL (ref 0.7–3.1)
LYMPHOCYTES NFR BLD AUTO: 22 %
MCH RBC QN AUTO: 29.6 PG (ref 26.6–33)
MCHC RBC AUTO-ENTMCNC: 31 G/DL (ref 31.5–35.7)
MCV RBC AUTO: 96 FL (ref 79–97)
MONOCYTES # BLD AUTO: 0.5 X10E3/UL (ref 0.1–0.9)
MONOCYTES NFR BLD AUTO: 5 %
NEUTROPHILS # BLD AUTO: 6.2 X10E3/UL (ref 1.4–7)
NEUTROPHILS NFR BLD AUTO: 69 %
PLATELET # BLD AUTO: 726 X10E3/UL (ref 150–450)
RBC # BLD AUTO: 3.68 X10E6/UL (ref 3.77–5.28)
WBC # BLD AUTO: 8.8 X10E3/UL (ref 3.4–10.8)

## 2025-05-20 NOTE — OUTREACH NOTE
Sepsis Week 3 Survey      Flowsheet Row Responses   [facility] facility patient discharged from? [place]   Does the patient have one of the following disease processes/diagnoses(primary or secondary)? Sepsis   Week 3 attempt successful? No   Unsuccessful attempts Attempt 1            YASMIN DOE - Registered Nurse

## 2025-05-21 ENCOUNTER — HOSPITAL ENCOUNTER (OUTPATIENT)
Dept: CARDIOLOGY | Facility: HOSPITAL | Age: 65
Discharge: HOME | End: 2025-05-21
Admitting: STUDENT IN AN ORGANIZED HEALTH CARE EDUCATION/TRAINING PROGRAM
Payer: MEDICARE

## 2025-05-21 ENCOUNTER — OFFICE VISIT (OUTPATIENT)
Dept: INTERNAL MEDICINE | Facility: CLINIC | Age: 65
End: 2025-05-21
Payer: MEDICARE

## 2025-05-21 VITALS
SYSTOLIC BLOOD PRESSURE: 130 MMHG | BODY MASS INDEX: 24.99 KG/M2 | HEIGHT: 65 IN | WEIGHT: 150 LBS | HEART RATE: 65 BPM | TEMPERATURE: 97.5 F | OXYGEN SATURATION: 98 % | DIASTOLIC BLOOD PRESSURE: 70 MMHG

## 2025-05-21 DIAGNOSIS — C79.51 MALIGNANT NEOPLASM METASTATIC TO BONE: ICD-10-CM

## 2025-05-21 DIAGNOSIS — M79.604 RIGHT LEG PAIN: ICD-10-CM

## 2025-05-21 DIAGNOSIS — Z00.00 MEDICARE ANNUAL WELLNESS VISIT, SUBSEQUENT: Primary | ICD-10-CM

## 2025-05-21 LAB
BH CV LOWER VASCULAR LEFT COMMON FEMORAL AUGMENT: NORMAL
BH CV LOWER VASCULAR LEFT COMMON FEMORAL COMPETENT: NORMAL
BH CV LOWER VASCULAR LEFT COMMON FEMORAL COMPRESS: NORMAL
BH CV LOWER VASCULAR LEFT COMMON FEMORAL PHASIC: NORMAL
BH CV LOWER VASCULAR LEFT COMMON FEMORAL SPONT: NORMAL
BH CV LOWER VASCULAR RIGHT COMMON FEMORAL AUGMENT: NORMAL
BH CV LOWER VASCULAR RIGHT COMMON FEMORAL COMPETENT: NORMAL
BH CV LOWER VASCULAR RIGHT COMMON FEMORAL COMPRESS: NORMAL
BH CV LOWER VASCULAR RIGHT COMMON FEMORAL PHASIC: NORMAL
BH CV LOWER VASCULAR RIGHT COMMON FEMORAL SPONT: NORMAL
BH CV LOWER VASCULAR RIGHT DISTAL FEMORAL COMPRESS: NORMAL
BH CV LOWER VASCULAR RIGHT GASTRONEMIUS COMPRESS: NORMAL
BH CV LOWER VASCULAR RIGHT GREATER SAPH AK COMPRESS: NORMAL
BH CV LOWER VASCULAR RIGHT GREATER SAPH BK COMPRESS: NORMAL
BH CV LOWER VASCULAR RIGHT LESSER SAPH COMPRESS: NORMAL
BH CV LOWER VASCULAR RIGHT MID FEMORAL AUGMENT: NORMAL
BH CV LOWER VASCULAR RIGHT MID FEMORAL COMPETENT: NORMAL
BH CV LOWER VASCULAR RIGHT MID FEMORAL COMPRESS: NORMAL
BH CV LOWER VASCULAR RIGHT MID FEMORAL PHASIC: NORMAL
BH CV LOWER VASCULAR RIGHT MID FEMORAL SPONT: NORMAL
BH CV LOWER VASCULAR RIGHT PERONEAL COMPRESS: NORMAL
BH CV LOWER VASCULAR RIGHT POPLITEAL AUGMENT: NORMAL
BH CV LOWER VASCULAR RIGHT POPLITEAL COMPETENT: NORMAL
BH CV LOWER VASCULAR RIGHT POPLITEAL COMPRESS: NORMAL
BH CV LOWER VASCULAR RIGHT POPLITEAL PHASIC: NORMAL
BH CV LOWER VASCULAR RIGHT POPLITEAL SPONT: NORMAL
BH CV LOWER VASCULAR RIGHT POSTERIOR TIBIAL COMPRESS: NORMAL
BH CV LOWER VASCULAR RIGHT PROFUNDA FEMORAL COMPRESS: NORMAL
BH CV LOWER VASCULAR RIGHT PROXIMAL FEMORAL COMPRESS: NORMAL
BH CV LOWER VASCULAR RIGHT SAPHENOFEMORAL JUNCTION COMPRESS: NORMAL

## 2025-05-21 PROCEDURE — 93971 EXTREMITY STUDY: CPT

## 2025-05-21 NOTE — PROGRESS NOTES
" Subjective   The ABCs of the Annual Wellness Visit  Medicare Wellness Visit      Maritza Bejarano is a 64 y.o. patient who presents for a Medicare Wellness Visit.    The following portions of the patient's history were reviewed and   updated as appropriate: allergies, current medications, past family history, past medical history, past social history, past surgical history, and problem list.    States she has experienced a tightness on the right calf for the last week. Has not been swollen that she has seen. Has not hurt but states she doesn't feel pain the way others do. She doesn't feel it is a tightness in a muscle but it might be \"vascular\".     Compared to one year ago, the patient's physical   health is the same.  Compared to one year ago, the patient's mental   health is better.    Recent Hospitalizations:  This patient has had a [facility] admission record on file within the last 365 days.  Current Medical Providers:  Patient Care Team:  Enoc Carbone DO as PCP - General (Internal Medicine)  Brittney Ortiz, RN as Nurse Navigator (Oncology)  Kim Barber MD as Referring Physician (General Surgery)  Zainab Lopes MD as Consulting Physician (Hematology and Oncology)  Shana Almonte, JUICE as Ambulatory  ([facility])    Outpatient Medications Prior to Visit   Medication Sig Dispense Refill    albuterol sulfate  (90 Base) MCG/ACT inhaler Inhale 2 puffs Every 4 (Four) Hours As Needed for Wheezing or Shortness of Air. 18 g 0    anastrozole (ARIMIDEX) 1 MG tablet Take 1 tablet by mouth Daily.      aspirin 81 MG EC tablet Take 1 tablet by mouth Daily. 30 tablet 0    baclofen (LIORESAL) 20 MG tablet Take 1 tablet by mouth 2 (Two) Times a Day.      Cholecalciferol (vitamin D3) 125 MCG (5000 UT) tablet tablet Take 1 tablet by mouth Daily.      Fenbendazole powder Use 440 mg 3 (Three) Times a Week. 2 capsules 3 times weekly      IVERMECTIN PO Take 15 mg by mouth 3 (Three) " Times a Week.      levoFLOXacin (LEVAQUIN) 750 MG tablet Take 1 tablet by mouth Daily for 2 days. 2 tablet 0    metoprolol succinate XL (TOPROL-XL) 50 MG 24 hr tablet Take 1 tablet by mouth Daily. 30 tablet 0    pantoprazole (PROTONIX) 40 MG EC tablet Take 1 tablet by mouth 2 (Two) Times a Day. (Patient taking differently: Take 1 tablet by mouth Every Other Day.)      pramipexole (MIRAPEX) 1.5 MG tablet Take 1 tablet by mouth 2 (Two) Times a Day.      prochlorperazine (COMPAZINE) 10 MG tablet Take 1 tablet by mouth Every 6 (Six) Hours As Needed for Nausea or Vomiting. 90 tablet 5    rosuvastatin (CRESTOR) 20 MG tablet Take 1 tablet by mouth Daily. 30 tablet 0    Capivasertib (Truqap) 200 MG tablet Take 2 tablets by mouth 2 (Two) Times a Day. Take for 4 days on then 3 days off. Repeat. (Patient not taking: Reported on 5/9/2025) 64 tablet 5    phenazopyridine (PYRIDIUM) 200 MG tablet Take 1 tablet by mouth 3 (Three) Times a Day As Needed for Dysuria. (Patient not taking: Reported on 5/9/2025) 20 tablet 0     No facility-administered medications prior to visit.     No opioid medication identified on active medication list. I have reviewed chart for other potential  high risk medication/s and harmful drug interactions in the elderly.      Aspirin is on active medication list. Aspirin use is indicated based on review of current medical condition/s. Pros and cons of this therapy have been discussed today. Benefits of this medication outweigh potential harm.  Patient has been encouraged to continue taking this medication.  .      Patient Active Problem List   Diagnosis    H/O total shoulder replacement, right    Cough    Acute UTI (urinary tract infection)    Multiple sclerosis    Essential hypertension    Hyperlipidemia    Shortness of breath    Oropharyngeal dysphagia    Abnormal finding on mammography    Acid reflux    Anxiety and depression    Brash    Carpal tunnel syndrome    Chronic low back pain    Diplopia     "Disease with a predominantly sexual mode of transmission    FOM (frequency of micturition)    Headache    History of diplopia    History of optic neuritis    History of vitamin D deficiency    Migraine syndrome    Mixed incontinence    Nausea    Neurogenic bladder    Pain in joint of right shoulder    Restless legs syndrome    Rupture of rotator cuff of shoulder    Secondary progressive multiple sclerosis    S/P cubital tunnel release    Vitamin D deficiency    Multiple sclerosis exacerbation    Sepsis due to Gram negative bacteria    Lower extremity cellulitis    Malignant neoplasm of overlapping sites of left breast in female, estrogen receptor positive    Encounter for long-term (current) use of other medications    Cancer, metastatic to bone    Colitis    Weakness    Elevated LFTs    Acute UTI    Sepsis    NSTEMI (non-ST elevated myocardial infarction)    Type 2 diabetes mellitus     Advance Care Planning Advance Directive is on file.  ACP discussion was held with the patient during this visit. Patient has an advance directive in EMR which is still valid.             Objective   Vitals:    05/21/25 1253   BP: 130/70   Pulse: 65   Temp: 97.5 °F (36.4 °C)   TempSrc: Infrared   SpO2: 98%   Weight: 68 kg (150 lb)   Height: 165.1 cm (65\")   PainSc: 0-No pain   Physical Exam  Vitals reviewed.   Constitutional:       General: She is not in acute distress.     Appearance: Normal appearance. She is not ill-appearing.   Eyes:      General: No scleral icterus.  Cardiovascular:      Rate and Rhythm: Normal rate.      Heart sounds: Murmur heard.      Systolic murmur is present with a grade of 3/6.   Pulmonary:      Effort: Pulmonary effort is normal. No respiratory distress.      Breath sounds: Normal breath sounds. No wheezing.   Abdominal:      General: Bowel sounds are normal. There is no distension.      Palpations: Abdomen is soft.      Tenderness: There is no abdominal tenderness.   Musculoskeletal:      Right lower " "leg: No edema.      Left lower leg: No edema.      Comments: Ambulating via motorized wheelchair.  Right calf with TTP .    Skin:     Coloration: Skin is not jaundiced.   Neurological:      Mental Status: She is alert.   Psychiatric:         Mood and Affect: Mood normal.         Behavior: Behavior normal.         Thought Content: Thought content normal.           Estimated body mass index is 24.96 kg/m² as calculated from the following:    Height as of this encounter: 165.1 cm (65\").    Weight as of this encounter: 68 kg (150 lb).    BMI is within normal parameters. No other follow-up for BMI required.           Does the patient have evidence of cognitive impairment? No  Lab Results   Component Value Date    HGBA1C 7.30 (H) 2025                                                                                               Health  Risk Assessment    Smoking Status:  Social History     Tobacco Use   Smoking Status Former    Current packs/day: 0.00    Average packs/day: 2.0 packs/day for 30.0 years (60.0 ttl pk-yrs)    Types: Cigarettes    Start date: 10/22/1973    Quit date: 10/22/2003    Years since quittin.5   Smokeless Tobacco Never     Alcohol Consumption:  Social History     Substance and Sexual Activity   Alcohol Use Not Currently       Fall Risk Screen  STEADI Fall Risk Assessment was completed, and patient is at LOW risk for falls.Assessment completed on:2025    Depression Screening   Little interest or pleasure in doing things? Not at all   Feeling down, depressed, or hopeless? Not at all   PHQ-2 Total Score 0      Health Habits and Functional and Cognitive Screenin/21/2025    12:57 PM   Functional & Cognitive Status   Do you have difficulty preparing food and eating? No   Do you have difficulty bathing yourself, getting dressed or grooming yourself? Yes   Do you have difficulty using the toilet? Yes   Do you have difficulty moving around from place to place? Yes   Do you have trouble " with steps or getting out of a bed or a chair? Yes   Current Diet Well Balanced Diet   Dental Exam Up to date   Eye Exam Not up to date   Exercise (times per week) --        Exercise Frequency Comment PT and OT   Current Exercises Include --        Exercise Comment Once a week   Do you need help using the phone?  Yes   Are you deaf or do you have serious difficulty hearing?  No   Do you need help to go to places out of walking distance? Yes   Do you need help shopping? Yes   Do you need help preparing meals?  Yes   Do you need help with housework?  Yes   Do you need help with laundry? Yes   Do you need help taking your medications? Yes   Do you need help managing money? No   Do you ever drive or ride in a car without wearing a seat belt? No   Have you felt unusual stress, anger or loneliness in the last month? No   Who do you live with? Spouse   If you need help, do you have trouble finding someone available to you? No   Have you been bothered in the last four weeks by sexual problems? No   Do you have difficulty concentrating, remembering or making decisions? No           Age-appropriate Screening Schedule:  Refer to the list below for future screening recommendations based on patient's age, sex and/or medical conditions. Orders for these recommended tests are listed in the plan section. The patient has been provided with a written plan.    Health Maintenance List  Health Maintenance   Topic Date Due    DIABETIC FOOT EXAM  Never done    DIABETIC EYE EXAM  Never done    URINE MICROALBUMIN-CREATININE RATIO (uACR)  Never done    Pneumococcal Vaccine 50+ (1 of 2 - PCV) Never done    PAP SMEAR  Never done    ZOSTER VACCINE (1 of 2) Never done    COVID-19 Vaccine (1 - 2024-25 season) Never done    COLORECTAL CANCER SCREENING  01/30/2025    INFLUENZA VACCINE  07/01/2025    LIPID PANEL  10/19/2025    HEMOGLOBIN A1C  10/21/2025    ANNUAL WELLNESS VISIT  05/21/2026    MAMMOGRAM  06/12/2026    TDAP/TD VACCINES (2 - Td or  "Tdap) 06/14/2029    HEPATITIS C SCREENING  Completed                                                                                                                                                CMS Preventative Services Quick Reference  Risk Factors Identified During Encounter  Immunizations Discussed/Encouraged: Influenza, Prevnar 20 (Pneumococcal 20-valent conjugate), Shingrix, COVID19, and RSV (Respiratory Syncytial Virus)  Inadequate Social Support, Isolation, Loneliness, Lack of Transportation, Financial Difficulties, or Caregiver Stress:  is her primary caregiver   Polypharmacy: Medication List reviewed  Dental Screening Recommended  Vision Screening Recommended    *known breast cancer, following with oncology  *defers seeing GI for consideration of colonoscopy at this time.    The above risks/problems have been discussed with the patient.  Pertinent information has been shared with the patient in the After Visit Summary.  An After Visit Summary and PPPS were made available to the patient.    Follow Up:   Next Medicare Wellness visit to be scheduled in 1 year.       A problem-based visit was also conducted on the same day, see below for assessment and plan    Diagnoses and all orders for this visit:    1. Right leg pain (Primary)  2. Malignant neoplasm metastatic to bone  -States she has experienced a tightness on the right calf for the last week. Has not been swollen that she has seen. Has not hurt but states she doesn't feel pain the way others do. She doesn't feel it is a tightness in a muscle but it might be \"vascular\".   -on exam there is no lower extremity swelling in either leg. She does have TTP in the right calf.  -obviously with history of metastatic cancer plus chronic immobility she is at higher risk of DVT . We discussed risk of undiagnosed/untreated DVT including PE . At this point I recommend she have a stat venous ultrasound of her RLE. She is agreeable. Further plan depending on " result.  -     Duplex Venous Lower Extremity - Right CAR; Future            The following social determinates of health impact the patient's medical decision making: No social determinates of health were factored in to today's visit.     Follow Up  Return in about 3 months (around 8/21/2025) for Recheck.

## 2025-05-23 ENCOUNTER — INFUSION (OUTPATIENT)
Dept: ONCOLOGY | Facility: HOSPITAL | Age: 65
End: 2025-05-23
Payer: MEDICARE

## 2025-05-23 ENCOUNTER — APPOINTMENT (OUTPATIENT)
Dept: LAB | Facility: HOSPITAL | Age: 65
End: 2025-05-23
Payer: MEDICARE

## 2025-05-23 ENCOUNTER — OFFICE VISIT (OUTPATIENT)
Age: 65
End: 2025-05-23
Payer: MEDICARE

## 2025-05-23 ENCOUNTER — OFFICE VISIT (OUTPATIENT)
Dept: ONCOLOGY | Facility: CLINIC | Age: 65
End: 2025-05-23
Payer: MEDICARE

## 2025-05-23 VITALS
WEIGHT: 150 LBS | HEIGHT: 65 IN | BODY MASS INDEX: 24.99 KG/M2 | DIASTOLIC BLOOD PRESSURE: 80 MMHG | HEART RATE: 87 BPM | SYSTOLIC BLOOD PRESSURE: 128 MMHG | OXYGEN SATURATION: 97 %

## 2025-05-23 VITALS
BODY MASS INDEX: 24.99 KG/M2 | OXYGEN SATURATION: 98 % | TEMPERATURE: 98.1 F | WEIGHT: 150 LBS | SYSTOLIC BLOOD PRESSURE: 134 MMHG | HEIGHT: 65 IN | DIASTOLIC BLOOD PRESSURE: 84 MMHG | HEART RATE: 91 BPM

## 2025-05-23 DIAGNOSIS — C50.812 MALIGNANT NEOPLASM OF OVERLAPPING SITES OF LEFT BREAST IN FEMALE, ESTROGEN RECEPTOR POSITIVE: Primary | ICD-10-CM

## 2025-05-23 DIAGNOSIS — Z17.0 MALIGNANT NEOPLASM OF OVERLAPPING SITES OF LEFT BREAST IN FEMALE, ESTROGEN RECEPTOR POSITIVE: Primary | ICD-10-CM

## 2025-05-23 DIAGNOSIS — Q20.8 ABNORMALITY OF LEFT VENTRICLE OF HEART: Primary | ICD-10-CM

## 2025-05-23 DIAGNOSIS — I51.81 STRESS-INDUCED CARDIOMYOPATHY: ICD-10-CM

## 2025-05-23 DIAGNOSIS — C79.51 CANCER, METASTATIC TO BONE: ICD-10-CM

## 2025-05-23 PROCEDURE — 25010000002 FULVESTRANT PER 25 MG: Performed by: INTERNAL MEDICINE

## 2025-05-23 PROCEDURE — 96402 CHEMO HORMON ANTINEOPL SQ/IM: CPT

## 2025-05-23 RX ORDER — ROSUVASTATIN CALCIUM 20 MG/1
20 TABLET, COATED ORAL DAILY
Qty: 90 TABLET | Refills: 1 | Status: SHIPPED | OUTPATIENT
Start: 2025-05-23

## 2025-05-23 RX ORDER — LAMOTRIGINE 25 MG/1
500 TABLET ORAL ONCE
Status: CANCELLED | OUTPATIENT
Start: 2025-05-23

## 2025-05-23 RX ORDER — METOPROLOL SUCCINATE 50 MG/1
50 TABLET, EXTENDED RELEASE ORAL
Qty: 90 TABLET | Refills: 1 | Status: SHIPPED | OUTPATIENT
Start: 2025-05-23

## 2025-05-23 RX ORDER — LAMOTRIGINE 25 MG/1
500 TABLET ORAL ONCE
Status: COMPLETED | OUTPATIENT
Start: 2025-05-23 | End: 2025-05-23

## 2025-05-23 RX ADMIN — FULVESTRANT 500 MG: 50 INJECTION INTRAMUSCULAR at 15:39

## 2025-05-23 NOTE — PROGRESS NOTES
Subjective:     Encounter Date:05/23/2025      Patient ID: Maritza Bejarano is a 64 y.o. female.    Chief Complaint:  Follow-up of stress cardiomyopathy    HPI:   64 y.o. female with hypertension, hyperlipidemia, multiple sclerosis, Stage 4 breast cancer, depression, indwelling Cobos, recent admission to the ICU about a month ago with septic shock secondary to Klebsiella UTI.  During that admission, echocardiogram was performed and revealed normal EF but with regional wall motion abnormalities that were consistent with stress cardiomyopathy. She was started on Toprol 50 and was eventually discharged.  She presents today for follow-up and has been doing well.  She denies any chest pressure, dyspnea, palpitations.  She does note that in the evenings her blood pressure is low in the 90s.  She is asymptomatic during that time.      The following portions of the patient's history were reviewed and updated as appropriate: allergies, current medications, past family history, past medical history, past social history, past surgical history and problem list.     REVIEW OF SYSTEMS:   All systems reviewed.  Pertinent positives identified in HPI.  All other systems are negative.    Past Medical History:   Diagnosis Date    Anemia 2024    `Treated with iron    Dawn esophagus     per patient    Blurred vision     R/T MS    Breast cancer     metastatic    Carpal tunnel syndrome     Clotting disorder 1993, 2003, 2012    3 g/i bleeds w/ transfus/ions    Colon polyp 2013    removed w/ colonoscopy    Deep vein thrombosis phlebitis 1980    Depression     Diplopia 2013    GERD (gastroesophageal reflux disease)     GI (gastrointestinal bleed) 3 bleeds    2 transfusions    H/O Skin cancer, basal cell     Headache     History of blood transfusion     History of GI bleed     R/T NSAIDS AND STEROIDS, multiple times    History of urinary tract infection     Hypercalcemia     s/p parathyroidectomy    Hyperlipidemia     Hypertension      Movement disorder     Multiple sclerosis     Optic neuritis     PONV (postoperative nausea and vomiting)     Steroid-induced diabetes        Family History   Problem Relation Age of Onset    Hypertension Mother     Hyperlipidemia Mother     Aortic aneurysm Mother         thoracic    Diabetes Mother     Hypertension Father         charissa heart failure    Heart failure Father     Hyperlipidemia Father     Miscarriages / Stillbirths Sister     Multiple sclerosis Brother     Atrial fibrillation Brother     Diabetes Maternal Grandmother     Diabetes Maternal Grandfather     Stroke Maternal Grandfather     Parkinsonism Paternal Grandmother     Tremor Paternal Grandmother     Stomach cancer Paternal Grandfather        Social History     Socioeconomic History    Marital status:      Spouse name: Donald    Number of children: 0   Tobacco Use    Smoking status: Former     Current packs/day: 0.00     Average packs/day: 2.0 packs/day for 30.0 years (60.0 ttl pk-yrs)     Types: Cigarettes     Start date: 10/22/1973     Quit date: 10/22/2003     Years since quittin.6    Smokeless tobacco: Never   Vaping Use    Vaping status: Never Used   Substance and Sexual Activity    Alcohol use: Not Currently    Drug use: Yes     Types: Marijuana     Comment: rarely    Sexual activity: Not Currently     Partners: Male     Birth control/protection: Post-menopausal       Allergies   Allergen Reactions    Klonopin [Clonazepam] Mental Status Change, Confusion and Hallucinations       Past Surgical History:   Procedure Laterality Date    APPENDECTOMY      BLADDER SURGERY      bladder stimulator    BREAST BIOPSY  don't remember    BREAST SURGERY      augmentation wtih subsequent removal    CARPAL TUNNEL RELEASE Bilateral     Left 2018, right 2020    CUBITAL TUNNEL RELEASE Left     CYSTOSCOPY BOTOX INJECTION OF BLADDER  2018    Cystoscopy with Botox    FRACTURE SURGERY  2019    rt shoulder    PARATHYROIDECTOMY      one gland removed     ROTATOR CUFF REPAIR Right 2017    TOE SURGERY      bilateral great toes    TOTAL SHOULDER ARTHROPLASTY W/ DISTAL CLAVICLE EXCISION Right 10/22/2018    Procedure: RT TOTAL SHOULDER REVERSE ARTHROPLASTY;  Surgeon: Bipin Dangelo MD;  Location: [facility];  Service: Orthopedics       Procedures       Objective:         Vitals:    05/23/25 1245   BP: 128/80   Pulse: 87   SpO2: 97%       PHYSICAL EXAM:  GEN: well appearing, in NAD   HEENT: NCAT, EOMI, moist mucus membranes   Respiratory: CTAB, no wheezes, rales or rhonchi  CV: normal rate, regular rhythm, normal S1, S2, no murmurs, rubs, gallops, +2 radial pulses b/l  GI: soft, nontender, nondistended  MSK: no edema  Skin: no rash, warm, dry  Heme/Lymph: no bruising or bleeding  Neuro: Alert and Oriented x 3, grossly normal motor function        Assessment:         (Q20.8) Abnormality of left ventricle of heart - Plan: Adult Transthoracic Echo Limited W/ Cont if Necessary Per Protocol    (I51.81) Stress-induced cardiomyopathy    64 y.o. female with hypertension, hyperlipidemia, multiple sclerosis, Stage 4 breast cancer, depression, indwelling Cobos, recent admission to the ICU about a month ago with septic shock secondary to Klebsiella UTI.  Admission was complicated by stress cardiomyopathy.        Plan:       #Stress cardiomyopathy  Echocardiogram with preserved EF but with apical wall motion abnormality, concerning for stress cardiomyopathy.  Asymptomatic at this time.  - Continue Toprol 50 mg daily, echocardiogram in 1 month    Enoc Carbone DO, thank you very much for referring this kind patient to me. Please call me with any questions or concerns. I will see the patient again in the office in 6 months or earlier as needed.         Paulo Ahmadi MD, FAC, [facility]  05/23/25  [facility]    Outpatient Encounter Medications as of 5/23/2025   Medication Sig Dispense Refill    albuterol sulfate  (90 Base) MCG/ACT inhaler Inhale 2 puffs Every 4 (Four)  Hours As Needed for Wheezing or Shortness of Air. 18 g 0    anastrozole (ARIMIDEX) 1 MG tablet Take 1 tablet by mouth Daily.      aspirin 81 MG EC tablet Take 1 tablet by mouth Daily. 30 tablet 0    baclofen (LIORESAL) 20 MG tablet Take 1 tablet by mouth 2 (Two) Times a Day.      Cholecalciferol (vitamin D3) 125 MCG (5000 UT) tablet tablet Take 1 tablet by mouth Daily.      Fenbendazole powder Use 440 mg 3 (Three) Times a Week. 2 capsules 3 times weekly      IVERMECTIN PO Take 15 mg by mouth 3 (Three) Times a Week.      metoprolol succinate XL (TOPROL-XL) 50 MG 24 hr tablet Take 1 tablet by mouth Daily. 30 tablet 0    pantoprazole (PROTONIX) 40 MG EC tablet Take 1 tablet by mouth 2 (Two) Times a Day. (Patient taking differently: Take 1 tablet by mouth Every Other Day.)      phenazopyridine (PYRIDIUM) 200 MG tablet Take 1 tablet by mouth 3 (Three) Times a Day As Needed for Dysuria. 20 tablet 0    pramipexole (MIRAPEX) 1.5 MG tablet Take 1 tablet by mouth 2 (Two) Times a Day.      prochlorperazine (COMPAZINE) 10 MG tablet Take 1 tablet by mouth Every 6 (Six) Hours As Needed for Nausea or Vomiting. 90 tablet 5    rosuvastatin (CRESTOR) 20 MG tablet Take 1 tablet by mouth Daily. 30 tablet 0    [DISCONTINUED] Capivasertib (Truqap) 200 MG tablet Take 2 tablets by mouth 2 (Two) Times a Day. Take for 4 days on then 3 days off. Repeat. 64 tablet 5     No facility-administered encounter medications on file as of 5/23/2025.

## 2025-05-23 NOTE — TELEPHONE ENCOUNTER
Caller: Maritza Bejarano    Relationship: Self    Best call back number:[phone]      Requested Prescriptions:   Requested Prescriptions     Pending Prescriptions Disp Refills    metoprolol succinate XL (TOPROL-XL) 50 MG 24 hr tablet 30 tablet 0     Sig: Take 1 tablet by mouth Daily.    rosuvastatin (CRESTOR) 20 MG tablet 30 tablet 0     Sig: Take 1 tablet by mouth Daily.        Pharmacy where request should be sent: [id number] [address]  AT [facility] [phone] [facility] [phone] FX     Last office visit with prescribing clinician: 5/21/2025   Last telemedicine visit with prescribing clinician: Visit date not found   Next office visit with prescribing clinician: 8/21/2025     Additional details provided by patient: THESE WERE PRESCRIBED FROM THE HOSPITAL.      Does the patient have less than a 3 day supply:  [x] Yes  [] No    Would you like a call back once the refill request has been completed: [] Yes [x] No    If the office needs to give you a call back, can they leave a voicemail: [] Yes [x] No    Tina Moreno Rep   05/23/25 10:27 EDT          HPI:    Judith Cabrera is a 82 y.o. male presenting to the emergency department via EMS for Hypoglycemia. EMS arrived on scene as patient was lethargic and had blood glucose of 39. They gave 300 cc of D10 with improvement of his blood glucose and alertness. Family at scene report this is the second time this is happened to him in as many days as they wanted this further evaluated for further evaluation in the ED. Patient's medical history significant for diabetes on metformin. Patient also has COPD with chronic respiratory failure on 3 L of oxygen throughout the day. Patient also started chemotherapy on Monday for lung cancer. On arrival, patient in no acute distress. Blood glucose within normal limits. Patient denies any pain at this time. He reports he has been having breakfast lunch and dinner without any change in his diet. EMS also reported that patient has living well and he does not want chest compressions or intubation if this were to occur, patient confirms at bedside. The history is provided by the patient and the EMS personnel. Hypoglycemia   Severity:  Severe  Onset quality:  Sudden  Progression:  Partially resolved  Chronicity:  Recurrent  Diabetic status:  Controlled with oral medications  Context: recent illness    Relieved by:  IV glucose  Associated symptoms: no shortness of breath and no vomiting    Risk factors: cancer         Review of Systems   Constitutional: Positive for fatigue. Negative for chills and fever. HENT: Negative for congestion and sore throat. Eyes: Negative for visual disturbance. Respiratory: Positive for cough. Negative for shortness of breath. Cardiovascular: Negative for chest pain. Gastrointestinal: Negative for abdominal pain, nausea and vomiting. Genitourinary: Negative for difficulty urinating and flank pain. Musculoskeletal: Negative for gait problem and neck pain. Skin: Negative for color change.    Neurological: Negative for required close monitoring throughout the encounter for recurrent episodes of low blood glucose. Patient was given meals as well as D50 and D10 drip. With recent cancer treatment, there is concern for possible underlying sepsis causing hypoglycemia as blood cultures were ordered. CT performed showed evidence of pneumonitis, potential pneumonia as antibiotics were given. Patient did remain alert and oriented throughout the encounter and should be stable for telemetry floor. Patient's daughter updated on patient's status and confirms patient should be DNR CCA. COVID test negative. Blood work significant for LISA as well as elevated troponin. No chest pain for further concern of NSTEMI, more likely related to underlying kidney dysfunction. Patient also had hyperkalemia that was treated with calcium chloride, bicarb, albuterol and IV fluids while keeping in mind patient was not having specific ekg changes with this finding. patient requires continued workup and management of their symptoms and will be admitted to the hospital for further evaluation and treatment. Patient agrees with the plan and all questions were answered. Amount and/or Complexity of Data Reviewed  Decide to obtain previous medical records or to obtain history from someone other than the patient: yes         ED Course as of Apr 18 0112 Fri Apr 17, 2020 2029 Patient reassessed. Patient's glucose low once again after feeding patient giving juice. Will place patient on D10 drip. Further orders given for abnormal results. Patient will be admitted. [MA]   2207 Patient reassessed. Patient remains alert and oriented at this time. Patient agrees with admission to the hospital.  Patient remains hemodynamically stable while keeping an eye on his glucose.     [MA]   4292 Discussed case with Dr. Hany Damon, agrees with admission    [MA]   2221 Discussed case with patient's daughter, Lizzeth Bautista, she reports patient just started chemo last Ref Range: 6.4 - 8.3 g/dL 6.3 (L)   Procalcitonin Latest Ref Range: 0.00 - 0.08 ng/mL 0.18 (H)   Pro-BNP Latest Ref Range: 0 - 450 pg/mL 15,303 (H)   Troponin Latest Ref Range: 0.00 - 0.03 ng/mL 1.68 (H)   Albumin Latest Ref Range: 3.5 - 5.2 g/dL 3.6   Alk Phos Latest Ref Range: 40 - 129 U/L 59   ALT Latest Ref Range: 0 - 40 U/L 12   AST Latest Ref Range: 0 - 39 U/L 23   Bilirubin Latest Ref Range: 0.0 - 1.2 mg/dL 0.3   Lipase Latest Ref Range: 13 - 60 U/L 55   WBC Latest Ref Range: 4.5 - 11.5 E9/L 6.5   RBC Latest Ref Range: 3.80 - 5.80 E12/L 3.78 (L)   Hemoglobin Quant Latest Ref Range: 12.5 - 16.5 g/dL 11.1 (L)   Hematocrit Latest Ref Range: 37.0 - 54.0 % 37.5   MCV Latest Ref Range: 80.0 - 99.9 fL 99.2   MCH Latest Ref Range: 26.0 - 35.0 pg 29.4   MCHC Latest Ref Range: 32.0 - 34.5 % 29.6 (L)   MPV Latest Ref Range: 7.0 - 12.0 fL 10.4   RDW Latest Ref Range: 11.5 - 15.0 fL 14.1   Platelet Count Latest Ref Range: 130 - 450 E9/L 180   Neutrophils % Latest Ref Range: 43.0 - 80.0 % 90.1 (H)   Immature Granulocytes % Latest Ref Range: 0.0 - 5.0 % 0.6   Lymphocyte % Latest Ref Range: 20.0 - 42.0 % 7.0 (L)   Monocytes % Latest Ref Range: 2.0 - 12.0 % 1.9 (L)   Eosinophils % Latest Ref Range: 0.0 - 6.0 % 0.2   Basophils % Latest Ref Range: 0.0 - 2.0 % 0.2   Neutrophils Absolute Latest Ref Range: 1.80 - 7.30 E9/L 5.83   Immature Granulocytes # Latest Units: E9/L 0.04   Lymphocytes Absolute Latest Ref Range: 1.50 - 4.00 E9/L 0.45 (L)   Monocytes Absolute Latest Ref Range: 0.10 - 0.95 E9/L 0.12   Eosinophils Absolute Latest Ref Range: 0.05 - 0.50 E9/L 0.01 (L)   Basophils Absolute Latest Ref Range: 0.00 - 0.20 E9/L 0.01   Poikilocytes Unknown 2+   Ovalocytes Unknown 2+   [place] Cells Unknown 2+   Prothrombin Time Latest Ref Range: 9.3 - 12.4 sec 12.6 (H)   INR Unknown 1.1   aPTT Latest Ref Range: 24.5 - 35.1 sec 34.6   CULTURE, BLOOD 1 Unknown Rpt   CULTURE, BLOOD 2 Unknown Rpt   Color, UA Latest Ref Range: Straw/Yellow

## 2025-05-23 NOTE — TELEPHONE ENCOUNTER
Caller: Maritza Bejarano    Relationship: Self    Best call back number: [phone]      What medication are you requesting: HYDROCODONE FOR PRESSURE WOUNDS & BABY ASPIRIN      Have you had these symptoms before:    [x] Yes  [] No    Have you been treated for these symptoms before:   [x] Yes  [] No    If a prescription is needed, what is your preferred pharmacy and phone number: [id number] [place], [place] - [address] AT [facility] [phone] [facility] [phone]

## 2025-05-23 NOTE — PROGRESS NOTES
Patient added to injection schedule for Faslodex today.   Per Dr Lopes, resume Xgeva with next months visit.

## 2025-05-23 NOTE — PROGRESS NOTES
Subjective   Maritza Bejarano is a 64 y.o. female.   With metastatic invasive lobular carcinoma, ER/HI strongly positive cancer with metastatic disease to the bones.    History of Present Illness   The patient returns to the office today in anticipation of cycle 2-day 1 Faslodex.  She also continues on truqap.  We have added glipizide due to hyperglycemia.  She has been checking a fasting blood glucose in the morning.  Over the past few weeks she has been struggling with her suprapubic catheter which is being managed by urology.  She did complete antibiotics that were prescribed due to multiple rounds of urinary tract infection.  She reports she has had intermittent loose bowels related to the Truqap.  Prior to therapy she had bowel movements every few days.  She is now experiencing soft more frequent bowel movements.  She does respond to Imodium.  She denies any new pain.  She has no nausea.    Oncologic history:    Patient is a 64-year-old postmenopausal lady who has not had a mammogram in 4 years presented with a screen detected abnormality.  She had a left breast biopsy in 2014 which was benign.  Denies palpating any breast masses skin changes or nipple discharge prior to the abnormal mammogram.  She does have left nipple inversion.    Patient has a longstanding diagnosis of multiple sclerosis and has not been on any treatment.  She was previously seen by Dr. Ozzy France and now being seen by Dr. Soto with neurology.  She has been nonambulatory for the past 4 years and requires a wheelchair.  She is unable to transfer herself in and out of wheelchairs and her  has to lift her for all the transfers.  She is also incontinent of the bladder and requiring a suprapubic catheter placed by urology to help with the same.  She had a bladder stimulator in the past which was turned off.  Other comorbidities include hypertension and hyperlipidemia.      Family history significant for maternal great grandmother with  breast cancer, maternal great aunt and mother with breast cancer in her 70s.  Denies any family history of ovarian cancer, pancreatic cancer or melanoma.    5/21/2024-bilateral screening mammogram  Finding 1.new nipple retraction along with diffuse skin and trabecular thickening of the left breast.  There is suggestion of subtle architectural distortion seen on the MLO projection in the posterior central region.  3 biopsy markers are noted.  No new suspicious calcifications.  Send finding 2.few axillary lymph node seen in the left breast which display interval increase in size and density.    Finding 3.focal asymmetry measuring 10 mm seen in the anterior one third of the right breast in the medial subareolar region.    Impression  Finding 1.new nipple retraction, skin and trabecular thickening in the left breast requires additional evaluation.  There is also question architectural distortion in the posterior central breast seen on the MLO projection.  Diagnostic mammogram to include repeat full-field CC with additional posterior tissue and complete breast ultrasound is recommended.  Finding 2.axillary lymph nodes in the left breast require additional evaluation, ultrasound recommended.  Finding 3.focal asymmetry in the right breast requires additional evaluation.  Diagnostic mammogram and limited breast ultrasound is recommended.    5/29/2024-bilateral diagnostic mammogram and ultrasound  Finding 1.4 0.7 x 5.6 x 6.8 cm developing asymmetry with associated nipple retraction, skin thickening and trabecular thickening of the left breast in the central region, inferior region in the upper outer region and the lower outer region.  Finding 2.axillary lymph node in the left breast.  Finding 3.suspected finding completely resolves upon further evaluation representing benign fibroglandular breast tissue.    Bilateral breast ultrasound  Finding 1.nonparallel hypoechoic mass with indistinct margins and skin thickening and  internal vascularity in the left breast in the central region, inferior region, upper outer region and in the lower outer region.  The finding is palpable and appears to involve all 4 quadrants.  Most discrete portion is located at 130, 3 cm from the nipple, skin thickening up to 6 mm.  Send finding 2.rounded enlarged left axillary lymph node measuring 11 mm.  Cortical thickening of 9 mm present.    Finding 3.no sonographic correlate.  Send finding 4.enlarged right axillary lymph node measuring 14 mm.  Cortical thickening of 5 mm.    Impression  Finding 1.ultrasound-guided biopsy recommended  Finding 2.ultrasound-guided biopsy recommended  Finding 3.previously described right breast asymmetry resolves  Finding 4.ultrasound-guided biopsy recommended.    Ultrasound-guided biopsy of the left breast and left axilla lymph node and right axilla lymph node    1.left breast  Invasive lobular carcinoma with crush artifact  Grade 2  Invasive carcinoma measures 8.5 mm  No lymphovascular space invasion  ER +91 to 100% strong  OR +31 to 40% moderate  HER2 negative, 0  Ki-67 35%    2.left axillary lymph node focus of metastatic Dastech lobular carcinoma measuring 10 mm  ER +91 to 100% strong  OR +21 to 30%  HER2 -0  Ki-67 30%    3.right axillary lymph node with a focus of metastatic carcinoma measuring 4 mm.  Grade 2.  ER +91 to 100% strong  OR +11 to 20% moderate  HER2 negative, 0  Ki-67 35%    Pathology of all the 3 sites appear similar and findings could represent left breast lobular carcinoma metastatic to the right as well as left axillary lymph nodes.    6/14/2024-CT of the chest abdomen and pelvis  1.large ill-defined infiltrative central left breast mass measuring 6.7 x 4 cm, medially there is an irregular 2.5 x 2.5 cm mass.  Significant thickening of the skin in the left breast and there is left axilla lymphadenopathy.  Left axilla lymph node measures 1.3 x 1.3 cm.  There is also a new enlarged right axillary lymph node  measuring 1.3 x 1 cm.  All of the subcentimeter right axillary lymph nodes are slightly larger than previous.  2.no mediastinal hilar or internal mammary chain lymphadenopathy  3.new indeterminate mixed density measuring 1.3 x 1.2 cm right apical pulmonary nodule.  Follow-up recommended.  4.pleural parenchymal thickening and nodules inferiorly and posterior to the right upper lobe are stable.  Chronic scarring and subsegmental atelectatic change in both lower lobes.    CT abdomen pelvis  1.moderate fatty infiltration of the liver.  No suspicious liver lesions.  2.moderate-sized paraesophageal hernia.  No acute bowel abnormality.  3.right sacral stimulator noted at the S3 neuroforamina.  No suspicious bone lesions are noted.    6/14/2024-bone scan  1.focal abnormal uptake in the left anterior inferior iliac spine correlating with lucent lesion on the CT concerning for metastatic disease.  Further evaluation with MRI of the pelvis and left hip could be performed.  2.focal uptake in the left frontal calvarium again concerning for metastatic disease.  Noncontrast CT or MRI could be obtained.  3.soft tissue uptake noted in the left breast at the site of known left breast cancer.  4.changes from arthroplasty on the right shoulder.  5.multifocal likely degenerative uptake in each knee, ankle and foot and increased uptake in the medial and patellofemoral cortex of the right knee could be posttraumatic.      Invitae 9 gene stat panel negative.    MRI of the brain which showed T2 hyperintensity involving the frontal bone measuring 1.6 cm in size and a smaller area of enhancement in the frontal bone laterally and inferiorly concerning for metastasis.  No brain mets    MRI of the hip and pelvis showed multiple enhancing bone lesions consistent with metastatic disease to the sacrum and pelvis.  Dominant lesion in the anterior inferior left pelvic spine and rectus femoris muscle origin that correlates with the site of bone scan  uptake.  She has some right joint hip joint effusion.  Her CA 15-3 16.2    She was seen by Dr. Saavedra on 7/16/2024 and recommended to start on Ribociclib along with anastrozole.    Started Ribociclib in the first week of August 2024    Had profound nausea and vomiting requiring antiemetics.  Completed 3 weeks of Ribociclib on 8/30/2024.    Hospitalized from 9/11/2024 to 9/16/2024 for pneumonia and KINZA and since then Ribociclib has been on hold    Readmitted to the hospital from 10/17/2024 to 10/23/2024 requiring holding Ribociclib.  She was admitted for UTI with sepsis.    10/29/2024-CT of the chest which was compared to the CT chest from 6/14/2024-stable 1.3 cm subsolid nodule in the right lung apex.  Decrease in size of the left axillary lymph nodes.  Ill-defined left breast mass appears unchanged.  Expansile lytic and sclerotic lesion in the posterior left 10th rib which appears increased compared to the prior CT.  Stable subtle area of sclerosis in the left anterior third rib.    10/3/2024-MRI of the cervical spine-rounded area of marrow replacement in C7 suspicious for metastatic disease.    10/3/2024-MRI of the thoracic spine-suspicious patchy areas of marrow replacement in the thoracic spine at T5, T6, T7, T11, T12, L1 suspicious for metastatic disease.    Resumed Ribociclib in November 2024 and has been on it continuously up until February 2025 2/18/2025-CT of the chest abdomen and pelvis  13 mm right apical lesion unchanged micronodules in the right upper lobe micronodules in the left upper lobe  Left 11th rib metastasis unchanged with subtle increase sclerosis  Several hypodense lesions in the liver with the most prominent measuring 16 mm consistent with metastatic deposits.  Not present on scans from September and October 2024.  Bladder is collapsed with Cobos catheter.  Fecal impaction  Small sclerotic metastasis throughout the lumbar spine pelvis, some of which are new.  Largest lesion being in the  inferior iliac spine which has become more sclerotic.    Bone scan 2/18/2025-widespread osseous metastatic disease vast majority of which are new/newly appreciable from the prior bone scan.    3/17/2025-initiated Trucap    3/20/2025-patient called complaining of abdominal cramping and loose stools.  She was recommended to start Bentyl and use Imodium for diarrhea.    3/21/2025-patient continues on Faslodex and Truqap.      5/12/2025-CT of the chest-stable 1.7 x 1.5 cm nodular opacity in the right apex.  Stable reticular nodular opacity inferiorly at the right upper lobe.  Bandlike scarring atelectasis at the posterior right lung base.  New pleural thickening of the superior aspect of the left major fissure and new faint reticular and groundglass opacities in the left upper lobe.  Follow-up CT in 3 months recommended.  New tiny left pleural effusion and new minimal right pleural effusion.  Extensive sclerotic bony metastasis without change.    5/12/2025-bone scan very similar skin to graphic findings in keeping with widespread osseous metastatic disease.      4/21/2025-CT of the abdomen-no evidence of metastatic disease.    Interval history  Maritza returns today to the clinic for a follow-up.  She has had multiple hospitalizations since initiation of Trucap with for urinary tract infections.   She tells me that since starting Trucap, she has taken it for less than 2 weeks.  However she has been compliant with Faslodex and has received 3 doses so far.  She continues on ivermectin.  She reports feeling well today.  Denies any new complaints.  She has an indwelling Cobos catheter for neurogenic bladder.  Continues to follow-up closely with urology.      The following portions of the patient's history were reviewed and updated as appropriate: allergies, current medications, past family history, past medical history, past social history, past surgical history, and problem list.    Past Medical History:   Diagnosis Date     Anemia 2024    `Treated with iron    Dawn esophagus     per patient    Blurred vision     R/T MS    Breast cancer     metastatic    Carpal tunnel syndrome     Clotting disorder 1993, 2003, 2012    3 g/i bleeds w/ transfus/ions    Colon polyp 2013    removed w/ colonoscopy    Deep vein thrombosis phlebitis 1980    Depression     Diplopia 2013    GERD (gastroesophageal reflux disease)     GI (gastrointestinal bleed) 3 bleeds    2 transfusions    H/O Skin cancer, basal cell     Headache     History of blood transfusion     History of GI bleed     R/T NSAIDS AND STEROIDS, multiple times    History of urinary tract infection     Hypercalcemia     s/p parathyroidectomy    Hyperlipidemia     Hypertension     Movement disorder     Multiple sclerosis     Optic neuritis     PONV (postoperative nausea and vomiting)     Steroid-induced diabetes 2024        Past Surgical History:   Procedure Laterality Date    APPENDECTOMY      BLADDER SURGERY      bladder stimulator    BREAST BIOPSY  don't remember    BREAST SURGERY      augmentation wtih subsequent removal    CARPAL TUNNEL RELEASE Bilateral     Left 2018, right 2020    CUBITAL TUNNEL RELEASE Left     CYSTOSCOPY BOTOX INJECTION OF BLADDER  2018    Cystoscopy with Botox    FRACTURE SURGERY  2019    rt shoulder    PARATHYROIDECTOMY      one gland removed    ROTATOR CUFF REPAIR Right 2017    TOE SURGERY      bilateral great toes    TOTAL SHOULDER ARTHROPLASTY W/ DISTAL CLAVICLE EXCISION Right 10/22/2018    Procedure: RT TOTAL SHOULDER REVERSE ARTHROPLASTY;  Surgeon: Bipin Dangelo MD;  Location: [facility];  Service: Orthopedics        Family History   Problem Relation Age of Onset    Hypertension Mother     Hyperlipidemia Mother     Aortic aneurysm Mother         thoracic    Diabetes Mother     Hypertension Father         charissa heart failure    Heart failure Father     Hyperlipidemia Father     Miscarriages / Stillbirths Sister     Multiple sclerosis Brother     Atrial  "fibrillation Brother     Diabetes Maternal Grandmother     Diabetes Maternal Grandfather     Stroke Maternal Grandfather     Parkinsonism Paternal Grandmother     Tremor Paternal Grandmother     Stomach cancer Paternal Grandfather         Social History     Socioeconomic History    Marital status:      Spouse name: Donald    Number of children: 0   Tobacco Use    Smoking status: Former     Current packs/day: 0.00     Average packs/day: 2.0 packs/day for 30.0 years (60.0 ttl pk-yrs)     Types: Cigarettes     Start date: 10/22/1973     Quit date: 10/22/2003     Years since quittin.6    Smokeless tobacco: Never   Vaping Use    Vaping status: Never Used   Substance and Sexual Activity    Alcohol use: Not Currently    Drug use: Yes     Types: Marijuana     Comment: rarely    Sexual activity: Not Currently     Partners: Male     Birth control/protection: Post-menopausal        OB History    No obstetric history on file.     Age at menarche-15  Age at first live childbirth-not applicable   2 para 0  0  Age of menopause-57  No use of hormone replacement therapy      Allergies   Allergen Reactions    Klonopin [Clonazepam] Mental Status Change, Confusion and Hallucinations        Review of systems as mentioned HPI otherwise negative    Objective   Blood pressure 134/84, pulse 91, temperature 98.1 °F (36.7 °C), temperature source Oral, height 165.1 cm (65\"), weight 68 kg (150 lb), SpO2 98%, not currently breastfeeding.     Physical Exam  Vitals reviewed.   Constitutional:       Appearance: Normal appearance. She is normal weight.   HENT:      Right Ear: External ear normal.      Left Ear: External ear normal.      Nose: Nose normal.      Mouth/Throat:      Pharynx: Oropharynx is clear.   Eyes:      Conjunctiva/sclera: Conjunctivae normal.   Cardiovascular:      Rate and Rhythm: Normal rate.   Pulmonary:      Effort: Pulmonary effort is normal.   Abdominal:      General: Abdomen is flat. "   Musculoskeletal:         General: Normal range of motion.      Cervical back: Normal range of motion.   Skin:     General: Skin is warm.   Neurological:      Mental Status: She is alert.      Comments: Non weight bearing, wheelchair bound.    Psychiatric:         Mood and Affect: Mood normal.         Behavior: Behavior normal.         Thought Content: Thought content normal.         Judgment: Judgment normal.       Breast Exam:Not examined today, 4/4/2025 Right breast appears normal on inspection.  No palpable right axilla lymphadenopathy or right breast masses.  Right nipple inverted.  Left breast on inspection there is nipple retraction crusting over the nipple region.  On palpation there is a 8 x 6 cm mass (improved) in the retroareolar region occupying much of the breast.  Palpable left axillary adenopathy as well.      I have reexamined the patient and the results are consistent with the previously documented exam. Zainab Lopes MD      Results from last 7 days   Lab Units 05/19/25  1551   WBC x10E3/uL 8.8   NEUTROS ABS x10E3/uL 6.2   HEMOGLOBIN g/dL 10.9*   HEMATOCRIT % 35.2   PLATELETS x10E3/uL 726*                      Assessment & Plan       *Left breast invasive lobular carcinoma  The tumor is estrogen receptor +91 to 100%, progesterone receptor +31 to 40%, HER2 negative, 0, Ki-67 35%  The tumor measures greater than 10 cm on exam, lymph node positive.  CT of the chest abdomen and pelvis with right upper lobe pulmonary nodule which is new and the bone scan also shows uptake in the left anterior inferior iliac spine which correlates to a lucent area on the CT scan and also focal uptake noted in the left frontal calvarium concerning for metastatic disease.  Further evaluation with a MRI of the pelvis and hip as well as MRI of the brain is underway.  The scans are scheduled for 7/10/2024.  Clinical T3 N1 M1, stage IV invasive lobular carcinoma.  There is also a right axillary lymph node which has been  biopsied and consistent with invasive lobular carcinoma with similar morphology as well as receptors concerning for metastatic disease rather than a primary right breast cancer.  Recommended Ribociclib 400 mg 3 weeks on and 1 week off.  July 16, 2024: Reviewed MRI of the pelvis and hip consistent with many bony metastasis.  MRI brain shows left frontal bone mets but no brain involvement.  Patient is here to start day 1 ribociclib along with anastrozole.  Continues on anastrozole.  Tempus performed on the left axilla lymph node biopsy shows PIK3CA mutation, CDH 1 mutation and Erb B2 mutation.  Therefore patient would benefit from alpelisib and Faslodex after progression on Ribociclib and AI.  Other options include neratinib plus Faslodex.  Initiated Ribociclib in August 2024.  8/30/2024-last day of week 3 of Ribociclib.    Patient completed 1 cycle of Ribociclib 400 mg and subsequently has not been able to go back on Ribociclib due to recurrent hospitalizations and abnormal LFTs.  10/14/2024-LFTs are normal today.  Recommend resuming Ribociclib at 200 mg and subsequently we will increase the dose to 400 mg with the next cycle.  Patient was unable to resume Ribociclib as she was admitted to the hospital from 10/17/2024 to 10/23/2024  11/11/2024-Labs reviewed and overall stable except for an ALT of 49.  Recommend resuming Ribociclib at 200 mg.  CT of the chest performed 10/29/2024 shows stable size of the left breast mass, decrease in size of the left axillary lymph node and mixed lytic and blastic lesion of the rib slightly increased in size.  11/11/2024-resumed Ribociclib 200 mg daily for 3/4 weeks.  2/18/2025-scans with disease progression in the bones in the liver.  Discontinue Ribociclib and anastrozole  2/28/2025-received the first dose of Faslodex.  3/17/2025-started truqap  3/21/2025- CBC reviewed and WBC 4.81, hemoglobin 13.9 platelets 595,000, CMP reviewed and LFTs have improved with ALT which is normal at  33, AST 71, alkaline phosphatase 185, total bilirubin normal at 0.2, blood sugar elevated at 409  Completed 1 week of trucap, resume again on 3/24/2025  4/4/2025 due for cycle 2-day 1 Faslodex.  She is struggling with intermittent hyperglycemia related to Truqap which is improved with glipizide  5/12/2025-CT of the chest and bone scan with overall stable disease.  Prior to that she had a CT abdomen end of April 2025 which also shows no evidence of metastatic disease.  Patient has not taken a whole lot of trucap, maybe less than 2 weeks total since initiation in March 2025.  It appears that patient continues to have stable disease only in response to Faslodex given the limited use of Truqap  We discussed restarting truqap at a lower dose but patient is extremely reluctant to do so as she struggled significantly with urinary tract infections which could be a side effect of this medication.  She already has an existing risk factor of neurogenic bladder and indwelling Cobos catheter.  Therefore she decided to continue with Faslodex only.  She also continues on ivermectin.  We will continue every 4 weekly Faslodex.    *Severe hypoglycemia  Blood sugar greater than 400  Administer 5 units of insulin  Start glipizide 2.5 mg  Remain glucometer and diabetic education.  Continue glipizide 2.5 mg daily, blood glucose 142 today.  The patient understands to continue truqap if AM fasting blood glucose is less than 250.   5/9/2025-blood sugar 128    *Abdominal cramping and diarrhea  Secondary to capivasertib.  Improved.    *Right axillary lymphadenopathy  Biopsied and consistent with invasive lobular carcinoma, grade 2, ER/MA positive and HER2 negative  Please refer to the above discussion for details    *Right breast non-mass enhancement  6/28/2024-MRI of the breast with non-mass enhancement noted in the right breast and patient had questions regarding if this should be biopsied.  Given extensive metastatic disease in the bones  and right axilla lymph node consistent with invasive lobular carcinoma management would not change with the biopsy and hence I would recommend against it.    *Severe nausea  Continue Compazine and Zofran as needed  Well-controlled at this time    *Skeletal metastasis  Patient will be started on Xgeva  8/16/2024 Xgeva initiation will be held as the patient has not been to the dentist in over 2 years.  Discussed possible side effects of Xgeva and she will schedule a dental visit and we will have her back in 1 week to initiate Xgeva pending this is complete.  10/14/2024-second dose of Xgeva will be administered.  Labs reviewed and stable to proceed.  continue Xgeva every 4 weeks alongside with Faslodex, no labs performed today and hence we will resume Xgeva in 4 weeks    *Multiple sclerosis  Patient was not on any treatment previously.  She sees Dr. Jacobson at [facility].  Due to profound weakness patient requested her neurologist to start steroids.  They plan to initiate high-dose IV steroids to help with this.  High-dose IV steroids have not been initiated.  Doing physical therapy and received high-dose steroids  Stable    *Hypertension-continue current medication  Blood pressure BP: 134/84   Continue to monitor  No changes in medications    *Hyperlipidemia-continue current medication  No changes    *Urinary incontinence-patient had  a suprapubic catheter placed by urology.  Patient with recurrent UTIs  UTIs are a side effect of truqap  Discussed with her primary care physician regarding suppressive therapy  Patient does not wish to go back on Truqap at this time    *History of iron deficiency anemia-  3/8/2024 hemoglobin was low at 10.9 and subsequently patient took oral iron which resulted in improvement of hemoglobin and normal.  She was scheduled for colonoscopy however she canceled that due to ongoing management of breast cancer.  She proceed with a colonoscopy.  8/16/2024 hemoglobin is 10.2.  She states she  recently was told to begin daily iron supplementation.  Baseline iron studies ordered today she is only been taking the iron for a few days.  Ferritin 32, iron saturation 5%, TIBC 440.  We will repeat this in 6 to 8 weeks.  12/10/2024 Hgb 12.0. Continue oral iron  Hemoglobin normal at 12.6 on 2/28/2025  Hemoglobin normal    *Genetic testing-9 gene stat panel negative    *Dyspnea  Stable to improved    *Right upper lobe pulmonary nodule   Concerning for metastatic disease  PET/CT may not be helpful as invasive lobular carcinomas typically are not very PET avid.  Could consider PET-FES  Stable on the most recent CT of the chest    *Follow-up-after the MRIs.  Reviewed MRI of the pelvis and MRI of the brain which shows mets in the bone  MRI of the cervical and thoracic spine performed at outside facility on 10/3/2024    *Abnormal LFTs  Likely secondary to Ribociclib  Ribociclib dose increased to 400 mg daily on 12/10/2024  12/31/2024-Labs reviewed and stable.  1/13/2025: LFTs further increased with AST 72, ALT 22.  Decision made to hold off on starting next cycle of ribociclib.  1/31/2025-AST 72, ALT normal, total bilirubin normal  Liver function studies minimally elevated today, 4/4/2025 with AST 73, ALT 52, total bilirubin is normal at 0.4  Most recent LFTs from May 2025 reviewed and stable    *Insomnia  Severe  Using trazodone and Ambien.  Neither of them helping  Only clonazepam helps  Referral to supportive oncology    *Alternate medication  Patient sees a outside provider and receives ivermectin and fenbendazole  We have ran an interaction check with the medications and does not appear to be any interaction however I want her against taking these medications and potential side effects  Patient however wishes to proceed with his medications.  Patient continues on ivermectin    Plan:   Proceed today with cycle 3-day 1 Faslodex  Proceed with Xgeva which is continued monthly, will administer in 4 weeks as we do not  have labs today  Discontinue Truqap due to severe side effects including UTIs as well as hypoglycemia  Continue glipizide 2.5 mg daily  Continue to follow-up with urology regularly  APRN in 4 weeks in 8 weeks and MD in 12 weeks with restaging scans    This patient is on high risk drug therapy requiring intensive monitoring for toxicity.  Experiencing recurrent severe UTIs and urosepsis as well as diarrhea and hyperglycemia secondary to Truqap.  CT of the chest and bone scan images independently reviewed and interpreted by me in detail summarized above  Patient also continues on Faslodex.      Zainab Lopes MD   05/23/2025

## 2025-05-27 ENCOUNTER — READMISSION MANAGEMENT (OUTPATIENT)
Dept: CALL CENTER | Facility: HOSPITAL | Age: 65
End: 2025-05-27
Payer: MEDICARE

## 2025-05-27 ENCOUNTER — SPECIALTY PHARMACY (OUTPATIENT)
Dept: PHARMACY | Facility: HOSPITAL | Age: 65
End: 2025-05-27
Payer: MEDICARE

## 2025-05-27 NOTE — PROGRESS NOTES
Specialty Pharmacy Patient Management Program  Lab Monitoring       Specialty Pharmacy Note: Truqap (capivasertib)    Marizta Bejarano is a 64 y.o. female with malignant neoplasm of the left breast was seen 5/23/25 by Dr. Lopes. Per provider dictation, continuing with Faslodex only. Truqap is being stopped due to inability to tolerate.     Specialty pharmacy will be disenrolling from the program as she is no longer on a targeted medication at this time.    Azalia Perla, PharmD, [facility]  Specialty Clinical Pharmacist  05/27/25  08:37 EDT

## 2025-05-27 NOTE — OUTREACH NOTE
Sepsis Week 3 Survey      Flowsheet Row Responses   [facility] patient discharged from? [facility]   Does the patient have one of the following disease processes/diagnoses(primary or secondary)? Sepsis   Week 3 attempt successful? No   Unsuccessful attempts Attempt 2   Revoke Gavi DARLING - Registered Nurse

## 2025-05-27 NOTE — PROGRESS NOTES
Specialty Pharmacy Patient Management Program  Program Disenrollment      Maritza Bergn is seen by their provider for Malignant neoplasm of overlapping sites of left breast. Patient was previously enrolled in the Oncology Patient Management program offered by [facility].      Disenrolling patient today due to Truqap being discontinued.    Belle Razo, Pharmacy Technician  5/27/2025  08:40 EDT

## 2025-05-27 NOTE — PROGRESS NOTES
Specialty Pharmacy Patient Management Program       I have been following pt for a refill on her Truqap. She was seen by Dr Lopes on 5/23/2025 and the Truqap has been discontinued due to side effects.    Discontinue Truqap due to severe side effects including UTIs as well as hypoglycemia      Belle Razo, Pharmacy Technician  05/27/25  08:22 EDT

## 2025-05-29 ENCOUNTER — TELEPHONE (OUTPATIENT)
Dept: ONCOLOGY | Facility: CLINIC | Age: 65
End: 2025-05-29
Payer: MEDICARE

## 2025-05-30 ENCOUNTER — TELEPHONE (OUTPATIENT)
Dept: INTERNAL MEDICINE | Facility: CLINIC | Age: 65
End: 2025-05-30

## 2025-05-30 NOTE — TELEPHONE ENCOUNTER
If the tightness/swelling has gotten worse I recommend she report to the ER for consideration of another ultrasound. If symptoms have not gotten worse, I recommend she use a compression hose to help and elevate the legs as much as possible.

## 2025-05-30 NOTE — TELEPHONE ENCOUNTER
Caller: Maritza Bejarano    Relationship: Self    Best call back number: [phone]     What was the call regarding: PATIENT STATES SHE IS STILL HAVING ISSUES WITH TIGHTNESS AND PAIN IN HER FOOT AND CALF. PATIENT WOULD LIKE TO KNOW WHAT DR SALMERON RECOMMENDS HER TO DO. PATIENT STATES WHEN SHE HAD THE IMAGING DONE BUT SHE WAS IN THE WHEELCHAIR SO SHE IS NOT SURE IF THEY GOT A GOOD ENOUGH VIEW.     PLEASE CALL AND ADVISE

## 2025-06-03 RX ORDER — PANTOPRAZOLE SODIUM 40 MG/1
40 TABLET, DELAYED RELEASE ORAL DAILY
Qty: 180 TABLET | Refills: 0 | Status: SHIPPED | OUTPATIENT
Start: 2025-06-03

## 2025-06-11 ENCOUNTER — OFFICE VISIT (OUTPATIENT)
Dept: INTERNAL MEDICINE | Facility: CLINIC | Age: 65
End: 2025-06-11

## 2025-06-11 VITALS
DIASTOLIC BLOOD PRESSURE: 70 MMHG | SYSTOLIC BLOOD PRESSURE: 130 MMHG | BODY MASS INDEX: 24.99 KG/M2 | OXYGEN SATURATION: 99 % | HEART RATE: 88 BPM | WEIGHT: 150 LBS | HEIGHT: 65 IN | TEMPERATURE: 97.7 F

## 2025-06-11 DIAGNOSIS — N61.0 CELLULITIS OF LEFT BREAST: Primary | ICD-10-CM

## 2025-06-11 DIAGNOSIS — C79.51 MALIGNANT NEOPLASM METASTATIC TO BONE: ICD-10-CM

## 2025-06-11 PROCEDURE — 99213 OFFICE O/P EST LOW 20 MIN: CPT | Performed by: STUDENT IN AN ORGANIZED HEALTH CARE EDUCATION/TRAINING PROGRAM

## 2025-06-11 RX ORDER — AMOXICILLIN 875 MG/1
875 TABLET, COATED ORAL 2 TIMES DAILY
Qty: 14 TABLET | Refills: 0 | Status: SHIPPED | OUTPATIENT
Start: 2025-06-11 | End: 2025-06-18

## 2025-06-11 RX ORDER — DOXYCYCLINE 100 MG/1
100 CAPSULE ORAL 2 TIMES DAILY
Qty: 14 CAPSULE | Refills: 0 | Status: SHIPPED | OUTPATIENT
Start: 2025-06-11 | End: 2025-06-18

## 2025-06-11 NOTE — PROGRESS NOTES
Enoc Carbone DO, FACP  Internal Medicine  [facility]  [address], [address]  [place] [address]  [phone]    Chief Complaint  Left breast redness x 2 days    SUBJECTIVE    History of Present Illness    Maritza Bejarano is a 64 y.o. female who presents to the office today as an established patient that last saw me on 5/21/2025.  Patient here today with her  who helps provide history.  Patient states she has a skin change in the left breast starting 3 days ago that initially started as bruising above the nipple.  Her  states the skin is hard and discolored with some scabbing on the surface.  Patient states her home health nurse felt the area and told her it felt warm and suspect a dermatitis or cellulitis.  A few nights ago the patient did feel a pain in the breast like a bite but never saw a bug or a bite shira.  There is been no nipple discharge but the patient states her nipples have been inverted since she was diagnosed with breast cancer.  She is also appreciated a new throbbing sensation for the last several days in the skin of the breast.    Allergies   Allergen Reactions    Klonopin [Clonazepam] Mental Status Change, Confusion and Hallucinations        Outpatient Medications Marked as Taking for the 6/11/25 encounter (Office Visit) with Enoc Carbone DO   Medication Sig Dispense Refill    albuterol sulfate  (90 Base) MCG/ACT inhaler Inhale 2 puffs Every 4 (Four) Hours As Needed for Wheezing or Shortness of Air. 18 g 0    anastrozole (ARIMIDEX) 1 MG tablet Take 1 tablet by mouth Daily.      aspirin 81 MG EC tablet Take 1 tablet by mouth Daily. 30 tablet 0    baclofen (LIORESAL) 20 MG tablet Take 1 tablet by mouth 2 (Two) Times a Day.      Cholecalciferol (vitamin D3) 125 MCG (5000 UT) tablet tablet Take 1 tablet by mouth Daily.      Fenbendazole powder Use 440 mg 3 (Three) Times a Week. 2 capsules 3 times weekly      IVERMECTIN PO Take 15 mg by mouth 3 (Three)  "Times a Week.      metoprolol succinate XL (TOPROL-XL) 50 MG 24 hr tablet Take 1 tablet by mouth Daily. 90 tablet 1    pantoprazole (PROTONIX) 40 MG EC tablet TAKE ONE TABLET BY MOUTH EVERY  tablet 0    phenazopyridine (PYRIDIUM) 200 MG tablet Take 1 tablet by mouth 3 (Three) Times a Day As Needed for Dysuria. 20 tablet 0    pramipexole (MIRAPEX) 1.5 MG tablet Take 1 tablet by mouth 2 (Two) Times a Day.      prochlorperazine (COMPAZINE) 10 MG tablet Take 1 tablet by mouth Every 6 (Six) Hours As Needed for Nausea or Vomiting. 90 tablet 5    rosuvastatin (CRESTOR) 20 MG tablet Take 1 tablet by mouth Daily. 90 tablet 1        Past Medical History:   Diagnosis Date    Anemia 2024    `Treated with iron    Dawn esophagus     per patient    Blurred vision     R/T MS    Breast cancer     metastatic    Carpal tunnel syndrome     Clotting disorder 1993, 2003, 2012    3 g/i bleeds w/ transfus/ions    Colon polyp 2013    removed w/ colonoscopy    Deep vein thrombosis phlebitis 1980    Depression     Diplopia 2013    GERD (gastroesophageal reflux disease)     GI (gastrointestinal bleed) 3 bleeds    2 transfusions    H/O Skin cancer, basal cell     Headache     History of blood transfusion     History of GI bleed     R/T NSAIDS AND STEROIDS, multiple times    History of urinary tract infection     Hypercalcemia     s/p parathyroidectomy    Hyperlipidemia     Hypertension     Movement disorder     Multiple sclerosis     Optic neuritis     PONV (postoperative nausea and vomiting)     Steroid-induced diabetes 2024       OBJECTIVE    Vital Signs:   /70   Pulse 88   Temp 97.7 °F (36.5 °C) (Infrared)   Ht 165.1 cm (65\")   Wt 68 kg (150 lb)   SpO2 99%   BMI 24.96 kg/m²        Physical Exam  Vitals reviewed. Exam conducted with a chaperone present (Dennies Garrick , MA).   Constitutional:       General: She is not in acute distress.     Appearance: Normal appearance. She is not ill-appearing.   Pulmonary:      " Effort: Pulmonary effort is normal. No respiratory distress.   Chest:   Breasts:     Right: Inverted nipple present. No nipple discharge.      Left: Inverted nipple present. No nipple discharge or tenderness.          Comments: In the encircled area there is an irregularly shaped area of skin erythema that is slightly warm to the touch. The skin texture is hard and mass-like. There is no active discharge or drainage.   Musculoskeletal:      Comments: Ambulating via motorized wheelchair   Neurological:      Mental Status: She is alert.   Psychiatric:         Mood and Affect: Mood normal.         Behavior: Behavior normal.         Thought Content: Thought content normal.                             ASSESSMENT & PLAN     Diagnoses and all orders for this visit:    1. Cellulitis of left breast (Primary)  2. Malignant neoplasm metastatic to bone  -pt with current diagnosis of left breast cancer metastatic to bone and to the right breast presents to the office today for 3 days of initial bruising and subsequent redness of the skin of the left breast.  She states there may have been a bite like pain in the left breast at night before the symptoms started but she cannot be certain.  She is currently undergoing medication therapy alone for breast cancer and has not had any surgical procedures.  The redness has not been significantly increasing in size.  She denies any fevers.  - On exam, the left breast has an irregularly shaped area of erythema and hardening breast subcutaneous tissue/mass .  The area is slightly warm to touch.  Bilateral nipples are inverted which she reports is chronic with her current breast cancer diagnosis.  Her temperature and blood pressure are appropriate.  - I advised patient that it is difficult to ascertain at this point if she is dealing with a cellulitis versus a skin manifestation of her known breast cancer.  Given the rapid nature of her development of the skin change I believe the likelihood  of cellulitis is slightly higher.  Over the last several months she has been on antibiotic therapy multiple times for recurrent urinary tract infection secondary to indwelling Cobos catheter.  This makes antibiotic selection more challenging.I Favor iniatiation of  empiric antibiotic treatment for cellulitis and if no improvement after 1 week of therapy she did update her oncology team. i will initiate treatment with amoxicillin 875 mg bid plus doxycycline  100 mg bid for 7 days and ask the patient to update me at that time on her symptoms or  immediately if any changes.          Follow Up  No follow-ups on file.    Patient/family had no further questions at this time and verbalized understanding of the plan discussed today.

## 2025-06-13 NOTE — PROGRESS NOTES
Subjective   Maritza Bejarano is a 64 y.o. female.   With metastatic invasive lobular carcinoma, ER/CA strongly positive cancer with metastatic disease to the bones.    History of Present Illness   The patient continues treatment with Faslodex.  She also continues on truqap.  We have added glipizide due to hyperglycemia.  She has been checking a fasting blood glucose in the morning.  Over the past few weeks she has been struggling with her suprapubic catheter which is being managed by urology.  She did complete antibiotics that were prescribed due to multiple rounds of urinary tract infection.  She reports she has had intermittent loose bowels related to the Truqap.  Prior to therapy she had bowel movements every few days.  She is now experiencing soft more frequent bowel movements.  She does respond to Imodium.  She denies any new pain.  She has no nausea.    Oncologic history:    Patient is a 64-year-old postmenopausal lady who has not had a mammogram in 4 years presented with a screen detected abnormality.  She had a left breast biopsy in 2014 which was benign.  Denies palpating any breast masses skin changes or nipple discharge prior to the abnormal mammogram.  She does have left nipple inversion.    Patient has a longstanding diagnosis of multiple sclerosis and has not been on any treatment.  She was previously seen by Dr. Ozzy Franec and now being seen by Dr. Soto with neurology.  She has been nonambulatory for the past 4 years and requires a wheelchair.  She is unable to transfer herself in and out of wheelchairs and her  has to lift her for all the transfers.  She is also incontinent of the bladder and requiring a suprapubic catheter placed by urology to help with the same.  She had a bladder stimulator in the past which was turned off.  Other comorbidities include hypertension and hyperlipidemia.      Family history significant for maternal great grandmother with breast cancer, maternal great aunt and  mother with breast cancer in her 70s.  Denies any family history of ovarian cancer, pancreatic cancer or melanoma.    5/21/2024-bilateral screening mammogram  Finding 1.new nipple retraction along with diffuse skin and trabecular thickening of the left breast.  There is suggestion of subtle architectural distortion seen on the MLO projection in the posterior central region.  3 biopsy markers are noted.  No new suspicious calcifications.  Send finding 2.few axillary lymph node seen in the left breast which display interval increase in size and density.    Finding 3.focal asymmetry measuring 10 mm seen in the anterior one third of the right breast in the medial subareolar region.    Impression  Finding 1.new nipple retraction, skin and trabecular thickening in the left breast requires additional evaluation.  There is also question architectural distortion in the posterior central breast seen on the MLO projection.  Diagnostic mammogram to include repeat full-field CC with additional posterior tissue and complete breast ultrasound is recommended.  Finding 2.axillary lymph nodes in the left breast require additional evaluation, ultrasound recommended.  Finding 3.focal asymmetry in the right breast requires additional evaluation.  Diagnostic mammogram and limited breast ultrasound is recommended.    5/29/2024-bilateral diagnostic mammogram and ultrasound  Finding 1.4 0.7 x 5.6 x 6.8 cm developing asymmetry with associated nipple retraction, skin thickening and trabecular thickening of the left breast in the central region, inferior region in the upper outer region and the lower outer region.  Finding 2.axillary lymph node in the left breast.  Finding 3.suspected finding completely resolves upon further evaluation representing benign fibroglandular breast tissue.    Bilateral breast ultrasound  Finding 1.nonparallel hypoechoic mass with indistinct margins and skin thickening and internal vascularity in the left breast in  the central region, inferior region, upper outer region and in the lower outer region.  The finding is palpable and appears to involve all 4 quadrants.  Most discrete portion is located at 130, 3 cm from the nipple, skin thickening up to 6 mm.  Send finding 2.rounded enlarged left axillary lymph node measuring 11 mm.  Cortical thickening of 9 mm present.    Finding 3.no sonographic correlate.  Send finding 4.enlarged right axillary lymph node measuring 14 mm.  Cortical thickening of 5 mm.    Impression  Finding 1.ultrasound-guided biopsy recommended  Finding 2.ultrasound-guided biopsy recommended  Finding 3.previously described right breast asymmetry resolves  Finding 4.ultrasound-guided biopsy recommended.    Ultrasound-guided biopsy of the left breast and left axilla lymph node and right axilla lymph node    1.left breast  Invasive lobular carcinoma with crush artifact  Grade 2  Invasive carcinoma measures 8.5 mm  No lymphovascular space invasion  ER +91 to 100% strong  [place] +31 to 40% moderate  HER2 negative, 0  Ki-67 35%    2.left axillary lymph node focus of metastatic Dastech lobular carcinoma measuring 10 mm  ER +91 to 100% strong  [place] +21 to 30%  HER2 -0  Ki-67 30%    3.right axillary lymph node with a focus of metastatic carcinoma measuring 4 mm.  Grade 2.  ER +91 to 100% strong  [place] +11 to 20% moderate  HER2 negative, 0  Ki-67 35%    Pathology of all the 3 sites appear similar and findings could represent left breast lobular carcinoma metastatic to the right as well as left axillary lymph nodes.    6/14/2024-CT of the chest abdomen and pelvis  1.large ill-defined infiltrative central left breast mass measuring 6.7 x 4 cm, medially there is an irregular 2.5 x 2.5 cm mass.  Significant thickening of the skin in the left breast and there is left axilla lymphadenopathy.  Left axilla lymph node measures 1.3 x 1.3 cm.  There is also a new enlarged right axillary lymph node measuring 1.3 x 1 cm.  All of the  subcentimeter right axillary lymph nodes are slightly larger than previous.  2.no mediastinal hilar or internal mammary chain lymphadenopathy  3.new indeterminate mixed density measuring 1.3 x 1.2 cm right apical pulmonary nodule.  Follow-up recommended.  4.pleural parenchymal thickening and nodules inferiorly and posterior to the right upper lobe are stable.  Chronic scarring and subsegmental atelectatic change in both lower lobes.    CT abdomen pelvis  1.moderate fatty infiltration of the liver.  No suspicious liver lesions.  2.moderate-sized paraesophageal hernia.  No acute bowel abnormality.  3.right sacral stimulator noted at the S3 neuroforamina.  No suspicious bone lesions are noted.    6/14/2024-bone scan  1.focal abnormal uptake in the left anterior inferior iliac spine correlating with lucent lesion on the CT concerning for metastatic disease.  Further evaluation with MRI of the pelvis and left hip could be performed.  2.focal uptake in the left frontal calvarium again concerning for metastatic disease.  Noncontrast CT or MRI could be obtained.  3.soft tissue uptake noted in the left breast at the site of known left breast cancer.  4.changes from arthroplasty on the right shoulder.  5.multifocal likely degenerative uptake in each knee, ankle and foot and increased uptake in the medial and patellofemoral cortex of the right knee could be posttraumatic.      Invitae 9 gene stat panel negative.    MRI of the brain which showed T2 hyperintensity involving the frontal bone measuring 1.6 cm in size and a smaller area of enhancement in the frontal bone laterally and inferiorly concerning for metastasis.  No brain mets    MRI of the hip and pelvis showed multiple enhancing bone lesions consistent with metastatic disease to the sacrum and pelvis.  Dominant lesion in the anterior inferior left pelvic spine and rectus femoris muscle origin that correlates with the site of bone scan uptake.  She has some right joint  hip joint effusion.  Her CA 15-3 16.2    She was seen by Dr. Saavedra on 7/16/2024 and recommended to start on Ribociclib along with anastrozole.    Started Ribociclib in the first week of August 2024    Had profound nausea and vomiting requiring antiemetics.  Completed 3 weeks of Ribociclib on 8/30/2024.    Hospitalized from 9/11/2024 to 9/16/2024 for pneumonia and KINZA and since then Ribociclib has been on hold    Readmitted to the hospital from 10/17/2024 to 10/23/2024 requiring holding Ribociclib.  She was admitted for UTI with sepsis.    10/29/2024-CT of the chest which was compared to the CT chest from 6/14/2024-stable 1.3 cm subsolid nodule in the right lung apex.  Decrease in size of the left axillary lymph nodes.  Ill-defined left breast mass appears unchanged.  Expansile lytic and sclerotic lesion in the posterior left 10th rib which appears increased compared to the prior CT.  Stable subtle area of sclerosis in the left anterior third rib.    10/3/2024-MRI of the cervical spine-rounded area of marrow replacement in C7 suspicious for metastatic disease.    10/3/2024-MRI of the thoracic spine-suspicious patchy areas of marrow replacement in the thoracic spine at T5, T6, T7, T11, T12, L1 suspicious for metastatic disease.    Resumed Ribociclib in November 2024 and has been on it continuously up until February 2025 2/18/2025-CT of the chest abdomen and pelvis  13 mm right apical lesion unchanged micronodules in the right upper lobe micronodules in the left upper lobe  Left 11th rib metastasis unchanged with subtle increase sclerosis  Several hypodense lesions in the liver with the most prominent measuring 16 mm consistent with metastatic deposits.  Not present on scans from September and October 2024.  Bladder is collapsed with Cobos catheter.  Fecal impaction  Small sclerotic metastasis throughout the lumbar spine pelvis, some of which are new.  Largest lesion being in the inferior iliac spine which has  become more sclerotic.    Bone scan 2/18/2025-widespread osseous metastatic disease vast majority of which are new/newly appreciable from the prior bone scan.    3/17/2025-initiated Trucap    3/20/2025-patient called complaining of abdominal cramping and loose stools.  She was recommended to start Bentyl and use Imodium for diarrhea.    3/21/2025-patient continues on Faslodex and Truqap.      5/12/2025-CT of the chest-stable 1.7 x 1.5 cm nodular opacity in the right apex.  Stable reticular nodular opacity inferiorly at the right upper lobe.  Bandlike scarring atelectasis at the posterior right lung base.  New pleural thickening of the superior aspect of the left major fissure and new faint reticular and groundglass opacities in the left upper lobe.  Follow-up CT in 3 months recommended.  New tiny left pleural effusion and new minimal right pleural effusion.  Extensive sclerotic bony metastasis without change.    5/12/2025-bone scan very similar skin to graphic findings in keeping with widespread osseous metastatic disease.      4/21/2025-CT of the abdomen-no evidence of metastatic disease.    Interval History  She returns today for cycle 4 Faslodex.  Since her last visit patient has followed with her primary care provider related to rash/swelling of the left breast.  She was diagnosed with cellulitis and given amoxicillin and doxycycline for a 7-day course.  Patient has since completed this. However, they have extended the course for 5 more days. The area is red, warm, and has multiple yellow blister-like areas.  There is some yellow drainage.  She reports that there is some tightness to the area but states she does not require any pain medication at this time.  She saw dermatology yesterday who performed a biopsy of the area.   Patient denies nausea, vomiting, abdominal pain, cough, shortness of air, bone pain and weight remains stable.  He endorses constipation, requesting stool softener prescription.  She  continues on ivermectin.  She reports feeling well today.  Denies any new complaints.  She has an indwelling Cobos catheter for neurogenic bladder.  Continues to follow-up closely with urology.      The following portions of the patient's history were reviewed and updated as appropriate: allergies, current medications, past family history, past medical history, past social history, past surgical history, and problem list.    Past Medical History:   Diagnosis Date    Anemia 2024    `Treated with iron    Dawn esophagus     per patient    Blurred vision     R/T MS    Breast cancer     metastatic    Carpal tunnel syndrome     Clotting disorder 1993, 2003, 2012    3 g/i bleeds w/ transfus/ions    Colon polyp 2013    removed w/ colonoscopy    Deep vein thrombosis phlebitis 1980    Depression     Diplopia 2013    GERD (gastroesophageal reflux disease)     GI (gastrointestinal bleed) 3 bleeds    2 transfusions    H/O Skin cancer, basal cell     Headache     History of blood transfusion     History of GI bleed     R/T NSAIDS AND STEROIDS, multiple times    History of urinary tract infection     Hypercalcemia     s/p parathyroidectomy    Hyperlipidemia     Hypertension     Movement disorder     Multiple sclerosis     Optic neuritis     PONV (postoperative nausea and vomiting)     Steroid-induced diabetes 2024        Past Surgical History:   Procedure Laterality Date    APPENDECTOMY      BLADDER SURGERY      bladder stimulator    BREAST BIOPSY  don't remember    BREAST SURGERY      augmentation wtih subsequent removal    CARPAL TUNNEL RELEASE Bilateral     Left 2018, right 2020    CUBITAL TUNNEL RELEASE Left     CYSTOSCOPY BOTOX INJECTION OF BLADDER  2018    Cystoscopy with Botox    FRACTURE SURGERY  2019    rt shoulder    PARATHYROIDECTOMY      one gland removed    ROTATOR CUFF REPAIR Right 2017    TOE SURGERY      bilateral great toes    TOTAL SHOULDER ARTHROPLASTY W/ DISTAL CLAVICLE EXCISION Right 10/22/2018     "Procedure: RT TOTAL SHOULDER REVERSE ARTHROPLASTY;  Surgeon: Bipin Dangelo MD;  Location: [facility];  Service: Orthopedics        Family History   Problem Relation Age of Onset    Hypertension Mother     Hyperlipidemia Mother     Aortic aneurysm Mother         thoracic    Diabetes Mother     Hypertension Father         charissa heart failure    Heart failure Father     Hyperlipidemia Father     Miscarriages / Stillbirths Sister     Multiple sclerosis Brother     Atrial fibrillation Brother     Diabetes Maternal Grandmother     Diabetes Maternal Grandfather     Stroke Maternal Grandfather     Parkinsonism Paternal Grandmother     Tremor Paternal Grandmother     Stomach cancer Paternal Grandfather         Social History     Socioeconomic History    Marital status:      Spouse name: Donald    Number of children: 0   Tobacco Use    Smoking status: Former     Current packs/day: 0.00     Average packs/day: 2.0 packs/day for 30.0 years (60.0 ttl pk-yrs)     Types: Cigarettes     Start date: 10/22/1973     Quit date: 10/22/2003     Years since quittin.6    Smokeless tobacco: Never   Vaping Use    Vaping status: Never Used   Substance and Sexual Activity    Alcohol use: Not Currently    Drug use: Yes     Types: Marijuana     Comment: rarely    Sexual activity: Not Currently     Partners: Male     Birth control/protection: Post-menopausal        OB History    No obstetric history on file.     Age at menarche-15  Age at first live childbirth-not applicable   2 para 0  0  Age of menopause-57  No use of hormone replacement therapy      Allergies   Allergen Reactions    Klonopin [Clonazepam] Mental Status Change, Confusion and Hallucinations        Review of systems as mentioned HPI otherwise negative    Objective   Blood pressure 143/82, pulse 78, temperature 98.1 °F (36.7 °C), temperature source Oral, resp. rate 16, height 165.1 cm (65\"), weight 81.6 kg (180 lb), SpO2 96%, not currently " breastfeeding.     Physical Exam  Vitals reviewed.   Constitutional:       Appearance: Normal appearance. She is normal weight.   HENT:      Right Ear: External ear normal.      Left Ear: External ear normal.      Nose: Nose normal.      Mouth/Throat:      Pharynx: Oropharynx is clear.   Eyes:      Conjunctiva/sclera: Conjunctivae normal.   Cardiovascular:      Rate and Rhythm: Normal rate.   Pulmonary:      Effort: Pulmonary effort is normal.   Abdominal:      General: Abdomen is flat.   Musculoskeletal:         General: Normal range of motion.      Cervical back: Normal range of motion.   Skin:     General: Skin is warm.   Neurological:      Mental Status: She is alert.      Comments: Non weight bearing, wheelchair bound.    Psychiatric:         Mood and Affect: Mood normal.         Behavior: Behavior normal.         Thought Content: Thought content normal.         Judgment: Judgment normal.       Breast Exam: Right breast appears normal on inspection.  No palpable right axilla lymphadenopathy or right breast masses.  Right nipple inverted.  Left breast on inspection there is nipple retraction yellow crusting over the nipple region.  There is no red discoloration to the lower half of breast with multiple yellow blister like areas.  There is a slight yellow drainage covering the area.  See previous pictures.   I have reexamined the patient and the results are consistent with the previously documented exam. DANA Gomez      Results from last 7 days   Lab Units 06/20/25  1448   WBC 10*3/mm3 6.57   NEUTROS ABS 10*3/mm3 4.24   HEMOGLOBIN g/dL 10.4*   HEMATOCRIT % 34.9   PLATELETS 10*3/mm3 534*       Results from last 7 days   Lab Units 06/20/25  1448   SODIUM mmol/L 138   POTASSIUM mmol/L 3.9   CHLORIDE mmol/L 106   CO2 mmol/L 22.1   BUN mg/dL 17.6   CREATININE mg/dL 0.49*   CALCIUM mg/dL 9.8   ALBUMIN g/dL 3.9   BILIRUBIN mg/dL 0.2   ALK PHOS U/L 203*   ALT (SGPT) U/L 32   AST (SGOT) U/L 85*   GLUCOSE mg/dL  203*   MAGNESIUM mg/dL 1.9                  Assessment & Plan       *Left breast invasive lobular carcinoma  The tumor is estrogen receptor +91 to 100%, progesterone receptor +31 to 40%, HER2 negative, 0, Ki-67 35%  The tumor measures greater than 10 cm on exam, lymph node positive.  CT of the chest abdomen and pelvis with right upper lobe pulmonary nodule which is new and the bone scan also shows uptake in the left anterior inferior iliac spine which correlates to a lucent area on the CT scan and also focal uptake noted in the left frontal calvarium concerning for metastatic disease.  Further evaluation with a MRI of the pelvis and hip as well as MRI of the brain is underway.  The scans are scheduled for 7/10/2024.  Clinical T3 N1 M1, stage IV invasive lobular carcinoma.  There is also a right axillary lymph node which has been biopsied and consistent with invasive lobular carcinoma with similar morphology as well as receptors concerning for metastatic disease rather than a primary right breast cancer.  Recommended Ribociclib 400 mg 3 weeks on and 1 week off.  July 16, 2024: Reviewed MRI of the pelvis and hip consistent with many bony metastasis.  MRI brain shows left frontal bone mets but no brain involvement.  Patient is here to start day 1 ribociclib along with anastrozole.  Continues on anastrozole.  Tempus performed on the left axilla lymph node biopsy shows PIK3CA mutation, CDH 1 mutation and Erb B2 mutation.  Therefore patient would benefit from alpelisib and Faslodex after progression on Ribociclib and AI.  Other options include neratinib plus Faslodex.  Initiated Ribociclib in August 2024.  8/30/2024-last day of week 3 of Ribociclib.    Patient completed 1 cycle of Ribociclib 400 mg and subsequently has not been able to go back on Ribociclib due to recurrent hospitalizations and abnormal LFTs.  10/14/2024-LFTs are normal today.  Recommend resuming Ribociclib at 200 mg and subsequently we will increase the  dose to 400 mg with the next cycle.  Patient was unable to resume Ribociclib as she was admitted to the hospital from 10/17/2024 to 10/23/2024  11/11/2024-Labs reviewed and overall stable except for an ALT of 49.  Recommend resuming Ribociclib at 200 mg.  CT of the chest performed 10/29/2024 shows stable size of the left breast mass, decrease in size of the left axillary lymph node and mixed lytic and blastic lesion of the rib slightly increased in size.  11/11/2024-resumed Ribociclib 200 mg daily for 3/4 weeks.  2/18/2025-scans with disease progression in the bones in the liver.  Discontinue Ribociclib and anastrozole  2/28/2025-received the first dose of Faslodex.  3/17/2025-started truqap  3/21/2025- CBC reviewed and WBC 4.81, hemoglobin 13.9 platelets 595,000, CMP reviewed and LFTs have improved with ALT which is normal at 33, AST 71, alkaline phosphatase 185, total bilirubin normal at 0.2, blood sugar elevated at 409  Completed 1 week of trucap, resume again on 3/24/2025  4/4/2025 due for cycle 2-day 1 Faslodex.  She is struggling with intermittent hyperglycemia related to Truqap which is improved with glipizide  5/12/2025-CT of the chest and bone scan with overall stable disease.  Prior to that she had a CT abdomen end of April 2025 which also shows no evidence of metastatic disease.  Patient has not taken a whole lot of trucap, maybe less than 2 weeks total since initiation in March 2025.  It appears that patient continues to have stable disease only in response to Faslodex given the limited use of Truqap  We discussed restarting truqap at a lower dose but patient is extremely reluctant to do so as she struggled significantly with urinary tract infections which could be a side effect of this medication.  She already has an existing risk factor of neurogenic bladder and indwelling Cobos catheter.  Therefore she decided to continue with Faslodex only.  She also continues on ivermectin.  We will continue every 4  weekly Faslodex.  6/20/2025: Patient has developed skin changes to the left breast.  It is red, has multiple yellow blisterlike areas.  There is some yellow drainage.  Reports that there is some tightness to the area but states she does not require any pain medication at this time.  She saw dermatology yesterday who performed a biopsy of the area.  Primary care provider has provided antibiotics which she completed today.  However, they have extended the course for 5 more days.  Continue with Faslodex today.  Order an ultrasound of the left breast and repeat tumor markers.    *Severe hypoglycemia  Blood sugar greater than 400  Administer 5 units of insulin  Start glipizide 2.5 mg  Remain glucometer and diabetic education.  Continue glipizide 2.5 mg daily, blood glucose 142 today.  The patient understands to continue truqap if AM fasting blood glucose is less than 250.   5/9/2025-blood sugar 128  6/20/2025: Blood glucose 203    *Abdominal cramping and diarrhea  Secondary to capivasertib.  Improved.    *Right axillary lymphadenopathy  Biopsied and consistent with invasive lobular carcinoma, grade 2, ER/MT positive and HER2 negative  Please refer to the above discussion for details    *Right breast non-mass enhancement  6/28/2024-MRI of the breast with non-mass enhancement noted in the right breast and patient had questions regarding if this should be biopsied.  Given extensive metastatic disease in the bones and right axilla lymph node consistent with invasive lobular carcinoma management would not change with the biopsy and hence I would recommend against it.    *Severe nausea  Continue Compazine and Zofran as needed  Well-controlled at this time    *Skeletal metastasis  Patient will be started on Xgeva  8/16/2024 Xgeva initiation will be held as the patient has not been to the dentist in over 2 years.  Discussed possible side effects of Xgeva and she will schedule a dental visit and we will have her back in 1 week to  initiate Xgeva pending this is complete.  10/14/2024-second dose of Xgeva will be administered.  Labs reviewed and stable to proceed.  continue Xgeva every 4 weeks alongside with Faslodex    *Multiple sclerosis  Patient was not on any treatment previously.  She sees Dr. Jacobson at [facility].  Due to profound weakness patient requested her neurologist to start steroids.  They plan to initiate high-dose IV steroids to help with this.  High-dose IV steroids have not been initiated.  Doing physical therapy and received high-dose steroids  Stable    *Hypertension-continue current medication  Blood pressure BP: 143/82   Continue to monitor  No changes in medications    *Hyperlipidemia-continue current medication  No changes    *Urinary incontinence-patient had  a suprapubic catheter placed by urology.  Patient with recurrent UTIs  UTIs are a side effect of truqap  Discussed with her primary care physician regarding suppressive therapy  Patient does not wish to go back on Truqap at this time    *History of iron deficiency anemia-  3/8/2024 hemoglobin was low at 10.9 and subsequently patient took oral iron which resulted in improvement of hemoglobin and normal.  She was scheduled for colonoscopy however she canceled that due to ongoing management of breast cancer.  She proceed with a colonoscopy.  8/16/2024 hemoglobin is 10.2.  She states she recently was told to begin daily iron supplementation.  Baseline iron studies ordered today she is only been taking the iron for a few days.  Ferritin 32, iron saturation 5%, TIBC 440.  We will repeat this in 6 to 8 weeks.  12/10/2024 Hgb 12.0. Continue oral iron  Hemoglobin normal at 12.6 on 2/28/2025  Hemoglobin 10.4    *Genetic testing-9 gene stat panel negative    *Dyspnea  Stable to improved    *Right upper lobe pulmonary nodule   Concerning for metastatic disease  PET/CT may not be helpful as invasive lobular carcinomas typically are not very PET avid.  Could consider  PET-FES  Stable on the most recent CT of the chest    *Follow-up-after the MRIs.  Reviewed MRI of the pelvis and MRI of the brain which shows mets in the bone  MRI of the cervical and thoracic spine performed at outside facility on 10/3/2024    *Abnormal LFTs  Likely secondary to Ribociclib  Ribociclib dose increased to 400 mg daily on 12/10/2024  12/31/2024-Labs reviewed and stable.  1/13/2025: LFTs further increased with AST 72, ALT 22.  Decision made to hold off on starting next cycle of ribociclib.  1/31/2025-AST 72, ALT normal, total bilirubin normal  Liver function studies minimally elevated today, 4/4/2025 with AST 73, ALT 52, total bilirubin is normal at 0.4  Most recent LFTs increased to AST 85, ALT 32, alk phos 203, total bili 0.2. She denies any recent alcohol or tylenol use. Will continue to monitor and may do restaging scan sooner if continuing to increase at next visit.     *Insomnia  Severe  Using trazodone and Ambien.  Neither of them helping  Only clonazepam helps  Referral to supportive oncology    *Alternate medication  Patient sees a outside provider and receives ivermectin and fenbendazole  We have ran an interaction check with the medications and does not appear to be any interaction however I want her against taking these medications and potential side effects  Patient however wishes to proceed with his medications.  Patient continues on ivermectin    *Constipation  Patient reports constipation with hard bowel movements every 2-3 days. She is requesting stool softener prescription-senna s sent to pharmacy.  Plan:   Proceed today with cycle 4-day 1 Faslodex  Diagnostic mammogram and left breast ultrasound  Proceed with Xgeva which is continued monthly  Continue glipizide 2.5 mg daily  Continue to follow-up with urology regularly  APRN in 4 weeks and MD in 8 weeks with restaging scans    This patient is on high risk drug therapy requiring intensive monitoring for toxicity.    I spent 40 minutes  caring for Maritza on this date of service. This time includes time spent by me in the following activities: preparing for the visit, reviewing tests, performing a medically appropriate examination and/or evaluation, counseling and educating the patient/family/caregiver, ordering medications, tests, or procedures, referring and communicating with other health care professionals, documenting information in the medical record, and care coordination.     Case discussed with Dr. Marianne Amato, APRN   06/20/2025

## 2025-06-16 ENCOUNTER — PATIENT MESSAGE (OUTPATIENT)
Dept: INTERNAL MEDICINE | Facility: CLINIC | Age: 65
End: 2025-06-16
Payer: MEDICARE

## 2025-06-16 DIAGNOSIS — N61.0 CELLULITIS OF LEFT BREAST: ICD-10-CM

## 2025-06-19 RX ORDER — AMOXICILLIN 875 MG/1
875 TABLET, COATED ORAL 2 TIMES DAILY
Qty: 10 TABLET | Refills: 0 | Status: SHIPPED | OUTPATIENT
Start: 2025-06-19 | End: 2025-06-24

## 2025-06-19 RX ORDER — DOXYCYCLINE 100 MG/1
100 CAPSULE ORAL 2 TIMES DAILY
Qty: 10 CAPSULE | Refills: 0 | Status: SHIPPED | OUTPATIENT
Start: 2025-06-19 | End: 2025-06-24

## 2025-06-20 ENCOUNTER — LAB (OUTPATIENT)
Dept: LAB | Facility: HOSPITAL | Age: 65
End: 2025-06-20
Payer: MEDICARE

## 2025-06-20 ENCOUNTER — INFUSION (OUTPATIENT)
Dept: ONCOLOGY | Facility: HOSPITAL | Age: 65
End: 2025-06-20
Payer: MEDICARE

## 2025-06-20 ENCOUNTER — OFFICE VISIT (OUTPATIENT)
Dept: ONCOLOGY | Facility: CLINIC | Age: 65
End: 2025-06-20
Payer: MEDICARE

## 2025-06-20 VITALS
OXYGEN SATURATION: 96 % | HEART RATE: 78 BPM | WEIGHT: 180 LBS | SYSTOLIC BLOOD PRESSURE: 143 MMHG | RESPIRATION RATE: 16 BRPM | HEIGHT: 65 IN | TEMPERATURE: 98.1 F | DIASTOLIC BLOOD PRESSURE: 82 MMHG | BODY MASS INDEX: 29.99 KG/M2

## 2025-06-20 DIAGNOSIS — C79.51 CANCER, METASTATIC TO BONE: Primary | ICD-10-CM

## 2025-06-20 DIAGNOSIS — C50.812 MALIGNANT NEOPLASM OF OVERLAPPING SITES OF LEFT BREAST IN FEMALE, ESTROGEN RECEPTOR POSITIVE: ICD-10-CM

## 2025-06-20 DIAGNOSIS — Z17.0 MALIGNANT NEOPLASM OF OVERLAPPING SITES OF LEFT BREAST IN FEMALE, ESTROGEN RECEPTOR POSITIVE: ICD-10-CM

## 2025-06-20 DIAGNOSIS — Z17.0 MALIGNANT NEOPLASM OF OVERLAPPING SITES OF LEFT BREAST IN FEMALE, ESTROGEN RECEPTOR POSITIVE: Primary | ICD-10-CM

## 2025-06-20 DIAGNOSIS — C50.812 MALIGNANT NEOPLASM OF OVERLAPPING SITES OF LEFT BREAST IN FEMALE, ESTROGEN RECEPTOR POSITIVE: Primary | ICD-10-CM

## 2025-06-20 DIAGNOSIS — C79.51 CANCER, METASTATIC TO BONE: ICD-10-CM

## 2025-06-20 LAB
ALBUMIN SERPL-MCNC: 3.9 G/DL (ref 3.5–5.2)
ALBUMIN/GLOB SERPL: 1.2 G/DL
ALP SERPL-CCNC: 203 U/L (ref 39–117)
ALT SERPL W P-5'-P-CCNC: 32 U/L (ref 1–33)
ANION GAP SERPL CALCULATED.3IONS-SCNC: 9.9 MMOL/L (ref 5–15)
AST SERPL-CCNC: 85 U/L (ref 1–32)
BASOPHILS # BLD AUTO: 0.05 10*3/MM3 (ref 0–0.2)
BASOPHILS NFR BLD AUTO: 0.8 % (ref 0–1.5)
BILIRUB SERPL-MCNC: 0.2 MG/DL (ref 0–1.2)
BUN SERPL-MCNC: 17.6 MG/DL (ref 8–23)
BUN/CREAT SERPL: 35.9 (ref 7–25)
CALCIUM SPEC-SCNC: 9.8 MG/DL (ref 8.6–10.5)
CANCER AG15-3 SERPL-ACNC: 261 U/ML
CHLORIDE SERPL-SCNC: 106 MMOL/L (ref 98–107)
CO2 SERPL-SCNC: 22.1 MMOL/L (ref 22–29)
CREAT SERPL-MCNC: 0.49 MG/DL (ref 0.57–1)
DEPRECATED RDW RBC AUTO: 58.8 FL (ref 37–54)
EGFRCR SERPLBLD CKD-EPI 2021: 106.7 ML/MIN/1.73
EOSINOPHIL # BLD AUTO: 0.17 10*3/MM3 (ref 0–0.4)
EOSINOPHIL NFR BLD AUTO: 2.6 % (ref 0.3–6.2)
ERYTHROCYTE [DISTWIDTH] IN BLOOD BY AUTOMATED COUNT: 16.7 % (ref 12.3–15.4)
GLOBULIN UR ELPH-MCNC: 3.3 GM/DL
GLUCOSE SERPL-MCNC: 203 MG/DL (ref 65–99)
HCT VFR BLD AUTO: 34.9 % (ref 34–46.6)
HGB BLD-MCNC: 10.4 G/DL (ref 12–15.9)
IMM GRANULOCYTES # BLD AUTO: 0.06 10*3/MM3 (ref 0–0.05)
IMM GRANULOCYTES NFR BLD AUTO: 0.9 % (ref 0–0.5)
LYMPHOCYTES # BLD AUTO: 1.66 10*3/MM3 (ref 0.7–3.1)
LYMPHOCYTES NFR BLD AUTO: 25.3 % (ref 19.6–45.3)
MAGNESIUM SERPL-MCNC: 1.9 MG/DL (ref 1.6–2.4)
MCH RBC QN AUTO: 28.7 PG (ref 26.6–33)
MCHC RBC AUTO-ENTMCNC: 29.8 G/DL (ref 31.5–35.7)
MCV RBC AUTO: 96.4 FL (ref 79–97)
MONOCYTES # BLD AUTO: 0.39 10*3/MM3 (ref 0.1–0.9)
MONOCYTES NFR BLD AUTO: 5.9 % (ref 5–12)
NEUTROPHILS NFR BLD AUTO: 4.24 10*3/MM3 (ref 1.7–7)
NEUTROPHILS NFR BLD AUTO: 64.5 % (ref 42.7–76)
NRBC BLD AUTO-RTO: 0 /100 WBC (ref 0–0.2)
PHOSPHATE SERPL-MCNC: 3.5 MG/DL (ref 2.5–4.5)
PLATELET # BLD AUTO: 534 10*3/MM3 (ref 140–450)
PMV BLD AUTO: 9.2 FL (ref 6–12)
POTASSIUM SERPL-SCNC: 3.9 MMOL/L (ref 3.5–5.2)
PROT SERPL-MCNC: 7.2 G/DL (ref 6–8.5)
RBC # BLD AUTO: 3.62 10*6/MM3 (ref 3.77–5.28)
SODIUM SERPL-SCNC: 138 MMOL/L (ref 136–145)
WBC NRBC COR # BLD AUTO: 6.57 10*3/MM3 (ref 3.4–10.8)

## 2025-06-20 PROCEDURE — 85025 COMPLETE CBC W/AUTO DIFF WBC: CPT

## 2025-06-20 PROCEDURE — 84100 ASSAY OF PHOSPHORUS: CPT

## 2025-06-20 PROCEDURE — 96372 THER/PROPH/DIAG INJ SC/IM: CPT

## 2025-06-20 PROCEDURE — 80053 COMPREHEN METABOLIC PANEL: CPT

## 2025-06-20 PROCEDURE — 86300 IMMUNOASSAY TUMOR CA 15-3: CPT | Performed by: NURSE PRACTITIONER

## 2025-06-20 PROCEDURE — 25010000002 FULVESTRANT PER 25 MG: Performed by: INTERNAL MEDICINE

## 2025-06-20 PROCEDURE — 96401 CHEMO ANTI-NEOPL SQ/IM: CPT

## 2025-06-20 PROCEDURE — 83735 ASSAY OF MAGNESIUM: CPT

## 2025-06-20 PROCEDURE — 36415 COLL VENOUS BLD VENIPUNCTURE: CPT

## 2025-06-20 PROCEDURE — 96402 CHEMO HORMON ANTINEOPL SQ/IM: CPT

## 2025-06-20 PROCEDURE — 25010000002 DENOSUMAB 120 MG/1.7ML SOLUTION: Performed by: INTERNAL MEDICINE

## 2025-06-20 RX ORDER — LAMOTRIGINE 25 MG/1
500 TABLET ORAL ONCE
Status: CANCELLED | OUTPATIENT
Start: 2025-06-20

## 2025-06-20 RX ORDER — LAMOTRIGINE 25 MG/1
500 TABLET ORAL ONCE
Status: COMPLETED | OUTPATIENT
Start: 2025-06-20 | End: 2025-06-20

## 2025-06-20 RX ORDER — AMOXICILLIN 250 MG
1 CAPSULE ORAL DAILY
Qty: 30 TABLET | Refills: 2 | Status: SHIPPED | OUTPATIENT
Start: 2025-06-20

## 2025-06-20 RX ADMIN — DENOSUMAB 120 MG: 120 INJECTION SUBCUTANEOUS at 15:51

## 2025-06-20 RX ADMIN — FULVESTRANT 500 MG: 50 INJECTION INTRAMUSCULAR at 15:51

## 2025-06-23 ENCOUNTER — TELEPHONE (OUTPATIENT)
Dept: ONCOLOGY | Facility: CLINIC | Age: 65
End: 2025-06-23
Payer: MEDICARE

## 2025-06-23 NOTE — TELEPHONE ENCOUNTER
Moved up scans per Sarah Amato NP. Pt confirmed apts for Bone scan, and CT scans scheduled 06-24-25.  Pt aware no solids 4hrs prior. Message sent to Bethany to get apt with Dr Lopes moved up to go over results.

## 2025-06-24 ENCOUNTER — HOSPITAL ENCOUNTER (OUTPATIENT)
Dept: NUCLEAR MEDICINE | Facility: HOSPITAL | Age: 65
Discharge: HOME | End: 2025-06-24
Payer: MEDICARE

## 2025-06-24 ENCOUNTER — HOSPITAL ENCOUNTER (OUTPATIENT)
Dept: CT IMAGING | Facility: HOSPITAL | Age: 65
Discharge: HOME | End: 2025-06-24
Payer: MEDICARE

## 2025-06-24 ENCOUNTER — HOSPITAL ENCOUNTER (OUTPATIENT)
Dept: ULTRASOUND IMAGING | Facility: HOSPITAL | Age: 65
Discharge: HOME | End: 2025-06-24
Payer: MEDICARE

## 2025-06-24 ENCOUNTER — HOSPITAL ENCOUNTER (OUTPATIENT)
Dept: MAMMOGRAPHY | Facility: HOSPITAL | Age: 65
Discharge: HOME | End: 2025-06-24
Payer: MEDICARE

## 2025-06-24 ENCOUNTER — APPOINTMENT (OUTPATIENT)
Dept: NUCLEAR MEDICINE | Facility: HOSPITAL | Age: 65
End: 2025-06-24
Payer: MEDICARE

## 2025-06-24 DIAGNOSIS — Z17.0 MALIGNANT NEOPLASM OF OVERLAPPING SITES OF LEFT BREAST IN FEMALE, ESTROGEN RECEPTOR POSITIVE: ICD-10-CM

## 2025-06-24 DIAGNOSIS — C79.51 CANCER, METASTATIC TO BONE: ICD-10-CM

## 2025-06-24 DIAGNOSIS — C50.812 MALIGNANT NEOPLASM OF OVERLAPPING SITES OF LEFT BREAST IN FEMALE, ESTROGEN RECEPTOR POSITIVE: ICD-10-CM

## 2025-06-24 PROCEDURE — G0279 TOMOSYNTHESIS, MAMMO: HCPCS

## 2025-06-24 PROCEDURE — 74177 CT ABD & PELVIS W/CONTRAST: CPT

## 2025-06-24 PROCEDURE — 71260 CT THORAX DX C+: CPT

## 2025-06-24 PROCEDURE — 34310000005 TECHNETIUM MEDRONATE KIT: Performed by: INTERNAL MEDICINE

## 2025-06-24 PROCEDURE — 77066 DX MAMMO INCL CAD BI: CPT

## 2025-06-24 PROCEDURE — 25510000001 IOPAMIDOL 61 % SOLUTION: Performed by: INTERNAL MEDICINE

## 2025-06-24 PROCEDURE — A9503 TC99M MEDRONATE: HCPCS | Performed by: INTERNAL MEDICINE

## 2025-06-24 PROCEDURE — 76641 ULTRASOUND BREAST COMPLETE: CPT

## 2025-06-24 PROCEDURE — 78306 BONE IMAGING WHOLE BODY: CPT

## 2025-06-24 RX ORDER — IOPAMIDOL 612 MG/ML
85 INJECTION, SOLUTION INTRAVASCULAR
Status: COMPLETED | OUTPATIENT
Start: 2025-06-24 | End: 2025-06-24

## 2025-06-24 RX ORDER — TC 99M MEDRONATE 20 MG/10ML
18.8 INJECTION, POWDER, LYOPHILIZED, FOR SOLUTION INTRAVENOUS
Status: COMPLETED | OUTPATIENT
Start: 2025-06-24 | End: 2025-06-24

## 2025-06-24 RX ADMIN — IOPAMIDOL 85 ML: 612 INJECTION, SOLUTION INTRAVENOUS at 11:23

## 2025-06-24 RX ADMIN — Medication 18.8 MILLICURIE: at 10:50

## 2025-06-25 ENCOUNTER — OFFICE VISIT (OUTPATIENT)
Dept: ONCOLOGY | Facility: CLINIC | Age: 65
End: 2025-06-25
Payer: MEDICARE

## 2025-06-25 ENCOUNTER — LAB (OUTPATIENT)
Dept: LAB | Facility: HOSPITAL | Age: 65
End: 2025-06-25
Payer: MEDICARE

## 2025-06-25 VITALS
WEIGHT: 180 LBS | DIASTOLIC BLOOD PRESSURE: 79 MMHG | SYSTOLIC BLOOD PRESSURE: 136 MMHG | HEART RATE: 76 BPM | HEIGHT: 65 IN | OXYGEN SATURATION: 99 % | BODY MASS INDEX: 29.99 KG/M2 | TEMPERATURE: 97.2 F

## 2025-06-25 DIAGNOSIS — C50.812 MALIGNANT NEOPLASM OF OVERLAPPING SITES OF LEFT BREAST IN FEMALE, ESTROGEN RECEPTOR POSITIVE: Primary | ICD-10-CM

## 2025-06-25 DIAGNOSIS — C50.812 MALIGNANT NEOPLASM OF OVERLAPPING SITES OF LEFT BREAST IN FEMALE, ESTROGEN RECEPTOR POSITIVE: ICD-10-CM

## 2025-06-25 DIAGNOSIS — Z17.0 MALIGNANT NEOPLASM OF OVERLAPPING SITES OF LEFT BREAST IN FEMALE, ESTROGEN RECEPTOR POSITIVE: Primary | ICD-10-CM

## 2025-06-25 DIAGNOSIS — Z17.0 MALIGNANT NEOPLASM OF OVERLAPPING SITES OF LEFT BREAST IN FEMALE, ESTROGEN RECEPTOR POSITIVE: ICD-10-CM

## 2025-06-25 DIAGNOSIS — C79.51 CANCER, METASTATIC TO BONE: ICD-10-CM

## 2025-06-25 DIAGNOSIS — Z79.899 HIGH RISK MEDICATION USE: ICD-10-CM

## 2025-06-25 LAB — CANCER AG15-3 SERPL-ACNC: 296 U/ML

## 2025-06-25 PROCEDURE — 86300 IMMUNOASSAY TUMOR CA 15-3: CPT | Performed by: INTERNAL MEDICINE

## 2025-06-25 PROCEDURE — 36415 COLL VENOUS BLD VENIPUNCTURE: CPT

## 2025-06-25 NOTE — PROGRESS NOTES
Subjective   Maritza Bejarano is a 64 y.o. female.   With metastatic invasive lobular carcinoma, ER/MO strongly positive cancer with metastatic disease to the bones.    Oncologic history:    Patient is a 64-year-old postmenopausal lady who has not had a mammogram in 4 years presented with a screen detected abnormality.  She had a left breast biopsy in 2014 which was benign.  Denies palpating any breast masses skin changes or nipple discharge prior to the abnormal mammogram.  She does have left nipple inversion.    Patient has a longstanding diagnosis of multiple sclerosis and has not been on any treatment.  She was previously seen by Dr. Ozzy France and now being seen by Dr. Soto with neurology.  She has been nonambulatory for the past 4 years and requires a wheelchair.  She is unable to transfer herself in and out of wheelchairs and her  has to lift her for all the transfers.  She is also incontinent of the bladder and requiring a suprapubic catheter placed by urology to help with the same.  She had a bladder stimulator in the past which was turned off.  Other comorbidities include hypertension and hyperlipidemia.      Family history significant for maternal great grandmother with breast cancer, maternal great aunt and mother with breast cancer in her 70s.  Denies any family history of ovarian cancer, pancreatic cancer or melanoma.    5/21/2024-bilateral screening mammogram  Finding 1.new nipple retraction along with diffuse skin and trabecular thickening of the left breast.  There is suggestion of subtle architectural distortion seen on the MLO projection in the posterior central region.  3 biopsy markers are noted.  No new suspicious calcifications.  Send finding 2.few axillary lymph node seen in the left breast which display interval increase in size and density.    Finding 3.focal asymmetry measuring 10 mm seen in the anterior one third of the right breast in the medial subareolar  region.    Impression  Finding 1.new nipple retraction, skin and trabecular thickening in the left breast requires additional evaluation.  There is also question architectural distortion in the posterior central breast seen on the MLO projection.  Diagnostic mammogram to include repeat full-field CC with additional posterior tissue and complete breast ultrasound is recommended.  Finding 2.axillary lymph nodes in the left breast require additional evaluation, ultrasound recommended.  Finding 3.focal asymmetry in the right breast requires additional evaluation.  Diagnostic mammogram and limited breast ultrasound is recommended.    5/29/2024-bilateral diagnostic mammogram and ultrasound  Finding 1.4 0.7 x 5.6 x 6.8 cm developing asymmetry with associated nipple retraction, skin thickening and trabecular thickening of the left breast in the central region, inferior region in the upper outer region and the lower outer region.  Finding 2.axillary lymph node in the left breast.  Finding 3.suspected finding completely resolves upon further evaluation representing benign fibroglandular breast tissue.    Bilateral breast ultrasound  Finding 1.nonparallel hypoechoic mass with indistinct margins and skin thickening and internal vascularity in the left breast in the central region, inferior region, upper outer region and in the lower outer region.  The finding is palpable and appears to involve all 4 quadrants.  Most discrete portion is located at 130, 3 cm from the nipple, skin thickening up to 6 mm.  Send finding 2.rounded enlarged left axillary lymph node measuring 11 mm.  Cortical thickening of 9 mm present.    Finding 3.no sonographic correlate.  Send finding 4.enlarged right axillary lymph node measuring 14 mm.  Cortical thickening of 5 mm.    Impression  Finding 1.ultrasound-guided biopsy recommended  Finding 2.ultrasound-guided biopsy recommended  Finding 3.previously described right breast asymmetry resolves  Finding  4.ultrasound-guided biopsy recommended.    Ultrasound-guided biopsy of the left breast and left axilla lymph node and right axilla lymph node    1.left breast  Invasive lobular carcinoma with crush artifact  Grade 2  Invasive carcinoma measures 8.5 mm  No lymphovascular space invasion  ER +91 to 100% strong  NE +31 to 40% moderate  HER2 negative, 0  Ki-67 35%    2.left axillary lymph node focus of metastatic Dastech lobular carcinoma measuring 10 mm  ER +91 to 100% strong  NE +21 to 30%  HER2 -0  Ki-67 30%    3.right axillary lymph node with a focus of metastatic carcinoma measuring 4 mm.  Grade 2.  ER +91 to 100% strong  NE +11 to 20% moderate  HER2 negative, 0  Ki-67 35%    Pathology of all the 3 sites appear similar and findings could represent left breast lobular carcinoma metastatic to the right as well as left axillary lymph nodes.    6/14/2024-CT of the chest abdomen and pelvis  1.large ill-defined infiltrative central left breast mass measuring 6.7 x 4 cm, medially there is an irregular 2.5 x 2.5 cm mass.  Significant thickening of the skin in the left breast and there is left axilla lymphadenopathy.  Left axilla lymph node measures 1.3 x 1.3 cm.  There is also a new enlarged right axillary lymph node measuring 1.3 x 1 cm.  All of the subcentimeter right axillary lymph nodes are slightly larger than previous.  2.no mediastinal hilar or internal mammary chain lymphadenopathy  3.new indeterminate mixed density measuring 1.3 x 1.2 cm right apical pulmonary nodule.  Follow-up recommended.  4.pleural parenchymal thickening and nodules inferiorly and posterior to the right upper lobe are stable.  Chronic scarring and subsegmental atelectatic change in both lower lobes.    CT abdomen pelvis  1.moderate fatty infiltration of the liver.  No suspicious liver lesions.  2.moderate-sized paraesophageal hernia.  No acute bowel abnormality.  3.right sacral stimulator noted at the S3 neuroforamina.  No suspicious bone  lesions are noted.    6/14/2024-bone scan  1.focal abnormal uptake in the left anterior inferior iliac spine correlating with lucent lesion on the CT concerning for metastatic disease.  Further evaluation with MRI of the pelvis and left hip could be performed.  2.focal uptake in the left frontal calvarium again concerning for metastatic disease.  Noncontrast CT or MRI could be obtained.  3.soft tissue uptake noted in the left breast at the site of known left breast cancer.  4.changes from arthroplasty on the right shoulder.  5.multifocal likely degenerative uptake in each knee, ankle and foot and increased uptake in the medial and patellofemoral cortex of the right knee could be posttraumatic.      Invitae 9 gene stat panel negative.    MRI of the brain which showed T2 hyperintensity involving the frontal bone measuring 1.6 cm in size and a smaller area of enhancement in the frontal bone laterally and inferiorly concerning for metastasis.  No brain mets    MRI of the hip and pelvis showed multiple enhancing bone lesions consistent with metastatic disease to the sacrum and pelvis.  Dominant lesion in the anterior inferior left pelvic spine and rectus femoris muscle origin that correlates with the site of bone scan uptake.  She has some right joint hip joint effusion.  Her CA 15-3 16.2    She was seen by Dr. Saavedra on 7/16/2024 and recommended to start on Ribociclib along with anastrozole.    Started Ribociclib in the first week of August 2024    Had profound nausea and vomiting requiring antiemetics.  Completed 3 weeks of Ribociclib on 8/30/2024.    Hospitalized from 9/11/2024 to 9/16/2024 for pneumonia and KINZA and since then Ribociclib has been on hold    Readmitted to the hospital from 10/17/2024 to 10/23/2024 requiring holding Ribociclib.  She was admitted for UTI with sepsis.    10/29/2024-CT of the chest which was compared to the CT chest from 6/14/2024-stable 1.3 cm subsolid nodule in the right lung apex.   Decrease in size of the left axillary lymph nodes.  Ill-defined left breast mass appears unchanged.  Expansile lytic and sclerotic lesion in the posterior left 10th rib which appears increased compared to the prior CT.  Stable subtle area of sclerosis in the left anterior third rib.    10/3/2024-MRI of the cervical spine-rounded area of marrow replacement in C7 suspicious for metastatic disease.    10/3/2024-MRI of the thoracic spine-suspicious patchy areas of marrow replacement in the thoracic spine at T5, T6, T7, T11, T12, L1 suspicious for metastatic disease.    Resumed Ribociclib in November 2024 and has been on it continuously up until February 2025 2/18/2025-CT of the chest abdomen and pelvis  13 mm right apical lesion unchanged micronodules in the right upper lobe micronodules in the left upper lobe  Left 11th rib metastasis unchanged with subtle increase sclerosis  Several hypodense lesions in the liver with the most prominent measuring 16 mm consistent with metastatic deposits.  Not present on scans from September and October 2024.  Bladder is collapsed with Cobos catheter.  Fecal impaction  Small sclerotic metastasis throughout the lumbar spine pelvis, some of which are new.  Largest lesion being in the inferior iliac spine which has become more sclerotic.    Bone scan 2/18/2025-widespread osseous metastatic disease vast majority of which are new/newly appreciable from the prior bone scan.    3/17/2025-initiated Trucap    3/20/2025-patient called complaining of abdominal cramping and loose stools.  She was recommended to start Bentyl and use Imodium for diarrhea.    3/21/2025-patient continues on Faslodex and Truqap.      5/12/2025-CT of the chest-stable 1.7 x 1.5 cm nodular opacity in the right apex.  Stable reticular nodular opacity inferiorly at the right upper lobe.  Bandlike scarring atelectasis at the posterior right lung base.  New pleural thickening of the superior aspect of the left major  fissure and new faint reticular and groundglass opacities in the left upper lobe.  Follow-up CT in 3 months recommended.  New tiny left pleural effusion and new minimal right pleural effusion.  Extensive sclerotic bony metastasis without change.    5/12/2025-bone scan very similar skin to graphic findings in keeping with widespread osseous metastatic disease.      4/21/2025-CT of the abdomen-no evidence of metastatic disease.    On 6/20/2025 for worsening erythema of the left breast.  This has been treated with antibiotics including amoxicillin and doxycycline.  Since the antibiotics the erythema has improved some but not all the way resolved.  Due to this we proceeded with a bilateral diagnostic mammogram and bilateral ultrasound performed on 6/24/2025 which showed an increase in size of the 2:00 malignancy.  New enlarged 1 cm right axillary lymph node was seen.  Ill-defined hypoechogenicity and shadowing throughout the right breast without a discrete measurable mass concerning for possible infiltrating malignancy.  Skin thickening throughout the left breast which could be cellulitis or underlying malignancy.    Punch biopsy was performed by her dermatologist about a week ago and the pathology of this is pending.    6/24/2025-bone scan with stable osseous metastatic disease.      6/24/2025-CT of the chest abdomen and pelvis-    Interval history  She does not complain of any pain on the right of the left breast however the erythema has only improved some after the antibiotics.  She has another round of antibiotics with Keflex prescribed last week which she is questioning if she needs to take that.  Other than that she has no new complaints.      The following portions of the patient's history were reviewed and updated as appropriate: allergies, current medications, past family history, past medical history, past social history, past surgical history, and problem list.    Past Medical History:   Diagnosis Date     Anemia 2024    `Treated with iron    Dawn esophagus     per patient    Blurred vision     R/T MS    Breast cancer     metastatic    Carpal tunnel syndrome     Clotting disorder 1993, 2003, 2012    3 g/i bleeds w/ transfus/ions    Colon polyp 2013    removed w/ colonoscopy    Deep vein thrombosis phlebitis 1980    Depression     Diplopia 2013    GERD (gastroesophageal reflux disease)     GI (gastrointestinal bleed) 3 bleeds    2 transfusions    H/O Skin cancer, basal cell     Headache     History of blood transfusion     History of GI bleed     R/T NSAIDS AND STEROIDS, multiple times    History of urinary tract infection     Hypercalcemia     s/p parathyroidectomy    Hyperlipidemia     Hypertension     Movement disorder     Multiple sclerosis     Optic neuritis     PONV (postoperative nausea and vomiting)     Steroid-induced diabetes 2024        Past Surgical History:   Procedure Laterality Date    APPENDECTOMY      BLADDER SURGERY      bladder stimulator    BREAST BIOPSY  don't remember    BREAST SURGERY      augmentation wtih subsequent removal    CARPAL TUNNEL RELEASE Bilateral     Left 2018, right 2020    CUBITAL TUNNEL RELEASE Left     CYSTOSCOPY BOTOX INJECTION OF BLADDER  2018    Cystoscopy with Botox    FRACTURE SURGERY  2019    rt shoulder    PARATHYROIDECTOMY      one gland removed    ROTATOR CUFF REPAIR Right 2017    TOE SURGERY      bilateral great toes    TOTAL SHOULDER ARTHROPLASTY W/ DISTAL CLAVICLE EXCISION Right 10/22/2018    Procedure: RT TOTAL SHOULDER REVERSE ARTHROPLASTY;  Surgeon: Bipin Dangelo MD;  Location: [facility];  Service: Orthopedics        Family History   Problem Relation Age of Onset    Hypertension Mother     Hyperlipidemia Mother     Aortic aneurysm Mother         thoracic    Diabetes Mother     Hypertension Father         charissa heart failure    Heart failure Father     Hyperlipidemia Father     Miscarriages / Stillbirths Sister     Multiple sclerosis Brother     Atrial  "fibrillation Brother     Diabetes Maternal Grandmother     Diabetes Maternal Grandfather     Stroke Maternal Grandfather     Parkinsonism Paternal Grandmother     Tremor Paternal Grandmother     Stomach cancer Paternal Grandfather         Social History     Socioeconomic History    Marital status:      Spouse name: Donald    Number of children: 0   Tobacco Use    Smoking status: Former     Current packs/day: 0.00     Average packs/day: 2.0 packs/day for 30.0 years (60.0 ttl pk-yrs)     Types: Cigarettes     Start date: 10/22/1973     Quit date: 10/22/2003     Years since quittin.6    Smokeless tobacco: Never   Vaping Use    Vaping status: Never Used   Substance and Sexual Activity    Alcohol use: Not Currently    Drug use: Yes     Types: Marijuana     Comment: rarely    Sexual activity: Not Currently     Partners: Male     Birth control/protection: Post-menopausal        OB History    No obstetric history on file.     Age at menarche-15  Age at first live childbirth-not applicable   2 para 0  0  Age of menopause-57  No use of hormone replacement therapy      Allergies   Allergen Reactions    Klonopin [Clonazepam] Mental Status Change, Confusion and Hallucinations        Review of systems as mentioned HPI otherwise negative    Objective   Blood pressure 136/79, pulse 76, temperature 97.2 °F (36.2 °C), temperature source Skin, height 165.1 cm (65\"), weight 81.6 kg (180 lb), SpO2 99%, not currently breastfeeding.     Physical Exam  Vitals reviewed.   Constitutional:       Appearance: Normal appearance. She is normal weight.   HENT:      Right Ear: External ear normal.      Left Ear: External ear normal.      Nose: Nose normal.      Mouth/Throat:      Pharynx: Oropharynx is clear.   Eyes:      Conjunctiva/sclera: Conjunctivae normal.   Cardiovascular:      Rate and Rhythm: Normal rate.   Pulmonary:      Effort: Pulmonary effort is normal.   Abdominal:      General: Abdomen is flat. "   Musculoskeletal:         General: Normal range of motion.      Cervical back: Normal range of motion.   Skin:     General: Skin is warm.   Neurological:      Mental Status: She is alert.      Comments: Non weight bearing, wheelchair bound.    Psychiatric:         Mood and Affect: Mood normal.         Behavior: Behavior normal.         Thought Content: Thought content normal.         Judgment: Judgment normal.       Breast Exam:Not examined today, 4/4/2025 Right breast appears normal on inspection.  No palpable right axilla lymphadenopathy or right breast masses.  Right nipple inverted.  Left breast on inspection there is nipple retraction crusting over the nipple region.  On palpation there is a 8 x 6 cm mass (improved) in the retroareolar region occupying much of the breast.  Palpable left axillary adenopathy as well.      I have reexamined the patient and the results are consistent with the previously documented exam. Zainab Lopes MD      Results from last 7 days   Lab Units 06/20/25  1448   WBC 10*3/mm3 6.57   NEUTROS ABS 10*3/mm3 4.24   HEMOGLOBIN g/dL 10.4*   HEMATOCRIT % 34.9   PLATELETS 10*3/mm3 534*     Results from last 7 days   Lab Units 06/20/25  1448   SODIUM mmol/L 138   POTASSIUM mmol/L 3.9   CHLORIDE mmol/L 106   CO2 mmol/L 22.1   BUN mg/dL 17.6   CREATININE mg/dL 0.49*   CALCIUM mg/dL 9.8   ALBUMIN g/dL 3.9   BILIRUBIN mg/dL 0.2   ALK PHOS U/L 203*   ALT (SGPT) U/L 32   AST (SGOT) U/L 85*   GLUCOSE mg/dL 203*   MAGNESIUM mg/dL 1.9             6/24/2025 CT of the chest abdomen and pelvis   Impression:  1.  At least 10-15 scattered hepatic lesions likely represent metastatic  disease with index lesions which have increased in size over multiple  prior CTs.  2.  Presumed extensive osseous metastatic disease. A mixed lytic and  blastic osseous lesion within the left posterior 10th rib has a more  permeative/lytic appearance compared with 2/18/2025.  3.  Right lower lobe nodular pulmonary  opacification is new since  2/18/2025 and grossly unchanged since 5/12/2025 while findings may  represent pneumonia neoplastic disease cannot be excluded. The subsolid  nodule within the right apex appears to gradually increase in size of  multiple prior CTs and is concerning for metastatic disease and/or  slow-growing neoplasm.  4.  Other findings as above     Assessment & Plan       *Left breast invasive lobular carcinoma  The tumor is estrogen receptor +91 to 100%, progesterone receptor +31 to 40%, HER2 negative, 0, Ki-67 35%  The tumor measures greater than 10 cm on exam, lymph node positive.  CT of the chest abdomen and pelvis with right upper lobe pulmonary nodule which is new and the bone scan also shows uptake in the left anterior inferior iliac spine which correlates to a lucent area on the CT scan and also focal uptake noted in the left frontal calvarium concerning for metastatic disease.  Further evaluation with a MRI of the pelvis and hip as well as MRI of the brain is underway.  The scans are scheduled for 7/10/2024.  Clinical T3 N1 M1, stage IV invasive lobular carcinoma.  There is also a right axillary lymph node which has been biopsied and consistent with invasive lobular carcinoma with similar morphology as well as receptors concerning for metastatic disease rather than a primary right breast cancer.  Recommended Ribociclib 400 mg 3 weeks on and 1 week off.  July 16, 2024: Reviewed MRI of the pelvis and hip consistent with many bony metastasis.  MRI brain shows left frontal bone mets but no brain involvement.  Patient is here to start day 1 ribociclib along with anastrozole.  Continues on anastrozole.  Tempus performed on the left axilla lymph node biopsy shows PIK3CA mutation, CDH 1 mutation and Erb B2 mutation.  Therefore patient would benefit from alpelisib and Faslodex after progression on Ribociclib and AI.  Other options include neratinib plus Faslodex.  Initiated Ribociclib in August  2024.  8/30/2024-last day of week 3 of Ribociclib.    Patient completed 1 cycle of Ribociclib 400 mg and subsequently has not been able to go back on Ribociclib due to recurrent hospitalizations and abnormal LFTs.  10/14/2024-LFTs are normal today.  Recommend resuming Ribociclib at 200 mg and subsequently we will increase the dose to 400 mg with the next cycle.  Patient was unable to resume Ribociclib as she was admitted to the hospital from 10/17/2024 to 10/23/2024  11/11/2024-Labs reviewed and overall stable except for an ALT of 49.  Recommend resuming Ribociclib at 200 mg.  CT of the chest performed 10/29/2024 shows stable size of the left breast mass, decrease in size of the left axillary lymph node and mixed lytic and blastic lesion of the rib slightly increased in size.  11/11/2024-resumed Ribociclib 200 mg daily for 3/4 weeks.  2/18/2025-scans with disease progression in the bones in the liver.  Discontinue Ribociclib and anastrozole  2/28/2025-received the first dose of Faslodex.  3/17/2025-started truqap  3/21/2025- CBC reviewed and WBC 4.81, hemoglobin 13.9 platelets 595,000, CMP reviewed and LFTs have improved with ALT which is normal at 33, AST 71, alkaline phosphatase 185, total bilirubin normal at 0.2, blood sugar elevated at 409  Completed 1 week of trucap, resume again on 3/24/2025  4/4/2025 due for cycle 2-day 1 Faslodex.  She is struggling with intermittent hyperglycemia related to Truqap which is improved with glipizide  5/12/2025-CT of the chest and bone scan with overall stable disease.  Prior to that she had a CT abdomen end of April 2025 which also shows no evidence of metastatic disease.  Patient has not taken a whole lot of trucap, maybe less than 2 weeks total since initiation in March 2025.  Bilateral diagnostic mammogram and ultrasound from 6/24/2025 concerning for disease progression  CT of the chest abdomen and pelvis from 6/24/2025 also concerning for disease progression  I reviewed  her previous pathology report and HER2 is noted to be 0.  She had a repeat punch biopsy of the left breast skin.  Will follow-up on the pathology from that.  If HER2 is 1+ or even ultralow we should be able to use Enhertu.  She truly did not have disease progression on Truqap however she was hospitalized back-to-back for the few days that she took 2 Due to recurrent UTIs.  She also currently has an ongoing UTI.  I worry that any type of PIK 3 CA directed therapy will result in UTIs, hyperglycemia and diarrhea which may not be ideal for her situation with the multiple sclerosis and inability to transfer as well as bladder dysfunction  Therefore the next best option would be chemotherapy which would be either Enhertu, Trodelvy or taxane.  I called and discussed with Dr. Jacqueline Daniels to determine the HER2 and we will also await the biopsy results to determine future treatment.    *Severe hyperglycemia  Blood sugar greater than 400  Administer 5 units of insulin  Start glipizide 2.5 mg  Remain glucometer and diabetic education.  Continue glipizide 2.5 mg daily, blood glucose 142 today.  The patient understands to continue truqap if AM fasting blood glucose is less than 250.   5/9/2025-blood sugar 128    *Abdominal cramping and diarrhea  Secondary to capivasertib.  Improved    *Right axillary lymphadenopathy  Biopsied and consistent with invasive lobular carcinoma, grade 2, ER/VT positive and HER2 negative  Recent bilateral diagnostic mammogram and ultrasound from 6/24/2025 shows disease progression    *Right breast non-mass enhancement  6/28/2024-MRI of the breast with non-mass enhancement noted in the right breast and patient had questions regarding if this should be biopsied.  Given extensive metastatic disease in the bones and right axilla lymph node consistent with invasive lobular carcinoma management would not change with the biopsy and hence I would recommend against it.    *Severe nausea  Continue Compazine  and Zofran as needed  Well-controlled at this time    *Skeletal metastasis  Patient will be started on Xgeva  8/16/2024 Xgeva initiation will be held as the patient has not been to the dentist in over 2 years.  Discussed possible side effects of Xgeva and she will schedule a dental visit and we will have her back in 1 week to initiate Xgeva pending this is complete.  10/14/2024-second dose of Xgeva will be administered.  Labs reviewed and stable to proceed.  Continue Xgeva every 4 weeks    *Multiple sclerosis  Patient was not on any treatment previously.  She sees Dr. Jacobson at [facility].  Due to profound weakness patient requested her neurologist to start steroids.  They plan to initiate high-dose IV steroids to help with this.  High-dose IV steroids have not been initiated.  Doing physical therapy and received high-dose steroids  Stable    *Hypertension-continue current medication  Blood pressure BP: 136/79   Continue to monitor  No changes in medications    *Hyperlipidemia-continue current medication  No changes    *Urinary incontinence-patient had  a suprapubic catheter placed by urology.  Patient with recurrent UTIs  UTIs are a side effect of truqap  Discussed with her primary care physician regarding suppressive therapy  Patient does not wish to go back on Truqap at this time    *History of iron deficiency anemia-  3/8/2024 hemoglobin was low at 10.9 and subsequently patient took oral iron which resulted in improvement of hemoglobin and normal.  She was scheduled for colonoscopy however she canceled that due to ongoing management of breast cancer.  She proceed with a colonoscopy.  8/16/2024 hemoglobin is 10.2.  She states she recently was told to begin daily iron supplementation.  Baseline iron studies ordered today she is only been taking the iron for a few days.  Ferritin 32, iron saturation 5%, TIBC 440.  We will repeat this in 6 to 8 weeks.  12/10/2024 Hgb 12.0. Continue oral iron  Hemoglobin normal  at 12.6 on 2/28/2025  Hemoglobin normal    *Genetic testing-9 gene stat panel negative    *Dyspnea  Stable to improved    *Right upper lobe pulmonary nodule   Concerning for metastatic disease  PET/CT may not be helpful as invasive lobular carcinomas typically are not very PET avid.  Could consider PET-FES  Stable on the most recent CT of the chest    *Follow-up-after the MRIs.  Reviewed MRI of the pelvis and MRI of the brain which shows mets in the bone  MRI of the cervical and thoracic spine performed at outside facility on 10/3/2024    *Abnormal LFTs  Likely secondary to Ribociclib  Ribociclib dose increased to 400 mg daily on 12/10/2024  12/31/2024-Labs reviewed and stable.  1/13/2025: LFTs further increased with AST 72, ALT 22.  Decision made to hold off on starting next cycle of ribociclib.  1/31/2025-AST 72, ALT normal, total bilirubin normal  Liver function studies minimally elevated today, 4/4/2025 with AST 73, ALT 52, total bilirubin is normal at 0.4  Most recent LFTs from May 2025 reviewed and stable    *Insomnia  Severe  Using trazodone and Ambien.  Neither of them helping  Only clonazepam helps  Referral to supportive oncology    *Alternate medication  Patient sees a outside provider and receives ivermectin and fenbendazole  We have ran an interaction check with the medications and does not appear to be any interaction however I want her against taking these medications and potential side effects  Patient however wishes to proceed with his medications.  Patient continues on ivermectin    Plan:   Follow-up on CT of the chest abdomen and pelvis report.  I reviewed the images independently and the liver lesions seem to be progressing.  Faslodex administered 6/20/2025  Due to the worsening of the breast mass as well as concern for disease progression in the liver we discussed possibly starting Enhertu provided the pathology evaluation shows that the HER2 is 1+ or ultralow.  Discussed with Dr. Pat Daniels  and waiting for the results on the HER2.  Will also obtain the pathology from the punch biopsy which was performed last week at her dermatologist office.  I will call the patient and update recommendations on treatment  I have reviewed the side effects of Enhertu with patient at length today.    This patient is on high risk drug therapy requiring intensive monitoring for toxicity.  CT of the chest abdomen and pelvis as well as breast imaging images independently reviewed interpreted by me and concerning for disease progression.      Zainab Lopes MD   06/25/2025

## 2025-06-26 ENCOUNTER — TELEPHONE (OUTPATIENT)
Dept: ONCOLOGY | Facility: CLINIC | Age: 65
End: 2025-06-26
Payer: MEDICARE

## 2025-06-26 LAB — CANCER AG27-29 SERPL-ACNC: 409.9 U/ML (ref 0–38.6)

## 2025-06-26 NOTE — TELEPHONE ENCOUNTER
Called pt to let her know we received results and will be in contact next week about what the next steps are. She v/u. Message sent to Rosina and Dr. Lopes

## 2025-06-26 NOTE — TELEPHONE ENCOUNTER
Caller: Maritza Bejarano    Relationship: Self    Best call back number:   Telephone Information:   Mobile [phone]     What is the best time to reach you: ANYTIME    What was the call regarding: PT REC CALL FROM DERMATOLOGIST ADVISING THEY SENT THE LABS TO THE OFFICE FOR DR HARDIN TO VIEW TO CREAT NEW CARE PLAN. PLEASE CB TO ADVISE

## 2025-06-27 ENCOUNTER — EXTERNAL PBMM DATA (OUTPATIENT)
Dept: PHARMACY | Facility: OTHER | Age: 65
End: 2025-06-27
Payer: MEDICARE

## 2025-06-27 ENCOUNTER — TELEPHONE (OUTPATIENT)
Dept: ONCOLOGY | Facility: CLINIC | Age: 65
End: 2025-06-27
Payer: MEDICARE

## 2025-06-27 RX ORDER — HYDROCODONE BITARTRATE AND ACETAMINOPHEN 5; 325 MG/1; MG/1
1 TABLET ORAL EVERY 4 HOURS PRN
Qty: 60 TABLET | Refills: 0 | Status: SHIPPED | OUTPATIENT
Start: 2025-06-27

## 2025-06-27 NOTE — TELEPHONE ENCOUNTER
Discussed in-office w/ Dr. Cage. Will send Norco 5-325mg q4H PRN #60. Relayed message to patient and verified pharm. Pt v/u. Sent this message as an FYI to Marianne. Rayna Banda RN

## 2025-06-27 NOTE — TELEPHONE ENCOUNTER
Provider: Marianne  Caller: patient  Relationship to Patient: self  Call Back Phone Number: [phone]  Reason for Call: patient is having pain to her left breast and wants to see about getting some pain medication

## 2025-06-27 NOTE — TELEPHONE ENCOUNTER
"DX:Left breast invasive lobular carcinoma     TX: Last Faslodex 6/20/25    Latest plan (6/25/25): \"Due to the worsening of the breast mass as well as concern for disease progression in the liver we discussed possibly starting Enhertu provided the pathology evaluation shows that the HER2 is 1+ or ultralow. Discussed with Dr. Pat Daniels and waiting for the results on the HER2. Will also obtain the pathology from the punch biopsy which was performed last week at her dermatologist office.\"    Returned call patient. Patient states she is having pain in her left breast. Patient was seen 2 days ago and states she did discuss her pain w/ Dr. Lopes. Nothing in the note regarding her pain that I could find. States it is worse at night time. Describes it as a throbbing pain and rates it a 4-5. Tylenol doesn't help. States the pain is newly in her left shoulder blade, under armpit, and radiating down the arm.  She is requesting a low dose pain medication that she can take occasionally at night when the pain is too much to tolerate.   "

## 2025-06-30 ENCOUNTER — PATIENT OUTREACH (OUTPATIENT)
Dept: OTHER | Facility: HOSPITAL | Age: 65
End: 2025-06-30
Payer: MEDICARE

## 2025-06-30 NOTE — PROGRESS NOTES
Ms. Bejarano called with questions related to wanting her doctors to communicate with each other and come up with a plan of care that works best for her. She is specifically asking if her integrated APRN, primary care and oncologist could do that. Message sent to medical oncology asking what options there are for this. Will update with any information I receive.

## 2025-07-02 ENCOUNTER — TELEPHONE (OUTPATIENT)
Dept: ONCOLOGY | Facility: CLINIC | Age: 65
End: 2025-07-02
Payer: MEDICARE

## 2025-07-02 ENCOUNTER — TELEPHONE (OUTPATIENT)
Dept: INTERNAL MEDICINE | Facility: CLINIC | Age: 65
End: 2025-07-02
Payer: MEDICARE

## 2025-07-02 ENCOUNTER — TELEPHONE (OUTPATIENT)
Dept: CARDIOLOGY | Age: 65
End: 2025-07-02
Payer: MEDICARE

## 2025-07-02 ENCOUNTER — HOSPITAL ENCOUNTER (INPATIENT)
Facility: HOSPITAL | Age: 65
LOS: 3 days | Discharge: HOME | End: 2025-07-07
Attending: EMERGENCY MEDICINE | Admitting: INTERNAL MEDICINE
Payer: MEDICARE

## 2025-07-02 ENCOUNTER — APPOINTMENT (OUTPATIENT)
Dept: GENERAL RADIOLOGY | Facility: HOSPITAL | Age: 65
End: 2025-07-02
Payer: MEDICARE

## 2025-07-02 ENCOUNTER — PATIENT OUTREACH (OUTPATIENT)
Dept: OTHER | Facility: HOSPITAL | Age: 65
End: 2025-07-02
Payer: MEDICARE

## 2025-07-02 DIAGNOSIS — T83.511A URINARY TRACT INFECTION ASSOCIATED WITH INDWELLING URETHRAL CATHETER, INITIAL ENCOUNTER: Primary | ICD-10-CM

## 2025-07-02 DIAGNOSIS — N39.0 URINARY TRACT INFECTION ASSOCIATED WITH INDWELLING URETHRAL CATHETER, INITIAL ENCOUNTER: Primary | ICD-10-CM

## 2025-07-02 LAB
ALBUMIN SERPL-MCNC: 3.7 G/DL (ref 3.5–5.2)
ALBUMIN/GLOB SERPL: 1.1 G/DL
ALP SERPL-CCNC: 187 U/L (ref 39–117)
ALT SERPL W P-5'-P-CCNC: 26 U/L (ref 1–33)
ANION GAP SERPL CALCULATED.3IONS-SCNC: 10.3 MMOL/L (ref 5–15)
AST SERPL-CCNC: 76 U/L (ref 1–32)
BACTERIA UR QL AUTO: ABNORMAL /HPF
BASOPHILS # BLD AUTO: 0.02 10*3/MM3 (ref 0–0.2)
BASOPHILS NFR BLD AUTO: 0.3 % (ref 0–1.5)
BILIRUB SERPL-MCNC: <0.2 MG/DL (ref 0–1.2)
BILIRUB UR QL STRIP: NEGATIVE
BUN SERPL-MCNC: 16 MG/DL (ref 8–23)
BUN/CREAT SERPL: 28.6 (ref 7–25)
CALCIUM SPEC-SCNC: 8.5 MG/DL (ref 8.6–10.5)
CHLORIDE SERPL-SCNC: 107 MMOL/L (ref 98–107)
CLARITY UR: ABNORMAL
CO2 SERPL-SCNC: 22.7 MMOL/L (ref 22–29)
COLOR UR: YELLOW
CREAT SERPL-MCNC: 0.56 MG/DL (ref 0.57–1)
D-LACTATE SERPL-SCNC: 1.4 MMOL/L (ref 0.5–2)
DEPRECATED RDW RBC AUTO: 54.1 FL (ref 37–54)
EGFRCR SERPLBLD CKD-EPI 2021: 103.3 ML/MIN/1.73
EOSINOPHIL # BLD AUTO: 0.21 10*3/MM3 (ref 0–0.4)
EOSINOPHIL NFR BLD AUTO: 3.2 % (ref 0.3–6.2)
ERYTHROCYTE [DISTWIDTH] IN BLOOD BY AUTOMATED COUNT: 15.7 % (ref 12.3–15.4)
GLOBULIN UR ELPH-MCNC: 3.3 GM/DL
GLUCOSE SERPL-MCNC: 122 MG/DL (ref 65–99)
GLUCOSE UR STRIP-MCNC: NEGATIVE MG/DL
HCT VFR BLD AUTO: 31.9 % (ref 34–46.6)
HGB BLD-MCNC: 9.6 G/DL (ref 12–15.9)
HGB UR QL STRIP.AUTO: ABNORMAL
HYALINE CASTS UR QL AUTO: ABNORMAL /LPF
IMM GRANULOCYTES # BLD AUTO: 0.08 10*3/MM3 (ref 0–0.05)
IMM GRANULOCYTES NFR BLD AUTO: 1.2 % (ref 0–0.5)
KETONES UR QL STRIP: NEGATIVE
LEUKOCYTE ESTERASE UR QL STRIP.AUTO: ABNORMAL
LYMPHOCYTES # BLD AUTO: 1.57 10*3/MM3 (ref 0.7–3.1)
LYMPHOCYTES NFR BLD AUTO: 23.9 % (ref 19.6–45.3)
MAGNESIUM SERPL-MCNC: 2.4 MG/DL (ref 1.6–2.4)
MCH RBC QN AUTO: 28.4 PG (ref 26.6–33)
MCHC RBC AUTO-ENTMCNC: 30.1 G/DL (ref 31.5–35.7)
MCV RBC AUTO: 94.4 FL (ref 79–97)
MONOCYTES # BLD AUTO: 0.54 10*3/MM3 (ref 0.1–0.9)
MONOCYTES NFR BLD AUTO: 8.2 % (ref 5–12)
NEUTROPHILS NFR BLD AUTO: 4.14 10*3/MM3 (ref 1.7–7)
NEUTROPHILS NFR BLD AUTO: 63.2 % (ref 42.7–76)
NITRITE UR QL STRIP: POSITIVE
NRBC BLD AUTO-RTO: 0 /100 WBC (ref 0–0.2)
PH UR STRIP.AUTO: 6.5 [PH] (ref 5–8)
PHOSPHATE SERPL-MCNC: 2.8 MG/DL (ref 2.5–4.5)
PLATELET # BLD AUTO: 447 10*3/MM3 (ref 140–450)
PMV BLD AUTO: 9.1 FL (ref 6–12)
POTASSIUM SERPL-SCNC: 4 MMOL/L (ref 3.5–5.2)
PROCALCITONIN SERPL-MCNC: 0.15 NG/ML (ref 0–0.25)
PROT SERPL-MCNC: 7 G/DL (ref 6–8.5)
PROT UR QL STRIP: ABNORMAL
RBC # BLD AUTO: 3.38 10*6/MM3 (ref 3.77–5.28)
RBC # UR STRIP: ABNORMAL /HPF
REF LAB TEST METHOD: ABNORMAL
SODIUM SERPL-SCNC: 140 MMOL/L (ref 136–145)
SP GR UR STRIP: 1.03 (ref 1–1.03)
SQUAMOUS #/AREA URNS HPF: ABNORMAL /HPF
UROBILINOGEN UR QL STRIP: ABNORMAL
WBC # UR STRIP: ABNORMAL /HPF
WBC NRBC COR # BLD AUTO: 6.56 10*3/MM3 (ref 3.4–10.8)
YEAST URNS QL MICRO: PRESENT /HPF

## 2025-07-02 PROCEDURE — G0378 HOSPITAL OBSERVATION PER HR: HCPCS

## 2025-07-02 PROCEDURE — 81001 URINALYSIS AUTO W/SCOPE: CPT | Performed by: EMERGENCY MEDICINE

## 2025-07-02 PROCEDURE — 84100 ASSAY OF PHOSPHORUS: CPT | Performed by: EMERGENCY MEDICINE

## 2025-07-02 PROCEDURE — 25010000002 CEFEPIME PER 500 MG: Performed by: INTERNAL MEDICINE

## 2025-07-02 PROCEDURE — 25010000002 CEFEPIME PER 500 MG: Performed by: EMERGENCY MEDICINE

## 2025-07-02 PROCEDURE — 87086 URINE CULTURE/COLONY COUNT: CPT | Performed by: EMERGENCY MEDICINE

## 2025-07-02 PROCEDURE — 71045 X-RAY EXAM CHEST 1 VIEW: CPT

## 2025-07-02 PROCEDURE — 87077 CULTURE AEROBIC IDENTIFY: CPT | Performed by: EMERGENCY MEDICINE

## 2025-07-02 PROCEDURE — 83735 ASSAY OF MAGNESIUM: CPT | Performed by: EMERGENCY MEDICINE

## 2025-07-02 PROCEDURE — 99285 EMERGENCY DEPT VISIT HI MDM: CPT

## 2025-07-02 PROCEDURE — 25810000003 SODIUM CHLORIDE 0.9 % SOLUTION: Performed by: EMERGENCY MEDICINE

## 2025-07-02 PROCEDURE — P9612 CATHETERIZE FOR URINE SPEC: HCPCS

## 2025-07-02 PROCEDURE — 84145 PROCALCITONIN (PCT): CPT | Performed by: EMERGENCY MEDICINE

## 2025-07-02 PROCEDURE — 80053 COMPREHEN METABOLIC PANEL: CPT | Performed by: EMERGENCY MEDICINE

## 2025-07-02 PROCEDURE — 87186 SC STD MICRODIL/AGAR DIL: CPT | Performed by: EMERGENCY MEDICINE

## 2025-07-02 PROCEDURE — 83605 ASSAY OF LACTIC ACID: CPT | Performed by: EMERGENCY MEDICINE

## 2025-07-02 PROCEDURE — 85025 COMPLETE CBC W/AUTO DIFF WBC: CPT | Performed by: EMERGENCY MEDICINE

## 2025-07-02 RX ORDER — ACETAMINOPHEN 650 MG/1
650 SUPPOSITORY RECTAL EVERY 4 HOURS PRN
Status: DISCONTINUED | OUTPATIENT
Start: 2025-07-02 | End: 2025-07-07 | Stop reason: HOSPADM

## 2025-07-02 RX ORDER — ROSUVASTATIN CALCIUM 10 MG/1
20 TABLET, COATED ORAL DAILY
Status: DISCONTINUED | OUTPATIENT
Start: 2025-07-03 | End: 2025-07-07 | Stop reason: HOSPADM

## 2025-07-02 RX ORDER — ACETAMINOPHEN 325 MG/1
650 TABLET ORAL EVERY 4 HOURS PRN
Status: DISCONTINUED | OUTPATIENT
Start: 2025-07-02 | End: 2025-07-07 | Stop reason: HOSPADM

## 2025-07-02 RX ORDER — PRAMIPEXOLE DIHYDROCHLORIDE 0.5 MG/1
1.5 TABLET ORAL 2 TIMES DAILY
Status: DISCONTINUED | OUTPATIENT
Start: 2025-07-02 | End: 2025-07-07 | Stop reason: HOSPADM

## 2025-07-02 RX ORDER — ALBUTEROL SULFATE 0.83 MG/ML
2.5 SOLUTION RESPIRATORY (INHALATION) EVERY 6 HOURS PRN
Status: DISCONTINUED | OUTPATIENT
Start: 2025-07-02 | End: 2025-07-07 | Stop reason: HOSPADM

## 2025-07-02 RX ORDER — ACETAMINOPHEN 160 MG/5ML
650 SOLUTION ORAL EVERY 4 HOURS PRN
Status: DISCONTINUED | OUTPATIENT
Start: 2025-07-02 | End: 2025-07-07 | Stop reason: HOSPADM

## 2025-07-02 RX ORDER — BISACODYL 5 MG/1
5 TABLET, DELAYED RELEASE ORAL DAILY PRN
Status: DISCONTINUED | OUTPATIENT
Start: 2025-07-02 | End: 2025-07-07 | Stop reason: HOSPADM

## 2025-07-02 RX ORDER — AMOXICILLIN 250 MG
2 CAPSULE ORAL 2 TIMES DAILY PRN
Status: DISCONTINUED | OUTPATIENT
Start: 2025-07-02 | End: 2025-07-07 | Stop reason: HOSPADM

## 2025-07-02 RX ORDER — METOPROLOL SUCCINATE 50 MG/1
50 TABLET, EXTENDED RELEASE ORAL
Status: DISCONTINUED | OUTPATIENT
Start: 2025-07-03 | End: 2025-07-06

## 2025-07-02 RX ORDER — HYDROCODONE BITARTRATE AND ACETAMINOPHEN 5; 325 MG/1; MG/1
1 TABLET ORAL EVERY 4 HOURS PRN
Refills: 0 | Status: DISCONTINUED | OUTPATIENT
Start: 2025-07-02 | End: 2025-07-07 | Stop reason: HOSPADM

## 2025-07-02 RX ORDER — BISACODYL 10 MG
10 SUPPOSITORY, RECTAL RECTAL DAILY PRN
Status: DISCONTINUED | OUTPATIENT
Start: 2025-07-02 | End: 2025-07-07 | Stop reason: HOSPADM

## 2025-07-02 RX ORDER — ONDANSETRON 4 MG/1
4 TABLET, ORALLY DISINTEGRATING ORAL EVERY 6 HOURS PRN
Status: DISCONTINUED | OUTPATIENT
Start: 2025-07-02 | End: 2025-07-07 | Stop reason: HOSPADM

## 2025-07-02 RX ORDER — SODIUM CHLORIDE 0.9 % (FLUSH) 0.9 %
10 SYRINGE (ML) INJECTION AS NEEDED
Status: DISCONTINUED | OUTPATIENT
Start: 2025-07-02 | End: 2025-07-07 | Stop reason: HOSPADM

## 2025-07-02 RX ORDER — ONDANSETRON 2 MG/ML
4 INJECTION INTRAMUSCULAR; INTRAVENOUS EVERY 6 HOURS PRN
Status: DISCONTINUED | OUTPATIENT
Start: 2025-07-02 | End: 2025-07-07 | Stop reason: HOSPADM

## 2025-07-02 RX ORDER — AMOXICILLIN 250 MG
1 CAPSULE ORAL DAILY
Status: DISCONTINUED | OUTPATIENT
Start: 2025-07-03 | End: 2025-07-07 | Stop reason: HOSPADM

## 2025-07-02 RX ORDER — CHOLECALCIFEROL (VITAMIN D3) 25 MCG
5000 TABLET ORAL DAILY
Status: DISCONTINUED | OUTPATIENT
Start: 2025-07-03 | End: 2025-07-07 | Stop reason: HOSPADM

## 2025-07-02 RX ORDER — POLYETHYLENE GLYCOL 3350 17 G/17G
17 POWDER, FOR SOLUTION ORAL DAILY PRN
Status: DISCONTINUED | OUTPATIENT
Start: 2025-07-02 | End: 2025-07-07 | Stop reason: HOSPADM

## 2025-07-02 RX ORDER — PANTOPRAZOLE SODIUM 40 MG/1
40 TABLET, DELAYED RELEASE ORAL 3 TIMES WEEKLY
Status: DISCONTINUED | OUTPATIENT
Start: 2025-07-02 | End: 2025-07-07 | Stop reason: HOSPADM

## 2025-07-02 RX ORDER — ASPIRIN 81 MG/1
81 TABLET ORAL DAILY
Status: DISCONTINUED | OUTPATIENT
Start: 2025-07-03 | End: 2025-07-07 | Stop reason: HOSPADM

## 2025-07-02 RX ADMIN — DOCUSATE SODIUM 50 MG AND SENNOSIDES 8.6 MG 2 TABLET: 8.6; 5 TABLET, FILM COATED ORAL at 22:08

## 2025-07-02 RX ADMIN — Medication 2.5 MG: at 22:08

## 2025-07-02 RX ADMIN — HYDROCODONE BITARTRATE AND ACETAMINOPHEN 1 TABLET: 5; 325 TABLET ORAL at 22:08

## 2025-07-02 RX ADMIN — PRAMIPEXOLE DIHYDROCHLORIDE 1.5 MG: 0.5 TABLET ORAL at 22:09

## 2025-07-02 RX ADMIN — CEFEPIME 2000 MG: 2 INJECTION, POWDER, FOR SOLUTION INTRAVENOUS at 16:33

## 2025-07-02 RX ADMIN — PANTOPRAZOLE SODIUM 40 MG: 40 TABLET, DELAYED RELEASE ORAL at 22:09

## 2025-07-02 RX ADMIN — SODIUM CHLORIDE 500 ML: 9 INJECTION, SOLUTION INTRAVENOUS at 14:16

## 2025-07-02 RX ADMIN — CEFEPIME 2000 MG: 2 INJECTION, POWDER, FOR SOLUTION INTRAVENOUS at 22:09

## 2025-07-02 NOTE — TELEPHONE ENCOUNTER
Caller: Maritza Bejarano    Relationship: Self    Best call back number: [phone]    Who are you requesting to speak with (clinical staff, provider,  specific staff member): HASEEB    What was the call regarding: PT STATES SHE HAD LEFT A VM EARLIER BUT HASN'T HEARD BACK.  PT NEEDING A CB TODAY REGARDING THE PATHOLOGY

## 2025-07-02 NOTE — ED NOTES
Nursing report ED to floor  Maritza Nance Darci  64 y.o.  female    HPI :  HPI  Stated Reason for Visit: weakness/ fever    Chief Complaint  Chief Complaint   Patient presents with    Weakness - Generalized    Fever       Admitting doctor:   Gisella Britt MD    Admitting diagnosis:   There were no encounter diagnoses.    Code status:   Current Code Status       Date Active Code Status Order ID Comments User Context       7/2/2025 1711 CPR (Attempt to Resuscitate) [id number]  Gisella Britt MD ED        Question Answer    Code Status (Patient has no pulse and is not breathing) CPR (Attempt to Resuscitate)    Medical Interventions (Patient has pulse or is breathing) Full                    Allergies:   Klonopin [clonazepam]    Isolation:   No active isolations    Intake and Output    Intake/Output Summary (Last 24 hours) at 7/2/2025 1747  Last data filed at 7/2/2025 1743  Gross per 24 hour   Intake 500 ml   Output --   Net 500 ml       Weight:   There were no vitals filed for this visit.    Most recent vitals:   Vitals:    07/02/25 1527 07/02/25 1527 07/02/25 1555 07/02/25 1739   BP:   149/71 118/65   Pulse: 74  71 82   Resp:  18     Temp:       TempSrc:       SpO2: 100%  100% 98%       Active LDAs/IV Access:   Lines, Drains & Airways       Active LDAs       Name Placement date Placement time Site Days    Peripheral IV 07/02/25 1400 20 G Left Antecubital 07/02/25  1400  Antecubital  less than 1                    Labs (abnormal labs have a star):   Labs Reviewed   COMPREHENSIVE METABOLIC PANEL - Abnormal; Notable for the following components:       Result Value    Glucose 122 (*)     Creatinine 0.56 (*)     Calcium 8.5 (*)     AST (SGOT) 76 (*)     Alkaline Phosphatase 187 (*)     BUN/Creatinine Ratio 28.6 (*)     All other components within normal limits    Narrative:     GFR Categories in Chronic Kidney Disease (CKD)              GFR Category          GFR (mL/min/1.73)    Interpretation  G1                     90 or greater        Normal or high (1)  G2                    60-89                Mild decrease (1)  G3a                   45-59                Mild to moderate decrease  G3b                   30-44                Moderate to severe decrease  G4                    15-29                Severe decrease  G5                    14 or less           Kidney failure    (1)In the absence of evidence of kidney disease, neither GFR category G1 or G2 fulfill the criteria for CKD.    eGFR calculation 2021 CKD-EPI creatinine equation, which does not include race as a factor   URINALYSIS W/ CULTURE IF INDICATED - Abnormal; Notable for the following components:    Appearance, UA Turbid (*)     Blood, UA Small (1+) (*)     Protein, UA 30 mg/dL (1+) (*)     Leuk Esterase, UA Large (3+) (*)     Nitrite, UA Positive (*)     All other components within normal limits    Narrative:     In absence of clinical symptoms, the presence of pyuria, bacteria, and/or nitrites on the urinalysis result does not correlate with infection.   CBC WITH AUTO DIFFERENTIAL - Abnormal; Notable for the following components:    RBC 3.38 (*)     Hemoglobin 9.6 (*)     Hematocrit 31.9 (*)     MCHC 30.1 (*)     RDW 15.7 (*)     RDW-SD 54.1 (*)     Immature Grans % 1.2 (*)     Immature Grans, Absolute 0.08 (*)     All other components within normal limits   URINALYSIS, MICROSCOPIC ONLY - Abnormal; Notable for the following components:    RBC, UA 21-50 (*)     WBC, UA Too Numerous to Count (*)     Bacteria, UA 4+ (*)     Squamous Epithelial Cells, UA 3-6 (*)     Yeast, UA Present (*)     All other components within normal limits   LACTIC ACID, PLASMA - Normal   PROCALCITONIN - Normal    Narrative:     As a Marker for Sepsis (Non-Neonates):    1. <0.5 ng/mL represents a low risk of severe sepsis and/or septic shock.  2. >2 ng/mL represents a high risk of severe sepsis and/or septic shock.    As a Marker for Lower Respiratory Tract Infections that require  "antibiotic therapy:    PCT on Admission    Antibiotic Therapy       6-12 Hrs later    >0.5                Strongly Recommended  >0.25 - <0.5        Recommended   0.1 - 0.25          Discouraged              Remeasure/reassess PCT  <0.1                Strongly Discouraged     Remeasure/reassess PCT    As 28 day mortality risk marker: \"Change in Procalcitonin Result\" (>80% or <=80%) if Day 0 (or Day 1) and Day 4 values are available. Refer to http://www.[facility]-pct-calculator.com    Change in PCT <=80%  A decrease of PCT levels below or equal to 80% defines a positive change in PCT test result representing a higher risk for 28-day all-cause mortality of patients diagnosed with severe sepsis for septic shock.    Change in PCT >80%  A decrease of PCT levels of more than 80% defines a negative change in PCT result representing a lower risk for 28-day all-cause mortality of patients diagnosed with severe sepsis or septic shock.      MAGNESIUM - Normal   PHOSPHORUS - Normal   URINE CULTURE   CBC AND DIFFERENTIAL    Narrative:     The following orders were created for panel order CBC & Differential.  Procedure                               Abnormality         Status                     ---------                               -----------         ------                     CBC Auto Differential[[id number]]        Abnormal            Final result                 Please view results for these tests on the individual orders.       EKG:   No orders to display       Meds given in ED:   Medications   sodium chloride 0.9 % flush 10 mL (has no administration in time range)   acetaminophen (TYLENOL) tablet 650 mg (has no administration in time range)     Or   acetaminophen (TYLENOL) 160 MG/5ML oral solution 650 mg (has no administration in time range)     Or   acetaminophen (TYLENOL) suppository 650 mg (has no administration in time range)   ondansetron ODT (ZOFRAN-ODT) disintegrating tablet 4 mg (has no administration in time range)    "  Or   ondansetron (ZOFRAN) injection 4 mg (has no administration in time range)   melatonin tablet 2.5 mg (has no administration in time range)   sennosides-docusate (PERICOLACE) 8.6-50 MG per tablet 2 tablet (has no administration in time range)     And   polyethylene glycol (MIRALAX) packet 17 g (has no administration in time range)     And   bisacodyl (DULCOLAX) EC tablet 5 mg (has no administration in time range)     And   bisacodyl (DULCOLAX) suppository 10 mg (has no administration in time range)   sodium chloride 0.9 % bolus 500 mL (0 mL Intravenous Stopped 25 1743)   cefepime 2000 mg IVPB in 100 mL NS (MBP) (2,000 mg Intravenous New Bag 25 1633)       Imaging results:  XR Chest 1 View  Result Date: 2025  As described.  This report was finalized on 2025 2:39 PM by Dr. Luan Estrada M.D on Workstation: [employer]        Ambulatory status:   - non-ambulatory    Social issues:   Social History     Socioeconomic History    Marital status:      Spouse name: Donald    Number of children: 0   Tobacco Use    Smoking status: Former     Current packs/day: 0.00     Average packs/day: 2.0 packs/day for 30.0 years (60.0 ttl pk-yrs)     Types: Cigarettes     Start date: 10/22/1973     Quit date: 10/22/2003     Years since quittin.7    Smokeless tobacco: Never   Vaping Use    Vaping status: Never Used   Substance and Sexual Activity    Alcohol use: Not Currently    Drug use: Yes     Types: Marijuana     Comment: rarely    Sexual activity: Not Currently     Partners: Male     Birth control/protection: Post-menopausal       Peripheral Neurovascular  Peripheral Neurovascular (Adult)  Peripheral Neurovascular WDL: WDL, capillary refill  Capillary Refill, General: less than/equal to 3 secs    Neuro Cognitive  Neuro Cognitive (Adult)  Cognitive/Neuro/Behavioral WDL: WDL, orientation  Orientation: oriented x 4    Learning  Learning Assessment  Learning Readiness and Ability: no barriers  identified    Respiratory  Respiratory WDL  Respiratory WDL: WDL  Breath Sounds  All Lung Fields Breath Sounds: All Fields  All Lung Fields Breath Sounds: equal bilaterally, clear    Abdominal Pain       Pain Assessments  Pain (Adult)  (0-10) Pain Rating: Rest: 0  (0-10) Pain Rating: Activity: 0    NIH Stroke Scale       Fiona Abbasi RN  07/02/25 17:47 EDT

## 2025-07-02 NOTE — PROGRESS NOTES
Ms. Bejarano called with more concerns about her treatment and next steps. Message sent to Dr. Lopes with concerns on behalf of patient. Ms. Bejarano also stated she is in the ER again for another potential UTI. Let her know her message was sent to Marianne and encouraged follow up with them about next steps.

## 2025-07-02 NOTE — TELEPHONE ENCOUNTER
Birgit dumont/ [employer] call regarding Maritza Bergn. She states she may be septic. Symptoms include temp of 101, chills, bp 162/100,fatigue and headache. She did take tylenol for her headache..    Advise Birgit patient need to go to the emergency room .

## 2025-07-02 NOTE — ED NOTES
Nursing report ED to floor  Maritza Bejarano  64 y.o.  female    HPI :  HPI  Stated Reason for Visit: weakness/ fever    Chief Complaint  Chief Complaint   Patient presents with    Weakness - Generalized    Fever       Admitting doctor:   Gisella Britt MD    Admitting diagnosis:   There were no encounter diagnoses.    Code status:   Current Code Status       Date Active Code Status Order ID Comments User Context       Prior            Allergies:   Klonopin [clonazepam]    Isolation:   No active isolations    Intake and Output  No intake or output data in the 24 hours ending 07/02/25 1702    Weight:   There were no vitals filed for this visit.    Most recent vitals:   Vitals:    07/02/25 1525 07/02/25 1527 07/02/25 1527 07/02/25 1555   BP: 144/76   149/71   Pulse: 76 74  71   Resp:   18    Temp:       TempSrc:       SpO2: 100% 100%  100%       Active LDAs/IV Access:   Lines, Drains & Airways       Active LDAs       Name Placement date Placement time Site Days    Peripheral IV 07/02/25 1400 20 G Left Antecubital 07/02/25  1400  Antecubital  less than 1                    Labs (abnormal labs have a star):   Labs Reviewed   COMPREHENSIVE METABOLIC PANEL - Abnormal; Notable for the following components:       Result Value    Glucose 122 (*)     Creatinine 0.56 (*)     Calcium 8.5 (*)     AST (SGOT) 76 (*)     Alkaline Phosphatase 187 (*)     BUN/Creatinine Ratio 28.6 (*)     All other components within normal limits    Narrative:     GFR Categories in Chronic Kidney Disease (CKD)              GFR Category          GFR (mL/min/1.73)    Interpretation  G1                    90 or greater        Normal or high (1)  G2                    60-89                Mild decrease (1)  G3a                   45-59                Mild to moderate decrease  G3b                   30-44                Moderate to severe decrease  G4                    15-29                Severe decrease  G5                    14 or less            "Kidney failure    (1)In the absence of evidence of kidney disease, neither GFR category G1 or G2 fulfill the criteria for CKD.    eGFR calculation 2021 CKD-EPI creatinine equation, which does not include race as a factor   URINALYSIS W/ CULTURE IF INDICATED - Abnormal; Notable for the following components:    Appearance, UA Turbid (*)     Blood, UA Small (1+) (*)     Protein, UA 30 mg/dL (1+) (*)     Leuk Esterase, UA Large (3+) (*)     Nitrite, UA Positive (*)     All other components within normal limits    Narrative:     In absence of clinical symptoms, the presence of pyuria, bacteria, and/or nitrites on the urinalysis result does not correlate with infection.   CBC WITH AUTO DIFFERENTIAL - Abnormal; Notable for the following components:    RBC 3.38 (*)     Hemoglobin 9.6 (*)     Hematocrit 31.9 (*)     MCHC 30.1 (*)     RDW 15.7 (*)     RDW-SD 54.1 (*)     Immature Grans % 1.2 (*)     Immature Grans, Absolute 0.08 (*)     All other components within normal limits   URINALYSIS, MICROSCOPIC ONLY - Abnormal; Notable for the following components:    RBC, UA 21-50 (*)     WBC, UA Too Numerous to Count (*)     Bacteria, UA 4+ (*)     Squamous Epithelial Cells, UA 3-6 (*)     Yeast, UA Present (*)     All other components within normal limits   LACTIC ACID, PLASMA - Normal   PROCALCITONIN - Normal    Narrative:     As a Marker for Sepsis (Non-Neonates):    1. <0.5 ng/mL represents a low risk of severe sepsis and/or septic shock.  2. >2 ng/mL represents a high risk of severe sepsis and/or septic shock.    As a Marker for Lower Respiratory Tract Infections that require antibiotic therapy:    PCT on Admission    Antibiotic Therapy       6-12 Hrs later    >0.5                Strongly Recommended  >0.25 - <0.5        Recommended   0.1 - 0.25          Discouraged              Remeasure/reassess PCT  <0.1                Strongly Discouraged     Remeasure/reassess PCT    As 28 day mortality risk marker: \"Change in " "Procalcitonin Result\" (>80% or <=80%) if Day 0 (or Day 1) and Day 4 values are available. Refer to http://www.[facility]pct-calculator.com    Change in PCT <=80%  A decrease of PCT levels below or equal to 80% defines a positive change in PCT test result representing a higher risk for 28-day all-cause mortality of patients diagnosed with severe sepsis for septic shock.    Change in PCT >80%  A decrease of PCT levels of more than 80% defines a negative change in PCT result representing a lower risk for 28-day all-cause mortality of patients diagnosed with severe sepsis or septic shock.      MAGNESIUM - Normal   PHOSPHORUS - Normal   URINE CULTURE   CBC AND DIFFERENTIAL    Narrative:     The following orders were created for panel order CBC & Differential.  Procedure                               Abnormality         Status                     ---------                               -----------         ------                     CBC Auto Differential[[id number]]        Abnormal            Final result                 Please view results for these tests on the individual orders.       EKG:   No orders to display       Meds given in ED:   Medications   sodium chloride 0.9 % flush 10 mL (has no administration in time range)   cefepime 2000 mg IVPB in 100 mL NS (MBP) (2,000 mg Intravenous New Bag 7/2/25 1633)   sodium chloride 0.9 % bolus 500 mL (500 mL Intravenous New Bag 7/2/25 1416)       Imaging results:  XR Chest 1 View  Result Date: 7/2/2025  As described.  This report was finalized on 7/2/2025 2:39 PM by Dr. Luan Estrada M.D on Workstation: [email or web address]        Ambulatory status:   - assist     Social issues:   Social History     Socioeconomic History    Marital status:      Spouse name: Donald    Number of children: 0   Tobacco Use    Smoking status: Former     Current packs/day: 0.00     Average packs/day: 2.0 packs/day for 30.0 years (60.0 ttl pk-yrs)     Types: Cigarettes     Start date: 10/22/1973     Quit " date: 10/22/2003     Years since quittin.7    Smokeless tobacco: Never   Vaping Use    Vaping status: Never Used   Substance and Sexual Activity    Alcohol use: Not Currently    Drug use: Yes     Types: Marijuana     Comment: rarely    Sexual activity: Not Currently     Partners: Male     Birth control/protection: Post-menopausal       Peripheral Neurovascular  Peripheral Neurovascular (Adult)  Peripheral Neurovascular WDL: WDL, capillary refill  Capillary Refill, General: less than/equal to 3 secs    Neuro Cognitive  Neuro Cognitive (Adult)  Cognitive/Neuro/Behavioral WDL: WDL, orientation  Orientation: oriented x 4    Learning  Learning Assessment  Learning Readiness and Ability: no barriers identified    Respiratory  Respiratory WDL  Respiratory WDL: WDL  Breath Sounds  All Lung Fields Breath Sounds: All Fields  All Lung Fields Breath Sounds: equal bilaterally, clear    Abdominal Pain       Pain Assessments  Pain (Adult)  (0-10) Pain Rating: Rest: 0  (0-10) Pain Rating: Activity: 0    NIH Stroke Scale       Su Kunz RN  25 17:02 EDT

## 2025-07-02 NOTE — ED PROVIDER NOTES
EMERGENCY DEPARTMENT ENCOUNTER    Room Number:  E448/1  PCP: Enoc Carbone DO  Historian: Patient    I initially evaluated the patient at 1500    HPI:  Chief Complaint: Fever, weakness  A complete HPI/ROS/PMH/PSH/SH/FH are unobtainable due to: Nothing  Context: Maritza Bejarano is a 64 y.o. female with a medical history of breast cancer, MS, hypertension, hyperlipidemia who presents to the ED c/o acute fever and generalized weakness.  Patient had temperature of 100.1 this morning.  She took Tylenol several hours ago.  She has had increased generalized weakness and some chills.  Denies sore throat, cough, chest pain, shortness of breath, abdominal pain, vomiting, or diarrhea.  She has a chronic Cobos catheter that was last changed about 2 weeks ago.  She has been admitted here several times in the past year for urosepsis.  She is nonambulatory at baseline. She has a history of invasive lobular carcinoma of the left breast with skeletal disease.  She is not currently undergoing any treatment for this.            PAST MEDICAL HISTORY  Active Ambulatory Problems     Diagnosis Date Noted    H/O total shoulder replacement, right 10/22/2018    Cough 01/28/2021    Acute UTI (urinary tract infection) 01/28/2021    Multiple sclerosis 01/28/2021    Essential hypertension 01/28/2021    Hyperlipidemia 01/28/2021    Shortness of breath 01/28/2021    Oropharyngeal dysphagia 02/04/2021    Abnormal finding on mammography 08/09/2021    Acid reflux 08/09/2021    Anxiety and depression 05/01/2018    Brash 08/09/2021    Carpal tunnel syndrome 01/10/2013    Chronic low back pain 01/22/2014    Diplopia 01/10/2013    Disease with a predominantly sexual mode of transmission 08/09/2021    FOM (frequency of micturition) 08/09/2021    Headache 01/10/2013    History of diplopia 01/07/2020    History of optic neuritis 01/07/2020    History of vitamin D deficiency 10/31/2017    Migraine syndrome 01/22/2014    Mixed incontinence 07/31/2017    Nausea  08/10/2015    Neurogenic bladder 02/23/2017    Pain in joint of right shoulder 07/31/2017    Restless legs syndrome 07/23/2014    Rupture of rotator cuff of shoulder 08/31/2017    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    S/P cubital tunnel release 01/04/2019    Vitamin D deficiency 01/22/2014    Multiple sclerosis exacerbation 02/26/2022    Sepsis due to Gram negative bacteria 02/27/2022    Lower extremity cellulitis 03/07/2024    Malignant neoplasm of overlapping sites of left breast in female, estrogen receptor positive 06/28/2024    Encounter for long-term (current) use of other medications 07/16/2024    Cancer, metastatic to bone 07/31/2024    Colitis 09/10/2024    Weakness 09/18/2024    Elevated LFTs 09/21/2024    Acute UTI 10/18/2024    Sepsis 04/17/2025    NSTEMI (non-ST elevated myocardial infarction) 04/22/2025    Type 2 diabetes mellitus 04/22/2025    Stress-induced cardiomyopathy 05/23/2025     Resolved Ambulatory Problems     Diagnosis Date Noted    No Resolved Ambulatory Problems     Past Medical History:   Diagnosis Date    Anemia 2024    Dawn esophagus     Blurred vision     Breast cancer     Clotting disorder 1993, 2003, 2012    Colon polyp 2013    Deep vein thrombosis phlebitis 1980    Depression     GERD (gastroesophageal reflux disease)     GI (gastrointestinal bleed) 3 bleeds    H/O Skin cancer, basal cell     History of blood transfusion     History of GI bleed     History of urinary tract infection     Hypercalcemia     Hypertension     Movement disorder     Optic neuritis     PONV (postoperative nausea and vomiting)     Steroid-induced diabetes 2024         PAST SURGICAL HISTORY  Past Surgical History:   Procedure Laterality Date    APPENDECTOMY      BLADDER SURGERY      bladder stimulator    BREAST BIOPSY  don't remember    BREAST SURGERY      augmentation wtih subsequent removal    CARPAL TUNNEL RELEASE Bilateral     Left 2018, right 2020    CUBITAL TUNNEL RELEASE Left      CYSTOSCOPY BOTOX INJECTION OF BLADDER  2018    Cystoscopy with Botox    FRACTURE SURGERY  2019    rt shoulder    PARATHYROIDECTOMY      one gland removed    ROTATOR CUFF REPAIR Right 2017    TOE SURGERY      bilateral great toes    TOTAL SHOULDER ARTHROPLASTY W/ DISTAL CLAVICLE EXCISION Right 10/22/2018    Procedure: RT TOTAL SHOULDER REVERSE ARTHROPLASTY;  Surgeon: Bipin Dangelo MD;  Location: [facility] MAIN OR;  Service: Orthopedics         FAMILY HISTORY  Family History   Problem Relation Age of Onset    Hypertension Mother     Hyperlipidemia Mother     Aortic aneurysm Mother         thoracic    Diabetes Mother     Hypertension Father         charissa heart failure    Heart failure Father     Hyperlipidemia Father     Miscarriages / Stillbirths Sister     Multiple sclerosis Brother     Atrial fibrillation Brother     Diabetes Maternal Grandmother     Diabetes Maternal Grandfather     Stroke Maternal Grandfather     Parkinsonism Paternal Grandmother     Tremor Paternal Grandmother     Stomach cancer Paternal Grandfather          SOCIAL HISTORY  Social History     Socioeconomic History    Marital status:      Spouse name: Donald    Number of children: 0   Tobacco Use    Smoking status: Former     Current packs/day: 0.00     Average packs/day: 2.0 packs/day for 30.0 years (60.0 ttl pk-yrs)     Types: Cigarettes     Start date: 10/22/1973     Quit date: 10/22/2003     Years since quittin.7    Smokeless tobacco: Never   Vaping Use    Vaping status: Never Used   Substance and Sexual Activity    Alcohol use: Not Currently    Drug use: Yes     Types: Marijuana     Comment: rarely    Sexual activity: Not Currently     Partners: Male     Birth control/protection: Post-menopausal         ALLERGIES  Klonopin [clonazepam]    REVIEW OF SYSTEMS  Review of Systems  Included in HPI  All systems reviewed and negative except for those discussed in HPI.      PHYSICAL EXAM  ED Triage Vitals   Temp Heart Rate Resp BP SpO2    07/02/25 1231 07/02/25 1231 07/02/25 1231 07/02/25 1405 07/02/25 1231   99 °F (37.2 °C) 83 18 118/58 95 %      Temp src Heart Rate Source Patient Position BP Location FiO2 (%)   07/02/25 1231 -- -- -- --   Oral           Physical Exam      GENERAL: Awake, alert, oriented x 3.  Chronically ill but nontoxic-appearing female.  Resting comfortably in no acute distress  HENT: NCAT, nares patent, [place] membranes  EYES: no scleral icterus  CV: regular rhythm, normal rate  RESPIRATORY: normal effort, clear to auscultation bilaterally  ABDOMEN: soft, nontender  : Cobos catheter is present draining yellow urine  MUSCULOSKELETAL: Extremities are nontender   NEURO: Speech is normal.  No facial droop.  Follows commands  PSYCH:  calm, cooperative  SKIN: warm, dry    Vital signs and nursing notes reviewed.          LAB RESULTS  Recent Results (from the past 24 hours)   Comprehensive Metabolic Panel    Collection Time: 07/02/25  2:19 PM    Specimen: Arm, Left; Blood   Result Value Ref Range    Glucose 122 (H) 65 - 99 mg/dL    BUN 16.0 8.0 - 23.0 mg/dL    Creatinine 0.56 (L) 0.57 - 1.00 mg/dL    Sodium 140 136 - 145 mmol/L    Potassium 4.0 3.5 - 5.2 mmol/L    Chloride 107 98 - 107 mmol/L    CO2 22.7 22.0 - 29.0 mmol/L    Calcium 8.5 (L) 8.6 - 10.5 mg/dL    Total Protein 7.0 6.0 - 8.5 g/dL    Albumin 3.7 3.5 - 5.2 g/dL    ALT (SGPT) 26 1 - 33 U/L    AST (SGOT) 76 (H) 1 - 32 U/L    Alkaline Phosphatase 187 (H) 39 - 117 U/L    Total Bilirubin <0.2 0.0 - 1.2 mg/dL    Globulin 3.3 gm/dL    A/G Ratio 1.1 g/dL    BUN/Creatinine Ratio 28.6 (H) 7.0 - 25.0    Anion Gap 10.3 5.0 - 15.0 mmol/L    eGFR 103.3 >60.0 mL/min/1.73   Lactic Acid, Plasma    Collection Time: 07/02/25  2:19 PM    Specimen: Arm, Left; Blood   Result Value Ref Range    Lactate 1.4 0.5 - 2.0 mmol/L   Procalcitonin    Collection Time: 07/02/25  2:19 PM    Specimen: Arm, Left; Blood   Result Value Ref Range    Procalcitonin 0.15 0.00 - 0.25 ng/mL   Magnesium     Collection Time: 07/02/25  2:19 PM    Specimen: Arm, Left; Blood   Result Value Ref Range    Magnesium 2.4 1.6 - 2.4 mg/dL   Phosphorus    Collection Time: 07/02/25  2:19 PM    Specimen: Arm, Left; Blood   Result Value Ref Range    Phosphorus 2.8 2.5 - 4.5 mg/dL   CBC Auto Differential    Collection Time: 07/02/25  2:19 PM    Specimen: Arm, Left; Blood   Result Value Ref Range    WBC 6.56 3.40 - 10.80 10*3/mm3    RBC 3.38 (L) 3.77 - 5.28 10*6/mm3    Hemoglobin 9.6 (L) 12.0 - 15.9 g/dL    Hematocrit 31.9 (L) 34.0 - 46.6 %    MCV 94.4 79.0 - 97.0 fL    MCH 28.4 26.6 - 33.0 pg    MCHC 30.1 (L) 31.5 - 35.7 g/dL    RDW 15.7 (H) 12.3 - 15.4 %    RDW-SD 54.1 (H) 37.0 - 54.0 fl    MPV 9.1 6.0 - 12.0 fL    Platelets 447 140 - 450 10*3/mm3    Neutrophil % 63.2 42.7 - 76.0 %    Lymphocyte % 23.9 19.6 - 45.3 %    Monocyte % 8.2 5.0 - 12.0 %    Eosinophil % 3.2 0.3 - 6.2 %    Basophil % 0.3 0.0 - 1.5 %    Immature Grans % 1.2 (H) 0.0 - 0.5 %    Neutrophils, Absolute 4.14 1.70 - 7.00 10*3/mm3    Lymphocytes, Absolute 1.57 0.70 - 3.10 10*3/mm3    Monocytes, Absolute 0.54 0.10 - 0.90 10*3/mm3    Eosinophils, Absolute 0.21 0.00 - 0.40 10*3/mm3    Basophils, Absolute 0.02 0.00 - 0.20 10*3/mm3    Immature Grans, Absolute 0.08 (H) 0.00 - 0.05 10*3/mm3    nRBC 0.0 0.0 - 0.2 /100 WBC   Urinalysis With Culture If Indicated - Urine, Catheter    Collection Time: 07/02/25  2:24 PM    Specimen: Urine, Catheter   Result Value Ref Range    Color, UA Yellow Yellow, Straw    Appearance, UA Turbid (A) Clear    pH, UA 6.5 5.0 - 8.0    Specific Gravity, UA 1.026 1.005 - 1.030    Glucose, UA Negative Negative    Ketones, UA Negative Negative    Bilirubin, UA Negative Negative    Blood, UA Small (1+) (A) Negative    Protein, UA 30 mg/dL (1+) (A) Negative    Leuk Esterase, UA Large (3+) (A) Negative    Nitrite, UA Positive (A) Negative    Urobilinogen, UA 0.2 E.U./dL 0.2 - 1.0 E.U./dL   Urinalysis, Microscopic Only - Urine, Catheter    Collection Time:  07/02/25  2:24 PM    Specimen: Urine, Catheter   Result Value Ref Range    RBC, UA 21-50 (A) None Seen, 0-2 /HPF    WBC, UA Too Numerous to Count (A) None Seen, 0-2 /HPF    Bacteria, UA 4+ (A) None Seen /HPF    Squamous Epithelial Cells, UA 3-6 (A) None Seen, 0-2 /HPF    Yeast, UA Present (A) None Seen /HPF    Hyaline Casts, UA 7-12 None Seen /LPF    Methodology Automated Microscopy        Ordered the above labs and reviewed the results.        RADIOLOGY  XR Chest 1 View  Result Date: 7/2/2025  XR CHEST 1 VW-  HISTORY: Female who is 64 years-old, fever  TECHNIQUE: Frontal view of the chest  COMPARISON: 5/9/2025  FINDINGS: The heart size is borderline. Pulmonary vasculature is unremarkable. Small likely atelectasis or scarring left lower lung. Previous CT-demonstrated right lower lobe pulmonary nodular density is not well visualized on this exam, CT follow-up recommended for direct comparison with previous CT exam. No large pleural effusion, or pneumothorax. No acute osseous process.      As described.  This report was finalized on 7/2/2025 2:39 PM by Dr. Luan Estrada M.D on Workstation: [employer]        Ordered the above noted radiological studies. Reviewed by me in PACS.            PROCEDURES  Procedures        OUTPATIENT MEDICATION MANAGEMENT:  Current Facility-Administered Medications Ordered in Epic   Medication Dose Route Frequency Provider Last Rate Last Admin    acetaminophen (TYLENOL) tablet 650 mg  650 mg Oral Q4H PRN Gisella Britt MD        Or    acetaminophen (TYLENOL) 160 MG/5ML oral solution 650 mg  650 mg Oral Q4H PRN Gisella Britt MD        Or    acetaminophen (TYLENOL) suppository 650 mg  650 mg Rectal Q4H PRN Gisella Britt MD        albuterol (PROVENTIL) nebulizer solution 0.083% 2.5 mg/3mL  2.5 mg Nebulization Q6H PRN Gisella Britt MD        [START ON 7/3/2025] aspirin EC tablet 81 mg  81 mg Oral Daily Gisella Britt MD        sennosides-docusate  (PERICOLACE) 8.6-50 MG per tablet 2 tablet  2 tablet Oral BID PRN Gisella Britt MD   2 tablet at 07/02/25 2208    And    polyethylene glycol (MIRALAX) packet 17 g  17 g Oral Daily PRN Gisella Britt MD        And    bisacodyl (DULCOLAX) EC tablet 5 mg  5 mg Oral Daily PRN Gisella Britt MD        And    bisacodyl (DULCOLAX) suppository 10 mg  10 mg Rectal Daily PRN Gisella Britt MD        cefepime 2000 mg IVPB in 100 mL NS (MBP)  2,000 mg Intravenous Q8H Gisella Britt MD   2,000 mg at 07/02/25 2209    [START ON 7/3/2025] cholecalciferol (VITAMIN D3) tablet 5,000 Units  5,000 Units Oral Daily Gisella Britt MD        HYDROcodone-acetaminophen (NORCO) 5-325 MG per tablet 1 tablet  1 tablet Oral Q4H PRN Gisella Britt MD   1 tablet at 07/02/25 2208    melatonin tablet 2.5 mg  2.5 mg Oral Nightly Gisella Britt MD   2.5 mg at 07/02/25 2208    [START ON 7/3/2025] metoprolol succinate XL (TOPROL-XL) 24 hr tablet 50 mg  50 mg Oral Q24H Gisella Britt MD        ondansetron ODT (ZOFRAN-ODT) disintegrating tablet 4 mg  4 mg Oral Q6H PRN Gisella Britt MD        Or    ondansetron (ZOFRAN) injection 4 mg  4 mg Intravenous Q6H PRN Gisella Britt MD        pantoprazole (PROTONIX) EC tablet 40 mg  40 mg Oral Once per day on Monday Wednesday Friday Gisella Britt MD   40 mg at 07/02/25 2209    pramipexole (MIRAPEX) tablet 1.5 mg  1.5 mg Oral BID Gisella Britt MD   1.5 mg at 07/02/25 2209    [START ON 7/3/2025] rosuvastatin (CRESTOR) tablet 20 mg  20 mg Oral Daily Gisella Britt MD        [START ON 7/3/2025] sennosides-docusate (PERICOLACE) 8.6-50 MG per tablet 1 tablet  1 tablet Oral Daily Gisella Britt MD        sodium chloride 0.9 % flush 10 mL  10 mL Intravenous PRN Danita Finch MD         No current [facility]-ordered outpatient medications on file.           MEDICATIONS GIVEN IN ER  Medications    sodium chloride 0.9 % flush 10 mL (has no administration in time range)   acetaminophen (TYLENOL) tablet 650 mg (has no administration in time range)     Or   acetaminophen (TYLENOL) 160 MG/5ML oral solution 650 mg (has no administration in time range)     Or   acetaminophen (TYLENOL) suppository 650 mg (has no administration in time range)   ondansetron ODT (ZOFRAN-ODT) disintegrating tablet 4 mg (has no administration in time range)     Or   ondansetron (ZOFRAN) injection 4 mg (has no administration in time range)   melatonin tablet 2.5 mg (2.5 mg Oral Given 7/2/25 2208)   sennosides-docusate (PERICOLACE) 8.6-50 MG per tablet 2 tablet (2 tablets Oral Given 7/2/25 2208)     And   polyethylene glycol (MIRALAX) packet 17 g (has no administration in time range)     And   bisacodyl (DULCOLAX) EC tablet 5 mg (has no administration in time range)     And   bisacodyl (DULCOLAX) suppository 10 mg (has no administration in time range)   cefepime 2000 mg IVPB in 100 mL NS (MBP) (2,000 mg Intravenous New Bag 7/2/25 2209)   albuterol (PROVENTIL) nebulizer solution 0.083% 2.5 mg/3mL (has no administration in time range)   aspirin EC tablet 81 mg (has no administration in time range)   cholecalciferol (VITAMIN D3) tablet 5,000 Units (has no administration in time range)   HYDROcodone-acetaminophen (NORCO) 5-325 MG per tablet 1 tablet (1 tablet Oral Given 7/2/25 2208)   metoprolol succinate XL (TOPROL-XL) 24 hr tablet 50 mg (has no administration in time range)   pantoprazole (PROTONIX) EC tablet 40 mg (40 mg Oral Given 7/2/25 2209)   rosuvastatin (CRESTOR) tablet 20 mg (has no administration in time range)   pramipexole (MIRAPEX) tablet 1.5 mg (1.5 mg Oral Given 7/2/25 2209)   sennosides-docusate (PERICOLACE) 8.6-50 MG per tablet 1 tablet (has no administration in time range)   sodium chloride 0.9 % bolus 500 mL (0 mL Intravenous Stopped 7/2/25 3843)   cefepime 2000 mg IVPB in 100 mL NS (MBP) (0 mg Intravenous Stopped 7/2/25  1810)                   MEDICAL DECISION MAKING, PROGRESS, and CONSULTS    All labs have been independently reviewed by me.  All radiology studies have been reviewed by me and I have also reviewed the radiology report.   EKG's independently viewed and interpreted by me.  Discussion below represents my analysis of pertinent findings related to patient's condition, differential diagnosis, treatment plan and final disposition.      Additional sources:    - Discussed/ obtained information from independent historians: None    - External (non-ED) record review: Patient was last admitted here in April 2025 for septic shock secondary to Klebsiella UTI and bacteremia.  While hospitalized, she developed KINZA, severe metabolic encephalopathy, and non-STEMI.  Echocardiogram showed normal LV systolic function.    -Prescription drug monitoring program review:     EM_[place] : N/A    - Chronic or social conditions impacting patient care (Social Determinants of Health): None          Orders placed during this visit:  Orders Placed This Encounter   Procedures    Urine Culture - Urine,    XR Chest 1 View    Comprehensive Metabolic Panel    Urinalysis With Culture If Indicated - Urine, Catheter    Lactic Acid, Plasma    Procalcitonin    Magnesium    Phosphorus    CBC Auto Differential    Urinalysis, Microscopic Only - Urine, Clean Catch    Basic Metabolic Panel    CBC (No Diff)    Diet: Cardiac; Healthy Heart (2-3 Na+); Fluid Consistency: Thin (IDDSI 0)    Vital Signs    Up with assistance    Daily Weights    Strict Intake & Output    Oral Care    Place Sequential Compression Device    Maintain Sequential Compression Device    Code Status and Medical Interventions: CPR (Attempt to Resuscitate); Full    LHA (on-call MD unless specified) Details    PT Consult: Eval & Treat Functional Mobility Below Baseline    Telemetry Scan    Insert Peripheral IV    Initiate Observation Status    ED Bed Request    CBC & Differential         Additional  orders considered but not ordered:          Differential diagnosis includes, but is not limited to:    UTI, sepsis, bacteremia      Independent interpretation of labs, radiology studies, and discussions with consultants:  ED Course as of 07/02/25 2316   Wed Jul 02, 2025   1405 First look: 63 yo F with h/o Breast ca and MS with chronic indwelling matias with several hospitalizations in last 6 months for Urosepsis. She started keflex she had at home yesterday.  This am she had a low grade fever and her HH provider recommended she come in for eval.  No n/v, no abd pain, no cp, no sob, no syncope. On exam, cooperative and conversant with abdomen soft and nontender. Plan sepsis screening labs incl lactic and procal with ua.  Re-eval and dispo per oncoming provider. [AR]   1459 BP: 118/58 [WH]   1459 Temp: 99 °F (37.2 °C) [WH]   1459 Heart Rate: 83 [WH]   1459 Resp: 18 [WH]   1459 SpO2: 95 % [WH]   1459 Device (Oxygen Therapy): room air [WH]   1510 Chest x-ray personally interpreted by me.  Heart size is borderline.  No large pleural effusion.  Per the radiologist, there is atelectasis versus scarring in the left lower lung. [WH]   1510 WBC: 6.56 [WH]   1511 Hemoglobin(!): 9.6  10.4 twelve days ago [WH]   1550 Procalcitonin: 0.15 [WH]   1550 Magnesium: 2.4 [WH]   1550 Glucose(!): 122 [WH]   1550 BUN: 16.0 [WH]   1550 Creatinine(!): 0.56 [WH]   1550 AST (SGOT)(!): 76 [WH]   1550 Alkaline Phosphatase(!): 187  AST 85, alkaline phosphatase 203 12 days ago [WH]   1550 Lactate: 1.4 [WH]   1551 Leukocytes, UA(!): Large (3+) [WH]   1551 Nitrite, UA(!): Positive [WH]   1551 RBC, UA(!): 21-50 [WH]   1551 WBC, UA(!): Too Numerous to Count [WH]   1551 Bacteria, UA(!): 4+ [WH]   1551 Yeast(!): Present [WH]   1552 Urinalysis shows evidence of a UTI.  Urine culture done on 5/9/2025 grew out Pseudomonas was pan susceptible.  Case discussed with Erika, ED pharmacist, and he recommends starting the patient on IV cefepime 2 g. [WH]   1600  Patient is resting comfortably.  Test results and diagnoses were discussed with her.  Shared decision making was discussed and admission was recommended.  She is agreeable with this.  Call will be placed to hospitalist []   1615 Case discussed with Dr. Britt, hospitalist, she agrees to admit the patient to [facility].  Pertinent history, exam findings, test results, ED course, and diagnoses were discussed with her. []   1622 MDM: Patient presented to the ED with acute fever and weakness.  She has a chronic Cobos catheter.  Urinalysis showed evidence of UTI.  She has multiple previous admissions here for urosepsis.  White blood cell count, lactic acid, and renal function were normal.  Urine cultures pending.  Patient was started on IV antibiotics.  She will be admitted to the hospitalist.  She was hemodynamically stable while in the ED. []      ED Course User Index  [AR] Danita Finch MD  [WH] Anant Soria MD         COMPLEXITY OF CARE  The patient requires admission.      DIAGNOSIS  Final diagnoses:   Urinary tract infection associated with indwelling urethral catheter, initial encounter         DISPOSITION  ADMISSION    Discussed treatment plan and reason for admission with pt/family and admitting physician.  Pt/family voiced understanding of the plan for admission for further testing/treatment as needed.             Latest Documented Vital Signs:  AS OF 23:16 EDT VITALS:    BP - 142/59  HR - 80  TEMP - 98.4 °F (36.9 °C) (Oral)  O2 SATS - 98%            --    Please note that portions of this were completed with a voice recognition program.       Note Disclaimer: At [facility], we believe that sharing information builds trust and better relationships. You are receiving this note because you are receiving care at [facility] or recently visited. It is possible you will see health information before a provider has talked with you about it. This kind of information can be easy to  misunderstand. To help you fully understand what it means for your health, we urge you to discuss this note with your provider.             Anant Soria MD  07/02/25 0624

## 2025-07-02 NOTE — Clinical Note
Level of Care: Med/Surg [1]   Diagnosis: UTI (urinary tract infection) due to urinary indwelling catheter [[id number]]   Admitting Physician: NIHARIKA RAMIREZ [[id number]]   Attending Physician: NIHARIKA RAMIREZ [[id number]]   Is patient appropriate for Inpatient Observation Unit?: No [0]

## 2025-07-02 NOTE — TELEPHONE ENCOUNTER
Returned call to Maritza.  I informed her that we do not have the HER 2 back yet.  I have asked for an ETA and will update her with that information when I receive it.  She is currently in the hospital for UTI.  Advised her I am following her chart closely and will update her just as soon as I have information to share.  She voiced understanding.

## 2025-07-03 ENCOUNTER — TELEPHONE (OUTPATIENT)
Dept: CARDIOLOGY | Age: 65
End: 2025-07-03

## 2025-07-03 PROBLEM — D64.9 ANEMIA: Status: ACTIVE | Noted: 2025-07-03

## 2025-07-03 LAB
ANION GAP SERPL CALCULATED.3IONS-SCNC: 8.6 MMOL/L (ref 5–15)
BUN SERPL-MCNC: 13 MG/DL (ref 8–23)
BUN/CREAT SERPL: 27.1 (ref 7–25)
CALCIUM SPEC-SCNC: 7.8 MG/DL (ref 8.6–10.5)
CHLORIDE SERPL-SCNC: 110 MMOL/L (ref 98–107)
CO2 SERPL-SCNC: 21.4 MMOL/L (ref 22–29)
CREAT SERPL-MCNC: 0.48 MG/DL (ref 0.57–1)
DEPRECATED RDW RBC AUTO: 53.8 FL (ref 37–54)
EGFRCR SERPLBLD CKD-EPI 2021: 107.2 ML/MIN/1.73
ERYTHROCYTE [DISTWIDTH] IN BLOOD BY AUTOMATED COUNT: 15.8 % (ref 12.3–15.4)
GLUCOSE BLDC GLUCOMTR-MCNC: 130 MG/DL (ref 70–130)
GLUCOSE BLDC GLUCOMTR-MCNC: 184 MG/DL (ref 70–130)
GLUCOSE SERPL-MCNC: 95 MG/DL (ref 65–99)
HCT VFR BLD AUTO: 28.2 % (ref 34–46.6)
HGB BLD-MCNC: 8.7 G/DL (ref 12–15.9)
MCH RBC QN AUTO: 28.5 PG (ref 26.6–33)
MCHC RBC AUTO-ENTMCNC: 30.9 G/DL (ref 31.5–35.7)
MCV RBC AUTO: 92.5 FL (ref 79–97)
PLATELET # BLD AUTO: 412 10*3/MM3 (ref 140–450)
PMV BLD AUTO: 9 FL (ref 6–12)
POTASSIUM SERPL-SCNC: 4 MMOL/L (ref 3.5–5.2)
RBC # BLD AUTO: 3.05 10*6/MM3 (ref 3.77–5.28)
SODIUM SERPL-SCNC: 140 MMOL/L (ref 136–145)
WBC NRBC COR # BLD AUTO: 4.78 10*3/MM3 (ref 3.4–10.8)

## 2025-07-03 PROCEDURE — 82948 REAGENT STRIP/BLOOD GLUCOSE: CPT

## 2025-07-03 PROCEDURE — G0378 HOSPITAL OBSERVATION PER HR: HCPCS

## 2025-07-03 PROCEDURE — 87040 BLOOD CULTURE FOR BACTERIA: CPT | Performed by: STUDENT IN AN ORGANIZED HEALTH CARE EDUCATION/TRAINING PROGRAM

## 2025-07-03 PROCEDURE — 63710000001 INSULIN LISPRO (HUMAN) PER 5 UNITS: Performed by: STUDENT IN AN ORGANIZED HEALTH CARE EDUCATION/TRAINING PROGRAM

## 2025-07-03 PROCEDURE — 36415 COLL VENOUS BLD VENIPUNCTURE: CPT | Performed by: INTERNAL MEDICINE

## 2025-07-03 PROCEDURE — 85027 COMPLETE CBC AUTOMATED: CPT | Performed by: INTERNAL MEDICINE

## 2025-07-03 PROCEDURE — 25010000002 CEFEPIME PER 500 MG: Performed by: INTERNAL MEDICINE

## 2025-07-03 PROCEDURE — 80048 BASIC METABOLIC PNL TOTAL CA: CPT | Performed by: INTERNAL MEDICINE

## 2025-07-03 RX ORDER — INSULIN LISPRO 100 [IU]/ML
2-7 INJECTION, SOLUTION INTRAVENOUS; SUBCUTANEOUS
Status: DISCONTINUED | OUTPATIENT
Start: 2025-07-03 | End: 2025-07-07 | Stop reason: HOSPADM

## 2025-07-03 RX ORDER — DEXTROSE MONOHYDRATE 25 G/50ML
25 INJECTION, SOLUTION INTRAVENOUS
Status: DISCONTINUED | OUTPATIENT
Start: 2025-07-03 | End: 2025-07-07 | Stop reason: HOSPADM

## 2025-07-03 RX ORDER — NICOTINE POLACRILEX 4 MG
15 LOZENGE BUCCAL
Status: DISCONTINUED | OUTPATIENT
Start: 2025-07-03 | End: 2025-07-07 | Stop reason: HOSPADM

## 2025-07-03 RX ORDER — BACLOFEN 10 MG/1
10 TABLET ORAL ONCE
Status: COMPLETED | OUTPATIENT
Start: 2025-07-03 | End: 2025-07-03

## 2025-07-03 RX ORDER — IBUPROFEN 600 MG/1
1 TABLET ORAL
Status: DISCONTINUED | OUTPATIENT
Start: 2025-07-03 | End: 2025-07-07 | Stop reason: HOSPADM

## 2025-07-03 RX ADMIN — PRAMIPEXOLE DIHYDROCHLORIDE 1.5 MG: 0.5 TABLET ORAL at 20:15

## 2025-07-03 RX ADMIN — METOPROLOL SUCCINATE 50 MG: 50 TABLET, EXTENDED RELEASE ORAL at 08:16

## 2025-07-03 RX ADMIN — INSULIN LISPRO 2 UNITS: 100 INJECTION, SOLUTION INTRAVENOUS; SUBCUTANEOUS at 22:34

## 2025-07-03 RX ADMIN — CEFEPIME 2000 MG: 2 INJECTION, POWDER, FOR SOLUTION INTRAVENOUS at 22:36

## 2025-07-03 RX ADMIN — PRAMIPEXOLE DIHYDROCHLORIDE 1.5 MG: 0.5 TABLET ORAL at 08:16

## 2025-07-03 RX ADMIN — ROSUVASTATIN CALCIUM 20 MG: 10 TABLET, FILM COATED ORAL at 08:16

## 2025-07-03 RX ADMIN — BACLOFEN 10 MG: 10 TABLET ORAL at 22:35

## 2025-07-03 RX ADMIN — CEFEPIME 2000 MG: 2 INJECTION, POWDER, FOR SOLUTION INTRAVENOUS at 14:30

## 2025-07-03 RX ADMIN — Medication 5000 UNITS: at 08:16

## 2025-07-03 RX ADMIN — ASPIRIN 81 MG: 81 TABLET, COATED ORAL at 08:16

## 2025-07-03 RX ADMIN — SENNOSIDES AND DOCUSATE SODIUM 1 TABLET: 50; 8.6 TABLET ORAL at 08:17

## 2025-07-03 RX ADMIN — CEFEPIME 2000 MG: 2 INJECTION, POWDER, FOR SOLUTION INTRAVENOUS at 05:42

## 2025-07-03 NOTE — CASE MANAGEMENT/SOCIAL WORK
Discharge Planning Assessment  [facility]     Patient Name: Maritza Bejarano  MRN: [id number]  Today's Date: 7/3/2025    Admit Date: 7/2/2025    Plan: Home with spouse and [employer]  (Current). Spouse will transport pt home in pt's handicap van at D/C.   Discharge Needs Assessment       Row Name 07/03/25 1430       Living Environment    People in Home spouse    Name(s) of People in Home Lives with her spouse    Current Living Arrangements home    Potentially Unsafe Housing Conditions none    In the past 12 months has the electric, gas, oil, or water company threatened to shut off services in your home? No    Primary Care Provided by spouse/significant other;self    Provides Primary Care For no one, unable/limited ability to care for self    Family Caregiver if Needed spouse    Quality of Family Relationships involved;supportive    Able to Return to Prior Arrangements yes       Resource/Environmental Concerns    Resource/Environmental Concerns none    Transportation Concerns none  Pt has a Handicap van       Transportation Needs    In the past 12 months, has lack of transportation kept you from medical appointments or from getting medications? no    In the past 12 months, has lack of transportation kept you from meetings, work, or from getting things needed for daily living? No       Food Insecurity    Within the past 12 months, you worried that your food would run out before you got the money to buy more. Never true    Within the past 12 months, the food you bought just didn't last and you didn't have money to get more. Never true       Transition Planning    Patient/Family Anticipates Transition to home with family    Patient/Family Anticipated Services at Transition home health care    Transportation Anticipated family or friend will provide       Discharge Needs Assessment    Readmission Within the Last 30 Days no previous admission in last 30 days    Equipment Currently Used at Home lift device;other (see  comments);bath bench;hospital bed;grab bar;cpap;wheelchair, motorized  Rachell Lift, F/C supplies, Handicap van, and a Lift track system into her bathroom    Concerns to be Addressed care coordination/care conferences;discharge planning    Do you want help finding or keeping work or a job? I do not need or want help    Do you want help with school or training? For example, starting or completing job training or getting a high school diploma, GED or equivalent No    Anticipated Changes Related to Illness none    Equipment Needed After Discharge none    Discharge Facility/Level of Care Needs home with home health    Provided Post Acute Provider List? Refused    Provided Post Acute Provider Quality & Resource List? Refused    Offered/Gave Vendor List no    Patient's Choice of Community Agency(s) Will use [facility]    Current Discharge Risk chronically ill;dependent with mobility/activities of daily living;physical impairment                   Discharge Plan       Row Name 07/03/25 1504       Plan    Plan Home with spouse and Eric  (Current). Spouse will transport pt home in pt's handicap van at D/C.    Patient/Family in Agreement with Plan yes    Plan Comments Met with pt at bedside. Explained roll of . Face sheet and pharmacy verified. Pt lives with her spouse in a single-story home with a ramp to enter. Home DME includes a bath bench; hospital bed; grab bar; cpap; wheelchair, motorized, Rachell Lift, F/C supplies, Handicap van, and a Lift track system into her bathroom.  Pt is bedbound and requires a Rachell lift to transfer to her motorized WC. She is dependent with ADLs. Pt has been to [facility], and [place] for Rehab. She is current with [facility] and they are following. Pt's PCP is Enoc Carbone DO. Uses [facility] at [place].  Pt has a handicap van and her spouse drives her to appointments and will transport at discharge. Pt's Living Will on  file lists her sister as her HCS. Pt requesting to complete a new Living Will to make her spouse her HCS. Order for [facility] to see for new Living Will placed in [facility]. Explained that CCP would follow to assess for discharge needs.  Wander Garcia RN-BC                  Continued Care and Services - Admitted Since 7/2/2025       Home Medical Care Coordination complete.      Service Provider Request Status Services Address Phone Fax Patient Preferred    [facility] [address], [address], [facility] [address] [phone] --                  Selected Continued Care - Prior Encounters Includes continued care and service providers with selected services from prior encounters from 4/3/2025 to 7/3/2025      Discharged on 4/30/2025 Admission date: 4/17/2025 - Discharge disposition: Home-Health Care [facility]      Home Medical Care       Service Provider Services Address Phone Fax Patient Preferred    [facility] [address], [facility] [address] [phone] --                             Demographic Summary       Row Name 07/03/25 1428       General Information    Admission Type observation    Arrived From emergency department    Required Notices Provided Observation Status Notice    Reason for Consult care coordination/care conference;discharge planning    Preferred Language English                   Functional Status       Row Name 07/03/25 1429       Functional Status    Usual Activity Tolerance poor    Current Activity Tolerance poor       Functional Status, IADL    Medications completely dependent    Meal Preparation completely dependent    Housekeeping completely dependent    Laundry completely dependent    Shopping completely dependent    If for any reason you need help with day-to-day activities such as bathing, preparing meals, shopping, managing finances, etc., do you get  the help you need? I get all the help I need       Mental Status    General Appearance WDL WDL       Mental Status Summary    Recent Changes in Mental Status/Cognitive Functioning no changes       Employment/    Employment Status disabled                              Wander Garcia, RN

## 2025-07-03 NOTE — TELEPHONE ENCOUNTER
Caller: Maritza Bejarano    Relationship: Self    Best call back number: [phone]     What was the call regarding: PT CALLING TO CANCEL [facility] APPT FOR TODAY TO FU ON SEPTIC SHOCK FROM A UTI - PT IS BACK IN HOSPITAL WITH ANOTHER UTI AND IS ON ABX, SHE IS ASKING IF [facility] CAN BE SENT TO HOSPITAL TO BE COMPLETED WHILE SHE IS THERE - PLEASE ADVISE

## 2025-07-03 NOTE — PLAN OF CARE
Problem: Adult Inpatient Plan of Care  Goal: Plan of Care Review  Outcome: Progressing  Flowsheets (Taken 7/3/2025 0545)  Progress: improving  Outcome Evaluation: New admit this shift for UTI. Pt has chronic indwelling matias. Cath care provided. IV abx given per order. VSS. Plan of care updated. Continue safety measures and progress towards goals.  Plan of Care Reviewed With: patient  Goal: Patient-Specific Goal (Individualized)  Outcome: Progressing  Goal: Absence of Hospital-Acquired Illness or Injury  Outcome: Progressing  Intervention: Identify and Manage Fall Risk  Recent Flowsheet Documentation  Taken 7/3/2025 0400 by Azalia Byrd, RN  Safety Promotion/Fall Prevention:   safety round/check completed   activity supervised   assistive device/personal items within reach   fall prevention program maintained   nonskid shoes/slippers when out of bed  Taken 7/3/2025 0227 by Azalia Byrd, RN  Safety Promotion/Fall Prevention:   safety round/check completed   activity supervised   assistive device/personal items within reach   fall prevention program maintained   nonskid shoes/slippers when out of bed  Taken 7/3/2025 0000 by Azalia Byrd, RN  Safety Promotion/Fall Prevention:   safety round/check completed   activity supervised   assistive device/personal items within reach   fall prevention program maintained   nonskid shoes/slippers when out of bed  Taken 7/2/2025 2222 by Azalia Byrd, RN  Safety Promotion/Fall Prevention:   safety round/check completed   activity supervised   assistive device/personal items within reach   fall prevention program maintained   nonskid shoes/slippers when out of bed  Taken 7/2/2025 2000 by Azalia Byrd, RN  Safety Promotion/Fall Prevention:   safety round/check completed   activity supervised   assistive device/personal items within reach   fall prevention program maintained   nonskid shoes/slippers when out of bed  Intervention: Prevent Skin Injury  Recent Flowsheet  Documentation  Taken 7/2/2025 2222 by Azalia Byrd RN  Body Position: weight shifting  Taken 7/2/2025 2000 by Azalia Byrd RN  Body Position:   turned   right  Skin Protection:   incontinence pads utilized   silicone foam dressing in place   skin sealant/moisture barrier applied  Intervention: Prevent Infection  Recent Flowsheet Documentation  Taken 7/3/2025 0400 by Azalia Byrd RN  Infection Prevention:   rest/sleep promoted   personal protective equipment utilized  Taken 7/3/2025 0227 by Azalia Byrd RN  Infection Prevention:   rest/sleep promoted   personal protective equipment utilized  Taken 7/3/2025 0000 by Azalia Byrd RN  Infection Prevention:   personal protective equipment utilized   rest/sleep promoted  Taken 7/2/2025 2222 by Azalia Byrd RN  Infection Prevention:   rest/sleep promoted   personal protective equipment utilized  Taken 7/2/2025 2000 by Azalia Byrd RN  Infection Prevention:   rest/sleep promoted   personal protective equipment utilized  Goal: Optimal Comfort and Wellbeing  Outcome: Progressing  Intervention: Provide Person-Centered Care  Recent Flowsheet Documentation  Taken 7/3/2025 0000 by Azalia Byrd RN  Trust Relationship/Rapport:   care explained   choices provided   questions answered   reassurance provided   thoughts/feelings acknowledged  Taken 7/2/2025 2000 by Azalia Byrd RN  Trust Relationship/Rapport:   care explained   choices provided   questions answered   reassurance provided   thoughts/feelings acknowledged  Goal: Readiness for Transition of Care  Outcome: Progressing  Intervention: Mutually Develop Transition Plan  Recent Flowsheet Documentation  Taken 7/2/2025 1900 by Azalia Byrd RN  Equipment Currently Used at Home:   lift device   shower chair   wheelchair, motorized  Transportation Anticipated: family or friend will provide  Transportation Concerns: none  Patient/Family Anticipated Services at Transition: home health  care  Patient/Family Anticipates Transition to:   home with family   home with help/services   Goal Outcome Evaluation:  Plan of Care Reviewed With: patient        Progress: improving  Outcome Evaluation: New admit this shift for UTI. Pt has chronic indwelling matias. Cath care provided. IV abx given per order. VSS. Plan of care updated. Continue safety measures and progress towards goals.

## 2025-07-03 NOTE — NURSING NOTE
Spoke with infection control RN who states no need for candida auris screen at this time.Pt screened in October and was negative and has not been in a rehab facility since then.

## 2025-07-03 NOTE — H&P
HISTORY AND PHYSICAL   [facility]        Date of Admission: 2025  Patient Identification:  Name: Maritza Nance Darci  Age: 64 y.o.  Sex: female  :  1960  MRN: [id number]                     Primary Care Physician: Enoc Carbone DO    Chief Complaint:  64 year old female presented to the emergency room with weakness, low grage fever, chills which started this  morning; she has an indwelling catheter and a history of breast cancer and is followed by oncology and is receiving faslodex; she has had past episodes of sepsis and uti    History of Present Illness:   As above    Past Medical History:  Past Medical History:   Diagnosis Date    Anemia     `Treated with iron    Dawn esophagus     per patient    Blurred vision     R/T MS    Breast cancer     metastatic    Carpal tunnel syndrome     Clotting disorder , ,     3 g/i bleeds w/ transfus/ions    Colon polyp 2013    removed w/ colonoscopy    Deep vein thrombosis phlebitis     Depression     Diplopia     GERD (gastroesophageal reflux disease)     GI (gastrointestinal bleed) 3 bleeds    2 transfusions    H/O Skin cancer, basal cell     Headache     History of blood transfusion     History of GI bleed     R/T NSAIDS AND STEROIDS, multiple times    History of urinary tract infection     Hypercalcemia     s/p parathyroidectomy    Hyperlipidemia     Hypertension     Movement disorder     Multiple sclerosis     Optic neuritis     PONV (postoperative nausea and vomiting)     Steroid-induced diabetes      Past Surgical History:  Past Surgical History:   Procedure Laterality Date    APPENDECTOMY      BLADDER SURGERY      bladder stimulator    BREAST BIOPSY  don't remember    BREAST SURGERY      augmentation wtih subsequent removal    CARPAL TUNNEL RELEASE Bilateral     Left 2018, right 2020    CUBITAL TUNNEL RELEASE Left     CYSTOSCOPY BOTOX INJECTION OF BLADDER  2018    Cystoscopy with Botox    FRACTURE SURGERY  2019    rt  shoulder    PARATHYROIDECTOMY      one gland removed    ROTATOR CUFF REPAIR Right 2017    TOE SURGERY      bilateral great toes    TOTAL SHOULDER ARTHROPLASTY W/ DISTAL CLAVICLE EXCISION Right 10/22/2018    Procedure: RT TOTAL SHOULDER REVERSE ARTHROPLASTY;  Surgeon: Bipin Dangelo MD;  Location: [facility] OR;  Service: Orthopedics      Home Meds:  Medications Prior to Admission   Medication Sig Dispense Refill Last Dose/Taking    albuterol sulfate  (90 Base) MCG/ACT inhaler Inhale 2 puffs Every 4 (Four) Hours As Needed for Wheezing or Shortness of Air. 18 g 0 Taking As Needed    anastrozole (ARIMIDEX) 1 MG tablet Take 1 tablet by mouth Daily.   Taking    aspirin 81 MG EC tablet Take 1 tablet by mouth Daily. 30 tablet 0 Taking    baclofen (LIORESAL) 20 MG tablet Take 1 tablet by mouth 2 (Two) Times a Day.   Taking    Cholecalciferol (vitamin D3) 125 MCG (5000 UT) tablet tablet Take 1 tablet by mouth Daily.   Taking    Fenbendazole powder Use 440 mg 3 (Three) Times a Week. 2 capsules 3 times weekly   Taking    HYDROcodone-acetaminophen (NORCO) 5-325 MG per tablet Take 1 tablet by mouth Every 4 (Four) Hours As Needed for Moderate Pain. 60 tablet 0 Taking As Needed    IVERMECTIN PO Take 15 mg by mouth 5 (Five) Times a Week. Mopnday-Friday   Taking    metoprolol succinate XL (TOPROL-XL) 50 MG 24 hr tablet Take 1 tablet by mouth Daily. 90 tablet 1 Taking    pantoprazole (PROTONIX) 40 MG EC tablet TAKE ONE TABLET BY MOUTH EVERY DAY (Patient taking differently: Take 1 tablet by mouth 3 (Three) Times a Week.) 180 tablet 0 Taking Differently    phenazopyridine (PYRIDIUM) 200 MG tablet Take 1 tablet by mouth 3 (Three) Times a Day As Needed for Dysuria. 20 tablet 0 Taking As Needed    pramipexole (MIRAPEX) 1.5 MG tablet Take 1 tablet by mouth 2 (Two) Times a Day.   Taking    prochlorperazine (COMPAZINE) 10 MG tablet Take 1 tablet by mouth Every 6 (Six) Hours As Needed for Nausea or Vomiting. 90 tablet 5 Taking As  Needed    rosuvastatin (CRESTOR) 20 MG tablet Take 1 tablet by mouth Daily. 90 tablet 1 Taking    sennosides-docusate (senna-docusate sodium) 8.6-50 MG per tablet Take 1 tablet by mouth Daily. 30 tablet 2 Taking       Allergies:  Allergies   Allergen Reactions    Klonopin [Clonazepam] Mental Status Change, Confusion and Hallucinations     Immunizations:  Immunization History   Administered Date(s) Administered    Tdap 2019     Social History:   Social History     Social History Narrative    Not on file     Social History     Socioeconomic History    Marital status:      Spouse name: Donald    Number of children: 0   Tobacco Use    Smoking status: Former     Current packs/day: 0.00     Average packs/day: 2.0 packs/day for 30.0 years (60.0 ttl pk-yrs)     Types: Cigarettes     Start date: 10/22/1973     Quit date: 10/22/2003     Years since quittin.7    Smokeless tobacco: Never   Vaping Use    Vaping status: Never Used   Substance and Sexual Activity    Alcohol use: Not Currently    Drug use: Yes     Types: Marijuana     Comment: rarely    Sexual activity: Not Currently     Partners: Male     Birth control/protection: Post-menopausal       Family History:  Family History   Problem Relation Age of Onset    Hypertension Mother     Hyperlipidemia Mother     Aortic aneurysm Mother         thoracic    Diabetes Mother     Hypertension Father         charissa heart failure    Heart failure Father     Hyperlipidemia Father     Miscarriages / Stillbirths Sister     Multiple sclerosis Brother     Atrial fibrillation Brother     Diabetes Maternal Grandmother     Diabetes Maternal Grandfather     Stroke Maternal Grandfather     Parkinsonism Paternal Grandmother     Tremor Paternal Grandmother     Stomach cancer Paternal Grandfather         Review of Systems  See history of present illness and past medical history.  Patient denies headache, dizziness, syncope, falls, trauma, change in vision, change in hearing, change  "in taste, changes in weight, changes in appetite, focal weakness, numbness, or paresthesia.  Patient denies chest pain, palpitations, dyspnea, orthopnea, PND, cough, sinus pressure, rhinorrhea, epistaxis, hemoptysis, nausea, vomiting,hematemesis, diarrhea, constipation or hematochezia.  Denies cold or heat intolerance, polydipsia, polyuria, polyphagia. Denies hematuria, pyuria, dysuria, hesitancy, frequency or urgency. Denies consumption of raw and under cooked meats foods or change in water source.       Objective:  T Max 24 hrs: Temp (24hrs), Av.7 °F (37.1 °C), Min:98.4 °F (36.9 °C), Max:99 °F (37.2 °C)    Vitals Ranges:   Temp:  [98.4 °F (36.9 °C)-99 °F (37.2 °C)] 98.4 °F (36.9 °C)  Heart Rate:  [67-83] 80  Resp:  [18] 18  BP: (118-149)/(58-76) 142/59      Exam:  /59 (BP Location: Right arm, Patient Position: Lying)   Pulse 80   Temp 98.4 °F (36.9 °C) (Oral)   Resp 18   Ht 165.1 cm (65\")   Wt 81.6 kg (180 lb)   LMP  (LMP Unknown) Comment: PT. STATES HER LAST PERIOD WAS GREATER THAN FIVE YEARS AGO  SpO2 98%   BMI 29.95 kg/m²     General Appearance:    Alert, cooperative, no distress, appears stated age; chronically ill appearing   Head:    Normocephalic, without obvious abnormality, atraumatic   Eyes:    PERRL, conjunctivae/corneas clear, EOM's intact, both eyes   Ears:    Normal external ear canals, both ears   Nose:   Nares normal, septum midline, mucosa normal, no drainage    or sinus tenderness   Throat:   Lips, mucosa, and tongue normal   Neck:   Supple, symmetrical, trachea midline, no adenopathy;     thyroid:  no enlargement/tenderness/nodules; no carotid    bruit or JVD   Back:     Symmetric, no curvature, ROM normal, no CVA tenderness   Lungs:     Clear to auscultation bilaterally, respirations unlabored   Chest Wall:    No tenderness or deformity    Heart:    Regular rate and rhythm, S1 and S2 normal, no murmur, rub   or gallop   Abdomen:     Soft, nontender, bowel sounds active all " four quadrants,     no masses, no hepatomegaly, no splenomegaly   Extremities:   Extremities normal, atraumatic, no cyanosis or edema                       .    Data Review:  Labs in chart were reviewed.  WBC   Date Value Ref Range Status   07/02/2025 6.56 3.40 - 10.80 10*3/mm3 Final     Hemoglobin   Date Value Ref Range Status   07/02/2025 9.6 (L) 12.0 - 15.9 g/dL Final     Hematocrit   Date Value Ref Range Status   07/02/2025 31.9 (L) 34.0 - 46.6 % Final     Platelets   Date Value Ref Range Status   07/02/2025 447 140 - 450 10*3/mm3 Final     Sodium   Date Value Ref Range Status   07/02/2025 140 136 - 145 mmol/L Final     Potassium   Date Value Ref Range Status   07/02/2025 4.0 3.5 - 5.2 mmol/L Final     Chloride   Date Value Ref Range Status   07/02/2025 107 98 - 107 mmol/L Final     CO2   Date Value Ref Range Status   07/02/2025 22.7 22.0 - 29.0 mmol/L Final     BUN   Date Value Ref Range Status   07/02/2025 16.0 8.0 - 23.0 mg/dL Final     Creatinine   Date Value Ref Range Status   07/02/2025 0.56 (L) 0.57 - 1.00 mg/dL Final     Glucose   Date Value Ref Range Status   07/02/2025 122 (H) 65 - 99 mg/dL Final     Calcium   Date Value Ref Range Status   07/02/2025 8.5 (L) 8.6 - 10.5 mg/dL Final     Magnesium   Date Value Ref Range Status   07/02/2025 2.4 1.6 - 2.4 mg/dL Final     Phosphorus   Date Value Ref Range Status   07/02/2025 2.8 2.5 - 4.5 mg/dL Final     AST (SGOT)   Date Value Ref Range Status   07/02/2025 76 (H) 1 - 32 U/L Final     ALT (SGPT)   Date Value Ref Range Status   07/02/2025 26 1 - 33 U/L Final     Alkaline Phosphatase   Date Value Ref Range Status   07/02/2025 187 (H) 39 - 117 U/L Final                Imaging Results (All)       Procedure Component Value Units Date/Time    XR Chest 1 View [[id number]] Collected: 07/02/25 1436     Updated: 07/02/25 1443    Narrative:      XR CHEST 1 VW-     HISTORY: Female who is 64 years-old, fever     TECHNIQUE: Frontal view of the chest     COMPARISON:  5/9/2025     FINDINGS: The heart size is borderline. Pulmonary vasculature is  unremarkable. Small likely atelectasis or scarring left lower lung.  Previous CT-demonstrated right lower lobe pulmonary nodular density is  not well visualized on this exam, CT follow-up recommended for direct  comparison with previous CT exam. No large pleural effusion, or  pneumothorax. No acute osseous process.       Impression:      As described.     This report was finalized on 7/2/2025 2:39 PM by Dr. Luan Estrada M.D on Workstation: [id number]                 Assessment:  Active Hospital Problems    Diagnosis  POA    **UTI (urinary tract infection) due to urinary indwelling catheter [T83.511A, N39.0]  Yes      Resolved Hospital Problems   No resolved problems to display.   Multiple sclerosis  Breast cancer  Anemia  Hypertension  Neurogenic bladder  Hyperglycemia  hyperlipidemia    Plan:  Continue antibiotics  Await cultures  Gentle fluids  Trend labs  Monitor on telemetry  Dw patient, ed provider    Gisella Britt MD  7/2/2025  20:26 EDT

## 2025-07-03 NOTE — PLAN OF CARE
Problem: Adult Inpatient Plan of Care  Goal: Plan of Care Review  Outcome: Progressing   Goal Outcome Evaluation:      Vss.F/c remains in place.Blood cultures drawn.Left breast biopsy site suture removed per MD order.Remains on iv abx.Pt refused to turn this afternoon.No c/o pain or n/v.

## 2025-07-03 NOTE — DISCHARGE PLACEMENT REQUEST
"Maritza Purdy (64 y.o. Female)       Date of Birth   1960    Social Security Number       Address   [address] [place] [address]    Home Phone   [phone]    MRN   [id number]       Muslim   Jehovah's witness    Marital Status                               Admission Date   7/2/2025    Admission Type   Emergency    Admitting Provider   Gisella Britt MD    Attending Provider   Frederick Carey DO    Department, Room/Bed   [address], E448/1       Discharge Date       Discharge Disposition       Discharge Destination                                 Attending Provider: Frederick Carey DO    Allergies: Klonopin [Clonazepam]    Isolation: None   Infection: None   Code Status: CPR    Ht: 165.1 cm (65\")   Wt: 79.4 kg (175 lb)    Admission Cmt: None   Principal Problem: UTI (urinary tract infection) due to urinary indwelling catheter [T83.511A,N39.0]                   Active Insurance as of 7/2/2025       Primary Coverage       Payor Plan Insurance Group Employer/Plan Group    [facility] MEDICARE REPLACEMENT ANTHEM MEDICARE ADVANTAGE O [id number]       Payor Plan Address Payor Plan Phone Number Payor Plan Fax Number Effective Dates    [address] [phone]  1/1/2025 - None Entered    [facility] [address]         Subscriber Name Subscriber Birth Date Member ID       MARITZA PURDY 1960 [id number]                     Emergency Contacts        (Rel.) Home Phone Work Phone Mobile Phone    Donald Purdy (Spouse) [phone] -- [phone]    Shana Klein (HCS) (Sister) -- -- [phone]    Maru Rodriguez (Sister) -- -- [phone]            "

## 2025-07-03 NOTE — PROGRESS NOTES
"Nutrition Services    Patient Name: Maritza Bejarano  YOB: 1960  MRN: [id number]  Admission date: 7/2/2025    Assessment Date:  07/03/25    NUTRITION EVALUATION      Reason for Encounter Pressure Injury and/or Non-Healing Wound   Diagnosis/Problem Admission Diagnosis:  UTI (urinary tract infection) due to urinary indwelling catheter [T83.511A, N39.0]    Problem List:    UTI (urinary tract infection) due to urinary indwelling catheter     Narrative Pt with previous admission for PI in April. Pt reports wounds are healing well and is about to be discharged from home health services for wound care. Pt stated her appetite is better and has no need for ONS to aid with intake. No recent weight change that pt is aware of. Discussed advancing diet to regular to aid with intake and food acceptance going forward.        PO Diet Diet: Cardiac; Healthy Heart (2-3 Na+); Fluid Consistency: Thin (IDDSI 0)   Allergies NKFA   Supplements n/a   PO Intake % 50% per pt interview       Chewing/Swallowing Difficulty no issues identified at this time       Medications reviewed, Vit D3, Protonix, Pericolace   Labs  reviewed       Physical Findings oriented, room air     Edema lower extremity , 1+ (trace), 2+ (mild)    GI Function WDL, last bowel movement: 7/1   Skin Status location of wound: Right heel PI on 4/18/25; Gluteal and coccycx PI on 4/17/25    Lines/Drains none   I/O reviewed        Height  Weight  BMI  Weight Trend     Height: 165.1 cm (65\")  Weight: 79.4 kg (175 lb) (07/03/25 0556)  Body mass index is 29.12 kg/m².  Stable    Weight change: No significant changes       NFPE No clinical signs of muscle wasting or fat loss, Date Completed: 7/3       Nutrition Problem (PES) Problem: Inadequate Oral Intake  Etiology: Medical Diagnosis - UTI   Signs/Symptoms: Report of Minimal PO Intake prior to admission       Intervention/Plan Liberalize diet to Regular to aid with intake.     Encourage good PO when possible.     RD to " follow up per protocol.     Results from last 7 days   Lab Units 07/03/25  0538 07/02/25  1419   SODIUM mmol/L 140 140   POTASSIUM mmol/L 4.0 4.0   CHLORIDE mmol/L 110* 107   CO2 mmol/L 21.4* 22.7   BUN mg/dL 13.0 16.0   CREATININE mg/dL 0.48* 0.56*   CALCIUM mg/dL 7.8* 8.5*   BILIRUBIN mg/dL  --  <0.2   ALK PHOS U/L  --  187*   ALT (SGPT) U/L  --  26   AST (SGOT) U/L  --  76*   GLUCOSE mg/dL 95 122*     Results from last 7 days   Lab Units 07/03/25  0538 07/02/25  1419   MAGNESIUM mg/dL  --  2.4   PHOSPHORUS mg/dL  --  2.8   HEMOGLOBIN g/dL 8.7* 9.6*   HEMATOCRIT % 28.2* 31.9*     Lab Results   Component Value Date    HGBA1C 7.30 (H) 04/21/2025     Wt Readings from Last 10 Encounters:   07/03/25 79.4 kg (175 lb)   06/25/25 81.6 kg (180 lb)   06/20/25 81.6 kg (180 lb)   06/11/25 68 kg (150 lb)   05/23/25 68 kg (150 lb)   05/23/25 68 kg (150 lb)   05/21/25 68 kg (150 lb)   05/19/25 68 kg (150 lb)   05/09/25 68 kg (150 lb)   05/09/25 68 kg (150 lb)       Electronically signed by:  Cathie Steel RD  07/03/25 07:55 EDT

## 2025-07-03 NOTE — CASE MANAGEMENT/SOCIAL WORK
Continued Stay Note  [facility]     Patient Name: Maritza Bejarano  MRN: [id number]  Today's Date: 7/3/2025    Admit Date: 7/2/2025    Plan: Home with spouse and Caretenders  (Current). Spouse will transport pt home in pt's handicap van at D/C.   Discharge Plan       Row Name 07/03/25 1504       Plan    Plan Home with spouse and Caretenders  (Current). Spouse will transport pt home in pt's handicap van at D/C.    Patient/Family in Agreement with Plan yes    Plan Comments Met with pt at bedside. Explained roll of . Face sheet and pharmacy verified. Pt lives with her spouse in a single-story home with a ramp to enter. Home DME includes a bath bench; hospital bed; grab bar; cpap; wheelchair, motorized, Rachell Lift, F/C supplies, Handicap van, and a Lift track system into her bathroom.  Pt is bedbound and requires a Rachell lift to transfer to her motorized WC. She is dependent with ADLs. Pt has been to [facility], and [place] for Rehab. She is current with [facility] and they are following. Pt's PCP is Enoc Carbone DO. Uses [facility] at [place] and [place].  Pt has a handicap van and her spouse drives her to appointments and will transport at discharge. Pt's Living Will on file lists her sister as her HCS. Pt requesting to complete a new Living Will to make her spouse her HCS. Order for Ene to see for new Living Will placed in [facility]. Explained that CCP would follow to assess for discharge needs.  Wander Garcia RN-BC                   Discharge Codes    No documentation.                       Wander Garcia RN

## 2025-07-03 NOTE — SIGNIFICANT NOTE
07/03/25 0902   OTHER   Discipline physical therapist   Rehab Time/Intention   Session Not Performed patient/family declined evaluation  (Pt uses a leandra lift for all t/fs at her baseline. PT to sign off at this time as she does not have any acute needs. She does state if she is admitted for > than 1 week she would like PT. Educated on how to request PT.)   Therapy Assessment/Plan (PT)   Criteria for Skilled Interventions Met (PT) no

## 2025-07-03 NOTE — PROGRESS NOTES
Name: Maritza Nance Darci ADMIT: 2025   : 1960  PCP: CarboneEnoc guzman     MRN: [id number] LOS: 0 days   AGE/SEX: 64 y.o. female  ROOM: [facility]     Subjective   Subjective   Patient awake and resting in bed, still with some fatigue, but slightly better today.       Objective   Objective   Vital Signs  Temp:  [97.7 °F (36.5 °C)-99 °F (37.2 °C)] 97.7 °F (36.5 °C)  Heart Rate:  [67-83] 77  Resp:  [18] 18  BP: (115-149)/(54-76) 145/67  SpO2:  [94 %-100 %] 96 %  on   ;   Device (Oxygen Therapy): room air  Body mass index is 29.12 kg/m².  Physical Exam  Vitals and nursing note reviewed.   Constitutional:       General: She is not in acute distress.  Eyes:      General: No scleral icterus.     Conjunctiva/sclera: Conjunctivae normal.      Pupils: Pupils are equal, round, and reactive to light.   Cardiovascular:      Rate and Rhythm: Normal rate.   Pulmonary:      Effort: Pulmonary effort is normal. No respiratory distress.      Breath sounds: No wheezing.   Abdominal:      General: Bowel sounds are normal. There is no distension.      Palpations: Abdomen is soft.      Tenderness: There is no abdominal tenderness.   Musculoskeletal:      Right lower leg: No edema.      Left lower leg: No edema.   Skin:     General: Skin is warm and dry.   Neurological:      Mental Status: She is alert.       Results Review     I reviewed the patient's new clinical results.  Results from last 7 days   Lab Units 25  0538 25  1419   WBC 10*3/mm3 4.78 6.56   HEMOGLOBIN g/dL 8.7* 9.6*   PLATELETS 10*3/mm3 412 447     Results from last 7 days   Lab Units 25  0538 25  1419   SODIUM mmol/L 140 140   POTASSIUM mmol/L 4.0 4.0   CHLORIDE mmol/L 110* 107   CO2 mmol/L 21.4* 22.7   BUN mg/dL 13.0 16.0   CREATININE mg/dL 0.48* 0.56*   GLUCOSE mg/dL 95 122*   EGFR mL/min/1.73 107.2 103.3     Results from last 7 days   Lab Units 25  1419   ALBUMIN g/dL 3.7   BILIRUBIN mg/dL <0.2   ALK PHOS U/L 187*   AST (SGOT) U/L 76*  "  ALT (SGPT) U/L 26     Results from last 7 days   Lab Units 07/03/25  0538 07/02/25  1419   CALCIUM mg/dL 7.8* 8.5*   ALBUMIN g/dL  --  3.7   MAGNESIUM mg/dL  --  2.4   PHOSPHORUS mg/dL  --  2.8     Results from last 7 days   Lab Units 07/02/25  1419   PROCALCITONIN ng/mL 0.15   LACTATE mmol/L 1.4     No results found for: \"HGBA1C\", \"POCGLU\"    XR Chest 1 View  Result Date: 7/2/2025  As described.  This report was finalized on 7/2/2025 2:39 PM by Dr. Luan Estrada M.D on Workstation: [id number]        I have personally reviewed all medications:  Scheduled Medications  aspirin, 81 mg, Oral, Daily  cefepime, 2,000 mg, Intravenous, Q8H  vitamin D3, 5,000 Units, Oral, Daily  melatonin, 2.5 mg, Oral, Nightly  metoprolol succinate XL, 50 mg, Oral, Q24H  pantoprazole, 40 mg, Oral, Once per day on Monday Wednesday Friday  pramipexole, 1.5 mg, Oral, BID  rosuvastatin, 20 mg, Oral, Daily  sennosides-docusate, 1 tablet, Oral, Daily    Infusions   Diet  Diet: Regular/House; Fluid Consistency: Thin (IDDSI 0)    I have personally reviewed:  [x]  Laboratory   [x]  Microbiology   [x]  Radiology   [x]  EKG/Telemetry  []  Cardiology/Vascular   []  Pathology    []  Records       Assessment/Plan     Active Hospital Problems    Diagnosis  POA   • **UTI (urinary tract infection) due to urinary indwelling catheter [T83.511A, N39.0]  Yes   • Anemia [D64.9]  Yes   • Type 2 diabetes mellitus [E11.9]  Yes   • Elevated LFTs [R79.89]  Yes   • Weakness [R53.1]  Yes   • Malignant neoplasm of overlapping sites of left breast in female, estrogen receptor positive [C50.812, Z17.0]  Not Applicable   • Essential hypertension [I10]  Yes   • Restless legs syndrome [G25.81]  Yes      Resolved Hospital Problems   No resolved problems to display.       64 y.o. female admitted with UTI (urinary tract infection) due to urinary indwelling catheter.    UA suggestive of infection, she is on Cefepime, tolerating well, continue as prescribed, follow up final " urine culture and blood culture results.    Weakness and fatigue, likely multifactorial, exacerbated by infection. Therapy services consulted. Further management based on clinical course.    A1c 7.30% (04/21/25), order SSI, trend glucose levels to guide management.    BP acceptable, continue treatments as prescribed. Trend BP to guide management.    LFT slightly elevated, trend with CMP.    Hemoglobin low, drop likely dilutional from fluids, monitor for now, repeat CBC in AM. Transfuse for hemoglobin <7.      SCDs for DVT prophylaxis.  Full code.  Discussed with patient, family, and nursing staff.  Anticipate discharge home in 1-2 days.  Expected discharge date/ time has not been documented.      Frederick Carey DO  [facility]  07/03/25

## 2025-07-04 PROBLEM — N39.0 UTI (URINARY TRACT INFECTION): Status: ACTIVE | Noted: 2025-07-04

## 2025-07-04 LAB
ALBUMIN SERPL-MCNC: 3.4 G/DL (ref 3.5–5.2)
ALBUMIN/GLOB SERPL: 1.1 G/DL
ALP SERPL-CCNC: 165 U/L (ref 39–117)
ALT SERPL W P-5'-P-CCNC: 19 U/L (ref 1–33)
ANION GAP SERPL CALCULATED.3IONS-SCNC: 9.3 MMOL/L (ref 5–15)
AST SERPL-CCNC: 66 U/L (ref 1–32)
BASOPHILS # BLD AUTO: 0.04 10*3/MM3 (ref 0–0.2)
BASOPHILS NFR BLD AUTO: 0.8 % (ref 0–1.5)
BILIRUB SERPL-MCNC: <0.2 MG/DL (ref 0–1.2)
BUN SERPL-MCNC: 13 MG/DL (ref 8–23)
BUN/CREAT SERPL: 25 (ref 7–25)
CALCIUM SPEC-SCNC: 8.1 MG/DL (ref 8.6–10.5)
CHLORIDE SERPL-SCNC: 111 MMOL/L (ref 98–107)
CO2 SERPL-SCNC: 19.7 MMOL/L (ref 22–29)
CREAT SERPL-MCNC: 0.52 MG/DL (ref 0.57–1)
DEPRECATED RDW RBC AUTO: 55.9 FL (ref 37–54)
EGFRCR SERPLBLD CKD-EPI 2021: 105.2 ML/MIN/1.73
EOSINOPHIL # BLD AUTO: 0.18 10*3/MM3 (ref 0–0.4)
EOSINOPHIL NFR BLD AUTO: 3.6 % (ref 0.3–6.2)
ERYTHROCYTE [DISTWIDTH] IN BLOOD BY AUTOMATED COUNT: 15.8 % (ref 12.3–15.4)
GLOBULIN UR ELPH-MCNC: 3.2 GM/DL
GLUCOSE BLDC GLUCOMTR-MCNC: 150 MG/DL (ref 70–130)
GLUCOSE BLDC GLUCOMTR-MCNC: 156 MG/DL (ref 70–130)
GLUCOSE BLDC GLUCOMTR-MCNC: 162 MG/DL (ref 70–130)
GLUCOSE BLDC GLUCOMTR-MCNC: 98 MG/DL (ref 70–130)
GLUCOSE SERPL-MCNC: 86 MG/DL (ref 65–99)
HCT VFR BLD AUTO: 31.2 % (ref 34–46.6)
HGB BLD-MCNC: 9.2 G/DL (ref 12–15.9)
IMM GRANULOCYTES # BLD AUTO: 0.07 10*3/MM3 (ref 0–0.05)
IMM GRANULOCYTES NFR BLD AUTO: 1.4 % (ref 0–0.5)
LYMPHOCYTES # BLD AUTO: 1.3 10*3/MM3 (ref 0.7–3.1)
LYMPHOCYTES NFR BLD AUTO: 26.1 % (ref 19.6–45.3)
MCH RBC QN AUTO: 28.2 PG (ref 26.6–33)
MCHC RBC AUTO-ENTMCNC: 29.5 G/DL (ref 31.5–35.7)
MCV RBC AUTO: 95.7 FL (ref 79–97)
MONOCYTES # BLD AUTO: 0.34 10*3/MM3 (ref 0.1–0.9)
MONOCYTES NFR BLD AUTO: 6.8 % (ref 5–12)
NEUTROPHILS NFR BLD AUTO: 3.06 10*3/MM3 (ref 1.7–7)
NEUTROPHILS NFR BLD AUTO: 61.3 % (ref 42.7–76)
NRBC BLD AUTO-RTO: 0.4 /100 WBC (ref 0–0.2)
PLATELET # BLD AUTO: 426 10*3/MM3 (ref 140–450)
PMV BLD AUTO: 9 FL (ref 6–12)
POTASSIUM SERPL-SCNC: 4 MMOL/L (ref 3.5–5.2)
PROT SERPL-MCNC: 6.6 G/DL (ref 6–8.5)
RBC # BLD AUTO: 3.26 10*6/MM3 (ref 3.77–5.28)
SODIUM SERPL-SCNC: 140 MMOL/L (ref 136–145)
WBC NRBC COR # BLD AUTO: 4.99 10*3/MM3 (ref 3.4–10.8)

## 2025-07-04 PROCEDURE — 63710000001 INSULIN LISPRO (HUMAN) PER 5 UNITS: Performed by: STUDENT IN AN ORGANIZED HEALTH CARE EDUCATION/TRAINING PROGRAM

## 2025-07-04 PROCEDURE — 80053 COMPREHEN METABOLIC PANEL: CPT | Performed by: STUDENT IN AN ORGANIZED HEALTH CARE EDUCATION/TRAINING PROGRAM

## 2025-07-04 PROCEDURE — 82948 REAGENT STRIP/BLOOD GLUCOSE: CPT

## 2025-07-04 PROCEDURE — 85025 COMPLETE CBC W/AUTO DIFF WBC: CPT | Performed by: STUDENT IN AN ORGANIZED HEALTH CARE EDUCATION/TRAINING PROGRAM

## 2025-07-04 PROCEDURE — 25010000002 CEFEPIME PER 500 MG: Performed by: INTERNAL MEDICINE

## 2025-07-04 RX ORDER — TEMAZEPAM 15 MG/1
15 CAPSULE ORAL NIGHTLY PRN
Status: DISCONTINUED | OUTPATIENT
Start: 2025-07-04 | End: 2025-07-07 | Stop reason: HOSPADM

## 2025-07-04 RX ORDER — PROCHLORPERAZINE EDISYLATE 5 MG/ML
5 INJECTION INTRAMUSCULAR; INTRAVENOUS EVERY 6 HOURS PRN
Status: DISCONTINUED | OUTPATIENT
Start: 2025-07-04 | End: 2025-07-07 | Stop reason: HOSPADM

## 2025-07-04 RX ORDER — BACLOFEN 10 MG/1
20 TABLET ORAL EVERY 8 HOURS SCHEDULED
Status: DISCONTINUED | OUTPATIENT
Start: 2025-07-04 | End: 2025-07-07 | Stop reason: HOSPADM

## 2025-07-04 RX ADMIN — ASPIRIN 81 MG: 81 TABLET, COATED ORAL at 09:16

## 2025-07-04 RX ADMIN — BACLOFEN 20 MG: 10 TABLET ORAL at 17:59

## 2025-07-04 RX ADMIN — PRAMIPEXOLE DIHYDROCHLORIDE 1.5 MG: 0.5 TABLET ORAL at 09:16

## 2025-07-04 RX ADMIN — INSULIN LISPRO 2 UNITS: 100 INJECTION, SOLUTION INTRAVENOUS; SUBCUTANEOUS at 12:46

## 2025-07-04 RX ADMIN — PANTOPRAZOLE SODIUM 40 MG: 40 TABLET, DELAYED RELEASE ORAL at 09:16

## 2025-07-04 RX ADMIN — Medication 2.5 MG: at 01:26

## 2025-07-04 RX ADMIN — CEFEPIME 2000 MG: 2 INJECTION, POWDER, FOR SOLUTION INTRAVENOUS at 12:46

## 2025-07-04 RX ADMIN — INSULIN LISPRO 2 UNITS: 100 INJECTION, SOLUTION INTRAVENOUS; SUBCUTANEOUS at 17:59

## 2025-07-04 RX ADMIN — ROSUVASTATIN CALCIUM 20 MG: 10 TABLET, FILM COATED ORAL at 09:16

## 2025-07-04 RX ADMIN — TEMAZEPAM 15 MG: 15 CAPSULE ORAL at 21:51

## 2025-07-04 RX ADMIN — HYDROCODONE BITARTRATE AND ACETAMINOPHEN 1 TABLET: 5; 325 TABLET ORAL at 01:43

## 2025-07-04 RX ADMIN — PRAMIPEXOLE DIHYDROCHLORIDE 1.5 MG: 0.5 TABLET ORAL at 21:52

## 2025-07-04 RX ADMIN — BACLOFEN 20 MG: 10 TABLET ORAL at 21:51

## 2025-07-04 RX ADMIN — METOPROLOL SUCCINATE 50 MG: 50 TABLET, EXTENDED RELEASE ORAL at 09:17

## 2025-07-04 RX ADMIN — INSULIN LISPRO 2 UNITS: 100 INJECTION, SOLUTION INTRAVENOUS; SUBCUTANEOUS at 21:51

## 2025-07-04 RX ADMIN — Medication 5000 UNITS: at 09:16

## 2025-07-04 RX ADMIN — SENNOSIDES AND DOCUSATE SODIUM 1 TABLET: 50; 8.6 TABLET ORAL at 09:17

## 2025-07-04 RX ADMIN — CEFEPIME 2000 MG: 2 INJECTION, POWDER, FOR SOLUTION INTRAVENOUS at 06:27

## 2025-07-04 RX ADMIN — CEFEPIME 2000 MG: 2 INJECTION, POWDER, FOR SOLUTION INTRAVENOUS at 21:52

## 2025-07-04 RX ADMIN — HYDROCODONE BITARTRATE AND ACETAMINOPHEN 1 TABLET: 5; 325 TABLET ORAL at 09:14

## 2025-07-04 NOTE — PROGRESS NOTES
Name: Maritza Nance Darci ADMIT: 2025   : 1960  PCP: Enoc Carbone     MRN: [id number] LOS: 0 days   AGE/SEX: 64 y.o. female  ROOM: [facility]     Subjective   Subjective   Patient awake and resting in bed, she feels worse today; has been nauseated, asking for compazine. Also requesting medicine for sleep- reports poor sleep at home and here.        Objective   Objective   Vital Signs  Temp:  [97.7 °F (36.5 °C)-99.7 °F (37.6 °C)] 98.8 °F (37.1 °C)  Heart Rate:  [73-79] 79  Resp:  [16-18] 16  BP: (126-177)/(70-84) 177/84  SpO2:  [94 %-96 %] 94 %  on  Flow (L/min) (Oxygen Therapy):  [2] 2;   Device (Oxygen Therapy): nasal cannula  Body mass index is 29.83 kg/m².  Physical Exam  Vitals and nursing note reviewed.   Constitutional:       General: She is not in acute distress.  Cardiovascular:      Rate and Rhythm: Normal rate.   Pulmonary:      Effort: Pulmonary effort is normal. No respiratory distress.      Breath sounds: No wheezing.   Abdominal:      General: Bowel sounds are normal. There is no distension.      Palpations: Abdomen is soft.      Tenderness: There is no abdominal tenderness.   Musculoskeletal:      Right lower leg: No edema.      Left lower leg: No edema.   Skin:     General: Skin is warm and dry.   Neurological:      Mental Status: She is alert.       Results Review     I reviewed the patient's new clinical results.  Results from last 7 days   Lab Units 25  0330 25  0538 25  1419   WBC 10*3/mm3 4.99 4.78 6.56   HEMOGLOBIN g/dL 9.2* 8.7* 9.6*   PLATELETS 10*3/mm3 426 412 447     Results from last 7 days   Lab Units 25  0330 25  0538 25  1419   SODIUM mmol/L 140 140 140   POTASSIUM mmol/L 4.0 4.0 4.0   CHLORIDE mmol/L 111* 110* 107   CO2 mmol/L 19.7* 21.4* 22.7   BUN mg/dL 13.0 13.0 16.0   CREATININE mg/dL 0.52* 0.48* 0.56*   GLUCOSE mg/dL 86 95 122*   EGFR mL/min/1.73 105.2 107.2 103.3     Results from last 7 days   Lab Units 25  0330 25  1414    ALBUMIN g/dL 3.4* 3.7   BILIRUBIN mg/dL <0.2 <0.2   ALK PHOS U/L 165* 187*   AST (SGOT) U/L 66* 76*   ALT (SGPT) U/L 19 26     Results from last 7 days   Lab Units 07/04/25  0330 07/03/25  0538 07/02/25  1419   CALCIUM mg/dL 8.1* 7.8* 8.5*   ALBUMIN g/dL 3.4*  --  3.7   MAGNESIUM mg/dL  --   --  2.4   PHOSPHORUS mg/dL  --   --  2.8     Results from last 7 days   Lab Units 07/02/25  1419   PROCALCITONIN ng/mL 0.15   LACTATE mmol/L 1.4     Glucose   Date/Time Value Ref Range Status   07/04/2025 1155 162 (H) 70 - 130 mg/dL Final   07/04/2025 0748 98 70 - 130 mg/dL Final   07/03/2025 2028 184 (H) 70 - 130 mg/dL Final   07/03/2025 1632 130 70 - 130 mg/dL Final       XR Chest 1 View  Result Date: 7/2/2025  As described.  This report was finalized on 7/2/2025 2:39 PM by Dr. Luan Estrada M.D on Workstation: [id number]        I have personally reviewed all medications:  Scheduled Medications  aspirin, 81 mg, Oral, Daily  baclofen, 20 mg, Oral, Q8H  cefepime, 2,000 mg, Intravenous, Q8H  vitamin D3, 5,000 Units, Oral, Daily  insulin lispro, 2-7 Units, Subcutaneous, 4x Daily AC & at Bedtime  melatonin, 10 mg, Oral, Nightly  metoprolol succinate XL, 50 mg, Oral, Q24H  pantoprazole, 40 mg, Oral, Once per day on Monday Wednesday Friday  pramipexole, 1.5 mg, Oral, BID  rosuvastatin, 20 mg, Oral, Daily  sennosides-docusate, 1 tablet, Oral, Daily    Infusions   Diet  Diet: Regular/House; Fluid Consistency: Thin (IDDSI 0)    I have personally reviewed:  [x]  Laboratory   [x]  Microbiology   [x]  Radiology   [x]  EKG/Telemetry  []  Cardiology/Vascular   []  Pathology    []  Records       Assessment/Plan     Active Hospital Problems    Diagnosis  POA   • **UTI (urinary tract infection) due to urinary indwelling catheter [T83.511A, N39.0]  Yes   • UTI (urinary tract infection) [N39.0]  Yes   • Anemia [D64.9]  Yes   • Type 2 diabetes mellitus [E11.9]  Yes   • Elevated LFTs [R79.89]  Yes   • Weakness [R53.1]  Yes   • Malignant  neoplasm of overlapping sites of left breast in female, estrogen receptor positive [C50.812, Z17.0]  Not Applicable   • Essential hypertension [I10]  Yes   • Restless legs syndrome [G25.81]  Yes      Resolved Hospital Problems   No resolved problems to display.       64 y.o. female admitted with UTI (urinary tract infection) due to urinary indwelling catheter.    UA suggestive of infection, she is on Cefepime, tolerating well, continue as prescribed, follow up final urine culture and blood culture results. Urine cx with klebsiella and gram positive cocci- suspect GPC is a contaminant. Blood cx are NGTD @24h. Patient now having issues with matias- urine leaking around- she is asking about exchanging Matias- she sees Dr. Olson at [facility].     Weakness and fatigue, likely multifactorial, exacerbated by infection. Therapy services consulted. Further management based on clinical course.    A1c 7.30% (04/21/25), order SSI, trend glucose levels to guide management.    BP acceptable, continue treatments as prescribed. Trend BP to guide management.    LFT slightly elevated, trend with CMP.    Hemoglobin low, drop likely dilutional from fluids, monitor for now, repeat CBC in AM. Transfuse for hemoglobin <7.    Insomnia- patient reports has tried klonopin, ambien, melatonin- all ineffective; willing to try restoril- explained would not be able to write long term script for this at [place].     SCDs for DVT prophylaxis.  Full code.  Discussed with patient, family, and nursing staff.  Anticipate discharge home in 1-2 days.  Expected Discharge Date: 7/6/2025; Expected Discharge Time:       Sisi Bella MD  [facility]  07/04/25

## 2025-07-04 NOTE — CONSULTS
Chp met with Pt to go over an Advance Directive. RN confirmed Pt decisional. Pt wished to update AD. Chp facilitated AD with Pt and answered Pt questions. Pt named Spouse Donald as Primary Health Care Surrogate. The form was completed and notarized by Chmalka LECHUGA. Pt got original copy, copy was placed in chart, and a copy was faxed to HIM.

## 2025-07-04 NOTE — PLAN OF CARE
Goal Outcome Evaluation:           Progress: improving  Outcome Evaluation: Pt c/o leg spasm, one time baclofen ordered by provider, PRN Norco given x1. Frequent weight shift assistance provided. Chronic matias remains in place, cath care complete. Room air. Safety maintained. Plan of care ongoing.

## 2025-07-05 LAB
ALBUMIN SERPL-MCNC: 3.2 G/DL (ref 3.5–5.2)
ALBUMIN/GLOB SERPL: 0.9 G/DL
ALP SERPL-CCNC: 156 U/L (ref 39–117)
ALT SERPL W P-5'-P-CCNC: 19 U/L (ref 1–33)
ANION GAP SERPL CALCULATED.3IONS-SCNC: 10.4 MMOL/L (ref 5–15)
AST SERPL-CCNC: 66 U/L (ref 1–32)
BACTERIA SPEC AEROBE CULT: ABNORMAL
BACTERIA SPEC AEROBE CULT: ABNORMAL
BILIRUB SERPL-MCNC: <0.2 MG/DL (ref 0–1.2)
BUN SERPL-MCNC: 17 MG/DL (ref 8–23)
BUN/CREAT SERPL: 23.9 (ref 7–25)
CALCIUM SPEC-SCNC: 8.2 MG/DL (ref 8.6–10.5)
CHLORIDE SERPL-SCNC: 109 MMOL/L (ref 98–107)
CO2 SERPL-SCNC: 19.6 MMOL/L (ref 22–29)
CREAT SERPL-MCNC: 0.71 MG/DL (ref 0.57–1)
DEPRECATED RDW RBC AUTO: 56.5 FL (ref 37–54)
EGFRCR SERPLBLD CKD-EPI 2021: 96.3 ML/MIN/1.73
EOSINOPHIL # BLD MANUAL: 0.07 10*3/MM3 (ref 0–0.4)
EOSINOPHIL NFR BLD MANUAL: 1 % (ref 0.3–6.2)
ERYTHROCYTE [DISTWIDTH] IN BLOOD BY AUTOMATED COUNT: 16 % (ref 12.3–15.4)
GEN 5 1HR TROPONIN T REFLEX: 25 NG/L
GLOBULIN UR ELPH-MCNC: 3.6 GM/DL
GLUCOSE BLDC GLUCOMTR-MCNC: 108 MG/DL (ref 70–130)
GLUCOSE BLDC GLUCOMTR-MCNC: 138 MG/DL (ref 70–130)
GLUCOSE BLDC GLUCOMTR-MCNC: 154 MG/DL (ref 70–130)
GLUCOSE BLDC GLUCOMTR-MCNC: 180 MG/DL (ref 70–130)
GLUCOSE SERPL-MCNC: 96 MG/DL (ref 65–99)
HCT VFR BLD AUTO: 31.1 % (ref 34–46.6)
HGB BLD-MCNC: 9.3 G/DL (ref 12–15.9)
LYMPHOCYTES # BLD MANUAL: 0.82 10*3/MM3 (ref 0.7–3.1)
LYMPHOCYTES NFR BLD MANUAL: 5 % (ref 5–12)
MAGNESIUM SERPL-MCNC: 2.5 MG/DL (ref 1.6–2.4)
MCH RBC QN AUTO: 28.7 PG (ref 26.6–33)
MCHC RBC AUTO-ENTMCNC: 29.9 G/DL (ref 31.5–35.7)
MCV RBC AUTO: 96 FL (ref 79–97)
METAMYELOCYTES NFR BLD MANUAL: 1 % (ref 0–0)
MONOCYTES # BLD: 0.34 10*3/MM3 (ref 0.1–0.9)
NEUTROPHILS # BLD AUTO: 5.51 10*3/MM3 (ref 1.7–7)
NEUTROPHILS NFR BLD MANUAL: 81 % (ref 42.7–76)
NRBC BLD AUTO-RTO: 0 /100 WBC (ref 0–0.2)
PLAT MORPH BLD: NORMAL
PLATELET # BLD AUTO: 498 10*3/MM3 (ref 140–450)
PMV BLD AUTO: 9.3 FL (ref 6–12)
POTASSIUM SERPL-SCNC: 4.4 MMOL/L (ref 3.5–5.2)
POTASSIUM SERPL-SCNC: 4.5 MMOL/L (ref 3.5–5.2)
PROT SERPL-MCNC: 6.8 G/DL (ref 6–8.5)
RBC # BLD AUTO: 3.24 10*6/MM3 (ref 3.77–5.28)
RBC MORPH BLD: NORMAL
SODIUM SERPL-SCNC: 139 MMOL/L (ref 136–145)
TROPONIN T % DELTA: 25
TROPONIN T NUMERIC DELTA: 5 NG/L
TROPONIN T SERPL HS-MCNC: 20 NG/L
VARIANT LYMPHS NFR BLD MANUAL: 12 % (ref 19.6–45.3)
WBC MORPH BLD: NORMAL
WBC NRBC COR # BLD AUTO: 6.8 10*3/MM3 (ref 3.4–10.8)

## 2025-07-05 PROCEDURE — 84132 ASSAY OF SERUM POTASSIUM: CPT | Performed by: STUDENT IN AN ORGANIZED HEALTH CARE EDUCATION/TRAINING PROGRAM

## 2025-07-05 PROCEDURE — 25010000002 PROCHLORPERAZINE 10 MG/2ML SOLUTION: Performed by: STUDENT IN AN ORGANIZED HEALTH CARE EDUCATION/TRAINING PROGRAM

## 2025-07-05 PROCEDURE — 63710000001 INSULIN LISPRO (HUMAN) PER 5 UNITS: Performed by: STUDENT IN AN ORGANIZED HEALTH CARE EDUCATION/TRAINING PROGRAM

## 2025-07-05 PROCEDURE — 82948 REAGENT STRIP/BLOOD GLUCOSE: CPT

## 2025-07-05 PROCEDURE — 84484 ASSAY OF TROPONIN QUANT: CPT | Performed by: STUDENT IN AN ORGANIZED HEALTH CARE EDUCATION/TRAINING PROGRAM

## 2025-07-05 PROCEDURE — 80053 COMPREHEN METABOLIC PANEL: CPT | Performed by: STUDENT IN AN ORGANIZED HEALTH CARE EDUCATION/TRAINING PROGRAM

## 2025-07-05 PROCEDURE — 93010 ELECTROCARDIOGRAM REPORT: CPT | Performed by: INTERNAL MEDICINE

## 2025-07-05 PROCEDURE — 85007 BL SMEAR W/DIFF WBC COUNT: CPT | Performed by: STUDENT IN AN ORGANIZED HEALTH CARE EDUCATION/TRAINING PROGRAM

## 2025-07-05 PROCEDURE — 85025 COMPLETE CBC W/AUTO DIFF WBC: CPT | Performed by: STUDENT IN AN ORGANIZED HEALTH CARE EDUCATION/TRAINING PROGRAM

## 2025-07-05 PROCEDURE — 25010000002 CEFEPIME PER 500 MG: Performed by: INTERNAL MEDICINE

## 2025-07-05 PROCEDURE — 25010000002 CEFEPIME PER 500 MG: Performed by: STUDENT IN AN ORGANIZED HEALTH CARE EDUCATION/TRAINING PROGRAM

## 2025-07-05 PROCEDURE — 83735 ASSAY OF MAGNESIUM: CPT | Performed by: STUDENT IN AN ORGANIZED HEALTH CARE EDUCATION/TRAINING PROGRAM

## 2025-07-05 PROCEDURE — 93005 ELECTROCARDIOGRAM TRACING: CPT | Performed by: STUDENT IN AN ORGANIZED HEALTH CARE EDUCATION/TRAINING PROGRAM

## 2025-07-05 RX ADMIN — BACLOFEN 20 MG: 10 TABLET ORAL at 13:43

## 2025-07-05 RX ADMIN — Medication 10 MG: at 02:50

## 2025-07-05 RX ADMIN — SENNOSIDES AND DOCUSATE SODIUM 1 TABLET: 50; 8.6 TABLET ORAL at 10:15

## 2025-07-05 RX ADMIN — POLYETHYLENE GLYCOL 3350 17 G: 17 POWDER, FOR SOLUTION ORAL at 17:07

## 2025-07-05 RX ADMIN — METOPROLOL SUCCINATE 50 MG: 50 TABLET, EXTENDED RELEASE ORAL at 10:15

## 2025-07-05 RX ADMIN — PROCHLORPERAZINE EDISYLATE 5 MG: 5 INJECTION INTRAMUSCULAR; INTRAVENOUS at 13:55

## 2025-07-05 RX ADMIN — PRAMIPEXOLE DIHYDROCHLORIDE 1.5 MG: 0.5 TABLET ORAL at 10:15

## 2025-07-05 RX ADMIN — PRAMIPEXOLE DIHYDROCHLORIDE 1.5 MG: 0.5 TABLET ORAL at 21:05

## 2025-07-05 RX ADMIN — Medication 5000 UNITS: at 10:15

## 2025-07-05 RX ADMIN — ROSUVASTATIN CALCIUM 20 MG: 10 TABLET, FILM COATED ORAL at 10:15

## 2025-07-05 RX ADMIN — CEFEPIME 2000 MG: 2 INJECTION, POWDER, FOR SOLUTION INTRAVENOUS at 21:04

## 2025-07-05 RX ADMIN — INSULIN LISPRO 2 UNITS: 100 INJECTION, SOLUTION INTRAVENOUS; SUBCUTANEOUS at 21:05

## 2025-07-05 RX ADMIN — BACLOFEN 20 MG: 10 TABLET ORAL at 21:05

## 2025-07-05 RX ADMIN — ASPIRIN 81 MG: 81 TABLET, COATED ORAL at 10:15

## 2025-07-05 RX ADMIN — BACLOFEN 20 MG: 10 TABLET ORAL at 05:37

## 2025-07-05 RX ADMIN — CEFEPIME 2000 MG: 2 INJECTION, POWDER, FOR SOLUTION INTRAVENOUS at 13:42

## 2025-07-05 RX ADMIN — CEFEPIME 2000 MG: 2 INJECTION, POWDER, FOR SOLUTION INTRAVENOUS at 05:33

## 2025-07-05 RX ADMIN — INSULIN LISPRO 2 UNITS: 100 INJECTION, SOLUTION INTRAVENOUS; SUBCUTANEOUS at 13:42

## 2025-07-05 NOTE — PLAN OF CARE
Goal Outcome Evaluation:  Plan of Care Reviewed With: patient        Progress: improving  Outcome Evaluation: Vital signs stable. Pt c/o pain in left breast, skin remains pink surrounding biopsy site. IV abx continued as ordered. Reposition assistance provided. Pt states PRN Restoril was effective. CPAP during sleep. Cobos remains in place, cath care complete. Heel dressings changed. +BM, 7/5AM. Safety maintained. Plan of care ongoing.

## 2025-07-05 NOTE — CONSULTS
CONSULT NOTE    Infectious Diseases - Luisa Lima MD  [facility]       Patient Identification:  Name: Maritza Bejarano  Age: 64 y.o.  Sex: female  :  1960  MRN: [id number]             Date of Consultation: 2025      Primary Care Physician: Enoc Carbone DO                               Requesting Physician: Dr. Bella  Reason for Consultation: Antibiotic recommendation with multiple pathogens noted in the urine culture and patient has indwelling Cobos catheter and recurrent UTI.    History of presenting illness: Patient is a 64-year-old female with history of bladder cancer for which she is on chemotherapy treatment with oncology service, multiple sclerosis with associated immobility and neurogenic bladder and urinary retention has chronic indwelling Cobos catheter with monthly changes and prior history of recurrent UTI colonization with various pathogens and hospitalization for systemic sepsis due to UTIs in the past.  Patient came to the emergency room on 2025 with symptoms of weakness low-grade fever chills lack of energy.  Workup revealed abnormal urinalysis consistent with UTI.  Catheter was exchanged and patient was started on broad-spectrum antibiotic therapy based on her previous blood culture and urine culture results.  Patient has been on cefepime since her admission with improvement in her sense of wellbeing.  In fact when I walked into the patient's room she was enjoying her cake brought by her .  She is also looking forward to go home as her deck is painted recently and will not enjoy that.  She denies any fatigue fever or chills or lack of energy which was present a few days ago.  Urine culture did come back positive for Klebsiella pneumonia as well as Enterococcus faecalis.  Infectious disease services consulted for antibiotic treatment recommendations.  Her blood cultures have been negative.  Patient has normal white blood cell count.  Patient appears well and  does not appear to be toxic or septic.  However earlier today patient had an episode of low oxygen saturation and shortness of breath with heart rate dropping into 40s and oxygen saturation dropping 60s with spontaneous improvement.    Impression: 64-year-old female with  1-systemic illness due to urinary tract infection in the setting of malfunctioning Cobos catheter with urine culture positive for Klebsiella pneumonia and Enterococcus faecalis but clinically improved on treatment ineffective against Enterococcus faecalis suggesting that Enterococcus faecalis is likely colonizer rather than true pathogen.  2-Cobos catheter malfunction-status was changed by urology service  3-immobility and neurogenic bladder and urinary retention due to multiple sclerosis  4-history of breast cancer: Chemotherapy  5-history of DVT  6-episodic hypoxia and bradycardia with spontaneous resolution etiology unclear.    Recommendations/Discussions:  At this juncture from infection standpoint patient seems to have improved with negative blood culture and responded to the treatment directed against Klebsiella pneumonia.  Cefepime was ineffective against Enterococcus and her improvement in her symptom while Enterococcus is not addressed to suggest it being a colonizer and does not require any further treatment.  It is explained to the patient in the past and as well as today that she is at risk of future episodes of urinary tract infection that need to be evaluated and treated based on her presentation and antibiotic therapy empirically selected based on prior urine culture results with further workup to find if any new pathogen start present for which antibiotic therapy can be tailored based on the response to treatment and progression of disease.  I explained to her that Enterococcus in the situation does not require treatment but need to be factored in when future episodes of UTIs are addressed and empiric antibiotic therapy selection is  contemplated.  Management of other issues per primary team.  From infectious disease standpoint patient could be discharged on no antibiotic therapy after having received 5 days of treatment for complicated cystitis in the setting.  Monitor closely for side effects of antibiotic therapy.  Monitor closely for any mental status changes while on cefepime and if she noticed to have these symptoms then cefepime can be discontinued as at that moment risk-benefit ratio would tilt towards risk.  Thank you very much for letting me be the part of your patient care please see above impression and recommendations            Past Medical History:  Past Medical History:   Diagnosis Date    Anemia 2024    `Treated with iron    Dawn esophagus     per patient    Blurred vision     R/T MS    Breast cancer     metastatic    Carpal tunnel syndrome     Clotting disorder 1993, 2003, 2012    3 g/i bleeds w/ transfus/ions    Colon polyp 2013    removed w/ colonoscopy    Deep vein thrombosis phlebitis 1980    Depression     Diplopia 2013    GERD (gastroesophageal reflux disease)     GI (gastrointestinal bleed) 3 bleeds    2 transfusions    H/O Skin cancer, basal cell     Headache     History of blood transfusion     History of GI bleed     R/T NSAIDS AND STEROIDS, multiple times    History of urinary tract infection     Hypercalcemia     s/p parathyroidectomy    Hyperlipidemia     Hypertension     Movement disorder     Multiple sclerosis     Optic neuritis     PONV (postoperative nausea and vomiting)     Steroid-induced diabetes 2024     Past Surgical History:  Past Surgical History:   Procedure Laterality Date    APPENDECTOMY      BLADDER SURGERY      bladder stimulator    BREAST BIOPSY  don't remember    BREAST SURGERY      augmentation wtih subsequent removal    CARPAL TUNNEL RELEASE Bilateral     Left 2018, right 2020    CUBITAL TUNNEL RELEASE Left     CYSTOSCOPY BOTOX INJECTION OF BLADDER  2018    Cystoscopy with Botox    FRACTURE  SURGERY  2019    rt shoulder    PARATHYROIDECTOMY      one gland removed    ROTATOR CUFF REPAIR Right 2017    TOE SURGERY      bilateral great toes    TOTAL SHOULDER ARTHROPLASTY W/ DISTAL CLAVICLE EXCISION Right 10/22/2018    Procedure: RT TOTAL SHOULDER REVERSE ARTHROPLASTY;  Surgeon: Bipin Dangelo MD;  Location: [facility] OR;  Service: Orthopedics      Home Meds:  Medications Prior to Admission   Medication Sig Dispense Refill Last Dose/Taking    albuterol sulfate  (90 Base) MCG/ACT inhaler Inhale 2 puffs Every 4 (Four) Hours As Needed for Wheezing or Shortness of Air. 18 g 0 Taking As Needed    anastrozole (ARIMIDEX) 1 MG tablet Take 1 tablet by mouth Daily.   Taking    aspirin 81 MG EC tablet Take 1 tablet by mouth Daily. 30 tablet 0 Taking    baclofen (LIORESAL) 20 MG tablet Take 1 tablet by mouth 2 (Two) Times a Day.   Taking    Cholecalciferol (vitamin D3) 125 MCG (5000 UT) tablet tablet Take 1 tablet by mouth Daily.   Taking    Fenbendazole powder Use 440 mg 3 (Three) Times a Week. 2 capsules 3 times weekly   Taking    HYDROcodone-acetaminophen (NORCO) 5-325 MG per tablet Take 1 tablet by mouth Every 4 (Four) Hours As Needed for Moderate Pain. 60 tablet 0 Taking As Needed    IVERMECTIN PO Take 15 mg by mouth 5 (Five) Times a Week. Mopnday-Friday   Taking    metoprolol succinate XL (TOPROL-XL) 50 MG 24 hr tablet Take 1 tablet by mouth Daily. 90 tablet 1 Taking    pantoprazole (PROTONIX) 40 MG EC tablet TAKE ONE TABLET BY MOUTH EVERY DAY (Patient taking differently: Take 1 tablet by mouth 3 (Three) Times a Week.) 180 tablet 0 Taking Differently    phenazopyridine (PYRIDIUM) 200 MG tablet Take 1 tablet by mouth 3 (Three) Times a Day As Needed for Dysuria. 20 tablet 0 Taking As Needed    pramipexole (MIRAPEX) 1.5 MG tablet Take 1 tablet by mouth 2 (Two) Times a Day.   Taking    prochlorperazine (COMPAZINE) 10 MG tablet Take 1 tablet by mouth Every 6 (Six) Hours As Needed for Nausea or Vomiting. 90  tablet 5 Taking As Needed    rosuvastatin (CRESTOR) 20 MG tablet Take 1 tablet by mouth Daily. 90 tablet 1 Taking    sennosides-docusate (senna-docusate sodium) 8.6-50 MG per tablet Take 1 tablet by mouth Daily. 30 tablet 2 Taking     Current Meds:     Current Facility-Administered Medications:     acetaminophen (TYLENOL) tablet 650 mg, 650 mg, Oral, Q4H PRN **OR** acetaminophen (TYLENOL) 160 MG/5ML oral solution 650 mg, 650 mg, Oral, Q4H PRN **OR** acetaminophen (TYLENOL) suppository 650 mg, 650 mg, Rectal, Q4H PRN, Gisella Britt MD    albuterol (PROVENTIL) nebulizer solution 0.083% 2.5 mg/3mL, 2.5 mg, Nebulization, Q6H PRN, Gisella Britt MD    aspirin EC tablet 81 mg, 81 mg, Oral, Daily, StinglGisella MD, 81 mg at 07/05/25 1015    baclofen (LIORESAL) tablet 20 mg, 20 mg, Oral, Q8H, Sisi Bella MD, 20 mg at 07/05/25 1343    sennosides-docusate (PERICOLACE) 8.6-50 MG per tablet 2 tablet, 2 tablet, Oral, BID PRN, 2 tablet at 07/02/25 2208 **AND** polyethylene glycol (MIRALAX) packet 17 g, 17 g, Oral, Daily PRN, 17 g at 07/05/25 1707 **AND** bisacodyl (DULCOLAX) EC tablet 5 mg, 5 mg, Oral, Daily PRN **AND** bisacodyl (DULCOLAX) suppository 10 mg, 10 mg, Rectal, Daily PRN, Gisella Britt MD    Calcium Replacement - Follow Nurse / BPA Driven Protocol, , Not Applicable, PRN, Frederick Carey,     cefepime 2000 mg IVPB in 100 mL NS (MBP), 2,000 mg, Intravenous, Q8H, Sisi Bella MD, 2,000 mg at 07/05/25 1342    cholecalciferol (VITAMIN D3) tablet 5,000 Units, 5,000 Units, Oral, Daily, Stingl, Gisella Kaur MD, 5,000 Units at 07/05/25 1015    dextrose (D50W) (25 g/50 mL) IV injection 25 g, 25 g, Intravenous, Q15 Min PRN, Frederick Carey,     dextrose (GLUTOSE) oral gel 15 g, 15 g, Oral, Q15 Min PRN, Frederick Carey, DO    glucagon (GLUCAGEN) injection 1 mg, 1 mg, Intramuscular, Q15 Min PRN, Frederick Carey, DO    HYDROcodone-acetaminophen (NORCO) 5-325 MG per tablet 1 tablet, 1  tablet, Oral, Q4H PRN, Gisella Britt MD, 1 tablet at 07/04/25 0914    insulin lispro (HUMALOG/ADMELOG) injection 2-7 Units, 2-7 Units, Subcutaneous, 4x Daily AC & at Bedtime, Frederick Carey DO, 2 Units at 07/05/25 1342    Magnesium Standard Dose Replacement - Follow Nurse / BPA Driven Protocol, , Not Applicable, PRN, Frederick Carey DO    melatonin tablet 10 mg, 10 mg, Oral, Nightly, Sisi Bella MD, 10 mg at 07/05/25 0250    metoprolol succinate XL (TOPROL-XL) 24 hr tablet 50 mg, 50 mg, Oral, Q24H, Gisella Britt MD, 50 mg at 07/05/25 1015    ondansetron ODT (ZOFRAN-ODT) disintegrating tablet 4 mg, 4 mg, Oral, Q6H PRN **OR** ondansetron (ZOFRAN) injection 4 mg, 4 mg, Intravenous, Q6H PRN, Gisella Britt MD    pantoprazole (PROTONIX) EC tablet 40 mg, 40 mg, Oral, Once per day on Monday Wednesday Friday, Gisella Britt MD, 40 mg at 07/04/25 0916    Phosphorus Replacement - Follow Nurse / BPA Driven Protocol, , Not Applicable, PRN, Frederick Carey DO    Potassium Replacement - Follow Nurse / BPA Driven Protocol, , Not Applicable, PRN, Frederick Carey DO    pramipexole (MIRAPEX) tablet 1.5 mg, 1.5 mg, Oral, BID, Gisella Britt MD, 1.5 mg at 07/05/25 1015    prochlorperazine (COMPAZINE) injection 5 mg, 5 mg, Intravenous, Q6H PRN, Sisi Bella MD, 5 mg at 07/05/25 1355    rosuvastatin (CRESTOR) tablet 20 mg, 20 mg, Oral, Daily, Gisella Britt MD, 20 mg at 07/05/25 1015    sennosides-docusate (PERICOLACE) 8.6-50 MG per tablet 1 tablet, 1 tablet, Oral, Daily, Stingl, Gisella Kaur MD, 1 tablet at 07/05/25 1015    [COMPLETED] Insert Peripheral IV, , , Once **AND** sodium chloride 0.9 % flush 10 mL, 10 mL, Intravenous, PRN, Danita Finch MD    temazepam (RESTORIL) capsule 15 mg, 15 mg, Oral, Nightly PRN, Sisi Bella MD, 15 mg at 07/04/25 3160  Allergies:  Allergies   Allergen Reactions    Klonopin [Clonazepam] Mental Status Change, Confusion and  "Hallucinations     Social History:   Social History     Tobacco Use    Smoking status: Former     Current packs/day: 0.00     Average packs/day: 2.0 packs/day for 30.0 years (60.0 ttl pk-yrs)     Types: Cigarettes     Start date: 10/22/1973     Quit date: 10/22/2003     Years since quittin.7    Smokeless tobacco: Never   Substance Use Topics    Alcohol use: Not Currently      Family History:  Family History   Problem Relation Age of Onset    Hypertension Mother     Hyperlipidemia Mother     Aortic aneurysm Mother         thoracic    Diabetes Mother     Hypertension Father         charissa heart failure    Heart failure Father     Hyperlipidemia Father     Miscarriages / Stillbirths Sister     Multiple sclerosis Brother     Atrial fibrillation Brother     Diabetes Maternal Grandmother     Diabetes Maternal Grandfather     Stroke Maternal Grandfather     Parkinsonism Paternal Grandmother     Tremor Paternal Grandmother     Stomach cancer Paternal Grandfather           Review of Systems  See history of present illness and past medical history.       Vitals:   /81   Pulse 80   Temp 97.5 °F (36.4 °C) (Oral)   Resp 16   Ht 165.1 cm (65\")   Wt 78 kg (171 lb 15.3 oz)   LMP  (LMP Unknown) Comment: PT. STATES HER LAST PERIOD WAS GREATER THAN FIVE YEARS AGO  SpO2 96%   BMI 28.62 kg/m²   I/O:   Intake/Output Summary (Last 24 hours) at 2025 1824  Last data filed at 2025 1433  Gross per 24 hour   Intake 200 ml   Output 650 ml   Net -450 ml     Exam:  Patient is examined using the personal protective equipment as per guidelines from infection control for this particular patient as enacted.  Hand washing was performed before and after patient interaction.  General Appearance:    Alert, cooperative, no distress, appears stated age   Head:    Normocephalic, without obvious abnormality, atraumatic   Eyes:    PERRL, conjunctivae/corneas clear, EOM's intact, both eyes   Ears:    Normal external ear canals, both " ears       Throat:   Lips, tongue, gums normal; oral mucosa pink and moist   Neck:   Supple   Back:     Symmetric, no curvature, ROM normal, no CVA tenderness   Lungs:     Clear to auscultation bilaterally, respirations unlabored   Chest Wall:    No tenderness or deformity    Heart:  S1-S2 regular   Abdomen:   Soft nontender, Cobos catheter in place   Extremities:   Extremities normal, atraumatic, no cyanosis or edema   Pulses:   Pulses palpable in all extremities; symmetric all extremities   Skin:   With no surrounding cellulitis.   Neurologic: Alert awake oriented interactive.  Neurological deficit due to multiple sclerosis.       Data Review:    I reviewed the patient's new clinical results.  Results from last 7 days   Lab Units 07/05/25  0504 07/04/25  0330 07/03/25  0538 07/02/25  1419   WBC 10*3/mm3 6.80 4.99 4.78 6.56   HEMOGLOBIN g/dL 9.3* 9.2* 8.7* 9.6*   PLATELETS 10*3/mm3 498* 426 412 447     Results from last 7 days   Lab Units 07/05/25  1631 07/05/25  0742 07/04/25  0330 07/03/25  0538 07/02/25  1419   SODIUM mmol/L  --  139 140 140 140   POTASSIUM mmol/L 4.5 4.4 4.0 4.0 4.0   CHLORIDE mmol/L  --  109* 111* 110* 107   CO2 mmol/L  --  19.6* 19.7* 21.4* 22.7   BUN mg/dL  --  17.0 13.0 13.0 16.0   CREATININE mg/dL  --  0.71 0.52* 0.48* 0.56*   CALCIUM mg/dL  --  8.2* 8.1* 7.8* 8.5*   GLUCOSE mg/dL  --  96 86 95 122*     Microbiology Results (last 10 days)       Procedure Component Value - Date/Time    Blood Culture - Blood, Hand, Right [[id number]]  (Normal) Collected: 07/03/25 1002    Lab Status: Preliminary result Specimen: Blood from Hand, Right Updated: 07/05/25 1300     Blood Culture No growth at 2 days    Blood Culture - Blood, Hand, Left [[id number]]  (Normal) Collected: 07/03/25 1001    Lab Status: Preliminary result Specimen: Blood from Hand, Left Updated: 07/05/25 1300     Blood Culture No growth at 2 days    Urine Culture - Urine, Urine, Catheter [[id number]]  (Abnormal)  (Susceptibility)  Collected: 07/02/25 1424    Lab Status: Final result Specimen: Urine, Catheter Updated: 07/05/25 0752     Urine Culture >100,000 CFU/mL Klebsiella pneumoniae ssp pneumoniae      >100,000 CFU/mL Enterococcus faecalis    Narrative:          Colonization of the urinary tract without infection is common. Treatment is discouraged unless the patient is symptomatic, pregnant, or undergoing an invasive urologic procedure.    Susceptibility        Klebsiella pneumoniae ssp pneumoniae      JOSE      Amoxicillin + Clavulanate Susceptible      Ampicillin Resistant      Ampicillin + Sulbactam Intermediate      Cefazolin (Urine) Susceptible      Cefepime Susceptible      Ceftazidime Susceptible      Ceftriaxone Susceptible      Cefuroxime axetil Intermediate      Ciprofloxacin Resistant      Gentamicin Susceptible      Levofloxacin Resistant      Nitrofurantoin Intermediate      Piperacillin + Tazobactam Intermediate      Trimethoprim + Sulfamethoxazole Resistant                       Susceptibility        Enterococcus faecalis      JOSE      Ampicillin Susceptible      Levofloxacin Susceptible      Nitrofurantoin Susceptible      Vancomycin Susceptible                                 XR Chest 1 View  Result Date: 7/2/2025  As described.  This report was finalized on 7/2/2025 2:39 PM by Dr. Luan Estrada M.D on Workstation: [id number]      CT Chest With Contrast Diagnostic  Result Date: 6/25/2025  Impression: 1.  At least 10-15 scattered hepatic lesions likely represent metastatic disease with index lesions which have increased in size over multiple prior CTs. 2.  Presumed extensive osseous metastatic disease. A mixed lytic and blastic osseous lesion within the left posterior 10th rib has a more permeative/lytic appearance compared with 2/18/2025. 3.  Right lower lobe nodular pulmonary opacification is new since 2/18/2025 and grossly unchanged since 5/12/2025 while findings may represent pneumonia neoplastic disease cannot be  excluded. The subsolid nodule within the right apex appears to gradually increase in size of multiple prior CTs and is concerning for metastatic disease and/or slow-growing neoplasm. 4.  Other findings as above   This report was finalized on 6/25/2025 1:15 PM by Dr. Amarjit Curiel M.D on Workstation: [id number]      CT Abdomen Pelvis With Contrast  Result Date: 6/25/2025  Impression: 1.  At least 10-15 scattered hepatic lesions likely represent metastatic disease with index lesions which have increased in size over multiple prior CTs. 2.  Presumed extensive osseous metastatic disease. A mixed lytic and blastic osseous lesion within the left posterior 10th rib has a more permeative/lytic appearance compared with 2/18/2025. 3.  Right lower lobe nodular pulmonary opacification is new since 2/18/2025 and grossly unchanged since 5/12/2025 while findings may represent pneumonia neoplastic disease cannot be excluded. The subsolid nodule within the right apex appears to gradually increase in size of multiple prior CTs and is concerning for metastatic disease and/or slow-growing neoplasm. 4.  Other findings as above   This report was finalized on 6/25/2025 1:15 PM by Dr. Amarjit Curiel M.D on Workstation: [id number]      NM Bone Scan Whole Body  Result Date: 6/24/2025  Unchanged scintigraphic pattern of widespread osseous metastatic disease.   This report was finalized on 6/24/2025 4:52 PM by Dr. Jose Quinonez M.D on Workstation: [id number]      Mammo Diagnostic Digital Tomosynthesis Bilateral With CAD  Result Date: 6/24/2025  1.  Limited examination due to inability to adequately image the breasts with mammogram, as discussed above, some of which is due to the patient's underlying condition. 2.  Biopsy-proven left breast malignancy centered at 2:00 has increased in size compared to prior ultrasound from June 2024. The biopsy-proven metastatic left x-ray lymph node is not clearly visualized. 3.  Ill-defined hypoechogenicity  and shadowing throughout the right breast without a discrete/measurable mass, concerning for possible infiltrating malignancy. Additionally, a newly enlarged 1 cm irregular right axilla lymph node is suspicious for possible metastatic disease. Ultrasound-guided core needle biopsy targeting a dominant/representative area of shadowing in the right breast and/or the right axillary lymph node could be performed, if clinically indicated for treatment planning purposes. 4.  Skin thickening throughout the left breast is identified, which could be due to cellulitis and/or underlying malignancy. No discrete or drainable collection is identified. The patient reportedly underwent recent skin punch biopsy with pathology currently pending. 5.  Contrast-enhanced breast MRI could be considered to better assess extent of disease.   Findings and recommendations were discussed with the patient in person at the time of the examination. An epic in basket message was sent to Dr. Lopes and DANA Gomez on 6/24/2025.  BI-RADS Category 4: Suspicious    This report was finalized on 6/24/2025 3:43 PM by Dr. Shameka Moser M.D on Workstation: [id number]      US Breast Bilateral Complete  Result Date: 6/24/2025  1.  Limited examination due to inability to adequately image the breasts with mammogram, as discussed above, some of which is due to the patient's underlying condition. 2.  Biopsy-proven left breast malignancy centered at 2:00 has increased in size compared to prior ultrasound from June 2024. The biopsy-proven metastatic left x-ray lymph node is not clearly visualized. 3.  Ill-defined hypoechogenicity and shadowing throughout the right breast without a discrete/measurable mass, concerning for possible infiltrating malignancy. Additionally, a newly enlarged 1 cm irregular right axilla lymph node is suspicious for possible metastatic disease. Ultrasound-guided core needle biopsy targeting a dominant/representative area of  shadowing in the right breast and/or the right axillary lymph node could be performed, if clinically indicated for treatment planning purposes. 4.  Skin thickening throughout the left breast is identified, which could be due to cellulitis and/or underlying malignancy. No discrete or drainable collection is identified. The patient reportedly underwent recent skin punch biopsy with pathology currently pending. 5.  Contrast-enhanced breast MRI could be considered to better assess extent of disease.   Findings and recommendations were discussed with the patient in person at the time of the examination. An epic in basket message was sent to Dr. Lopes and DANA Gomez on 6/24/2025.  BI-RADS Category 4: Suspicious    This report was finalized on 6/24/2025 3:43 PM by Dr. Shameka Moser M.D on Workstation: [id number]          Assessment:  Active Hospital Problems    Diagnosis  POA    **UTI (urinary tract infection) due to urinary indwelling catheter [T83.511A, N39.0]  Yes    UTI (urinary tract infection) [N39.0]  Yes    Anemia [D64.9]  Yes    Type 2 diabetes mellitus [E11.9]  Yes    Elevated LFTs [R79.89]  Yes    Weakness [R53.1]  Yes    Malignant neoplasm of overlapping sites of left breast in female, estrogen receptor positive [C50.812, Z17.0]  Not Applicable    Essential hypertension [I10]  Yes    Restless legs syndrome [G25.81]  Yes      Resolved Hospital Problems   No resolved problems to display.         Plan:  See above  Luisa Child MD   7/5/2025  18:24 EDT    Parts of this note may be an electronic transcription/translation of spoken language to printed text using the Dragon dictation system.

## 2025-07-05 NOTE — CONSULTS
[facility] CONSULT      Patient Identification:  NAME:  Maritza Bejarano  Age:  64 y.o.   Sex:  female   :  1960   MRN:  [id number]     Chief complaint: Abdominal pain    History of present illness:      Well-known patient to our practice, very pleasant 64-year-old female, history of multiple sclerosis, with chronic monthly Cobos catheter change.  Failed InterStim device.    Here with urine retention due to Cobos catheter clogged with sediment after multiple irrigations.  Abdominal pain relieved after irrigation but returned when catheter became clogged again.    Urine culture pending    In hospital:  -[facility], good UOP  Asked to see    Past medical history:  Past Medical History:   Diagnosis Date    Anemia     `Treated with iron    Dawn esophagus     per patient    Blurred vision     R/T MS    Breast cancer     metastatic    Carpal tunnel syndrome     Clotting disorder , ,     3 g/i bleeds w/ transfus/ions    Colon polyp 2013    removed w/ colonoscopy    Deep vein thrombosis phlebitis     Depression     Diplopia     GERD (gastroesophageal reflux disease)     GI (gastrointestinal bleed) 3 bleeds    2 transfusions    H/O Skin cancer, basal cell     Headache     History of blood transfusion     History of GI bleed     R/T NSAIDS AND STEROIDS, multiple times    History of urinary tract infection     Hypercalcemia     s/p parathyroidectomy    Hyperlipidemia     Hypertension     Movement disorder     Multiple sclerosis     Optic neuritis     PONV (postoperative nausea and vomiting)     Steroid-induced diabetes        Past surgical history:  Past Surgical History:   Procedure Laterality Date    APPENDECTOMY      BLADDER SURGERY      bladder stimulator    BREAST BIOPSY  don't remember    BREAST SURGERY      augmentation wtih subsequent removal    CARPAL TUNNEL RELEASE Bilateral     Left 2018, right 2020    CUBITAL TUNNEL RELEASE Left     CYSTOSCOPY BOTOX INJECTION OF BLADDER  2018     Cystoscopy with Botox    FRACTURE SURGERY  2019    rt shoulder    PARATHYROIDECTOMY      one gland removed    ROTATOR CUFF REPAIR Right 2017    TOE SURGERY      bilateral great toes    TOTAL SHOULDER ARTHROPLASTY W/ DISTAL CLAVICLE EXCISION Right 10/22/2018    Procedure: RT TOTAL SHOULDER REVERSE ARTHROPLASTY;  Surgeon: Bipin Dangelo MD;  Location: [facility] MAIN OR;  Service: Orthopedics       Allergies:  Klonopin [clonazepam]    Home medications:  Medications Prior to Admission   Medication Sig Dispense Refill Last Dose/Taking    albuterol sulfate  (90 Base) MCG/ACT inhaler Inhale 2 puffs Every 4 (Four) Hours As Needed for Wheezing or Shortness of Air. 18 g 0 Taking As Needed    anastrozole (ARIMIDEX) 1 MG tablet Take 1 tablet by mouth Daily.   Taking    aspirin 81 MG EC tablet Take 1 tablet by mouth Daily. 30 tablet 0 Taking    baclofen (LIORESAL) 20 MG tablet Take 1 tablet by mouth 2 (Two) Times a Day.   Taking    Cholecalciferol (vitamin D3) 125 MCG (5000 UT) tablet tablet Take 1 tablet by mouth Daily.   Taking    Fenbendazole powder Use 440 mg 3 (Three) Times a Week. 2 capsules 3 times weekly   Taking    HYDROcodone-acetaminophen (NORCO) 5-325 MG per tablet Take 1 tablet by mouth Every 4 (Four) Hours As Needed for Moderate Pain. 60 tablet 0 Taking As Needed    IVERMECTIN PO Take 15 mg by mouth 5 (Five) Times a Week. Mopnday-Friday   Taking    metoprolol succinate XL (TOPROL-XL) 50 MG 24 hr tablet Take 1 tablet by mouth Daily. 90 tablet 1 Taking    pantoprazole (PROTONIX) 40 MG EC tablet TAKE ONE TABLET BY MOUTH EVERY DAY (Patient taking differently: Take 1 tablet by mouth 3 (Three) Times a Week.) 180 tablet 0 Taking Differently    phenazopyridine (PYRIDIUM) 200 MG tablet Take 1 tablet by mouth 3 (Three) Times a Day As Needed for Dysuria. 20 tablet 0 Taking As Needed    pramipexole (MIRAPEX) 1.5 MG tablet Take 1 tablet by mouth 2 (Two) Times a Day.   Taking    prochlorperazine (COMPAZINE) 10 MG tablet  Take 1 tablet by mouth Every 6 (Six) Hours As Needed for Nausea or Vomiting. 90 tablet 5 Taking As Needed    rosuvastatin (CRESTOR) 20 MG tablet Take 1 tablet by mouth Daily. 90 tablet 1 Taking    sennosides-docusate (senna-docusate sodium) 8.6-50 MG per tablet Take 1 tablet by mouth Daily. 30 tablet 2 Taking        Hospital medications:  aspirin, 81 mg, Oral, Daily  baclofen, 20 mg, Oral, Q8H  cefepime, 2,000 mg, Intravenous, Q8H  vitamin D3, 5,000 Units, Oral, Daily  insulin lispro, 2-7 Units, Subcutaneous, 4x Daily AC & at Bedtime  melatonin, 10 mg, Oral, Nightly  metoprolol succinate XL, 50 mg, Oral, Q24H  pantoprazole, 40 mg, Oral, Once per day on Monday Wednesday Friday  pramipexole, 1.5 mg, Oral, BID  rosuvastatin, 20 mg, Oral, Daily  sennosides-docusate, 1 tablet, Oral, Daily           acetaminophen **OR** acetaminophen **OR** acetaminophen    albuterol    senna-docusate sodium **AND** polyethylene glycol **AND** bisacodyl **AND** bisacodyl    Calcium Replacement - Follow Nurse / BPA Driven Protocol    dextrose    dextrose    glucagon (human recombinant)    HYDROcodone-acetaminophen    Magnesium Standard Dose Replacement - Follow Nurse / BPA Driven Protocol    ondansetron ODT **OR** ondansetron    Phosphorus Replacement - Follow Nurse / BPA Driven Protocol    Potassium Replacement - Follow Nurse / BPA Driven Protocol    prochlorperazine    [COMPLETED] Insert Peripheral IV **AND** sodium chloride    temazepam    Family history:  Family History   Problem Relation Age of Onset    Hypertension Mother     Hyperlipidemia Mother     Aortic aneurysm Mother         thoracic    Diabetes Mother     Hypertension Father         charissa heart failure    Heart failure Father     Hyperlipidemia Father     Miscarriages / Stillbirths Sister     Multiple sclerosis Brother     Atrial fibrillation Brother     Diabetes Maternal Grandmother     Diabetes Maternal Grandfather     Stroke Maternal Grandfather     Parkinsonism Paternal  Grandmother     Tremor Paternal Grandmother     Stomach cancer Paternal Grandfather        Social history:  Social History     Tobacco Use    Smoking status: Former     Current packs/day: 0.00     Average packs/day: 2.0 packs/day for 30.0 years (60.0 ttl pk-yrs)     Types: Cigarettes     Start date: 10/22/1973     Quit date: 10/22/2003     Years since quittin.7    Smokeless tobacco: Never   Vaping Use    Vaping status: Never Used   Substance Use Topics    Alcohol use: Not Currently    Drug use: Yes     Types: Marijuana     Comment: rarely       Review of systems:      Positive for:  nothing  Negative for:  chest pain, cough, sob, o/w neg    Objective:  TMax 24 hours:   Temp (24hrs), Av.4 °F (36.9 °C), Min:98.1 °F (36.7 °C), Max:98.6 °F (37 °C)      Vitals Ranges:   Temp:  [98.1 °F (36.7 °C)-98.6 °F (37 °C)] 98.6 °F (37 °C)  Heart Rate:  [77-88] 83  Resp:  [16-18] 16  BP: (122-135)/(56-67) 122/67    Intake/Output Last 3 shifts:  I/O last 3 completed shifts:  In: 500 [IV Piggyback:500]  Out: 2800 [Urine:2800]     Physical Exam:    General Appearance:    Alert, cooperative, NAD   Back:     No CVA tenderness   Lungs:     Respirations unlabored, no wheezing    Heart:    RRR, intact peripheral pulses   Abdomen:     Soft, NDNT, no masses, no guarding   :    Pelvic not performed, bladder nondistended and nontender   Neuro/Psych:   Orientation intact, mood/affect pleasant       Results review:   I reviewed the patient's new clinical results.    Data review:  Lab Results (last 24 hours)       Procedure Component Value Units Date/Time    Blood Culture - Blood, Hand, Left [[id number]]  (Normal) Collected: 25 1001    Specimen: Blood from Hand, Left Updated: 25 1300     Blood Culture No growth at 2 days    Blood Culture - Blood, Hand, Right [[id number]]  (Normal) Collected: 25 1002    Specimen: Blood from Hand, Right Updated: 25 1300     Blood Culture No growth at 2 days    POC Glucose Once  [[id number]]  (Abnormal) Collected: 07/05/25 1205    Specimen: Blood Updated: 07/05/25 1206     Glucose 154 mg/dL     Comprehensive Metabolic Panel [[id number]]  (Abnormal) Collected: 07/05/25 0742    Specimen: Blood Updated: 07/05/25 0948     Glucose 96 mg/dL      BUN 17.0 mg/dL      Creatinine 0.71 mg/dL      Sodium 139 mmol/L      Potassium 4.4 mmol/L      Comment: Slight hemolysis detected by analyzer. Result may be falsely elevated.        Chloride 109 mmol/L      CO2 19.6 mmol/L      Calcium 8.2 mg/dL      Total Protein 6.8 g/dL      Albumin 3.2 g/dL      ALT (SGPT) 19 U/L      AST (SGOT) 66 U/L      Comment: Slight hemolysis detected by analyzer. Result may be falsely elevated.        Alkaline Phosphatase 156 U/L      Total Bilirubin <0.2 mg/dL      Globulin 3.6 gm/dL      A/G Ratio 0.9 g/dL      BUN/Creatinine Ratio 23.9     Anion Gap 10.4 mmol/L      eGFR 96.3 mL/min/1.73     Narrative:      GFR Categories in Chronic Kidney Disease (CKD)              GFR Category          GFR (mL/min/1.73)    Interpretation  G1                    90 or greater        Normal or high (1)  G2                    60-89                Mild decrease (1)  G3a                   45-59                Mild to moderate decrease  G3b                   30-44                Moderate to severe decrease  G4                    15-29                Severe decrease  G5                    14 or less           Kidney failure    (1)In the absence of evidence of kidney disease, neither GFR category G1 or G2 fulfill the criteria for CKD.    eGFR calculation 2021 CKD-EPI creatinine equation, which does not include race as a factor    CBC & Differential [[id number]]  (Abnormal) Collected: 07/05/25 0504    Specimen: Blood Updated: 07/05/25 0900    Narrative:      The following orders were created for panel order CBC & Differential.  Procedure                               Abnormality         Status                     ---------                                -----------         ------                     CBC Auto Differential[[id number]]        Abnormal            Final result                 Please view results for these tests on the individual orders.    Manual Differential [[id number]]  (Abnormal) Collected: 07/05/25 0504    Specimen: Blood Updated: 07/05/25 0900     Neutrophil % 81.0 %      Lymphocyte % 12.0 %      Monocyte % 5.0 %      Eosinophil % 1.0 %      Metamyelocyte % 1.0 %      Neutrophils Absolute 5.51 10*3/mm3      Lymphocytes Absolute 0.82 10*3/mm3      Monocytes Absolute 0.34 10*3/mm3      Eosinophils Absolute 0.07 10*3/mm3      RBC Morphology Normal     WBC Morphology Normal     Platelet Morphology Normal    CBC Auto Differential [[id number]]  (Abnormal) Collected: 07/05/25 0504    Specimen: Blood Updated: 07/05/25 0900     WBC 6.80 10*3/mm3      RBC 3.24 10*6/mm3      Hemoglobin 9.3 g/dL      Hematocrit 31.1 %      MCV 96.0 fL      MCH 28.7 pg      MCHC 29.9 g/dL      RDW 16.0 %      RDW-SD 56.5 fl      MPV 9.3 fL      Platelets 498 10*3/mm3      nRBC 0.0 /100 WBC     POC Glucose Once [[id number]]  (Normal) Collected: 07/05/25 0758    Specimen: Blood Updated: 07/05/25 0759     Glucose 108 mg/dL     Urine Culture - Urine, Urine, Catheter [[id number]]  (Abnormal)  (Susceptibility) Collected: 07/02/25 1424    Specimen: Urine, Catheter Updated: 07/05/25 0752     Urine Culture >100,000 CFU/mL Klebsiella pneumoniae ssp pneumoniae      >100,000 CFU/mL Enterococcus faecalis    Narrative:          Colonization of the urinary tract without infection is common. Treatment is discouraged unless the patient is symptomatic, pregnant, or undergoing an invasive urologic procedure.    Susceptibility        Klebsiella pneumoniae ssp pneumoniae      JOSE      Amoxicillin + Clavulanate Susceptible      Ampicillin Resistant      Ampicillin + Sulbactam Intermediate      Cefazolin (Urine) Susceptible      Cefepime Susceptible      Ceftazidime Susceptible      Ceftriaxone  Susceptible      Cefuroxime axetil Intermediate      Ciprofloxacin Resistant      Gentamicin Susceptible      Levofloxacin Resistant      Nitrofurantoin Intermediate      Piperacillin + Tazobactam Intermediate      Trimethoprim + Sulfamethoxazole Resistant                       Susceptibility        Enterococcus faecalis      JOSE      Ampicillin Susceptible      Levofloxacin Susceptible      Nitrofurantoin Susceptible      Vancomycin Susceptible                           POC Glucose Once [[id number]]  (Abnormal) Collected: 07/04/25 2101    Specimen: Blood Updated: 07/04/25 2102     Glucose 150 mg/dL     POC Glucose Once [[id number]]  (Abnormal) Collected: 07/04/25 1639    Specimen: Blood Updated: 07/04/25 1640     Glucose 156 mg/dL              Imaging:  Imaging Results (Last 24 Hours)       ** No results found for the last 24 hours. **             Assessment:     Urine retention due to sediment and chronic Cobos catheter.    Urinary tract infection    Plan:     Change Cobos catheter,  Primary to follow culture specific antibiotics  Follow-up first urology  1 month after Cobos catheter change     [facility] will sign off call if needed    DANA Gonzalez  07/05/25  14:18 EDT

## 2025-07-05 NOTE — NURSING NOTE
"This RN observed on the monitors that pt O2 sats were 61% and HR in the 40s. I immediately went to check on pt and she was pale on arrival and in a panic stating \"I can't breathe, I feel like I'm dying!\" A few seconds later pts HR returned to the 80s. O2 sats 95% and pt stated she felt better. This RN paged Dr. Bella to inform and pt had another episode while we were on the phone. Pt again recovered spontaneously. STAT EKG and labs obtained. Cardio consulted per [facility].   "

## 2025-07-05 NOTE — PLAN OF CARE
Goal Outcome Evaluation:              Outcome Evaluation: VSS. Pt c/o pain from matias catheter near the urethra. [facility] aware. OK to irrigate matias per [facility]. When this RN attempted to irrigate, fluid splashed back on me. After manipulation of matias, we were able to irrigate with adequate output. Urology consulted. Continue IVabx. Q2 turn. PRN pain medcation given. Spouse at bedside. No other complaints. Needs met at this time.

## 2025-07-05 NOTE — PROGRESS NOTES
Name: Maritza Nance Darci ADMIT: 2025   : 1960  PCP: CarboneEnoc guzman     MRN: [id number] LOS: 1 days   AGE/SEX: 64 y.o. female  ROOM: [facility]     Subjective   Subjective   Patient awake and resting in bed, she reports better sleep overnight with restoril but took melatonin at 3 am and has been drowsy today.  Also complaining of increasing pain at breast biopsy site. On exam biopsy site does not appear overtly infect, no drainage.        Objective   Objective   Vital Signs  Temp:  [98.1 °F (36.7 °C)-98.6 °F (37 °C)] 98.6 °F (37 °C)  Heart Rate:  [77-88] 83  Resp:  [16-18] 16  BP: (122-135)/(56-67) 122/67  SpO2:  [94 %-96 %] 94 %  on  Flow (L/min) (Oxygen Therapy):  [2] 2;   Device (Oxygen Therapy): CPAP  Body mass index is 28.62 kg/m².  Physical Exam  Vitals and nursing note reviewed.   Constitutional:       General: She is not in acute distress.  Cardiovascular:      Rate and Rhythm: Normal rate.   Pulmonary:      Effort: Pulmonary effort is normal. No respiratory distress.      Breath sounds: No wheezing.   Abdominal:      General: Bowel sounds are normal. There is no distension.      Palpations: Abdomen is soft.      Tenderness: There is no abdominal tenderness.   Musculoskeletal:      Right lower leg: No edema.      Left lower leg: No edema.      Comments: L breast biopsy site immediately inferior to inverted L nipple; steri-strip overlying biopsy site; appears red chronically, does not appear overtly infected, no drainage noted .   Skin:     General: Skin is warm and dry.   Neurological:      Mental Status: She is alert.       Results Review     I reviewed the patient's new clinical results.  Results from last 7 days   Lab Units 25  0504 25  0330 25  0538 25  1419   WBC 10*3/mm3 6.80 4.99 4.78 6.56   HEMOGLOBIN g/dL 9.3* 9.2* 8.7* 9.6*   PLATELETS 10*3/mm3 498* 426 412 447     Results from last 7 days   Lab Units 25  0742 25  0330 25  0538 25  1412    SODIUM mmol/L 139 140 140 140   POTASSIUM mmol/L 4.4 4.0 4.0 4.0   CHLORIDE mmol/L 109* 111* 110* 107   CO2 mmol/L 19.6* 19.7* 21.4* 22.7   BUN mg/dL 17.0 13.0 13.0 16.0   CREATININE mg/dL 0.71 0.52* 0.48* 0.56*   GLUCOSE mg/dL 96 86 95 122*   EGFR mL/min/1.73 96.3 105.2 107.2 103.3     Results from last 7 days   Lab Units 07/05/25  0742 07/04/25  0330 07/02/25  1419   ALBUMIN g/dL 3.2* 3.4* 3.7   BILIRUBIN mg/dL <0.2 <0.2 <0.2   ALK PHOS U/L 156* 165* 187*   AST (SGOT) U/L 66* 66* 76*   ALT (SGPT) U/L 19 19 26     Results from last 7 days   Lab Units 07/05/25  0742 07/04/25  0330 07/03/25  0538 07/02/25  1419   CALCIUM mg/dL 8.2* 8.1* 7.8* 8.5*   ALBUMIN g/dL 3.2* 3.4*  --  3.7   MAGNESIUM mg/dL  --   --   --  2.4   PHOSPHORUS mg/dL  --   --   --  2.8     Results from last 7 days   Lab Units 07/02/25  1419   PROCALCITONIN ng/mL 0.15   LACTATE mmol/L 1.4     Glucose   Date/Time Value Ref Range Status   07/05/2025 1205 154 (H) 70 - 130 mg/dL Final   07/05/2025 0758 108 70 - 130 mg/dL Final   07/04/2025 2101 150 (H) 70 - 130 mg/dL Final   07/04/2025 1639 156 (H) 70 - 130 mg/dL Final   07/04/2025 1155 162 (H) 70 - 130 mg/dL Final   07/04/2025 0748 98 70 - 130 mg/dL Final   07/03/2025 2028 184 (H) 70 - 130 mg/dL Final       No radiology results for the last day      I have personally reviewed all medications:  Scheduled Medications  aspirin, 81 mg, Oral, Daily  baclofen, 20 mg, Oral, Q8H  cefepime, 2,000 mg, Intravenous, Q8H  vitamin D3, 5,000 Units, Oral, Daily  insulin lispro, 2-7 Units, Subcutaneous, 4x Daily AC & at Bedtime  melatonin, 10 mg, Oral, Nightly  metoprolol succinate XL, 50 mg, Oral, Q24H  pantoprazole, 40 mg, Oral, Once per day on Monday Wednesday Friday  pramipexole, 1.5 mg, Oral, BID  rosuvastatin, 20 mg, Oral, Daily  sennosides-docusate, 1 tablet, Oral, Daily    Infusions   Diet  Diet: Regular/House; Fluid Consistency: Thin (IDDSI 0)    I have personally reviewed:  [x]  Laboratory   [x]   Microbiology   [x]  Radiology   [x]  EKG/Telemetry  []  Cardiology/Vascular   []  Pathology    []  Records       Assessment/Plan     Active Hospital Problems    Diagnosis  POA    **UTI (urinary tract infection) due to urinary indwelling catheter [T83.511A, N39.0]  Yes    UTI (urinary tract infection) [N39.0]  Yes    Anemia [D64.9]  Yes    Type 2 diabetes mellitus [E11.9]  Yes    Elevated LFTs [R79.89]  Yes    Weakness [R53.1]  Yes    Malignant neoplasm of overlapping sites of left breast in female, estrogen receptor positive [C50.812, Z17.0]  Not Applicable    Essential hypertension [I10]  Yes    Restless legs syndrome [G25.81]  Yes      Resolved Hospital Problems   No resolved problems to display.       64 y.o. female admitted with UTI (urinary tract infection) due to urinary indwelling catheter.    UA suggestive of infection, she is on Cefepime, tolerating well, continue as prescribed, follow up final urine culture and blood culture results. Urine cx with klebsiella and enterococcus faecalis. Blood cx are NGTD @48h. Uro consulted yesterday due to issues with Matias catheter/urology to see today. Have asked ID to see regarding e faecalis in in dwelling matias; she has been on cefepime but still reports feeling poorly, WBC is wnl and pt is afebrile.     Weakness and fatigue, likely multifactorial, exacerbated by infection. Therapy services consulted. Further management based on clinical course.    A1c 7.30% (04/21/25), order SSI, trend glucose levels to guide management.    BP acceptable, continue treatments as prescribed. Trend BP to guide management.    LFT slightly elevated, trend with CMP.    Hemoglobin low, drop likely dilutional from fluids, monitor for now, repeat CBC in AM. Transfuse for hemoglobin <7.    Insomnia- patient reports has tried klonopin, ambien, melatonin- all ineffective; willing to try restoril- explained would not be able to write long term script for this at [place].   Ok to take melatonin and  "restoril together.     SCDs for DVT prophylaxis.  Full code.  Discussed with patient, family, and nursing staff.  Anticipate discharge home in 1-2 days.  Expected Discharge Date: 7/6/2025; Expected Discharge Time:       Sisi Bella MD  [facility]  07/05/25    Addendum:   Paged by RN this afternoon, patient has had several episodes with heart rates dropping into the 40s associated with O2 sats dropping into the 60s with feelings of panic, breathlessness \"feeling like she is going to die\"-RN reports 2 L O2 applied with good oxygen saturation.  Reports that heart rate recovered spontaneously back to the 80s.  EKG, mag, troponin potassium are pending.  Will also ask cardiology to see for symptomatic bradycardia.  "

## 2025-07-05 NOTE — PLAN OF CARE
Goal Outcome Evaluation:              Outcome Evaluation: VSS. Pt had episodes of bradycardia along with desatuation this shift. See previous notes. Strips saved in chart. Cardiology consulted. echo ordered. Cardio aware of critical trop. Cobos exchanged per urology. Needs met at this time.

## 2025-07-06 ENCOUNTER — APPOINTMENT (OUTPATIENT)
Dept: CARDIOLOGY | Facility: HOSPITAL | Age: 65
End: 2025-07-06
Payer: MEDICARE

## 2025-07-06 LAB
ALBUMIN SERPL-MCNC: 3.4 G/DL (ref 3.5–5.2)
ALBUMIN/GLOB SERPL: 1 G/DL
ALP SERPL-CCNC: 170 U/L (ref 39–117)
ALT SERPL W P-5'-P-CCNC: 22 U/L (ref 1–33)
ANION GAP SERPL CALCULATED.3IONS-SCNC: 9.8 MMOL/L (ref 5–15)
AORTIC ARCH: 2.8 CM
AORTIC DIMENSIONLESS INDEX: 0.8 (DI)
ASCENDING AORTA: 3 CM
AST SERPL-CCNC: 60 U/L (ref 1–32)
AV LVOT PEAK GRADIENT: 25 MMHG
AV MEAN PRESS GRAD SYS DOP V1V2: 11.3 MMHG
AV VMAX SYS DOP: 224.7 CM/SEC
BASOPHILS # BLD AUTO: 0.03 10*3/MM3 (ref 0–0.2)
BASOPHILS NFR BLD AUTO: 0.4 % (ref 0–1.5)
BH CV ECHO MEAS - ACS: 1.42 CM
BH CV ECHO MEAS - AO MAX PG: 20.2 MMHG
BH CV ECHO MEAS - AO ROOT DIAM: 2.04 CM
BH CV ECHO MEAS - AO V2 VTI: 55.4 CM
BH CV ECHO MEAS - AVA(I,D): 2.26 CM2
BH CV ECHO MEAS - EDV(CUBED): 117.8 ML
BH CV ECHO MEAS - EDV(MOD-SP2): 96 ML
BH CV ECHO MEAS - EDV(MOD-SP4): 101 ML
BH CV ECHO MEAS - EF(MOD-SP2): 58.3 %
BH CV ECHO MEAS - EF(MOD-SP4): 69.3 %
BH CV ECHO MEAS - ESV(CUBED): 28.2 ML
BH CV ECHO MEAS - ESV(MOD-SP2): 40 ML
BH CV ECHO MEAS - ESV(MOD-SP4): 31 ML
BH CV ECHO MEAS - FS: 37.9 %
BH CV ECHO MEAS - IVS/LVPW: 1.08 CM
BH CV ECHO MEAS - IVSD: 0.98 CM
BH CV ECHO MEAS - LAT PEAK E' VEL: 8.9 CM/SEC
BH CV ECHO MEAS - LV DIASTOLIC VOL/BSA (35-75): 54.2 CM2
BH CV ECHO MEAS - LV MASS(C)D: 163.3 GRAMS
BH CV ECHO MEAS - LV MAX PG: 12.7 MMHG
BH CV ECHO MEAS - LV MEAN PG: 8.5 MMHG
BH CV ECHO MEAS - LV SYSTOLIC VOL/BSA (12-30): 16.6 CM2
BH CV ECHO MEAS - LV V1 MAX: 178.2 CM/SEC
BH CV ECHO MEAS - LV V1 VTI: 44.6 CM
BH CV ECHO MEAS - LVIDD: 4.9 CM
BH CV ECHO MEAS - LVIDS: 3 CM
BH CV ECHO MEAS - LVOT AREA: 2.8 CM2
BH CV ECHO MEAS - LVOT DIAM: 1.89 CM
BH CV ECHO MEAS - LVPWD: 0.91 CM
BH CV ECHO MEAS - MED PEAK E' VEL: 7 CM/SEC
BH CV ECHO MEAS - MV A DUR: 0.11 SEC
BH CV ECHO MEAS - MV A MAX VEL: 111.4 CM/SEC
BH CV ECHO MEAS - MV DEC SLOPE: 1040 CM/SEC2
BH CV ECHO MEAS - MV DEC TIME: 0.12 SEC
BH CV ECHO MEAS - MV E MAX VEL: 86.2 CM/SEC
BH CV ECHO MEAS - MV E/A: 0.77
BH CV ECHO MEAS - MV MAX PG: 8 MMHG
BH CV ECHO MEAS - MV MEAN PG: 4.4 MMHG
BH CV ECHO MEAS - MV P1/2T: 35.6 MSEC
BH CV ECHO MEAS - MV V2 VTI: 27.9 CM
BH CV ECHO MEAS - MVA(P1/2T): 6.2 CM2
BH CV ECHO MEAS - MVA(VTI): 4.5 CM2
BH CV ECHO MEAS - PA ACC TIME: 0.07 SEC
BH CV ECHO MEAS - PA V2 MAX: 115.5 CM/SEC
BH CV ECHO MEAS - PULM A REVS DUR: 0.11 SEC
BH CV ECHO MEAS - PULM A REVS VEL: 29.9 CM/SEC
BH CV ECHO MEAS - PULM DIAS VEL: 36.9 CM/SEC
BH CV ECHO MEAS - PULM S/D: 1.23
BH CV ECHO MEAS - PULM SYS VEL: 45.3 CM/SEC
BH CV ECHO MEAS - RAP SYSTOLE: 3 MMHG
BH CV ECHO MEAS - RV MAX PG: 1.91 MMHG
BH CV ECHO MEAS - RV V1 MAX: 68.2 CM/SEC
BH CV ECHO MEAS - RV V1 VTI: 13.7 CM
BH CV ECHO MEAS - SUP REN AO DIAM: 2.1 CM
BH CV ECHO MEAS - SV(LVOT): 125.4 ML
BH CV ECHO MEAS - SV(MOD-SP2): 56 ML
BH CV ECHO MEAS - SV(MOD-SP4): 70 ML
BH CV ECHO MEAS - SVI(LVOT): 67.3 ML/M2
BH CV ECHO MEAS - SVI(MOD-SP2): 30 ML/M2
BH CV ECHO MEAS - SVI(MOD-SP4): 37.5 ML/M2
BH CV ECHO MEAS - TAPSE (>1.6): 2.15 CM
BH CV ECHO MEASUREMENTS AVERAGE E/E' RATIO: 10.84
BH CV XLRA - RV BASE: 3.7 CM
BH CV XLRA - RV LENGTH: 7.3 CM
BH CV XLRA - RV MID: 2.29 CM
BH CV XLRA - TDI S': 17.2 CM/SEC
BILIRUB SERPL-MCNC: <0.2 MG/DL (ref 0–1.2)
BUN SERPL-MCNC: 15 MG/DL (ref 8–23)
BUN/CREAT SERPL: 24.2 (ref 7–25)
CALCIUM SPEC-SCNC: 9.6 MG/DL (ref 8.6–10.5)
CHLORIDE SERPL-SCNC: 108 MMOL/L (ref 98–107)
CO2 SERPL-SCNC: 22.2 MMOL/L (ref 22–29)
CREAT SERPL-MCNC: 0.62 MG/DL (ref 0.57–1)
DEPRECATED RDW RBC AUTO: 54.2 FL (ref 37–54)
EGFRCR SERPLBLD CKD-EPI 2021: 100.8 ML/MIN/1.73
EOSINOPHIL # BLD AUTO: 0.19 10*3/MM3 (ref 0–0.4)
EOSINOPHIL NFR BLD AUTO: 2.4 % (ref 0.3–6.2)
ERYTHROCYTE [DISTWIDTH] IN BLOOD BY AUTOMATED COUNT: 15.4 % (ref 12.3–15.4)
GLOBULIN UR ELPH-MCNC: 3.5 GM/DL
GLUCOSE BLDC GLUCOMTR-MCNC: 105 MG/DL (ref 70–130)
GLUCOSE BLDC GLUCOMTR-MCNC: 153 MG/DL (ref 70–130)
GLUCOSE BLDC GLUCOMTR-MCNC: 172 MG/DL (ref 70–130)
GLUCOSE BLDC GLUCOMTR-MCNC: 215 MG/DL (ref 70–130)
GLUCOSE BLDC GLUCOMTR-MCNC: 99 MG/DL (ref 70–130)
GLUCOSE SERPL-MCNC: 160 MG/DL (ref 65–99)
HCT VFR BLD AUTO: 32.7 % (ref 34–46.6)
HGB BLD-MCNC: 9.8 G/DL (ref 12–15.9)
IMM GRANULOCYTES # BLD AUTO: 0.13 10*3/MM3 (ref 0–0.05)
IMM GRANULOCYTES NFR BLD AUTO: 1.6 % (ref 0–0.5)
LEFT ATRIUM VOLUME INDEX: 24.4 ML/M2
LV EF BIPLANE MOD: 64.1 %
LYMPHOCYTES # BLD AUTO: 1.9 10*3/MM3 (ref 0.7–3.1)
LYMPHOCYTES NFR BLD AUTO: 23.9 % (ref 19.6–45.3)
MCH RBC QN AUTO: 28.5 PG (ref 26.6–33)
MCHC RBC AUTO-ENTMCNC: 30 G/DL (ref 31.5–35.7)
MCV RBC AUTO: 95.1 FL (ref 79–97)
MONOCYTES # BLD AUTO: 0.7 10*3/MM3 (ref 0.1–0.9)
MONOCYTES NFR BLD AUTO: 8.8 % (ref 5–12)
NEUTROPHILS NFR BLD AUTO: 5 10*3/MM3 (ref 1.7–7)
NEUTROPHILS NFR BLD AUTO: 62.9 % (ref 42.7–76)
NRBC BLD AUTO-RTO: 0.3 /100 WBC (ref 0–0.2)
PLATELET # BLD AUTO: 438 10*3/MM3 (ref 140–450)
PMV BLD AUTO: 9.2 FL (ref 6–12)
POTASSIUM SERPL-SCNC: 4.7 MMOL/L (ref 3.5–5.2)
PROT SERPL-MCNC: 6.9 G/DL (ref 6–8.5)
QT INTERVAL: 393 MS
QTC INTERVAL: 444 MS
RBC # BLD AUTO: 3.44 10*6/MM3 (ref 3.77–5.28)
SINUS: 3 CM
SODIUM SERPL-SCNC: 140 MMOL/L (ref 136–145)
STJ: 2.41 CM
WBC NRBC COR # BLD AUTO: 7.95 10*3/MM3 (ref 3.4–10.8)

## 2025-07-06 PROCEDURE — 25010000002 CEFEPIME PER 500 MG: Performed by: STUDENT IN AN ORGANIZED HEALTH CARE EDUCATION/TRAINING PROGRAM

## 2025-07-06 PROCEDURE — 82948 REAGENT STRIP/BLOOD GLUCOSE: CPT

## 2025-07-06 PROCEDURE — 93306 TTE W/DOPPLER COMPLETE: CPT | Performed by: INTERNAL MEDICINE

## 2025-07-06 PROCEDURE — 99222 1ST HOSP IP/OBS MODERATE 55: CPT | Performed by: STUDENT IN AN ORGANIZED HEALTH CARE EDUCATION/TRAINING PROGRAM

## 2025-07-06 PROCEDURE — 25510000001 PERFLUTREN 6.52 MG/ML SUSPENSION 2 ML VIAL

## 2025-07-06 PROCEDURE — 85025 COMPLETE CBC W/AUTO DIFF WBC: CPT | Performed by: STUDENT IN AN ORGANIZED HEALTH CARE EDUCATION/TRAINING PROGRAM

## 2025-07-06 PROCEDURE — 93306 TTE W/DOPPLER COMPLETE: CPT

## 2025-07-06 PROCEDURE — 63710000001 INSULIN LISPRO (HUMAN) PER 5 UNITS: Performed by: STUDENT IN AN ORGANIZED HEALTH CARE EDUCATION/TRAINING PROGRAM

## 2025-07-06 PROCEDURE — 80053 COMPREHEN METABOLIC PANEL: CPT | Performed by: STUDENT IN AN ORGANIZED HEALTH CARE EDUCATION/TRAINING PROGRAM

## 2025-07-06 RX ORDER — METOPROLOL SUCCINATE 25 MG/1
25 TABLET, EXTENDED RELEASE ORAL
Status: DISCONTINUED | OUTPATIENT
Start: 2025-07-07 | End: 2025-07-07 | Stop reason: HOSPADM

## 2025-07-06 RX ORDER — METOPROLOL SUCCINATE 25 MG/1
25 TABLET, EXTENDED RELEASE ORAL ONCE
Status: COMPLETED | OUTPATIENT
Start: 2025-07-06 | End: 2025-07-06

## 2025-07-06 RX ADMIN — BACLOFEN 20 MG: 10 TABLET ORAL at 06:11

## 2025-07-06 RX ADMIN — HYDROCODONE BITARTRATE AND ACETAMINOPHEN 1 TABLET: 5; 325 TABLET ORAL at 06:11

## 2025-07-06 RX ADMIN — Medication 5000 UNITS: at 09:27

## 2025-07-06 RX ADMIN — POLYETHYLENE GLYCOL 3350 17 G: 17 POWDER, FOR SOLUTION ORAL at 18:27

## 2025-07-06 RX ADMIN — ROSUVASTATIN CALCIUM 20 MG: 10 TABLET, FILM COATED ORAL at 09:27

## 2025-07-06 RX ADMIN — TEMAZEPAM 15 MG: 15 CAPSULE ORAL at 21:19

## 2025-07-06 RX ADMIN — PRAMIPEXOLE DIHYDROCHLORIDE 1.5 MG: 0.5 TABLET ORAL at 09:27

## 2025-07-06 RX ADMIN — SENNOSIDES AND DOCUSATE SODIUM 1 TABLET: 50; 8.6 TABLET ORAL at 09:27

## 2025-07-06 RX ADMIN — BACLOFEN 20 MG: 10 TABLET ORAL at 15:59

## 2025-07-06 RX ADMIN — Medication 10 MG: at 21:14

## 2025-07-06 RX ADMIN — CEFEPIME 2000 MG: 2 INJECTION, POWDER, FOR SOLUTION INTRAVENOUS at 21:13

## 2025-07-06 RX ADMIN — INSULIN LISPRO 2 UNITS: 100 INJECTION, SOLUTION INTRAVENOUS; SUBCUTANEOUS at 13:03

## 2025-07-06 RX ADMIN — INSULIN LISPRO 3 UNITS: 100 INJECTION, SOLUTION INTRAVENOUS; SUBCUTANEOUS at 18:27

## 2025-07-06 RX ADMIN — CEFEPIME 2000 MG: 2 INJECTION, POWDER, FOR SOLUTION INTRAVENOUS at 15:58

## 2025-07-06 RX ADMIN — PRAMIPEXOLE DIHYDROCHLORIDE 1.5 MG: 0.5 TABLET ORAL at 21:13

## 2025-07-06 RX ADMIN — METOPROLOL SUCCINATE 25 MG: 25 TABLET, EXTENDED RELEASE ORAL at 15:59

## 2025-07-06 RX ADMIN — CEFEPIME 2000 MG: 2 INJECTION, POWDER, FOR SOLUTION INTRAVENOUS at 06:12

## 2025-07-06 RX ADMIN — INSULIN LISPRO 2 UNITS: 100 INJECTION, SOLUTION INTRAVENOUS; SUBCUTANEOUS at 21:16

## 2025-07-06 RX ADMIN — PERFLUTREN 2 ML: 6.52 INJECTION, SUSPENSION INTRAVENOUS at 15:33

## 2025-07-06 RX ADMIN — ASPIRIN 81 MG: 81 TABLET, COATED ORAL at 09:27

## 2025-07-06 RX ADMIN — BACLOFEN 20 MG: 10 TABLET ORAL at 21:14

## 2025-07-06 NOTE — PLAN OF CARE
Goal Outcome Evaluation:  Plan of Care Reviewed With: patient        Progress: improving  Outcome Evaluation: Vital signs stable. No episodes of bradycardia, no c/o chest pain. Echo planned for 7/6. IV abx given as ordered. Safety maintained. Plan of care ongoing.

## 2025-07-06 NOTE — PLAN OF CARE
Goal Outcome Evaluation:              Outcome Evaluation: VSS. No cardiac events this shift. Cardio seen pt. Decreased metoprolol dose. Continue IV abx. Echo completed. No other complaints needs met at this time.

## 2025-07-06 NOTE — PROGRESS NOTES
"    Name: Maritza Nance Darci ADMIT: 2025   : 1960  PCP: CarboneEnoc guzman     MRN: [id number] LOS: 2 days   AGE/SEX: 64 y.o. female  ROOM: [facility]     Subjective   Subjective   Patient awake and resting in bed, yesterday afternoon felt like she was \"going to take her last breath\" when her HR would drop in the 40s. Unclear etiology but no further episodes overnight.        Objective   Objective   Vital Signs  Temp:  [97.5 °F (36.4 °C)-98.2 °F (36.8 °C)] 97.7 °F (36.5 °C)  Heart Rate:  [64-80] 71  Resp:  [16-18] 18  BP: (125-168)/(52-94) 125/67  SpO2:  [96 %-99 %] 96 %  on  Flow (L/min) (Oxygen Therapy):  [2] 2;   Device (Oxygen Therapy): CPAP  Body mass index is 29.02 kg/m².  Physical Exam  Vitals and nursing note reviewed.   Constitutional:       General: She is not in acute distress.  Cardiovascular:      Rate and Rhythm: Normal rate.   Pulmonary:      Effort: Pulmonary effort is normal. No respiratory distress.      Breath sounds: No wheezing.   Abdominal:      General: Bowel sounds are normal. There is no distension.      Palpations: Abdomen is soft.      Tenderness: There is no abdominal tenderness.   Musculoskeletal:      Right lower leg: No edema.      Left lower leg: No edema.   Skin:     General: Skin is warm and dry.   Neurological:      Mental Status: She is alert.       Results Review     I reviewed the patient's new clinical results.  Results from last 7 days   Lab Units 25  0443 25  0504 25  0330 25  0538   WBC 10*3/mm3 7.95 6.80 4.99 4.78   HEMOGLOBIN g/dL 9.8* 9.3* 9.2* 8.7*   PLATELETS 10*3/mm3 438 498* 426 412     Results from last 7 days   Lab Units 25  0443 25  1631 25  0742 25  0330 25  0538   SODIUM mmol/L 140  --  139 140 140   POTASSIUM mmol/L 4.7 4.5 4.4 4.0 4.0   CHLORIDE mmol/L 108*  --  109* 111* 110*   CO2 mmol/L 22.2  --  19.6* 19.7* 21.4*   BUN mg/dL 15.0  --  17.0 13.0 13.0   CREATININE mg/dL 0.62  --  0.71 0.52* 0.48*   GLUCOSE " mg/dL 160*  --  96 86 95   EGFR mL/min/1.73 100.8  --  96.3 105.2 107.2     Results from last 7 days   Lab Units 07/06/25  0443 07/05/25  0742 07/04/25  0330 07/02/25  1419   ALBUMIN g/dL 3.4* 3.2* 3.4* 3.7   BILIRUBIN mg/dL <0.2 <0.2 <0.2 <0.2   ALK PHOS U/L 170* 156* 165* 187*   AST (SGOT) U/L 60* 66* 66* 76*   ALT (SGPT) U/L 22 19 19 26     Results from last 7 days   Lab Units 07/06/25  0443 07/05/25  1631 07/05/25  0742 07/04/25  0330 07/03/25  0538 07/02/25  1419   CALCIUM mg/dL 9.6  --  8.2* 8.1* 7.8* 8.5*   ALBUMIN g/dL 3.4*  --  3.2* 3.4*  --  3.7   MAGNESIUM mg/dL  --  2.5*  --   --   --  2.4   PHOSPHORUS mg/dL  --   --   --   --   --  2.8     Results from last 7 days   Lab Units 07/02/25  1419   PROCALCITONIN ng/mL 0.15   LACTATE mmol/L 1.4     Glucose   Date/Time Value Ref Range Status   07/06/2025 1139 153 (H) 70 - 130 mg/dL Final   07/06/2025 0818 105 70 - 130 mg/dL Final   07/06/2025 0719 99 70 - 130 mg/dL Final   07/05/2025 2046 180 (H) 70 - 130 mg/dL Final   07/05/2025 1629 138 (H) 70 - 130 mg/dL Final   07/05/2025 1205 154 (H) 70 - 130 mg/dL Final   07/05/2025 0758 108 70 - 130 mg/dL Final       No radiology results for the last day      I have personally reviewed all medications:  Scheduled Medications  aspirin, 81 mg, Oral, Daily  baclofen, 20 mg, Oral, Q8H  cefepime, 2,000 mg, Intravenous, Q8H  vitamin D3, 5,000 Units, Oral, Daily  insulin lispro, 2-7 Units, Subcutaneous, 4x Daily AC & at Bedtime  melatonin, 10 mg, Oral, Nightly  [START ON 7/7/2025] metoprolol succinate XL, 25 mg, Oral, Q24H  pantoprazole, 40 mg, Oral, Once per day on Monday Wednesday Friday  pramipexole, 1.5 mg, Oral, BID  rosuvastatin, 20 mg, Oral, Daily  sennosides-docusate, 1 tablet, Oral, Daily    Infusions   Diet  Diet: Regular/House; Fluid Consistency: Thin (IDDSI 0)    I have personally reviewed:  [x]  Laboratory   [x]  Microbiology   [x]  Radiology   [x]  EKG/Telemetry  []  Cardiology/Vascular   []  Pathology    []   Records       Assessment/Plan     Active Hospital Problems    Diagnosis  POA    **UTI (urinary tract infection) due to urinary indwelling catheter [T83.511A, N39.0]  Yes    UTI (urinary tract infection) [N39.0]  Yes    Anemia [D64.9]  Yes    Type 2 diabetes mellitus [E11.9]  Yes    Elevated LFTs [R79.89]  Yes    Weakness [R53.1]  Yes    Malignant neoplasm of overlapping sites of left breast in female, estrogen receptor positive [C50.812, Z17.0]  Not Applicable    Essential hypertension [I10]  Yes    Restless legs syndrome [G25.81]  Yes      Resolved Hospital Problems   No resolved problems to display.       64 y.o. female admitted with UTI (urinary tract infection) due to urinary indwelling catheter.    UA suggestive of infection, she is on Cefepime, tolerating well, continue as prescribed, follow up final urine culture and blood culture results. Urine cx with klebsiella and enterococcus faecalis. Blood cx are NGTD @48h. Uro consulted due to issues with Matias catheter/urology- recommended exchanging Matias which seems to be draining better, yesterday after exchanging matias pt with 700 cc output- 1 month uro f/u. ID consulted given urine cx findings- recommend 5d course of cefepime for klebsiella but think e faecalis is likely colonizer/does not recommend antibiotics for it    Bradycardia  - pt with feelings of doom when HR would drop, occurred shortly after receiving compazine; pt also had ~700 cc urinary retention at this time which may have caused HR to drop; seems ot have resolved; cards consulted and we are awaiting echo; metoprolol reduced    Weakness and fatigue, likely multifactorial, exacerbated by infection. Therapy services consulted. Further management based on clinical course.    A1c 7.30% (04/21/25), order SSI, trend glucose levels to guide management.    BP acceptable, continue treatments as prescribed. Trend BP to guide management.    LFT slightly elevated, trend with CMP.    Hemoglobin low, drop  likely dilutional from fluids, monitor for now, repeat CBC in AM. Transfuse for hemoglobin <7.    Insomnia- patient reports has tried klonopin, ambien, melatonin- all ineffective; willing to try restoril- explained would not be able to write long term script for this at [place].   Ok to take melatonin and restoril together.     SCDs for DVT prophylaxis.  Full code.  Discussed with patient and nursing staff.  Anticipate discharge home tomorrow if HR is ok/pending echo.   Expected Discharge Date: 7/6/2025; Expected Discharge Time:       Sisi Bella MD  [facility]  07/06/25

## 2025-07-06 NOTE — CONSULTS
Date of Consultation: 25    Referral Provider: Gisella Britt, *     Reason for Consultation: Bradycardia    Encounter Provider: Boy Curiel MD    Group of Service: [facility]     Patient Name: Maritza Bejarano    :1960    Chief complaint: UTI    History of Present Illness:      Maritza Bejarano is a 64-year-old female with a past medical history significant for hypertension, hyperlipidemia, multiple sclerosis, stage IV breast cancer, depression, and chronic indwelling catheter.  She has had multiple admissions for urosepsis.    She was admitted in 2025 to the ICU with septic shock secondary to Klebsiella UTI.  She had an echocardiogram that revealed a normal EF, but regional wall motion abnormalities that were consistent with stress cardiomyopathy.  She was started on Toprol 50 and was discharged.    She presented to the emergency department on 2025 with generalized weakness with low-grade fever/chills.  She denies chest pain, shortness of breath, syncope. She was found to have urinary rentention due to clogged matias and UTI. She was started on IV antibiotics and her Matias was exchanged.    Nursing reports seeing a couple episodes of sinus bradycardia on telemetry yesterday.  Patient feels poorly when these episodes occur.  She reports feeling breathlessness, impending sense of doom, and is hypoxic with oxygen saturations dropping into the 60s.  Her symptoms improved with supplemental oxygen application.  Cardiology has been consulted for further recommendation.              Telemetry       ECHO 2025    Left ventricular systolic function is normal. Calculated left ventricular EF = 58.3%    The following left ventricular wall segments are dyskinetic: apical anterior. The following left ventricular wall segments are akinetic: apical lateral and apex.    Left ventricular diastolic function is consistent with (grade I) impaired relaxation.    There is mild calcification  of the aortic valve.      Past Medical History:   Diagnosis Date    Anemia 2024    `Treated with iron    Dawn esophagus     per patient    Blurred vision     R/T MS    Breast cancer     metastatic    Carpal tunnel syndrome     Clotting disorder 1993, 2003, 2012    3 g/i bleeds w/ transfus/ions    Colon polyp 2013    removed w/ colonoscopy    Deep vein thrombosis phlebitis 1980    Depression     Diplopia 2013    GERD (gastroesophageal reflux disease)     GI (gastrointestinal bleed) 3 bleeds    2 transfusions    H/O Skin cancer, basal cell     Headache     History of blood transfusion     History of GI bleed     R/T NSAIDS AND STEROIDS, multiple times    History of urinary tract infection     Hypercalcemia     s/p parathyroidectomy    Hyperlipidemia     Hypertension     Movement disorder     Multiple sclerosis     Optic neuritis     PONV (postoperative nausea and vomiting)     Steroid-induced diabetes 2024         Past Surgical History:   Procedure Laterality Date    APPENDECTOMY      BLADDER SURGERY      bladder stimulator    BREAST BIOPSY  don't remember    BREAST SURGERY      augmentation wtih subsequent removal    CARPAL TUNNEL RELEASE Bilateral     Left 2018, right 2020    CUBITAL TUNNEL RELEASE Left     CYSTOSCOPY BOTOX INJECTION OF BLADDER  2018    Cystoscopy with Botox    FRACTURE SURGERY  2019    rt shoulder    PARATHYROIDECTOMY      one gland removed    ROTATOR CUFF REPAIR Right 2017    TOE SURGERY      bilateral great toes    TOTAL SHOULDER ARTHROPLASTY W/ DISTAL CLAVICLE EXCISION Right 10/22/2018    Procedure: RT TOTAL SHOULDER REVERSE ARTHROPLASTY;  Surgeon: Bipin Dangelo MD;  Location: [facility];  Service: Orthopedics         Allergies   Allergen Reactions    Klonopin [Clonazepam] Mental Status Change, Confusion and Hallucinations         No current facility-administered medications on file prior to encounter.     Current Outpatient Medications on File Prior to Encounter   Medication Sig  Dispense Refill    albuterol sulfate  (90 Base) MCG/ACT inhaler Inhale 2 puffs Every 4 (Four) Hours As Needed for Wheezing or Shortness of Air. 18 g 0    anastrozole (ARIMIDEX) 1 MG tablet Take 1 tablet by mouth Daily.      aspirin 81 MG EC tablet Take 1 tablet by mouth Daily. 30 tablet 0    baclofen (LIORESAL) 20 MG tablet Take 1 tablet by mouth 2 (Two) Times a Day.      Cholecalciferol (vitamin D3) 125 MCG (5000 UT) tablet tablet Take 1 tablet by mouth Daily.      Fenbendazole powder Use 440 mg 3 (Three) Times a Week. 2 capsules 3 times weekly      HYDROcodone-acetaminophen (NORCO) 5-325 MG per tablet Take 1 tablet by mouth Every 4 (Four) Hours As Needed for Moderate Pain. 60 tablet 0    IVERMECTIN PO Take 15 mg by mouth 5 (Five) Times a Week. -Friday      metoprolol succinate XL (TOPROL-XL) 50 MG 24 hr tablet Take 1 tablet by mouth Daily. 90 tablet 1    pantoprazole (PROTONIX) 40 MG EC tablet TAKE ONE TABLET BY MOUTH EVERY DAY (Patient taking differently: Take 1 tablet by mouth 3 (Three) Times a Week.) 180 tablet 0    phenazopyridine (PYRIDIUM) 200 MG tablet Take 1 tablet by mouth 3 (Three) Times a Day As Needed for Dysuria. 20 tablet 0    pramipexole (MIRAPEX) 1.5 MG tablet Take 1 tablet by mouth 2 (Two) Times a Day.      prochlorperazine (COMPAZINE) 10 MG tablet Take 1 tablet by mouth Every 6 (Six) Hours As Needed for Nausea or Vomiting. 90 tablet 5    rosuvastatin (CRESTOR) 20 MG tablet Take 1 tablet by mouth Daily. 90 tablet 1    sennosides-docusate (senna-docusate sodium) 8.6-50 MG per tablet Take 1 tablet by mouth Daily. 30 tablet 2         Social History     Socioeconomic History    Marital status:      Spouse name: Donald    Number of children: 0   Tobacco Use    Smoking status: Former     Current packs/day: 0.00     Average packs/day: 2.0 packs/day for 30.0 years (60.0 ttl pk-yrs)     Types: Cigarettes     Start date: 10/22/1973     Quit date: 10/22/2003     Years since quittin.7  "   Smokeless tobacco: Never   Vaping Use    Vaping status: Never Used   Substance and Sexual Activity    Alcohol use: Not Currently    Drug use: Yes     Types: Marijuana     Comment: rarely    Sexual activity: Not Currently     Partners: Male     Birth control/protection: Post-menopausal         Family History   Problem Relation Age of Onset    Hypertension Mother     Hyperlipidemia Mother     Aortic aneurysm Mother         thoracic    Diabetes Mother     Hypertension Father         charissa heart failure    Heart failure Father     Hyperlipidemia Father     Miscarriages / Stillbirths Sister     Multiple sclerosis Brother     Atrial fibrillation Brother     Diabetes Maternal Grandmother     Diabetes Maternal Grandfather     Stroke Maternal Grandfather     Parkinsonism Paternal Grandmother     Tremor Paternal Grandmother     Stomach cancer Paternal Grandfather             Objective:     Vitals:    07/05/25 1923 07/05/25 2348 07/06/25 0612 07/06/25 0722   BP: 126/52 141/62  125/67   BP Location: Right arm Right arm     Patient Position: Lying Lying     Pulse: 64   71   Resp: 18 18     Temp: 98.1 °F (36.7 °C) 98.2 °F (36.8 °C)  97.7 °F (36.5 °C)   TempSrc: Oral Oral     SpO2: 99%   96%   Weight:   79.1 kg (174 lb 6.1 oz)    Height:         Body mass index is 29.02 kg/m².  Flowsheet Rows      Flowsheet Row First Filed Value   Admission Height 165.1 cm (65\") Documented at 07/02/2025 1846   Admission Weight 81.6 kg (180 lb) Documented at 07/02/2025 1846             General:    No acute distress, alert and oriented x4, pleasant                   Head:    Normocephalic, atraumatic.   Eyes:          Conjunctivae and sclerae normal, no icterus, PERRLA   Throat:   No oral lesions, no thrush, oral mucosa moist.    Neck:   Supple, trachea midline.   Lungs:     Clear to auscultation bilaterally     Heart:    Regular rhythm and normal rate.  No murmurs, gallops, or rubs noted.   Abdomen:     Soft, non-tender, non-distended, positive " bowel sounds.    Extremities:   No clubbing, cyanosis, or edema.     Pulses:   Pulses palpable and equal bilaterally.    Skin:   No bleeding or rash.   Neuro:   Non-focal.  Moves all extremities well.    Psychiatric:   Normal mood and affect.                 Lab Review:                Results from last 7 days   Lab Units 07/06/25  0443   SODIUM mmol/L 140   POTASSIUM mmol/L 4.7   CHLORIDE mmol/L 108*   CO2 mmol/L 22.2   BUN mg/dL 15.0   CREATININE mg/dL 0.62   GLUCOSE mg/dL 160*   CALCIUM mg/dL 9.6     Results from last 7 days   Lab Units 07/05/25  1723 07/05/25  1631   HSTROP T ng/L 25* 20*     Results from last 7 days   Lab Units 07/06/25  0443   WBC 10*3/mm3 7.95   HEMOGLOBIN g/dL 9.8*   HEMATOCRIT % 32.7*   PLATELETS 10*3/mm3 438             Results from last 7 days   Lab Units 07/05/25  1631   MAGNESIUM mg/dL 2.5*           EKG (reviewed by me personally):            Assessment:   Bradycardia  History of stress cardiomyopathy  Breast cancer  Elevated troponin  History of multiple sclerosis    Plan:       - Unclear on the etiology from yesterday.  Symptoms have been after she got Compazine.  She reports she felt short of breath and was hypoxic and subsequently got bradycardic.  She was started on oxygen supplementation and her symptoms resolved.  She is now feeling well.  No chest pain, no shortness of breath.  Her troponin is minimally elevated and flat, lower than previous admission.  Not consistent with ACS.    -Primary team ordered echo.  Will follow.  Was already scheduled to have one as an outpatien    - Will reduce her metoprolol to 25 mg daily.     Will follow-up results of echo.

## 2025-07-07 ENCOUNTER — READMISSION MANAGEMENT (OUTPATIENT)
Dept: CALL CENTER | Facility: HOSPITAL | Age: 65
End: 2025-07-07
Payer: MEDICARE

## 2025-07-07 VITALS
OXYGEN SATURATION: 97 % | TEMPERATURE: 97.7 F | SYSTOLIC BLOOD PRESSURE: 119 MMHG | BODY MASS INDEX: 28.99 KG/M2 | WEIGHT: 174 LBS | RESPIRATION RATE: 18 BRPM | DIASTOLIC BLOOD PRESSURE: 70 MMHG | HEIGHT: 65 IN | HEART RATE: 79 BPM

## 2025-07-07 LAB
ALBUMIN SERPL-MCNC: 3.5 G/DL (ref 3.5–5.2)
ALBUMIN/GLOB SERPL: 0.9 G/DL
ALP SERPL-CCNC: 184 U/L (ref 39–117)
ALT SERPL W P-5'-P-CCNC: 27 U/L (ref 1–33)
ANION GAP SERPL CALCULATED.3IONS-SCNC: 11.2 MMOL/L (ref 5–15)
AST SERPL-CCNC: 71 U/L (ref 1–32)
BASOPHILS # BLD AUTO: 0.05 10*3/MM3 (ref 0–0.2)
BASOPHILS NFR BLD AUTO: 0.8 % (ref 0–1.5)
BILIRUB SERPL-MCNC: <0.2 MG/DL (ref 0–1.2)
BUN SERPL-MCNC: 18 MG/DL (ref 8–23)
BUN/CREAT SERPL: 31 (ref 7–25)
CALCIUM SPEC-SCNC: 9.2 MG/DL (ref 8.6–10.5)
CHLORIDE SERPL-SCNC: 103 MMOL/L (ref 98–107)
CO2 SERPL-SCNC: 23.8 MMOL/L (ref 22–29)
CREAT SERPL-MCNC: 0.58 MG/DL (ref 0.57–1)
DEPRECATED RDW RBC AUTO: 54.3 FL (ref 37–54)
EGFRCR SERPLBLD CKD-EPI 2021: 102.5 ML/MIN/1.73
EOSINOPHIL # BLD AUTO: 0.22 10*3/MM3 (ref 0–0.4)
EOSINOPHIL NFR BLD AUTO: 3.6 % (ref 0.3–6.2)
ERYTHROCYTE [DISTWIDTH] IN BLOOD BY AUTOMATED COUNT: 15.6 % (ref 12.3–15.4)
GLOBULIN UR ELPH-MCNC: 3.9 GM/DL
GLUCOSE BLDC GLUCOMTR-MCNC: 132 MG/DL (ref 70–130)
GLUCOSE BLDC GLUCOMTR-MCNC: 138 MG/DL (ref 70–130)
GLUCOSE BLDC GLUCOMTR-MCNC: 147 MG/DL (ref 70–130)
GLUCOSE SERPL-MCNC: 159 MG/DL (ref 65–99)
HCT VFR BLD AUTO: 32.5 % (ref 34–46.6)
HGB BLD-MCNC: 9.9 G/DL (ref 12–15.9)
IMM GRANULOCYTES # BLD AUTO: 0.12 10*3/MM3 (ref 0–0.05)
IMM GRANULOCYTES NFR BLD AUTO: 1.9 % (ref 0–0.5)
LYMPHOCYTES # BLD AUTO: 1.74 10*3/MM3 (ref 0.7–3.1)
LYMPHOCYTES NFR BLD AUTO: 28.2 % (ref 19.6–45.3)
MCH RBC QN AUTO: 28.8 PG (ref 26.6–33)
MCHC RBC AUTO-ENTMCNC: 30.5 G/DL (ref 31.5–35.7)
MCV RBC AUTO: 94.5 FL (ref 79–97)
MONOCYTES # BLD AUTO: 0.48 10*3/MM3 (ref 0.1–0.9)
MONOCYTES NFR BLD AUTO: 7.8 % (ref 5–12)
NEUTROPHILS NFR BLD AUTO: 3.55 10*3/MM3 (ref 1.7–7)
NEUTROPHILS NFR BLD AUTO: 57.7 % (ref 42.7–76)
NRBC BLD AUTO-RTO: 0 /100 WBC (ref 0–0.2)
PLATELET # BLD AUTO: 460 10*3/MM3 (ref 140–450)
PMV BLD AUTO: 9.1 FL (ref 6–12)
POTASSIUM SERPL-SCNC: 4.4 MMOL/L (ref 3.5–5.2)
PROT SERPL-MCNC: 7.4 G/DL (ref 6–8.5)
RBC # BLD AUTO: 3.44 10*6/MM3 (ref 3.77–5.28)
SODIUM SERPL-SCNC: 138 MMOL/L (ref 136–145)
WBC NRBC COR # BLD AUTO: 6.16 10*3/MM3 (ref 3.4–10.8)

## 2025-07-07 PROCEDURE — 85025 COMPLETE CBC W/AUTO DIFF WBC: CPT | Performed by: STUDENT IN AN ORGANIZED HEALTH CARE EDUCATION/TRAINING PROGRAM

## 2025-07-07 PROCEDURE — 63710000001 ONDANSETRON ODT 4 MG TABLET DISPERSIBLE: Performed by: INTERNAL MEDICINE

## 2025-07-07 PROCEDURE — 82948 REAGENT STRIP/BLOOD GLUCOSE: CPT

## 2025-07-07 PROCEDURE — 99232 SBSQ HOSP IP/OBS MODERATE 35: CPT | Performed by: STUDENT IN AN ORGANIZED HEALTH CARE EDUCATION/TRAINING PROGRAM

## 2025-07-07 PROCEDURE — 80053 COMPREHEN METABOLIC PANEL: CPT | Performed by: STUDENT IN AN ORGANIZED HEALTH CARE EDUCATION/TRAINING PROGRAM

## 2025-07-07 PROCEDURE — 63710000001 PROCHLORPERAZINE MALEATE PER 5 MG: Performed by: STUDENT IN AN ORGANIZED HEALTH CARE EDUCATION/TRAINING PROGRAM

## 2025-07-07 PROCEDURE — 25010000002 CEFEPIME PER 500 MG: Performed by: STUDENT IN AN ORGANIZED HEALTH CARE EDUCATION/TRAINING PROGRAM

## 2025-07-07 RX ORDER — PROCHLORPERAZINE MALEATE 5 MG/1
5 TABLET ORAL EVERY 6 HOURS PRN
Status: DISCONTINUED | OUTPATIENT
Start: 2025-07-07 | End: 2025-07-07 | Stop reason: HOSPADM

## 2025-07-07 RX ORDER — PANTOPRAZOLE SODIUM 40 MG/1
40 TABLET, DELAYED RELEASE ORAL 3 TIMES WEEKLY
Start: 2025-07-07

## 2025-07-07 RX ORDER — METOPROLOL SUCCINATE 25 MG/1
25 TABLET, EXTENDED RELEASE ORAL
Qty: 30 TABLET | Refills: 0 | Status: SHIPPED | OUTPATIENT
Start: 2025-07-08

## 2025-07-07 RX ADMIN — BACLOFEN 20 MG: 10 TABLET ORAL at 13:44

## 2025-07-07 RX ADMIN — Medication 5000 UNITS: at 09:23

## 2025-07-07 RX ADMIN — PANTOPRAZOLE SODIUM 40 MG: 40 TABLET, DELAYED RELEASE ORAL at 09:23

## 2025-07-07 RX ADMIN — ONDANSETRON 4 MG: 4 TABLET, ORALLY DISINTEGRATING ORAL at 05:14

## 2025-07-07 RX ADMIN — PRAMIPEXOLE DIHYDROCHLORIDE 1.5 MG: 0.5 TABLET ORAL at 09:23

## 2025-07-07 RX ADMIN — ASPIRIN 81 MG: 81 TABLET, COATED ORAL at 09:23

## 2025-07-07 RX ADMIN — METOPROLOL SUCCINATE 25 MG: 25 TABLET, EXTENDED RELEASE ORAL at 09:23

## 2025-07-07 RX ADMIN — SENNOSIDES AND DOCUSATE SODIUM 1 TABLET: 50; 8.6 TABLET ORAL at 09:23

## 2025-07-07 RX ADMIN — BACLOFEN 20 MG: 10 TABLET ORAL at 06:45

## 2025-07-07 RX ADMIN — PROCHLORPERAZINE MALEATE 5 MG: 5 TABLET ORAL at 14:24

## 2025-07-07 RX ADMIN — ROSUVASTATIN CALCIUM 20 MG: 10 TABLET, FILM COATED ORAL at 09:23

## 2025-07-07 NOTE — PROGRESS NOTES
"    Patient Name: Maritza Nance Darci  :1960  64 y.o.      Patient Care Team:  Enoc Carbone DO as PCP - General (Internal Medicine)  Brittney Ortiz, RN as Nurse Navigator (Oncology)  Kim Barber MD as Referring Physician (General Surgery)  Zainab Lopes MD as Consulting Physician (Hematology and Oncology)    Chief Complaint:   UTI, bradycardia    Interval History:   NAEO, feels well.     Objective   Vital Signs  Temp:  [97.9 °F (36.6 °C)-98.4 °F (36.9 °C)] 97.9 °F (36.6 °C)  Heart Rate:  [71-86] 71  Resp:  [16-20] 16  BP: (133-158)/(65-80) 133/65    Intake/Output Summary (Last 24 hours) at 2025 1033  Last data filed at 2025 0125  Gross per 24 hour   Intake 300 ml   Output 1300 ml   Net -1000 ml     Flowsheet Rows      Flowsheet Row First Filed Value   Admission Height 165.1 cm (65\") Documented at 2025 1846   Admission Weight 81.6 kg (180 lb) Documented at 2025 1846            GEN: no distress, alert and oriented  HEENT: NACT, EOMI, moist mucous membranes  Lungs: CTAB, no wheezes, rales or rhonchi  CV: normal rate, regular rhythm, normal S1, S2, no murmurs, +2 radial pulses b/l, no carotid bruit  Abdomen: soft, nontender, nondistended, NABS  Extremities: no edema  Skin: no rash, warm, dry  Heme/Lymph: no bruising  Psych: organized thought, normal behavior and affect    Results Review:    Results from last 7 days   Lab Units 25  0359   SODIUM mmol/L 138   POTASSIUM mmol/L 4.4   CHLORIDE mmol/L 103   CO2 mmol/L 23.8   BUN mg/dL 18.0   CREATININE mg/dL 0.58   GLUCOSE mg/dL 159*   CALCIUM mg/dL 9.2     Results from last 7 days   Lab Units 25  1723 25  1631   HSTROP T ng/L 25* 20*     Results from last 7 days   Lab Units 25  0359   WBC 10*3/mm3 6.16   HEMOGLOBIN g/dL 9.9*   HEMATOCRIT % 32.5*   PLATELETS 10*3/mm3 460*         Results from last 7 days   Lab Units 25  1631   MAGNESIUM mg/dL 2.5*                   Medication Review:   aspirin, 81 mg, " Oral, Daily  baclofen, 20 mg, Oral, Q8H  cefepime, 2,000 mg, Intravenous, Q8H  vitamin D3, 5,000 Units, Oral, Daily  insulin lispro, 2-7 Units, Subcutaneous, 4x Daily AC & at Bedtime  melatonin, 10 mg, Oral, Nightly  metoprolol succinate XL, 25 mg, Oral, Q24H  pantoprazole, 40 mg, Oral, Once per day on Monday Wednesday Friday  pramipexole, 1.5 mg, Oral, BID  rosuvastatin, 20 mg, Oral, Daily  sennosides-docusate, 1 tablet, Oral, Daily              Assessment & Plan   #UTI  #sinus bradycardia  #DM  #HTN  #History of stress cardiomyopathy    Echocardiogram performed yesterday with an EF greater than 70%.     - continue Toprol 25 mg daily    No further cardiac workup indicated at this time, stable for discharge from cardiac standpoint.    Paulo Ahmadi MD, FACC, [facility]  07/07/25  10:33 EDT

## 2025-07-07 NOTE — DISCHARGE SUMMARY
Patient Name: Maritza Bejarano  : 1960  MRN: [id number]    Date of Admission: 2025  Date of Discharge:  2025  Primary Care Physician: Enoc Carbone DO      Chief Complaint:   Weakness - Generalized and Fever      Discharge Diagnoses     Active Hospital Problems    Diagnosis  POA    **UTI (urinary tract infection) due to urinary indwelling catheter [T83.511A, N39.0]  Yes    UTI (urinary tract infection) [N39.0]  Yes    Anemia [D64.9]  Yes    Type 2 diabetes mellitus [E11.9]  Yes    Elevated LFTs [R79.89]  Yes    Weakness [R53.1]  Yes    Malignant neoplasm of overlapping sites of left breast in female, estrogen receptor positive [C50.812, Z17.0]  Not Applicable    Essential hypertension [I10]  Yes    Restless legs syndrome [G25.81]  Yes      Resolved Hospital Problems   No resolved problems to display.        Hospital Course     Ms. Bejarnao is a 64 y.o. female with a history of MS, neurogenic bladder with chronic indwelling Ocbos, type 2 diabetes, breast cancer who presented to [facility] initially complaining of fever and generalized weakness.  Please see the admitting history and physical for further details.  She was admitted to the hospital for further evaluation and treatment.  Patient was found to have UA consistent with UTI on admission, she was started on empiric antibiotics while culture data was pending.  Ultimately her urine culture grew Klebsiella and Enterococcus faecalis.  Infectious disease was consulted for antibiotic recommendations.  Given that she was improving on an antibiotic that would not cover Enterococcus faecalis per ID patient's UTI likely related to Klebsiella and Enterococcus faecalis was only a colonizing bacteria.  She completed a course of antibiotics per infectious these recommendations for Klebsiella UTI.  While admitted she did have some bradycardia, cardiology was consulted, her metoprolol was reduced and she underwent echo which showed preserved  ejection fraction.  No further bradycardia noted after reduction of her metoprolol.  She was also seen by urology who recommended patient continue to follow-up with them in clinic in about a month after Cobso catheter exchange.  The patient is stable for discharge, she should follow-up with her PCP in a week for posthospital follow-up visit as well.    Day of Discharge     Subjective:  Resting in bed, she is having some nausea this morning requesting oral Compazine but overall feels well enough to go home.    Physical Exam:  Temp:  [97.7 °F (36.5 °C)-98.4 °F (36.9 °C)] 97.7 °F (36.5 °C)  Heart Rate:  [71-86] 79  Resp:  [16-20] 18  BP: (119-158)/(65-80) 119/70  Body mass index is 28.96 kg/m².  Physical Exam  Vitals and nursing note reviewed.   Constitutional:       General: She is not in acute distress.  Cardiovascular:      Rate and Rhythm: Normal rate.   Pulmonary:      Effort: Pulmonary effort is normal. No respiratory distress.      Breath sounds: No wheezing.   Abdominal:      General: Bowel sounds are normal. There is no distension.      Palpations: Abdomen is soft.      Tenderness: There is no abdominal tenderness.   Musculoskeletal:      Right lower leg: No edema.      Left lower leg: No edema.   Skin:     General: Skin is warm and dry.   Neurological:      Mental Status: She is alert.         Consultants     Consult Orders (all) (From admission, onward)       Start     Ordered    07/05/25 1536  Inpatient Cardiology Consult  Once        Specialty:  Cardiology  Provider:  Boy Curiel MD    07/05/25 1536    07/05/25 1244  Inpatient Infectious Diseases Consult  Once        Specialty:  Infectious Diseases  Provider:  Luisa Child MD    07/05/25 1244    07/04/25 1517  Inpatient Urology Consult  Once        Specialty:  Urology  Provider:  Weston Olson Jr., MD    07/04/25 1517    07/03/25 1407  Inpatient Advance Care Planning Consult  Once        Comments: Wants to complete a new Living Will    Provider:  (Not yet assigned)    07/03/25 1407    07/02/25 1601  [facility] (on-call MD unless specified) Details  Once        Specialty:  Hospitalist  Provider:  (Not yet assigned)    07/02/25 1600                  Procedures     Imaging Results (All)       Procedure Component Value Units Date/Time    XR Chest 1 View [[id number]] Collected: 07/02/25 1436     Updated: 07/02/25 1443    Narrative:      XR CHEST 1 VW-     HISTORY: Female who is 64 years-old, fever     TECHNIQUE: Frontal view of the chest     COMPARISON: 5/9/2025     FINDINGS: The heart size is borderline. Pulmonary vasculature is  unremarkable. Small likely atelectasis or scarring left lower lung.  Previous CT-demonstrated right lower lobe pulmonary nodular density is  not well visualized on this exam, CT follow-up recommended for direct  comparison with previous CT exam. No large pleural effusion, or  pneumothorax. No acute osseous process.       Impression:      As described.     This report was finalized on 7/2/2025 2:39 PM by Dr. Luan Estrada M.D on Workstation: [id number]               Pertinent Labs     Results from last 7 days   Lab Units 07/07/25  0359 07/06/25  0443 07/05/25  0504 07/04/25  0330   WBC 10*3/mm3 6.16 7.95 6.80 4.99   HEMOGLOBIN g/dL 9.9* 9.8* 9.3* 9.2*   PLATELETS 10*3/mm3 460* 438 498* 426     Results from last 7 days   Lab Units 07/07/25  0359 07/06/25  0443 07/05/25  1631 07/05/25  0742 07/04/25  0330   SODIUM mmol/L 138 140  --  139 140   POTASSIUM mmol/L 4.4 4.7 4.5 4.4 4.0   CHLORIDE mmol/L 103 108*  --  109* 111*   CO2 mmol/L 23.8 22.2  --  19.6* 19.7*   BUN mg/dL 18.0 15.0  --  17.0 13.0   CREATININE mg/dL 0.58 0.62  --  0.71 0.52*   GLUCOSE mg/dL 159* 160*  --  96 86   Estimated Creatinine Clearance: 104.5 mL/min (by C-G formula based on SCr of 0.58 mg/dL).  Results from last 7 days   Lab Units 07/07/25  0359 07/06/25  0443 07/05/25  0742 07/04/25  0330   ALBUMIN g/dL 3.5 3.4* 3.2* 3.4*   BILIRUBIN mg/dL <0.2 <0.2 <0.2  "<0.2   ALK PHOS U/L 184* 170* 156* 165*   AST (SGOT) U/L 71* 60* 66* 66*   ALT (SGPT) U/L 27 22 19 19     Results from last 7 days   Lab Units 07/07/25  0359 07/06/25  0443 07/05/25  1631 07/05/25  0742 07/04/25  0330 07/03/25  0538 07/02/25  1419   CALCIUM mg/dL 9.2 9.6  --  8.2* 8.1*   < > 8.5*   ALBUMIN g/dL 3.5 3.4*  --  3.2* 3.4*  --  3.7   MAGNESIUM mg/dL  --   --  2.5*  --   --   --  2.4   PHOSPHORUS mg/dL  --   --   --   --   --   --  2.8    < > = values in this interval not displayed.       Results from last 7 days   Lab Units 07/05/25  1723 07/05/25  1631   HSTROP T ng/L 25* 20*           Invalid input(s): \"LDLCALC\"  Results from last 7 days   Lab Units 07/03/25  1002 07/03/25  1001 07/02/25  1424   BLOODCX  No growth at 4 days No growth at 4 days  --    URINECX   --   --  >100,000 CFU/mL Klebsiella pneumoniae ssp pneumoniae*  >100,000 CFU/mL Enterococcus faecalis*       Test Results Pending at Discharge     Pending Labs       Order Current Status    Blood Culture - Blood, Hand, Left Preliminary result    Blood Culture - Blood, Hand, Right Preliminary result            Discharge Details        Discharge Medications        Changes to Medications        Instructions Start Date   metoprolol succinate XL 25 MG 24 hr tablet  Commonly known as: TOPROL-XL  What changed:   medication strength  how much to take   25 mg, Oral, Every 24 Hours Scheduled   Start Date: July 8, 2025            Continue These Medications        Instructions Start Date   albuterol sulfate  (90 Base) MCG/ACT inhaler  Commonly known as: PROVENTIL HFA;VENTOLIN HFA;PROAIR HFA   2 puffs, Inhalation, Every 4 Hours PRN      anastrozole 1 MG tablet  Commonly known as: ARIMIDEX   1 tablet, Daily      Aspirin Low Dose 81 MG EC tablet  Generic drug: aspirin   81 mg, Oral, Daily      baclofen 20 MG tablet  Commonly known as: LIORESAL   20 mg, 2 Times Daily      Fenbendazole powder   440 mg, 3 Times Weekly      HYDROcodone-acetaminophen 5-325 " MG per tablet  Commonly known as: NORCO   1 tablet, Oral, Every 4 Hours PRN      IVERMECTIN PO   15 mg, 5 Times Weekly      pantoprazole 40 MG EC tablet  Commonly known as: PROTONIX   40 mg, Oral, 3 Times Weekly      phenazopyridine 200 MG tablet  Commonly known as: PYRIDIUM   200 mg, Oral, 3 Times Daily PRN      pramipexole 1.5 MG tablet  Commonly known as: MIRAPEX   1.5 mg, 2 Times Daily      prochlorperazine 10 MG tablet  Commonly known as: COMPAZINE   10 mg, Oral, Every 6 Hours PRN      rosuvastatin 20 MG tablet  Commonly known as: CRESTOR   20 mg, Oral, Daily      sennosides-docusate 8.6-50 MG per tablet  Commonly known as: PERICOLACE   1 tablet, Oral, Daily      vitamin D3 125 MCG (5000 UT) tablet tablet   5,000 Units, Daily               Allergies   Allergen Reactions    Klonopin [Clonazepam] Mental Status Change, Confusion and Hallucinations         Discharge Disposition:  Home or Self Care    Discharge Diet:  Diet Order   Procedures    Diet: Regular/House; Fluid Consistency: Thin (IDDSI 0)       Discharge Activity:   Activity Instructions       Activity as Tolerated              CODE STATUS:    Code Status and Medical Interventions: CPR (Attempt to Resuscitate); Full   Ordered at: 07/02/25 1711     Code Status (Patient has no pulse and is not breathing):    CPR (Attempt to Resuscitate)     Medical Interventions (Patient has pulse or is breathing):    Full       Future Appointments   Date Time Provider Department Center   7/10/2025  8:00 AM Enoc Carbone DO [facility]   7/18/2025  1:45 PM INJECTION CHAIR [facility] ALVARE[facility]   7/18/2025  2:00 PM Liz Farmer APRN [facility]   7/18/2025  2:20 PM LAB CHAIR 6 [facility]ARRY LOPEZ  LAB [facility]   8/21/2025  2:00 PM Enoc Carbone DO [facility]   8/22/2025  2:10 PM LAB CHAIR 3 [facility]ARRY LOPEZ  LAB [facility]   8/22/2025  2:40 PM Zainab Lopes MD [facility]   8/22/2025  3:30 PM INJECTION CHAIR [facility]  INFUS [facility]     Additional  Instructions for the Follow-ups that You Need to Schedule       Discharge Follow-up with PCP   As directed       Currently Documented PCP:    Enoc Carbone DO    PCP Phone Number:    [phone]     Follow Up Details: post-hospital follow up               Contact information for follow-up providers       Enoc Carbone DO .    Specialty: Internal Medicine  Why: post-hospital follow up  Contact information:  [address]  [address]  [facility] [id number] [phone]                       Contact information for after-discharge care       Home Medical Care       [facility] .    Service: Home Health Services  Contact information:  [address]  [facility] [address] [phone]                                   Additional Instructions for the Follow-ups that You Need to Schedule       Discharge Follow-up with PCP   As directed       Currently Documented PCP:    Enoc Carbone DO    PCP Phone Number:    [phone]     Follow Up Details: post-hospital follow up            Time Spent on Discharge:  I spent greater than 30 minutes on this discharge activity which included: face-to-face encounter with the patient, reviewing the data in the system, coordination of the care with the nursing staff as well as consultants, documentation, and entering orders.       Sisi Bella MD  [facility]  07/07/25  14:48 EDT

## 2025-07-07 NOTE — PLAN OF CARE
Problem: Adult Inpatient Plan of Care  Goal: Plan of Care Review  Outcome: Met  Flowsheets (Taken 7/7/2025 1601)  Progress: improving  Outcome Evaluation: Pt refused IV ABX today. pt requested po compazine d/t nausea. pt to d/c today.  Plan of Care Reviewed With: patient   Goal Outcome Evaluation:  Plan of Care Reviewed With: patient        Progress: improving  Outcome Evaluation: Pt refused IV ABX today. pt requested po compazine d/t nausea. pt to d/c today.

## 2025-07-07 NOTE — OUTREACH NOTE
Prep Survey      Flowsheet Row Responses   [facility] patient discharged from? [place]   Is LACE score < 7 ? No   Eligibility [facility]   Date of Admission 07/02/25   Date of Discharge 07/07/25   Discharge Disposition Home or Self Care   Discharge diagnosis UTI   Does the patient have one of the following disease processes/diagnoses(primary or secondary)? Other   Does the patient have Home health ordered? Yes   What is the Home health agency?  [facility]   Is there a DME ordered? No   Prep survey completed? Yes            JAY A - Registered Nurse

## 2025-07-07 NOTE — PLAN OF CARE
Goal Outcome Evaluation:              Outcome Evaluation: Continued tx for UTI. VSS. Monitoring HR. IV abx continued. Room air. Anxious at times. Pt safety maintained.

## 2025-07-08 ENCOUNTER — TRANSITIONAL CARE MANAGEMENT TELEPHONE ENCOUNTER (OUTPATIENT)
Dept: CALL CENTER | Facility: HOSPITAL | Age: 65
End: 2025-07-08
Payer: MEDICARE

## 2025-07-08 ENCOUNTER — LAB REQUISITION (OUTPATIENT)
Dept: LAB | Facility: HOSPITAL | Age: 65
End: 2025-07-08
Payer: MEDICARE

## 2025-07-08 DIAGNOSIS — C79.81 SECONDARY MALIGNANT NEOPLASM OF BREAST: ICD-10-CM

## 2025-07-08 LAB
BACTERIA SPEC AEROBE CULT: NORMAL
BACTERIA SPEC AEROBE CULT: NORMAL

## 2025-07-08 PROCEDURE — 88342 IMHCHEM/IMCYTCHM 1ST ANTB: CPT | Performed by: INTERNAL MEDICINE

## 2025-07-08 PROCEDURE — 88341 IMHCHEM/IMCYTCHM EA ADD ANTB: CPT | Performed by: INTERNAL MEDICINE

## 2025-07-08 PROCEDURE — 88360 TUMOR IMMUNOHISTOCHEM/MANUAL: CPT | Performed by: INTERNAL MEDICINE

## 2025-07-08 NOTE — OUTREACH NOTE
Call Center TCM Note      Flowsheet Row Responses   [facility] patient discharged from? [place]   Does the patient have one of the following disease processes/diagnoses(primary or secondary)? Other   TCM attempt successful? Yes   Call start time 1026   Discharge diagnosis UTI   Meds reviewed with patient/caregiver? Yes   Is the patient having any side effects they believe may be caused by any medication additions or changes? No   Does the patient have all medications ordered at discharge? Yes   Prescription comments Updated rx fro Metoprolol succinate XL in place.   Is the patient taking all medications as directed (includes completed medication regime)? Yes   Comments TCM APPT WITH PCP DR JOMAR SALMERON IS 07/10/2025   Does the patient have an appointment with their PCP within 7-14 days of discharge? Yes   What is the Home health agency?  [facility]   Has home health visited the patient within 72 hours of discharge? Yes   Home health comments [facility] resuming care   Psychosocial issues? No   Did the patient receive a copy of their discharge instructions? Yes   Nursing interventions Reviewed instructions with patient   What is the patient's perception of their health status since discharge? Improving   Is the patient/caregiver able to teach back signs and symptoms related to disease process for when to call PCP? Yes   Is the patient/caregiver able to teach back signs and symptoms related to disease process for when to call 911? Yes   Is the patient/caregiver able to teach back the hierarchy of who to call/visit for symptoms/problems? PCP, Specialist, Home health nurse, Urgent Care, ED, 911 Yes   If the patient is a current smoker, are they able to teach back resources for cessation? Not a smoker   TCM call completed? Yes   Wrap up additional comments D/C DX: UTI,  tachycardia - Pt states she is tired, but does feel better. Continues with nausea, this was present prior to admit.  No questions at this  time. TCM APPT with PCP Dr Enoc Carbone is 07/10/2025            LESLIE AGUIRRE - Medical Assistant    7/8/2025, 10:31 EDT

## 2025-07-08 NOTE — CASE MANAGEMENT/SOCIAL WORK
Emergency Department Provider Note       PCP: File, Not On, MD   Age: 89 y.o.   Sex: female     DISPOSITION Decision To Discharge 04/30/2025 02:06:57 PM   DISPOSITION CONDITION Stable            ICD-10-CM    1. Laceration of scalp, initial encounter  S01.01XA       2. Fall, initial encounter  W19.XXXA           Medical Decision Making     I will get a CT scan to look for any evidence of intracranial injury.    CT scan is unremarkable.  I was able to place 3 staples without difficulty.  Wound was irrigated with 100 mL of saline.  No complications.     1 acute complicated illness or injury.  Shared medical decision making was utilized in creating the patients health plan today.    I independently ordered and reviewed each unique test.       I interpreted the CT Scan no obvious intracranial abnormality.              History     89-year-old lady presents from her facility after falling.  She has a small laceration on the top of her forehead.  No other injury.  She has been ambulatory since the fall.  She is moving her arms without difficulty.    Elements of this note were created using speech recognition software.  As such, errors of speech recognition may be present.        ROS     Review of Systems   Constitutional:  Negative for chills and fever.   Respiratory:  Negative for shortness of breath.    Cardiovascular:  Negative for chest pain.   Neurological:  Negative for dizziness, light-headedness and headaches.        Physical Exam     Vitals signs and nursing note reviewed:  Vitals:    04/30/25 1137 04/30/25 1230 04/30/25 1340   BP: 96/78 131/73 124/82   Pulse: 92  79   Resp: 17  19   Temp: 99 °F (37.2 °C)     TempSrc: Oral     SpO2: 99% 94% 98%   Weight: 49.9 kg (110 lb)     Height: 1.575 m (5' 2\")        Physical Exam  Constitutional:       Appearance: Normal appearance.   HENT:      Head:      Comments: 1 cm laceration at the hairline  Musculoskeletal:         General: No tenderness, deformity or signs of  Case Management Discharge Note      Final Note: Home with spouse and [facility]. Left VM for Corin/[facility] to notify of D/C.  transported home.    Provided Post Acute Provider List?: Refused  Provided Post Acute Provider Quality & Resource List?: Refused    Selected Continued Care - Discharged on 7/7/2025 Admission date: 7/2/2025 - Discharge disposition: Home or Self Care      Destination    No services have been selected for the patient.                Durable Medical Equipment    No services have been selected for the patient.                Dialysis/Infusion    No services have been selected for the patient.                Home Medical Care Coordination complete.      Service Provider Services Address Phone Fax Patient Preferred    [facility] [address], [address], [facility] [address] [phone] --              Therapy    No services have been selected for the patient.                Community Resources    No services have been selected for the patient.                Community & DME    No services have been selected for the patient.                    Selected Continued Care - Prior Encounters Includes continued care and service providers with selected services from prior encounters from 4/3/2025 to 7/7/2025      Discharged on 4/30/2025 Admission date: 4/17/2025 - Discharge disposition: [place] [facility]      [place] Care       Service Provider Services Address Phone Fax Patient Preferred    [facility] [address], [address], [facility] [address] [phone] --                          Transportation Services  Transportation: Private Transportation  Private: Car    Final Discharge Disposition Code: 06 - home with home health care

## 2025-07-09 LAB
LAB AP CASE REPORT: NORMAL
LAB AP CLINICAL INFORMATION: NORMAL
LAB AP SPECIAL STAINS: NORMAL
PATH REPORT.FINAL DX SPEC: NORMAL

## 2025-07-10 ENCOUNTER — OFFICE VISIT (OUTPATIENT)
Dept: ONCOLOGY | Facility: CLINIC | Age: 65
End: 2025-07-10
Payer: MEDICARE

## 2025-07-10 ENCOUNTER — OFFICE VISIT (OUTPATIENT)
Dept: INTERNAL MEDICINE | Facility: CLINIC | Age: 65
End: 2025-07-10
Payer: MEDICARE

## 2025-07-10 VITALS
TEMPERATURE: 98.8 F | WEIGHT: 180 LBS | BODY MASS INDEX: 29.99 KG/M2 | DIASTOLIC BLOOD PRESSURE: 82 MMHG | SYSTOLIC BLOOD PRESSURE: 153 MMHG | OXYGEN SATURATION: 97 % | HEIGHT: 65 IN | HEART RATE: 86 BPM

## 2025-07-10 VITALS
SYSTOLIC BLOOD PRESSURE: 140 MMHG | WEIGHT: 180 LBS | DIASTOLIC BLOOD PRESSURE: 80 MMHG | BODY MASS INDEX: 29.99 KG/M2 | HEIGHT: 65 IN | OXYGEN SATURATION: 92 % | HEART RATE: 87 BPM | TEMPERATURE: 98 F

## 2025-07-10 DIAGNOSIS — Z17.0 MALIGNANT NEOPLASM OF OVERLAPPING SITES OF LEFT BREAST IN FEMALE, ESTROGEN RECEPTOR POSITIVE: ICD-10-CM

## 2025-07-10 DIAGNOSIS — Z09 HOSPITAL DISCHARGE FOLLOW-UP: Primary | ICD-10-CM

## 2025-07-10 DIAGNOSIS — Z17.0 MALIGNANT NEOPLASM OF OVERLAPPING SITES OF LEFT BREAST IN FEMALE, ESTROGEN RECEPTOR POSITIVE: Primary | ICD-10-CM

## 2025-07-10 DIAGNOSIS — C50.812 MALIGNANT NEOPLASM OF OVERLAPPING SITES OF LEFT BREAST IN FEMALE, ESTROGEN RECEPTOR POSITIVE: Primary | ICD-10-CM

## 2025-07-10 DIAGNOSIS — C50.812 MALIGNANT NEOPLASM OF OVERLAPPING SITES OF LEFT BREAST IN FEMALE, ESTROGEN RECEPTOR POSITIVE: ICD-10-CM

## 2025-07-10 DIAGNOSIS — G47.00 INSOMNIA, UNSPECIFIED TYPE: ICD-10-CM

## 2025-07-10 DIAGNOSIS — K59.00 CONSTIPATION, UNSPECIFIED CONSTIPATION TYPE: ICD-10-CM

## 2025-07-10 DIAGNOSIS — C50.912: Primary | ICD-10-CM

## 2025-07-10 RX ORDER — SODIUM CHLORIDE 9 MG/ML
20 INJECTION, SOLUTION INTRAVENOUS ONCE
OUTPATIENT
Start: 2025-07-17

## 2025-07-10 RX ORDER — PALONOSETRON 0.05 MG/ML
0.25 INJECTION, SOLUTION INTRAVENOUS ONCE
OUTPATIENT
Start: 2025-07-31

## 2025-07-10 RX ORDER — FAMOTIDINE 10 MG/ML
20 INJECTION, SOLUTION INTRAVENOUS AS NEEDED
OUTPATIENT
Start: 2025-07-31

## 2025-07-10 RX ORDER — FAMOTIDINE 10 MG/ML
20 INJECTION, SOLUTION INTRAVENOUS AS NEEDED
OUTPATIENT
Start: 2025-07-17

## 2025-07-10 RX ORDER — TEMAZEPAM 15 MG/1
CAPSULE ORAL
Qty: 30 CAPSULE | Refills: 0 | Status: SHIPPED | OUTPATIENT
Start: 2025-07-10

## 2025-07-10 RX ORDER — ACETAMINOPHEN 325 MG/1
650 TABLET ORAL ONCE
OUTPATIENT
Start: 2025-07-17

## 2025-07-10 RX ORDER — ACETAMINOPHEN 325 MG/1
650 TABLET ORAL ONCE
OUTPATIENT
Start: 2025-07-31

## 2025-07-10 RX ORDER — DIPHENHYDRAMINE HYDROCHLORIDE 50 MG/ML
50 INJECTION, SOLUTION INTRAMUSCULAR; INTRAVENOUS AS NEEDED
OUTPATIENT
Start: 2025-07-17

## 2025-07-10 RX ORDER — HYDROCORTISONE SODIUM SUCCINATE 100 MG/2ML
100 INJECTION INTRAMUSCULAR; INTRAVENOUS AS NEEDED
OUTPATIENT
Start: 2025-07-17

## 2025-07-10 RX ORDER — HYDROCORTISONE SODIUM SUCCINATE 100 MG/2ML
100 INJECTION INTRAMUSCULAR; INTRAVENOUS AS NEEDED
OUTPATIENT
Start: 2025-07-31

## 2025-07-10 RX ORDER — DIPHENHYDRAMINE HYDROCHLORIDE 50 MG/ML
50 INJECTION, SOLUTION INTRAMUSCULAR; INTRAVENOUS AS NEEDED
OUTPATIENT
Start: 2025-07-31

## 2025-07-10 RX ORDER — PALONOSETRON 0.05 MG/ML
0.25 INJECTION, SOLUTION INTRAVENOUS ONCE
OUTPATIENT
Start: 2025-07-17

## 2025-07-10 RX ORDER — SODIUM CHLORIDE 9 MG/ML
20 INJECTION, SOLUTION INTRAVENOUS ONCE
OUTPATIENT
Start: 2025-07-31

## 2025-07-10 RX ORDER — FAMOTIDINE 10 MG/ML
20 INJECTION, SOLUTION INTRAVENOUS ONCE
OUTPATIENT
Start: 2025-07-17

## 2025-07-10 RX ORDER — ATROPINE SULFATE 1 MG/ML
0.25 INJECTION, SOLUTION INTRAMUSCULAR; INTRAVENOUS; SUBCUTANEOUS
OUTPATIENT
Start: 2025-07-31

## 2025-07-10 RX ORDER — ATROPINE SULFATE 1 MG/ML
0.25 INJECTION, SOLUTION INTRAMUSCULAR; INTRAVENOUS; SUBCUTANEOUS
OUTPATIENT
Start: 2025-07-17

## 2025-07-10 RX ORDER — FAMOTIDINE 10 MG/ML
20 INJECTION, SOLUTION INTRAVENOUS ONCE
OUTPATIENT
Start: 2025-07-31

## 2025-07-10 NOTE — PROGRESS NOTES
"Transitional Care Follow Up Visit  Subjective     Maritza Bejarano is a 64 y.o. female who presents for a transitional care management visit.    Within 48 business hours after discharge our office contacted her via telephone to coordinate her care and needs.      I reviewed and discussed the details of that call along with the discharge summary, hospital problems, inpatient lab results, inpatient diagnostic studies, and consultation reports with Maritza.     Current outpatient and discharge medications have been reconciled for the patient.  Reviewed by: Enoc Carbone DO          7/7/2025     7:08 PM   Date of TCM Phone Call   [facility]   Date of Admission 7/2/2025   Date of Discharge 7/7/2025   Discharge Disposition Home or Self Care     Risk for Readmission (LACE) Score: 17 (7/7/2025  6:00 AM)      History of Present Illness   Course During Hospital Stay:      \"Ms. Bejarano is a 64 y.o. female with a history of MS, neurogenic bladder with chronic indwelling Coobs, type 2 diabetes, breast cancer who presented to [facility] initially complaining of fever and generalized weakness.  Please see the admitting history and physical for further details.  She was admitted to the hospital for further evaluation and treatment.  Patient was found to have UA consistent with UTI on admission, she was started on empiric antibiotics while culture data was pending.  Ultimately her urine culture grew Klebsiella and Enterococcus faecalis.  Infectious disease was consulted for antibiotic recommendations.  Given that she was improving on an antibiotic that would not cover Enterococcus faecalis per ID patient's UTI likely related to Klebsiella and Enterococcus faecalis was only a colonizing bacteria.  She completed a course of antibiotics per infectious these recommendations for Klebsiella UTI.  While admitted she did have some bradycardia, cardiology was consulted, her metoprolol was reduced and she underwent echo which " "showed preserved ejection fraction.  No further bradycardia noted after reduction of her metoprolol.  She was also seen by urology who recommended patient continue to follow-up with them in clinic in about a month after Cobos catheter exchange.  The patient is stable for discharge, she should follow-up with her PCP in a week for posthospital follow-up visit as well. \"     States she has a follow up scheduled with [facility] for 7.16.25 . States her temperature at home have not been elevated since discharge.  When she has UTI she never has dysuria per her report.    Today, states she has always had an issue with constipation and despite miralax several day per week as well as  prune juice .  She is only having bowel movement every 6 or 7 days and it has been like that \"forever\" which she clarifies as \"Years pretty much\". Her last bowel movement was 4 days ago and before that was 7 or 8 days previously. She is taking docusate -senna one tab daily as prescribed by Oncology. States she     She is currently receiving home health nursing, they came yesterday to check her pressure wounds but patient states they are closed so they weren't assessed.    States in the hospital she was prescribed temazepam for insomnia . She has tried melatonin in the past that will help her fall asleep but not stay asleep. Has already been trialed on clonazepam, trazodone, ambien with no benefit or side effects.     The following portions of the patient's history were reviewed and updated as appropriate: allergies, current medications, past family history, past medical history, past social history, past surgical history, and problem list.        Objective   /80   Pulse 87   Temp 98 °F (36.7 °C) (Infrared)   Ht 165.1 cm (65\")   Wt 81.6 kg (180 lb)   SpO2 92%   BMI 29.95 kg/m²   Physical Exam  Vitals reviewed.   Constitutional:       General: She is not in acute distress.     Appearance: She is not ill-appearing.   Pulmonary:      " "Effort: Pulmonary effort is normal. No respiratory distress.   Abdominal:      General: Bowel sounds are normal. There is no distension.      Palpations: Abdomen is soft.      Tenderness: There is no abdominal tenderness. There is no guarding.      Comments: General \"fullness \" sensation with abdominal palpation throughout.    Musculoskeletal:      Comments: Ambulating via power wheelchair   Skin:     Coloration: Skin is not jaundiced.   Neurological:      Mental Status: She is alert.   Psychiatric:         Mood and Affect: Mood normal.         Behavior: Behavior normal.         Thought Content: Thought content normal.         Assessment & Plan   Diagnoses and all orders for this visit:    1. Hospital discharge follow-up (Primary)  2. Constipation, unspecified constipation type  3. Insomnia, unspecified type  -Patient here today for hospital discharge follow-up.  She was admitted at [facility] from 7/2/2025 to 7/7/2025.  She presented to the hospital complaining of fever and generalized weakness and was found to have a UA consistent with UTI.  She was started on empiric antibiotics and ultimately her urine culture grew Klebsiella and Enterococcus faecalis.  Infectious disease was consulted.  Enterococcus faecalis was felt to be a colonizing bacteria in her UTI related to Klebsiella.  She completed antibiotic course per infectious disease for Klebsiella UTI.  She experienced an bradycardia while admitted and cardiology reduced her metoprolol and echocardiogram revealed preserved ejection fraction.  Urology consulted during her hospital stay who recommended she follow-up with them in clinic in about a month after Cobos catheter exchange.  She was discharged home.  I reviewed hospital discharge summary as well as day of  discharge labs.  -today she is slightly hypertensive at 140/80, overall acceptable given her number of comorbidities including neoplastic breast cancer, and afebrile. She will establish " with urology in the next week at [facility] which is great. They will be managing her indwelling matias.   -for constipation, this seems years long and related to her MS. She does have a Norco Rx but states she uses this several times monthly only. I recommend she increase her docusate-senna from one daily to a max of 4 tabs twice daily slowly based upon her need. Her bowel sounds are positive today and she had a CT scan of her abdomen and pelvis with IV contrast with no relevant findings to constipation.  -States in the hospital she was prescribed temazepam for insomnia . She has tried melatonin in the past that will help her fall asleep but not stay asleep. Has already been trialed on clonazepam, trazodone, ambien with no benefit or side effects.  We discussed that temazepam is a controlled substance and can be addictive. At the same time, she has numerous serious underlying medical conditions and sleep is a priority for her. Given her failure of several other drugs I am agreeable to a one month trial of temazepam 7.5-15 mg nightly as needed. She reports having adverse mental status changes with clonazepam in the past and we discussed that these medications are in the same class of medication so the same type of reaction is possible. She will need to monitor very carefully.

## 2025-07-10 NOTE — PROGRESS NOTES
Subjective   Maritza Bejarano is a 64 y.o. female.   With metastatic invasive lobular carcinoma, ER/NH strongly positive cancer with metastatic disease to the bones.    Oncologic history:    Patient is a 64-year-old postmenopausal lady who has not had a mammogram in 4 years presented with a screen detected abnormality.  She had a left breast biopsy in 2014 which was benign.  Denies palpating any breast masses skin changes or nipple discharge prior to the abnormal mammogram.  She does have left nipple inversion.    Patient has a longstanding diagnosis of multiple sclerosis and has not been on any treatment.  She was previously seen by Dr. Ozzy France and now being seen by Dr. Soto with neurology.  She has been nonambulatory for the past 4 years and requires a wheelchair.  She is unable to transfer herself in and out of wheelchairs and her  has to lift her for all the transfers.  She is also incontinent of the bladder and requiring a suprapubic catheter placed by urology to help with the same.  She had a bladder stimulator in the past which was turned off.  Other comorbidities include hypertension and hyperlipidemia.      Family history significant for maternal great grandmother with breast cancer, maternal great aunt and mother with breast cancer in her 70s.  Denies any family history of ovarian cancer, pancreatic cancer or melanoma.    5/21/2024-bilateral screening mammogram  Finding 1.new nipple retraction along with diffuse skin and trabecular thickening of the left breast.  There is suggestion of subtle architectural distortion seen on the MLO projection in the posterior central region.  3 biopsy markers are noted.  No new suspicious calcifications.  Send finding 2.few axillary lymph node seen in the left breast which display interval increase in size and density.    Finding 3.focal asymmetry measuring 10 mm seen in the anterior one third of the right breast in the medial subareolar  region.    Impression  Finding 1.new nipple retraction, skin and trabecular thickening in the left breast requires additional evaluation.  There is also question architectural distortion in the posterior central breast seen on the MLO projection.  Diagnostic mammogram to include repeat full-field CC with additional posterior tissue and complete breast ultrasound is recommended.  Finding 2.axillary lymph nodes in the left breast require additional evaluation, ultrasound recommended.  Finding 3.focal asymmetry in the right breast requires additional evaluation.  Diagnostic mammogram and limited breast ultrasound is recommended.    5/29/2024-bilateral diagnostic mammogram and ultrasound  Finding 1.4 0.7 x 5.6 x 6.8 cm developing asymmetry with associated nipple retraction, skin thickening and trabecular thickening of the left breast in the central region, inferior region in the upper outer region and the lower outer region.  Finding 2.axillary lymph node in the left breast.  Finding 3.suspected finding completely resolves upon further evaluation representing benign fibroglandular breast tissue.    Bilateral breast ultrasound  Finding 1.nonparallel hypoechoic mass with indistinct margins and skin thickening and internal vascularity in the left breast in the central region, inferior region, upper outer region and in the lower outer region.  The finding is palpable and appears to involve all 4 quadrants.  Most discrete portion is located at 130, 3 cm from the nipple, skin thickening up to 6 mm.  Send finding 2.rounded enlarged left axillary lymph node measuring 11 mm.  Cortical thickening of 9 mm present.    Finding 3.no sonographic correlate.  Send finding 4.enlarged right axillary lymph node measuring 14 mm.  Cortical thickening of 5 mm.    Impression  Finding 1.ultrasound-guided biopsy recommended  Finding 2.ultrasound-guided biopsy recommended  Finding 3.previously described right breast asymmetry resolves  Finding  4.ultrasound-guided biopsy recommended.    Ultrasound-guided biopsy of the left breast and left axilla lymph node and right axilla lymph node    1.left breast  Invasive lobular carcinoma with crush artifact  Grade 2  Invasive carcinoma measures 8.5 mm  No lymphovascular space invasion  ER +91 to 100% strong  MS +31 to 40% moderate  HER2 negative, 0  Ki-67 35%    2.left axillary lymph node focus of metastatic Dastech lobular carcinoma measuring 10 mm  ER +91 to 100% strong  MS +21 to 30%  HER2 -0  Ki-67 30%    3.right axillary lymph node with a focus of metastatic carcinoma measuring 4 mm.  Grade 2.  ER +91 to 100% strong  MS +11 to 20% moderate  HER2 negative, 0  Ki-67 35%    Pathology of all the 3 sites appear similar and findings could represent left breast lobular carcinoma metastatic to the right as well as left axillary lymph nodes.    6/14/2024-CT of the chest abdomen and pelvis  1.large ill-defined infiltrative central left breast mass measuring 6.7 x 4 cm, medially there is an irregular 2.5 x 2.5 cm mass.  Significant thickening of the skin in the left breast and there is left axilla lymphadenopathy.  Left axilla lymph node measures 1.3 x 1.3 cm.  There is also a new enlarged right axillary lymph node measuring 1.3 x 1 cm.  All of the subcentimeter right axillary lymph nodes are slightly larger than previous.  2.no mediastinal hilar or internal mammary chain lymphadenopathy  3.new indeterminate mixed density measuring 1.3 x 1.2 cm right apical pulmonary nodule.  Follow-up recommended.  4.pleural parenchymal thickening and nodules inferiorly and posterior to the right upper lobe are stable.  Chronic scarring and subsegmental atelectatic change in both lower lobes.    CT abdomen pelvis  1.moderate fatty infiltration of the liver.  No suspicious liver lesions.  2.moderate-sized paraesophageal hernia.  No acute bowel abnormality.  3.right sacral stimulator noted at the S3 neuroforamina.  No suspicious bone  lesions are noted.    6/14/2024-bone scan  1.focal abnormal uptake in the left anterior inferior iliac spine correlating with lucent lesion on the CT concerning for metastatic disease.  Further evaluation with MRI of the pelvis and left hip could be performed.  2.focal uptake in the left frontal calvarium again concerning for metastatic disease.  Noncontrast CT or MRI could be obtained.  3.soft tissue uptake noted in the left breast at the site of known left breast cancer.  4.changes from arthroplasty on the right shoulder.  5.multifocal likely degenerative uptake in each knee, ankle and foot and increased uptake in the medial and patellofemoral cortex of the right knee could be posttraumatic.      Invitae 9 gene stat panel negative.    MRI of the brain which showed T2 hyperintensity involving the frontal bone measuring 1.6 cm in size and a smaller area of enhancement in the frontal bone laterally and inferiorly concerning for metastasis.  No brain mets    MRI of the hip and pelvis showed multiple enhancing bone lesions consistent with metastatic disease to the sacrum and pelvis.  Dominant lesion in the anterior inferior left pelvic spine and rectus femoris muscle origin that correlates with the site of bone scan uptake.  She has some right joint hip joint effusion.  Her CA 15-3 16.2    She was seen by Dr. Saavedra on 7/16/2024 and recommended to start on Ribociclib along with anastrozole.    Started Ribociclib in the first week of August 2024    Had profound nausea and vomiting requiring antiemetics.  Completed 3 weeks of Ribociclib on 8/30/2024.    Hospitalized from 9/11/2024 to 9/16/2024 for pneumonia and KINZA and since then Ribociclib has been on hold    Readmitted to the hospital from 10/17/2024 to 10/23/2024 requiring holding Ribociclib.  She was admitted for UTI with sepsis.    10/29/2024-CT of the chest which was compared to the CT chest from 6/14/2024-stable 1.3 cm subsolid nodule in the right lung apex.   Decrease in size of the left axillary lymph nodes.  Ill-defined left breast mass appears unchanged.  Expansile lytic and sclerotic lesion in the posterior left 10th rib which appears increased compared to the prior CT.  Stable subtle area of sclerosis in the left anterior third rib.    10/3/2024-MRI of the cervical spine-rounded area of marrow replacement in C7 suspicious for metastatic disease.    10/3/2024-MRI of the thoracic spine-suspicious patchy areas of marrow replacement in the thoracic spine at T5, T6, T7, T11, T12, L1 suspicious for metastatic disease.    Resumed Ribociclib in November 2024 and has been on it continuously up until February 2025 2/18/2025-CT of the chest abdomen and pelvis  13 mm right apical lesion unchanged micronodules in the right upper lobe micronodules in the left upper lobe  Left 11th rib metastasis unchanged with subtle increase sclerosis  Several hypodense lesions in the liver with the most prominent measuring 16 mm consistent with metastatic deposits.  Not present on scans from September and October 2024.  Bladder is collapsed with Cobos catheter.  Fecal impaction  Small sclerotic metastasis throughout the lumbar spine pelvis, some of which are new.  Largest lesion being in the inferior iliac spine which has become more sclerotic.    Bone scan 2/18/2025-widespread osseous metastatic disease vast majority of which are new/newly appreciable from the prior bone scan.    3/17/2025-initiated Trucap    3/20/2025-patient called complaining of abdominal cramping and loose stools.  She was recommended to start Bentyl and use Imodium for diarrhea.    3/21/2025-patient continues on Faslodex and Truqap.      5/12/2025-CT of the chest-stable 1.7 x 1.5 cm nodular opacity in the right apex.  Stable reticular nodular opacity inferiorly at the right upper lobe.  Bandlike scarring atelectasis at the posterior right lung base.  New pleural thickening of the superior aspect of the left major  fissure and new faint reticular and groundglass opacities in the left upper lobe.  Follow-up CT in 3 months recommended.  New tiny left pleural effusion and new minimal right pleural effusion.  Extensive sclerotic bony metastasis without change.    5/12/2025-bone scan very similar skin to graphic findings in keeping with widespread osseous metastatic disease.      4/21/2025-CT of the abdomen-no evidence of metastatic disease.    On 6/20/2025 for worsening erythema of the left breast.  This has been treated with antibiotics including amoxicillin and doxycycline.  Since the antibiotics the erythema has improved some but not all the way resolved.  Due to this we proceeded with a bilateral diagnostic mammogram and bilateral ultrasound performed on 6/24/2025 which showed an increase in size of the 2:00 malignancy.  New enlarged 1 cm right axillary lymph node was seen.  Ill-defined hypoechogenicity and shadowing throughout the right breast without a discrete measurable mass concerning for possible infiltrating malignancy.  Skin thickening throughout the left breast which could be cellulitis or underlying malignancy.    Punch biopsy was performed by her dermatologist and pathology was consistent with pleomorphic invasive lobular carcinoma which is ER negative, SC negative and HER2 score 0.    6/24/2025-bone scan with stable osseous metastatic disease.      6/24/2025-CT of the chest abdomen and pelvis- 10-15 scattered hepatic lesions likely represent metastatic disease with index lesions which have increased in size over multiple prior CTs.  Extensive osseous metastatic disease, mixed lytic and blastic lesion in the left posterior 10th rib has a more lytic appearance compared to February 2025.  Right lower lobe pulmonary nodular opacification is new compared February 2025 but grossly unchanged from May 2025.    Interval history  Maritza returns today for follow-up.  She reports that the erythema of the left breast has not  changed significantly however the pain is somewhat worse and she is currently using Norco as needed for pain.  She does not have to use much of Norco.  Denies any other new complaints at this time.      The following portions of the patient's history were reviewed and updated as appropriate: allergies, current medications, past family history, past medical history, past social history, past surgical history, and problem list.    Past Medical History:   Diagnosis Date    Anemia 2024    `Treated with iron    Dawn esophagus     per patient    Blurred vision     R/T MS    Breast cancer     metastatic    Carpal tunnel syndrome     Clotting disorder 1993, 2003, 2012    3 g/i bleeds w/ transfus/ions    Colon polyp 2013    removed w/ colonoscopy    Deep vein thrombosis phlebitis 1980    Depression     Diplopia 2013    GERD (gastroesophageal reflux disease)     GI (gastrointestinal bleed) 3 bleeds    2 transfusions    H/O Skin cancer, basal cell     Headache     History of blood transfusion     History of GI bleed     R/T NSAIDS AND STEROIDS, multiple times    History of urinary tract infection     Hypercalcemia     s/p parathyroidectomy    Hyperlipidemia     Hypertension     Movement disorder     Multiple sclerosis     Optic neuritis     PONV (postoperative nausea and vomiting)     Steroid-induced diabetes 2024        Past Surgical History:   Procedure Laterality Date    APPENDECTOMY      BLADDER SURGERY      bladder stimulator    BREAST BIOPSY  don't remember    BREAST SURGERY      augmentation wtih subsequent removal    CARPAL TUNNEL RELEASE Bilateral     Left 2018, right 2020    CUBITAL TUNNEL RELEASE Left     CYSTOSCOPY BOTOX INJECTION OF BLADDER  2018    Cystoscopy with Botox    FRACTURE SURGERY  2019    rt shoulder    PARATHYROIDECTOMY      one gland removed    ROTATOR CUFF REPAIR Right 2017    TOE SURGERY      bilateral great toes    TOTAL SHOULDER ARTHROPLASTY W/ DISTAL CLAVICLE EXCISION Right 10/22/2018     "Procedure: RT TOTAL SHOULDER REVERSE ARTHROPLASTY;  Surgeon: Bipin Dangelo MD;  Location: [facility];  Service: Orthopedics        Family History   Problem Relation Age of Onset    Hypertension Mother     Hyperlipidemia Mother     Aortic aneurysm Mother         thoracic    Diabetes Mother     Hypertension Father         charissa heart failure    Heart failure Father     Hyperlipidemia Father     Miscarriages / Stillbirths Sister     Multiple sclerosis Brother     Atrial fibrillation Brother     Diabetes Maternal Grandmother     Diabetes Maternal Grandfather     Stroke Maternal Grandfather     Parkinsonism Paternal Grandmother     Tremor Paternal Grandmother     Stomach cancer Paternal Grandfather         Social History     Socioeconomic History    Marital status:      Spouse name: Donald    Number of children: 0   Tobacco Use    Smoking status: Former     Current packs/day: 0.00     Average packs/day: 2.0 packs/day for 30.0 years (60.0 ttl pk-yrs)     Types: Cigarettes     Start date: 10/22/1973     Quit date: 10/22/2003     Years since quittin.7    Smokeless tobacco: Never   Vaping Use    Vaping status: Never Used   Substance and Sexual Activity    Alcohol use: Not Currently    Drug use: Yes     Types: Marijuana     Comment: rarely    Sexual activity: Not Currently     Partners: Male     Birth control/protection: Post-menopausal        OB History    No obstetric history on file.     Age at menarche-15  Age at first live childbirth-not applicable   2 para 0  0  Age of menopause-57  No use of hormone replacement therapy      Allergies   Allergen Reactions    Klonopin [Clonazepam] Mental Status Change, Confusion and Hallucinations        Review of systems as mentioned HPI otherwise negative    Objective   Blood pressure 153/82, pulse 86, temperature 98.8 °F (37.1 °C), temperature source Oral, height 165.1 cm (65\"), weight 81.6 kg (180 lb), SpO2 97%, not currently breastfeeding.     Physical " Exam  Vitals reviewed.   Constitutional:       Appearance: Normal appearance. She is normal weight.   HENT:      Right Ear: External ear normal.      Left Ear: External ear normal.      Nose: Nose normal.      Mouth/Throat:      Pharynx: Oropharynx is clear.   Eyes:      Conjunctiva/sclera: Conjunctivae normal.   Cardiovascular:      Rate and Rhythm: Normal rate.   Pulmonary:      Effort: Pulmonary effort is normal.   Abdominal:      General: Abdomen is flat.   Musculoskeletal:         General: Normal range of motion.      Cervical back: Normal range of motion.   Skin:     General: Skin is warm.   Neurological:      Mental Status: She is alert.      Comments: Non weight bearing, wheelchair bound.    Psychiatric:         Mood and Affect: Mood normal.         Behavior: Behavior normal.         Thought Content: Thought content normal.         Judgment: Judgment normal.     Breast Exam:Not examined today, 4/4/2025 Right breast appears normal on inspection.  No palpable right axilla lymphadenopathy or right breast masses.  Right nipple inverted.  Left breast on inspection there is nipple retraction crusting over the nipple region.  On palpation there is a 8 x 6 cm mass (improved) in the retroareolar region occupying much of the breast.  Palpable left axillary adenopathy as well.      I have reexamined the patient and the results are consistent with the previously documented exam. Zainab Lopes MD      Results from last 7 days   Lab Units 07/07/25  0359 07/06/25  0443 07/05/25  0504   WBC 10*3/mm3 6.16 7.95 6.80   NEUTROS ABS 10*3/mm3 3.55 5.00 5.51   LYMPHS ABS 10*3/mm3  --   --  0.82   HEMOGLOBIN g/dL 9.9* 9.8* 9.3*   HEMATOCRIT % 32.5* 32.7* 31.1*   PLATELETS 10*3/mm3 460* 438 498*     Results from last 7 days   Lab Units 07/07/25  0359 07/06/25  0443 07/05/25  1631 07/05/25  0742   SODIUM mmol/L 138 140  --  139   POTASSIUM mmol/L 4.4 4.7 4.5 4.4   CHLORIDE mmol/L 103 108*  --  109*   CO2 mmol/L 23.8 22.2  --   19.6*   BUN mg/dL 18.0 15.0  --  17.0   CREATININE mg/dL 0.58 0.62  --  0.71   CALCIUM mg/dL 9.2 9.6  --  8.2*   ALBUMIN g/dL 3.5 3.4*  --  3.2*   BILIRUBIN mg/dL <0.2 <0.2  --  <0.2   ALK PHOS U/L 184* 170*  --  156*   ALT (SGPT) U/L 27 22  --  19   AST (SGOT) U/L 71* 60*  --  66*   GLUCOSE mg/dL 159* 160*  --  96   MAGNESIUM mg/dL  --   --  2.5*  --              6/24/2025 CT of the chest abdomen and pelvis   Impression:  1.  At least 10-15 scattered hepatic lesions likely represent metastatic  disease with index lesions which have increased in size over multiple  prior CTs.  2.  Presumed extensive osseous metastatic disease. A mixed lytic and  blastic osseous lesion within the left posterior 10th rib has a more  permeative/lytic appearance compared with 2/18/2025.  3.  Right lower lobe nodular pulmonary opacification is new since  2/18/2025 and grossly unchanged since 5/12/2025 while findings may  represent pneumonia neoplastic disease cannot be excluded. The subsolid  nodule within the right apex appears to gradually increase in size of  multiple prior CTs and is concerning for metastatic disease and/or  slow-growing neoplasm.  4.  Other findings as above     Assessment & Plan       *Left breast invasive lobular carcinoma  The tumor is estrogen receptor +91 to 100%, progesterone receptor +31 to 40%, HER2 negative, 0, Ki-67 35%  The tumor measures greater than 10 cm on exam, lymph node positive.  CT of the chest abdomen and pelvis with right upper lobe pulmonary nodule which is new and the bone scan also shows uptake in the left anterior inferior iliac spine which correlates to a lucent area on the CT scan and also focal uptake noted in the left frontal calvarium concerning for metastatic disease.  Further evaluation with a MRI of the pelvis and hip as well as MRI of the brain is underway.  The scans are scheduled for 7/10/2024.  Clinical T3 N1 M1, stage IV invasive lobular carcinoma.  There is also a right  axillary lymph node which has been biopsied and consistent with invasive lobular carcinoma with similar morphology as well as receptors concerning for metastatic disease rather than a primary right breast cancer.  Recommended Ribociclib 400 mg 3 weeks on and 1 week off.  July 16, 2024: Reviewed MRI of the pelvis and hip consistent with many bony metastasis.  MRI brain shows left frontal bone mets but no brain involvement.  Patient is here to start day 1 ribociclib along with anastrozole.  Continues on anastrozole.  Tempus performed on the left axilla lymph node biopsy shows PIK3CA mutation, CDH 1 mutation and Erb B2 mutation.  Therefore patient would benefit from alpelisib and Faslodex after progression on Ribociclib and AI.  Other options include neratinib plus Faslodex.  Initiated Ribociclib in August 2024.  8/30/2024-last day of week 3 of Ribociclib.    Patient completed 1 cycle of Ribociclib 400 mg and subsequently has not been able to go back on Ribociclib due to recurrent hospitalizations and abnormal LFTs.  10/14/2024-LFTs are normal today.  Recommend resuming Ribociclib at 200 mg and subsequently we will increase the dose to 400 mg with the next cycle.  Patient was unable to resume Ribociclib as she was admitted to the hospital from 10/17/2024 to 10/23/2024  11/11/2024-Labs reviewed and overall stable except for an ALT of 49.  Recommend resuming Ribociclib at 200 mg.  CT of the chest performed 10/29/2024 shows stable size of the left breast mass, decrease in size of the left axillary lymph node and mixed lytic and blastic lesion of the rib slightly increased in size.  11/11/2024-resumed Ribociclib 200 mg daily for 3/4 weeks.  2/18/2025-scans with disease progression in the bones in the liver.  Discontinue Ribociclib and anastrozole  2/28/2025-received the first dose of Faslodex.  3/17/2025-started truqap  3/21/2025- CBC reviewed and WBC 4.81, hemoglobin 13.9 platelets 595,000, CMP reviewed and LFTs have  improved with ALT which is normal at 33, AST 71, alkaline phosphatase 185, total bilirubin normal at 0.2, blood sugar elevated at 409  Completed 1 week of trucap, resume again on 3/24/2025  4/4/2025 due for cycle 2-day 1 Faslodex.  She is struggling with intermittent hyperglycemia related to Truqap which is improved with glipizide  5/12/2025-CT of the chest and bone scan with overall stable disease.  Prior to that she had a CT abdomen end of April 2025 which also shows no evidence of metastatic disease.  Patient has not taken a whole lot of trucap, maybe less than 2 weeks total since initiation in March 2025.  Bilateral diagnostic mammogram and ultrasound from 6/24/2025 concerning for disease progression  CT of the chest abdomen and pelvis from 6/24/2025 also concerning for disease progression  I reviewed her previous pathology report and HER2 is noted to be 0.  She had a repeat punch biopsy of the left breast skin.  Will follow-up on the pathology from that.  If HER2 is 1+ or even ultralow we should be able to use Enhertu.  She truly did not have disease progression on Truqap however she was hospitalized back-to-back for the few days that she took 2 Due to recurrent UTIs.  She also currently has an ongoing UTI.  I worry that any type of PIK 3 CA directed therapy will result in UTIs, hyperglycemia and diarrhea which may not be ideal for her situation with the multiple sclerosis and inability to transfer as well as bladder dysfunction  Therefore the next best option would be chemotherapy which would be either Enhertu, Trodelvy or taxane.  I called and discussed with Dr. Jaqcueline Daniels to determine the HER2 and she looked at the original biopsy again and this was negative.  Punch biopsy from the left breast noted to be ER negative, TX negative and HER2 nu 0.  Invasive pleomorphic lobular carcinoma.  Given that the receptor status has changed, we would have to treat the metastatic breast cancer which is progressing  with chemotherapy which would be either Xeloda or Trodelvy as HER2/dorothy was 0 Enhertu is not an option.  I reviewed both of these options with the patient and the side effects of both these medications at length and she prefers to proceed with Trodelvy.  Will also obtain a PD-L1 status to see if immunotherapy is an option.  Started Trodelvy at 7.5 mg/kg, q. 14 days as I am really concerned about her ability to tolerate.  We will also plan for Neulasta upfront.  Although tumor markers were not elevated originally now with elevated tumor markers with a CA 15-3 of 296 on 6/25/2025 and CA 20 7.29 of 409 on 6/25/2025.  Will plan to monitor these closely.    *Severe hyperglycemia  Blood sugar greater than 400  Administer 5 units of insulin  Start glipizide 2.5 mg  Remain glucometer and diabetic education.  Continue glipizide 2.5 mg daily, blood glucose 142 today.  The patient understands to continue truqap if AM fasting blood glucose is less than 250.   5/9/2025-blood sugar 128    *Abdominal cramping and diarrhea  Secondary to capivasertib.  Resolved    *Right axillary lymphadenopathy  Biopsied and consistent with invasive lobular carcinoma, grade 2, ER/OH positive and HER2 negative  Recent bilateral diagnostic mammogram and ultrasound from 6/24/2025 shows disease progression    *Right breast non-mass enhancement  6/28/2024-MRI of the breast with non-mass enhancement noted in the right breast and patient had questions regarding if this should be biopsied.  Given extensive metastatic disease in the bones and right axilla lymph node consistent with invasive lobular carcinoma management would not change with the biopsy and hence I would recommend against it.    *Severe nausea  Continue Compazine and Zofran as needed  Well-controlled at this time    *Skeletal metastasis  Patient will be started on Xgeva  8/16/2024 Xgeva initiation will be held as the patient has not been to the dentist in over 2 years.  Discussed possible side  effects of Xgeva and she will schedule a dental visit and we will have her back in 1 week to initiate Xgeva pending this is complete.  10/14/2024-second dose of Xgeva will be administered.  Labs reviewed and stable to proceed.  Continue Xgeva every 4 weeks    *Multiple sclerosis  Patient was not on any treatment previously.  She sees Dr. Jacobson at [facility].  Due to profound weakness patient requested her neurologist to start steroids.  They plan to initiate high-dose IV steroids to help with this.  High-dose IV steroids have not been initiated.  Doing physical therapy and received high-dose steroids  Stable    *Hypertension-continue current medication  Blood pressure BP: 153/82   Continue to monitor  No changes in medications    *Hyperlipidemia-continue current medication  No changes    *Urinary incontinence-patient had  a suprapubic catheter placed by urology.  Patient with recurrent UTIs  UTIs are a side effect of truqap  Discussed with her primary care physician regarding suppressive therapy  Patient does not wish to go back on Truqap at this time    *History of iron deficiency anemia-  3/8/2024 hemoglobin was low at 10.9 and subsequently patient took oral iron which resulted in improvement of hemoglobin and normal.  She was scheduled for colonoscopy however she canceled that due to ongoing management of breast cancer.  She proceed with a colonoscopy.  8/16/2024 hemoglobin is 10.2.  She states she recently was told to begin daily iron supplementation.  Baseline iron studies ordered today she is only been taking the iron for a few days.  Ferritin 32, iron saturation 5%, TIBC 440.  We will repeat this in 6 to 8 weeks.  12/10/2024 Hgb 12.0. Continue oral iron  Hemoglobin normal at 12.6 on 2/28/2025 7/7/2025-hemoglobin 9.9    *Genetic testing-9 gene stat panel negative    *Dyspnea  Stable to improved    *Right upper lobe pulmonary nodule   Concerning for metastatic disease  PET/CT may not be helpful as  invasive lobular carcinomas typically are not very PET avid.  Could consider PET-FES  Stable on the most recent CT of the chest    *Follow-up-after the MRIs.  Reviewed MRI of the pelvis and MRI of the brain which shows mets in the bone  MRI of the cervical and thoracic spine performed at outside facility on 10/3/2024    *Abnormal LFTs  Likely secondary to Ribociclib  Ribociclib dose increased to 400 mg daily on 12/10/2024  12/31/2024-Labs reviewed and stable.  1/13/2025: LFTs further increased with AST 72, ALT 22.  Decision made to hold off on starting next cycle of ribociclib.  1/31/2025-AST 72, ALT normal, total bilirubin normal  Liver function studies minimally elevated today, 4/4/2025 with AST 73, ALT 52, total bilirubin is normal at 0.4  Most recent LFTs from May 2025 reviewed and stable  7/7/2025-LFTs stable    *Insomnia  Severe  Using trazodone and Ambien.  Neither of them helping  Only clonazepam helps  Referral to supportive oncology    *Alternate medication  Patient sees a outside provider and receives ivermectin and fenbendazole  We have ran an interaction check with the medications and does not appear to be any interaction however I want her against taking these medications and potential side effects  Patient however wishes to proceed with his medications.  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  Discontinue Faslodex   Enhertu is not an option as HER2/dorothy is 0.  APRN cycle 1 day 8 of Trodelvy.  Obtain PD-L1 testing  Obtain NGS on the punch biopsy or liquid biopsy.  Referral to Pattie Devries for severe anxiety given disease progression    This patient is on high risk drug therapy requiring intensive monitoring for toxicity.  76 minutes total spent on the encounter on the same day    Zainab Lopes MD   07/10/2025

## 2025-07-10 NOTE — H&P (VIEW-ONLY)
Subjective   Maritza Bejarano is a 64 y.o. female.   With metastatic invasive lobular carcinoma, ER/HI strongly positive cancer with metastatic disease to the bones.    Oncologic history:    Patient is a 64-year-old postmenopausal lady who has not had a mammogram in 4 years presented with a screen detected abnormality.  She had a left breast biopsy in 2014 which was benign.  Denies palpating any breast masses skin changes or nipple discharge prior to the abnormal mammogram.  She does have left nipple inversion.    Patient has a longstanding diagnosis of multiple sclerosis and has not been on any treatment.  She was previously seen by Dr. Ozzy France and now being seen by Dr. Soto with neurology.  She has been nonambulatory for the past 4 years and requires a wheelchair.  She is unable to transfer herself in and out of wheelchairs and her  has to lift her for all the transfers.  She is also incontinent of the bladder and requiring a suprapubic catheter placed by urology to help with the same.  She had a bladder stimulator in the past which was turned off.  Other comorbidities include hypertension and hyperlipidemia.      Family history significant for maternal great grandmother with breast cancer, maternal great aunt and mother with breast cancer in her 70s.  Denies any family history of ovarian cancer, pancreatic cancer or melanoma.    5/21/2024-bilateral screening mammogram  Finding 1.new nipple retraction along with diffuse skin and trabecular thickening of the left breast.  There is suggestion of subtle architectural distortion seen on the MLO projection in the posterior central region.  3 biopsy markers are noted.  No new suspicious calcifications.  Send finding 2.few axillary lymph node seen in the left breast which display interval increase in size and density.    Finding 3.focal asymmetry measuring 10 mm seen in the anterior one third of the right breast in the medial subareolar  region.    Impression  Finding 1.new nipple retraction, skin and trabecular thickening in the left breast requires additional evaluation.  There is also question architectural distortion in the posterior central breast seen on the MLO projection.  Diagnostic mammogram to include repeat full-field CC with additional posterior tissue and complete breast ultrasound is recommended.  Finding 2.axillary lymph nodes in the left breast require additional evaluation, ultrasound recommended.  Finding 3.focal asymmetry in the right breast requires additional evaluation.  Diagnostic mammogram and limited breast ultrasound is recommended.    5/29/2024-bilateral diagnostic mammogram and ultrasound  Finding 1.4 0.7 x 5.6 x 6.8 cm developing asymmetry with associated nipple retraction, skin thickening and trabecular thickening of the left breast in the central region, inferior region in the upper outer region and the lower outer region.  Finding 2.axillary lymph node in the left breast.  Finding 3.suspected finding completely resolves upon further evaluation representing benign fibroglandular breast tissue.    Bilateral breast ultrasound  Finding 1.nonparallel hypoechoic mass with indistinct margins and skin thickening and internal vascularity in the left breast in the central region, inferior region, upper outer region and in the lower outer region.  The finding is palpable and appears to involve all 4 quadrants.  Most discrete portion is located at 130, 3 cm from the nipple, skin thickening up to 6 mm.  Send finding 2.rounded enlarged left axillary lymph node measuring 11 mm.  Cortical thickening of 9 mm present.    Finding 3.no sonographic correlate.  Send finding 4.enlarged right axillary lymph node measuring 14 mm.  Cortical thickening of 5 mm.    Impression  Finding 1.ultrasound-guided biopsy recommended  Finding 2.ultrasound-guided biopsy recommended  Finding 3.previously described right breast asymmetry resolves  Finding  4.ultrasound-guided biopsy recommended.    Ultrasound-guided biopsy of the left breast and left axilla lymph node and right axilla lymph node    1.left breast  Invasive lobular carcinoma with crush artifact  Grade 2  Invasive carcinoma measures 8.5 mm  No lymphovascular space invasion  ER +91 to 100% strong  OR +31 to 40% moderate  HER2 negative, 0  Ki-67 35%    2.left axillary lymph node focus of metastatic Dastech lobular carcinoma measuring 10 mm  ER +91 to 100% strong  OR +21 to 30%  HER2 -0  Ki-67 30%    3.right axillary lymph node with a focus of metastatic carcinoma measuring 4 mm.  Grade 2.  ER +91 to 100% strong  OR +11 to 20% moderate  HER2 negative, 0  Ki-67 35%    Pathology of all the 3 sites appear similar and findings could represent left breast lobular carcinoma metastatic to the right as well as left axillary lymph nodes.    6/14/2024-CT of the chest abdomen and pelvis  1.large ill-defined infiltrative central left breast mass measuring 6.7 x 4 cm, medially there is an irregular 2.5 x 2.5 cm mass.  Significant thickening of the skin in the left breast and there is left axilla lymphadenopathy.  Left axilla lymph node measures 1.3 x 1.3 cm.  There is also a new enlarged right axillary lymph node measuring 1.3 x 1 cm.  All of the subcentimeter right axillary lymph nodes are slightly larger than previous.  2.no mediastinal hilar or internal mammary chain lymphadenopathy  3.new indeterminate mixed density measuring 1.3 x 1.2 cm right apical pulmonary nodule.  Follow-up recommended.  4.pleural parenchymal thickening and nodules inferiorly and posterior to the right upper lobe are stable.  Chronic scarring and subsegmental atelectatic change in both lower lobes.    CT abdomen pelvis  1.moderate fatty infiltration of the liver.  No suspicious liver lesions.  2.moderate-sized paraesophageal hernia.  No acute bowel abnormality.  3.right sacral stimulator noted at the S3 neuroforamina.  No suspicious bone  lesions are noted.    6/14/2024-bone scan  1.focal abnormal uptake in the left anterior inferior iliac spine correlating with lucent lesion on the CT concerning for metastatic disease.  Further evaluation with MRI of the pelvis and left hip could be performed.  2.focal uptake in the left frontal calvarium again concerning for metastatic disease.  Noncontrast CT or MRI could be obtained.  3.soft tissue uptake noted in the left breast at the site of known left breast cancer.  4.changes from arthroplasty on the right shoulder.  5.multifocal likely degenerative uptake in each knee, ankle and foot and increased uptake in the medial and patellofemoral cortex of the right knee could be posttraumatic.      Invitae 9 gene stat panel negative.    MRI of the brain which showed T2 hyperintensity involving the frontal bone measuring 1.6 cm in size and a smaller area of enhancement in the frontal bone laterally and inferiorly concerning for metastasis.  No brain mets    MRI of the hip and pelvis showed multiple enhancing bone lesions consistent with metastatic disease to the sacrum and pelvis.  Dominant lesion in the anterior inferior left pelvic spine and rectus femoris muscle origin that correlates with the site of bone scan uptake.  She has some right joint hip joint effusion.  Her CA 15-3 16.2    She was seen by Dr. Saavedra on 7/16/2024 and recommended to start on Ribociclib along with anastrozole.    Started Ribociclib in the first week of August 2024    Had profound nausea and vomiting requiring antiemetics.  Completed 3 weeks of Ribociclib on 8/30/2024.    Hospitalized from 9/11/2024 to 9/16/2024 for pneumonia and KINZA and since then Ribociclib has been on hold    Readmitted to the hospital from 10/17/2024 to 10/23/2024 requiring holding Ribociclib.  She was admitted for UTI with sepsis.    10/29/2024-CT of the chest which was compared to the CT chest from 6/14/2024-stable 1.3 cm subsolid nodule in the right lung apex.   Decrease in size of the left axillary lymph nodes.  Ill-defined left breast mass appears unchanged.  Expansile lytic and sclerotic lesion in the posterior left 10th rib which appears increased compared to the prior CT.  Stable subtle area of sclerosis in the left anterior third rib.    10/3/2024-MRI of the cervical spine-rounded area of marrow replacement in C7 suspicious for metastatic disease.    10/3/2024-MRI of the thoracic spine-suspicious patchy areas of marrow replacement in the thoracic spine at T5, T6, T7, T11, T12, L1 suspicious for metastatic disease.    Resumed Ribociclib in November 2024 and has been on it continuously up until February 2025 2/18/2025-CT of the chest abdomen and pelvis  13 mm right apical lesion unchanged micronodules in the right upper lobe micronodules in the left upper lobe  Left 11th rib metastasis unchanged with subtle increase sclerosis  Several hypodense lesions in the liver with the most prominent measuring 16 mm consistent with metastatic deposits.  Not present on scans from September and October 2024.  Bladder is collapsed with Cobos catheter.  Fecal impaction  Small sclerotic metastasis throughout the lumbar spine pelvis, some of which are new.  Largest lesion being in the inferior iliac spine which has become more sclerotic.    Bone scan 2/18/2025-widespread osseous metastatic disease vast majority of which are new/newly appreciable from the prior bone scan.    3/17/2025-initiated Trucap    3/20/2025-patient called complaining of abdominal cramping and loose stools.  She was recommended to start Bentyl and use Imodium for diarrhea.    3/21/2025-patient continues on Faslodex and Truqap.      5/12/2025-CT of the chest-stable 1.7 x 1.5 cm nodular opacity in the right apex.  Stable reticular nodular opacity inferiorly at the right upper lobe.  Bandlike scarring atelectasis at the posterior right lung base.  New pleural thickening of the superior aspect of the left major  fissure and new faint reticular and groundglass opacities in the left upper lobe.  Follow-up CT in 3 months recommended.  New tiny left pleural effusion and new minimal right pleural effusion.  Extensive sclerotic bony metastasis without change.    5/12/2025-bone scan very similar skin to graphic findings in keeping with widespread osseous metastatic disease.      4/21/2025-CT of the abdomen-no evidence of metastatic disease.    On 6/20/2025 for worsening erythema of the left breast.  This has been treated with antibiotics including amoxicillin and doxycycline.  Since the antibiotics the erythema has improved some but not all the way resolved.  Due to this we proceeded with a bilateral diagnostic mammogram and bilateral ultrasound performed on 6/24/2025 which showed an increase in size of the 2:00 malignancy.  New enlarged 1 cm right axillary lymph node was seen.  Ill-defined hypoechogenicity and shadowing throughout the right breast without a discrete measurable mass concerning for possible infiltrating malignancy.  Skin thickening throughout the left breast which could be cellulitis or underlying malignancy.    Punch biopsy was performed by her dermatologist and pathology was consistent with pleomorphic invasive lobular carcinoma which is ER negative, SD negative and HER2 score 0.    6/24/2025-bone scan with stable osseous metastatic disease.      6/24/2025-CT of the chest abdomen and pelvis- 10-15 scattered hepatic lesions likely represent metastatic disease with index lesions which have increased in size over multiple prior CTs.  Extensive osseous metastatic disease, mixed lytic and blastic lesion in the left posterior 10th rib has a more lytic appearance compared to February 2025.  Right lower lobe pulmonary nodular opacification is new compared February 2025 but grossly unchanged from May 2025.    Interval history  Maritza returns today for follow-up.  She reports that the erythema of the left breast has not  changed significantly however the pain is somewhat worse and she is currently using Norco as needed for pain.  She does not have to use much of Norco.  Denies any other new complaints at this time.      The following portions of the patient's history were reviewed and updated as appropriate: allergies, current medications, past family history, past medical history, past social history, past surgical history, and problem list.    Past Medical History:   Diagnosis Date    Anemia 2024    `Treated with iron    Dawn esophagus     per patient    Blurred vision     R/T MS    Breast cancer     metastatic    Carpal tunnel syndrome     Clotting disorder 1993, 2003, 2012    3 g/i bleeds w/ transfus/ions    Colon polyp 2013    removed w/ colonoscopy    Deep vein thrombosis phlebitis 1980    Depression     Diplopia 2013    GERD (gastroesophageal reflux disease)     GI (gastrointestinal bleed) 3 bleeds    2 transfusions    H/O Skin cancer, basal cell     Headache     History of blood transfusion     History of GI bleed     R/T NSAIDS AND STEROIDS, multiple times    History of urinary tract infection     Hypercalcemia     s/p parathyroidectomy    Hyperlipidemia     Hypertension     Movement disorder     Multiple sclerosis     Optic neuritis     PONV (postoperative nausea and vomiting)     Steroid-induced diabetes 2024        Past Surgical History:   Procedure Laterality Date    APPENDECTOMY      BLADDER SURGERY      bladder stimulator    BREAST BIOPSY  don't remember    BREAST SURGERY      augmentation wtih subsequent removal    CARPAL TUNNEL RELEASE Bilateral     Left 2018, right 2020    CUBITAL TUNNEL RELEASE Left     CYSTOSCOPY BOTOX INJECTION OF BLADDER  2018    Cystoscopy with Botox    FRACTURE SURGERY  2019    rt shoulder    PARATHYROIDECTOMY      one gland removed    ROTATOR CUFF REPAIR Right 2017    TOE SURGERY      bilateral great toes    TOTAL SHOULDER ARTHROPLASTY W/ DISTAL CLAVICLE EXCISION Right 10/22/2018     "Procedure: RT TOTAL SHOULDER REVERSE ARTHROPLASTY;  Surgeon: Bipin Dangelo MD;  Location: [facility];  Service: Orthopedics        Family History   Problem Relation Age of Onset    Hypertension Mother     Hyperlipidemia Mother     Aortic aneurysm Mother         thoracic    Diabetes Mother     Hypertension Father         charissa heart failure    Heart failure Father     Hyperlipidemia Father     Miscarriages / Stillbirths Sister     Multiple sclerosis Brother     Atrial fibrillation Brother     Diabetes Maternal Grandmother     Diabetes Maternal Grandfather     Stroke Maternal Grandfather     Parkinsonism Paternal Grandmother     Tremor Paternal Grandmother     Stomach cancer Paternal Grandfather         Social History     Socioeconomic History    Marital status:      Spouse name: Donald    Number of children: 0   Tobacco Use    Smoking status: Former     Current packs/day: 0.00     Average packs/day: 2.0 packs/day for 30.0 years (60.0 ttl pk-yrs)     Types: Cigarettes     Start date: 10/22/1973     Quit date: 10/22/2003     Years since quittin.7    Smokeless tobacco: Never   Vaping Use    Vaping status: Never Used   Substance and Sexual Activity    Alcohol use: Not Currently    Drug use: Yes     Types: Marijuana     Comment: rarely    Sexual activity: Not Currently     Partners: Male     Birth control/protection: Post-menopausal        OB History    No obstetric history on file.     Age at menarche-15  Age at first live childbirth-not applicable   2 para 0  0  Age of menopause-57  No use of hormone replacement therapy      Allergies   Allergen Reactions    Klonopin [Clonazepam] Mental Status Change, Confusion and Hallucinations        Review of systems as mentioned HPI otherwise negative    Objective   Blood pressure 153/82, pulse 86, temperature 98.8 °F (37.1 °C), temperature source Oral, height 165.1 cm (65\"), weight 81.6 kg (180 lb), SpO2 97%, not currently breastfeeding.     Physical " Exam  Vitals reviewed.   Constitutional:       Appearance: Normal appearance. She is normal weight.   HENT:      Right Ear: External ear normal.      Left Ear: External ear normal.      Nose: Nose normal.      Mouth/Throat:      Pharynx: Oropharynx is clear.   Eyes:      Conjunctiva/sclera: Conjunctivae normal.   Cardiovascular:      Rate and Rhythm: Normal rate.   Pulmonary:      Effort: Pulmonary effort is normal.   Abdominal:      General: Abdomen is flat.   Musculoskeletal:         General: Normal range of motion.      Cervical back: Normal range of motion.   Skin:     General: Skin is warm.   Neurological:      Mental Status: She is alert.      Comments: Non weight bearing, wheelchair bound.    Psychiatric:         Mood and Affect: Mood normal.         Behavior: Behavior normal.         Thought Content: Thought content normal.         Judgment: Judgment normal.     Breast Exam:Not examined today, 4/4/2025 Right breast appears normal on inspection.  No palpable right axilla lymphadenopathy or right breast masses.  Right nipple inverted.  Left breast on inspection there is nipple retraction crusting over the nipple region.  On palpation there is a 8 x 6 cm mass (improved) in the retroareolar region occupying much of the breast.  Palpable left axillary adenopathy as well.      I have reexamined the patient and the results are consistent with the previously documented exam. Zainab Lopes MD      Results from last 7 days   Lab Units 07/07/25  0359 07/06/25  0443 07/05/25  0504   WBC 10*3/mm3 6.16 7.95 6.80   NEUTROS ABS 10*3/mm3 3.55 5.00 5.51   LYMPHS ABS 10*3/mm3  --   --  0.82   HEMOGLOBIN g/dL 9.9* 9.8* 9.3*   HEMATOCRIT % 32.5* 32.7* 31.1*   PLATELETS 10*3/mm3 460* 438 498*     Results from last 7 days   Lab Units 07/07/25  0359 07/06/25  0443 07/05/25  1631 07/05/25  0742   SODIUM mmol/L 138 140  --  139   POTASSIUM mmol/L 4.4 4.7 4.5 4.4   CHLORIDE mmol/L 103 108*  --  109*   CO2 mmol/L 23.8 22.2  --   19.6*   BUN mg/dL 18.0 15.0  --  17.0   CREATININE mg/dL 0.58 0.62  --  0.71   CALCIUM mg/dL 9.2 9.6  --  8.2*   ALBUMIN g/dL 3.5 3.4*  --  3.2*   BILIRUBIN mg/dL <0.2 <0.2  --  <0.2   ALK PHOS U/L 184* 170*  --  156*   ALT (SGPT) U/L 27 22  --  19   AST (SGOT) U/L 71* 60*  --  66*   GLUCOSE mg/dL 159* 160*  --  96   MAGNESIUM mg/dL  --   --  2.5*  --              6/24/2025 CT of the chest abdomen and pelvis   Impression:  1.  At least 10-15 scattered hepatic lesions likely represent metastatic  disease with index lesions which have increased in size over multiple  prior CTs.  2.  Presumed extensive osseous metastatic disease. A mixed lytic and  blastic osseous lesion within the left posterior 10th rib has a more  permeative/lytic appearance compared with 2/18/2025.  3.  Right lower lobe nodular pulmonary opacification is new since  2/18/2025 and grossly unchanged since 5/12/2025 while findings may  represent pneumonia neoplastic disease cannot be excluded. The subsolid  nodule within the right apex appears to gradually increase in size of  multiple prior CTs and is concerning for metastatic disease and/or  slow-growing neoplasm.  4.  Other findings as above     Assessment & Plan       *Left breast invasive lobular carcinoma  The tumor is estrogen receptor +91 to 100%, progesterone receptor +31 to 40%, HER2 negative, 0, Ki-67 35%  The tumor measures greater than 10 cm on exam, lymph node positive.  CT of the chest abdomen and pelvis with right upper lobe pulmonary nodule which is new and the bone scan also shows uptake in the left anterior inferior iliac spine which correlates to a lucent area on the CT scan and also focal uptake noted in the left frontal calvarium concerning for metastatic disease.  Further evaluation with a MRI of the pelvis and hip as well as MRI of the brain is underway.  The scans are scheduled for 7/10/2024.  Clinical T3 N1 M1, stage IV invasive lobular carcinoma.  There is also a right  axillary lymph node which has been biopsied and consistent with invasive lobular carcinoma with similar morphology as well as receptors concerning for metastatic disease rather than a primary right breast cancer.  Recommended Ribociclib 400 mg 3 weeks on and 1 week off.  July 16, 2024: Reviewed MRI of the pelvis and hip consistent with many bony metastasis.  MRI brain shows left frontal bone mets but no brain involvement.  Patient is here to start day 1 ribociclib along with anastrozole.  Continues on anastrozole.  Tempus performed on the left axilla lymph node biopsy shows PIK3CA mutation, CDH 1 mutation and Erb B2 mutation.  Therefore patient would benefit from alpelisib and Faslodex after progression on Ribociclib and AI.  Other options include neratinib plus Faslodex.  Initiated Ribociclib in August 2024.  8/30/2024-last day of week 3 of Ribociclib.    Patient completed 1 cycle of Ribociclib 400 mg and subsequently has not been able to go back on Ribociclib due to recurrent hospitalizations and abnormal LFTs.  10/14/2024-LFTs are normal today.  Recommend resuming Ribociclib at 200 mg and subsequently we will increase the dose to 400 mg with the next cycle.  Patient was unable to resume Ribociclib as she was admitted to the hospital from 10/17/2024 to 10/23/2024  11/11/2024-Labs reviewed and overall stable except for an ALT of 49.  Recommend resuming Ribociclib at 200 mg.  CT of the chest performed 10/29/2024 shows stable size of the left breast mass, decrease in size of the left axillary lymph node and mixed lytic and blastic lesion of the rib slightly increased in size.  11/11/2024-resumed Ribociclib 200 mg daily for 3/4 weeks.  2/18/2025-scans with disease progression in the bones in the liver.  Discontinue Ribociclib and anastrozole  2/28/2025-received the first dose of Faslodex.  3/17/2025-started truqap  3/21/2025- CBC reviewed and WBC 4.81, hemoglobin 13.9 platelets 595,000, CMP reviewed and LFTs have  improved with ALT which is normal at 33, AST 71, alkaline phosphatase 185, total bilirubin normal at 0.2, blood sugar elevated at 409  Completed 1 week of trucap, resume again on 3/24/2025  4/4/2025 due for cycle 2-day 1 Faslodex.  She is struggling with intermittent hyperglycemia related to Truqap which is improved with glipizide  5/12/2025-CT of the chest and bone scan with overall stable disease.  Prior to that she had a CT abdomen end of April 2025 which also shows no evidence of metastatic disease.  Patient has not taken a whole lot of trucap, maybe less than 2 weeks total since initiation in March 2025.  Bilateral diagnostic mammogram and ultrasound from 6/24/2025 concerning for disease progression  CT of the chest abdomen and pelvis from 6/24/2025 also concerning for disease progression  I reviewed her previous pathology report and HER2 is noted to be 0.  She had a repeat punch biopsy of the left breast skin.  Will follow-up on the pathology from that.  If HER2 is 1+ or even ultralow we should be able to use Enhertu.  She truly did not have disease progression on Truqap however she was hospitalized back-to-back for the few days that she took 2 Due to recurrent UTIs.  She also currently has an ongoing UTI.  I worry that any type of PIK 3 CA directed therapy will result in UTIs, hyperglycemia and diarrhea which may not be ideal for her situation with the multiple sclerosis and inability to transfer as well as bladder dysfunction  Therefore the next best option would be chemotherapy which would be either Enhertu, Trodelvy or taxane.  I called and discussed with Dr. Jacqueline Daniels to determine the HER2 and she looked at the original biopsy again and this was negative.  Punch biopsy from the left breast noted to be ER negative, [place] negative and HER2 nu 0.  Invasive pleomorphic lobular carcinoma.  Given that the receptor status has changed, we would have to treat the metastatic breast cancer which is progressing  with chemotherapy which would be either Xeloda or Trodelvy as HER2/dorothy was 0 Enhertu is not an option.  I reviewed both of these options with the patient and the side effects of both these medications at length and she prefers to proceed with Trodelvy.  Will also obtain a PD-L1 status to see if immunotherapy is an option.  Started Trodelvy at 7.5 mg/kg, q. 14 days as I am really concerned about her ability to tolerate.  We will also plan for Neulasta upfront.  Although tumor markers were not elevated originally now with elevated tumor markers with a CA 15-3 of 296 on 6/25/2025 and CA 20 7.29 of 409 on 6/25/2025.  Will plan to monitor these closely.    *Severe hyperglycemia  Blood sugar greater than 400  Administer 5 units of insulin  Start glipizide 2.5 mg  Remain glucometer and diabetic education.  Continue glipizide 2.5 mg daily, blood glucose 142 today.  The patient understands to continue truqap if AM fasting blood glucose is less than 250.   5/9/2025-blood sugar 128    *Abdominal cramping and diarrhea  Secondary to capivasertib.  Resolved    *Right axillary lymphadenopathy  Biopsied and consistent with invasive lobular carcinoma, grade 2, ER/SD positive and HER2 negative  Recent bilateral diagnostic mammogram and ultrasound from 6/24/2025 shows disease progression    *Right breast non-mass enhancement  6/28/2024-MRI of the breast with non-mass enhancement noted in the right breast and patient had questions regarding if this should be biopsied.  Given extensive metastatic disease in the bones and right axilla lymph node consistent with invasive lobular carcinoma management would not change with the biopsy and hence I would recommend against it.    *Severe nausea  Continue Compazine and Zofran as needed  Well-controlled at this time    *Skeletal metastasis  Patient will be started on Xgeva  8/16/2024 Xgeva initiation will be held as the patient has not been to the dentist in over 2 years.  Discussed possible side  effects of Xgeva and she will schedule a dental visit and we will have her back in 1 week to initiate Xgeva pending this is complete.  10/14/2024-second dose of Xgeva will be administered.  Labs reviewed and stable to proceed.  Continue Xgeva every 4 weeks    *Multiple sclerosis  Patient was not on any treatment previously.  She sees Dr. Jacobson at [facility].  Due to profound weakness patient requested her neurologist to start steroids.  They plan to initiate high-dose IV steroids to help with this.  High-dose IV steroids have not been initiated.  Doing physical therapy and received high-dose steroids  Stable    *Hypertension-continue current medication  Blood pressure BP: 153/82   Continue to monitor  No changes in medications    *Hyperlipidemia-continue current medication  No changes    *Urinary incontinence-patient had  a suprapubic catheter placed by urology.  Patient with recurrent UTIs  UTIs are a side effect of truqap  Discussed with her primary care physician regarding suppressive therapy  Patient does not wish to go back on Truqap at this time    *History of iron deficiency anemia-  3/8/2024 hemoglobin was low at 10.9 and subsequently patient took oral iron which resulted in improvement of hemoglobin and normal.  She was scheduled for colonoscopy however she canceled that due to ongoing management of breast cancer.  She proceed with a colonoscopy.  8/16/2024 hemoglobin is 10.2.  She states she recently was told to begin daily iron supplementation.  Baseline iron studies ordered today she is only been taking the iron for a few days.  Ferritin 32, iron saturation 5%, TIBC 440.  We will repeat this in 6 to 8 weeks.  12/10/2024 Hgb 12.0. Continue oral iron  Hemoglobin normal at 12.6 on 2/28/2025 7/7/2025-hemoglobin 9.9    *Genetic testing-9 gene stat panel negative    *Dyspnea  Stable to improved    *Right upper lobe pulmonary nodule   Concerning for metastatic disease  PET/CT may not be helpful as  invasive lobular carcinomas typically are not very PET avid.  Could consider PET-FES  Stable on the most recent CT of the chest    *Follow-up-after the MRIs.  Reviewed MRI of the pelvis and MRI of the brain which shows mets in the bone  MRI of the cervical and thoracic spine performed at outside facility on 10/3/2024    *Abnormal LFTs  Likely secondary to Ribociclib  Ribociclib dose increased to 400 mg daily on 12/10/2024  12/31/2024-Labs reviewed and stable.  1/13/2025: LFTs further increased with AST 72, ALT 22.  Decision made to hold off on starting next cycle of ribociclib.  1/31/2025-AST 72, ALT normal, total bilirubin normal  Liver function studies minimally elevated today, 4/4/2025 with AST 73, ALT 52, total bilirubin is normal at 0.4  Most recent LFTs from May 2025 reviewed and stable  7/7/2025-LFTs stable    *Insomnia  Severe  Using trazodone and Ambien.  Neither of them helping  Only clonazepam helps  Referral to supportive oncology    *Alternate medication  Patient sees a outside provider and receives ivermectin and fenbendazole  We have ran an interaction check with the medications and does not appear to be any interaction however I want her against taking these medications and potential side effects  Patient however wishes to proceed with his medications.  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  Discontinue Faslodex   Enhertu is not an option as HER2/dorothy is 0.  APRN cycle 1 day 8 of Trodelvy.  Obtain PD-L1 testing  Obtain NGS on the punch biopsy or liquid biopsy.  Referral to Pattie Devries for severe anxiety given disease progression    This patient is on high risk drug therapy requiring intensive monitoring for toxicity.  76 minutes total spent on the encounter on the same day    Zainab Lopes MD   07/10/2025

## 2025-07-11 ENCOUNTER — EXTERNAL PBMM DATA (OUTPATIENT)
Dept: PHARMACY | Facility: OTHER | Age: 65
End: 2025-07-11
Payer: MEDICARE

## 2025-07-14 ENCOUNTER — OFFICE VISIT (OUTPATIENT)
Dept: ONCOLOGY | Facility: CLINIC | Age: 65
End: 2025-07-14
Payer: MEDICARE

## 2025-07-14 VITALS
TEMPERATURE: 97.5 F | DIASTOLIC BLOOD PRESSURE: 77 MMHG | BODY MASS INDEX: 29.99 KG/M2 | OXYGEN SATURATION: 93 % | WEIGHT: 180 LBS | HEART RATE: 92 BPM | SYSTOLIC BLOOD PRESSURE: 135 MMHG | HEIGHT: 65 IN

## 2025-07-14 DIAGNOSIS — Z17.0 MALIGNANT NEOPLASM OF OVERLAPPING SITES OF LEFT BREAST IN FEMALE, ESTROGEN RECEPTOR POSITIVE: Primary | ICD-10-CM

## 2025-07-14 DIAGNOSIS — C50.912: ICD-10-CM

## 2025-07-14 DIAGNOSIS — C50.812 MALIGNANT NEOPLASM OF OVERLAPPING SITES OF LEFT BREAST IN FEMALE, ESTROGEN RECEPTOR POSITIVE: Primary | ICD-10-CM

## 2025-07-14 PROCEDURE — 3075F SYST BP GE 130 - 139MM HG: CPT

## 2025-07-14 PROCEDURE — 3078F DIAST BP <80 MM HG: CPT

## 2025-07-14 PROCEDURE — 1126F AMNT PAIN NOTED NONE PRSNT: CPT

## 2025-07-14 PROCEDURE — 99215 OFFICE O/P EST HI 40 MIN: CPT

## 2025-07-14 RX ORDER — OLANZAPINE 5 MG/1
5 TABLET, FILM COATED ORAL NIGHTLY
Qty: 8 TABLET | Refills: 5 | Status: SHIPPED | OUTPATIENT
Start: 2025-07-14

## 2025-07-14 RX ORDER — CICLOPIROX 1 G/100ML
SHAMPOO TOPICAL
COMMUNITY
Start: 2025-06-19

## 2025-07-14 RX ORDER — ONDANSETRON 8 MG/1
8 TABLET, FILM COATED ORAL 3 TIMES DAILY PRN
Qty: 30 TABLET | Refills: 5 | Status: SHIPPED | OUTPATIENT
Start: 2025-07-14 | End: 2025-07-14

## 2025-07-14 RX ORDER — LIDOCAINE AND PRILOCAINE 25; 25 MG/G; MG/G
1 CREAM TOPICAL AS NEEDED
Qty: 30 G | Refills: 3 | Status: SHIPPED | OUTPATIENT
Start: 2025-07-14

## 2025-07-14 NOTE — PAYOR COMM NOTE
"Maritza Purdy (64 y.o. Female)     ATTN: NURSE REVIEWER  RE: DISCHARGE SUMMARY  REF: [id number]  PLS REPLY TO ROSE MARY ANAYA [phone] OR FAX# [phone]      Date of Birth   1960    Social Security Number       Address   [address] [facility] [address]    Home Phone   [phone]    MRN   [id number]       Congregational   [employer]    Marital Status                               Admission Date   2025    Admission Type   Emergency    Admitting Provider   Gisella Britt MD    Attending Provider       Department, Room/Bed   [address], [address]/1       Discharge Date   2025    Discharge Disposition   Home or Self Care    Discharge Destination                                 Attending Provider: (none)   Allergies: Klonopin [Clonazepam]    Isolation: None   Infection: None   Code Status: Prior    Ht: 165.1 cm (65\")   Wt: 78.9 kg (174 lb)    Admission Cmt: None   Principal Problem: UTI (urinary tract infection) due to urinary indwelling catheter [T83.511A,N39.0]                   Active Insurance as of 2025       Primary Coverage       Payor Plan Insurance Group Employer/Plan Group    ANTHEM MEDICARE REPLACEMENT ANTH MEDICARE ADVANTAGE HMO [id number]       Payor Plan Address Payor Plan Phone Number Payor Plan Fax Number Effective Dates    [address] [phone]  2025 - None Entered    [facility] [id number]         Subscriber Name Subscriber Birth Date Member ID       MARITZA PURDY 1960 [id number]                     Emergency Contacts        (Rel.) Home Phone Work Phone Mobile Phone    Donald Purdy (Spouse) [phone] -- [phone]    Shana Klein (HCS) (Sister) -- -- [phone]    Maru Rodriguez (Sister) -- -- [phone]                 Discharge Summary        Sisi Bella MD at 25 0647              Patient Name: Maritza Purdy  : 1960  MRN: [id number]    Date of Admission: 2025  Date of " Discharge:  7/7/2025  Primary Care Physician: Enoc Carbone DO      Chief Complaint:   Weakness - Generalized and Fever      Discharge Diagnoses     Active Hospital Problems    Diagnosis  POA    **UTI (urinary tract infection) due to urinary indwelling catheter [T83.511A, N39.0]  Yes    UTI (urinary tract infection) [N39.0]  Yes    Anemia [D64.9]  Yes    Type 2 diabetes mellitus [E11.9]  Yes    Elevated LFTs [R79.89]  Yes    Weakness [R53.1]  Yes    Malignant neoplasm of overlapping sites of left breast in female, estrogen receptor positive [C50.812, Z17.0]  Not Applicable    Essential hypertension [I10]  Yes    Restless legs syndrome [G25.81]  Yes      Resolved Hospital Problems   No resolved problems to display.        Hospital Course     Ms. Bejarano is a 64 y.o. female with a history of MS, neurogenic bladder with chronic indwelling Cobos, type 2 diabetes, breast cancer who presented to [facility] initially complaining of fever and generalized weakness.  Please see the admitting history and physical for further details.  She was admitted to the hospital for further evaluation and treatment.  Patient was found to have UA consistent with UTI on admission, she was started on empiric antibiotics while culture data was pending.  Ultimately her urine culture grew Klebsiella and Enterococcus faecalis.  Infectious disease was consulted for antibiotic recommendations.  Given that she was improving on an antibiotic that would not cover Enterococcus faecalis per ID patient's UTI likely related to Klebsiella and Enterococcus faecalis was only a colonizing bacteria.  She completed a course of antibiotics per infectious these recommendations for Klebsiella UTI.  While admitted she did have some bradycardia, cardiology was consulted, her metoprolol was reduced and she underwent echo which showed preserved ejection fraction.  No further bradycardia noted after reduction of her metoprolol.  She was also seen by urology  who recommended patient continue to follow-up with them in clinic in about a month after Cobos catheter exchange.  The patient is stable for discharge, she should follow-up with her PCP in a week for posthospital follow-up visit as well.    Day of Discharge     Subjective:  Resting in bed, she is having some nausea this morning requesting oral Compazine but overall feels well enough to go home.    Physical Exam:  Temp:  [97.7 °F (36.5 °C)-98.4 °F (36.9 °C)] 97.7 °F (36.5 °C)  Heart Rate:  [71-86] 79  Resp:  [16-20] 18  BP: (119-158)/(65-80) 119/70  Body mass index is 28.96 kg/m².  Physical Exam  Vitals and nursing note reviewed.   Constitutional:       General: She is not in acute distress.  Cardiovascular:      Rate and Rhythm: Normal rate.   Pulmonary:      Effort: Pulmonary effort is normal. No respiratory distress.      Breath sounds: No wheezing.   Abdominal:      General: Bowel sounds are normal. There is no distension.      Palpations: Abdomen is soft.      Tenderness: There is no abdominal tenderness.   Musculoskeletal:      Right lower leg: No edema.      Left lower leg: No edema.   Skin:     General: Skin is warm and dry.   Neurological:      Mental Status: She is alert.         Consultants     Consult Orders (all) (From admission, onward)       Start     Ordered    07/05/25 1536  Inpatient Cardiology Consult  Once        Specialty:  Cardiology  Provider:  Boy Curiel MD    07/05/25 1536    07/05/25 1244  Inpatient Infectious Diseases Consult  Once        Specialty:  Infectious Diseases  Provider:  Luisa Child MD    07/05/25 1244    07/04/25 1517  Inpatient Urology Consult  Once        Specialty:  Urology  Provider:  Weston Olson Jr., MD    07/04/25 1517    07/03/25 1407  Inpatient Advance Care Planning Consult  Once        Comments: Wants to complete a new Living Will   Provider:  (Not yet assigned)    07/03/25 1407    07/02/25 1601  [facility] (on-call MD unless specified) Details  Once         Specialty:  Hospitalist  Provider:  (Not yet assigned)    07/02/25 1600                  Procedures     Imaging Results (All)       Procedure Component Value Units Date/Time    XR Chest 1 View [[id number]] Collected: 07/02/25 1436     Updated: 07/02/25 1443    Narrative:      XR CHEST 1 VW-     HISTORY: Female who is 64 years-old, fever     TECHNIQUE: Frontal view of the chest     COMPARISON: 5/9/2025     FINDINGS: The heart size is borderline. Pulmonary vasculature is  unremarkable. Small likely atelectasis or scarring left lower lung.  Previous CT-demonstrated right lower lobe pulmonary nodular density is  not well visualized on this exam, CT follow-up recommended for direct  comparison with previous CT exam. No large pleural effusion, or  pneumothorax. No acute osseous process.       Impression:      As described.     This report was finalized on 7/2/2025 2:39 PM by Dr. Luan Estrada M.D on Workstation: [id number]               Pertinent Labs     Results from last 7 days   Lab Units 07/07/25  0359 07/06/25  0443 07/05/25  0504 07/04/25  0330   WBC 10*3/mm3 6.16 7.95 6.80 4.99   HEMOGLOBIN g/dL 9.9* 9.8* 9.3* 9.2*   PLATELETS 10*3/mm3 460* 438 498* 426     Results from last 7 days   Lab Units 07/07/25  0359 07/06/25  0443 07/05/25  1631 07/05/25  0742 07/04/25  0330   SODIUM mmol/L 138 140  --  139 140   POTASSIUM mmol/L 4.4 4.7 4.5 4.4 4.0   CHLORIDE mmol/L 103 108*  --  109* 111*   CO2 mmol/L 23.8 22.2  --  19.6* 19.7*   BUN mg/dL 18.0 15.0  --  17.0 13.0   CREATININE mg/dL 0.58 0.62  --  0.71 0.52*   GLUCOSE mg/dL 159* 160*  --  96 86   Estimated Creatinine Clearance: 104.5 mL/min (by C-G formula based on SCr of 0.58 mg/dL).  Results from last 7 days   Lab Units 07/07/25  0359 07/06/25  0443 07/05/25  0742 07/04/25  0330   ALBUMIN g/dL 3.5 3.4* 3.2* 3.4*   BILIRUBIN mg/dL <0.2 <0.2 <0.2 <0.2   ALK PHOS U/L 184* 170* 156* 165*   AST (SGOT) U/L 71* 60* 66* 66*   ALT (SGPT) U/L 27 22 19 19     Results from  "last 7 days   Lab Units 07/07/25  0359 07/06/25  0443 07/05/25  1631 07/05/25  0742 07/04/25  0330 07/03/25  0538 07/02/25  1419   CALCIUM mg/dL 9.2 9.6  --  8.2* 8.1*   < > 8.5*   ALBUMIN g/dL 3.5 3.4*  --  3.2* 3.4*  --  3.7   MAGNESIUM mg/dL  --   --  2.5*  --   --   --  2.4   PHOSPHORUS mg/dL  --   --   --   --   --   --  2.8    < > = values in this interval not displayed.       Results from last 7 days   Lab Units 07/05/25  1723 07/05/25  1631   HSTROP T ng/L 25* 20*           Invalid input(s): \"LDLCALC\"  Results from last 7 days   Lab Units 07/03/25  1002 07/03/25  1001 07/02/25  1424   BLOODCX  No growth at 4 days No growth at 4 days  --    URINECX   --   --  >100,000 CFU/mL Klebsiella pneumoniae ssp pneumoniae*  >100,000 CFU/mL Enterococcus faecalis*       Test Results Pending at Discharge     Pending Labs       Order Current Status    Blood Culture - Blood, Hand, Left Preliminary result    Blood Culture - Blood, Hand, Right Preliminary result            Discharge Details        Discharge Medications        Changes to Medications        Instructions Start Date   metoprolol succinate XL 25 MG 24 hr tablet  Commonly known as: TOPROL-XL  What changed:   medication strength  how much to take   25 mg, Oral, Every 24 Hours Scheduled   Start Date: July 8, 2025            Continue These Medications        Instructions Start Date   albuterol sulfate  (90 Base) MCG/ACT inhaler  Commonly known as: PROVENTIL HFA;VENTOLIN HFA;PROAIR HFA   2 puffs, Inhalation, Every 4 Hours PRN      anastrozole 1 MG tablet  Commonly known as: ARIMIDEX   1 tablet, Daily      Aspirin Low Dose 81 MG EC tablet  Generic drug: aspirin   81 mg, Oral, Daily      baclofen 20 MG tablet  Commonly known as: LIORESAL   20 mg, 2 Times Daily      Fenbendazole powder   440 mg, 3 Times Weekly      HYDROcodone-acetaminophen 5-325 MG per tablet  Commonly known as: NORCO   1 tablet, Oral, Every 4 Hours PRN      IVERMECTIN PO   15 mg, 5 Times Weekly   "    pantoprazole 40 MG EC tablet  Commonly known as: PROTONIX   40 mg, Oral, 3 Times Weekly      phenazopyridine 200 MG tablet  Commonly known as: PYRIDIUM   200 mg, Oral, 3 Times Daily PRN      pramipexole 1.5 MG tablet  Commonly known as: MIRAPEX   1.5 mg, 2 Times Daily      prochlorperazine 10 MG tablet  Commonly known as: COMPAZINE   10 mg, Oral, Every 6 Hours PRN      rosuvastatin 20 MG tablet  Commonly known as: CRESTOR   20 mg, Oral, Daily      sennosides-docusate 8.6-50 MG per tablet  Commonly known as: PERICOLACE   1 tablet, Oral, Daily      vitamin D3 125 MCG (5000 UT) tablet tablet   5,000 Units, Daily               Allergies   Allergen Reactions    Klonopin [Clonazepam] Mental Status Change, Confusion and Hallucinations         Discharge Disposition:  Home or Self Care    Discharge Diet:  Diet Order   Procedures    Diet: Regular/House; Fluid Consistency: Thin (IDDSI 0)       Discharge Activity:   Activity Instructions       Activity as Tolerated              CODE STATUS:    Code Status and Medical Interventions: CPR (Attempt to Resuscitate); Full   Ordered at: 07/02/25 1711     Code Status (Patient has no pulse and is not breathing):    CPR (Attempt to Resuscitate)     Medical Interventions (Patient has pulse or is breathing):    Full       Future Appointments   Date Time Provider Department Center   7/10/2025  8:00 AM Enoc Carbone DO [facility]   7/18/2025  1:45 PM INJECTION CHAIR [facility]   7/18/2025  2:00 PM Liz Farmer APRN [facility]   7/18/2025  2:20 PM LAB CHAIR 6 [facility] [place]   8/21/2025  2:00 PM Enoc Carbone DO [facility]   8/22/2025  2:10 PM LAB CHAIR 3 [facility] [place]   8/22/2025  2:40 PM Zainab Lopes MD [facility] [place]   8/22/2025  3:30 PM INJECTION CHAIR [facility]     Additional Instructions for the Follow-ups that You Need to Schedule       Discharge Follow-up with PCP   As directed        Currently Documented PCP:    Enoc Carbone DO    PCP Phone Number:    [phone]     Follow Up Details: post-hospital follow up               Contact information for follow-up providers       Enoc Carbone DO .    Specialty: Internal Medicine  Why: post-hospital follow up  Contact information:  [address]  [address]  [facility] [id number] [phone]                       Contact information for after-discharge care       Home Medical Care       [facility] .    Service: Home Health Services  Contact information:  [address]  [place] [address] [phone]                                   Additional Instructions for the Follow-ups that You Need to Schedule       Discharge Follow-up with PCP   As directed       Currently Documented PCP:    Enoc Carbone DO    PCP Phone Number:    [phone]     Follow Up Details: post-hospital follow up            Time Spent on Discharge:  I spent greater than 30 minutes on this discharge activity which included: face-to-face encounter with the patient, reviewing the data in the system, coordination of the care with the nursing staff as well as consultants, documentation, and entering orders.       Sisi Bella MD  [facility]  07/07/25  14:48 EDT                Electronically signed by Sisi Bella MD at 07/07/25 5910

## 2025-07-14 NOTE — PROGRESS NOTES
Patient is undecided about treatment and therefore, was also interested in receiving education materials on Xeloda. Education materials given and side effects explained. We did discuss that Dr. Lopes recommends proceeding with Trodelvy rather than Xeloda. She and her spouse verbalized understanding.

## 2025-07-14 NOTE — PROGRESS NOTES
TREATMENT  PREPARATION    Maritza Bejarano  [id number]  1960    Chief Complaint: Treatment preparation and needs assessment    History of present illness:  Maritza Bejarano is a 64 y.o. year old female who is here today for treatment preparation and needs assessment.  The patient has been diagnosed with   Encounter Diagnosis   Name Primary?    Malignant neoplasm of overlapping sites of left breast in female, estrogen receptor positive Yes    and is scheduled to begin treatment with:     Oncology History:    Oncology/Hematology History   Malignant neoplasm of overlapping sites of left breast in female, estrogen receptor positive   6/28/2024 Initial Diagnosis    Malignant neoplasm of overlapping sites of left breast in female, estrogen receptor positive     7/15/2024 - 1/31/2025 Chemotherapy    OP BREAST Letrozole / Ribociclib     8/30/2024 -  Chemotherapy    OP SUPPORTIVE Denosumab (Xgeva) Q28D     3/7/2025 - 6/20/2025 Chemotherapy    OP BREAST Capivasertib / Fulvestrant     7/17/2025 -  Chemotherapy    OP BREAST Sacituzumab govitecan     Cancer, metastatic to bone   7/31/2024 Initial Diagnosis    Cancer, metastatic to bone     8/30/2024 -  Chemotherapy    OP SUPPORTIVE Denosumab (Xgeva) Q28D     Primary malignant neoplasm of breast, left   7/10/2025 Initial Diagnosis    Primary malignant neoplasm of breast, left     7/17/2025 -  Chemotherapy    OP BREAST Sacituzumab govitecan         The current medication list and allergy list were reviewed and reconciled.     Past Medical History, Past Surgical History, Social History, Family History have been reviewed and are without significant changes except as mentioned.    Physical Exam:  Vitals:    07/14/25 1450   BP: 135/77   Pulse: 92   Temp: 97.5 °F (36.4 °C)   SpO2: 93%     Vitals:    07/14/25 1450   PainSc: 0-No pain     Physical Exam  Constitutional:       Appearance: Normal appearance.   Cardiovascular:      Rate and Rhythm: Normal rate.      Heart sounds: Normal  "heart sounds.       NEEDS ASSESSMENTS  Psychosocial and Barriers to care  The patient has completed a PHQ-9 Depression Screening and the Distress Thermometer (DT) today.  PHQ-9 results show PHQ-2 Total Score:   PHQ-9 Total Score:        The patient scored their distress today as   on a scale of 0-10 with 0 being no distress and 10 being extreme distress. Problems marked by the patient as being an issue for them within the last week include   .      Nutrition  The patient has completed the malnutrition screening today. They scored Malnutrition Screening Tool  Have you recently lost weight without trying?  If yes, how much weight have you lost?: 0--> No  Have you been eating poorly because of a decreased appetite?: 0--> No  MST score: 0   with a score of 0-1 meaning not at risk in a score of 2 or greater meaning at risk.  Patients with a score of 3 or higher will be referred to our oncology dietitian for support. Patients beginning at risk treatment regimens or who have dietary concerns will also be referred to our oncology dietitian. The patient will not be referred.    Intravenous Access Assessment  The patient and I discussed planned intravenous chemo/biotherapy as well as other IV treatments that are often needed throughout the course of treatment. These may include, but are not limited to blood transfusions, antibiotics, and IV hydration. Discussed that depending on selected treatment and vein assessment, patient may require venous access device (VAD) which could include but not limited to a Mediport or PICC line. Risks and benefits of VADs reviewed. The patient will be treated via Port.    Advanced Care Planning  Advance Care Planning   The patient and I discussed advanced care planning, \"Conversations that Matter\".   This service is offered for development of advance directives with a certified ACP facilitator.  The patient does have an up-to-date advanced directive. This document is on file with our office. " The patient is not interested in an appointment with one of our facilitators to create or update their advanced directives.               Smoking cessation  Tobacco Use: Medium Risk (7/10/2025)    Patient History     Smoking Tobacco Use: Former     Smokeless Tobacco Use: Never     Passive Exposure: Not on file     Survivorship   When appropriate, we discussed that we will refer the patient to survivorship clinic to discuss next steps following completion of planned treatment.  Reviewed this visit will include assessment of your physical, psychological, functional, and spiritual needs as a survivor and the need at attend this visit when scheduled.    TREATMENT EDUCATION    Today I met with the patient to discuss the chemo/biotherapy regimen recommended for treatment of Malignant neoplasm of overlapping sites of left breast in female, estrogen receptor positive  .  The patient was given explanation of treatment premed side effects including office policy that prohibits patients to drive if sedating medications are administered, MD explanation given regarding benefits, side effects, toxicities and goals of treatment.  The patient received a Chemotherapy/Biotherapy Plan Summary including diagnosis and explanation of specific treatment plan.    SIDE EFFECTS:  Common side effects were discussed with the patient and/or significant other.  Discussion included where applicable hair loss/discoloration, anemia/fatigue, infection/chills/fever, appetite, bleeding risk/precautions, constipation, diarrhea, mouth sores, taste alteration, loss of appetite, nausea/vomiting, peripheral neuropathy, skin/nail changes, rash, muscle aches/weakness, photosensitivity, weight gain/loss, hearing loss, dizziness, menopausal symptoms, menstrual irregularity, sterility, high blood pressure, heart damage, liver damage, lung damage, kidney damage, DVT/PE risk, fluid retention, pleural/pericardial effusion, somnolence, electrolyte/LFT imbalance,  vein exercises and/or the possible need for vascular access/port placement.  The patient was advised that although uncommon, leakage of an infused medication from the vein or venous access device may lead to skin breakdown and/or other tissue damage.  The patient was advised that he/she may have pain, bleeding, and/or bruising from the insertion of a needle in their vein or venous access device (port).  The patient was further advised that, in spite of proper technique, infection with redness and irritation may rarely occur at the site where the needle was inserted.  The patient was advised that if complications occur, additional medical treatment is available.  Finally, where applicable we have reviewed rare but potential immune mediated side effects including shortness of breath, cough, chest pain (pneumonitis), abdominal pain, diarrhea (colitis), thyroiditis (hypothyroid or hyperthyroid), hepatitis and liver dysfunction, nephritis and renal dysfunction.    Discussion also included side effects specific to drugs in the treatment plan, specifically:    Treatment Plans       Name Type Hold Status Plan Dates Plan Provider       Active    OP SUPPORTIVE Denosumab (Xgeva) Q28D ONCOLOGY SUPPORTIVE CARE 1 On Automatic Hold 8/30/2024 - Present Zainab Lopes MD    OP BREAST Sacituzumab govitecan ONCOLOGY TREATMENT Not on Hold 7/15/2025 - Present Zainab Lopes MD                     Questions answered and additional information discussed on topics including:  Anemia, Thrombocytopenia, Neutropenia, Nutrition and appetite changes, Constipation, Diarrhea, Nausea & vomiting, Alopecia, and Organ toxicities       Assessment and Plan:    Diagnoses and all orders for this visit:    1. Malignant neoplasm of overlapping sites of left breast in female, estrogen receptor positive (Primary)      No orders of the defined types were placed in this encounter.    The patient and I have reviewed their diagnosis and scheduled treatment  plan. Needs assessment was completed where applicable including genetics, psychosocial needs, barriers to care, VAD evaluation, advanced care planning, survivorship, and palliative care services where indicated. Referrals have been ordered as appropriate based upon evaluation today and patient desires.   Chemo/biotherapy teaching was completed today and consent obtained. See separate documentation for further details.  Adequate time was given to answer questions.  Patient made aware of their care team members and contact information if they have questions or problems throughout the treatment course.  Discussion held and written information provided describing frequency of office visits and ongoing monitoring throughout the treatment plan.     Reviewed with patient any prescribed medication sent to pharmacy.  Education provided regarding proper storage, safe handling, and proper disposal of unused medication.  Proper handling of body fluids and waste discussed and written information provided.  If appropriate, patient had pretreatment labs drawn today.    Learning assessment completed at initial patient encounter. See separate flowsheet. Chemo/biotherapy education comprehension assessed at today's visit.    I spent 50 minutes caring for Maritza on this date of service. This time includes time spent by me in the following activities: preparing for the visit, reviewing tests, obtaining and/or reviewing a separately obtained history, performing a medically appropriate examination and/or evaluation, counseling and educating the patient/family/caregiver, ordering medications, tests, or procedures, referring and communicating with other health care professionals, documenting information in the medical record, independently interpreting results and communicating that information with the patient/family/caregiver, and care coordination.     Indai Henriquez, APRN   07/14/25

## 2025-07-14 NOTE — PROGRESS NOTES
07/15/25 0001   Pre-Procedure Phone Call   Procedure Time Verified Yes   Arrival Time 0700   Procedure Location Verified Yes   Medical History Reviewed No  (Left voicemail message.)   NPO Status Reinforced Yes   Ride and Caregiver Arranged Yes   Phone Number for Ride/Caregiver Left voicemail message with detailed instructions.   Patient Knows to Bring Current Medications Yes   Bring Outside Films Requested No

## 2025-07-14 NOTE — PROGRESS NOTES
[facility] Hematology/Oncology Treatment Plan Summary    Name: Maritza Nance Darci  [facility]# [id number]  MD: Dr. Lopes     Diagnosis:     ICD-10-CM ICD-9-CM   1. Malignant neoplasm of overlapping sites of left breast in female, estrogen receptor positive  C50.812 174.8    Z17.0 V86.0     Treatment Medication(s):   Trodelvy     Frequency: Every other week     Starting on: 7/16/2025    Items for home use: Senokot-S (for constipation), Milk of Magnesia (for constipation), Imodium AD (for diarrhea), Tylenol (for fever and/or pain), and Thermometer    Rx written for: [x] Nausea    [x] Pre-Treatment   EMLA cream to port site 30 minutes prior to access and olanzapine 5mg nightly on days as directed. Patient states that zofran does not help her, she prefers compazine.     Notes:     Next Steps:  placement on 7/15 followed by first treatment on 7/16/2025    Completing Provider: DANA Abraham           Date/time: 07/14/2025      Please note: You will be seen by a provider frequently with your treatment plan. This plan may change depending on many factors, if so, this will be discussed with you by your physician.  Last update 03/2022.

## 2025-07-15 ENCOUNTER — HOSPITAL ENCOUNTER (OUTPATIENT)
Dept: INTERVENTIONAL RADIOLOGY/VASCULAR | Facility: HOSPITAL | Age: 65
Discharge: HOME | End: 2025-07-15
Payer: MEDICARE

## 2025-07-15 DIAGNOSIS — C50.812 MALIGNANT NEOPLASM OF OVERLAPPING SITES OF LEFT BREAST IN FEMALE, ESTROGEN RECEPTOR POSITIVE: ICD-10-CM

## 2025-07-15 DIAGNOSIS — Z17.0 MALIGNANT NEOPLASM OF OVERLAPPING SITES OF LEFT BREAST IN FEMALE, ESTROGEN RECEPTOR POSITIVE: ICD-10-CM

## 2025-07-18 ENCOUNTER — TELEPHONE (OUTPATIENT)
Dept: INTERNAL MEDICINE | Facility: CLINIC | Age: 65
End: 2025-07-18
Payer: MEDICARE

## 2025-07-18 ENCOUNTER — READMISSION MANAGEMENT (OUTPATIENT)
Dept: CALL CENTER | Facility: HOSPITAL | Age: 65
End: 2025-07-18
Payer: MEDICARE

## 2025-07-18 DIAGNOSIS — G47.00 INSOMNIA, UNSPECIFIED TYPE: Primary | ICD-10-CM

## 2025-07-18 NOTE — TELEPHONE ENCOUNTER
Caller: Maritza Bejarano    Relationship: Self    Best call back number: [phone]     What medication are you requesting: CLONAZEPAM 2 MG     What are your current symptoms: NOT SLEEPING WITH CURRENT MEDICATION TEMZAEPAM THAT IS NOT WORKING FOR HER         Have you had these symptoms before:    [x] Yes  [] No    Have you been treated for these symptoms before:   [x] Yes  [] No    If a prescription is needed, what is your preferred pharmacy and phone number: [id number] [address] AT [facility] [phone] [facility] [phone] FX     Additional notes:  PLEASE CALL AND ADVISE

## 2025-07-18 NOTE — PROGRESS NOTES
07/21/25 0001   Pre-Procedure Phone Call   Procedure Time Verified Yes   Arrival Time 0700   Procedure Location Verified Yes   Medical History Reviewed No   NPO Status Reinforced Yes   Bring Outside Films Requested No     Detailed message left at [phone]

## 2025-07-18 NOTE — OUTREACH NOTE
Medical Week 2 Survey      Flowsheet Row Responses   [facility] patient discharged from? [place]   Does the patient have one of the following disease processes/diagnoses(primary or secondary)? Other   Week 2 attempt successful? Yes   Call start time 1732   Discharge diagnosis UTI   Call end time 1736   Person spoke with today (if not patient) and relationship Patient   Meds reviewed with patient/caregiver? Yes   Is the patient having any side effects they believe may be caused by any medication additions or changes? No   Does the patient have all medications ordered at discharge? N/A   Prescription comments No new rxs at time of discharge. Patient denies any questions or concerns.   Is the patient taking all medications as directed (includes completed medication regime)? Yes   Comments regarding appointments Patient saw Oncology on 7/14/25 for f/u.   Does the patient have a primary care provider?  Yes   Does the patient have an appointment with their PCP within 7 days of discharge? Yes   Comments regarding PCP PCP--Dr. Enoc Carbone---seen on 7/10/25 for f/u.   Has the patient kept scheduled appointments due by today? Yes   What is the Home health agency?  [facility]    Has home health visited the patient within 72 hours of discharge? Yes   Home health comments [facility] resuming care   Psychosocial issues? No   Did the patient receive a copy of their discharge instructions? Yes   Nursing interventions Reviewed instructions with patient   What is the patient's perception of their health status since discharge? Improving   Is the patient/caregiver able to teach back signs and symptoms related to disease process for when to call PCP? Yes   Is the patient/caregiver able to teach back signs and symptoms related to disease process for when to call 911? Yes   Is the patient/caregiver able to teach back the hierarchy of who to call/visit for symptoms/problems? PCP, Specialist, Home health nurse, Urgent Care, ED,  911 Yes   If the patient is a current smoker, are they able to teach back resources for cessation? Not a smoker   Week 2 Call Completed? Yes   Graduated Yes   Is the patient interested in additional calls from an ambulatory ? No   Would this patient benefit from a Referral to [facility] Social Work? No   Graduated/Revoked comments patient reports she is improving. No needs or concerns at this time.   Call end time 6921            Maral RUIZ - Registered Nurse

## 2025-07-20 RX ORDER — ESZOPICLONE 1 MG/1
1 TABLET, FILM COATED ORAL NIGHTLY
Qty: 7 TABLET | Refills: 0 | Status: SHIPPED | OUTPATIENT
Start: 2025-07-20

## 2025-07-20 NOTE — TELEPHONE ENCOUNTER
Please let pt know that with her history of adverse reaction with clonazepam I am not comfortable prescribing that. At this point she needs to STOP temazepam and I have sent a rx for Lunesta 1 mg nightly right before bed to her pharmacy. At the end of the week I would like her to let me know how she does with this medication and at that point I can consider increasing the dosage if needed.

## 2025-07-21 ENCOUNTER — HOSPITAL ENCOUNTER (OUTPATIENT)
Dept: INTERVENTIONAL RADIOLOGY/VASCULAR | Facility: HOSPITAL | Age: 65
Discharge: HOME | End: 2025-07-21
Admitting: INTERNAL MEDICINE
Payer: MEDICARE

## 2025-07-21 VITALS
DIASTOLIC BLOOD PRESSURE: 58 MMHG | WEIGHT: 180 LBS | OXYGEN SATURATION: 94 % | SYSTOLIC BLOOD PRESSURE: 120 MMHG | HEART RATE: 68 BPM | BODY MASS INDEX: 29.99 KG/M2 | HEIGHT: 65 IN | RESPIRATION RATE: 17 BRPM | TEMPERATURE: 97.9 F

## 2025-07-21 DIAGNOSIS — Z17.0 MALIGNANT NEOPLASM OF OVERLAPPING SITES OF LEFT BREAST IN FEMALE, ESTROGEN RECEPTOR POSITIVE: ICD-10-CM

## 2025-07-21 DIAGNOSIS — C50.812 MALIGNANT NEOPLASM OF OVERLAPPING SITES OF LEFT BREAST IN FEMALE, ESTROGEN RECEPTOR POSITIVE: ICD-10-CM

## 2025-07-21 DIAGNOSIS — C50.912: ICD-10-CM

## 2025-07-21 LAB
ANION GAP SERPL CALCULATED.3IONS-SCNC: 10.6 MMOL/L (ref 5–15)
BASOPHILS # BLD AUTO: 0.05 10*3/MM3 (ref 0–0.2)
BASOPHILS NFR BLD AUTO: 0.8 % (ref 0–1.5)
BUN SERPL-MCNC: 20 MG/DL (ref 8–23)
BUN/CREAT SERPL: 24.1 (ref 7–25)
CALCIUM SPEC-SCNC: 8.8 MG/DL (ref 8.6–10.5)
CHLORIDE SERPL-SCNC: 105 MMOL/L (ref 98–107)
CO2 SERPL-SCNC: 23.4 MMOL/L (ref 22–29)
CREAT SERPL-MCNC: 0.83 MG/DL (ref 0.57–1)
DEPRECATED RDW RBC AUTO: 52.4 FL (ref 37–54)
EGFRCR SERPLBLD CKD-EPI 2021: 79.8 ML/MIN/1.73
EOSINOPHIL # BLD AUTO: 0.15 10*3/MM3 (ref 0–0.4)
EOSINOPHIL NFR BLD AUTO: 2.5 % (ref 0.3–6.2)
ERYTHROCYTE [DISTWIDTH] IN BLOOD BY AUTOMATED COUNT: 16.1 % (ref 12.3–15.4)
GLUCOSE SERPL-MCNC: 113 MG/DL (ref 65–99)
HCT VFR BLD AUTO: 27.3 % (ref 34–46.6)
HGB BLD-MCNC: 8.6 G/DL (ref 12–15.9)
IMM GRANULOCYTES # BLD AUTO: 0.06 10*3/MM3 (ref 0–0.05)
IMM GRANULOCYTES NFR BLD AUTO: 1 % (ref 0–0.5)
LYMPHOCYTES # BLD AUTO: 1.77 10*3/MM3 (ref 0.7–3.1)
LYMPHOCYTES NFR BLD AUTO: 29.7 % (ref 19.6–45.3)
MCH RBC QN AUTO: 28.3 PG (ref 26.6–33)
MCHC RBC AUTO-ENTMCNC: 31.5 G/DL (ref 31.5–35.7)
MCV RBC AUTO: 89.8 FL (ref 79–97)
MONOCYTES # BLD AUTO: 0.37 10*3/MM3 (ref 0.1–0.9)
MONOCYTES NFR BLD AUTO: 6.2 % (ref 5–12)
NEUTROPHILS NFR BLD AUTO: 3.56 10*3/MM3 (ref 1.7–7)
NEUTROPHILS NFR BLD AUTO: 59.8 % (ref 42.7–76)
NRBC BLD AUTO-RTO: 0 /100 WBC (ref 0–0.2)
PLATELET # BLD AUTO: 442 10*3/MM3 (ref 140–450)
PMV BLD AUTO: 9.2 FL (ref 6–12)
POTASSIUM SERPL-SCNC: 4.3 MMOL/L (ref 3.5–5.2)
RBC # BLD AUTO: 3.04 10*6/MM3 (ref 3.77–5.28)
SODIUM SERPL-SCNC: 139 MMOL/L (ref 136–145)
WBC NRBC COR # BLD AUTO: 5.96 10*3/MM3 (ref 3.4–10.8)

## 2025-07-21 PROCEDURE — 85610 PROTHROMBIN TIME: CPT

## 2025-07-21 PROCEDURE — 80048 BASIC METABOLIC PNL TOTAL CA: CPT | Performed by: RADIOLOGY

## 2025-07-21 PROCEDURE — 99152 MOD SED SAME PHYS/QHP 5/>YRS: CPT

## 2025-07-21 PROCEDURE — 25010000002 LIDOCAINE PF 1% 1 % SOLUTION: Performed by: RADIOLOGY

## 2025-07-21 PROCEDURE — A9270 NON-COVERED ITEM OR SERVICE: HCPCS | Performed by: RADIOLOGY

## 2025-07-21 PROCEDURE — 25010000002 HEPARIN LOCK FLUSH PER 10 UNITS: Performed by: RADIOLOGY

## 2025-07-21 PROCEDURE — 85025 COMPLETE CBC W/AUTO DIFF WBC: CPT | Performed by: RADIOLOGY

## 2025-07-21 PROCEDURE — 25010000002 MIDAZOLAM PER 1 MG: Performed by: RADIOLOGY

## 2025-07-21 PROCEDURE — 99153 MOD SED SAME PHYS/QHP EA: CPT

## 2025-07-21 PROCEDURE — 63710000001 HYDROCODONE-ACETAMINOPHEN 5-325 MG TABLET: Performed by: RADIOLOGY

## 2025-07-21 PROCEDURE — 76937 US GUIDE VASCULAR ACCESS: CPT

## 2025-07-21 PROCEDURE — 25010000002 FENTANYL CITRATE (PF) 50 MCG/ML SOLUTION: Performed by: RADIOLOGY

## 2025-07-21 PROCEDURE — 25010000002 CEFAZOLIN PER 500 MG: Performed by: RADIOLOGY

## 2025-07-21 PROCEDURE — C1769 GUIDE WIRE: HCPCS

## 2025-07-21 PROCEDURE — C1894 INTRO/SHEATH, NON-LASER: HCPCS

## 2025-07-21 PROCEDURE — C1788 PORT, INDWELLING, IMP: HCPCS

## 2025-07-21 PROCEDURE — 77001 FLUOROGUIDE FOR VEIN DEVICE: CPT

## 2025-07-21 RX ORDER — CLOBETASOL PROPIONATE 0.5 MG/G
OINTMENT TOPICAL
Status: ON HOLD | COMMUNITY
Start: 2025-06-19

## 2025-07-21 RX ORDER — FENTANYL CITRATE 50 UG/ML
INJECTION, SOLUTION INTRAMUSCULAR; INTRAVENOUS AS NEEDED
Status: COMPLETED | OUTPATIENT
Start: 2025-07-21 | End: 2025-07-21

## 2025-07-21 RX ORDER — ONDANSETRON 8 MG/1
8 TABLET, FILM COATED ORAL 3 TIMES DAILY PRN
Status: ON HOLD | COMMUNITY
Start: 2025-07-14

## 2025-07-21 RX ORDER — SODIUM CHLORIDE 0.9 % (FLUSH) 0.9 %
3 SYRINGE (ML) INJECTION EVERY 12 HOURS SCHEDULED
Status: CANCELLED | OUTPATIENT
Start: 2025-07-21

## 2025-07-21 RX ORDER — SODIUM CHLORIDE 0.9 % (FLUSH) 0.9 %
10 SYRINGE (ML) INJECTION AS NEEDED
Status: CANCELLED | OUTPATIENT
Start: 2025-07-21

## 2025-07-21 RX ORDER — TEMAZEPAM 15 MG/1
CAPSULE ORAL
Status: ON HOLD | COMMUNITY
Start: 2025-07-10

## 2025-07-21 RX ORDER — LIDOCAINE HYDROCHLORIDE 10 MG/ML
INJECTION, SOLUTION EPIDURAL; INFILTRATION; INTRACAUDAL; PERINEURAL AS NEEDED
Status: COMPLETED | OUTPATIENT
Start: 2025-07-21 | End: 2025-07-21

## 2025-07-21 RX ORDER — SODIUM CHLORIDE 9 MG/ML
INJECTION, SOLUTION INTRAVENOUS CONTINUOUS PRN
Status: COMPLETED | OUTPATIENT
Start: 2025-07-21 | End: 2025-07-21

## 2025-07-21 RX ORDER — HEPARIN SODIUM (PORCINE) LOCK FLUSH IV SOLN 100 UNIT/ML 100 UNIT/ML
SOLUTION INTRAVENOUS AS NEEDED
Status: COMPLETED | OUTPATIENT
Start: 2025-07-21 | End: 2025-07-21

## 2025-07-21 RX ORDER — HYDROCODONE BITARTRATE AND ACETAMINOPHEN 5; 325 MG/1; MG/1
1 TABLET ORAL ONCE AS NEEDED
Refills: 0 | Status: COMPLETED | OUTPATIENT
Start: 2025-07-21 | End: 2025-07-21

## 2025-07-21 RX ORDER — SODIUM CHLORIDE 9 MG/ML
40 INJECTION, SOLUTION INTRAVENOUS AS NEEDED
Status: CANCELLED | OUTPATIENT
Start: 2025-07-21

## 2025-07-21 RX ORDER — MIDAZOLAM HYDROCHLORIDE 1 MG/ML
INJECTION, SOLUTION INTRAMUSCULAR; INTRAVENOUS AS NEEDED
Status: COMPLETED | OUTPATIENT
Start: 2025-07-21 | End: 2025-07-21

## 2025-07-21 RX ADMIN — SODIUM CHLORIDE 50 ML/HR: 9 INJECTION, SOLUTION INTRAVENOUS at 08:44

## 2025-07-21 RX ADMIN — FENTANYL CITRATE 25 MCG: 50 INJECTION, SOLUTION INTRAMUSCULAR; INTRAVENOUS at 08:58

## 2025-07-21 RX ADMIN — LIDOCAINE HYDROCHLORIDE 16 ML: 10 INJECTION, SOLUTION EPIDURAL; INFILTRATION; INTRACAUDAL; PERINEURAL at 09:23

## 2025-07-21 RX ADMIN — MIDAZOLAM 0.25 MG: 1 INJECTION INTRAMUSCULAR; INTRAVENOUS at 08:52

## 2025-07-21 RX ADMIN — SODIUM CHLORIDE 2000 MG: 900 INJECTION INTRAVENOUS at 08:46

## 2025-07-21 RX ADMIN — FENTANYL CITRATE 25 MCG: 50 INJECTION, SOLUTION INTRAMUSCULAR; INTRAVENOUS at 09:04

## 2025-07-21 RX ADMIN — Medication 500 UNITS: at 09:18

## 2025-07-21 RX ADMIN — MIDAZOLAM 0.5 MG: 1 INJECTION INTRAMUSCULAR; INTRAVENOUS at 09:12

## 2025-07-21 RX ADMIN — HYDROCODONE BITARTRATE AND ACETAMINOPHEN 1 TABLET: 5; 325 TABLET ORAL at 11:50

## 2025-07-21 RX ADMIN — MIDAZOLAM 0.25 MG: 1 INJECTION INTRAMUSCULAR; INTRAVENOUS at 08:55

## 2025-07-21 NOTE — NURSING NOTE
Patient arrived to bay [facility] in IR preop area. PPE worn per patient and care providers for any bedside care.

## 2025-07-21 NOTE — POST-PROCEDURE NOTE
POST PROCEDURE NOTE    Procedure: port    Pre-Procedure Diagnosis: ca/chemo    Post-procedure Diagnosis: same    Findings: successful placement of single lumen 8 [place] via right IJ. heplocked, ready to use.    Complications: none    Blood loss: min    Specimen Removed: n/a    Disposition:   Discharge home

## 2025-07-22 ENCOUNTER — INFUSION (OUTPATIENT)
Dept: ONCOLOGY | Facility: HOSPITAL | Age: 65
End: 2025-07-22
Payer: MEDICARE

## 2025-07-22 ENCOUNTER — CLINICAL SUPPORT (OUTPATIENT)
Dept: OTHER | Facility: HOSPITAL | Age: 65
End: 2025-07-22
Payer: MEDICARE

## 2025-07-22 ENCOUNTER — OFFICE VISIT (OUTPATIENT)
Dept: ONCOLOGY | Facility: CLINIC | Age: 65
End: 2025-07-22
Payer: MEDICARE

## 2025-07-22 VITALS
OXYGEN SATURATION: 99 % | SYSTOLIC BLOOD PRESSURE: 144 MMHG | HEIGHT: 65 IN | TEMPERATURE: 97.9 F | BODY MASS INDEX: 29.95 KG/M2 | HEART RATE: 88 BPM | DIASTOLIC BLOOD PRESSURE: 77 MMHG | RESPIRATION RATE: 16 BRPM

## 2025-07-22 DIAGNOSIS — C50.812 MALIGNANT NEOPLASM OF OVERLAPPING SITES OF LEFT BREAST IN FEMALE, ESTROGEN RECEPTOR POSITIVE: ICD-10-CM

## 2025-07-22 DIAGNOSIS — Z17.0 MALIGNANT NEOPLASM OF OVERLAPPING SITES OF LEFT BREAST IN FEMALE, ESTROGEN RECEPTOR POSITIVE: ICD-10-CM

## 2025-07-22 DIAGNOSIS — C50.912: ICD-10-CM

## 2025-07-22 DIAGNOSIS — Z79.899 HIGH RISK MEDICATION USE: ICD-10-CM

## 2025-07-22 DIAGNOSIS — C79.51 CANCER, METASTATIC TO BONE: ICD-10-CM

## 2025-07-22 DIAGNOSIS — C50.912: Primary | ICD-10-CM

## 2025-07-22 LAB
ALBUMIN SERPL-MCNC: 3.7 G/DL (ref 3.5–5.2)
ALBUMIN/GLOB SERPL: 1.2 G/DL
ALP SERPL-CCNC: 180 U/L (ref 39–117)
ALT SERPL W P-5'-P-CCNC: 25 U/L (ref 1–33)
ANION GAP SERPL CALCULATED.3IONS-SCNC: 11.6 MMOL/L (ref 5–15)
AST SERPL-CCNC: 95 U/L (ref 1–32)
BASOPHILS # BLD AUTO: 0.04 10*3/MM3 (ref 0–0.2)
BASOPHILS NFR BLD AUTO: 0.6 % (ref 0–1.5)
BILIRUB SERPL-MCNC: <0.2 MG/DL (ref 0–1.2)
BUN SERPL-MCNC: 14.5 MG/DL (ref 8–23)
BUN/CREAT SERPL: 27.4 (ref 7–25)
CALCIUM SPEC-SCNC: 9 MG/DL (ref 8.6–10.5)
CHLORIDE SERPL-SCNC: 106 MMOL/L (ref 98–107)
CO2 SERPL-SCNC: 20.4 MMOL/L (ref 22–29)
CREAT SERPL-MCNC: 0.53 MG/DL (ref 0.57–1)
DEPRECATED RDW RBC AUTO: 58.3 FL (ref 37–54)
EGFRCR SERPLBLD CKD-EPI 2021: 104.7 ML/MIN/1.73
EOSINOPHIL # BLD AUTO: 0.18 10*3/MM3 (ref 0–0.4)
EOSINOPHIL NFR BLD AUTO: 2.7 % (ref 0.3–6.2)
ERYTHROCYTE [DISTWIDTH] IN BLOOD BY AUTOMATED COUNT: 17.1 % (ref 12.3–15.4)
FERRITIN SERPL-MCNC: 333 NG/ML (ref 13–150)
GLOBULIN UR ELPH-MCNC: 3 GM/DL
GLUCOSE SERPL-MCNC: 248 MG/DL (ref 65–99)
HCT VFR BLD AUTO: 29.2 % (ref 34–46.6)
HGB BLD-MCNC: 8.7 G/DL (ref 12–15.9)
IMM GRANULOCYTES # BLD AUTO: 0.09 10*3/MM3 (ref 0–0.05)
IMM GRANULOCYTES NFR BLD AUTO: 1.4 % (ref 0–0.5)
INR PPP: 1 (ref 0.8–1.2)
IRON 24H UR-MRATE: 59 MCG/DL (ref 37–145)
IRON SATN MFR SERPL: 17 % (ref 20–50)
LYMPHOCYTES # BLD AUTO: 1.29 10*3/MM3 (ref 0.7–3.1)
LYMPHOCYTES NFR BLD AUTO: 19.6 % (ref 19.6–45.3)
MAGNESIUM SERPL-MCNC: 2.2 MG/DL (ref 1.6–2.4)
MCH RBC QN AUTO: 28 PG (ref 26.6–33)
MCHC RBC AUTO-ENTMCNC: 29.8 G/DL (ref 31.5–35.7)
MCV RBC AUTO: 93.9 FL (ref 79–97)
MONOCYTES # BLD AUTO: 0.32 10*3/MM3 (ref 0.1–0.9)
MONOCYTES NFR BLD AUTO: 4.9 % (ref 5–12)
NEUTROPHILS NFR BLD AUTO: 4.67 10*3/MM3 (ref 1.7–7)
NEUTROPHILS NFR BLD AUTO: 70.8 % (ref 42.7–76)
NRBC BLD AUTO-RTO: 0 /100 WBC (ref 0–0.2)
PHOSPHATE SERPL-MCNC: 3 MG/DL (ref 2.5–4.5)
PLATELET # BLD AUTO: 432 10*3/MM3 (ref 140–450)
PMV BLD AUTO: 8.6 FL (ref 6–12)
POTASSIUM SERPL-SCNC: 3.8 MMOL/L (ref 3.5–5.2)
PROT SERPL-MCNC: 6.7 G/DL (ref 6–8.5)
PROTHROMBIN TIME: 10.2 SECONDS (ref 12.8–15.2)
RBC # BLD AUTO: 3.11 10*6/MM3 (ref 3.77–5.28)
SODIUM SERPL-SCNC: 138 MMOL/L (ref 136–145)
TIBC SERPL-MCNC: 353 MCG/DL (ref 298–536)
TRANSFERRIN SERPL-MCNC: 237 MG/DL (ref 200–360)
WBC NRBC COR # BLD AUTO: 6.59 10*3/MM3 (ref 3.4–10.8)

## 2025-07-22 PROCEDURE — 96375 TX/PRO/DX INJ NEW DRUG ADDON: CPT

## 2025-07-22 PROCEDURE — 82728 ASSAY OF FERRITIN: CPT | Performed by: NURSE PRACTITIONER

## 2025-07-22 PROCEDURE — 3078F DIAST BP <80 MM HG: CPT | Performed by: NURSE PRACTITIONER

## 2025-07-22 PROCEDURE — 25010000002 SACITUZUMAB GOVITECAN-HZIY 180 MG RECONSTITUTED SOLUTION 1 EACH VIAL: Performed by: NURSE PRACTITIONER

## 2025-07-22 PROCEDURE — 85025 COMPLETE CBC W/AUTO DIFF WBC: CPT

## 2025-07-22 PROCEDURE — 25010000002 DEXAMETHASONE SODIUM PHOSPHATE 100 MG/10ML SOLUTION: Performed by: INTERNAL MEDICINE

## 2025-07-22 PROCEDURE — 25010000002 FOSAPREPITANT PER 1 MG: Performed by: INTERNAL MEDICINE

## 2025-07-22 PROCEDURE — 84466 ASSAY OF TRANSFERRIN: CPT | Performed by: NURSE PRACTITIONER

## 2025-07-22 PROCEDURE — 80053 COMPREHEN METABOLIC PANEL: CPT

## 2025-07-22 PROCEDURE — 96413 CHEMO IV INFUSION 1 HR: CPT

## 2025-07-22 PROCEDURE — 25010000002 DENOSUMAB 120 MG/1.7ML SOLUTION: Performed by: NURSE PRACTITIONER

## 2025-07-22 PROCEDURE — 96372 THER/PROPH/DIAG INJ SC/IM: CPT

## 2025-07-22 PROCEDURE — 84100 ASSAY OF PHOSPHORUS: CPT

## 2025-07-22 PROCEDURE — 25010000002 DIPHENHYDRAMINE PER 50 MG: Performed by: INTERNAL MEDICINE

## 2025-07-22 PROCEDURE — 25010000002 PALONOSETRON PER 25 MCG: Performed by: INTERNAL MEDICINE

## 2025-07-22 PROCEDURE — 3077F SYST BP >= 140 MM HG: CPT | Performed by: NURSE PRACTITIONER

## 2025-07-22 PROCEDURE — 25810000003 SODIUM CHLORIDE 0.9 % SOLUTION 250 ML FLEX CONT: Performed by: NURSE PRACTITIONER

## 2025-07-22 PROCEDURE — 83735 ASSAY OF MAGNESIUM: CPT

## 2025-07-22 PROCEDURE — 63710000001 ACETAMINOPHEN 325 MG TABLET: Performed by: INTERNAL MEDICINE

## 2025-07-22 PROCEDURE — 99215 OFFICE O/P EST HI 40 MIN: CPT | Performed by: NURSE PRACTITIONER

## 2025-07-22 PROCEDURE — A9270 NON-COVERED ITEM OR SERVICE: HCPCS | Performed by: INTERNAL MEDICINE

## 2025-07-22 PROCEDURE — 96415 CHEMO IV INFUSION ADDL HR: CPT

## 2025-07-22 PROCEDURE — 1126F AMNT PAIN NOTED NONE PRSNT: CPT | Performed by: NURSE PRACTITIONER

## 2025-07-22 PROCEDURE — 83540 ASSAY OF IRON: CPT | Performed by: NURSE PRACTITIONER

## 2025-07-22 PROCEDURE — 25810000003 SODIUM CHLORIDE 0.9 % SOLUTION: Performed by: INTERNAL MEDICINE

## 2025-07-22 PROCEDURE — 25010000002 FAMOTIDINE 10 MG/ML SOLUTION: Performed by: INTERNAL MEDICINE

## 2025-07-22 PROCEDURE — 96367 TX/PROPH/DG ADDL SEQ IV INF: CPT

## 2025-07-22 PROCEDURE — 25010000002 ATROPINE SULFATE 0.4 MG/ML SOLUTION: Performed by: NURSE PRACTITIONER

## 2025-07-22 RX ORDER — ATROPINE SULFATE 0.4 MG/ML
0.12 INJECTION, SOLUTION INTRAMUSCULAR; INTRAVENOUS; SUBCUTANEOUS
Status: DISCONTINUED | OUTPATIENT
Start: 2025-07-22 | End: 2025-07-22 | Stop reason: HOSPADM

## 2025-07-22 RX ORDER — ATROPINE SULFATE 0.4 MG/ML
0.25 INJECTION, SOLUTION INTRAMUSCULAR; INTRAVENOUS; SUBCUTANEOUS
Status: DISCONTINUED | OUTPATIENT
Start: 2025-07-22 | End: 2025-07-22

## 2025-07-22 RX ORDER — NITROFURANTOIN 25; 75 MG/1; MG/1
100 CAPSULE ORAL DAILY
Status: ON HOLD | COMMUNITY
Start: 2025-06-30

## 2025-07-22 RX ORDER — SODIUM CHLORIDE 9 MG/ML
20 INJECTION, SOLUTION INTRAVENOUS ONCE
Status: COMPLETED | OUTPATIENT
Start: 2025-07-22 | End: 2025-07-22

## 2025-07-22 RX ORDER — PALONOSETRON 0.05 MG/ML
0.25 INJECTION, SOLUTION INTRAVENOUS ONCE
Status: COMPLETED | OUTPATIENT
Start: 2025-07-22 | End: 2025-07-22

## 2025-07-22 RX ORDER — ACETAMINOPHEN 325 MG/1
650 TABLET ORAL ONCE
Status: COMPLETED | OUTPATIENT
Start: 2025-07-22 | End: 2025-07-22

## 2025-07-22 RX ORDER — FAMOTIDINE 10 MG/ML
20 INJECTION, SOLUTION INTRAVENOUS ONCE
Status: COMPLETED | OUTPATIENT
Start: 2025-07-22 | End: 2025-07-22

## 2025-07-22 RX ADMIN — SODIUM CHLORIDE 610 MG: 9 INJECTION, SOLUTION INTRAVENOUS at 10:47

## 2025-07-22 RX ADMIN — DEXAMETHASONE SODIUM PHOSPHATE 12 MG: 10 INJECTION, SOLUTION INTRAMUSCULAR; INTRAVENOUS at 09:58

## 2025-07-22 RX ADMIN — FOSAPREPITANT 100 ML: 150 INJECTION, POWDER, LYOPHILIZED, FOR SOLUTION INTRAVENOUS at 10:13

## 2025-07-22 RX ADMIN — SODIUM CHLORIDE 20 ML/HR: 9 INJECTION, SOLUTION INTRAVENOUS at 09:36

## 2025-07-22 RX ADMIN — FAMOTIDINE 20 MG: 10 INJECTION INTRAVENOUS at 09:36

## 2025-07-22 RX ADMIN — ATROPINE SULFATE 0.12 MG: 0.4 INJECTION, SOLUTION INTRAVENOUS at 10:46

## 2025-07-22 RX ADMIN — DIPHENHYDRAMINE HYDROCHLORIDE 50 MG: 50 INJECTION INTRAMUSCULAR; INTRAVENOUS at 09:37

## 2025-07-22 RX ADMIN — DENOSUMAB 120 MG: 120 INJECTION SUBCUTANEOUS at 13:46

## 2025-07-22 RX ADMIN — ACETAMINOPHEN 650 MG: 325 TABLET ORAL at 09:36

## 2025-07-22 RX ADMIN — PALONOSETRON 0.25 MG: 0.05 INJECTION, SOLUTION INTRAVENOUS at 09:36

## 2025-07-22 NOTE — PROGRESS NOTES
Subjective   Maritza Bejarano is a 64 y.o. female.   With metastatic invasive lobular carcinoma, ER/MN strongly positive cancer with metastatic disease to the bones.    Oncologic history:    Patient is a 64-year-old postmenopausal lady who has not had a mammogram in 4 years presented with a screen detected abnormality.  She had a left breast biopsy in 2014 which was benign.  Denies palpating any breast masses skin changes or nipple discharge prior to the abnormal mammogram.  She does have left nipple inversion.    Patient has a longstanding diagnosis of multiple sclerosis and has not been on any treatment.  She was previously seen by Dr. Ozzy France and now being seen by Dr. Soto with neurology.  She has been nonambulatory for the past 4 years and requires a wheelchair.  She is unable to transfer herself in and out of wheelchairs and her  has to lift her for all the transfers.  She is also incontinent of the bladder and requiring a suprapubic catheter placed by urology to help with the same.  She had a bladder stimulator in the past which was turned off.  Other comorbidities include hypertension and hyperlipidemia.      Family history significant for maternal great grandmother with breast cancer, maternal great aunt and mother with breast cancer in her 70s.  Denies any family history of ovarian cancer, pancreatic cancer or melanoma.    5/21/2024-bilateral screening mammogram  Finding 1.new nipple retraction along with diffuse skin and trabecular thickening of the left breast.  There is suggestion of subtle architectural distortion seen on the MLO projection in the posterior central region.  3 biopsy markers are noted.  No new suspicious calcifications.  Send finding 2.few axillary lymph node seen in the left breast which display interval increase in size and density.    Finding 3.focal asymmetry measuring 10 mm seen in the anterior one third of the right breast in the medial subareolar  region.    Impression  Finding 1.new nipple retraction, skin and trabecular thickening in the left breast requires additional evaluation.  There is also question architectural distortion in the posterior central breast seen on the MLO projection.  Diagnostic mammogram to include repeat full-field CC with additional posterior tissue and complete breast ultrasound is recommended.  Finding 2.axillary lymph nodes in the left breast require additional evaluation, ultrasound recommended.  Finding 3.focal asymmetry in the right breast requires additional evaluation.  Diagnostic mammogram and limited breast ultrasound is recommended.    5/29/2024-bilateral diagnostic mammogram and ultrasound  Finding 1.4 0.7 x 5.6 x 6.8 cm developing asymmetry with associated nipple retraction, skin thickening and trabecular thickening of the left breast in the central region, inferior region in the upper outer region and the lower outer region.  Finding 2.axillary lymph node in the left breast.  Finding 3.suspected finding completely resolves upon further evaluation representing benign fibroglandular breast tissue.    Bilateral breast ultrasound  Finding 1.nonparallel hypoechoic mass with indistinct margins and skin thickening and internal vascularity in the left breast in the central region, inferior region, upper outer region and in the lower outer region.  The finding is palpable and appears to involve all 4 quadrants.  Most discrete portion is located at 130, 3 cm from the nipple, skin thickening up to 6 mm.  Send finding 2.rounded enlarged left axillary lymph node measuring 11 mm.  Cortical thickening of 9 mm present.    Finding 3.no sonographic correlate.  Send finding 4.enlarged right axillary lymph node measuring 14 mm.  Cortical thickening of 5 mm.    Impression  Finding 1.ultrasound-guided biopsy recommended  Finding 2.ultrasound-guided biopsy recommended  Finding 3.previously described right breast asymmetry resolves  Finding  4.ultrasound-guided biopsy recommended.    Ultrasound-guided biopsy of the left breast and left axilla lymph node and right axilla lymph node    1.left breast  Invasive lobular carcinoma with crush artifact  Grade 2  Invasive carcinoma measures 8.5 mm  No lymphovascular space invasion  ER +91 to 100% strong  MA +31 to 40% moderate  HER2 negative, 0  Ki-67 35%    2.left axillary lymph node focus of metastatic Dastech lobular carcinoma measuring 10 mm  ER +91 to 100% strong  MA +21 to 30%  HER2 -0  Ki-67 30%    3.right axillary lymph node with a focus of metastatic carcinoma measuring 4 mm.  Grade 2.  ER +91 to 100% strong  MA +11 to 20% moderate  HER2 negative, 0  Ki-67 35%    Pathology of all the 3 sites appear similar and findings could represent left breast lobular carcinoma metastatic to the right as well as left axillary lymph nodes.    6/14/2024-CT of the chest abdomen and pelvis  1.large ill-defined infiltrative central left breast mass measuring 6.7 x 4 cm, medially there is an irregular 2.5 x 2.5 cm mass.  Significant thickening of the skin in the left breast and there is left axilla lymphadenopathy.  Left axilla lymph node measures 1.3 x 1.3 cm.  There is also a new enlarged right axillary lymph node measuring 1.3 x 1 cm.  All of the subcentimeter right axillary lymph nodes are slightly larger than previous.  2.no mediastinal hilar or internal mammary chain lymphadenopathy  3.new indeterminate mixed density measuring 1.3 x 1.2 cm right apical pulmonary nodule.  Follow-up recommended.  4.pleural parenchymal thickening and nodules inferiorly and posterior to the right upper lobe are stable.  Chronic scarring and subsegmental atelectatic change in both lower lobes.    CT abdomen pelvis  1.moderate fatty infiltration of the liver.  No suspicious liver lesions.  2.moderate-sized paraesophageal hernia.  No acute bowel abnormality.  3.right sacral stimulator noted at the S3 neuroforamina.  No suspicious bone  lesions are noted.    6/14/2024-bone scan  1.focal abnormal uptake in the left anterior inferior iliac spine correlating with lucent lesion on the CT concerning for metastatic disease.  Further evaluation with MRI of the pelvis and left hip could be performed.  2.focal uptake in the left frontal calvarium again concerning for metastatic disease.  Noncontrast CT or MRI could be obtained.  3.soft tissue uptake noted in the left breast at the site of known left breast cancer.  4.changes from arthroplasty on the right shoulder.  5.multifocal likely degenerative uptake in each knee, ankle and foot and increased uptake in the medial and patellofemoral cortex of the right knee could be posttraumatic.      Invitae 9 gene stat panel negative.    MRI of the brain which showed T2 hyperintensity involving the frontal bone measuring 1.6 cm in size and a smaller area of enhancement in the frontal bone laterally and inferiorly concerning for metastasis.  No brain mets    MRI of the hip and pelvis showed multiple enhancing bone lesions consistent with metastatic disease to the sacrum and pelvis.  Dominant lesion in the anterior inferior left pelvic spine and rectus femoris muscle origin that correlates with the site of bone scan uptake.  She has some right joint hip joint effusion.  Her CA 15-3 16.2    She was seen by Dr. Saavedra on 7/16/2024 and recommended to start on Ribociclib along with anastrozole.    Started Ribociclib in the first week of August 2024    Had profound nausea and vomiting requiring antiemetics.  Completed 3 weeks of Ribociclib on 8/30/2024.    Hospitalized from 9/11/2024 to 9/16/2024 for pneumonia and KINZA and since then Ribociclib has been on hold    Readmitted to the hospital from 10/17/2024 to 10/23/2024 requiring holding Ribociclib.  She was admitted for UTI with sepsis.    10/29/2024-CT of the chest which was compared to the CT chest from 6/14/2024-stable 1.3 cm subsolid nodule in the right lung apex.   Decrease in size of the left axillary lymph nodes.  Ill-defined left breast mass appears unchanged.  Expansile lytic and sclerotic lesion in the posterior left 10th rib which appears increased compared to the prior CT.  Stable subtle area of sclerosis in the left anterior third rib.    10/3/2024-MRI of the cervical spine-rounded area of marrow replacement in C7 suspicious for metastatic disease.    10/3/2024-MRI of the thoracic spine-suspicious patchy areas of marrow replacement in the thoracic spine at T5, T6, T7, T11, T12, L1 suspicious for metastatic disease.    Resumed Ribociclib in November 2024 and has been on it continuously up until February 2025 2/18/2025-CT of the chest abdomen and pelvis  13 mm right apical lesion unchanged micronodules in the right upper lobe micronodules in the left upper lobe  Left 11th rib metastasis unchanged with subtle increase sclerosis  Several hypodense lesions in the liver with the most prominent measuring 16 mm consistent with metastatic deposits.  Not present on scans from September and October 2024.  Bladder is collapsed with Cobos catheter.  Fecal impaction  Small sclerotic metastasis throughout the lumbar spine pelvis, some of which are new.  Largest lesion being in the inferior iliac spine which has become more sclerotic.    Bone scan 2/18/2025-widespread osseous metastatic disease vast majority of which are new/newly appreciable from the prior bone scan.    3/17/2025-initiated Trucap    3/20/2025-patient called complaining of abdominal cramping and loose stools.  She was recommended to start Bentyl and use Imodium for diarrhea.    3/21/2025-patient continues on Faslodex and Truqap.      5/12/2025-CT of the chest-stable 1.7 x 1.5 cm nodular opacity in the right apex.  Stable reticular nodular opacity inferiorly at the right upper lobe.  Bandlike scarring atelectasis at the posterior right lung base.  New pleural thickening of the superior aspect of the left major  fissure and new faint reticular and groundglass opacities in the left upper lobe.  Follow-up CT in 3 months recommended.  New tiny left pleural effusion and new minimal right pleural effusion.  Extensive sclerotic bony metastasis without change.    5/12/2025-bone scan very similar skin to graphic findings in keeping with widespread osseous metastatic disease.      4/21/2025-CT of the abdomen-no evidence of metastatic disease.    On 6/20/2025 for worsening erythema of the left breast.  This has been treated with antibiotics including amoxicillin and doxycycline.  Since the antibiotics the erythema has improved some but not all the way resolved.    Due to this we proceeded with a bilateral diagnostic mammogram and bilateral ultrasound performed on 6/24/2025 which showed an increase in size of the 2:00 malignancy.  New enlarged 1 cm right axillary lymph node was seen.  Ill-defined hypoechogenicity and shadowing throughout the right breast without a discrete measurable mass concerning for possible infiltrating malignancy.  Skin thickening throughout the left breast which could be cellulitis or underlying malignancy.    Punch biopsy was performed by her dermatologist and pathology was consistent with pleomorphic invasive lobular carcinoma which is ER negative, ME negative and HER2 score 0.    6/24/2025-bone scan with stable osseous metastatic disease.      6/24/2025-CT of the chest abdomen and pelvis- 10-15 scattered hepatic lesions likely represent metastatic disease with index lesions which have increased in size over multiple prior CTs.  Extensive osseous metastatic disease, mixed lytic and blastic lesion in the left posterior 10th rib has a more lytic appearance compared to February 2025.  Right lower lobe pulmonary nodular opacification is new compared February 2025 but grossly unchanged from May 2025.    Interval history  She returns today for initiation of Trodelvy.  She had her port placed yesterday.  She is  troubled with some constipation though is managing with over-the-counter medication.  She denies recent fevers or chills.  She has been using senna to help with constipation though states her bowels are still more hard.  She did recently get a prescription for Lunesta to help with her sleep and we discussed not taking this for the 4 nights following Trodelvy as she is taking olanzapine on those nights.      The following portions of the patient's history were reviewed and updated as appropriate: allergies, current medications, past family history, past medical history, past social history, past surgical history, and problem list.    Past Medical History:   Diagnosis Date    Anemia 2024    `Treated with iron    Dawn esophagus     per patient    Blurred vision     R/T MS    Breast cancer     metastatic    Carpal tunnel syndrome     Clotting disorder 1993, 2003, 2012    3 g/i bleeds w/ transfus/ions    Colon polyp 2013    removed w/ colonoscopy    Deep vein thrombosis phlebitis 1980    Depression     Diplopia 2013    GERD (gastroesophageal reflux disease)     GI (gastrointestinal bleed) 3 bleeds    2 transfusions    H/O Skin cancer, basal cell     Headache     History of blood transfusion     History of GI bleed     R/T NSAIDS AND STEROIDS, multiple times    History of urinary tract infection     Hypercalcemia     s/p parathyroidectomy    Hyperlipidemia     Hypertension     Movement disorder     Multiple sclerosis     Optic neuritis     PONV (postoperative nausea and vomiting)     Sleep apnea     wears cpap    Steroid-induced diabetes 2024        Past Surgical History:   Procedure Laterality Date    APPENDECTOMY      BLADDER SURGERY      bladder stimulator    BREAST BIOPSY  don't remember    BREAST SURGERY      augmentation wtih subsequent removal    CARPAL TUNNEL RELEASE Bilateral     Left 2018, right 2020    CUBITAL TUNNEL RELEASE Left     CYSTOSCOPY BOTOX INJECTION OF BLADDER  2018    Cystoscopy with Botox     FRACTURE SURGERY  2019    rt shoulder    PARATHYROIDECTOMY      one gland removed    ROTATOR CUFF REPAIR Right 2017    TOE SURGERY      bilateral great toes    TOTAL SHOULDER ARTHROPLASTY W/ DISTAL CLAVICLE EXCISION Right 10/22/2018    Procedure: RT TOTAL SHOULDER REVERSE ARTHROPLASTY;  Surgeon: Bipin Dangelo MD;  Location: [facility];  Service: Orthopedics        Family History   Problem Relation Age of Onset    Hypertension Mother     Hyperlipidemia Mother     Aortic aneurysm Mother         thoracic    Diabetes Mother     Hypertension Father         charissa heart failure    Heart failure Father     Hyperlipidemia Father     Miscarriages / Stillbirths Sister     Multiple sclerosis Brother     Atrial fibrillation Brother     Diabetes Maternal Grandmother     Diabetes Maternal Grandfather     Stroke Maternal Grandfather     Parkinsonism Paternal Grandmother     Tremor Paternal Grandmother     Stomach cancer Paternal Grandfather         Social History     Socioeconomic History    Marital status:      Spouse name: Donald    Number of children: 0   Tobacco Use    Smoking status: Former     Current packs/day: 0.00     Average packs/day: 2.0 packs/day for 30.0 years (60.0 ttl pk-yrs)     Types: Cigarettes     Start date: 10/22/1973     Quit date: 10/22/2003     Years since quittin.7    Smokeless tobacco: Never   Vaping Use    Vaping status: Never Used   Substance and Sexual Activity    Alcohol use: Not Currently    Drug use: Yes     Types: Marijuana     Comment: rarely    Sexual activity: Not Currently     Partners: Male     Birth control/protection: Post-menopausal        OB History    No obstetric history on file.     Age at menarche-15  Age at first live childbirth-not applicable   2 para 0  0  Age of menopause-57  No use of hormone replacement therapy      Allergies   Allergen Reactions    Klonopin [Clonazepam] Mental Status Change, Confusion and Hallucinations        Review of systems as  "mentioned HPI otherwise negative    Objective   Blood pressure 144/77, pulse 88, temperature 97.9 °F (36.6 °C), temperature source Oral, resp. rate 16, height 165.1 cm (65\"), SpO2 99%, not currently breastfeeding.     Physical Exam  Vitals reviewed.   Constitutional:       Appearance: Normal appearance. She is normal weight.   HENT:      Right Ear: External ear normal.      Left Ear: External ear normal.      Nose: Nose normal.      Mouth/Throat:      Pharynx: Oropharynx is clear.   Eyes:      Conjunctiva/sclera: Conjunctivae normal.   Cardiovascular:      Rate and Rhythm: Normal rate.   Pulmonary:      Effort: Pulmonary effort is normal.   Abdominal:      General: Abdomen is flat.   Musculoskeletal:         General: Normal range of motion.      Cervical back: Normal range of motion.   Skin:     General: Skin is warm.   Neurological:      Mental Status: She is alert.      Comments: Non weight bearing, wheelchair bound.    Psychiatric:         Mood and Affect: Mood normal.         Behavior: Behavior normal.         Thought Content: Thought content normal.         Judgment: Judgment normal.       Breast Exam:Not examined today, 4/4/2025 Right breast appears normal on inspection.  No palpable right axilla lymphadenopathy or right breast masses.  Right nipple inverted.  Left breast on inspection there is nipple retraction crusting over the nipple region.  On palpation there is a 8 x 6 cm mass (improved) in the retroareolar region occupying much of the breast.  Palpable left axillary adenopathy as well.      I have reexamined the patient and the results are consistent with the previously documented exam. DANA Lopez      Results from last 7 days   Lab Units 07/22/25  0805 07/21/25  0754   WBC 10*3/mm3 6.59 5.96   NEUTROS ABS 10*3/mm3 4.67 3.56   HEMOGLOBIN g/dL 8.7* 8.6*   HEMATOCRIT % 29.2* 27.3*   PLATELETS 10*3/mm3 432 442     Results from last 7 days   Lab Units 07/22/25  0805 07/21/25  0754   SODIUM mmol/L " 138 139   POTASSIUM mmol/L 3.8 4.3   CHLORIDE mmol/L 106 105   CO2 mmol/L 20.4* 23.4   BUN mg/dL 14.5 20.0   CREATININE mg/dL 0.53* 0.83   CALCIUM mg/dL 9.0 8.8   ALBUMIN g/dL 3.7  --    BILIRUBIN mg/dL <0.2  --    ALK PHOS U/L 180*  --    ALT (SGPT) U/L 25  --    AST (SGOT) U/L 95*  --    GLUCOSE mg/dL 248* 113*   FERRITIN ng/mL 333.00*  --    IRON mcg/dL 59  --    TIBC mcg/dL 353  --        Results from last 7 days   Lab Units 07/21/25  0832   INR  1.0       6/24/2025 CT of the chest abdomen and pelvis   Impression:  1.  At least 10-15 scattered hepatic lesions likely represent metastatic  disease with index lesions which have increased in size over multiple  prior CTs.  2.  Presumed extensive osseous metastatic disease. A mixed lytic and  blastic osseous lesion within the left posterior 10th rib has a more  permeative/lytic appearance compared with 2/18/2025.  3.  Right lower lobe nodular pulmonary opacification is new since  2/18/2025 and grossly unchanged since 5/12/2025 while findings may  represent pneumonia neoplastic disease cannot be excluded. The subsolid  nodule within the right apex appears to gradually increase in size of  multiple prior CTs and is concerning for metastatic disease and/or  slow-growing neoplasm.  4.  Other findings as above     Assessment & Plan       *Left breast invasive lobular carcinoma  The tumor is estrogen receptor +91 to 100%, progesterone receptor +31 to 40%, HER2 negative, 0, Ki-67 35%  The tumor measures greater than 10 cm on exam, lymph node positive.  CT of the chest abdomen and pelvis with right upper lobe pulmonary nodule which is new and the bone scan also shows uptake in the left anterior inferior iliac spine which correlates to a lucent area on the CT scan and also focal uptake noted in the left frontal calvarium concerning for metastatic disease.  Further evaluation with a MRI of the pelvis and hip as well as MRI of the brain is underway.  The scans are scheduled for  7/10/2024.  Clinical T3 N1 M1, stage IV invasive lobular carcinoma.  There is also a right axillary lymph node which has been biopsied and consistent with invasive lobular carcinoma with similar morphology as well as receptors concerning for metastatic disease rather than a primary right breast cancer.  Recommended Ribociclib 400 mg 3 weeks on and 1 week off.  July 16, 2024: Reviewed MRI of the pelvis and hip consistent with many bony metastasis.  MRI brain shows left frontal bone mets but no brain involvement.  Patient is here to start day 1 ribociclib along with anastrozole.  Continues on anastrozole.  Tempus performed on the left axilla lymph node biopsy shows PIK3CA mutation, CDH 1 mutation and Erb B2 mutation.  Therefore patient would benefit from alpelisib and Faslodex after progression on Ribociclib and AI.  Other options include neratinib plus Faslodex.  Initiated Ribociclib in August 2024.  8/30/2024-last day of week 3 of Ribociclib.    Patient completed 1 cycle of Ribociclib 400 mg and subsequently has not been able to go back on Ribociclib due to recurrent hospitalizations and abnormal LFTs.  10/14/2024-LFTs are normal today.  Recommend resuming Ribociclib at 200 mg and subsequently we will increase the dose to 400 mg with the next cycle.  Patient was unable to resume Ribociclib as she was admitted to the hospital from 10/17/2024 to 10/23/2024  11/11/2024-Labs reviewed and overall stable except for an ALT of 49.  Recommend resuming Ribociclib at 200 mg.  CT of the chest performed 10/29/2024 shows stable size of the left breast mass, decrease in size of the left axillary lymph node and mixed lytic and blastic lesion of the rib slightly increased in size.  11/11/2024-resumed Ribociclib 200 mg daily for 3/4 weeks.  2/18/2025-scans with disease progression in the bones in the liver.  Discontinue Ribociclib and anastrozole  2/28/2025-received the first dose of Faslodex.  3/17/2025-started truqap  3/21/2025-  CBC reviewed and WBC 4.81, hemoglobin 13.9 platelets 595,000, CMP reviewed and LFTs have improved with ALT which is normal at 33, AST 71, alkaline phosphatase 185, total bilirubin normal at 0.2, blood sugar elevated at 409  Completed 1 week of trucap, resume again on 3/24/2025  4/4/2025 due for cycle 2-day 1 Faslodex.  She is struggling with intermittent hyperglycemia related to Truqap which is improved with glipizide  5/12/2025-CT of the chest and bone scan with overall stable disease.  Prior to that she had a CT abdomen end of April 2025 which also shows no evidence of metastatic disease.  Patient has not taken a whole lot of trucap, maybe less than 2 weeks total since initiation in March 2025.  Bilateral diagnostic mammogram and ultrasound from 6/24/2025 concerning for disease progression  CT of the chest abdomen and pelvis from 6/24/2025 also concerning for disease progression  I reviewed her previous pathology report and HER2 is noted to be 0.  She had a repeat punch biopsy of the left breast skin.  Will follow-up on the pathology from that.  If HER2 is 1+ or even ultralow we should be able to use Enhertu.  She truly did not have disease progression on Truqap however she was hospitalized back-to-back for the few days that she took 2 Due to recurrent UTIs.  She also currently has an ongoing UTI.  I worry that any type of PIK 3 CA directed therapy will result in UTIs, hyperglycemia and diarrhea which may not be ideal for her situation with the multiple sclerosis and inability to transfer as well as bladder dysfunction  Therefore the next best option would be chemotherapy which would be either Enhertu, Trodelvy or taxane.  I called and discussed with Dr. Jacqueline Daniels to determine the HER2 and she looked at the original biopsy again and this was negative.  Punch biopsy from the left breast noted to be ER negative, MA negative and HER2 nu 0.  Invasive pleomorphic lobular carcinoma.  Given that the receptor status  has changed, we would have to treat the metastatic breast cancer which is progressing with chemotherapy which would be either Xeloda or Trodelvy as HER2/dorothy was 0 Enhertu is not an option.  I reviewed both of these options with the patient and the side effects of both these medications at length and she prefers to proceed with Trodelvy.  Will also obtain a PD-L1 status to see if immunotherapy is an option.  Started Trodelvy at 7.5 mg/kg, q. 14 days as I am really concerned about her ability to tolerate.  We will also plan for Neulasta upfront.  Although tumor markers were not elevated originally now with elevated tumor markers with a CA 15-3 of 296 on 6/25/2025 and CA 20 7.29 of 409 on 6/25/2025.  7/22/2025 patient returns today for initiation of Trodelvy as planned.  She had her port placed yesterday.  We did get Neulasta support.  Monitor hemoglobin closely at 8.7 today.    *Severe hyperglycemia  Blood sugar greater than 400  Administer 5 units of insulin  Start glipizide 2.5 mg  Remain glucometer and diabetic education.  Continue glipizide 2.5 mg daily, blood glucose 142 today.  The patient understands to continue truqap if AM fasting blood glucose is less than 250.   5/9/2025-blood sugar 128  7/22/2025 glucose 248    *Right axillary lymphadenopathy  Biopsied and consistent with invasive lobular carcinoma, grade 2, ER/DE positive and HER2 negative  Recent bilateral diagnostic mammogram and ultrasound from 6/24/2025 shows disease progression    *Right breast non-mass enhancement  6/28/2024-MRI of the breast with non-mass enhancement noted in the right breast and patient had questions regarding if this should be biopsied.  Given extensive metastatic disease in the bones and right axilla lymph node consistent with invasive lobular carcinoma management would not change with the biopsy and hence I would recommend against it.    *Severe nausea  Continue Compazine and Zofran as needed  Well-controlled at this  time    *Skeletal metastasis  Patient will be started on Xgeva  8/16/2024 Xgeva initiation will be held as the patient has not been to the dentist in over 2 years.  Discussed possible side effects of Xgeva and she will schedule a dental visit and we will have her back in 1 week to initiate Xgeva pending this is complete.  10/14/2024-second dose of Xgeva will be administered.  Labs reviewed and stable to proceed.  Continue Xgeva every 4 weeks    *Multiple sclerosis  Patient was not on any treatment previously.  She sees Dr. Jacobson at [facility].  Due to profound weakness patient requested her neurologist to start steroids.  They plan to initiate high-dose IV steroids to help with this.  High-dose IV steroids have not been initiated.  Doing physical therapy and received high-dose steroids  Stable    *Hypertension-continue current medication  Blood pressure BP: 144/77   Continue to monitor  No changes in medications    *Hyperlipidemia-continue current medication  No changes    *Urinary incontinence-patient had  a suprapubic catheter placed by urology.  Patient with recurrent UTIs  UTIs are a side effect of truqap  Discussed with her primary care physician regarding suppressive therapy  Patient does not wish to go back on Truqap at this time    *History of iron deficiency anemia-  3/8/2024 hemoglobin was low at 10.9 and subsequently patient took oral iron which resulted in improvement of hemoglobin and normal.  She was scheduled for colonoscopy however she canceled that due to ongoing management of breast cancer.  She proceed with a colonoscopy.  8/16/2024 hemoglobin is 10.2.  She states she recently was told to begin daily iron supplementation.  Baseline iron studies ordered today she is only been taking the iron for a few days.  Ferritin 32, iron saturation 5%, TIBC 440.  We will repeat this in 6 to 8 weeks.  12/10/2024 Hgb 12.0. Continue oral iron  Hemoglobin normal at 12.6 on 2/28/2025 7/7/2025-hemoglobin  9.9  7/22/2025- hgb 8.7.  Ferritin 333, iron saturation 17%, TIBC 353    *Genetic testing-9 gene stat panel negative    *Dyspnea  Stable to improved    *Right upper lobe pulmonary nodule   Concerning for metastatic disease  PET/CT may not be helpful as invasive lobular carcinomas typically are not very PET avid.  Could consider PET-FES  Stable on the most recent CT of the chest    *Follow-up-after the MRIs.  Reviewed MRI of the pelvis and MRI of the brain which shows mets in the bone  MRI of the cervical and thoracic spine performed at outside facility on 10/3/2024    *Abnormal LFTs  Likely secondary to Ribociclib  Ribociclib dose increased to 400 mg daily on 12/10/2024  12/31/2024-Labs reviewed and stable.  1/13/2025: LFTs further increased with AST 72, ALT 22.  Decision made to hold off on starting next cycle of ribociclib.  1/31/2025-AST 72, ALT normal, total bilirubin normal  Liver function studies minimally elevated today, 4/4/2025 with AST 73, ALT 52, total bilirubin is normal at 0.4  Most recent LFTs from May 2025 reviewed and stable  7/7/2025-LFTs stable\  7/22/2025 AST 95, ALT normal and total bilirubin normal:dosing: Liver Impairment: Adult (Sacituzumab govitecan) Mild impairment (total bilirubin <= ULN with AST > ULN or bilirubin >1 to 1.5 times ULN with any AST): No initial dosage adjustment necessary.Moderate to severe impairment (total bilirubin >1.5 times ULN, or AST and ALT >3 times ULN without liver metastases, or AST and ALT >5 times ULN with liver metastases): There are no dosage adjustments provided in the 's labeling (no initial dosage recommendation can be made).  Therefore no adjustment needed in dosing.  Discussed with Dr. Reyes.    *Constipation  Patient having constipation utilizing senna.  Initiating Trodelvy today and due for atropine.  Will give 50% of atropine dose    *Insomnia  Severe  Using trazodone and Ambien.  Neither of them helping  Only clonazepam helps  Referral to  supportive oncology    *Alternate medication  Patient sees a outside provider and receives ivermectin and fenbendazole  We have ran an interaction check with the medications and does not appear to be any interaction however I want her against taking these medications and potential side effects  Patient however wishes to proceed with his medications.  Patient continues on ivermectin    Plan:   Proceed with initiation of Trodelvy at 7 mg/kg today  Xgeva today, continue monthly  Requesting approval of Neulasta dose  Will give 50% of atropine dose today due to existing constipatio  Return in 1 week for labs with nurse practitioner review for toxicity check  Return in 2 weeks for NP and cycle 1 Day 15 Audra, Dr. Lopes not available to stay  Return in 4 weeks for review by Dr. Lopes with labs and cycle 2-day 1 Trodelvy will review  PD-L1 testing and NGS on the punch biopsy or liquid biopsy.    This patient is on high risk drug therapy requiring intensive monitoring for toxicity.  Case discussed with infusion nursing, clinic nurse, pharmacy.  Case discussed with Dr. Reyes in Dr. Lopes's absence. I spent 50 minutes caring for Maritza on this date of service. This time includes time spent by me in the following activities: preparing for the visit, reviewing tests, obtaining and/or reviewing a separately obtained history, performing a medically appropriate examination and/or evaluation, counseling and educating the patient/family/caregiver, ordering medications, tests, or procedures, referring and communicating with other health care professionals, documenting information in the medical record, independently interpreting results and communicating that information with the patient/family/caregiver, and care coordination.       Delmy Schmitt, APRN   07/22/2025

## 2025-07-22 NOTE — SIGNIFICANT NOTE
Per Delmy COTREZ okay to treat today.     Lab Results   Component Value Date    GLUCOSE 248 (H) 07/22/2025    BUN 14.5 07/22/2025    CREATININE 0.53 (L) 07/22/2025     07/22/2025    K 3.8 07/22/2025     07/22/2025    CALCIUM 9.0 07/22/2025    PROTEINTOT 6.7 07/22/2025    ALBUMIN 3.7 07/22/2025    ALT 25 07/22/2025    AST 95 (C) 07/22/2025    ALKPHOS 180 (H) 07/22/2025    BILITOT <0.2 07/22/2025    GLOB 3.0 07/22/2025    AGRATIO 1.2 07/22/2025    BCR 27.4 (H) 07/22/2025    ANIONGAP 11.6 07/22/2025    EGFR 104.7 07/22/2025     Pt has been taking senna the past 5 days. Did not take a dose today due to not wanting to have diarrhea while here. Pt uses a leandra lift to get around and use the bathroom. RN spoke to Delmy CORTEZ due to pts bm leaning towards constipation. DANA would like to cut the dose into half.     Pt not approved for on body today NP will be doing a peer to peer tomorrow for approval if approved pt will come in tomorrow for injection. Pt made aware and vu on waiting for a phone call.

## 2025-07-22 NOTE — PROGRESS NOTES
OUTPATIENT ONCOLOGY NUTRITION ASSESSMENT    Patient Name: Maritza Bejarano  YOB: 1960  MRN: [id number]  Assessment Date: 7/22/2025    COMMENTS: Met patient in infusion today. Begins Trodelvy.   She has had issues with constipation. Receiving 50% of atropine today. Returns in a week for labs and NP review. Will follow for tolerance.  If diarrhea is an issue and not constipation can try enterade if needed. Provided patient the ACS nutrition booklet and RD contact info.             Reason for Assessment Follow up, Nursing referral, Education      Diagnosis/Problem   Breast cancer   Treatment Plan Chemotherapy Trodelvy     Medical/Surgical History Past Medical History:   Diagnosis Date    Anemia 2024    `Treated with iron    Dawn esophagus     per patient    Blurred vision     R/T MS    Breast cancer     metastatic    Carpal tunnel syndrome     Clotting disorder 1993, 2003, 2012    3 g/i bleeds w/ transfus/ions    Colon polyp 2013    removed w/ colonoscopy    Deep vein thrombosis phlebitis 1980    Depression     Diplopia 2013    GERD (gastroesophageal reflux disease)     GI (gastrointestinal bleed) 3 bleeds    2 transfusions    H/O Skin cancer, basal cell     Headache     History of blood transfusion     History of GI bleed     R/T NSAIDS AND STEROIDS, multiple times    History of urinary tract infection     Hypercalcemia     s/p parathyroidectomy    Hyperlipidemia     Hypertension     Movement disorder     Multiple sclerosis     Optic neuritis     PONV (postoperative nausea and vomiting)     Sleep apnea     wears cpap    Steroid-induced diabetes 2024       Past Surgical History:   Procedure Laterality Date    APPENDECTOMY      BLADDER SURGERY      bladder stimulator    BREAST BIOPSY  don't remember    BREAST SURGERY      augmentation wtih subsequent removal    CARPAL TUNNEL RELEASE Bilateral     Left 2018, right 2020    CUBITAL TUNNEL RELEASE Left     CYSTOSCOPY BOTOX INJECTION OF BLADDER  2018     "Cystoscopy with Botox    FRACTURE SURGERY  2019    rt shoulder    PARATHYROIDECTOMY      one gland removed    ROTATOR CUFF REPAIR Right 2017    TOE SURGERY      bilateral great toes    TOTAL SHOULDER ARTHROPLASTY W/ DISTAL CLAVICLE EXCISION Right 10/22/2018    Procedure: RT TOTAL SHOULDER REVERSE ARTHROPLASTY;  Surgeon: Bipin Dangelo MD;  Location: [facility];  Service: Orthopedics            Anthropometrics        Current Height Ht Readings from Last 1 Encounters:   07/22/25 165.1 cm (65\")      Current Weight Wt Readings from Last 1 Encounters:   07/21/25 81.6 kg (180 lb)      Weight History Wt Readings from Last 30 Encounters:   07/21/25 0711 81.6 kg (180 lb)   07/14/25 1450 81.6 kg (180 lb)   07/10/25 1356 81.6 kg (180 lb)   07/10/25 0800 81.6 kg (180 lb)   07/06/25 1532 78.9 kg (174 lb)   07/06/25 0612 79.1 kg (174 lb 6.1 oz)   07/05/25 0500 78 kg (171 lb 15.3 oz)   07/04/25 0552 81.3 kg (179 lb 3.7 oz)   07/03/25 0556 79.4 kg (175 lb)   07/02/25 1846 81.6 kg (180 lb)   06/25/25 1157 81.6 kg (180 lb)   06/20/25 1448 81.6 kg (180 lb)   06/11/25 1142 68 kg (150 lb)   05/23/25 1416 68 kg (150 lb)   05/23/25 1245 68 kg (150 lb)   05/21/25 1253 68 kg (150 lb)   05/19/25 1556 68 kg (150 lb)   05/09/25 2103 68 kg (150 lb)   05/09/25 1553 68 kg (150 lb)   04/30/25 0516 79.6 kg (175 lb 7.8 oz)   04/28/25 0553 83.2 kg (183 lb 6.8 oz)   04/27/25 0509 81.5 kg (179 lb 10.8 oz)   04/26/25 0500 83.1 kg (183 lb 3.2 oz)   04/24/25 0500 81 kg (178 lb 9.2 oz)   04/22/25 0500 83.1 kg (183 lb 3.2 oz)   04/21/25 0630 82 kg (180 lb 12.4 oz)   04/19/25 2036 80.3 kg (177 lb 0.5 oz)   04/19/25 0400 77.6 kg (171 lb 1.2 oz)   04/18/25 1022 76.2 kg (168 lb)   04/18/25 0607 76.5 kg (168 lb 10.4 oz)   04/17/25 1600 76.7 kg (169 lb 1.5 oz)   04/17/25 1259 70.3 kg (155 lb)   03/25/25 0226 65.8 kg (145 lb)   03/12/25 2023 65.3 kg (144 lb)   02/28/25 1208 65.3 kg (144 lb)   01/31/25 1110 68 kg (150 lb)   01/24/25 1552 68 kg (150 lb) "   11/25/24 1434 77.1 kg (170 lb)   11/11/24 1311 76.2 kg (168 lb)   10/18/24 0203 83.4 kg (183 lb 13.8 oz)   10/17/24 1938 76.2 kg (168 lb)   10/14/24 0758 81.6 kg (180 lb)   10/04/24 1543 75.3 kg (166 lb)   09/18/24 1537 87 kg (191 lb 12.8 oz)   09/08/24 0520 84.4 kg (186 lb 1.1 oz)   08/30/24 1513 74.8 kg (165 lb)   08/16/24 1426 74.8 kg (165 lb)   07/16/24 0907 79.4 kg (175 lb)          Medications           Current medications: Fenbendazole, HYDROcodone-acetaminophen, Ivermectin, OLANZapine, albuterol sulfate HFA, aspirin, baclofen, ciclopirox, clobetasol, eszopiclone, lidocaine-prilocaine, metoprolol succinate XL, nitrofurantoin (macrocrystal-monohydrate), ondansetron, pantoprazole, phenazopyridine, pramipexole, prochlorperazine, rosuvastatin, sennosides-docusate, temazepam, and vitamin D3                 Labs       Pertinent Labs    Results from last 7 days   Lab Units 07/22/25  0805 07/21/25  0754   SODIUM mmol/L 138 139   POTASSIUM mmol/L 3.8 4.3   CHLORIDE mmol/L 106 105   CO2 mmol/L 20.4* 23.4   BUN mg/dL 14.5 20.0   CREATININE mg/dL 0.53* 0.83   CALCIUM mg/dL 9.0 8.8   BILIRUBIN mg/dL <0.2  --    ALK PHOS U/L 180*  --    ALT (SGPT) U/L 25  --    AST (SGOT) U/L 95*  --    GLUCOSE mg/dL 248* 113*     Results from last 7 days   Lab Units 07/22/25  0805   MAGNESIUM mg/dL 2.2   PHOSPHORUS mg/dL 3.0   HEMOGLOBIN g/dL 8.7*   HEMATOCRIT % 29.2*   WBC 10*3/mm3 6.59   ALBUMIN g/dL 3.7     Results from last 7 days   Lab Units 07/22/25  0805 07/21/25  0832 07/21/25  0754   INR   --  1.0  --    PLATELETS 10*3/mm3 432  --  442     COVID19   Date Value Ref Range Status   05/09/2025 Not Detected Not Detected - Ref. Range Final     Lab Results   Component Value Date    HGBA1C 7.30 (H) 04/21/2025          Physical Findings        Physical Appearance alert, Other: wheelchair     Edema  not assessed   Gastrointestinal constipation   Tubes/Lines/Drains Implantable Port   Oral/Mouth Cavity WNL   Skin Intact        Estimated/Assessed Needs        Energy Requirements        Weight for Calculation 81 kg   Method for Estimation  20-25 kcal/kg   EST Needs (kcal/day) 4422-2953 kcals/day       Protein Requirements    Weight for Calculation 81 kg   EST Protein Needs (g/kg) 1.2 gm/kg   EST Daily Needs (g/day) 97 g/day       Fluid Requirements     Method for Estimation 1 mL/kcal    Estimated Needs (mL/day) 1800 mL/day         NUTRITION INTERVENTION / PLAN OF CARE      Intervention Goal(s) Maintain nutrition status, Provide information, Meet estimated needs, Disease management/therapy, Tolerate PO , Maintain intake, Maintain weight, and No significant weight loss         RD Intervention/Action Encouraged intake, Follow Tx progress         Recommendations:       PO Diet Continue a balanced diet      Supplements       Snacks       Other Soluble and insoluble fiber sources         Monitor/Evaluation PO intake, Supplement intake, Pertinent labs, Weight, Symptoms   Education Education provided             Electronically signed by:  Andria Taylor RD, LD  07/22/25 13:29 EDT

## 2025-07-23 ENCOUNTER — TELEPHONE (OUTPATIENT)
Dept: ONCOLOGY | Facility: CLINIC | Age: 65
End: 2025-07-23
Payer: MEDICARE

## 2025-07-23 NOTE — PROGRESS NOTES
Notification received that Maritza's insurance has approved the Udenyca Onbody injector.  Maritza cannot make it into the office this afternoon.  Will plan for her to return tomorrow morning in order for her to receive the medication within 72 hours of her chemotherapy.  Maritza is notified of the same.

## 2025-07-23 NOTE — TELEPHONE ENCOUNTER
Maritza called today to report that she did not sleep well at all last night. She also states she was sweating all night.  She said she called the on-call doctor who advised her to take the olanzapine rather than lunesta.  I explained to Maritza that she likely had difficulty sleeping and sweating due to the steroid we gave her during treatment and as this wears off her sleep should improve.  Reviewed drug-drug interaction between lunesta and zyprexa and this is a category C- monitor therapy interaction. Advised this can cause increased sedation. She otherwise states she feels well today. Asked her to call if any other issues arise. She voiced understanding.    Positive

## 2025-07-24 ENCOUNTER — INFUSION (OUTPATIENT)
Dept: ONCOLOGY | Facility: HOSPITAL | Age: 65
End: 2025-07-24
Payer: MEDICARE

## 2025-07-24 ENCOUNTER — TELEPHONE (OUTPATIENT)
Dept: ONCOLOGY | Facility: CLINIC | Age: 65
End: 2025-07-24
Payer: MEDICARE

## 2025-07-24 DIAGNOSIS — C50.812 MALIGNANT NEOPLASM OF OVERLAPPING SITES OF LEFT BREAST IN FEMALE, ESTROGEN RECEPTOR POSITIVE: ICD-10-CM

## 2025-07-24 DIAGNOSIS — Z17.0 MALIGNANT NEOPLASM OF OVERLAPPING SITES OF LEFT BREAST IN FEMALE, ESTROGEN RECEPTOR POSITIVE: ICD-10-CM

## 2025-07-24 DIAGNOSIS — C50.912: Primary | ICD-10-CM

## 2025-07-24 PROCEDURE — 96377 APPLICATON ON-BODY INJECTOR: CPT

## 2025-07-24 PROCEDURE — 25010000002 PEGFILGRASTIM-CBQV 6 MG/0.6ML SOLUTION PREFILLED SYRINGE

## 2025-07-24 RX ADMIN — PEGFILGRASTIM-CBQV 6 MG: 6 INJECTION, SOLUTION SUBCUTANEOUS at 11:14

## 2025-07-24 NOTE — TELEPHONE ENCOUNTER
Caller: Maritza Bejarano    Relationship to patient: Self    Best call back number: [phone]    Chief complaint: CONFLICT OF ANOTHER APPT SAME DAY NEEDING LATER APPT TIME    Type of visit: LAB AND FOLLOW UP 1    Requested date: 07/30 NEEDING TIME AFTER 9:30AM     If rescheduling, when is the original appointment: 07/30

## 2025-07-25 ENCOUNTER — HOSPITAL ENCOUNTER (INPATIENT)
Facility: HOSPITAL | Age: 65
LOS: 4 days | Discharge: HOME HEALTH | DRG: 872 | End: 2025-07-29
Attending: EMERGENCY MEDICINE | Admitting: STUDENT IN AN ORGANIZED HEALTH CARE EDUCATION/TRAINING PROGRAM
Payer: MEDICARE

## 2025-07-25 ENCOUNTER — APPOINTMENT (OUTPATIENT)
Dept: GENERAL RADIOLOGY | Facility: HOSPITAL | Age: 65
DRG: 872 | End: 2025-07-25
Payer: MEDICARE

## 2025-07-25 ENCOUNTER — TELEPHONE (OUTPATIENT)
Dept: ONCOLOGY | Facility: CLINIC | Age: 65
End: 2025-07-25
Payer: MEDICARE

## 2025-07-25 DIAGNOSIS — R73.9 HYPERGLYCEMIA: ICD-10-CM

## 2025-07-25 DIAGNOSIS — A41.9 SEPSIS SECONDARY TO UTI: Primary | ICD-10-CM

## 2025-07-25 DIAGNOSIS — N39.0 SEPSIS SECONDARY TO UTI: Primary | ICD-10-CM

## 2025-07-25 DIAGNOSIS — D64.9 CHRONIC ANEMIA: ICD-10-CM

## 2025-07-25 LAB
ALBUMIN SERPL-MCNC: 3.7 G/DL (ref 3.5–5.2)
ALBUMIN/GLOB SERPL: 1 G/DL
ALP SERPL-CCNC: 196 U/L (ref 39–117)
ALT SERPL W P-5'-P-CCNC: 28 U/L (ref 1–33)
ANION GAP SERPL CALCULATED.3IONS-SCNC: 12.8 MMOL/L (ref 5–15)
AST SERPL-CCNC: 112 U/L (ref 1–32)
BACTERIA UR QL AUTO: ABNORMAL /HPF
BASOPHILS # BLD AUTO: 0.03 10*3/MM3 (ref 0–0.2)
BASOPHILS NFR BLD AUTO: 0.2 % (ref 0–1.5)
BILIRUB SERPL-MCNC: <0.2 MG/DL (ref 0–1.2)
BILIRUB UR QL STRIP: NEGATIVE
BUN SERPL-MCNC: 17 MG/DL (ref 8–23)
BUN/CREAT SERPL: 18.7 (ref 7–25)
CALCIUM SPEC-SCNC: 9.4 MG/DL (ref 8.6–10.5)
CHLORIDE SERPL-SCNC: 103 MMOL/L (ref 98–107)
CLARITY UR: ABNORMAL
CO2 SERPL-SCNC: 22.2 MMOL/L (ref 22–29)
COLOR UR: YELLOW
CREAT SERPL-MCNC: 0.91 MG/DL (ref 0.57–1)
D-LACTATE SERPL-SCNC: 1.3 MMOL/L (ref 0.5–2)
D-LACTATE SERPL-SCNC: 2.3 MMOL/L (ref 0.5–2)
DEPRECATED RDW RBC AUTO: 52.6 FL (ref 37–54)
EGFRCR SERPLBLD CKD-EPI 2021: 71.5 ML/MIN/1.73
EOSINOPHIL # BLD AUTO: 0.09 10*3/MM3 (ref 0–0.4)
EOSINOPHIL NFR BLD AUTO: 0.7 % (ref 0.3–6.2)
ERYTHROCYTE [DISTWIDTH] IN BLOOD BY AUTOMATED COUNT: 16.6 % (ref 12.3–15.4)
GLOBULIN UR ELPH-MCNC: 3.6 GM/DL
GLUCOSE SERPL-MCNC: 244 MG/DL (ref 65–99)
GLUCOSE UR STRIP-MCNC: ABNORMAL MG/DL
HCT VFR BLD AUTO: 28.1 % (ref 34–46.6)
HGB BLD-MCNC: 9 G/DL (ref 12–15.9)
HGB UR QL STRIP.AUTO: ABNORMAL
HYALINE CASTS UR QL AUTO: ABNORMAL /LPF
IMM GRANULOCYTES # BLD AUTO: 0.07 10*3/MM3 (ref 0–0.05)
IMM GRANULOCYTES NFR BLD AUTO: 0.5 % (ref 0–0.5)
KETONES UR QL STRIP: NEGATIVE
LEUKOCYTE ESTERASE UR QL STRIP.AUTO: ABNORMAL
LYMPHOCYTES # BLD AUTO: 1.4 10*3/MM3 (ref 0.7–3.1)
LYMPHOCYTES NFR BLD AUTO: 11 % (ref 19.6–45.3)
MCH RBC QN AUTO: 28.8 PG (ref 26.6–33)
MCHC RBC AUTO-ENTMCNC: 32 G/DL (ref 31.5–35.7)
MCV RBC AUTO: 90.1 FL (ref 79–97)
MONOCYTES # BLD AUTO: 0.24 10*3/MM3 (ref 0.1–0.9)
MONOCYTES NFR BLD AUTO: 1.9 % (ref 5–12)
NEUTROPHILS NFR BLD AUTO: 10.93 10*3/MM3 (ref 1.7–7)
NEUTROPHILS NFR BLD AUTO: 85.7 % (ref 42.7–76)
NITRITE UR QL STRIP: POSITIVE
NRBC BLD AUTO-RTO: 0 /100 WBC (ref 0–0.2)
PH UR STRIP.AUTO: 5.5 [PH] (ref 5–8)
PLATELET # BLD AUTO: 449 10*3/MM3 (ref 140–450)
PMV BLD AUTO: 8.7 FL (ref 6–12)
POTASSIUM SERPL-SCNC: 3.7 MMOL/L (ref 3.5–5.2)
PROCALCITONIN SERPL-MCNC: 0.3 NG/ML (ref 0–0.25)
PROT SERPL-MCNC: 7.3 G/DL (ref 6–8.5)
PROT UR QL STRIP: ABNORMAL
RBC # BLD AUTO: 3.12 10*6/MM3 (ref 3.77–5.28)
RBC # UR STRIP: ABNORMAL /HPF
REF LAB TEST METHOD: ABNORMAL
SODIUM SERPL-SCNC: 138 MMOL/L (ref 136–145)
SP GR UR STRIP: 1.02 (ref 1–1.03)
SQUAMOUS #/AREA URNS HPF: ABNORMAL /HPF
UROBILINOGEN UR QL STRIP: ABNORMAL
WBC # UR STRIP: ABNORMAL /HPF
WBC NRBC COR # BLD AUTO: 12.76 10*3/MM3 (ref 3.4–10.8)
YEAST URNS QL MICRO: ABNORMAL /HPF

## 2025-07-25 PROCEDURE — 80053 COMPREHEN METABOLIC PANEL: CPT | Performed by: EMERGENCY MEDICINE

## 2025-07-25 PROCEDURE — 36415 COLL VENOUS BLD VENIPUNCTURE: CPT | Performed by: EMERGENCY MEDICINE

## 2025-07-25 PROCEDURE — 83605 ASSAY OF LACTIC ACID: CPT | Performed by: EMERGENCY MEDICINE

## 2025-07-25 PROCEDURE — 99291 CRITICAL CARE FIRST HOUR: CPT

## 2025-07-25 PROCEDURE — 81001 URINALYSIS AUTO W/SCOPE: CPT | Performed by: EMERGENCY MEDICINE

## 2025-07-25 PROCEDURE — 71045 X-RAY EXAM CHEST 1 VIEW: CPT

## 2025-07-25 PROCEDURE — 87040 BLOOD CULTURE FOR BACTERIA: CPT | Performed by: EMERGENCY MEDICINE

## 2025-07-25 PROCEDURE — 25010000002 CEFEPIME PER 500 MG: Performed by: EMERGENCY MEDICINE

## 2025-07-25 PROCEDURE — 84145 PROCALCITONIN (PCT): CPT | Performed by: EMERGENCY MEDICINE

## 2025-07-25 PROCEDURE — 85025 COMPLETE CBC W/AUTO DIFF WBC: CPT | Performed by: EMERGENCY MEDICINE

## 2025-07-25 PROCEDURE — 99285 EMERGENCY DEPT VISIT HI MDM: CPT

## 2025-07-25 RX ORDER — SODIUM CHLORIDE 0.9 % (FLUSH) 0.9 %
10 SYRINGE (ML) INJECTION AS NEEDED
Status: DISCONTINUED | OUTPATIENT
Start: 2025-07-25 | End: 2025-07-29 | Stop reason: HOSPADM

## 2025-07-25 RX ORDER — FLUCONAZOLE 2 MG/ML
200 INJECTION, SOLUTION INTRAVENOUS ONCE
Status: DISCONTINUED | OUTPATIENT
Start: 2025-07-25 | End: 2025-07-28

## 2025-07-25 RX ADMIN — CEFEPIME 2000 MG: 2 INJECTION, POWDER, FOR SOLUTION INTRAVENOUS at 23:29

## 2025-07-25 NOTE — ED NOTES
Patient to ED via EMS from home with possible reaction to chemotherapy. Pt recently diagnosed with stage IV breast cancer and began treatment last week. Pt became diaphoretic during first treatment and began having fever, lethargy, and increased weakness last night.

## 2025-07-25 NOTE — TELEPHONE ENCOUNTER
Maritza contacted me to report she's feeling very fatigued since last evening. She also reports feeling short of breath.  She states he vitals are all normal, HR, BP, glucose.  She does not have an O2 monitor.  She denies any chest pain or fever.  She describes it as constant.  Reviewed with DANA Brock who saw Maritza on Tuesday.  She suggests Maritza proceed to the ER if she feels the shortness of breath is a significant change from her baseline.  Reviewed this back with Maritza who states she does feel like this is significant, advised she should seek ER evaluation as any imaging that would be necessary can be done the quickest.  She voiced understanding.

## 2025-07-26 PROBLEM — R74.01 TRANSAMINITIS: Status: ACTIVE | Noted: 2025-07-26

## 2025-07-26 LAB
ALBUMIN SERPL-MCNC: 3.4 G/DL (ref 3.5–5.2)
ALBUMIN/GLOB SERPL: 1 G/DL
ALP SERPL-CCNC: 173 U/L (ref 39–117)
ALT SERPL W P-5'-P-CCNC: 23 U/L (ref 1–33)
ANION GAP SERPL CALCULATED.3IONS-SCNC: 11 MMOL/L (ref 5–15)
AST SERPL-CCNC: 109 U/L (ref 1–32)
BILIRUB SERPL-MCNC: <0.2 MG/DL (ref 0–1.2)
BUN SERPL-MCNC: 15 MG/DL (ref 8–23)
BUN/CREAT SERPL: 26.8 (ref 7–25)
CALCIUM SPEC-SCNC: 8.5 MG/DL (ref 8.6–10.5)
CHLORIDE SERPL-SCNC: 104 MMOL/L (ref 98–107)
CO2 SERPL-SCNC: 22 MMOL/L (ref 22–29)
CREAT SERPL-MCNC: 0.56 MG/DL (ref 0.57–1)
D-LACTATE SERPL-SCNC: 2 MMOL/L (ref 0.5–2)
DEPRECATED RDW RBC AUTO: 53.2 FL (ref 37–54)
EGFRCR SERPLBLD CKD-EPI 2021: 103.3 ML/MIN/1.73
ERYTHROCYTE [DISTWIDTH] IN BLOOD BY AUTOMATED COUNT: 16.1 % (ref 12.3–15.4)
GLOBULIN UR ELPH-MCNC: 3.4 GM/DL
GLUCOSE BLDC GLUCOMTR-MCNC: 126 MG/DL (ref 70–130)
GLUCOSE BLDC GLUCOMTR-MCNC: 194 MG/DL (ref 70–130)
GLUCOSE BLDC GLUCOMTR-MCNC: 225 MG/DL (ref 70–130)
GLUCOSE SERPL-MCNC: 212 MG/DL (ref 65–99)
HCT VFR BLD AUTO: 26.7 % (ref 34–46.6)
HGB BLD-MCNC: 8.6 G/DL (ref 12–15.9)
MAGNESIUM SERPL-MCNC: 2.3 MG/DL (ref 1.6–2.4)
MCH RBC QN AUTO: 29.1 PG (ref 26.6–33)
MCHC RBC AUTO-ENTMCNC: 32.2 G/DL (ref 31.5–35.7)
MCV RBC AUTO: 90.2 FL (ref 79–97)
PHOSPHATE SERPL-MCNC: 3.6 MG/DL (ref 2.5–4.5)
PLATELET # BLD AUTO: 397 10*3/MM3 (ref 140–450)
PMV BLD AUTO: 8.9 FL (ref 6–12)
POTASSIUM SERPL-SCNC: 3.8 MMOL/L (ref 3.5–5.2)
PROT SERPL-MCNC: 6.8 G/DL (ref 6–8.5)
RBC # BLD AUTO: 2.96 10*6/MM3 (ref 3.77–5.28)
SODIUM SERPL-SCNC: 137 MMOL/L (ref 136–145)
WBC NRBC COR # BLD AUTO: 13.95 10*3/MM3 (ref 3.4–10.8)

## 2025-07-26 PROCEDURE — 84100 ASSAY OF PHOSPHORUS: CPT | Performed by: STUDENT IN AN ORGANIZED HEALTH CARE EDUCATION/TRAINING PROGRAM

## 2025-07-26 PROCEDURE — 82948 REAGENT STRIP/BLOOD GLUCOSE: CPT

## 2025-07-26 PROCEDURE — 83735 ASSAY OF MAGNESIUM: CPT | Performed by: STUDENT IN AN ORGANIZED HEALTH CARE EDUCATION/TRAINING PROGRAM

## 2025-07-26 PROCEDURE — 25010000002 ENOXAPARIN PER 10 MG: Performed by: STUDENT IN AN ORGANIZED HEALTH CARE EDUCATION/TRAINING PROGRAM

## 2025-07-26 PROCEDURE — 25810000003 SODIUM CHLORIDE 0.9 % SOLUTION: Performed by: EMERGENCY MEDICINE

## 2025-07-26 PROCEDURE — 25010000002 CEFEPIME PER 500 MG: Performed by: NURSE PRACTITIONER

## 2025-07-26 PROCEDURE — 63710000001 INSULIN LISPRO (HUMAN) PER 5 UNITS: Performed by: NURSE PRACTITIONER

## 2025-07-26 PROCEDURE — 99222 1ST HOSP IP/OBS MODERATE 55: CPT | Performed by: INTERNAL MEDICINE

## 2025-07-26 PROCEDURE — 85027 COMPLETE CBC AUTOMATED: CPT | Performed by: NURSE PRACTITIONER

## 2025-07-26 PROCEDURE — 80053 COMPREHEN METABOLIC PANEL: CPT | Performed by: NURSE PRACTITIONER

## 2025-07-26 PROCEDURE — 25010000002 FLUCONAZOLE PER 200 MG: Performed by: NURSE PRACTITIONER

## 2025-07-26 PROCEDURE — 83605 ASSAY OF LACTIC ACID: CPT | Performed by: STUDENT IN AN ORGANIZED HEALTH CARE EDUCATION/TRAINING PROGRAM

## 2025-07-26 RX ORDER — ACETAMINOPHEN 325 MG/1
650 TABLET ORAL EVERY 4 HOURS PRN
Status: DISCONTINUED | OUTPATIENT
Start: 2025-07-26 | End: 2025-07-29 | Stop reason: HOSPADM

## 2025-07-26 RX ORDER — POLYETHYLENE GLYCOL 3350 17 G/17G
17 POWDER, FOR SOLUTION ORAL DAILY PRN
Status: DISCONTINUED | OUTPATIENT
Start: 2025-07-26 | End: 2025-07-27

## 2025-07-26 RX ORDER — ALBUTEROL SULFATE 0.83 MG/ML
2.5 SOLUTION RESPIRATORY (INHALATION) EVERY 4 HOURS PRN
Status: DISCONTINUED | OUTPATIENT
Start: 2025-07-26 | End: 2025-07-29 | Stop reason: HOSPADM

## 2025-07-26 RX ORDER — HEPARIN SODIUM (PORCINE) LOCK FLUSH IV SOLN 100 UNIT/ML 100 UNIT/ML
5 SOLUTION INTRAVENOUS AS NEEDED
Status: DISCONTINUED | OUTPATIENT
Start: 2025-07-26 | End: 2025-07-29 | Stop reason: HOSPADM

## 2025-07-26 RX ORDER — ONDANSETRON 2 MG/ML
4 INJECTION INTRAMUSCULAR; INTRAVENOUS EVERY 6 HOURS PRN
Status: DISCONTINUED | OUTPATIENT
Start: 2025-07-26 | End: 2025-07-29 | Stop reason: HOSPADM

## 2025-07-26 RX ORDER — SODIUM CHLORIDE 9 MG/ML
40 INJECTION, SOLUTION INTRAVENOUS AS NEEDED
Status: DISCONTINUED | OUTPATIENT
Start: 2025-07-26 | End: 2025-07-29 | Stop reason: HOSPADM

## 2025-07-26 RX ORDER — PRAMIPEXOLE DIHYDROCHLORIDE 1.5 MG/1
1.5 TABLET ORAL 2 TIMES DAILY
Status: DISCONTINUED | OUTPATIENT
Start: 2025-07-26 | End: 2025-07-29 | Stop reason: HOSPADM

## 2025-07-26 RX ORDER — INSULIN LISPRO 100 [IU]/ML
2-7 INJECTION, SOLUTION INTRAVENOUS; SUBCUTANEOUS
Status: DISCONTINUED | OUTPATIENT
Start: 2025-07-26 | End: 2025-07-29 | Stop reason: HOSPADM

## 2025-07-26 RX ORDER — PANTOPRAZOLE SODIUM 40 MG/1
40 TABLET, DELAYED RELEASE ORAL 3 TIMES WEEKLY
Status: DISCONTINUED | OUTPATIENT
Start: 2025-07-26 | End: 2025-07-29 | Stop reason: HOSPADM

## 2025-07-26 RX ORDER — NITROGLYCERIN 0.4 MG/1
0.4 TABLET SUBLINGUAL
Status: DISCONTINUED | OUTPATIENT
Start: 2025-07-26 | End: 2025-07-29 | Stop reason: HOSPADM

## 2025-07-26 RX ORDER — SODIUM CHLORIDE 0.9 % (FLUSH) 0.9 %
10 SYRINGE (ML) INJECTION EVERY 12 HOURS SCHEDULED
Status: DISCONTINUED | OUTPATIENT
Start: 2025-07-26 | End: 2025-07-29 | Stop reason: HOSPADM

## 2025-07-26 RX ORDER — FAMOTIDINE 20 MG/1
20 TABLET, FILM COATED ORAL 2 TIMES DAILY PRN
Status: DISCONTINUED | OUTPATIENT
Start: 2025-07-26 | End: 2025-07-29 | Stop reason: HOSPADM

## 2025-07-26 RX ORDER — BISACODYL 10 MG
10 SUPPOSITORY, RECTAL RECTAL DAILY PRN
Status: DISCONTINUED | OUTPATIENT
Start: 2025-07-26 | End: 2025-07-27

## 2025-07-26 RX ORDER — SODIUM CHLORIDE 0.9 % (FLUSH) 0.9 %
10 SYRINGE (ML) INJECTION AS NEEDED
Status: DISCONTINUED | OUTPATIENT
Start: 2025-07-26 | End: 2025-07-29 | Stop reason: HOSPADM

## 2025-07-26 RX ORDER — METOPROLOL SUCCINATE 25 MG/1
25 TABLET, EXTENDED RELEASE ORAL
Status: DISCONTINUED | OUTPATIENT
Start: 2025-07-26 | End: 2025-07-29 | Stop reason: HOSPADM

## 2025-07-26 RX ORDER — ASPIRIN 81 MG/1
81 TABLET ORAL DAILY
Status: DISCONTINUED | OUTPATIENT
Start: 2025-07-26 | End: 2025-07-29 | Stop reason: HOSPADM

## 2025-07-26 RX ORDER — FLUCONAZOLE 2 MG/ML
200 INJECTION, SOLUTION INTRAVENOUS EVERY 24 HOURS
Status: DISCONTINUED | OUTPATIENT
Start: 2025-07-26 | End: 2025-07-28

## 2025-07-26 RX ORDER — DEXTROSE MONOHYDRATE 25 G/50ML
25 INJECTION, SOLUTION INTRAVENOUS
Status: DISCONTINUED | OUTPATIENT
Start: 2025-07-26 | End: 2025-07-29 | Stop reason: HOSPADM

## 2025-07-26 RX ORDER — IBUPROFEN 600 MG/1
1 TABLET ORAL
Status: DISCONTINUED | OUTPATIENT
Start: 2025-07-26 | End: 2025-07-29 | Stop reason: HOSPADM

## 2025-07-26 RX ORDER — AMOXICILLIN 250 MG
2 CAPSULE ORAL 2 TIMES DAILY PRN
Status: DISCONTINUED | OUTPATIENT
Start: 2025-07-26 | End: 2025-07-27

## 2025-07-26 RX ORDER — NICOTINE POLACRILEX 4 MG
15 LOZENGE BUCCAL
Status: DISCONTINUED | OUTPATIENT
Start: 2025-07-26 | End: 2025-07-29 | Stop reason: HOSPADM

## 2025-07-26 RX ORDER — ENOXAPARIN SODIUM 100 MG/ML
40 INJECTION SUBCUTANEOUS EVERY 24 HOURS
Status: DISCONTINUED | OUTPATIENT
Start: 2025-07-26 | End: 2025-07-29 | Stop reason: HOSPADM

## 2025-07-26 RX ORDER — SODIUM CHLORIDE 0.9 % (FLUSH) 0.9 %
20 SYRINGE (ML) INJECTION AS NEEDED
Status: DISCONTINUED | OUTPATIENT
Start: 2025-07-26 | End: 2025-07-29 | Stop reason: HOSPADM

## 2025-07-26 RX ORDER — CHOLECALCIFEROL (VITAMIN D3) 25 MCG
5000 TABLET ORAL DAILY
Status: DISCONTINUED | OUTPATIENT
Start: 2025-07-26 | End: 2025-07-29 | Stop reason: HOSPADM

## 2025-07-26 RX ORDER — ACETAMINOPHEN 160 MG/5ML
650 SOLUTION ORAL EVERY 4 HOURS PRN
Status: DISCONTINUED | OUTPATIENT
Start: 2025-07-26 | End: 2025-07-29 | Stop reason: HOSPADM

## 2025-07-26 RX ORDER — BISACODYL 5 MG/1
5 TABLET, DELAYED RELEASE ORAL DAILY PRN
Status: DISCONTINUED | OUTPATIENT
Start: 2025-07-26 | End: 2025-07-27

## 2025-07-26 RX ORDER — ACETAMINOPHEN 650 MG/1
650 SUPPOSITORY RECTAL EVERY 4 HOURS PRN
Status: DISCONTINUED | OUTPATIENT
Start: 2025-07-26 | End: 2025-07-29 | Stop reason: HOSPADM

## 2025-07-26 RX ORDER — BACLOFEN 10 MG/1
20 TABLET ORAL 2 TIMES DAILY
Status: DISCONTINUED | OUTPATIENT
Start: 2025-07-26 | End: 2025-07-29 | Stop reason: HOSPADM

## 2025-07-26 RX ORDER — HYDROCODONE BITARTRATE AND ACETAMINOPHEN 5; 325 MG/1; MG/1
1 TABLET ORAL EVERY 4 HOURS PRN
Refills: 0 | Status: DISCONTINUED | OUTPATIENT
Start: 2025-07-26 | End: 2025-07-29 | Stop reason: HOSPADM

## 2025-07-26 RX ADMIN — INSULIN LISPRO 3 UNITS: 100 INJECTION, SOLUTION INTRAVENOUS; SUBCUTANEOUS at 17:58

## 2025-07-26 RX ADMIN — CEFEPIME 2000 MG: 2 INJECTION, POWDER, FOR SOLUTION INTRAVENOUS at 15:48

## 2025-07-26 RX ADMIN — ENOXAPARIN SODIUM 40 MG: 100 INJECTION SUBCUTANEOUS at 15:48

## 2025-07-26 RX ADMIN — DOCUSATE SODIUM 50 MG AND SENNOSIDES 8.6 MG 2 TABLET: 8.6; 5 TABLET, FILM COATED ORAL at 23:27

## 2025-07-26 RX ADMIN — SODIUM CHLORIDE 1000 ML: 9 INJECTION, SOLUTION INTRAVENOUS at 01:11

## 2025-07-26 RX ADMIN — BACLOFEN 20 MG: 10 TABLET ORAL at 08:13

## 2025-07-26 RX ADMIN — Medication 10 ML: at 23:09

## 2025-07-26 RX ADMIN — PRAMIPEXOLE DIHYDROCHLORIDE 1.5 MG: 1.5 TABLET ORAL at 08:15

## 2025-07-26 RX ADMIN — BACLOFEN 20 MG: 10 TABLET ORAL at 21:13

## 2025-07-26 RX ADMIN — CEFEPIME 2000 MG: 2 INJECTION, POWDER, FOR SOLUTION INTRAVENOUS at 23:04

## 2025-07-26 RX ADMIN — Medication 10 ML: at 12:19

## 2025-07-26 RX ADMIN — METOPROLOL SUCCINATE 25 MG: 25 TABLET, EXTENDED RELEASE ORAL at 08:15

## 2025-07-26 RX ADMIN — FLUCONAZOLE 200 MG: 200 INJECTION, SOLUTION INTRAVENOUS at 21:13

## 2025-07-26 RX ADMIN — Medication 10 ML: at 08:15

## 2025-07-26 RX ADMIN — Medication 5000 UNITS: at 08:15

## 2025-07-26 RX ADMIN — ACETAMINOPHEN 650 MG: 325 TABLET, FILM COATED ORAL at 15:47

## 2025-07-26 RX ADMIN — ASPIRIN 81 MG: 81 TABLET, COATED ORAL at 08:15

## 2025-07-26 RX ADMIN — PRAMIPEXOLE DIHYDROCHLORIDE 1.5 MG: 1.5 TABLET ORAL at 21:12

## 2025-07-26 RX ADMIN — INSULIN LISPRO 2 UNITS: 100 INJECTION, SOLUTION INTRAVENOUS; SUBCUTANEOUS at 08:13

## 2025-07-26 RX ADMIN — PANTOPRAZOLE SODIUM 40 MG: 40 TABLET, DELAYED RELEASE ORAL at 04:57

## 2025-07-26 RX ADMIN — CEFEPIME 2000 MG: 2 INJECTION, POWDER, FOR SOLUTION INTRAVENOUS at 08:14

## 2025-07-26 NOTE — ED NOTES
"..Nursing report ED to floor  Maritza Bejarano  64 y.o.  female    HPI :  HPI  Stated Reason for Visit: Fever, weakness, recently began chemotherapy  History Obtained From: EMS, patient  Did patient miss a scheduled dialysis appointment?: No    Chief Complaint  Chief Complaint   Patient presents with    Fever       Admitting doctor:   Frederick Carey DO    Admitting diagnosis:   The encounter diagnosis was Sepsis secondary to UTI.    Code status:   Current Code Status       Date Active Code Status Order ID Comments User Context       Prior            Allergies:   Klonopin [clonazepam]    Isolation:   No active isolations    Intake and Output  No intake or output data in the 24 hours ending 07/25/25 2302    Weight:       07/25/25 2002   Weight: 81.6 kg (180 lb)       Most recent vitals:   Vitals:    07/25/25 2002 07/25/25 2103 07/25/25 2134   BP: 150/75 130/65 105/51   BP Location: Right arm     Patient Position: Lying     Pulse: 100 91 88   Resp: 18 18 18   Temp: 99.9 °F (37.7 °C)     TempSrc: Oral     SpO2: 100% 93% 96%   Weight: 81.6 kg (180 lb)     Height: 165.1 cm (65\")         Active LDAs/IV Access:   Lines, Drains & Airways       Active LDAs       Name Placement date Placement time Site Days    Peripheral IV 07/25/25 2034 20 G Anterior;Distal;Right Forearm 07/25/25 2034  Forearm  less than 1    Urethral Catheter 16 Fr. 07/25/25 2051  -- less than 1    Single Lumen Implantable Port 07/21/25 Right Subclavian 07/21/25  0910  Subclavian  4                    Labs (abnormal labs have a star):   Labs Reviewed   COMPREHENSIVE METABOLIC PANEL - Abnormal; Notable for the following components:       Result Value    Glucose 244 (*)     AST (SGOT) 112 (*)     Alkaline Phosphatase 196 (*)     All other components within normal limits    Narrative:     GFR Categories in Chronic Kidney Disease (CKD)              GFR Category          GFR (mL/min/1.73)    Interpretation  G1                    90 or greater        Normal or high " "(1)  G2                    60-89                Mild decrease (1)  G3a                   45-59                Mild to moderate decrease  G3b                   30-44                Moderate to severe decrease  G4                    15-29                Severe decrease  G5                    14 or less           Kidney failure    (1)In the absence of evidence of kidney disease, neither GFR category G1 or G2 fulfill the criteria for CKD.    eGFR calculation 2021 CKD-EPI creatinine equation, which does not include race as a factor   LACTIC ACID, PLASMA - Abnormal; Notable for the following components:    Lactate 2.3 (*)     All other components within normal limits   PROCALCITONIN - Abnormal; Notable for the following components:    Procalcitonin 0.30 (*)     All other components within normal limits    Narrative:     As a Marker for Sepsis (Non-Neonates):    1. <0.5 ng/mL represents a low risk of severe sepsis and/or septic shock.  2. >2 ng/mL represents a high risk of severe sepsis and/or septic shock.    As a Marker for Lower Respiratory Tract Infections that require antibiotic therapy:    PCT on Admission    Antibiotic Therapy       6-12 Hrs later    >0.5                Strongly Recommended  >0.25 - <0.5        Recommended   0.1 - 0.25          Discouraged              Remeasure/reassess PCT  <0.1                Strongly Discouraged     Remeasure/reassess PCT    As 28 day mortality risk marker: \"Change in Procalcitonin Result\" (>80% or <=80%) if Day 0 (or Day 1) and Day 4 values are available. Refer to http://www.[facility]-pct-calculator.com    Change in PCT <=80%  A decrease of PCT levels below or equal to 80% defines a positive change in PCT test result representing a higher risk for 28-day all-cause mortality of patients diagnosed with severe sepsis for septic shock.    Change in PCT >80%  A decrease of PCT levels of more than 80% defines a negative change in PCT result representing a lower risk for 28-day all-cause " mortality of patients diagnosed with severe sepsis or septic shock.      URINALYSIS W/ CULTURE IF INDICATED - Abnormal; Notable for the following components:    Appearance, UA Slightly Cloudy (*)     Glucose,  mg/dL (Trace) (*)     Blood, UA Small (1+) (*)     Protein,  mg/dL (2+) (*)     Leuk Esterase, UA Small (1+) (*)     Nitrite, UA Positive (*)     All other components within normal limits    Narrative:     In absence of clinical symptoms, the presence of pyuria, bacteria, and/or nitrites on the urinalysis result does not correlate with infection.   CBC WITH AUTO DIFFERENTIAL - Abnormal; Notable for the following components:    WBC 12.76 (*)     RBC 3.12 (*)     Hemoglobin 9.0 (*)     Hematocrit 28.1 (*)     RDW 16.6 (*)     Neutrophil % 85.7 (*)     Lymphocyte % 11.0 (*)     Monocyte % 1.9 (*)     Neutrophils, Absolute 10.93 (*)     Immature Grans, Absolute 0.07 (*)     All other components within normal limits   URINALYSIS, MICROSCOPIC ONLY - Abnormal; Notable for the following components:    WBC, UA 3-5 (*)     Bacteria, UA Trace (*)     Yeast, UA Large/3+ Yeast (*)     All other components within normal limits   BLOOD CULTURE   BLOOD CULTURE   LACTIC ACID, REFLEX   CBC AND DIFFERENTIAL    Narrative:     The following orders were created for panel order CBC & Differential.  Procedure                               Abnormality         Status                     ---------                               -----------         ------                     CBC Auto Differential[[id number]]        Abnormal            Final result                 Please view results for these tests on the individual orders.       EKG:   No orders to display       Meds given in ED:   Medications   sodium chloride 0.9 % flush 10 mL (has no administration in time range)   cefepime 2000 mg IVPB in 100 mL NS (MBP) (has no administration in time range)   sodium chloride 0.9 % bolus 1,000 mL (has no administration in time range)    fluconazole (DIFLUCAN) IVPB 200 mg (has no administration in time range)       Imaging results:  XR Chest 1 View  Result Date: 2025   Right IJ [id number] catheter remains in place.  The heart size is within normal limits.  Low lung volumes, no acute pulmonary infiltrate.  The right lung appears normally aerated. Left basilar retrocardiac linear density, scarring or atelectasis, unchanged.  This report was finalized on 2025 9:45 PM by Dr. Juma Scott M.D on Workstation: [id number]        Ambulatory status:   - bed bound    Social issues:   Social History     Socioeconomic History    Marital status:      Spouse name: Donald    Number of children: 0   Tobacco Use    Smoking status: Former     Current packs/day: 0.00     Average packs/day: 2.0 packs/day for 30.0 years (60.0 ttl pk-yrs)     Types: Cigarettes     Start date: 10/22/1973     Quit date: 10/22/2003     Years since quittin.7    Smokeless tobacco: Never   Vaping Use    Vaping status: Never Used   Substance and Sexual Activity    Alcohol use: Not Currently    Drug use: Yes     Types: Marijuana     Comment: rarely    Sexual activity: Not Currently     Partners: Male     Birth control/protection: Post-menopausal       Peripheral Neurovascular  Peripheral Neurovascular (Adult)  Peripheral Neurovascular WDL: .WDL except, neurovascular assessment upper, neurovascular assessment lower (this is pt's baseline)  LUE Neurovascular Assessment  Sensation LUE: numbness present, tingling present  RUE Neurovascular Assessment  Sensation RUE: numbness present, tingling present  LLE Neurovascular Assessment  Sensation LLE: numbness present, tingling present  RLE Neurovascular Assessment  Sensation RLE: numbness present, tingling present    Neuro Cognitive  Neuro Cognitive (Adult)  Cognitive/Neuro/Behavioral WDL: WDL    Learning  Learning Assessment  Learning Readiness and Ability: cognitive limitation noted    Respiratory  Respiratory  Airway  WDL: WDL  Respiratory WDL  Respiratory WDL: .WDL except, all  Rhythm/Pattern, Respiratory: shortness of breath    Abdominal Pain       Pain Assessments  Pain (Adult)  (0-10) Pain Rating: Rest: 0  (0-10) Pain Rating: Activity: 0  Patient requested Medication Prescribed for Lower Pain Scale: No    NIH Stroke Scale       Angel Barfield RN  07/25/25 23:02 EDT

## 2025-07-26 NOTE — PROGRESS NOTES
[facility] Clinical Pharmacy Services: Cefepime Consult    Pt Name: Maritza Nance Darci   : 1960  Weight: 81.6 kg (180 lb)  Antibiotic: Cefepime  Indication: Uncomplicated Cystitis     Relevant clinical data and objective history reviewed:    Past Medical History:   Diagnosis Date    Anemia     `Treated with iron    Dawn esophagus     per patient    Blurred vision     R/T MS    Breast cancer     metastatic    Carpal tunnel syndrome     Clotting disorder , ,     3 g/i bleeds w/ transfus/ions    Colon polyp     removed w/ colonoscopy    Deep vein thrombosis phlebitis     Depression     Diplopia     GERD (gastroesophageal reflux disease)     GI (gastrointestinal bleed) 3 bleeds    2 transfusions    H/O Skin cancer, basal cell     Headache     History of blood transfusion     History of GI bleed     R/T NSAIDS AND STEROIDS, multiple times    History of urinary tract infection     Hypercalcemia     s/p parathyroidectomy    Hyperlipidemia     Hypertension     Movement disorder     Multiple sclerosis     Optic neuritis     PONV (postoperative nausea and vomiting)     Sleep apnea     wears cpap    Steroid-induced diabetes      Creatinine   Date Value Ref Range Status   2025 0.91 0.57 - 1.00 mg/dL Final   2025 0.53 (L) 0.57 - 1.00 mg/dL Final   2025 0.83 0.57 - 1.00 mg/dL Final   07/10/2024 0.70 0.60 - 1.30 mg/dL Final     Comment:     Serial Number: [id number]:  [id number]   2024 0.80 0.60 - 1.30 mg/dL Final     Comment:     Serial Number: [id number]:  [id number]   2024 0.80 0.60 - 1.30 mg/dL Final     Comment:     Serial Number: [id number]:  [id number]   2020 0.80 0.7 - 1.5 mg/dL Final   2019 0.6 (L) 0.7 - 1.5 mg/dL Final   2018 0.8 0.7 - 1.5 mg/dL Final     BUN   Date Value Ref Range Status   2025 17.0 8.0 - 23.0 mg/dL Final   2020 19 7 - 20 mg/dL Final     Estimated Creatinine Clearance: 67.6 mL/min (by C-G formula based  on SCr of 0.91 mg/dL).    Lab Results   Component Value Date    WBC 12.76 (H) 07/25/2025     Temp Readings from Last 3 Encounters:   07/26/25 98.6 °F (37 °C)   07/22/25 97.9 °F (36.6 °C) (Oral)   07/21/25 97.9 °F (36.6 °C) (Oral)      Assessment/Plan    Ordered Cefepime 2Gm iv every 8 hours for a total of 5 days. Will monitor and adjust if culture data or pertinent lab values indicate this is best for the patient.     Thank you for this consult and please contact pharmacy with any questions or concerns.     Ranulfo Kemp [facility]  Clinical Pharmacist

## 2025-07-26 NOTE — H&P
Patient Name:  Maritza Bejarano  YOB: 1960  MRN:  [id number]  Admit Date:  7/25/2025  Patient Care Team:  Enoc Carbone DO as PCP - General (Internal Medicine)  Brittney Ortiz, RN as Nurse Navigator (Oncology)  Kim Barber MD as Referring Physician (General Surgery)  Zainab Lopes MD as Consulting Physician (Hematology and Oncology)      Subjective   History Present Illness     Chief Complaint   Patient presents with    Fever     History of Present Illness  Ms. Bejarano is a 64 year old female with history of anemia breast cancer, multiple sclerosis, neurogenic bladder with indwelling catheter, type 2 diabetes, restless leg syndrome who presents to the ED with complaints of generalized fatigue and not feeling well, some nausea and chills and generalized bodyaches.  Patient states she has a similar episodes when she has had UTIs in the past which exacerbates her MS.  She did start on chemotherapy and had her round, did have some sweating that night, they felt better, but today she had a temp of up to 102 at home.  She denies having any nausea or vomiting, no chest pain or shortness of breath.  She does have a chronic indwelling Cobos cath due to urinary retention.  In the emergency room her blood glucose was 244, alkaline phosphatase 196, , lactate 2.3 with a repeat of 1.3 after 1 L fluid, procalcitonin 0.30, white blood cell count 12.7, hemoglobin 9.0 which is stable at her baseline, hematocrit 28.1, platelets 449, urinalysis showed 3+ yeast, positive nitrites, trace bacteria, a culture is pending, blood cultures are pending.  Chest x-ray shows heart is within normal limits, low lung volumes but no acute pulmonary infiltrate.  Patient was given cefepime and fluconazole in the emergency room.    Review of Systems   Constitutional:  Positive for chills, fatigue and fever. Negative for appetite change.   HENT:  Negative for nosebleeds and trouble swallowing.    Eyes:  Negative for  photophobia, redness and visual disturbance.   Respiratory:  Negative for cough, chest tightness, shortness of breath and wheezing.    Cardiovascular:  Negative for chest pain, palpitations and leg swelling.   Gastrointestinal:  Negative for abdominal distention, abdominal pain, nausea and vomiting.   Endocrine: Negative.    Genitourinary: Negative.    Musculoskeletal:  Negative for gait problem and joint swelling.   Skin: Negative.    Neurological:  Positive for weakness. Negative for dizziness, seizures, speech difficulty, light-headedness and headaches.   Hematological: Negative.    Psychiatric/Behavioral:  Negative for behavioral problems and confusion.         Personal History     Past Medical History:   Diagnosis Date    Anemia 2024    `Treated with iron    Dawn esophagus     per patient    Blurred vision     R/T MS    Breast cancer     metastatic    Carpal tunnel syndrome     Clotting disorder 1993, 2003, 2012    3 g/i bleeds w/ transfus/ions    Colon polyp 2013    removed w/ colonoscopy    Deep vein thrombosis phlebitis 1980    Depression     Diplopia 2013    GERD (gastroesophageal reflux disease)     GI (gastrointestinal bleed) 3 bleeds    2 transfusions    H/O Skin cancer, basal cell     Headache     History of blood transfusion     History of GI bleed     R/T NSAIDS AND STEROIDS, multiple times    History of urinary tract infection     Hypercalcemia     s/p parathyroidectomy    Hyperlipidemia     Hypertension     Movement disorder     Multiple sclerosis     Optic neuritis     PONV (postoperative nausea and vomiting)     Sleep apnea     wears cpap    Steroid-induced diabetes 2024     Past Surgical History:   Procedure Laterality Date    APPENDECTOMY      BLADDER SURGERY      bladder stimulator    BREAST BIOPSY  don't remember    BREAST SURGERY      augmentation wtih subsequent removal    CARPAL TUNNEL RELEASE Bilateral     Left 2018, right 2020    CUBITAL TUNNEL RELEASE Left     CYSTOSCOPY BOTOX  INJECTION OF BLADDER  2018    Cystoscopy with Botox    FRACTURE SURGERY  2019    rt shoulder    PARATHYROIDECTOMY      one gland removed    ROTATOR CUFF REPAIR Right 2017    TOE SURGERY      bilateral great toes    TOTAL SHOULDER ARTHROPLASTY W/ DISTAL CLAVICLE EXCISION Right 10/22/2018    Procedure: RT TOTAL SHOULDER REVERSE ARTHROPLASTY;  Surgeon: Bipin Dangelo MD;  Location: [facility];  Service: Orthopedics     Family History   Problem Relation Age of Onset    Hypertension Mother     Hyperlipidemia Mother     Aortic aneurysm Mother         thoracic    Diabetes Mother     Hypertension Father         charissa heart failure    Heart failure Father     Hyperlipidemia Father     Miscarriages / Stillbirths Sister     Multiple sclerosis Brother     Atrial fibrillation Brother     Diabetes Maternal Grandmother     Diabetes Maternal Grandfather     Stroke Maternal Grandfather     Parkinsonism Paternal Grandmother     Tremor Paternal Grandmother     Stomach cancer Paternal Grandfather      Social History     Tobacco Use    Smoking status: Former     Current packs/day: 0.00     Average packs/day: 2.0 packs/day for 30.0 years (60.0 ttl pk-yrs)     Types: Cigarettes     Start date: 10/22/1973     Quit date: 10/22/2003     Years since quittin.7    Smokeless tobacco: Never   Vaping Use    Vaping status: Never Used   Substance Use Topics    Alcohol use: Not Currently    Drug use: Yes     Types: Marijuana     Comment: rarely     No current facility-administered medications on file prior to encounter.     Current Outpatient Medications on File Prior to Encounter   Medication Sig Dispense Refill    albuterol sulfate  (90 Base) MCG/ACT inhaler Inhale 2 puffs Every 4 (Four) Hours As Needed for Wheezing or Shortness of Air. 18 g 0    aspirin 81 MG EC tablet Take 1 tablet by mouth Daily. 30 tablet 0    baclofen (LIORESAL) 20 MG tablet Take 1 tablet by mouth 2 (Two) Times a Day.      Cholecalciferol (vitamin D3) 125 MCG  (5000 UT) tablet tablet Take 1 tablet by mouth Daily.      ciclopirox (LOPROX) 1 % shampoo       clobetasol (TEMOVATE) 0.05 % ointment APPLY TOPICALLY TO THE AFFECTED AREA OF BREAST TWICE DAILY. AVOID FACE AND SKIN FOLDS      eszopiclone (LUNESTA) 1 MG tablet Take 1 tablet by mouth Every Night. Take immediately before bedtime 7 tablet 0    Fenbendazole powder Use 440 mg 3 (Three) Times a Week. 2 capsules 3 times weekly      HYDROcodone-acetaminophen (NORCO) 5-325 MG per tablet Take 1 tablet by mouth Every 4 (Four) Hours As Needed for Moderate Pain. 60 tablet 0    IVERMECTIN PO Take 15 mg by mouth 5 (Five) Times a Week. Mopnday-Friday      lidocaine-prilocaine (EMLA) 2.5-2.5 % cream Apply 1 Application topically to the appropriate area as directed As Needed for Injection Site Pain. Apply a dime size amount 30 minutes prior to venous port access 30 g 3    metoprolol succinate XL (TOPROL-XL) 25 MG 24 hr tablet Take 1 tablet by mouth Daily. 30 tablet 0    nitrofurantoin, macrocrystal-monohydrate, (MACROBID) 100 MG capsule Take 1 capsule by mouth Daily.      OLANZapine (zyPREXA) 5 MG tablet Take 1 tablet by mouth Every Night. Take on days 1, 2, 3, and 4 and Days 8, 9, 10, 11 after chemotherapy. 8 tablet 5    ondansetron (ZOFRAN) 8 MG tablet Take 1 tablet by mouth 3 (Three) Times a Day As Needed for Nausea or Vomiting.      pantoprazole (PROTONIX) 40 MG EC tablet Take 1 tablet by mouth 3 (Three) Times a Week.      phenazopyridine (PYRIDIUM) 200 MG tablet Take 1 tablet by mouth 3 (Three) Times a Day As Needed for Dysuria. 20 tablet 0    pramipexole (MIRAPEX) 1.5 MG tablet Take 1 tablet by mouth 2 (Two) Times a Day.      prochlorperazine (COMPAZINE) 10 MG tablet Take 1 tablet by mouth Every 6 (Six) Hours As Needed for Nausea or Vomiting. 90 tablet 5    rosuvastatin (CRESTOR) 20 MG tablet Take 1 tablet by mouth Daily. 90 tablet 1    sennosides-docusate (senna-docusate sodium) 8.6-50 MG per tablet Take 1 tablet by mouth  Daily. 30 tablet 2    temazepam (RESTORIL) 15 MG capsule Use one-half to one tablet nightly as needed.       Allergies   Allergen Reactions    Klonopin [Clonazepam] Mental Status Change, Confusion and Hallucinations       Objective    Objective     Vital Signs  Temp:  [98.9 °F (37.2 °C)-99.9 °F (37.7 °C)] 98.9 °F (37.2 °C)  Heart Rate:  [] 88  Resp:  [18] 18  BP: (105-150)/(51-75) 105/51  SpO2:  [93 %-100 %] 96 %  on   ;   Device (Oxygen Therapy): room air  Body mass index is 29.95 kg/m².    Physical Exam  Vitals and nursing note reviewed.   Constitutional:       General: She is not in acute distress.     Appearance: She is well-developed.   HENT:      Head: Normocephalic.   Neck:      Vascular: No JVD.   Cardiovascular:      Rate and Rhythm: Normal rate and regular rhythm.      Comments: Sinus rhythm on a monitor with heart rate 82 during my exam, no peripheral edema  Pulmonary:      Effort: Pulmonary effort is normal.      Breath sounds: Normal breath sounds.      Comments: Lung sounds diminished but clear, sats 95% on room air  Chest:      Comments: Port in right chest, no surrounding redness or edema  Abdominal:      General: Bowel sounds are normal. There is no distension.      Palpations: Abdomen is soft.      Tenderness: There is no abdominal tenderness.   Musculoskeletal:         General: Normal range of motion.      Cervical back: Normal range of motion.   Skin:     General: Skin is warm and dry.      Capillary Refill: Capillary refill takes less than 2 seconds.   Neurological:      General: No focal deficit present.      Mental Status: She is alert and oriented to person, place, and time.   Psychiatric:         Attention and Perception: Attention normal.         Behavior: Behavior normal.         Cognition and Memory: Cognition normal.         Results Review:  I reviewed the patient's new clinical results.  I reviewed the patient's new imaging results and agree with the interpretation.  I reviewed  the patient's other test results and agree with the interpretation  I personally viewed and interpreted the patient's EKG/Telemetry data  Discussed with ED provider.    Lab Results (last 24 hours)       Procedure Component Value Units Date/Time    CBC & Differential [[id number]]  (Abnormal) Collected: 07/25/25 2033    Specimen: Blood Updated: 07/25/25 2106    Narrative:      The following orders were created for panel order CBC & Differential.  Procedure                               Abnormality         Status                     ---------                               -----------         ------                     CBC Auto Differential[[id number]]        Abnormal            Final result                 Please view results for these tests on the individual orders.    Comprehensive Metabolic Panel [[id number]]  (Abnormal) Collected: 07/25/25 2033    Specimen: Blood Updated: 07/25/25 2130     Glucose 244 mg/dL      BUN 17.0 mg/dL      Creatinine 0.91 mg/dL      Sodium 138 mmol/L      Potassium 3.7 mmol/L      Chloride 103 mmol/L      CO2 22.2 mmol/L      Calcium 9.4 mg/dL      Total Protein 7.3 g/dL      Albumin 3.7 g/dL      ALT (SGPT) 28 U/L      AST (SGOT) 112 U/L      Alkaline Phosphatase 196 U/L      Total Bilirubin <0.2 mg/dL      Globulin 3.6 gm/dL      A/G Ratio 1.0 g/dL      BUN/Creatinine Ratio 18.7     Anion Gap 12.8 mmol/L      eGFR 71.5 mL/min/1.73     Narrative:      GFR Categories in Chronic Kidney Disease (CKD)              GFR Category          GFR (mL/min/1.73)    Interpretation  G1                    90 or greater        Normal or high (1)  G2                    60-89                Mild decrease (1)  G3a                   45-59                Mild to moderate decrease  G3b                   30-44                Moderate to severe decrease  G4                    15-29                Severe decrease  G5                    14 or less           Kidney failure    (1)In the absence of evidence of kidney  "disease, neither GFR category G1 or G2 fulfill the criteria for CKD.    eGFR calculation 2021 CKD-EPI creatinine equation, which does not include race as a factor    Lactic Acid, Plasma [[id number]]  (Abnormal) Collected: 07/25/25 2033    Specimen: Blood Updated: 07/25/25 2129     Lactate 2.3 mmol/L     Procalcitonin [[id number]]  (Abnormal) Collected: 07/25/25 2033    Specimen: Blood Updated: 07/25/25 2135     Procalcitonin 0.30 ng/mL     Narrative:      As a Marker for Sepsis (Non-Neonates):    1. <0.5 ng/mL represents a low risk of severe sepsis and/or septic shock.  2. >2 ng/mL represents a high risk of severe sepsis and/or septic shock.    As a Marker for Lower Respiratory Tract Infections that require antibiotic therapy:    PCT on Admission    Antibiotic Therapy       6-12 Hrs later    >0.5                Strongly Recommended  >0.25 - <0.5        Recommended   0.1 - 0.25          Discouraged              Remeasure/reassess PCT  <0.1                Strongly Discouraged     Remeasure/reassess PCT    As 28 day mortality risk marker: \"Change in Procalcitonin Result\" (>80% or <=80%) if Day 0 (or Day 1) and Day 4 values are available. Refer to [email or web address]-[email or web address]-[email or web address]    Change in PCT <=80%  A decrease of PCT levels below or equal to 80% defines a positive change in PCT test result representing a higher risk for 28-day all-cause mortality of patients diagnosed with severe sepsis for septic shock.    Change in PCT >80%  A decrease of PCT levels of more than 80% defines a negative change in PCT result representing a lower risk for 28-day all-cause mortality of patients diagnosed with severe sepsis or septic shock.       CBC Auto Differential [[id number]]  (Abnormal) Collected: 07/25/25 2033    Specimen: Blood Updated: 07/25/25 2106     WBC 12.76 10*3/mm3      RBC 3.12 10*6/mm3      Hemoglobin 9.0 g/dL      Hematocrit 28.1 %      MCV 90.1 fL      MCH 28.8 pg      MCHC 32.0 g/dL      RDW 16.6 %      RDW-SD 52.6 " fl      MPV 8.7 fL      Platelets 449 10*3/mm3      Neutrophil % 85.7 %      Lymphocyte % 11.0 %      Monocyte % 1.9 %      Eosinophil % 0.7 %      Basophil % 0.2 %      Immature Grans % 0.5 %      Neutrophils, Absolute 10.93 10*3/mm3      Lymphocytes, Absolute 1.40 10*3/mm3      Monocytes, Absolute 0.24 10*3/mm3      Eosinophils, Absolute 0.09 10*3/mm3      Basophils, Absolute 0.03 10*3/mm3      Immature Grans, Absolute 0.07 10*3/mm3      nRBC 0.0 /100 WBC     Urinalysis With Culture If Indicated - Urine, Clean Catch [[id number]]  (Abnormal) Collected: 07/25/25 2052    Specimen: Urine, Clean Catch Updated: 07/25/25 2129     Color, UA Yellow     Appearance, UA Slightly Cloudy     pH, UA 5.5     Specific Gravity, UA 1.025     Glucose,  mg/dL (Trace)     Ketones, UA Negative     Bilirubin, UA Negative     Blood, UA Small (1+)     Protein,  mg/dL (2+)     Leuk Esterase, UA Small (1+)     Nitrite, UA Positive     Urobilinogen, UA 0.2 E.U./dL    Narrative:      In absence of clinical symptoms, the presence of pyuria, bacteria, and/or nitrites on the urinalysis result does not correlate with infection.    Urinalysis, Microscopic Only - Urine, Clean Catch [[id number]]  (Abnormal) Collected: 07/25/25 2052    Specimen: Urine, Clean Catch Updated: 07/25/25 2129     RBC, UA 0-2 /HPF      WBC, UA 3-5 /HPF      Comment: Urine culture not indicated.        Bacteria, UA Trace /HPF      Squamous Epithelial Cells, UA 0-2 /HPF      Yeast, UA Large/3+ Yeast /HPF      Hyaline Casts, UA 0-2 /LPF      Methodology Automated Microscopy    Blood Culture - Blood, Hand, Right [[id number]] Collected: 07/25/25 2106    Specimen: Blood from Hand, Right Updated: 07/25/25 2109    Blood Culture - Blood, Hand, Left [[id number]] Collected: 07/25/25 2311    Specimen: Blood from Hand, Left Updated: 07/25/25 2315    STAT Lactic Acid, Reflex [[id number]]  (Normal) Collected: 07/25/25 2311    Specimen: Blood Updated: 07/25/25 2352     Lactate 1.3  mmol/L             Imaging Results (Last 24 Hours)       Procedure Component Value Units Date/Time    XR Chest 1 View [[id number]] Collected: 07/25/25 2143     Updated: 07/25/25 2148    Narrative:      CXR ONE VIEW      HISTORY: fever     COMPARISON: 7/2/2025     TECHNIQUE: single portable AP       Impression:         Right IJ [id number] catheter remains in place.     The heart size is within normal limits.     Low lung volumes, no acute pulmonary infiltrate.     The right lung appears normally aerated. Left basilar retrocardiac  linear density, scarring or atelectasis, unchanged.     This report was finalized on 7/25/2025 9:45 PM by Dr. Juma Scott M.D on Workstation: [id number]               Results for orders placed during the hospital encounter of 07/02/25    Adult Transthoracic Echo Complete W/ Cont if Necessary Per Protocol 07/06/2025  4:44 PM    Interpretation Summary    Left ventricular ejection fraction appears to be greater than 70%.    The following left ventricular wall segments are hyperkinetic: basal anterior, basal anterolateral, basal inferolateral, basal inferior, basal inferoseptal, basal anteroseptal, mid anterior, mid anterolateral, mid inferolateral, mid inferior, mid inferoseptal, mid anteroseptal, apical anterior, apical lateral, apical inferior, apical septal and apex.    Left ventricular outflow tract peak flow gradient at rest is 25 mmHg.    Left ventricular diastolic function was indeterminate.      No orders to display        Assessment/Plan     Active Hospital Problems    Diagnosis  POA    **Sepsis secondary to UTI [A41.9, N39.0]  Yes    Anemia [D64.9]  Yes    UTI (urinary tract infection) due to urinary indwelling catheter [T83.511A, N39.0]  Yes    Type 2 diabetes mellitus [E11.9]  Yes    Malignant neoplasm of overlapping sites of left breast in female, estrogen receptor positive [C50.812, Z17.0]  Not Applicable    Multiple sclerosis [G35]  Yes    Neurogenic bladder [N31.9]   Yes    Restless legs syndrome [G25.81]  Yes     Ms. Bejarano is a 64 year old female with history of anemia breast cancer, multiple sclerosis, neurogenic bladder with indwelling catheter, type 2 diabetes, restless leg syndrome who presents to the ED with complaints of generalized fatigue and not feeling well, some nausea and chills and generalized bodyaches.    Sepsis secondary to acute UTI with indwelling catheter  - Indwelling Cobos catheter to be changed  - Urine cultures pending  - Blood cultures are pending  - Cefepime and fluconazole given in the emergency room, will continue pending cultures  - Telemetry unit for monitoring  - Normal saline at 100 cc an hour overnight  - Repeat BMP, CBC, lactic acid in a.m.    Type 2 diabetes  - Accu-Cheks AC and at bedtime with correctional dose insulin  - Hold oral diabetic medications at this time    Multiple sclerosis/neurogenic bladder  - PT to eval and treat  - Indwelling Cobos catheter in place  - Continue home medications when med rec completed    Stage IV breast cancer  - Currently undergoing chemotherapy, last treatment was 4 days ago  - Consult oncology      I discussed the patient's findings and my recommendations with patient.    VTE Prophylaxis - SCDs.  Code Status - Full code.       DANA Peralta  [facility]  07/25/25  23:53 EDT

## 2025-07-26 NOTE — PROGRESS NOTES
Name: Maritza Nance Darci ADMIT: 2025   : 1960  PCP: Enoc Carbone     MRN: [id number] LOS: 1 days   AGE/SEX: 64 y.o. female  ROOM: [facility]     Subjective   Subjective   Patient seen this morning.  Lying in bed.  Not feeling well, feels very tired, fatigued.  Denies current fevers or chills.  No significant abdominal symptoms.    Review of Systems   As above  Objective   Objective   Vital Signs  Temp:  [98 °F (36.7 °C)-99.9 °F (37.7 °C)] 98 °F (36.7 °C)  Heart Rate:  [] 83  Resp:  [18] 18  BP: (105-150)/(51-75) 115/59  SpO2:  [93 %-100 %] 96 %  on  Flow (L/min) (Oxygen Therapy):  [3] 3;   Device (Oxygen Therapy): nasal cannula  Body mass index is 29.95 kg/m².  Physical Exam    General: Alert, lying in bed, not in distress, ill-appearing  HEENT: Normocephalic, atraumatic  CV: Regular rate and rhythm, no murmurs rubs or gallops  Lungs: Clear to auscultation bilaterally, no crackles or wheezes  Abdomen: Soft, nontender, nondistended  Extremities: No significant peripheral edema , no cyanosis     Results Review     I reviewed the patient's new clinical results.  Results from last 7 days   Lab Units 25  0754   WBC 10*3/mm3 13.95* 12.76* 6.59 5.96   HEMOGLOBIN g/dL 8.6* 9.0* 8.7* 8.6*   PLATELETS 10*3/mm3 397 449 432 442     Results from last 7 days   Lab Units 25  0805 25  0754   SODIUM mmol/L 137 138 138 139   POTASSIUM mmol/L 3.8 3.7 3.8 4.3   CHLORIDE mmol/L 104 103 106 105   CO2 mmol/L 22.0 22.2 20.4* 23.4   BUN mg/dL 15.0 17.0 14.5 20.0   CREATININE mg/dL 0.56* 0.91 0.53* 0.83   GLUCOSE mg/dL 212* 244* 248* 113*   Estimated Creatinine Clearance: 109.8 mL/min (A) (by C-G formula based on SCr of 0.56 mg/dL (L)).  Results from last 7 days   Lab Units 25  0211 25  2033 25  0805   ALBUMIN g/dL 3.4* 3.7 3.7   BILIRUBIN mg/dL <0.2 <0.2 <0.2   ALK PHOS U/L 173* 196* 180*   AST (SGOT) U/L 109* 112*  95*   ALT (SGPT) U/L 23 28 25     Results from last 7 days   Lab Units 07/26/25  0211 07/25/25 2033 07/22/25  0805 07/21/25  0754   CALCIUM mg/dL 8.5* 9.4 9.0 8.8   ALBUMIN g/dL 3.4* 3.7 3.7  --    MAGNESIUM mg/dL 2.3  --  2.2  --    PHOSPHORUS mg/dL 3.6  --  3.0  --      Results from last 7 days   Lab Units 07/26/25  0514 07/25/25  2311 07/25/25 2033   PROCALCITONIN ng/mL  --   --  0.30*   LACTATE mmol/L 2.0 1.3 2.3*     COVID19   Date Value Ref Range Status   05/09/2025 Not Detected Not Detected - Ref. Range Final   04/17/2025 Not Detected Not Detected - Ref. Range Final     Glucose   Date/Time Value Ref Range Status   07/26/2025 1111 126 70 - 130 mg/dL Final   07/26/2025 0617 194 (H) 70 - 130 mg/dL Final           XR Chest 1 View  Narrative: CXR ONE VIEW      HISTORY: fever     COMPARISON: 7/2/2025     TECHNIQUE: single portable AP     Impression:    Right IJ [id number] catheter remains in place.     The heart size is within normal limits.     Low lung volumes, no acute pulmonary infiltrate.     The right lung appears normally aerated. Left basilar retrocardiac  linear density, scarring or atelectasis, unchanged.     This report was finalized on 7/25/2025 9:45 PM by Dr. Juma Scott M.D on Workstation: [id number]       Scheduled Medications  aspirin, 81 mg, Oral, Daily  baclofen, 20 mg, Oral, BID  cefepime, 2,000 mg, Intravenous, Q8H  vitamin D3, 5,000 Units, Oral, Daily  fluconazole, 200 mg, Intravenous, Once  fluconazole, 200 mg, Intravenous, Q24H  insulin lispro, 2-7 Units, Subcutaneous, 4x Daily AC & at Bedtime  melatonin, 5 mg, Oral, Nightly  [Held by provider] metoprolol succinate XL, 25 mg, Oral, Q24H  pantoprazole, 40 mg, Oral, Once per day on Monday Wednesday Friday  pramipexole, 1.5 mg, Oral, BID  sodium chloride, 10 mL, Intravenous, Q12H  sodium chloride, 10 mL, Intravenous, Q12H    Infusions  Pharmacy To Dose:,     Diet  Diet: Cardiac, Diabetic; Healthy Heart (2-3 Na+); Consistent  Carbohydrate; Fluid Consistency: Thin (IDDSI 0)    I have personally reviewed     [x]  Laboratory   [x]  Microbiology   [x]  Radiology   [x]  EKG/Telemetry  [x]  Cardiology/Vascular   []  Pathology    []  Records       Assessment/Plan     Active Hospital Problems    Diagnosis  POA    **Sepsis secondary to UTI [A41.9, N39.0]  Yes    Transaminitis [R74.01]  Yes    Anemia [D64.9]  Yes    UTI (urinary tract infection) due to urinary indwelling catheter [T83.511A, N39.0]  Yes    Type 2 diabetes mellitus [E11.9]  Yes    Malignant neoplasm of overlapping sites of left breast in female, estrogen receptor positive [C50.812, Z17.0]  Not Applicable    Multiple sclerosis [G35]  Yes    Neurogenic bladder [N31.9]  Yes    Restless legs syndrome [G25.81]  Yes      Resolved Hospital Problems   No resolved problems to display.       Patient is 64 year old female with history of anemia breast cancer, multiple sclerosis, neurogenic bladder with indwelling catheter, type 2 diabetes, restless leg syndrome who presents to the ED with complaints of generalized fatigue and not feeling well, some nausea and chills and generalized bodyaches.     Sepsis secondary to acute UTI with indwelling catheter  - Indwelling Cobos changed  - Urinalysis showed large yeast, trace bacteria, positive for nitrates, leukocytes 6-5 WBC, did not reflex to culture.  Urine.   blood cultures pending  - Continue cefepime, fluconazole  -Follow-up culture result     Type 2 diabetes  -Continue SSI, hypoglycemia protocol  - Hold oral diabetic medications at this time     Multiple sclerosis/neurogenic bladder  - PT to eval and treat  - Indwelling Cobos catheter in place  - Continue home medications     Stage IV breast cancer  - Currently undergoing chemotherapy, last treatment was 4 days prior to admission  - Consult oncology    Anemia  - Recent iron panel 07/22/2025 elevated ferritin 333, iron saturation 17  - Hemoglobin stable compared to prior.  Monitor     DVT  prophylaxis.  Initiate Lovenox  Full code.  Discussed with patient.  Expected Discharge Date: 7/29/2025; Expected Discharge Time:        Copied text in this note has been reviewed and is accurate as of 07/26/25.         Dictated utilizing Dragon dictation        Vinicio Montoya MD  [facility]  07/26/25  12:03 EDT

## 2025-07-26 NOTE — SIGNIFICANT NOTE
07/26/25 1032   OTHER   Discipline physical therapist   Rehab Time/Intention   Session Not Performed other (see comments)  (Spoke with pt, reports leandra lift to power wheelchair at home. Has track system in bathroom to use leandra lift but recently has required bed level assist. 24/7 assist from spouse. Not appropriate for PT services in the acute setting. PT signing off.)   Therapy Assessment/Plan (PT)   Criteria for Skilled Interventions Met (PT) no;does not meet criteria for skilled intervention

## 2025-07-26 NOTE — CONSULTS
[facility] GROUP INITIAL INPATIENT CONSULTATION NOTE    REASON FOR CONSULTATION:    Metastatic invasive lobular carcinoma, ER/AR strongly positive cancer with metastatic disease to the bones.   Fever    HISTORY OF PRESENT ILLNESS:  Maritza Bejarano is a 64 y.o. female who we are asked to see today in consultation for fever ,metastatic breast cancer    The patient has a past medical history of MS, neurogenic bladder, metastatic breast cancer.  She received her first dose of Trodelvy on 7/22 after port placement on 7/21.  She did receive Udenyca which is a white blood cell growth factor anticipated to increase the white blood cell count.    The patient presented with weakness, fatigue, fever to 102, chills, myalgias.  She has experienced sepsis on multiple occasions and felt like she was getting an infection.  She had some chest discomfort and worsening upper extremity weakness.  She was tachycardic.  The white blood cell count was elevated, again potentially secondary to Udenyca.    Procalcitonin was barely elevated at 0.3.  Lactic acid was mildly elevated at 2.3, normalized at 1.3.  Urinalysis as expected with a suprapubic catheter.  Blood culture no growth to date.    Empiric cefepime was initiated. She is currently afebrile.  Her arms remain weak but she is feeling a little bit better.  She denies any nausea and vomiting at this point.    Past Medical History:   Diagnosis Date    Anemia 2024    `Treated with iron    Dawn esophagus     per patient    Blurred vision     R/T MS    Breast cancer     metastatic    Carpal tunnel syndrome     Clotting disorder 1993, 2003, 2012    3 g/i bleeds w/ transfus/ions    Colon polyp 2013    removed w/ colonoscopy    Deep vein thrombosis phlebitis 1980    Depression     Diplopia 2013    GERD (gastroesophageal reflux disease)     GI (gastrointestinal bleed) 3 bleeds    2 transfusions    H/O Skin cancer, basal cell     Headache     History of blood transfusion     History of  GI bleed     R/T NSAIDS AND STEROIDS, multiple times    History of urinary tract infection     Hypercalcemia     s/p parathyroidectomy    Hyperlipidemia     Hypertension     Movement disorder     Multiple sclerosis     Optic neuritis     PONV (postoperative nausea and vomiting)     Sleep apnea     wears cpap    Steroid-induced diabetes 2024       Past Surgical History:   Procedure Laterality Date    APPENDECTOMY      BLADDER SURGERY      bladder stimulator    BREAST BIOPSY  don't remember    BREAST SURGERY      augmentation wtih subsequent removal    CARPAL TUNNEL RELEASE Bilateral     Left 2018, right 2020    CUBITAL TUNNEL RELEASE Left     CYSTOSCOPY BOTOX INJECTION OF BLADDER  2018    Cystoscopy with Botox    FRACTURE SURGERY  2019    rt shoulder    PARATHYROIDECTOMY      one gland removed    ROTATOR CUFF REPAIR Right 2017    TOE SURGERY      bilateral great toes    TOTAL SHOULDER ARTHROPLASTY W/ DISTAL CLAVICLE EXCISION Right 10/22/2018    Procedure: RT TOTAL SHOULDER REVERSE ARTHROPLASTY;  Surgeon: Bipin Dangelo MD;  Location: [facility];  Service: Orthopedics       SOCIAL HISTORY:   reports that she quit smoking about 21 years ago. Her smoking use included cigarettes. She started smoking about 51 years ago. She has a 60 pack-year smoking history. She does not have any smokeless tobacco history on file. She reports that she does not currently use alcohol. She reports current drug use. Drug: Marijuana.    FAMILY HISTORY:  family history includes Aortic aneurysm in her mother; Atrial fibrillation in her brother; Diabetes in her maternal grandfather, maternal grandmother, and mother; Heart failure in her father; Hyperlipidemia in her father and mother; Hypertension in her father and mother; Miscarriages / Stillbirths in her sister; Multiple sclerosis in her brother; Parkinsonism in her paternal grandmother; Stomach cancer in her paternal grandfather; Stroke in her maternal grandfather; Tremor in her paternal  grandmother.    ALLERGIES:  Allergies   Allergen Reactions    Klonopin [Clonazepam] Mental Status Change, Confusion and Hallucinations       MEDICATIONS:  As listed in the electronic medical record.    Review of Systems   Constitutional:  Positive for fatigue.   Respiratory:  Positive for chest tightness and shortness of breath.    Neurological:  Positive for weakness.   All other systems reviewed and are negative.      Vitals:    07/26/25 0000 07/26/25 0900 07/26/25 1114 07/26/25 1544   BP: 123/65 115/59  127/58   BP Location: Right arm Right arm  Right arm   Patient Position: Lying Lying  Lying   Pulse: 88 83  87   Resp: 18 18  18   Temp: 98.6 °F (37 °C) 99.3 °F (37.4 °C) 98 °F (36.7 °C) 97.9 °F (36.6 °C)   TempSrc: Oral Oral Oral Oral   SpO2: 96%   98%   Weight:       Height:         General:  No acute distress, awake, alert and oriented.  Lying in bed.  Moving her upper extremities.  Skin:  Warm and dry, no visible rash  HEENT:  Normocephalic/atraumatic.   Chest:  Normal respiratory effort.  Benign-appearing accessed Mediport present in the right subclavian area.  Extremities:  No visible clubbing, cyanosis, or edema  Neuro/psych:  Grossly nonfocal.  Normal mood and affect.       DIAGNOSTIC DATA:  Results from last 7 days   Lab Units 07/26/25  0211   WBC 10*3/mm3 13.95*   HEMOGLOBIN g/dL 8.6*   HEMATOCRIT % 26.7*   PLATELETS 10*3/mm3 397     Lab Results   Component Value Date    NEUTROABS 10.93 (H) 07/25/2025     Results from last 7 days   Lab Units 07/26/25  0211   SODIUM mmol/L 137   POTASSIUM mmol/L 3.8   CHLORIDE mmol/L 104   CO2 mmol/L 22.0   BUN mg/dL 15.0   CREATININE mg/dL 0.56*   GLUCOSE mg/dL 212*   CALCIUM mg/dL 8.5*     Results from last 7 days   Lab Units 07/21/25  0832   INR  1.0     Results from last 7 days   Lab Units 07/26/25  0211   MAGNESIUM mg/dL 2.3       IMAGING: None reviewed    ASSESSMENT:  This is a 64 y.o. female with:    *Sepsis secondary to urinary tract infection  History of  recurrent urinary tract infections  On empiric cefepime  Urinalysis does not meet criteria for culture, chronic indwelling Cobos catheter  Large amount of yeast present on the UA  Blood cultures pending    *Left breast invasive lobular carcinoma  The tumor is estrogen receptor +91 to 100%, progesterone receptor +31 to 40%, HER2 negative, 0, Ki-67 35%  The tumor measures greater than 10 cm on exam, lymph node positive.  CT of the chest abdomen and pelvis with right upper lobe pulmonary nodule which is new and the bone scan also shows uptake in the left anterior inferior iliac spine which correlates to a lucent area on the CT scan and also focal uptake noted in the left frontal calvarium concerning for metastatic disease.  Further evaluation with a MRI of the pelvis and hip as well as MRI of the brain is underway.  The scans are scheduled for 7/10/2024.  Clinical T3 N1 M1, stage IV invasive lobular carcinoma.  There is also a right axillary lymph node which has been biopsied and consistent with invasive lobular carcinoma with similar morphology as well as receptors concerning for metastatic disease rather than a primary right breast cancer.  Recommended Ribociclib 400 mg 3 weeks on and 1 week off.  July 16, 2024: Reviewed MRI of the pelvis and hip consistent with many bony metastasis.  MRI brain shows left frontal bone mets but no brain involvement.  Patient is here to start day 1 ribociclib along with anastrozole.  Continues on anastrozole.  Tempus performed on the left axilla lymph node biopsy shows PIK3CA mutation, CDH 1 mutation and Erb B2 mutation.  Therefore patient would benefit from alpelisib and Faslodex after progression on Ribociclib and AI.  Other options include neratinib plus Faslodex.  Initiated Ribociclib in August 2024.  8/30/2024-last day of week 3 of Ribociclib.    Patient completed 1 cycle of Ribociclib 400 mg and subsequently has not been able to go back on Ribociclib due to recurrent  hospitalizations and abnormal LFTs.  10/14/2024-LFTs are normal today.  Recommend resuming Ribociclib at 200 mg and subsequently we will increase the dose to 400 mg with the next cycle.  Patient was unable to resume Ribociclib as she was admitted to the hospital from 10/17/2024 to 10/23/2024  11/11/2024-Labs reviewed and overall stable except for an ALT of 49.  Recommend resuming Ribociclib at 200 mg.  CT of the chest performed 10/29/2024 shows stable size of the left breast mass, decrease in size of the left axillary lymph node and mixed lytic and blastic lesion of the rib slightly increased in size.  11/11/2024-resumed Ribociclib 200 mg daily for 3/4 weeks.  2/18/2025-scans with disease progression in the bones in the liver.  Discontinue Ribociclib and anastrozole  2/28/2025-received the first dose of Faslodex.  3/17/2025-started truqap  3/21/2025- CBC reviewed and WBC 4.81, hemoglobin 13.9 platelets 595,000, CMP reviewed and LFTs have improved with ALT which is normal at 33, AST 71, alkaline phosphatase 185, total bilirubin normal at 0.2, blood sugar elevated at 409  Completed 1 week of trucap, resume again on 3/24/2025  4/4/2025 due for cycle 2-day 1 Faslodex.  She is struggling with intermittent hyperglycemia related to Truqap which is improved with glipizide  5/12/2025-CT of the chest and bone scan with overall stable disease.  Prior to that she had a CT abdomen end of April 2025 which also shows no evidence of metastatic disease.  Patient has not taken a whole lot of trucap, maybe less than 2 weeks total since initiation in March 2025.  Bilateral diagnostic mammogram and ultrasound from 6/24/2025 concerning for disease progression  CT of the chest abdomen and pelvis from 6/24/2025 also concerning for disease progression  I reviewed her previous pathology report and HER2 is noted to be 0.  She had a repeat punch biopsy of the left breast skin.  Will follow-up on the pathology from that.  If HER2 is 1+ or even  ultralow we should be able to use Enhertu.  She truly did not have disease progression on Truqap however she was hospitalized back-to-back for the few days that she took 2 Due to recurrent UTIs.  She also currently has an ongoing UTI.  I worry that any type of PIK 3 CA directed therapy will result in UTIs, hyperglycemia and diarrhea which may not be ideal for her situation with the multiple sclerosis and inability to transfer as well as bladder dysfunction  Therefore the next best option would be chemotherapy which would be either Enhertu, Trodelvy or taxane.  I called and discussed with Dr. Jacqueline Daniels to determine the HER2 and she looked at the original biopsy again and this was negative.  Punch biopsy from the left breast noted to be ER negative, IA negative and HER2 nu 0.  Invasive pleomorphic lobular carcinoma.  Given that the receptor status has changed, we would have to treat the metastatic breast cancer which is progressing with chemotherapy which would be either Xeloda or Trodelvy as HER2/dorothy was 0 Enhertu is not an option.  I reviewed both of these options with the patient and the side effects of both these medications at length and she prefers to proceed with Trodelvy.  Will also obtain a PD-L1 status to see if immunotherapy is an option.  Started Trodelvy at 7.5 mg/kg, q. 14 days as I am really concerned about her ability to tolerate.  We will also plan for Neulasta upfront.  Although tumor markers were not elevated originally now with elevated tumor markers with a CA 15-3 of 296 on 6/25/2025 and CA 20 7.29 of 409 on 6/25/2025.  7/22/2025: initiation of Trodelvy as planned.  She had her port placed 7/21.  Received Udenyca, white blood cell growth factor.       *DM with hyperglycemia     *Right axillary lymphadenopathy  Biopsied and consistent with invasive lobular carcinoma, grade 2, ER/IA positive and HER2 negative  Recent bilateral diagnostic mammogram and ultrasound from 6/24/2025 shows disease  progression     *Skeletal metastasis  Patient will be started on Xgeva  8/16/2024 Xgeva initiation will be held as the patient has not been to the dentist in over 2 years.  Discussed possible side effects of Xgeva and she will schedule a dental visit and we will have her back in 1 week to initiate Xgeva pending this is complete.  10/14/2024-second dose of Xgeva will be administered.  Labs reviewed and stable to proceed.  Continue Xgeva every 4 weeks     *Multiple sclerosis  Patient was not on any treatment previously.  She sees Dr. Jacobson at [facility].  Due to profound weakness patient requested her neurologist to start steroids.  They plan to initiate high-dose IV steroids to help with this.  High-dose IV steroids have not been initiated.  Doing physical therapy and received high-dose steroids  Stable     *Neurogenic bladder  Chronic Cobos catheter in place.  She recently saw a new urologist and is planning to discontinue the Cobos in favor of In-N-Out straight cathing.     *Normocytic anemia  History of iron deficiency  Iron 59, ferritin 333  Hemoglobin 8.6, from 9.0, from 8.7    *Abnormal liver labs  AST stable at 109, ALT 23    *Alternative medication  Patient sees a outside provider and receives ivermectin and fenbendazole  We have ran an interaction check with the medications and does not appear to be any interaction however I want her against taking these medications and potential side effects  Patient however wishes to proceed with his medications.  Patient continues on ivermectin     *Leukocytosis with neutrophilia  White blood cell count 13.95, from 12.76  She did receive Udenyca on 7/24/2025 which is a white blood cell booster following her chemotherapy    RECOMMENDATIONS/PLAN:  Empiric antibiotics continue with cefepime  Daily labs.  The white blood cell count may remain elevated due to the Udenyca but it is likely to drop following the Trodelvy as well.  Scheduled with an APRN in the office on  7/30, 8/6 with an APRN for Oneidadelvy, 8/20 with Dr. Lopes with Trodelvy  We will follow      James Austin MD

## 2025-07-26 NOTE — PROGRESS NOTES
"[facility] Clinical Pharmacy Services: Enoxaparin Consult    Maritza Bejarano has a pharmacy consult to dose prophylactic enoxaparin per Dr. Montoya's request.     Indication: VTE Prophylaxis      Relevant clinical data and objective history reviewed:  64 y.o. female 165.1 cm (65\") 81.6 kg (180 lb)   Body mass index is 29.95 kg/m².   Results from last 7 days   Lab Units 07/26/25  0211   PLATELETS 10*3/mm3 397     Estimated Creatinine Clearance: 109.8 mL/min (A) (by C-G formula based on SCr of 0.56 mg/dL (L)).    Assessment/Plan    Will start patient on 40mg subcutaneous every 24 hours, adjusted for renal function. Consult order will be discontinued but pharmacy will continue to follow.     Karlie Vasques, [facility]  Clinical Pharmacist    "

## 2025-07-26 NOTE — NURSING NOTE
Pt is depressed and a little tearful at times. All needs were met and exceeded by staff. She was supplied with a soft-touch call light because she doesn't have the dexterity for the regular one.  was in and out all day, but provided good care. CHG bath done for CLABSI prevention.

## 2025-07-26 NOTE — ED PROVIDER NOTES
"EMERGENCY DEPARTMENT ENCOUNTER  Room Number:  S515/1  PCP: Enoc Carbone DO  Independent Historians: Patient, EMS      HPI:  Chief Complaint: had concerns including Fever.  Weakness, malaise and shortness of breath  A complete HPI/ROS/PMH/PSH/SH/FH are unobtainable due to: None    Context: Maritza Bejarano is a 64 y.o. female with a medical history of breast cancer and multiple sclerosis who presents to the ED c/o acute fever with generalized weakness and malaise.  Patient had chemotherapy this week for management of her breast cancer diagnosis.  Shortly after the treatment she developed some diaphoresis and felt ill.  However the following day she was feeling much better.  Then, last night she began to feel ill once again.  She has had some fevers and bodyaches as well as generalized weakness throughout her whole body.  She has had lethargy throughout the day and some shortness of breath.  Denies diarrhea.  Denies vomiting.  Denies cough but she has had some feeling of shortness of breath.  She says that she has had sepsis related to UTIs in the past and wanted to come here \"to catch it early this time.\"      Review of prior external notes (non-ED) -and- Review of prior external test results outside of this encounter: I independently reviewed the internal medicine discharge summary from July 7, 2025.  She was found to have UA consistent with UTI on admission.  Ultimately her urine culture grew Klebsiella and Enterococcus faecalis.  Infectious disease was consulted for antibiotic recommendations.  Also had cardiology consultation for bradycardia and subsequently her metoprolol was reduced.    Prescription drug monitoring program review: CM reviewed by Frederick Carey DO, Suman Boogie MD KASPER: N/A and CM query complete and reviewed. Patient receives regular prescriptions for controlled substances.    PAST MEDICAL HISTORY  Active Ambulatory Problems     Diagnosis Date Noted    H/O total shoulder replacement, " right 10/22/2018    Cough 01/28/2021    Acute UTI (urinary tract infection) 01/28/2021    Multiple sclerosis 01/28/2021    Essential hypertension 01/28/2021    Hyperlipidemia 01/28/2021    Shortness of breath 01/28/2021    Oropharyngeal dysphagia 02/04/2021    Abnormal finding on mammography 08/09/2021    Acid reflux 08/09/2021    Anxiety and depression 05/01/2018    Brash 08/09/2021    Carpal tunnel syndrome 01/10/2013    Chronic low back pain 01/22/2014    Diplopia 01/10/2013    Disease with a predominantly sexual mode of transmission 08/09/2021    FOM (frequency of micturition) 08/09/2021    Headache 01/10/2013    History of diplopia 01/07/2020    History of optic neuritis 01/07/2020    History of vitamin D deficiency 10/31/2017    Migraine syndrome 01/22/2014    Mixed incontinence 07/31/2017    Nausea 08/10/2015    Neurogenic bladder 02/23/2017    Pain in joint of right shoulder 07/31/2017    Restless legs syndrome 07/23/2014    Rupture of rotator cuff of shoulder 08/31/2017    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    S/P cubital tunnel release 01/04/2019    Vitamin D deficiency 01/22/2014    Multiple sclerosis exacerbation 02/26/2022    Sepsis due to Gram negative bacteria 02/27/2022    Lower extremity cellulitis 03/07/2024    Malignant neoplasm of overlapping sites of left breast in female, estrogen receptor positive 06/28/2024    Encounter for long-term (current) use of other medications 07/16/2024    Cancer, metastatic to bone 07/31/2024    Colitis 09/10/2024    Weakness 09/18/2024    Elevated LFTs 09/21/2024    Acute UTI 10/18/2024    Sepsis 04/17/2025    NSTEMI (non-ST elevated myocardial infarction) 04/22/2025    Type 2 diabetes mellitus 04/22/2025    Stress-induced cardiomyopathy 05/23/2025    UTI (urinary tract infection) due to urinary indwelling catheter 07/02/2025    Anemia 07/03/2025    UTI (urinary tract infection) 07/04/2025    Primary malignant neoplasm of breast, left 07/10/2025      Resolved Ambulatory Problems     Diagnosis Date Noted    No Resolved Ambulatory Problems     Past Medical History:   Diagnosis Date    Dawn esophagus     Blurred vision     Breast cancer     Clotting disorder 1993, 2003, 2012    Colon polyp 2013    Deep vein thrombosis phlebitis 1980    Depression     GERD (gastroesophageal reflux disease)     GI (gastrointestinal bleed) 3 bleeds    H/O Skin cancer, basal cell     History of blood transfusion     History of GI bleed     History of urinary tract infection     Hypercalcemia     Hypertension     Movement disorder     Optic neuritis     PONV (postoperative nausea and vomiting)     Sleep apnea     Steroid-induced diabetes 2024         PAST SURGICAL HISTORY  Past Surgical History:   Procedure Laterality Date    APPENDECTOMY      BLADDER SURGERY      bladder stimulator    BREAST BIOPSY  don't remember    BREAST SURGERY      augmentation wtih subsequent removal    CARPAL TUNNEL RELEASE Bilateral     Left 2018, right 2020    CUBITAL TUNNEL RELEASE Left     CYSTOSCOPY BOTOX INJECTION OF BLADDER  2018    Cystoscopy with Botox    FRACTURE SURGERY  2019    rt shoulder    PARATHYROIDECTOMY      one gland removed    ROTATOR CUFF REPAIR Right 2017    TOE SURGERY      bilateral great toes    TOTAL SHOULDER ARTHROPLASTY W/ DISTAL CLAVICLE EXCISION Right 10/22/2018    Procedure: RT TOTAL SHOULDER REVERSE ARTHROPLASTY;  Surgeon: Bipin Dangelo MD;  Location: [facility];  Service: Orthopedics         FAMILY HISTORY  Family History   Problem Relation Age of Onset    Hypertension Mother     Hyperlipidemia Mother     Aortic aneurysm Mother         thoracic    Diabetes Mother     Hypertension Father         charissa heart failure    Heart failure Father     Hyperlipidemia Father     Miscarriages / Stillbirths Sister     Multiple sclerosis Brother     Atrial fibrillation Brother     Diabetes Maternal Grandmother     Diabetes Maternal Grandfather     Stroke Maternal Grandfather      Parkinsonism Paternal Grandmother     Tremor Paternal Grandmother     Stomach cancer Paternal Grandfather          SOCIAL HISTORY  Social History     Socioeconomic History    Marital status:      Spouse name: Donald    Number of children: 0   Tobacco Use    Smoking status: Former     Current packs/day: 0.00     Average packs/day: 2.0 packs/day for 30.0 years (60.0 ttl pk-yrs)     Types: Cigarettes     Start date: 10/22/1973     Quit date: 10/22/2003     Years since quittin.7   Vaping Use    Vaping status: Never Used   Substance and Sexual Activity    Alcohol use: Not Currently    Drug use: Yes     Types: Marijuana     Comment: rarely    Sexual activity: Defer     Partners: Male     Birth control/protection: Post-menopausal       Chronic or social conditions impacting patient care (Social Determinants of Health):  Social Drivers of Health     Tobacco Use: Medium Risk (2025)    Patient History     Smoking Tobacco Use: Former     Smokeless Tobacco Use: Never     Passive Exposure: Not on file   Alcohol Use: Not At Risk (2025)    AUDIT-C     Frequency of Alcohol Consumption: Never     Average Number of Drinks: Patient does not drink     Frequency of Binge Drinking: Never   Financial Resource Strain: Medium Risk (2024)    Overall Financial Resource Strain (CARDIA)     Difficulty of Paying Living Expenses: Somewhat hard   Food Insecurity: No Food Insecurity (7/3/2025)    Hunger Vital Sign     Worried About Running Out of Food in the Last Year: Never true     Ran Out of Food in the Last Year: Never true   Transportation Needs: No Transportation Needs (7/3/2025)    PRAPARE - Transportation     Lack of Transportation (Medical): No     Lack of Transportation (Non-Medical): No   Physical Activity: Not on file   Stress: Not on file   Social Connections: Unknown (7/3/2025)    Family and Community Support     Help with Day-to-Day Activities: I get all the help I need     Lonely or Isolated: Not on file    Interpersonal Safety: Not At Risk (7/26/2025)    Abuse Screen     Unsafe at Home or Work/School: no     Feels Threatened by Someone?: no     Does Anyone Keep You from Contacting Others or Doint Things Outside the Home?: no     Physical Sign of Abuse Present: no   Depression: Not at risk (7/22/2025)    PHQ-2     PHQ-2 Score: 0   Recent Concern: Depression - At risk (7/14/2025)    PHQ-2     PHQ-2 Score: 7   Housing Stability: Not At Risk (7/26/2025)    Housing Stability     Current Living Arrangements: home     Potentially Unsafe Housing Conditions: none   Utilities: Not At Risk (7/3/2025)    AHC Utilities     Threatened with loss of utilities: No   Health Literacy: Not At Risk (7/3/2025)    Education     Help with school or training?: No     Preferred Language: English   Employment: Not At Risk (7/3/2025)    Employment     Do you want help finding or keeping work or a job?: I do not need or want help   Disabilities: At Risk (7/26/2025)    Disabilities     Concentrating, Remembering, or Making Decisions Difficulty: no     Doing Errands Independently Difficulty: yes       ALLERGIES  Klonopin [clonazepam]      REVIEW OF SYSTEMS  Review of Systems  Included in HPI  All systems reviewed and negative except for those discussed in HPI.      PHYSICAL EXAM    I have reviewed the triage vital signs and nursing notes.    ED Triage Vitals [07/25/25 2002]   Temp Heart Rate Resp BP SpO2   99.9 °F (37.7 °C) 100 18 150/75 100 %      Temp src Heart Rate Source Patient Position BP Location FiO2 (%)   Oral Monitor Lying Right arm --       Physical Exam  GENERAL: alert, appears fatigued but no acute distress  SKIN: Warm, dry  HENT: Normocephalic, atraumatic  EYES: no scleral icterus  CV: regular rhythm, regular rate  RESPIRATORY: normal effort, lungs clear  ABDOMEN: soft, nondistended, nontender in all quadrants  MUSCULOSKELETAL: no deformity  NEURO: alert, follows commands, clear speech, no facial droop.  Lower extremity weakness  consistent with history      LAB RESULTS  Recent Results (from the past 24 hours)   Comprehensive Metabolic Panel    Collection Time: 07/25/25  8:33 PM    Specimen: Blood   Result Value Ref Range    Glucose 244 (H) 65 - 99 mg/dL    BUN 17.0 8.0 - 23.0 mg/dL    Creatinine 0.91 0.57 - 1.00 mg/dL    Sodium 138 136 - 145 mmol/L    Potassium 3.7 3.5 - 5.2 mmol/L    Chloride 103 98 - 107 mmol/L    CO2 22.2 22.0 - 29.0 mmol/L    Calcium 9.4 8.6 - 10.5 mg/dL    Total Protein 7.3 6.0 - 8.5 g/dL    Albumin 3.7 3.5 - 5.2 g/dL    ALT (SGPT) 28 1 - 33 U/L    AST (SGOT) 112 (H) 1 - 32 U/L    Alkaline Phosphatase 196 (H) 39 - 117 U/L    Total Bilirubin <0.2 0.0 - 1.2 mg/dL    Globulin 3.6 gm/dL    A/G Ratio 1.0 g/dL    BUN/Creatinine Ratio 18.7 7.0 - 25.0    Anion Gap 12.8 5.0 - 15.0 mmol/L    eGFR 71.5 >60.0 mL/min/1.73   Lactic Acid, Plasma    Collection Time: 07/25/25  8:33 PM    Specimen: Blood   Result Value Ref Range    Lactate 2.3 (C) 0.5 - 2.0 mmol/L   Procalcitonin    Collection Time: 07/25/25  8:33 PM    Specimen: Blood   Result Value Ref Range    Procalcitonin 0.30 (H) 0.00 - 0.25 ng/mL   CBC Auto Differential    Collection Time: 07/25/25  8:33 PM    Specimen: Blood   Result Value Ref Range    WBC 12.76 (H) 3.40 - 10.80 10*3/mm3    RBC 3.12 (L) 3.77 - 5.28 10*6/mm3    Hemoglobin 9.0 (L) 12.0 - 15.9 g/dL    Hematocrit 28.1 (L) 34.0 - 46.6 %    MCV 90.1 79.0 - 97.0 fL    MCH 28.8 26.6 - 33.0 pg    MCHC 32.0 31.5 - 35.7 g/dL    RDW 16.6 (H) 12.3 - 15.4 %    RDW-SD 52.6 37.0 - 54.0 fl    MPV 8.7 6.0 - 12.0 fL    Platelets 449 140 - 450 10*3/mm3    Neutrophil % 85.7 (H) 42.7 - 76.0 %    Lymphocyte % 11.0 (L) 19.6 - 45.3 %    Monocyte % 1.9 (L) 5.0 - 12.0 %    Eosinophil % 0.7 0.3 - 6.2 %    Basophil % 0.2 0.0 - 1.5 %    Immature Grans % 0.5 0.0 - 0.5 %    Neutrophils, Absolute 10.93 (H) 1.70 - 7.00 10*3/mm3    Lymphocytes, Absolute 1.40 0.70 - 3.10 10*3/mm3    Monocytes, Absolute 0.24 0.10 - 0.90 10*3/mm3    Eosinophils,  Absolute 0.09 0.00 - 0.40 10*3/mm3    Basophils, Absolute 0.03 0.00 - 0.20 10*3/mm3    Immature Grans, Absolute 0.07 (H) 0.00 - 0.05 10*3/mm3    nRBC 0.0 0.0 - 0.2 /100 WBC   Urinalysis With Culture If Indicated - Urine, Clean Catch    Collection Time: 07/25/25  8:52 PM    Specimen: Urine, Clean Catch   Result Value Ref Range    Color, UA Yellow Yellow, Straw    Appearance, UA Slightly Cloudy (A) Clear    pH, UA 5.5 5.0 - 8.0    Specific Gravity, UA 1.025 1.005 - 1.030    Glucose,  mg/dL (Trace) (A) Negative    Ketones, UA Negative Negative    Bilirubin, UA Negative Negative    Blood, UA Small (1+) (A) Negative    Protein,  mg/dL (2+) (A) Negative    Leuk Esterase, UA Small (1+) (A) Negative    Nitrite, UA Positive (A) Negative    Urobilinogen, UA 0.2 E.U./dL 0.2 - 1.0 E.U./dL   Urinalysis, Microscopic Only - Urine, Clean Catch    Collection Time: 07/25/25  8:52 PM    Specimen: Urine, Clean Catch   Result Value Ref Range    RBC, UA 0-2 None Seen, 0-2 /HPF    WBC, UA 3-5 (A) None Seen, 0-2 /HPF    Bacteria, UA Trace (A) None Seen /HPF    Squamous Epithelial Cells, UA 0-2 None Seen, 0-2 /HPF    Yeast, UA Large/3+ Yeast (A) None Seen /HPF    Hyaline Casts, UA 0-2 None Seen /LPF    Methodology Automated Microscopy    STAT Lactic Acid, Reflex    Collection Time: 07/25/25 11:11 PM    Specimen: Blood   Result Value Ref Range    Lactate 1.3 0.5 - 2.0 mmol/L   CBC (No Diff)    Collection Time: 07/26/25  2:11 AM    Specimen: Blood   Result Value Ref Range    WBC 13.95 (H) 3.40 - 10.80 10*3/mm3    RBC 2.96 (L) 3.77 - 5.28 10*6/mm3    Hemoglobin 8.6 (L) 12.0 - 15.9 g/dL    Hematocrit 26.7 (L) 34.0 - 46.6 %    MCV 90.2 79.0 - 97.0 fL    MCH 29.1 26.6 - 33.0 pg    MCHC 32.2 31.5 - 35.7 g/dL    RDW 16.1 (H) 12.3 - 15.4 %    RDW-SD 53.2 37.0 - 54.0 fl    MPV 8.9 6.0 - 12.0 fL    Platelets 397 140 - 450 10*3/mm3   Comprehensive Metabolic Panel    Collection Time: 07/26/25  2:11 AM    Specimen: Blood   Result Value Ref  Range    Glucose 212 (H) 65 - 99 mg/dL    BUN 15.0 8.0 - 23.0 mg/dL    Creatinine 0.56 (L) 0.57 - 1.00 mg/dL    Sodium 137 136 - 145 mmol/L    Potassium 3.8 3.5 - 5.2 mmol/L    Chloride 104 98 - 107 mmol/L    CO2 22.0 22.0 - 29.0 mmol/L    Calcium 8.5 (L) 8.6 - 10.5 mg/dL    Total Protein 6.8 6.0 - 8.5 g/dL    Albumin 3.4 (L) 3.5 - 5.2 g/dL    ALT (SGPT) 23 1 - 33 U/L    AST (SGOT) 109 (H) 1 - 32 U/L    Alkaline Phosphatase 173 (H) 39 - 117 U/L    Total Bilirubin <0.2 0.0 - 1.2 mg/dL    Globulin 3.4 gm/dL    A/G Ratio 1.0 g/dL    BUN/Creatinine Ratio 26.8 (H) 7.0 - 25.0    Anion Gap 11.0 5.0 - 15.0 mmol/L    eGFR 103.3 >60.0 mL/min/1.73         RADIOLOGY  XR Chest 1 View  Result Date: 7/25/2025  CXR ONE VIEW  HISTORY: fever  COMPARISON: 7/2/2025  TECHNIQUE: single portable AP       Right IJ [id number] catheter remains in place.  The heart size is within normal limits.  Low lung volumes, no acute pulmonary infiltrate.  The right lung appears normally aerated. Left basilar retrocardiac linear density, scarring or atelectasis, unchanged.  This report was finalized on 7/25/2025 9:45 PM by Dr. Juma Scott M.D on Workstation: [id number]          MEDICATIONS GIVEN IN ER  Medications   sodium chloride 0.9 % flush 10 mL (has no administration in time range)   sodium chloride 0.9 % bolus 1,000 mL (1,000 mL Intravenous Incomplete 7/26/25 0111)   fluconazole (DIFLUCAN) IVPB 200 mg (has no administration in time range)   nitroglycerin (NITROSTAT) SL tablet 0.4 mg (has no administration in time range)   sodium chloride 0.9 % flush 10 mL (has no administration in time range)   sodium chloride 0.9 % flush 10 mL (has no administration in time range)   sodium chloride 0.9 % infusion 40 mL (has no administration in time range)   acetaminophen (TYLENOL) tablet 650 mg (has no administration in time range)     Or   acetaminophen (TYLENOL) 160 MG/5ML oral solution 650 mg (has no administration in time range)     Or    acetaminophen (TYLENOL) suppository 650 mg (has no administration in time range)   famotidine (PEPCID) tablet 20 mg (has no administration in time range)   sennosides-docusate (PERICOLACE) 8.6-50 MG per tablet 2 tablet (has no administration in time range)     And   polyethylene glycol (MIRALAX) packet 17 g (has no administration in time range)     And   bisacodyl (DULCOLAX) EC tablet 5 mg (has no administration in time range)     And   bisacodyl (DULCOLAX) suppository 10 mg (has no administration in time range)   ondansetron (ZOFRAN) injection 4 mg (has no administration in time range)   melatonin tablet 5 mg (has no administration in time range)   Pharmacy To Dose: Cefepime (has no administration in time range)   fluconazole (DIFLUCAN) IVPB 200 mg (has no administration in time range)   dextrose (GLUTOSE) oral gel 15 g (has no administration in time range)   dextrose (D50W) (25 g/50 mL) IV injection 25 g (has no administration in time range)   glucagon (GLUCAGEN) injection 1 mg (has no administration in time range)   insulin lispro (HUMALOG/ADMELOG) injection 2-7 Units (has no administration in time range)   cefepime 2000 mg IVPB in 100 mL NS (MBP) (has no administration in time range)   albuterol (PROVENTIL) nebulizer solution 0.083% 2.5 mg/3mL (has no administration in time range)   aspirin EC tablet 81 mg (has no administration in time range)   baclofen (LIORESAL) tablet 20 mg (has no administration in time range)   cholecalciferol (VITAMIN D3) tablet 5,000 Units (has no administration in time range)   HYDROcodone-acetaminophen (NORCO) 5-325 MG per tablet 1 tablet (has no administration in time range)   pantoprazole (PROTONIX) EC tablet 40 mg (has no administration in time range)   pramipexole (MIRAPEX) tablet 1.5 mg (has no administration in time range)   metoprolol succinate XL (TOPROL-XL) 24 hr tablet 25 mg (has no administration in time range)   cefepime 2000 mg IVPB in 100 mL NS (MBP) (2,000 mg  Intravenous New Bag 7/25/25 2027)         ORDERS PLACED DURING THIS VISIT:  Orders Placed This Encounter   Procedures    Blood Culture - Blood,    Blood Culture - Blood,    XR Chest 1 View    Comprehensive Metabolic Panel    Lactic Acid, Plasma    Procalcitonin    Urinalysis With Culture If Indicated - Urine, Clean Catch    CBC Auto Differential    Urinalysis, Microscopic Only - Urine, Clean Catch    STAT Lactic Acid, Reflex    CBC (No Diff)    Comprehensive Metabolic Panel    Lactic Acid, Plasma    Diet: Cardiac, Diabetic; Healthy Heart (2-3 Na+); Consistent Carbohydrate; Fluid Consistency: Thin (IDDSI 0)    Monitor Blood Pressure    Replace Cobos Catheter    Assess Need for Indwelling Urinary Catheter - Follow Removal Protocol    Urinary Catheter Care    Vital Signs    Telemetry - Place Orders & Notify Provider of Results When Patient Experiences Acute Chest Pain, Dysrhythmia or Respiratory Distress    May Be Off Telemetry for Tests    Notify Provider (With Default Parameters)    Up With Assistance    Intake & Output    Weigh Patient    Oral Care    Place Sequential Compression Device    Maintain Sequential Compression Device    Saline Lock & Maintain IV Access    Continuous Pulse Oximetry    Code Status and Medical Interventions: CPR (Attempt to Resuscitate); Full Support    [facility] (on-call MD unless specified) Details    Hematology & Oncology Inpatient Consult    OT Consult: Eval & Treat ADL Performance Below Baseline    PT Consult: Eval & Treat Functional Mobility Below Baseline    Oxygen Therapy- Nasal Cannula; Titrate 1-6 LPM Per SpO2; 90 - 95%    POC Glucose 4x Daily Before Meals & at Bedtime    Telemetry Scan    Insert Peripheral IV    Access Port    Insert Peripheral IV    Inpatient Admission    Fall Precautions    CBC & Differential         OUTPATIENT MEDICATION MANAGEMENT:  Current Facility-Administered Medications Ordered in Epic   Medication Dose Route Frequency Provider Last Rate Last Admin     acetaminophen (TYLENOL) tablet 650 mg  650 mg Oral Q4H PRN Rosina Atkinson APRN        Or    acetaminophen (TYLENOL) 160 MG/5ML oral solution 650 mg  650 mg Oral Q4H PRN Rosina Atkinson APRN        Or    acetaminophen (TYLENOL) suppository 650 mg  650 mg Rectal Q4H PRN Rosina Atkinson APRN        albuterol (PROVENTIL) nebulizer solution 0.083% 2.5 mg/3mL  2.5 mg Nebulization Q4H PRN Frederick Carey DO        aspirin EC tablet 81 mg  81 mg Oral Daily Frederick Carey DO        baclofen (LIORESAL) tablet 20 mg  20 mg Oral BID Frederick Carey DO        sennosides-docusate (PERICOLACE) 8.6-50 MG per tablet 2 tablet  2 tablet Oral BID PRN Rosina Atkinson APRN        And    polyethylene glycol (MIRALAX) packet 17 g  17 g Oral Daily PRN Rosian Atkinson APRN        And    bisacodyl (DULCOLAX) EC tablet 5 mg  5 mg Oral Daily PRN Rosina Atkinson APRN        And    bisacodyl (DULCOLAX) suppository 10 mg  10 mg Rectal Daily PRN Rosina Atkinson APRN        cefepime 2000 mg IVPB in 100 mL NS (MBP)  2,000 mg Intravenous Q8H Rosina Atkinson APRN        cholecalciferol (VITAMIN D3) tablet 5,000 Units  5,000 Units Oral Daily Frederick Carey DO        dextrose (D50W) (25 g/50 mL) IV injection 25 g  25 g Intravenous Q15 Min PRN Rosina Atkinson APRN        dextrose (GLUTOSE) oral gel 15 g  15 g Oral Q15 Min PRN Rosina Atkinson APRN        famotidine (PEPCID) tablet 20 mg  20 mg Oral BID PRN Rosina Atkinson APRN        fluconazole (DIFLUCAN) IVPB 200 mg  200 mg Intravenous Once Suman Boogie MD        fluconazole (DIFLUCAN) IVPB 200 mg  200 mg Intravenous Q24H Rosina Atkinson APRN        glucagon (GLUCAGEN) injection 1 mg  1 mg Intramuscular Q15 Min PRN Rosina Atkinson APRN        HYDROcodone-acetaminophen (NORCO) 5-325 MG per tablet 1 tablet  1 tablet Oral Q4H PRN Frederick Carey DO        insulin lispro (HUMALOG/ADMELOG) injection 2-7 Units  2-7 Units Subcutaneous 4x Daily AC &  at Bedtime Rosina Atkinson APRN        melatonin tablet 5 mg  5 mg Oral Nightly Rosina Atkinson APRN        metoprolol succinate XL (TOPROL-XL) 24 hr tablet 25 mg  25 mg Oral Q24H Frederick Carey DO        nitroglycerin (NITROSTAT) SL tablet 0.4 mg  0.4 mg Sublingual Q5 Min PRN Rosina Atkinson APRN        ondansetron (ZOFRAN) injection 4 mg  4 mg Intravenous Q6H PRN Rosina Atkinson APRN        pantoprazole (PROTONIX) EC tablet 40 mg  40 mg Oral Once per day on Monday Wednesday Friday Frederick Carey DO        Pharmacy To Dose: Cefepime   Not Applicable Continuous PRN Rosina Atkinson APRN        pramipexole (MIRAPEX) tablet 1.5 mg  1.5 mg Oral BID Frederick Carey DO        sodium chloride 0.9 % bolus 1,000 mL  1,000 mL Intravenous Once Suman Boogie MD        sodium chloride 0.9 % flush 10 mL  10 mL Intravenous PRN Suman Boogie MD        sodium chloride 0.9 % flush 10 mL  10 mL Intravenous Q12H Rosina Atkinson APRN        sodium chloride 0.9 % flush 10 mL  10 mL Intravenous PRN Rosina Atkinson APRN        sodium chloride 0.9 % infusion 40 mL  40 mL Intravenous PRN Rosina Atkinson APRN         No current Epic-ordered outpatient medications on file.         PROCEDURES  Procedures      Critical Care Note: Critical care provider statement:    Critical care time (minutes): 32.   Critical care time was exclusive of:  Separately billable procedures and treating other patients   Critical care was necessary to treat or prevent imminent or life-threatening deterioration of the following conditions:  Sepsis   Critical care was time spent personally by me on the following activities:  Development of treatment plan with patient or surrogate, discussions with consultants, evaluation of patient's response to treatment, examination of patient, obtaining history from patient or surrogate, ordering and performing treatments and interventions, ordering and review of laboratory studies, ordering  and review of radiographic studies, pulse oximetry, re-evaluation of patient's condition and review of old charts. Critical Care indicators: Sepsis / septicemia       PROGRESS, DATA ANALYSIS, CONSULTS, AND MEDICAL DECISION MAKING  All labs have been independently interpreted by me.  All radiology studies have been reviewed by me. All EKG's have been independently viewed and interpreted by me.  Discussion below represents my analysis of pertinent findings related to patient's condition, differential diagnosis, treatment plan and final disposition.    Differential diagnosis includes but is not limited to sepsis, urinary tract infection, UTI, pneumonia, viral illness, chemotherapy side effect, hypoglycemia.    Clinical Scores:                                   ED Course as of 07/26/25 0250   Fri Jul 25, 2025 2102 Patient presents with temperature of 99.9 and concerns for sepsis from a clinical standpoint.  She has had complicated infections in the past.  She and she has an indwelling Cobos catheter, I am certainly suspicious of an underlying UTI process.  Will proceed with sepsis workup and early initiation of antibiotics. [DAVID]   2129 WBC(!): 12.76 [DAVID]   2130 Nitrite, UA(!): Positive [DAVID]   2130 Yeast(!): Large/3+ Yeast [DAVID]   2130 Bacteria, UA(!): Trace [DAVID]   2208 Review of most recent hospitalization record, including infectious disease consult records, indicated utilization of IV cefepime for UTI.  Therefore I will start with that antibiotic at this time. [DAVID]   2215 I discussed with Dr. Carey from [facility] about the patient.  He agrees to admit her to the hospitalist service for further care needs today. [DAVID]   2215 I independently interpreted the Chest X-ray and my findings are: No Pneumothorax, No Effusion, No Infiltrate   [DAVID]   2220 Hemoglobin(!): 9.0 [DAVID]   2220 Hematocrit(!): 28.1 [DAVID]   2220 Glucose(!): 244 [DAVID]   Sat Jul 26, 2025   0248 Procalcitonin(!): 0.30 [DAVID]      ED Course User Index  [DAVID] Keagan  Suman LECHUGA MD           AS OF 02:50 EDT VITALS:    BP - 123/65  HR - 88  TEMP - 98.6 °F (37 °C) (Oral)  O2 SATS - 96%    COMPLEXITY OF CARE  The patient requires admission.      DIAGNOSIS  Final diagnoses:   Sepsis secondary to UTI   Hyperglycemia   Chronic anemia         DISPOSITION  ED Disposition       ED Disposition   Decision to Admit    Condition   --    Comment   Level of Care: Telemetry [5]   Diagnosis: Sepsis secondary to UTI [133328]   Admitting Physician: KIARRA CRANE [[id number]]   Attending Physician: KIARRA CRANE [[id number]]   Certification: I Certify That Inpatient Hospital Services Are Medically Necessary For Greater Than 2 Midnights                  Please note that portions of this document were completed with a voice recognition program.    Note Disclaimer: At [facility], we believe that sharing information builds trust and better relationships. You are receiving this note because you recently visited [facility]. It is possible you will see health information before a provider has talked with you about it. This kind of information can be easy to misunderstand. To help you fully understand what it means for your health, we urge you to discuss this note with your provider.         Suman Boogie MD  07/26/25 0250

## 2025-07-26 NOTE — SIGNIFICANT NOTE
07/26/25 1326   OTHER   Discipline occupational therapist   Rehab Time/Intention   Session Not Performed patient/family declined evaluation   Recommendation   OT - Next Appointment 07/28/25     Hx of MS. Pt reports using a leandra lift at home to power w/c for mobility. Also has a track system in her bathroom for leandra lift to the toilet and shower. However she has been recently bed level due to urgency. Does use the track system for transferring to shower chair. She reports BUEs increased weakness now and difficulty using her hands. She recently was feeding her self at home. She declined eval today but pt could possibly benefit from OT for UE/hand ROM/strengthening and self feeding.

## 2025-07-27 LAB
ALBUMIN SERPL-MCNC: 3.4 G/DL (ref 3.5–5.2)
ALBUMIN/GLOB SERPL: 1 G/DL
ALP SERPL-CCNC: 189 U/L (ref 39–117)
ALT SERPL W P-5'-P-CCNC: 21 U/L (ref 1–33)
ANION GAP SERPL CALCULATED.3IONS-SCNC: 11 MMOL/L (ref 5–15)
AST SERPL-CCNC: 76 U/L (ref 1–32)
BACTERIA UR QL AUTO: ABNORMAL /HPF
BILIRUB SERPL-MCNC: <0.2 MG/DL (ref 0–1.2)
BILIRUB UR QL STRIP: NEGATIVE
BUN SERPL-MCNC: 12 MG/DL (ref 8–23)
BUN/CREAT SERPL: 21.1 (ref 7–25)
CALCIUM SPEC-SCNC: 9 MG/DL (ref 8.6–10.5)
CHLORIDE SERPL-SCNC: 105 MMOL/L (ref 98–107)
CLARITY UR: CLEAR
CO2 SERPL-SCNC: 23 MMOL/L (ref 22–29)
COLOR UR: YELLOW
CREAT SERPL-MCNC: 0.57 MG/DL (ref 0.57–1)
DEPRECATED RDW RBC AUTO: 53.1 FL (ref 37–54)
EGFRCR SERPLBLD CKD-EPI 2021: 102.9 ML/MIN/1.73
ERYTHROCYTE [DISTWIDTH] IN BLOOD BY AUTOMATED COUNT: 16 % (ref 12.3–15.4)
GLOBULIN UR ELPH-MCNC: 3.4 GM/DL
GLUCOSE BLDC GLUCOMTR-MCNC: 147 MG/DL (ref 70–130)
GLUCOSE BLDC GLUCOMTR-MCNC: 218 MG/DL (ref 70–130)
GLUCOSE BLDC GLUCOMTR-MCNC: 334 MG/DL (ref 70–130)
GLUCOSE SERPL-MCNC: 145 MG/DL (ref 65–99)
GLUCOSE UR STRIP-MCNC: ABNORMAL MG/DL
HCT VFR BLD AUTO: 25.9 % (ref 34–46.6)
HGB BLD-MCNC: 8 G/DL (ref 12–15.9)
HGB UR QL STRIP.AUTO: NEGATIVE
HYALINE CASTS UR QL AUTO: ABNORMAL /LPF
KETONES UR QL STRIP: NEGATIVE
LEUKOCYTE ESTERASE UR QL STRIP.AUTO: ABNORMAL
MAGNESIUM SERPL-MCNC: 2.4 MG/DL (ref 1.6–2.4)
MCH RBC QN AUTO: 28 PG (ref 26.6–33)
MCHC RBC AUTO-ENTMCNC: 30.9 G/DL (ref 31.5–35.7)
MCV RBC AUTO: 90.6 FL (ref 79–97)
NITRITE UR QL STRIP: NEGATIVE
PH UR STRIP.AUTO: 6 [PH] (ref 5–8)
PHOSPHATE SERPL-MCNC: 2.7 MG/DL (ref 2.5–4.5)
PLATELET # BLD AUTO: 402 10*3/MM3 (ref 140–450)
PMV BLD AUTO: 9 FL (ref 6–12)
POTASSIUM SERPL-SCNC: 4.3 MMOL/L (ref 3.5–5.2)
PROT SERPL-MCNC: 6.8 G/DL (ref 6–8.5)
PROT UR QL STRIP: ABNORMAL
RBC # BLD AUTO: 2.86 10*6/MM3 (ref 3.77–5.28)
RBC # UR STRIP: ABNORMAL /HPF
REF LAB TEST METHOD: ABNORMAL
SODIUM SERPL-SCNC: 139 MMOL/L (ref 136–145)
SP GR UR STRIP: 1.02 (ref 1–1.03)
SQUAMOUS #/AREA URNS HPF: ABNORMAL /HPF
UROBILINOGEN UR QL STRIP: ABNORMAL
WBC # UR STRIP: ABNORMAL /HPF
WBC NRBC COR # BLD AUTO: 13.97 10*3/MM3 (ref 3.4–10.8)
YEAST URNS QL MICRO: PRESENT /HPF

## 2025-07-27 PROCEDURE — 25010000002 FLUCONAZOLE PER 200 MG: Performed by: NURSE PRACTITIONER

## 2025-07-27 PROCEDURE — 80053 COMPREHEN METABOLIC PANEL: CPT | Performed by: STUDENT IN AN ORGANIZED HEALTH CARE EDUCATION/TRAINING PROGRAM

## 2025-07-27 PROCEDURE — 85027 COMPLETE CBC AUTOMATED: CPT | Performed by: STUDENT IN AN ORGANIZED HEALTH CARE EDUCATION/TRAINING PROGRAM

## 2025-07-27 PROCEDURE — 25010000002 ENOXAPARIN PER 10 MG: Performed by: STUDENT IN AN ORGANIZED HEALTH CARE EDUCATION/TRAINING PROGRAM

## 2025-07-27 PROCEDURE — 82948 REAGENT STRIP/BLOOD GLUCOSE: CPT

## 2025-07-27 PROCEDURE — 84100 ASSAY OF PHOSPHORUS: CPT | Performed by: STUDENT IN AN ORGANIZED HEALTH CARE EDUCATION/TRAINING PROGRAM

## 2025-07-27 PROCEDURE — 25010000002 CEFEPIME PER 500 MG: Performed by: NURSE PRACTITIONER

## 2025-07-27 PROCEDURE — 99232 SBSQ HOSP IP/OBS MODERATE 35: CPT | Performed by: INTERNAL MEDICINE

## 2025-07-27 PROCEDURE — 81001 URINALYSIS AUTO W/SCOPE: CPT | Performed by: INTERNAL MEDICINE

## 2025-07-27 PROCEDURE — 83735 ASSAY OF MAGNESIUM: CPT | Performed by: STUDENT IN AN ORGANIZED HEALTH CARE EDUCATION/TRAINING PROGRAM

## 2025-07-27 PROCEDURE — 63710000001 INSULIN LISPRO (HUMAN) PER 5 UNITS: Performed by: NURSE PRACTITIONER

## 2025-07-27 RX ORDER — AMOXICILLIN 250 MG
2 CAPSULE ORAL 2 TIMES DAILY
Status: DISCONTINUED | OUTPATIENT
Start: 2025-07-27 | End: 2025-07-29 | Stop reason: HOSPADM

## 2025-07-27 RX ORDER — BISACODYL 10 MG
10 SUPPOSITORY, RECTAL RECTAL DAILY PRN
Status: DISCONTINUED | OUTPATIENT
Start: 2025-07-27 | End: 2025-07-29 | Stop reason: HOSPADM

## 2025-07-27 RX ORDER — POLYETHYLENE GLYCOL 3350 17 G/17G
17 POWDER, FOR SOLUTION ORAL DAILY
Status: DISCONTINUED | OUTPATIENT
Start: 2025-07-27 | End: 2025-07-29 | Stop reason: HOSPADM

## 2025-07-27 RX ORDER — BISACODYL 5 MG/1
5 TABLET, DELAYED RELEASE ORAL DAILY PRN
Status: DISCONTINUED | OUTPATIENT
Start: 2025-07-27 | End: 2025-07-29 | Stop reason: HOSPADM

## 2025-07-27 RX ADMIN — CEFEPIME 2000 MG: 2 INJECTION, POWDER, FOR SOLUTION INTRAVENOUS at 08:20

## 2025-07-27 RX ADMIN — PRAMIPEXOLE DIHYDROCHLORIDE 1.5 MG: 1.5 TABLET ORAL at 08:21

## 2025-07-27 RX ADMIN — FLUCONAZOLE 200 MG: 200 INJECTION, SOLUTION INTRAVENOUS at 17:21

## 2025-07-27 RX ADMIN — ASPIRIN 81 MG: 81 TABLET, COATED ORAL at 08:20

## 2025-07-27 RX ADMIN — Medication 10 ML: at 08:21

## 2025-07-27 RX ADMIN — Medication 10 ML: at 21:07

## 2025-07-27 RX ADMIN — ENOXAPARIN SODIUM 40 MG: 100 INJECTION SUBCUTANEOUS at 13:17

## 2025-07-27 RX ADMIN — BACLOFEN 20 MG: 10 TABLET ORAL at 08:20

## 2025-07-27 RX ADMIN — SENNOSIDES AND DOCUSATE SODIUM 2 TABLET: 50; 8.6 TABLET ORAL at 20:55

## 2025-07-27 RX ADMIN — Medication 5000 UNITS: at 08:20

## 2025-07-27 RX ADMIN — Medication 5 MG: at 20:55

## 2025-07-27 RX ADMIN — INSULIN LISPRO 3 UNITS: 100 INJECTION, SOLUTION INTRAVENOUS; SUBCUTANEOUS at 13:18

## 2025-07-27 RX ADMIN — SENNOSIDES AND DOCUSATE SODIUM 2 TABLET: 50; 8.6 TABLET ORAL at 08:20

## 2025-07-27 RX ADMIN — PRAMIPEXOLE DIHYDROCHLORIDE 1.5 MG: 1.5 TABLET ORAL at 20:55

## 2025-07-27 RX ADMIN — CEFEPIME 2000 MG: 2 INJECTION, POWDER, FOR SOLUTION INTRAVENOUS at 17:21

## 2025-07-27 RX ADMIN — POLYETHYLENE GLYCOL 3350 17 G: 17 POWDER, FOR SOLUTION ORAL at 08:20

## 2025-07-27 RX ADMIN — BACLOFEN 20 MG: 10 TABLET ORAL at 20:55

## 2025-07-27 RX ADMIN — INSULIN LISPRO 5 UNITS: 100 INJECTION, SOLUTION INTRAVENOUS; SUBCUTANEOUS at 20:58

## 2025-07-27 NOTE — PAYOR COMM NOTE
"Maritza Purdy (64 y.o. Female)     ATTN: NURSE REVIEWER  RE: INITIAL INPT AUTH CLINICALS   REF: [id number]  PLS REPLY TO ROSE MARY ANAYA [phone] OR FAX# [phone]      Date of Birth   1960    Social Security Number       Address   [address] [place] [id number]    Home Phone       MRN   [id number]       Episcopal   Adventist    Marital Status                               Admission Date   7/25/2025    Admission Type   Emergency    Admitting Provider   Frederick Carey DO    Attending Provider   Vinicio Montoya MD    Department, Room/Bed   [address], S515/1       Discharge Date       Discharge Disposition       Discharge Destination                                 Attending Provider: Vinicio Montoya MD    Allergies: Klonopin [Clonazepam]    Isolation: None   Infection: None   Code Status: CPR    Ht: 165.1 cm (65\")   Wt: 81.6 kg (180 lb)    Admission Cmt: None   Principal Problem: Sepsis secondary to UTI [A41.9,N39.0]                   Active Insurance as of 7/25/2025       Primary Coverage       Payor Plan Insurance Group Employer/Plan Group    ANTHEM MEDICARE REPLACEMENT ANTHEM MEDICARE ADVANTAGE HMO [id number]       Payor Plan Address Payor Plan Phone Number Payor Plan Fax Number Effective Dates    [address] [phone]  1/1/2025 - None Entered    [facility] [id number]         Subscriber Name Subscriber Birth Date Member ID       MARITZA PURDY 1960 [id number]                     Emergency Contacts        (Rel.) Home Phone Work Phone Mobile Phone    Donald Purdy (Spouse) [phone] -- [phone]    Shana Klein (HCS) (Sister) -- -- [phone]    Maru Rodriguez (Sister) -- -- [phone]                 History & Physical        Rosina Atkinson, APRN at 07/25/25 2212       Attestation signed by Frederick Carey DO at 07/26/25 0218 (Updated)      Brief Attending Admission Attestation   I have seen and examined the " patient, performing an independent face-to-face diagnostic evaluation with plan of care reviewed and developed with DANA Castro.  The majority of medical decision making (>50%) for this encounter was completed by myself and discussed with this APRN. Patient seen and examined before midnight, note delayed due to patient care.    Brief Summary Statement/HPI  64 y.o. female with history of breast cancer, MS, neurogenic bladder with chronic indwelling matias catheter who presents to [facility] with complaints of generalized weakness and fatigue over the past few days. She has also had some nausea, chills, myalgias. She endorses symptoms similar to this when she has had UTI in the past. Upon arrival, patient found to be tachycardic, with leukocytosis and UA suggestive of infection. She was given empiric antibiotics in ED and is being admitted for further evaluation and management.  Remainder of detailed HPI is as noted above and has been reviewed by me for completeness.    Attending Physical Exam  Temp:  [98.6 °F (37 °C)-99.9 °F (37.7 °C)] 98.6 °F (37 °C)  Heart Rate:  [] 88  Resp:  [18] 18  BP: (105-150)/(51-75) 123/65  Constitutional: chronically ill appearing, no acute distress  HEENT: Normal conjunctivae, no scleral icterus  CV: Regular rate, regular rhythm  RESP: FIO2 21, clear to auscultation B/L, normal effort  GI: soft, non-tender  MSK: No tenderness  Skin: Warm, dry.   Neuro: Awake, alert, no tremor  Psych: Appropriate mood and affect.    Assessment/Plan  64 y.o. female with history of breast cancer, MS, neurogenic bladder with chronic indwelling matias catheter who presents to [facility] with complaints of generalized weakness and fatigue over the past few days.  SIRS 2/4 on arrival with tachycardia, leukocytosis; UA suggestive of infection. She states that her symptoms noted on arrival are similar to when she had UTI in the past. Continue with empiric Cefepime for  now. Follow up culture results. Sepsis order set reviewed. Trend WBC with CBC. She has chronic indwelling matias, order has been placed to exchange in ED. Generalized weakness likely related to infection, she has received fluids and will monitor clinical course. Glucose elevated, will order SSI, trend levels, can adjust regimen as needed. LFT slightly elevated-hold statin, trend with CMP. Hemoglobin slightly low, no evidence of bleeding, repeat CBC in AM. Transfuse for hemoglobin <7. She follows with Oncology in outpatient setting, will consult them to follow along while admitted.    Active Hospital Problems    Diagnosis  POA    **Sepsis secondary to UTI [A41.9, N39.0]  Yes    Transaminitis [R74.01]  Yes    Anemia [D64.9]  Yes    UTI (urinary tract infection) due to urinary indwelling catheter [T83.511A, N39.0]  Yes    Type 2 diabetes mellitus [E11.9]  Yes    Malignant neoplasm of overlapping sites of left breast in female, estrogen receptor positive [C50.812, Z17.0]  Not Applicable    Multiple sclerosis [G35]  Yes    Neurogenic bladder [N31.9]  Yes    Restless legs syndrome [G25.81]  Yes      Resolved Hospital Problems   No resolved problems to display.     See above section for further detailed assessment and plan developed with APRN which I have reviewed.  Electronically signed by Frederick Carey DO 07/25/25                        Patient Name:  Maritza Bejarano  YOB: 1960  MRN:  [id number]  Admit Date:  7/25/2025  Patient Care Team:  Enoc Carbone DO as PCP - General (Internal Medicine)  Brittney Ortiz RN as Nurse Navigator (Oncology)  Kim Barber MD as Referring Physician (General Surgery)  Zainab Lopes MD as Consulting Physician (Hematology and Oncology)      Subjective  History Present Illness     Chief Complaint   Patient presents with    Fever     History of Present Illness  Ms. Bejarano is a 64 year old female with history of anemia breast cancer, multiple sclerosis, neurogenic  bladder with indwelling catheter, type 2 diabetes, restless leg syndrome who presents to the ED with complaints of generalized fatigue and not feeling well, some nausea and chills and generalized bodyaches.  Patient states she has a similar episodes when she has had UTIs in the past which exacerbates her MS.  She did start on chemotherapy and had her round, did have some sweating that night, they felt better, but today she had a temp of up to 102 at home.  She denies having any nausea or vomiting, no chest pain or shortness of breath.  She does have a chronic indwelling Cobos cath due to urinary retention.  In the emergency room her blood glucose was 244, alkaline phosphatase 196, , lactate 2.3 with a repeat of 1.3 after 1 L fluid, procalcitonin 0.30, white blood cell count 12.7, hemoglobin 9.0 which is stable at her baseline, hematocrit 28.1, platelets 449, urinalysis showed 3+ yeast, positive nitrites, trace bacteria, a culture is pending, blood cultures are pending.  Chest x-ray shows heart is within normal limits, low lung volumes but no acute pulmonary infiltrate.  Patient was given cefepime and fluconazole in the emergency room.    Review of Systems   Constitutional:  Positive for chills, fatigue and fever. Negative for appetite change.   HENT:  Negative for nosebleeds and trouble swallowing.    Eyes:  Negative for photophobia, redness and visual disturbance.   Respiratory:  Negative for cough, chest tightness, shortness of breath and wheezing.    Cardiovascular:  Negative for chest pain, palpitations and leg swelling.   Gastrointestinal:  Negative for abdominal distention, abdominal pain, nausea and vomiting.   Endocrine: Negative.    Genitourinary: Negative.    Musculoskeletal:  Negative for gait problem and joint swelling.   Skin: Negative.    Neurological:  Positive for weakness. Negative for dizziness, seizures, speech difficulty, light-headedness and headaches.   Hematological: Negative.     Psychiatric/Behavioral:  Negative for behavioral problems and confusion.         Personal History     Past Medical History:   Diagnosis Date    Anemia 2024    `Treated with iron    Dawn esophagus     per patient    Blurred vision     R/T MS    Breast cancer     metastatic    Carpal tunnel syndrome     Clotting disorder 1993, 2003, 2012    3 g/i bleeds w/ transfus/ions    Colon polyp 2013    removed w/ colonoscopy    Deep vein thrombosis phlebitis 1980    Depression     Diplopia 2013    GERD (gastroesophageal reflux disease)     GI (gastrointestinal bleed) 3 bleeds    2 transfusions    H/O Skin cancer, basal cell     Headache     History of blood transfusion     History of GI bleed     R/T NSAIDS AND STEROIDS, multiple times    History of urinary tract infection     Hypercalcemia     s/p parathyroidectomy    Hyperlipidemia     Hypertension     Movement disorder     Multiple sclerosis     Optic neuritis     PONV (postoperative nausea and vomiting)     Sleep apnea     wears cpap    Steroid-induced diabetes 2024     Past Surgical History:   Procedure Laterality Date    APPENDECTOMY      BLADDER SURGERY      bladder stimulator    BREAST BIOPSY  don't remember    BREAST SURGERY      augmentation wtih subsequent removal    CARPAL TUNNEL RELEASE Bilateral     Left 2018, right 2020    CUBITAL TUNNEL RELEASE Left     CYSTOSCOPY BOTOX INJECTION OF BLADDER  2018    Cystoscopy with Botox    FRACTURE SURGERY  2019    rt shoulder    PARATHYROIDECTOMY      one gland removed    ROTATOR CUFF REPAIR Right 2017    TOE SURGERY      bilateral great toes    TOTAL SHOULDER ARTHROPLASTY W/ DISTAL CLAVICLE EXCISION Right 10/22/2018    Procedure: RT TOTAL SHOULDER REVERSE ARTHROPLASTY;  Surgeon: Bipin Dangelo MD;  Location: [facility] OR;  Service: Orthopedics     Family History   Problem Relation Age of Onset    Hypertension Mother     Hyperlipidemia Mother     Aortic aneurysm Mother         thoracic    Diabetes Mother      Hypertension Father         charissa heart failure    Heart failure Father     Hyperlipidemia Father     Miscarriages / Stillbirths Sister     Multiple sclerosis Brother     Atrial fibrillation Brother     Diabetes Maternal Grandmother     Diabetes Maternal Grandfather     Stroke Maternal Grandfather     Parkinsonism Paternal Grandmother     Tremor Paternal Grandmother     Stomach cancer Paternal Grandfather      Social History     Tobacco Use    Smoking status: Former     Current packs/day: 0.00     Average packs/day: 2.0 packs/day for 30.0 years (60.0 ttl pk-yrs)     Types: Cigarettes     Start date: 10/22/1973     Quit date: 10/22/2003     Years since quittin.7    Smokeless tobacco: Never   Vaping Use    Vaping status: Never Used   Substance Use Topics    Alcohol use: Not Currently    Drug use: Yes     Types: Marijuana     Comment: rarely     No current facility-administered medications on file prior to encounter.     Current Outpatient Medications on File Prior to Encounter   Medication Sig Dispense Refill    albuterol sulfate  (90 Base) MCG/ACT inhaler Inhale 2 puffs Every 4 (Four) Hours As Needed for Wheezing or Shortness of Air. 18 g 0    aspirin 81 MG EC tablet Take 1 tablet by mouth Daily. 30 tablet 0    baclofen (LIORESAL) 20 MG tablet Take 1 tablet by mouth 2 (Two) Times a Day.      Cholecalciferol (vitamin D3) 125 MCG (5000 UT) tablet tablet Take 1 tablet by mouth Daily.      ciclopirox (LOPROX) 1 % shampoo       clobetasol (TEMOVATE) 0.05 % ointment APPLY TOPICALLY TO THE AFFECTED AREA OF BREAST TWICE DAILY. AVOID FACE AND SKIN FOLDS      eszopiclone (LUNESTA) 1 MG tablet Take 1 tablet by mouth Every Night. Take immediately before bedtime 7 tablet 0    Fenbendazole powder Use 440 mg 3 (Three) Times a Week. 2 capsules 3 times weekly      HYDROcodone-acetaminophen (NORCO) 5-325 MG per tablet Take 1 tablet by mouth Every 4 (Four) Hours As Needed for Moderate Pain. 60 tablet 0    IVERMECTIN PO Take  15 mg by mouth 5 (Five) Times a Week. Mopnday-Friday      lidocaine-prilocaine (EMLA) 2.5-2.5 % cream Apply 1 Application topically to the appropriate area as directed As Needed for Injection Site Pain. Apply a dime size amount 30 minutes prior to venous port access 30 g 3    metoprolol succinate XL (TOPROL-XL) 25 MG 24 hr tablet Take 1 tablet by mouth Daily. 30 tablet 0    nitrofurantoin, macrocrystal-monohydrate, (MACROBID) 100 MG capsule Take 1 capsule by mouth Daily.      OLANZapine (zyPREXA) 5 MG tablet Take 1 tablet by mouth Every Night. Take on days 1, 2, 3, and 4 and Days 8, 9, 10, 11 after chemotherapy. 8 tablet 5    ondansetron (ZOFRAN) 8 MG tablet Take 1 tablet by mouth 3 (Three) Times a Day As Needed for Nausea or Vomiting.      pantoprazole (PROTONIX) 40 MG EC tablet Take 1 tablet by mouth 3 (Three) Times a Week.      phenazopyridine (PYRIDIUM) 200 MG tablet Take 1 tablet by mouth 3 (Three) Times a Day As Needed for Dysuria. 20 tablet 0    pramipexole (MIRAPEX) 1.5 MG tablet Take 1 tablet by mouth 2 (Two) Times a Day.      prochlorperazine (COMPAZINE) 10 MG tablet Take 1 tablet by mouth Every 6 (Six) Hours As Needed for Nausea or Vomiting. 90 tablet 5    rosuvastatin (CRESTOR) 20 MG tablet Take 1 tablet by mouth Daily. 90 tablet 1    sennosides-docusate (senna-docusate sodium) 8.6-50 MG per tablet Take 1 tablet by mouth Daily. 30 tablet 2    temazepam (RESTORIL) 15 MG capsule Use one-half to one tablet nightly as needed.       Allergies   Allergen Reactions    Klonopin [Clonazepam] Mental Status Change, Confusion and Hallucinations       Objective   Objective     Vital Signs  Temp:  [98.9 °F (37.2 °C)-99.9 °F (37.7 °C)] 98.9 °F (37.2 °C)  Heart Rate:  [] 88  Resp:  [18] 18  BP: (105-150)/(51-75) 105/51  SpO2:  [93 %-100 %] 96 %  on   ;   Device (Oxygen Therapy): room air  Body mass index is 29.95 kg/m².    Physical Exam  Vitals and nursing note reviewed.   Constitutional:       General: She is not  in acute distress.     Appearance: She is well-developed.   HENT:      Head: Normocephalic.   Neck:      Vascular: No JVD.   Cardiovascular:      Rate and Rhythm: Normal rate and regular rhythm.      Comments: Sinus rhythm on a monitor with heart rate 82 during my exam, no peripheral edema  Pulmonary:      Effort: Pulmonary effort is normal.      Breath sounds: Normal breath sounds.      Comments: Lung sounds diminished but clear, sats 95% on room air  Chest:      Comments: Port in right chest, no surrounding redness or edema  Abdominal:      General: Bowel sounds are normal. There is no distension.      Palpations: Abdomen is soft.      Tenderness: There is no abdominal tenderness.   Musculoskeletal:         General: Normal range of motion.      Cervical back: Normal range of motion.   Skin:     General: Skin is warm and dry.      Capillary Refill: Capillary refill takes less than 2 seconds.   Neurological:      General: No focal deficit present.      Mental Status: She is alert and oriented to person, place, and time.   Psychiatric:         Attention and Perception: Attention normal.         Behavior: Behavior normal.         Cognition and Memory: Cognition normal.         Results Review:  I reviewed the patient's new clinical results.  I reviewed the patient's new imaging results and agree with the interpretation.  I reviewed the patient's other test results and agree with the interpretation  I personally viewed and interpreted the patient's EKG/Telemetry data  Discussed with ED provider.    Lab Results (last 24 hours)       Procedure Component Value Units Date/Time    CBC & Differential [[id number]]  (Abnormal) Collected: 07/25/25 2033    Specimen: Blood Updated: 07/25/25 2106    Narrative:      The following orders were created for panel order CBC & Differential.  Procedure                               Abnormality         Status                     ---------                               -----------          ------                     CBC Auto Differential[[id number]]        Abnormal            Final result                 Please view results for these tests on the individual orders.    Comprehensive Metabolic Panel [[id number]]  (Abnormal) Collected: 07/25/25 2033    Specimen: Blood Updated: 07/25/25 2130     Glucose 244 mg/dL      BUN 17.0 mg/dL      Creatinine 0.91 mg/dL      Sodium 138 mmol/L      Potassium 3.7 mmol/L      Chloride 103 mmol/L      CO2 22.2 mmol/L      Calcium 9.4 mg/dL      Total Protein 7.3 g/dL      Albumin 3.7 g/dL      ALT (SGPT) 28 U/L      AST (SGOT) 112 U/L      Alkaline Phosphatase 196 U/L      Total Bilirubin <0.2 mg/dL      Globulin 3.6 gm/dL      A/G Ratio 1.0 g/dL      BUN/Creatinine Ratio 18.7     Anion Gap 12.8 mmol/L      eGFR 71.5 mL/min/1.73     Narrative:      GFR Categories in Chronic Kidney Disease (CKD)              GFR Category          GFR (mL/min/1.73)    Interpretation  G1                    90 or greater        Normal or high (1)  G2                    60-89                Mild decrease (1)  G3a                   45-59                Mild to moderate decrease  G3b                   30-44                Moderate to severe decrease  G4                    15-29                Severe decrease  G5                    14 or less           Kidney failure    (1)In the absence of evidence of kidney disease, neither GFR category G1 or G2 fulfill the criteria for CKD.    eGFR calculation 2021 CKD-EPI creatinine equation, which does not include race as a factor    Lactic Acid, Plasma [[id number]]  (Abnormal) Collected: 07/25/25 2033    Specimen: Blood Updated: 07/25/25 2129     Lactate 2.3 mmol/L     Procalcitonin [[id number]]  (Abnormal) Collected: 07/25/25 2033    Specimen: Blood Updated: 07/25/25 2135     Procalcitonin 0.30 ng/mL     Narrative:      As a Marker for Sepsis (Non-Neonates):    1. <0.5 ng/mL represents a low risk of severe sepsis and/or septic shock.  2. >2 ng/mL represents  "a high risk of severe sepsis and/or septic shock.    As a Marker for Lower Respiratory Tract Infections that require antibiotic therapy:    PCT on Admission    Antibiotic Therapy       6-12 Hrs later    >0.5                Strongly Recommended  >0.25 - <0.5        Recommended   0.1 - 0.25          Discouraged              Remeasure/reassess PCT  <0.1                Strongly Discouraged     Remeasure/reassess PCT    As 28 day mortality risk marker: \"Change in Procalcitonin Result\" (>80% or <=80%) if Day 0 (or Day 1) and Day 4 values are available. Refer to http://www.[facility]-pct-calculator.com    Change in PCT <=80%  A decrease of PCT levels below or equal to 80% defines a positive change in PCT test result representing a higher risk for 28-day all-cause mortality of patients diagnosed with severe sepsis for septic shock.    Change in PCT >80%  A decrease of PCT levels of more than 80% defines a negative change in PCT result representing a lower risk for 28-day all-cause mortality of patients diagnosed with severe sepsis or septic shock.       CBC Auto Differential [[id number]]  (Abnormal) Collected: 07/25/25 2033    Specimen: Blood Updated: 07/25/25 2106     WBC 12.76 10*3/mm3      RBC 3.12 10*6/mm3      Hemoglobin 9.0 g/dL      Hematocrit 28.1 %      MCV 90.1 fL      MCH 28.8 pg      MCHC 32.0 g/dL      RDW 16.6 %      RDW-SD 52.6 fl      MPV 8.7 fL      Platelets 449 10*3/mm3      Neutrophil % 85.7 %      Lymphocyte % 11.0 %      Monocyte % 1.9 %      Eosinophil % 0.7 %      Basophil % 0.2 %      Immature Grans % 0.5 %      Neutrophils, Absolute 10.93 10*3/mm3      Lymphocytes, Absolute 1.40 10*3/mm3      Monocytes, Absolute 0.24 10*3/mm3      Eosinophils, Absolute 0.09 10*3/mm3      Basophils, Absolute 0.03 10*3/mm3      Immature Grans, Absolute 0.07 10*3/mm3      nRBC 0.0 /100 WBC     Urinalysis With Culture If Indicated - Urine, Clean Catch [[id number]]  (Abnormal) Collected: 07/25/25 2052    Specimen: Urine, " Clean Catch Updated: 07/25/25 2129     Color, UA Yellow     Appearance, UA Slightly Cloudy     pH, UA 5.5     Specific Gravity, UA 1.025     Glucose,  mg/dL (Trace)     Ketones, UA Negative     Bilirubin, UA Negative     Blood, UA Small (1+)     Protein,  mg/dL (2+)     Leuk Esterase, UA Small (1+)     Nitrite, UA Positive     Urobilinogen, UA 0.2 E.U./dL    Narrative:      In absence of clinical symptoms, the presence of pyuria, bacteria, and/or nitrites on the urinalysis result does not correlate with infection.    Urinalysis, Microscopic Only - Urine, Clean Catch [[id number]]  (Abnormal) Collected: 07/25/25 2052    Specimen: Urine, Clean Catch Updated: 07/25/25 2129     RBC, UA 0-2 /HPF      WBC, UA 3-5 /HPF      Comment: Urine culture not indicated.        Bacteria, UA Trace /HPF      Squamous Epithelial Cells, UA 0-2 /HPF      Yeast, UA Large/3+ Yeast /HPF      Hyaline Casts, UA 0-2 /LPF      Methodology Automated Microscopy    Blood Culture - Blood, Hand, Right [[id number]] Collected: 07/25/25 2106    Specimen: Blood from Hand, Right Updated: 07/25/25 2109    Blood Culture - Blood, Hand, Left [[id number]] Collected: 07/25/25 2311    Specimen: Blood from Hand, Left Updated: 07/25/25 2315    STAT Lactic Acid, Reflex [[id number]]  (Normal) Collected: 07/25/25 2311    Specimen: Blood Updated: 07/25/25 2352     Lactate 1.3 mmol/L             Imaging Results (Last 24 Hours)       Procedure Component Value Units Date/Time    XR Chest 1 View [[id number]] Collected: 07/25/25 2143     Updated: 07/25/25 2148    Narrative:      CXR ONE VIEW      HISTORY: fever     COMPARISON: 7/2/2025     TECHNIQUE: single portable AP       Impression:         Right IJ Glztly-t-[id number] catheter remains in place.     The heart size is within normal limits.     Low lung volumes, no acute pulmonary infiltrate.     The right lung appears normally aerated. Left basilar retrocardiac  linear density, scarring or atelectasis, unchanged.      This report was finalized on 7/25/2025 9:45 PM by Dr. Juma Scott M.D on Workstation: [id number]               Results for orders placed during the hospital encounter of 07/02/25    Adult Transthoracic Echo Complete W/ Cont if Necessary Per Protocol 07/06/2025  4:44 PM    Interpretation Summary    Left ventricular ejection fraction appears to be greater than 70%.    The following left ventricular wall segments are hyperkinetic: basal anterior, basal anterolateral, basal inferolateral, basal inferior, basal inferoseptal, basal anteroseptal, mid anterior, mid anterolateral, mid inferolateral, mid inferior, mid inferoseptal, mid anteroseptal, apical anterior, apical lateral, apical inferior, apical septal and apex.    Left ventricular outflow tract peak flow gradient at rest is 25 mmHg.    Left ventricular diastolic function was indeterminate.      No orders to display        Assessment/Plan     Active Hospital Problems    Diagnosis  POA    **Sepsis secondary to UTI [A41.9, N39.0]  Yes    Anemia [D64.9]  Yes    UTI (urinary tract infection) due to urinary indwelling catheter [T83.511A, N39.0]  Yes    Type 2 diabetes mellitus [E11.9]  Yes    Malignant neoplasm of overlapping sites of left breast in female, estrogen receptor positive [C50.812, Z17.0]  Not Applicable    Multiple sclerosis [G35]  Yes    Neurogenic bladder [N31.9]  Yes    Restless legs syndrome [G25.81]  Yes     Ms. Bejarano is a 64 year old female with history of anemia breast cancer, multiple sclerosis, neurogenic bladder with indwelling catheter, type 2 diabetes, restless leg syndrome who presents to the ED with complaints of generalized fatigue and not feeling well, some nausea and chills and generalized bodyaches.    Sepsis secondary to acute UTI with indwelling catheter  - Indwelling Cobos catheter to be changed  - Urine cultures pending  - Blood cultures are pending  - Cefepime and fluconazole given in the emergency room, will continue pending  cultures  - Telemetry unit for monitoring  - Normal saline at 100 cc an hour overnight  - Repeat BMP, CBC, lactic acid in a.m.    Type 2 diabetes  - Accu-Cheks AC and at bedtime with correctional dose insulin  - Hold oral diabetic medications at this time    Multiple sclerosis/neurogenic bladder  - PT to eval and treat  - Indwelling Cobos catheter in place  - Continue home medications when med rec completed    Stage IV breast cancer  - Currently undergoing chemotherapy, last treatment was 4 days ago  - Consult oncology      I discussed the patient's findings and my recommendations with patient.    VTE Prophylaxis - SCDs.  Code Status - Full code.       DANA Peralta  Henry Mayo [facility]  07/25/25  23:53 EDT      Electronically signed by Frederick Carey DO at 07/26/25 0218       Facility-Administered Medications as of 7/27/2025   Medication Dose Route Frequency Provider Last Rate Last Admin    acetaminophen (TYLENOL) tablet 650 mg  650 mg Oral Q4H PRN Rosina Atkinson APRN   650 mg at 07/26/25 1547    Or    acetaminophen (TYLENOL) 160 MG/5ML oral solution 650 mg  650 mg Oral Q4H PRN Rosina Atkinson APRN        Or    acetaminophen (TYLENOL) suppository 650 mg  650 mg Rectal Q4H PRN Rosina Atkinson APRN        albuterol (PROVENTIL) nebulizer solution 0.083% 2.5 mg/3mL  2.5 mg Nebulization Q4H PRN Frederick Carey DO        aspirin EC tablet 81 mg  81 mg Oral Daily Frederick Carey DO   81 mg at 07/27/25 0820    baclofen (LIORESAL) tablet 20 mg  20 mg Oral BID Frederick Carey DO   20 mg at 07/27/25 0820    polyethylene glycol (MIRALAX) packet 17 g  17 g Oral Daily Vinicio Montoya MD   17 g at 07/27/25 0820    And    bisacodyl (DULCOLAX) EC tablet 5 mg  5 mg Oral Daily PRN Vinicio Montoya MD        And    bisacodyl (DULCOLAX) suppository 10 mg  10 mg Rectal Daily PRN Vinicio Montoya MD        [COMPLETED] cefepime 2000 mg IVPB in 100 mL NS (MBP)  2,000 mg Intravenous Once  Suman Boogie MD   2,000 mg at 07/25/25 2329    cefepime 2000 mg IVPB in 100 mL NS (MBP)  2,000 mg Intravenous Q8H Rosina Atkinson APRN   2,000 mg at 07/27/25 0820    cholecalciferol (VITAMIN D3) tablet 5,000 Units  5,000 Units Oral Daily Frederick Carey DO   5,000 Units at 07/27/25 0820    dextrose (D50W) (25 g/50 mL) IV injection 25 g  25 g Intravenous Q15 Min PRN Rosina Atkinson APRN        dextrose (GLUTOSE) oral gel 15 g  15 g Oral Q15 Min PRN Rosina Atkinson APRN        enoxaparin sodium (LOVENOX) syringe 40 mg  40 mg Subcutaneous Q24H Vinicio Montoya MD   40 mg at 07/26/25 1548    famotidine (PEPCID) tablet 20 mg  20 mg Oral BID PRN Rosina Atkinson APRN        fluconazole (DIFLUCAN) IVPB 200 mg  200 mg Intravenous Once Suman Boogie MD        fluconazole (DIFLUCAN) IVPB 200 mg  200 mg Intravenous Q24H Rosina Atkinson APRN 100 mL/hr at 07/26/25 2113 200 mg at 07/26/25 2113    glucagon (GLUCAGEN) injection 1 mg  1 mg Intramuscular Q15 Min PRN Rosina Atkinson APRN        heparin injection 500 Units  5 mL Intravenous PRN Vinicio Montoya MD        HYDROcodone-acetaminophen (NORCO) 5-325 MG per tablet 1 tablet  1 tablet Oral Q4H PRN Frederick Carey DO        insulin lispro (HUMALOG/ADMELOG) injection 2-7 Units  2-7 Units Subcutaneous 4x Daily AC & at Bedtime Rosina Atkinson APRN   3 Units at 07/26/25 1758    melatonin tablet 5 mg  5 mg Oral Nightly Rosina Atkinson APRN        [Held by provider] metoprolol succinate XL (TOPROL-XL) 24 hr tablet 25 mg  25 mg Oral Q24H Frederick Carey DO   25 mg at 07/26/25 0815    nitroglycerin (NITROSTAT) SL tablet 0.4 mg  0.4 mg Sublingual Q5 Min PRN Rosina Atkinson APRN        ondansetron (ZOFRAN) injection 4 mg  4 mg Intravenous Q6H PRN Rosina Atkinson APRN        pantoprazole (PROTONIX) EC tablet 40 mg  40 mg Oral Once per day on Monday Wednesday Friday Frederick Carey DO   40 mg at 07/26/25 0452    Pharmacy To Dose:  Cefepime   Not Applicable Continuous PRN Rosina Atkinson APRN        pramipexole (MIRAPEX) tablet 1.5 mg  1.5 mg Oral BID Frederick Carey DO   1.5 mg at 07/27/25 0821    sennosides-docusate (PERICOLACE) 8.6-50 MG per tablet 2 tablet  2 tablet Oral BID Vinicio Montoya MD   2 tablet at 07/27/25 0820    [COMPLETED] sodium chloride 0.9 % bolus 1,000 mL  1,000 mL Intravenous Once Suman Boogie MD 2,000 mL/hr at 07/26/25 0111 1,000 mL at 07/26/25 0111    sodium chloride 0.9 % flush 10 mL  10 mL Intravenous PRN Suman Boogie MD        sodium chloride 0.9 % flush 10 mL  10 mL Intravenous Q12H Rosina Atkinson APRN   10 mL at 07/27/25 0821    sodium chloride 0.9 % flush 10 mL  10 mL Intravenous PRN Rosina Atkinson APRN        sodium chloride 0.9 % flush 10 mL  10 mL Intravenous Q12H Vinicio Montoya MD   10 mL at 07/27/25 0821    sodium chloride 0.9 % flush 10 mL  10 mL Intravenous PRN Vinicio Montoya MD        sodium chloride 0.9 % flush 20 mL  20 mL Intravenous PRN Vinicio Montoya MD        sodium chloride 0.9 % infusion 40 mL  40 mL Intravenous PRN Rosina Atkinson APRN        sodium chloride 0.9 % infusion 40 mL  40 mL Intravenous PRN Vinicio Montoya MD         Lab Results (last 24 hours)       Procedure Component Value Units Date/Time    Phosphorus [[id number]]  (Normal) Collected: 07/27/25 0604    Specimen: Blood Updated: 07/27/25 0720     Phosphorus 2.7 mg/dL     Magnesium [[id number]]  (Normal) Collected: 07/27/25 0604    Specimen: Blood Updated: 07/27/25 0714     Magnesium 2.4 mg/dL     Comprehensive Metabolic Panel [[id number]]  (Abnormal) Collected: 07/27/25 0604    Specimen: Blood Updated: 07/27/25 0714     Glucose 145 mg/dL      BUN 12.0 mg/dL      Creatinine 0.57 mg/dL      Sodium 139 mmol/L      Potassium 4.3 mmol/L      Chloride 105 mmol/L      CO2 23.0 mmol/L      Calcium 9.0 mg/dL      Total Protein 6.8 g/dL      Albumin 3.4 g/dL      ALT (SGPT) 21 U/L      AST  (SGOT) 76 U/L      Alkaline Phosphatase 189 U/L      Total Bilirubin <0.2 mg/dL      Globulin 3.4 gm/dL      A/G Ratio 1.0 g/dL      BUN/Creatinine Ratio 21.1     Anion Gap 11.0 mmol/L      eGFR 102.9 mL/min/1.73     Narrative:      GFR Categories in Chronic Kidney Disease (CKD)              GFR Category          GFR (mL/min/1.73)    Interpretation  G1                    90 or greater        Normal or high (1)  G2                    60-89                Mild decrease (1)  G3a                   45-59                Mild to moderate decrease  G3b                   30-44                Moderate to severe decrease  G4                    15-29                Severe decrease  G5                    14 or less           Kidney failure    (1)In the absence of evidence of kidney disease, neither GFR category G1 or G2 fulfill the criteria for CKD.    eGFR calculation 2021 CKD-EPI creatinine equation, which does not include race as a factor    CBC (No Diff) [[id number]]  (Abnormal) Collected: 07/27/25 0604    Specimen: Blood Updated: 07/27/25 0654     WBC 13.97 10*3/mm3      RBC 2.86 10*6/mm3      Hemoglobin 8.0 g/dL      Hematocrit 25.9 %      MCV 90.6 fL      MCH 28.0 pg      MCHC 30.9 g/dL      RDW 16.0 %      RDW-SD 53.1 fl      MPV 9.0 fL      Platelets 402 10*3/mm3     Blood Culture - Blood, Hand, Left [[id number]]  (Normal) Collected: 07/25/25 2311    Specimen: Blood from Hand, Left Updated: 07/26/25 2316     Blood Culture No growth at 24 hours    Blood Culture - Blood, Hand, Right [[id number]]  (Normal) Collected: 07/25/25 2106    Specimen: Blood from Hand, Right Updated: 07/26/25 2117     Blood Culture No growth at 24 hours    POC Glucose Once [[id number]]  (Abnormal) Collected: 07/26/25 1546    Specimen: Blood Updated: 07/26/25 1547     Glucose 225 mg/dL     POC Glucose Once [[id number]]  (Normal) Collected: 07/26/25 1111    Specimen: Blood Updated: 07/26/25 1112     Glucose 126 mg/dL           Imaging Results (Last 24  "Hours)       ** No results found for the last 24 hours. **          ECG/EMG Results (last 24 hours)       ** No results found for the last 24 hours. **          Orders (last 24 hrs)        Start     Ordered    08/02/25 0000  Change Cage Needle & Transparent Dressing Every 7 Days  Weekly        Comments: Per [facility] Policy    07/26/25 1010    07/27/25 0900  sennosides-docusate (PERICOLACE) 8.6-50 MG per tablet 2 tablet  2 Times Daily         07/27/25 0702    07/27/25 0900  polyethylene glycol (MIRALAX) packet 17 g  Daily        Placed in \"And\" Linked Group    07/27/25 0702    07/27/25 0801  Urinalysis With Culture If Indicated - Indwelling Urethral Catheter  Once        Comments: Please ensure 'Use Existing Specimen' is selected if this order is for urine culture add-on.  If no button appears, please contact the lab for assistance.      07/27/25 0802    07/27/25 0738  Inpatient Infectious Diseases Consult  IN AM        Specialty:  Infectious Diseases  Provider:  Luisa Child MD    07/27/25 0738    07/27/25 0717  Transfer Patient  Once         07/27/25 0716    07/27/25 0717  Discontinue Cardiac Monitoring  Once         07/27/25 0716    07/27/25 0702  bisacodyl (DULCOLAX) EC tablet 5 mg  Daily PRN        Placed in \"And\" Linked Group    07/27/25 0702    07/27/25 0702  bisacodyl (DULCOLAX) suppository 10 mg  Daily PRN        Placed in \"And\" Linked Group    07/27/25 0702    07/27/25 0702  Inpatient Infectious Diseases Consult  IN AM,   Status:  Canceled        Specialty:  Infectious Diseases  Provider:  Moreno Estevez MD    07/26/25 2115 07/27/25 0600  Daily CHG Bath While Central Line in Place  Daily       07/26/25 1010    07/27/25 0600  Magnesium  Daily       07/26/25 2114 07/27/25 0600  Phosphorus  Daily       07/26/25 2114 07/27/25 0600  CBC (No Diff)  Daily       07/26/25 2114 07/27/25 0600  Comprehensive Metabolic Panel  Daily       07/26/25 2114 07/26/25 1800  fluconazole (DIFLUCAN) IVPB " 200 mg  Every 24 Hours         07/26/25 0012    07/26/25 1548  POC Glucose Once  PROCEDURE ONCE        Comments: Complete no more than 45 minutes prior to patient eating      07/26/25 1546    07/26/25 1315  enoxaparin sodium (LOVENOX) syringe 40 mg  Every 24 Hours         07/26/25 1227    07/26/25 1206  ECG 12 Lead QT Measurement  Once         07/26/25 1206    07/26/25 1203  Pharmacy to Dose enoxaparin (LOVENOX)  Continuous PRN,   Status:  Discontinued         07/26/25 1204    07/26/25 1113  POC Glucose Once  PROCEDURE ONCE        Comments: Complete no more than 45 minutes prior to patient eating      07/26/25 1111    07/26/25 1100  sodium chloride 0.9 % flush 10 mL  Every 12 Hours Scheduled         07/26/25 1010    07/26/25 1010  sodium chloride 0.9 % flush 10 mL  As Needed         07/26/25 1010    07/26/25 1010  sodium chloride 0.9 % flush 20 mL  As Needed         07/26/25 1010    07/26/25 1010  sodium chloride 0.9 % infusion 40 mL  As Needed         07/26/25 1010    07/26/25 1010  heparin injection 500 Units  As Needed         07/26/25 1010    07/26/25 0900  aspirin EC tablet 81 mg  Daily         07/26/25 0159    07/26/25 0900  baclofen (LIORESAL) tablet 20 mg  2 Times Daily         07/26/25 0159    07/26/25 0900  cholecalciferol (VITAMIN D3) tablet 5,000 Units  Daily         07/26/25 0159 07/26/25 0900  pramipexole (MIRAPEX) tablet 1.5 mg  2 Times Daily         07/26/25 0159 07/26/25 0900  [Held by provider]  metoprolol succinate XL (TOPROL-XL) 24 hr tablet 25 mg  Every 24 Hours Scheduled        (On hold since yesterday at 1203 until manually unheld; held by Vinicio Montoya MDHold Reason: Other (Comment Required))    07/26/25 0159    07/26/25 0800  Oral Care  2 Times Daily       07/26/25 0012    07/26/25 0730  insulin lispro (HUMALOG/ADMELOG) injection 2-7 Units  4 Times Daily Before Meals & Nightly         07/26/25 0012    07/26/25 0730  cefepime 2000 mg IVPB in 100 mL NS (MBP)  Every 8 Hours          "07/26/25 0039    07/26/25 0700  POC Glucose 4x Daily Before Meals & at Bedtime  4 Times Daily Before Meals & at Bedtime      Comments: Complete no more than 45 minutes prior to patient eating      07/26/25 0012    07/26/25 0400  Vital Signs  Every 4 Hours       07/26/25 0012    07/26/25 0245  pantoprazole (PROTONIX) EC tablet 40 mg  Once per day on Monday Wednesday Friday 07/26/25 0159    07/26/25 0150  HYDROcodone-acetaminophen (NORCO) 5-325 MG per tablet 1 tablet  Every 4 Hours PRN         07/26/25 0159    07/26/25 0150  albuterol (PROVENTIL) nebulizer solution 0.083% 2.5 mg/3mL  Every 4 Hours PRN         07/26/25 0159    07/26/25 0100  sodium chloride 0.9 % flush 10 mL  Every 12 Hours Scheduled         07/26/25 0012    07/26/25 0100  melatonin tablet 5 mg  Nightly         07/26/25 0012    07/26/25 0011  dextrose (GLUTOSE) oral gel 15 g  Every 15 Minutes PRN         07/26/25 0012    07/26/25 0011  dextrose (D50W) (25 g/50 mL) IV injection 25 g  Every 15 Minutes PRN         07/26/25 0012    07/26/25 0011  glucagon (GLUCAGEN) injection 1 mg  Every 15 Minutes PRN         07/26/25 0012    07/26/25 0011  Intake & Output  Every Shift       07/26/25 0012    07/26/25 0010  Pharmacy To Dose: Cefepime  Continuous PRN         07/26/25 0012    07/26/25 0010  sodium chloride 0.9 % flush 10 mL  As Needed         07/26/25 0012    07/26/25 0010  sodium chloride 0.9 % infusion 40 mL  As Needed         07/26/25 0012    07/26/25 0010  acetaminophen (TYLENOL) tablet 650 mg  Every 4 Hours PRN        Placed in \"Or\" Linked Group    07/26/25 0012    07/26/25 0010  acetaminophen (TYLENOL) 160 MG/5ML oral solution 650 mg  Every 4 Hours PRN        Placed in \"Or\" Linked Group    07/26/25 0012    07/26/25 0010  acetaminophen (TYLENOL) suppository 650 mg  Every 4 Hours PRN        Placed in \"Or\" Linked Group    07/26/25 0012    07/26/25 0010  famotidine (PEPCID) tablet 20 mg  2 Times Daily PRN         07/26/25 0012    07/26/25 0010  " "sennosides-docusate (PERICOLACE) 8.6-50 MG per tablet 2 tablet  2 Times Daily PRN,   Status:  Discontinued        Placed in \"And\" Linked Group    07/26/25 0012    07/26/25 0010  polyethylene glycol (MIRALAX) packet 17 g  Daily PRN,   Status:  Discontinued        Placed in \"And\" Linked Group    07/26/25 0012    07/26/25 0010  bisacodyl (DULCOLAX) EC tablet 5 mg  Daily PRN,   Status:  Discontinued        Placed in \"And\" Linked Group    07/26/25 0012    07/26/25 0010  bisacodyl (DULCOLAX) suppository 10 mg  Daily PRN,   Status:  Discontinued        Placed in \"And\" Linked Group    07/26/25 0012    07/26/25 0010  ondansetron (ZOFRAN) injection 4 mg  Every 6 Hours PRN         07/26/25 0012 07/26/25 0010  nitroglycerin (NITROSTAT) SL tablet 0.4 mg  Every 5 Minutes PRN         07/26/25 0012 07/25/25 2147  fluconazole (DIFLUCAN) IVPB 200 mg  Once         07/25/25 2131 07/25/25 2022  Urinary Catheter Care  Every Shift      Placed in \"And\" Linked Group    07/25/25 2022 07/25/25 2011  sodium chloride 0.9 % flush 10 mL  As Needed        Placed in \"And\" Linked Group    07/25/25 2012    Unscheduled  Up With Assistance  As Needed       07/26/25 0012    Unscheduled  Oxygen Therapy- Nasal Cannula; Titrate 1-6 LPM Per SpO2; 90 - 95%  Continuous PRN       07/26/25 0012    Unscheduled  Follow Hypoglycemia Standing Orders For Blood Glucose <70 & Notify Provider of Treatment  As Needed      Comments: Follow Hypoglycemia Orders As Outlined in Process Instructions (Open Order Report to View Full Instructions)  Notify Provider Any Time Hypoglycemia Treatment is Administered    07/26/25 0012    Unscheduled  Change Dressing to IV Site As Needed When Damp, Loose or Soiled  As Needed       07/26/25 1010    Unscheduled  Change Needleless Connectors  As Needed      Comments: Change Needleless Connectors When:  - Administration Set Changed  - Dressing Changed  - Removed For Any Reason  - Residual Blood or Debris Within Connector  - " Prior to Drawing Blood Cultures  - Contamination of Connector  - After Administration of Blood or Blood Components    25 1010    Unscheduled  Change Cage Needle As Needed  As Needed       25 1010                  Operative/Procedure Notes (last 24 hours)  Notes from 25 1024 through 25 1024   No notes of this type exist for this encounter.          Physician Progress Notes (last 24 hours)        Lenny Handley MD at 25 0733          Pt recently by Dr Child.  D/w RN and will route consult to him.     Electronically signed by Lenny Handley MD at 25 0734       Vinicio Montoya MD at 25 1151              Name: Maritza Nance Darci ADMIT: 2025   : 1960  PCP: Enoc Carbone DO    MRN: [id number] LOS: 1 days   AGE/SEX: 64 y.o. female  ROOM: [facility]     Subjective   Subjective   Patient seen this morning.  Lying in bed.  Not feeling well, feels very tired, fatigued.  Denies current fevers or chills.  No significant abdominal symptoms.    Review of Systems  As above  Objective   Objective   Vital Signs  Temp:  [98 °F (36.7 °C)-99.9 °F (37.7 °C)] 98 °F (36.7 °C)  Heart Rate:  [] 83  Resp:  [18] 18  BP: (105-150)/(51-75) 115/59  SpO2:  [93 %-100 %] 96 %  on  Flow (L/min) (Oxygen Therapy):  [3] 3;   Device (Oxygen Therapy): nasal cannula  Body mass index is 29.95 kg/m².  Physical Exam    General: Alert, lying in bed, not in distress, ill-appearing  HEENT: Normocephalic, atraumatic  CV: Regular rate and rhythm, no murmurs rubs or gallops  Lungs: Clear to auscultation bilaterally, no crackles or wheezes  Abdomen: Soft, nontender, nondistended  Extremities: No significant peripheral edema , no cyanosis     Results Review     I reviewed the patient's new clinical results.  Results from last 7 days   Lab Units 25  0211 25  0805 25  0754   WBC 10*3/mm3 13.95* 12.76* 6.59 5.96   HEMOGLOBIN g/dL 8.6* 9.0* 8.7* 8.6*    PLATELETS 10*3/mm3 397 449 432 442     Results from last 7 days   Lab Units 07/26/25 0211 07/25/25 2033 07/22/25  0805 07/21/25  0754   SODIUM mmol/L 137 138 138 139   POTASSIUM mmol/L 3.8 3.7 3.8 4.3   CHLORIDE mmol/L 104 103 106 105   CO2 mmol/L 22.0 22.2 20.4* 23.4   BUN mg/dL 15.0 17.0 14.5 20.0   CREATININE mg/dL 0.56* 0.91 0.53* 0.83   GLUCOSE mg/dL 212* 244* 248* 113*   Estimated Creatinine Clearance: 109.8 mL/min (A) (by C-G formula based on SCr of 0.56 mg/dL (L)).  Results from last 7 days   Lab Units 07/26/25 0211 07/25/25 2033 07/22/25  0805   ALBUMIN g/dL 3.4* 3.7 3.7   BILIRUBIN mg/dL <0.2 <0.2 <0.2   ALK PHOS U/L 173* 196* 180*   AST (SGOT) U/L 109* 112* 95*   ALT (SGPT) U/L 23 28 25     Results from last 7 days   Lab Units 07/26/25 0211 07/25/25 2033 07/22/25 0805 07/21/25  0754   CALCIUM mg/dL 8.5* 9.4 9.0 8.8   ALBUMIN g/dL 3.4* 3.7 3.7  --    MAGNESIUM mg/dL 2.3  --  2.2  --    PHOSPHORUS mg/dL 3.6  --  3.0  --      Results from last 7 days   Lab Units 07/26/25  0514 07/25/25 2311 07/25/25 2033   PROCALCITONIN ng/mL  --   --  0.30*   LACTATE mmol/L 2.0 1.3 2.3*     COVID19   Date Value Ref Range Status   05/09/2025 Not Detected Not Detected - Ref. Range Final   04/17/2025 Not Detected Not Detected - Ref. Range Final     Glucose   Date/Time Value Ref Range Status   07/26/2025 1111 126 70 - 130 mg/dL Final   07/26/2025 0617 194 (H) 70 - 130 mg/dL Final           XR Chest 1 View  Narrative: CXR ONE VIEW      HISTORY: fever     COMPARISON: 7/2/2025     TECHNIQUE: single portable AP     Impression:    Right IJ [id number] catheter remains in place.     The heart size is within normal limits.     Low lung volumes, no acute pulmonary infiltrate.     The right lung appears normally aerated. Left basilar retrocardiac  linear density, scarring or atelectasis, unchanged.     This report was finalized on 7/25/2025 9:45 PM by Dr. Juma Scott M.D on Workstation: [id number]       Scheduled  Medications  aspirin, 81 mg, Oral, Daily  baclofen, 20 mg, Oral, BID  cefepime, 2,000 mg, Intravenous, Q8H  vitamin D3, 5,000 Units, Oral, Daily  fluconazole, 200 mg, Intravenous, Once  fluconazole, 200 mg, Intravenous, Q24H  insulin lispro, 2-7 Units, Subcutaneous, 4x Daily AC & at Bedtime  melatonin, 5 mg, Oral, Nightly  [Held by provider] metoprolol succinate XL, 25 mg, Oral, Q24H  pantoprazole, 40 mg, Oral, Once per day on Monday Wednesday Friday  pramipexole, 1.5 mg, Oral, BID  sodium chloride, 10 mL, Intravenous, Q12H  sodium chloride, 10 mL, Intravenous, Q12H    Infusions  Pharmacy To Dose:,     Diet  Diet: Cardiac, Diabetic; Healthy Heart (2-3 Na+); Consistent Carbohydrate; Fluid Consistency: Thin (IDDSI 0)    I have personally reviewed     [x]  Laboratory   [x]  Microbiology   [x]  Radiology   [x]  EKG/Telemetry  [x]  Cardiology/Vascular   []  Pathology    []  Records      Assessment/Plan     Active Hospital Problems    Diagnosis  POA    **Sepsis secondary to UTI [A41.9, N39.0]  Yes    Transaminitis [R74.01]  Yes    Anemia [D64.9]  Yes    UTI (urinary tract infection) due to urinary indwelling catheter [T83.511A, N39.0]  Yes    Type 2 diabetes mellitus [E11.9]  Yes    Malignant neoplasm of overlapping sites of left breast in female, estrogen receptor positive [C50.812, Z17.0]  Not Applicable    Multiple sclerosis [G35]  Yes    Neurogenic bladder [N31.9]  Yes    Restless legs syndrome [G25.81]  Yes      Resolved Hospital Problems   No resolved problems to display.       Patient is 64 year old female with history of anemia breast cancer, multiple sclerosis, neurogenic bladder with indwelling catheter, type 2 diabetes, restless leg syndrome who presents to the ED with complaints of generalized fatigue and not feeling well, some nausea and chills and generalized bodyaches.     Sepsis secondary to acute UTI with indwelling catheter  - Indwelling Cobos changed  - Urinalysis showed large yeast, trace bacteria,  positive for nitrates, leukocytes 6-5 WBC, did not reflex to culture.  Urine.   blood cultures pending  - Continue cefepime, fluconazole  -Follow-up culture result     Type 2 diabetes  -Continue SSI, hypoglycemia protocol  - Hold oral diabetic medications at this time     Multiple sclerosis/neurogenic bladder  - PT to eval and treat  - Indwelling Cobos catheter in place  - Continue home medications     Stage IV breast cancer  - Currently undergoing chemotherapy, last treatment was 4 days prior to admission  - Consult oncology    Anemia  - Recent iron panel 07/22/2025 elevated ferritin 333, iron saturation 17  - Hemoglobin stable compared to prior.  Monitor     DVT prophylaxis.  Initiate Lovenox  Full code.  Discussed with patient.  Expected Discharge Date: 7/29/2025; Expected Discharge Time:        Copied text in this note has been reviewed and is accurate as of 07/26/25.         Dictated utilizing Dragon dictation        Vinicio Montoya MD  [facility]  07/26/25  12:03 EDT        Electronically signed by Vinicio Montoya MD at 07/27/25 0702          Consult Notes (last 24 hours)        James Austin MD at 07/26/25 1653        Consult Orders    1. Hematology & Oncology Inpatient Consult [[id number]] ordered by Rosina Atkinson APRN at 07/26/25 0140                 [facility] INITIAL INPATIENT CONSULTATION NOTE    REASON FOR CONSULTATION:    Metastatic invasive lobular carcinoma, ER/CA strongly positive cancer with metastatic disease to the bones.   Fever    HISTORY OF PRESENT ILLNESS:  Maritza Bejarano is a 64 y.o. female who we are asked to see today in consultation for fever ,metastatic breast cancer    The patient has a past medical history of MS, neurogenic bladder, metastatic breast cancer.  She received her first dose of Trodelvy on 7/22 after port placement on 7/21.  She did receive Udenyca which is a white blood cell growth factor anticipated to increase the white  blood cell count.    The patient presented with weakness, fatigue, fever to 102, chills, myalgias.  She has experienced sepsis on multiple occasions and felt like she was getting an infection.  She had some chest discomfort and worsening upper extremity weakness.  She was tachycardic.  The white blood cell count was elevated, again potentially secondary to Udenyca.    Procalcitonin was barely elevated at 0.3.  Lactic acid was mildly elevated at 2.3, normalized at 1.3.  Urinalysis as expected with a suprapubic catheter.  Blood culture no growth to date.    Empiric cefepime was initiated. She is currently afebrile.  Her arms remain weak but she is feeling a little bit better.  She denies any nausea and vomiting at this point.    Past Medical History:   Diagnosis Date    Anemia 2024    `Treated with iron    Dawn esophagus     per patient    Blurred vision     R/T MS    Breast cancer     metastatic    Carpal tunnel syndrome     Clotting disorder 1993, 2003, 2012    3 g/i bleeds w/ transfus/ions    Colon polyp 2013    removed w/ colonoscopy    Deep vein thrombosis phlebitis 1980    Depression     Diplopia 2013    GERD (gastroesophageal reflux disease)     GI (gastrointestinal bleed) 3 bleeds    2 transfusions    H/O Skin cancer, basal cell     Headache     History of blood transfusion     History of GI bleed     R/T NSAIDS AND STEROIDS, multiple times    History of urinary tract infection     Hypercalcemia     s/p parathyroidectomy    Hyperlipidemia     Hypertension     Movement disorder     Multiple sclerosis     Optic neuritis     PONV (postoperative nausea and vomiting)     Sleep apnea     wears cpap    Steroid-induced diabetes 2024       Past Surgical History:   Procedure Laterality Date    APPENDECTOMY      BLADDER SURGERY      bladder stimulator    BREAST BIOPSY  don't remember    BREAST SURGERY      augmentation wtih subsequent removal    CARPAL TUNNEL RELEASE Bilateral     Left 2018, right 2020    CUBITAL  TUNNEL RELEASE Left     CYSTOSCOPY BOTOX INJECTION OF BLADDER  2018    Cystoscopy with Botox    FRACTURE SURGERY  2019    rt shoulder    PARATHYROIDECTOMY      one gland removed    ROTATOR CUFF REPAIR Right 2017    TOE SURGERY      bilateral great toes    TOTAL SHOULDER ARTHROPLASTY W/ DISTAL CLAVICLE EXCISION Right 10/22/2018    Procedure: RT TOTAL SHOULDER REVERSE ARTHROPLASTY;  Surgeon: Bipin Dangelo MD;  Location: [facility];  Service: Orthopedics       SOCIAL HISTORY:   reports that she quit smoking about 21 years ago. Her smoking use included cigarettes. She started smoking about 51 years ago. She has a 60 pack-year smoking history. She does not have any smokeless tobacco history on file. She reports that she does not currently use alcohol. She reports current drug use. Drug: Marijuana.    FAMILY HISTORY:  family history includes Aortic aneurysm in her mother; Atrial fibrillation in her brother; Diabetes in her maternal grandfather, maternal grandmother, and mother; Heart failure in her father; Hyperlipidemia in her father and mother; Hypertension in her father and mother; Miscarriages / Stillbirths in her sister; Multiple sclerosis in her brother; Parkinsonism in her paternal grandmother; Stomach cancer in her paternal grandfather; Stroke in her maternal grandfather; Tremor in her paternal grandmother.    ALLERGIES:  Allergies   Allergen Reactions    Klonopin [Clonazepam] Mental Status Change, Confusion and Hallucinations       MEDICATIONS:  As listed in the electronic medical record.    Review of Systems   Constitutional:  Positive for fatigue.   Respiratory:  Positive for chest tightness and shortness of breath.    Neurological:  Positive for weakness.   All other systems reviewed and are negative.      Vitals:    07/26/25 0000 07/26/25 0900 07/26/25 1114 07/26/25 1544   BP: 123/65 115/59  127/58   BP Location: Right arm Right arm  Right arm   Patient Position: Lying Lying  Lying   Pulse: 88 83  87    Resp: 18 18  18   Temp: 98.6 °F (37 °C) 99.3 °F (37.4 °C) 98 °F (36.7 °C) 97.9 °F (36.6 °C)   TempSrc: Oral Oral Oral Oral   SpO2: 96%   98%   Weight:       Height:         General:  No acute distress, awake, alert and oriented.  Lying in bed.  Moving her upper extremities.  Skin:  Warm and dry, no visible rash  HEENT:  Normocephalic/atraumatic.   Chest:  Normal respiratory effort.  Benign-appearing accessed Mediport present in the right subclavian area.  Extremities:  No visible clubbing, cyanosis, or edema  Neuro/psych:  Grossly nonfocal.  Normal mood and affect.       DIAGNOSTIC DATA:  Results from last 7 days   Lab Units 07/26/25  0211   WBC 10*3/mm3 13.95*   HEMOGLOBIN g/dL 8.6*   HEMATOCRIT % 26.7*   PLATELETS 10*3/mm3 397     Lab Results   Component Value Date    NEUTROABS 10.93 (H) 07/25/2025     Results from last 7 days   Lab Units 07/26/25  0211   SODIUM mmol/L 137   POTASSIUM mmol/L 3.8   CHLORIDE mmol/L 104   CO2 mmol/L 22.0   BUN mg/dL 15.0   CREATININE mg/dL 0.56*   GLUCOSE mg/dL 212*   CALCIUM mg/dL 8.5*     Results from last 7 days   Lab Units 07/21/25  0832   INR  1.0     Results from last 7 days   Lab Units 07/26/25  0211   MAGNESIUM mg/dL 2.3       IMAGING: None reviewed    ASSESSMENT:  This is a 64 y.o. female with:    *Sepsis secondary to urinary tract infection  History of recurrent urinary tract infections  On empiric cefepime  Urinalysis does not meet criteria for culture, chronic indwelling Cobos catheter  Large amount of yeast present on the UA  Blood cultures pending    *Left breast invasive lobular carcinoma  The tumor is estrogen receptor +91 to 100%, progesterone receptor +31 to 40%, HER2 negative, 0, Ki-67 35%  The tumor measures greater than 10 cm on exam, lymph node positive.  CT of the chest abdomen and pelvis with right upper lobe pulmonary nodule which is new and the bone scan also shows uptake in the left anterior inferior iliac spine which correlates to a lucent area on the CT  scan and also focal uptake noted in the left frontal calvarium concerning for metastatic disease.  Further evaluation with a MRI of the pelvis and hip as well as MRI of the brain is underway.  The scans are scheduled for 7/10/2024.  Clinical T3 N1 M1, stage IV invasive lobular carcinoma.  There is also a right axillary lymph node which has been biopsied and consistent with invasive lobular carcinoma with similar morphology as well as receptors concerning for metastatic disease rather than a primary right breast cancer.  Recommended Ribociclib 400 mg 3 weeks on and 1 week off.  July 16, 2024: Reviewed MRI of the pelvis and hip consistent with many bony metastasis.  MRI brain shows left frontal bone mets but no brain involvement.  Patient is here to start day 1 ribociclib along with anastrozole.  Continues on anastrozole.  Tempus performed on the left axilla lymph node biopsy shows PIK3CA mutation, CDH 1 mutation and Erb B2 mutation.  Therefore patient would benefit from alpelisib and Faslodex after progression on Ribociclib and AI.  Other options include neratinib plus Faslodex.  Initiated Ribociclib in August 2024.  8/30/2024-last day of week 3 of Ribociclib.    Patient completed 1 cycle of Ribociclib 400 mg and subsequently has not been able to go back on Ribociclib due to recurrent hospitalizations and abnormal LFTs.  10/14/2024-LFTs are normal today.  Recommend resuming Ribociclib at 200 mg and subsequently we will increase the dose to 400 mg with the next cycle.  Patient was unable to resume Ribociclib as she was admitted to the hospital from 10/17/2024 to 10/23/2024  11/11/2024-Labs reviewed and overall stable except for an ALT of 49.  Recommend resuming Ribociclib at 200 mg.  CT of the chest performed 10/29/2024 shows stable size of the left breast mass, decrease in size of the left axillary lymph node and mixed lytic and blastic lesion of the rib slightly increased in size.  11/11/2024-resumed Ribociclib 200  mg daily for 3/4 weeks.  2/18/2025-scans with disease progression in the bones in the liver.  Discontinue Ribociclib and anastrozole  2/28/2025-received the first dose of Faslodex.  3/17/2025-started truqap  3/21/2025- CBC reviewed and WBC 4.81, hemoglobin 13.9 platelets 595,000, CMP reviewed and LFTs have improved with ALT which is normal at 33, AST 71, alkaline phosphatase 185, total bilirubin normal at 0.2, blood sugar elevated at 409  Completed 1 week of trucap, resume again on 3/24/2025  4/4/2025 due for cycle 2-day 1 Faslodex.  She is struggling with intermittent hyperglycemia related to Truqap which is improved with glipizide  5/12/2025-CT of the chest and bone scan with overall stable disease.  Prior to that she had a CT abdomen end of April 2025 which also shows no evidence of metastatic disease.  Patient has not taken a whole lot of trucap, maybe less than 2 weeks total since initiation in March 2025.  Bilateral diagnostic mammogram and ultrasound from 6/24/2025 concerning for disease progression  CT of the chest abdomen and pelvis from 6/24/2025 also concerning for disease progression  I reviewed her previous pathology report and HER2 is noted to be 0.  She had a repeat punch biopsy of the left breast skin.  Will follow-up on the pathology from that.  If HER2 is 1+ or even ultralow we should be able to use Enhertu.  She truly did not have disease progression on Truqap however she was hospitalized back-to-back for the few days that she took 2 Due to recurrent UTIs.  She also currently has an ongoing UTI.  I worry that any type of PIK 3 CA directed therapy will result in UTIs, hyperglycemia and diarrhea which may not be ideal for her situation with the multiple sclerosis and inability to transfer as well as bladder dysfunction  Therefore the next best option would be chemotherapy which would be either Enhertu, Trodelvy or taxane.  I called and discussed with Dr. Jacqueline Daniels to determine the HER2 and she  looked at the original biopsy again and this was negative.  Punch biopsy from the left breast noted to be ER negative, VA negative and HER2 nu 0.  Invasive pleomorphic lobular carcinoma.  Given that the receptor status has changed, we would have to treat the metastatic breast cancer which is progressing with chemotherapy which would be either Xeloda or Trodelvy as HER2/dorothy was 0 Enhertu is not an option.  I reviewed both of these options with the patient and the side effects of both these medications at length and she prefers to proceed with Trodelvy.  Will also obtain a PD-L1 status to see if immunotherapy is an option.  Started Trodelvy at 7.5 mg/kg, q. 14 days as I am really concerned about her ability to tolerate.  We will also plan for Neulasta upfront.  Although tumor markers were not elevated originally now with elevated tumor markers with a CA 15-3 of 296 on 6/25/2025 and CA 20 7.29 of 409 on 6/25/2025.  7/22/2025: initiation of Trodelvy as planned.  She had her port placed 7/21.  Received Udenyca, white blood cell growth factor.       *DM with hyperglycemia     *Right axillary lymphadenopathy  Biopsied and consistent with invasive lobular carcinoma, grade 2, ER/VA positive and HER2 negative  Recent bilateral diagnostic mammogram and ultrasound from 6/24/2025 shows disease progression     *Skeletal metastasis  Patient will be started on Xgeva  8/16/2024 Xgeva initiation will be held as the patient has not been to the dentist in over 2 years.  Discussed possible side effects of Xgeva and she will schedule a dental visit and we will have her back in 1 week to initiate Xgeva pending this is complete.  10/14/2024-second dose of Xgeva will be administered.  Labs reviewed and stable to proceed.  Continue Xgeva every 4 weeks     *Multiple sclerosis  Patient was not on any treatment previously.  She sees Dr. Jacobson at [facility].  Due to profound weakness patient requested her neurologist to start  steroids.  They plan to initiate high-dose IV steroids to help with this.  High-dose IV steroids have not been initiated.  Doing physical therapy and received high-dose steroids  Stable     *Neurogenic bladder  Chronic Cobos catheter in place.  She recently saw a new urologist and is planning to discontinue the Cobos in favor of In-N-Out straight cathing.     *Normocytic anemia  History of iron deficiency  Iron 59, ferritin 333  Hemoglobin 8.6, from 9.0, from 8.7    *Abnormal liver labs  AST stable at 109, ALT 23    *Alternative medication  Patient sees a outside provider and receives ivermectin and fenbendazole  We have ran an interaction check with the medications and does not appear to be any interaction however I want her against taking these medications and potential side effects  Patient however wishes to proceed with his medications.  Patient continues on ivermectin     *Leukocytosis with neutrophilia  White blood cell count 13.95, from 12.76  She did receive Udenyca on 7/24/2025 which is a white blood cell booster following her chemotherapy    RECOMMENDATIONS/PLAN:  Empiric antibiotics continue with cefepime  Daily labs.  The white blood cell count may remain elevated due to the Udenyca but it is likely to drop following the Trodelvy as well.  Scheduled with an APRN in the office on 7/30, 8/6 with an APRN for Audra, 8/20 with Dr. Lopes with Audra  We will follow      James Austin MD     Electronically signed by James Austin MD at 07/26/25 9891

## 2025-07-27 NOTE — PROGRESS NOTES
[facility] INPATIENT PROGRESS NOTE    Length of Stay:  2 days    CHIEF COMPLAINT/REASON FOR VISIT:  Metastatic invasive lobular carcinoma, ER/SC strongly positive cancer with metastatic disease to the bones.   Fever    SUBJECTIVE: Remains afebrile.  Normotensive.  She states that dexterity in her upper extremities is still not at baseline.  Otherwise asymptomatic.    ROS:  14 systems reviewed with pertinent positives and negatives in the HPI. Reviewed today.    OBJECTIVE:  Vitals:    07/26/25 1544 07/26/25 1944 07/26/25 2318 07/27/25 0700   BP: 127/58 113/62 129/62    BP Location: Right arm Right arm Right arm    Patient Position: Lying Lying Lying    Pulse: 87   91   Resp: 18 18 18    Temp: 97.9 °F (36.6 °C) 98.8 °F (37.1 °C) 98.4 °F (36.9 °C)    TempSrc: Oral Oral Oral    SpO2: 98%   96%   Weight:       Height:             PHYSICAL EXAMINATION:   General:  No acute distress, awake, alert and oriented.  Lying in bed.  Skin:  Warm and dry, no visible rash  HEENT:  Normocephalic/atraumatic.   Chest:  Normal respiratory effort.  Benign-appearing Mediport present.  Extremities:  No visible clubbing, cyanosis, or edema  Neuro/psych:  Grossly nonfocal.  Normal mood and affect.       DIAGNOSTIC DATA:  Results Review:     I reviewed the patient's new clinical results.    Results from last 7 days   Lab Units 07/27/25  0604   WBC 10*3/mm3 13.97*   HEMOGLOBIN g/dL 8.0*   HEMATOCRIT % 25.9*   PLATELETS 10*3/mm3 402     Lab Results   Component Value Date    NEUTROABS 10.93 (H) 07/25/2025     Results from last 7 days   Lab Units 07/27/25  0604   SODIUM mmol/L 139   POTASSIUM mmol/L 4.3   CHLORIDE mmol/L 105   CO2 mmol/L 23.0   BUN mg/dL 12.0   CREATININE mg/dL 0.57   GLUCOSE mg/dL 145*   CALCIUM mg/dL 9.0     Results from last 7 days   Lab Units 07/21/25  0832   INR  1.0     Results from last 7 days   Lab Units 07/27/25  0604   MAGNESIUM mg/dL 2.4         IMAGING:    None reviewed    ASSESSMENT:  This is a 64 y.o.  female with:     *Sepsis secondary to presumed urinary tract infection  History of recurrent urinary tract infections  On empiric cefepime  Urinalysis does not meet criteria for culture, chronic indwelling Cobos catheter  Large amount of yeast present on the UA  Blood cultures no growth to date     *Left breast invasive lobular carcinoma  The tumor is estrogen receptor +91 to 100%, progesterone receptor +31 to 40%, HER2 negative, 0, Ki-67 35%  The tumor measures greater than 10 cm on exam, lymph node positive.  CT of the chest abdomen and pelvis with right upper lobe pulmonary nodule which is new and the bone scan also shows uptake in the left anterior inferior iliac spine which correlates to a lucent area on the CT scan and also focal uptake noted in the left frontal calvarium concerning for metastatic disease.  Further evaluation with a MRI of the pelvis and hip as well as MRI of the brain is underway.  The scans are scheduled for 7/10/2024.  Clinical T3 N1 M1, stage IV invasive lobular carcinoma.  There is also a right axillary lymph node which has been biopsied and consistent with invasive lobular carcinoma with similar morphology as well as receptors concerning for metastatic disease rather than a primary right breast cancer.  Recommended Ribociclib 400 mg 3 weeks on and 1 week off.  July 16, 2024: Reviewed MRI of the pelvis and hip consistent with many bony metastasis.  MRI brain shows left frontal bone mets but no brain involvement.  Patient is here to start day 1 ribociclib along with anastrozole.  Continues on anastrozole.  Tempus performed on the left axilla lymph node biopsy shows PIK3CA mutation, CDH 1 mutation and Erb B2 mutation.  Therefore patient would benefit from alpelisib and Faslodex after progression on Ribociclib and AI.  Other options include neratinib plus Faslodex.  Initiated Ribociclib in August 2024.  8/30/2024-last day of week 3 of Ribociclib.    Patient completed 1 cycle of Ribociclib  400 mg and subsequently has not been able to go back on Ribociclib due to recurrent hospitalizations and abnormal LFTs.  10/14/2024-LFTs are normal today.  Recommend resuming Ribociclib at 200 mg and subsequently we will increase the dose to 400 mg with the next cycle.  Patient was unable to resume Ribociclib as she was admitted to the hospital from 10/17/2024 to 10/23/2024  11/11/2024-Labs reviewed and overall stable except for an ALT of 49.  Recommend resuming Ribociclib at 200 mg.  CT of the chest performed 10/29/2024 shows stable size of the left breast mass, decrease in size of the left axillary lymph node and mixed lytic and blastic lesion of the rib slightly increased in size.  11/11/2024-resumed Ribociclib 200 mg daily for 3/4 weeks.  2/18/2025-scans with disease progression in the bones in the liver.  Discontinue Ribociclib and anastrozole  2/28/2025-received the first dose of Faslodex.  3/17/2025-started truqap  3/21/2025- CBC reviewed and WBC 4.81, hemoglobin 13.9 platelets 595,000, CMP reviewed and LFTs have improved with ALT which is normal at 33, AST 71, alkaline phosphatase 185, total bilirubin normal at 0.2, blood sugar elevated at 409  Completed 1 week of trucap, resume again on 3/24/2025  4/4/2025 due for cycle 2-day 1 Faslodex.  She is struggling with intermittent hyperglycemia related to Truqap which is improved with glipizide  5/12/2025-CT of the chest and bone scan with overall stable disease.  Prior to that she had a CT abdomen end of April 2025 which also shows no evidence of metastatic disease.  Patient has not taken a whole lot of trucap, maybe less than 2 weeks total since initiation in March 2025.  Bilateral diagnostic mammogram and ultrasound from 6/24/2025 concerning for disease progression  CT of the chest abdomen and pelvis from 6/24/2025 also concerning for disease progression  I reviewed her previous pathology report and HER2 is noted to be 0.  She had a repeat punch biopsy of the  left breast skin.  Will follow-up on the pathology from that.  If HER2 is 1+ or even ultralow we should be able to use Enhertu.  She truly did not have disease progression on Truqap however she was hospitalized back-to-back for the few days that she took 2 Due to recurrent UTIs.  She also currently has an ongoing UTI.  I worry that any type of PIK 3 CA directed therapy will result in UTIs, hyperglycemia and diarrhea which may not be ideal for her situation with the multiple sclerosis and inability to transfer as well as bladder dysfunction  Therefore the next best option would be chemotherapy which would be either Enhertu, Trodelvy or taxane.  I called and discussed with Dr. Jacqueline Daniels to determine the HER2 and she looked at the original biopsy again and this was negative.  Punch biopsy from the left breast noted to be ER negative, MN negative and HER2 nu 0.  Invasive pleomorphic lobular carcinoma.  Given that the receptor status has changed, we would have to treat the metastatic breast cancer which is progressing with chemotherapy which would be either Xeloda or Trodelvy as HER2/dorothy was 0 Enhertu is not an option.  I reviewed both of these options with the patient and the side effects of both these medications at length and she prefers to proceed with Trodelvy.  Will also obtain a PD-L1 status to see if immunotherapy is an option.  Started Trodelvy at 7.5 mg/kg, q. 14 days as I am really concerned about her ability to tolerate.  We will also plan for Neulasta upfront.  Although tumor markers were not elevated originally now with elevated tumor markers with a CA 15-3 of 296 on 6/25/2025 and CA 20 7.29 of 409 on 6/25/2025.  7/22/2025: initiation of Trodelvy as planned.  She had her port placed 7/21.  Received Udenyca, white blood cell growth factor.       *DM with hyperglycemia     *Right axillary lymphadenopathy  Biopsied and consistent with invasive lobular carcinoma, grade 2, ER/MN positive and HER2  negative  Recent bilateral diagnostic mammogram and ultrasound from 6/24/2025 shows disease progression     *Skeletal metastasis  Patient will be started on Xgeva  8/16/2024 Xgeva initiation will be held as the patient has not been to the dentist in over 2 years.  Discussed possible side effects of Xgeva and she will schedule a dental visit and we will have her back in 1 week to initiate Xgeva pending this is complete.  10/14/2024-second dose of Xgeva will be administered.  Labs reviewed and stable to proceed.  Continue Xgeva every 4 weeks     *Multiple sclerosis  Patient was not on any treatment previously.  She sees Dr. Jacobson at [facility].  Due to profound weakness patient requested her neurologist to start steroids.  They plan to initiate high-dose IV steroids to help with this.  High-dose IV steroids have not been initiated.  Doing physical therapy and received high-dose steroids  Stable     *Neurogenic bladder  Chronic Cobos catheter in place.  She recently saw a new urologist and is planning to discontinue the Cobos in favor of In-N-Out straight cathing.     *Normocytic anemia  History of iron deficiency  Iron 59, ferritin 333  Hemoglobin 8.0, from 8.6, from 9.0, from 8.7     *Abnormal liver labs  Transaminases improving     *Alternative medication  Patient sees a outside provider and receives ivermectin and fenbendazole  We have ran an interaction check with the medications and does not appear to be any interaction however I want her against taking these medications and potential side effects  Patient however wishes to proceed with his medications.  Patient continues on ivermectin     *Leukocytosis with neutrophilia  White blood cell count mildly elevated  She did receive Udenyca on 7/24/2025 which is a white blood cell booster following her chemotherapy     RECOMMENDATIONS/PLAN:  Empiric antibiotics continue with cefepime. Dr. Child now following.  Transfuse red cells for hemoglobin approaching 7 or  as needed for symptoms  Daily labs.  The white blood cell count may remain elevated due to the Udenyca but it could potentially drop following the Trodelvy as well.  Scheduled with an APRN in the office on 7/30 for labs and toxicity check, 8/6 with an APRN for Trodelvy, 8/20 with Dr. Lopes with Trodelvy.  We will modify if needed.  We will follow.  Discussed with the patient.    James Austin MD

## 2025-07-27 NOTE — CONSULTS
CONSULT NOTE    Infectious Diseases - Luisa Lima MD  [facility]       Patient Identification:  Name: Maritza Bejarano  Age: 64 y.o.  Sex: female  :  1960  MRN: [id number]             Date of Consultation: 2025      Primary Care Physician: Enoc Carbone DO                               Requesting Physician:   Reason for Consultation: Complicated UTI    History of presenting illness: Patient is a 64-year-old female who has complicated past medical history including history of metastatic breast cancer currently on chemotherapy, history of multiple sclerosis with immobility, neurogenic bladder requiring chronic indwelling catheter and multiple hospitalization for complicated urinary tract infection and her mental status received Mediport on 2025 and received treatment for breast cancer on 2025 along with white blood cells and stimulating factor.  Patient presented to the hospital on 2025 with weakness fatigue fevers and chills and bodyaches.  Patient was noted to have abnormal urinalysis normal white blood cell count upon admission and was noted to have temperature of 99.9.  Patient was started on cefepime after blood cultures were drawn.  Fluconazole was added to her regimen and today infectious disease service is consulted for possibility of complicated UTI.  As mentioned patient had abnormal urinalysis at the time of admission but no urine culture was sent.  Patient is feeling overall better.  Impression: 64-year-old female with complicated past medical history presents with bodyaches fever with chronically abnormal urinalysis due to indwelling catheter and neurogenic bladder and recent therapy for metastatic breast cancer white blood cell count stimulating factor administered on 2025 with blood cultures negative-this presentation consistent with  1-febrile illness with systemic symptoms due to UTI versus endogenous chasity in the setting of evolving mucositis  and neutropenia with neutropenia curtailed due to white blood cell count stimulating factor  2-negative blood culture with no urine culture with initial urinalysis showing evidence of yeast  3-progressive leukocytosis despite resolution of fever and improved sense of wellbeing likely due to stimulating factor rather than persistent infection  4-multiple sclerosis  5-breast cancer with mets  6-status post Mediport placement  7-sleep apnea  8-other diagnoses per primary team.      Recommendations/Discussions:  At this juncture I agree with the care plan consisting of empiric antibiotic therapy while awaiting blood culture results.  Would recommend sending urine culture if it is not sent.  If her blood cultures are negative and she remains afebrile and continues to feel well then antibiotic therapy can be discontinued.  Changing Cobos catheter removing it altogether while on fluconazole is not unreasonable to prevent future episodes of infection.  Monitor closely for side effects of antibiotic therapy including possibility of C. difficile infection yeast infection hepatic and renal dysfunction cytopenias and drug fever.  Overall concept of care discussed with the patient.  Thank you very much for letting me be the part of your patient care please see above impression and recommendations        Past Medical History:  Past Medical History:   Diagnosis Date    Anemia 2024    `Treated with iron    Dawn esophagus     per patient    Blurred vision     R/T MS    Breast cancer     metastatic    Carpal tunnel syndrome     Clotting disorder 1993, 2003, 2012    3 g/i bleeds w/ transfus/ions    Colon polyp 2013    removed w/ colonoscopy    Deep vein thrombosis phlebitis 1980    Depression     Diplopia 2013    GERD (gastroesophageal reflux disease)     GI (gastrointestinal bleed) 3 bleeds    2 transfusions    H/O Skin cancer, basal cell     Headache     History of blood transfusion     History of GI bleed     R/T NSAIDS AND  STEROIDS, multiple times    History of urinary tract infection     Hypercalcemia     s/p parathyroidectomy    Hyperlipidemia     Hypertension     Movement disorder     Multiple sclerosis     Optic neuritis     PONV (postoperative nausea and vomiting)     Sleep apnea     wears cpap    Steroid-induced diabetes 2024     Past Surgical History:  Past Surgical History:   Procedure Laterality Date    APPENDECTOMY      BLADDER SURGERY      bladder stimulator    BREAST BIOPSY  don't remember    BREAST SURGERY      augmentation wtih subsequent removal    CARPAL TUNNEL RELEASE Bilateral     Left 2018, right 2020    CUBITAL TUNNEL RELEASE Left     CYSTOSCOPY BOTOX INJECTION OF BLADDER  2018    Cystoscopy with Botox    FRACTURE SURGERY  2019    rt shoulder    PARATHYROIDECTOMY      one gland removed    ROTATOR CUFF REPAIR Right 2017    TOE SURGERY      bilateral great toes    TOTAL SHOULDER ARTHROPLASTY W/ DISTAL CLAVICLE EXCISION Right 10/22/2018    Procedure: RT TOTAL SHOULDER REVERSE ARTHROPLASTY;  Surgeon: Bipin Dangelo MD;  Location: [facility] OR;  Service: Orthopedics      Home Meds:  Medications Prior to Admission   Medication Sig Dispense Refill Last Dose/Taking    aspirin 81 MG EC tablet Take 1 tablet by mouth Daily. 30 tablet 0 Patient Taking Differently    baclofen (LIORESAL) 20 MG tablet Take 1 tablet by mouth 2 (Two) Times a Day. (Patient taking differently: Take 1 tablet by mouth 3 (Three) Times a Day.)   Patient Taking Differently    Cholecalciferol (vitamin D3) 125 MCG (5000 UT) tablet tablet Take 1 tablet by mouth Daily.   7/25/2025 Morning    ciclopirox (LOPROX) 1 % shampoo    7/25/2025 Morning    clobetasol (TEMOVATE) 0.05 % ointment APPLY TOPICALLY TO THE AFFECTED AREA OF BREAST TWICE DAILY. AVOID FACE AND SKIN FOLDS   7/25/2025 Morning    eszopiclone (LUNESTA) 1 MG tablet Take 1 tablet by mouth Every Night. Take immediately before bedtime 7 tablet 0 7/24/2025    Fenbendazole powder Use 440 mg 3 (Three)  Times a Week. 2 capsules 3 times weekly   7/25/2025 Morning    HYDROcodone-acetaminophen (NORCO) 5-325 MG per tablet Take 1 tablet by mouth Every 4 (Four) Hours As Needed for Moderate Pain. 60 tablet 0 Patient Taking Differently    IVERMECTIN PO Take 15 mg by mouth 5 (Five) Times a Week. Mopnday-Friday 7/25/2025 Morning    lidocaine-prilocaine (EMLA) 2.5-2.5 % cream Apply 1 Application topically to the appropriate area as directed As Needed for Injection Site Pain. Apply a dime size amount 30 minutes prior to venous port access 30 g 3 7/22/2025    metoprolol succinate XL (TOPROL-XL) 25 MG 24 hr tablet Take 1 tablet by mouth Daily. 30 tablet 0 7/25/2025 Morning    nitrofurantoin, macrocrystal-monohydrate, (MACROBID) 100 MG capsule Take 1 capsule by mouth Daily.   7/25/2025 Morning    ondansetron (ZOFRAN) 8 MG tablet Take 1 tablet by mouth 3 (Three) Times a Day As Needed for Nausea or Vomiting. (Patient taking differently: Take 1 tablet by mouth Every 8 (Eight) Hours As Needed for Nausea or Vomiting.)   Patient Taking Differently    pantoprazole (PROTONIX) 40 MG EC tablet Take 1 tablet by mouth 3 (Three) Times a Week.   7/25/2025 Morning    phenazopyridine (PYRIDIUM) 200 MG tablet Take 1 tablet by mouth 3 (Three) Times a Day As Needed for Dysuria. 20 tablet 0 7/25/2025 Morning    pramipexole (MIRAPEX) 1.5 MG tablet Take 1 tablet by mouth 2 (Two) Times a Day.   7/25/2025 Morning    prochlorperazine (COMPAZINE) 10 MG tablet Take 1 tablet by mouth Every 6 (Six) Hours As Needed for Nausea or Vomiting. 90 tablet 5 Patient Taking Differently    rosuvastatin (CRESTOR) 20 MG tablet Take 1 tablet by mouth Daily. 90 tablet 1 7/25/2025 Morning    sennosides-docusate (senna-docusate sodium) 8.6-50 MG per tablet Take 1 tablet by mouth Daily. 30 tablet 2 7/25/2025 Morning    temazepam (RESTORIL) 15 MG capsule Use one-half to one tablet nightly as needed.   7/24/2025 Bedtime    albuterol sulfate  (90 Base) MCG/ACT inhaler  Inhale 2 puffs Every 4 (Four) Hours As Needed for Wheezing or Shortness of Air. 18 g 0     OLANZapine (zyPREXA) 5 MG tablet Take 1 tablet by mouth Every Night. Take on days 1, 2, 3, and 4 and Days 8, 9, 10, 11 after chemotherapy. 8 tablet 5 7/22/2025     Current Meds:     Current Facility-Administered Medications:     acetaminophen (TYLENOL) tablet 650 mg, 650 mg, Oral, Q4H PRN, 650 mg at 07/26/25 1547 **OR** acetaminophen (TYLENOL) 160 MG/5ML oral solution 650 mg, 650 mg, Oral, Q4H PRN **OR** acetaminophen (TYLENOL) suppository 650 mg, 650 mg, Rectal, Q4H PRN, Rosina Atkinson APRN    albuterol (PROVENTIL) nebulizer solution 0.083% 2.5 mg/3mL, 2.5 mg, Nebulization, Q4H PRN, Frederick Carey DO    aspirin EC tablet 81 mg, 81 mg, Oral, Daily, Frederick Carey DO, 81 mg at 07/26/25 0815    baclofen (LIORESAL) tablet 20 mg, 20 mg, Oral, BID, Frederick Carey DO, 20 mg at 07/26/25 2113    [DISCONTINUED] sennosides-docusate (PERICOLACE) 8.6-50 MG per tablet 2 tablet, 2 tablet, Oral, BID PRN, 2 tablet at 07/26/25 2327 **AND** polyethylene glycol (MIRALAX) packet 17 g, 17 g, Oral, Daily **AND** bisacodyl (DULCOLAX) EC tablet 5 mg, 5 mg, Oral, Daily PRN **AND** bisacodyl (DULCOLAX) suppository 10 mg, 10 mg, Rectal, Daily PRN, Vinicio Montoya MD    cefepime 2000 mg IVPB in 100 mL NS (MBP), 2,000 mg, Intravenous, Q8H, Rosina Atkinson APRN, 2,000 mg at 07/26/25 2304    cholecalciferol (VITAMIN D3) tablet 5,000 Units, 5,000 Units, Oral, Daily, Frederick Carey DO, 5,000 Units at 07/26/25 0815    dextrose (D50W) (25 g/50 mL) IV injection 25 g, 25 g, Intravenous, Q15 Min PRN, Rosina Atkinson APRN    dextrose (GLUTOSE) oral gel 15 g, 15 g, Oral, Q15 Min PRN, Rosina Atkinson APRN    enoxaparin sodium (LOVENOX) syringe 40 mg, 40 mg, Subcutaneous, Q24H, Vinicio Montoya MD, 40 mg at 07/26/25 1548    famotidine (PEPCID) tablet 20 mg, 20 mg, Oral, BID PRN, Rosina Atkinson APRN    fluconazole (DIFLUCAN) IVPB 200 mg,  200 mg, Intravenous, Once, Suman Boogie MD    fluconazole (DIFLUCAN) IVPB 200 mg, 200 mg, Intravenous, Q24H, Rosina Atkinson APRN, Last Rate: 100 mL/hr at 07/26/25 2113, 200 mg at 07/26/25 2113    glucagon (GLUCAGEN) injection 1 mg, 1 mg, Intramuscular, Q15 Min PRN, Rosina Atkinson APRN    heparin injection 500 Units, 5 mL, Intravenous, PRN, Vinicio Montoya MD    HYDROcodone-acetaminophen (NORCO) 5-325 MG per tablet 1 tablet, 1 tablet, Oral, Q4H PRN, Frederick Carey DO    insulin lispro (HUMALOG/ADMELOG) injection 2-7 Units, 2-7 Units, Subcutaneous, 4x Daily AC & at Bedtime, Rosina Atkinson APRN, 3 Units at 07/26/25 1758    melatonin tablet 5 mg, 5 mg, Oral, Nightly, Rosina Atkinson APRN    [Held by provider] metoprolol succinate XL (TOPROL-XL) 24 hr tablet 25 mg, 25 mg, Oral, Q24H, Frederick Carey DO, 25 mg at 07/26/25 0815    nitroglycerin (NITROSTAT) SL tablet 0.4 mg, 0.4 mg, Sublingual, Q5 Min PRN, Rosina Atkinson APRN    ondansetron (ZOFRAN) injection 4 mg, 4 mg, Intravenous, Q6H PRN, Rosina Atkinson APRN    pantoprazole (PROTONIX) EC tablet 40 mg, 40 mg, Oral, Once per day on Monday Wednesday Friday, Frederick Carey DO, 40 mg at 07/26/25 0457    Pharmacy To Dose: Cefepime, , Not Applicable, Continuous PRN, Rosina Atkinson APRN    pramipexole (MIRAPEX) tablet 1.5 mg, 1.5 mg, Oral, BID, Frederick Carey DO, 1.5 mg at 07/26/25 2112    sennosides-docusate (PERICOLACE) 8.6-50 MG per tablet 2 tablet, 2 tablet, Oral, BID, Vinicio Montoya MD    [COMPLETED] Insert Peripheral IV, , , Once **AND** sodium chloride 0.9 % flush 10 mL, 10 mL, Intravenous, PRN, Suman Boogie MD    sodium chloride 0.9 % flush 10 mL, 10 mL, Intravenous, Q12H, Rosina Atkinson APRN, 10 mL at 07/26/25 8316    sodium chloride 0.9 % flush 10 mL, 10 mL, Intravenous, PRN, Rosina Atkinson APRN    sodium chloride 0.9 % flush 10 mL, 10 mL, Intravenous, Q12H, Vinicio Montoya MD, 10 mL at 07/26/25  "    sodium chloride 0.9 % flush 10 mL, 10 mL, Intravenous, PRN, Vinicio Montoya MD    sodium chloride 0.9 % flush 20 mL, 20 mL, Intravenous, PRN, Vinicio Montoya MD    sodium chloride 0.9 % infusion 40 mL, 40 mL, Intravenous, PRN, Rosina Atkinson APRN    sodium chloride 0.9 % infusion 40 mL, 40 mL, Intravenous, PRNJan Bokhodir S, MD  Allergies:  Allergies   Allergen Reactions    Klonopin [Clonazepam] Mental Status Change, Confusion and Hallucinations     Social History:   Social History     Tobacco Use    Smoking status: Former     Current packs/day: 0.00     Average packs/day: 2.0 packs/day for 30.0 years (60.0 ttl pk-yrs)     Types: Cigarettes     Start date: 10/22/1973     Quit date: 10/22/2003     Years since quittin.7    Smokeless tobacco: Not on file   Substance Use Topics    Alcohol use: Not Currently      Family History:  Family History   Problem Relation Age of Onset    Hypertension Mother     Hyperlipidemia Mother     Aortic aneurysm Mother         thoracic    Diabetes Mother     Hypertension Father         charissa heart failure    Heart failure Father     Hyperlipidemia Father     Miscarriages / Stillbirths Sister     Multiple sclerosis Brother     Atrial fibrillation Brother     Diabetes Maternal Grandmother     Diabetes Maternal Grandfather     Stroke Maternal Grandfather     Parkinsonism Paternal Grandmother     Tremor Paternal Grandmother     Stomach cancer Paternal Grandfather           Review of Systems  See history of present illness and past medical history.  Overall feeling better      Vitals:   /62 (BP Location: Right arm, Patient Position: Lying)   Pulse 87   Temp 98.4 °F (36.9 °C) (Oral)   Resp 18   Ht 165.1 cm (65\")   Wt 81.6 kg (180 lb)   LMP  (LMP Unknown) Comment: PT. STATES HER LAST PERIOD WAS GREATER THAN FIVE YEARS AGO  SpO2 98%   BMI 29.95 kg/m²   I/O:   Intake/Output Summary (Last 24 hours) at 2025 0759  Last data filed at 2025 " 2304  Gross per 24 hour   Intake 1120 ml   Output 1750 ml   Net -630 ml     Exam:  Patient is examined using the personal protective equipment as per guidelines from infection control for this particular patient as enacted.  Hand washing was performed before and after patient interaction.  General Appearance:    Alert, cooperative, no distress, appears stated age   Head:    Normocephalic, without obvious abnormality, atraumatic   Eyes:    PERRL, conjunctivae/corneas clear, EOM's intact, both eyes   Ears:    Normal external ear canals, both ears   Nose:   Nares normal, septum midline, mucosa normal, no drainage    or sinus tenderness   Throat:   Lips, tongue, gums normal; oral mucosa pink and moist   Neck:   Supple   Back:     Symmetric, no curvature, ROM normal, no CVA tenderness   Lungs:     Clear to auscultation bilaterally, respirations unlabored   Chest Wall:    No tenderness or deformity, right upper chest Mediport in place    Heart:    Regular rate and rhythm, S1 and S2    Abdomen:   Soft nontender catheter in place   Extremities:   Extremities normal, atraumatic, no cyanosis or edema   Pulses:   Pulses palpable in all extremities; symmetric all extremities   Skin:   Skin color normal, Skin is warm and dry,  no rashes or palpable lesions   Neurologic:   CNII-XII intact, motor strength grossly intact, sensation grossly intact to light touch, no focal deficits noted       Data Review:    I reviewed the patient's new clinical results.  Results from last 7 days   Lab Units 07/27/25  0604 07/26/25 0211 07/25/25 2033 07/22/25  0805 07/21/25  0754   WBC 10*3/mm3 13.97* 13.95* 12.76* 6.59 5.96   HEMOGLOBIN g/dL 8.0* 8.6* 9.0* 8.7* 8.6*   PLATELETS 10*3/mm3 402 397 449 432 442     Results from last 7 days   Lab Units 07/27/25  0604 07/26/25  0211 07/25/25 2033 07/22/25  0805 07/21/25  0754   SODIUM mmol/L 139 137 138 138 139   POTASSIUM mmol/L 4.3 3.8 3.7 3.8 4.3   CHLORIDE mmol/L 105 104 103 106 105   CO2 mmol/L  23.0 22.0 22.2 20.4* 23.4   BUN mg/dL 12.0 15.0 17.0 14.5 20.0   CREATININE mg/dL 0.57 0.56* 0.91 0.53* 0.83   CALCIUM mg/dL 9.0 8.5* 9.4 9.0 8.8   GLUCOSE mg/dL 145* 212* 244* 248* 113*     Culture data review  Microbiology Results (last 10 days)       Procedure Component Value - Date/Time    Blood Culture - Blood, Hand, Left [[id number]]  (Normal) Collected: 07/25/25 2311    Lab Status: Preliminary result Specimen: Blood from Hand, Left Updated: 07/27/25 2330     Blood Culture No growth at 2 days    Blood Culture - Blood, Hand, Right [[id number]]  (Normal) Collected: 07/25/25 2106    Lab Status: Preliminary result Specimen: Blood from Hand, Right Updated: 07/27/25 2115     Blood Culture No growth at 2 days            Assessment:  Active Hospital Problems    Diagnosis  POA    **Sepsis secondary to UTI [A41.9, N39.0]  Yes    Transaminitis [R74.01]  Yes    Anemia [D64.9]  Yes    UTI (urinary tract infection) due to urinary indwelling catheter [T83.511A, N39.0]  Yes    Type 2 diabetes mellitus [E11.9]  Yes    Malignant neoplasm of overlapping sites of left breast in female, estrogen receptor positive [C50.812, Z17.0]  Not Applicable    Multiple sclerosis [G35]  Yes    Neurogenic bladder [N31.9]  Yes    Restless legs syndrome [G25.81]  Yes      Resolved Hospital Problems   No resolved problems to display.         Plan:  See above  Luisa Child MD   7/27/2025  07:59 EDT    Parts of this note may be an electronic transcription/translation of spoken language to printed text using the Dragon dictation system.

## 2025-07-27 NOTE — PLAN OF CARE
Goal Outcome Evaluation:  Plan of Care Reviewed With: patient         Pt is doing better with improved vitals. VSS

## 2025-07-27 NOTE — PROGRESS NOTES
Name: Maritza Nance Darci ADMIT: 2025   : 1960  PCP: Enoc Carbone DO    MRN: [id number] LOS: 2 days   AGE/SEX: 64 y.o. female  ROOM: [facility]     Subjective   Subjective   Patient seen this morning.  No acute overnight.  Feeling better.  Still feels very weak but overall improving.  No fevers no chills.    Review of Systems   As above  Objective   Objective   Vital Signs  Temp:  [98.4 °F (36.9 °C)-98.8 °F (37.1 °C)] 98.4 °F (36.9 °C)  Heart Rate:  [91] 91  Resp:  [18] 18  BP: (113-129)/(62) 129/62  SpO2:  [96 %] 96 %  on  Flow (L/min) (Oxygen Therapy):  [3] 3;   Device (Oxygen Therapy): CPAP  Body mass index is 29.95 kg/m².  Physical Exam    General: Sitting up in the bed, more alert, not in distress,  HEENT: Normocephalic, atraumatic  CV: Regular rate and rhythm, no murmurs rubs or gallops  Lungs: CTA anteriorly, no wheezing  Abdomen: Soft, nontender, nondistended  Extremities: No significant peripheral edema , no cyanosis     Results Review     I reviewed the patient's new clinical results.  Results from last 7 days   Lab Units 25  0604 25  0805   WBC 10*3/mm3 13.97* 13.95* 12.76* 6.59   HEMOGLOBIN g/dL 8.0* 8.6* 9.0* 8.7*   PLATELETS 10*3/mm3 402 397 449 432     Results from last 7 days   Lab Units 25  0604 25  02125  0805   SODIUM mmol/L 139 137 138 138   POTASSIUM mmol/L 4.3 3.8 3.7 3.8   CHLORIDE mmol/L 105 104 103 106   CO2 mmol/L 23.0 22.0 22.2 20.4*   BUN mg/dL 12.0 15.0 17.0 14.5   CREATININE mg/dL 0.57 0.56* 0.91 0.53*   GLUCOSE mg/dL 145* 212* 244* 248*   Estimated Creatinine Clearance: 107.9 mL/min (by C-G formula based on SCr of 0.57 mg/dL).  Results from last 7 days   Lab Units 25  0604 25  02125  0805   ALBUMIN g/dL 3.4* 3.4* 3.7 3.7   BILIRUBIN mg/dL <0.2 <0.2 <0.2 <0.2   ALK PHOS U/L 189* 173* 196* 180*   AST (SGOT) U/L 76* 109* 112* 95*   ALT (SGPT) U/L  25     Results  from last 7 days   Lab Units 07/27/25  0604 07/26/25  0211 07/25/25 2033 07/22/25  0805   CALCIUM mg/dL 9.0 8.5* 9.4 9.0   ALBUMIN g/dL 3.4* 3.4* 3.7 3.7   MAGNESIUM mg/dL 2.4 2.3  --  2.2   PHOSPHORUS mg/dL 2.7 3.6  --  3.0     Results from last 7 days   Lab Units 07/26/25  0514 07/25/25  2311 07/25/25 2033   PROCALCITONIN ng/mL  --   --  0.30*   LACTATE mmol/L 2.0 1.3 2.3*     COVID19   Date Value Ref Range Status   05/09/2025 Not Detected Not Detected - Ref. Range Final   04/17/2025 Not Detected Not Detected - Ref. Range Final     Glucose   Date/Time Value Ref Range Status   07/27/2025 1610 147 (H) 70 - 130 mg/dL Final   07/27/2025 1108 218 (H) 70 - 130 mg/dL Final   07/26/2025 1546 225 (H) 70 - 130 mg/dL Final   07/26/2025 1111 126 70 - 130 mg/dL Final   07/26/2025 0617 194 (H) 70 - 130 mg/dL Final           XR Chest 1 View  Narrative: CXR ONE VIEW      HISTORY: fever     COMPARISON: 7/2/2025     TECHNIQUE: single portable AP     Impression:    Right IJ [id number] catheter remains in place.     The heart size is within normal limits.     Low lung volumes, no acute pulmonary infiltrate.     The right lung appears normally aerated. Left basilar retrocardiac  linear density, scarring or atelectasis, unchanged.     This report was finalized on 7/25/2025 9:45 PM by Dr. Juma Scott M.D on Workstation: [id number]       Scheduled Medications  aspirin, 81 mg, Oral, Daily  baclofen, 20 mg, Oral, BID  cefepime, 2,000 mg, Intravenous, Q8H  vitamin D3, 5,000 Units, Oral, Daily  enoxaparin sodium, 40 mg, Subcutaneous, Q24H  fluconazole, 200 mg, Intravenous, Once  fluconazole, 200 mg, Intravenous, Q24H  insulin lispro, 2-7 Units, Subcutaneous, 4x Daily AC & at Bedtime  melatonin, 5 mg, Oral, Nightly  [Held by provider] metoprolol succinate XL, 25 mg, Oral, Q24H  pantoprazole, 40 mg, Oral, Once per day on Monday Wednesday Friday  polyethylene glycol, 17 g, Oral, Daily  pramipexole, 1.5 mg, Oral, BID  senna-docusate  sodium, 2 tablet, Oral, BID  sodium chloride, 10 mL, Intravenous, Q12H  sodium chloride, 10 mL, Intravenous, Q12H    Infusions  Pharmacy To Dose:,     Diet  Diet: Cardiac, Diabetic; Healthy Heart (2-3 Na+); Consistent Carbohydrate; Fluid Consistency: Thin (IDDSI 0)    I have personally reviewed     [x]  Laboratory   [x]  Microbiology   [x]  Radiology   [x]  EKG/Telemetry  [x]  Cardiology/Vascular   []  Pathology    []  Records       Assessment/Plan     Active Hospital Problems    Diagnosis  POA    **Sepsis secondary to UTI [A41.9, N39.0]  Yes    Transaminitis [R74.01]  Yes    Anemia [D64.9]  Yes    UTI (urinary tract infection) due to urinary indwelling catheter [T83.511A, N39.0]  Yes    Type 2 diabetes mellitus [E11.9]  Yes    Malignant neoplasm of overlapping sites of left breast in female, estrogen receptor positive [C50.812, Z17.0]  Not Applicable    Multiple sclerosis [G35]  Yes    Neurogenic bladder [N31.9]  Yes    Restless legs syndrome [G25.81]  Yes      Resolved Hospital Problems   No resolved problems to display.       Patient is 64 year old female with history of anemia breast cancer, multiple sclerosis, neurogenic bladder with indwelling catheter, type 2 diabetes, restless leg syndrome who presents to the ED with complaints of generalized fatigue and not feeling well, some nausea and chills and generalized bodyaches.     Sepsis secondary to acute UTI with indwelling catheter  - Indwelling Cobos changed  - Urinalysis showed large yeast, trace bacteria, positive for nitrates, leukocytes 6-5 WBC, did not reflex to culture.  Urine.   blood cultures pending  - Continue cefepime, fluconazole  -Follow-up blood culture results  -ID consulted     Type 2 diabetes  -Continue SSI, hypoglycemia protocol  - Hold oral diabetic medications at this time     Multiple sclerosis/neurogenic bladder  - PT to eval and treat  - Indwelling Cobos catheter in place  - Continue home medications     Stage IV breast cancer  -  Currently undergoing chemotherapy, last treatment was 4 days prior to admission  - Hematology oncology following    Anemia  - Recent iron panel 07/22/2025 elevated ferritin 333, iron saturation 17  - Hemoglobin 8.0 this morning.  Monitor daily, transfuse as indicated for symptomatic anemia or hemoglobin less than 7.        DVT prophylaxis.  Lovenox  Full code.  Discussed with patient.  Disposition: Home, likely in 2 days  Expected Discharge Date: 7/29/2025; Expected Discharge Time:        Copied text in this note has been reviewed and is accurate as of 07/27/25.         Dictated utilizing Dragon dictation        Vinicio Montoya MD  [facility]  07/27/25  16:56 EDT

## 2025-07-28 ENCOUNTER — APPOINTMENT (OUTPATIENT)
Dept: ULTRASOUND IMAGING | Facility: HOSPITAL | Age: 65
DRG: 872 | End: 2025-07-28
Payer: MEDICARE

## 2025-07-28 LAB
ABO GROUP BLD: NORMAL
ALBUMIN SERPL-MCNC: 3.4 G/DL (ref 3.5–5.2)
ALBUMIN/GLOB SERPL: 1 G/DL
ALP SERPL-CCNC: 207 U/L (ref 39–117)
ALT SERPL W P-5'-P-CCNC: 22 U/L (ref 1–33)
ANION GAP SERPL CALCULATED.3IONS-SCNC: 11.3 MMOL/L (ref 5–15)
AST SERPL-CCNC: 56 U/L (ref 1–32)
BILIRUB SERPL-MCNC: <0.2 MG/DL (ref 0–1.2)
BLD GP AB SCN SERPL QL: NEGATIVE
BUN SERPL-MCNC: 14 MG/DL (ref 8–23)
BUN/CREAT SERPL: 23 (ref 7–25)
CALCIUM SPEC-SCNC: 8.9 MG/DL (ref 8.6–10.5)
CHLORIDE SERPL-SCNC: 103 MMOL/L (ref 98–107)
CO2 SERPL-SCNC: 23.7 MMOL/L (ref 22–29)
CREAT SERPL-MCNC: 0.61 MG/DL (ref 0.57–1)
DEPRECATED RDW RBC AUTO: 51.7 FL (ref 37–54)
EGFRCR SERPLBLD CKD-EPI 2021: 101.2 ML/MIN/1.73
ERYTHROCYTE [DISTWIDTH] IN BLOOD BY AUTOMATED COUNT: 16.1 % (ref 12.3–15.4)
GLOBULIN UR ELPH-MCNC: 3.4 GM/DL
GLUCOSE BLDC GLUCOMTR-MCNC: 118 MG/DL (ref 70–130)
GLUCOSE BLDC GLUCOMTR-MCNC: 120 MG/DL (ref 70–130)
GLUCOSE BLDC GLUCOMTR-MCNC: 140 MG/DL (ref 70–130)
GLUCOSE BLDC GLUCOMTR-MCNC: 155 MG/DL (ref 70–130)
GLUCOSE SERPL-MCNC: 114 MG/DL (ref 65–99)
HCT VFR BLD AUTO: 25.1 % (ref 34–46.6)
HGB BLD-MCNC: 7.9 G/DL (ref 12–15.9)
MAGNESIUM SERPL-MCNC: 2.2 MG/DL (ref 1.6–2.4)
MCH RBC QN AUTO: 27.8 PG (ref 26.6–33)
MCHC RBC AUTO-ENTMCNC: 31.5 G/DL (ref 31.5–35.7)
MCV RBC AUTO: 88.4 FL (ref 79–97)
PHOSPHATE SERPL-MCNC: 3 MG/DL (ref 2.5–4.5)
PLATELET # BLD AUTO: 437 10*3/MM3 (ref 140–450)
PMV BLD AUTO: 9.5 FL (ref 6–12)
POTASSIUM SERPL-SCNC: 4.1 MMOL/L (ref 3.5–5.2)
PROT SERPL-MCNC: 6.8 G/DL (ref 6–8.5)
RBC # BLD AUTO: 2.84 10*6/MM3 (ref 3.77–5.28)
RH BLD: POSITIVE
SODIUM SERPL-SCNC: 138 MMOL/L (ref 136–145)
T&S EXPIRATION DATE: NORMAL
WBC NRBC COR # BLD AUTO: 13.78 10*3/MM3 (ref 3.4–10.8)

## 2025-07-28 PROCEDURE — 86900 BLOOD TYPING SEROLOGIC ABO: CPT

## 2025-07-28 PROCEDURE — 84100 ASSAY OF PHOSPHORUS: CPT | Performed by: STUDENT IN AN ORGANIZED HEALTH CARE EDUCATION/TRAINING PROGRAM

## 2025-07-28 PROCEDURE — 86850 RBC ANTIBODY SCREEN: CPT | Performed by: STUDENT IN AN ORGANIZED HEALTH CARE EDUCATION/TRAINING PROGRAM

## 2025-07-28 PROCEDURE — 86901 BLOOD TYPING SEROLOGIC RH(D): CPT | Performed by: STUDENT IN AN ORGANIZED HEALTH CARE EDUCATION/TRAINING PROGRAM

## 2025-07-28 PROCEDURE — 36430 TRANSFUSION BLD/BLD COMPNT: CPT

## 2025-07-28 PROCEDURE — 80053 COMPREHEN METABOLIC PANEL: CPT | Performed by: STUDENT IN AN ORGANIZED HEALTH CARE EDUCATION/TRAINING PROGRAM

## 2025-07-28 PROCEDURE — 97166 OT EVAL MOD COMPLEX 45 MIN: CPT

## 2025-07-28 PROCEDURE — 85027 COMPLETE CBC AUTOMATED: CPT | Performed by: STUDENT IN AN ORGANIZED HEALTH CARE EDUCATION/TRAINING PROGRAM

## 2025-07-28 PROCEDURE — 83735 ASSAY OF MAGNESIUM: CPT | Performed by: STUDENT IN AN ORGANIZED HEALTH CARE EDUCATION/TRAINING PROGRAM

## 2025-07-28 PROCEDURE — 25010000002 ENOXAPARIN PER 10 MG: Performed by: STUDENT IN AN ORGANIZED HEALTH CARE EDUCATION/TRAINING PROGRAM

## 2025-07-28 PROCEDURE — 82948 REAGENT STRIP/BLOOD GLUCOSE: CPT

## 2025-07-28 PROCEDURE — 25010000002 CEFEPIME PER 500 MG: Performed by: NURSE PRACTITIONER

## 2025-07-28 PROCEDURE — 99231 SBSQ HOSP IP/OBS SF/LOW 25: CPT | Performed by: INTERNAL MEDICINE

## 2025-07-28 PROCEDURE — 86900 BLOOD TYPING SEROLOGIC ABO: CPT | Performed by: STUDENT IN AN ORGANIZED HEALTH CARE EDUCATION/TRAINING PROGRAM

## 2025-07-28 PROCEDURE — P9016 RBC LEUKOCYTES REDUCED: HCPCS

## 2025-07-28 PROCEDURE — 86923 COMPATIBILITY TEST ELECTRIC: CPT

## 2025-07-28 PROCEDURE — 76536 US EXAM OF HEAD AND NECK: CPT

## 2025-07-28 PROCEDURE — 97110 THERAPEUTIC EXERCISES: CPT

## 2025-07-28 PROCEDURE — 63710000001 PROCHLORPERAZINE MALEATE PER 10 MG: Performed by: STUDENT IN AN ORGANIZED HEALTH CARE EDUCATION/TRAINING PROGRAM

## 2025-07-28 RX ORDER — PROCHLORPERAZINE MALEATE 10 MG
5 TABLET ORAL EVERY 8 HOURS PRN
Status: DISCONTINUED | OUTPATIENT
Start: 2025-07-28 | End: 2025-07-29 | Stop reason: HOSPADM

## 2025-07-28 RX ORDER — PROMETHAZINE HYDROCHLORIDE 12.5 MG/1
12.5 TABLET ORAL EVERY 8 HOURS PRN
Status: DISCONTINUED | OUTPATIENT
Start: 2025-07-28 | End: 2025-07-28

## 2025-07-28 RX ORDER — FLUCONAZOLE 200 MG/1
200 TABLET ORAL EVERY 24 HOURS
Status: DISCONTINUED | OUTPATIENT
Start: 2025-07-28 | End: 2025-07-29 | Stop reason: HOSPADM

## 2025-07-28 RX ADMIN — Medication 10 ML: at 08:29

## 2025-07-28 RX ADMIN — SENNOSIDES AND DOCUSATE SODIUM 2 TABLET: 50; 8.6 TABLET ORAL at 08:28

## 2025-07-28 RX ADMIN — Medication 5 MG: at 22:17

## 2025-07-28 RX ADMIN — ACETAMINOPHEN 650 MG: 325 TABLET, FILM COATED ORAL at 23:51

## 2025-07-28 RX ADMIN — PANTOPRAZOLE SODIUM 40 MG: 40 TABLET, DELAYED RELEASE ORAL at 08:29

## 2025-07-28 RX ADMIN — POLYETHYLENE GLYCOL 3350 17 G: 17 POWDER, FOR SOLUTION ORAL at 08:28

## 2025-07-28 RX ADMIN — CEFEPIME 2000 MG: 2 INJECTION, POWDER, FOR SOLUTION INTRAVENOUS at 08:28

## 2025-07-28 RX ADMIN — PRAMIPEXOLE DIHYDROCHLORIDE 1.5 MG: 1.5 TABLET ORAL at 22:17

## 2025-07-28 RX ADMIN — PROCHLORPERAZINE MALEATE 5 MG: 10 TABLET ORAL at 11:04

## 2025-07-28 RX ADMIN — Medication 10 ML: at 23:06

## 2025-07-28 RX ADMIN — ASPIRIN 81 MG: 81 TABLET, COATED ORAL at 08:29

## 2025-07-28 RX ADMIN — CEFEPIME 2000 MG: 2 INJECTION, POWDER, FOR SOLUTION INTRAVENOUS at 01:55

## 2025-07-28 RX ADMIN — BACLOFEN 20 MG: 10 TABLET ORAL at 22:17

## 2025-07-28 RX ADMIN — Medication 5000 UNITS: at 08:29

## 2025-07-28 RX ADMIN — FLUCONAZOLE 200 MG: 200 TABLET ORAL at 17:12

## 2025-07-28 RX ADMIN — ENOXAPARIN SODIUM 40 MG: 100 INJECTION SUBCUTANEOUS at 13:09

## 2025-07-28 RX ADMIN — BACLOFEN 20 MG: 10 TABLET ORAL at 08:29

## 2025-07-28 RX ADMIN — SENNOSIDES AND DOCUSATE SODIUM 2 TABLET: 50; 8.6 TABLET ORAL at 22:17

## 2025-07-28 RX ADMIN — PRAMIPEXOLE DIHYDROCHLORIDE 1.5 MG: 1.5 TABLET ORAL at 08:28

## 2025-07-28 RX ADMIN — Medication 10 ML: at 23:05

## 2025-07-28 NOTE — PROGRESS NOTES
Maritza Bejarano is a 64 y.o. female who qualifies for IV to PO therapy conversion.    Fluconazole has been changed from IV to PO per System Policy.       Rosy Estrada, Pharm.D., BCPS

## 2025-07-28 NOTE — PROGRESS NOTES
Name: Maritza Nance Darci ADMIT: 2025   : 1960  PCP: Enoc Carbone DO    MRN: [id number] LOS: 3 days   AGE/SEX: 64 y.o. female  ROOM: [facility]     Subjective   Subjective   Patient seen this morning.  No acute events overnight.  Complains of feeling fatigued but overall feeling better compared to admission.  No fevers or chills.    Review of Systems   As above  Objective   Objective   Vital Signs  Temp:  [97.8 °F (36.6 °C)-99 °F (37.2 °C)] 98.6 °F (37 °C)  Heart Rate:  [68-87] 85  Resp:  [15-18] 18  BP: (115-134)/(64-71) 131/65  SpO2:  [97 %-99 %] 97 %  on  Flow (L/min) (Oxygen Therapy):  [2-3] 3;   Device (Oxygen Therapy): nasal cannula  Body mass index is 29.95 kg/m².  Physical Exam    General: Alert, sitting up in the bed, not in distress,  HEENT: Normocephalic, atraumatic  CV: Regular rate and rhythm, no murmurs rubs or gallops  Lungs: CTA anteriorly, no wheezing  Abdomen: Soft, nontender, nondistended  Extremities: No significant peripheral edema , no cyanosis     Results Review     I reviewed the patient's new clinical results.  Results from last 7 days   Lab Units 25  0503 25  0604 25   WBC 10*3/mm3 13.78* 13.97* 13.95* 12.76*   HEMOGLOBIN g/dL 7.9* 8.0* 8.6* 9.0*   PLATELETS 10*3/mm3 437 402 397 449     Results from last 7 days   Lab Units 25  0503 25  0604 25  02125   SODIUM mmol/L 138 139 137 138   POTASSIUM mmol/L 4.1 4.3 3.8 3.7   CHLORIDE mmol/L 103 105 104 103   CO2 mmol/L 23.7 23.0 22.0 22.2   BUN mg/dL 14.0 12.0 15.0 17.0   CREATININE mg/dL 0.61 0.57 0.56* 0.91   GLUCOSE mg/dL 114* 145* 212* 244*   Estimated Creatinine Clearance: 100.8 mL/min (by C-G formula based on SCr of 0.61 mg/dL).  Results from last 7 days   Lab Units 25  0503 25  0604 25  0211 25  2033   ALBUMIN g/dL 3.4* 3.4* 3.4* 3.7   BILIRUBIN mg/dL <0.2 <0.2 <0.2 <0.2   ALK PHOS U/L 207* 189* 173* 196*   AST (SGOT) U/L 56* 76* 109* 112*    ALT (SGPT) U/L 22 21 23 28     Results from last 7 days   Lab Units 07/28/25  0503 07/27/25  0604 07/26/25  0211 07/25/25  2033 07/22/25  0805   CALCIUM mg/dL 8.9 9.0 8.5* 9.4 9.0   ALBUMIN g/dL 3.4* 3.4* 3.4* 3.7 3.7   MAGNESIUM mg/dL 2.2 2.4 2.3  --  2.2   PHOSPHORUS mg/dL 3.0 2.7 3.6  --  3.0     Results from last 7 days   Lab Units 07/26/25  0514 07/25/25  2311 07/25/25 2033   PROCALCITONIN ng/mL  --   --  0.30*   LACTATE mmol/L 2.0 1.3 2.3*     COVID19   Date Value Ref Range Status   05/09/2025 Not Detected Not Detected - Ref. Range Final   04/17/2025 Not Detected Not Detected - Ref. Range Final     Glucose   Date/Time Value Ref Range Status   07/28/2025 1025 155 (H) 70 - 130 mg/dL Final   07/28/2025 0630 118 70 - 130 mg/dL Final   07/27/2025 2040 334 (H) 70 - 130 mg/dL Final   07/27/2025 1610 147 (H) 70 - 130 mg/dL Final   07/27/2025 1108 218 (H) 70 - 130 mg/dL Final   07/26/2025 1546 225 (H) 70 - 130 mg/dL Final   07/26/2025 1111 126 70 - 130 mg/dL Final           XR Chest 1 View  Narrative: CXR ONE VIEW      HISTORY: fever     COMPARISON: 7/2/2025     TECHNIQUE: single portable AP     Impression:    Right IJ [id number] catheter remains in place.     The heart size is within normal limits.     Low lung volumes, no acute pulmonary infiltrate.     The right lung appears normally aerated. Left basilar retrocardiac  linear density, scarring or atelectasis, unchanged.     This report was finalized on 7/25/2025 9:45 PM by Dr. Juma Scott M.D on Workstation: [id number]       Scheduled Medications  aspirin, 81 mg, Oral, Daily  baclofen, 20 mg, Oral, BID  vitamin D3, 5,000 Units, Oral, Daily  enoxaparin sodium, 40 mg, Subcutaneous, Q24H  fluconazole, 200 mg, Oral, Q24H  insulin lispro, 2-7 Units, Subcutaneous, 4x Daily AC & at Bedtime  melatonin, 5 mg, Oral, Nightly  [Held by provider] metoprolol succinate XL, 25 mg, Oral, Q24H  pantoprazole, 40 mg, Oral, Once per day on Monday Wednesday  Friday  polyethylene glycol, 17 g, Oral, Daily  pramipexole, 1.5 mg, Oral, BID  senna-docusate sodium, 2 tablet, Oral, BID  sodium chloride, 10 mL, Intravenous, Q12H  sodium chloride, 10 mL, Intravenous, Q12H    Infusions     Diet  Diet: Cardiac, Diabetic; Healthy Heart (2-3 Na+); Consistent Carbohydrate; Fluid Consistency: Thin (IDDSI 0)    I have personally reviewed     [x]  Laboratory   [x]  Microbiology   [x]  Radiology   [x]  EKG/Telemetry  [x]  Cardiology/Vascular   []  Pathology    []  Records       Assessment/Plan     Active Hospital Problems    Diagnosis  POA    **Sepsis secondary to UTI [A41.9, N39.0]  Yes    Transaminitis [R74.01]  Yes    Anemia [D64.9]  Yes    UTI (urinary tract infection) due to urinary indwelling catheter [T83.511A, N39.0]  Yes    Type 2 diabetes mellitus [E11.9]  Yes    Malignant neoplasm of overlapping sites of left breast in female, estrogen receptor positive [C50.812, Z17.0]  Not Applicable    Multiple sclerosis [G35]  Yes    Neurogenic bladder [N31.9]  Yes    Restless legs syndrome [G25.81]  Yes      Resolved Hospital Problems   No resolved problems to display.       Patient is 64 year old female with history of anemia breast cancer, multiple sclerosis, neurogenic bladder with indwelling catheter, type 2 diabetes, restless leg syndrome who presents to the ED with complaints of generalized fatigue and not feeling well, some nausea and chills and generalized bodyaches.     Sepsis secondary to acute UTI with indwelling catheter  - Indwelling Cobos changed  - Urinalysis showed large yeast, trace bacteria, positive for nitrates, leukocytes 6-5 WBC, did not reflex to culture.  Urine.   blood cultures pending  -Follow-up blood culture results  -ID following, transitions to p.o. fluconazole, appreciate recs      Type 2 diabetes  -Continue SSI, hypoglycemia protocol  - Hold oral diabetic medications at this time     Multiple sclerosis/neurogenic bladder  - PT to eval and treat  -  Indwelling Cobos catheter in place  - Continue home medications      Stage IV breast cancer  - Currently undergoing chemotherapy, last treatment was 4 days prior to admission  - Hematology oncology following    Anemia  - Recent iron panel 07/22/2025 elevated ferritin 333, iron saturation 17  - Hemoglobin 7.9 this morning.  Patient continues to complain of significant fatigue/weakness.  Ordered 1 unit of PRBC in the setting of symptomatic anemia.        DVT prophylaxis.  Lovenox  Full code.  Discussed with patient.  Disposition: Home, likely in tomorrow  Expected Discharge Date: 7/29/2025; Expected Discharge Time:        Copied text in this note has been reviewed and is accurate as of 07/28/25.         Dictated utilizing Dragon dictation        Vinicio Montoya MD  [facility]  07/28/25  11:56 EDT

## 2025-07-28 NOTE — PROGRESS NOTES
"  Infectious Diseases Progress Note    Luisa Child MD     [facility]  Los: 3 days  Patient Identification:  Name: Maritza Bejarano  Age: 64 y.o.  Sex: female  :  1960  MRN: [id number]         Primary Care Physician: Enoc Carbone DO        Subjective: Overall feels well except for fatigue.  Denies any urine chills.  Interval History: See consultation note.    Objective:    Scheduled Meds:aspirin, 81 mg, Oral, Daily  baclofen, 20 mg, Oral, BID  cefepime, 2,000 mg, Intravenous, Q8H  vitamin D3, 5,000 Units, Oral, Daily  enoxaparin sodium, 40 mg, Subcutaneous, Q24H  fluconazole, 200 mg, Intravenous, Once  fluconazole, 200 mg, Intravenous, Q24H  insulin lispro, 2-7 Units, Subcutaneous, 4x Daily AC & at Bedtime  melatonin, 5 mg, Oral, Nightly  [Held by provider] metoprolol succinate XL, 25 mg, Oral, Q24H  pantoprazole, 40 mg, Oral, Once per day on   polyethylene glycol, 17 g, Oral, Daily  pramipexole, 1.5 mg, Oral, BID  senna-docusate sodium, 2 tablet, Oral, BID  sodium chloride, 10 mL, Intravenous, Q12H  sodium chloride, 10 mL, Intravenous, Q12H      Continuous Infusions:Pharmacy To Dose:,         Vital signs in last 24 hours:  Temp:  [97.8 °F (36.6 °C)-99 °F (37.2 °C)] 98.6 °F (37 °C)  Heart Rate:  [68-93] 85  Resp:  [15-18] 18  BP: (115-134)/(64-71) 131/65    Intake/Output:    Intake/Output Summary (Last 24 hours) at 2025 0905  Last data filed at 2025 0240  Gross per 24 hour   Intake --   Output 1450 ml   Net -1450 ml       Exam:  /65 (BP Location: Right arm, Patient Position: Lying)   Pulse 85   Temp 98.6 °F (37 °C) (Oral)   Resp 18   Ht 165.1 cm (65\")   Wt 81.6 kg (180 lb)   LMP  (LMP Unknown) Comment: PT. STATES HER LAST PERIOD WAS GREATER THAN FIVE YEARS AGO  SpO2 97%   BMI 29.95 kg/m²   Patient is examined using the personal protective equipment as per guidelines from infection control for this particular patient as enacted.  Hand washing was " performed before and after patient interaction.  General Appearance:    Alert, cooperative, no distress, AAOx3                          Head:    Normocephalic, without obvious abnormality, atraumatic                           Eyes:  Pale conjunctiva                         Throat:   Lips, tongue, gums normal; oral mucosa pink and moist                           Neck:   Supple, symmetrical, trachea midline, no JVD                         Lungs:    Clear to auscultation bilaterally, respirations unlabored                 Chest Wall:    No tenderness or deformity                          Heart:  S1-S2 regular                  Abdomen:   Soft nontender catheter in place                 Extremities:   Extremities normal, atraumatic, no cyanosis or edema                    Neurologic: Alert and oriented x 3 neurological weakness involving her lower extremities due to underlying MS and immobility unchanged.       Data Review:    I reviewed the patient's new clinical results.  Results from last 7 days   Lab Units 07/28/25  0503 07/27/25  0604 07/26/25  0211 07/25/25 2033 07/22/25  0805   WBC 10*3/mm3 13.78* 13.97* 13.95* 12.76* 6.59   HEMOGLOBIN g/dL 7.9* 8.0* 8.6* 9.0* 8.7*   PLATELETS 10*3/mm3 437 402 397 449 432     Results from last 7 days   Lab Units 07/28/25  0503 07/27/25  0604 07/26/25  0211 07/25/25 2033 07/22/25  0805   SODIUM mmol/L 138 139 137 138 138   POTASSIUM mmol/L 4.1 4.3 3.8 3.7 3.8   CHLORIDE mmol/L 103 105 104 103 106   CO2 mmol/L 23.7 23.0 22.0 22.2 20.4*   BUN mg/dL 14.0 12.0 15.0 17.0 14.5   CREATININE mg/dL 0.61 0.57 0.56* 0.91 0.53*   CALCIUM mg/dL 8.9 9.0 8.5* 9.4 9.0   GLUCOSE mg/dL 114* 145* 212* 244* 248*     Microbiology Results (last 10 days)       Procedure Component Value - Date/Time    Blood Culture - Blood, Hand, Left [[id number]]  (Normal) Collected: 07/25/25 2311    Lab Status: Preliminary result Specimen: Blood from Hand, Left Updated: 07/27/25 2330     Blood Culture No growth at 2  days    Blood Culture - Blood, Hand, Right [[id number]]  (Normal) Collected: 07/25/25 2106    Lab Status: Preliminary result Specimen: Blood from Hand, Right Updated: 07/27/25 2115     Blood Culture No growth at 2 days              Assessment:    Sepsis secondary to UTI    Multiple sclerosis    Neurogenic bladder    Restless legs syndrome    Malignant neoplasm of overlapping sites of left breast in female, estrogen receptor positive    Type 2 diabetes mellitus    UTI (urinary tract infection) due to urinary indwelling catheter    Anemia    Transaminitis   64-year-old female with complicated past medical history presents with bodyaches fever with chronically abnormal urinalysis due to indwelling catheter and neurogenic bladder and recent therapy for metastatic breast cancer white blood cell count stimulating factor administered on 7/24/2025 with blood cultures negative-this presentation consistent with  1-febrile illness with systemic symptoms due to UTI versus endogenous chasity in the setting of evolving mucositis and neutropenia with neutropenia curtailed due to white blood cell count stimulating factor  2-negative blood culture with no urine culture with initial urinalysis showing evidence of yeast  3-progressive leukocytosis despite resolution of fever and improved sense of wellbeing likely due to stimulating factor rather than persistent infection  4-multiple sclerosis  5-breast cancer with mets  6-status post Mediport placement  7-sleep apnea  8-other diagnoses per primary team.        Recommendations/Discussions:  See my discussion and recommendations on 7/27/2025.  Since all of her cultures are negative and there is no defined infectious syndrome except for urinary tract infection and there is alternative explanation for her leukocytosis occurring while she is in the hospital with improving symptoms i.e. due to colony-stimulating factor administered few days prior, her antibiotic therapy can be discontinued  while continuing 5 to 7 days treatment of oral fluconazole.  It be ideal if her current catheter is changed while she is receiving fluconazole to avoid colonization of the Cobos catheter and recurrence of UTI due to yeast.  Monitor closely for side effects of antibiotic therapy.  Luisa Child MD  7/28/2025  09:05 EDT    Parts of this note may be an electronic transcription/translation of spoken language to printed text using the Dragon dictation system.

## 2025-07-28 NOTE — THERAPY EVALUATION
Patient Name: Maritza Nance Darci  : 1960    MRN: [id number]                              Today's Date: 2025       Admit Date: 2025    Visit Dx:     ICD-10-CM ICD-9-CM   1. Sepsis secondary to UTI  A41.9 038.9    N39.0 995.91     599.0   2. Hyperglycemia  R73.9 790.29   3. Chronic anemia  D64.9 285.9     Patient Active Problem List   Diagnosis    H/O total shoulder replacement, right    Cough    Acute UTI (urinary tract infection)    Multiple sclerosis    Essential hypertension    Hyperlipidemia    Shortness of breath    Oropharyngeal dysphagia    Abnormal finding on mammography    Acid reflux    Anxiety and depression    Brash    Carpal tunnel syndrome    Chronic low back pain    Diplopia    Disease with a predominantly sexual mode of transmission    FOM (frequency of micturition)    Headache    History of diplopia    History of optic neuritis    History of vitamin D deficiency    Migraine syndrome    Mixed incontinence    Nausea    Neurogenic bladder    Pain in joint of right shoulder    Restless legs syndrome    Rupture of rotator cuff of shoulder    Secondary progressive multiple sclerosis    S/P cubital tunnel release    Vitamin D deficiency    Multiple sclerosis exacerbation    Sepsis due to Gram negative bacteria    Lower extremity cellulitis    Malignant neoplasm of overlapping sites of left breast in female, estrogen receptor positive    Encounter for long-term (current) use of other medications    Cancer, metastatic to bone    Colitis    Weakness    Elevated LFTs    Acute UTI    Sepsis    NSTEMI (non-ST elevated myocardial infarction)    Type 2 diabetes mellitus    Stress-induced cardiomyopathy    UTI (urinary tract infection) due to urinary indwelling catheter    Anemia    UTI (urinary tract infection)    Primary malignant neoplasm of breast, left    Sepsis secondary to UTI    Transaminitis     Past Medical History:   Diagnosis Date    Anemia     `Treated with iron    Dawn esophagus      per patient    Blurred vision     R/T MS    Breast cancer     metastatic    Carpal tunnel syndrome     Clotting disorder 1993, 2003, 2012    3 g/i bleeds w/ transfus/ions    Colon polyp 2013    removed w/ colonoscopy    Deep vein thrombosis phlebitis 1980    Depression     Diplopia 2013    GERD (gastroesophageal reflux disease)     GI (gastrointestinal bleed) 3 bleeds    2 transfusions    H/O Skin cancer, basal cell     Headache     History of blood transfusion     History of GI bleed     R/T NSAIDS AND STEROIDS, multiple times    History of urinary tract infection     Hypercalcemia     s/p parathyroidectomy    Hyperlipidemia     Hypertension     Movement disorder     Multiple sclerosis     Optic neuritis     PONV (postoperative nausea and vomiting)     Sleep apnea     wears cpap    Steroid-induced diabetes 2024     Past Surgical History:   Procedure Laterality Date    APPENDECTOMY      BLADDER SURGERY      bladder stimulator    BREAST BIOPSY  don't remember    BREAST SURGERY      augmentation wtih subsequent removal    CARPAL TUNNEL RELEASE Bilateral     Left 2018, right 2020    CUBITAL TUNNEL RELEASE Left     CYSTOSCOPY BOTOX INJECTION OF BLADDER  2018    Cystoscopy with Botox    FRACTURE SURGERY  2019    rt shoulder    PARATHYROIDECTOMY      one gland removed    ROTATOR CUFF REPAIR Right 2017    TOE SURGERY      bilateral great toes    TOTAL SHOULDER ARTHROPLASTY W/ DISTAL CLAVICLE EXCISION Right 10/22/2018    Procedure: RT TOTAL SHOULDER REVERSE ARTHROPLASTY;  Surgeon: Bipin Dangelo MD;  Location: [facility];  Service: Orthopedics      General Information       Row Name 07/28/25 0944          OT Time and Intention    Subjective Information complains of;weakness;fatigue  -LE     Document Type evaluation;therapy note (daily note)  -LE     Mode of Treatment individual therapy;occupational therapy  -LE     Patient Effort good  -LE     Symptoms Noted During/After Treatment fatigue  -LE     Comment with UE ex.  h/o MS so generalized fatigue  -       Row Name 07/28/25 0944          General Information    Patient Profile Reviewed yes  -LE     Prior Level of Function transfer;ADL's;max assist:;mod assist:  leandra lift with track system to bathroom and to w/c.   spouse does most of ADL and assist using leandra. spouse reports when needed assists with feeding as well.  -LE     Existing Precautions/Restrictions fall  -LE     Barriers to Rehab previous functional deficit;medically complex  -       Row Name 07/28/25 0944          Living Environment    Current Living Arrangements home  -LE     People in Home spouse  -       Row Name 07/28/25 0944          Cognition    Orientation Status (Cognition) oriented x 4  -[facility] Name 07/28/25 0944          Safety Issues/Impairments Affecting Functional Mobility    Impairments Affecting Function (Mobility) endurance/activity tolerance;strength  -LE     Comment, Safety Issues/Impairments (Mobility) --  -LE               User Key  (r) = Recorded By, (t) = Taken By, (c) = Cosigned By      Initials Name Provider Type    Angeli Santana, OTR/L, CSRS Occupational Therapist                     Mobility/ADL's       Row Name 07/28/25 0947          Bed Mobility    Bed Mobility supine-sit;sit-supine;scooting/bridging  -LE     Comment, (Bed Mobility) defer, uses leandra at baseline  -[facility] Name 07/28/25 09          Transfers    Transfers sit-stand transfer;stand-sit transfer;bed-chair transfer;toilet transfer  -LE     Comment, (Transfers) defer, uses leandra and track system at baseline.  -[facility] Name 07/28/25 09          Activities of Daily Living    BADL Assessment/Intervention toileting;feeding;grooming;lower body dressing;upper body dressing;bathing  -[facility] Name 07/28/25 09          Self-Feeding Assessment/Training    Comment, (Feeding) has bamboo handled utensils at home but has trouble with grasp.  OT provides red foam to trial on utensils at hospital and at home.   OT also does [employer] search of foam and built up utensils to show pt/spouse options for utensils.  Spouse reports plan to trial gross grasp vs pinch grasp on utensils and OT agrees with trial of this type of  on utensils.  OT ed option to use foam on toothbrush, pens, etc as well.  -LISA       [facility] Name 07/28/25 0996          Lower Body Dressing Assessment/Training    Comment, (Lower Body Dressing) anticipate full assist.  -LISA       [facility] Name 07/28/25 0985          Toileting Assessment/Training    Comment, (Toileting) catheter.  -               User Key  (r) = Recorded By, (t) = Taken By, (c) = Cosigned By      Initials Name Provider Type    Angeli Santana, OTR/L, CSRS Occupational Therapist                   Obj/Interventions       [facility] Name 07/28/25 0951          Sensory Assessment (Somatosensory)    Sensory Assessment h/o numbness B UE due to MS  -LISA       Row Name 07/28/25 0930          Range of Motion Comprehensive    Comment, General Range of Motion h/o R TSA and AROM to about 1/3.  L shld 1/2 AROM.  B elbow AROM 7/8.  B hand up  to 7/8 AROM.  -LISA       [facility] Name 07/28/25 0903          Strength Comprehensive (MMT)    Comment, General Manual Muscle Testing (MMT) Assessment grossly takes min assist.  R hand stronger than L.  -LISA       Row Name 07/28/25 0915          Shoulder (Therapeutic Exercise)    Shoulder (Therapeutic Exercise) AROM (active range of motion)  -     Shoulder AROM (Therapeutic Exercise) bilateral;flexion;extension;5 repetitions  -LE       Row Name 07/28/25 0950          Elbow/Forearm (Therapeutic Exercise)    Elbow/Forearm (Therapeutic Exercise) AROM (active range of motion)  fatigues with 5 reps.  -     Elbow/Forearm AROM (Therapeutic Exercise) bilateral;flexion;extension;5 repetitions  -[facility] Name 07/28/25 0950          Wrist (Therapeutic Exercise)    Wrist (Therapeutic Exercise) AROM (active range of motion)  -LE       Row Name 07/28/25 0998          Hand (Therapeutic Exercise)     Hand (Therapeutic Exercise) AROM (active range of motion)  -LE     Hand AROM/AAROM (Therapeutic Exercise) bilateral;finger flexion;finger extension;5 repetitions  with green foam.  ed pt on pacing of UE ex and start with B UE 5 reps at a time and work up to 10 reps.   ed to do throughout the day to work on edurance.  -LE       Row Name 07/28/25 0950          Motor Skills    Therapeutic Exercise shoulder;elbow/forearm;wrist;hand  -LE       Row Name 07/28/25 09          Balance    Balance Assessment sitting static balance;standing static balance;standing dynamic balance  -LE               User Key  (r) = Recorded By, (t) = Taken By, (c) = Cosigned By      Initials Name Provider Type    Angeli Santana, OTR/L, CSRS Occupational Therapist                   Goals/Plan       Row Name 07/28/25 0954          Grooming Goal 1 (OT)    Activity/Device (Grooming Goal 1, OT) oral care;built-up handle items  -LE     [place] (Grooming Goal 1, OT) set-up required;verbal cues required;minimum assist (75% or more patient effort)  -LE     Time Frame (Grooming Goal 1, OT) 2 weeks  -LE     Progress/Outcome (Grooming Goal 1, OT) goal ongoing  -       Row Name 07/28/25 0954          Self-Feeding Goal 1 (OT)    Activity/Device (Self-Feeding Goal 1, OT) scoop food and bring to mouth;built-up handle utensils;finger foods;liquids to mouth  -LE     [place] Level/Cues Needed (Self-Feeding Goal 1, OT) standby assist;verbal cues required  for 50% of meal.  -LE     Time Frame (Self-Feeding Goal 1, OT) 2 weeks  -LE     Progress/Outcomes (Self-Feeding Goal 1, OT) goal ongoing  -LE       Row Name 07/28/25 0954          Strength Goal 1 (OT)    Strength Goal 1 (OT) 5-7 reps of 2 sets B UE AROM to increase tolerance to sustained reach and hold.  -LE     Time Frame (Strength Goal 1, OT) 2 weeks  -LE     Progress/Outcome (Strength Goal 1, OT) goal ongoing  -       Row Name 07/28/25 0954          Problem Specific Goal 1 (OT)    Problem  Specific Goal 1 (OT) Pt to be independent with hand strengthening tasks and foam block.  -LE     Time Frame (Problem Specific Goal 1, OT) 2 weeks  -LE     Progress/Outcome (Problem Specific Goal 1, OT) goal ongoing  -LE       Row Name 07/28/25 0954          Therapy Assessment/Plan (OT)    Planned Therapy Interventions (OT) activity tolerance training;adaptive equipment training;strengthening exercise;patient/caregiver education/training;ROM/therapeutic exercise  -LE               User Key  (r) = Recorded By, (t) = Taken By, (c) = Cosigned By      Initials Name Provider Type    Angeli Santana, OTR/L, CSRS Occupational Therapist                   Clinical Impression       Row Name 07/28/25 0952          Pain Assessment    Pain Side/Orientation generalized  -LE     Pre/Posttreatment Pain Comment does not rate.  -LE       Row Name 07/28/25 0954          Plan of Care Review    Plan of Care Reviewed With patient;spouse  -LE     Outcome Evaluation Pt admit with possible reaction to chemo, ? Sepsis/UTI. History includes MS, BRCA with bone metastasis. At baseline pt lives with spouse and has [employer] system for mobility and spouse assists with ADL including feeding at times.  Pt reports acute change to UE strength/endurance and fatigues with a few reps of UE ROM.   OT works with pt and spouse on education regarding built up utensils, 5 reps of UE AROM throughout the day to increase endurance, use of foam block for hand strengthening.  Spouse does well with cueing pt on ROM ex.   OT provides foam to trial on utensils and ed on benefit of using spoon over fork for scooping food.   Will follow up  for continued work on strength and endurance with B UE.   Pt plans return home with continued spouse assist.  -LE       Row Name 07/28/25 0900          Therapy Assessment/Plan (OT)    Patient/Family Therapy Goal Statement (OT) Return to prior level of function.  -LE     Rehab Potential (OT) good  -LE     Criteria for Skilled  Therapeutic Interventions Met (OT) meets criteria;yes  -LE     Therapy Frequency (OT) 3 times/wk  -LE       Row Name 07/28/25 0952          Therapy Plan Review/Discharge Plan (OT)    Equipment Needs Upon Discharge (OT) --  has shower chair, leandra lift, track system, w/c  -LE     Anticipated Discharge Disposition (OT) home with 24/7 care  -LE       Row Name 07/28/25 0952          Vital Signs    O2 Delivery Pre Treatment room air  -LE     O2 Delivery Intra Treatment room air  -LE     O2 Delivery Post Treatment room air  -LE     Pre Patient Position Supine  -LE     Intra Patient Position Supine  -LE     Post Patient Position Supine  -LE       Row Name 07/28/25 0952          Positioning and Restraints    Pre-Treatment Position in bed  -LE     Post Treatment Position bed  -LE     In Bed fowlers;call light within reach;encouraged to call for assist;exit alarm on;with family/caregiver  -LE               User Key  (r) = Recorded By, (t) = Taken By, (c) = Cosigned By      Initials Name Provider Type    Angeli Santana OTR/L, ERMELINDA Occupational Therapist                   Outcome Measures       Row Name 07/28/25 0956          How much help from another is currently needed...    Putting on and taking off regular lower body clothing? 1  -LE     Bathing (including washing, rinsing, and drying) 2  -LE     Toileting (which includes using toilet bed pan or urinal) 1  -LE     Putting on and taking off regular upper body clothing 2  -LE     Taking care of personal grooming (such as brushing teeth) 2  -LE     Eating meals 2  -LE     AM-PAC 6 Clicks Score (OT) 10  -LE       Row Name 07/28/25 0956          Functional Assessment    Outcome Measure Options AM-PAC 6 Clicks Daily Activity (OT)  -LE               User Key  (r) = Recorded By, (t) = Taken By, (c) = Cosigned By      Initials Name Provider Type    Angeli Santana OTR/L, CSRS Occupational Therapist                    Occupational Therapy Education       Title: PT OT SLP  Therapies (In Progress)       Topic: Occupational Therapy (In Progress)       Point: ADL training (Done)       Learning Progress Summary            Patient Acceptance, E,D, Bed IU,DU by LISA at 7/28/2025 0956   Family Acceptance, E,D, Bed IU,DU by LISA at 7/28/2025 0956                                      User Key       Initials Effective Dates Name Provider Type Discipline    LISA 04/25/25 -  Angeli Jasso, OTR/L, CSRS Occupational Therapist OT                  OT Recommendation and Plan  Planned Therapy Interventions (OT): activity tolerance training, adaptive equipment training, strengthening exercise, patient/caregiver education/training, ROM/therapeutic exercise  Therapy Frequency (OT): 3 times/wk  Plan of Care Review  Plan of Care Reviewed With: patient, spouse  Outcome Evaluation: Pt admit with possible reaction to chemo, ? Sepsis/UTI. History includes MS, BRCA with bone metastasis. At baseline pt lives with spouse and has [employer] system for mobility and spouse assists with ADL including feeding at times.  Pt reports acute change to UE strength/endurance and fatigues with a few reps of UE ROM.   OT works with pt and spouse on education regarding built up utensils, 5 reps of UE AROM throughout the day to increase endurance, use of foam block for hand strengthening.  Spouse does well with cueing pt on ROM ex.   OT provides foam to trial on utensils and ed on benefit of using spoon over fork for scooping food.   Will follow up  for continued work on strength and endurance with B UE.   Pt plans return home with continued spouse assist.     Time Calculation:   Evaluation Complexity (OT)  Review Occupational Profile/Medical/Therapy History Complexity: expanded/moderate complexity  Assessment, Occupational Performance/Identification of Deficit Complexity: 3-5 performance deficits  Clinical Decision Making Complexity (OT): detailed assessment/moderate complexity  Overall Complexity of Evaluation (OT): moderate  complexity     Time Calculation- OT       Row Name 07/28/25 0957             Time Calculation- OT    OT Start Time 0904  -LE      OT Stop Time 0930  -LE      OT Time Calculation (min) 26 min  -LE      OT Received On 07/28/25  -LE      OT - Next Appointment 07/30/25  -LE      OT Goal Re-Cert Due Date 08/11/25  -LE         Timed Charges    27183 - OT Therapeutic Exercise Minutes 12  -LE         Total Minutes    Timed Charges Total Minutes 12  -LE       Total Minutes 12  -LE                User Key  (r) = Recorded By, (t) = Taken By, (c) = Cosigned By      Initials Name Provider Type    LE Angeli Jasso, OTR/L, CSRS Occupational Therapist                  Therapy Charges for Today       Code Description Service Date Service Provider Modifiers Qty    56255859155 HC OT THER PROC EA 15 MIN 7/28/2025 Angeli Jasso OTR/L, CSRS GO 1    06983068891 HC OT EVAL MOD COMPLEXITY 3 7/28/2025 Angeli Jasso OTR/L, CSRS GO 1                 Angeli Elder, OTR/L, CSRS  7/28/2025

## 2025-07-28 NOTE — PROGRESS NOTES
Discharge Planning Assessment  [facility]     Patient Name: Maritza Bejarano  MRN: [id number]  Today's Date: 7/28/2025    Admit Date: 7/25/2025    Plan: Return home with family and spouse   Discharge Needs Assessment    No documentation.                  Discharge Plan       Row Name 07/28/25 1348       Plan    Plan Return home with family and spouse    Plan Comments Met with pt at bedside. Face sheet and pharmacy verified. Pt lives with her spouse in a single-story home with a ramp to enter. Home DME includes a bath bench; hospital bed; grab bar; cpap; wheelchair, motorized, [employer] Lift, F/C supplies, Handicap van, and a Lift track system into her bathroom.  Pt is bedbound and requires a [employer] lift to transfer to her motorized WC. She is dependent with ADLs. Pt has been to [facility], and [place] for Rehab. She is current with [facility] new referral in [facility]. Pt's PCP is Enoc Carbone DO. Uses [facility] at [place] and [place].  Pt has a handicap van and her spouse drives her to appointments and will transport at discharge.                  Continued Care and Services - Admitted Since 7/25/2025       Home Medical Care       Service Provider Request Status Services Address Phone Fax Patient Preferred    [facility] Pending - Request Sent -- [phone], [facility] [address] [phone] --                  Selected Continued Care - Prior Encounters Includes continued care and service providers with selected services from prior encounters from 4/26/2025 to 7/28/2025      Discharged on 7/7/2025 Admission date: 7/2/2025 - Discharge disposition: Home-Health Care [facility]      Home Medical Care       Service Provider Services Address Phone Fax Patient Preferred    [facility] [address], [address], [facility] [address] [phone] --                      Discharged on 4/30/2025  Admission date: 4/17/2025 - Discharge disposition: [place]Health Care [facility]      Home Medical Care       Service Provider Services Address Phone Fax Patient Preferred    [facility] [address], [address], [facility] [address] [phone] --                          Expected Discharge Date and Time       Expected Discharge Date Expected Discharge Time    Jul 29, 2025            Demographic Summary    No documentation.                  Functional Status    No documentation.                  Psychosocial    No documentation.                  Abuse/Neglect    No documentation.                  Legal    No documentation.                  Substance Abuse    No documentation.                  Patient Forms    No documentation.                     Bridget Hayes, RN

## 2025-07-28 NOTE — PLAN OF CARE
Goal Outcome Evaluation:  Plan of Care Reviewed With: patient, spouse           Outcome Evaluation: Pt admit with possible reaction to chemo, ? Sepsis/UTI. History includes MS, BRCA with bone metastasis. At baseline pt lives with spouse and has "[employer] system for mobility and spouse assists with ADL including feeding at times.  Pt reports acute change to UE strength/endurance and fatigues with a few reps of UE ROM.   OT works with pt and spouse on education regarding built up utensils, 5 reps of UE AROM throughout the day to increase endurance, use of foam block for hand strengthening.  Spouse does well with cueing pt on ROM ex.   OT provides foam to trial on utensils and ed on benefit of using spoon over fork for scooping food.   Will follow up  for continued work on strength and endurance with B UE.   Pt plans return home with continued spouse assist.    Anticipated Discharge Disposition (OT): home with 24/7 care

## 2025-07-28 NOTE — PLAN OF CARE
Problem: Adult Inpatient Plan of Care  Goal: Plan of Care Review  Outcome: Progressing  Goal: Patient-Specific Goal (Individualized)  Outcome: Progressing  Goal: Absence of Hospital-Acquired Illness or Injury  Outcome: Progressing  Intervention: Identify and Manage Fall Risk  Description: Perform standard risk assessment on admission using a validated tool or comprehensive approach appropriate to the patient; reassess fall risk frequently, with change in status or transfer to another level of care.Communicate risk to interprofessional healthcare team; ensure fall risk visible cue.Determine need for increased observation, equipment and environmental modification, as well as use of supportive, nonskid footwear.Adjust safety measures to individual needs and identified risk factors.Reinforce the importance of active participation with fall risk prevention, safety, and physical activity with the patient and family.Perform regular intentional rounding to assess need for position change, pain assessment and personal needs, including assistance with toileting.  Recent Flowsheet Documentation  Taken 7/28/2025 1400 by Sahra Marcano RN  Safety Promotion/Fall Prevention:   nonskid shoes/slippers when out of bed   safety round/check completed  Taken 7/28/2025 1200 by Sahra Marcano RN  Safety Promotion/Fall Prevention:   nonskid shoes/slippers when out of bed   safety round/check completed  Taken 7/28/2025 1000 by Sahra Marcano RN  Safety Promotion/Fall Prevention:   nonskid shoes/slippers when out of bed   safety round/check completed  Taken 7/28/2025 0800 by Sahra Marcano RN  Safety Promotion/Fall Prevention:   nonskid shoes/slippers when out of bed   safety round/check completed  Intervention: Prevent Skin Injury  Description: Perform a screening for skin injury risk, such as pressure or moisture-associated skin damage on admission and at regular intervals throughout hospital stay.Keep all  areas of skin (especially folds) clean and dry.Maintain adequate skin hydration.Relieve and redistribute pressure and protect bony prominences and skin at risk for injury; implement measures based on patient-specific risk factors.Match turning and repositioning schedule to clinical condition.Encourage weight shift frequently; assist with reposition if unable to complete independently.Float heels off bed; avoid pressure on the Achilles tendon.Keep skin free from extended contact with medical devices.Optimize nutrition and hydration.Encourage functional activity and mobility, as early as tolerated.Use aids (e.g., slide boards, mechanical lift) during transfer.  Recent Flowsheet Documentation  Taken 7/28/2025 1400 by Sahra Marcano RN  Body Position: position changed independently  Skin Protection: incontinence pads utilized  Taken 7/28/2025 1200 by Sahra Marcano RN  Body Position: position changed independently  Taken 7/28/2025 1000 by Sahra Marcano RN  Body Position: position changed independently  Taken 7/28/2025 0800 by Sahra Marcano RN  Body Position: position changed independently  Skin Protection: incontinence pads utilized  Intervention: Prevent Infection  Description: Maintain skin and mucous membrane integrity; promote hand, oral and pulmonary hygiene.Optimize fluid balance, nutrition, sleep and glycemic control to maximize infection resistance.Identify potential sources of infection early to prevent or mitigate progression of infection (e.g., wound, lines, devices).Evaluate ongoing need for invasive devices; remove promptly when no longer indicated.Review vaccination status.  Recent Flowsheet Documentation  Taken 7/28/2025 1400 by Sahra Marcano RN  Infection Prevention: single patient room provided  Taken 7/28/2025 1200 by Sahra Marcano RN  Infection Prevention: single patient room provided  Taken 7/28/2025 1000 by Sahra Marcano RN  Infection  Prevention: single patient room provided  Taken 7/28/2025 0800 by Sahra Marcano RN  Infection Prevention: single patient room provided  Goal: Optimal Comfort and Wellbeing  Outcome: Progressing  Intervention: Provide Person-Centered Care  Description: Use a family-focused approach to care; encourage support system presence and participation.Develop trust and rapport by proactively providing information, encouraging questions, addressing concerns and offering reassurance.Acknowledge emotional response to hospitalization.Recognize and utilize personal coping strategies and strengths; develop goals via shared decision-making.Honor spiritual and cultural preferences.  Recent Flowsheet Documentation  Taken 7/28/2025 1400 by Sahra Marcano, RN  Trust Relationship/Rapport:   care explained   choices provided   emotional support provided   empathic listening provided   questions answered   questions encouraged   reassurance provided   thoughts/feelings acknowledged  Taken 7/28/2025 0800 by Sahra Marcano, RN  Trust Relationship/Rapport:   care explained   choices provided   emotional support provided   questions encouraged   empathic listening provided   questions answered   reassurance provided   thoughts/feelings acknowledged  Goal: Readiness for Transition of Care  Outcome: Progressing     Problem: Skin Injury Risk Increased  Goal: Skin Health and Integrity  Outcome: Progressing  Intervention: Optimize Skin Protection  Description: Perform a full pressure injury risk assessment, as indicated by screening, upon admission to care unit.Reassess skin (full inspection and injury risk, including skin temperature, consistency and color) frequently (e.g., scheduled interval, with change in condition) to provide optimal early detection and prevention.Maintain adequate tissue perfusion (e.g., encourage fluid balance; avoid crossing legs, constrictive clothing or devices) to promote tissue  oxygenation.Maintain head of bed at lowest degree of elevation tolerated, considering medical condition and other restrictions. Use positioning supports to prevent sliding and friction. Consider low friction textiles.Avoid positioning onto an area that remains reddened or on bony prominences.Minimize incontinence and moisture (e.g., toileting schedule; moisture-wicking pad, diaper or incontinence collection device; skin moisture barrier).Cleanse skin promptly and gently, when soiled, utilizing a pH-balanced cleanser.Relieve and redistribute pressure (e.g., scheduled position changes, weight shifts, use of support surface, medical device repositioning, protective dressing application, use of positioning device, microclimate control, use of pressure-injury-monitorEncourage increased activity, such as sitting in a chair at the bedside or early mobilization, when able to tolerate. Avoid prolonged sitting.  Recent Flowsheet Documentation  Taken 7/28/2025 1400 by Sahra Marcano RN  Activity Management: bedrest  Pressure Reduction Techniques: frequent weight shift encouraged  Head of Bed (HOB) Positioning: [facility] elevated  Pressure Reduction Devices: pressure-redistributing mattress utilized  Skin Protection: incontinence pads utilized  Taken 7/28/2025 1200 by Sahra Marcano, RN  Head of Bed (HOB) Positioning: HOB elevated  Taken 7/28/2025 1000 by Sahra Marcano RN  Head of Bed (HOB) Positioning: HOB elevated  Taken 7/28/2025 0800 by Sahra Marcano, RN  Activity Management: bedrest  Pressure Reduction Techniques: frequent weight shift encouraged  Head of Bed (HOB) Positioning: HOB elevated  Pressure Reduction Devices: pressure-redistributing mattress utilized  Skin Protection: incontinence pads utilized     Problem: Sepsis/Septic Shock  Goal: Optimal Coping  Outcome: Progressing  Goal: Absence of Bleeding  Outcome: Progressing  Goal: Blood Glucose Level Within Target Range  Outcome:  Progressing  Goal: Absence of Infection Signs and Symptoms  Outcome: Progressing  Intervention: Initiate Sepsis Management  Description: Provide fluid therapy, such as crystalloid or albumin, to increase intravascular volume, organ perfusion and oxygen delivery.Provide respiratory support, such as oxygen therapy, noninvasive or invasive positive pressure ventilation, to achieve oxygenation and ventilation goal; avoid hyperoxemia.Obtain cultures prior to initiating antimicrobial therapy when possible. Do not delay treatment for laboratory results in the presence of high suspicion or clinical indicators.Administer intravenous broad-spectrum antimicrobial therapy promptly.Implement hemodynamic monitoring to guide intravascular support based on individual targeted parameters.Determine and address underlying source of infection aggressively; implement transmission-based precautions and isolation, as indicated.  Recent Flowsheet Documentation  Taken 7/28/2025 1400 by Sahra Marcano RN  Infection Prevention: single patient room provided  Isolation Precautions:   precautions maintained   droplet  Taken 7/28/2025 1200 by Sahra Marcano RN  Infection Prevention: single patient room provided  Isolation Precautions:   precautions maintained   droplet  Taken 7/28/2025 1000 by Sahra Marcano RN  Infection Prevention: single patient room provided  Isolation Precautions:   precautions maintained   droplet  Taken 7/28/2025 0800 by Sahra Marcano RN  Infection Prevention: single patient room provided  Isolation Precautions:   precautions maintained   droplet  Intervention: Promote Recovery  Description: Encourage pulmonary hygiene, such as cough-enhancement and airway-clearance techniques, that may include use of incentive spirometry, deep breathing and cough.Encourage early rehabilitation and physical activity to optimize functional ability and activity tolerance, as well as minimize delirium.Promote  energy conservation; minimize oxygen demand and consumption by adjusting environment, decreasing stimulation, maintaining normothermia and treating pain.Optimize fluid balance, nutrition intake, sleep and glycemic control to maintain tissue perfusion and enhance immune response.  Recent Flowsheet Documentation  Taken 7/28/2025 1400 by Sahra Marcano RN  Activity Management: bedrest  Taken 7/28/2025 0800 by Sahra Marcano RN  Activity Management: bedrest  Goal: Optimal Nutrition Delivery  Outcome: Progressing     Problem: Fall Injury Risk  Goal: Absence of Fall and Fall-Related Injury  Outcome: Progressing  Intervention: Promote Injury-Free Environment  Description: Provide a safe, barrier-free environment that encourages independent activity.Keep care area uncluttered and well-lighted.Determine need for increased observation or monitoring.Avoid use of devices that minimize mobility, such as restraints or indwelling urinary catheter.  Recent Flowsheet Documentation  Taken 7/28/2025 1400 by Sahra Marcano RN  Safety Promotion/Fall Prevention:   nonskid shoes/slippers when out of bed   safety round/check completed  Taken 7/28/2025 1200 by Sahra Marcano RN  Safety Promotion/Fall Prevention:   nonskid shoes/slippers when out of bed   safety round/check completed  Taken 7/28/2025 1000 by Sahra Marcano RN  Safety Promotion/Fall Prevention:   nonskid shoes/slippers when out of bed   safety round/check completed  Taken 7/28/2025 0800 by Sahra Marcano RN  Safety Promotion/Fall Prevention:   nonskid shoes/slippers when out of bed   safety round/check completed   Goal Outcome Evaluation:  Plan of Care Reviewed With: patient         Pt was having symptomatic anemia, and she received 1 unit of PRBC. She will go home this evening or tomorrow, likely. VSS

## 2025-07-28 NOTE — PROGRESS NOTES
[facility] GROUP INPATIENT PROGRESS NOTE    Length of Stay:  3 days    CHIEF COMPLAINT/REASON FOR VISIT:  Metastatic invasive lobular carcinoma, ER/NV strongly positive cancer with metastatic disease to the bones.   Fever      7/27/2025  SUBJECTIVE: Remains afebrile.  Normotensive.  She states that dexterity in her upper extremities is still not at baseline.  Otherwise asymptomatic    7/28/2025  T98.6, pulse 85, respirations 18, /65  Patient notes some improvement after having some degree of sedation from antinausea medication which is now resolving.  She is scheduled for transfusion today and possible discharge later this afternoon.  We have discussed keeping her current schedule in place.  H&H 7.9 and 25.1 white count 13,080, platelet count 4 37,000, BUN/creatinine of 14 and 0.61, albumin 3.4, AST 56, alk phos 207  Record review-infectious disease notes no defined infectious syndrome except for UTI-antibiotic therapy discontinued with oral fluconazole to continue.      ROS:  14 systems reviewed with pertinent positives and negatives in the HPI. Reviewed today.    OBJECTIVE:  Vitals:    07/27/25 1508 07/27/25 2007 07/27/25 2343 07/28/25 0719   BP: 134/71 132/64 115/65 131/65   BP Location: Right arm  Right arm Right arm   Patient Position: Lying  Lying Lying   Pulse: 68 87 87 85   Resp: 15  18 18   Temp: 97.8 °F (36.6 °C) 98.8 °F (37.1 °C) 99 °F (37.2 °C) 98.6 °F (37 °C)   TempSrc: Oral  Oral Oral   SpO2: 99%  98% 97%   Weight:       Height:             PHYSICAL EXAMINATION:   General:  No acute distress, awake, alert and oriented.  Lying in bed.  Skin:  Warm and dry, no visible rash  HEENT:  Normocephalic/atraumatic.   Chest:  Normal respiratory effort.  Benign-appearing Mediport present.  Extremities:  No visible clubbing, cyanosis, or edema  Neuro/psych:  Grossly nonfocal.  Normal mood and affect.       DIAGNOSTIC DATA:  Results Review:     I reviewed the patient's new clinical results.    Results  from last 7 days   Lab Units 07/28/25  0503   WBC 10*3/mm3 13.78*   HEMOGLOBIN g/dL 7.9*   HEMATOCRIT % 25.1*   PLATELETS 10*3/mm3 437     Lab Results   Component Value Date    NEUTROABS 10.93 (H) 07/25/2025     Results from last 7 days   Lab Units 07/28/25  0503   SODIUM mmol/L 138   POTASSIUM mmol/L 4.1   CHLORIDE mmol/L 103   CO2 mmol/L 23.7   BUN mg/dL 14.0   CREATININE mg/dL 0.61   GLUCOSE mg/dL 114*   CALCIUM mg/dL 8.9           Results from last 7 days   Lab Units 07/28/25  0503   MAGNESIUM mg/dL 2.2         IMAGING:    None reviewed    ASSESSMENT:  This is a 64 y.o. female with:     *Sepsis secondary to presumed urinary tract infection  History of recurrent urinary tract infections  On empiric cefepime  Urinalysis does not meet criteria for culture, chronic indwelling Cobos catheter  Large amount of yeast present on the UA, plans to continue fluconazole  Blood cultures no growth to date     *Left breast invasive lobular carcinoma  The tumor is estrogen receptor +91 to 100%, progesterone receptor +31 to 40%, HER2 negative, 0, Ki-67 35%  The tumor measures greater than 10 cm on exam, lymph node positive.  CT of the chest abdomen and pelvis with right upper lobe pulmonary nodule which is new and the bone scan also shows uptake in the left anterior inferior iliac spine which correlates to a lucent area on the CT scan and also focal uptake noted in the left frontal calvarium concerning for metastatic disease.  Further evaluation with a MRI of the pelvis and hip as well as MRI of the brain is underway.  The scans are scheduled for 7/10/2024.  Clinical T3 N1 M1, stage IV invasive lobular carcinoma.  There is also a right axillary lymph node which has been biopsied and consistent with invasive lobular carcinoma with similar morphology as well as receptors concerning for metastatic disease rather than a primary right breast cancer.  Recommended Ribociclib 400 mg 3 weeks on and 1 week off.  July 16, 2024: Reviewed MRI  of the pelvis and hip consistent with many bony metastasis.  MRI brain shows left frontal bone mets but no brain involvement.  Patient began ribociclib along with anastrozole.  Continues on anastrozole.  Tempus performed on the left axilla lymph node biopsy shows PIK3CA mutation, CDH 1 mutation and Erb B2 mutation.  Therefore patient would benefit from alpelisib and Faslodex after progression on Ribociclib and AI.  Other options include neratinib plus Faslodex.  Initiated Ribociclib in August 2024.  8/30/2024-last day of week 3 of Ribociclib.    Patient completed 1 cycle of Ribociclib 400 mg and subsequently has not been able to go back on Ribociclib due to recurrent hospitalizations and abnormal LFTs.  10/14/2024-LFTs are normal today.  Recommend resuming Ribociclib at 200 mg and subsequently we will increase the dose to 400 mg with the next cycle.  Patient was unable to resume Ribociclib as she was admitted to the hospital from 10/17/2024 to 10/23/2024  11/11/2024-Labs reviewed and overall stable except for an ALT of 49.  Recommend resuming Ribociclib at 200 mg.  CT of the chest performed 10/29/2024 shows stable size of the left breast mass, decrease in size of the left axillary lymph node and mixed lytic and blastic lesion of the rib slightly increased in size.  11/11/2024-resumed Ribociclib 200 mg daily for 3/4 weeks.  2/18/2025-scans with disease progression in the bones in the liver.  Discontinue Ribociclib and anastrozole  2/28/2025-received the first dose of Faslodex.  3/17/2025-started truqap  3/21/2025- CBC reviewed and WBC 4.81, hemoglobin 13.9 platelets 595,000, CMP reviewed and LFTs have improved with ALT which is normal at 33, AST 71, alkaline phosphatase 185, total bilirubin normal at 0.2, blood sugar elevated at 409  Completed 1 week of trucap, resume again on 3/24/2025  4/4/2025 due for cycle 2-day 1 Faslodex.  She is struggling with intermittent hyperglycemia related to Truqap which is improved  with glipizide  5/12/2025-CT of the chest and bone scan with overall stable disease.  Prior to that she had a CT abdomen end of April 2025 which also shows no evidence of metastatic disease.  Patient has not taken a whole lot of trucap, maybe less than 2 weeks total since initiation in March 2025.  Bilateral diagnostic mammogram and ultrasound from 6/24/2025 concerning for disease progression  CT of the chest abdomen and pelvis from 6/24/2025 also concerning for disease progression  I reviewed her previous pathology report and HER2 is noted to be 0.  She had a repeat punch biopsy of the left breast skin.  Will follow-up on the pathology from that.  If HER2 is 1+ or even ultralow we should be able to use Enhertu.  She truly did not have disease progression on Truqap however she was hospitalized back-to-back for the few days that she took 2 Due to recurrent UTIs.  She also currently has an ongoing UTI.  I worry that any type of PIK 3 CA directed therapy will result in UTIs, hyperglycemia and diarrhea which may not be ideal for her situation with the multiple sclerosis and inability to transfer as well as bladder dysfunction  Therefore the next best option would be chemotherapy which would be either Enhertu, Trodelvy or taxane.  I called and discussed with Dr. Jacqueline Daniels to determine the HER2 and she looked at the original biopsy again and this was negative.  Punch biopsy from the left breast noted to be ER negative, CT negative and HER2 nu 0.  Invasive pleomorphic lobular carcinoma.  Given that the receptor status has changed, we would have to treat the metastatic breast cancer which is progressing with chemotherapy which would be either Xeloda or Trodelvy as HER2/dorothy was 0 Enhertu is not an option.  I reviewed both of these options with the patient and the side effects of both these medications at length and she prefers to proceed with Trodelvy.  Will also obtain a PD-L1 status to see if immunotherapy is an  option.  Started Trodelvy at 7.5 mg/kg, q. 14 days as I am really concerned about her ability to tolerate.  We will also plan for Neulasta upfront.  Although tumor markers were not elevated originally now with elevated tumor markers with a CA 15-3 of 296 on 6/25/2025 and CA 20 7.29 of 409 on 6/25/2025.  7/22/2025: initiation of Trodelvy as planned.  She had her port placed 7/21.  Received Udenyca, white blood cell growth factor.    7/20/2025-H&H is 7.9 and 25.1, white count 13,000 780, platelet count of 4 37,000, transfusion planned     *DM with hyperglycemia     *Right axillary lymphadenopathy  Biopsied and consistent with invasive lobular carcinoma, grade 2, ER/DE positive and HER2 negative  Recent bilateral diagnostic mammogram and ultrasound from 6/24/2025 shows disease progression     *Skeletal metastasis  Patient will be started on Xgeva  8/16/2024 Xgeva initiation will be held as the patient has not been to the dentist in over 2 years.  Discussed possible side effects of Xgeva and she will schedule a dental visit and we will have her back in 1 week to initiate Xgeva pending this is complete.  10/14/2024-second dose of Xgeva will be administered.  Labs reviewed and stable to proceed.  Continue Xgeva every 4 weeks     *Multiple sclerosis  Patient was not on any treatment previously.  She sees Dr. Jacobson at [facility].  Due to profound weakness patient requested her neurologist to start steroids.  They plan to initiate high-dose IV steroids to help with this.  High-dose IV steroids have not been initiated.  Doing physical therapy and received high-dose steroids  Stable     *Neurogenic bladder  Chronic Cobos catheter in place.  She recently saw a new urologist and is planning to discontinue the Cobos in favor of In-N-Out straight cathing.     *Normocytic anemia  History of iron deficiency  Iron 59, ferritin 333  Hemoglobin 8.0, from 8.6, from 9.0, from 8.7     *Abnormal liver labs  Transaminases improving      *Alternative medication  Patient sees a outside provider and receives ivermectin and fenbendazole  We have ran an interaction check with the medications and does not appear to be any interaction however I want her against taking these medications and potential side effects  Patient however wishes to proceed with his medications.  Patient continues on ivermectin     *Leukocytosis with neutrophilia  White blood cell count mildly elevated  She did receive Udenyca on 7/24/2025 which is a white blood cell booster following her chemotherapy     RECOMMENDATIONS/PLAN:  As above for infectious ease, fluconazole to continue  Transfuse red cells for hemoglobin approaching 7 or as needed for symptoms, transfusion planned 7/28/2025  Daily labs.  The white blood cell count may remain elevated due to the Udenyca but it could potentially drop following the Trodelvy as well.  Scheduled with an APRN in the office on 7/30 for labs and toxicity check, 8/6 with an APRN for Trodelvy, 8/20 with Dr. Lopes with Trodelvy.  Will keep current schedule in place as patient would likely be discharged in the p.m. 7/28/2025      Rohith Reyes MD

## 2025-07-29 ENCOUNTER — READMISSION MANAGEMENT (OUTPATIENT)
Dept: CALL CENTER | Facility: HOSPITAL | Age: 65
End: 2025-07-29
Payer: MEDICARE

## 2025-07-29 ENCOUNTER — NURSE TRIAGE (OUTPATIENT)
Dept: CALL CENTER | Facility: HOSPITAL | Age: 65
End: 2025-07-29
Payer: MEDICARE

## 2025-07-29 VITALS
SYSTOLIC BLOOD PRESSURE: 124 MMHG | RESPIRATION RATE: 16 BRPM | BODY MASS INDEX: 29.99 KG/M2 | TEMPERATURE: 98.2 F | HEIGHT: 65 IN | OXYGEN SATURATION: 90 % | HEART RATE: 77 BPM | DIASTOLIC BLOOD PRESSURE: 69 MMHG | WEIGHT: 180 LBS

## 2025-07-29 LAB
ALBUMIN SERPL-MCNC: 3.4 G/DL (ref 3.5–5.2)
ALBUMIN/GLOB SERPL: 1 G/DL
ALP SERPL-CCNC: 214 U/L (ref 39–117)
ALT SERPL W P-5'-P-CCNC: 19 U/L (ref 1–33)
ANION GAP SERPL CALCULATED.3IONS-SCNC: 9.8 MMOL/L (ref 5–15)
AST SERPL-CCNC: 50 U/L (ref 1–32)
BH BB BLOOD EXPIRATION DATE: NORMAL
BH BB BLOOD TYPE BARCODE: 5100
BH BB DISPENSE STATUS: NORMAL
BH BB PRODUCT CODE: NORMAL
BH BB UNIT NUMBER: NORMAL
BILIRUB SERPL-MCNC: <0.2 MG/DL (ref 0–1.2)
BUN SERPL-MCNC: 18 MG/DL (ref 8–23)
BUN/CREAT SERPL: 31.6 (ref 7–25)
CALCIUM SPEC-SCNC: 9 MG/DL (ref 8.6–10.5)
CHLORIDE SERPL-SCNC: 104 MMOL/L (ref 98–107)
CO2 SERPL-SCNC: 24.2 MMOL/L (ref 22–29)
CREAT SERPL-MCNC: 0.57 MG/DL (ref 0.57–1)
CROSSMATCH INTERPRETATION: NORMAL
DEPRECATED RDW RBC AUTO: 52.5 FL (ref 37–54)
EGFRCR SERPLBLD CKD-EPI 2021: 102.9 ML/MIN/1.73
ERYTHROCYTE [DISTWIDTH] IN BLOOD BY AUTOMATED COUNT: 16.2 % (ref 12.3–15.4)
GLOBULIN UR ELPH-MCNC: 3.4 GM/DL
GLUCOSE BLDC GLUCOMTR-MCNC: 116 MG/DL (ref 70–130)
GLUCOSE BLDC GLUCOMTR-MCNC: 143 MG/DL (ref 70–130)
GLUCOSE SERPL-MCNC: 117 MG/DL (ref 65–99)
HCT VFR BLD AUTO: 28.9 % (ref 34–46.6)
HGB BLD-MCNC: 9.2 G/DL (ref 12–15.9)
MAGNESIUM SERPL-MCNC: 2.2 MG/DL (ref 1.6–2.4)
MCH RBC QN AUTO: 28.5 PG (ref 26.6–33)
MCHC RBC AUTO-ENTMCNC: 31.8 G/DL (ref 31.5–35.7)
MCV RBC AUTO: 89.5 FL (ref 79–97)
PHOSPHATE SERPL-MCNC: 3.4 MG/DL (ref 2.5–4.5)
PLATELET # BLD AUTO: 384 10*3/MM3 (ref 140–450)
PMV BLD AUTO: 9.1 FL (ref 6–12)
POTASSIUM SERPL-SCNC: 4 MMOL/L (ref 3.5–5.2)
PROT SERPL-MCNC: 6.8 G/DL (ref 6–8.5)
RBC # BLD AUTO: 3.23 10*6/MM3 (ref 3.77–5.28)
SODIUM SERPL-SCNC: 138 MMOL/L (ref 136–145)
UNIT  ABO: NORMAL
UNIT  RH: NORMAL
WBC NRBC COR # BLD AUTO: 11.36 10*3/MM3 (ref 3.4–10.8)

## 2025-07-29 PROCEDURE — 83735 ASSAY OF MAGNESIUM: CPT | Performed by: STUDENT IN AN ORGANIZED HEALTH CARE EDUCATION/TRAINING PROGRAM

## 2025-07-29 PROCEDURE — 85027 COMPLETE CBC AUTOMATED: CPT | Performed by: STUDENT IN AN ORGANIZED HEALTH CARE EDUCATION/TRAINING PROGRAM

## 2025-07-29 PROCEDURE — 82948 REAGENT STRIP/BLOOD GLUCOSE: CPT

## 2025-07-29 PROCEDURE — 99231 SBSQ HOSP IP/OBS SF/LOW 25: CPT | Performed by: INTERNAL MEDICINE

## 2025-07-29 PROCEDURE — 25010000002 HEPARIN LOCK FLUSH PER 10 UNITS: Performed by: STUDENT IN AN ORGANIZED HEALTH CARE EDUCATION/TRAINING PROGRAM

## 2025-07-29 PROCEDURE — 84100 ASSAY OF PHOSPHORUS: CPT | Performed by: STUDENT IN AN ORGANIZED HEALTH CARE EDUCATION/TRAINING PROGRAM

## 2025-07-29 PROCEDURE — 80053 COMPREHEN METABOLIC PANEL: CPT | Performed by: STUDENT IN AN ORGANIZED HEALTH CARE EDUCATION/TRAINING PROGRAM

## 2025-07-29 RX ORDER — FLUCONAZOLE 200 MG/1
200 TABLET ORAL EVERY 24 HOURS
Qty: 3 TABLET | Refills: 0 | Status: SHIPPED | OUTPATIENT
Start: 2025-07-30 | End: 2025-08-02

## 2025-07-29 RX ADMIN — Medication 10 ML: at 08:08

## 2025-07-29 RX ADMIN — ASPIRIN 81 MG: 81 TABLET, COATED ORAL at 08:07

## 2025-07-29 RX ADMIN — BACLOFEN 20 MG: 10 TABLET ORAL at 08:07

## 2025-07-29 RX ADMIN — Medication 5000 UNITS: at 08:07

## 2025-07-29 RX ADMIN — SENNOSIDES AND DOCUSATE SODIUM 2 TABLET: 50; 8.6 TABLET ORAL at 08:07

## 2025-07-29 RX ADMIN — HEPARIN 500 UNITS: 100 SYRINGE at 13:30

## 2025-07-29 RX ADMIN — POLYETHYLENE GLYCOL 3350 17 G: 17 POWDER, FOR SOLUTION ORAL at 08:07

## 2025-07-29 RX ADMIN — PRAMIPEXOLE DIHYDROCHLORIDE 1.5 MG: 1.5 TABLET ORAL at 08:07

## 2025-07-29 NOTE — PROGRESS NOTES
[facility] GROUP INPATIENT PROGRESS NOTE    Length of Stay:  4 days    CHIEF COMPLAINT/REASON FOR VISIT:  Metastatic invasive lobular carcinoma, ER/ND strongly positive cancer with metastatic disease to the bones.   Fever      7/27/2025  SUBJECTIVE: Remains afebrile.  Normotensive.  She states that dexterity in her upper extremities is still not at baseline.  Otherwise asymptomatic    7/28/2025  T98.6, pulse 85, respirations 18, /65  Patient notes some improvement after having some degree of sedation from antinausea medication which is now resolving.  She is scheduled for transfusion today and possible discharge later this afternoon.  We have discussed keeping her current schedule in place.  H&H 7.9 and 25.1 white count 13,080, platelet count 4 37,000, BUN/creatinine of 14 and 0.61, albumin 3.4, AST 56, alk phos 207  Record review-infectious disease notes no defined infectious syndrome except for UTI-antibiotic therapy discontinued with oral fluconazole to continue.    7/29/2025  T97.5, pulse 80, respiration 16, /60, SpO2 94%  Patient hopeful for discharge today, office follow-up plan reviewed  H&H 9.2 and 28.9, white count of 11,360, platelet count 384,000, BUN/creatinine of 18 and 0.57, calcium 9.0, AST 50, alk phos 214      ROS:  14 systems reviewed with pertinent positives and negatives in the HPI. Reviewed today.    OBJECTIVE:  Vitals:    07/28/25 1700 07/28/25 1848 07/28/25 2345 07/29/25 0525   BP: 129/56 134/70 146/76 118/68   BP Location:  Left arm Right arm Right arm   Patient Position:  Lying Lying Lying   Pulse: 94 84 90 79   Resp: 16 16 16 16   Temp: 98.8 °F (37.1 °C) 98.8 °F (37.1 °C) 100 °F (37.8 °C) 97.5 °F (36.4 °C)   TempSrc:  Oral Oral Oral   SpO2: 98% 95% 93% 94%   Weight:       Height:             PHYSICAL EXAMINATION:   General:  No acute distress, awake, alert and oriented.  Lying in bed.  Skin:  Warm and dry, no visible rash, chest wall eruption noted  HEENT:   Normocephalic/atraumatic.   Chest:  Normal respiratory effort.  Benign-appearing Mediport present.  Extremities:  No visible clubbing, cyanosis, or edema  Neuro/psych:  Grossly nonfocal.  Normal mood and affect.       DIAGNOSTIC DATA:  Results Review:     I reviewed the patient's new clinical results.    Results from last 7 days   Lab Units 07/29/25  0517   WBC 10*3/mm3 11.36*   HEMOGLOBIN g/dL 9.2*   HEMATOCRIT % 28.9*   PLATELETS 10*3/mm3 384     Lab Results   Component Value Date    NEUTROABS 10.93 (H) 07/25/2025     Results from last 7 days   Lab Units 07/29/25  0517   SODIUM mmol/L 138   POTASSIUM mmol/L 4.0   CHLORIDE mmol/L 104   CO2 mmol/L 24.2   BUN mg/dL 18.0   CREATININE mg/dL 0.57   GLUCOSE mg/dL 117*   CALCIUM mg/dL 9.0           Results from last 7 days   Lab Units 07/29/25  0517   MAGNESIUM mg/dL 2.2         IMAGING:    None reviewed    ASSESSMENT:  This is a 64 y.o. female with:     *Sepsis secondary to presumed urinary tract infection  History of recurrent urinary tract infections  On empiric cefepime  Urinalysis does not meet criteria for culture, chronic indwelling Cobos catheter  Large amount of yeast present on the UA, plans to continue fluconazole  Blood cultures no growth to date     *Left breast invasive lobular carcinoma  The tumor is estrogen receptor +91 to 100%, progesterone receptor +31 to 40%, HER2 negative, 0, Ki-67 35%  The tumor measures greater than 10 cm on exam, lymph node positive.  CT of the chest abdomen and pelvis with right upper lobe pulmonary nodule which is new and the bone scan also shows uptake in the left anterior inferior iliac spine which correlates to a lucent area on the CT scan and also focal uptake noted in the left frontal calvarium concerning for metastatic disease.  Further evaluation with a MRI of the pelvis and hip as well as MRI of the brain is underway.  The scans are scheduled for 7/10/2024.  Clinical T3 N1 M1, stage IV invasive lobular carcinoma.  There  is also a right axillary lymph node which has been biopsied and consistent with invasive lobular carcinoma with similar morphology as well as receptors concerning for metastatic disease rather than a primary right breast cancer.  Recommended Ribociclib 400 mg 3 weeks on and 1 week off.  July 16, 2024: Reviewed MRI of the pelvis and hip consistent with many bony metastasis.  MRI brain shows left frontal bone mets but no brain involvement.  Patient began ribociclib along with anastrozole.  Continues on anastrozole.  Tempus performed on the left axilla lymph node biopsy shows PIK3CA mutation, CDH 1 mutation and Erb B2 mutation.  Therefore patient would benefit from alpelisib and Faslodex after progression on Ribociclib and AI.  Other options include neratinib plus Faslodex.  Initiated Ribociclib in August 2024.  8/30/2024-last day of week 3 of Ribociclib.    Patient completed 1 cycle of Ribociclib 400 mg and subsequently has not been able to go back on Ribociclib due to recurrent hospitalizations and abnormal LFTs.  10/14/2024-LFTs are normal today.  Recommend resuming Ribociclib at 200 mg and subsequently we will increase the dose to 400 mg with the next cycle.  Patient was unable to resume Ribociclib as she was admitted to the hospital from 10/17/2024 to 10/23/2024  11/11/2024-Labs reviewed and overall stable except for an ALT of 49.  Recommend resuming Ribociclib at 200 mg.  CT of the chest performed 10/29/2024 shows stable size of the left breast mass, decrease in size of the left axillary lymph node and mixed lytic and blastic lesion of the rib slightly increased in size.  11/11/2024-resumed Ribociclib 200 mg daily for 3/4 weeks.  2/18/2025-scans with disease progression in the bones in the liver.  Discontinue Ribociclib and anastrozole  2/28/2025-received the first dose of Faslodex.  3/17/2025-started truqap  3/21/2025- CBC reviewed and WBC 4.81, hemoglobin 13.9 platelets 595,000, CMP reviewed and LFTs have  improved with ALT which is normal at 33, AST 71, alkaline phosphatase 185, total bilirubin normal at 0.2, blood sugar elevated at 409  Completed 1 week of trucap, resume again on 3/24/2025  4/4/2025 due for cycle 2-day 1 Faslodex.  She is struggling with intermittent hyperglycemia related to Truqap which is improved with glipizide  5/12/2025-CT of the chest and bone scan with overall stable disease.  Prior to that she had a CT abdomen end of April 2025 which also shows no evidence of metastatic disease.  Patient has not taken a whole lot of trucap, maybe less than 2 weeks total since initiation in March 2025.  Bilateral diagnostic mammogram and ultrasound from 6/24/2025 concerning for disease progression  CT of the chest abdomen and pelvis from 6/24/2025 also concerning for disease progression  I reviewed her previous pathology report and HER2 is noted to be 0.  She had a repeat punch biopsy of the left breast skin.  Will follow-up on the pathology from that.  If HER2 is 1+ or even ultralow we should be able to use Enhertu.  She truly did not have disease progression on Truqap however she was hospitalized back-to-back for the few days that she took 2 Due to recurrent UTIs.  She also currently has an ongoing UTI.  I worry that any type of PIK 3 CA directed therapy will result in UTIs, hyperglycemia and diarrhea which may not be ideal for her situation with the multiple sclerosis and inability to transfer as well as bladder dysfunction  Therefore the next best option would be chemotherapy which would be either Enhertu, Trodelvy or taxane.  I called and discussed with Dr. Jacqueline Daniels to determine the HER2 and she looked at the original biopsy again and this was negative.  Punch biopsy from the left breast noted to be ER negative, NC negative and HER2 nu 0.  Invasive pleomorphic lobular carcinoma.  Given that the receptor status has changed, we would have to treat the metastatic breast cancer which is progressing  with chemotherapy which would be either Xeloda or Trodelvy as HER2/dorothy was 0 Enhertu is not an option.  I reviewed both of these options with the patient and the side effects of both these medications at length and she prefers to proceed with Trodelvy.  Will also obtain a PD-L1 status to see if immunotherapy is an option.  Started Trodelvy at 7.5 mg/kg, q. 14 days as I am really concerned about her ability to tolerate.  We will also plan for Neulasta upfront.  Although tumor markers were not elevated originally now with elevated tumor markers with a CA 15-3 of 296 on 6/25/2025 and CA 20 7.29 of 409 on 6/25/2025.  7/22/2025: initiation of Trodelvy as planned.  She had her port placed 7/21.  Received Udenyca, white blood cell growth factor.    7/28/2025-H&H is 7.9 and 25.1, white count 13,000 780, platelet count of 4 37,000, transfusion planned  Reassessment 7/29/2024 with H&H at 9.2 and 28.9, white count 5316 platelet count of 3 84,000.     *DM with hyperglycemia     *Right axillary lymphadenopathy  Biopsied and consistent with invasive lobular carcinoma, grade 2, ER/GA positive and HER2 negative  Recent bilateral diagnostic mammogram and ultrasound from 6/24/2025 shows disease progression     *Skeletal metastasis  Patient will be started on Xgeva  8/16/2024 Xgeva initiation will be held as the patient has not been to the dentist in over 2 years.  Discussed possible side effects of Xgeva and she will schedule a dental visit and we will have her back in 1 week to initiate Xgeva pending this is complete.  10/14/2024-second dose of Xgeva will be administered.  Labs reviewed and stable to proceed.  Continue Xgeva every 4 weeks     *Multiple sclerosis  Patient was not on any treatment previously.  She sees Dr. Jacobson at [facility].  Due to profound weakness patient requested her neurologist to start steroids.  They plan to initiate high-dose IV steroids to help with this.  High-dose IV steroids have not been  initiated.  Doing physical therapy and received high-dose steroids  Stable     *Neurogenic bladder  Chronic Cobos catheter in place.  She recently saw a new urologist and is planning to discontinue the Cobos in favor of In-N-Out straight cathing.     *Normocytic anemia  History of iron deficiency  Iron 59, ferritin 333  Hemoglobin 7/29/2025 H&H 9.2 and 28.9     *Abnormal liver labs  Transaminases improving     *Alternative medication  Patient sees a outside provider and receives ivermectin and fenbendazole  We have ran an interaction check with the medications and does not appear to be any interaction however I want her against taking these medications and potential side effects  Patient however wishes to proceed with his medications.  Patient continues on ivermectin     *Leukocytosis with neutrophilia  White blood cell count mildly elevated  She did receive Udenyca on 7/24/2025 which is a white blood cell booster following her chemotherapy     RECOMMENDATIONS/PLAN:  As above for infectious ease, fluconazole to continue  Transfuse red cells for hemoglobin approaching 7 or as needed for symptoms, transfusion given 7/28/2025  Had to cancel the visit 7/30/2025, keep 8/6 with an APRN for Audra, 8/20 with Dr. Lopes with Trodelvy.  Will keep current schedule in place as patient to be discharged 7/29/2025      Rohith Reyes MD

## 2025-07-29 NOTE — OUTREACH NOTE
Prep Survey      Flowsheet Row Responses   [facility] patient discharged from? [place]   Is LACE score < 7 ? No   Eligibility [facility]   Date of Admission 07/25/25   Date of Discharge 07/29/25   [place] Disposition [place] [facility]   Discharge diagnosis Sepsis secondary to UTI (   Does the patient have one of the following disease processes/diagnoses(primary or secondary)? Sepsis   Does the patient have Home health ordered? Yes   What is the Home health agency?  [facility]   Is there a DME ordered? No   Prep survey completed? Yes            YASMIN DOE - Registered Nurse

## 2025-07-29 NOTE — CASE MANAGEMENT/SOCIAL WORK
Case Management Discharge Note      Final Note: Return home with family and [facility].  to transport.         Selected Continued Care - Admitted Since 7/25/2025       Destination    No services have been selected for the patient.                Durable Medical Equipment    No services have been selected for the patient.                Dialysis/Infusion    No services have been selected for the patient.                Home Medical Care    No services have been selected for the patient.                Therapy    No services have been selected for the patient.                Community Resources    No services have been selected for the patient.                Community & DME    No services have been selected for the patient.                    Selected Continued Care - Prior Encounters Includes continued care and service providers with selected services from prior encounters from 4/26/2025 to 7/29/2025      Discharged on 7/7/2025 Admission date: 7/2/2025 - Discharge disposition: Home-Health Care [facility]      Home Medical Care       Service Provider Services Address Phone Fax Patient Preferred    [facility] [address], [address], [facility] [address] [phone] --                      Discharged on 4/30/2025 Admission date: 4/17/2025 - Discharge disposition: Home-Health Care [facility]      Home Medical Care       Service Provider Services Address Phone Fax Patient Preferred    [facility] [address], [address], [facility] [address] [phone] --                          Transportation Services  Transportation: Private Transportation  Private: Car    Final Discharge Disposition Code: 06 - home with home health care

## 2025-07-29 NOTE — TELEPHONE ENCOUNTER
"She has not been discharged from [facility] as of yet.  She is calling because in her my chart the US is still pending results. Explained it may not be resulted, do not have any control on when the report shows in my chart.   Reason for Disposition   General information question, no triage required and triager able to answer question    Additional Information   Negative: [1] Caller is not with the adult (patient) AND [2] reporting urgent symptoms   Negative: Lab result questions   Negative: Medication questions   Negative: Caller can't be reached by phone   Negative: Caller has already spoken to PCP or another triager   Negative: RN needs further essential information from caller in order to complete triage   Negative: Requesting regular office appointment   Negative: [1] Caller requesting NON-URGENT health information AND [2] PCP's office is the best resource   Negative: Health Information question, no triage required and triager able to answer question    Answer Assessment - Initial Assessment Questions  1. REASON FOR CALL or QUESTION: \"What is your reason for calling today?\" or \"How can I best help you?\" or \"What question do you have that I can help answer?\"      See note.    Protocols used: Information Only Call - No Triage-ADULT-    "

## 2025-07-29 NOTE — PROGRESS NOTES
"  Infectious Diseases Progress Note    Luisa Child MD     [facility]  [place]: 4 days  Patient Identification:  Name: Maritza Nance Darci  Age: 64 y.o.  Sex: female  :  1960  MRN: [id number]         Primary Care Physician: Enoc Carbone DO    Chart round    Subjective: Feeling better.  Interval History: See consultation note.    Objective:    Scheduled Meds:aspirin, 81 mg, Oral, Daily  baclofen, 20 mg, Oral, BID  vitamin D3, 5,000 Units, Oral, Daily  enoxaparin sodium, 40 mg, Subcutaneous, Q24H  fluconazole, 200 mg, Oral, Q24H  insulin lispro, 2-7 Units, Subcutaneous, 4x Daily AC & at Bedtime  melatonin, 5 mg, Oral, Nightly  [Held by provider] metoprolol succinate XL, 25 mg, Oral, Q24H  pantoprazole, 40 mg, Oral, Once per day on   polyethylene glycol, 17 g, Oral, Daily  pramipexole, 1.5 mg, Oral, BID  senna-docusate sodium, 2 tablet, Oral, BID  sodium chloride, 10 mL, Intravenous, Q12H  sodium chloride, 10 mL, Intravenous, Q12H      Continuous Infusions:       Vital signs in last 24 hours:  Temp:  [97.5 °F (36.4 °C)-100 °F (37.8 °C)] 98.2 °F (36.8 °C)  Heart Rate:  [77-94] 77  Resp:  [16] 16  BP: (118-146)/(56-76) 124/69    Intake/Output:    Intake/Output Summary (Last 24 hours) at 2025 1346  Last data filed at 2025 1000  Gross per 24 hour   Intake 790 ml   Output 600 ml   Net 190 ml       Exam:  /69   Pulse 77   Temp 98.2 °F (36.8 °C)   Resp 16   Ht 165.1 cm (65\")   Wt 81.6 kg (180 lb)   LMP  (LMP Unknown) Comment: PT. STATES HER LAST PERIOD WAS GREATER THAN FIVE YEARS AGO  SpO2 90%   BMI 29.95 kg/m²   Patient is examined using the personal protective equipment as per guidelines from infection control for this particular patient as enacted.  Hand washing was performed before and after patient interaction.  General Appearance:    Alert, cooperative, no distress, AAOx3                          Head:    Normocephalic, without obvious abnormality, " atraumatic                           Eyes:  Pale conjunctiva                         Throat:   Lips, tongue, gums normal; oral mucosa pink and moist                           Neck:   Supple, symmetrical, trachea midline, no JVD                         Lungs:    Clear to auscultation bilaterally, respirations unlabored                 Chest Wall:    No tenderness or deformity                          Heart:  S1-S2 regular                  Abdomen:   Soft nontender catheter in place                 Extremities:   Extremities normal, atraumatic, no cyanosis or edema                    Neurologic: Alert and oriented x 3 neurological weakness involving her lower extremities due to underlying MS and immobility unchanged.       Data Review:    I reviewed the patient's new clinical results.  Results from last 7 days   Lab Units 07/29/25  0517 07/28/25  0503 07/27/25  0604 07/26/25  0211 07/25/25 2033   WBC 10*3/mm3 11.36* 13.78* 13.97* 13.95* 12.76*   HEMOGLOBIN g/dL 9.2* 7.9* 8.0* 8.6* 9.0*   PLATELETS 10*3/mm3 384 437 402 397 449     Results from last 7 days   Lab Units 07/29/25  0517 07/28/25  0503 07/27/25  0604 07/26/25 0211 07/25/25 2033   SODIUM mmol/L 138 138 139 137 138   POTASSIUM mmol/L 4.0 4.1 4.3 3.8 3.7   CHLORIDE mmol/L 104 103 105 104 103   CO2 mmol/L 24.2 23.7 23.0 22.0 22.2   BUN mg/dL 18.0 14.0 12.0 15.0 17.0   CREATININE mg/dL 0.57 0.61 0.57 0.56* 0.91   CALCIUM mg/dL 9.0 8.9 9.0 8.5* 9.4   GLUCOSE mg/dL 117* 114* 145* 212* 244*     Microbiology Results (last 10 days)       Procedure Component Value - Date/Time    Blood Culture - Blood, Hand, Left [[id number]]  (Normal) Collected: 07/25/25 2311    Lab Status: Preliminary result Specimen: Blood from Hand, Left Updated: 07/28/25 2330     Blood Culture No growth at 3 days    Blood Culture - Blood, Hand, Right [[id number]]  (Normal) Collected: 07/25/25 2106    Lab Status: Preliminary result Specimen: Blood from Hand, Right Updated: 07/28/25 2116     Blood  Culture No growth at 3 days              Assessment:    Sepsis secondary to UTI    Multiple sclerosis    Neurogenic bladder    Restless legs syndrome    Malignant neoplasm of overlapping sites of left breast in female, estrogen receptor positive    Type 2 diabetes mellitus    UTI (urinary tract infection) due to urinary indwelling catheter    Anemia    Transaminitis   64-year-old female with complicated past medical history presents with bodyaches fever with chronically abnormal urinalysis due to indwelling catheter and neurogenic bladder and recent therapy for metastatic breast cancer white blood cell count stimulating factor administered on 7/24/2025 with blood cultures negative-this presentation consistent with  1-febrile illness with systemic symptoms due to UTI versus endogenous chasity in the setting of evolving mucositis and neutropenia with neutropenia curtailed due to white blood cell count stimulating factor  2-negative blood culture with no urine culture with initial urinalysis showing evidence of yeast  3-progressive leukocytosis despite resolution of fever and improved sense of wellbeing likely due to stimulating factor rather than persistent infection  4-multiple sclerosis  5-breast cancer with mets  6-status post Mediport placement  7-sleep apnea  8-other diagnoses per primary team.        Recommendations/Discussions:  See my discussion and recommendations on 7/27/2025 and 7/28/2025.  Okay to discharge on 5 to 7 days treatment of oral fluconazole.  It be ideal if her current catheter is changed while she is receiving fluconazole to avoid colonization of the Cobos catheter and recurrence of UTI due to yeast.  Monitor closely for side effects of antibiotic therapy.  Luisa Child MD  7/29/2025  13:46 EDT    Parts of this note may be an electronic transcription/translation of spoken language to printed text using the Dragon dictation system.

## 2025-07-29 NOTE — PLAN OF CARE
[id number]:  Pt A&O x4. Able to verbalize needs appropriately. VSS, afebrile.  Denies pain, SOB, nausea, or dizziness.  Cobos intact and draining without issues.  No BM. R CW port flushed and capped.  Frequent checks continued to maintain safety and comfort.      Problem: Adult Inpatient Plan of Care  Goal: Plan of Care Review  Outcome: Progressing  Flowsheets (Taken 7/29/2025 0027)  Plan of Care Reviewed With: patient  Goal: Patient-Specific Goal (Individualized)  Outcome: Progressing  Goal: Absence of Hospital-Acquired Illness or Injury  Outcome: Progressing  Intervention: Identify and Manage Fall Risk  Recent Flowsheet Documentation  Taken 7/28/2025 2015 by Shahnaz Bell RN  Safety Promotion/Fall Prevention:   assistive device/personal items within reach   clutter free environment maintained   fall prevention program maintained   safety round/check completed   room organization consistent   toileting scheduled  Intervention: Prevent Skin Injury  Recent Flowsheet Documentation  Taken 7/28/2025 2015 by Shahnaz Bell RN  Body Position:   position changed independently   sitting up in bed  Intervention: Prevent and Manage VTE (Venous Thromboembolism) Risk  Recent Flowsheet Documentation  Taken 7/28/2025 2015 by Shahnaz Bell RN  VTE Prevention/Management: patient refused intervention  Intervention: Prevent Infection  Recent Flowsheet Documentation  Taken 7/28/2025 2015 by Shahnaz Bell RN  Infection Prevention:   cohorting utilized   environmental surveillance performed   hand hygiene promoted   personal protective equipment utilized   rest/sleep promoted  Goal: Optimal Comfort and Wellbeing  Outcome: Progressing  Intervention: Provide Person-Centered Care  Recent Flowsheet Documentation  Taken 7/28/2025 2015 by Shahnaz Bell RN  Trust Relationship/Rapport:   care explained   choices provided   emotional support provided   empathic listening provided   questions answered   questions encouraged   reassurance provided    thoughts/feelings acknowledged  Goal: Readiness for Transition of Care  Outcome: Progressing     Problem: Skin Injury Risk Increased  Goal: Skin Health and Integrity  Outcome: Progressing  Intervention: Optimize Skin Protection  Recent Flowsheet Documentation  Taken 7/28/2025 2015 by Shahnaz Bell RN  Activity Management: bedrest  Pressure Reduction Techniques:   frequent weight shift encouraged   weight shift assistance provided  Head of Bed (HOB) Positioning: HOB elevated  Pressure Reduction Devices: pressure-redistributing mattress utilized  Intervention: Promote and Optimize Oral Intake  Recent Flowsheet Documentation  Taken 7/28/2025 2015 by Shahnaz Bell RN  Oral Nutrition Promotion: adaptive equipment use encouraged     Problem: Sepsis/Septic Shock  Goal: Optimal Coping  Outcome: Progressing  Intervention: Support Patient and Family Response  Recent Flowsheet Documentation  Taken 7/28/2025 2015 by Shahnaz Bell RN  Supportive Measures:   active listening utilized   decision-making supported   positive reinforcement provided  Goal: Absence of Bleeding  Outcome: Progressing  Goal: Blood Glucose Level Within Target Range  Outcome: Progressing  Intervention: Optimize Glycemic Control  Recent Flowsheet Documentation  Taken 7/28/2025 2015 by Shahnaz Bell RN  Hyperglycemia Management: blood glucose monitored  Hypoglycemia Management: blood glucose monitored  Goal: Absence of Infection Signs and Symptoms  Outcome: Progressing  Intervention: Initiate Sepsis Management  Recent Flowsheet Documentation  Taken 7/28/2025 2015 by Shahnaz Bell RN  Infection Prevention:   cohorting utilized   environmental surveillance performed   hand hygiene promoted   personal protective equipment utilized   rest/sleep promoted  Intervention: Promote Recovery  Recent Flowsheet Documentation  Taken 7/28/2025 2015 by Shahnaz Bell RN  Activity Management: bedrest  Sleep/Rest Enhancement:   awakenings minimized   noise level reduced   room  darkened  Goal: Optimal Nutrition Delivery  Outcome: Progressing     Problem: Fall Injury Risk  Goal: Absence of Fall and Fall-Related Injury  Outcome: Progressing  Intervention: Identify and Manage Contributors  Recent Flowsheet Documentation  Taken 7/28/2025 2015 by Shahnaz Bell, RN  Medication Review/Management: medications reviewed  Self-Care Promotion: independence encouraged  Intervention: Promote Injury-Free Environment  Recent Flowsheet Documentation  Taken 7/28/2025 2015 by Shahnaz Bell RN  Safety Promotion/Fall Prevention:   assistive device/personal items within reach   clutter free environment maintained   fall prevention program maintained   safety round/check completed   room organization consistent   toileting scheduled   Goal Outcome Evaluation:  Plan of Care Reviewed With: patient

## 2025-07-29 NOTE — CASE MANAGEMENT/SOCIAL WORK
Continued Stay Note  [facility]     Patient Name: Maritza Bejarano  MRN: [id number]  Today's Date: 7/29/2025    Admit Date: 7/25/2025    Plan: Return home with family and [facility].  to transport.   Discharge Plan       Row Name 07/29/25 0954       Plan    Plan Return home with family and [facility].  to transport.    Patient/Family in Agreement with Plan yes    Plan Comments DC orders noted. CCP notified Corin/Eric that pt will DC today. Plan is return home with family and [facility].  to transport. RLutes RN/CCP    Final Discharge Disposition Code 06 - home with home health care    Final Note Return home with family and [facility].  to transport.                   Discharge Codes    No documentation.                 Expected Discharge Date and Time       Expected Discharge Date Expected Discharge Time    Jul 29, 2025               July Mcknight RN

## 2025-07-30 ENCOUNTER — NURSE TRIAGE (OUTPATIENT)
Dept: CALL CENTER | Facility: HOSPITAL | Age: 65
End: 2025-07-30
Payer: MEDICARE

## 2025-07-30 ENCOUNTER — TRANSITIONAL CARE MANAGEMENT TELEPHONE ENCOUNTER (OUTPATIENT)
Dept: CALL CENTER | Facility: HOSPITAL | Age: 65
End: 2025-07-30
Payer: MEDICARE

## 2025-07-30 ENCOUNTER — TELEPHONE (OUTPATIENT)
Dept: OTHER | Facility: HOSPITAL | Age: 65
End: 2025-07-30
Payer: MEDICARE

## 2025-07-30 DIAGNOSIS — C79.51 METASTASIS TO BONE: Primary | ICD-10-CM

## 2025-07-30 LAB
BACTERIA SPEC AEROBE CULT: NORMAL
BACTERIA SPEC AEROBE CULT: NORMAL

## 2025-07-30 NOTE — TELEPHONE ENCOUNTER
"Caller has question about her AVS. Pending labs and imaging, states she had a Thyroid US done before discharge and it is not listed as pending and she has not seen results in My Chart.   Advised triage staff are not allowed to discuss lab and imaging results. But there is a result in the chart and she should be able to see this in My chart within the next 24 hours. She also had a question about her Blood culture results. Advised she should check her My chart in the next 24 hours for these results as well.  Reason for Disposition  • Caller requesting lab results  (Exception: Routine or non-urgent lab result.)    Additional Information  • Negative: [1] Caller is not with the adult (patient) AND [2] reporting urgent symptoms  • Lab result questions  • Negative: Lab calling with strep throat test results and triager can call in prescription  • Negative: Lab calling with urinalysis test results and triager can call in prescription  • Negative: Medication questions  • Negative: Medication renewal and refill questions  • Negative: Pre-operative or pre-procedural questions  • Negative: ED call to PCP (i.e., primary care provider; doctor, NP, or PA)  • Negative: Doctor (or NP/PA) call to PCP  • Negative: Call about patient who is currently hospitalized  • Negative: Lab or radiology calling with CRITICAL test results  • Negative: [1] Follow-up call from patient regarding patient's clinical status AND [2] information urgent  • Negative: [1] Caller requests to speak ONLY to PCP AND [2] URGENT question  • Negative: [1] Caller requests to speak to PCP now AND [2] won't tell us reason for call  (Exception: If 10 pm to 6 am, caller must first discuss reason for the call.)  • Negative: Notification of hospital admission  • Negative: Notification of death    Answer Assessment - Initial Assessment Questions  1. REASON FOR CALL or QUESTION: \"What is your reason for calling today?\" or \"How can I best help you?\" or \"What question do you " "have that I can help answer?\"      Questions about imaging and lab results.    Answer Assessment - Initial Assessment Questions  1. REASON FOR CALL or QUESTION: \"What is your reason for calling today?\" or \"How can I best  help you?\" or \"What question do you have that I can help answer?\"      Asking about us thyroid result and blood culture results  2. CALLER: Document the source of call. (e.g., laboratory, patient).      patient    Protocols used: Information Only Call - No Triage-ADULT-AH, PCP Call - No Triage-ADULT-AH    "

## 2025-07-30 NOTE — OUTREACH NOTE
Call Center TCM Note      Flowsheet Row Responses   [facility] patient discharged from? [place]   Does the patient have one of the following disease processes/diagnoses(primary or secondary)? Sepsis   TCM attempt successful? Yes   Call start time 1334   Call end time 1341   Discharge diagnosis Sepsis secondary to UTI   Meds reviewed with patient/caregiver? Yes   Is the patient having any side effects they believe may be caused by any medication additions or changes? No   Does the patient have all medications related to this admission filled (includes all antibiotics, inhalers, nebulizers,steroids,etc.) Yes   Is the patient taking all medications as directed (includes completed medication regime)? Yes   Comments Hospital f/u 8/4/25@0915am (appt in place at time of call)   Does the patient have an appointment with their PCP within 7-14 days of discharge? Yes   Nursing Interventions Confirmed date/time of appointment   What is the Home health agency?  [facility]   Has home health visited the patient within 72 hours of discharge? Unsure   Psychosocial issues? No   Did the patient receive a copy of their discharge instructions? Yes   Nursing interventions Reviewed instructions with patient   What is the patient's perception of their health status since discharge? Same  [Pt with matias in place and discusses transition to straight cath after f/u with urology.  Reviewed uti s/s with pt.]   Nursing interventions Nurse provided patient education   Is the patient/caregiver able to teach back TIME? T emperature - higher or lower than normal, I nfection - may have signs and symptoms of an infection, M ental Decline - confused, sleepy, difficult to arouse, E xtremely Ill - severe pain, discomfort, shortness of breath   Nursing interventions Nurse provided patient education   Is the patient/caregiver able to teach back signs and symptoms of worsening condition: Altered mental  status(confusion/coma), Fever, Rapid heart rate (>90)   Is the patient/caregiver able to teach back the hierarchy of who to call/visit for symptoms/problems? PCP, Specialist, Home health nurse, Urgent Care, ED, 911 Yes   TCM call completed? Yes   Call end time 1341            RAMILA MERRITT - Registered Nurse    7/30/2025, 13:44 EDT

## 2025-07-30 NOTE — DISCHARGE SUMMARY
Patient Name: Maritza Bejarano  : 1960  MRN: [id number]    Date of Admission: 2025  Date of Discharge:  2025  Primary Care Physician: Enoc Carbone DO      Chief Complaint:   Fever      Discharge Diagnoses     Active Hospital Problems    Diagnosis  POA    **Sepsis secondary to UTI [A41.9, N39.0]  Yes    Transaminitis [R74.01]  Yes    Anemia [D64.9]  Yes    UTI (urinary tract infection) due to urinary indwelling catheter [T83.511A, N39.0]  Yes    Type 2 diabetes mellitus [E11.9]  Yes    Malignant neoplasm of overlapping sites of left breast in female, estrogen receptor positive [C50.812, Z17.0]  Not Applicable    Multiple sclerosis [G35]  Yes    Neurogenic bladder [N31.9]  Yes    Restless legs syndrome [G25.81]  Yes      Resolved Hospital Problems   No resolved problems to display.        Hospital Course     Patient is 64 year old female with history of anemia breast cancer, multiple sclerosis, neurogenic bladder with indwelling catheter, type 2 diabetes, restless leg syndrome who presents to the ED with complaints of generalized fatigue and not feeling well, some nausea and chills and generalized bodyaches.     Sepsis secondary to acute UTI with indwelling catheter  - Indwelling Cobos changed on admission and prior to discharge per ID recommendations.  - Urinalysis showed large yeast, trace bacteria, positive for nitrates, leukocytes 6-5 WBC, did not reflex to culture.  Blood cultures remained negative prior to discharge.  -Was initiated on IV cefepime, fluconazole, ID was consulted.  Discharged on p.o. fluconazole to complete 5-day course of regimen.  -     Anemia  - Recent iron panel 2025 elevated ferritin 333, iron saturation 17  - Received 1 unit of PRBC in the setting of weakness, multiple sclerosis.  Hemoglobin improved to 9.2        Stage IV breast cancer  - Currently undergoing chemotherapy, last treatment was 4 days prior to admission  - Follow-up with hematology oncology as  scheduled       At the time of discharge patient was told to take all medications as prescribed, keep all follow-up appointments, and call their doctor or return to the hospital with any worsening or concerning symptoms.             Day of Discharge     Subjective:  Patient seen this morning.  Status post 1 unit of PRBC transfused yesterday.  Hemoglobin improved.  Reports feeling significantly better compared to yesterday.  Denies any symptoms.  No fevers no chills.    Physical Exam:  Temp:  [97.5 °F (36.4 °C)-100 °F (37.8 °C)] 98.2 °F (36.8 °C)  Heart Rate:  [77-90] 77  Resp:  [16] 16  BP: (118-146)/(68-76) 124/69  Body mass index is 29.95 kg/m².  Physical Exam    General: Alert, lying in bed, not in distress,  HEENT: Normocephalic, atraumatic  CV: Regular rate and rhythm, no murmurs rubs or gallops  Lungs: Clear to auscultation bilaterally, no crackles or wheezes  Abdomen: Soft, nontender, nondistended  Extremities: No significant peripheral edema , no cyanosis,       Consultants     Consult Orders (all) (From admission, onward)       Start     Ordered    07/27/25 1656  Inpatient Infectious Diseases Consult  IN AM,   Status:  Canceled        Specialty:  Infectious Diseases  Provider:  Luisa Child MD    07/27/25 1655    07/27/25 0738  Inpatient Infectious Diseases Consult  IN AM        Specialty:  Infectious Diseases  Provider:  Luisa Child MD    07/27/25 0738    07/27/25 0702  Inpatient Infectious Diseases Consult  IN AM,   Status:  Canceled        Specialty:  Infectious Diseases  Provider:  Moreno Estevez MD    07/26/25 2115    07/26/25 0702  Hematology & Oncology Inpatient Consult  IN AM        Specialty:  Hematology and Oncology  Provider:  Zainab Lopes MD    07/26/25 0140    07/25/25 2137  [facility] (on-call MD unless specified) Details  Once,   Status:  Canceled        Specialty:  Hospitalist  Provider:  (Not yet assigned)    07/25/25 2136                  Procedures     * Surgery not found  *      Imaging Results (All)       Procedure Component Value Units Date/Time    US Thyroid [[id number]] Collected: 07/29/25 1004     Updated: 07/29/25 1019    Narrative:      ULTRASOUND THYROID     INDICATION: Hyperparathyroidism. Elevated calcium.     COMPARISON: None     TECHNIQUE: Real-time sonographic evaluation of the thyroid gland and  neck with grayscale and color-flow imaging. Representative images were  saved for review.     FINDINGS:     THYROID GLAND: Homogeneous parenchymal echotexture. The right lobe  measures 3.5 x 1.5 x 1.2 cm and the left lobe measures 3.8 x 1.2 x 1.1  cm. The isthmus measures 0.3 cm AP.     Total number of nodules greater than equal to 1 cm: 0.  Total thyroid nodules: 2.     Nodule 1:  - Size: 0.7 x 0.6 x 0.7 cm  - Location: Left midportion  - Composition: Mixed cystic and solid (1)  - Echogenicity: Hypoechoic (2)  - Shape: Wider-than-tall (0)  - Margins: Lobulated (2)  - Echogenic foci: None (0)     ACR TI-RADS total points: 5  ACR TI-RADS category: TR4 (moderately suspicious)     Nodule 2:  - Size: 0.5 x 0.3 x 0.5 cm  - Location: Right midportion  - Composition: Mixed cystic and solid (1)  - Echogenicity: Isoechoic (1)  - Shape: Wider-than-tall (0)  - Margins: Lobulated (2)  - Echogenic foci: None (0)     ACR TI-RADS total points: 4  ACR TI-RADS category: TR4 (moderately suspicious)     Nodule 3:  - Size: 0.6 x 0.6 x 0.6 cm  - Location: Inferior to the right thyroid lobe, adjacent to the jugular  vein, not well seen on the cine images provided.  - Composition: Solid (2)  - Echogenicity: Hypoechoic (2)  - Shape: Wider-than-tall (0)  - Margins: Ill-defined (0)  - Echogenic foci: None (0)          Impression:      Hypoechoic solid nodule seen adjacent to the inferior pole the right  thyroid lobe and medial to the jugular vein, potentially a parathyroid  adenoma.        Nodule 1: Left mid thyroid lobe, TI-RADS category: 5, size 0.7 cm.  Consider 1 year follow-up thyroid  ultrasound  Nodule 2: Right mid thyroid lobe, TI-RADS category: 4, size 0.5 cm.  Consider 1 year follow-up thyroid ultrasound        ------------------------------------------------------------------------  --------------------------------------  ACR TI-RADS recommendations:  TR5 (>= 7 points) - FNA if >=1.0 cm, follow-up at 1, 2, 3, 4, and 5  years if >= 0.5 cm.  TR4 (4-6 points) - FNA if >=1.5 cm, follow-up at 1, 2, 3, and 5 years if  >=1.0 cm.  TR3 (3 points) - FNA if >= 2.5 cm, follow-up at 1, 3, and 5 years if  >=1.5 cm.  TR2 (2 points) & TR 1 (0 points) - No FNA or follow-up.     * ACR TI-RADS recommends that no more than two nodules with the highest  total points should be biopsied and no more than four nodules should be  followed.  **Recommendations are based on TI-RADS category. Management decisions  such as whether to perform FNA and whether to follow a nodule may differ  from the TI-RADS recommendation based on clinician judgment, patient  preference, risk factors for thyroid cancer, comorbidities, life  expectancy, and other considerations.     This report was finalized on 7/29/2025 10:16 AM by Dr. Rohith Stone M.D on Workstation: [id number]       XR Chest 1 View [[id number]] Collected: 07/25/25 2143     Updated: 07/25/25 2148    Narrative:      CXR ONE VIEW      HISTORY: fever     COMPARISON: 7/2/2025     TECHNIQUE: single portable AP       Impression:         Right IJ Kfmamp-p-Skah catheter remains in place.     The heart size is within normal limits.     Low lung volumes, no acute pulmonary infiltrate.     The right lung appears normally aerated. Left basilar retrocardiac  linear density, scarring or atelectasis, unchanged.     This report was finalized on 7/25/2025 9:45 PM by Dr. Juma Scott M.D on Workstation: [id number]             Results for orders placed during the hospital encounter of 05/21/25    Duplex Venous Lower Extremity - Right CAR 05/21/2025  4:07 PM    Interpretation  Summary    Normal right lower extremity venous duplex scan.    Results for orders placed during the hospital encounter of 07/02/25    Adult Transthoracic Echo Complete W/ Cont if Necessary Per Protocol 07/06/2025  4:44 PM    Interpretation Summary    Left ventricular ejection fraction appears to be greater than 70%.    The following left ventricular wall segments are hyperkinetic: basal anterior, basal anterolateral, basal inferolateral, basal inferior, basal inferoseptal, basal anteroseptal, mid anterior, mid anterolateral, mid inferolateral, mid inferior, mid inferoseptal, mid anteroseptal, apical anterior, apical lateral, apical inferior, apical septal and apex.    Left ventricular outflow tract peak flow gradient at rest is 25 mmHg.    Left ventricular diastolic function was indeterminate.    Pertinent Labs     Results from last 7 days   Lab Units 07/29/25 0517 07/28/25 0503 07/27/25  0604 07/26/25  0211   WBC 10*3/mm3 11.36* 13.78* 13.97* 13.95*   HEMOGLOBIN g/dL 9.2* 7.9* 8.0* 8.6*   PLATELETS 10*3/mm3 384 437 402 397     Results from last 7 days   Lab Units 07/29/25 0517 07/28/25  0503 07/27/25  0604 07/26/25  0211   SODIUM mmol/L 138 138 139 137   POTASSIUM mmol/L 4.0 4.1 4.3 3.8   CHLORIDE mmol/L 104 103 105 104   CO2 mmol/L 24.2 23.7 23.0 22.0   BUN mg/dL 18.0 14.0 12.0 15.0   CREATININE mg/dL 0.57 0.61 0.57 0.56*   GLUCOSE mg/dL 117* 114* 145* 212*   Estimated Creatinine Clearance: 107.9 mL/min (by C-G formula based on SCr of 0.57 mg/dL).  Results from last 7 days   Lab Units 07/29/25 0517 07/28/25  0503 07/27/25  0604 07/26/25  0211   ALBUMIN g/dL 3.4* 3.4* 3.4* 3.4*   BILIRUBIN mg/dL <0.2 <0.2 <0.2 <0.2   ALK PHOS U/L 214* 207* 189* 173*   AST (SGOT) U/L 50* 56* 76* 109*   ALT (SGPT) U/L 19 22 21 23     Results from last 7 days   Lab Units 07/29/25  0517 07/28/25  0503 07/27/25  0604 07/26/25  0211   CALCIUM mg/dL 9.0 8.9 9.0 8.5*   ALBUMIN g/dL 3.4* 3.4* 3.4* 3.4*   MAGNESIUM mg/dL 2.2 2.2 2.4 2.3  "  PHOSPHORUS mg/dL 3.4 3.0 2.7 3.6               Invalid input(s): \"LDLCALC\"  Results from last 7 days   Lab Units 07/25/25  2311 07/25/25  2106   BLOODCX  No growth at 3 days No growth at 3 days         Test Results Pending at Discharge     Pending Labs       Order Current Status    Blood Culture - Blood, Hand, Left Preliminary result    Blood Culture - Blood, Hand, Right Preliminary result            Discharge Details        Discharge Medications        New Medications        Instructions Start Date   fluconazole 200 MG tablet  Commonly known as: DIFLUCAN   200 mg, Oral, Every 24 Hours   Start Date: July 30, 2025            Changes to Medications        Instructions Start Date   ondansetron 8 MG tablet  Commonly known as: ZOFRAN  What changed: when to take this   8 mg, 3 Times Daily PRN             Continue These Medications        Instructions Start Date   albuterol sulfate  (90 Base) MCG/ACT inhaler  Commonly known as: PROVENTIL HFA;VENTOLIN HFA;PROAIR HFA   2 puffs, Inhalation, Every 4 Hours PRN      Aspirin Low Dose 81 MG EC tablet  Generic drug: aspirin   81 mg, Oral, Daily      baclofen 20 MG tablet  Commonly known as: LIORESAL   20 mg, 2 Times Daily      ciclopirox 1 % shampoo  Commonly known as: LOPROX       clobetasol 0.05 % ointment  Commonly known as: TEMOVATE   APPLY TOPICALLY TO THE AFFECTED AREA OF BREAST TWICE DAILY. AVOID FACE AND SKIN FOLDS      eszopiclone 1 MG tablet  Commonly known as: LUNESTA   1 mg, Oral, Nightly, Take immediately before bedtime      Fenbendazole powder   440 mg, 3 Times Weekly      HYDROcodone-acetaminophen 5-325 MG per tablet  Commonly known as: NORCO   1 tablet, Oral, Every 4 Hours PRN      IVERMECTIN PO   15 mg, 5 Times Weekly      lidocaine-prilocaine 2.5-2.5 % cream  Commonly known as: EMLA   1 Application, Topical, As Needed, Apply a dime size amount 30 minutes prior to venous port access      metoprolol succinate XL 25 MG 24 hr tablet  Commonly known as: " TOPROL-XL   25 mg, Oral, Every 24 Hours Scheduled      nitrofurantoin (macrocrystal-monohydrate) 100 MG capsule  Commonly known as: MACROBID   100 mg, Daily      OLANZapine 5 MG tablet  Commonly known as: zyPREXA   5 mg, Oral, Nightly, Take on days 1, 2, 3, and 4 and Days 8, 9, 10, 11 after chemotherapy.      pantoprazole 40 MG EC tablet  Commonly known as: PROTONIX   40 mg, Oral, 3 Times Weekly      phenazopyridine 200 MG tablet  Commonly known as: PYRIDIUM   200 mg, Oral, 3 Times Daily PRN      pramipexole 1.5 MG tablet  Commonly known as: MIRAPEX   1.5 mg, 2 Times Daily      prochlorperazine 10 MG tablet  Commonly known as: COMPAZINE   10 mg, Oral, Every 6 Hours PRN      rosuvastatin 20 MG tablet  Commonly known as: CRESTOR   20 mg, Oral, Daily      sennosides-docusate 8.6-50 MG per tablet  Commonly known as: PERICOLACE   1 tablet, Oral, Daily      temazepam 15 MG capsule  Commonly known as: RESTORIL   Use one-half to one tablet nightly as needed.      vitamin D3 125 MCG (5000 UT) tablet tablet   5,000 Units, Daily               Allergies   Allergen Reactions    Klonopin [Clonazepam] Mental Status Change, Confusion and Hallucinations       Discharge Disposition:  Home or Self Care      Discharge Diet:  No active diet order      Discharge Activity:       CODE STATUS:    Code Status and Medical Interventions: CPR (Attempt to Resuscitate); Full Support   Ordered at: 07/26/25 0012     Code Status (Patient has no pulse and is not breathing):    CPR (Attempt to Resuscitate)     Medical Interventions (Patient has pulse or is breathing):    Full Support       Future Appointments   Date Time Provider Department Center   8/4/2025  9:15 AM Enoc Carbone DO [facility]   8/6/2025  9:00 AM INFUSION ACCESS CHAIR- JESSICA  [facility]   8/6/2025  9:20 AM Nilda Ovalles APRN [facility]   8/6/2025 10:00 AM CHEMO INF 6 [facility]  [facility]   8/20/2025 10:00 AM INFUSION ACCESS CHAIR- TE[facility]ORAE  [facility]   8/20/2025 10:20 AM Zainab Lopes MD [facility]   8/20/2025 10:45 AM CHAIR 12 [facility]   8/21/2025  2:00 PM Enoc Carbone DO [facility]      Contact information for follow-up providers       Enoc Carbone DO. Schedule an appointment as soon as possible for a visit in 1 week(s).    Specialty: Internal Medicine  Contact information:  [address] [facility] [address] [facility] [address]  [address]  [facility] [id number] [phone]                       Contact information for after-discharge care       Home Medical Care       [facility]    Service: Home Health Services  Contact information:  [address]  [facility] [address] [phone]                                   Time Spent on Discharge:  Greater than 30 minutes      Vinicio Montoya MD  [facility]  07/29/25  20:20 EDT

## 2025-07-30 NOTE — TELEPHONE ENCOUNTER
Oncology Social Work  ONCOLOGY SOCIAL WORKER PSYCHOSOCIAL ASSESSMENT    Date:  7/30/2025      Reason for Visit:   Breast Cancer    Distress Screening Complete:  Yes (See assessment for details)   DISTRESS SCORE:  5     Distress Screening Follow-up    Diagnosis: Breast Cancer    Location of Distress Screening: Medical Oncology    Distress Level: 5 (7/14/2025  4:00 PM)    Physical Concerns:    Sleep: Y    Practical Problems:    Treatment decisions: Y  Taking care of myself: Y  Finances: Y    Emotional Concerns:    Worry or anxiety: Y  Sadness or depression: Y  Feelings of worthlessness or being a burden: Y    Family Concerns:    Relationship with spouse or partner: Y     Interventions:   Emotional support/coping strategies       I.    PHYSICAL:     Diagnosis: Metastatic invasive lobular carcinoma, ER/TN strongly positive cancer with metastatic disease to the bones.   Patient Active Problem List   Diagnosis    H/O total shoulder replacement, right    Cough    Acute UTI (urinary tract infection)    Multiple sclerosis    Essential hypertension    Hyperlipidemia    Shortness of breath    Oropharyngeal dysphagia    Abnormal finding on mammography    Acid reflux    Anxiety and depression    Brash    Carpal tunnel syndrome    Chronic low back pain    Diplopia    Disease with a predominantly sexual mode of transmission    FOM (frequency of micturition)    Headache    History of diplopia    History of optic neuritis    History of vitamin D deficiency    Migraine syndrome    Mixed incontinence    Nausea    Neurogenic bladder    Pain in joint of right shoulder    Restless legs syndrome    Rupture of rotator cuff of shoulder    Secondary progressive multiple sclerosis    S/P cubital tunnel release    Vitamin D deficiency    Multiple sclerosis exacerbation    Sepsis due to Gram negative bacteria    Lower extremity cellulitis    Malignant neoplasm of overlapping sites of left breast in female, estrogen receptor  positive    Encounter for long-term (current) use of other medications    Cancer, metastatic to bone    Colitis    Weakness    Elevated LFTs    Acute UTI    Sepsis    NSTEMI (non-ST elevated myocardial infarction)    Type 2 diabetes mellitus    Stress-induced cardiomyopathy    UTI (urinary tract infection) due to urinary indwelling catheter    Anemia    UTI (urinary tract infection)    Primary malignant neoplasm of breast, left    Sepsis secondary to UTI    Transaminitis      Date of Diagnosis:  06/28/2024   Recurrent of same type:  No  Recurrence with New Primary:  No,    Date of Previous Diagnosis:  N/A   Treatment:  Chemotherapy    Other:  Patient is also seeing DANA Peterson    [phone] with [facility]- integrative medicine approach utilizing alternative medicine- Ivermectin and Febendazole.     II.   FINANCIAL:  Employment Status:  Disabled.  In college worked as an  then eyesight went bad due to Multiple Sclerosis then took a part time job as a .  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  Prescription Drug Coverage:  Yes  Medications Unable to Afford:  N/A  Pharmacy Name/Location:    [id number] - [place], [place] - [address] [place] [address]  AT [facility] - [phone] PH - [phone]   [address] [facility] [address]  [facility] [address]  Phone: [phone] Fax: [phone]    [facility] - [place], [place] - [address] [place] [address] - [phone] PH - [phone]  [address] [place] [address] [address].  [place] [address]  Phone: [phone] Fax: [phone]    [facility] - [place], [place] - [address] [facility] - [phone] PH - [phone]  [address] [facility] [address]  Phone:  [phone] Fax: [phone]    [facility] [place]  [address]  [facility] [address]  Phone: [phone] Fax: [phone]    [facility] ([facility])  [address]  [facility] [address]  Phone: [phone] Fax: [phone]    [facility] - [place], [place] - [address] - [phone]  - [phone]   [address]  [facility] [address]  Phone: [phone] Fax: [phone]    [facility] [place]  [address] [facility] [address]  Phone: [phone] Fax: [phone]         III.  SOCIAL:    Marital Status:    Significant Other:  N/A,   Children under 18:  No  Do the children know about the cancer diagnosis:  N/A  Does the patient have:  Living Will / Advanced Directive  Home Situation:  The patient resides at home with her spouse who provides care for her.  They have 2 dogs- Yogi (2 years old) and Aguilera-Aguilera (3 years old).    Patient's Support System:  The patient states that her spouse whom she has been  to for 32 years and her 6 friends of about 50 years are tremendous supports to her.  Her spouse attends to all the cooking, cleaning and laundry and her friends will also provide meals for her.     ADLs - Functioning Level at Home:  Dependent    Able to (on own):  None  Current DME:  The patient states that she has an electric wheelchair, leandra lift, ceiling track system to get into the tub, electric bed and dragon software due to her dexterity issues.  The patient's spouse assists her with all of her ADL's.    Current Home Health:  The patient states that she should be getting back connected with [facility] since her recent hospital d/c.    Dialysis:  No, Type (if applicable):  N/A  Any Drug Use History:  Yes- occasional marijuana use for nausea.  Any cultural/ethical background issues:  No      IV.    MENTAL:    Emotional Status:  Calm  ANXIETY SCORE (NIRAV-7):        Mental Status:  Alert and Oriented  Any current psychiatric illness:  Yes, Anxiety and Depression  History of psychiatric illness:  Yes, Anxiety and Depression  Family history of psychiatric illness:  N/A, N/A  Current Psychiatrist:  None  Current Therapist:  None.  Had a therapist as a teenager and early in her marriage.    Taking any psychiatric medications:  Yes, Zyprexa- prescribed by PCP.  About 5 years ago weaned herself off of Prozac which she had been on for 30 years.    Suicidal Ideation:  No; Plan-No, Intent-No, and Means-No   Homicidal Ideation:  No; Plan-No, Intent-No, and Means-No  Coping Skills / Strengths:  Open to talking about her disease and cares about spouse and friend.  DEPRESSION SCALE SCORE (PHQ-9):  7  Patient scored positively on question #9- Thoughts that you would be better off dead or hurting yourself in some way.  When discussed with patient she stated that she would never do anything to hurt herself as she was too scared, but since she can only  her head and arms she thinks that way sometimes. She stated that she would like to be a DNR.  Discussed with her that her Advanced Care Planning and Living Will can reflect those wishes.        V.    SPIRITUAL:    Congregational Affiliation:  Islam- member of [facility] [employer] [employer]  Attend Services Regularly:  No but the [employer] does bring communion to her home when requested.    Any spiritual support:  Yes the patient continues to maintain relationships with her [employer] members.        VI.   OTHER:    Education Level:  Some College. The patient reports that she is 6 credit hours away from finishing her college degree.    Legal Issues:  No      VII.  GOALS / NEEDS:    Goals:  To increase her strength and get back to using a 4 wheel scooter.  Needs:  Financial Assistance and assistance with medical bills.   Referrals Made:  The patient was informed that financial assistance referrals will be completed on her behalf.        VIII.   PATIENT CONCERNS:    The patient expressed that she does not like that she will need to take other medications to address some of the side effects of chemotherapy. The patient is hopeful that the combination of chemotherapy and alternative medications will work together well.      IX.    NOTES:    OSW spoke to the patient via telephone; explained role of OSW and supportive oncology services.  Discussed the patient's distress screening score of 5 and areas of concern. The patient states that she has always had problems sleeping but it has gotten worse since her diagnosis.  The patient states that after a 2 week hospital stay in April she now gets 2-3 hours of sleep per night.  She states that she has tried many medications for sleep but is now on her own regime of 2 mgs of Melatonin and 15 mgs of Lunesta which is prescribed by her PCP.  She states that she has been on a lot of different medications for her concerns with sleeping including Dr. Lopes having placed her on Trazadone and Ambien.     The patient states that her sadness and depression are under control most of the time however she recently found out something about her medical condition which made her depressed for a little while.  The patient states that she feels like a burden to her spouse as he is her primary caregiver and he has to do all the heavy lifting.  She does state that he is an amazing support for her along with the assistance of her friends. She states that she and her spouse may have some miscommunication and was informed that is normal in a relationship.  The patient states that her spouse does the cooking, cleaning and laundry and she takes care of the business side of their relationship (bills etc).      The patient states that she has been approved for hospital financial assistance but is in need of assistance for their household and she was informed that OSW would assist her with the application process for those programs- [place]  [employer], [employer], [employer] and [facility] for the Medicaid waiver program.       The patient states that she is agreeable to a referral to Behavioral Health to discuss her sleeping concerns. OSW will follow up to have that referral submitted and will follow up on assisting the patient with referrals for the financial assistance programs and for Medicaid waiver services.             NICK Land  07/30/25  15:13 EDT

## 2025-07-31 ENCOUNTER — DOCUMENTATION (OUTPATIENT)
Dept: OTHER | Facility: HOSPITAL | Age: 65
End: 2025-07-31
Payer: MEDICARE

## 2025-07-31 NOTE — PROGRESS NOTES
Oncology Social Work  Referral made to [facility] for Medicaid waiver for in home care. NICK Land

## 2025-08-01 ENCOUNTER — OFFICE VISIT (OUTPATIENT)
Dept: ONCOLOGY | Facility: CLINIC | Age: 65
End: 2025-08-01
Payer: MEDICARE

## 2025-08-01 ENCOUNTER — LAB (OUTPATIENT)
Dept: LAB | Facility: HOSPITAL | Age: 65
End: 2025-08-01
Payer: MEDICARE

## 2025-08-01 VITALS
OXYGEN SATURATION: 98 % | HEIGHT: 65 IN | BODY MASS INDEX: 29.95 KG/M2 | TEMPERATURE: 97.2 F | DIASTOLIC BLOOD PRESSURE: 75 MMHG | HEART RATE: 72 BPM | SYSTOLIC BLOOD PRESSURE: 126 MMHG

## 2025-08-01 DIAGNOSIS — C50.912: Primary | ICD-10-CM

## 2025-08-01 DIAGNOSIS — C50.812 MALIGNANT NEOPLASM OF OVERLAPPING SITES OF LEFT BREAST IN FEMALE, ESTROGEN RECEPTOR POSITIVE: ICD-10-CM

## 2025-08-01 DIAGNOSIS — Z17.0 MALIGNANT NEOPLASM OF OVERLAPPING SITES OF LEFT BREAST IN FEMALE, ESTROGEN RECEPTOR POSITIVE: ICD-10-CM

## 2025-08-01 DIAGNOSIS — C50.912: ICD-10-CM

## 2025-08-01 DIAGNOSIS — R21 SKIN RASH: Primary | ICD-10-CM

## 2025-08-01 DIAGNOSIS — Z79.899 HIGH RISK MEDICATION USE: ICD-10-CM

## 2025-08-01 LAB
ALBUMIN SERPL-MCNC: 3.8 G/DL (ref 3.5–5.2)
ALBUMIN/GLOB SERPL: 1.1 G/DL
ALP SERPL-CCNC: 219 U/L (ref 39–117)
ALT SERPL W P-5'-P-CCNC: 41 U/L (ref 1–33)
ANION GAP SERPL CALCULATED.3IONS-SCNC: 12.9 MMOL/L (ref 5–15)
AST SERPL-CCNC: 85 U/L (ref 1–32)
BASOPHILS # BLD AUTO: 0.07 10*3/MM3 (ref 0–0.2)
BASOPHILS NFR BLD AUTO: 0.8 % (ref 0–1.5)
BILIRUB SERPL-MCNC: <0.2 MG/DL (ref 0–1.2)
BUN SERPL-MCNC: 13.6 MG/DL (ref 8–23)
BUN/CREAT SERPL: 32.4 (ref 7–25)
CALCIUM SPEC-SCNC: 8.8 MG/DL (ref 8.6–10.5)
CHLORIDE SERPL-SCNC: 104 MMOL/L (ref 98–107)
CO2 SERPL-SCNC: 19.1 MMOL/L (ref 22–29)
CREAT SERPL-MCNC: 0.42 MG/DL (ref 0.57–1)
DEPRECATED RDW RBC AUTO: 60 FL (ref 37–54)
EGFRCR SERPLBLD CKD-EPI 2021: 110.8 ML/MIN/1.73
EOSINOPHIL # BLD AUTO: 0.11 10*3/MM3 (ref 0–0.4)
EOSINOPHIL NFR BLD AUTO: 1.3 % (ref 0.3–6.2)
ERYTHROCYTE [DISTWIDTH] IN BLOOD BY AUTOMATED COUNT: 17.5 % (ref 12.3–15.4)
GLOBULIN UR ELPH-MCNC: 3.5 GM/DL
GLUCOSE SERPL-MCNC: 158 MG/DL (ref 65–99)
HCT VFR BLD AUTO: 33.4 % (ref 34–46.6)
HGB BLD-MCNC: 9.8 G/DL (ref 12–15.9)
IMM GRANULOCYTES # BLD AUTO: 0.58 10*3/MM3 (ref 0–0.05)
IMM GRANULOCYTES NFR BLD AUTO: 6.9 % (ref 0–0.5)
LYMPHOCYTES # BLD AUTO: 1.48 10*3/MM3 (ref 0.7–3.1)
LYMPHOCYTES NFR BLD AUTO: 17.7 % (ref 19.6–45.3)
MCH RBC QN AUTO: 27.8 PG (ref 26.6–33)
MCHC RBC AUTO-ENTMCNC: 29.3 G/DL (ref 31.5–35.7)
MCV RBC AUTO: 94.6 FL (ref 79–97)
MONOCYTES # BLD AUTO: 0.49 10*3/MM3 (ref 0.1–0.9)
MONOCYTES NFR BLD AUTO: 5.8 % (ref 5–12)
NEUTROPHILS NFR BLD AUTO: 5.65 10*3/MM3 (ref 1.7–7)
NEUTROPHILS NFR BLD AUTO: 67.5 % (ref 42.7–76)
NRBC BLD AUTO-RTO: 0.4 /100 WBC (ref 0–0.2)
PLATELET # BLD AUTO: 383 10*3/MM3 (ref 140–450)
PMV BLD AUTO: 9.5 FL (ref 6–12)
POTASSIUM SERPL-SCNC: 3.9 MMOL/L (ref 3.5–5.2)
PROT SERPL-MCNC: 7.3 G/DL (ref 6–8.5)
RBC # BLD AUTO: 3.53 10*6/MM3 (ref 3.77–5.28)
SODIUM SERPL-SCNC: 136 MMOL/L (ref 136–145)
WBC NRBC COR # BLD AUTO: 8.38 10*3/MM3 (ref 3.4–10.8)

## 2025-08-01 PROCEDURE — 80053 COMPREHEN METABOLIC PANEL: CPT

## 2025-08-01 PROCEDURE — 36415 COLL VENOUS BLD VENIPUNCTURE: CPT

## 2025-08-01 PROCEDURE — 85025 COMPLETE CBC W/AUTO DIFF WBC: CPT

## 2025-08-01 NOTE — PROGRESS NOTES
Reviewed [employer] message/photo with Liz CORTEZ. She recommends Maritza come into the office today for evaluation with [facility].  Scheduling notified.  Maritza aware.

## 2025-08-01 NOTE — PROGRESS NOTES
Subjective   Maritza Bejarano is a 64 y.o. female.   With metastatic invasive lobular carcinoma, ER/MN strongly positive cancer with metastatic disease to the bones.    Oncologic history:    Patient is a 64-year-old postmenopausal lady who has not had a mammogram in 4 years presented with a screen detected abnormality.  She had a left breast biopsy in 2014 which was benign.  Denies palpating any breast masses skin changes or nipple discharge prior to the abnormal mammogram.  She does have left nipple inversion.    Patient has a longstanding diagnosis of multiple sclerosis and has not been on any treatment.  She was previously seen by Dr. Ozzy France and now being seen by Dr. Soto with neurology.  She has been nonambulatory for the past 4 years and requires a wheelchair.  She is unable to transfer herself in and out of wheelchairs and her  has to lift her for all the transfers.  She is also incontinent of the bladder and requiring a suprapubic catheter placed by urology to help with the same.  She had a bladder stimulator in the past which was turned off.  Other comorbidities include hypertension and hyperlipidemia.      Family history significant for maternal great grandmother with breast cancer, maternal great aunt and mother with breast cancer in her 70s.  Denies any family history of ovarian cancer, pancreatic cancer or melanoma.    5/21/2024-bilateral screening mammogram  Finding 1.new nipple retraction along with diffuse skin and trabecular thickening of the left breast.  There is suggestion of subtle architectural distortion seen on the MLO projection in the posterior central region.  3 biopsy markers are noted.  No new suspicious calcifications.  Send finding 2.few axillary lymph node seen in the left breast which display interval increase in size and density.    Finding 3.focal asymmetry measuring 10 mm seen in the anterior one third of the right breast in the medial subareolar  region.    Impression  Finding 1.new nipple retraction, skin and trabecular thickening in the left breast requires additional evaluation.  There is also question architectural distortion in the posterior central breast seen on the MLO projection.  Diagnostic mammogram to include repeat full-field CC with additional posterior tissue and complete breast ultrasound is recommended.  Finding 2.axillary lymph nodes in the left breast require additional evaluation, ultrasound recommended.  Finding 3.focal asymmetry in the right breast requires additional evaluation.  Diagnostic mammogram and limited breast ultrasound is recommended.    5/29/2024-bilateral diagnostic mammogram and ultrasound  Finding 1.4 0.7 x 5.6 x 6.8 cm developing asymmetry with associated nipple retraction, skin thickening and trabecular thickening of the left breast in the central region, inferior region in the upper outer region and the lower outer region.  Finding 2.axillary lymph node in the left breast.  Finding 3.suspected finding completely resolves upon further evaluation representing benign fibroglandular breast tissue.    Bilateral breast ultrasound  Finding 1.nonparallel hypoechoic mass with indistinct margins and skin thickening and internal vascularity in the left breast in the central region, inferior region, upper outer region and in the lower outer region.  The finding is palpable and appears to involve all 4 quadrants.  Most discrete portion is located at 130, 3 cm from the nipple, skin thickening up to 6 mm.  Send finding 2.rounded enlarged left axillary lymph node measuring 11 mm.  Cortical thickening of 9 mm present.    Finding 3.no sonographic correlate.  Send finding 4.enlarged right axillary lymph node measuring 14 mm.  Cortical thickening of 5 mm.    Impression  Finding 1.ultrasound-guided biopsy recommended  Finding 2.ultrasound-guided biopsy recommended  Finding 3.previously described right breast asymmetry resolves  Finding  4.ultrasound-guided biopsy recommended.    Ultrasound-guided biopsy of the left breast and left axilla lymph node and right axilla lymph node    1.left breast  Invasive lobular carcinoma with crush artifact  Grade 2  Invasive carcinoma measures 8.5 mm  No lymphovascular space invasion  ER +91 to 100% strong  DC +31 to 40% moderate  HER2 negative, 0  Ki-67 35%    2.left axillary lymph node focus of metastatic Dastech lobular carcinoma measuring 10 mm  ER +91 to 100% strong  DC +21 to 30%  HER2 -0  Ki-67 30%    3.right axillary lymph node with a focus of metastatic carcinoma measuring 4 mm.  Grade 2.  ER +91 to 100% strong  DC +11 to 20% moderate  HER2 negative, 0  Ki-67 35%    Pathology of all the 3 sites appear similar and findings could represent left breast lobular carcinoma metastatic to the right as well as left axillary lymph nodes.    6/14/2024-CT of the chest abdomen and pelvis  1.large ill-defined infiltrative central left breast mass measuring 6.7 x 4 cm, medially there is an irregular 2.5 x 2.5 cm mass.  Significant thickening of the skin in the left breast and there is left axilla lymphadenopathy.  Left axilla lymph node measures 1.3 x 1.3 cm.  There is also a new enlarged right axillary lymph node measuring 1.3 x 1 cm.  All of the subcentimeter right axillary lymph nodes are slightly larger than previous.  2.no mediastinal hilar or internal mammary chain lymphadenopathy  3.new indeterminate mixed density measuring 1.3 x 1.2 cm right apical pulmonary nodule.  Follow-up recommended.  4.pleural parenchymal thickening and nodules inferiorly and posterior to the right upper lobe are stable.  Chronic scarring and subsegmental atelectatic change in both lower lobes.    CT abdomen pelvis  1.moderate fatty infiltration of the liver.  No suspicious liver lesions.  2.moderate-sized paraesophageal hernia.  No acute bowel abnormality.  3.right sacral stimulator noted at the S3 neuroforamina.  No suspicious bone  lesions are noted.    6/14/2024-bone scan  1.focal abnormal uptake in the left anterior inferior iliac spine correlating with lucent lesion on the CT concerning for metastatic disease.  Further evaluation with MRI of the pelvis and left hip could be performed.  2.focal uptake in the left frontal calvarium again concerning for metastatic disease.  Noncontrast CT or MRI could be obtained.  3.soft tissue uptake noted in the left breast at the site of known left breast cancer.  4.changes from arthroplasty on the right shoulder.  5.multifocal likely degenerative uptake in each knee, ankle and foot and increased uptake in the medial and patellofemoral cortex of the right knee could be posttraumatic.      Invitae 9 gene stat panel negative.    MRI of the brain which showed T2 hyperintensity involving the frontal bone measuring 1.6 cm in size and a smaller area of enhancement in the frontal bone laterally and inferiorly concerning for metastasis.  No brain mets    MRI of the hip and pelvis showed multiple enhancing bone lesions consistent with metastatic disease to the sacrum and pelvis.  Dominant lesion in the anterior inferior left pelvic spine and rectus femoris muscle origin that correlates with the site of bone scan uptake.  She has some right joint hip joint effusion.  Her CA 15-3 16.2    She was seen by Dr. Saavedra on 7/16/2024 and recommended to start on Ribociclib along with anastrozole.    Started Ribociclib in the first week of August 2024    Had profound nausea and vomiting requiring antiemetics.  Completed 3 weeks of Ribociclib on 8/30/2024.    Hospitalized from 9/11/2024 to 9/16/2024 for pneumonia and KINZA and since then Ribociclib has been on hold    Readmitted to the hospital from 10/17/2024 to 10/23/2024 requiring holding Ribociclib.  She was admitted for UTI with sepsis.    10/29/2024-CT of the chest which was compared to the CT chest from 6/14/2024-stable 1.3 cm subsolid nodule in the right lung apex.   Decrease in size of the left axillary lymph nodes.  Ill-defined left breast mass appears unchanged.  Expansile lytic and sclerotic lesion in the posterior left 10th rib which appears increased compared to the prior CT.  Stable subtle area of sclerosis in the left anterior third rib.    10/3/2024-MRI of the cervical spine-rounded area of marrow replacement in C7 suspicious for metastatic disease.    10/3/2024-MRI of the thoracic spine-suspicious patchy areas of marrow replacement in the thoracic spine at T5, T6, T7, T11, T12, L1 suspicious for metastatic disease.    Resumed Ribociclib in November 2024 and has been on it continuously up until February 2025 2/18/2025-CT of the chest abdomen and pelvis  13 mm right apical lesion unchanged micronodules in the right upper lobe micronodules in the left upper lobe  Left 11th rib metastasis unchanged with subtle increase sclerosis  Several hypodense lesions in the liver with the most prominent measuring 16 mm consistent with metastatic deposits.  Not present on scans from September and October 2024.  Bladder is collapsed with Cobos catheter.  Fecal impaction  Small sclerotic metastasis throughout the lumbar spine pelvis, some of which are new.  Largest lesion being in the inferior iliac spine which has become more sclerotic.    Bone scan 2/18/2025-widespread osseous metastatic disease vast majority of which are new/newly appreciable from the prior bone scan.    3/17/2025-initiated Trucap    3/20/2025-patient called complaining of abdominal cramping and loose stools.  She was recommended to start Bentyl and use Imodium for diarrhea.    3/21/2025-patient continues on Faslodex and Truqap.      5/12/2025-CT of the chest-stable 1.7 x 1.5 cm nodular opacity in the right apex.  Stable reticular nodular opacity inferiorly at the right upper lobe.  Bandlike scarring atelectasis at the posterior right lung base.  New pleural thickening of the superior aspect of the left major  fissure and new faint reticular and groundglass opacities in the left upper lobe.  Follow-up CT in 3 months recommended.  New tiny left pleural effusion and new minimal right pleural effusion.  Extensive sclerotic bony metastasis without change.    5/12/2025-bone scan very similar skin to graphic findings in keeping with widespread osseous metastatic disease.      4/21/2025-CT of the abdomen-no evidence of metastatic disease.    On 6/20/2025 for worsening erythema of the left breast.  This has been treated with antibiotics including amoxicillin and doxycycline.  Since the antibiotics the erythema has improved some but not all the way resolved.    Due to this we proceeded with a bilateral diagnostic mammogram and bilateral ultrasound performed on 6/24/2025 which showed an increase in size of the 2:00 malignancy.  New enlarged 1 cm right axillary lymph node was seen.  Ill-defined hypoechogenicity and shadowing throughout the right breast without a discrete measurable mass concerning for possible infiltrating malignancy.  Skin thickening throughout the left breast which could be cellulitis or underlying malignancy.    Punch biopsy was performed by her dermatologist and pathology was consistent with pleomorphic invasive lobular carcinoma which is ER negative, WV negative and HER2 score 0.    6/24/2025-bone scan with stable osseous metastatic disease.      6/24/2025-CT of the chest abdomen and pelvis- 10-15 scattered hepatic lesions likely represent metastatic disease with index lesions which have increased in size over multiple prior CTs.  Extensive osseous metastatic disease, mixed lytic and blastic lesion in the left posterior 10th rib has a more lytic appearance compared to February 2025.  Right lower lobe pulmonary nodular opacification is new compared February 2025 but grossly unchanged from May 2025.    Interval history  Maritza returns today for triage visit.  She called concerned about erythema around her port  site.  Port was placed approximately 2 weeks ago by IR.  She was also recently in the hospital.  During hospitalization she denies any issues with her port that she is aware of.  Once she returned home, she started to have a lot of pruritus around her port site.  She then noticed some erythema around the port site.  She is not having any pain around her port and denies any warmth.  Denies any fever or chills since discharge from the hospital.    Of note, patient was recently hospitalized from 7/25/2025 - 7/29/2025 for sepsis secondary to acute UTI from indwelling catheter.  Indwelling Cobos was changed on admission and prior to discharge per recommendation of ID.  Blood cultures were negative prior to discharge.  She was started on cefepime and fluconazole, per ID was discharged on fluconazole to complete a 5-day course.  She also experienced anemia during hospitalization and required 1 unit of PRBC.    The following portions of the patient's history were reviewed and updated as appropriate: allergies, current medications, past family history, past medical history, past social history, past surgical history, and problem list.    Past Medical History:   Diagnosis Date    Anemia 2024    `Treated with iron    Dawn esophagus     per patient    Blurred vision     R/T MS    Breast cancer     metastatic    Carpal tunnel syndrome     Clotting disorder 1993, 2003, 2012    3 g/i bleeds w/ transfus/ions    Colon polyp 2013    removed w/ colonoscopy    Deep vein thrombosis phlebitis 1980    Depression     Diplopia 2013    GERD (gastroesophageal reflux disease)     GI (gastrointestinal bleed) 3 bleeds    2 transfusions    H/O Skin cancer, basal cell     Headache     History of blood transfusion     History of GI bleed     R/T NSAIDS AND STEROIDS, multiple times    History of urinary tract infection     Hypercalcemia     s/p parathyroidectomy    Hyperlipidemia     Hypertension     Movement disorder     Multiple sclerosis      Optic neuritis     PONV (postoperative nausea and vomiting)     Sleep apnea     wears cpap    Steroid-induced diabetes         Past Surgical History:   Procedure Laterality Date    APPENDECTOMY      BLADDER SURGERY      bladder stimulator    BREAST BIOPSY  don't remember    BREAST SURGERY      augmentation wtih subsequent removal    CARPAL TUNNEL RELEASE Bilateral     Left 2018, right 2020    CUBITAL TUNNEL RELEASE Left     CYSTOSCOPY BOTOX INJECTION OF BLADDER  2018    Cystoscopy with Botox    FRACTURE SURGERY  2019    rt shoulder    PARATHYROIDECTOMY      one gland removed    ROTATOR CUFF REPAIR Right 2017    TOE SURGERY      bilateral great toes    TOTAL SHOULDER ARTHROPLASTY W/ DISTAL CLAVICLE EXCISION Right 10/22/2018    Procedure: RT TOTAL SHOULDER REVERSE ARTHROPLASTY;  Surgeon: Bipin Dangelo MD;  Location: [facility] OR;  Service: Orthopedics        Family History   Problem Relation Age of Onset    Hypertension Mother     Hyperlipidemia Mother     Aortic aneurysm Mother         thoracic    Diabetes Mother     Hypertension Father         charissa heart failure    Heart failure Father     Hyperlipidemia Father     Miscarriages / Stillbirths Sister     Multiple sclerosis Brother     Atrial fibrillation Brother     Diabetes Maternal Grandmother     Diabetes Maternal Grandfather     Stroke Maternal Grandfather     Parkinsonism Paternal Grandmother     Tremor Paternal Grandmother     Stomach cancer Paternal Grandfather         Social History     Socioeconomic History    Marital status:      Spouse name: Donald    Number of children: 0   Tobacco Use    Smoking status: Former     Current packs/day: 0.00     Average packs/day: 2.0 packs/day for 30.0 years (60.0 ttl pk-yrs)     Types: Cigarettes     Start date: 10/22/1973     Quit date: 10/22/2003     Years since quittin.7   Vaping Use    Vaping status: Never Used   Substance and Sexual Activity    Alcohol use: Not Currently    Drug use: Yes     Types:  "Marijuana     Comment: rarely    Sexual activity: Defer     Partners: Male     Birth control/protection: Post-menopausal        OB History    No obstetric history on file.     Age at menarche-15  Age at first live childbirth-not applicable   2 para 0  0  Age of menopause-57  No use of hormone replacement therapy      Allergies   Allergen Reactions    Klonopin [Clonazepam] Mental Status Change, Confusion and Hallucinations        Review of systems as mentioned HPI otherwise negative    Objective   Blood pressure 126/75, pulse 72, temperature 97.2 °F (36.2 °C), temperature source Skin, height 165.1 cm (65\"), SpO2 98%, not currently breastfeeding.     Physical Exam  Vitals reviewed.   Constitutional:       Appearance: Normal appearance. She is normal weight.   HENT:      Right Ear: External ear normal.      Left Ear: External ear normal.      Nose: Nose normal.      Mouth/Throat:      Pharynx: Oropharynx is clear.   Eyes:      Conjunctiva/sclera: Conjunctivae normal.   Cardiovascular:      Rate and Rhythm: Normal rate.   Pulmonary:      Effort: Pulmonary effort is normal.   Abdominal:      General: Abdomen is flat.   Musculoskeletal:         General: Normal range of motion.      Cervical back: Normal range of motion.   Skin:     General: Skin is warm.      Findings: Rash (erythema present to port site.  No warmth or tenderness present.  No streaking.  Some dermabond still in place.  Picture in media) present.   Neurological:      Mental Status: She is alert.      Comments: Non weight bearing, wheelchair bound.    Psychiatric:         Mood and Affect: Mood normal.         Behavior: Behavior normal.         Thought Content: Thought content normal.         Judgment: Judgment normal.       Breast Exam:Not examined today, 2025 (triage visit). Right breast appears normal on inspection.  No palpable right axilla lymphadenopathy or right breast masses.  Right nipple inverted.  Left breast on inspection there " is nipple retraction crusting over the nipple region.  On palpation there is a 8 x 6 cm mass (improved) in the retroareolar region occupying much of the breast.  Palpable left axillary adenopathy as well.      I have reexamined the patient and the results are consistent with the previously documented exam. DANA Pham      Results from last 7 days   Lab Units 08/01/25  1355 07/29/25  0517 07/28/25  0503 07/26/25  0211 07/25/25  2033   WBC 10*3/mm3 8.38 11.36* 13.78*   < > 12.76*   NEUTROS ABS 10*3/mm3 5.65  --   --   --  10.93*   HEMOGLOBIN g/dL 9.8* 9.2* 7.9*   < > 9.0*   HEMATOCRIT % 33.4* 28.9* 25.1*   < > 28.1*   PLATELETS 10*3/mm3 383 384 437   < > 449    < > = values in this interval not displayed.     Results from last 7 days   Lab Units 08/01/25  1355 07/29/25  0517 07/28/25  0503 07/27/25  0604   SODIUM mmol/L 136 138 138 139   POTASSIUM mmol/L 3.9 4.0 4.1 4.3   CHLORIDE mmol/L 104 104 103 105   CO2 mmol/L 19.1* 24.2 23.7 23.0   BUN mg/dL 13.6 18.0 14.0 12.0   CREATININE mg/dL 0.42* 0.57 0.61 0.57   CALCIUM mg/dL 8.8 9.0 8.9 9.0   ALBUMIN g/dL 3.8 3.4* 3.4* 3.4*   BILIRUBIN mg/dL <0.2 <0.2 <0.2 <0.2   ALK PHOS U/L 219* 214* 207* 189*   ALT (SGPT) U/L 41* 19 22 21   AST (SGOT) U/L 85* 50* 56* 76*   GLUCOSE mg/dL 158* 117* 114* 145*   MAGNESIUM mg/dL  --  2.2 2.2 2.4               6/24/2025 CT of the chest abdomen and pelvis   Impression:  1.  At least 10-15 scattered hepatic lesions likely represent metastatic  disease with index lesions which have increased in size over multiple  prior CTs.  2.  Presumed extensive osseous metastatic disease. A mixed lytic and  blastic osseous lesion within the left posterior 10th rib has a more  permeative/lytic appearance compared with 2/18/2025.  3.  Right lower lobe nodular pulmonary opacification is new since  2/18/2025 and grossly unchanged since 5/12/2025 while findings may  represent pneumonia neoplastic disease cannot be excluded. The subsolid  nodule  within the right apex appears to gradually increase in size of  multiple prior CTs and is concerning for metastatic disease and/or  slow-growing neoplasm.  4.  Other findings as above     Assessment & Plan       *Left breast invasive lobular carcinoma  The tumor is estrogen receptor +91 to 100%, progesterone receptor +31 to 40%, HER2 negative, 0, Ki-67 35%  The tumor measures greater than 10 cm on exam, lymph node positive.  CT of the chest abdomen and pelvis with right upper lobe pulmonary nodule which is new and the bone scan also shows uptake in the left anterior inferior iliac spine which correlates to a lucent area on the CT scan and also focal uptake noted in the left frontal calvarium concerning for metastatic disease.  Further evaluation with a MRI of the pelvis and hip as well as MRI of the brain is underway.  The scans are scheduled for 7/10/2024.  Clinical T3 N1 M1, stage IV invasive lobular carcinoma.  There is also a right axillary lymph node which has been biopsied and consistent with invasive lobular carcinoma with similar morphology as well as receptors concerning for metastatic disease rather than a primary right breast cancer.  Recommended Ribociclib 400 mg 3 weeks on and 1 week off.  July 16, 2024: Reviewed MRI of the pelvis and hip consistent with many bony metastasis.  MRI brain shows left frontal bone mets but no brain involvement.  Patient is here to start day 1 ribociclib along with anastrozole.  Continues on anastrozole.  Tempus performed on the left axilla lymph node biopsy shows PIK3CA mutation, CDH 1 mutation and Erb B2 mutation.  Therefore patient would benefit from alpelisib and Faslodex after progression on Ribociclib and AI.  Other options include neratinib plus Faslodex.  Initiated Ribociclib in August 2024.  8/30/2024-last day of week 3 of Ribociclib.    Patient completed 1 cycle of Ribociclib 400 mg and subsequently has not been able to go back on Ribociclib due to recurrent  hospitalizations and abnormal LFTs.  10/14/2024-LFTs are normal today.  Recommend resuming Ribociclib at 200 mg and subsequently we will increase the dose to 400 mg with the next cycle.  Patient was unable to resume Ribociclib as she was admitted to the hospital from 10/17/2024 to 10/23/2024  11/11/2024-Labs reviewed and overall stable except for an ALT of 49.  Recommend resuming Ribociclib at 200 mg.  CT of the chest performed 10/29/2024 shows stable size of the left breast mass, decrease in size of the left axillary lymph node and mixed lytic and blastic lesion of the rib slightly increased in size.  11/11/2024-resumed Ribociclib 200 mg daily for 3/4 weeks.  2/18/2025-scans with disease progression in the bones in the liver.  Discontinue Ribociclib and anastrozole  2/28/2025-received the first dose of Faslodex.  3/17/2025-started truqap  3/21/2025- CBC reviewed and WBC 4.81, hemoglobin 13.9 platelets 595,000, CMP reviewed and LFTs have improved with ALT which is normal at 33, AST 71, alkaline phosphatase 185, total bilirubin normal at 0.2, blood sugar elevated at 409  Completed 1 week of trucap, resume again on 3/24/2025  4/4/2025 due for cycle 2-day 1 Faslodex.  She is struggling with intermittent hyperglycemia related to Truqap which is improved with glipizide  5/12/2025-CT of the chest and bone scan with overall stable disease.  Prior to that she had a CT abdomen end of April 2025 which also shows no evidence of metastatic disease.  Patient has not taken a whole lot of trucap, maybe less than 2 weeks total since initiation in March 2025.  Bilateral diagnostic mammogram and ultrasound from 6/24/2025 concerning for disease progression  CT of the chest abdomen and pelvis from 6/24/2025 also concerning for disease progression  I reviewed her previous pathology report and HER2 is noted to be 0.  She had a repeat punch biopsy of the left breast skin.  Will follow-up on the pathology from that.  If HER2 is 1+ or even  ultralow we should be able to use Enhertu.  She truly did not have disease progression on Truqap however she was hospitalized back-to-back for the few days that she took 2 Due to recurrent UTIs.  She also currently has an ongoing UTI.  I worry that any type of PIK 3 CA directed therapy will result in UTIs, hyperglycemia and diarrhea which may not be ideal for her situation with the multiple sclerosis and inability to transfer as well as bladder dysfunction  Therefore the next best option would be chemotherapy which would be either Enhertu, Trodelvy or taxane.  I called and discussed with Dr. Jacqueline Daniels to determine the HER2 and she looked at the original biopsy again and this was negative.  Punch biopsy from the left breast noted to be ER negative, HI negative and HER2 nu 0.  Invasive pleomorphic lobular carcinoma.  Given that the receptor status has changed, we would have to treat the metastatic breast cancer which is progressing with chemotherapy which would be either Xeloda or Trodelvy as HER2/dorothy was 0 Enhertu is not an option.  I reviewed both of these options with the patient and the side effects of both these medications at length and she prefers to proceed with Trodelvy.  Will also obtain a PD-L1 status to see if immunotherapy is an option.  Started Trodelvy at 7.5 mg/kg, q. 14 days as I am really concerned about her ability to tolerate.  We will also plan for Neulasta upfront.  Although tumor markers were not elevated originally now with elevated tumor markers with a CA 15-3 of 296 on 6/25/2025 and CA 20 7.29 of 409 on 6/25/2025.  7/22/2025 patient returns today for initiation of Trodelvy as planned.  She had her port placed yesterday.  We did get Neulasta support.  Monitor hemoglobin closely at 8.7 today.    *Severe hyperglycemia  Blood sugar greater than 400  Administer 5 units of insulin  Start glipizide 2.5 mg  Remain glucometer and diabetic education.  Continue glipizide 2.5 mg daily, blood  glucose 142 today.  The patient understands to continue truqap if AM fasting blood glucose is less than 250.   5/9/2025-blood sugar 128  7/22/2025 glucose 248    *Right axillary lymphadenopathy  Biopsied and consistent with invasive lobular carcinoma, grade 2, ER/NM positive and HER2 negative  Recent bilateral diagnostic mammogram and ultrasound from 6/24/2025 shows disease progression    *Right breast non-mass enhancement  6/28/2024-MRI of the breast with non-mass enhancement noted in the right breast and patient had questions regarding if this should be biopsied.  Given extensive metastatic disease in the bones and right axilla lymph node consistent with invasive lobular carcinoma management would not change with the biopsy and hence I would recommend against it.    *Severe nausea  Continue Compazine and Zofran as needed  Well-controlled at this time    *Skeletal metastasis  Patient will be started on Xgeva  8/16/2024 Xgeva initiation will be held as the patient has not been to the dentist in over 2 years.  Discussed possible side effects of Xgeva and she will schedule a dental visit and we will have her back in 1 week to initiate Xgeva pending this is complete.  10/14/2024-second dose of Xgeva will be administered.  Labs reviewed and stable to proceed.  Continue Xgeva every 4 weeks    *Multiple sclerosis  Patient was not on any treatment previously.  She sees Dr. Jacobson at [facility].  Due to profound weakness patient requested her neurologist to start steroids.  They plan to initiate high-dose IV steroids to help with this.  High-dose IV steroids have not been initiated.  Doing physical therapy and received high-dose steroids  Stable    *Hypertension-continue current medication  Blood pressure BP: 126/75   Continue to monitor  No changes in medications    *Hyperlipidemia-continue current medication  No changes    *Urinary incontinence-patient had  a suprapubic catheter placed by urology.  Patient with  recurrent UTIs  UTIs are a side effect of truqap  Discussed with her primary care physician regarding suppressive therapy  Patient does not wish to go back on Truqap at this time    *History of iron deficiency anemia-  3/8/2024 hemoglobin was low at 10.9 and subsequently patient took oral iron which resulted in improvement of hemoglobin and normal.  She was scheduled for colonoscopy however she canceled that due to ongoing management of breast cancer.  She proceed with a colonoscopy.  8/16/2024 hemoglobin is 10.2.  She states she recently was told to begin daily iron supplementation.  Baseline iron studies ordered today she is only been taking the iron for a few days.  Ferritin 32, iron saturation 5%, TIBC 440.  We will repeat this in 6 to 8 weeks.  12/10/2024 Hgb 12.0. Continue oral iron  Hemoglobin normal at 12.6 on 2/28/2025 7/7/2025-hemoglobin 9.9  7/22/2025- hgb 8.7.  Ferritin 333, iron saturation 17%, TIBC 353  8/1/2025: Hemoglobin improved to 9.8.    *Genetic testing-9 gene stat panel negative    *Dyspnea  Stable to improved    *Right upper lobe pulmonary nodule   Concerning for metastatic disease  PET/CT may not be helpful as invasive lobular carcinomas typically are not very PET avid.  Could consider PET-FES  Stable on the most recent CT of the chest    *Follow-up-after the MRIs.  Reviewed MRI of the pelvis and MRI of the brain which shows mets in the bone  MRI of the cervical and thoracic spine performed at outside facility on 10/3/2024    *Abnormal LFTs  Likely secondary to Ribociclib  Ribociclib dose increased to 400 mg daily on 12/10/2024  12/31/2024-Labs reviewed and stable.  1/13/2025: LFTs further increased with AST 72, ALT 22.  Decision made to hold off on starting next cycle of ribociclib.  1/31/2025-AST 72, ALT normal, total bilirubin normal  Liver function studies minimally elevated today, 4/4/2025 with AST 73, ALT 52, total bilirubin is normal at 0.4  Most recent LFTs from May 2025 reviewed and  stable  7/7/2025-LFTs stable\  7/22/2025 AST 95, ALT normal and total bilirubin normal:dosing: Liver Impairment: Adult (Sacituzumab govitecan) Mild impairment (total bilirubin <= ULN with AST > ULN or bilirubin >1 to 1.5 times ULN with any AST): No initial dosage adjustment necessary.Moderate to severe impairment (total bilirubin >1.5 times ULN, or AST and ALT >3 times ULN without liver metastases, or AST and ALT >5 times ULN with liver metastases): There are no dosage adjustments provided in the 's labeling (no initial dosage recommendation can be made).  Therefore no adjustment needed in dosing.  Discussed with Dr. Reyes.  8/1/2025: AST 85, ALT 41, T. bili less than 0.2.  Repeat Wednesday prior to next Trodelvy.    *Constipation  Patient having constipation utilizing senna.  Initiating Trodelvy today and due for atropine.  Will give 50% of atropine dose    *Insomnia  Severe  Using trazodone and Ambien.  Neither of them helping  Only clonazepam helps  Referral to supportive oncology    *Alternate medication  Patient sees a outside provider and receives ivermectin and fenbendazole  We have ran an interaction check with the medications and does not appear to be any interaction however I want her against taking these medications and potential side effects  Patient however wishes to proceed with his medications.  Patient continues on ivermectin    *Erythema port site  8/1/2025: Seen for triage visit due to erythema around her port site.  Was hospitalized last week, had her port accessed during hospitalization.  Denies any issues with her port during hospitalization.  However, once dressing was removed and she was discharged home she noted a lot of pruritus to her port site.  She then noticed erythema/rash around the port site.  No warmth or tenderness present.  She has not had a fever since discharge from the hospital.  Picture of port site present in media.  Reviewed with Dr. Gibbons in Dr. Lopes's  absence who agrees this is likely allergic rash secondary to port site dressing or what they used in the hospital to clean port site.  Recommend utilizing hydrocortisone cream and we we will reevaluate on Wednesday.  If she starts having fever, she will call and notify us before then.    *Hospitalized from 7/25/2025 - 7/29/2025 for sepsis secondary to acute UTI from indwelling catheter.  Indwelling Cobos was changed on admission and prior to discharge per recommendation of ID.  Blood cultures were negative prior to discharge.  She was started on cefepime and fluconazole, per ID was discharged on fluconazole to complete a 5-day course.      Plan:   Start hydrocortisone cream to skin rash at port site  Call if she develops fever.  Return Wednesday for NP follow-up and cycle 1 day 15 Trodelvy.  Will reevaluate port site at that visit.  Continues monthly Xgeva, next due 8/15/2025.  She is approved for a Udenyca.  Premedication with Trodelvy: Atropine given at 50% dose with cycle 1 day 1 due to existing constipation.  Return in 3 weeks for review by Dr. Lopes with labs and cycle 2-day 1 Trodelvy will review  PD-L1 testing and NGS on the punch biopsy or liquid biopsy.    Liz Farmer, DANA   08/01/2025

## 2025-08-04 ENCOUNTER — DOCUMENTATION (OUTPATIENT)
Dept: OTHER | Facility: HOSPITAL | Age: 65
End: 2025-08-04
Payer: MEDICARE

## 2025-08-04 ENCOUNTER — OFFICE VISIT (OUTPATIENT)
Dept: INTERNAL MEDICINE | Facility: CLINIC | Age: 65
End: 2025-08-04
Payer: MEDICARE

## 2025-08-04 VITALS
WEIGHT: 147 LBS | SYSTOLIC BLOOD PRESSURE: 150 MMHG | BODY MASS INDEX: 24.49 KG/M2 | HEART RATE: 56 BPM | HEIGHT: 65 IN | TEMPERATURE: 97.7 F | OXYGEN SATURATION: 100 % | DIASTOLIC BLOOD PRESSURE: 94 MMHG

## 2025-08-04 DIAGNOSIS — Z97.8 INDWELLING FOLEY CATHETER PRESENT: ICD-10-CM

## 2025-08-04 DIAGNOSIS — N39.0 RECURRENT UTI: ICD-10-CM

## 2025-08-04 DIAGNOSIS — Z09 HOSPITAL DISCHARGE FOLLOW-UP: Primary | ICD-10-CM

## 2025-08-04 DIAGNOSIS — G47.00 INSOMNIA, UNSPECIFIED TYPE: ICD-10-CM

## 2025-08-04 PROCEDURE — 3077F SYST BP >= 140 MM HG: CPT | Performed by: STUDENT IN AN ORGANIZED HEALTH CARE EDUCATION/TRAINING PROGRAM

## 2025-08-04 PROCEDURE — 99214 OFFICE O/P EST MOD 30 MIN: CPT | Performed by: STUDENT IN AN ORGANIZED HEALTH CARE EDUCATION/TRAINING PROGRAM

## 2025-08-04 PROCEDURE — 3080F DIAST BP >= 90 MM HG: CPT | Performed by: STUDENT IN AN ORGANIZED HEALTH CARE EDUCATION/TRAINING PROGRAM

## 2025-08-04 PROCEDURE — 1126F AMNT PAIN NOTED NONE PRSNT: CPT | Performed by: STUDENT IN AN ORGANIZED HEALTH CARE EDUCATION/TRAINING PROGRAM

## 2025-08-04 PROCEDURE — 3051F HG A1C>EQUAL 7.0%<8.0%: CPT | Performed by: STUDENT IN AN ORGANIZED HEALTH CARE EDUCATION/TRAINING PROGRAM

## 2025-08-04 PROCEDURE — 1111F DSCHRG MED/CURRENT MED MERGE: CPT | Performed by: STUDENT IN AN ORGANIZED HEALTH CARE EDUCATION/TRAINING PROGRAM

## 2025-08-04 RX ORDER — ESZOPICLONE 1 MG/1
1 TABLET, FILM COATED ORAL NIGHTLY
Qty: 30 TABLET | Refills: 0 | Status: SHIPPED | OUTPATIENT
Start: 2025-08-04

## 2025-08-06 ENCOUNTER — OFFICE VISIT (OUTPATIENT)
Dept: ONCOLOGY | Facility: CLINIC | Age: 65
End: 2025-08-06
Payer: MEDICARE

## 2025-08-06 ENCOUNTER — INFUSION (OUTPATIENT)
Dept: ONCOLOGY | Facility: HOSPITAL | Age: 65
End: 2025-08-06
Payer: MEDICARE

## 2025-08-06 VITALS
TEMPERATURE: 98.5 F | WEIGHT: 147 LBS | DIASTOLIC BLOOD PRESSURE: 79 MMHG | BODY MASS INDEX: 24.49 KG/M2 | HEART RATE: 80 BPM | SYSTOLIC BLOOD PRESSURE: 147 MMHG | HEIGHT: 65 IN | OXYGEN SATURATION: 96 % | RESPIRATION RATE: 18 BRPM

## 2025-08-06 DIAGNOSIS — C50.912: Primary | ICD-10-CM

## 2025-08-06 DIAGNOSIS — C79.51 CANCER, METASTATIC TO BONE: ICD-10-CM

## 2025-08-06 DIAGNOSIS — D64.9 ANEMIA, UNSPECIFIED TYPE: ICD-10-CM

## 2025-08-06 DIAGNOSIS — C50.912: ICD-10-CM

## 2025-08-06 DIAGNOSIS — Z79.899 HIGH RISK MEDICATION USE: ICD-10-CM

## 2025-08-06 DIAGNOSIS — E11.9 CONTROLLED TYPE 2 DIABETES MELLITUS WITHOUT COMPLICATION, WITHOUT LONG-TERM CURRENT USE OF INSULIN: Primary | ICD-10-CM

## 2025-08-06 DIAGNOSIS — C50.812 MALIGNANT NEOPLASM OF OVERLAPPING SITES OF LEFT BREAST IN FEMALE, ESTROGEN RECEPTOR POSITIVE: ICD-10-CM

## 2025-08-06 DIAGNOSIS — D64.9 CHRONIC ANEMIA: ICD-10-CM

## 2025-08-06 DIAGNOSIS — Z17.0 MALIGNANT NEOPLASM OF OVERLAPPING SITES OF LEFT BREAST IN FEMALE, ESTROGEN RECEPTOR POSITIVE: ICD-10-CM

## 2025-08-06 LAB
ALBUMIN SERPL-MCNC: 3.8 G/DL (ref 3.5–5.2)
ALBUMIN/GLOB SERPL: 1.3 G/DL
ALP SERPL-CCNC: 193 U/L (ref 39–117)
ALT SERPL W P-5'-P-CCNC: 29 U/L (ref 1–33)
ANION GAP SERPL CALCULATED.3IONS-SCNC: 12.1 MMOL/L (ref 5–15)
AST SERPL-CCNC: 61 U/L (ref 1–32)
BASOPHILS # BLD AUTO: 0.04 10*3/MM3 (ref 0–0.2)
BASOPHILS NFR BLD AUTO: 0.6 % (ref 0–1.5)
BILIRUB SERPL-MCNC: <0.2 MG/DL (ref 0–1.2)
BUN SERPL-MCNC: 11.5 MG/DL (ref 8–23)
BUN/CREAT SERPL: 26.7 (ref 7–25)
CALCIUM SPEC-SCNC: 9 MG/DL (ref 8.6–10.5)
CHLORIDE SERPL-SCNC: 104 MMOL/L (ref 98–107)
CO2 SERPL-SCNC: 21.9 MMOL/L (ref 22–29)
CREAT SERPL-MCNC: 0.43 MG/DL (ref 0.57–1)
DEPRECATED RDW RBC AUTO: 57.6 FL (ref 37–54)
EGFRCR SERPLBLD CKD-EPI 2021: 110.1 ML/MIN/1.73
EOSINOPHIL # BLD AUTO: 0.13 10*3/MM3 (ref 0–0.4)
EOSINOPHIL NFR BLD AUTO: 1.9 % (ref 0.3–6.2)
ERYTHROCYTE [DISTWIDTH] IN BLOOD BY AUTOMATED COUNT: 17.4 % (ref 12.3–15.4)
FOLATE SERPL-MCNC: 8.54 NG/ML (ref 4.78–24.2)
GLOBULIN UR ELPH-MCNC: 3 GM/DL
GLUCOSE SERPL-MCNC: 146 MG/DL (ref 65–99)
HBA1C MFR BLD: 7.2 % (ref 4.8–5.6)
HCT VFR BLD AUTO: 29.1 % (ref 34–46.6)
HGB BLD-MCNC: 8.9 G/DL (ref 12–15.9)
IMM GRANULOCYTES # BLD AUTO: 0.2 10*3/MM3 (ref 0–0.05)
IMM GRANULOCYTES NFR BLD AUTO: 2.9 % (ref 0–0.5)
LYMPHOCYTES # BLD AUTO: 1.35 10*3/MM3 (ref 0.7–3.1)
LYMPHOCYTES NFR BLD AUTO: 19.4 % (ref 19.6–45.3)
MCH RBC QN AUTO: 28.1 PG (ref 26.6–33)
MCHC RBC AUTO-ENTMCNC: 30.6 G/DL (ref 31.5–35.7)
MCV RBC AUTO: 91.8 FL (ref 79–97)
MONOCYTES # BLD AUTO: 0.28 10*3/MM3 (ref 0.1–0.9)
MONOCYTES NFR BLD AUTO: 4 % (ref 5–12)
NEUTROPHILS NFR BLD AUTO: 4.97 10*3/MM3 (ref 1.7–7)
NEUTROPHILS NFR BLD AUTO: 71.2 % (ref 42.7–76)
NRBC BLD AUTO-RTO: 0 /100 WBC (ref 0–0.2)
PLATELET # BLD AUTO: 353 10*3/MM3 (ref 140–450)
PMV BLD AUTO: 9 FL (ref 6–12)
POTASSIUM SERPL-SCNC: 3.4 MMOL/L (ref 3.5–5.2)
PROT SERPL-MCNC: 6.8 G/DL (ref 6–8.5)
RBC # BLD AUTO: 3.17 10*6/MM3 (ref 3.77–5.28)
SODIUM SERPL-SCNC: 138 MMOL/L (ref 136–145)
VIT B12 BLD-MCNC: >2000 PG/ML (ref 211–946)
WBC NRBC COR # BLD AUTO: 6.97 10*3/MM3 (ref 3.4–10.8)

## 2025-08-06 PROCEDURE — 96377 APPLICATON ON-BODY INJECTOR: CPT

## 2025-08-06 PROCEDURE — 36415 COLL VENOUS BLD VENIPUNCTURE: CPT | Performed by: NURSE PRACTITIONER

## 2025-08-06 PROCEDURE — 25810000003 SODIUM CHLORIDE 0.9 % SOLUTION 250 ML FLEX CONT: Performed by: NURSE PRACTITIONER

## 2025-08-06 PROCEDURE — 25010000002 PALONOSETRON PER 25 MCG: Performed by: INTERNAL MEDICINE

## 2025-08-06 PROCEDURE — 25010000002 SACITUZUMAB GOVITECAN-HZIY 180 MG RECONSTITUTED SOLUTION 1 EACH VIAL: Performed by: NURSE PRACTITIONER

## 2025-08-06 PROCEDURE — 83036 HEMOGLOBIN GLYCOSYLATED A1C: CPT | Performed by: NURSE PRACTITIONER

## 2025-08-06 PROCEDURE — 80053 COMPREHEN METABOLIC PANEL: CPT | Performed by: NURSE PRACTITIONER

## 2025-08-06 PROCEDURE — 96413 CHEMO IV INFUSION 1 HR: CPT

## 2025-08-06 PROCEDURE — 82607 VITAMIN B-12: CPT | Performed by: NURSE PRACTITIONER

## 2025-08-06 PROCEDURE — 25810000003 SODIUM CHLORIDE 0.9 % SOLUTION: Performed by: INTERNAL MEDICINE

## 2025-08-06 PROCEDURE — 25010000002 PEGFILGRASTIM-CBQV 6 MG/0.6ML SOLUTION PREFILLED SYRINGE: Performed by: NURSE PRACTITIONER

## 2025-08-06 PROCEDURE — A9270 NON-COVERED ITEM OR SERVICE: HCPCS | Performed by: INTERNAL MEDICINE

## 2025-08-06 PROCEDURE — 63710000001 ACETAMINOPHEN 325 MG TABLET: Performed by: INTERNAL MEDICINE

## 2025-08-06 PROCEDURE — 25010000002 ATROPINE SULFATE 0.4 MG/ML SOLUTION: Performed by: INTERNAL MEDICINE

## 2025-08-06 PROCEDURE — 25010000002 DIPHENHYDRAMINE PER 50 MG: Performed by: INTERNAL MEDICINE

## 2025-08-06 PROCEDURE — 85025 COMPLETE CBC W/AUTO DIFF WBC: CPT | Performed by: NURSE PRACTITIONER

## 2025-08-06 PROCEDURE — 25010000002 DEXAMETHASONE SODIUM PHOSPHATE 100 MG/10ML SOLUTION: Performed by: INTERNAL MEDICINE

## 2025-08-06 PROCEDURE — 96375 TX/PRO/DX INJ NEW DRUG ADDON: CPT

## 2025-08-06 PROCEDURE — 25010000002 FAMOTIDINE 10 MG/ML SOLUTION: Performed by: INTERNAL MEDICINE

## 2025-08-06 PROCEDURE — 25010000002 FOSAPREPITANT PER 1 MG: Performed by: INTERNAL MEDICINE

## 2025-08-06 PROCEDURE — 96367 TX/PROPH/DG ADDL SEQ IV INF: CPT

## 2025-08-06 PROCEDURE — 82746 ASSAY OF FOLIC ACID SERUM: CPT | Performed by: NURSE PRACTITIONER

## 2025-08-06 RX ORDER — PALONOSETRON 0.05 MG/ML
0.25 INJECTION, SOLUTION INTRAVENOUS ONCE
Status: COMPLETED | OUTPATIENT
Start: 2025-08-06 | End: 2025-08-06

## 2025-08-06 RX ORDER — ATROPINE SULFATE 0.4 MG/ML
0.25 INJECTION, SOLUTION INTRAVENOUS
Status: DISCONTINUED | OUTPATIENT
Start: 2025-08-06 | End: 2025-08-06 | Stop reason: HOSPADM

## 2025-08-06 RX ORDER — FAMOTIDINE 10 MG/ML
20 INJECTION, SOLUTION INTRAVENOUS ONCE
Status: COMPLETED | OUTPATIENT
Start: 2025-08-06 | End: 2025-08-06

## 2025-08-06 RX ORDER — SODIUM CHLORIDE 9 MG/ML
20 INJECTION, SOLUTION INTRAVENOUS ONCE
Status: COMPLETED | OUTPATIENT
Start: 2025-08-06 | End: 2025-08-06

## 2025-08-06 RX ORDER — ACETAMINOPHEN 325 MG/1
650 TABLET ORAL ONCE
Status: COMPLETED | OUTPATIENT
Start: 2025-08-06 | End: 2025-08-06

## 2025-08-06 RX ADMIN — SODIUM CHLORIDE 20 ML/HR: 9 INJECTION, SOLUTION INTRAVENOUS at 10:40

## 2025-08-06 RX ADMIN — ACETAMINOPHEN 650 MG: 325 TABLET ORAL at 10:45

## 2025-08-06 RX ADMIN — SODIUM CHLORIDE 500 MG: 9 INJECTION, SOLUTION INTRAVENOUS at 11:53

## 2025-08-06 RX ADMIN — ATROPINE SULFATE 0.25 MG: 0.4 INJECTION, SOLUTION INTRAVENOUS at 11:51

## 2025-08-06 RX ADMIN — SODIUM CHLORIDE 12 MG: 9 INJECTION, SOLUTION INTRAVENOUS at 10:44

## 2025-08-06 RX ADMIN — PEGFILGRASTIM-CBQV 6 MG: 6 INJECTION, SOLUTION SUBCUTANEOUS at 12:57

## 2025-08-06 RX ADMIN — FAMOTIDINE 20 MG: 10 INJECTION INTRAVENOUS at 10:40

## 2025-08-06 RX ADMIN — PALONOSETRON 0.25 MG: 0.05 INJECTION, SOLUTION INTRAVENOUS at 10:43

## 2025-08-06 RX ADMIN — SODIUM CHLORIDE 25 MG: 9 INJECTION, SOLUTION INTRAVENOUS at 11:01

## 2025-08-06 RX ADMIN — FOSAPREPITANT 100 ML: 150 INJECTION, POWDER, LYOPHILIZED, FOR SOLUTION INTRAVENOUS at 11:18

## 2025-08-07 ENCOUNTER — DOCUMENTATION (OUTPATIENT)
Dept: OTHER | Facility: HOSPITAL | Age: 65
End: 2025-08-07
Payer: MEDICARE

## 2025-08-07 ENCOUNTER — TELEPHONE (OUTPATIENT)
Dept: ONCOLOGY | Facility: CLINIC | Age: 65
End: 2025-08-07
Payer: MEDICARE

## 2025-08-11 ENCOUNTER — TELEPHONE (OUTPATIENT)
Dept: OTHER | Facility: HOSPITAL | Age: 65
End: 2025-08-11
Payer: MEDICARE

## 2025-08-14 ENCOUNTER — DOCUMENTATION (OUTPATIENT)
Dept: OTHER | Facility: HOSPITAL | Age: 65
End: 2025-08-14
Payer: MEDICARE

## 2025-08-14 ENCOUNTER — APPOINTMENT (OUTPATIENT)
Dept: NUCLEAR MEDICINE | Facility: HOSPITAL | Age: 65
End: 2025-08-14
Payer: MEDICARE

## 2025-08-14 ENCOUNTER — PATIENT OUTREACH (OUTPATIENT)
Dept: OTHER | Facility: HOSPITAL | Age: 65
End: 2025-08-14
Payer: MEDICARE

## 2025-08-19 ENCOUNTER — TELEPHONE (OUTPATIENT)
Dept: OTHER | Facility: HOSPITAL | Age: 65
End: 2025-08-19
Payer: MEDICARE

## 2025-08-20 ENCOUNTER — OFFICE VISIT (OUTPATIENT)
Dept: ONCOLOGY | Facility: CLINIC | Age: 65
End: 2025-08-20
Payer: MEDICARE

## 2025-08-20 ENCOUNTER — INFUSION (OUTPATIENT)
Dept: ONCOLOGY | Facility: HOSPITAL | Age: 65
End: 2025-08-20
Payer: MEDICARE

## 2025-08-20 VITALS
HEIGHT: 65 IN | BODY MASS INDEX: 24.46 KG/M2 | OXYGEN SATURATION: 96 % | DIASTOLIC BLOOD PRESSURE: 80 MMHG | HEART RATE: 81 BPM | TEMPERATURE: 98.2 F | SYSTOLIC BLOOD PRESSURE: 124 MMHG

## 2025-08-20 DIAGNOSIS — Z17.0 MALIGNANT NEOPLASM OF OVERLAPPING SITES OF LEFT BREAST IN FEMALE, ESTROGEN RECEPTOR POSITIVE: ICD-10-CM

## 2025-08-20 DIAGNOSIS — C50.912: ICD-10-CM

## 2025-08-20 DIAGNOSIS — C50.912: Primary | ICD-10-CM

## 2025-08-20 DIAGNOSIS — C50.812 MALIGNANT NEOPLASM OF OVERLAPPING SITES OF LEFT BREAST IN FEMALE, ESTROGEN RECEPTOR POSITIVE: ICD-10-CM

## 2025-08-20 DIAGNOSIS — C79.51 CANCER, METASTATIC TO BONE: Primary | ICD-10-CM

## 2025-08-20 DIAGNOSIS — C79.51 CANCER, METASTATIC TO BONE: ICD-10-CM

## 2025-08-20 LAB
ALBUMIN SERPL-MCNC: 3.8 G/DL (ref 3.5–5.2)
ALBUMIN/GLOB SERPL: 1.4 G/DL
ALP SERPL-CCNC: 218 U/L (ref 39–117)
ALT SERPL W P-5'-P-CCNC: 37 U/L (ref 1–33)
ANION GAP SERPL CALCULATED.3IONS-SCNC: 11.8 MMOL/L (ref 5–15)
AST SERPL-CCNC: 65 U/L (ref 1–32)
BASOPHILS # BLD AUTO: 0.06 10*3/MM3 (ref 0–0.2)
BASOPHILS NFR BLD AUTO: 1 % (ref 0–1.5)
BILIRUB SERPL-MCNC: 0.2 MG/DL (ref 0–1.2)
BUN SERPL-MCNC: 16.3 MG/DL (ref 8–23)
BUN/CREAT SERPL: 37.9 (ref 7–25)
CALCIUM SPEC-SCNC: 8.7 MG/DL (ref 8.6–10.5)
CANCER AG15-3 SERPL-ACNC: 350 U/ML
CHLORIDE SERPL-SCNC: 108 MMOL/L (ref 98–107)
CO2 SERPL-SCNC: 22.2 MMOL/L (ref 22–29)
CREAT SERPL-MCNC: 0.43 MG/DL (ref 0.57–1)
DEPRECATED RDW RBC AUTO: 59.7 FL (ref 37–54)
EGFRCR SERPLBLD CKD-EPI 2021: 110.1 ML/MIN/1.73
EOSINOPHIL # BLD AUTO: 0.14 10*3/MM3 (ref 0–0.4)
EOSINOPHIL NFR BLD AUTO: 2.2 % (ref 0.3–6.2)
ERYTHROCYTE [DISTWIDTH] IN BLOOD BY AUTOMATED COUNT: 19 % (ref 12.3–15.4)
GLOBULIN UR ELPH-MCNC: 2.7 GM/DL
GLUCOSE SERPL-MCNC: 258 MG/DL (ref 65–99)
HCT VFR BLD AUTO: 25.2 % (ref 34–46.6)
HGB BLD-MCNC: 7.9 G/DL (ref 12–15.9)
IMM GRANULOCYTES # BLD AUTO: 0.19 10*3/MM3 (ref 0–0.05)
IMM GRANULOCYTES NFR BLD AUTO: 3 % (ref 0–0.5)
LYMPHOCYTES # BLD AUTO: 1 10*3/MM3 (ref 0.7–3.1)
LYMPHOCYTES NFR BLD AUTO: 15.8 % (ref 19.6–45.3)
MAGNESIUM SERPL-MCNC: 2.1 MG/DL (ref 1.6–2.4)
MCH RBC QN AUTO: 28.3 PG (ref 26.6–33)
MCHC RBC AUTO-ENTMCNC: 31.3 G/DL (ref 31.5–35.7)
MCV RBC AUTO: 90.3 FL (ref 79–97)
MONOCYTES # BLD AUTO: 0.3 10*3/MM3 (ref 0.1–0.9)
MONOCYTES NFR BLD AUTO: 4.8 % (ref 5–12)
NEUTROPHILS NFR BLD AUTO: 4.62 10*3/MM3 (ref 1.7–7)
NEUTROPHILS NFR BLD AUTO: 73.2 % (ref 42.7–76)
NRBC BLD AUTO-RTO: 0.3 /100 WBC (ref 0–0.2)
PHOSPHATE SERPL-MCNC: 3.1 MG/DL (ref 2.5–4.5)
PLATELET # BLD AUTO: 307 10*3/MM3 (ref 140–450)
PMV BLD AUTO: 8.9 FL (ref 6–12)
POTASSIUM SERPL-SCNC: 3.8 MMOL/L (ref 3.5–5.2)
PROT SERPL-MCNC: 6.5 G/DL (ref 6–8.5)
RBC # BLD AUTO: 2.79 10*6/MM3 (ref 3.77–5.28)
SODIUM SERPL-SCNC: 142 MMOL/L (ref 136–145)
WBC NRBC COR # BLD AUTO: 6.31 10*3/MM3 (ref 3.4–10.8)

## 2025-08-20 PROCEDURE — 25010000002 PEGFILGRASTIM-CBQV 6 MG/0.6ML SOLUTION PREFILLED SYRINGE: Performed by: INTERNAL MEDICINE

## 2025-08-20 PROCEDURE — A9270 NON-COVERED ITEM OR SERVICE: HCPCS | Performed by: INTERNAL MEDICINE

## 2025-08-20 PROCEDURE — 96377 APPLICATON ON-BODY INJECTOR: CPT

## 2025-08-20 PROCEDURE — 25810000003 SODIUM CHLORIDE 0.9 % SOLUTION 250 ML FLEX CONT: Performed by: INTERNAL MEDICINE

## 2025-08-20 PROCEDURE — 84100 ASSAY OF PHOSPHORUS: CPT

## 2025-08-20 PROCEDURE — 25010000002 DEXAMETHASONE SODIUM PHOSPHATE 100 MG/10ML SOLUTION: Performed by: INTERNAL MEDICINE

## 2025-08-20 PROCEDURE — 36415 COLL VENOUS BLD VENIPUNCTURE: CPT

## 2025-08-20 PROCEDURE — 25010000002 FOSAPREPITANT PER 1 MG: Performed by: INTERNAL MEDICINE

## 2025-08-20 PROCEDURE — 25010000002 ATROPINE SULFATE 0.4 MG/ML SOLUTION: Performed by: INTERNAL MEDICINE

## 2025-08-20 PROCEDURE — 25010000002 PALONOSETRON PER 25 MCG: Performed by: INTERNAL MEDICINE

## 2025-08-20 PROCEDURE — 25010000002 SACITUZUMAB GOVITECAN-HZIY 180 MG RECONSTITUTED SOLUTION 1 EACH VIAL: Performed by: INTERNAL MEDICINE

## 2025-08-20 PROCEDURE — 96375 TX/PRO/DX INJ NEW DRUG ADDON: CPT

## 2025-08-20 PROCEDURE — 80053 COMPREHEN METABOLIC PANEL: CPT

## 2025-08-20 PROCEDURE — 96413 CHEMO IV INFUSION 1 HR: CPT

## 2025-08-20 PROCEDURE — 83735 ASSAY OF MAGNESIUM: CPT

## 2025-08-20 PROCEDURE — 25010000002 DIPHENHYDRAMINE PER 50 MG: Performed by: INTERNAL MEDICINE

## 2025-08-20 PROCEDURE — 25810000003 SODIUM CHLORIDE 0.9 % SOLUTION: Performed by: INTERNAL MEDICINE

## 2025-08-20 PROCEDURE — 25010000002 FAMOTIDINE 10 MG/ML SOLUTION: Performed by: INTERNAL MEDICINE

## 2025-08-20 PROCEDURE — 86300 IMMUNOASSAY TUMOR CA 15-3: CPT | Performed by: INTERNAL MEDICINE

## 2025-08-20 PROCEDURE — 25010000002 DENOSUMAB 120 MG/1.7ML SOLUTION: Performed by: INTERNAL MEDICINE

## 2025-08-20 PROCEDURE — 96372 THER/PROPH/DIAG INJ SC/IM: CPT

## 2025-08-20 PROCEDURE — 85025 COMPLETE CBC W/AUTO DIFF WBC: CPT

## 2025-08-20 PROCEDURE — 63710000001 ACETAMINOPHEN 325 MG TABLET: Performed by: INTERNAL MEDICINE

## 2025-08-20 PROCEDURE — 96367 TX/PROPH/DG ADDL SEQ IV INF: CPT

## 2025-08-20 RX ORDER — ATROPINE SULFATE 1 MG/ML
0.25 INJECTION, SOLUTION INTRAMUSCULAR; INTRAVENOUS; SUBCUTANEOUS
Status: CANCELLED | OUTPATIENT
Start: 2025-08-20

## 2025-08-20 RX ORDER — FAMOTIDINE 10 MG/ML
20 INJECTION, SOLUTION INTRAVENOUS ONCE
Status: CANCELLED | OUTPATIENT
Start: 2025-08-20

## 2025-08-20 RX ORDER — DIPHENHYDRAMINE HYDROCHLORIDE 50 MG/ML
50 INJECTION, SOLUTION INTRAMUSCULAR; INTRAVENOUS AS NEEDED
OUTPATIENT
Start: 2025-09-03

## 2025-08-20 RX ORDER — ACETAMINOPHEN 325 MG/1
650 TABLET ORAL ONCE
Status: CANCELLED | OUTPATIENT
Start: 2025-08-20

## 2025-08-20 RX ORDER — SODIUM CHLORIDE 9 MG/ML
20 INJECTION, SOLUTION INTRAVENOUS ONCE
OUTPATIENT
Start: 2025-09-03

## 2025-08-20 RX ORDER — FAMOTIDINE 10 MG/ML
20 INJECTION, SOLUTION INTRAVENOUS AS NEEDED
OUTPATIENT
Start: 2025-09-03

## 2025-08-20 RX ORDER — FAMOTIDINE 10 MG/ML
20 INJECTION, SOLUTION INTRAVENOUS ONCE
OUTPATIENT
Start: 2025-09-03

## 2025-08-20 RX ORDER — SODIUM CHLORIDE 9 MG/ML
20 INJECTION, SOLUTION INTRAVENOUS ONCE
Status: CANCELLED | OUTPATIENT
Start: 2025-08-20

## 2025-08-20 RX ORDER — HYDROCORTISONE SODIUM SUCCINATE 100 MG/2ML
100 INJECTION INTRAMUSCULAR; INTRAVENOUS AS NEEDED
OUTPATIENT
Start: 2025-09-03

## 2025-08-20 RX ORDER — PALONOSETRON 0.05 MG/ML
0.25 INJECTION, SOLUTION INTRAVENOUS ONCE
Status: COMPLETED | OUTPATIENT
Start: 2025-08-20 | End: 2025-08-20

## 2025-08-20 RX ORDER — ACETAMINOPHEN 325 MG/1
650 TABLET ORAL ONCE
OUTPATIENT
Start: 2025-09-03

## 2025-08-20 RX ORDER — SODIUM CHLORIDE 9 MG/ML
20 INJECTION, SOLUTION INTRAVENOUS ONCE
Status: COMPLETED | OUTPATIENT
Start: 2025-08-20 | End: 2025-08-20

## 2025-08-20 RX ORDER — ACETAMINOPHEN 325 MG/1
650 TABLET ORAL ONCE
Status: COMPLETED | OUTPATIENT
Start: 2025-08-20 | End: 2025-08-20

## 2025-08-20 RX ORDER — ATROPINE SULFATE 0.4 MG/ML
0.25 INJECTION, SOLUTION INTRAVENOUS
Status: DISCONTINUED | OUTPATIENT
Start: 2025-08-20 | End: 2025-08-20 | Stop reason: HOSPADM

## 2025-08-20 RX ORDER — FAMOTIDINE 10 MG/ML
20 INJECTION, SOLUTION INTRAVENOUS ONCE
Status: COMPLETED | OUTPATIENT
Start: 2025-08-20 | End: 2025-08-20

## 2025-08-20 RX ORDER — FAMOTIDINE 10 MG/ML
20 INJECTION, SOLUTION INTRAVENOUS AS NEEDED
Status: CANCELLED | OUTPATIENT
Start: 2025-08-20

## 2025-08-20 RX ORDER — PALONOSETRON 0.05 MG/ML
0.25 INJECTION, SOLUTION INTRAVENOUS ONCE
OUTPATIENT
Start: 2025-09-03

## 2025-08-20 RX ORDER — PALONOSETRON 0.05 MG/ML
0.25 INJECTION, SOLUTION INTRAVENOUS ONCE
Status: CANCELLED | OUTPATIENT
Start: 2025-08-20

## 2025-08-20 RX ORDER — DIPHENHYDRAMINE HYDROCHLORIDE 50 MG/ML
50 INJECTION, SOLUTION INTRAMUSCULAR; INTRAVENOUS AS NEEDED
Status: CANCELLED | OUTPATIENT
Start: 2025-08-20

## 2025-08-20 RX ORDER — HYDROCORTISONE SODIUM SUCCINATE 100 MG/2ML
100 INJECTION INTRAMUSCULAR; INTRAVENOUS AS NEEDED
Status: CANCELLED | OUTPATIENT
Start: 2025-08-20

## 2025-08-20 RX ORDER — ATROPINE SULFATE 1 MG/ML
0.25 INJECTION, SOLUTION INTRAMUSCULAR; INTRAVENOUS; SUBCUTANEOUS
OUTPATIENT
Start: 2025-09-03

## 2025-08-20 RX ADMIN — ATROPINE SULFATE 0.25 MG: 0.4 INJECTION, SOLUTION INTRAVENOUS at 13:08

## 2025-08-20 RX ADMIN — FAMOTIDINE 20 MG: 10 INJECTION INTRAVENOUS at 13:06

## 2025-08-20 RX ADMIN — SODIUM CHLORIDE 20 ML/HR: 9 INJECTION, SOLUTION INTRAVENOUS at 11:41

## 2025-08-20 RX ADMIN — ACETAMINOPHEN 650 MG: 325 TABLET ORAL at 11:52

## 2025-08-20 RX ADMIN — PALONOSETRON 0.25 MG: 0.05 INJECTION, SOLUTION INTRAVENOUS at 12:28

## 2025-08-20 RX ADMIN — DIPHENHYDRAMINE HYDROCHLORIDE 25 MG: 50 INJECTION INTRAMUSCULAR; INTRAVENOUS at 12:48

## 2025-08-20 RX ADMIN — SODIUM CHLORIDE 665 MG: 9 INJECTION, SOLUTION INTRAVENOUS at 13:27

## 2025-08-20 RX ADMIN — SODIUM CHLORIDE 12 MG: 9 INJECTION, SOLUTION INTRAVENOUS at 12:30

## 2025-08-20 RX ADMIN — DENOSUMAB 120 MG: 120 INJECTION SUBCUTANEOUS at 14:25

## 2025-08-20 RX ADMIN — FOSAPREPITANT 100 ML: 150 INJECTION, POWDER, LYOPHILIZED, FOR SOLUTION INTRAVENOUS at 11:49

## 2025-08-20 RX ADMIN — PEGFILGRASTIM-CBQV 6 MG: 6 INJECTION, SOLUTION SUBCUTANEOUS at 14:35

## 2025-08-21 LAB — CANCER AG27-29 SERPL-ACNC: 350.3 U/ML (ref 0–38.6)

## 2025-08-24 ENCOUNTER — HOSPITAL ENCOUNTER (INPATIENT)
Facility: HOSPITAL | Age: 65
LOS: 2 days | Discharge: HOME | End: 2025-08-26
Attending: EMERGENCY MEDICINE | Admitting: HOSPITALIST
Payer: MEDICARE

## 2025-08-24 DIAGNOSIS — N39.0 COMPLICATED UTI (URINARY TRACT INFECTION): Primary | ICD-10-CM

## 2025-08-24 DIAGNOSIS — D72.829 LEUKOCYTOSIS, UNSPECIFIED TYPE: ICD-10-CM

## 2025-08-24 LAB
ALBUMIN SERPL-MCNC: 3.6 G/DL (ref 3.5–5.2)
ALBUMIN/GLOB SERPL: 1.1 G/DL
ALP SERPL-CCNC: 278 U/L (ref 39–117)
ALT SERPL W P-5'-P-CCNC: 23 U/L (ref 1–33)
ANION GAP SERPL CALCULATED.3IONS-SCNC: 13.5 MMOL/L (ref 5–15)
AST SERPL-CCNC: 44 U/L (ref 1–32)
BACTERIA UR QL AUTO: ABNORMAL /HPF
BILIRUB SERPL-MCNC: <0.2 MG/DL (ref 0–1.2)
BILIRUB UR QL STRIP: NEGATIVE
BUN SERPL-MCNC: 17 MG/DL (ref 8–23)
BUN/CREAT SERPL: 33.3 (ref 7–25)
CALCIUM SPEC-SCNC: 9 MG/DL (ref 8.6–10.5)
CHLORIDE SERPL-SCNC: 104 MMOL/L (ref 98–107)
CLARITY UR: ABNORMAL
CO2 SERPL-SCNC: 20.5 MMOL/L (ref 22–29)
COLOR UR: ABNORMAL
CREAT SERPL-MCNC: 0.51 MG/DL (ref 0.57–1)
D-LACTATE SERPL-SCNC: 1.8 MMOL/L (ref 0.5–2)
DEPRECATED RDW RBC AUTO: 56.5 FL (ref 37–54)
EGFRCR SERPLBLD CKD-EPI 2021: 105.7 ML/MIN/1.73
EOSINOPHIL # BLD MANUAL: 0.44 10*3/MM3 (ref 0–0.4)
EOSINOPHIL NFR BLD MANUAL: 2 % (ref 0.3–6.2)
ERYTHROCYTE [DISTWIDTH] IN BLOOD BY AUTOMATED COUNT: 17.6 % (ref 12.3–15.4)
GLOBULIN UR ELPH-MCNC: 3.2 GM/DL
GLUCOSE BLDC GLUCOMTR-MCNC: 193 MG/DL (ref 65–99)
GLUCOSE SERPL-MCNC: 291 MG/DL (ref 65–99)
GLUCOSE UR STRIP-MCNC: ABNORMAL MG/DL
HCT VFR BLD AUTO: 24.4 % (ref 34–46.6)
HGB BLD-MCNC: 7.7 G/DL (ref 12–15.9)
HGB UR QL STRIP.AUTO: ABNORMAL
HYALINE CASTS UR QL AUTO: ABNORMAL /LPF
KETONES UR QL STRIP: NEGATIVE
LEUKOCYTE ESTERASE UR QL STRIP.AUTO: ABNORMAL
LYMPHOCYTES # BLD MANUAL: 1.53 10*3/MM3 (ref 0.7–3.1)
LYMPHOCYTES NFR BLD MANUAL: 8 % (ref 5–12)
MCH RBC QN AUTO: 28.2 PG (ref 26.6–33)
MCHC RBC AUTO-ENTMCNC: 31.6 G/DL (ref 31.5–35.7)
MCV RBC AUTO: 89.4 FL (ref 79–97)
MONOCYTES # BLD: 1.75 10*3/MM3 (ref 0.1–0.9)
NEUTROPHILS # BLD AUTO: 17.52 10*3/MM3 (ref 1.7–7)
NEUTROPHILS NFR BLD MANUAL: 80 % (ref 42.7–76)
NITRITE UR QL STRIP: POSITIVE
NRBC BLD AUTO-RTO: 0.4 /100 WBC (ref 0–0.2)
PH UR STRIP.AUTO: >=9 [PH] (ref 5–8)
PLAT MORPH BLD: NORMAL
PLATELET # BLD AUTO: 286 10*3/MM3 (ref 140–450)
PMV BLD AUTO: 9 FL (ref 6–12)
POTASSIUM SERPL-SCNC: 4 MMOL/L (ref 3.5–5.2)
PROCALCITONIN SERPL-MCNC: 0.36 NG/ML (ref 0–0.25)
PROMYELOCYTES NFR BLD MANUAL: 3 % (ref 0–0)
PROT SERPL-MCNC: 6.8 G/DL (ref 6–8.5)
PROT UR QL STRIP: ABNORMAL
RBC # BLD AUTO: 2.73 10*6/MM3 (ref 3.77–5.28)
RBC # UR STRIP: ABNORMAL /HPF
RBC MORPH BLD: NORMAL
REF LAB TEST METHOD: ABNORMAL
SODIUM SERPL-SCNC: 138 MMOL/L (ref 136–145)
SP GR UR STRIP: 1.03 (ref 1–1.03)
SQUAMOUS #/AREA URNS HPF: ABNORMAL /HPF
TRI-PHOS CRY URNS QL MICRO: PRESENT /HPF
UROBILINOGEN UR QL STRIP: ABNORMAL
VARIANT LYMPHS NFR BLD MANUAL: 7 % (ref 19.6–45.3)
WBC # UR STRIP: ABNORMAL /HPF
WBC NRBC COR # BLD AUTO: 21.9 10*3/MM3 (ref 3.4–10.8)

## 2025-08-24 PROCEDURE — 85025 COMPLETE CBC W/AUTO DIFF WBC: CPT | Performed by: PHYSICIAN ASSISTANT

## 2025-08-24 PROCEDURE — 36415 COLL VENOUS BLD VENIPUNCTURE: CPT | Performed by: PHYSICIAN ASSISTANT

## 2025-08-24 PROCEDURE — 84145 PROCALCITONIN (PCT): CPT | Performed by: PHYSICIAN ASSISTANT

## 2025-08-24 PROCEDURE — P9612 CATHETERIZE FOR URINE SPEC: HCPCS

## 2025-08-24 PROCEDURE — 87040 BLOOD CULTURE FOR BACTERIA: CPT | Performed by: PHYSICIAN ASSISTANT

## 2025-08-24 PROCEDURE — 81001 URINALYSIS AUTO W/SCOPE: CPT | Performed by: PHYSICIAN ASSISTANT

## 2025-08-24 PROCEDURE — 83605 ASSAY OF LACTIC ACID: CPT | Performed by: PHYSICIAN ASSISTANT

## 2025-08-24 PROCEDURE — 99285 EMERGENCY DEPT VISIT HI MDM: CPT

## 2025-08-24 PROCEDURE — 80053 COMPREHEN METABOLIC PANEL: CPT | Performed by: PHYSICIAN ASSISTANT

## 2025-08-24 PROCEDURE — 51798 US URINE CAPACITY MEASURE: CPT

## 2025-08-24 PROCEDURE — 85007 BL SMEAR W/DIFF WBC COUNT: CPT | Performed by: PHYSICIAN ASSISTANT

## 2025-08-24 PROCEDURE — 82948 REAGENT STRIP/BLOOD GLUCOSE: CPT

## 2025-08-24 PROCEDURE — 25810000003 SODIUM CHLORIDE 0.9 % SOLUTION: Performed by: PHYSICIAN ASSISTANT

## 2025-08-24 PROCEDURE — 25010000002 CEFTRIAXONE PER 250 MG: Performed by: PHYSICIAN ASSISTANT

## 2025-08-24 RX ORDER — PROCHLORPERAZINE MALEATE 10 MG
10 TABLET ORAL EVERY 6 HOURS PRN
Status: DISCONTINUED | OUTPATIENT
Start: 2025-08-24 | End: 2025-08-26 | Stop reason: HOSPADM

## 2025-08-24 RX ORDER — ACETAMINOPHEN 650 MG/1
650 SUPPOSITORY RECTAL EVERY 4 HOURS PRN
Status: DISCONTINUED | OUTPATIENT
Start: 2025-08-24 | End: 2025-08-26 | Stop reason: HOSPADM

## 2025-08-24 RX ORDER — INSULIN LISPRO 100 [IU]/ML
2-9 INJECTION, SOLUTION INTRAVENOUS; SUBCUTANEOUS
Status: DISCONTINUED | OUTPATIENT
Start: 2025-08-24 | End: 2025-08-26 | Stop reason: HOSPADM

## 2025-08-24 RX ORDER — OLANZAPINE 5 MG/1
5 TABLET, FILM COATED ORAL NIGHTLY PRN
Status: DISCONTINUED | OUTPATIENT
Start: 2025-08-24 | End: 2025-08-26 | Stop reason: HOSPADM

## 2025-08-24 RX ORDER — ROSUVASTATIN CALCIUM 20 MG/1
20 TABLET, COATED ORAL DAILY
Status: DISCONTINUED | OUTPATIENT
Start: 2025-08-25 | End: 2025-08-26 | Stop reason: HOSPADM

## 2025-08-24 RX ORDER — AMOXICILLIN 250 MG
2 CAPSULE ORAL DAILY
Status: DISCONTINUED | OUTPATIENT
Start: 2025-08-25 | End: 2025-08-26 | Stop reason: HOSPADM

## 2025-08-24 RX ORDER — METOPROLOL SUCCINATE 25 MG/1
25 TABLET, EXTENDED RELEASE ORAL
Status: DISCONTINUED | OUTPATIENT
Start: 2025-08-25 | End: 2025-08-26 | Stop reason: HOSPADM

## 2025-08-24 RX ORDER — ACETAMINOPHEN 325 MG/1
650 TABLET ORAL EVERY 4 HOURS PRN
Status: DISCONTINUED | OUTPATIENT
Start: 2025-08-24 | End: 2025-08-26 | Stop reason: HOSPADM

## 2025-08-24 RX ORDER — CHOLECALCIFEROL (VITAMIN D3) 25 MCG
5000 TABLET ORAL DAILY
Status: DISCONTINUED | OUTPATIENT
Start: 2025-08-25 | End: 2025-08-26 | Stop reason: HOSPADM

## 2025-08-24 RX ORDER — BACLOFEN 10 MG/1
20 TABLET ORAL 2 TIMES DAILY
Status: DISCONTINUED | OUTPATIENT
Start: 2025-08-24 | End: 2025-08-26 | Stop reason: HOSPADM

## 2025-08-24 RX ORDER — ALBUTEROL SULFATE 0.63 MG/3ML
0.63 SOLUTION RESPIRATORY (INHALATION) EVERY 6 HOURS PRN
Status: DISCONTINUED | OUTPATIENT
Start: 2025-08-24 | End: 2025-08-26 | Stop reason: HOSPADM

## 2025-08-24 RX ORDER — BISACODYL 5 MG/1
5 TABLET, DELAYED RELEASE ORAL DAILY PRN
Status: DISCONTINUED | OUTPATIENT
Start: 2025-08-24 | End: 2025-08-26 | Stop reason: HOSPADM

## 2025-08-24 RX ORDER — ONDANSETRON 2 MG/ML
4 INJECTION INTRAMUSCULAR; INTRAVENOUS EVERY 6 HOURS PRN
Status: DISCONTINUED | OUTPATIENT
Start: 2025-08-24 | End: 2025-08-26 | Stop reason: HOSPADM

## 2025-08-24 RX ORDER — PANTOPRAZOLE SODIUM 40 MG/1
40 TABLET, DELAYED RELEASE ORAL 2 TIMES WEEKLY
Status: DISCONTINUED | OUTPATIENT
Start: 2025-08-25 | End: 2025-08-26 | Stop reason: HOSPADM

## 2025-08-24 RX ORDER — POLYETHYLENE GLYCOL 3350 17 G/17G
17 POWDER, FOR SOLUTION ORAL DAILY PRN
Status: DISCONTINUED | OUTPATIENT
Start: 2025-08-24 | End: 2025-08-26 | Stop reason: HOSPADM

## 2025-08-24 RX ORDER — ACETAMINOPHEN 160 MG/5ML
650 SOLUTION ORAL EVERY 4 HOURS PRN
Status: DISCONTINUED | OUTPATIENT
Start: 2025-08-24 | End: 2025-08-26 | Stop reason: HOSPADM

## 2025-08-24 RX ORDER — HYDROCODONE BITARTRATE AND ACETAMINOPHEN 5; 325 MG/1; MG/1
1 TABLET ORAL EVERY 4 HOURS PRN
Refills: 0 | Status: DISCONTINUED | OUTPATIENT
Start: 2025-08-24 | End: 2025-08-26 | Stop reason: HOSPADM

## 2025-08-24 RX ORDER — BISACODYL 10 MG
10 SUPPOSITORY, RECTAL RECTAL DAILY PRN
Status: DISCONTINUED | OUTPATIENT
Start: 2025-08-24 | End: 2025-08-26 | Stop reason: HOSPADM

## 2025-08-24 RX ORDER — PHENAZOPYRIDINE HYDROCHLORIDE 100 MG/1
200 TABLET, FILM COATED ORAL ONCE
Status: COMPLETED | OUTPATIENT
Start: 2025-08-24 | End: 2025-08-24

## 2025-08-24 RX ORDER — DEXTROSE MONOHYDRATE 25 G/50ML
25 INJECTION, SOLUTION INTRAVENOUS
Status: DISCONTINUED | OUTPATIENT
Start: 2025-08-24 | End: 2025-08-26 | Stop reason: HOSPADM

## 2025-08-24 RX ORDER — NICOTINE POLACRILEX 4 MG
15 LOZENGE BUCCAL
Status: DISCONTINUED | OUTPATIENT
Start: 2025-08-24 | End: 2025-08-26 | Stop reason: HOSPADM

## 2025-08-24 RX ORDER — PRAMIPEXOLE DIHYDROCHLORIDE 1.5 MG/1
1.5 TABLET ORAL 2 TIMES DAILY
Status: DISCONTINUED | OUTPATIENT
Start: 2025-08-24 | End: 2025-08-26 | Stop reason: HOSPADM

## 2025-08-24 RX ORDER — ONDANSETRON 4 MG/1
4 TABLET, ORALLY DISINTEGRATING ORAL EVERY 6 HOURS PRN
Status: DISCONTINUED | OUTPATIENT
Start: 2025-08-24 | End: 2025-08-26 | Stop reason: HOSPADM

## 2025-08-24 RX ORDER — AMOXICILLIN 250 MG
2 CAPSULE ORAL 2 TIMES DAILY PRN
Status: DISCONTINUED | OUTPATIENT
Start: 2025-08-24 | End: 2025-08-26 | Stop reason: HOSPADM

## 2025-08-24 RX ORDER — SODIUM CHLORIDE 0.9 % (FLUSH) 0.9 %
10 SYRINGE (ML) INJECTION AS NEEDED
Status: DISCONTINUED | OUTPATIENT
Start: 2025-08-24 | End: 2025-08-26 | Stop reason: HOSPADM

## 2025-08-24 RX ORDER — ZOLPIDEM TARTRATE 5 MG/1
5 TABLET ORAL NIGHTLY
Status: DISCONTINUED | OUTPATIENT
Start: 2025-08-24 | End: 2025-08-26 | Stop reason: HOSPADM

## 2025-08-24 RX ORDER — SODIUM CHLORIDE 9 MG/ML
40 INJECTION, SOLUTION INTRAVENOUS AS NEEDED
Status: DISCONTINUED | OUTPATIENT
Start: 2025-08-24 | End: 2025-08-26 | Stop reason: HOSPADM

## 2025-08-24 RX ORDER — PHENAZOPYRIDINE HYDROCHLORIDE 100 MG/1
200 TABLET, FILM COATED ORAL 3 TIMES DAILY PRN
Status: DISCONTINUED | OUTPATIENT
Start: 2025-08-24 | End: 2025-08-26 | Stop reason: HOSPADM

## 2025-08-24 RX ORDER — SODIUM CHLORIDE 0.9 % (FLUSH) 0.9 %
10 SYRINGE (ML) INJECTION EVERY 12 HOURS SCHEDULED
Status: DISCONTINUED | OUTPATIENT
Start: 2025-08-24 | End: 2025-08-26 | Stop reason: HOSPADM

## 2025-08-24 RX ORDER — ASPIRIN 81 MG/1
81 TABLET ORAL DAILY
Status: DISCONTINUED | OUTPATIENT
Start: 2025-08-25 | End: 2025-08-26 | Stop reason: HOSPADM

## 2025-08-24 RX ORDER — IBUPROFEN 600 MG/1
1 TABLET ORAL
Status: DISCONTINUED | OUTPATIENT
Start: 2025-08-24 | End: 2025-08-26 | Stop reason: HOSPADM

## 2025-08-24 RX ADMIN — SODIUM CHLORIDE 500 ML: 9 INJECTION, SOLUTION INTRAVENOUS at 14:03

## 2025-08-24 RX ADMIN — PHENAZOPYRIDINE 200 MG: 100 TABLET ORAL at 14:04

## 2025-08-24 RX ADMIN — CEFTRIAXONE SODIUM 1000 MG: 1 INJECTION, POWDER, FOR SOLUTION INTRAMUSCULAR; INTRAVENOUS at 17:51

## 2025-08-25 LAB
ANION GAP SERPL CALCULATED.3IONS-SCNC: 12.6 MMOL/L (ref 5–15)
ANISOCYTOSIS BLD QL: ABNORMAL
BASOPHILS # BLD MANUAL: 0.22 10*3/MM3 (ref 0–0.2)
BASOPHILS NFR BLD MANUAL: 1 % (ref 0–1.5)
BUN SERPL-MCNC: 14 MG/DL (ref 8–23)
BUN/CREAT SERPL: 25.5 (ref 7–25)
CALCIUM SPEC-SCNC: 8.7 MG/DL (ref 8.6–10.5)
CHLORIDE SERPL-SCNC: 107 MMOL/L (ref 98–107)
CO2 SERPL-SCNC: 19.4 MMOL/L (ref 22–29)
CREAT SERPL-MCNC: 0.55 MG/DL (ref 0.57–1)
DEPRECATED RDW RBC AUTO: 55.9 FL (ref 37–54)
EGFRCR SERPLBLD CKD-EPI 2021: 103.8 ML/MIN/1.73
EOSINOPHIL # BLD MANUAL: 0.22 10*3/MM3 (ref 0–0.4)
EOSINOPHIL NFR BLD MANUAL: 1 % (ref 0.3–6.2)
ERYTHROCYTE [DISTWIDTH] IN BLOOD BY AUTOMATED COUNT: 17.8 % (ref 12.3–15.4)
GLUCOSE BLDC GLUCOMTR-MCNC: 136 MG/DL (ref 65–99)
GLUCOSE BLDC GLUCOMTR-MCNC: 137 MG/DL (ref 65–99)
GLUCOSE BLDC GLUCOMTR-MCNC: 178 MG/DL (ref 65–99)
GLUCOSE BLDC GLUCOMTR-MCNC: 267 MG/DL (ref 65–99)
GLUCOSE SERPL-MCNC: 191 MG/DL (ref 65–99)
HCT VFR BLD AUTO: 25.4 % (ref 34–46.6)
HGB BLD-MCNC: 8.1 G/DL (ref 12–15.9)
LYMPHOCYTES # BLD MANUAL: 1.75 10*3/MM3 (ref 0.7–3.1)
LYMPHOCYTES NFR BLD MANUAL: 2 % (ref 5–12)
MACROCYTES BLD QL SMEAR: ABNORMAL
MCH RBC QN AUTO: 28.2 PG (ref 26.6–33)
MCHC RBC AUTO-ENTMCNC: 31.9 G/DL (ref 31.5–35.7)
MCV RBC AUTO: 88.5 FL (ref 79–97)
MONOCYTES # BLD: 0.43 10*3/MM3 (ref 0.1–0.9)
NEUTROPHILS # BLD AUTO: 19.04 10*3/MM3 (ref 1.7–7)
NEUTROPHILS NFR BLD MANUAL: 87.9 % (ref 42.7–76)
NRBC BLD AUTO-RTO: 0.5 /100 WBC (ref 0–0.2)
PLAT MORPH BLD: NORMAL
PLATELET # BLD AUTO: 319 10*3/MM3 (ref 140–450)
PMV BLD AUTO: 9.6 FL (ref 6–12)
POLYCHROMASIA BLD QL SMEAR: ABNORMAL
POTASSIUM SERPL-SCNC: 4.1 MMOL/L (ref 3.5–5.2)
RBC # BLD AUTO: 2.87 10*6/MM3 (ref 3.77–5.28)
SODIUM SERPL-SCNC: 139 MMOL/L (ref 136–145)
VARIANT LYMPHS NFR BLD MANUAL: 8.1 % (ref 19.6–45.3)
WBC MORPH BLD: NORMAL
WBC NRBC COR # BLD AUTO: 21.66 10*3/MM3 (ref 3.4–10.8)

## 2025-08-25 PROCEDURE — 99222 1ST HOSP IP/OBS MODERATE 55: CPT | Performed by: INTERNAL MEDICINE

## 2025-08-25 PROCEDURE — 82948 REAGENT STRIP/BLOOD GLUCOSE: CPT

## 2025-08-25 PROCEDURE — 85025 COMPLETE CBC W/AUTO DIFF WBC: CPT | Performed by: HOSPITALIST

## 2025-08-25 PROCEDURE — 85007 BL SMEAR W/DIFF WBC COUNT: CPT | Performed by: HOSPITALIST

## 2025-08-25 PROCEDURE — 80048 BASIC METABOLIC PNL TOTAL CA: CPT | Performed by: HOSPITALIST

## 2025-08-25 PROCEDURE — 82948 REAGENT STRIP/BLOOD GLUCOSE: CPT | Performed by: HOSPITALIST

## 2025-08-25 PROCEDURE — 97165 OT EVAL LOW COMPLEX 30 MIN: CPT

## 2025-08-25 PROCEDURE — 97530 THERAPEUTIC ACTIVITIES: CPT

## 2025-08-25 PROCEDURE — 63710000001 INSULIN LISPRO (HUMAN) PER 5 UNITS: Performed by: HOSPITALIST

## 2025-08-25 RX ADMIN — PANTOPRAZOLE SODIUM 40 MG: 40 TABLET, DELAYED RELEASE ORAL at 09:49

## 2025-08-25 RX ADMIN — BACLOFEN 20 MG: 10 TABLET ORAL at 21:42

## 2025-08-25 RX ADMIN — SENNOSIDES, DOCUSATE SODIUM 2 TABLET: 50; 8.6 TABLET, FILM COATED ORAL at 09:49

## 2025-08-25 RX ADMIN — ROSUVASTATIN CALCIUM 20 MG: 20 TABLET, FILM COATED ORAL at 09:49

## 2025-08-25 RX ADMIN — METOPROLOL SUCCINATE 25 MG: 25 TABLET, EXTENDED RELEASE ORAL at 09:49

## 2025-08-25 RX ADMIN — BACLOFEN 20 MG: 10 TABLET ORAL at 00:35

## 2025-08-25 RX ADMIN — PRAMIPEXOLE DIHYDROCHLORIDE 1.5 MG: 1.5 TABLET ORAL at 23:57

## 2025-08-25 RX ADMIN — BACLOFEN 20 MG: 10 TABLET ORAL at 09:49

## 2025-08-25 RX ADMIN — INSULIN LISPRO 6 UNITS: 100 INJECTION, SOLUTION INTRAVENOUS; SUBCUTANEOUS at 22:01

## 2025-08-25 RX ADMIN — HYDROCODONE BITARTRATE AND ACETAMINOPHEN 1 TABLET: 5; 325 TABLET ORAL at 22:01

## 2025-08-25 RX ADMIN — OLANZAPINE 5 MG: 2.5 TABLET, FILM COATED ORAL at 00:44

## 2025-08-25 RX ADMIN — Medication 5000 UNITS: at 09:49

## 2025-08-25 RX ADMIN — PRAMIPEXOLE DIHYDROCHLORIDE 1.5 MG: 1.5 TABLET ORAL at 09:49

## 2025-08-25 RX ADMIN — ASPIRIN 81 MG: 81 TABLET, COATED ORAL at 09:49

## 2025-08-25 RX ADMIN — HYDROCODONE BITARTRATE AND ACETAMINOPHEN 1 TABLET: 5; 325 TABLET ORAL at 00:44

## 2025-08-25 RX ADMIN — Medication 10 ML: at 21:43

## 2025-08-26 ENCOUNTER — TELEPHONE (OUTPATIENT)
Dept: ONCOLOGY | Facility: CLINIC | Age: 65
End: 2025-08-26
Payer: MEDICARE

## 2025-08-26 ENCOUNTER — DOCUMENTATION (OUTPATIENT)
Dept: OTHER | Facility: HOSPITAL | Age: 65
End: 2025-08-26
Payer: MEDICARE

## 2025-08-26 ENCOUNTER — READMISSION MANAGEMENT (OUTPATIENT)
Dept: CALL CENTER | Facility: HOSPITAL | Age: 65
End: 2025-08-26
Payer: MEDICARE

## 2025-08-26 VITALS
OXYGEN SATURATION: 94 % | TEMPERATURE: 98.2 F | SYSTOLIC BLOOD PRESSURE: 116 MMHG | HEIGHT: 65 IN | RESPIRATION RATE: 16 BRPM | DIASTOLIC BLOOD PRESSURE: 51 MMHG | WEIGHT: 167.77 LBS | BODY MASS INDEX: 27.95 KG/M2 | HEART RATE: 81 BPM

## 2025-08-26 PROBLEM — T45.1X5A ANTINEOPLASTIC ANTIBIOTICS CAUSING ADVERSE EFFECT IN THERAPEUTIC USE: Status: ACTIVE | Noted: 2025-08-26

## 2025-08-26 PROBLEM — D72.829 LEUKOCYTOSIS: Status: ACTIVE | Noted: 2025-08-26

## 2025-08-26 PROBLEM — R82.71 ASYMPTOMATIC BACTERIURIA: Status: ACTIVE | Noted: 2025-08-24

## 2025-08-26 LAB
ANION GAP SERPL CALCULATED.3IONS-SCNC: 11.9 MMOL/L (ref 5–15)
BUN SERPL-MCNC: 14 MG/DL (ref 8–23)
BUN/CREAT SERPL: 26.9 (ref 7–25)
CALCIUM SPEC-SCNC: 8.7 MG/DL (ref 8.6–10.5)
CHLORIDE SERPL-SCNC: 108 MMOL/L (ref 98–107)
CO2 SERPL-SCNC: 21.1 MMOL/L (ref 22–29)
CREAT SERPL-MCNC: 0.52 MG/DL (ref 0.57–1)
DEPRECATED RDW RBC AUTO: 56.8 FL (ref 37–54)
EGFRCR SERPLBLD CKD-EPI 2021: 105.2 ML/MIN/1.73
ERYTHROCYTE [DISTWIDTH] IN BLOOD BY AUTOMATED COUNT: 18.4 % (ref 12.3–15.4)
GLUCOSE BLDC GLUCOMTR-MCNC: 134 MG/DL (ref 65–99)
GLUCOSE BLDC GLUCOMTR-MCNC: 181 MG/DL (ref 65–99)
GLUCOSE SERPL-MCNC: 125 MG/DL (ref 65–99)
HCT VFR BLD AUTO: 23.3 % (ref 34–46.6)
HGB BLD-MCNC: 7.6 G/DL (ref 12–15.9)
Lab: ABNORMAL
MCH RBC QN AUTO: 28.4 PG (ref 26.6–33)
MCHC RBC AUTO-ENTMCNC: 32.6 G/DL (ref 31.5–35.7)
MCV RBC AUTO: 86.9 FL (ref 79–97)
PLATELET # BLD AUTO: 279 10*3/MM3 (ref 140–450)
PMV BLD AUTO: 9.5 FL (ref 6–12)
POTASSIUM SERPL-SCNC: 4.1 MMOL/L (ref 3.5–5.2)
RBC # BLD AUTO: 2.68 10*6/MM3 (ref 3.77–5.28)
SODIUM SERPL-SCNC: 141 MMOL/L (ref 136–145)
WBC NRBC COR # BLD AUTO: 11.62 10*3/MM3 (ref 3.4–10.8)

## 2025-08-26 PROCEDURE — 80048 BASIC METABOLIC PNL TOTAL CA: CPT | Performed by: HOSPITALIST

## 2025-08-26 PROCEDURE — 82948 REAGENT STRIP/BLOOD GLUCOSE: CPT | Performed by: HOSPITALIST

## 2025-08-26 PROCEDURE — 63710000001 INSULIN LISPRO (HUMAN) PER 5 UNITS: Performed by: HOSPITALIST

## 2025-08-26 PROCEDURE — 82948 REAGENT STRIP/BLOOD GLUCOSE: CPT

## 2025-08-26 PROCEDURE — 99232 SBSQ HOSP IP/OBS MODERATE 35: CPT | Performed by: INTERNAL MEDICINE

## 2025-08-26 PROCEDURE — 85027 COMPLETE CBC AUTOMATED: CPT | Performed by: HOSPITALIST

## 2025-08-26 RX ORDER — PANTOPRAZOLE SODIUM 40 MG/1
40 TABLET, DELAYED RELEASE ORAL 2 TIMES WEEKLY
Start: 2025-08-28

## 2025-08-26 RX ORDER — OLANZAPINE 5 MG/1
5 TABLET, FILM COATED ORAL NIGHTLY
Qty: 30 TABLET | Refills: 1 | Status: SHIPPED | OUTPATIENT
Start: 2025-08-26

## 2025-08-26 RX ORDER — AMOXICILLIN 250 MG
2 CAPSULE ORAL DAILY
Start: 2025-08-26

## 2025-08-26 RX ADMIN — ROSUVASTATIN CALCIUM 20 MG: 20 TABLET, FILM COATED ORAL at 08:52

## 2025-08-26 RX ADMIN — SENNOSIDES, DOCUSATE SODIUM 2 TABLET: 50; 8.6 TABLET, FILM COATED ORAL at 08:52

## 2025-08-26 RX ADMIN — BACLOFEN 20 MG: 10 TABLET ORAL at 08:52

## 2025-08-26 RX ADMIN — PRAMIPEXOLE DIHYDROCHLORIDE 1.5 MG: 1.5 TABLET ORAL at 08:52

## 2025-08-26 RX ADMIN — HYDROCODONE BITARTRATE AND ACETAMINOPHEN 1 TABLET: 5; 325 TABLET ORAL at 02:32

## 2025-08-26 RX ADMIN — ASPIRIN 81 MG: 81 TABLET, COATED ORAL at 08:52

## 2025-08-26 RX ADMIN — INSULIN LISPRO 2 UNITS: 100 INJECTION, SOLUTION INTRAVENOUS; SUBCUTANEOUS at 12:10

## 2025-08-26 RX ADMIN — Medication 10 ML: at 08:52

## 2025-08-26 RX ADMIN — Medication 5000 UNITS: at 08:52

## 2025-08-27 ENCOUNTER — TRANSITIONAL CARE MANAGEMENT TELEPHONE ENCOUNTER (OUTPATIENT)
Dept: CALL CENTER | Facility: HOSPITAL | Age: 65
End: 2025-08-27
Payer: MEDICARE

## 2025-08-29 LAB
BACTERIA SPEC AEROBE CULT: NORMAL
BACTERIA SPEC AEROBE CULT: NORMAL

## (undated) DEVICE — BANDAGE,GAUZE,BULKEE II,4.5"X4.1YD,STRL: Brand: MEDLINE

## (undated) DEVICE — CONTAINER,SPECIMEN,OR STERILE,4OZ: Brand: MEDLINE

## (undated) DEVICE — DRSNG GZ PETROLTM XEROFORM CURAD 1X8IN STRL

## (undated) DEVICE — KT DRN EVAC WND PVC PCH WTROC RND 10F400

## (undated) DEVICE — TRY SKINPREP DRYPREP

## (undated) DEVICE — DRSNG TELFA PAD NONADH STR 1S 3X8IN

## (undated) DEVICE — SYR LUERLOK 50ML

## (undated) DEVICE — 1016 S-DRAPE IRRIG POUCH 10/BOX: Brand: STERI-DRAPE™

## (undated) DEVICE — DUAL CUT SAGITTAL BLADE

## (undated) DEVICE — ADHS SKIN DERMABOND TOP ADVANCED

## (undated) DEVICE — BIT DRL SCRW PERIPH 2.7MM

## (undated) DEVICE — DRSNG TELFA PAD NONADH STR 1S 3X4IN

## (undated) DEVICE — GLV SURG BIOGEL LTX PF 8

## (undated) DEVICE — GLV SURG SENSICARE W/ALOE PF LF 8 STRL

## (undated) DEVICE — PK SHLDR OPN 40

## (undated) DEVICE — SUT VIC 0 CT1 36IN J946H

## (undated) DEVICE — PREP SOL POVIDONE/IODINE BT 4OZ

## (undated) DEVICE — APPL CHLORAPREP W/TINT 26ML ORNG

## (undated) DEVICE — SOL ISO/ALC RUB 70PCT 4OZ

## (undated) DEVICE — TUBING, SUCTION, 1/4" X 20', STRAIGHT: Brand: MEDLINE INDUSTRIES, INC.

## (undated) DEVICE — DRSNG SURESITE123 4X10IN

## (undated) DEVICE — MAT FLR ABSORBENT LG 4FT 10 2.5FT

## (undated) DEVICE — 3M™ IOBAN™ 2 ANTIMICROBIAL INCISE DRAPE 6650EZ: Brand: IOBAN™ 2

## (undated) DEVICE — HANDPIECE SET WITH COAXIAL HIGH FLOW TIP AND SUCTION TUBE: Brand: INTERPULSE

## (undated) DEVICE — ANTIBACTERIAL UNDYED BRAIDED (POLYGLACTIN 910), SYNTHETIC ABSORBABLE SUTURE: Brand: COATED VICRYL

## (undated) DEVICE — DRAPE,U/ SHT,SPLIT,PLAS,STERIL: Brand: MEDLINE

## (undated) DEVICE — GLV SURG BIOGEL LTX PF 8 1/2

## (undated) DEVICE — IMMOB SHLDR PAD2 UNIV SM

## (undated) DEVICE — VIOLET BRAIDED (POLYGLACTIN 910), SYNTHETIC ABSORBABLE SUTURE: Brand: COATED VICRYL

## (undated) DEVICE — PIN STNMAN 3.2MM 9IN
Type: IMPLANTABLE DEVICE | Site: SHOULDER | Status: NON-FUNCTIONAL
Removed: 2018-10-22